# Patient Record
Sex: MALE | Race: BLACK OR AFRICAN AMERICAN | Employment: OTHER | URBAN - METROPOLITAN AREA
[De-identification: names, ages, dates, MRNs, and addresses within clinical notes are randomized per-mention and may not be internally consistent; named-entity substitution may affect disease eponyms.]

---

## 2017-01-09 ENCOUNTER — TELEPHONE (OUTPATIENT)
Dept: INTERNAL MEDICINE | Facility: CLINIC | Age: 72
End: 2017-01-09

## 2017-01-09 NOTE — TELEPHONE ENCOUNTER
Pt's daughter, Annetta called  She would like a call back to discuss all of her father's medications and how he should be taking them  Please call [phone] to leave a detailed message

## 2017-01-16 ENCOUNTER — TELEPHONE (OUTPATIENT)
Dept: INTERNAL MEDICINE | Facility: CLINIC | Age: 72
End: 2017-01-16

## 2017-01-16 NOTE — TELEPHONE ENCOUNTER
Pt's daughter Annetta is calling, asking if pt has recently had his prostate lab checked. Last lab for PSA was March 2016. Per daughter, Pt has not seen a Urologist, but he is being prescribed 2 meds by a Urologist. This was not mentioned when he came in Dec for exam with Dr. Castillo. Advised daughter to schedule f/u with PCP to discuss her concerns with prostate and have labs done.

## 2017-02-15 ENCOUNTER — OFFICE VISIT (OUTPATIENT)
Dept: INTERNAL MEDICINE | Facility: CLINIC | Age: 72
End: 2017-02-15
Payer: COMMERCIAL

## 2017-02-15 ENCOUNTER — RADIANT APPOINTMENT (OUTPATIENT)
Dept: GENERAL RADIOLOGY | Facility: CLINIC | Age: 72
End: 2017-02-15
Attending: INTERNAL MEDICINE
Payer: COMMERCIAL

## 2017-02-15 VITALS
BODY MASS INDEX: 42.15 KG/M2 | OXYGEN SATURATION: 90 % | TEMPERATURE: 98.3 F | HEART RATE: 100 BPM | HEIGHT: 65 IN | WEIGHT: 253 LBS | DIASTOLIC BLOOD PRESSURE: 80 MMHG | SYSTOLIC BLOOD PRESSURE: 130 MMHG

## 2017-02-15 DIAGNOSIS — R07.89 CHEST WALL PAIN: ICD-10-CM

## 2017-02-15 DIAGNOSIS — R05.9 COUGH: ICD-10-CM

## 2017-02-15 DIAGNOSIS — R07.89 CHEST WALL PAIN: Primary | ICD-10-CM

## 2017-02-15 DIAGNOSIS — J44.9 CHRONIC OBSTRUCTIVE PULMONARY DISEASE, UNSPECIFIED COPD TYPE (H): Chronic | ICD-10-CM

## 2017-02-15 DIAGNOSIS — E66.01 MORBID OBESITY DUE TO EXCESS CALORIES (H): ICD-10-CM

## 2017-02-15 PROCEDURE — 99214 OFFICE O/P EST MOD 30 MIN: CPT | Performed by: INTERNAL MEDICINE

## 2017-02-15 PROCEDURE — 71020 XR CHEST 2 VW: CPT

## 2017-02-15 RX ORDER — ALBUTEROL SULFATE 90 UG/1
2 AEROSOL, METERED RESPIRATORY (INHALATION) EVERY 6 HOURS PRN
Qty: 3 INHALER | Refills: 1 | Status: SHIPPED | OUTPATIENT
Start: 2017-02-15 | End: 2017-12-06

## 2017-02-15 NOTE — MR AVS SNAPSHOT
After Visit Summary   2/15/2017    Nahun Villalobos    MRN: [id number]           Patient Information     Date Of Birth          1945        Visit Information        Provider Department      2/15/2017 9:20 AM Olgeario Castillo MD [facility]        Today's Diagnoses     Chest wall pain    -  1    Cough        Morbid obesity due to excess calories (H)        Chronic obstructive pulmonary disease, unspecified COPD type (H)           Follow-ups after your visit        Follow-up notes from your care team     Return if symptoms worsen or fail to improve.      Who to contact     If you have questions or need follow up information about today's clinic visit or your schedule please contact [facility] directly at [phone].  Normal or non-critical lab and imaging results will be communicated to you by [employer], letter or phone within 4 business days after the clinic has received the results. If you do not hear from us within 7 days, please contact the clinic through [employer] or phone. If you have a critical or abnormal lab result, we will notify you by phone as soon as possible.  Submit refill requests through [employer] or call your pharmacy and they will forward the refill request to us. Please allow 3 business days for your refill to be completed.          Additional Information About Your Visit        MyChart Information     [employer] gives you secure access to your electronic health record. If you see a primary care provider, you can also send messages to your care team and make appointments. If you have questions, please call your primary care clinic.  If you do not have a primary care provider, please call [phone] and they will assist you.        Care EveryWhere ID     This is your Care EveryWhere ID. This could be used by other organizations to access your [facility] medical records  [id number]        Your Vitals Were     Pulse Temperature Height  "Pulse Oximetry BMI (Body Mass Index)       100 98.3  F (36.8  C) (Oral) 5' 5\" (1.651 m) 90% 42.1 kg/m2        Blood Pressure from Last 3 Encounters:   02/15/17 130/80   12/13/16 128/72   09/20/16 152/84    Weight from Last 3 Encounters:   02/15/17 253 lb (114.8 kg)   12/13/16 257 lb (116.6 kg)   09/20/16 247 lb 14.4 oz (112.4 kg)                 Today's Medication Changes          These changes are accurate as of: 2/15/17  9:56 AM.  If you have any questions, ask your nurse or doctor.               Start taking these medicines.        Dose/Directions    fluticasone-salmeterol 250-50 MCG/DOSE diskus inhaler   Commonly known as:  ADVAIR   Used for:  Chronic obstructive pulmonary disease, unspecified COPD type (H)   Started by:  Olegario Castillo MD        Dose:  1 puff   Inhale 1 puff into the lungs 2 times daily   Quantity:  1 Inhaler   Refills:  5            Where to get your medicines      These medications were sent to [facility]/pharmacy #[id number] - [address] [address]     Phone:  [phone]     albuterol 108 (90 BASE) MCG/ACT Inhaler    fluticasone-salmeterol 250-50 MCG/DOSE diskus inhaler                Primary Care Provider Office Phone # Fax #    Olegario Castillo [phone]       [facility] [address] [facility] [address]        Thank you!     Thank you for choosing [facility]  for your care. Our goal is always to provide you with excellent care. Hearing back from our patients is one way we can continue to improve our services. Please take a few minutes to complete the written survey that you may receive in the mail after your visit with us. Thank you!             Your Updated Medication List - Protect others around you: Learn how to safely use, store and throw away your medicines at www.disposemymeds.org.          This list is accurate as of: 2/15/17  9:56 AM.  Always use your most recent med " list.                   Brand Name Dispense Instructions for use    albuterol 108 (90 BASE) MCG/ACT Inhaler    PROAIR HFA/PROVENTIL HFA/VENTOLIN HFA    3 Inhaler    Inhale 2 puffs into the lungs every 6 hours as needed for shortness of breath / dyspnea or wheezing       aspirin 81 MG tablet     30 tablet    Take 1 tablet (81 mg) by mouth daily       atorvastatin 10 MG tablet    LIPITOR    90 tablet    Take 1 tablet (10 mg) by mouth daily       blood glucose monitoring lancets     1 Box    Use to test blood sugar 2 times daily or as directed.       blood glucose monitoring test strip    ACCU-CHEK CHACHA    60 strip    Use to test blood sugar 2 times daily or as directed.       erythromycin ophthalmic ointment    ROMYCIN    1 Tube    Place 0.25 inches Into the left eye At Bedtime       fluticasone-salmeterol 250-50 MCG/DOSE diskus inhaler    ADVAIR    1 Inhaler    Inhale 1 puff into the lungs 2 times daily       furosemide 20 MG tablet    LASIX    360 tablet    Take 2 tablets (40 mg) by mouth 2 times daily       lisinopril 40 MG tablet    PRINIVIL/ZESTRIL    90 tablet    Take 1 tablet (40 mg) by mouth daily       metFORMIN 500 MG tablet    GLUCOPHAGE    180 tablet    Take 1 tablet (500 mg) by mouth 2 times daily (with meals)       order for DME      Equipment delivered; Respironics 760S BIPAP with heated humidification and heated tubing.  Pressure 15/7cm. Respironics Syeda View FF mask (Medium)       * order for DME     1 Device    Oxygen 2 Li/min via nasal cannula at night       * order for DME     1 Device    Oxygen 2 Li/min at night via nasal cannula       * order for DME     1 Package    Equipment being ordered: diabetic shoes, 1 pair       * Notice:  This list has 3 medication(s) that are the same as other medications prescribed for you. Read the directions carefully, and ask your doctor or other care provider to review them with you.

## 2017-02-15 NOTE — NURSING NOTE
"Chief Complaint   Patient presents with     Chest Pain       Initial /80 (BP Location: Left arm, Patient Position: Chair, Cuff Size: Adult Large)  Pulse 100  Temp 98.3  F (36.8  C) (Oral)  Ht 5' 5\" (1.651 m)  Wt 253 lb (114.8 kg)  SpO2 90%  BMI 42.1 kg/m2 Estimated body mass index is 42.1 kg/(m^2) as calculated from the following:    Height as of this encounter: 5' 5\" (1.651 m).    Weight as of this encounter: 253 lb (114.8 kg).  Medication Reconciliation: complete   Betina Renee CMA      "

## 2017-02-15 NOTE — PROGRESS NOTES
"  SUBJECTIVE:                                                    Keely Villalobos is a 71 year old male who presents to clinic today for the following health issues:      Chest Wall Pain      Onset: 1 month     Description (location/character/radiation/duration): Left side chest pain/ \"sore muscle\"     Intensity:  moderate    Accompanying signs and symptoms:        Shortness of breath: no       Sweating: YES       Nausea/vomitting: no       Palpitations: no        Other (fevers/chills/cough/heartburn/lightheadedness): YES- extreme coughing , lightheadedness, hard time catching breath     History (similar episodes/previous evaluation): Hx copd     Precipitating or alleviating factors:       Worse with exertion: no- worse with bending        Worse with breathing: no        Related to eating: no        Better with burping: no     Therapies tried and outcome: None     GATED MYOCARDIAL PERFUSION SCINTIGRAPHY WITH INTRAVENOUS PHARMACOLOGIC  VASODILATATION LEXISCAN -TWO DAY STUDY     6/4/2015 10:23 AM KEELY VILLALOBOS 69 years Male 1945.     Indication/Clinical History: Shortness of breath with edema  Rest performed on 5/26/2015 and stress performed on 6/4/2015  Impression  1. Myocardial perfusion imaging using single isotope technique  demonstrated moderate size relatively fixed  inferior/inferoseptal/inferolateral defect extending from the base to  the apex rest greater than stress most likely consistent with  diaphragm attenuation/bowel uptake artifact; no significant perfusion  defect consistent with significant ischemia or infarct..   2. Gated images demonstrated normal size left ventricle with good  overall contractility and no significant regional wall motion  abnormality. The left ventricular systolic function is normal with  ejection fraction of 67%.  3. Compared to the prior study from not applicable .  Problem list and histories reviewed & adjusted, as indicated.  Additional history: as " documented    Patient Active Problem List   Diagnosis     Essential hypertension, benign     Tobacco use disorder     Lumbago     Impotence of organic origin     Advance care planning     Polyp of colon     Elevated PSA     Sleep related hypoventilation/hypoxemia in other disease     Hyperlipidemia LDL goal <100     Morbid obesity due to excess calories (H)     BPPV (benign paroxysmal positional vertigo), unspecified laterality     Chronic obstructive pulmonary disease, unspecified COPD type (H)     Type 2 diabetes mellitus without complication, without long-term current use of insulin (H)     Past Surgical History   Procedure Laterality Date     C nonspecific procedure       cholecystectomy     Cholecystectomy       Appendectomy         Social History   Substance Use Topics     Smoking status: Former Smoker     Packs/day: 1.00     Years: 52.00     Types: Cigarettes     Quit date: 6/1/2013     Smokeless tobacco: Never Used     Alcohol use No     Family History   Problem Relation Age of Onset     Hypertension Mother      C.A.D. Mother      ANEURYSM     Cancer - colorectal Mother      CANCER Mother      OVARIAN     Hypertension Sister      Breast Cancer Sister      CEREBROVASCULAR DISEASE Father          Current Outpatient Prescriptions   Medication Sig Dispense Refill     order for DME Equipment being ordered: diabetic shoes, 1 pair 1 Package 0     lisinopril (PRINIVIL/ZESTRIL) 40 MG tablet Take 1 tablet (40 mg) by mouth daily 90 tablet 3     atorvastatin (LIPITOR) 10 MG tablet Take 1 tablet (10 mg) by mouth daily 90 tablet 3     metFORMIN (GLUCOPHAGE) 500 MG tablet Take 1 tablet (500 mg) by mouth 2 times daily (with meals) 180 tablet 3     furosemide (LASIX) 20 MG tablet Take 2 tablets (40 mg) by mouth 2 times daily 360 tablet 3     erythromycin (ROMYCIN) ophthalmic ointment Place 0.25 inches Into the left eye At Bedtime 1 Tube 0     albuterol (PROAIR HFA, PROVENTIL HFA, VENTOLIN HFA) 108 (90 BASE) MCG/ACT inhaler  "Inhale 2 puffs into the lungs every 6 hours as needed for shortness of breath / dyspnea or wheezing 3 Inhaler 1     aspirin 81 MG tablet Take 1 tablet (81 mg) by mouth daily 30 tablet 11     order for DME Oxygen 2 Li/min  at night via nasal cannula 1 Device 0     order for DME Oxygen 2 Li/min via nasal cannula  at night 1 Device 0     order for DME Equipment delivered; Respironics 760S BIPAP with heated humidification and heated tubing.  Pressure 15/7cm. Respironics Syeda View FF mask (Medium)       blood glucose (ACCU-CHEK CHACHA) test strip Use to test blood sugar 2 times daily or as directed. 60 strip 6     blood glucose monitoring (SOFTCLIX) lancets Use to test blood sugar 2 times daily or as directed. 1 Box 6     BP Readings from Last 3 Encounters:   12/13/16 128/72   09/20/16 152/84   05/05/16 136/78    Wt Readings from Last 3 Encounters:   12/13/16 257 lb (116.6 kg)   09/20/16 247 lb 14.4 oz (112.4 kg)   05/05/16 256 lb (116.1 kg)            ROS:  C: NEGATIVE for fever, chills, change in weight  E: NEGATIVE for vision changes or irritation  E/M: NEGATIVE for ear, mouth and throat problems  CV: NEGATIVE for chest pain, palpitations or peripheral edema  GI: NEGATIVE for nausea, abdominal pain, heartburn, or change in bowel habits  : NEGATIVE for frequency, dysuria, or hematuria  M: NEGATIVE for significant arthralgias or myalgia  H: NEGATIVE for bleeding problems  P: NEGATIVE for changes in mood or affect    OBJECTIVE:                                                    /80 (BP Location: Left arm, Patient Position: Chair, Cuff Size: Adult Large)  Pulse 100  Temp 98.3  F (36.8  C) (Oral)  Ht 5' 5\" (1.651 m)  Wt 253 lb (114.8 kg)  SpO2 90%  BMI 42.1 kg/m2  Body mass index is 42.1 kg/(m^2).  GENERAL:  alert and no distress  EYES: Eyes grossly normal to inspection, extraocular movements - intact, and PERRL  HENT: ear canals- normal; TMs- normal; Nose- normal; Mouth- no ulcers, no lesions  NECK: no tenderness, " no adenopathy, no asymmetry, no masses, no stiffness; thyroid- normal to palpation  RESP: lungs clear to auscultation - no rales, no rhonchi, no wheezes  CV: regular rates and rhythm, normal S1 S2, no S3 or S4 and no murmur, no click or rub - ++ chest wall pain left upper chest reproducible as mimicking symptoms  ABDOMEN: obese  MS: extremities- no gross deformities noted  PSYCH: Alert and oriented times 3; speech- coherent , normal rate and volume; able to articulate logical thoughts, able to abstract reason, no tangential thoughts, no hallucinations or delusions, affect- normal     ASSESSMENT/PLAN:                                                      (R07.89) Chest wall pain  (primary encounter diagnosis)  Comment: as ordered, appears as distinct chest wall pain  Plan: XR Chest 2 Views            (R05) Cough  Comment: start trial inhaler  Plan: XR Chest 2 Views            (E66.01) Morbid obesity due to excess calories (H)  Comment: weight loss discussed  Plan:     (J44.9) Chronic obstructive pulmonary disease, unspecified COPD type (H)  Comment: start trial Advair  Plan: albuterol (PROAIR HFA/PROVENTIL HFA/VENTOLIN         HFA) 108 (90 BASE) MCG/ACT Inhaler              See Patient Instructions    Olegario Castillo MD  [facility]    25 minutes spent with this patient, face to face, discussing treatment options for listed problems above as well as side effects of appropriate medications.  Counseling time extended beyond 50% of the clinic visit.  Medication dosing, treatment plan and follow-up were discussed. Also reviewed need for primary care testing for patient.

## 2017-03-01 ENCOUNTER — PRE VISIT (OUTPATIENT)
Dept: UROLOGY | Facility: CLINIC | Age: 72
End: 2017-03-01

## 2017-03-02 ENCOUNTER — MEDICAL CORRESPONDENCE (OUTPATIENT)
Dept: HEALTH INFORMATION MANAGEMENT | Facility: CLINIC | Age: 72
End: 2017-03-02

## 2017-03-14 ENCOUNTER — TELEPHONE (OUTPATIENT)
Dept: NURSING | Facility: CLINIC | Age: 72
End: 2017-03-14

## 2017-03-14 NOTE — TELEPHONE ENCOUNTER
Pt has a head cold, very congested.  Last few days ears have felt plugged, fluid filled.  Is using Coricidin without relief.  Wondering with his htn, okay to try a few days of a real decongestant- like pseudefed?  Please let him know, or any other suggestions you may have.

## 2017-03-14 NOTE — TELEPHONE ENCOUNTER
Would avoid any traditional OTC antihistamines as may increase BP.  May benefit form trial of Flonase as well as Ana pot.

## 2017-03-31 ENCOUNTER — OFFICE VISIT (OUTPATIENT)
Dept: UROLOGY | Facility: CLINIC | Age: 72
End: 2017-03-31

## 2017-03-31 VITALS
SYSTOLIC BLOOD PRESSURE: 135 MMHG | BODY MASS INDEX: 43.47 KG/M2 | HEART RATE: 80 BPM | WEIGHT: 260.9 LBS | HEIGHT: 65 IN | DIASTOLIC BLOOD PRESSURE: 85 MMHG

## 2017-03-31 DIAGNOSIS — R97.20 ELEVATED PROSTATE SPECIFIC ANTIGEN (PSA): ICD-10-CM

## 2017-03-31 DIAGNOSIS — R39.15 URINARY URGENCY: Primary | ICD-10-CM

## 2017-03-31 LAB — PSA SERPL-MCNC: 1.74 UG/L (ref 0–4)

## 2017-03-31 RX ORDER — TAMSULOSIN HYDROCHLORIDE 0.4 MG/1
0.4 CAPSULE ORAL DAILY
Qty: 60 CAPSULE | Refills: 3 | Status: SHIPPED | OUTPATIENT
Start: 2017-03-31 | End: 2018-05-11

## 2017-03-31 ASSESSMENT — ENCOUNTER SYMPTOMS
FEVER: 0
DYSURIA: 0
TROUBLE SWALLOWING: 0
ARTHRALGIAS: 0
ABDOMINAL PAIN: 0
SNORES LOUDLY: 1
PANIC: 0
SORE THROAT: 0
PARALYSIS: 0
LIGHT-HEADEDNESS: 1
DEPRESSION: 1
MUSCLE CRAMPS: 1
MUSCLE WEAKNESS: 0
SINUS CONGESTION: 0
WEIGHT GAIN: 1
COUGH: 1
TREMORS: 0
FATIGUE: 0
HALLUCINATIONS: 0
LOSS OF CONSCIOUSNESS: 0
NERVOUS/ANXIOUS: 0
ALTERED TEMPERATURE REGULATION: 0
EYE PAIN: 0
DECREASED APPETITE: 0
RESPIRATORY PAIN: 1
HOARSE VOICE: 1
POLYPHAGIA: 1
EYE WATERING: 1
HEARTBURN: 0
JAUNDICE: 0
EYE IRRITATION: 0
RECTAL BLEEDING: 0
STIFFNESS: 0
JOINT SWELLING: 0
HYPERTENSION: 1
SMELL DISTURBANCE: 0
NAUSEA: 0
CLAUDICATION: 1
DIZZINESS: 0
SPUTUM PRODUCTION: 1
EXERCISE INTOLERANCE: 1
MYALGIAS: 0
ORTHOPNEA: 0
HEMOPTYSIS: 0
BLOATING: 0
BACK PAIN: 0
NECK MASS: 0
VOMITING: 0
NECK PAIN: 0
SINUS PAIN: 0
TASTE DISTURBANCE: 0
EYE REDNESS: 0
NUMBNESS: 1
INCREASED ENERGY: 0
LEG PAIN: 1
LEG SWELLING: 1
INSOMNIA: 0
BLOOD IN STOOL: 0
PALPITATIONS: 0
RECTAL PAIN: 0
DIFFICULTY URINATING: 1
DOUBLE VISION: 1
DYSPNEA ON EXERTION: 1
NIGHT SWEATS: 0
TINGLING: 1
SHORTNESS OF BREATH: 1
HEADACHES: 0
DIARRHEA: 1
HYPOTENSION: 0
POLYDIPSIA: 1
POSTURAL DYSPNEA: 0
FLANK PAIN: 0
MEMORY LOSS: 0
TACHYCARDIA: 0
SEIZURES: 0
WHEEZING: 1
SPEECH CHANGE: 0
WEAKNESS: 0
DECREASED CONCENTRATION: 0
WEIGHT LOSS: 0
CHILLS: 0
SYNCOPE: 0
COUGH DISTURBING SLEEP: 0
CONSTIPATION: 0
HEMATURIA: 0
BOWEL INCONTINENCE: 1
SLEEP DISTURBANCES DUE TO BREATHING: 0
DISTURBANCES IN COORDINATION: 0

## 2017-03-31 ASSESSMENT — PAIN SCALES - GENERAL: PAINLEVEL: NO PAIN (0)

## 2017-03-31 NOTE — PROGRESS NOTES
It was my pleasure to see Nahun Villalobos a 71 year old year old male today. Patient was seen in consultation for elevated PSA and lower urinary tract symptoms.    HPI:   [facility] previously seen at [facility].   Was seen for elevated PSA and lower urinary tract symptoms.   Patient s/p prostate biopsy four years ago for elevated PSA.   PSA was 7.5 six months ago and six months prior to that was 5.22.   Had lower urinary tract symptoms similar to listed below. Was prescribed tamsulosin and finasteride but unclear if patient started these rx.     Patient with urgency, nocturia 2-4x, frequency, incomplete emptying.   Notes variable stream from dribble to moderate stream.       Past Medical History:   Diagnosis Date     DM (diabetes mellitus) (H)      Essential hypertension, benign      Hemorrhage of rectum and anus      Obesity      Personal history of colonic polyps      Tobacco use disorder        Past Surgical History:   Procedure Laterality Date     APPENDECTOMY       C NONSPECIFIC PROCEDURE      cholecystectomy     CHOLECYSTECTOMY         Family History   Problem Relation Age of Onset     Hypertension Mother      C.A.D. Mother      ANEURYSM     Cancer - colorectal Mother      CANCER Mother      OVARIAN     Hypertension Sister      Breast Cancer Sister      CEREBROVASCULAR DISEASE Father        Current Outpatient Prescriptions   Medication Sig Dispense Refill     albuterol (PROAIR HFA/PROVENTIL HFA/VENTOLIN HFA) 108 (90 BASE) MCG/ACT Inhaler Inhale 2 puffs into the lungs every 6 hours as needed for shortness of breath / dyspnea or wheezing 3 Inhaler 1     fluticasone-salmeterol (ADVAIR) 250-50 MCG/DOSE diskus inhaler Inhale 1 puff into the lungs 2 times daily 1 Inhaler 5     order for DME Equipment being ordered: diabetic shoes, 1 pair 1 Package 0     lisinopril (PRINIVIL/ZESTRIL) 40 MG tablet Take 1 tablet (40 mg) by mouth daily 90 tablet 3     atorvastatin (LIPITOR) 10 MG tablet Take 1 tablet (10 mg) by mouth  "daily 90 tablet 3     metFORMIN (GLUCOPHAGE) 500 MG tablet Take 1 tablet (500 mg) by mouth 2 times daily (with meals) 180 tablet 3     furosemide (LASIX) 20 MG tablet Take 2 tablets (40 mg) by mouth 2 times daily 360 tablet 3     order for DME Oxygen 2 Li/min  at night via nasal cannula 1 Device 0     erythromycin (ROMYCIN) ophthalmic ointment Place 0.25 inches Into the left eye At Bedtime 1 Tube 0     order for DME Oxygen 2 Li/min via nasal cannula  at night 1 Device 0     aspirin 81 MG tablet Take 1 tablet (81 mg) by mouth daily 30 tablet 11     order for DME Equipment delivered; Respironics 760S BIPAP with heated humidification and heated tubing.  Pressure 15/7cm. Respironics Syeda View FF mask (Medium)       blood glucose (ACCU-CHEK CHACHA) test strip Use to test blood sugar 2 times daily or as directed. 60 strip 6     blood glucose monitoring (SOFTCLIX) lancets Use to test blood sugar 2 times daily or as directed. 1 Box 6       ALLERGIES: No known drug allergies      REVIEW OF SYSTEMS:  Skin: negative  Eyes: negative  Ears/Nose/Throat: negative  Respiratory: No shortness of breath, dyspnea on exertion, cough, or hemoptysis  Cardiovascular: negative  Gastrointestinal: negative  Genitourinary: as above  Musculoskeletal: negative  Neurologic: negative  Psychiatric: negative  Hematologic/Lymphatic/Immunologic: negative  Endocrine: negative      GENERAL PHYSICAL EXAM:   Vitals: /85  Pulse 80  Ht 1.651 m (5' 5\")  Wt 118.3 kg (260 lb 14.4 oz)  BMI 43.42 kg/m2  Body mass index is 43.42 kg/(m^2).  Constitutional: healthy, alert and no distress  Head: Normocephalic  Neck: Neck supple  Cardiovascular: negative  Respiratory: negative,  Gastrointestinal: Abdomen soft, non-tender.  Musculoskeletal: extremities normal-  Skin: no suspicious lesions or rashes  Neurologic: Gait normal  Psychiatric: affect normal/bright and mentation appears normal.      RADIOLOGY: The following tests were reviewed: Need to complete the " following Radiology exams prior to the office appointment:  LABS: The last test results for Needs to complete the necessary tests prior to appointment: were reviewed.     ASSESSMENT: 72 y/o M with fluctuating PSA, s/p one negative biopsy as well as lower urinary tract symptoms      PLAN:   Repeat PSA today   MRI prostate to better assess the prostate   Start trial of tamsulosin   Follow up in 6-8 weeks       I spent over 15 minutes with the patient.  Over half this time was spent on counseling for elevated PSA and lower urinary tract symptoms.      Answers for HPI/ROS submitted by the patient on 3/31/2017   General Symptoms: Yes  Skin Symptoms: No  HENT Symptoms: Yes  EYE SYMPTOMS: Yes  HEART SYMPTOMS: Yes  LUNG SYMPTOMS: Yes  INTESTINAL SYMPTOMS: Yes  URINARY SYMPTOMS: Yes  REPRODUCTIVE SYMPTOMS: No  SKELETAL SYMPTOMS: Yes  BLOOD SYMPTOMS: No  NERVOUS SYSTEM SYMPTOMS: Yes  MENTAL HEALTH SYMPTOMS: Yes  Fever: No  Loss of appetite: No  Weight loss: No  Weight gain: Yes  Fatigue: No  Night sweats: No  Chills: No  Increased stress: No  Excessive hunger: Yes  Excessive thirst: Yes  Feeling hot or cold when others believe the temperature is normal: No  Loss of height: Yes  Post-operative complications: No  Surgical site pain: No  Hallucinations: No  Change in or Loss of Energy: No  Hyperactivity: No  Confusion: No  Ear pain: No  Ear discharge: No  Hearing loss: Yes  Tinnitus: No  Nosebleeds: No  Congestion: No  Sinus pain: No  Trouble swallowing: No   Voice hoarseness: Yes  Mouth sores: No  Sore throat: No  Tooth pain: No  Gum tenderness: No  Bleeding gums: No  Change in taste: No  Change in sense of smell: No  Dry mouth: Yes  Hearing aid used: No  Neck lump: No  Eye pain: No  Vision loss: Yes  Dry eyes: Yes  Watery eyes: Yes  Eye bulging: No  Double vision: Yes  Flashing of lights: No  Spots: Yes  Floaters: Yes  Redness: No  Crossed eyes: No  Tunnel Vision: No  Yellowing of eyes: No  Eye irritation: No  Cough:  Yes  Sputum or phlegm: Yes  Coughing up blood: No  Difficulty breating or shortness of breath: Yes  Snoring: Yes  Wheezing: Yes  Difficulty breathing on exertion: Yes  Respiratory pain: Yes  Nighttime Cough: No  Difficulty breathing when lying flat: No  Chest pain or pressure: Yes  Fast or irregular heartbeat: No  Pain in legs with walking: Yes  Swelling in feet or ankles: Yes  Trouble breathing while lying down: No  Fingers or Toes appear blue: No  High blood pressure: Yes  Low blood pressure: No  Fainting: No  Murmurs: No  Chest pain on exertion: No  Chest pain at rest: No  Cramping pain in leg during exercise: Yes  Pacemaker: No  Varicose veins: Yes  Edema or swelling: Yes  Fast heart beat: No  Wake up at night with shortness of breath: No  Heart flutters: No  Light-headedness: Yes  Exercise intolerance: Yes  Heart burn or indigestion: No  Nausea: No  Vomiting: No  Abdominal pain: No  Bloating: No  Constipation: No  Diarrhea: Yes  Blood in stool: No  Black stools: No  Rectal or Anal pain: No  Fecal incontinence: Yes  Rectal bleeding: No  Yellowing of skin or eyes: No  Vomit with blood: No  Change in stools: Yes  Trouble holding urine or incontinence: Yes  Pain or burning: No  Trouble starting or stopping: Yes  Increased frequency of urination: Yes  Blood in urine: No  Decreased frequency of urination: No  Frequent nighttime urination: Yes  Flank pain: No  Difficulty emptying bladder: Yes  Back pain: No  Muscle aches: No  Neck pain: No  Swollen joints: No  Joint pain: No  Bone pain: No  Muscle cramps: Yes  Muscle weakness: No  Joint stiffness: No  Bone fracture: No  Trouble with coordination: No  Dizziness or trouble with balance: No  Fainting or black-out spells: No  Memory loss: No  Headache: No  Seizures: No  Speech problems: No  Tingling: Yes  Tremor: No  Weakness: No  Difficulty walking: No  Paralysis: No  Numbness: Yes  Nervous or Anxious: No  Depression: Yes  Trouble sleeping: No  Trouble thinking or  concentrating: No  Mood changes: No  Panic attacks: No

## 2017-03-31 NOTE — MR AVS SNAPSHOT
After Visit Summary   3/31/2017    Nahun Villalobos    MRN: [id number]           Patient Information     Date Of Birth          1945        Visit Information        Provider Department      3/31/2017 10:00 AM Praveena Burris MD [facility] Urology and [facility] for Prostate and Urologic Cancers        Today's Diagnoses     Urinary urgency    -  1    Elevated prostate specific antigen (PSA)          Care Instructions    PSA today, schedule an MRI, and return in 6-8 weeks for follow up of your medication.          Follow-ups after your visit        Your next 10 appointments already scheduled     Mar 31, 2017 11:45 AM CDT   LAB with  LAB   [facility] Lab ([facility])    [address] [address] [phone]           Patient must bring picture ID.  Patient should be prepared to give a urine specimen  Please do not eat 10-12 hours before your appointment if you are coming in fasting for labs on lipids, cholesterol, or glucose (sugar).  Pregnant women should follow their Care Team instructions. Water with medications is okay. Do not drink coffee or other fluids.   If you have concerns about taking  your medications, please ask at office or if scheduling via [employer], send a message by clicking on Secure Messaging, Message Your Care Team.            Apr 12, 2017  4:00 PM CDT   (Arrive by 3:45 PM)   MR PROSTATE with [id number]   [facility] [place] [facility] ([facility])    [address] [place] [id number] [phone]           Take your medicines as usual, unless your doctor tells you not to. Bring a list of your current medicines to your exam (including vitamins, minerals and over-the-counter drugs). Also bring the results of similar scans you may have had.  Please remove any body piercings and hair extensions before you arrive.  Follow your doctor s orders. If you do not, we may have to  postpone your exam.  You will not have contrast for this exam. You do not need to do anything special to prepare.  The MRI machine uses a strong magnet. Please wear clothes without metal (snaps, zippers). A sweatsuit works well, or we may give you a hospital gown.   **IMPORTANT** THE INSTRUCTIONS BELOW ARE ONLY FOR THOSE PATIENTS WHO HAVE BEEN TOLD THEY WILL RECEIVE SEDATION OR GENERAL ANESTHESIA DURING THEIR MRI PROCEDURE:  IF YOU WILL RECEIVE SEDATION (take medicine to help you relax during your exam):   You must get the medicine from your doctor before you arrive. Bring the medicine to the exam. Do not take it at home.   Arrive one hour early. Bring someone who can take you home after the test. Your medicine will make you sleepy. After the exam, you may not drive, take a bus or take a taxi by yourself.   No eating 8 hours before your exam. You may have clear liquids up until 4 hours before your exam. (Clear liquids include water, clear tea, black coffee and fruit juice without pulp.)  IF YOU WILL RECEIVE ANESTHESIA (be asleep for your exam):   Arrive 1 1/2 hours early. Bring someone who can take you home after the test. You may not drive, take a bus or take a taxi by yourself.   No eating 8 hours before your exam. You may have clear liquids up until 4 hours before your exam. (Clear liquids include water, clear tea, black coffee and fruit juice without pulp.)   You will spend four to five hours in the recovery room.  Please call the Imaging Department at your exam site with any questions.            Jun 02, 2017 10:00 AM CDT   (Arrive by 9:45 AM)   Return Visit with Praveena Burris MD   [facility] Urology and [facility] for Prostate and Urologic Cancers ([facility])    [address] [facility]  [address] [facility] [address] [phone]              Future tests that were ordered for you today     Open Future Orders        Priority Expected Expires Ordered    PSA tumor marker Routine  " 3/31/2018 3/31/2017    MRI Prostate Routine  3/31/2018 3/31/2017            Who to contact     Please call your clinic at [phone] to:    Ask questions about your health    Make or cancel appointments    Discuss your medicines    Learn about your test results    Speak to your doctor   If you have compliments or concerns about an experience at your clinic, or if you wish to file a complaint, please contact [facility] Patient Relations at [phone] or email us at [email or web address]         Additional Information About Your Visit        [employer] Information     [employer] gives you secure access to your electronic health record. If you see a primary care provider, you can also send messages to your care team and make appointments. If you have questions, please call your primary care clinic.  If you do not have a primary care provider, please call [phone] and they will assist you.      [employer] is an electronic gateway that provides easy, online access to your medical records. With [employer], you can request a clinic appointment, read your test results, renew a prescription or communicate with your care team.     To access your existing account, please contact your [facility] Clinic or call [phone] for assistance.        Care EveryWhere ID     This is your Care EveryWhere ID. This could be used by other organizations to access your [place] medical records  [id number]        Your Vitals Were     Pulse Height BMI (Body Mass Index)             80 1.651 m (5' 5\") 43.42 kg/m2          Blood Pressure from Last 3 Encounters:   03/31/17 135/85   02/15/17 130/80   12/13/16 128/72    Weight from Last 3 Encounters:   03/31/17 118.3 kg (260 lb 14.4 oz)   02/15/17 114.8 kg (253 lb)   12/13/16 116.6 kg (257 lb)              We Performed the Following     COMPLEX UROFLOWMETRY     POST-VOID RESIDUAL BLADDER SCAN          Today's Medication Changes        "   These changes are accurate as of: 3/31/17 11:31 AM.  If you have any questions, ask your nurse or doctor.               Start taking these medicines.        Dose/Directions    tamsulosin 0.4 MG capsule   Commonly known as:  FLOMAX   Used for:  Urinary urgency   Started by:  Praveena Burris MD        Dose:  0.4 mg   Take 1 capsule (0.4 mg) by mouth daily   Quantity:  60 capsule   Refills:  3            Where to get your medicines      These medications were sent to [facility]/pharmacy #[id number] [place], [place] - [address] [facility]  [address] [place] [address]     Phone:  [phone]     tamsulosin 0.4 MG capsule                Primary Care Provider Office Phone # Fax #    Olegario Castillo [phone]       [facility] [address] [facility] [address]        Thank you!     Thank you for choosing [facility] UROLOGY AND [facility] FOR PROSTATE AND UROLOGIC CANCERS  for your care. Our goal is always to provide you with excellent care. Hearing back from our patients is one way we can continue to improve our services. Please take a few minutes to complete the written survey that you may receive in the mail after your visit with us. Thank you!             Your Updated Medication List - Protect others around you: Learn how to safely use, store and throw away your medicines at www.disposemymeds.org.          This list is accurate as of: 3/31/17 11:31 AM.  Always use your most recent med list.                   Brand Name Dispense Instructions for use    albuterol 108 (90 BASE) MCG/ACT Inhaler    PROAIR HFA/PROVENTIL HFA/VENTOLIN HFA    3 Inhaler    Inhale 2 puffs into the lungs every 6 hours as needed for shortness of breath / dyspnea or wheezing       aspirin 81 MG tablet     30 tablet    Take 1 tablet (81 mg) by mouth daily       atorvastatin 10 MG tablet    LIPITOR    90 tablet    Take 1 tablet (10 mg) by mouth daily       blood glucose monitoring lancets     1 Box    Use to  test blood sugar 2 times daily or as directed.       blood glucose monitoring test strip    ACCU-CHEK CHACHA    60 strip    Use to test blood sugar 2 times daily or as directed.       erythromycin ophthalmic ointment    ROMYCIN    1 Tube    Place 0.25 inches Into the left eye At Bedtime       fluticasone-salmeterol 250-50 MCG/DOSE diskus inhaler    ADVAIR    1 Inhaler    Inhale 1 puff into the lungs 2 times daily       furosemide 20 MG tablet    LASIX    360 tablet    Take 2 tablets (40 mg) by mouth 2 times daily       lisinopril 40 MG tablet    PRINIVIL/ZESTRIL    90 tablet    Take 1 tablet (40 mg) by mouth daily       metFORMIN 500 MG tablet    GLUCOPHAGE    180 tablet    Take 1 tablet (500 mg) by mouth 2 times daily (with meals)       order for DME      Equipment delivered; Respironics 760S BIPAP with heated humidification and heated tubing.  Pressure 15/7cm. Respironics Syeda View FF mask (Medium)       * order for DME     1 Device    Oxygen 2 Li/min via nasal cannula at night       * order for DME     1 Device    Oxygen 2 Li/min at night via nasal cannula       * order for DME     1 Package    Equipment being ordered: diabetic shoes, 1 pair       tamsulosin 0.4 MG capsule    FLOMAX    60 capsule    Take 1 capsule (0.4 mg) by mouth daily       * Notice:  This list has 3 medication(s) that are the same as other medications prescribed for you. Read the directions carefully, and ask your doctor or other care provider to review them with you.

## 2017-03-31 NOTE — LETTER
3/31/2017       RE: Nahun Villalobos  [address] [facility] [address]     Dear Colleague,    Thank you for referring your patient, Nahun Villalobos, to the [facility] UROLOGY AND INST FOR PROSTATE AND UROLOGIC CANCERS at [facility]. Please see a copy of my visit note below.    It was my pleasure to see Nahun Villalobos a 71 year old year old male today. Patient was seen in consultation for elevated PSA and lower urinary tract symptoms.    HPI:   Patient previously seen at [facility].   Was seen for elevated PSA and lower urinary tract symptoms.   Patient s/p prostate biopsy four years ago for elevated PSA.   PSA was 7.5 six months ago and six months prior to that was 5.22.   Had lower urinary tract symptoms similar to listed below. Was prescribed tamsulosin and finasteride but unclear if patient started these rx.     Patient with urgency, nocturia 2-4x, frequency, incomplete emptying.   Notes variable stream from dribble to moderate stream.       Past Medical History:   Diagnosis Date     DM (diabetes mellitus) (H)      Essential hypertension, benign      Hemorrhage of rectum and anus      Obesity      Personal history of colonic polyps      Tobacco use disorder        Past Surgical History:   Procedure Laterality Date     APPENDECTOMY       C NONSPECIFIC PROCEDURE      cholecystectomy     CHOLECYSTECTOMY         Family History   Problem Relation Age of Onset     Hypertension Mother      C.A.D. Mother      ANEURYSM     Cancer - colorectal Mother      CANCER Mother      OVARIAN     Hypertension Sister      Breast Cancer Sister      CEREBROVASCULAR DISEASE Father        Current Outpatient Prescriptions   Medication Sig Dispense Refill     albuterol (PROAIR HFA/PROVENTIL HFA/VENTOLIN HFA) 108 (90 BASE) MCG/ACT Inhaler Inhale 2 puffs into the lungs every 6 hours as needed for shortness of breath / dyspnea or wheezing 3 Inhaler 1     fluticasone-salmeterol (ADVAIR)  "250-50 MCG/DOSE diskus inhaler Inhale 1 puff into the lungs 2 times daily 1 Inhaler 5     order for DME Equipment being ordered: diabetic shoes, 1 pair 1 Package 0     lisinopril (PRINIVIL/ZESTRIL) 40 MG tablet Take 1 tablet (40 mg) by mouth daily 90 tablet 3     atorvastatin (LIPITOR) 10 MG tablet Take 1 tablet (10 mg) by mouth daily 90 tablet 3     metFORMIN (GLUCOPHAGE) 500 MG tablet Take 1 tablet (500 mg) by mouth 2 times daily (with meals) 180 tablet 3     furosemide (LASIX) 20 MG tablet Take 2 tablets (40 mg) by mouth 2 times daily 360 tablet 3     order for DME Oxygen 2 Li/min  at night via nasal cannula 1 Device 0     erythromycin (ROMYCIN) ophthalmic ointment Place 0.25 inches Into the left eye At Bedtime 1 Tube 0     order for DME Oxygen 2 Li/min via nasal cannula  at night 1 Device 0     aspirin 81 MG tablet Take 1 tablet (81 mg) by mouth daily 30 tablet 11     order for DME Equipment delivered; Respironics 760S BIPAP with heated humidification and heated tubing.  Pressure 15/7cm. Respironics Syeda View FF mask (Medium)       blood glucose (ACCU-CHEK CHACHA) test strip Use to test blood sugar 2 times daily or as directed. 60 strip 6     blood glucose monitoring (SOFTCLIX) lancets Use to test blood sugar 2 times daily or as directed. 1 Box 6       ALLERGIES: No known drug allergies      REVIEW OF SYSTEMS:  Skin: negative  Eyes: negative  Ears/Nose/Throat: negative  Respiratory: No shortness of breath, dyspnea on exertion, cough, or hemoptysis  Cardiovascular: negative  Gastrointestinal: negative  Genitourinary: as above  Musculoskeletal: negative  Neurologic: negative  Psychiatric: negative  Hematologic/Lymphatic/Immunologic: negative  Endocrine: negative      GENERAL PHYSICAL EXAM:   Vitals: /85  Pulse 80  Ht 1.651 m (5' 5\")  Wt 118.3 kg (260 lb 14.4 oz)  BMI 43.42 kg/m2  Body mass index is 43.42 kg/(m^2).  Constitutional: healthy, alert and no distress  Head: Normocephalic  Neck: Neck " supple  Cardiovascular: negative  Respiratory: negative,  Gastrointestinal: Abdomen soft, non-tender.  Musculoskeletal: extremities normal-  Skin: no suspicious lesions or rashes  Neurologic: Gait normal  Psychiatric: affect normal/bright and mentation appears normal.      RADIOLOGY: The following tests were reviewed: Need to complete the following Radiology exams prior to the office appointment:  LABS: The last test results for Needs to complete the necessary tests prior to appointment: were reviewed.     ASSESSMENT: 70 y/o M with fluctuating PSA, s/p one negative biopsy as well as lower urinary tract symptoms      PLAN:   Repeat PSA today   MRI prostate to better assess the prostate   Start trial of tamsulosin   Follow up in 6-8 weeks       I spent over 15 minutes with the patient.  Over half this time was spent on counseling for elevated PSA and lower urinary tract symptoms.    Again, thank you for allowing me to participate in the care of your patient.      Sincerely,    Praveena Burris MD

## 2017-03-31 NOTE — NURSING NOTE
"Chief Complaint   Patient presents with     Consult     Elevated PSA       Blood pressure 135/85, pulse 80, height 1.651 m (5' 5\"), weight 118.3 kg (260 lb 14.4 oz). Body mass index is 43.42 kg/(m^2).    Patient Active Problem List   Diagnosis     Essential hypertension, benign     Tobacco use disorder     Lumbago     Impotence of organic origin     Advance care planning     Polyp of colon     Elevated PSA     Sleep related hypoventilation/hypoxemia in other disease     Hyperlipidemia LDL goal <100     Morbid obesity due to excess calories (H)     BPPV (benign paroxysmal positional vertigo), unspecified laterality     Chronic obstructive pulmonary disease, unspecified COPD type (H)     Type 2 diabetes mellitus without complication, without long-term current use of insulin (H)       Allergies   Allergen Reactions     No Known Drug Allergies        Current Outpatient Prescriptions   Medication Sig Dispense Refill     albuterol (PROAIR HFA/PROVENTIL HFA/VENTOLIN HFA) 108 (90 BASE) MCG/ACT Inhaler Inhale 2 puffs into the lungs every 6 hours as needed for shortness of breath / dyspnea or wheezing 3 Inhaler 1     fluticasone-salmeterol (ADVAIR) 250-50 MCG/DOSE diskus inhaler Inhale 1 puff into the lungs 2 times daily 1 Inhaler 5     order for DME Equipment being ordered: diabetic shoes, 1 pair 1 Package 0     lisinopril (PRINIVIL/ZESTRIL) 40 MG tablet Take 1 tablet (40 mg) by mouth daily 90 tablet 3     atorvastatin (LIPITOR) 10 MG tablet Take 1 tablet (10 mg) by mouth daily 90 tablet 3     metFORMIN (GLUCOPHAGE) 500 MG tablet Take 1 tablet (500 mg) by mouth 2 times daily (with meals) 180 tablet 3     furosemide (LASIX) 20 MG tablet Take 2 tablets (40 mg) by mouth 2 times daily 360 tablet 3     order for DME Oxygen 2 Li/min  at night via nasal cannula 1 Device 0     erythromycin (ROMYCIN) ophthalmic ointment Place 0.25 inches Into the left eye At Bedtime 1 Tube 0     order for DME Oxygen 2 Li/min via nasal cannula  at " night 1 Device 0     aspirin 81 MG tablet Take 1 tablet (81 mg) by mouth daily 30 tablet 11     order for DME Equipment delivered; Respironics 760S BIPAP with heated humidification and heated tubing.  Pressure 15/7cm. Respironics Syeda View FF mask (Medium)       blood glucose (ACCU-CHEK CHACHA) test strip Use to test blood sugar 2 times daily or as directed. 60 strip 6     blood glucose monitoring (SOFTCLIX) lancets Use to test blood sugar 2 times daily or as directed. 1 Box 6       Social History   Substance Use Topics     Smoking status: Former Smoker     Packs/day: 1.00     Years: 52.00     Types: Cigarettes     Quit date: 6/1/2013     Smokeless tobacco: Never Used     Alcohol use No       JESUS Helms  3/31/2017  9:49 AM

## 2017-04-03 ENCOUNTER — TELEPHONE (OUTPATIENT)
Dept: SLEEP MEDICINE | Facility: CLINIC | Age: 72
End: 2017-04-03

## 2017-04-03 NOTE — TELEPHONE ENCOUNTER
Javon was calling to inform me that his wife passed away from cancer. She was also a patient of mine.   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 He says that they bought vans instead of buses, so it no longer requires DOT monitoring. He feels he does not breathe well. He does not want to go through more workup and feels he would not be able to afford a new machine or supplemental oxygen, but asks about using his wife's old CPAP. He said he is going to have to work until he dies. I told him CPAP would be better than nothing, but I would want to make sure the settings are appropriate and that he has a mask that would work well. He will bring it in at some point. He also asks for a letter stating he should not walk through the casino or spend too much time in cold air because it exacerbates his breathing issues. I advised him to call his pulmonologist for that letter as it relates to COPD more than his sleep disorders. I gave him the contact number for Dr. Haynes at [facility].  Bennett Goltz, PA-C

## 2017-04-03 NOTE — TELEPHONE ENCOUNTER
Patient called to speak to Bennett Goltz, PA-C regarding how his sleep problem is going. He would also like to get an update and more information on the DOT studies.   Best phone number to call - [phone]    Christelle Durant

## 2017-05-08 ENCOUNTER — PRE VISIT (OUTPATIENT)
Dept: UROLOGY | Facility: CLINIC | Age: 72
End: 2017-05-08

## 2017-05-08 NOTE — TELEPHONE ENCOUNTER
Patient is coming in to see  for a 2 month follow up with a PSA and MRI of the prostate before. PSA and MRI are done and in epic ready for appointment.

## 2017-05-23 ENCOUNTER — TELEPHONE (OUTPATIENT)
Dept: INTERNAL MEDICINE | Facility: CLINIC | Age: 72
End: 2017-05-23

## 2017-05-23 NOTE — LETTER
"[facility]  [address]   [address]  Tel. [phone]  Fax [phone]      Nahun Villalobos  [address] [facility] [address]            To Whom it May Concern:    Nahun Villalobos has had lung cancer approx. 1 yr ago, and it is hard for him to breathe when he has to walk long distances.  He can't breathe when walking through the building from parking in the employee lot thus he should be allowed to park \"closer\" to the building so his distance is much shorter for walking.  He already has a handicap parking voucher.      Please contact me for questions or concerns.    Sincerely,       Olegario Castillo MD  [facility] INTERNAL MEDICINE        5/23/17      "

## 2017-05-23 NOTE — TELEPHONE ENCOUNTER
"Reason for Call:  Other form for work to park closer to building    Detailed comments: the patient had lung cancer approx. 1 yr ago, and it is hard for him to breathe when he has to walk long distances.  He would like to have a letter from the doctor indicating for the employer, [employer], that the patient can park \"closer\" to the building so his distance is much shorter for walking (do not park in the employee lot), he states he already has a handicap parking voucher.  He can't breathe when walking through the building from parking in the employee lot.    Phone Number Patient can be reached at: Home number on file [phone] (home)    Best Time: anytime    Can we leave a detailed message on this number? YES    Call taken on 5/23/2017 at 12:23 PM by Grace Kilpatrick      "

## 2017-06-29 DIAGNOSIS — E11.9 TYPE 2 DIABETES MELLITUS WITHOUT COMPLICATION (H): ICD-10-CM

## 2017-06-29 DIAGNOSIS — E78.5 HYPERLIPIDEMIA LDL GOAL <100: ICD-10-CM

## 2017-06-29 DIAGNOSIS — I10 ESSENTIAL HYPERTENSION, BENIGN: ICD-10-CM

## 2017-06-29 RX ORDER — LISINOPRIL 40 MG/1
TABLET ORAL
Qty: 30 TABLET | Refills: 0 | Status: SHIPPED | OUTPATIENT
Start: 2017-06-29 | End: 2017-12-06

## 2017-06-29 RX ORDER — ATORVASTATIN CALCIUM 10 MG/1
TABLET, FILM COATED ORAL
Qty: 30 TABLET | Refills: 0 | Status: SHIPPED | OUTPATIENT
Start: 2017-06-29 | End: 2017-11-29

## 2017-06-29 NOTE — TELEPHONE ENCOUNTER
atorvastatin (LIPITOR) 10 MG tablet     Last Written Prescription Date: 12/13/2016  Last Fill Quantity: 90, # refills: 3  [place] [facility] or [facility] prescribing provider: 2/15/2017       Lab Results   Component Value Date    CHOL 127 03/15/2016     Lab Results   Component Value Date    HDL 37 03/15/2016     Lab Results   Component Value Date    LDL 66 03/15/2016     Lab Results   Component Value Date    TRIG 122 03/15/2016     Lab Results   Component Value Date    CHOLHDLRATIO 3.6 07/24/2015     lisinopril (PRINIVIL/ZESTRIL) 40 MG tablet      Last Written Prescription Date: 12/13/2016  Last Fill Quantity: 90, # refills: 3  Last [place] Visit [place] [facility] or [facility] prescribing provider: 2/15/2017       Potassium   Date Value Ref Range Status   03/15/2016 3.9 3.4 - 5.3 mmol/L Final     Creatinine   Date Value Ref Range Status   03/15/2016 1.06 0.66 - 1.25 mg/dL Final     BP Readings from Last 3 Encounters:   03/31/17 135/85   02/15/17 130/80   12/13/16 128/72

## 2017-08-01 ENCOUNTER — PRE VISIT (OUTPATIENT)
Dept: UROLOGY | Facility: CLINIC | Age: 72
End: 2017-08-01

## 2017-09-06 DIAGNOSIS — R97.20 ELEVATED PROSTATE SPECIFIC ANTIGEN (PSA): Primary | ICD-10-CM

## 2017-09-24 DIAGNOSIS — N40.0 ENLARGED PROSTATE: Primary | ICD-10-CM

## 2017-09-25 RX ORDER — FINASTERIDE 5 MG/1
TABLET, FILM COATED ORAL
Qty: 90 TABLET | Refills: 3 | Status: SHIPPED | OUTPATIENT
Start: 2017-09-25 | End: 2020-06-11

## 2017-10-04 ENCOUNTER — PRE VISIT (OUTPATIENT)
Dept: UROLOGY | Facility: CLINIC | Age: 72
End: 2017-10-04

## 2017-10-10 ENCOUNTER — OFFICE VISIT (OUTPATIENT)
Dept: CARDIOLOGY | Facility: CLINIC | Age: 72
End: 2017-10-10
Attending: INTERNAL MEDICINE
Payer: COMMERCIAL

## 2017-10-10 VITALS
WEIGHT: 255.3 LBS | HEIGHT: 65 IN | SYSTOLIC BLOOD PRESSURE: 144 MMHG | HEART RATE: 84 BPM | DIASTOLIC BLOOD PRESSURE: 78 MMHG | BODY MASS INDEX: 42.53 KG/M2

## 2017-10-10 DIAGNOSIS — E78.5 HYPERLIPIDEMIA LDL GOAL <100: ICD-10-CM

## 2017-10-10 DIAGNOSIS — R94.39 ABNORMAL CARDIOVASCULAR STRESS TEST: Primary | ICD-10-CM

## 2017-10-10 DIAGNOSIS — R07.89 OTHER CHEST PAIN: ICD-10-CM

## 2017-10-10 PROCEDURE — 99214 OFFICE O/P EST MOD 30 MIN: CPT | Performed by: INTERNAL MEDICINE

## 2017-10-10 NOTE — PROGRESS NOTES
HPI and Plan:   See dictation    Orders Placed This Encounter   Procedures     NM Lexiscan stress test (nuc card)     Follow-Up with Cardiologist       No orders of the defined types were placed in this encounter.      There are no discontinued medications.      Encounter Diagnoses   Name Primary?     Abnormal cardiovascular stress test Yes     Hyperlipidemia LDL goal <100      Other chest pain        CURRENT MEDICATIONS:  Current Outpatient Prescriptions   Medication Sig Dispense Refill     finasteride (PROSCAR) 5 MG tablet TAKE 1 TABLET EVERY DAY 90 tablet 3     atorvastatin (LIPITOR) 10 MG tablet TAKE 1 TABLET (10 MG) BY MOUTH DAILY 30 tablet 0     lisinopril (PRINIVIL/ZESTRIL) 40 MG tablet TAKE 1 TABLET (40 MG) BY MOUTH DAILY 30 tablet 0     tamsulosin (FLOMAX) 0.4 MG capsule Take 1 capsule (0.4 mg) by mouth daily 60 capsule 3     albuterol (PROAIR HFA/PROVENTIL HFA/VENTOLIN HFA) 108 (90 BASE) MCG/ACT Inhaler Inhale 2 puffs into the lungs every 6 hours as needed for shortness of breath / dyspnea or wheezing 3 Inhaler 1     fluticasone-salmeterol (ADVAIR) 250-50 MCG/DOSE diskus inhaler Inhale 1 puff into the lungs 2 times daily 1 Inhaler 5     order for DME Equipment being ordered: diabetic shoes, 1 pair 1 Package 0     lisinopril (PRINIVIL/ZESTRIL) 40 MG tablet Take 1 tablet (40 mg) by mouth daily 90 tablet 3     atorvastatin (LIPITOR) 10 MG tablet Take 1 tablet (10 mg) by mouth daily 90 tablet 3     metFORMIN (GLUCOPHAGE) 500 MG tablet Take 1 tablet (500 mg) by mouth 2 times daily (with meals) 180 tablet 3     furosemide (LASIX) 20 MG tablet Take 2 tablets (40 mg) by mouth 2 times daily 360 tablet 3     order for DME Oxygen 2 Li/min  at night via nasal cannula 1 Device 0     erythromycin (ROMYCIN) ophthalmic ointment Place 0.25 inches Into the left eye At Bedtime 1 Tube 0     order for DME Oxygen 2 Li/min via nasal cannula  at night 1 Device 0     aspirin 81 MG tablet Take 1 tablet (81 mg) by mouth daily 30  tablet 11     order for DME Equipment delivered; Respironics 760S BIPAP with heated humidification and heated tubing.  Pressure 15/7cm. Respironics Syeda View FF mask (Medium)       blood glucose (ACCU-CHEK CHACHA) test strip Use to test blood sugar 2 times daily or as directed. 60 strip 6     blood glucose monitoring (SOFTCLIX) lancets Use to test blood sugar 2 times daily or as directed. 1 Box 6       ALLERGIES     Allergies   Allergen Reactions     No Known Drug Allergies        PAST MEDICAL HISTORY:  Past Medical History:   Diagnosis Date     DM (diabetes mellitus) (H)      Essential hypertension, benign      Hemorrhage of rectum and anus      Obesity      Personal history of colonic polyps      Tobacco use disorder        PAST SURGICAL HISTORY:  Past Surgical History:   Procedure Laterality Date     APPENDECTOMY       C NONSPECIFIC PROCEDURE      cholecystectomy     CHOLECYSTECTOMY         FAMILY HISTORY:  Family History   Problem Relation Age of Onset     Hypertension Mother      C.A.D. Mother      ANEURYSM     Cancer - colorectal Mother      CANCER Mother      OVARIAN     Hypertension Sister      Breast Cancer Sister      CEREBROVASCULAR DISEASE Father        SOCIAL HISTORY:  Social History     Social History     Marital status:      Spouse name: N/A     Number of children: N/A     Years of education: N/A     Social History Main Topics     Smoking status: Former Smoker     Packs/day: 1.00     Years: 52.00     Types: Cigarettes     Quit date: 6/1/2013     Smokeless tobacco: Never Used     Alcohol use No     Drug use: No     Sexual activity: Yes     Partners: Female     Other Topics Concern     Caffeine Concern No     1-2 a day     Special Diet No     Exercise No     Seat Belt Yes     Social History Narrative       Review of Systems:  Skin:  not assessed       Eyes:  not assessed      ENT:  not assessed      Respiratory:  Positive for shortness of breath;dyspnea on exertion  hx lung cancer  "  Cardiovascular:    chest pain;Positive for;dizziness    Gastroenterology: not assessed      Genitourinary:  not assessed      Musculoskeletal:  not assessed      Neurologic:  Positive for numbness or tingling of hands feet cramp when cold  Psychiatric:  not assessed      Heme/Lymph/Imm:  not assessed      Endocrine:  Positive for diabetes borderline diabetic    Physical Exam:  Vitals: /78 (BP Location: Right arm, Patient Position: Sitting, Cuff Size: Adult Large)  Pulse 84  Ht 1.651 m (5' 5\")  Wt 115.8 kg (255 lb 4.8 oz)  BMI 42.48 kg/m2    Constitutional:  alert and oriented;well developed;in no acute distress        Skin:  warm and dry to the touch        Head:  normocephalic        Eyes:  sclera white        ENT:  no pallor or cyanosis        Neck:  JVP normal        Chest:  clear to auscultation          Cardiac: regular rhythm;normal S1 and S2   distant heart sounds              Abdomen:  abdomen soft        Vascular: pulses full and equal                                        Extremities and Back:  no edema              Neurological:  affect appropriate              CC  Vern Armenta MD  [address]  [place] [id number]              "

## 2017-10-10 NOTE — PROGRESS NOTES
HISTORY OF PRESENT ILLNESS:  Mr. Villalobos is a pleasant 71-year-old gentleman with a history of type 2 diabetes mellitus, hypertension, longstanding history of former tobacco abuse having quit 4 years ago, diagnosis of lung carcinoma for which he underwent a wedge resection, COPD, and a prior stress test showing a fixed inferior wall defect which we have elected to treat medically.  He returns in annual followup.      The patient has been having more chest discomfort since I have seen him last.  He describes it as a sharp pain which comes on with physical exertion.  It does not happen frequently, but when it is present it is therefore 1-2 days continuously.  It is not associated with any nausea or shortness of breath.      He continues to have chronic dyspnea with exertion which is related to his underlying COPD and prior lung resection.  It is unchanged from when I saw him last.      His most recent lipids show excellent control of his cholesterol with an LDL of 66.  He has been able to tolerate his aspirin and atorvastatin without any difficulty.      Please see my separate note with his full physical examination.      IMPRESSION AND PLAN:  Mr. Villalobos is a pleasant 71-year-old gentleman with a history of probable underlying coronary artery disease with a stress test showing a fixed inferior defect which we have been treating medically.  He has been having some more atypical chest pain since I have seen him last.  At this time, I have ordered a Lexiscan nuclear stress test and I asked that the patient return to see me thereafter.  In the interim, I will continue his aspirin and statin unchanged.  His blood pressure is slightly elevated at today's visit and I will reassess his blood pressure when I see him back in the clinic.         JUAN PEREZ MD             D: 10/10/2017 12:33   T: 10/10/2017 14:31   MT: CARMELO      Name:     KEELY VILLALOBOS   MRN:      [id number]        Account:      [id number]   :       1945           Service Date: 10/10/2017      Document: [id number]

## 2017-10-10 NOTE — LETTER
10/10/2017    Olegario Castillo MD  [address] [facility] [address]      RE: Nahun Villalobos       Dear Colleague,    I had the pleasure of seeing Nahun Villalobos in the [facility].    HISTORY OF PRESENT ILLNESS:  Mr. Villalobos is a pleasant 71-year-old gentleman with a history of type 2 diabetes mellitus, hypertension, longstanding history of former tobacco abuse having quit 4 years ago, diagnosis of lung carcinoma for which he underwent a wedge resection, COPD, and a prior stress test showing a fixed inferior wall defect which we have elected to treat medically.  He returns in annual followup.      The patient has been having more chest discomfort since I have seen him last.  He describes it as a sharp pain which comes on with physical exertion.  It does not happen frequently, but when it is present it is therefore 1-2 days continuously.  It is not associated with any nausea or shortness of breath.      He continues to have chronic dyspnea with exertion which is related to his underlying COPD and prior lung resection.  It is unchanged from when I saw him last.      His most recent lipids show excellent control of his cholesterol with an LDL of 66.  He has been able to tolerate his aspirin and atorvastatin without any difficulty.      Please see my separate note with his full physical examination.     Outpatient Encounter Prescriptions as of 10/10/2017   Medication Sig Dispense Refill     finasteride (PROSCAR) 5 MG tablet TAKE 1 TABLET EVERY DAY 90 tablet 3     lisinopril (PRINIVIL/ZESTRIL) 40 MG tablet TAKE 1 TABLET (40 MG) BY MOUTH DAILY 30 tablet 0     [DISCONTINUED] atorvastatin (LIPITOR) 10 MG tablet TAKE 1 TABLET (10 MG) BY MOUTH DAILY 30 tablet 0     tamsulosin (FLOMAX) 0.4 MG capsule Take 1 capsule (0.4 mg) by mouth daily 60 capsule 3     albuterol (PROAIR HFA/PROVENTIL HFA/VENTOLIN HFA) 108 (90 BASE) MCG/ACT Inhaler Inhale 2 puffs into the lungs every 6 hours as  needed for shortness of breath / dyspnea or wheezing 3 Inhaler 1     fluticasone-salmeterol (ADVAIR) 250-50 MCG/DOSE diskus inhaler Inhale 1 puff into the lungs 2 times daily 1 Inhaler 5     order for DME Equipment being ordered: diabetic shoes, 1 pair 1 Package 0     metFORMIN (GLUCOPHAGE) 500 MG tablet Take 1 tablet (500 mg) by mouth 2 times daily (with meals) 180 tablet 3     furosemide (LASIX) 20 MG tablet Take 2 tablets (40 mg) by mouth 2 times daily 360 tablet 3     [DISCONTINUED] lisinopril (PRINIVIL/ZESTRIL) 40 MG tablet Take 1 tablet (40 mg) by mouth daily (Patient not taking: Reported on 10/16/2017) 90 tablet 3     [DISCONTINUED] atorvastatin (LIPITOR) 10 MG tablet Take 1 tablet (10 mg) by mouth daily (Patient not taking: Reported on 10/16/2017) 90 tablet 3     order for DME Oxygen 2 Li/min  at night via nasal cannula 1 Device 0     erythromycin (ROMYCIN) ophthalmic ointment Place 0.25 inches Into the left eye At Bedtime 1 Tube 0     [DISCONTINUED] order for DME Oxygen 2 Li/min via nasal cannula  at night (Patient not taking: Reported on 10/16/2017) 1 Device 0     aspirin 81 MG tablet Take 1 tablet (81 mg) by mouth daily 30 tablet 11     order for DME Equipment delivered; Respironics 760S BIPAP with heated humidification and heated tubing.  Pressure 15/7cm. Respironics Syeda View FF mask (Medium)       blood glucose (ACCU-CHEK CHACHA) test strip Use to test blood sugar 2 times daily or as directed. 60 strip 6     blood glucose monitoring (SOFTCLIX) lancets Use to test blood sugar 2 times daily or as directed. 1 Box 6     No facility-administered encounter medications on file as of 10/10/2017.       IMPRESSION AND PLAN:  Mr. Villalobos is a pleasant 71-year-old gentleman with a history of probable underlying coronary artery disease with a stress test showing a fixed inferior defect which we have been treating medically.  He has been having some more atypical chest pain since I have seen him last.  At this time,  I have ordered a Lexiscan nuclear stress test and I asked that the patient return to see me thereafter.  In the interim, I will continue his aspirin and statin unchanged.  His blood pressure is slightly elevated at today's visit and I will reassess his blood pressure when I see him back in the clinic.      Again, thank you for allowing me to participate in the care of your patient.      Sincerely,    Vern Armenta MD     [facility]

## 2017-10-10 NOTE — MR AVS SNAPSHOT
After Visit Summary   10/10/2017    Nahun Villalobos    MRN: [id number]           Patient Information     Date Of Birth          1945        Visit Information        Provider Department      10/10/2017 11:15 AM Vern Armenta MD [facility] [place]        Today's Diagnoses     Abnormal cardiovascular stress test    -  1    Hyperlipidemia LDL goal <100        Other chest pain           Follow-ups after your visit        Additional Services     Follow-Up with Cardiologist                 Your next 10 appointments already scheduled     Oct 17, 2017  9:00 AM CDT   NM SH CV MPI WITH ASHLEY 1 DAY with SCINM1   [facility] CV Nuclear Medicine (Cardiovascular Imaging at [facility])    [address]  [address]  [facility] [address] [phone]           For a ONE day exam: Allow 3-4 hours for test. For a TWO day exam: Allow 2 hours PER day for test.  You may need to stop some medicines before the test. Follow your doctor s orders. - If you take a beta blocker: Follow your doctor s specific instructions on taking it prior to and on the day of your exam. - If you take Aggrenox or dipyridamole (Persantine, Permole), stop taking it 48 hours before your test. - If you take Viagra, Cialis or Levitra, stop taking it 48 hours before your test. - If you take theophylline or aminophylline, stop taking it 12 hours before your test.  For patients with diabetes: - If you take insulin, call your diabetes care team. Ask if you should take a 1/2 dose the morning of your test. - If you take diabetes medicine by mouth, don t take it on the morning of your test. Bring it with you to take after the test. (If you have questions, call your diabetes care team.)  Do not take nitrates on the day of your test. Do not wear your Nitro-Patch.  Stop all caffeine 12 hours before the test. This includes coffee, tea, soda pop, chocolate and certain medicines (such as  Anacin, Excedrin and NoDoz). Also avoid decaf coffee and tea, as these contain small amounts of caffeine.  No alcohol, smoking or other tobacco for 12 hours before the test.  Stop eating 3 hours before the test. You may drink water.  Please wear a loose two-piece outfit. If you will have an exercise test, bring rubber-soled walking shoes.  When you arrive, please tell us if you: - Have diabetes - Are breastfeeding - May be pregnant - Have a pacemaker of ICD (implantable defibrillator).  Please call your Imaging Department at your exam site with any questions.            Oct 27, 2017  2:00 PM CDT   LAB with  LAB   [facility] ([facility])    [address] [address] [phone]           Patient must bring picture ID. Patient should be prepared to give a urine specimen  Please do not eat 10-12 hours before your appointment if you are coming in fasting for labs on lipids, cholesterol, or glucose (sugar). Pregnant women should follow their Care Team instructions. Water with medications is okay. Do not drink coffee or other fluids. If you have concerns about taking  your medications, please ask at office or if scheduling via [employer], send a message by clicking on Secure Messaging, Message Your Care Team.            Oct 27, 2017  3:30 PM CDT   (Arrive by 3:15 PM)   Return Visit with Praveena Burris MD   [facility] ([facility])    [address] [address] [phone]            Nov 17, 2017  1:45 PM CST   Return Visit with Vern Armenta MD   [facility] [place] ([facility])    [address]  [facility] [id number] [phone]              Future tests that were ordered for you today     Open Future Orders        Priority Expected Expires Ordered    Follow-Up with Cardiologist  "Routine 11/9/2017 10/10/2018 10/10/2017    NM Lexiscan stress test (nuc card) Routine 10/17/2017 10/10/2018 10/10/2017            Who to contact     If you have questions or need follow up information about today's clinic visit or your schedule please contact [facility] AT [place] directly at [phone].  Normal or non-critical lab and imaging results will be communicated to you by Amoreliehart, letter or phone within 4 business days after the clinic has received the results. If you do not hear from us within 7 days, please contact the clinic through Amoreliehart or phone. If you have a critical or abnormal lab result, we will notify you by phone as soon as possible.  Submit refill requests through [employer] or call your pharmacy and they will forward the refill request to us. Please allow 3 business days for your refill to be completed.          Additional Information About Your Visit        AmorelieharSphere 3d Information     [employer] gives you secure access to your electronic health record. If you see a primary care provider, you can also send messages to your care team and make appointments. If you have questions, please call your primary care clinic.  If you do not have a primary care provider, please call [phone] and they will assist you.        Care EveryWhere ID     This is your Care EveryWhere ID. This could be used by other organizations to access your [facility] medical records  [id number]        Your Vitals Were     Pulse Height BMI (Body Mass Index)             84 1.651 m (5' 5\") 42.48 kg/m2          Blood Pressure from Last 3 Encounters:   10/10/17 144/78   03/31/17 135/85   02/15/17 130/80    Weight from Last 3 Encounters:   10/10/17 115.8 kg (255 lb 4.8 oz)   03/31/17 118.3 kg (260 lb 14.4 oz)   02/15/17 114.8 kg (253 lb)              We Performed the Following     Follow-Up with Cardiologist        Primary Care Provider Office Phone # Fax #    Olegario Castillo [phone] " [phone]       [address] [facility] [address]        Equal Access to Services     MALLORY VAZQUEZ : Hadii rufina golden van [place], wavasiliyda luqbill, osbaldota kajennida roman, erika jigarin hayaamarcos nevillelavonne lee lachadmarcos campos. So [facility] [phone].    ATENCIÓN: Si habla español, tiene a huggins disposición servicios gratuitos de asistencia lingüística. Llame al [phone].    We comply with applicable federal civil rights laws and [place] laws. We do not discriminate on the basis of race, color, national origin, age, disability, sex, sexual orientation, or gender identity.            Thank you!     Thank you for choosing [facility] AT [place]  for your care. Our goal is always to provide you with excellent care. Hearing back from our patients is one way we can continue to improve our services. Please take a few minutes to complete the written survey that you may receive in the mail after your visit with us. Thank you!             Your Updated Medication List - Protect others around you: Learn how to safely use, store and throw away your medicines at www.disposemymeds.org.          This list is accurate as of: 10/10/17 11:57 AM.  Always use your most recent med list.                   Brand Name Dispense Instructions for use Diagnosis    albuterol 108 (90 BASE) MCG/ACT Inhaler    PROAIR HFA/PROVENTIL HFA/VENTOLIN HFA    3 Inhaler    Inhale 2 puffs into the lungs every 6 hours as needed for shortness of breath / dyspnea or wheezing    Chronic obstructive pulmonary disease, unspecified COPD type (H)       aspirin 81 MG tablet     30 tablet    Take 1 tablet (81 mg) by mouth daily    Type 2 diabetes, HbA1C goal < 8% (H)       * atorvastatin 10 MG tablet    LIPITOR    90 tablet    Take 1 tablet (10 mg) by mouth daily    Hyperlipidemia LDL goal <100, Type 2 diabetes mellitus without complication, without long-term current use of insulin (H)       * atorvastatin 10 MG tablet    LIPITOR    30  tablet    TAKE 1 TABLET (10 MG) BY MOUTH DAILY    Type 2 diabetes mellitus without complication (H), Hyperlipidemia LDL goal <100       blood glucose monitoring lancets     1 Box    Use to test blood sugar 2 times daily or as directed.    DM (diabetes mellitus) (H)       blood glucose monitoring test strip    ACCU-CHEK CHACHA    60 strip    Use to test blood sugar 2 times daily or as directed.    DM (diabetes mellitus) (H)       erythromycin ophthalmic ointment    ROMYCIN    1 Tube    Place 0.25 inches Into the left eye At Bedtime    Acute conjunctivitis of left eye, unspecified acute conjunctivitis       finasteride 5 MG tablet    PROSCAR    90 tablet    TAKE 1 TABLET EVERY DAY    Enlarged prostate       fluticasone-salmeterol 250-50 MCG/DOSE diskus inhaler    ADVAIR    1 Inhaler    Inhale 1 puff into the lungs 2 times daily    Chronic obstructive pulmonary disease, unspecified COPD type (H)       furosemide 20 MG tablet    LASIX    360 tablet    Take 2 tablets (40 mg) by mouth 2 times daily    Essential hypertension, benign       * lisinopril 40 MG tablet    PRINIVIL/ZESTRIL    90 tablet    Take 1 tablet (40 mg) by mouth daily    Type 2 diabetes mellitus without complication, without long-term current use of insulin (H), Essential hypertension, benign       * lisinopril 40 MG tablet    PRINIVIL/ZESTRIL    30 tablet    TAKE 1 TABLET (40 MG) BY MOUTH DAILY    Essential hypertension, benign, Type 2 diabetes mellitus without complication (H)       metFORMIN 500 MG tablet    GLUCOPHAGE    180 tablet    Take 1 tablet (500 mg) by mouth 2 times daily (with meals)    Type 2 diabetes mellitus without complication, without long-term current use of insulin (H)       order for DME      Equipment delivered; Respironics 760S BIPAP with heated humidification and heated tubing.  Pressure 15/7cm. Respironics Syeda View FF mask (Medium)        * order for DME     1 Device    Oxygen 2 Li/min via nasal cannula at night    Chronic  obstructive pulmonary disease, unspecified COPD type (H), Sleep related hypoventilation/hypoxemia in other disease       * order for DME     1 Device    Oxygen 2 Li/min at night via nasal cannula    Nocturnal hypoxemia       * order for DME     1 Package    Equipment being ordered: diabetic shoes, 1 pair    Type 2 diabetes mellitus without complication, without long-term current use of insulin (H)       tamsulosin 0.4 MG capsule    FLOMAX    60 capsule    Take 1 capsule (0.4 mg) by mouth daily    Urinary urgency       * Notice:  This list has 7 medication(s) that are the same as other medications prescribed for you. Read the directions carefully, and ask your doctor or other care provider to review them with you.

## 2017-10-16 ENCOUNTER — OFFICE VISIT (OUTPATIENT)
Dept: INTERNAL MEDICINE | Facility: CLINIC | Age: 72
End: 2017-10-16
Payer: COMMERCIAL

## 2017-10-16 VITALS
HEART RATE: 92 BPM | OXYGEN SATURATION: 93 % | DIASTOLIC BLOOD PRESSURE: 70 MMHG | SYSTOLIC BLOOD PRESSURE: 136 MMHG | WEIGHT: 255.1 LBS | TEMPERATURE: 98.9 F | BODY MASS INDEX: 42.45 KG/M2

## 2017-10-16 DIAGNOSIS — K57.32 DIVERTICULITIS OF COLON: ICD-10-CM

## 2017-10-16 DIAGNOSIS — R10.32 LLQ ABDOMINAL PAIN: Primary | ICD-10-CM

## 2017-10-16 LAB
ANION GAP SERPL CALCULATED.3IONS-SCNC: 4 MMOL/L (ref 3–14)
BUN SERPL-MCNC: 14 MG/DL (ref 7–30)
CALCIUM SERPL-MCNC: 9.3 MG/DL (ref 8.5–10.1)
CHLORIDE SERPL-SCNC: 101 MMOL/L (ref 94–109)
CO2 SERPL-SCNC: 32 MMOL/L (ref 20–32)
CREAT SERPL-MCNC: 1.02 MG/DL (ref 0.66–1.25)
ERYTHROCYTE [DISTWIDTH] IN BLOOD BY AUTOMATED COUNT: 12.3 % (ref 10–15)
GFR SERPL CREATININE-BSD FRML MDRD: 72 ML/MIN/1.7M2
GLUCOSE SERPL-MCNC: 118 MG/DL (ref 70–99)
HCT VFR BLD AUTO: 41.7 % (ref 40–53)
HGB BLD-MCNC: 13.2 G/DL (ref 13.3–17.7)
MCH RBC QN AUTO: 32.4 PG (ref 26.5–33)
MCHC RBC AUTO-ENTMCNC: 31.7 G/DL (ref 31.5–36.5)
MCV RBC AUTO: 103 FL (ref 78–100)
PLATELET # BLD AUTO: 281 10E9/L (ref 150–450)
POTASSIUM SERPL-SCNC: 4.4 MMOL/L (ref 3.4–5.3)
RBC # BLD AUTO: 4.07 10E12/L (ref 4.4–5.9)
SODIUM SERPL-SCNC: 137 MMOL/L (ref 133–144)
WBC # BLD AUTO: 15.2 10E9/L (ref 4–11)

## 2017-10-16 PROCEDURE — 36415 COLL VENOUS BLD VENIPUNCTURE: CPT | Performed by: INTERNAL MEDICINE

## 2017-10-16 PROCEDURE — 85027 COMPLETE CBC AUTOMATED: CPT | Performed by: INTERNAL MEDICINE

## 2017-10-16 PROCEDURE — 99214 OFFICE O/P EST MOD 30 MIN: CPT | Performed by: INTERNAL MEDICINE

## 2017-10-16 PROCEDURE — 80048 BASIC METABOLIC PNL TOTAL CA: CPT | Performed by: INTERNAL MEDICINE

## 2017-10-16 RX ORDER — METRONIDAZOLE 500 MG/1
500 TABLET ORAL 3 TIMES DAILY
Qty: 30 TABLET | Refills: 0 | Status: SHIPPED | OUTPATIENT
Start: 2017-10-16 | End: 2017-10-26

## 2017-10-16 RX ORDER — CIPROFLOXACIN 500 MG/1
500 TABLET, FILM COATED ORAL 2 TIMES DAILY
Qty: 20 TABLET | Refills: 0 | Status: SHIPPED | OUTPATIENT
Start: 2017-10-16 | End: 2017-11-10

## 2017-10-16 NOTE — MR AVS SNAPSHOT
After Visit Summary   10/16/2017    Nahun Villalobos    MRN: [id number]           Patient Information     Date Of Birth          1945        Visit Information        Provider Department      10/16/2017 1:40 PM Maxi Restrepo MD [facility]        Today's Diagnoses     LLQ abdominal pain    -  1    Diverticulitis of colon           Follow-ups after your visit        Your next 10 appointments already scheduled     Oct 17, 2017  9:00 AM CDT   NM SH CV MPI WITH ASHLEY 1 DAY with SCINM1   [facility] CV Nuclear Medicine (Cardiovascular Imaging at [facility])    [address]  [address]  [facility] [address] [phone]           For a ONE day exam: Allow 3-4 hours for test. For a TWO day exam: Allow 2 hours PER day for test.  You may need to stop some medicines before the test. Follow your doctor s orders. - If you take a beta blocker: Follow your doctor s specific instructions on taking it prior to and on the day of your exam. - If you take Aggrenox or dipyridamole (Persantine, Permole), stop taking it 48 hours before your test. - If you take Viagra, Cialis or Levitra, stop taking it 48 hours before your test. - If you take theophylline or aminophylline, stop taking it 12 hours before your test.  For patients with diabetes: - If you take insulin, call your diabetes care team. Ask if you should take a 1/2 dose the morning of your test. - If you take diabetes medicine by mouth, don t take it on the morning of your test. Bring it with you to take after the test. (If you have questions, call your diabetes care team.)  Do not take nitrates on the day of your test. Do not wear your Nitro-Patch.  Stop all caffeine 12 hours before the test. This includes coffee, tea, soda pop, chocolate and certain medicines (such as Anacin, Excedrin and NoDoz). Also avoid decaf coffee and tea, as these contain small amounts of caffeine.  No alcohol, smoking or  other tobacco for 12 hours before the test.  Stop eating 3 hours before the test. You may drink water.  Please wear a loose two-piece outfit. If you will have an exercise test, bring rubber-soled walking shoes.  When you arrive, please tell us if you: - Have diabetes - Are breastfeeding - May be pregnant - Have a pacemaker of ICD (implantable defibrillator).  Please call your Imaging Department at your exam site with any questions.            Oct 27, 2017  2:00 PM CDT   LAB with  LAB   [facility] ([facility])    [address] [place] [address]  [address] [facility] [address] [phone]           Patient must bring picture ID. Patient should be prepared to give a urine specimen  Please do not eat 10-12 hours before your appointment if you are coming in fasting for labs on lipids, cholesterol, or glucose (sugar). Pregnant women should follow their Care Team instructions. Water with medications is okay. Do not drink coffee or other fluids. If you have concerns about taking  your medications, please ask at office or if scheduling via [employer], send a message by clicking on Secure Messaging, Message Your Care Team.            Oct 27, 2017  3:30 PM CDT   (Arrive by 3:15 PM)   Return Visit with Praveena Burris MD   [facility] Urology and [facility] Pro[facility] Urologic [facility] ([facility])    [address] [place] [address] [phone]            Nov 17, 2017  1:45 PM CST   Return Visit with Vern Armenta MD   [facility] PHYSICIANS [facility] AT [place] ([facility])    [address]  [facility] [address] [phone]              Who to contact     If you have questions or need follow up information about today's clinic visit or your schedule please contact [facility] directly at [phone].  Normal or non-critical lab and imaging results will be communicated to you by  MyChart, letter or phone within 4 business days after the clinic has received the results. If you do not hear from us within 7 days, please contact the clinic through [employer] or phone. If you have a critical or abnormal lab result, we will notify you by phone as soon as possible.  Submit refill requests through [employer] or call your pharmacy and they will forward the refill request to us. Please allow 3 business days for your refill to be completed.          Additional Information About Your Visit        [email or web address] Information     [employer] gives you secure access to your electronic health record. If you see a primary care provider, you can also send messages to your care team and make appointments. If you have questions, please call your primary care clinic.  If you do not have a primary care provider, please call [phone] and they will assist you.        Care EveryWhere ID     This is your Care EveryWhere ID. This could be used by other organizations to access your [facility] medical records  [id number]        Your Vitals Were     Pulse Temperature Pulse Oximetry BMI (Body Mass Index)          92 98.9  F (37.2  C) (Oral) 93% 42.45 kg/m2         Blood Pressure from Last 3 Encounters:   10/16/17 136/70   10/10/17 144/78   03/31/17 135/85    Weight from Last 3 Encounters:   10/16/17 255 lb 1.6 oz (115.7 kg)   10/10/17 255 lb 4.8 oz (115.8 kg)   03/31/17 260 lb 14.4 oz (118.3 kg)              We Performed the Following     Basic metabolic panel     CBC with platelets          Today's Medication Changes          These changes are accurate as of: 10/16/17  3:21 PM.  If you have any questions, ask your nurse or doctor.               Start taking these medicines.        Dose/Directions    ciprofloxacin 500 MG tablet   Commonly known as:  CIPRO   Used for:  Diverticulitis of colon   Started by:  Maxi Restrepo MD        Dose:  500 mg   Take 1 tablet (500 mg) by mouth 2 times daily   Quantity:  20 tablet   Refills:  0        metroNIDAZOLE 500 MG tablet   Commonly known as:  FLAGYL   Used for:  Diverticulitis of colon   Started by:  Maxi Restrepo MD        Dose:  500 mg   Take 1 tablet (500 mg) by mouth 3 times daily for 10 days   Quantity:  30 tablet   Refills:  0            Where to get your medicines      These medications were sent to [facility]/pharmacy #[id number] [facility] [address] [address]     Phone:  [phone]     ciprofloxacin 500 MG tablet    metroNIDAZOLE 500 MG tablet                Primary Care Provider Office Phone # Fax #    Olegario Castillo [phone]       [address] [facility] [address]        Equal Access to Services     MALLORY VAZQUEZ : Hadii rufina mckeono Soaltagracia, waaxda luqadaha, qaybta kaalmada adeegyada, erika campos. So [facility] [phone].    ATENCIÓN: Si habla español, tiene a huggins disposición servicios gratuitos de asistencia lingüística. [facility] [phone].    We comply with applicable federal civil rights laws and [place] laws. We do not discriminate on the basis of race, color, national origin, age, disability, sex, sexual orientation, or gender identity.            Thank you!     Thank you for choosing [facility]  for your care. Our goal is always to provide you with excellent care. Hearing back from our patients is one way we can continue to improve our services. Please take a few minutes to complete the written survey that you may receive in the mail after your visit with us. Thank you!             Your Updated Medication List - Protect others around you: Learn how to safely use, store and throw away your medicines at www.disposemymeds.org.          This list is accurate as of: 10/16/17  3:21 PM.  Always use your most recent med list.                   Brand Name Dispense Instructions for use Diagnosis    albuterol 108 (90 BASE) MCG/ACT Inhaler    PROAIR HFA/PROVENTIL  HFA/VENTOLIN HFA    3 Inhaler    Inhale 2 puffs into the lungs every 6 hours as needed for shortness of breath / dyspnea or wheezing    Chronic obstructive pulmonary disease, unspecified COPD type (H)       aspirin 81 MG tablet     30 tablet    Take 1 tablet (81 mg) by mouth daily    Type 2 diabetes, HbA1C goal < 8% (H)       atorvastatin 10 MG tablet    LIPITOR    30 tablet    TAKE 1 TABLET (10 MG) BY MOUTH DAILY    Type 2 diabetes mellitus without complication (H), Hyperlipidemia LDL goal <100       blood glucose monitoring lancets     1 Box    Use to test blood sugar 2 times daily or as directed.    DM (diabetes mellitus) (H)       blood glucose monitoring test strip    ACCU-CHEK CHACHA    60 strip    Use to test blood sugar 2 times daily or as directed.    DM (diabetes mellitus) (H)       ciprofloxacin 500 MG tablet    CIPRO    20 tablet    Take 1 tablet (500 mg) by mouth 2 times daily    Diverticulitis of colon       erythromycin ophthalmic ointment    ROMYCIN    1 Tube    Place 0.25 inches Into the left eye At Bedtime    Acute conjunctivitis of left eye, unspecified acute conjunctivitis       finasteride 5 MG tablet    PROSCAR    90 tablet    TAKE 1 TABLET EVERY DAY    Enlarged prostate       fluticasone-salmeterol 250-50 MCG/DOSE diskus inhaler    ADVAIR    1 Inhaler    Inhale 1 puff into the lungs 2 times daily    Chronic obstructive pulmonary disease, unspecified COPD type (H)       furosemide 20 MG tablet    LASIX    360 tablet    Take 2 tablets (40 mg) by mouth 2 times daily    Essential hypertension, benign       lisinopril 40 MG tablet    PRINIVIL/ZESTRIL    30 tablet    TAKE 1 TABLET (40 MG) BY MOUTH DAILY    Essential hypertension, benign, Type 2 diabetes mellitus without complication (H)       metFORMIN 500 MG tablet    GLUCOPHAGE    180 tablet    Take 1 tablet (500 mg) by mouth 2 times daily (with meals)    Type 2 diabetes mellitus without complication, without long-term current use of insulin (H)        metroNIDAZOLE 500 MG tablet    FLAGYL    30 tablet    Take 1 tablet (500 mg) by mouth 3 times daily for 10 days    Diverticulitis of colon       order for DME      Equipment delivered; Respironics 760S BIPAP with heated humidification and heated tubing.  Pressure 15/7cm. Respironics Syeda View FF mask (Medium)        * order for DME     1 Device    Oxygen 2 Li/min at night via nasal cannula    Nocturnal hypoxemia       * order for DME     1 Package    Equipment being ordered: diabetic shoes, 1 pair    Type 2 diabetes mellitus without complication, without long-term current use of insulin (H)       tamsulosin 0.4 MG capsule    FLOMAX    60 capsule    Take 1 capsule (0.4 mg) by mouth daily    Urinary urgency       * Notice:  This list has 2 medication(s) that are the same as other medications prescribed for you. Read the directions carefully, and ask your doctor or other care provider to review them with you.

## 2017-10-16 NOTE — PROGRESS NOTES
SUBJECTIVE:   Nahun Villalobos is a 71 year old male who presents to clinic today for the following health issues:      ABDOMINAL   PAIN     Onset: yesterday    Description:   Character: Sharp and Dull ache  Location: left lower quadrant  Radiation: None    Intensity: mild    Progression of Symptoms:  same    Accompanying Signs & Symptoms:  Fever/Chills?: no   Gas/Bloating: no   Nausea: no   Vomitting: no   Diarrhea?: YES  Constipation:no   Dysuria or Hematuria: no    History:   Trauma: no   Previous similar pain: YES- maybe   Previous tests done: none    Precipitating factors:   Does the pain change with:     Food: no      BM: no     Urination: no     Alleviating factors:  none    Therapies Tried and outcome: none    LMP:  not applicable     Problem list and histories reviewed & adjusted, as indicated.  Additional history: as documented    Labs reviewed in EPIC    Reviewed and updated as needed this visit by clinical staffAllergies       Reviewed and updated as needed this visit by Provider         ROS:  Constitutional, HEENT, cardiovascular, pulmonary, gi and gu systems are negative, except as otherwise noted.      OBJECTIVE:                                                    /70  Pulse 92  Temp 98.9  F (37.2  C) (Oral)  Wt 255 lb 1.6 oz (115.7 kg)  SpO2 93%  BMI 42.45 kg/m2  Body mass index is 42.45 kg/(m^2).  GENERAL APPEARANCE: alert, no distress and over weight  RESP: lungs clear to auscultation - no rales, rhonchi or wheezes  CV: regular rates and rhythm, normal S1 S2, no S3 or S4 and no murmur, click or rub  ABDOMEN: localized tenderness in LLQ without rebound.    Diagnostic test results:  Results for orders placed or performed in visit on 10/16/17 (from the past 24 hour(s))   Basic metabolic panel   Result Value Ref Range    Sodium 137 133 - 144 mmol/L    Potassium 4.4 3.4 - 5.3 mmol/L    Chloride 101 94 - 109 mmol/L    Carbon Dioxide 32 20 - 32 mmol/L    Anion Gap 4 3 - 14 mmol/L    Glucose  118 (H) 70 - 99 mg/dL    Urea Nitrogen 14 7 - 30 mg/dL    Creatinine 1.02 0.66 - 1.25 mg/dL    GFR Estimate 72 >60 mL/min/1.7m2    GFR Estimate If Black 87 >60 mL/min/1.7m2    Calcium 9.3 8.5 - 10.1 mg/dL   CBC with platelets   Result Value Ref Range    WBC 15.2 (H) 4.0 - 11.0 10e9/L    RBC Count 4.07 (L) 4.4 - 5.9 10e12/L    Hemoglobin 13.2 (L) 13.3 - 17.7 g/dL    Hematocrit 41.7 40.0 - 53.0 %     (H) 78 - 100 fl    MCH 32.4 26.5 - 33.0 pg    MCHC 31.7 31.5 - 36.5 g/dL    RDW 12.3 10.0 - 15.0 %    Platelet Count 281 150 - 450 10e9/L          ASSESSMENT/PLAN:                                                    1. LLQ abdominal pain  2. Diverticulitis of colon  Prior hx of sigmoid diverticulitis in 2013. This is suggestive of same. Will empirically rx. If sx do not respond to antibx , worsening pain we'll need to do a CT and r/o abscess or other etiology for symptoms.   - metroNIDAZOLE (FLAGYL) 500 MG tablet; Take 1 tablet (500 mg) by mouth 3 times daily for 10 days  Dispense: 30 tablet; Refill: 0  - ciprofloxacin (CIPRO) 500 MG tablet; Take 1 tablet (500 mg) by mouth 2 times daily  Dispense: 20 tablet; Refill: 0      Follow up with Provider- prn with PCP     Maxi Restrepo MD  [facility]

## 2017-10-16 NOTE — NURSING NOTE
"Chief Complaint   Patient presents with     Abdominal Pain       Initial /70  Pulse 92  Temp 98.9  F (37.2  C) (Oral)  Wt 255 lb 1.6 oz (115.7 kg)  SpO2 93%  BMI 42.45 kg/m2 Estimated body mass index is 42.45 kg/(m^2) as calculated from the following:    Height as of 10/10/17: 5' 5\" (1.651 m).    Weight as of this encounter: 255 lb 1.6 oz (115.7 kg).  Medication Reconciliation: complete    "

## 2017-10-27 DIAGNOSIS — R97.20 ELEVATED PROSTATE SPECIFIC ANTIGEN (PSA): ICD-10-CM

## 2017-10-27 LAB — PSA SERPL-MCNC: 1.98 UG/L (ref 0–4)

## 2017-11-07 ENCOUNTER — HOSPITAL ENCOUNTER (OUTPATIENT)
Dept: NUCLEAR MEDICINE | Facility: CLINIC | Age: 72
Setting detail: NUCLEAR MEDICINE
End: 2017-11-07
Attending: INTERNAL MEDICINE | Admitting: INTERNAL MEDICINE
Payer: MEDICARE

## 2017-11-07 ENCOUNTER — HOSPITAL ENCOUNTER (OUTPATIENT)
Facility: CLINIC | Age: 72
Discharge: HOME | End: 2017-11-07
Attending: INTERNAL MEDICINE | Admitting: INTERNAL MEDICINE
Payer: MEDICARE

## 2017-11-07 ENCOUNTER — HOSPITAL ENCOUNTER (OUTPATIENT)
Dept: CARDIOLOGY | Facility: CLINIC | Age: 72
End: 2017-11-07
Attending: INTERNAL MEDICINE | Admitting: INTERNAL MEDICINE
Payer: MEDICARE

## 2017-11-07 VITALS — DIASTOLIC BLOOD PRESSURE: 68 MMHG | SYSTOLIC BLOOD PRESSURE: 138 MMHG | RESPIRATION RATE: 18 BRPM | HEART RATE: 87 BPM

## 2017-11-07 DIAGNOSIS — R07.89 OTHER CHEST PAIN: ICD-10-CM

## 2017-11-07 PROCEDURE — 93017 CV STRESS TEST TRACING ONLY: CPT | Performed by: REHABILITATION PRACTITIONER

## 2017-11-07 PROCEDURE — 78452 HT MUSCLE IMAGE SPECT MULT: CPT | Mod: 26 | Performed by: INTERNAL MEDICINE

## 2017-11-07 PROCEDURE — A9502 TC99M TETROFOSMIN: HCPCS | Performed by: INTERNAL MEDICINE

## 2017-11-07 PROCEDURE — 78452 HT MUSCLE IMAGE SPECT MULT: CPT

## 2017-11-07 PROCEDURE — 34300033 ZZH RX 343: Performed by: INTERNAL MEDICINE

## 2017-11-07 PROCEDURE — 25000128 H RX IP 250 OP 636: Performed by: INTERNAL MEDICINE

## 2017-11-07 PROCEDURE — 40000141 ZZH STATISTIC PERIPHERAL IV START W/O US GUIDANCE

## 2017-11-07 PROCEDURE — 93018 CV STRESS TEST I&R ONLY: CPT | Performed by: INTERNAL MEDICINE

## 2017-11-07 PROCEDURE — 40000855 ZZH STATISTIC ECHO STRESS OR NM NPI

## 2017-11-07 RX ORDER — AMINOPHYLLINE 25 MG/ML
50-100 INJECTION, SOLUTION INTRAVENOUS
Status: DISCONTINUED | OUTPATIENT
Start: 2017-11-07 | End: 2017-11-07 | Stop reason: HOSPADM

## 2017-11-07 RX ORDER — ACYCLOVIR 200 MG/1
0-1 CAPSULE ORAL
Status: DISCONTINUED | OUTPATIENT
Start: 2017-11-07 | End: 2017-11-07 | Stop reason: HOSPADM

## 2017-11-07 RX ORDER — REGADENOSON 0.08 MG/ML
0.4 INJECTION, SOLUTION INTRAVENOUS ONCE
Status: COMPLETED | OUTPATIENT
Start: 2017-11-07 | End: 2017-11-07

## 2017-11-07 RX ORDER — ALBUTEROL SULFATE 90 UG/1
2 AEROSOL, METERED RESPIRATORY (INHALATION) EVERY 5 MIN PRN
Status: DISCONTINUED | OUTPATIENT
Start: 2017-11-07 | End: 2017-11-07 | Stop reason: HOSPADM

## 2017-11-07 RX ADMIN — TETROFOSMIN 34.7 MCI.: 1.38 INJECTION, POWDER, LYOPHILIZED, FOR SOLUTION INTRAVENOUS at 11:42

## 2017-11-07 RX ADMIN — REGADENOSON 0.4 MG: 0.08 INJECTION, SOLUTION INTRAVENOUS at 11:31

## 2017-11-07 RX ADMIN — TETROFOSMIN 13 MCI.: 1.38 INJECTION, POWDER, LYOPHILIZED, FOR SOLUTION INTRAVENOUS at 09:36

## 2017-11-10 ENCOUNTER — OFFICE VISIT (OUTPATIENT)
Dept: UROLOGY | Facility: CLINIC | Age: 72
End: 2017-11-10

## 2017-11-10 VITALS
SYSTOLIC BLOOD PRESSURE: 145 MMHG | BODY MASS INDEX: 43.25 KG/M2 | HEIGHT: 65 IN | HEART RATE: 83 BPM | WEIGHT: 259.6 LBS | DIASTOLIC BLOOD PRESSURE: 83 MMHG

## 2017-11-10 DIAGNOSIS — R97.20 ELEVATED PROSTATE SPECIFIC ANTIGEN (PSA): Primary | ICD-10-CM

## 2017-11-10 DIAGNOSIS — R35.1 NOCTURIA: ICD-10-CM

## 2017-11-10 ASSESSMENT — PAIN SCALES - GENERAL: PAINLEVEL: NO PAIN (0)

## 2017-11-10 NOTE — PATIENT INSTRUCTIONS
Cystoscopy.    It was a pleasure meeting with you today.  Thank you for allowing me and my team the privilege of caring for you today.  YOU are the reason we are here, and I truly hope we provided you with the excellent service you deserve.  Please let us know if there is anything else we can do for you so that we can be sure you are leaving completely satisfied with your care experience.

## 2017-11-10 NOTE — LETTER
11/10/2017       RE: Nahun Villalobos  [address] [facility] [address]     Dear Colleague,    Thank you for referring your patient, Nahun Villalobos, to the [facility] UROLOGY AND INST FOR PROSTATE AND UROLOGIC CANCERS at [facility]. Please see a copy of my visit note below.    UROLOGIC DIAGNOSES:     CURRENT INTERVENTIONS:       HISTORY:     Patient previously seen at [facility].   Was seen for elevated PSA and lower urinary tract symptoms.   Patient s/p prostate biopsy four years ago for elevated PSA.   PSA was 7.5 six months ago and six months prior to that was 5.22.   Had lower urinary tract symptoms similar to listed below. Was prescribed tamsulosin and finasteride but unclear if patient started these rx.     Patient with urgency, nocturia 2-4x, frequency, incomplete emptying.   Notes variable stream from dribble to moderate stream.     Most recent PSA is 1.98 (though actually ~ 4 as patient is taking finasteride).   Patient continues to note lower urinary tract symptoms despite combination therapy and would like to consider further intervention.           PAST MEDICAL HISTORY:   Past Medical History:   Diagnosis Date     DM (diabetes mellitus) (H)      Essential hypertension, benign      Hemorrhage of rectum and anus      Obesity      Personal history of colonic polyps      Tobacco use disorder        PAST SURGICAL HISTORY:   Past Surgical History:   Procedure Laterality Date     APPENDECTOMY       C NONSPECIFIC PROCEDURE      cholecystectomy     CHOLECYSTECTOMY         FAMILY HISTORY:   Family History   Problem Relation Age of Onset     Hypertension Mother      C.A.D. Mother      ANEURYSM     Cancer - colorectal Mother      CANCER Mother      OVARIAN     Hypertension Sister      Breast Cancer Sister      CEREBROVASCULAR DISEASE Father        SOCIAL HISTORY:   Social History   Substance Use Topics     Smoking status: Former Smoker     Packs/day: 1.00      "Years: 52.00     Types: Cigarettes     Quit date: 6/1/2013     Smokeless tobacco: Never Used     Alcohol use No       Current Outpatient Prescriptions   Medication     finasteride (PROSCAR) 5 MG tablet     atorvastatin (LIPITOR) 10 MG tablet     lisinopril (PRINIVIL/ZESTRIL) 40 MG tablet     tamsulosin (FLOMAX) 0.4 MG capsule     albuterol (PROAIR HFA/PROVENTIL HFA/VENTOLIN HFA) 108 (90 BASE) MCG/ACT Inhaler     fluticasone-salmeterol (ADVAIR) 250-50 MCG/DOSE diskus inhaler     order for DME     metFORMIN (GLUCOPHAGE) 500 MG tablet     furosemide (LASIX) 20 MG tablet     order for DME     erythromycin (ROMYCIN) ophthalmic ointment     aspirin 81 MG tablet     order for DME     blood glucose (ACCU-CHEK CHACHA) test strip     blood glucose monitoring (SOFTCLIX) lancets     No current facility-administered medications for this visit.          PHYSICAL EXAM:    /83  Pulse 83  Ht 1.651 m (5' 5\")  Wt 117.8 kg (259 lb 9.6 oz)  BMI 43.2 kg/m2    HEENT: Normocephalic and atraumatic   Cardiac: Not done  Back/Flank: Not done  CNS/PNS: Not done  Respiratory: Normal non-labored breathing  Abdomen: Soft nontender and nondistended  Peripheral Vascular: Not done  Mental Status: Not done    Penis: Not done  Scrotal Skin: Not done  Testicles: Not done  Epididymis: Not done  Digital Rectal Exam:     Cystoscopy: Not done    Imaging: None    Urinalysis: UA RESULTS:  Recent Labs   Lab Test  02/22/12   1449   COLOR  Yellow   APPEARANCE  Clear   URINEGLC  Negative   URINEBILI  Negative   URINEKETONE  Negative   SG  1.015   UBLD  Negative   URINEPH  6.0   PROTEIN  Negative   UROBILINOGEN  0.2   NITRITE  Negative   LEUKEST  Negative       PSA: 1.98    Post Void Residual:     Other labs: None today      IMPRESSION:  71 y/o M with history of elevated PSA and lower urinary tract symptoms     PLAN:  Schedule for cystoscopy next available after which will consider TURP     Total Time: 15 minutes                                      Total " in Consultation: greater than 50%      Again, thank you for allowing me to participate in the care of your patient.      Sincerely,    Praveena Burris MD

## 2017-11-10 NOTE — NURSING NOTE
"Chief Complaint   Patient presents with     RECHECK     Elevated PSA       Blood pressure 145/83, pulse 83, height 1.651 m (5' 5\"), weight 117.8 kg (259 lb 9.6 oz). Body mass index is 43.2 kg/(m^2).    Patient Active Problem List   Diagnosis     Essential hypertension, benign     Tobacco use disorder     Lumbago     Impotence of organic origin     Advance care planning     Polyp of colon     Elevated PSA     Sleep related hypoventilation/hypoxemia in other disease     Hyperlipidemia LDL goal <100     Morbid obesity due to excess calories (H)     BPPV (benign paroxysmal positional vertigo), unspecified laterality     Chronic obstructive pulmonary disease, unspecified COPD type (H)     Type 2 diabetes mellitus without complication, without long-term current use of insulin (H)       Allergies   Allergen Reactions     No Known Drug Allergies        Current Outpatient Prescriptions   Medication Sig Dispense Refill     finasteride (PROSCAR) 5 MG tablet TAKE 1 TABLET EVERY DAY 90 tablet 3     atorvastatin (LIPITOR) 10 MG tablet TAKE 1 TABLET (10 MG) BY MOUTH DAILY 30 tablet 0     lisinopril (PRINIVIL/ZESTRIL) 40 MG tablet TAKE 1 TABLET (40 MG) BY MOUTH DAILY 30 tablet 0     tamsulosin (FLOMAX) 0.4 MG capsule Take 1 capsule (0.4 mg) by mouth daily 60 capsule 3     albuterol (PROAIR HFA/PROVENTIL HFA/VENTOLIN HFA) 108 (90 BASE) MCG/ACT Inhaler Inhale 2 puffs into the lungs every 6 hours as needed for shortness of breath / dyspnea or wheezing 3 Inhaler 1     fluticasone-salmeterol (ADVAIR) 250-50 MCG/DOSE diskus inhaler Inhale 1 puff into the lungs 2 times daily 1 Inhaler 5     order for DME Equipment being ordered: diabetic shoes, 1 pair 1 Package 0     metFORMIN (GLUCOPHAGE) 500 MG tablet Take 1 tablet (500 mg) by mouth 2 times daily (with meals) 180 tablet 3     furosemide (LASIX) 20 MG tablet Take 2 tablets (40 mg) by mouth 2 times daily 360 tablet 3     order for DME Oxygen 2 Li/min  at night via nasal cannula 1 Device 0 "     erythromycin (ROMYCIN) ophthalmic ointment Place 0.25 inches Into the left eye At Bedtime 1 Tube 0     aspirin 81 MG tablet Take 1 tablet (81 mg) by mouth daily 30 tablet 11     order for DME Equipment delivered; Respironics 760S BIPAP with heated humidification and heated tubing.  Pressure 15/7cm. Respironics Syeda View FF mask (Medium)       blood glucose (ACCU-CHEK CHACHA) test strip Use to test blood sugar 2 times daily or as directed. 60 strip 6     blood glucose monitoring (SOFTCLIX) lancets Use to test blood sugar 2 times daily or as directed. 1 Box 6       Social History   Substance Use Topics     Smoking status: Former Smoker     Packs/day: 1.00     Years: 52.00     Types: Cigarettes     Quit date: 6/1/2013     Smokeless tobacco: Never Used     Alcohol use No       JESUS Helms  11/10/2017  9:09 AM

## 2017-11-10 NOTE — PROGRESS NOTES
UROLOGIC DIAGNOSES:     CURRENT INTERVENTIONS:       HISTORY:     [facility] previously seen at [facility].   Was seen for elevated PSA and lower urinary tract symptoms.   Patient s/p prostate biopsy four years ago for elevated PSA.   PSA was 7.5 six months ago and six months prior to that was 5.22.   Had lower urinary tract symptoms similar to listed below. Was prescribed tamsulosin and finasteride but unclear if patient started these rx.     Patient with urgency, nocturia 2-4x, frequency, incomplete emptying.   Notes variable stream from dribble to moderate stream.     Most recent PSA is 1.98 (though actually ~ 4 as patient is taking finasteride).   Patient continues to note lower urinary tract symptoms despite combination therapy and would like to consider further intervention.           PAST MEDICAL HISTORY:   Past Medical History:   Diagnosis Date     DM (diabetes mellitus) (H)      Essential hypertension, benign      Hemorrhage of rectum and anus      Obesity      Personal history of colonic polyps      Tobacco use disorder        PAST SURGICAL HISTORY:   Past Surgical History:   Procedure Laterality Date     APPENDECTOMY       C NONSPECIFIC PROCEDURE      cholecystectomy     CHOLECYSTECTOMY         FAMILY HISTORY:   Family History   Problem Relation Age of Onset     Hypertension Mother      C.A.D. Mother      ANEURYSM     Cancer - colorectal Mother      CANCER Mother      OVARIAN     Hypertension Sister      Breast Cancer Sister      CEREBROVASCULAR DISEASE Father        SOCIAL HISTORY:   Social History   Substance Use Topics     Smoking status: Former Smoker     Packs/day: 1.00     Years: 52.00     Types: Cigarettes     Quit date: 6/1/2013     Smokeless tobacco: Never Used     Alcohol use No       Current Outpatient Prescriptions   Medication     finasteride (PROSCAR) 5 MG tablet     atorvastatin (LIPITOR) 10 MG tablet     lisinopril (PRINIVIL/ZESTRIL) 40 MG tablet     tamsulosin (FLOMAX) 0.4 MG capsule      "albuterol (PROAIR HFA/PROVENTIL HFA/VENTOLIN HFA) 108 (90 BASE) MCG/ACT Inhaler     fluticasone-salmeterol (ADVAIR) 250-50 MCG/DOSE diskus inhaler     order for DME     metFORMIN (GLUCOPHAGE) 500 MG tablet     furosemide (LASIX) 20 MG tablet     order for DME     erythromycin (ROMYCIN) ophthalmic ointment     aspirin 81 MG tablet     order for DME     blood glucose (ACCU-CHEK CHACHA) test strip     blood glucose monitoring (SOFTCLIX) lancets     No current facility-administered medications for this visit.          PHYSICAL EXAM:    /83  Pulse 83  Ht 1.651 m (5' 5\")  Wt 117.8 kg (259 lb 9.6 oz)  BMI 43.2 kg/m2    HEENT: Normocephalic and atraumatic   Cardiac: Not done  Back/Flank: Not done  CNS/PNS: Not done  Respiratory: Normal non-labored breathing  Abdomen: Soft nontender and nondistended  Peripheral Vascular: Not done  Mental Status: Not done    Penis: Not done  Scrotal Skin: Not done  Testicles: Not done  Epididymis: Not done  Digital Rectal Exam:     Cystoscopy: Not done    Imaging: None    Urinalysis: UA RESULTS:  Recent Labs   Lab Test  02/22/12   1449   COLOR  Yellow   APPEARANCE  Clear   URINEGLC  Negative   URINEBILI  Negative   URINEKETONE  Negative   SG  1.015   UBLD  Negative   URINEPH  6.0   PROTEIN  Negative   UROBILINOGEN  0.2   NITRITE  Negative   LEUKEST  Negative       PSA: 1.98    Post Void Residual:     Other labs: None today      IMPRESSION:  73 y/o M with history of elevated PSA and lower urinary tract symptoms     PLAN:  Schedule for cystoscopy next available after which will consider TURP     Total Time: 15 minutes                                      Total in Consultation: greater than 50%    "

## 2017-11-10 NOTE — MR AVS SNAPSHOT
After Visit Summary   11/10/2017    Nahun Villalobos    MRN: [id number]           Patient Information     Date Of Birth          1945        Visit Information        Provider Department      11/10/2017 9:00 AM Praveena Burris MD [facility]         Follow-ups after your visit        Your next 10 appointments already scheduled     Nov 17, 2017  1:45 PM CST   Return Visit with Vern Armenta MD   [facility] ([facility])    [address]  [facility] [id number] [phone]            Dec 22, 2017  1:15 PM CST   (Arrive by 1:00 PM)   Cystoscopy with Praveena Burris MD   [facility] ([facility])    [address] [place] [id number] [phone]              Who to contact     Please call your clinic at [phone] to:    Ask questions about your health    Make or cancel appointments    Discuss your medicines    Learn about your test results    Speak to your doctor   If you have compliments or concerns about an experience at your clinic, or if you wish to file a complaint, please contact [facility] Patient Relations at [phone] or email us at [email or web address]         Additional Information About Your Visit        MyChart Information     [employer] gives you secure access to your electronic health record. If you see a primary care provider, you can also send messages to your care team and make appointments. If you have questions, please call your primary care clinic.  If you do not have a primary care provider, please call [phone] and they will assist you.      [employer] is an electronic gateway that provides easy, online access to your medical records. With [employer], you can request a clinic appointment, read your test  "results, renew a prescription or communicate with your care team.     To access your existing account, please contact your [facility] or call [phone] for assistance.        Care EveryWhere ID     This is your Care EveryWhere ID. This could be used by other organizations to access your Early medical records  [id number]        Your Vitals Were     Pulse Height BMI (Body Mass Index)             83 1.651 m (5' 5\") 43.2 kg/m2          Blood Pressure from Last 3 Encounters:   11/10/17 145/83   11/07/17 138/68   10/16/17 136/70    Weight from Last 3 Encounters:   11/10/17 117.8 kg (259 lb 9.6 oz)   10/16/17 115.7 kg (255 lb 1.6 oz)   10/10/17 115.8 kg (255 lb 4.8 oz)              Today, you had the following     No orders found for display         Today's Medication Changes          These changes are accurate as of: 11/10/17  9:38 AM.  If you have any questions, ask your nurse or doctor.               Stop taking these medicines if you haven't already. Please contact your care team if you have questions.     ciprofloxacin 500 MG tablet   Commonly known as:  CIPRO   Stopped by:  Praveena Burris MD                    Primary Care Provider Office Phone # Fax #    Olegario Castillo [phone]       [address] [place] [address]        Equal Access to Services     [facility]: Hadii rufina raaujo Soaltagracia, waaxda luqadaha, qaybta kaalmada roman, erika lynch . So [facility] [phone].    ATENCIÓN: Si habla español, tiene a huggins disposición servicios gratuitos de asistencia lingüística. Llame al [phone].    We comply with applicable federal civil rights laws and [place] laws. We do not discriminate on the basis of race, color, national origin, age, disability, sex, sexual orientation, or gender identity.            Thank you!     Thank you for choosing [facility]OLOG[facility] AND [facility]  for " your care. Our goal is always to provide you with excellent care. Hearing back from our patients is one way we can continue to improve our services. Please take a few minutes to complete the written survey that you may receive in the mail after your visit with us. Thank you!             Your Updated Medication List - Protect others around you: Learn how to safely use, store and throw away your medicines at www.disposemymeds.org.          This list is accurate as of: 11/10/17  9:38 AM.  Always use your most recent med list.                   Brand Name Dispense Instructions for use Diagnosis    albuterol 108 (90 BASE) MCG/ACT Inhaler    PROAIR HFA/PROVENTIL HFA/VENTOLIN HFA    3 Inhaler    Inhale 2 puffs into the lungs every 6 hours as needed for shortness of breath / dyspnea or wheezing    Chronic obstructive pulmonary disease, unspecified COPD type (H)       aspirin 81 MG tablet     30 tablet    Take 1 tablet (81 mg) by mouth daily    Type 2 diabetes, HbA1C goal < 8% (H)       atorvastatin 10 MG tablet    LIPITOR    30 tablet    TAKE 1 TABLET (10 MG) BY MOUTH DAILY    Type 2 diabetes mellitus without complication (H), Hyperlipidemia LDL goal <100       blood glucose monitoring lancets     1 Box    Use to test blood sugar 2 times daily or as directed.    DM (diabetes mellitus) (H)       blood glucose monitoring test strip    ACCU-CHEK CHACHA    60 strip    Use to test blood sugar 2 times daily or as directed.    DM (diabetes mellitus) (H)       erythromycin ophthalmic ointment    ROMYCIN    1 Tube    Place 0.25 inches Into the left eye At Bedtime    Acute conjunctivitis of left eye, unspecified acute conjunctivitis       finasteride 5 MG tablet    PROSCAR    90 tablet    TAKE 1 TABLET EVERY DAY    Enlarged prostate       fluticasone-salmeterol 250-50 MCG/DOSE diskus inhaler    ADVAIR    1 Inhaler    Inhale 1 puff into the lungs 2 times daily    Chronic obstructive pulmonary disease, unspecified COPD type (H)        furosemide 20 MG tablet    LASIX    360 tablet    Take 2 tablets (40 mg) by mouth 2 times daily    Essential hypertension, benign       lisinopril 40 MG tablet    PRINIVIL/ZESTRIL    30 tablet    TAKE 1 TABLET (40 MG) BY MOUTH DAILY    Essential hypertension, benign, Type 2 diabetes mellitus without complication (H)       metFORMIN 500 MG tablet    GLUCOPHAGE    180 tablet    Take 1 tablet (500 mg) by mouth 2 times daily (with meals)    Type 2 diabetes mellitus without complication, without long-term current use of insulin (H)       order for DME      Equipment delivered; Respironics 760S BIPAP with heated humidification and heated tubing.  Pressure 15/7cm. Respironics Syeda View FF mask (Medium)        * order for DME     1 Device    Oxygen 2 Li/min at night via nasal cannula    Nocturnal hypoxemia       * order for DME     1 Package    Equipment being ordered: diabetic shoes, 1 pair    Type 2 diabetes mellitus without complication, without long-term current use of insulin (H)       tamsulosin 0.4 MG capsule    FLOMAX    60 capsule    Take 1 capsule (0.4 mg) by mouth daily    Urinary urgency       * Notice:  This list has 2 medication(s) that are the same as other medications prescribed for you. Read the directions carefully, and ask your doctor or other care provider to review them with you.

## 2017-11-14 ENCOUNTER — TRANSFERRED RECORDS (OUTPATIENT)
Dept: HEALTH INFORMATION MANAGEMENT | Facility: CLINIC | Age: 72
End: 2017-11-14

## 2017-11-27 ENCOUNTER — TELEPHONE (OUTPATIENT)
Dept: CARDIOLOGY | Facility: CLINIC | Age: 72
End: 2017-11-27

## 2017-11-27 NOTE — TELEPHONE ENCOUNTER
Called patient to f/u on cardiac testing. Pt no-showed 11/17/17 appt with .   Jing 11/7/17 was to be reviewed at this OV. No answer. Awaiting call back.   NICKY Perales

## 2017-11-28 ENCOUNTER — TELEPHONE (OUTPATIENT)
Dept: CARDIOLOGY | Facility: CLINIC | Age: 72
End: 2017-11-28

## 2017-11-28 NOTE — TELEPHONE ENCOUNTER
"Spoke to patient regarding stress test. Pt was a no-show for OV scheduled 11/17/17. Patient rescheduled visit for 2/22/18, however, pt needs to be seen sooner than that to address cardiac testing results.     Stress test done 11/7/17,   Impression  1.  Myocardial perfusion imaging using single isotope technique  demonstrated small size mild intensity basal inferior reversible  defect overall consistent with mild ischemia.   2. Gated images demonstrated no regional wall motion abnormalities.   The left ventricular systolic function is normal with LVEF of 83% with  stress.  3. Compared to the prior study dated 06/04/2015 a fixed inferior  defect was reported in previous study. In the present study the defect  is reversible in basal inferior segment.    Pt reports having two episodes of sharp chest pain starting on left side radiating down to ribs and back. These episodes occurred \"last week\" and lasted about 2-3 seconds at rest. No other new symptoms occurred at that time. Pt does report SOB but states that he always feels like that. Reviewed medications list with patient. Lisinopril and Atorvastatin not taken since July 2017. Pt has been taking Lasix 20 mg QD rather than BID. Pt seemed very confused about medications and states, \"I'm old and I admit I don't remember to take my medications all the time\". Pt also reports that ever since wife passed away, he forgets to make it to his appointments and to take his medications.   Advised patient to seek medical help if episode of chest pain, SOB, diaphoresis occurs. Patient \"feels okay\" and reports no reoccurring episodes since last week.   Will route to  for review and work with  to find a sooner OV date. Pt's work schedule provides limited times for f/u. Nurse number given to patient.  NICKY Perales     "

## 2017-11-29 ENCOUNTER — PRE VISIT (OUTPATIENT)
Dept: UROLOGY | Facility: CLINIC | Age: 72
End: 2017-11-29

## 2017-11-29 DIAGNOSIS — E78.5 HYPERLIPIDEMIA LDL GOAL <100: ICD-10-CM

## 2017-11-29 RX ORDER — ATORVASTATIN CALCIUM 10 MG/1
10 TABLET, FILM COATED ORAL DAILY
Qty: 90 TABLET | Refills: 1 | Status: SHIPPED | OUTPATIENT
Start: 2017-11-29 | End: 2018-02-21

## 2017-11-29 NOTE — TELEPHONE ENCOUNTER
Per , pt to restart Atorvastatin 10 mg qd. Pt to resume taking Lasix dose.   Patient has f/u scheduled w/  on 12/20/17.     Atorvastatin script sent to Pharmacy. Fasting lipids to be drawn 6-8 weeks post starting medication. Orders placed in [facility].   Calling patient with plan. NICKY Perales

## 2017-11-29 NOTE — TELEPHONE ENCOUNTER
Received refill request for:  Atorvastatin   Last OV was: 10/10/17 w/    Labs/EKG: Last lipids 3/15/16. Restarting this medication, will wait 6-8 weeks for a fasting lipid. Orders placed.   F/U scheduled: 12/20/17 w/    New script sent to: [facility]

## 2017-11-29 NOTE — TELEPHONE ENCOUNTER
Patient is coming in to see  for a cystoscopy for lower urinary tract symptoms, no need for a call.

## 2017-11-30 NOTE — TELEPHONE ENCOUNTER
Spoke to patient regarding Lasix dose. Pt to be taking Lasix 40 mg BID. Patient clarified he has only been taking 20 mg daily.   Lasix last filled by . Advised patient to f/u with PMD.  was notified. Patient will see  12/20/17. NICKY Peralse

## 2017-12-06 ENCOUNTER — OFFICE VISIT (OUTPATIENT)
Dept: INTERNAL MEDICINE | Facility: CLINIC | Age: 72
End: 2017-12-06
Payer: COMMERCIAL

## 2017-12-06 VITALS
OXYGEN SATURATION: 90 % | WEIGHT: 254.4 LBS | BODY MASS INDEX: 42.33 KG/M2 | DIASTOLIC BLOOD PRESSURE: 70 MMHG | SYSTOLIC BLOOD PRESSURE: 118 MMHG | HEART RATE: 78 BPM | TEMPERATURE: 98.7 F

## 2017-12-06 DIAGNOSIS — J44.9 CHRONIC OBSTRUCTIVE PULMONARY DISEASE, UNSPECIFIED COPD TYPE (H): Chronic | ICD-10-CM

## 2017-12-06 DIAGNOSIS — Z23 NEED FOR PROPHYLACTIC VACCINATION AND INOCULATION AGAINST INFLUENZA: ICD-10-CM

## 2017-12-06 DIAGNOSIS — F43.21 GRIEF REACTION: ICD-10-CM

## 2017-12-06 DIAGNOSIS — I10 ESSENTIAL HYPERTENSION, BENIGN: ICD-10-CM

## 2017-12-06 DIAGNOSIS — E11.9 TYPE 2 DIABETES MELLITUS WITHOUT COMPLICATION, WITHOUT LONG-TERM CURRENT USE OF INSULIN (H): Primary | ICD-10-CM

## 2017-12-06 DIAGNOSIS — E66.01 MORBID OBESITY DUE TO EXCESS CALORIES (H): ICD-10-CM

## 2017-12-06 DIAGNOSIS — Z71.89 ADVANCE CARE PLANNING: ICD-10-CM

## 2017-12-06 PROBLEM — F43.20 GRIEF REACTION: Status: ACTIVE | Noted: 2017-12-06

## 2017-12-06 PROCEDURE — 99214 OFFICE O/P EST MOD 30 MIN: CPT | Mod: 25 | Performed by: INTERNAL MEDICINE

## 2017-12-06 PROCEDURE — 90662 IIV NO PRSV INCREASED AG IM: CPT | Performed by: INTERNAL MEDICINE

## 2017-12-06 PROCEDURE — G0008 ADMIN INFLUENZA VIRUS VAC: HCPCS | Performed by: INTERNAL MEDICINE

## 2017-12-06 RX ORDER — ALBUTEROL SULFATE 90 UG/1
2 AEROSOL, METERED RESPIRATORY (INHALATION) EVERY 6 HOURS PRN
Qty: 3 INHALER | Refills: 1 | Status: SHIPPED | OUTPATIENT
Start: 2017-12-06 | End: 2019-03-30

## 2017-12-06 RX ORDER — LISINOPRIL 40 MG/1
TABLET ORAL
Qty: 90 TABLET | Refills: 3 | Status: SHIPPED | OUTPATIENT
Start: 2017-12-06 | End: 2018-11-01

## 2017-12-06 RX ORDER — FUROSEMIDE 20 MG
20 TABLET ORAL 2 TIMES DAILY
Qty: 360 TABLET | Refills: 3 | Status: SHIPPED | OUTPATIENT
Start: 2017-12-06 | End: 2018-07-24

## 2017-12-06 NOTE — LETTER
My Depression Action Plan  Name: Nahun Villalobos   Date of Birth 1945  Date: 12/6/2017    My doctor: Olegario Castillo   My clinic: [address] [address] [phone]          GREEN    ZONE   Good Control    What it looks like:     Things are going generally well. You have normal up s and down s. You may even feel depressed from time to time, but bad moods usually last less than a day.   What you need to do:  1. Continue to care for yourself (see self care plan)  2. Check your depression survival kit and update it as needed  3. Follow your physician s recommendations including any medication.  4. Do not stop taking medication unless you consult with your physician first.           YELLOW         ZONE Getting Worse    What it looks like:     Depression is starting to interfere with your life.     It may be hard to get out of bed; you may be starting to isolate yourself from others.    Symptoms of depression are starting to last most all day and this has happened for several days.     You may have suicidal thoughts but they are not constant.   What you need to do:     1. Call your care team, your response to treatment will improve if you keep your care team informed of your progress. Yellow periods are signs an adjustment may need to be made.     2. Continue your self-care, even if you have to fake it!    3. Talk to someone in your support network    4. Open up your depression survival kit           RED    ZONE Medical Alert - Get Help    What it looks like:     Depression is seriously interfering with your life.     You may experience these or other symptoms: You can t get out of bed most days, can t work or engage in other necessary activities, you have trouble taking care of basic hygiene, or basic responsibilities, thoughts of suicide or death that will not go away, self-injurious behavior.     What you need to do:  1. Call your  care team and request a same-day appointment. If they are not available (weekends or after hours) call your local crisis line, emergency room or 911.      Electronically signed by: Betina Renee, December 6, 2017    Depression Self Care Plan / Survival Kit    Self-Care for Depression  Here s the deal. Your body and mind are really not as separate as most people think.  What you do and think affects how you feel and how you feel influences what you do and think. This means if you do things that people who feel good do, it will help you feel better.  Sometimes this is all it takes.  There is also a place for medication and therapy depending on how severe your depression is, so be sure to consult with your medical provider and/ or Behavioral Health Consultant if your symptoms are worsening or not improving.     In order to better manage my stress, I will:    Exercise  Get some form of exercise, every day. This will help reduce pain and release endorphins, the  feel good  chemicals in your brain. This is almost as good as taking antidepressants!  This is not the same as joining a gym and then never going! (they count on that by the way ) It can be as simple as just going for a walk or doing some gardening, anything that will get you moving.      Hygiene   Maintain good hygiene (Get out of bed in the morning, Make your bed, Brush your teeth, Take a shower, and Get dressed like you were going to work, even if you are unemployed).  If your clothes don't fit try to get ones that do.    Diet  I will strive to eat foods that are good for me, drink plenty of water, and avoid excessive sugar, caffeine, alcohol, and other mood-altering substances.  Some foods that are helpful in depression are: complex carbohydrates, B vitamins, flaxseed, fish or fish oil, fresh fruits and vegetables.    Psychotherapy  I agree to participate in Individual Therapy (if recommended).    Medication  If prescribed medications, I agree to take  them.  Missing doses can result in serious side effects.  I understand that drinking alcohol, or other illicit drug use, may cause potential side effects.  I will not stop my medication abruptly without first discussing it with my provider.    Staying Connected With Others  I will stay in touch with my friends, family members, and my primary care provider/team.    Use your imagination  Be creative.  We all have a creative side; it doesn t matter if it s oil painting, sand castles, or mud pies! This will also kick up the endorphins.    Witness Beauty  (AKA stop and smell the roses) Take a look outside, even in mid-winter. Notice colors, textures. Watch the squirrels and birds.     Service to others  Be of service to others.  There is always someone else in need.  By helping others we can  get out of ourselves  and remember the really important things.  This also provides opportunities for practicing all the other parts of the program.    Humor  Laugh and be silly!  Adjust your TV habits for less news and crime-drama and more comedy.    Control your stress  Try breathing deep, massage therapy, biofeedback, and meditation. Find time to relax each day.     My support system    Clinic Contact:  Phone number:    Contact 1:  Phone number:    Contact 2:  Phone number:    [employer]/:  Phone number:    Therapist:  Phone number:    Local crisis center:    Phone number:    Other community support:  Phone number:

## 2017-12-06 NOTE — PROGRESS NOTES
SUBJECTIVE:   Nahnu Villalobos is a 72 year old male who presents to clinic today for the following health issues:       Hyperlipidemia Follow-Up      Rate your low fat/cholesterol diet?: fair    Taking statin?  Yes, no muscle aches from statin    Other lipid medications/supplements?:  Fenofibrate, without side effects    Hypertension Follow-up      Outpatient blood pressures are not being checked.    Low Salt Diet: low salt    Diabetes Follow-up      Patient is checking blood sugars: not at all    Diabetic concerns: None     Symptoms of hypoglycemia (low blood sugar): none     Paresthesias (numbness or burning in feet) or sores: Yes      Date of last diabetic eye exam: DUE    Other concerns:  1. Bilateral foot swelling L>R  2. L foot pain- sharp  3. Urinary frequency    BP Readings from Last 2 Encounters:   11/10/17 145/83   11/07/17 138/68     Hemoglobin A1C (%)   Date Value   12/13/2016 6.2 (H)   03/15/2016 6.1 (H)     LDL Cholesterol Calculated (mg/dL)   Date Value   03/15/2016 66   07/24/2015 44         Amount of exercise or physical activity: None    Problems taking medications regularly: No    Medication side effects: none    Diet: low fat/cholesterol and diabetic        Amount of exercise or physical activity: None    Problems taking medications regularly: No    Medication side effects: none  Diet: low fat/cholesterol    Problem list and histories reviewed & adjusted, as indicated.  Additional history: as documented    Patient Active Problem List   Diagnosis     Essential hypertension, benign     Tobacco use disorder     Lumbago     Impotence of organic origin     Advance care planning     Polyp of colon     Elevated PSA     Sleep related hypoventilation/hypoxemia in other disease     Hyperlipidemia LDL goal <100     Morbid obesity due to excess calories (H)     BPPV (benign paroxysmal positional vertigo), unspecified laterality     Chronic obstructive pulmonary disease, unspecified COPD type (H)     Type  2 diabetes mellitus without complication, without long-term current use of insulin (H)     Past Surgical History:   Procedure Laterality Date     APPENDECTOMY       C NONSPECIFIC PROCEDURE      cholecystectomy     CHOLECYSTECTOMY         Social History   Substance Use Topics     Smoking status: Former Smoker     Packs/day: 1.00     Years: 52.00     Types: Cigarettes     Quit date: 6/1/2013     Smokeless tobacco: Never Used     Alcohol use No     Family History   Problem Relation Age of Onset     Hypertension Mother      C.A.D. Mother      ANEURYSM     Cancer - colorectal Mother      CANCER Mother      OVARIAN     Hypertension Sister      Breast Cancer Sister      CEREBROVASCULAR DISEASE Father          Current Outpatient Prescriptions   Medication Sig Dispense Refill     atorvastatin (LIPITOR) 10 MG tablet Take 1 tablet (10 mg) by mouth daily 90 tablet 1     finasteride (PROSCAR) 5 MG tablet TAKE 1 TABLET EVERY DAY 90 tablet 3     lisinopril (PRINIVIL/ZESTRIL) 40 MG tablet TAKE 1 TABLET (40 MG) BY MOUTH DAILY 30 tablet 0     tamsulosin (FLOMAX) 0.4 MG capsule Take 1 capsule (0.4 mg) by mouth daily 60 capsule 3     albuterol (PROAIR HFA/PROVENTIL HFA/VENTOLIN HFA) 108 (90 BASE) MCG/ACT Inhaler Inhale 2 puffs into the lungs every 6 hours as needed for shortness of breath / dyspnea or wheezing 3 Inhaler 1     fluticasone-salmeterol (ADVAIR) 250-50 MCG/DOSE diskus inhaler Inhale 1 puff into the lungs 2 times daily 1 Inhaler 5     metFORMIN (GLUCOPHAGE) 500 MG tablet Take 1 tablet (500 mg) by mouth 2 times daily (with meals) 180 tablet 3     furosemide (LASIX) 20 MG tablet Take 2 tablets (40 mg) by mouth 2 times daily 360 tablet 3     aspirin 81 MG tablet Take 1 tablet (81 mg) by mouth daily 30 tablet 11     blood glucose (ACCU-CHEK CHACHA) test strip Use to test blood sugar 2 times daily or as directed. 60 strip 6     blood glucose monitoring (SOFTCLIX) lancets Use to test blood sugar 2 times daily or as directed. 1 Box  6     order for DME Equipment being ordered: diabetic shoes, 1 pair 1 Package 0     order for DME Oxygen 2 Li/min  at night via nasal cannula 1 Device 0     order for DME Equipment delivered; Respironics 760S BIPAP with heated humidification and heated tubing.  Pressure 15/7cm. Respironics Syeda View FF mask (Medium)       Allergies   Allergen Reactions     No Known Drug Allergies      Recent Labs   Lab Test  10/16/17   1411  12/13/16   1600  03/15/16   0806  07/24/15   0824  05/26/15   1016   04/07/15   1550  08/27/13   1029   A1C   --   6.2*  6.1*   --   7.1*   --    --    --    LDL   --    --   66  44   --    --    --   98   HDL   --    --   37*  29*   --    --    --   34*   TRIG   --    --   122  148   --    --    --   221*   ALT   --    --   18   --    --    --   24  39   CR  1.02   --   1.06   --    --    < >  1.09  1.04   GFRESTIMATED  72   --   69   --    --    < >  67  71   GFRESTBLACK  87   --   83   --    --    < >  81  86   POTASSIUM  4.4   --   3.9   --    --    < >  4.2  4.1   TSH   --    --   2.15   --    --    --   1.68   --     < > = values in this interval not displayed.      BP Readings from Last 3 Encounters:   12/06/17 118/70   11/10/17 145/83   11/07/17 138/68    Wt Readings from Last 3 Encounters:   12/06/17 254 lb 6.4 oz (115.4 kg)   11/10/17 259 lb 9.6 oz (117.8 kg)   10/16/17 255 lb 1.6 oz (115.7 kg)                  Labs reviewed in EPIC          Reviewed and updated as needed this visit by clinical staffProblems       Reviewed and updated as needed this visit by Provider         ROS:  C: NEGATIVE for fever, chills, change in weight  E/M: NEGATIVE for ear, mouth and throat problems  R: NEGATIVE for significant cough or SOB  CV: NEGATIVE for chest pain, palpitations or peripheral edema  GI: NEGATIVE for nausea, abdominal pain, heartburn, or change in bowel habits  M: NEGATIVE for significant arthralgias or myalgia  H: NEGATIVE for bleeding problems  P: + for changes in mood or affect since  his wife's death.    OBJECTIVE:                                                    /70  Pulse 78  Temp 98.7  F (37.1  C) (Oral)  Wt 254 lb 6.4 oz (115.4 kg)  SpO2 90%  BMI 42.33 kg/m2  Body mass index is 42.33 kg/(m^2).  GENERAL: alert and no distress  EYES: Eyes grossly normal to inspection, extraocular movements - intact, and PERRL  HENT: ear canals- normal; TMs- normal; Nose- normal; Mouth- no ulcers, no lesions  NECK: no tenderness, no adenopathy, no asymmetry, no masses, no stiffness; thyroid- normal to palpation  RESP: lungs clear to auscultation - no rales, no rhonchi, no wheezes  CV: regular rates and rhythm, normal S1 S2, no S3 or S4 and no click or rub -  ABDOMEN: obese  MS: extremities- no gross deformities noted with dystrophic nails to feet R>L, trace to 1+ edema.  PSYCH: Alert and oriented times 3; speech- coherent , normal rate and volume; able to articulate logical thoughts, able to abstract reason, no tangential thoughts, no hallucinations or delusions, affect- normal     ASSESSMENT/PLAN:                                                      (E11.9) Type 2 diabetes mellitus without complication, without long-term current use of insulin (H)  (primary encounter diagnosis)  Comment: repeat labs as ordered  Plan: lisinopril (PRINIVIL/ZESTRIL) 40 MG tablet,         metFORMIN (GLUCOPHAGE) 500 MG tablet, Lipid         Profile, Hepatic panel, Hemoglobin A1c, Albumin        Random Urine Quantitative with Creat Ratio, TSH        with free T4 reflex        Advised Twinkle Toes for toe trimming, cut back Lasix to BID dosing    (J44.9) Chronic obstructive pulmonary disease, unspecified COPD type (H)  Comment: stable on therapy  Plan: albuterol (PROAIR HFA/PROVENTIL HFA/VENTOLIN         HFA) 108 (90 BASE) MCG/ACT Inhaler            (I10) Essential hypertension, benign  Comment: at goal on therapy  Plan: furosemide (LASIX) 20 MG tablet, lisinopril         (PRINIVIL/ZESTRIL) 40 MG tablet             (F43.20) Grief reaction  Comment: discussed options of care since wife , offered SSRI + counseling but declined at present.  Plan:       (E66.01) Morbid obesity due to excess calories (H)  Comment: weight loss advised  Plan:         See Patient Instructions    Olegario Castillo MD  [facility]    25 minutes spent with this patient, face to face, discussing treatment options for listed problems above as well as side effects of appropriate medications.  Counseling time extended beyond 50% of the clinic visit.  Medication dosing, treatment plan and follow-up were discussed. Also reviewed need for primary care testing for patient.

## 2017-12-06 NOTE — MR AVS SNAPSHOT
After Visit Summary   12/6/2017    Nahun Villalobos    MRN: [id number]           Patient Information     Date Of Birth          1945        Visit Information        Provider Department      12/6/2017 2:40 PM Olegario Castillo MD [facility]        Today's Diagnoses     Type 2 diabetes mellitus without complication, without long-term current use of insulin (H)    -  1    Chronic obstructive pulmonary disease, unspecified COPD type (H)        Essential hypertension, benign        Grief reaction           Follow-ups after your visit        Your next 10 appointments already scheduled     Dec 20, 2017  1:45 PM CST   Return Visit with Vern Armenta MD   [facility]   [place] ([facility])    [address]  [address]  [facility] [address]   [phone]            Dec 22, 2017  1:15 PM CST   (Arrive by 1:00 PM)   Cystoscopy with Praveena Burris MD   [facility] ([facility])    [address] [place] [address]  4th Floor  [facility] [address] [phone]              Future tests that were ordered for you today     Open Future Orders        Priority Expected Expires Ordered    Lipid Profile Routine 12/6/2017 12/31/2017 12/6/2017    Hepatic panel Routine 12/6/2017 12/31/2017 12/6/2017    Hemoglobin A1c Routine 12/6/2017 12/31/2017 12/6/2017    Albumin Random Urine Quantitative with Creat Ratio Routine 12/6/2017 12/31/2017 12/6/2017    TSH with free T4 reflex Routine 12/6/2017 12/31/2017 12/6/2017            Who to contact     If you have questions or need follow up information about today's clinic visit or your schedule please contact [facility] directly at [phone].  Normal or non-critical lab and imaging results will be communicated to you by MyChart, letter or phone within 4 business days after the clinic has received the  results. If you do not hear from us within 7 days, please contact the clinic through Hash[employer] or phone. If you have a critical or abnormal lab result, we will notify you by phone as soon as possible.  Submit refill requests through Hash[employer] or call your pharmacy and they will forward the refill request to us. Please allow 3 business days for your refill to be completed.          Additional Information About Your Visit        Hashable Information     [employer]able gives you secure access to your electronic health record. If you see a primary care provider, you can also send messages to your care team and make appointments. If you have questions, please call your primary care clinic.  If you do not have a primary care provider, please call [phone] and they will assist you.        Care EveryWhere ID     This is your Care EveryWhere ID. This could be used by other organizations to access your [place] medical records  [id number]        Your Vitals Were     Pulse Temperature Pulse Oximetry BMI (Body Mass Index)          78 98.7  F (37.1  C) (Oral) 90% 42.33 kg/m2         Blood Pressure from Last 3 Encounters:   12/06/17 118/70   11/10/17 145/83   11/07/17 138/68    Weight from Last 3 Encounters:   12/06/17 254 lb 6.4 oz (115.4 kg)   11/10/17 259 lb 9.6 oz (117.8 kg)   10/16/17 255 lb 1.6 oz (115.7 kg)                 Today's Medication Changes          These changes are accurate as of: 12/6/17  3:15 PM.  If you have any questions, ask your nurse or doctor.               These medicines have changed or have updated prescriptions.        Dose/Directions    furosemide 20 MG tablet   Commonly known as:  LASIX   This may have changed:  how much to take   Used for:  Essential hypertension, benign        Dose:  20 mg   Take 1 tablet (20 mg) by mouth 2 times daily   Quantity:  360 tablet   Refills:  3       lisinopril 40 MG tablet   Commonly known as:  PRINIVIL/ZESTRIL   This may have changed:  See the new instructions.   Used  for:  Type 2 diabetes mellitus without complication, without long-term current use of insulin (H), Essential hypertension, benign        TAKE 1 TABLET (40 MG) BY MOUTH DAILY   Quantity:  90 tablet   Refills:  3            Where to get your medicines      These medications were sent to [facility]/pharmacy #[id number] - [place], [place] - [address]  [address] [address]     Phone:  [phone]     albuterol 108 (90 BASE) MCG/ACT Inhaler    furosemide 20 MG tablet    lisinopril 40 MG tablet    metFORMIN 500 MG tablet                Primary Care Provider Office Phone # Fax #    Olegario Castillo [phone]       [address] [facility] [address]        Equal Access to Services     MALLORY VAZQUEZ : Hadii rufina mckeono Soaltagracia, waaxda luqadaha, qaybta kaalmada adeegyada, erika campos. So [facility] [phone].    ATENCIÓN: Si habla español, tiene a huggins disposición servicios gratuitos de asistencia lingüística. [facility] [phone].    We comply with applicable federal civil rights laws and [place] laws. We do not discriminate on the basis of race, color, national origin, age, disability, sex, sexual orientation, or gender identity.            Thank you!     Thank you for choosing [facility]  for your care. Our goal is always to provide you with excellent care. Hearing back from our patients is one way we can continue to improve our services. Please take a few minutes to complete the written survey that you may receive in the mail after your visit with us. Thank you!             Your Updated Medication List - Protect others around you: Learn how to safely use, store and throw away your medicines at www.disposemymeds.org.          This list is accurate as of: 12/6/17  3:15 PM.  Always use your most recent med list.                   Brand Name Dispense Instructions for use Diagnosis    albuterol 108 (90 BASE) MCG/ACT Inhaler     PROAIR HFA/PROVENTIL HFA/VENTOLIN HFA    3 Inhaler    Inhale 2 puffs into the lungs every 6 hours as needed for shortness of breath / dyspnea or wheezing    Chronic obstructive pulmonary disease, unspecified COPD type (H)       aspirin 81 MG tablet     30 tablet    Take 1 tablet (81 mg) by mouth daily    Type 2 diabetes, HbA1C goal < 8% (H)       atorvastatin 10 MG tablet    LIPITOR    90 tablet    Take 1 tablet (10 mg) by mouth daily    Hyperlipidemia LDL goal <100       blood glucose monitoring lancets     1 Box    Use to test blood sugar 2 times daily or as directed.    DM (diabetes mellitus) (H)       blood glucose monitoring test strip    ACCU-CHEK CHACHA    60 strip    Use to test blood sugar 2 times daily or as directed.    DM (diabetes mellitus) (H)       finasteride 5 MG tablet    PROSCAR    90 tablet    TAKE 1 TABLET EVERY DAY    Enlarged prostate       fluticasone-salmeterol 250-50 MCG/DOSE diskus inhaler    ADVAIR    1 Inhaler    Inhale 1 puff into the lungs 2 times daily    Chronic obstructive pulmonary disease, unspecified COPD type (H)       furosemide 20 MG tablet    LASIX    360 tablet    Take 1 tablet (20 mg) by mouth 2 times daily    Essential hypertension, benign       lisinopril 40 MG tablet    PRINIVIL/ZESTRIL    90 tablet    TAKE 1 TABLET (40 MG) BY MOUTH DAILY    Type 2 diabetes mellitus without complication, without long-term current use of insulin (H), Essential hypertension, benign       metFORMIN 500 MG tablet    GLUCOPHAGE    180 tablet    Take 1 tablet (500 mg) by mouth 2 times daily (with meals)    Type 2 diabetes mellitus without complication, without long-term current use of insulin (H)       order for DME      Equipment delivered; Respironics 760S BIPAP with heated humidification and heated tubing.  Pressure 15/7cm. Respironics Syeda View FF mask (Medium)        * order for DME     1 Device    Oxygen 2 Li/min at night via nasal cannula    Nocturnal hypoxemia       * order for DME      1 Package    Equipment being ordered: diabetic shoes, 1 pair    Type 2 diabetes mellitus without complication, without long-term current use of insulin (H)       tamsulosin 0.4 MG capsule    FLOMAX    60 capsule    Take 1 capsule (0.4 mg) by mouth daily    Urinary urgency       * Notice:  This list has 2 medication(s) that are the same as other medications prescribed for you. Read the directions carefully, and ask your doctor or other care provider to review them with you.

## 2017-12-06 NOTE — NURSING NOTE
"Chief Complaint   Patient presents with     Recheck Medication     Swelling       Initial /70  Pulse 78  Temp 98.7  F (37.1  C) (Oral)  Wt 254 lb 6.4 oz (115.4 kg)  SpO2 90%  BMI 42.33 kg/m2 Estimated body mass index is 42.33 kg/(m^2) as calculated from the following:    Height as of 11/10/17: 5' 5\" (1.651 m).    Weight as of this encounter: 254 lb 6.4 oz (115.4 kg).  Medication Reconciliation: complete    "

## 2017-12-07 DIAGNOSIS — E11.9 TYPE 2 DIABETES MELLITUS WITHOUT COMPLICATION, WITHOUT LONG-TERM CURRENT USE OF INSULIN (H): ICD-10-CM

## 2017-12-07 LAB
ALBUMIN SERPL-MCNC: 3.6 G/DL (ref 3.4–5)
ALP SERPL-CCNC: 73 U/L (ref 40–150)
ALT SERPL W P-5'-P-CCNC: 20 U/L (ref 0–70)
AST SERPL W P-5'-P-CCNC: 20 U/L (ref 0–45)
BILIRUB DIRECT SERPL-MCNC: 0.1 MG/DL (ref 0–0.2)
BILIRUB SERPL-MCNC: 0.5 MG/DL (ref 0.2–1.3)
CHOLEST SERPL-MCNC: 136 MG/DL
CREAT UR-MCNC: 259 MG/DL
HBA1C MFR BLD: 6.6 % (ref 4.3–6)
HDLC SERPL-MCNC: 40 MG/DL
LDLC SERPL CALC-MCNC: 67 MG/DL
MICROALBUMIN UR-MCNC: 308 MG/L
MICROALBUMIN/CREAT UR: 118.92 MG/G CR (ref 0–17)
NONHDLC SERPL-MCNC: 96 MG/DL
PROT SERPL-MCNC: 8.5 G/DL (ref 6.8–8.8)
TRIGL SERPL-MCNC: 143 MG/DL
TSH SERPL DL<=0.005 MIU/L-ACNC: 2.39 MU/L (ref 0.4–4)

## 2017-12-07 PROCEDURE — 83036 HEMOGLOBIN GLYCOSYLATED A1C: CPT | Performed by: INTERNAL MEDICINE

## 2017-12-07 PROCEDURE — 80076 HEPATIC FUNCTION PANEL: CPT | Performed by: INTERNAL MEDICINE

## 2017-12-07 PROCEDURE — 84443 ASSAY THYROID STIM HORMONE: CPT | Performed by: INTERNAL MEDICINE

## 2017-12-07 PROCEDURE — 80061 LIPID PANEL: CPT | Performed by: INTERNAL MEDICINE

## 2017-12-07 PROCEDURE — 82043 UR ALBUMIN QUANTITATIVE: CPT | Performed by: INTERNAL MEDICINE

## 2017-12-07 PROCEDURE — 36415 COLL VENOUS BLD VENIPUNCTURE: CPT | Performed by: INTERNAL MEDICINE

## 2018-01-09 NOTE — TELEPHONE ENCOUNTER
Patient no-showed two OV w/  and canceled recent. Pt needs f/u after stress test done 11/7/17. Left  for call back. NICKY Perales

## 2018-01-22 ENCOUNTER — PRE VISIT (OUTPATIENT)
Dept: UROLOGY | Facility: CLINIC | Age: 73
End: 2018-01-22

## 2018-01-24 ENCOUNTER — OFFICE VISIT (OUTPATIENT)
Dept: UROLOGY | Facility: CLINIC | Age: 73
End: 2018-01-24
Payer: COMMERCIAL

## 2018-01-24 VITALS
BODY MASS INDEX: 40.98 KG/M2 | WEIGHT: 255 LBS | SYSTOLIC BLOOD PRESSURE: 150 MMHG | HEIGHT: 66 IN | HEART RATE: 81 BPM | DIASTOLIC BLOOD PRESSURE: 79 MMHG

## 2018-01-24 DIAGNOSIS — R35.0 URINARY FREQUENCY: Primary | ICD-10-CM

## 2018-01-24 RX ORDER — CEFAZOLIN SODIUM 1 G/3ML
1 INJECTION, POWDER, FOR SOLUTION INTRAMUSCULAR; INTRAVENOUS SEE ADMIN INSTRUCTIONS
Status: CANCELLED | OUTPATIENT
Start: 2018-01-24

## 2018-01-24 ASSESSMENT — PAIN SCALES - GENERAL: PAINLEVEL: NO PAIN (0)

## 2018-01-24 NOTE — NURSING NOTE
Invasive Procedure Safety Checklist:    Procedure: Cystoscopy    Action: Complete sections and checkboxes as appropriate.    Pre-procedure:  1. Patient ID Verified with 2 identifiers (Benita and  or MRN) : YES    2. Procedure and site verified with patient/designee (when able) : YES    3. Accurate consent documentation in medical record : YES    4. H&P (or appropriate assessment) documented in medical record : NO  H&P must be up to 30 days prior to procedure an updated within 24 hours of                 Procedure as applicable.     5. Relevant diagnostic and radiology test results appropriately labeled and displayed as applicable : NO    6. Blood products, implants, devices, and/or special equipment available for the procedure as applicable : NO    7. Procedure site(s) marked with provider initials [Exclusions: ] : NO    8. Marking not required. Reason : Yes  Procedure does not require site marking    Time Out:     Time-Out performed immediately prior to starting procedure, including verbal and active participation of all team members addressing: YES    1. Correct patient identity.  2. Confirmed that the correct side and site are marked.  3. An accurate procedure to be done.  4. Agreement on the procedure to be done.  5. Correct patient position.  6. Relevant images and results are properly labeled and appropriately displayed.  7. The need to administer antibiotics or fluids for irrigation purposes during the procedure as applicable.  8. Safety precautions based on patient history or medication use.    During Procedure: Verification of correct person, site, and procedure occurs any time the responsibility for care of the patient is transferred to another member of the care team.    JESUS Carpenter

## 2018-01-24 NOTE — PROGRESS NOTES
PRE-PROCEDURE DIAGNOSIS: urinary frequency, urgency, lower urinary tract symptoms   POST-PROCEDURE DIAGNOSIS: same   PROCEDURE: Cystoscopy  HISTORY: Nahun Villalobos is a 72 year old male with lower urinary tract symptoms refractory to combination therapy   REVIEW OF OFFICE STUDIES (e.g., uroflow or PVR):   DESCRIPTION OF PROCEDURE: After informed consent was obtained, the patient was brought to the procedure room where he was placed in the supine position with all pressure points well padded.  The penis and scrotum were prepped and draped in a sterile fashion. A flexible cystoscope was introduced through a well-lubricated urethra. Anterior urethra strictures were absent.   The urinary sphincter was intact.  The prostate demonstrated massive median lobe as well as bilobar hypertrophy.  Bladder neck was open.   Bladder significant for presence of the following:      Diverticuli: absent      Cellules: absent      Trabeculation: present 1+      Tumors: absent      Stones: absent  The flexible cystoscope was removed and the findings were described to the patient.   ASSESSMENT AND PLAN: 72 year old male with lower urinary tract symptoms and massive median lobe as well as bilobar hypertrophy.     Schedule for stage I TURP

## 2018-01-24 NOTE — NURSING NOTE
"Chief Complaint   Patient presents with     RECHECK     Follow up- elevated PSA/LUTS with cystoscopy.       Initial Ht 1.676 m (5' 6\")  Wt 115.7 kg (255 lb)  BMI 41.16 kg/m2 Estimated body mass index is 41.16 kg/(m^2) as calculated from the following:    Height as of this encounter: 1.676 m (5' 6\").    Weight as of this encounter: 115.7 kg (255 lb).  Medication Reconciliation: complete     JESUS Carpenter    "

## 2018-01-24 NOTE — MR AVS SNAPSHOT
"              After Visit Summary   1/24/2018    Nahun Villalobos    MRN: [id number]           Patient Information     Date Of Birth          1945        Visit Information        Provider Department      1/24/2018 11:30 AM Praveena Burris MD [facility] Urology and [facility] for Prostate and Urologic Cancers        Today's Diagnoses     Urinary frequency    -  1      Care Instructions      AFTER YOUR CYSTOSCOPY        You have just completed a cystoscopy, or \"cysto\", which allowed your physician to learn more about your bladder (or to remove a stent placed after surgery). We suggest that you continue to avoid caffeine, fruit juice, and alcohol for the next 24 hours, however, you are encouraged to return to your normal activities.         A few things that are considered normal after your cystoscopy:     * Small amount of bleeding (or spotting) that clears within the next 24 hours     * Slight burning sensation with urination     * Sensation to of needing to avoid more frequently     * The feeling of \"air\" in your urine     * Mild discomfort that is relieved with Tylenol        Please contact our office promptly if you:     * Develop a fever above 101 degrees     * Are unable to urinate     * Develop bright red blood that does not stop     * Severe pain or swelling         Please contact our office with any concerns or questions @[facility].          Follow-ups after your visit        Additional Services     PAC Visit Referral (For [facility] Only)       Does this visit require an Anesthesia consult?  No -  If this visit is not for evaluation for co-morbidity (such as patient request due to anxiety), what is the purpose of the visit?: H and P     H&P done by:  N/A        Please be aware that coverage of these services is subject to the terms and limitations of your health insurance plan.  Call member services at your health plan with any benefit or coverage questions.      Please bring the following to your " appointment:  >>   Any x-rays, CTs or MRIs which have been performed.  Contact the facility where they were done to arrange for  prior to your scheduled appointment.  Any new CT, MRI or other procedures ordered by your specialist must be performed at a [facility] facility or coordinated by your clinic's referral office.    >>   List of current medications  >>   This referral request   >>   Any documents/labs given to you for this referral                  Follow-up notes from your care team     Return in 12 months (on 1/24/2019).      Who to contact     Please call your clinic at [phone] to:    Ask questions about your health    Make or cancel appointments    Discuss your medicines    Learn about your test results    Speak to your doctor   If you have compliments or concerns about an experience at your clinic, or if you wish to file a complaint, please contact [facility] Patient Relations at [phone] or email us at [email or web address]         Additional Information About Your Visit        [email or web address] Information     [employer] gives you secure access to your electronic health record. If you see a primary care provider, you can also send messages to your care team and make appointments. If you have questions, please call your primary care clinic.  If you do not have a primary care provider, please call [phone] and they will assist you.      [employer] is an electronic gateway that provides easy, online access to your medical records. With [employer], you can request a clinic appointment, read your test results, renew a prescription or communicate with your care team.     To access your existing account, please contact your [facility] Clinic or call [phone] for assistance.        Care EveryWhere ID     This is your Care EveryWhere ID. This could be used by other organizations to access your [facility] medical records  [id number]        Your  "Vitals Were     Pulse Height BMI (Body Mass Index)             81 1.676 m (5' 6\") 41.16 kg/m2          Blood Pressure from Last 3 Encounters:   01/24/18 150/79   12/06/17 118/70   11/10/17 145/83    Weight from Last 3 Encounters:   01/24/18 115.7 kg (255 lb)   12/06/17 115.4 kg (254 lb 6.4 oz)   11/10/17 117.8 kg (259 lb 9.6 oz)              We Performed the Following     CYSTOURETHROSCOPY     PAC Visit Referral (For [facility] Only)     Gertrude-Operative Worksheet  (Urology General)        Primary Care Provider Office Phone # Fax #    Olegario Castillo [phone]       [address] [place] [id number]        Equal Access to Services     MALLORY VAZQUEZ : Humberto araujo Soaltagracia, waaxda luqadaha, qaybta kaalmada roman, erika lynch . [facility] [phone].    ATENCIÓN: Si habla español, tiene a huggins disposición servicios gratuitos de asistencia lingüística. [facility] [phone].    We comply with applicable federal civil rights laws and [place] laws. We do not discriminate on the basis of race, color, national origin, age, disability, sex, sexual orientation, or gender identity.            Thank you!     Thank you for choosing [facility] UROLOGY AND [facility] FOR PROSTATE AND UROLOGIC CANCERS  for your care. Our goal is always to provide you with excellent care. Hearing back from our patients is one way we can continue to improve our services. Please take a few minutes to complete the written survey that you may receive in the mail after your visit with us. Thank you!             Your Updated Medication List - Protect others around you: Learn how to safely use, store and throw away your medicines at www.disposemymeds.org.          This list is accurate as of 1/24/18 12:21 PM.  Always use your most recent med list.                   Brand Name Dispense Instructions for use Diagnosis    albuterol 108 (90 BASE) MCG/ACT Inhaler    PROAIR HFA/PROVENTIL HFA/VENTOLIN HFA    3 " Inhaler    Inhale 2 puffs into the lungs every 6 hours as needed for shortness of breath / dyspnea or wheezing    Chronic obstructive pulmonary disease, unspecified COPD type (H)       aspirin 81 MG tablet     30 tablet    Take 1 tablet (81 mg) by mouth daily    Type 2 diabetes, HbA1C goal < 8% (H)       atorvastatin 10 MG tablet    LIPITOR    90 tablet    Take 1 tablet (10 mg) by mouth daily    Hyperlipidemia LDL goal <100       blood glucose monitoring lancets     1 Box    Use to test blood sugar 2 times daily or as directed.    DM (diabetes mellitus) (H)       blood glucose monitoring test strip    ACCU-CHEK CHACHA    60 strip    Use to test blood sugar 2 times daily or as directed.    DM (diabetes mellitus) (H)       finasteride 5 MG tablet    PROSCAR    90 tablet    TAKE 1 TABLET EVERY DAY    Enlarged prostate       fluticasone-salmeterol 250-50 MCG/DOSE diskus inhaler    ADVAIR    1 Inhaler    Inhale 1 puff into the lungs 2 times daily    Chronic obstructive pulmonary disease, unspecified COPD type (H)       furosemide 20 MG tablet    LASIX    360 tablet    Take 1 tablet (20 mg) by mouth 2 times daily    Essential hypertension, benign       lisinopril 40 MG tablet    PRINIVIL/ZESTRIL    90 tablet    TAKE 1 TABLET (40 MG) BY MOUTH DAILY    Type 2 diabetes mellitus without complication, without long-term current use of insulin (H), Essential hypertension, benign       metFORMIN 500 MG tablet    GLUCOPHAGE    180 tablet    Take 1 tablet (500 mg) by mouth 2 times daily (with meals)    Type 2 diabetes mellitus without complication, without long-term current use of insulin (H)       order for DME      Equipment delivered; Respironics 760S BIPAP with heated humidification and heated tubing.  Pressure 15/7cm. Respironics Syeda View FF mask (Medium)        * order for DME     1 Device    Oxygen 2 Li/min at night via nasal cannula    Nocturnal hypoxemia       * order for DME     1 Package    Equipment being ordered:  diabetic shoes, 1 pair    Type 2 diabetes mellitus without complication, without long-term current use of insulin (H)       tamsulosin 0.4 MG capsule    FLOMAX    60 capsule    Take 1 capsule (0.4 mg) by mouth daily    Urinary urgency       * Notice:  This list has 2 medication(s) that are the same as other medications prescribed for you. Read the directions carefully, and ask your doctor or other care provider to review them with you.

## 2018-01-24 NOTE — LETTER
1/24/2018       RE: Nahun Villalobos  [address] [address]     Dear Colleague,    Thank you for referring your patient, Nahun Villalobos, to the [facility] UROLOGY AND INST FOR PROSTATE AND UROLOGIC CANCERS at [facility]. Please see a copy of my visit note below.        PRE-PROCEDURE DIAGNOSIS: urinary frequency, urgency, lower urinary tract symptoms   POST-PROCEDURE DIAGNOSIS: same   PROCEDURE: Cystoscopy  HISTORY: Nahun Villalobos is a 72 year old male with lower urinary tract symptoms refractory to combination therapy   REVIEW OF OFFICE STUDIES (e.g., uroflow or PVR):   DESCRIPTION OF PROCEDURE: After informed consent was obtained, the patient was brought to the procedure room where he was placed in the supine position with all pressure points well padded.  The penis and scrotum were prepped and draped in a sterile fashion. A flexible cystoscope was introduced through a well-lubricated urethra. Anterior urethra strictures were absent.   The urinary sphincter was intact.  The prostate demonstrated massive median lobe as well as bilobar hypertrophy.  Bladder neck was open.   Bladder significant for presence of the following:      Diverticuli: absent      Cellules: absent      Trabeculation: present 1+      Tumors: absent      Stones: absent  The flexible cystoscope was removed and the findings were described to the patient.   ASSESSMENT AND PLAN: 72 year old male with lower urinary tract symptoms and massive median lobe as well as bilobar hypertrophy.     Schedule for stage I TURP     Again, thank you for allowing me to participate in the care of your patient.      Sincerely,    Praveena Burris MD

## 2018-02-07 ENCOUNTER — OFFICE VISIT (OUTPATIENT)
Dept: SURGERY | Facility: CLINIC | Age: 73
End: 2018-02-07
Payer: COMMERCIAL

## 2018-02-07 ENCOUNTER — ALLIED HEALTH/NURSE VISIT (OUTPATIENT)
Dept: SURGERY | Facility: CLINIC | Age: 73
End: 2018-02-07
Payer: COMMERCIAL

## 2018-02-07 ENCOUNTER — OFFICE VISIT (OUTPATIENT)
Dept: CARDIOLOGY | Facility: CLINIC | Age: 73
End: 2018-02-07
Payer: COMMERCIAL

## 2018-02-07 ENCOUNTER — ANESTHESIA EVENT (OUTPATIENT)
Dept: SURGERY | Facility: CLINIC | Age: 73
End: 2018-02-07

## 2018-02-07 VITALS
SYSTOLIC BLOOD PRESSURE: 176 MMHG | BODY MASS INDEX: 40.82 KG/M2 | HEART RATE: 68 BPM | HEIGHT: 66 IN | WEIGHT: 254 LBS | OXYGEN SATURATION: 94 % | TEMPERATURE: 97.6 F | RESPIRATION RATE: 20 BRPM | DIASTOLIC BLOOD PRESSURE: 84 MMHG

## 2018-02-07 VITALS
WEIGHT: 255.6 LBS | SYSTOLIC BLOOD PRESSURE: 158 MMHG | OXYGEN SATURATION: 97 % | HEART RATE: 79 BPM | BODY MASS INDEX: 41.25 KG/M2 | DIASTOLIC BLOOD PRESSURE: 66 MMHG

## 2018-02-07 DIAGNOSIS — R94.39 ABNORMAL CARDIOVASCULAR STRESS TEST: Primary | ICD-10-CM

## 2018-02-07 DIAGNOSIS — N39.0 URINARY TRACT INFECTION: Primary | ICD-10-CM

## 2018-02-07 DIAGNOSIS — Z01.818 PREOP EXAMINATION: Primary | ICD-10-CM

## 2018-02-07 DIAGNOSIS — E78.5 HYPERLIPIDEMIA LDL GOAL <100: ICD-10-CM

## 2018-02-07 DIAGNOSIS — N39.0 URINARY TRACT INFECTION: ICD-10-CM

## 2018-02-07 PROCEDURE — 99215 OFFICE O/P EST HI 40 MIN: CPT | Performed by: INTERNAL MEDICINE

## 2018-02-07 RX ORDER — METOPROLOL SUCCINATE 25 MG/1
25 TABLET, EXTENDED RELEASE ORAL DAILY
Qty: 30 TABLET | Refills: 11 | Status: SHIPPED | OUTPATIENT
Start: 2018-02-07 | End: 2019-08-28

## 2018-02-07 ASSESSMENT — LIFESTYLE VARIABLES: TOBACCO_USE: 1

## 2018-02-07 ASSESSMENT — COPD QUESTIONNAIRES: COPD: 1

## 2018-02-07 NOTE — NURSING NOTE
"Chief Complaint   Patient presents with     RECHECK     f/u post stress test     Past medical history     HTN, hyperlipidemia, CIERA, COPD, DM2 and hx of lung cancer       Initial /66 (BP Location: Right arm, Patient Position: Chair, Cuff Size: Adult Large)  Pulse 79  Wt 115.9 kg (255 lb 9.6 oz)  SpO2 97%  BMI 41.25 kg/m2 Estimated body mass index is 41.25 kg/(m^2) as calculated from the following:    Height as of an earlier encounter on 2/7/18: 1.676 m (5' 6\").    Weight as of this encounter: 115.9 kg (255 lb 9.6 oz).  Medication Reconciliation: complete   Bailey Velázquez MA    "

## 2018-02-07 NOTE — PROGRESS NOTES
HPI and Plan:   See dictation    Orders Placed This Encounter   Procedures     Follow-Up with Cardiologist       Orders Placed This Encounter   Medications     metoprolol succinate (TOPROL-XL) 25 MG 24 hr tablet     Sig: Take 1 tablet (25 mg) by mouth daily     Dispense:  30 tablet     Refill:  11       There are no discontinued medications.      Encounter Diagnoses   Name Primary?     Abnormal cardiovascular stress test Yes     Hyperlipidemia LDL goal <100        CURRENT MEDICATIONS:  Current Outpatient Prescriptions   Medication Sig Dispense Refill     Ascorbic Acid (VITAMIN C PO) Take by mouth At Bedtime       metoprolol succinate (TOPROL-XL) 25 MG 24 hr tablet Take 1 tablet (25 mg) by mouth daily 30 tablet 11     albuterol (PROAIR HFA/PROVENTIL HFA/VENTOLIN HFA) 108 (90 BASE) MCG/ACT Inhaler Inhale 2 puffs into the lungs every 6 hours as needed for shortness of breath / dyspnea or wheezing 3 Inhaler 1     furosemide (LASIX) 20 MG tablet Take 1 tablet (20 mg) by mouth 2 times daily (Patient taking differently: Take 20 mg by mouth At Bedtime ) 360 tablet 3     lisinopril (PRINIVIL/ZESTRIL) 40 MG tablet TAKE 1 TABLET (40 MG) BY MOUTH DAILY (Patient taking differently: Take 40 mg by mouth At Bedtime TAKE 1 TABLET (40 MG) BY MOUTH DAILY) 90 tablet 3     metFORMIN (GLUCOPHAGE) 500 MG tablet Take 1 tablet (500 mg) by mouth 2 times daily (with meals) (Patient taking differently: Take 500 mg by mouth At Bedtime ) 180 tablet 3     atorvastatin (LIPITOR) 10 MG tablet Take 1 tablet (10 mg) by mouth daily (Patient taking differently: Take 10 mg by mouth At Bedtime ) 90 tablet 1     finasteride (PROSCAR) 5 MG tablet TAKE 1 TABLET EVERY DAY (Patient taking differently: TAKE 1 TABLET EVERY DAY AT BEDTIME) 90 tablet 3     tamsulosin (FLOMAX) 0.4 MG capsule Take 1 capsule (0.4 mg) by mouth daily (Patient taking differently: Take 0.4 mg by mouth At Bedtime ) 60 capsule 3     order for DME Equipment being ordered: diabetic shoes, 1  pair 1 Package 0     order for DME Oxygen 2 Li/min  at night via nasal cannula 1 Device 0     aspirin 81 MG tablet Take 1 tablet (81 mg) by mouth daily (Patient taking differently: Take 81 mg by mouth At Bedtime ) 30 tablet 11     order for DME Equipment delivered; Respironics 760S BIPAP with heated humidification and heated tubing.  Pressure 15/7cm. Respironics Syeda View FF mask (Medium)       blood glucose (ACCU-CHEK CHACHA) test strip Use to test blood sugar 2 times daily or as directed. 60 strip 6     blood glucose monitoring (SOFTCLIX) lancets Use to test blood sugar 2 times daily or as directed. 1 Box 6       ALLERGIES     Allergies   Allergen Reactions     No Known Drug Allergies        PAST MEDICAL HISTORY:  Past Medical History:   Diagnosis Date     COPD (chronic obstructive pulmonary disease) (H)      DM (diabetes mellitus) (H)      Essential hypertension, benign      Hemorrhage of rectum and anus      Non-small cell lung cancer (H)      Obesity      Personal history of colonic polyps      Tobacco use disorder      Urinary retention        PAST SURGICAL HISTORY:  Past Surgical History:   Procedure Laterality Date     APPENDECTOMY       C NONSPECIFIC PROCEDURE      cholecystectomy     CHOLECYSTECTOMY       WEDGE RESECTION      lung       FAMILY HISTORY:  Family History   Problem Relation Age of Onset     Hypertension Mother      C.A.D. Mother      ANEURYSM     Cancer - colorectal Mother      CANCER Mother      OVARIAN     Hypertension Sister      Breast Cancer Sister      CEREBROVASCULAR DISEASE Father        SOCIAL HISTORY:  Social History     Social History     Marital status:      Spouse name: N/A     Number of children: N/A     Years of education: N/A     Social History Main Topics     Smoking status: Former Smoker     Packs/day: 1.00     Years: 52.00     Types: Cigarettes     Quit date: 6/1/2013     Smokeless tobacco: Never Used     Alcohol use No     Drug use: No     Sexual activity: Yes      Partners: Female     Other Topics Concern     Caffeine Concern No     1-2 a day     Special Diet No     Exercise No     Seat Belt Yes     Social History Narrative       Review of Systems:  Skin:  Negative       Eyes:  Positive for glasses    ENT:  Negative      Respiratory:  Positive for shortness of breath;dyspnea on exertion;CPAP;sleep apnea  hx lung cancer   Cardiovascular:    chest pain;Positive for;dizziness    Gastroenterology: Negative      Genitourinary:  Positive for urinary frequency;prostate problem surgery 2/19/18  Musculoskeletal:  Positive for back pain;neck pain;joint pain;arthritis    Neurologic:  Positive for numbness or tingling of hands feet cramp when cold  Psychiatric:  Positive for depression    Heme/Lymph/Imm:  Negative      Endocrine:  Positive for diabetes borderline diabetic    Physical Exam:  Vitals: /66 (BP Location: Right arm, Patient Position: Chair, Cuff Size: Adult Large)  Pulse 79  Wt 115.9 kg (255 lb 9.6 oz)  SpO2 97%  BMI 41.25 kg/m2    Constitutional:  cooperative;in no acute distress        Skin:  warm and dry to the touch          Head:  normocephalic        Eyes:  sclera white        Lymph:No Cervical lymphadenopathy present     ENT:  no pallor or cyanosis        Neck:  JVP normal        Respiratory:  clear to auscultation         Cardiac: regular rhythm;normal S1 and S2   distant heart sounds            pulses full and equal                                        GI:  abdomen soft        Extremities and Muscular Skeletal:  no edema              Neurological:  affect appropriate        Psych:  affect appropriate, oriented to time, person and place        CC  Vern Armenta MD  [address]  [place] [id number]

## 2018-02-07 NOTE — MR AVS SNAPSHOT
After Visit Summary   2018    Nahun Villalobos    MRN: [id number]           Patient Information     Date Of Birth          1945        Visit Information        Provider Department      2018 9:00 AM Rn, [facility]        Care Instructions    Preparing for Your Surgery      Name:  Nahun Villalobos   MRN:  [id number]   :  1945   Today's Date:  2018     Arriving for surgery:  Surgery date:  18  Arrival time:  12:50 pm    Please come to:    [facility], 3rd floor, [address]    [address] [id number]    -   parking is available in front of the hospital from 5:15 am to 8:00 pm    -  Stop at the Information Desk in the lobby    -   Inform the information person that you are here for surgery. An escort to 3c will be provided. If you would not like an escort, please proceed to 3C on the 3rd floor. [phone]     What can I eat or drink?  -  You may have solid food or milk products until 8 hours prior to your surgery (6:50 am).  -  You may have water, gatorade, apple juice or 7up/Sprite until 2 hours prior to your surgery (12:50 pm). Come in hydrated!    Which medicines can I take?        -  Hold Aspirin 7 days before surgery, last dose on 18.      -  Please take these medications the day of surgery:   All regular morning medications, inhalers.        -  Do not bring your own medications to the hospital, except for inhalers and eye drops.    How do I prepare myself?  -  Take two showers: one the night before surgery; and one the morning of surgery.         Use Scrubcare or Hibiclens to wash from neck down.  You may use your own shampoo and conditioner. No other hair products.   -  Do NOT use lotion, powder, deodorant, or antiperspirant the day of your surgery.  -  Do NOT wear any makeup, fingernail polish or jewelry.  -  Bring your ID and insurance card.    AFTER YOUR  SURGERY  Breathing exercises   Breathing exercises help you recover faster. Take deep breaths and let the air out slowly. This will:     Help you wake up after surgery.    Help prevent complications like pneumonia.  Preventing complications will help you go home sooner.   We may give you a breathing device (incentive spirometer) to encourage you to breathe deeply.   Nausea and vomiting   You may feel sick to your stomach after surgery; if so, let your nurse know.    Pain control:  After surgery, you may have pain. Our goal is to help you manage your pain. Pain medicine will help you feel comfortable enough to do activities that will help you heal.  These activities may include breathing exercises, walking and physical therapy.   To help your health care team treat your pain we will ask: 1) If you have pain  2) where it is located 3) describe your pain in your words  Methods of pain control include medications given by mouth, vein or by nerve block for some surgeries.  We may give you a pain control pump that will:  1) Deliver the medicine through a tube placed in your vein  2) Control the amount of medicine you receive  3) Allow you to push a button to deliver a dose of pain medicine  Sequential Compression Device (SCD) or Pneumo Boots:  You may need to wear SCD S on your legs or feet. These are wraps connected to a machine that pumps in air and releases it. The repeated pumping helps prevent blood clots from forming.     Questions or Concerns:  If you have questions or concerns, please call the  Preoperative [facility] Center, Monday-Friday 7AM-7PM:  [phone]          Follow-ups after your visit        Your next 10 appointments already scheduled     Feb 07, 2018  9:00 AM CST   (Arrive by 8:45 AM)   PAC RN ASSESSMENT with Alice Pac Rn   [facility] ([facility])    [address]  [address] [facility] [id number] [phone]            Feb 07, 2018   9:30 AM CST   (Arrive by 9:15 AM)   PAC Anesthesia Consult with  Pac Anesthesiologist   [facility] ([facility])    [address]  [address] [facility] [id number] [phone]            Feb 07, 2018  9:45 AM CST   LAB with  LAB   [facility] Lab ([facility])    [address] [id number] [phone]           Please do not eat 10-12 hours before your appointment if you are coming in fasting for labs on lipids, cholesterol, or glucose (sugar). This does not apply to pregnant women. Water, hot tea and black coffee (with nothing added) are okay. Do not drink other fluids, diet soda or chew gum.            Feb 19, 2018   Procedure with Praveena Burris MD   [facility], [place], Same Day Surgery (--)    [address] [facility] [address]   [phone]            Mar 14, 2018  9:30 AM CDT   (Arrive by 9:15 AM)   Post-Op with Praveena Burris MD   [facility]rolog[facility] ([facility])    [address] [id number] [phone]              Future tests that were ordered for you today     Open Future Orders        Priority Expected Expires Ordered    Urine Culture Aerobic Bacterial Routine  2/7/2019 2/7/2018            Who to contact     Please call your clinic at [phone] to:    Ask questions about your health    Make or cancel appointments    Discuss your medicines    Learn about your test results    Speak to your doctor   If you have compliments or concerns about an experience at your clinic, or if you wish to file a complaint, please contact [facility] Patient Relations at [phone] or email us at [email or web address]         Additional Information About Your Visit        [employer] Information     [employer] gives you secure access to your electronic  health record. If you see a primary care provider, you can also send messages to your care team and make appointments. If you have questions, please call your primary care clinic.  If you do not have a primary care provider, please call [phone] and they will assist you.      [employer] is an electronic gateway that provides easy, online access to your medical records. With [employer], you can request a clinic appointment, read your test results, renew a prescription or communicate with your care team.     To access your existing account, please contact your [facility] or call [phone] for assistance.        Care EveryWhere ID     This is your Care EveryWhere ID. This could be used by other organizations to access your [place] medical records  [id number]         Blood Pressure from Last 3 Encounters:   02/07/18 176/84   01/24/18 150/79   12/06/17 118/70    Weight from Last 3 Encounters:   02/07/18 115.2 kg (254 lb)   01/24/18 115.7 kg (255 lb)   12/06/17 115.4 kg (254 lb 6.4 oz)              Today, you had the following     No orders found for display         Today's Medication Changes          These changes are accurate as of 2/7/18  8:45 AM.  If you have any questions, ask your nurse or doctor.               These medicines have changed or have updated prescriptions.        Dose/Directions    aspirin 81 MG tablet   This may have changed:  when to take this   Used for:  Type 2 diabetes, HbA1C goal < 8% (H)        Dose:  81 mg   Take 1 tablet (81 mg) by mouth daily   Quantity:  30 tablet   Refills:  11       atorvastatin 10 MG tablet   Commonly known as:  LIPITOR   This may have changed:  when to take this   Used for:  Hyperlipidemia LDL goal <100        Dose:  10 mg   Take 1 tablet (10 mg) by mouth daily   Quantity:  90 tablet   Refills:  1       finasteride 5 MG tablet   Commonly known as:  PROSCAR   This may have changed:  See the new instructions.   Used for:  Enlarged  prostate        TAKE 1 TABLET EVERY DAY   Quantity:  90 tablet   Refills:  3       furosemide 20 MG tablet   Commonly known as:  LASIX   This may have changed:  when to take this   Used for:  Essential hypertension, benign        Dose:  20 mg   Take 1 tablet (20 mg) by mouth 2 times daily   Quantity:  360 tablet   Refills:  3       lisinopril 40 MG tablet   Commonly known as:  PRINIVIL/ZESTRIL   This may have changed:    - how much to take  - how to take this  - when to take this  - additional instructions   Used for:  Type 2 diabetes mellitus without complication, without long-term current use of insulin (H), Essential hypertension, benign        TAKE 1 TABLET (40 MG) BY MOUTH DAILY   Quantity:  90 tablet   Refills:  3       metFORMIN 500 MG tablet   Commonly known as:  GLUCOPHAGE   This may have changed:  when to take this   Used for:  Type 2 diabetes mellitus without complication, without long-term current use of insulin (H)        Dose:  500 mg   Take 1 tablet (500 mg) by mouth 2 times daily (with meals)   Quantity:  180 tablet   Refills:  3       tamsulosin 0.4 MG capsule   Commonly known as:  FLOMAX   This may have changed:  when to take this   Used for:  Urinary urgency        Dose:  0.4 mg   Take 1 capsule (0.4 mg) by mouth daily   Quantity:  60 capsule   Refills:  3                Primary Care Provider Office Phone # Fax #    Olegario Castillo [phone]       [address] [place] [id number]        Equal Access to Services     MALLORY VAZQUEZ AH: Hadfred araujo Soaltagracia, waaxda luqadaha, qaybta kaalmada roman, erika campos. So [facility] [phone].    ATENCIÓN: Si habla español, tiene a huggins disposición servicios gratuitos de asistencia lingüística. Katie al [phone].    We comply with applicable federal civil rights laws and [place] laws. We do not discriminate on the basis of race, color, national origin, age, disability, sex, sexual orientation,  or gender identity.            Thank you!     Thank you for choosing [facility]  for your care. Our goal is always to provide you with excellent care. Hearing back from our patients is one way we can continue to improve our services. Please take a few minutes to complete the written survey that you may receive in the mail after your visit with us. Thank you!             Your Updated Medication List - Protect others around you: Learn how to safely use, store and throw away your medicines at www.disposemymeds.org.          This list is accurate as of 2/7/18  8:45 AM.  Always use your most recent med list.                   Brand Name Dispense Instructions for use Diagnosis    albuterol 108 (90 BASE) MCG/ACT Inhaler    PROAIR HFA/PROVENTIL HFA/VENTOLIN HFA    3 Inhaler    Inhale 2 puffs into the lungs every 6 hours as needed for shortness of breath / dyspnea or wheezing    Chronic obstructive pulmonary disease, unspecified COPD type (H)       aspirin 81 MG tablet     30 tablet    Take 1 tablet (81 mg) by mouth daily    Type 2 diabetes, HbA1C goal < 8% (H)       atorvastatin 10 MG tablet    LIPITOR    90 tablet    Take 1 tablet (10 mg) by mouth daily    Hyperlipidemia LDL goal <100       blood glucose monitoring lancets     1 Box    Use to test blood sugar 2 times daily or as directed.    DM (diabetes mellitus) (H)       blood glucose monitoring test strip    ACCU-CHEK CHACHA    60 strip    Use to test blood sugar 2 times daily or as directed.    DM (diabetes mellitus) (H)       finasteride 5 MG tablet    PROSCAR    90 tablet    TAKE 1 TABLET EVERY DAY    Enlarged prostate       furosemide 20 MG tablet    LASIX    360 tablet    Take 1 tablet (20 mg) by mouth 2 times daily    Essential hypertension, benign       lisinopril 40 MG tablet    PRINIVIL/ZESTRIL    90 tablet    TAKE 1 TABLET (40 MG) BY MOUTH DAILY    Type 2 diabetes mellitus without complication, without long-term current use of  insulin (H), Essential hypertension, benign       metFORMIN 500 MG tablet    GLUCOPHAGE    180 tablet    Take 1 tablet (500 mg) by mouth 2 times daily (with meals)    Type 2 diabetes mellitus without complication, without long-term current use of insulin (H)       order for DME      Equipment delivered; Respironics 760S BIPAP with heated humidification and heated tubing.  Pressure 15/7cm. Respironics Syeda View FF mask (Medium)        * order for DME     1 Device    Oxygen 2 Li/min at night via nasal cannula    Nocturnal hypoxemia       * order for DME     1 Package    Equipment being ordered: diabetic shoes, 1 pair    Type 2 diabetes mellitus without complication, without long-term current use of insulin (H)       tamsulosin 0.4 MG capsule    FLOMAX    60 capsule    Take 1 capsule (0.4 mg) by mouth daily    Urinary urgency       VITAMIN C PO      Take by mouth At Bedtime        * Notice:  This list has 2 medication(s) that are the same as other medications prescribed for you. Read the directions carefully, and ask your doctor or other care provider to review them with you.

## 2018-02-07 NOTE — H&P
Pre-Operative H & P     CC:  Preoperative exam to assess for increased cardiopulmonary risk while undergoing surgery and anesthesia.    Date of Encounter: 2/7/2018  Primary Care Physician:  Olegario Castillo  Reason for visit: Urinary frequency [R35.0]  - Primary     HPI  Nahun Villalobos is a 72 year old male who presents for pre-operative H & P in preparation for cystoscopy, transurethral resection of the prostate with Dr. Burris on 2/19/18 at [facility]. History is obtained from the patient.     Patient who was recently evaluated by Dr. Burris for urinary frequency and urgency. He was counseled for above procedure.   Patient's history is otherwise complex with HLD, HTN, smoking history, COPD, non small cell lung cancer, s/p wedge resection, CIERA, and DM with neuropathy.    Past Medical History  Past Medical History:   Diagnosis Date     COPD (chronic obstructive pulmonary disease) (H)      DM (diabetes mellitus) (H)      Essential hypertension, benign      Hemorrhage of rectum and anus      Non-small cell lung cancer (H)      Obesity      Personal history of colonic polyps      Tobacco use disorder      Urinary retention        Past Surgical History  Past Surgical History:   Procedure Laterality Date     APPENDECTOMY       C NONSPECIFIC PROCEDURE      cholecystectomy     CHOLECYSTECTOMY       WEDGE RESECTION      lung       Hx of Blood transfusions/reactions: Denies.      Hx of abnormal bleeding or anti-platelet use: ASA 81 mg daily    Menstrual history: No LMP for male patient.    Steroid use in the last year: Denies.     Personal or FH with difficulty with Anesthesia:  Denies.     Prior to Admission Medications  Current Outpatient Prescriptions   Medication Sig Dispense Refill     Ascorbic Acid (VITAMIN C PO) Take by mouth At Bedtime       albuterol (PROAIR HFA/PROVENTIL HFA/VENTOLIN HFA) 108 (90 BASE) MCG/ACT Inhaler Inhale 2 puffs into the lungs every 6 hours as  needed for shortness of breath / dyspnea or wheezing 3 Inhaler 1     furosemide (LASIX) 20 MG tablet Take 1 tablet (20 mg) by mouth 2 times daily (Patient taking differently: Take 20 mg by mouth At Bedtime ) 360 tablet 3     lisinopril (PRINIVIL/ZESTRIL) 40 MG tablet TAKE 1 TABLET (40 MG) BY MOUTH DAILY (Patient taking differently: Take 40 mg by mouth At Bedtime TAKE 1 TABLET (40 MG) BY MOUTH DAILY) 90 tablet 3     metFORMIN (GLUCOPHAGE) 500 MG tablet Take 1 tablet (500 mg) by mouth 2 times daily (with meals) (Patient taking differently: Take 500 mg by mouth At Bedtime ) 180 tablet 3     atorvastatin (LIPITOR) 10 MG tablet Take 1 tablet (10 mg) by mouth daily (Patient taking differently: Take 10 mg by mouth At Bedtime ) 90 tablet 1     finasteride (PROSCAR) 5 MG tablet TAKE 1 TABLET EVERY DAY (Patient taking differently: TAKE 1 TABLET EVERY DAY AT BEDTIME) 90 tablet 3     tamsulosin (FLOMAX) 0.4 MG capsule Take 1 capsule (0.4 mg) by mouth daily (Patient taking differently: Take 0.4 mg by mouth At Bedtime ) 60 capsule 3     aspirin 81 MG tablet Take 1 tablet (81 mg) by mouth daily (Patient taking differently: Take 81 mg by mouth At Bedtime ) 30 tablet 11     order for DME Equipment being ordered: diabetic shoes, 1 pair 1 Package 0     order for DME Oxygen 2 Li/min  at night via nasal cannula 1 Device 0     order for DME Equipment delivered; Respironics 760S BIPAP with heated humidification and heated tubing.  Pressure 15/7cm. Respironics Syeda View FF mask (Medium)       blood glucose (ACCU-CHEK CHACHA) test strip Use to test blood sugar 2 times daily or as directed. 60 strip 6     blood glucose monitoring (SOFTCLIX) lancets Use to test blood sugar 2 times daily or as directed. 1 Box 6       Allergies  Allergies   Allergen Reactions     No Known Drug Allergies        Social History  Social History     Social History     Marital status:      Spouse name: N/A     Number of children: N/A     Years of education:  N/A     Occupational History     Not on file.     Social History Main Topics     Smoking status: Former Smoker     Packs/day: 1.00     Years: 52.00     Types: Cigarettes     Quit date: 6/1/2013     Smokeless tobacco: Never Used     Alcohol use No     Drug use: No     Sexual activity: Yes     Partners: Female     Other Topics Concern     Caffeine Concern No     1-2 a day     Special Diet No     Exercise No     Seat Belt Yes     Social History Narrative       Family History  Family History   Problem Relation Age of Onset     Hypertension Mother      C.A.D. Mother      ANEURYSM     Cancer - colorectal Mother      CANCER Mother      OVARIAN     Hypertension Sister      Breast Cancer Sister      CEREBROVASCULAR DISEASE Father        Review of Systems  ROS/MED HISTORY    The complete review of systems is negative other than noted in the HPI or here.     ENT/Pulmonary: Comment: 52 pack years    (+)sleep apnea, tobacco use, Past use COPD, , . Other pulmonary disease s/p wedge resection for non small cell lung cancer. Unable to tolerate CPAP.   Neurologic:     (+)neuropathy feet   Cardiovascular:     (+) Dyslipidemia, hypertension----. Taking blood thinners : . . . :. . Previous cardiac testing date:results:date: results:ECG reviewed date:2016 results:Sinus rhythm date: results: Recent positive stress test, needs follow up        METS/Exercise Tolerance:  Able to walk from waiting room to exam room.   Hematologic:  - neg hematologic  ROS       Musculoskeletal:  - OA, low back and bilateral shoulder pain. Limited ROM of shoulders     GI/Hepatic:  - neg GI/hepatic ROS       Renal/Genitourinary:     (+) Other Renal/ Genitourinary, urinary retention, frequency      Endo:     (+) type II DM Last HgA1c: 6.6 Diabetic complications: neuropathy, Obesity, .      Psychiatric:  - neg psychiatric ROS       Infectious Disease:  - neg infectious disease ROS       Malignancy:   (+) Malignancy History of Lung  Lung CA Remission status post  "Surgery.         Other:    (+) No chance of pregnancy C-spine cleared: N/A, no H/O Chronic Pain,no other significant disability        Physical Exam      Airway   Mallampati: III  TM distance: >3 FB  Neck ROM: limited      Temp: 97.6  F (36.4  C) Temp src: Oral BP: 176/84 Pulse: 68   Resp: 20 SpO2: 94 %         254 lbs 0 oz  5' 6\"   Body mass index is 41 kg/(m^2).       Physical Exam  Constitutional: Awake, alert, cooperative, no apparent distress, and appears stated age. Accompanied by daughter.  Eyes: Pupils equal, round and reactive to light, extra ocular muscles intact, sclera clear, conjunctiva normal.  HENT: Normocephalic, oral pharynx with moist mucus membranes, few remaining teeth. No goiter appreciated.   Respiratory: Clear to auscultation bilaterally, no crackles or wheezing. No cough. Some shortness of breath after he returned from the BR, resolved after sitting.  Cardiovascular: Regular rate and rhythm, normal S1 and S2, and no murmur noted. Carotids, no bruits. No edema. Palpable pulses to radial  DP and PT arteries.   GI: Normal bowel sounds, soft, non-distended, non-tender, no masses palpated. Difficult exam due to obese abdomen.  Lymph/Hematologic: No cervical lymphadenopathy and no supraclavicular lymphadenopathy.  Genitourinary: Deferred.   Skin: Warm and dry.    Musculoskeletal: Limited ROM of neck. There is no redness, warmth, or swelling of the joints. Gross motor strength is mildly limited.    Neurologic: Awake, alert, oriented to name, place and time. Cranial nerves II-XII are grossly intact. Gait is irregular.  Neuropsychiatric: Calm, cooperative. Normal affect.     Labs: (personally reviewed)  Lab Results   Component Value Date    WBC 15.2 10/16/2017     Lab Results   Component Value Date    RBC 4.07 10/16/2017     Lab Results   Component Value Date    HGB 13.2 10/16/2017     Lab Results   Component Value Date    HCT 41.7 10/16/2017     Lab Results   Component Value Date     " 10/16/2017     Lab Results   Component Value Date    MCH 32.4 10/16/2017     Lab Results   Component Value Date    MCHC 31.7 10/16/2017     Lab Results   Component Value Date    RDW 12.3 10/16/2017     Lab Results   Component Value Date     10/16/2017     Last Basic Metabolic Panel:  Lab Results   Component Value Date     10/16/2017      Lab Results   Component Value Date    POTASSIUM 4.4 10/16/2017     Lab Results   Component Value Date    CHLORIDE 101 10/16/2017     Lab Results   Component Value Date    JESSE 9.3 10/16/2017     Lab Results   Component Value Date    CO2 32 10/16/2017     Lab Results   Component Value Date    BUN 14 10/16/2017     Lab Results   Component Value Date    CR 1.02 10/16/2017     Lab Results   Component Value Date     10/16/2017     Lab Results   Component Value Date    AST 20 2017     Lab Results   Component Value Date    ALT 20 2017     No results found for: BILICONJ   Lab Results   Component Value Date    BILITOTAL 0.5 2017     Lab Results   Component Value Date    ALBUMIN 3.6 2017     Lab Results   Component Value Date    PROTTOTAL 8.5 2017      Lab Results   Component Value Date    ALKPHOS 73 2017     A1C 6.6  TSH 2.39  EKG: Personally reviewed but formal cardiology read pendin Sinus rhythm  Cardiac echo: 2015  Interpretation Summary  Technically difficult imaging. Hyperdynamic left ventricular function The   visual ejection fraction is estimated at 65-70%. The left ventricle is normal   in size. The right ventricle is normal in structure, function and size. Right   ventricular systolic pressure is elevated, consistent with moderate pulmonary   hypertension. There has likely been no siginficant change since 10/2/2013.    Stress test:NM Lexiscan stress 17  Impression  1.  Myocardial perfusion imaging using single isotope technique  demonstrated small size mild intensity basal inferior reversible  defect overall  consistent with mild ischemia.   2. Gated images demonstrated no regional wall motion abnormalities.   The left ventricular systolic function is normal with LVEF of 83% with  stress.  3. Compared to the prior study dated 06/04/2015 a fixed inferior  defect was reported in previous study. In the present study the defect  is reversible in basal inferior segment.   MRI Prostate 4/12/17      IMPRESSION:   1. Based on the most suspicious abnormality, this exam is  characterized as PIRADS 2 - Low probability.  Clinically significant  cancer is unlikely to be present. Background changes of benign  prostatic hyperplasia with multiple nodules.  2. No evidence of extraprostatic malignancy. No suspicious adenopathy  or evidence of pelvic metastases.  CXR 2/15/17  IMPRESSION:  No acute process. No infiltrates. Suggestion of mild  linear scarring in the upper medial right lung.   CT Chest 11/17/17  INDICATION:  Non-small cell lung carcinoma; status post wedge resection lesion right upper lobe; lung cancer surveillance.  Impression:  1. Status post wedge resection non-small cell lung carcinoma upper lobe right lung.  2. Negative CT chest with intravenous contrast otherwise.  3. No interval change.    Outside records reviewed from: [facility] Everywhere    ASSESSMENT and PLAN  Nahun Villalobos is a 72 year old male scheduled to undergo cystoscopy, transurethral resection of the prostate with Dr. Burris on 2/19/18. He has the following specific operative considerations:   - RCRI : 0.9% risk of major adverse cardiac event.   - Anesthesia considerations:  Refer to PAC assessment in anesthesia records  - VTE risk: 1.8%  - Risk of PONV score = 2.  If > 2, anti-emetic intervention recommended.       --Urinary retention, frequency, and urgency. Above procedure planned.   --HLD. Atoravastatin. HTN. Lisinopril and Lasix at HS. Aspirin 81 mg daily. Follows with Cardiology, last seen 10/10/17 with positive stress test above. Contacted  Cardiologist to ask for recommendations for patient proceeding to surgery. He wanted to have follow up with him regarding recent stress test before he had surgery. This is planned for 2/22/18. Contacted Dr. Burris and team regarding postponement until he can be optimized by Cardiology. They agreed and will contact patient.    --Smoking history 52 pack years. COPD. Advair, taken rarely, Albuterol used occasionally. History of non small cell lung cancer, s/p wedge excision with ongoing surveillance.    --CIEAR, but unable to tolerate CPAP. Has been sleeping in recliner for 15 years.    --DM II. Last A1C 6.6 Will hold Metformin on DOS. Neuropathy in feet.    Patient was discussed with Dr Chávez.    Patient was seen by his Cardiologist today who has recommended an angiogram for further evaluation and possible PCI.     SELMA Hu CNS  Preoperative Assessment Center  [facility]  [facility]  Phone: [phone]  Fax: [phone]

## 2018-02-07 NOTE — ANESTHESIA PREPROCEDURE EVALUATION
Anesthesia Evaluation     . Pt has had prior anesthetic. Type: General and MAC    No history of anesthetic complications          ROS/MED HX    ENT/Pulmonary: Comment: 52 pack years    (+)sleep apnea, tobacco use, Past use COPD, doesn't use CPAP , . Other pulmonary disease s/p wedge resection for non small cell lung cancer.    Neurologic:     (+)neuropathy - feet,     Cardiovascular: Comment: Recent positive stress test, needs Cards follow up    (+) Dyslipidemia, hypertension-range: 150-118/80-70, ---. Taking blood thinners : . . STACY, . :. . Previous cardiac testing Echodate:2015results:Stress Testdate:2017 results:ECG reviewed date:2016 results:Sinus rhythm date: results:          METS/Exercise Tolerance:  1 - Eating, dressing   Hematologic:  - neg hematologic  ROS       Musculoskeletal:   (+) arthritis, , , other musculoskeletal- back pain, bilateral shoulder pain with limited mobility      GI/Hepatic:  - neg GI/hepatic ROS       Renal/Genitourinary:     (+) Other Renal/ Genitourinary, urinary retention, frequency      Endo:     (+) type II DM Last HgA1c: 6.6 date: 12/7/17 Not using insulin - not using insulin pump Diabetic complications: neuropathy, Obesity, .      Psychiatric:  - neg psychiatric ROS       Infectious Disease:  - neg infectious disease ROS       Malignancy:   (+) Malignancy History of Lung  Lung CA Remission status post Surgery.         Other:    (+) No chance of pregnancy C-spine cleared: N/A, H/O Chronic Pain,no other significant disability                    Physical Exam      Airway   Mallampati: III  TM distance: >3 FB  Neck ROM: limited    Dental   (+) missing    Cardiovascular   Rhythm and rate: regular and normal      Pulmonary    breath sounds clear to auscultation(+) decreased breath sounds       Other findings: For further details of assessment, testing, and physical exam please see H and P completed on same date.    NM Lexiscan stress 11/7/17  Impression  1.  Myocardial perfusion  imaging using single isotope technique  demonstrated small size mild intensity basal inferior reversible  defect overall consistent with mild ischemia.   2. Gated images demonstrated no regional wall motion abnormalities.   The left ventricular systolic function is normal with LVEF of 83% with  stress.  3. Compared to the prior study dated 06/04/2015 a fixed inferior  defect was reported in previous study. In the present study the defect  is reversible in basal inferior segment.    Echo 2015  Interpretation Summary  Technically difficult imaging.  Hyperdynamic left ventricular function The   visual ejection fraction is estimated at 65-70%. The left ventricle is normal   in size. The right ventricle is normal in structure, function and size. Right   ventricular systolic pressure is elevated, consistent with moderate pulmonary   hypertension.  There has likely been no siginficant change since `10/2/2013.             PAC Discussion and Assessment    ASA Classification: 3  Case is suitable for: [place]  Anesthetic techniques and relevant risks discussed: GA  Invasive monitoring and risk discussed: No  Types:   Possibility and Risk of blood transfusion discussed: No  NPO instructions given:   Additional anesthetic preparation and risks discussed:   Needs early admission to pre-op area:   Other:     PAC Resident/NP Anesthesia Assessment:  Nahun Villalobos is a 72 year old male scheduled to undergo cystoscopy, transurethral resection of the prostate with Dr. Burris on 2/19/18. He has the following specific operative considerations:   - RCRI : 0.9% risk of major adverse cardiac event.   - VTE risk: 1.8%  - Risk of PONV score = 2.  If > 2, anti-emetic intervention recommended.    Last GA for lung cancer wedge resection. No history of problems with anesthesia.     --Urinary retention, frequency, and urgency. Above procedure planned.   --HLD. Atoravastatin. HTN. Lisinopril and Lasix at HS. Aspirin 81 mg daily. Follows with  Cardiology, last seen 10/10/17 with positive stress test above. Contacted Cardiologist to ask for recommendations for patient proceeding to surgery. He wanted to have follow up with him regarding recent stress test before he had surgery. This is planned for 2/22/18. Contacted Dr. Burris and team regarding postponement until he can be optimized by Cardiology. They agreed and will contact patient.    --Smoking history 52 pack years. COPD. Advair, taken rarely, Albuterol used occasionally. History of non small cell lung cancer, s/p wedge excision with ongoing surveillance.    --CIERA, but unable to tolerate CPAP. Has been sleeping in recliner for 15 years.    --DM II. Last A1C 6.6 Will hold Metformin on DOS. Neuropathy in feet.      Patient was discussed with Dr Chávez.    Patient was seen by his Cardiologist today who has recommended an angiogram for further evaluation and possible PCI.             Reviewed and Signed by PAC Mid-Level Provider/Resident  Mid-Level Provider/Resident: SELMA Galaviz, CNS  Date: 2/7/18  Time: 8:00am    Attending Anesthesiologist Anesthesia Assessment:  Pt seen,  q's answered    Reviewed and Signed by PAC Anesthesiologist  Anesthesiologist: lavon  Date: 2/9/18  Time:   Pass/Fail:   Disposition:     PAC Pharmacist Assessment:        Pharmacist:   Date:   Time:                           .

## 2018-02-07 NOTE — MR AVS SNAPSHOT
After Visit Summary   2/7/2018    Nahun Villalobos    MRN: [id number]           Patient Information     Date Of Birth          1945        Visit Information        Provider Department      2/7/2018 11:45 AM Vern Armenta MD [facility]   [place]        Today's Diagnoses     Abnormal cardiovascular stress test    -  1    Hyperlipidemia LDL goal <100           Follow-ups after your visit        Additional Services     Follow-Up with Cardiologist                 Your next 10 appointments already scheduled     Feb 19, 2018   Procedure with Praveena Burris MD   [facility], [place], Same Day Surgery (--)    [address] [address]   [phone]            Mar 14, 2018  9:30 AM CDT   (Arrive by 9:15 AM)   Post-Op with Praveena Burris MD   [facility] ([facility])    [address]  [address] [facility] [place] [id number] [phone]              Future tests that were ordered for you today     Open Future Orders        Priority Expected Expires Ordered    Follow-Up with Cardiologist Routine 2/23/2018 2/7/2019 2/7/2018    Cardiac Cath: Coronary Angiography Adult Order Routine 2/14/2018 2/7/2019 2/7/2018    Urine Culture Aerobic Bacterial Routine  2/7/2019 2/7/2018            Who to contact     If you have questions or need follow up information about today's clinic visit or your schedule please contact [facility] directly at [phone].  Normal or non-critical lab and imaging results will be communicated to you by MyChart, letter or phone within 4 business days after the clinic has received the results. If you do not hear from us within 7 days, please contact the clinic through MyChart or phone. If you have a critical or abnormal lab result, we will notify you by phone as soon as possible.  Submit refill requests through [employer] or  call your pharmacy and they will forward the refill request to us. Please allow 3 business days for your refill to be completed.          Additional Information About Your Visit        [employer] Information     [email or web address] gives you secure access to your electronic health record. If you see a primary care provider, you can also send messages to your care team and make appointments. If you have questions, please call your primary care clinic.  If you do not have a primary care provider, please call [phone] and they will assist you.        Care EveryWhere ID     This is your Care EveryWhere ID. This could be used by other organizations to access your [facility] medical records  [id number]        Your Vitals Were     Pulse Pulse Oximetry BMI (Body Mass Index)             79 97% 41.25 kg/m2          Blood Pressure from Last 3 Encounters:   02/07/18 158/66   02/07/18 176/84   01/24/18 150/79    Weight from Last 3 Encounters:   02/07/18 115.9 kg (255 lb 9.6 oz)   02/07/18 115.2 kg (254 lb)   01/24/18 115.7 kg (255 lb)                 Today's Medication Changes          These changes are accurate as of 2/7/18 12:08 PM.  If you have any questions, ask your nurse or doctor.               Start taking these medicines.        Dose/Directions    metoprolol succinate 25 MG 24 hr tablet   Commonly known as:  TOPROL-XL   Used for:  Abnormal cardiovascular stress test   Started by:  Vern Armenta MD        Dose:  25 mg   Take 1 tablet (25 mg) by mouth daily   Quantity:  30 tablet   Refills:  11         These medicines have changed or have updated prescriptions.        Dose/Directions    aspirin 81 MG tablet   This may have changed:  when to take this   Used for:  Type 2 diabetes, HbA1C goal < 8% (H)        Dose:  81 mg   Take 1 tablet (81 mg) by mouth daily   Quantity:  30 tablet   Refills:  11       atorvastatin 10 MG tablet   Commonly known as:  LIPITOR   This may have changed:  when to take this   Used for:  Hyperlipidemia LDL  goal <100        Dose:  10 mg   Take 1 tablet (10 mg) by mouth daily   Quantity:  90 tablet   Refills:  1       finasteride 5 MG tablet   Commonly known as:  PROSCAR   This may have changed:  See the new instructions.   Used for:  Enlarged prostate        TAKE 1 TABLET EVERY DAY   Quantity:  90 tablet   Refills:  3       furosemide 20 MG tablet   Commonly known as:  LASIX   This may have changed:  when to take this   Used for:  Essential hypertension, benign        Dose:  20 mg   Take 1 tablet (20 mg) by mouth 2 times daily   Quantity:  360 tablet   Refills:  3       lisinopril 40 MG tablet   Commonly known as:  PRINIVIL/ZESTRIL   This may have changed:    - how much to take  - how to take this  - when to take this  - additional instructions   Used for:  Type 2 diabetes mellitus without complication, without long-term current use of insulin (H), Essential hypertension, benign        TAKE 1 TABLET (40 MG) BY MOUTH DAILY   Quantity:  90 tablet   Refills:  3       metFORMIN 500 MG tablet   Commonly known as:  GLUCOPHAGE   This may have changed:  when to take this   Used for:  Type 2 diabetes mellitus without complication, without long-term current use of insulin (H)        Dose:  500 mg   Take 1 tablet (500 mg) by mouth 2 times daily (with meals)   Quantity:  180 tablet   Refills:  3       tamsulosin 0.4 MG capsule   Commonly known as:  FLOMAX   This may have changed:  when to take this   Used for:  Urinary urgency        Dose:  0.4 mg   Take 1 capsule (0.4 mg) by mouth daily   Quantity:  60 capsule   Refills:  3            Where to get your medicines      These medications were sent to [facility] #[id number] - [place], [place] - [address] [address]     Phone:  [phone]     metoprolol succinate 25 MG 24 hr tablet                Primary Care Provider Office Phone # Fax #    Olegario Castillo [phone]       [address] [address]        Equal  Access to Services     [facility]: Hadii rufina golden van [place], wavasiliyda luqadaha, qaybta kaalmaerika zuluaga. So [facility] [phone].    ATENCIÓN: Si habla español, tiene a huggins disposición servicios gratuitos de asistencia lingüística. Llame al [phone].    We comply with applicable federal civil rights laws and [place] laws. We do not discriminate on the basis of race, color, national origin, age, disability, sex, sexual orientation, or gender identity.            Thank you!     Thank you for choosing [facility]   [facility]  for your care. Our goal is always to provide you with excellent care. Hearing back from our patients is one way we can continue to improve our services. Please take a few minutes to complete the written survey that you may receive in the mail after your visit with us. Thank you!             Your Updated Medication List - Protect others around you: Learn how to safely use, store and throw away your medicines at www.disposemymeds.org.          This list is accurate as of 2/7/18 12:08 PM.  Always use your most recent med list.                   Brand Name Dispense Instructions for use Diagnosis    albuterol 108 (90 BASE) MCG/ACT Inhaler    PROAIR HFA/PROVENTIL HFA/VENTOLIN HFA    3 Inhaler    Inhale 2 puffs into the lungs every 6 hours as needed for shortness of breath / dyspnea or wheezing    Chronic obstructive pulmonary disease, unspecified COPD type (H)       aspirin 81 MG tablet     30 tablet    Take 1 tablet (81 mg) by mouth daily    Type 2 diabetes, HbA1C goal < 8% (H)       atorvastatin 10 MG tablet    LIPITOR    90 tablet    Take 1 tablet (10 mg) by mouth daily    Hyperlipidemia LDL goal <100       blood glucose monitoring lancets     1 Box    Use to test blood sugar 2 times daily or as directed.    DM (diabetes mellitus) (H)       blood glucose monitoring test strip    ACCU-CHEK CHACHA    60 strip    Use to test  blood sugar 2 times daily or as directed.    DM (diabetes mellitus) (H)       finasteride 5 MG tablet    PROSCAR    90 tablet    TAKE 1 TABLET EVERY DAY    Enlarged prostate       furosemide 20 MG tablet    LASIX    360 tablet    Take 1 tablet (20 mg) by mouth 2 times daily    Essential hypertension, benign       lisinopril 40 MG tablet    PRINIVIL/ZESTRIL    90 tablet    TAKE 1 TABLET (40 MG) BY MOUTH DAILY    Type 2 diabetes mellitus without complication, without long-term current use of insulin (H), Essential hypertension, benign       metFORMIN 500 MG tablet    GLUCOPHAGE    180 tablet    Take 1 tablet (500 mg) by mouth 2 times daily (with meals)    Type 2 diabetes mellitus without complication, without long-term current use of insulin (H)       metoprolol succinate 25 MG 24 hr tablet    TOPROL-XL    30 tablet    Take 1 tablet (25 mg) by mouth daily    Abnormal cardiovascular stress test       order for DME      Equipment delivered; Respironics 760S BIPAP with heated humidification and heated tubing.  Pressure 15/7cm. Respironics Syeda View FF mask (Medium)        * order for DME     1 Device    Oxygen 2 Li/min at night via nasal cannula    Nocturnal hypoxemia       * order for DME     1 Package    Equipment being ordered: diabetic shoes, 1 pair    Type 2 diabetes mellitus without complication, without long-term current use of insulin (H)       tamsulosin 0.4 MG capsule    FLOMAX    60 capsule    Take 1 capsule (0.4 mg) by mouth daily    Urinary urgency       VITAMIN C PO      Take by mouth At Bedtime        * Notice:  This list has 2 medication(s) that are the same as other medications prescribed for you. Read the directions carefully, and ask your doctor or other care provider to review them with you.

## 2018-02-07 NOTE — PATIENT INSTRUCTIONS
Preparing for Your Surgery      Name:  Nahun Villalobos   MRN:  [id number]   :  1945   Today's Date:  2018     Arriving for surgery:  Surgery date:  18  Arrival time:  12:50 pm    Please come to:    [facility], 3rd floor, [address]    [address] [place]  [address]    -   parking is available in front of the hospital from 5:15 am to 8:00 pm    -  Stop at the Information Desk in the lobby    -   Inform the information person that you are here for surgery. An escort to 3c will be provided. If you would not like an escort, please proceed to 3C on the 3rd floor. [phone]     What can I eat or drink?  -  You may have solid food or milk products until 8 hours prior to your surgery (6:50 am).  -  You may have water, gatorade, apple juice or 7up/Sprite until 2 hours prior to your surgery (12:50 pm). Come in hydrated!    Which medicines can I take?        -  Hold Aspirin 7 days before surgery, last dose on 18.      -  Please take these medications the day of surgery:   All regular morning medications, inhalers.        -  Do not bring your own medications to the hospital, except for inhalers and eye drops.    How do I prepare myself?  -  Take two showers: one the night before surgery; and one the morning of surgery.         Use Scrubcare or Hibiclens to wash from neck down.  You may use your own shampoo and conditioner. No other hair products.   -  Do NOT use lotion, powder, deodorant, or antiperspirant the day of your surgery.  -  Do NOT wear any makeup, fingernail polish or jewelry.  -  Bring your ID and insurance card.    AFTER YOUR SURGERY  Breathing exercises   Breathing exercises help you recover faster. Take deep breaths and let the air out slowly. This will:     Help you wake up after surgery.    Help prevent complications like pneumonia.  Preventing complications will help you go home sooner.   We may give you a breathing device (incentive  spirometer) to encourage you to breathe deeply.   Nausea and vomiting   You may feel sick to your stomach after surgery; if so, let your nurse know.    Pain control:  After surgery, you may have pain. Our goal is to help you manage your pain. Pain medicine will help you feel comfortable enough to do activities that will help you heal.  These activities may include breathing exercises, walking and physical therapy.   To help your health care team treat your pain we will ask: 1) If you have pain  2) where it is located 3) describe your pain in your words  Methods of pain control include medications given by mouth, vein or by nerve block for some surgeries.  We may give you a pain control pump that will:  1) Deliver the medicine through a tube placed in your vein  2) Control the amount of medicine you receive  3) Allow you to push a button to deliver a dose of pain medicine  Sequential Compression Device (SCD) or Pneumo Boots:  You may need to wear SCD S on your legs or feet. These are wraps connected to a machine that pumps in air and releases it. The repeated pumping helps prevent blood clots from forming.     Questions or Concerns:  If you have questions or concerns, please call the  Preoperative Assessment Center, Monday-Friday 7AM-7PM:  [phone]

## 2018-02-07 NOTE — TELEPHONE ENCOUNTER
Spoke with patient today regarding need to f/u post his Lexiscan done 11/7/17. Patient stated he forgot about the previous OV. Rescheduled pt to see  today 2/7/18. Nurse in [place] aware of add on. NICKY Perales

## 2018-02-07 NOTE — PROGRESS NOTES
Service Date: 02/07/2018      HISTORY OF PRESENT ILLNESS:  Mr. Villalobos is a pleasant 72-year-old gentleman with a history of type 2 diabetes mellitus, hypertension, longstanding history of former tobacco abuse, having quit approximately 5 years ago, diagnosis of lung carcinoma 2 years ago for which he underwent wedge resection, COPD, prior stress test showing a fixed inferior defect which we initially treated medically, who I saw last fall with atypical chest pain.  I had the patient undergo a repeat nuclear stress test which now shows a small area of inferior wall ischemia.  He continues to have occasional chest discomfort.      Unfortunately, complicating the patient's care is his wife passed away a year ago.  He was exceptionally close to his wife and is still having some bereavement issues.  He is no longer eating as well as he used to as his wife cooked regularly for him.  He has gained a significant amount of weight since we have seen him last.  His blood pressure control has also been worse.      I was contacted by his urologist that the patient was going to undergo a TURP.  They asked me to clear him via staff message and obviously I could not.  I had the patient come in today to see me.  He continues to have occasional atypical chest pain and has dyspnea with exertion, but denies any shortness of breath at rest, orthopnea or paroxysmal nocturnal dyspnea.      He has been compliant with his medications and is currently on low dose aspirin along with atorvastatin 10 mg daily.  His baseline LDL is between 44 and 67.      Please see my separate note with his full physical examination.      IMPRESSION AND PLAN:  Mr. Villalobos is a pleasant 72-year-old gentleman with type 2 diabetes mellitus and multiple cardiovascular risk factors who needs to undergo preoperative risk assessment for an upcoming TURP.  He does continue to have occasional atypical chest discomfort; however, his nuclear stress test now is showing  evidence for inferior wall ischemia.  I believe the patient does have underlying coronary disease and at this time given his abnormal nuclear stress test, multiple cardiovascular risk factors, and atypical chest pain, I have recommended a cardiac catheterization.      Informed consent was obtained after explaining the risks and benefits of the cardiac catheterization with the patient who is in agreement.  In the interim, I have also started a beta blocker and we will plan to see the patient back in close clinical followup after his cardiac catheterization.  I did inform him that he may need to undergo PCI and will potentially need drug-eluting stents and dual antiplatelet therapy for up to 1 year.  He is comfortable waiting for up to 1 year for his TURP if the urologists are unwilling to operate on dual antiplatelet therapy.         JUAN PEREZ MD             D: 2018   T: 2018   MT: CARMELO      Name:     KEELY FRANCE   MRN:      [id number]        Account:      [id number]   :      1945           Service Date: 2018      Document: [id number]

## 2018-02-07 NOTE — LETTER
2/7/2018      Olegario Castillo MD  [address] [facility] [address]      RE: Nahun Villalobos       Dear Colleague,    I had the pleasure of seeing Nahun Villalobos in the [facility].    Service Date: 02/07/2018      HISTORY OF PRESENT ILLNESS:  Mr. Villalobos is a pleasant 72-year-old gentleman with a history of type 2 diabetes mellitus, hypertension, longstanding history of former tobacco abuse, having quit approximately 5 years ago, diagnosis of lung carcinoma 2 years ago for which he underwent wedge resection, COPD, prior stress test showing a fixed inferior defect which we initially treated medically, who I saw last fall with atypical chest pain.  I had the patient undergo a repeat nuclear stress test which now shows a small area of inferior wall ischemia.  He continues to have occasional chest discomfort.      Unfortunately, complicating the patient's care is his wife passed away a year ago.  He was exceptionally close to his wife and is still having some bereavement issues.  He is no longer eating as well as he used to as his wife cooked regularly for him.  He has gained a significant amount of weight since we have seen him last.  His blood pressure control has also been worse.      I was contacted by his urologist that the patient was going to undergo a TURP.  They asked me to clear him via staff message and obviously I could not.  I had the patient come in today to see me.  He continues to have occasional atypical chest pain and has dyspnea with exertion, but denies any shortness of breath at rest, orthopnea or paroxysmal nocturnal dyspnea.      He has been compliant with his medications and is currently on low dose aspirin along with atorvastatin 10 mg daily.  His baseline LDL is between 44 and 67.      Please see my separate note with his full physical examination.      IMPRESSION AND PLAN:  Mr. Villalobos is a pleasant 72-year-old gentleman with type 2 diabetes mellitus  and multiple cardiovascular risk factors who needs to undergo preoperative risk assessment for an upcoming TURP.  He does continue to have occasional atypical chest discomfort; however, his nuclear stress test now is showing evidence for inferior wall ischemia.  I believe the patient does have underlying coronary disease and at this time given his abnormal nuclear stress test, multiple cardiovascular risk factors, and atypical chest pain, I have recommended a cardiac catheterization.      Informed consent was obtained after explaining the risks and benefits of the cardiac catheterization with the patient who is in agreement.  In the interim, I have also started a beta blocker and we will plan to see the patient back in close clinical followup after his cardiac catheterization.  I did inform him that he may need to undergo PCI and will potentially need drug-eluting stents and dual antiplatelet therapy for up to 1 year.  He is comfortable waiting for up to 1 year for his TURP if the urologists are unwilling to operate on dual antiplatelet therapy.         JUAN PEREZ MD             D: 2018   T: 2018   MT: CARMELO      Name:     KEELY FRANCE   MRN:      [id number]        Account:      [id number]   :      1945           Service Date: 2018      Document: [id number]         Outpatient Encounter Prescriptions as of 2018   Medication Sig Dispense Refill     Ascorbic Acid (VITAMIN C PO) Take by mouth At Bedtime       metoprolol succinate (TOPROL-XL) 25 MG 24 hr tablet Take 1 tablet (25 mg) by mouth daily 30 tablet 11     albuterol (PROAIR HFA/PROVENTIL HFA/VENTOLIN HFA) 108 (90 BASE) MCG/ACT Inhaler Inhale 2 puffs into the lungs every 6 hours as needed for shortness of breath / dyspnea or wheezing 3 Inhaler 1     furosemide (LASIX) 20 MG tablet Take 1 tablet (20 mg) by mouth 2 times daily (Patient taking differently: Take 20 mg by mouth At Bedtime ) 360 tablet 3     lisinopril  (PRINIVIL/ZESTRIL) 40 MG tablet TAKE 1 TABLET (40 MG) BY MOUTH DAILY (Patient taking differently: Take 40 mg by mouth At Bedtime TAKE 1 TABLET (40 MG) BY MOUTH DAILY) 90 tablet 3     metFORMIN (GLUCOPHAGE) 500 MG tablet Take 1 tablet (500 mg) by mouth 2 times daily (with meals) (Patient taking differently: Take 500 mg by mouth At Bedtime ) 180 tablet 3     atorvastatin (LIPITOR) 10 MG tablet Take 1 tablet (10 mg) by mouth daily (Patient taking differently: Take 10 mg by mouth At Bedtime ) 90 tablet 1     finasteride (PROSCAR) 5 MG tablet TAKE 1 TABLET EVERY DAY (Patient taking differently: TAKE 1 TABLET EVERY DAY AT BEDTIME) 90 tablet 3     tamsulosin (FLOMAX) 0.4 MG capsule Take 1 capsule (0.4 mg) by mouth daily (Patient taking differently: Take 0.4 mg by mouth At Bedtime ) 60 capsule 3     order for DME Equipment being ordered: diabetic shoes, 1 pair 1 Package 0     order for DME Oxygen 2 Li/min  at night via nasal cannula 1 Device 0     aspirin 81 MG tablet Take 1 tablet (81 mg) by mouth daily (Patient taking differently: Take 81 mg by mouth At Bedtime ) 30 tablet 11     order for DME Equipment delivered; Respironics 760S BIPAP with heated humidification and heated tubing.  Pressure 15/7cm. Respironics Syeda View FF mask (Medium)       blood glucose (ACCU-CHEK CHACHA) test strip Use to test blood sugar 2 times daily or as directed. 60 strip 6     blood glucose monitoring (SOFTCLIX) lancets Use to test blood sugar 2 times daily or as directed. 1 Box 6     No facility-administered encounter medications on file as of 2/7/2018.        Again, thank you for allowing me to participate in the care of your patient.      Sincerely,    Vern Armenta MD     [facility]

## 2018-02-07 NOTE — LETTER
2/7/2018    Olegario Castillo MD  [address] [facility] [address]    RE: Nahun Villalobos       Dear Colleague,    I had the pleasure of seeing Nahun Villalobos in the [facility].    HPI and Plan:   See dictation    Orders Placed This Encounter   Procedures     Follow-Up with Cardiologist       Orders Placed This Encounter   Medications     metoprolol succinate (TOPROL-XL) 25 MG 24 hr tablet     Sig: Take 1 tablet (25 mg) by mouth daily     Dispense:  30 tablet     Refill:  11       There are no discontinued medications.      Encounter Diagnoses   Name Primary?     Abnormal cardiovascular stress test Yes     Hyperlipidemia LDL goal <100        CURRENT MEDICATIONS:  Current Outpatient Prescriptions   Medication Sig Dispense Refill     Ascorbic Acid (VITAMIN C PO) Take by mouth At Bedtime       metoprolol succinate (TOPROL-XL) 25 MG 24 hr tablet Take 1 tablet (25 mg) by mouth daily 30 tablet 11     albuterol (PROAIR HFA/PROVENTIL HFA/VENTOLIN HFA) 108 (90 BASE) MCG/ACT Inhaler Inhale 2 puffs into the lungs every 6 hours as needed for shortness of breath / dyspnea or wheezing 3 Inhaler 1     furosemide (LASIX) 20 MG tablet Take 1 tablet (20 mg) by mouth 2 times daily (Patient taking differently: Take 20 mg by mouth At Bedtime ) 360 tablet 3     lisinopril (PRINIVIL/ZESTRIL) 40 MG tablet TAKE 1 TABLET (40 MG) BY MOUTH DAILY (Patient taking differently: Take 40 mg by mouth At Bedtime TAKE 1 TABLET (40 MG) BY MOUTH DAILY) 90 tablet 3     metFORMIN (GLUCOPHAGE) 500 MG tablet Take 1 tablet (500 mg) by mouth 2 times daily (with meals) (Patient taking differently: Take 500 mg by mouth At Bedtime ) 180 tablet 3     atorvastatin (LIPITOR) 10 MG tablet Take 1 tablet (10 mg) by mouth daily (Patient taking differently: Take 10 mg by mouth At Bedtime ) 90 tablet 1     finasteride (PROSCAR) 5 MG tablet TAKE 1 TABLET EVERY DAY (Patient taking differently: TAKE 1 TABLET EVERY DAY AT BEDTIME) 90  tablet 3     tamsulosin (FLOMAX) 0.4 MG capsule Take 1 capsule (0.4 mg) by mouth daily (Patient taking differently: Take 0.4 mg by mouth At Bedtime ) 60 capsule 3     order for DME Equipment being ordered: diabetic shoes, 1 pair 1 Package 0     order for DME Oxygen 2 Li/min  at night via nasal cannula 1 Device 0     aspirin 81 MG tablet Take 1 tablet (81 mg) by mouth daily (Patient taking differently: Take 81 mg by mouth At Bedtime ) 30 tablet 11     order for DME Equipment delivered; Respironics 760S BIPAP with heated humidification and heated tubing.  Pressure 15/7cm. Respironics Syeda View FF mask (Medium)       blood glucose (ACCU-CHEK CHACHA) test strip Use to test blood sugar 2 times daily or as directed. 60 strip 6     blood glucose monitoring (SOFTCLIX) lancets Use to test blood sugar 2 times daily or as directed. 1 Box 6       ALLERGIES     Allergies   Allergen Reactions     No Known Drug Allergies        PAST MEDICAL HISTORY:  Past Medical History:   Diagnosis Date     COPD (chronic obstructive pulmonary disease) (H)      DM (diabetes mellitus) (H)      Essential hypertension, benign      Hemorrhage of rectum and anus      Non-small cell lung cancer (H)      Obesity      Personal history of colonic polyps      Tobacco use disorder      Urinary retention        PAST SURGICAL HISTORY:  Past Surgical History:   Procedure Laterality Date     APPENDECTOMY       C NONSPECIFIC PROCEDURE      cholecystectomy     CHOLECYSTECTOMY       WEDGE RESECTION      lung       FAMILY HISTORY:  Family History   Problem Relation Age of Onset     Hypertension Mother      C.A.D. Mother      ANEURYSM     Cancer - colorectal Mother      CANCER Mother      OVARIAN     Hypertension Sister      Breast Cancer Sister      CEREBROVASCULAR DISEASE Father        SOCIAL HISTORY:  Social History     Social History     Marital status:      Spouse name: N/A     Number of children: N/A     Years of education: N/A     Social History Main  Topics     Smoking status: Former Smoker     Packs/day: 1.00     Years: 52.00     Types: Cigarettes     Quit date: 6/1/2013     Smokeless tobacco: Never Used     Alcohol use No     Drug use: No     Sexual activity: Yes     Partners: Female     Other Topics Concern     Caffeine Concern No     1-2 a day     Special Diet No     Exercise No     Seat Belt Yes     Social History Narrative       Review of Systems:  Skin:  Negative       Eyes:  Positive for glasses    ENT:  Negative      Respiratory:  Positive for shortness of breath;dyspnea on exertion;CPAP;sleep apnea  hx lung cancer   Cardiovascular:    chest pain;Positive for;dizziness    Gastroenterology: Negative      Genitourinary:  Positive for urinary frequency;prostate problem surgery 2/19/18  Musculoskeletal:  Positive for back pain;neck pain;joint pain;arthritis    Neurologic:  Positive for numbness or tingling of hands feet cramp when cold  Psychiatric:  Positive for depression    Heme/Lymph/Imm:  Negative      Endocrine:  Positive for diabetes borderline diabetic    Physical Exam:  Vitals: /66 (BP Location: Right arm, Patient Position: Chair, Cuff Size: Adult Large)  Pulse 79  Wt 115.9 kg (255 lb 9.6 oz)  SpO2 97%  BMI 41.25 kg/m2    Constitutional:  cooperative;in no acute distress        Skin:  warm and dry to the touch          Head:  normocephalic        Eyes:  sclera white        Lymph:No Cervical lymphadenopathy present     ENT:  no pallor or cyanosis        Neck:  JVP normal        Respiratory:  clear to auscultation         Cardiac: regular rhythm;normal S1 and S2   distant heart sounds            pulses full and equal                                        GI:  abdomen soft        Extremities and Muscular Skeletal:  no edema              Neurological:  affect appropriate        Psych:  affect appropriate, oriented to time, person and place        CC  Vern Armenta MD  [address]  [place] [id number]                Thank you for  allowing me to participate in the care of your patient.      Sincerely,     Vern Armenta MD     [facility]    cc:   Vern Armenta MD  [address]  [place] [id number]

## 2018-02-08 ENCOUNTER — DOCUMENTATION ONLY (OUTPATIENT)
Dept: CARDIOLOGY | Facility: CLINIC | Age: 73
End: 2018-02-08

## 2018-02-08 ENCOUNTER — TELEPHONE (OUTPATIENT)
Dept: CARDIOLOGY | Facility: CLINIC | Age: 73
End: 2018-02-08

## 2018-02-08 DIAGNOSIS — I99.8 OTHER DISORDER OF CIRCULATORY SYSTEM (CODE): ICD-10-CM

## 2018-02-08 DIAGNOSIS — I10 ESSENTIAL HYPERTENSION, BENIGN: Primary | ICD-10-CM

## 2018-02-08 DIAGNOSIS — R94.39 ABNORMAL STRESS TEST: Primary | ICD-10-CM

## 2018-02-08 DIAGNOSIS — E11.9 TYPE 2 DIABETES MELLITUS WITHOUT COMPLICATION, WITHOUT LONG-TERM CURRENT USE OF INSULIN (H): Primary | ICD-10-CM

## 2018-02-08 LAB
BACTERIA SPEC CULT: NO GROWTH
Lab: NORMAL
SPECIMEN SOURCE: NORMAL

## 2018-02-08 RX ORDER — ASPIRIN 81 MG/1
81 TABLET ORAL DAILY
Status: CANCELLED | OUTPATIENT
Start: 2018-02-08

## 2018-02-08 RX ORDER — SODIUM CHLORIDE 9 MG/ML
INJECTION, SOLUTION INTRAVENOUS CONTINUOUS
Status: CANCELLED | OUTPATIENT
Start: 2018-02-08

## 2018-02-08 RX ORDER — LIDOCAINE 40 MG/G
CREAM TOPICAL
Status: CANCELLED | OUTPATIENT
Start: 2018-02-08

## 2018-02-08 RX ORDER — POTASSIUM CHLORIDE 750 MG/1
20 TABLET, EXTENDED RELEASE ORAL
Status: CANCELLED | OUTPATIENT
Start: 2018-02-08

## 2018-02-08 NOTE — PATIENT INSTRUCTIONS
"Left Heart Cath Patient Instructions    Patient is scheduled for a left heart cath/coronary angiogram at [facility], on 2/14/18. Check in time is at 10:30am and procedure time is at 12pm.  Advised patient not eat or drink after midnight on 2/13/18 .      Advised patient to hold Lasix on the morning of the procedure, patient may take it after the procedure.     Patient is not taking insulin.     Advised to hold Metformin the day of the procedure should continue to hold until after we can review results of BMP lab which is scheduled on 2/16/18 at [facility].The patient will be called and advised if they can resume their metformin.    Patient is not taking an anticoagulant.     Patient should continue their current dose of Aspirin with their other daily medications the morning of the procedure with small sips of water.     Verified patient does not have an allergy to contrast dye.     Verified patient has someone available to drive them home from the hospital and can stay with them for 24 hours after the procedure.     This is a same day procedure, however advised to should pack an overnight bag just in case he may need to stay overnight.    Patient seemed a little confused with prep instructions. Pt has difficulty writing and keeping track of \"things\". Pre-cath labs done 2/9/18, will have nurse explain instructions in person. Per pt's request, will contact daughter to review instructions as well.     NICKY Perales  [facility]      "

## 2018-02-09 ENCOUNTER — CARE COORDINATION (OUTPATIENT)
Dept: UROLOGY | Facility: CLINIC | Age: 73
End: 2018-02-09

## 2018-02-09 ENCOUNTER — DOCUMENTATION ONLY (OUTPATIENT)
Dept: CARDIOLOGY | Facility: CLINIC | Age: 73
End: 2018-02-09

## 2018-02-09 ENCOUNTER — HOSPITAL ENCOUNTER (OUTPATIENT)
Dept: LAB | Facility: CLINIC | Age: 73
Discharge: HOME | End: 2018-02-09
Attending: INTERNAL MEDICINE | Admitting: INTERNAL MEDICINE
Payer: MEDICARE

## 2018-02-09 DIAGNOSIS — R94.39 ABNORMAL STRESS TEST: ICD-10-CM

## 2018-02-09 DIAGNOSIS — E11.9 TYPE 2 DIABETES MELLITUS WITHOUT COMPLICATION, WITHOUT LONG-TERM CURRENT USE OF INSULIN (H): ICD-10-CM

## 2018-02-09 DIAGNOSIS — I99.8 OTHER DISORDER OF CIRCULATORY SYSTEM (CODE): ICD-10-CM

## 2018-02-09 LAB
ANION GAP SERPL CALCULATED.3IONS-SCNC: 4 MMOL/L (ref 3–14)
APTT PPP: 29 SEC (ref 22–37)
BUN SERPL-MCNC: 13 MG/DL (ref 7–30)
CALCIUM SERPL-MCNC: 9 MG/DL (ref 8.5–10.1)
CHLORIDE SERPL-SCNC: 99 MMOL/L (ref 94–109)
CO2 SERPL-SCNC: 32 MMOL/L (ref 20–32)
CREAT SERPL-MCNC: 0.92 MG/DL (ref 0.66–1.25)
ERYTHROCYTE [DISTWIDTH] IN BLOOD BY AUTOMATED COUNT: 12.1 % (ref 10–15)
GFR SERPL CREATININE-BSD FRML MDRD: 81 ML/MIN/1.7M2
GLUCOSE SERPL-MCNC: 141 MG/DL (ref 70–99)
HCT VFR BLD AUTO: 41.4 % (ref 40–53)
HGB BLD-MCNC: 13.3 G/DL (ref 13.3–17.7)
INR PPP: 1.01 (ref 0.86–1.14)
MCH RBC QN AUTO: 31.6 PG (ref 26.5–33)
MCHC RBC AUTO-ENTMCNC: 32.1 G/DL (ref 31.5–36.5)
MCV RBC AUTO: 98 FL (ref 78–100)
PLATELET # BLD AUTO: 297 10E9/L (ref 150–450)
POTASSIUM SERPL-SCNC: 3.9 MMOL/L (ref 3.4–5.3)
RBC # BLD AUTO: 4.21 10E12/L (ref 4.4–5.9)
SODIUM SERPL-SCNC: 135 MMOL/L (ref 133–144)
WBC # BLD AUTO: 10.1 10E9/L (ref 4–11)

## 2018-02-09 PROCEDURE — 85730 THROMBOPLASTIN TIME PARTIAL: CPT | Performed by: INTERNAL MEDICINE

## 2018-02-09 PROCEDURE — 36415 COLL VENOUS BLD VENIPUNCTURE: CPT | Performed by: INTERNAL MEDICINE

## 2018-02-09 PROCEDURE — 85027 COMPLETE CBC AUTOMATED: CPT | Performed by: INTERNAL MEDICINE

## 2018-02-09 PROCEDURE — 85610 PROTHROMBIN TIME: CPT | Performed by: INTERNAL MEDICINE

## 2018-02-09 PROCEDURE — 80048 BASIC METABOLIC PNL TOTAL CA: CPT | Performed by: INTERNAL MEDICINE

## 2018-02-09 NOTE — PATIENT INSTRUCTIONS
6/29/17: LCSW attempted to contact pt's son/caregiver Al to complete SW assessment.  Ray stated that it was not a good time for him to talk as he was in the middle of doing something.  Ray asked LCSW if he could give him a call back around the same time tomorrow morning.     Prep Instructions reviewed with patient: face to face    Procedure: Coronary Angiogram (left)  Scheduled: 02-14-18  Location: [facility]  Check-in time: 1030 am   Procedure time: 12 noon    Patient to be NPO after midnight, the night before the procedure.        Patient can take his Metoprolol XL, along with Aspirin on the morning of the procedure with small sips of water.      Patient will need to hold his Metformin on the morning of the procedure until 02-16-18.     Patient should take his other medications after the procedure.     Patient will need to arrange for someone to drive him home after the procedure.     Patient will need a responsible adult to stay with him for 24 hours after the procedure.     Patient should pack an overnight bag just in case he may need to stay overnight.    ----------------------------------------------------------------------------------------------------------------------    Prep instructions were reviewed with patient in clinic.    Patient was given written instructions.     A BMP is scheduled on 02-16-18 (Friday). Patient is aware that he will receive a call from a nurse to review his BMP results and to let him know if he can restart his Metformin.     Patient had no questions about his prep instructions     [facility]

## 2018-02-14 ENCOUNTER — HOSPITAL ENCOUNTER (OUTPATIENT)
Facility: CLINIC | Age: 73
Discharge: HOME | End: 2018-02-14
Attending: INTERNAL MEDICINE | Admitting: INTERNAL MEDICINE
Payer: MEDICARE

## 2018-02-14 ENCOUNTER — APPOINTMENT (OUTPATIENT)
Dept: CARDIOLOGY | Facility: CLINIC | Age: 73
End: 2018-02-14
Attending: INTERNAL MEDICINE
Payer: MEDICARE

## 2018-02-14 VITALS
RESPIRATION RATE: 15 BRPM | OXYGEN SATURATION: 96 % | BODY MASS INDEX: 40.75 KG/M2 | DIASTOLIC BLOOD PRESSURE: 78 MMHG | HEART RATE: 84 BPM | HEIGHT: 66 IN | WEIGHT: 253.53 LBS | SYSTOLIC BLOOD PRESSURE: 148 MMHG

## 2018-02-14 DIAGNOSIS — R94.39 ABNORMAL CARDIOVASCULAR STRESS TEST: ICD-10-CM

## 2018-02-14 DIAGNOSIS — Z98.890 STATUS POST CORONARY ANGIOGRAM: Primary | ICD-10-CM

## 2018-02-14 DIAGNOSIS — E78.5 HYPERLIPIDEMIA LDL GOAL <100: ICD-10-CM

## 2018-02-14 PROCEDURE — 99152 MOD SED SAME PHYS/QHP 5/>YRS: CPT | Performed by: INTERNAL MEDICINE

## 2018-02-14 PROCEDURE — 93010 ELECTROCARDIOGRAM REPORT: CPT | Performed by: INTERNAL MEDICINE

## 2018-02-14 PROCEDURE — 27210742 ZZH CATH CR1

## 2018-02-14 PROCEDURE — 40000065 ZZH STATISTIC EKG NON-CHARGEABLE

## 2018-02-14 PROCEDURE — 93005 ELECTROCARDIOGRAM TRACING: CPT

## 2018-02-14 PROCEDURE — 25000132 ZZH RX MED GY IP 250 OP 250 PS 637: Mod: GY | Performed by: INTERNAL MEDICINE

## 2018-02-14 PROCEDURE — 99152 MOD SED SAME PHYS/QHP 5/>YRS: CPT

## 2018-02-14 PROCEDURE — 25000128 H RX IP 250 OP 636: Performed by: INTERNAL MEDICINE

## 2018-02-14 PROCEDURE — 93458 L HRT ARTERY/VENTRICLE ANGIO: CPT

## 2018-02-14 PROCEDURE — C1769 GUIDE WIRE: HCPCS

## 2018-02-14 PROCEDURE — 27210856 ZZH ACCESS HEART CATH CR2

## 2018-02-14 PROCEDURE — 25000125 ZZHC RX 250: Performed by: INTERNAL MEDICINE

## 2018-02-14 PROCEDURE — A9270 NON-COVERED ITEM OR SERVICE: HCPCS | Mod: GY | Performed by: INTERNAL MEDICINE

## 2018-02-14 PROCEDURE — 27210946 ZZH KIT HC TOTES DISP CR8

## 2018-02-14 PROCEDURE — C1894 INTRO/SHEATH, NON-LASER: HCPCS

## 2018-02-14 PROCEDURE — 99153 MOD SED SAME PHYS/QHP EA: CPT

## 2018-02-14 PROCEDURE — 27210827 ZZH KIT ACIST INJECTOR CR6

## 2018-02-14 PROCEDURE — 93458 L HRT ARTERY/VENTRICLE ANGIO: CPT | Mod: 26 | Performed by: INTERNAL MEDICINE

## 2018-02-14 RX ORDER — FENTANYL CITRATE 50 UG/ML
25-50 INJECTION, SOLUTION INTRAMUSCULAR; INTRAVENOUS
Status: DISCONTINUED | OUTPATIENT
Start: 2018-02-14 | End: 2018-02-14 | Stop reason: HOSPADM

## 2018-02-14 RX ORDER — PHENYLEPHRINE HCL IN 0.9% NACL 1 MG/10 ML
20-100 SYRINGE (ML) INTRAVENOUS
Status: DISCONTINUED | OUTPATIENT
Start: 2018-02-14 | End: 2018-02-14 | Stop reason: HOSPADM

## 2018-02-14 RX ORDER — NITROGLYCERIN 5 MG/ML
VIAL (ML) INTRAVENOUS
Status: DISCONTINUED
Start: 2018-02-14 | End: 2018-02-14 | Stop reason: HOSPADM

## 2018-02-14 RX ORDER — SODIUM CHLORIDE 9 MG/ML
INJECTION, SOLUTION INTRAVENOUS CONTINUOUS
Status: DISCONTINUED | OUTPATIENT
Start: 2018-02-14 | End: 2018-02-14 | Stop reason: HOSPADM

## 2018-02-14 RX ORDER — FENTANYL CITRATE 50 UG/ML
INJECTION, SOLUTION INTRAMUSCULAR; INTRAVENOUS
Status: DISCONTINUED
Start: 2018-02-14 | End: 2018-02-14 | Stop reason: HOSPADM

## 2018-02-14 RX ORDER — HEPARIN SODIUM 1000 [USP'U]/ML
INJECTION, SOLUTION INTRAVENOUS; SUBCUTANEOUS
Status: DISCONTINUED
Start: 2018-02-14 | End: 2018-02-14 | Stop reason: HOSPADM

## 2018-02-14 RX ORDER — DOPAMINE HYDROCHLORIDE 160 MG/100ML
2-20 INJECTION, SOLUTION INTRAVENOUS CONTINUOUS PRN
Status: DISCONTINUED | OUTPATIENT
Start: 2018-02-14 | End: 2018-02-14 | Stop reason: HOSPADM

## 2018-02-14 RX ORDER — NICARDIPINE HYDROCHLORIDE 2.5 MG/ML
100 INJECTION INTRAVENOUS
Status: DISCONTINUED | OUTPATIENT
Start: 2018-02-14 | End: 2018-02-14 | Stop reason: HOSPADM

## 2018-02-14 RX ORDER — ASPIRIN 81 MG/1
81 TABLET ORAL DAILY
Status: DISCONTINUED | OUTPATIENT
Start: 2018-02-14 | End: 2018-02-14 | Stop reason: HOSPADM

## 2018-02-14 RX ORDER — NALOXONE HYDROCHLORIDE 0.4 MG/ML
0.4 INJECTION, SOLUTION INTRAMUSCULAR; INTRAVENOUS; SUBCUTANEOUS EVERY 5 MIN PRN
Status: DISCONTINUED | OUTPATIENT
Start: 2018-02-14 | End: 2018-02-14 | Stop reason: HOSPADM

## 2018-02-14 RX ORDER — IOPAMIDOL 755 MG/ML
100 INJECTION, SOLUTION INTRAVASCULAR ONCE
Status: COMPLETED | OUTPATIENT
Start: 2018-02-14 | End: 2018-02-14

## 2018-02-14 RX ORDER — ATROPINE SULFATE 0.1 MG/ML
.5-1 INJECTION INTRAVENOUS
Status: DISCONTINUED | OUTPATIENT
Start: 2018-02-14 | End: 2018-02-14 | Stop reason: HOSPADM

## 2018-02-14 RX ORDER — PRASUGREL 10 MG/1
10-60 TABLET, FILM COATED ORAL
Status: DISCONTINUED | OUTPATIENT
Start: 2018-02-14 | End: 2018-02-14 | Stop reason: HOSPADM

## 2018-02-14 RX ORDER — ONDANSETRON 2 MG/ML
4 INJECTION INTRAMUSCULAR; INTRAVENOUS EVERY 4 HOURS PRN
Status: DISCONTINUED | OUTPATIENT
Start: 2018-02-14 | End: 2018-02-14 | Stop reason: HOSPADM

## 2018-02-14 RX ORDER — ACETAMINOPHEN 325 MG/1
325-650 TABLET ORAL EVERY 4 HOURS PRN
Status: DISCONTINUED | OUTPATIENT
Start: 2018-02-14 | End: 2018-02-14 | Stop reason: HOSPADM

## 2018-02-14 RX ORDER — PROTAMINE SULFATE 10 MG/ML
25-100 INJECTION, SOLUTION INTRAVENOUS EVERY 5 MIN PRN
Status: DISCONTINUED | OUTPATIENT
Start: 2018-02-14 | End: 2018-02-14 | Stop reason: HOSPADM

## 2018-02-14 RX ORDER — VERAPAMIL HYDROCHLORIDE 2.5 MG/ML
INJECTION, SOLUTION INTRAVENOUS
Status: DISCONTINUED
Start: 2018-02-14 | End: 2018-02-14 | Stop reason: HOSPADM

## 2018-02-14 RX ORDER — NALOXONE HYDROCHLORIDE 0.4 MG/ML
.2-.4 INJECTION, SOLUTION INTRAMUSCULAR; INTRAVENOUS; SUBCUTANEOUS
Status: DISCONTINUED | OUTPATIENT
Start: 2018-02-14 | End: 2018-02-14 | Stop reason: HOSPADM

## 2018-02-14 RX ORDER — CLOPIDOGREL 300 MG/1
300-600 TABLET, FILM COATED ORAL
Status: DISCONTINUED | OUTPATIENT
Start: 2018-02-14 | End: 2018-02-14 | Stop reason: HOSPADM

## 2018-02-14 RX ORDER — DIPHENHYDRAMINE HYDROCHLORIDE 50 MG/ML
25-50 INJECTION INTRAMUSCULAR; INTRAVENOUS
Status: DISCONTINUED | OUTPATIENT
Start: 2018-02-14 | End: 2018-02-14 | Stop reason: HOSPADM

## 2018-02-14 RX ORDER — FLUMAZENIL 0.1 MG/ML
0.2 INJECTION, SOLUTION INTRAVENOUS
Status: DISCONTINUED | OUTPATIENT
Start: 2018-02-14 | End: 2018-02-14 | Stop reason: HOSPADM

## 2018-02-14 RX ORDER — METOPROLOL TARTRATE 1 MG/ML
5 INJECTION, SOLUTION INTRAVENOUS EVERY 5 MIN PRN
Status: DISCONTINUED | OUTPATIENT
Start: 2018-02-14 | End: 2018-02-14 | Stop reason: HOSPADM

## 2018-02-14 RX ORDER — POTASSIUM CHLORIDE 29.8 MG/ML
20 INJECTION INTRAVENOUS
Status: DISCONTINUED | OUTPATIENT
Start: 2018-02-14 | End: 2018-02-14 | Stop reason: HOSPADM

## 2018-02-14 RX ORDER — HYDROCODONE BITARTRATE AND ACETAMINOPHEN 5; 325 MG/1; MG/1
1-2 TABLET ORAL EVERY 4 HOURS PRN
Status: DISCONTINUED | OUTPATIENT
Start: 2018-02-14 | End: 2018-02-14 | Stop reason: HOSPADM

## 2018-02-14 RX ORDER — ASPIRIN 325 MG
325 TABLET ORAL
Status: DISCONTINUED | OUTPATIENT
Start: 2018-02-14 | End: 2018-02-14 | Stop reason: HOSPADM

## 2018-02-14 RX ORDER — DOBUTAMINE HYDROCHLORIDE 200 MG/100ML
2-20 INJECTION INTRAVENOUS CONTINUOUS PRN
Status: DISCONTINUED | OUTPATIENT
Start: 2018-02-14 | End: 2018-02-14 | Stop reason: HOSPADM

## 2018-02-14 RX ORDER — NALOXONE HYDROCHLORIDE 0.4 MG/ML
.1-.4 INJECTION, SOLUTION INTRAMUSCULAR; INTRAVENOUS; SUBCUTANEOUS
Status: DISCONTINUED | OUTPATIENT
Start: 2018-02-14 | End: 2018-02-14 | Stop reason: HOSPADM

## 2018-02-14 RX ORDER — ASPIRIN 81 MG/1
81-324 TABLET, CHEWABLE ORAL
Status: DISCONTINUED | OUTPATIENT
Start: 2018-02-14 | End: 2018-02-14 | Stop reason: HOSPADM

## 2018-02-14 RX ORDER — LORAZEPAM 2 MG/ML
.5-2 INJECTION INTRAMUSCULAR EVERY 4 HOURS PRN
Status: DISCONTINUED | OUTPATIENT
Start: 2018-02-14 | End: 2018-02-14 | Stop reason: HOSPADM

## 2018-02-14 RX ORDER — LIDOCAINE 40 MG/G
CREAM TOPICAL
Status: DISCONTINUED | OUTPATIENT
Start: 2018-02-14 | End: 2018-02-14 | Stop reason: HOSPADM

## 2018-02-14 RX ORDER — POTASSIUM CHLORIDE 7.45 MG/ML
10 INJECTION INTRAVENOUS
Status: DISCONTINUED | OUTPATIENT
Start: 2018-02-14 | End: 2018-02-14 | Stop reason: HOSPADM

## 2018-02-14 RX ORDER — EPINEPHRINE 1 MG/ML
0.3 INJECTION, SOLUTION, CONCENTRATE INTRAVENOUS
Status: DISCONTINUED | OUTPATIENT
Start: 2018-02-14 | End: 2018-02-14 | Stop reason: HOSPADM

## 2018-02-14 RX ORDER — SODIUM NITROPRUSSIDE 25 MG/ML
100-200 INJECTION INTRAVENOUS
Status: DISCONTINUED | OUTPATIENT
Start: 2018-02-14 | End: 2018-02-14 | Stop reason: HOSPADM

## 2018-02-14 RX ORDER — POTASSIUM CHLORIDE 1500 MG/1
20 TABLET, EXTENDED RELEASE ORAL
Status: COMPLETED | OUTPATIENT
Start: 2018-02-14 | End: 2018-02-14

## 2018-02-14 RX ORDER — PROTAMINE SULFATE 10 MG/ML
1-5 INJECTION, SOLUTION INTRAVENOUS
Status: DISCONTINUED | OUTPATIENT
Start: 2018-02-14 | End: 2018-02-14 | Stop reason: HOSPADM

## 2018-02-14 RX ORDER — POTASSIUM CHLORIDE 1500 MG/1
TABLET, EXTENDED RELEASE ORAL
Status: DISCONTINUED
Start: 2018-02-14 | End: 2018-02-14 | Stop reason: HOSPADM

## 2018-02-14 RX ORDER — NIFEDIPINE 10 MG/1
10 CAPSULE ORAL
Status: DISCONTINUED | OUTPATIENT
Start: 2018-02-14 | End: 2018-02-14 | Stop reason: HOSPADM

## 2018-02-14 RX ORDER — ENALAPRILAT 1.25 MG/ML
1.25-2.5 INJECTION INTRAVENOUS
Status: DISCONTINUED | OUTPATIENT
Start: 2018-02-14 | End: 2018-02-14 | Stop reason: HOSPADM

## 2018-02-14 RX ORDER — NITROGLYCERIN 5 MG/ML
100-200 VIAL (ML) INTRAVENOUS
Status: DISCONTINUED | OUTPATIENT
Start: 2018-02-14 | End: 2018-02-14 | Stop reason: HOSPADM

## 2018-02-14 RX ORDER — NITROGLYCERIN 0.4 MG/1
0.4 TABLET SUBLINGUAL EVERY 5 MIN PRN
Status: DISCONTINUED | OUTPATIENT
Start: 2018-02-14 | End: 2018-02-14 | Stop reason: HOSPADM

## 2018-02-14 RX ORDER — FUROSEMIDE 10 MG/ML
20-100 INJECTION INTRAMUSCULAR; INTRAVENOUS
Status: DISCONTINUED | OUTPATIENT
Start: 2018-02-14 | End: 2018-02-14 | Stop reason: HOSPADM

## 2018-02-14 RX ORDER — HYDRALAZINE HYDROCHLORIDE 20 MG/ML
10-20 INJECTION INTRAMUSCULAR; INTRAVENOUS
Status: DISCONTINUED | OUTPATIENT
Start: 2018-02-14 | End: 2018-02-14 | Stop reason: HOSPADM

## 2018-02-14 RX ORDER — NITROGLYCERIN 20 MG/100ML
.07-1.74 INJECTION INTRAVENOUS CONTINUOUS PRN
Status: DISCONTINUED | OUTPATIENT
Start: 2018-02-14 | End: 2018-02-14 | Stop reason: HOSPADM

## 2018-02-14 RX ORDER — PROMETHAZINE HYDROCHLORIDE 25 MG/ML
6.25-25 INJECTION, SOLUTION INTRAMUSCULAR; INTRAVENOUS EVERY 4 HOURS PRN
Status: DISCONTINUED | OUTPATIENT
Start: 2018-02-14 | End: 2018-02-14 | Stop reason: HOSPADM

## 2018-02-14 RX ORDER — METHYLPREDNISOLONE SODIUM SUCCINATE 125 MG/2ML
125 INJECTION, POWDER, LYOPHILIZED, FOR SOLUTION INTRAMUSCULAR; INTRAVENOUS
Status: DISCONTINUED | OUTPATIENT
Start: 2018-02-14 | End: 2018-02-14 | Stop reason: HOSPADM

## 2018-02-14 RX ORDER — NITROGLYCERIN 5 MG/ML
100-500 VIAL (ML) INTRAVENOUS
Status: COMPLETED | OUTPATIENT
Start: 2018-02-14 | End: 2018-02-14

## 2018-02-14 RX ORDER — LIDOCAINE HYDROCHLORIDE 10 MG/ML
30 INJECTION, SOLUTION EPIDURAL; INFILTRATION; INTRACAUDAL; PERINEURAL
Status: DISCONTINUED | OUTPATIENT
Start: 2018-02-14 | End: 2018-02-14 | Stop reason: HOSPADM

## 2018-02-14 RX ORDER — DEXTROSE MONOHYDRATE 25 G/50ML
12.5-5 INJECTION, SOLUTION INTRAVENOUS EVERY 30 MIN PRN
Status: DISCONTINUED | OUTPATIENT
Start: 2018-02-14 | End: 2018-02-14 | Stop reason: HOSPADM

## 2018-02-14 RX ORDER — VERAPAMIL HYDROCHLORIDE 2.5 MG/ML
1-2.5 INJECTION, SOLUTION INTRAVENOUS
Status: COMPLETED | OUTPATIENT
Start: 2018-02-14 | End: 2018-02-14

## 2018-02-14 RX ORDER — HEPARIN SODIUM 1000 [USP'U]/ML
1000-10000 INJECTION, SOLUTION INTRAVENOUS; SUBCUTANEOUS EVERY 5 MIN PRN
Status: DISCONTINUED | OUTPATIENT
Start: 2018-02-14 | End: 2018-02-14 | Stop reason: HOSPADM

## 2018-02-14 RX ORDER — CLOPIDOGREL BISULFATE 75 MG/1
75 TABLET ORAL
Status: DISCONTINUED | OUTPATIENT
Start: 2018-02-14 | End: 2018-02-14 | Stop reason: HOSPADM

## 2018-02-14 RX ORDER — LIDOCAINE HYDROCHLORIDE 10 MG/ML
1-10 INJECTION, SOLUTION INFILTRATION; PERINEURAL
Status: COMPLETED | OUTPATIENT
Start: 2018-02-14 | End: 2018-02-14

## 2018-02-14 RX ORDER — LIDOCAINE HYDROCHLORIDE 10 MG/ML
INJECTION, SOLUTION INFILTRATION; PERINEURAL
Status: DISCONTINUED
Start: 2018-02-14 | End: 2018-02-14 | Stop reason: HOSPADM

## 2018-02-14 RX ORDER — ATROPINE SULFATE 0.1 MG/ML
0.5 INJECTION INTRAVENOUS EVERY 5 MIN PRN
Status: DISCONTINUED | OUTPATIENT
Start: 2018-02-14 | End: 2018-02-14 | Stop reason: HOSPADM

## 2018-02-14 RX ORDER — MORPHINE SULFATE 2 MG/ML
1-2 INJECTION, SOLUTION INTRAMUSCULAR; INTRAVENOUS EVERY 5 MIN PRN
Status: DISCONTINUED | OUTPATIENT
Start: 2018-02-14 | End: 2018-02-14 | Stop reason: HOSPADM

## 2018-02-14 RX ORDER — BUPIVACAINE HYDROCHLORIDE 2.5 MG/ML
1-10 INJECTION, SOLUTION EPIDURAL; INFILTRATION; INTRACAUDAL
Status: DISCONTINUED | OUTPATIENT
Start: 2018-02-14 | End: 2018-02-14 | Stop reason: HOSPADM

## 2018-02-14 RX ORDER — ADENOSINE 3 MG/ML
12-12000 INJECTION, SOLUTION INTRAVENOUS
Status: DISCONTINUED | OUTPATIENT
Start: 2018-02-14 | End: 2018-02-14 | Stop reason: HOSPADM

## 2018-02-14 RX ADMIN — SODIUM CHLORIDE: 9 INJECTION, SOLUTION INTRAVENOUS at 11:20

## 2018-02-14 RX ADMIN — FENTANYL CITRATE 50 MCG: 50 INJECTION INTRAMUSCULAR; INTRAVENOUS at 12:35

## 2018-02-14 RX ADMIN — POTASSIUM CHLORIDE 20 MEQ: 1500 TABLET, EXTENDED RELEASE ORAL at 11:17

## 2018-02-14 RX ADMIN — ASPIRIN 81 MG: 81 TABLET, COATED ORAL at 11:17

## 2018-02-14 RX ADMIN — MIDAZOLAM 1 MG: 1 INJECTION INTRAMUSCULAR; INTRAVENOUS at 12:35

## 2018-02-14 RX ADMIN — FENTANYL CITRATE 50 MCG: 50 INJECTION INTRAMUSCULAR; INTRAVENOUS at 12:17

## 2018-02-14 RX ADMIN — HEPARIN SODIUM 5000 UNITS: 1000 INJECTION, SOLUTION INTRAVENOUS; SUBCUTANEOUS at 12:26

## 2018-02-14 RX ADMIN — NITROGLYCERIN 200 MCG: 5 INJECTION, SOLUTION INTRAVENOUS at 12:25

## 2018-02-14 RX ADMIN — LIDOCAINE HYDROCHLORIDE 1 ML: 10 INJECTION, SOLUTION INFILTRATION; PERINEURAL at 12:25

## 2018-02-14 RX ADMIN — VERAPAMIL HYDROCHLORIDE 2.5 MG: 2.5 INJECTION, SOLUTION INTRAVENOUS at 12:26

## 2018-02-14 RX ADMIN — MIDAZOLAM 1 MG: 1 INJECTION INTRAMUSCULAR; INTRAVENOUS at 12:17

## 2018-02-14 RX ADMIN — IOPAMIDOL 87 ML: 755 INJECTION, SOLUTION INTRAVASCULAR at 12:15

## 2018-02-14 NOTE — IP AVS SNAPSHOT
MRN:[id number]                      After Visit Summary   2/14/2018    Nahun Villalobos    MRN: [id number]           Visit Information        Department      2/14/2018 10:14 AM [facility]          Review of your medicines      UNREVIEWED medicines. Ask your doctor about these medicines        Dose / Directions    albuterol 108 (90 BASE) MCG/ACT Inhaler   Commonly known as:  PROAIR HFA/PROVENTIL HFA/VENTOLIN HFA   Used for:  Chronic obstructive pulmonary disease, unspecified COPD type (H)        Dose:  2 puff   Inhale 2 puffs into the lungs every 6 hours as needed for shortness of breath / dyspnea or wheezing   Quantity:  3 Inhaler   Refills:  1       aspirin 81 MG tablet   Used for:  Type 2 diabetes, HbA1C goal < 8% (H)        Dose:  81 mg   Take 1 tablet (81 mg) by mouth daily   Quantity:  30 tablet   Refills:  11       atorvastatin 10 MG tablet   Commonly known as:  LIPITOR   Used for:  Hyperlipidemia LDL goal <100        Dose:  10 mg   Take 1 tablet (10 mg) by mouth daily   Quantity:  90 tablet   Refills:  1       finasteride 5 MG tablet   Commonly known as:  PROSCAR   Used for:  Enlarged prostate        TAKE 1 TABLET EVERY DAY   Quantity:  90 tablet   Refills:  3       furosemide 20 MG tablet   Commonly known as:  LASIX   Used for:  Essential hypertension, benign        Dose:  20 mg   Take 1 tablet (20 mg) by mouth 2 times daily   Quantity:  360 tablet   Refills:  3       lisinopril 40 MG tablet   Commonly known as:  PRINIVIL/ZESTRIL   Used for:  Type 2 diabetes mellitus without complication, without long-term current use of insulin (H), Essential hypertension, benign        TAKE 1 TABLET (40 MG) BY MOUTH DAILY   Quantity:  90 tablet   Refills:  3       metFORMIN 500 MG tablet   Commonly known as:  GLUCOPHAGE   Used for:  Type 2 diabetes mellitus without complication, without long-term current use of insulin (H)        Dose:  500 mg   Take 1 tablet (500 mg) by mouth 2 times daily  (with meals)   Quantity:  180 tablet   Refills:  3       metoprolol succinate 25 MG 24 hr tablet   Commonly known as:  TOPROL-XL   Used for:  Abnormal cardiovascular stress test        Dose:  25 mg   Take 1 tablet (25 mg) by mouth daily   Quantity:  30 tablet   Refills:  11       tamsulosin 0.4 MG capsule   Commonly known as:  FLOMAX   Used for:  Urinary urgency        Dose:  0.4 mg   Take 1 capsule (0.4 mg) by mouth daily   Quantity:  60 capsule   Refills:  3       VITAMIN C PO        Take by mouth At Bedtime   Refills:  0         CONTINUE these medicines which have NOT CHANGED        Dose / Directions    blood glucose monitoring lancets   Used for:  DM (diabetes mellitus) (H)        Use to test blood sugar 2 times daily or as directed.   Quantity:  1 Box   Refills:  6       blood glucose monitoring test strip   Commonly known as:  ACCU-CHEK CHACHA   Used for:  DM (diabetes mellitus) (H)        Use to test blood sugar 2 times daily or as directed.   Quantity:  60 strip   Refills:  6       order for DME        Equipment delivered; Respironics 760S BIPAP with heated humidification and heated tubing.  Pressure 15/7cm. Respironics Syeda View FF mask (Medium)   Refills:  0       * order for DME   Used for:  Nocturnal hypoxemia        Oxygen 2 Li/min at night via nasal cannula   Quantity:  1 Device   Refills:  0       * order for DME   Used for:  Type 2 diabetes mellitus without complication, without long-term current use of insulin (H)        Equipment being ordered: diabetic shoes, 1 pair   Quantity:  1 Package   Refills:  0       * Notice:  This list has 2 medication(s) that are the same as other medications prescribed for you. Read the directions carefully, and ask your doctor or other care provider to review them with you.             Protect others around you: Learn how to safely use, store and throw away your medicines at www.disposemymeds.org.         Follow-ups after your visit        Your next 10 appointments  already scheduled     Feb 16, 2018  3:00 PM CST   LAB with RU LAB   [facility] AT [place] ([facility])    [address]  [facility] [id number] [phone]           Please do not eat 10-12 hours before your appointment if you are coming in fasting for labs on lipids, cholesterol, or glucose (sugar). This does not apply to pregnant women. Water, hot tea and black coffee (with nothing added) are okay. Do not drink other fluids, diet soda or chew gum.            Feb 19, 2018   Procedure with Praveena Burris MD   [facility], Same Day Surgery (--)    [address] [address] [phone]            Feb 21, 2018  3:15 PM CST   Return Visit with Vern Armenta MD   [facility]   [place] ([facility] [place])    [address]  [address]  [place] [address]   [phone]            Mar 14, 2018  9:30 AM CDT   (Arrive by 9:15 AM)   Post-Op with Praveena Burris MD   [facility]rologic C[facility] ([facility])    [address]  [address]  [place] [address] [phone]               Care Instructions        After Care Instructions     Discharge Instructions - IF on Metformin (Glucophage or Glucovance) or Metformin containing medications       IF on Metformin (Glucophage or Glucovance) or Metformin containing medications , schedule a Basic Metabolic Panel at [facility] or Primary Clinic in 48 - 72 hours post procedure and PRIOR TO resuming the Metformin or Metformin containing medications.  Hold Metformin (Glucophage or Glucovance) or Metformin containing medications until after the Basic Metabolic Panel on the 2nd or 3rd day following the procedure.  May resume after blood draw is complete.                  Further instructions from your care team               Going Home after an Angiogram    ______________________________________________    Patient Name: Nahun Villalobos  Date of Procedure: February 14, 2018    Doctor: Jaron    After you go home:    Have an adult stay with you for 24 hours.    Drink plenty of fluids.    You may eat your normal diet, unless your doctor tells you otherwise.    For 24 hours:    Relax and take it easy.    Do NOT smoke.    Do NOT make any important or legal decisions.    Do NOT drive or operate machines at home or at work.    Do NOT drink alcohol.    Remove the Band-Aid after 24 hours. If there is minor oozing, apply another Band-aid and remove it after 12 hours.    For 2 days, do NOT have sex or do any heavy exercise.    Do NOT take a bath, or use a hot tub or pool for at least 3 days. You may shower.                     You may experience some discomfort at the puncture site and mild tingling in your hand for up to 3 days.    For 2 days, do not use your hand or arm to support your weight (such as rising from a chair) or bend your wrist (such as lifting a garage door).    For 2 days, do not lift more than 5 pounds or exercise your arm (tennis, golf or bowling).    If you start bleeding from the site in your arm:    Sit down and press firmly on the site with your fingers for 10 minutes. Call your doctor as soon as you can.    If the bleeding stops, sit still and keep your wrist straight for 2 hours.    Medicines    If you are on metformin (Glucophage), do not restart it until you have blood tests (within 2 to 3 days after discharge). When your doctor tells you it is safe, you may restart the metformin.    If you have stopped any other medicines, check with your nurse or provider about when to restart them.    Call 911 right away if you have bleeding that is heavy or does not stop.    Call your doctor if:    You have a large or growing hard lump around the site.    The site is red, swollen, hot or tender.    Blood or fluid is draining from the site.    You have chills or a  "fever greater than 101 F (38 C).    Your leg or arm feels numb or cool.    You have hives, a rash or unusual itching.      [facility] [place]:  [phone] (7 days a week)             Additional Information About Your Visit        MyChart Information     [employer] gives you secure access to your electronic health record. If you see a primary care provider, you can also send messages to your care team and make appointments. If you have questions, please call your primary care clinic.  If you do not have a primary care provider, please call [phone] and they will assist you.        Care EveryWhere ID     This is your Care EveryWhere ID. This could be used by other organizations to access your [place] medical records  [id number]        Your Vitals Were     Blood Pressure Pulse Respirations Height Weight Pulse Oximetry    150/77 (BP Location: Left arm) 83 13 1.676 m (5' 6\") 115 kg (253 lb 8.5 oz) 93%    BMI (Body Mass Index)                   40.92 kg/m2            Primary Care Provider Office Phone # Fax #    Olegario Castillo [phone]      Equal Access to Services     [facility]: Hadii aad ku hadasho Soomaali, waaxda luqadaha, qaybta kaalmada adeegyada, erika dunnen nuris lynch . So [facility] [phone].    ATENCIÓN: Si habla español, tiene a huggins disposición servicios gratuitos de asistencia lingüística. Llame al [phone].    We comply with applicable federal civil rights laws and [place] laws. We do not discriminate on the basis of race, color, national origin, age, disability, sex, sexual orientation, or gender identity.            Thank you!     Thank you for choosing [facility] for your care. Our goal is always to provide you with excellent care. Hearing back from our patients is one way we can continue to improve our services. Please take a few minutes to complete the written survey that you may receive in the mail after " you visit. If you would like to speak to someone directly about your visit please contact Patient Relations at [phone]. Thank you!               Medication List: This is a list of all your medications and when to take them. Check marks below indicate your daily home schedule. Keep this list as a reference.      Medications           Morning Afternoon Evening Bedtime As Needed    albuterol 108 (90 BASE) MCG/ACT Inhaler   Commonly known as:  PROAIR HFA/PROVENTIL HFA/VENTOLIN HFA   Inhale 2 puffs into the lungs every 6 hours as needed for shortness of breath / dyspnea or wheezing                                aspirin 81 MG tablet   Take 1 tablet (81 mg) by mouth daily                                atorvastatin 10 MG tablet   Commonly known as:  LIPITOR   Take 1 tablet (10 mg) by mouth daily                                blood glucose monitoring lancets   Use to test blood sugar 2 times daily or as directed.                                blood glucose monitoring test strip   Commonly known as:  ACCU-CHEK CHACHA   Use to test blood sugar 2 times daily or as directed.                                finasteride 5 MG tablet   Commonly known as:  PROSCAR   TAKE 1 TABLET EVERY DAY                                furosemide 20 MG tablet   Commonly known as:  LASIX   Take 1 tablet (20 mg) by mouth 2 times daily                                lisinopril 40 MG tablet   Commonly known as:  PRINIVIL/ZESTRIL   TAKE 1 TABLET (40 MG) BY MOUTH DAILY                                metFORMIN 500 MG tablet   Commonly known as:  GLUCOPHAGE   Take 1 tablet (500 mg) by mouth 2 times daily (with meals)                                metoprolol succinate 25 MG 24 hr tablet   Commonly known as:  TOPROL-XL   Take 1 tablet (25 mg) by mouth daily                                order for DME   Equipment delivered; Respironics 760S BIPAP with heated humidification and heated tubing.  Pressure 15/7cm. Respironics Syeda View FF mask (Medium)                                 * order for DME   Oxygen 2 Li/min at night via nasal cannula                                * order for DME   Equipment being ordered: diabetic shoes, 1 pair                                tamsulosin 0.4 MG capsule   Commonly known as:  FLOMAX   Take 1 capsule (0.4 mg) by mouth daily                                VITAMIN C PO   Take by mouth At Bedtime                                * Notice:  This list has 2 medication(s) that are the same as other medications prescribed for you. Read the directions carefully, and ask your doctor or other care provider to review them with you.

## 2018-02-14 NOTE — DISCHARGE INSTRUCTIONS
Going Home after an Angiogram   ______________________________________________    Patient Name: Nahun Villalobos  Date of Procedure: February 14, 2018    Doctor: Jaron    After you go home:    Have an adult stay with you for 24 hours.    Drink plenty of fluids.    You may eat your normal diet, unless your doctor tells you otherwise.    For 24 hours:    Relax and take it easy.    Do NOT smoke.    Do NOT make any important or legal decisions.    Do NOT drive or operate machines at home or at work.    Do NOT drink alcohol.    Remove the Band-Aid after 24 hours. If there is minor oozing, apply another Band-aid and remove it after 12 hours.    For 2 days, do NOT have sex or do any heavy exercise.    Do NOT take a bath, or use a hot tub or pool for at least 3 days. You may shower.                     You may experience some discomfort at the puncture site and mild tingling in your hand for up to 3 days.    For 2 days, do not use your hand or arm to support your weight (such as rising from a chair) or bend your wrist (such as lifting a garage door).    For 2 days, do not lift more than 5 pounds or exercise your arm (tennis, golf or bowling).    If you start bleeding from the site in your arm:    Sit down and press firmly on the site with your fingers for 10 minutes. Call your doctor as soon as you can.    If the bleeding stops, sit still and keep your wrist straight for 2 hours.    Medicines    If you are on metformin (Glucophage), do not restart it until you have blood tests (within 2 to 3 days after discharge). When your doctor tells you it is safe, you may restart the metformin.    If you have stopped any other medicines, check with your nurse or provider about when to restart them.    Call 911 right away if you have bleeding that is heavy or does not stop.    Call your doctor if:    You have a large or growing hard lump around the site.    The site is red, swollen, hot or tender.    Blood or fluid is draining  from the site.    You have chills or a fever greater than 101 F (38 C).    Your leg or arm feels numb or cool.    You have hives, a rash or unusual itching.      [facility] [place]:  [phone] (7 days a week)

## 2018-02-14 NOTE — IP AVS SNAPSHOT
[facility]    [address]    [facility] [address]    Phone:  [phone]                                       After Visit Summary   2/14/2018    Nahun Villalobos    MRN: [id number]           After Visit Summary Signature Page     I have received my discharge instructions, and my questions have been answered. I have discussed any challenges I see with this plan with the nurse or doctor.    ..........................................................................................................................................  Patient/Patient Representative Signature      ..........................................................................................................................................  Patient Representative Print Name and Relationship to Patient    ..................................................               ................................................  Date                                            Time    ..........................................................................................................................................  Reviewed by Signature/Title    ...................................................              ..............................................  Date                                                            Time

## 2018-02-14 NOTE — PROGRESS NOTES
Patient remains stable .  post discharge instructions given to patient and daughter. Denies any chest pain or discomfort. Right wrist dry and intact , no bleeding or hematoma noted. Patient discharged to home with daughter.

## 2018-02-16 ENCOUNTER — TELEPHONE (OUTPATIENT)
Dept: UROLOGY | Facility: CLINIC | Age: 73
End: 2018-02-16

## 2018-02-16 ENCOUNTER — TELEPHONE (OUTPATIENT)
Dept: CARDIOLOGY | Facility: CLINIC | Age: 73
End: 2018-02-16

## 2018-02-16 LAB — INTERPRETATION ECG - MUSE: NORMAL

## 2018-02-16 NOTE — TELEPHONE ENCOUNTER
Due to the need for cardiology clearance, Carl surgery has been postponed. I called to let him know. He understood. I let him know that someone would be in touch with him to reschedule.

## 2018-02-16 NOTE — TELEPHONE ENCOUNTER
Patient missed BMP lab work today. BMP was post cath done 2/14/18. Pt was instructed to hold Metformin until lab work done today. States he forgot and took Metformin 500 mg yesterday 2/15/18.   Reviewed with Amira Jackson CNP. Advised pt to hold Metformin throughout the weekend until BMP can be completed. BMP scheduled for 2/19/18. Explained to pt the importance of checking kidney function prior to restarting Metformin. Reminded patient of office visit on 2/21/18 w/ . All questions answered. [facility] address given to patient. NICKY Perales

## 2018-02-19 DIAGNOSIS — I10 ESSENTIAL HYPERTENSION, BENIGN: ICD-10-CM

## 2018-02-19 LAB
ANION GAP SERPL CALCULATED.3IONS-SCNC: 7 MMOL/L (ref 3–14)
BUN SERPL-MCNC: 13 MG/DL (ref 7–30)
CALCIUM SERPL-MCNC: 9.3 MG/DL (ref 8.5–10.1)
CHLORIDE SERPL-SCNC: 102 MMOL/L (ref 94–109)
CO2 SERPL-SCNC: 29 MMOL/L (ref 20–32)
CREAT SERPL-MCNC: 0.87 MG/DL (ref 0.66–1.25)
GFR SERPL CREATININE-BSD FRML MDRD: 86 ML/MIN/1.7M2
GLUCOSE SERPL-MCNC: 104 MG/DL (ref 70–99)
POTASSIUM SERPL-SCNC: 4 MMOL/L (ref 3.4–5.3)
SODIUM SERPL-SCNC: 138 MMOL/L (ref 133–144)

## 2018-02-19 PROCEDURE — 36415 COLL VENOUS BLD VENIPUNCTURE: CPT | Performed by: INTERNAL MEDICINE

## 2018-02-19 PROCEDURE — 80048 BASIC METABOLIC PNL TOTAL CA: CPT | Performed by: INTERNAL MEDICINE

## 2018-02-21 ENCOUNTER — OFFICE VISIT (OUTPATIENT)
Dept: CARDIOLOGY | Facility: CLINIC | Age: 73
End: 2018-02-21
Payer: COMMERCIAL

## 2018-02-21 VITALS
WEIGHT: 256.6 LBS | BODY MASS INDEX: 41.42 KG/M2 | DIASTOLIC BLOOD PRESSURE: 68 MMHG | HEART RATE: 91 BPM | SYSTOLIC BLOOD PRESSURE: 134 MMHG | OXYGEN SATURATION: 95 %

## 2018-02-21 DIAGNOSIS — I25.10 CORONARY ARTERY DISEASE INVOLVING NATIVE CORONARY ARTERY OF NATIVE HEART WITHOUT ANGINA PECTORIS: Primary | ICD-10-CM

## 2018-02-21 DIAGNOSIS — E78.5 HYPERLIPIDEMIA LDL GOAL <100: ICD-10-CM

## 2018-02-21 DIAGNOSIS — R94.39 ABNORMAL CARDIOVASCULAR STRESS TEST: ICD-10-CM

## 2018-02-21 PROCEDURE — 99214 OFFICE O/P EST MOD 30 MIN: CPT | Performed by: INTERNAL MEDICINE

## 2018-02-21 RX ORDER — ATORVASTATIN CALCIUM 20 MG/1
20 TABLET, FILM COATED ORAL DAILY
Qty: 90 TABLET | Refills: 3 | Status: SHIPPED | OUTPATIENT
Start: 2018-02-21 | End: 2019-08-28

## 2018-02-21 NOTE — MR AVS SNAPSHOT
After Visit Summary   2/21/2018    Nahun Villalobos    MRN: [id number]           Patient Information     Date Of Birth          1945        Visit Information        Provider Department      2/21/2018 3:15 PM Vern Armenta MD [facility]        Today's Diagnoses     Coronary artery disease involving native coronary artery of native heart without angina pectoris    -  1    Abnormal cardiovascular stress test        Hyperlipidemia LDL goal <100           Follow-ups after your visit        Additional Services     Follow-Up with Cardiologist                 Your next 10 appointments already scheduled     Mar 14, 2018  9:30 AM CDT   (Arrive by 9:15 AM)   Post-Op with Praveena Burris MD   [facility] ([facility])    [address] [place] [address] [phone]              Future tests that were ordered for you today     Open Future Orders        Priority Expected Expires Ordered    Follow-Up with Cardiologist Routine 8/20/2018 2/21/2019 2/21/2018            Who to contact     If you have questions or need follow up information about today's clinic visit or your schedule please contact [facility]   [facility]ACK[facility] directly at [phone].  Normal or non-critical lab and imaging results will be communicated to you by MyChart, letter or phone within 4 business days after the clinic has received the results. If you do not hear from us within 7 days, please contact the clinic through MyChart or phone. If you have a critical or abnormal lab result, we will notify you by phone as soon as possible.  Submit refill requests through "[employer] or call your pharmacy and they will forward the refill request to us. Please allow 3 business days for your refill to be completed.          Additional Information About Your Visit        MyChart  Information     [employer] gives you secure access to your electronic health record. If you see a primary care provider, you can also send messages to your care team and make appointments. If you have questions, please call your primary care clinic.  If you do not have a primary care provider, please call [phone] and they will assist you.        Care EveryWhere ID     This is your Care EveryWhere ID. This could be used by other organizations to access your [facility] medical records  [id number]        Your Vitals Were     Pulse Pulse Oximetry BMI (Body Mass Index)             91 95% 41.42 kg/m2          Blood Pressure from Last 3 Encounters:   02/21/18 134/68   02/14/18 148/78   02/07/18 158/66    Weight from Last 3 Encounters:   02/21/18 116.4 kg (256 lb 9.6 oz)   02/14/18 115 kg (253 lb 8.5 oz)   02/07/18 115.9 kg (255 lb 9.6 oz)              We Performed the Following     Follow-Up with Cardiologist          Today's Medication Changes          These changes are accurate as of 2/21/18  3:28 PM.  If you have any questions, ask your nurse or doctor.               These medicines have changed or have updated prescriptions.        Dose/Directions    aspirin 81 MG tablet   This may have changed:  when to take this   Used for:  Type 2 diabetes, HbA1C goal < 8% (H)        Dose:  81 mg   Take 1 tablet (81 mg) by mouth daily   Quantity:  30 tablet   Refills:  11       atorvastatin 20 MG tablet   Commonly known as:  LIPITOR   This may have changed:    - medication strength  - how much to take   Used for:  Hyperlipidemia LDL goal <100   Changed by:  Vern Armenta MD        Dose:  20 mg   Take 1 tablet (20 mg) by mouth daily   Quantity:  90 tablet   Refills:  3       finasteride 5 MG tablet   Commonly known as:  PROSCAR   This may have changed:  See the new instructions.   Used for:  Enlarged prostate        TAKE 1 TABLET EVERY DAY   Quantity:  90 tablet   Refills:  3       furosemide 20 MG tablet   Commonly known as:   LASIX   This may have changed:  when to take this   Used for:  Essential hypertension, benign        Dose:  20 mg   Take 1 tablet (20 mg) by mouth 2 times daily   Quantity:  360 tablet   Refills:  3       lisinopril 40 MG tablet   Commonly known as:  PRINIVIL/ZESTRIL   This may have changed:    - how much to take  - how to take this  - when to take this  - additional instructions   Used for:  Type 2 diabetes mellitus without complication, without long-term current use of insulin (H), Essential hypertension, benign        TAKE 1 TABLET (40 MG) BY MOUTH DAILY   Quantity:  90 tablet   Refills:  3       metFORMIN 500 MG tablet   Commonly known as:  GLUCOPHAGE   This may have changed:  when to take this   Used for:  Type 2 diabetes mellitus without complication, without long-term current use of insulin (H)        Dose:  500 mg   Take 1 tablet (500 mg) by mouth 2 times daily (with meals)   Quantity:  180 tablet   Refills:  3       tamsulosin 0.4 MG capsule   Commonly known as:  FLOMAX   This may have changed:  when to take this   Used for:  Urinary urgency        Dose:  0.4 mg   Take 1 capsule (0.4 mg) by mouth daily   Quantity:  60 capsule   Refills:  3            Where to get your medicines      These medications were sent to [facility]/pharmacy #[id number] - [address] [address]     Phone:  [phone]     atorvastatin 20 MG tablet                Primary Care Provider Office Phone # Fax #    Olegario Castillo [phone]       [address] [facility] [address]        Equal Access to Services     MALLORY VAZQUEZ AH: Hadii rufina golden hadasho Soomaali, waaxda luqadaha, qaybta kaalmada adeegyada, waxay columba campos. So [facility] [phone].    ATENCIÓN: Si habla español, tiene a huggins disposición servicios gratuitos de asistencia lingüística. Llame al [phone].    We comply with applicable federal civil rights laws and [place] laws. We do  not discriminate on the basis of race, color, national origin, age, disability, sex, sexual orientation, or gender identity.            Thank you!     Thank you for choosing [facility]   [facility]  for your care. Our goal is always to provide you with excellent care. Hearing back from our patients is one way we can continue to improve our services. Please take a few minutes to complete the written survey that you may receive in the mail after your visit with us. Thank you!             Your Updated Medication List - Protect others around you: Learn how to safely use, store and throw away your medicines at www.disposemymeds.org.          This list is accurate as of 2/21/18  3:28 PM.  Always use your most recent med list.                   Brand Name Dispense Instructions for use Diagnosis    albuterol 108 (90 BASE) MCG/ACT Inhaler    PROAIR HFA/PROVENTIL HFA/VENTOLIN HFA    3 Inhaler    Inhale 2 puffs into the lungs every 6 hours as needed for shortness of breath / dyspnea or wheezing    Chronic obstructive pulmonary disease, unspecified COPD type (H)       aspirin 81 MG tablet     30 tablet    Take 1 tablet (81 mg) by mouth daily    Type 2 diabetes, HbA1C goal < 8% (H)       atorvastatin 20 MG tablet    LIPITOR    90 tablet    Take 1 tablet (20 mg) by mouth daily    Hyperlipidemia LDL goal <100       blood glucose monitoring lancets     1 Box    Use to test blood sugar 2 times daily or as directed.    DM (diabetes mellitus) (H)       blood glucose monitoring test strip    ACCU-CHEK CHACHA    60 strip    Use to test blood sugar 2 times daily or as directed.    DM (diabetes mellitus) (H)       finasteride 5 MG tablet    PROSCAR    90 tablet    TAKE 1 TABLET EVERY DAY    Enlarged prostate       furosemide 20 MG tablet    LASIX    360 tablet    Take 1 tablet (20 mg) by mouth 2 times daily    Essential hypertension, benign       lisinopril 40 MG tablet    PRINIVIL/ZESTRIL    90 tablet    TAKE 1  TABLET (40 MG) BY MOUTH DAILY    Type 2 diabetes mellitus without complication, without long-term current use of insulin (H), Essential hypertension, benign       metFORMIN 500 MG tablet    GLUCOPHAGE    180 tablet    Take 1 tablet (500 mg) by mouth 2 times daily (with meals)    Type 2 diabetes mellitus without complication, without long-term current use of insulin (H)       metoprolol succinate 25 MG 24 hr tablet    TOPROL-XL    30 tablet    Take 1 tablet (25 mg) by mouth daily    Abnormal cardiovascular stress test       order for DME      Equipment delivered; Respironics 760S BIPAP with heated humidification and heated tubing.  Pressure 15/7cm. Respironics Syeda View FF mask (Medium)        * order for DME     1 Device    Oxygen 2 Li/min at night via nasal cannula    Nocturnal hypoxemia       * order for DME     1 Package    Equipment being ordered: diabetic shoes, 1 pair    Type 2 diabetes mellitus without complication, without long-term current use of insulin (H)       tamsulosin 0.4 MG capsule    FLOMAX    60 capsule    Take 1 capsule (0.4 mg) by mouth daily    Urinary urgency       VITAMIN C PO      Take by mouth At Bedtime        * Notice:  This list has 2 medication(s) that are the same as other medications prescribed for you. Read the directions carefully, and ask your doctor or other care provider to review them with you.

## 2018-02-21 NOTE — LETTER
2/21/2018      Olegario Castillo MD  [address] [facility] [address]      RE: Nahun Villalobos       Dear Colleague,    I had the pleasure of seeing aNhun Villalobos in the [facility].    Service Date: 02/21/2018      HISTORY OF PRESENT ILLNESS:  Mr. Villalobos is a pleasant 72-year-old gentleman with a history of type 2 diabetes mellitus, hypertension, longstanding history of former tobacco abuse having quit approximately 5 years ago, diagnosis of lung carcinoma 2 years ago for which he underwent a wedge resection, COPD, and a prior stress test showing a fixed inferior defect which we initially elected to treat medically.  I saw the patient over the last 1 year with atypical chest pain and he underwent a nuclear stress test which showed a small area of inferior wall ischemia.  He continues to have atypical chest pain and eventually I sent the patient for a cardiac catheterization as he needed to undergo preoperative risk assessment for possible TURP.      The patient underwent a cardiac catheterization with Dr. Salmeron on 02/14/2018.  This fortunately showed mild to moderate nonobstructive coronary artery disease.  The patient did have severe systemic hypertension during the cardiac catheterization with aortic pressure of 161 mmHg.      The patient continues to do reasonably well from a cardiovascular standpoint, having some atypical chest pain.  This is likely noncardiac in origin.  His blood pressure is markedly better at today's office visit at 134/68.  He relates to me that he has been taking his cardiac medications.      Please see my separate note with his full physical examination.      IMPRESSION AND PLAN:  Mr. Villalobos is a pleasant 72-year-old gentleman with atypical chest pain whose cardiac catheterization shows mild to moderate nonobstructive coronary artery disease.  At this time, his risk factors appear to be well modified although I will increase his atorvastatin  from 10 to 20 mg daily.  His baseline LDL is 67.  His blood pressure is only 134/60 in the office, so I will continue his metoprolol and lisinopril unchanged.  He is at acceptable risk for his upcoming TURP and I have told the patient to contact his urologist to go ahead and have this set up.  I will plan to otherwise see the patient back in routine clinical followup in 6 months.  Again, at today's visit, we did discuss exercise, diet and weight loss to help his future cardiovascular risk.         JUAN PEREZ MD             D: 2018   T: 2018   MT: CARMELO      Name:     KEELY FRANCE   MRN:      [id number]        Account:      [id number]   :      1945           Service Date: 2018      Document: [id number]         Outpatient Encounter Prescriptions as of 2018   Medication Sig Dispense Refill     atorvastatin (LIPITOR) 20 MG tablet Take 1 tablet (20 mg) by mouth daily 90 tablet 3     Ascorbic Acid (VITAMIN C PO) Take by mouth At Bedtime       metoprolol succinate (TOPROL-XL) 25 MG 24 hr tablet Take 1 tablet (25 mg) by mouth daily 30 tablet 11     albuterol (PROAIR HFA/PROVENTIL HFA/VENTOLIN HFA) 108 (90 BASE) MCG/ACT Inhaler Inhale 2 puffs into the lungs every 6 hours as needed for shortness of breath / dyspnea or wheezing 3 Inhaler 1     furosemide (LASIX) 20 MG tablet Take 1 tablet (20 mg) by mouth 2 times daily (Patient taking differently: Take 20 mg by mouth At Bedtime ) 360 tablet 3     lisinopril (PRINIVIL/ZESTRIL) 40 MG tablet TAKE 1 TABLET (40 MG) BY MOUTH DAILY (Patient taking differently: Take 40 mg by mouth At Bedtime TAKE 1 TABLET (40 MG) BY MOUTH DAILY) 90 tablet 3     metFORMIN (GLUCOPHAGE) 500 MG tablet Take 1 tablet (500 mg) by mouth 2 times daily (with meals) (Patient taking differently: Take 500 mg by mouth At Bedtime ) 180 tablet 3     finasteride (PROSCAR) 5 MG tablet TAKE 1 TABLET EVERY DAY (Patient taking differently: TAKE 1 TABLET EVERY DAY AT BEDTIME) 90  tablet 3     tamsulosin (FLOMAX) 0.4 MG capsule Take 1 capsule (0.4 mg) by mouth daily (Patient taking differently: Take 0.4 mg by mouth At Bedtime ) 60 capsule 3     order for DME Equipment being ordered: diabetic shoes, 1 pair 1 Package 0     order for DME Oxygen 2 Li/min  at night via nasal cannula 1 Device 0     aspirin 81 MG tablet Take 1 tablet (81 mg) by mouth daily (Patient taking differently: Take 81 mg by mouth At Bedtime ) 30 tablet 11     order for DME Equipment delivered; Respironics 760S BIPAP with heated humidification and heated tubing.  Pressure 15/7cm. Respironics Syeda View FF mask (Medium)       blood glucose (ACCU-CHEK CHACHA) test strip Use to test blood sugar 2 times daily or as directed. 60 strip 6     blood glucose monitoring (SOFTCLIX) lancets Use to test blood sugar 2 times daily or as directed. 1 Box 6     [DISCONTINUED] atorvastatin (LIPITOR) 10 MG tablet Take 1 tablet (10 mg) by mouth daily (Patient taking differently: Take 10 mg by mouth At Bedtime ) 90 tablet 1     No facility-administered encounter medications on file as of 2/21/2018.      Again, thank you for allowing me to participate in the care of your patient.      Sincerely,    Vern Armenta MD     [facility]

## 2018-02-21 NOTE — PROGRESS NOTES
Service Date: 02/21/2018      HISTORY OF PRESENT ILLNESS:  Mr. Villalobos is a pleasant 72-year-old gentleman with a history of type 2 diabetes mellitus, hypertension, longstanding history of former tobacco abuse having quit approximately 5 years ago, diagnosis of lung carcinoma 2 years ago for which he underwent a wedge resection, COPD, and a prior stress test showing a fixed inferior defect which we initially elected to treat medically.  I saw the patient over the last 1 year with atypical chest pain and he underwent a nuclear stress test which showed a small area of inferior wall ischemia.  He continues to have atypical chest pain and eventually I sent the patient for a cardiac catheterization as he needed to undergo preoperative risk assessment for possible TURP.      The patient underwent a cardiac catheterization with Dr. Salmeron on 02/14/2018.  This fortunately showed mild to moderate nonobstructive coronary artery disease.  The patient did have severe systemic hypertension during the cardiac catheterization with aortic pressure of 161 mmHg.      The patient continues to do reasonably well from a cardiovascular standpoint, having some atypical chest pain.  This is likely noncardiac in origin.  His blood pressure is markedly better at today's office visit at 134/68.  He relates to me that he has been taking his cardiac medications.      Please see my separate note with his full physical examination.      IMPRESSION AND PLAN:  Mr. Villalobos is a pleasant 72-year-old gentleman with atypical chest pain whose cardiac catheterization shows mild to moderate nonobstructive coronary artery disease.  At this time, his risk factors appear to be well modified although I will increase his atorvastatin from 10 to 20 mg daily.  His baseline LDL is 67.  His blood pressure is only 134/60 in the office, so I will continue his metoprolol and lisinopril unchanged.  He is at acceptable risk for his upcoming TURP and I have told the  patient to contact his urologist to go ahead and have this set up.  I will plan to otherwise see the patient back in routine clinical followup in 6 months.  Again, at today's visit, we did discuss exercise, diet and weight loss to help his future cardiovascular risk.         JUAN PEREZ MD             D: 2018   T: 2018   MT: CARMELO      Name:     KEELY FRANCE   MRN:      [id number]        Account:      [id number]   :      1945           Service Date: 2018      Document: [id number]

## 2018-02-21 NOTE — PROGRESS NOTES
HPI and Plan:   See dictation    Orders Placed This Encounter   Procedures     Follow-Up with Cardiologist       Orders Placed This Encounter   Medications     atorvastatin (LIPITOR) 20 MG tablet     Sig: Take 1 tablet (20 mg) by mouth daily     Dispense:  90 tablet     Refill:  3       Medications Discontinued During This Encounter   Medication Reason     atorvastatin (LIPITOR) 10 MG tablet Reorder         Encounter Diagnoses   Name Primary?     Abnormal cardiovascular stress test      Hyperlipidemia LDL goal <100      Coronary artery disease involving native coronary artery of native heart without angina pectoris Yes       CURRENT MEDICATIONS:  Current Outpatient Prescriptions   Medication Sig Dispense Refill     atorvastatin (LIPITOR) 20 MG tablet Take 1 tablet (20 mg) by mouth daily 90 tablet 3     Ascorbic Acid (VITAMIN C PO) Take by mouth At Bedtime       metoprolol succinate (TOPROL-XL) 25 MG 24 hr tablet Take 1 tablet (25 mg) by mouth daily 30 tablet 11     albuterol (PROAIR HFA/PROVENTIL HFA/VENTOLIN HFA) 108 (90 BASE) MCG/ACT Inhaler Inhale 2 puffs into the lungs every 6 hours as needed for shortness of breath / dyspnea or wheezing 3 Inhaler 1     furosemide (LASIX) 20 MG tablet Take 1 tablet (20 mg) by mouth 2 times daily (Patient taking differently: Take 20 mg by mouth At Bedtime ) 360 tablet 3     lisinopril (PRINIVIL/ZESTRIL) 40 MG tablet TAKE 1 TABLET (40 MG) BY MOUTH DAILY (Patient taking differently: Take 40 mg by mouth At Bedtime TAKE 1 TABLET (40 MG) BY MOUTH DAILY) 90 tablet 3     metFORMIN (GLUCOPHAGE) 500 MG tablet Take 1 tablet (500 mg) by mouth 2 times daily (with meals) (Patient taking differently: Take 500 mg by mouth At Bedtime ) 180 tablet 3     finasteride (PROSCAR) 5 MG tablet TAKE 1 TABLET EVERY DAY (Patient taking differently: TAKE 1 TABLET EVERY DAY AT BEDTIME) 90 tablet 3     tamsulosin (FLOMAX) 0.4 MG capsule Take 1 capsule (0.4 mg) by mouth daily (Patient taking differently: Take  0.4 mg by mouth At Bedtime ) 60 capsule 3     order for DME Equipment being ordered: diabetic shoes, 1 pair 1 Package 0     order for DME Oxygen 2 Li/min  at night via nasal cannula 1 Device 0     aspirin 81 MG tablet Take 1 tablet (81 mg) by mouth daily (Patient taking differently: Take 81 mg by mouth At Bedtime ) 30 tablet 11     order for DME Equipment delivered; Respironics 760S BIPAP with heated humidification and heated tubing.  Pressure 15/7cm. Respironics Syeda View FF mask (Medium)       blood glucose (ACCU-CHEK CHACHA) test strip Use to test blood sugar 2 times daily or as directed. 60 strip 6     blood glucose monitoring (SOFTCLIX) lancets Use to test blood sugar 2 times daily or as directed. 1 Box 6     [DISCONTINUED] atorvastatin (LIPITOR) 10 MG tablet Take 1 tablet (10 mg) by mouth daily (Patient taking differently: Take 10 mg by mouth At Bedtime ) 90 tablet 1       ALLERGIES     Allergies   Allergen Reactions     No Known Drug Allergies        PAST MEDICAL HISTORY:  Past Medical History:   Diagnosis Date     COPD (chronic obstructive pulmonary disease) (H)      DM (diabetes mellitus) (H)      Essential hypertension, benign      Hemorrhage of rectum and anus      Non-small cell lung cancer (H)      Obesity      Personal history of colonic polyps      Tobacco use disorder      Urinary retention        PAST SURGICAL HISTORY:  Past Surgical History:   Procedure Laterality Date     APPENDECTOMY       C NONSPECIFIC PROCEDURE      cholecystectomy     CHOLECYSTECTOMY       WEDGE RESECTION      lung       FAMILY HISTORY:  Family History   Problem Relation Age of Onset     Hypertension Mother      C.A.D. Mother      ANEURYSM     Cancer - colorectal Mother      CANCER Mother      OVARIAN     Hypertension Sister      Breast Cancer Sister      CEREBROVASCULAR DISEASE Father        SOCIAL HISTORY:  Social History     Social History     Marital status:      Spouse name: N/A     Number of children: N/A      Years of education: N/A     Social History Main Topics     Smoking status: Former Smoker     Packs/day: 1.00     Years: 52.00     Types: Cigarettes     Quit date: 6/1/2013     Smokeless tobacco: Never Used     Alcohol use No     Drug use: No     Sexual activity: Yes     Partners: Female     Other Topics Concern     Caffeine Concern No     1-2 a day     Special Diet No     Exercise No     Seat Belt Yes     Social History Narrative       Review of Systems:  Skin:  Negative       Eyes:  Positive for glasses    ENT:  Negative      Respiratory:  Positive for shortness of breath;dyspnea on exertion;CPAP;sleep apnea  hx lung cancer   Cardiovascular:    chest pain;Positive for;dizziness;lightheadedness    Gastroenterology: Negative      Genitourinary:  Positive for urinary frequency;prostate problem surgery 2/19/18  Musculoskeletal:  Positive for back pain;neck pain;joint pain;arthritis    Neurologic:  Positive for numbness or tingling of hands;headaches feet cramp when cold  Psychiatric:  Positive for depression    Heme/Lymph/Imm:  Negative      Endocrine:  Positive for diabetes;hot flashes borderline diabetic    Physical Exam:  Vitals: /68 (BP Location: Right arm, Patient Position: Chair, Cuff Size: Adult Large)  Pulse 91  Wt 116.4 kg (256 lb 9.6 oz)  SpO2 95%  BMI 41.42 kg/m2    Constitutional:  cooperative;in no acute distress        Skin:  warm and dry to the touch          Head:  normocephalic        Eyes:  sclera white        Lymph:No Cervical lymphadenopathy present     ENT:  no pallor or cyanosis        Neck:  JVP normal        Respiratory:  clear to auscultation         Cardiac: regular rhythm;normal S1 and S2   distant heart sounds            pulses full and equal                                        GI:  abdomen soft        Extremities and Muscular Skeletal:  no edema              Neurological:  affect appropriate        Psych:  affect appropriate, oriented to time, person and place        MATTIE Porras  MD Kathi  [address]  [place] [id number]

## 2018-02-21 NOTE — LETTER
2/21/2018    Olegario Castillo MD  [address] [facility] [address]    RE: Nahun Villalobos       Dear Colleague,    I had the pleasure of seeing Nahun Villalobos in the [facility].    HPI and Plan:   See dictation    Orders Placed This Encounter   Procedures     Follow-Up with Cardiologist       Orders Placed This Encounter   Medications     atorvastatin (LIPITOR) 20 MG tablet     Sig: Take 1 tablet (20 mg) by mouth daily     Dispense:  90 tablet     Refill:  3       Medications Discontinued During This Encounter   Medication Reason     atorvastatin (LIPITOR) 10 MG tablet Reorder         Encounter Diagnoses   Name Primary?     Abnormal cardiovascular stress test      Hyperlipidemia LDL goal <100      Coronary artery disease involving native coronary artery of native heart without angina pectoris Yes       CURRENT MEDICATIONS:  Current Outpatient Prescriptions   Medication Sig Dispense Refill     atorvastatin (LIPITOR) 20 MG tablet Take 1 tablet (20 mg) by mouth daily 90 tablet 3     Ascorbic Acid (VITAMIN C PO) Take by mouth At Bedtime       metoprolol succinate (TOPROL-XL) 25 MG 24 hr tablet Take 1 tablet (25 mg) by mouth daily 30 tablet 11     albuterol (PROAIR HFA/PROVENTIL HFA/VENTOLIN HFA) 108 (90 BASE) MCG/ACT Inhaler Inhale 2 puffs into the lungs every 6 hours as needed for shortness of breath / dyspnea or wheezing 3 Inhaler 1     furosemide (LASIX) 20 MG tablet Take 1 tablet (20 mg) by mouth 2 times daily (Patient taking differently: Take 20 mg by mouth At Bedtime ) 360 tablet 3     lisinopril (PRINIVIL/ZESTRIL) 40 MG tablet TAKE 1 TABLET (40 MG) BY MOUTH DAILY (Patient taking differently: Take 40 mg by mouth At Bedtime TAKE 1 TABLET (40 MG) BY MOUTH DAILY) 90 tablet 3     metFORMIN (GLUCOPHAGE) 500 MG tablet Take 1 tablet (500 mg) by mouth 2 times daily (with meals) (Patient taking differently: Take 500 mg by mouth At Bedtime ) 180 tablet 3     finasteride (PROSCAR)  5 MG tablet TAKE 1 TABLET EVERY DAY (Patient taking differently: TAKE 1 TABLET EVERY DAY AT BEDTIME) 90 tablet 3     tamsulosin (FLOMAX) 0.4 MG capsule Take 1 capsule (0.4 mg) by mouth daily (Patient taking differently: Take 0.4 mg by mouth At Bedtime ) 60 capsule 3     order for DME Equipment being ordered: diabetic shoes, 1 pair 1 Package 0     order for DME Oxygen 2 Li/min  at night via nasal cannula 1 Device 0     aspirin 81 MG tablet Take 1 tablet (81 mg) by mouth daily (Patient taking differently: Take 81 mg by mouth At Bedtime ) 30 tablet 11     order for DME Equipment delivered; Respironics 760S BIPAP with heated humidification and heated tubing.  Pressure 15/7cm. Respironics Syeda View FF mask (Medium)       blood glucose (ACCU-CHEK CHACHA) test strip Use to test blood sugar 2 times daily or as directed. 60 strip 6     blood glucose monitoring (SOFTCLIX) lancets Use to test blood sugar 2 times daily or as directed. 1 Box 6     [DISCONTINUED] atorvastatin (LIPITOR) 10 MG tablet Take 1 tablet (10 mg) by mouth daily (Patient taking differently: Take 10 mg by mouth At Bedtime ) 90 tablet 1       ALLERGIES     Allergies   Allergen Reactions     No Known Drug Allergies        PAST MEDICAL HISTORY:  Past Medical History:   Diagnosis Date     COPD (chronic obstructive pulmonary disease) (H)      DM (diabetes mellitus) (H)      Essential hypertension, benign      Hemorrhage of rectum and anus      Non-small cell lung cancer (H)      Obesity      Personal history of colonic polyps      Tobacco use disorder      Urinary retention        PAST SURGICAL HISTORY:  Past Surgical History:   Procedure Laterality Date     APPENDECTOMY       C NONSPECIFIC PROCEDURE      cholecystectomy     CHOLECYSTECTOMY       WEDGE RESECTION      lung       FAMILY HISTORY:  Family History   Problem Relation Age of Onset     Hypertension Mother      C.A.D. Mother      ANEURYSM     Cancer - colorectal Mother      CANCER Mother      OVARIAN      Hypertension Sister      Breast Cancer Sister      CEREBROVASCULAR DISEASE Father        SOCIAL HISTORY:  Social History     Social History     Marital status:      Spouse name: N/A     Number of children: N/A     Years of education: N/A     Social History Main Topics     Smoking status: Former Smoker     Packs/day: 1.00     Years: 52.00     Types: Cigarettes     Quit date: 6/1/2013     Smokeless tobacco: Never Used     Alcohol use No     Drug use: No     Sexual activity: Yes     Partners: Female     Other Topics Concern     Caffeine Concern No     1-2 a day     Special Diet No     Exercise No     Seat Belt Yes     Social History Narrative       Review of Systems:  Skin:  Negative       Eyes:  Positive for glasses    ENT:  Negative      Respiratory:  Positive for shortness of breath;dyspnea on exertion;CPAP;sleep apnea  hx lung cancer   Cardiovascular:    chest pain;Positive for;dizziness;lightheadedness    Gastroenterology: Negative      Genitourinary:  Positive for urinary frequency;prostate problem surgery 2/19/18  Musculoskeletal:  Positive for back pain;neck pain;joint pain;arthritis    Neurologic:  Positive for numbness or tingling of hands;headaches feet cramp when cold  Psychiatric:  Positive for depression    Heme/Lymph/Imm:  Negative      Endocrine:  Positive for diabetes;hot flashes borderline diabetic    Physical Exam:  Vitals: /68 (BP Location: Right arm, Patient Position: Chair, Cuff Size: Adult Large)  Pulse 91  Wt 116.4 kg (256 lb 9.6 oz)  SpO2 95%  BMI 41.42 kg/m2    Constitutional:  cooperative;in no acute distress        Skin:  warm and dry to the touch          Head:  normocephalic        Eyes:  sclera white        Lymph:No Cervical lymphadenopathy present     ENT:  no pallor or cyanosis        Neck:  JVP normal        Respiratory:  clear to auscultation         Cardiac: regular rhythm;normal S1 and S2   distant heart sounds            pulses full and equal                                         GI:  abdomen soft        Extremities and Muscular Skeletal:  no edema              Neurological:  affect appropriate        Psych:  affect appropriate, oriented to time, person and place        CC  Vern Armenta MD  [address]  [place] [id number]                Thank you for allowing me to participate in the care of your patient.      Sincerely,     Vern Armenta MD     [facility]    cc:   Vern Armenta MD  [address]  [place] [id number]

## 2018-03-01 DIAGNOSIS — I10 ESSENTIAL HYPERTENSION, BENIGN: ICD-10-CM

## 2018-03-02 RX ORDER — FUROSEMIDE 20 MG
TABLET ORAL
Qty: 360 TABLET | Refills: 0 | Status: SHIPPED | OUTPATIENT
Start: 2018-03-02 | End: 2018-04-26

## 2018-03-13 ENCOUNTER — TELEPHONE (OUTPATIENT)
Dept: UROLOGY | Facility: CLINIC | Age: 73
End: 2018-03-13

## 2018-03-13 NOTE — TELEPHONE ENCOUNTER
Spoke to the patient daughter surgery scheduled for 04/30/18 at 7:45am with a 5:45am check in. She is aware he will need a pre-op. Surgery packet mailed to her house per her request. [address] [place] [address]

## 2018-04-24 ENCOUNTER — CARE COORDINATION (OUTPATIENT)
Dept: UROLOGY | Facility: CLINIC | Age: 73
End: 2018-04-24

## 2018-04-24 DIAGNOSIS — R30.0 DYSURIA: Primary | ICD-10-CM

## 2018-04-24 NOTE — PROGRESS NOTES
Spoke with patient to let him know that he needs a pre-op physical for appointment next Monday. He said he will schedule with [facility]. Areli Cordero RN

## 2018-04-26 ENCOUNTER — OFFICE VISIT (OUTPATIENT)
Dept: INTERNAL MEDICINE | Facility: CLINIC | Age: 73
End: 2018-04-26
Payer: COMMERCIAL

## 2018-04-26 VITALS
BODY MASS INDEX: 40.5 KG/M2 | DIASTOLIC BLOOD PRESSURE: 70 MMHG | TEMPERATURE: 98 F | HEART RATE: 81 BPM | HEIGHT: 66 IN | WEIGHT: 252 LBS | SYSTOLIC BLOOD PRESSURE: 138 MMHG | RESPIRATION RATE: 16 BRPM | OXYGEN SATURATION: 93 %

## 2018-04-26 DIAGNOSIS — F17.200 TOBACCO USE DISORDER: ICD-10-CM

## 2018-04-26 DIAGNOSIS — I10 ESSENTIAL HYPERTENSION, BENIGN: ICD-10-CM

## 2018-04-26 DIAGNOSIS — E66.01 MORBID OBESITY DUE TO EXCESS CALORIES (H): ICD-10-CM

## 2018-04-26 DIAGNOSIS — R35.0 URINARY FREQUENCY: ICD-10-CM

## 2018-04-26 DIAGNOSIS — E11.9 TYPE 2 DIABETES MELLITUS WITHOUT COMPLICATION, WITHOUT LONG-TERM CURRENT USE OF INSULIN (H): ICD-10-CM

## 2018-04-26 DIAGNOSIS — Z01.818 PREOP GENERAL PHYSICAL EXAM: Primary | ICD-10-CM

## 2018-04-26 PROCEDURE — 99215 OFFICE O/P EST HI 40 MIN: CPT | Performed by: INTERNAL MEDICINE

## 2018-04-26 NOTE — PROGRESS NOTES
[facility]  [address] [address]  [phone]  Dept: [phone]    PRE-OP EVALUATION:  Today's date: 2018    Nahun Villalobos (: 1945) presents for pre-operative evaluation assessment as requested by Dr. Burris.  He requires evaluation and anesthesia risk assessment prior to undergoing surgery/procedure for treatment of urinary frequency.    Proposed Surgery/ Procedure: Cystoscopy, transurethral resection of the prostate  Date of Surgery/ Procedure: 18  Time of Surgery/ Procedure: 7:30 am  Hospital/Surgical Facility: [facility]  Primary Physician: Olegario Castillo  Type of Anesthesia Anticipated: General    Patient has a Health Care Directive or Living Will:  NO    HPI:     HPI related to upcoming procedure: assessment prior to undergoing surgery/procedure for treatment of urinary frequency.    See problem list for active medical problems.  Problems all longstanding and stable, except as noted/documented.  See ROS for pertinent symptoms related to these conditions.                                                                                                                                                          .  DIABETES - Patient has a longstanding history of DiabetesType Type II . Patient is being treated with oral agents and denies significant side effects. Control has been good. Complicating factors include but are not limited to: hypertension, hyperlipidemia and CAD.    Lab Results   Component Value Date    A1C 6.6 2017    A1C 6.2 2016    A1C 6.1 03/15/2016    A1C 7.1 2015                                                                                                                          .    MEDICAL HISTORY:     Patient Active Problem List    Diagnosis Date Noted     Grief reaction 2017     Priority: Medium     Type 2 diabetes mellitus without complication, without long-term current use of insulin (H)  12/13/2016     Priority: Medium     Morbid obesity due to excess calories (H) 03/14/2016     Priority: Medium     BPPV (benign paroxysmal positional vertigo), unspecified laterality 03/14/2016     Priority: Medium     Hyperlipidemia LDL goal <100 10/10/2015     Priority: Medium     Sleep related hypoventilation/hypoxemia in other disease 07/30/2015     Priority: Medium     Problem list name updated by automated process. Provider to review       Elevated PSA 09/30/2013     Priority: Medium     Polyp of colon 08/27/2013     Priority: Medium     Advance care planning 02/22/2012     Priority: Medium     Advance Care Planning 12/6/2017: ACP Review of Chart / Resources Provided:  Reviewed chart for advance care plan.  Garnicamihaela Villalobos has been provided information and resources to begin or update their advance care plan.  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     Priority: Medium     Lumbago 11/25/2003     Priority: Medium     Essential hypertension, benign 12/19/2002     Priority: Medium     Tobacco use disorder 12/19/2002     Priority: Medium      Past Medical History:   Diagnosis Date     COPD (chronic obstructive pulmonary disease) (H)      DM (diabetes mellitus) (H)      Essential hypertension, benign      Hemorrhage of rectum and anus      Non-small cell lung cancer (H)      Obesity      Personal history of colonic polyps      Tobacco use disorder      Urinary retention      Past Surgical History:   Procedure Laterality Date     APPENDECTOMY       C NONSPECIFIC PROCEDURE      cholecystectomy     CHOLECYSTECTOMY       WEDGE RESECTION      lung     Current Outpatient Prescriptions   Medication Sig Dispense Refill     albuterol (PROAIR HFA/PROVENTIL HFA/VENTOLIN HFA) 108 (90 BASE) MCG/ACT Inhaler Inhale 2 puffs into the lungs every 6 hours as needed for shortness of breath /  dyspnea or wheezing 3 Inhaler 1     Ascorbic Acid (VITAMIN C PO) Take by mouth At Bedtime       aspirin 81 MG tablet Take 1 tablet (81 mg) by mouth daily (Patient taking differently: Take 81 mg by mouth At Bedtime ) 30 tablet 11     atorvastatin (LIPITOR) 20 MG tablet Take 1 tablet (20 mg) by mouth daily 90 tablet 3     finasteride (PROSCAR) 5 MG tablet TAKE 1 TABLET EVERY DAY (Patient taking differently: TAKE 1 TABLET EVERY DAY AT BEDTIME) 90 tablet 3     furosemide (LASIX) 20 MG tablet Take 1 tablet (20 mg) by mouth 2 times daily (Patient taking differently: Take 20 mg by mouth At Bedtime ) 360 tablet 3     furosemide (LASIX) 20 MG tablet TAKE 2 TABLETS (40 MG) BY MOUTH 2 TIMES DAILY 360 tablet 0     lisinopril (PRINIVIL/ZESTRIL) 40 MG tablet TAKE 1 TABLET (40 MG) BY MOUTH DAILY (Patient taking differently: Take 40 mg by mouth At Bedtime TAKE 1 TABLET (40 MG) BY MOUTH DAILY) 90 tablet 3     metFORMIN (GLUCOPHAGE) 500 MG tablet Take 1 tablet (500 mg) by mouth 2 times daily (with meals) (Patient taking differently: Take 500 mg by mouth At Bedtime ) 180 tablet 3     metoprolol succinate (TOPROL-XL) 25 MG 24 hr tablet Take 1 tablet (25 mg) by mouth daily 30 tablet 11     tamsulosin (FLOMAX) 0.4 MG capsule Take 1 capsule (0.4 mg) by mouth daily (Patient taking differently: Take 0.4 mg by mouth At Bedtime ) 60 capsule 3     blood glucose (ACCU-CHEK CHACHA) test strip Use to test blood sugar 2 times daily or as directed. 60 strip 6     blood glucose monitoring (SOFTCLIX) lancets Use to test blood sugar 2 times daily or as directed. 1 Box 6     order for DME Equipment delivered; Respironics 760S BIPAP with heated humidification and heated tubing.  Pressure 15/7cm. Respironics Syeda View FF mask (Medium)       order for DME Oxygen 2 Li/min  at night via nasal cannula 1 Device 0     order for DME Equipment being ordered: diabetic shoes, 1 pair 1 Package 0     OTC products: None, except as noted above    Allergies   Allergen  "Reactions     No Known Drug Allergies       Latex Allergy: NO    Social History   Substance Use Topics     Smoking status: Former Smoker     Packs/day: 1.00     Years: 52.00     Types: Cigarettes     Quit date: 6/1/2013     Smokeless tobacco: Never Used     Alcohol use No     History   Drug Use No       REVIEW OF SYSTEMS:   CONSTITUTIONAL: NEGATIVE for fever, chills, change in weight  INTEGUMENTARY/SKIN: NEGATIVE for worrisome rashes, moles or lesions  ENT/MOUTH: NEGATIVE for ear, mouth and throat problems  RESP: NEGATIVE for significant cough or SOB  GI: NEGATIVE for nausea, abdominal pain, heartburn, or change in bowel habits  : NEGATIVE for dysuria, or hematuria  MUSCULOSKELETAL: NEGATIVE for significant arthralgias or myalgia  NEURO: NEGATIVE for weakness, dizziness or paresthesias  HEME: NEGATIVE for bleeding problems  PSYCHIATRIC: NEGATIVE for changes in mood or affect    EXAM:   /70  Pulse 81  Temp 98  F (36.7  C) (Oral)  Resp 16  Ht 5' 6\" (1.676 m)  Wt 252 lb (114.3 kg)  SpO2 93%  BMI 40.67 kg/m2    GENERAL APPEARANCE: healthy, alert and no distress     EYES: EOMI,  PERRL     HENT: ear canals and TM's normal and nose and mouth without ulcers or lesions     NECK: no adenopathy, no asymmetry, masses, or scars and thyroid normal to palpation     RESP: lungs clear to auscultation - no rales, rhonchi or wheezes     CV: regular rates and rhythm, normal S1 S2, no S3 or S4 and no murmur, click or rub     ABDOMEN:  Obese, soft, nontender, no HSM or masses and bowel sounds normal.     MS: extremities normal- no gross deformities noted     NEURO: No focal changes     PSYCH: mentation appears normal and affect normal/bright    DIAGNOSTICS:     EKG: see prior EKG done per Cardiology 2/2018  Labs Resulted Today:   Results for orders placed or performed in visit on 02/19/18   Basic metabolic panel   Result Value Ref Range    Sodium 138 133 - 144 mmol/L    Potassium 4.0 3.4 - 5.3 mmol/L    Chloride 102 94 - " 109 mmol/L    Carbon Dioxide 29 20 - 32 mmol/L    Anion Gap 7 3 - 14 mmol/L    Glucose 104 (H) 70 - 99 mg/dL    Urea Nitrogen 13 7 - 30 mg/dL    Creatinine 0.87 0.66 - 1.25 mg/dL    GFR Estimate 86 >60 mL/min/1.7m2    GFR Estimate If Black >90 >60 mL/min/1.7m2    Calcium 9.3 8.5 - 10.1 mg/dL       Recent Labs   Lab Test  02/19/18   1507  02/09/18   1611  12/07/17   0934  10/16/17   1411  12/13/16   1600   HGB   --   13.3   --   13.2*   --    PLT   --   297   --   281   --    INR   --   1.01   --    --    --    NA  138  135   --   137   --    POTASSIUM  4.0  3.9   --   4.4   --    CR  0.87  0.92   --   1.02   --    A1C   --    --   6.6*   --   6.2*      IMPRESSION:   Reason for surgery/procedure: assessment prior to undergoing surgery/procedure for treatment of urinary frequency.    The proposed surgical procedure is considered mild risk.    REVISED CARDIAC RISK INDEX  The patient has the following serious cardiovascular risks for perioperative complications such as (MI, PE, VFib and 3  AV Block):  Congestive Heart Failure (pulmonary edema, PND, s3 vasquez, CXR with pulmonary congestion, basilar rales)  INTERPRETATION: 1 risks: Class II (low risk - 0.9% complication rate)    The patient has the following additional risks for perioperative complications:  Morbid obesity      ICD-10-CM    1. Preop general physical exam Z01.818    2. Urinary frequency R35.0    3. Type 2 diabetes mellitus without complication, without long-term current use of insulin (H) E11.9    4. Morbid obesity due to excess calories (H) E66.01    5. Essential hypertension, benign I10    6. Tobacco use disorder F17.200        RECOMMENDATIONS:     --Consult hospital rounder / IM to assist post-op medical management    Cardiovascular Risk  Performs 4 METs exercise without symptoms (Light housework (dusting, washing dishes), Climb a flight of stairs and Walk on level ground at 15 minutes per mile (4 miles/hour)) .   Patient is already on a Beta Blocker.  Continue Betablocker therapy after surgery, using Beta blocker order set as necessary for NPO status.    He is at acceptable risk for his upcoming TURP.    --Patient is to take all scheduled medications on the day of surgery EXCEPT for modifications listed below.    Diabetes Medication Use  Hold usual oral and non-insulin diabetic meds (e.g. Metformin) while NPO.     Anticoagulant or Antiplatelet Medication Use  ASPIRIN + supplements: Discontinue 7 days prior to procedure to reduce bleeding risk.  It should be resumed post-operatively.      ACE Inhibitor or Angiotensin Receptor Blocker (ARB) Use  Ace inhibitor or Angiotensin Receptor Blocker (ARB) and should HOLD this medication for the 24 hours prior to surgery.      APPROVAL GIVEN to proceed with proposed procedure, without further diagnostic evaluation       Signed Electronically by: Olegario Castillo MD    Copy of this evaluation report is provided to requesting physician, Dr Burris.    [facility] Preop Guidelines    Revised Cardiac Risk Index

## 2018-04-26 NOTE — MR AVS SNAPSHOT
After Visit Summary   4/26/2018    Nahun Villalobos    MRN: [id number]           Patient Information     Date Of Birth          1945        Visit Information        Provider Department      4/26/2018 2:20 PM Olegario Castillo MD [facility]        Today's Diagnoses     Preop general physical exam    -  1    Urinary frequency        Type 2 diabetes mellitus without complication, without long-term current use of insulin (H)        Morbid obesity due to excess calories (H)        Essential hypertension, benign        Tobacco use disorder          Care Instructions      Before Your Surgery      Call your surgeon if there is any change in your health. This includes signs of a cold or flu (such as a sore throat, runny nose, cough, rash or fever).    Do not smoke, drink alcohol or take over the counter medicine (unless your surgeon or primary care doctor tells you to) for the 24 hours before and after surgery.    If you take prescribed drugs: Follow your doctor s orders about which medicines to take and which to stop until after surgery.    Eating and drinking prior to surgery: follow the instructions from your surgeon    Take a shower or bath the night before surgery. Use the soap your surgeon gave you to gently clean your skin. If you do not have soap from your surgeon, use your regular soap. Do not shave or scrub the surgery site.  Wear clean pajamas and have clean sheets on your bed.           Follow-ups after your visit        Follow-up notes from your care team     Return if symptoms worsen or fail to improve.      Your next 10 appointments already scheduled     Apr 30, 2018   Procedure with Praveena Burris MD   [facility] [place], Same Day Surgery (--)    [address] [facility] [place] [address]   [phone]            May 23, 2018  8:30 AM CDT   (Arrive by 8:15 AM)   Post-Op with Praveena Burris MD   [facility]    [address]  4th Floor  [facility] [address] [phone]              Who to contact     If you have questions or need follow up information about today's clinic visit or your schedule please contact [facility] directly at [phone].  Normal or non-critical lab and imaging results will be communicated to you by MyChart, letter or phone within 4 business days after the clinic has received the results. If you do not hear from us within 7 days, please contact the clinic through [employer] or phone. If you have a critical or abnormal lab result, we will notify you by phone as soon as possible.  Submit refill requests through [employer] or call your pharmacy and they will forward the refill request to us. Please allow 3 business days for your refill to be completed.          Additional Information About Your Visit        MyChart Information     [employer] gives you secure access to your electronic health record. If you see a primary care provider, you can also send messages to your care team and make appointments. If you have questions, please call your primary care clinic.  If you do not have a primary care provider, please call [phone] and they will assist you.        Care EveryWhere ID     This is your Care EveryWhere ID. This could be used by other organizations to access your [facility] medical records  [id number]        Your Vitals Were     Pulse Temperature Respirations Height Pulse Oximetry BMI (Body Mass Index)    81 98  F (36.7  C) (Oral) 16 5' 6\" (1.676 m) 93% 40.67 kg/m2       Blood Pressure from Last 3 Encounters:   04/26/18 138/70   02/21/18 134/68   02/14/18 148/78    Weight from Last 3 Encounters:   04/26/18 252 lb (114.3 kg)   02/21/18 256 lb 9.6 oz (116.4 kg)   02/14/18 253 lb 8.5 oz (115 kg)              Today, you had the following     No orders found for display         Today's Medication Changes          These changes are " accurate as of 4/26/18  2:23 PM.  If you have any questions, ask your nurse or doctor.               These medicines have changed or have updated prescriptions.        Dose/Directions    aspirin 81 MG tablet   This may have changed:  when to take this   Used for:  Type 2 diabetes, HbA1C goal < 8% (H)        Dose:  81 mg   Take 1 tablet (81 mg) by mouth daily   Quantity:  30 tablet   Refills:  11       finasteride 5 MG tablet   Commonly known as:  PROSCAR   This may have changed:  See the new instructions.   Used for:  Enlarged prostate        TAKE 1 TABLET EVERY DAY   Quantity:  90 tablet   Refills:  3       furosemide 20 MG tablet   Commonly known as:  LASIX   This may have changed:  when to take this   Used for:  Essential hypertension, benign        Dose:  20 mg   Take 1 tablet (20 mg) by mouth 2 times daily   Quantity:  360 tablet   Refills:  3       lisinopril 40 MG tablet   Commonly known as:  PRINIVIL/ZESTRIL   This may have changed:    - how much to take  - how to take this  - when to take this  - additional instructions   Used for:  Type 2 diabetes mellitus without complication, without long-term current use of insulin (H), Essential hypertension, benign        TAKE 1 TABLET (40 MG) BY MOUTH DAILY   Quantity:  90 tablet   Refills:  3       metFORMIN 500 MG tablet   Commonly known as:  GLUCOPHAGE   This may have changed:  when to take this   Used for:  Type 2 diabetes mellitus without complication, without long-term current use of insulin (H)        Dose:  500 mg   Take 1 tablet (500 mg) by mouth 2 times daily (with meals)   Quantity:  180 tablet   Refills:  3       tamsulosin 0.4 MG capsule   Commonly known as:  FLOMAX   This may have changed:  when to take this   Used for:  Urinary urgency        Dose:  0.4 mg   Take 1 capsule (0.4 mg) by mouth daily   Quantity:  60 capsule   Refills:  3                Primary Care Provider Office Phone # Fax #    Olegario Castillo [phone] [address] [facility] [address]        Equal Access to Services     SHWETHAMOUNA GEORGE : Hadii rufina golden hadarmando Soaltagracia, wavasiliyda luqadaha, qaybta kaalmada nurisherodanuta, erika kammeaganmonika campos. So [facility] [phone].    ATENCIÓN: Si habla español, tiene a huggins disposición servicios gratuitos de asistencia lingüística. Llame al [phone].    We comply with applicable federal civil rights laws and [place] laws. We do not discriminate on the basis of race, color, national origin, age, disability, sex, sexual orientation, or gender identity.            Thank you!     Thank you for choosing [facility]  for your care. Our goal is always to provide you with excellent care. Hearing back from our patients is one way we can continue to improve our services. Please take a few minutes to complete the written survey that you may receive in the mail after your visit with us. Thank you!             Your Updated Medication List - Protect others around you: Learn how to safely use, store and throw away your medicines at www.disposemymeds.org.          This list is accurate as of 4/26/18  2:23 PM.  Always use your most recent med list.                   Brand Name Dispense Instructions for use Diagnosis    albuterol 108 (90 Base) MCG/ACT Inhaler    PROAIR HFA/PROVENTIL HFA/VENTOLIN HFA    3 Inhaler    Inhale 2 puffs into the lungs every 6 hours as needed for shortness of breath / dyspnea or wheezing    Chronic obstructive pulmonary disease, unspecified COPD type (H)       aspirin 81 MG tablet     30 tablet    Take 1 tablet (81 mg) by mouth daily    Type 2 diabetes, HbA1C goal < 8% (H)       atorvastatin 20 MG tablet    LIPITOR    90 tablet    Take 1 tablet (20 mg) by mouth daily    Hyperlipidemia LDL goal <100       blood glucose monitoring lancets     1 Box    Use to test blood sugar 2 times daily or as directed.    DM (diabetes mellitus) (H)       blood glucose monitoring test strip    ACCU-CHEK  CHACHA    60 strip    Use to test blood sugar 2 times daily or as directed.    DM (diabetes mellitus) (H)       finasteride 5 MG tablet    PROSCAR    90 tablet    TAKE 1 TABLET EVERY DAY    Enlarged prostate       furosemide 20 MG tablet    LASIX    360 tablet    Take 1 tablet (20 mg) by mouth 2 times daily    Essential hypertension, benign       lisinopril 40 MG tablet    PRINIVIL/ZESTRIL    90 tablet    TAKE 1 TABLET (40 MG) BY MOUTH DAILY    Type 2 diabetes mellitus without complication, without long-term current use of insulin (H), Essential hypertension, benign       metFORMIN 500 MG tablet    GLUCOPHAGE    180 tablet    Take 1 tablet (500 mg) by mouth 2 times daily (with meals)    Type 2 diabetes mellitus without complication, without long-term current use of insulin (H)       metoprolol succinate 25 MG 24 hr tablet    TOPROL-XL    30 tablet    Take 1 tablet (25 mg) by mouth daily    Abnormal cardiovascular stress test       order for DME      Equipment delivered; Respironics 760S BIPAP with heated humidification and heated tubing.  Pressure 15/7cm. Respironics Syeda View FF mask (Medium)        * order for DME     1 Device    Oxygen 2 Li/min at night via nasal cannula    Nocturnal hypoxemia       * order for DME     1 Package    Equipment being ordered: diabetic shoes, 1 pair    Type 2 diabetes mellitus without complication, without long-term current use of insulin (H)       tamsulosin 0.4 MG capsule    FLOMAX    60 capsule    Take 1 capsule (0.4 mg) by mouth daily    Urinary urgency       VITAMIN C PO      Take by mouth At Bedtime        * Notice:  This list has 2 medication(s) that are the same as other medications prescribed for you. Read the directions carefully, and ask your doctor or other care provider to review them with you.

## 2018-04-27 ENCOUNTER — ANESTHESIA EVENT (OUTPATIENT)
Dept: SURGERY | Facility: CLINIC | Age: 73
End: 2018-04-27
Payer: MEDICARE

## 2018-04-30 ENCOUNTER — HOSPITAL ENCOUNTER (OUTPATIENT)
Facility: CLINIC | Age: 73
Discharge: HOME | End: 2018-05-01
Attending: UROLOGY | Admitting: UROLOGY
Payer: MEDICARE

## 2018-04-30 ENCOUNTER — SURGERY (OUTPATIENT)
Age: 73
End: 2018-04-30

## 2018-04-30 ENCOUNTER — ANESTHESIA (OUTPATIENT)
Dept: SURGERY | Facility: CLINIC | Age: 73
End: 2018-04-30
Payer: MEDICARE

## 2018-04-30 DIAGNOSIS — Z90.79 S/P TRANSURETHRAL RESECTION OF PROSTATE: Primary | ICD-10-CM

## 2018-04-30 PROBLEM — R31.0 HEMATURIA, GROSS: Status: ACTIVE | Noted: 2018-04-30

## 2018-04-30 LAB
ALBUMIN UR-MCNC: 30 MG/DL
ANION GAP SERPL CALCULATED.3IONS-SCNC: 7 MMOL/L (ref 3–14)
APPEARANCE UR: CLEAR
BILIRUB UR QL STRIP: NEGATIVE
BUN SERPL-MCNC: 11 MG/DL (ref 7–30)
CALCIUM SERPL-MCNC: 8.4 MG/DL (ref 8.5–10.1)
CHLORIDE SERPL-SCNC: 103 MMOL/L (ref 94–109)
CO2 SERPL-SCNC: 28 MMOL/L (ref 20–32)
COLOR UR AUTO: YELLOW
CREAT SERPL-MCNC: 0.96 MG/DL (ref 0.66–1.25)
CREAT SERPL-MCNC: 1.03 MG/DL (ref 0.66–1.25)
ERYTHROCYTE [DISTWIDTH] IN BLOOD BY AUTOMATED COUNT: 12.6 % (ref 10–15)
GFR SERPL CREATININE-BSD FRML MDRD: 71 ML/MIN/1.7M2
GFR SERPL CREATININE-BSD FRML MDRD: 76 ML/MIN/1.7M2
GLUCOSE BLDC GLUCOMTR-MCNC: 122 MG/DL (ref 70–99)
GLUCOSE BLDC GLUCOMTR-MCNC: 150 MG/DL (ref 70–99)
GLUCOSE BLDC GLUCOMTR-MCNC: 179 MG/DL (ref 70–99)
GLUCOSE SERPL-MCNC: 134 MG/DL (ref 70–99)
GLUCOSE UR STRIP-MCNC: NEGATIVE MG/DL
HCT VFR BLD AUTO: 38.4 % (ref 40–53)
HGB BLD-MCNC: 12.4 G/DL (ref 13.3–17.7)
HGB BLD-MCNC: 13.1 G/DL (ref 13.3–17.7)
HGB UR QL STRIP: NEGATIVE
HYALINE CASTS #/AREA URNS LPF: 1 /LPF (ref 0–2)
KETONES UR STRIP-MCNC: NEGATIVE MG/DL
LEUKOCYTE ESTERASE UR QL STRIP: NEGATIVE
MCH RBC QN AUTO: 31.7 PG (ref 26.5–33)
MCHC RBC AUTO-ENTMCNC: 32.3 G/DL (ref 31.5–36.5)
MCV RBC AUTO: 98 FL (ref 78–100)
MUCOUS THREADS #/AREA URNS LPF: PRESENT /LPF
NITRATE UR QL: NEGATIVE
PH UR STRIP: 5.5 PH (ref 5–7)
PLATELET # BLD AUTO: 277 10E9/L (ref 150–450)
POTASSIUM SERPL-SCNC: 4 MMOL/L (ref 3.4–5.3)
POTASSIUM SERPL-SCNC: 4.3 MMOL/L (ref 3.4–5.3)
RBC # BLD AUTO: 3.91 10E12/L (ref 4.4–5.9)
RBC #/AREA URNS AUTO: 3 /HPF (ref 0–2)
SODIUM SERPL-SCNC: 138 MMOL/L (ref 133–144)
SOURCE: ABNORMAL
SP GR UR STRIP: 1.01 (ref 1–1.03)
UROBILINOGEN UR STRIP-MCNC: NORMAL MG/DL (ref 0–2)
WBC # BLD AUTO: 8.8 10E9/L (ref 4–11)
WBC #/AREA URNS AUTO: <1 /HPF (ref 0–5)

## 2018-04-30 PROCEDURE — 87086 URINE CULTURE/COLONY COUNT: CPT | Performed by: STUDENT IN AN ORGANIZED HEALTH CARE EDUCATION/TRAINING PROGRAM

## 2018-04-30 PROCEDURE — 85018 HEMOGLOBIN: CPT | Performed by: ANESTHESIOLOGY

## 2018-04-30 PROCEDURE — 37000008 ZZH ANESTHESIA TECHNICAL FEE, 1ST 30 MIN: Performed by: UROLOGY

## 2018-04-30 PROCEDURE — 82962 GLUCOSE BLOOD TEST: CPT | Mod: 91

## 2018-04-30 PROCEDURE — 25000125 ZZHC RX 250: Performed by: ANESTHESIOLOGY

## 2018-04-30 PROCEDURE — 27210794 ZZH OR GENERAL SUPPLY STERILE: Performed by: UROLOGY

## 2018-04-30 PROCEDURE — 71000016 ZZH RECOVERY PHASE 1 LEVEL 3 FIRST HR: Performed by: UROLOGY

## 2018-04-30 PROCEDURE — 37000009 ZZH ANESTHESIA TECHNICAL FEE, EACH ADDTL 15 MIN: Performed by: UROLOGY

## 2018-04-30 PROCEDURE — 85027 COMPLETE CBC AUTOMATED: CPT | Performed by: STUDENT IN AN ORGANIZED HEALTH CARE EDUCATION/TRAINING PROGRAM

## 2018-04-30 PROCEDURE — 84132 ASSAY OF SERUM POTASSIUM: CPT | Performed by: ANESTHESIOLOGY

## 2018-04-30 PROCEDURE — 71000017 ZZH RECOVERY PHASE 1 LEVEL 3 EA ADDTL HR: Performed by: UROLOGY

## 2018-04-30 PROCEDURE — 36415 COLL VENOUS BLD VENIPUNCTURE: CPT | Performed by: STUDENT IN AN ORGANIZED HEALTH CARE EDUCATION/TRAINING PROGRAM

## 2018-04-30 PROCEDURE — 25000125 ZZHC RX 250: Performed by: NURSE ANESTHETIST, CERTIFIED REGISTERED

## 2018-04-30 PROCEDURE — 25000125 ZZHC RX 250: Mod: GY | Performed by: STUDENT IN AN ORGANIZED HEALTH CARE EDUCATION/TRAINING PROGRAM

## 2018-04-30 PROCEDURE — A9270 NON-COVERED ITEM OR SERVICE: HCPCS | Mod: GY | Performed by: STUDENT IN AN ORGANIZED HEALTH CARE EDUCATION/TRAINING PROGRAM

## 2018-04-30 PROCEDURE — 25000128 H RX IP 250 OP 636: Performed by: ANESTHESIOLOGY

## 2018-04-30 PROCEDURE — 40000275 ZZH STATISTIC RCP TIME EA 10 MIN

## 2018-04-30 PROCEDURE — 40000014 ZZH STATISTIC ARTERIAL MONITORING DAILY

## 2018-04-30 PROCEDURE — 25000565 ZZH ISOFLURANE, EA 15 MIN: Performed by: UROLOGY

## 2018-04-30 PROCEDURE — 25000128 H RX IP 250 OP 636: Performed by: UROLOGY

## 2018-04-30 PROCEDURE — C1769 GUIDE WIRE: HCPCS | Performed by: UROLOGY

## 2018-04-30 PROCEDURE — 80048 BASIC METABOLIC PNL TOTAL CA: CPT | Performed by: STUDENT IN AN ORGANIZED HEALTH CARE EDUCATION/TRAINING PROGRAM

## 2018-04-30 PROCEDURE — 81001 URINALYSIS AUTO W/SCOPE: CPT | Performed by: STUDENT IN AN ORGANIZED HEALTH CARE EDUCATION/TRAINING PROGRAM

## 2018-04-30 PROCEDURE — 25800025 ZZH RX 258: Performed by: STUDENT IN AN ORGANIZED HEALTH CARE EDUCATION/TRAINING PROGRAM

## 2018-04-30 PROCEDURE — 25000128 H RX IP 250 OP 636: Performed by: NURSE ANESTHETIST, CERTIFIED REGISTERED

## 2018-04-30 PROCEDURE — 25000128 H RX IP 250 OP 636: Performed by: STUDENT IN AN ORGANIZED HEALTH CARE EDUCATION/TRAINING PROGRAM

## 2018-04-30 PROCEDURE — 25000132 ZZH RX MED GY IP 250 OP 250 PS 637: Mod: GY | Performed by: STUDENT IN AN ORGANIZED HEALTH CARE EDUCATION/TRAINING PROGRAM

## 2018-04-30 PROCEDURE — 36415 COLL VENOUS BLD VENIPUNCTURE: CPT | Performed by: ANESTHESIOLOGY

## 2018-04-30 PROCEDURE — 40000170 ZZH STATISTIC PRE-PROCEDURE ASSESSMENT II: Performed by: UROLOGY

## 2018-04-30 PROCEDURE — 36000057 ZZH SURGERY LEVEL 3 1ST 30 MIN - UMMC: Performed by: UROLOGY

## 2018-04-30 PROCEDURE — C9399 UNCLASSIFIED DRUGS OR BIOLOG: HCPCS | Performed by: NURSE ANESTHETIST, CERTIFIED REGISTERED

## 2018-04-30 PROCEDURE — 88305 TISSUE EXAM BY PATHOLOGIST: CPT | Performed by: UROLOGY

## 2018-04-30 PROCEDURE — 82565 ASSAY OF CREATININE: CPT | Mod: 91 | Performed by: ANESTHESIOLOGY

## 2018-04-30 PROCEDURE — 36000059 ZZH SURGERY LEVEL 3 EA 15 ADDTL MIN UMMC: Performed by: UROLOGY

## 2018-04-30 RX ORDER — IPRATROPIUM BROMIDE AND ALBUTEROL SULFATE 2.5; .5 MG/3ML; MG/3ML
3 SOLUTION RESPIRATORY (INHALATION) ONCE
Status: COMPLETED | OUTPATIENT
Start: 2018-04-30 | End: 2018-04-30

## 2018-04-30 RX ORDER — LIDOCAINE 40 MG/G
CREAM TOPICAL
Status: DISCONTINUED | OUTPATIENT
Start: 2018-04-30 | End: 2018-04-30 | Stop reason: HOSPADM

## 2018-04-30 RX ORDER — KETAMINE HYDROCHLORIDE 10 MG/ML
INJECTION INTRAMUSCULAR; INTRAVENOUS PRN
Status: DISCONTINUED | OUTPATIENT
Start: 2018-04-30 | End: 2018-04-30

## 2018-04-30 RX ORDER — PROPOFOL 10 MG/ML
INJECTION, EMULSION INTRAVENOUS CONTINUOUS PRN
Status: DISCONTINUED | OUTPATIENT
Start: 2018-04-30 | End: 2018-04-30

## 2018-04-30 RX ORDER — LIDOCAINE HYDROCHLORIDE 20 MG/ML
INJECTION, SOLUTION INFILTRATION; PERINEURAL PRN
Status: DISCONTINUED | OUTPATIENT
Start: 2018-04-30 | End: 2018-04-30

## 2018-04-30 RX ORDER — SODIUM CHLORIDE, SODIUM LACTATE, POTASSIUM CHLORIDE, CALCIUM CHLORIDE 600; 310; 30; 20 MG/100ML; MG/100ML; MG/100ML; MG/100ML
INJECTION, SOLUTION INTRAVENOUS CONTINUOUS
Status: DISCONTINUED | OUTPATIENT
Start: 2018-04-30 | End: 2018-04-30 | Stop reason: HOSPADM

## 2018-04-30 RX ORDER — NALOXONE HYDROCHLORIDE 0.4 MG/ML
.1-.4 INJECTION, SOLUTION INTRAMUSCULAR; INTRAVENOUS; SUBCUTANEOUS
Status: DISCONTINUED | OUTPATIENT
Start: 2018-04-30 | End: 2018-05-01 | Stop reason: HOSPADM

## 2018-04-30 RX ORDER — SODIUM CHLORIDE 9 MG/ML
INJECTION, SOLUTION INTRAVENOUS CONTINUOUS
Status: DISCONTINUED | OUTPATIENT
Start: 2018-04-30 | End: 2018-05-01 | Stop reason: CLARIF

## 2018-04-30 RX ORDER — ACETAMINOPHEN 325 MG/1
650 TABLET ORAL EVERY 4 HOURS PRN
Status: DISCONTINUED | OUTPATIENT
Start: 2018-04-30 | End: 2018-05-01 | Stop reason: HOSPADM

## 2018-04-30 RX ORDER — IPRATROPIUM BROMIDE AND ALBUTEROL SULFATE 2.5; .5 MG/3ML; MG/3ML
3 SOLUTION RESPIRATORY (INHALATION)
Status: COMPLETED | OUTPATIENT
Start: 2018-04-30 | End: 2018-04-30

## 2018-04-30 RX ORDER — OXYCODONE HYDROCHLORIDE 5 MG/1
5-10 TABLET ORAL
Status: DISCONTINUED | OUTPATIENT
Start: 2018-04-30 | End: 2018-05-01 | Stop reason: HOSPADM

## 2018-04-30 RX ORDER — FENTANYL CITRATE 50 UG/ML
INJECTION, SOLUTION INTRAMUSCULAR; INTRAVENOUS PRN
Status: DISCONTINUED | OUTPATIENT
Start: 2018-04-30 | End: 2018-04-30

## 2018-04-30 RX ORDER — NALOXONE HYDROCHLORIDE 0.4 MG/ML
.1-.4 INJECTION, SOLUTION INTRAMUSCULAR; INTRAVENOUS; SUBCUTANEOUS
Status: DISCONTINUED | OUTPATIENT
Start: 2018-04-30 | End: 2018-04-30

## 2018-04-30 RX ORDER — ESMOLOL HYDROCHLORIDE 10 MG/ML
INJECTION INTRAVENOUS PRN
Status: DISCONTINUED | OUTPATIENT
Start: 2018-04-30 | End: 2018-04-30

## 2018-04-30 RX ORDER — HYDROMORPHONE HCL/0.9% NACL/PF 0.2MG/0.2
0.2 SYRINGE (ML) INTRAVENOUS
Status: DISCONTINUED | OUTPATIENT
Start: 2018-04-30 | End: 2018-05-01 | Stop reason: HOSPADM

## 2018-04-30 RX ORDER — ONDANSETRON 2 MG/ML
4 INJECTION INTRAMUSCULAR; INTRAVENOUS EVERY 30 MIN PRN
Status: DISCONTINUED | OUTPATIENT
Start: 2018-04-30 | End: 2018-04-30 | Stop reason: HOSPADM

## 2018-04-30 RX ORDER — MEPERIDINE HYDROCHLORIDE 50 MG/ML
12.5 INJECTION INTRAMUSCULAR; INTRAVENOUS; SUBCUTANEOUS
Status: DISCONTINUED | OUTPATIENT
Start: 2018-04-30 | End: 2018-04-30 | Stop reason: HOSPADM

## 2018-04-30 RX ORDER — ONDANSETRON 4 MG/1
4 TABLET, ORALLY DISINTEGRATING ORAL EVERY 30 MIN PRN
Status: DISCONTINUED | OUTPATIENT
Start: 2018-04-30 | End: 2018-04-30 | Stop reason: HOSPADM

## 2018-04-30 RX ORDER — HYDROMORPHONE HYDROCHLORIDE 1 MG/ML
.3-.5 INJECTION, SOLUTION INTRAMUSCULAR; INTRAVENOUS; SUBCUTANEOUS EVERY 10 MIN PRN
Status: DISCONTINUED | OUTPATIENT
Start: 2018-04-30 | End: 2018-04-30 | Stop reason: HOSPADM

## 2018-04-30 RX ORDER — NALOXONE HYDROCHLORIDE 0.4 MG/ML
.1-.4 INJECTION, SOLUTION INTRAMUSCULAR; INTRAVENOUS; SUBCUTANEOUS
Status: DISCONTINUED | OUTPATIENT
Start: 2018-04-30 | End: 2018-04-30 | Stop reason: HOSPADM

## 2018-04-30 RX ORDER — LIDOCAINE 40 MG/G
CREAM TOPICAL
Status: DISCONTINUED | OUTPATIENT
Start: 2018-04-30 | End: 2018-05-01 | Stop reason: HOSPADM

## 2018-04-30 RX ORDER — ONDANSETRON 4 MG/1
4 TABLET, ORALLY DISINTEGRATING ORAL EVERY 6 HOURS PRN
Status: DISCONTINUED | OUTPATIENT
Start: 2018-04-30 | End: 2018-05-01 | Stop reason: HOSPADM

## 2018-04-30 RX ORDER — NEOMYCIN/BACITRACIN/POLYMYXINB 3.5-400-5K
OINTMENT (GRAM) TOPICAL 4 TIMES DAILY PRN
Status: DISCONTINUED | OUTPATIENT
Start: 2018-04-30 | End: 2018-05-01 | Stop reason: HOSPADM

## 2018-04-30 RX ORDER — METOPROLOL TARTRATE 1 MG/ML
INJECTION, SOLUTION INTRAVENOUS PRN
Status: DISCONTINUED | OUTPATIENT
Start: 2018-04-30 | End: 2018-04-30

## 2018-04-30 RX ORDER — METOPROLOL TARTRATE 1 MG/ML
1-2 INJECTION, SOLUTION INTRAVENOUS EVERY 5 MIN PRN
Status: DISCONTINUED | OUTPATIENT
Start: 2018-04-30 | End: 2018-04-30 | Stop reason: HOSPADM

## 2018-04-30 RX ORDER — CEFAZOLIN SODIUM 1 G/3ML
1 INJECTION, POWDER, FOR SOLUTION INTRAMUSCULAR; INTRAVENOUS SEE ADMIN INSTRUCTIONS
Status: DISCONTINUED | OUTPATIENT
Start: 2018-04-30 | End: 2018-04-30 | Stop reason: HOSPADM

## 2018-04-30 RX ORDER — OXYCODONE HYDROCHLORIDE 5 MG/1
5-10 TABLET ORAL EVERY 4 HOURS PRN
Status: DISCONTINUED | OUTPATIENT
Start: 2018-04-30 | End: 2018-04-30

## 2018-04-30 RX ORDER — LIDOCAINE 50 MG/G
OINTMENT TOPICAL 4 TIMES DAILY PRN
Status: DISCONTINUED | OUTPATIENT
Start: 2018-04-30 | End: 2018-05-01 | Stop reason: HOSPADM

## 2018-04-30 RX ORDER — ONDANSETRON 2 MG/ML
INJECTION INTRAMUSCULAR; INTRAVENOUS PRN
Status: DISCONTINUED | OUTPATIENT
Start: 2018-04-30 | End: 2018-04-30

## 2018-04-30 RX ORDER — INDOCYANINE GREEN AND WATER 25 MG
KIT INJECTION PRN
Status: DISCONTINUED | OUTPATIENT
Start: 2018-04-30 | End: 2018-04-30

## 2018-04-30 RX ORDER — CEFAZOLIN SODIUM 2 G/100ML
2 INJECTION, SOLUTION INTRAVENOUS
Status: COMPLETED | OUTPATIENT
Start: 2018-04-30 | End: 2018-04-30

## 2018-04-30 RX ORDER — ALBUTEROL SULFATE 0.83 MG/ML
2.5 SOLUTION RESPIRATORY (INHALATION) EVERY 4 HOURS PRN
Status: DISCONTINUED | OUTPATIENT
Start: 2018-04-30 | End: 2018-04-30 | Stop reason: HOSPADM

## 2018-04-30 RX ORDER — PROPOFOL 10 MG/ML
INJECTION, EMULSION INTRAVENOUS PRN
Status: DISCONTINUED | OUTPATIENT
Start: 2018-04-30 | End: 2018-04-30

## 2018-04-30 RX ORDER — FENTANYL CITRATE 50 UG/ML
25-50 INJECTION, SOLUTION INTRAMUSCULAR; INTRAVENOUS EVERY 5 MIN PRN
Status: DISCONTINUED | OUTPATIENT
Start: 2018-04-30 | End: 2018-04-30 | Stop reason: HOSPADM

## 2018-04-30 RX ORDER — HYDRALAZINE HYDROCHLORIDE 20 MG/ML
10 INJECTION INTRAMUSCULAR; INTRAVENOUS EVERY 4 HOURS PRN
Status: DISCONTINUED | OUTPATIENT
Start: 2018-04-30 | End: 2018-05-01 | Stop reason: HOSPADM

## 2018-04-30 RX ORDER — HYDRALAZINE HYDROCHLORIDE 20 MG/ML
2.5-5 INJECTION INTRAMUSCULAR; INTRAVENOUS EVERY 10 MIN PRN
Status: DISCONTINUED | OUTPATIENT
Start: 2018-04-30 | End: 2018-04-30 | Stop reason: HOSPADM

## 2018-04-30 RX ORDER — LABETALOL HYDROCHLORIDE 5 MG/ML
20 INJECTION, SOLUTION INTRAVENOUS EVERY 4 HOURS PRN
Status: DISCONTINUED | OUTPATIENT
Start: 2018-04-30 | End: 2018-05-01 | Stop reason: HOSPADM

## 2018-04-30 RX ORDER — ONDANSETRON 2 MG/ML
4 INJECTION INTRAMUSCULAR; INTRAVENOUS EVERY 6 HOURS PRN
Status: DISCONTINUED | OUTPATIENT
Start: 2018-04-30 | End: 2018-05-01 | Stop reason: HOSPADM

## 2018-04-30 RX ORDER — FUROSEMIDE 10 MG/ML
INJECTION INTRAMUSCULAR; INTRAVENOUS PRN
Status: DISCONTINUED | OUTPATIENT
Start: 2018-04-30 | End: 2018-04-30

## 2018-04-30 RX ORDER — METOPROLOL SUCCINATE 25 MG/1
25 TABLET, EXTENDED RELEASE ORAL EVERY EVENING
Status: DISCONTINUED | OUTPATIENT
Start: 2018-04-30 | End: 2018-05-01 | Stop reason: HOSPADM

## 2018-04-30 RX ADMIN — SODIUM CHLORIDE, POTASSIUM CHLORIDE, SODIUM LACTATE AND CALCIUM CHLORIDE: 600; 310; 30; 20 INJECTION, SOLUTION INTRAVENOUS at 12:20

## 2018-04-30 RX ADMIN — LIDOCAINE HYDROCHLORIDE 40 MG: 20 INJECTION, SOLUTION INFILTRATION; PERINEURAL at 11:54

## 2018-04-30 RX ADMIN — Medication 10 MG: at 08:58

## 2018-04-30 RX ADMIN — IPRATROPIUM BROMIDE AND ALBUTEROL SULFATE 3 ML: .5; 3 SOLUTION RESPIRATORY (INHALATION) at 07:08

## 2018-04-30 RX ADMIN — ONDANSETRON 4 MG: 2 INJECTION INTRAMUSCULAR; INTRAVENOUS at 11:15

## 2018-04-30 RX ADMIN — CEFAZOLIN SODIUM 2 G: 2 INJECTION, SOLUTION INTRAVENOUS at 09:17

## 2018-04-30 RX ADMIN — FUROSEMIDE 10 MG: 10 INJECTION, SOLUTION INTRAVENOUS at 11:13

## 2018-04-30 RX ADMIN — SODIUM CHLORIDE 3000 ML: 900 IRRIGANT IRRIGATION at 14:11

## 2018-04-30 RX ADMIN — SODIUM CHLORIDE 3000 ML: 900 IRRIGANT IRRIGATION at 19:22

## 2018-04-30 RX ADMIN — OXYCODONE HYDROCHLORIDE 10 MG: 5 TABLET ORAL at 15:28

## 2018-04-30 RX ADMIN — FENTANYL CITRATE 50 MCG: 50 INJECTION INTRAMUSCULAR; INTRAVENOUS at 13:39

## 2018-04-30 RX ADMIN — SODIUM CHLORIDE: 9 INJECTION, SOLUTION INTRAVENOUS at 19:02

## 2018-04-30 RX ADMIN — IPRATROPIUM BROMIDE AND ALBUTEROL SULFATE 3 ML: .5; 3 SOLUTION RESPIRATORY (INHALATION) at 13:00

## 2018-04-30 RX ADMIN — INDOCYANINE GREEN 25 MG: KIT INTRAVENOUS at 10:50

## 2018-04-30 RX ADMIN — ATROPA BELLADONNA AND OPIUM 1 SUPPOSITORY: 16.2; 3 SUPPOSITORY RECTAL at 18:01

## 2018-04-30 RX ADMIN — METOPROLOL SUCCINATE 25 MG: 25 TABLET, EXTENDED RELEASE ORAL at 20:12

## 2018-04-30 RX ADMIN — CEFAZOLIN 1 G: 1 INJECTION, POWDER, FOR SOLUTION INTRAMUSCULAR; INTRAVENOUS at 11:30

## 2018-04-30 RX ADMIN — FENTANYL CITRATE 50 MCG: 50 INJECTION INTRAMUSCULAR; INTRAVENOUS at 13:59

## 2018-04-30 RX ADMIN — SUGAMMADEX 200 MG: 100 INJECTION, SOLUTION INTRAVENOUS at 11:44

## 2018-04-30 RX ADMIN — FENTANYL CITRATE 50 MCG: 50 INJECTION, SOLUTION INTRAMUSCULAR; INTRAVENOUS at 09:00

## 2018-04-30 RX ADMIN — FENTANYL CITRATE 25 MCG: 50 INJECTION INTRAMUSCULAR; INTRAVENOUS at 13:33

## 2018-04-30 RX ADMIN — Medication 0.2 MG: at 17:21

## 2018-04-30 RX ADMIN — FENTANYL CITRATE 25 MCG: 50 INJECTION INTRAMUSCULAR; INTRAVENOUS at 13:53

## 2018-04-30 RX ADMIN — SODIUM CHLORIDE, POTASSIUM CHLORIDE, SODIUM LACTATE AND CALCIUM CHLORIDE: 600; 310; 30; 20 INJECTION, SOLUTION INTRAVENOUS at 08:48

## 2018-04-30 RX ADMIN — PROPOFOL 40 MG: 10 INJECTION, EMULSION INTRAVENOUS at 12:12

## 2018-04-30 RX ADMIN — Medication 100 MG: at 09:00

## 2018-04-30 RX ADMIN — ROCURONIUM BROMIDE 50 MG: 10 INJECTION INTRAVENOUS at 09:09

## 2018-04-30 RX ADMIN — LIDOCAINE HYDROCHLORIDE 100 MG: 20 INJECTION, SOLUTION INFILTRATION; PERINEURAL at 12:00

## 2018-04-30 RX ADMIN — LIDOCAINE HYDROCHLORIDE 60 MG: 20 INJECTION, SOLUTION INFILTRATION; PERINEURAL at 11:52

## 2018-04-30 RX ADMIN — SODIUM CHLORIDE 6000 ML: 900 IRRIGANT IRRIGATION at 23:48

## 2018-04-30 RX ADMIN — HYDROMORPHONE HYDROCHLORIDE 0.5 MG: 1 INJECTION, SOLUTION INTRAMUSCULAR; INTRAVENOUS; SUBCUTANEOUS at 14:06

## 2018-04-30 RX ADMIN — ESMOLOL HYDROCHLORIDE 30 MG: 10 INJECTION, SOLUTION INTRAVENOUS at 09:08

## 2018-04-30 RX ADMIN — METOPROLOL TARTRATE 1 MG: 5 INJECTION INTRAVENOUS at 09:09

## 2018-04-30 RX ADMIN — LIDOCAINE HYDROCHLORIDE 100 MG: 20 INJECTION, SOLUTION INFILTRATION; PERINEURAL at 09:00

## 2018-04-30 RX ADMIN — ESMOLOL HYDROCHLORIDE 40 MG: 10 INJECTION, SOLUTION INTRAVENOUS at 11:48

## 2018-04-30 RX ADMIN — ROCURONIUM BROMIDE 20 MG: 10 INJECTION INTRAVENOUS at 09:40

## 2018-04-30 RX ADMIN — SODIUM CHLORIDE, POTASSIUM CHLORIDE, SODIUM LACTATE AND CALCIUM CHLORIDE: 600; 310; 30; 20 INJECTION, SOLUTION INTRAVENOUS at 13:52

## 2018-04-30 RX ADMIN — MIDAZOLAM 1 MG: 1 INJECTION INTRAMUSCULAR; INTRAVENOUS at 08:48

## 2018-04-30 RX ADMIN — ESMOLOL HYDROCHLORIDE 30 MG: 10 INJECTION, SOLUTION INTRAVENOUS at 11:45

## 2018-04-30 RX ADMIN — PROPOFOL 50 MG: 10 INJECTION, EMULSION INTRAVENOUS at 09:00

## 2018-04-30 RX ADMIN — FENTANYL CITRATE 25 MCG: 50 INJECTION, SOLUTION INTRAMUSCULAR; INTRAVENOUS at 11:30

## 2018-04-30 RX ADMIN — Medication 10 MG: at 09:00

## 2018-04-30 RX ADMIN — PROPOFOL 40 MG: 10 INJECTION, EMULSION INTRAVENOUS at 09:09

## 2018-04-30 RX ADMIN — SODIUM CHLORIDE 6000 ML: 900 IRRIGANT IRRIGATION at 18:56

## 2018-04-30 RX ADMIN — PROPOFOL 50 MCG/KG/MIN: 10 INJECTION, EMULSION INTRAVENOUS at 09:15

## 2018-04-30 RX ADMIN — SODIUM CHLORIDE 3000 ML: 900 IRRIGANT IRRIGATION at 20:18

## 2018-04-30 RX ADMIN — FENTANYL CITRATE 50 MCG: 50 INJECTION, SOLUTION INTRAMUSCULAR; INTRAVENOUS at 09:10

## 2018-04-30 ASSESSMENT — PAIN DESCRIPTION - DESCRIPTORS: DESCRIPTORS: PRESSURE

## 2018-04-30 ASSESSMENT — COPD QUESTIONNAIRES: COPD: 1

## 2018-04-30 ASSESSMENT — LIFESTYLE VARIABLES: TOBACCO_USE: 1

## 2018-04-30 NOTE — IP AVS SNAPSHOT
MRN:[id number]                      After Visit Summary   4/30/2018    Nahun Villalobos    MRN: [id number]           Thank you!     Thank you for choosing [facility] for your care. Our goal is always to provide you with excellent care. Hearing back from our patients is one way we can continue to improve our services. Please take a few minutes to complete the written survey that you may receive in the mail after you visit with us. Thank you!        Patient Information     Date Of Birth          1945        Designated Caregiver       Most Recent Value    Caregiver    Will someone help with your care after discharge? no      About your hospital stay     You were admitted on:  April 30, 2018 You last received care in the:  [address] [facility]    You were discharged on:  May 1, 2018        Reason for your hospital stay       On 4/30/18 Mr. Villalobos underwent a TURP with Dr. Burris.                  Who to Call     For medical emergencies, please call 911.  For non-urgent questions about your medical care, please call your primary care provider or clinic, [phone]  For questions related to your surgery, please call your surgery clinic        Attending Provider     Provider Specialty    Praveena Burris MD Urology       Primary Care Provider Office Phone # Fax #    Olegario Castillo [phone]      After Care Instructions     Activity       Your activity upon discharge: see discharge instructions            Diet       Follow this diet upon discharge: see discharge instructions            Diet Instructions       Resume pre-procedure diet            Discharge Instructions       Diet:  - Consistent carbohydrate (diabetic) diet    Activity:   - No strenuous exercise for 4 weeks.   - No lifting, pushing, pulling more than 15 pounds for 4 weeks.   - Do not strain with bowel movements.   - Do not drive until you can press the brake pedal quickly and fully without pain.   - Do  not operate a motor vehicle while taking narcotic pain medications.     Medications:   - PAIN: Oxycodone is a narcotic medication that has been prescribed for pain.  Narcotics will cause sleepiness and constipation and can become addictive, therefore it is best to stop or reduce them as soon as you can.  Any left over narcotics should be disposed of with an Authorized  for unneeded medications.  Contact your [facility]'s or [facility]'s household trash and recycling service to learn about medication disposal options and guidelines for your area.  If you decide to store this medication at home it should be kept in a locked cabinet to prevent access to children or visitors. To reduce your narcotic use, take Tylenol (acetaminophen 625mg) every 4-6 hours as needed.  This has been prescribed for you.  Do not take more than 4,000mg of Tylenol (acetaminophen/ APAP) from all sources in any 24 hour period since this can cause liver damage.  Do not take more than 2400mg of ibuprofen in any 24 hour period since this can cause kidney damage. Never drive, operate machinery or drink alcoholic beverages while you are taking narcotic pain medications.    - Narcotics can make you constipated. Take over the counter fiber (metamucil or benefiber) and stool softeners (miralax, docusate or senna) while using narcotic pain medications, but stop if you develop diarrhea.   - Continue your urinary medications (finasteride and tamsulosin) until advised to stop by your urologist.   - No driving or operating machinery while taking narcotic pain medications    Post-TURP Instructions:   - You may shower 24 hours after surgery  - Continue to take Flomax (tamsulosin) and Proscar (finasteride) until your followup appointment with Dr. Burris.  At that time, she may ask you to discontinue the medication.    - Drink 2-3L of water a day to keep your urine clear to light pink in color. Some blood in your urine can be expected but should clear  with reducing your activity and increasing your water consumption  - Call or return sooner than your regularly scheduled visit if you develop any of the following:  Fever (greater than 101.3F), uncontrolled pain, uncontrolled nausea or vomiting, or if you are passing large clots, are unable to void, or if your urine will not lighten in color with increase water intake.            No Alcohol       For 24 hours post procedure            No lifting        No lifting over 10 lbs and no strenuous physical activity for 2 weeks                  Follow-up Appointments     Adult [facility] Follow-up and recommended labs and tests       Follow-Up:   - Call your primary care provider to touch base regarding your recent admission.     - Follow up with Dr. Burris as scheduled in Urology Clinic    Phone numbers:  - Monday through Friday 8am to 4:30pm: call [phone].    - Nights or weekends, call [phone] and ask the  to page the urology resident on call.   - For emergencies, always call 911    Appointments on [place] and/or [facility] (with [facility] or [facility] provider or service). Call [phone] if you haven't heard regarding these appointments within 7 days of discharge.                  Your next 10 appointments already scheduled     May 23, 2018  8:30 AM CDT   (Arrive by 8:15 AM)   Post-Op with Praveena Burris MD   [facility] ([facility])    [address] [address] [phone]            Jul 30, 2018  9:20 AM CDT   Office Visit with Olegario Castillo MD   [facility]    [address] [address] [phone]           Bring a current list of meds and any records pertaining to this visit. For Physicals, please bring immunization records and any forms needing to be filled out. Please arrive 10 minutes early  "to complete paperwork.              Pending Results     Date and Time Order Name Status Description    4/30/2018 1041 Surgical pathology exam In process             Statement of Approval     Ordered          05/01/18 1533  I have reviewed and agree with all the recommendations and orders detailed in this document.  EFFECTIVE NOW     Approved and electronically signed by:  Kaylin Spivey PA             Admission Information     Date & Time Provider Department Dept. Phone    4/30/2018 Praveena Burris MD [address] [facility] [phone]      Your Vitals Were     Blood Pressure Pulse Temperature Respirations Height Weight    143/77 (BP Location: Right arm) 81 98.8  F (37.1  C) (Oral) 20 1.689 m (5' 6.5\") 118.5 kg (261 lb 3.9 oz)    Pulse Oximetry BMI (Body Mass Index)                93% 41.53 kg/m2          MyChart Information     [employer] gives you secure access to your electronic health record. If you see a primary care provider, you can also send messages to your care team and make appointments. If you have questions, please call your primary care clinic.  If you do not have a primary care provider, please call [phone] and they will assist you.        Care EveryWhere ID     This is your Care EveryWhere ID. This could be used by other organizations to access your [place] medical records  [id number]        Equal Access to Services     MALLORY VAZQUEZ AH: Humberto araujo Soaltagracia, waaxda luqadaha, qaybta kaalmada roman, erika campos. So [facility] [phone].    ATENCIÓN: Si habla español, tiene a huggins disposición servicios gratuitos de asistencia lingüística. Llame al [phone].    We comply with applicable federal civil rights laws and [place] laws. We do not discriminate on the basis of race, color, national origin, age, disability, sex, sexual orientation, or gender identity.               Review of your medicines      START taking        Dose / Directions    " acetaminophen 325 MG tablet   Commonly known as:  TYLENOL        Dose:  650 mg   Take 2 tablets (650 mg) by mouth every 4 hours as needed for mild pain   Quantity:  100 tablet   Refills:  0       docusate sodium 100 MG tablet   Commonly known as:  COLACE        Dose:  100 mg   Take 100 mg by mouth 2 times daily To prevent constipation   Quantity:  45 tablet   Refills:  1       oxyCODONE IR 5 MG tablet   Commonly known as:  ROXICODONE        Dose:  5 mg   Take 1 tablet (5 mg) by mouth every 3 hours as needed for pain   Quantity:  14 tablet   Refills:  0         CONTINUE these medicines which may have CHANGED, or have new prescriptions. If we are uncertain of the size of tablets/capsules you have at home, strength may be listed as something that might have changed.        Dose / Directions    aspirin 81 MG tablet   This may have changed:  when to take this   Used for:  Type 2 diabetes, HbA1C goal < 8% (H)        Dose:  81 mg   Take 1 tablet (81 mg) by mouth daily   Quantity:  30 tablet   Refills:  11       finasteride 5 MG tablet   Commonly known as:  PROSCAR   This may have changed:  See the new instructions.   Used for:  Enlarged prostate        TAKE 1 TABLET EVERY DAY   Quantity:  90 tablet   Refills:  3       furosemide 20 MG tablet   Commonly known as:  LASIX   This may have changed:  when to take this   Used for:  Essential hypertension, benign        Dose:  20 mg   Take 1 tablet (20 mg) by mouth 2 times daily   Quantity:  360 tablet   Refills:  3       lisinopril 40 MG tablet   Commonly known as:  PRINIVIL/ZESTRIL   This may have changed:    - how much to take  - how to take this  - when to take this  - additional instructions   Used for:  Type 2 diabetes mellitus without complication, without long-term current use of insulin (H), Essential hypertension, benign        TAKE 1 TABLET (40 MG) BY MOUTH DAILY   Quantity:  90 tablet   Refills:  3       metFORMIN 500 MG tablet   Commonly known as:  GLUCOPHAGE   This  may have changed:  when to take this   Used for:  Type 2 diabetes mellitus without complication, without long-term current use of insulin (H)        Dose:  500 mg   Take 1 tablet (500 mg) by mouth 2 times daily (with meals)   Quantity:  180 tablet   Refills:  3       tamsulosin 0.4 MG capsule   Commonly known as:  FLOMAX   This may have changed:  when to take this   Used for:  Urinary urgency        Dose:  0.4 mg   Take 1 capsule (0.4 mg) by mouth daily   Quantity:  60 capsule   Refills:  3         CONTINUE these medicines which have NOT CHANGED        Dose / Directions    albuterol 108 (90 Base) MCG/ACT Inhaler   Commonly known as:  PROAIR HFA/PROVENTIL HFA/VENTOLIN HFA   Used for:  Chronic obstructive pulmonary disease, unspecified COPD type (H)        Dose:  2 puff   Inhale 2 puffs into the lungs every 6 hours as needed for shortness of breath / dyspnea or wheezing   Quantity:  3 Inhaler   Refills:  1       atorvastatin 20 MG tablet   Commonly known as:  LIPITOR   Used for:  Hyperlipidemia LDL goal <100        Dose:  20 mg   Take 1 tablet (20 mg) by mouth daily   Quantity:  90 tablet   Refills:  3       blood glucose monitoring lancets   Used for:  DM (diabetes mellitus) (H)        Use to test blood sugar 2 times daily or as directed.   Quantity:  1 Box   Refills:  6       blood glucose monitoring test strip   Commonly known as:  ACCU-CHEK CHACHA   Used for:  DM (diabetes mellitus) (H)        Use to test blood sugar 2 times daily or as directed.   Quantity:  60 strip   Refills:  6       metoprolol succinate 25 MG 24 hr tablet   Commonly known as:  TOPROL-XL   Used for:  Abnormal cardiovascular stress test        Dose:  25 mg   Take 1 tablet (25 mg) by mouth daily   Quantity:  30 tablet   Refills:  11       order for DME        Equipment delivered; Respironics 760S BIPAP with heated humidification and heated tubing.  Pressure 15/7cm. Respironics Syeda View FF mask (Medium)   Refills:  0       * order for DME   Used  for:  Nocturnal hypoxemia        Oxygen 2 Li/min at night via nasal cannula   Quantity:  1 Device   Refills:  0       * order for DME   Used for:  Type 2 diabetes mellitus without complication, without long-term current use of insulin (H)        Equipment being ordered: diabetic shoes, 1 pair   Quantity:  1 Package   Refills:  0       VITAMIN C PO        Take by mouth At Bedtime   Refills:  0       * Notice:  This list has 2 medication(s) that are the same as other medications prescribed for you. Read the directions carefully, and ask your doctor or other care provider to review them with you.         Where to get your medicines      These medications were sent to [facility] [place], [place] - [address]  [address] [facility] [address]     Phone:  [phone]     acetaminophen 325 MG tablet    docusate sodium 100 MG tablet         Some of these will need a paper prescription and others can be bought over the counter. Ask your nurse if you have questions.     Bring a paper prescription for each of these medications     oxyCODONE IR 5 MG tablet                Protect others around you: Learn how to safely use, store and throw away your medicines at www.disposemymeds.org.        Information about OPIOIDS     PRESCRIPTION OPIOIDS: WHAT YOU NEED TO KNOW   You have a prescription for an opioid (narcotic) pain medicine. Opioids can cause addiction. If you have a history of chemical dependency of any type, you are at a higher risk of becoming addicted to opioids. Only take this medicine after all other options have been tried. Take it for as short a time and as few doses as possible.     Do not:    Drive. If you drive while taking these medicines, you could be arrested for driving under the influence (DUI).    Operate heavy machinery    Do any other dangerous activities while taking these medicines.     Drink any alcohol while taking these medicines.      Take with any other medicines  that contain acetaminophen. Read all labels carefully. Look for the word  acetaminophen  or  Tylenol.  Ask your pharmacist if you have questions or are unsure.    Store your pills in a secure place, locked if possible. We will not replace any lost or stolen medicine. If you don t finish your medicine, please throw away (dispose) as directed by your pharmacist. The [place] Pollution Control Agency has more information about safe disposal: https://www.pca.[facility].[place].us/living-green/managing-unwanted-medications    All opioids tend to cause constipation. Drink plenty of water and eat foods that have a lot of fiber, such as fruits, vegetables, prune juice, apple juice and high-fiber cereal. Take a laxative (Miralax, milk of magnesia, Colace, Senna) if you don t move your bowels at least every other day.              Medication List: This is a list of all your medications and when to take them. Check marks below indicate your daily home schedule. Keep this list as a reference.      Medications           Morning Afternoon Evening Bedtime As Needed    acetaminophen 325 MG tablet   Commonly known as:  TYLENOL   Take 2 tablets (650 mg) by mouth every 4 hours as needed for mild pain                                albuterol 108 (90 Base) MCG/ACT Inhaler   Commonly known as:  PROAIR HFA/PROVENTIL HFA/VENTOLIN HFA   Inhale 2 puffs into the lungs every 6 hours as needed for shortness of breath / dyspnea or wheezing                                aspirin 81 MG tablet   Take 1 tablet (81 mg) by mouth daily                                atorvastatin 20 MG tablet   Commonly known as:  LIPITOR   Take 1 tablet (20 mg) by mouth daily   Last time this was given:  20 mg on 5/1/2018  9:15 AM                                blood glucose monitoring lancets   Use to test blood sugar 2 times daily or as directed.                                blood glucose monitoring test strip   Commonly known as:  ACCU-CHEK CHACHA   Use to test blood sugar  2 times daily or as directed.                                docusate sodium 100 MG tablet   Commonly known as:  COLACE   Take 100 mg by mouth 2 times daily To prevent constipation                                finasteride 5 MG tablet   Commonly known as:  PROSCAR   TAKE 1 TABLET EVERY DAY                                furosemide 20 MG tablet   Commonly known as:  LASIX   Take 1 tablet (20 mg) by mouth 2 times daily                                lisinopril 40 MG tablet   Commonly known as:  PRINIVIL/ZESTRIL   TAKE 1 TABLET (40 MG) BY MOUTH DAILY                                metFORMIN 500 MG tablet   Commonly known as:  GLUCOPHAGE   Take 1 tablet (500 mg) by mouth 2 times daily (with meals)                                metoprolol succinate 25 MG 24 hr tablet   Commonly known as:  TOPROL-XL   Take 1 tablet (25 mg) by mouth daily   Last time this was given:  25 mg on 4/30/2018  8:12 PM                                order for DME   Equipment delivered; Respironics 760S BIPAP with heated humidification and heated tubing.  Pressure 15/7cm. Respironics Syeda View FF mask (Medium)                                * order for DME   Oxygen 2 Li/min at night via nasal cannula                                * order for DME   Equipment being ordered: diabetic shoes, 1 pair                                oxyCODONE IR 5 MG tablet   Commonly known as:  ROXICODONE   Take 1 tablet (5 mg) by mouth every 3 hours as needed for pain   Last time this was given:  10 mg on 5/1/2018  1:19 PM                                tamsulosin 0.4 MG capsule   Commonly known as:  FLOMAX   Take 1 capsule (0.4 mg) by mouth daily                                VITAMIN C PO   Take by mouth At Bedtime                                * Notice:  This list has 2 medication(s) that are the same as other medications prescribed for you. Read the directions carefully, and ask your doctor or other care provider to review them with you.

## 2018-04-30 NOTE — ANESTHESIA CARE TRANSFER NOTE
Patient: Nahun Villalobos    Procedure(s):  Cystoscopy, Transurethral Resection Of The Prostate - Wound Class: II-Clean Contaminated    Diagnosis: Urinary Frequency   Diagnosis Additional Information: No value filed.    Anesthesia Type:   General, ETT     Note:  Airway :ETT  Patient transferred to:PACU  Comments: Pt transferred to PACU intubated, sedated. Unable to extubate in OR, would cough, but not responsive, Vt 150's when spontaneously breathing, ETCO2 would climb to 60's without support. Placed on vent, Vt 550, rate 12, Fio2 40. Report to RN.Handoff Report: Identifed the Patient, Identified the Reponsible Provider, Reviewed the pertinent medical history, Discussed the surgical course, Reviewed Intra-OP anesthesia mangement and issues during anesthesia, Set expectations for post-procedure period and Allowed opportunity for questions and acknowledgement of understanding      Vitals: (Last set prior to Anesthesia Care Transfer)    CRNA VITALS  4/30/2018 1146 - 4/30/2018 1222      4/30/2018             Pulse: 82    SpO2: 99 %    Resp Rate (observed): 22                Electronically Signed By: SELMA Murray CRNA  April 30, 2018  12:22 PM

## 2018-04-30 NOTE — BRIEF OP NOTE
[facility], [place]    Brief Operative Note    Pre-operative diagnosis: Urinary Frequency    Post-operative diagnosis * No post-op diagnosis entered *   Procedure: Procedure(s):  Cystoscopy, Transurethral Resection Of The Prostate - Wound Class: II-Clean Contaminated   Surgeon: Praveena Burris MD   Assistants(s): Josh Urban    Anesthesia: General    Estimated blood loss: Less than 50 ml   Total IV fluids: (See anesthesia record)   Blood transfusion: (See anesthesia record)   Total urine output: (See anesthesia record)   Drains: 24 Turkmen 3-way phliip catheter    Specimens:   ID Type Source Tests Collected by Time Destination   A : Prostate Chips Tissue Prostate SURGICAL PATHOLOGY EXAM Praveena Burris MD 4/30/2018 10:39 AM       Findings: massive intravesical lobe, unable to visualize ureteral orifices    Complications: None   Implants: None

## 2018-04-30 NOTE — OR NURSING
Pts VSS.. Has been working well with IS.. ETCo2 has trended down to 40's form  50's  Pain relativly under control.. 2 oxycodone give ~ 15:15  CBI continues.. U/O continues to be red/pink.. Good flow from philip.. Report given.. Ready for Tx to 7B

## 2018-04-30 NOTE — ANESTHESIA POSTPROCEDURE EVALUATION
Patient: Nahun Villalobos    Procedure(s):  Cystoscopy, Transurethral Resection Of The Prostate - Wound Class: II-Clean Contaminated    Diagnosis:Urinary Frequency   Diagnosis Additional Information: No value filed.    Anesthesia Type:  General, ETT    Note:  Anesthesia Post Evaluation    Patient location during evaluation: PACU  Patient participation: Able to fully participate in evaluation  Level of consciousness: awake  Pain management: adequate  Airway patency: patent  Cardiovascular status: acceptable  Respiratory status: acceptable  Hydration status: acceptable  PONV: none     Anesthetic complications: None          Last vitals:  Vitals:    04/30/18 1400 04/30/18 1415 04/30/18 1500   BP: (!) 154/91 153/88 155/88   Pulse:      Resp: 16 16 16   Temp: 36.4  C (97.5  F) 35.9  C (96.6  F) 36.7  C (98  F)   SpO2: 96% 96% 94%         Electronically Signed By: Gary Garcia MD  April 30, 2018  3:15 PM

## 2018-04-30 NOTE — ANESTHESIA PREPROCEDURE EVALUATION
Anesthesia Evaluation     . Pt has had prior anesthetic. Type: General and MAC    No history of anesthetic complications          ROS/MED HX    ENT/Pulmonary: Comment: 52 pack years    (+)sleep apnea, tobacco use, Past use COPD, doesn't use CPAP , . Other pulmonary disease s/p wedge resection for non small cell lung cancer.    Neurologic:     (+)neuropathy - feet,     Cardiovascular: Comment: Recent positive stress test, needs Cards follow up    (+) Dyslipidemia, hypertension-range: 150-118/80-70, ---. Taking blood thinners : . . STACY, . :. . Previous cardiac testing Echodate:2015results:Stress Testdate:2017 results:ECG reviewed date:2016 results:Sinus rhythm date: results:          METS/Exercise Tolerance:  1 - Eating, dressing   Hematologic:  - neg hematologic  ROS       Musculoskeletal:   (+) arthritis, , , other musculoskeletal- back pain, bilateral shoulder pain with limited mobility      GI/Hepatic:  - neg GI/hepatic ROS       Renal/Genitourinary:     (+) Other Renal/ Genitourinary, urinary retention, frequency      Endo:     (+) type II DM Last HgA1c: 6.6 date: 12/7/17 Not using insulin - not using insulin pump Diabetic complications: neuropathy, Obesity, .   (-) Type I DM   Psychiatric:  - neg psychiatric ROS       Infectious Disease:  - neg infectious disease ROS       Malignancy:   (+) Malignancy History of Lung  Lung CA Remission status post Surgery.         Other:    (+) No chance of pregnancy C-spine cleared: N/A, H/O Chronic Pain,no other significant disability                    Physical Exam  Normal systems: cardiovascular and pulmonary    Airway   Mallampati: III  TM distance: >3 FB  Neck ROM: limited    Dental     Cardiovascular   Rhythm and rate: regular and normal      Pulmonary (+) decreased breath sounds                       Anesthesia Plan      History & Physical Review  History and physical reviewed and following examination; no interval change.    ASA Status:  3 .    NPO Status:  > 8  hours    Plan for General and ETT with Intravenous and Propofol induction. Maintenance will be Balanced.    PONV prophylaxis:  Ondansetron (or other 5HT-3) and Dexamethasone or Solumedrol  Additional equipment: Videolaryngoscope, 2nd IV and Arterial Line      Postoperative Care  Postoperative pain management:  IV analgesics and Multi-modal analgesia.  Plan for postoperative opioid use.    Consents  Anesthetic plan, risks, benefits and alternatives discussed with:  Patient.  Use of blood products discussed: Yes.   Use of blood products discussed with Patient.  Consented to blood products.  .                          .

## 2018-04-30 NOTE — OR NURSING
Pain controlled C02 in the low 50's working on deep breathing and coughing instructed on incentive spiromiter. Report to Soham Avelar

## 2018-04-30 NOTE — IP AVS SNAPSHOT
[address] [address]    Phone:  [phone]                                       After Visit Summary   4/30/2018    Nahun Villalobos    MRN: [id number]           After Visit Summary Signature Page     I have received my discharge instructions, and my questions have been answered. I have discussed any challenges I see with this plan with the nurse or doctor.    ..........................................................................................................................................  Patient/Patient Representative Signature      ..........................................................................................................................................  Patient Representative Print Name and Relationship to Patient    ..................................................               ................................................  Date                                            Time    ..........................................................................................................................................  Reviewed by Signature/Title    ...................................................              ..............................................  Date                                                            Time

## 2018-05-01 ENCOUNTER — APPOINTMENT (OUTPATIENT)
Dept: ULTRASOUND IMAGING | Facility: CLINIC | Age: 73
End: 2018-05-01
Attending: UROLOGY
Payer: MEDICARE

## 2018-05-01 VITALS
WEIGHT: 261.25 LBS | DIASTOLIC BLOOD PRESSURE: 77 MMHG | HEART RATE: 81 BPM | RESPIRATION RATE: 20 BRPM | SYSTOLIC BLOOD PRESSURE: 143 MMHG | TEMPERATURE: 98.8 F | BODY MASS INDEX: 41 KG/M2 | HEIGHT: 67 IN | OXYGEN SATURATION: 93 %

## 2018-05-01 LAB
ANION GAP SERPL CALCULATED.3IONS-SCNC: 5 MMOL/L (ref 3–14)
BACTERIA SPEC CULT: NORMAL
BASOPHILS # BLD AUTO: 0 10E9/L (ref 0–0.2)
BASOPHILS NFR BLD AUTO: 0.2 %
BUN SERPL-MCNC: 11 MG/DL (ref 7–30)
CALCIUM SERPL-MCNC: 8.3 MG/DL (ref 8.5–10.1)
CHLORIDE SERPL-SCNC: 104 MMOL/L (ref 94–109)
CO2 SERPL-SCNC: 29 MMOL/L (ref 20–32)
CREAT SERPL-MCNC: 1.06 MG/DL (ref 0.66–1.25)
DIFFERENTIAL METHOD BLD: ABNORMAL
DIFFERENTIAL METHOD BLD: NORMAL
EOSINOPHIL # BLD AUTO: 0.2 10E9/L (ref 0–0.7)
EOSINOPHIL NFR BLD AUTO: 1.3 %
ERYTHROCYTE [DISTWIDTH] IN BLOOD BY AUTOMATED COUNT: 12.7 % (ref 10–15)
ERYTHROCYTE [DISTWIDTH] IN BLOOD BY AUTOMATED COUNT: NORMAL % (ref 10–15)
GFR SERPL CREATININE-BSD FRML MDRD: 68 ML/MIN/1.7M2
GLUCOSE BLDC GLUCOMTR-MCNC: 153 MG/DL (ref 70–99)
GLUCOSE SERPL-MCNC: 136 MG/DL (ref 70–99)
HBA1C MFR BLD: 7.2 % (ref 0–6.4)
HBA1C MFR BLD: NORMAL % (ref 0–6.4)
HCT VFR BLD AUTO: 37.6 % (ref 40–53)
HCT VFR BLD AUTO: NORMAL % (ref 40–53)
HGB BLD-MCNC: 11.9 G/DL (ref 13.3–17.7)
HGB BLD-MCNC: NORMAL G/DL (ref 13.3–17.7)
IMM GRANULOCYTES # BLD: 0 10E9/L (ref 0–0.4)
IMM GRANULOCYTES NFR BLD: 0.3 %
LYMPHOCYTES # BLD AUTO: 2.2 10E9/L (ref 0.8–5.3)
LYMPHOCYTES NFR BLD AUTO: 15.5 %
Lab: NORMAL
MCH RBC QN AUTO: 32.2 PG (ref 26.5–33)
MCH RBC QN AUTO: NORMAL PG (ref 26.5–33)
MCHC RBC AUTO-ENTMCNC: 31.6 G/DL (ref 31.5–36.5)
MCHC RBC AUTO-ENTMCNC: NORMAL G/DL (ref 31.5–36.5)
MCV RBC AUTO: 102 FL (ref 78–100)
MCV RBC AUTO: NORMAL FL (ref 78–100)
MONOCYTES # BLD AUTO: 1.5 10E9/L (ref 0–1.3)
MONOCYTES NFR BLD AUTO: 10.8 %
NEUTROPHILS # BLD AUTO: 10.1 10E9/L (ref 1.6–8.3)
NEUTROPHILS NFR BLD AUTO: 71.9 %
NRBC # BLD AUTO: 0 10*3/UL
NRBC BLD AUTO-RTO: 0 /100
PLATELET # BLD AUTO: 246 10E9/L (ref 150–450)
PLATELET # BLD AUTO: NORMAL 10E9/L (ref 150–450)
POTASSIUM SERPL-SCNC: 4.2 MMOL/L (ref 3.4–5.3)
RBC # BLD AUTO: 3.7 10E12/L (ref 4.4–5.9)
RBC # BLD AUTO: NORMAL 10E12/L (ref 4.4–5.9)
SODIUM SERPL-SCNC: 138 MMOL/L (ref 133–144)
SPECIMEN SOURCE: NORMAL
WBC # BLD AUTO: 14.1 10E9/L (ref 4–11)
WBC # BLD AUTO: NORMAL 10E9/L (ref 4–11)

## 2018-05-01 PROCEDURE — 82962 GLUCOSE BLOOD TEST: CPT

## 2018-05-01 PROCEDURE — A9270 NON-COVERED ITEM OR SERVICE: HCPCS | Mod: GY | Performed by: STUDENT IN AN ORGANIZED HEALTH CARE EDUCATION/TRAINING PROGRAM

## 2018-05-01 PROCEDURE — A9270 NON-COVERED ITEM OR SERVICE: HCPCS | Mod: GY | Performed by: UROLOGY

## 2018-05-01 PROCEDURE — 25000132 ZZH RX MED GY IP 250 OP 250 PS 637: Mod: GY | Performed by: STUDENT IN AN ORGANIZED HEALTH CARE EDUCATION/TRAINING PROGRAM

## 2018-05-01 PROCEDURE — 36415 COLL VENOUS BLD VENIPUNCTURE: CPT | Performed by: UROLOGY

## 2018-05-01 PROCEDURE — 25800025 ZZH RX 258: Performed by: STUDENT IN AN ORGANIZED HEALTH CARE EDUCATION/TRAINING PROGRAM

## 2018-05-01 PROCEDURE — 76770 US EXAM ABDO BACK WALL COMP: CPT

## 2018-05-01 PROCEDURE — 25000132 ZZH RX MED GY IP 250 OP 250 PS 637: Mod: GY | Performed by: UROLOGY

## 2018-05-01 PROCEDURE — 25000125 ZZHC RX 250: Mod: GY | Performed by: STUDENT IN AN ORGANIZED HEALTH CARE EDUCATION/TRAINING PROGRAM

## 2018-05-01 PROCEDURE — 85025 COMPLETE CBC W/AUTO DIFF WBC: CPT | Performed by: UROLOGY

## 2018-05-01 PROCEDURE — 83036 HEMOGLOBIN GLYCOSYLATED A1C: CPT | Performed by: UROLOGY

## 2018-05-01 PROCEDURE — 80048 BASIC METABOLIC PNL TOTAL CA: CPT | Performed by: UROLOGY

## 2018-05-01 RX ORDER — ATORVASTATIN CALCIUM 20 MG/1
20 TABLET, FILM COATED ORAL DAILY
Status: DISCONTINUED | OUTPATIENT
Start: 2018-05-01 | End: 2018-05-01 | Stop reason: HOSPADM

## 2018-05-01 RX ORDER — DEXTROSE MONOHYDRATE 25 G/50ML
25-50 INJECTION, SOLUTION INTRAVENOUS
Status: DISCONTINUED | OUTPATIENT
Start: 2018-05-01 | End: 2018-05-01 | Stop reason: HOSPADM

## 2018-05-01 RX ORDER — ASPIRIN 81 MG
100 TABLET, DELAYED RELEASE (ENTERIC COATED) ORAL 2 TIMES DAILY
Qty: 45 TABLET | Refills: 1 | Status: ON HOLD | OUTPATIENT
Start: 2018-05-01 | End: 2020-09-20

## 2018-05-01 RX ORDER — OXYCODONE HYDROCHLORIDE 5 MG/1
5 TABLET ORAL
Qty: 14 TABLET | Refills: 0 | Status: SHIPPED | OUTPATIENT
Start: 2018-05-01 | End: 2018-05-23

## 2018-05-01 RX ORDER — NICOTINE POLACRILEX 4 MG
15-30 LOZENGE BUCCAL
Status: DISCONTINUED | OUTPATIENT
Start: 2018-05-01 | End: 2018-05-01 | Stop reason: HOSPADM

## 2018-05-01 RX ORDER — FENTANYL CITRATE 50 UG/ML
25-50 INJECTION, SOLUTION INTRAMUSCULAR; INTRAVENOUS
Status: DISCONTINUED | OUTPATIENT
Start: 2018-05-01 | End: 2018-05-01

## 2018-05-01 RX ORDER — ALBUTEROL SULFATE 90 UG/1
2 AEROSOL, METERED RESPIRATORY (INHALATION) EVERY 6 HOURS PRN
Status: DISCONTINUED | OUTPATIENT
Start: 2018-05-01 | End: 2018-05-01 | Stop reason: HOSPADM

## 2018-05-01 RX ORDER — FUROSEMIDE 20 MG
20 TABLET ORAL AT BEDTIME
Status: DISCONTINUED | OUTPATIENT
Start: 2018-05-01 | End: 2018-05-01 | Stop reason: HOSPADM

## 2018-05-01 RX ORDER — ACETAMINOPHEN 325 MG/1
650 TABLET ORAL EVERY 4 HOURS PRN
Qty: 100 TABLET | Refills: 0 | Status: SHIPPED | OUTPATIENT
Start: 2018-05-01 | End: 2021-04-21

## 2018-05-01 RX ADMIN — OXYCODONE HYDROCHLORIDE 10 MG: 5 TABLET ORAL at 09:15

## 2018-05-01 RX ADMIN — SODIUM CHLORIDE 6000 ML: 900 IRRIGANT IRRIGATION at 01:39

## 2018-05-01 RX ADMIN — OXYCODONE HYDROCHLORIDE 10 MG: 5 TABLET ORAL at 13:19

## 2018-05-01 RX ADMIN — ATORVASTATIN CALCIUM 20 MG: 20 TABLET, FILM COATED ORAL at 09:15

## 2018-05-01 RX ADMIN — OXYCODONE HYDROCHLORIDE 10 MG: 5 TABLET ORAL at 01:39

## 2018-05-01 RX ADMIN — ATROPA BELLADONNA AND OPIUM 1 SUPPOSITORY: 16.2; 3 SUPPOSITORY RECTAL at 09:15

## 2018-05-01 NOTE — DISCHARGE SUMMARY
[facility] Discharge Summary    Patient: Nahun Villalobos    MRN: [id number]   : 1945         Date of Admission:  2018   Date of Discharge::  2018   Admitting Physician:  Praveena Burris MD  Discharge Physician:  Kaylin Spivey PA-C             Admission Diagnoses:   1) Urinary Frequency   2) Gross Hematuria  3) S/P transurethral resection of prostate    Past Medical History:   Diagnosis Date     COPD (chronic obstructive pulmonary disease) (H)      DM (diabetes mellitus) (H)      Essential hypertension, benign      Hemorrhage of rectum and anus      Non-small cell lung cancer (H)      Obesity      Personal history of colonic polyps      Tobacco use disorder      Urinary retention              Discharge Diagnosis:   1) Urinary Frequency   2) Gross Hematuria  3) S/P transurethral resection of prostate    Past Medical History:   Diagnosis Date     COPD (chronic obstructive pulmonary disease) (H)      DM (diabetes mellitus) (H)      Essential hypertension, benign      Hemorrhage of rectum and anus      Non-small cell lung cancer (H)      Obesity      Personal history of colonic polyps      Tobacco use disorder      Urinary retention           Procedures:   Procedure(s): 18 - Transurethral resection of prostate  Dr. Praveena Burris, Urology            Medications Prior to Admission:     Prescriptions Prior to Admission   Medication Sig Dispense Refill Last Dose     albuterol (PROAIR HFA/PROVENTIL HFA/VENTOLIN HFA) 108 (90 BASE) MCG/ACT Inhaler Inhale 2 puffs into the lungs every 6 hours as needed for shortness of breath / dyspnea or wheezing 3 Inhaler 1 2018 at 1800     Ascorbic Acid (VITAMIN C PO) Take by mouth At Bedtime   2018 at 0800     aspirin 81 MG tablet Take 1 tablet (81 mg) by mouth daily (Patient taking differently: Take 81 mg by mouth At Bedtime ) 30 tablet 11 2018     atorvastatin (LIPITOR) 20 MG tablet Take 1 tablet (20 mg) by mouth daily 90  tablet 3 4/29/2018 at 2100     blood glucose (ACCU-CHEK CHACHA) test strip Use to test blood sugar 2 times daily or as directed. 60 strip 6 Past Month at Unknown time     blood glucose monitoring (SOFTCLIX) lancets Use to test blood sugar 2 times daily or as directed. 1 Box 6 Past Month at Unknown time     finasteride (PROSCAR) 5 MG tablet TAKE 1 TABLET EVERY DAY (Patient taking differently: TAKE 1 TABLET EVERY DAY AT BEDTIME) 90 tablet 3 4/29/2018 at 2100     furosemide (LASIX) 20 MG tablet Take 1 tablet (20 mg) by mouth 2 times daily (Patient taking differently: Take 20 mg by mouth At Bedtime ) 360 tablet 3 4/29/2018 at 2100     lisinopril (PRINIVIL/ZESTRIL) 40 MG tablet TAKE 1 TABLET (40 MG) BY MOUTH DAILY (Patient taking differently: Take 40 mg by mouth At Bedtime TAKE 1 TABLET (40 MG) BY MOUTH DAILY) 90 tablet 3 4/29/2018 at 2100     metFORMIN (GLUCOPHAGE) 500 MG tablet Take 1 tablet (500 mg) by mouth 2 times daily (with meals) (Patient taking differently: Take 500 mg by mouth At Bedtime ) 180 tablet 3 4/29/2018 at 2100     metoprolol succinate (TOPROL-XL) 25 MG 24 hr tablet Take 1 tablet (25 mg) by mouth daily 30 tablet 11 4/29/2018 at 2100     order for DME Equipment delivered; Respironics 760S BIPAP with heated humidification and heated tubing.  Pressure 15/7cm. Respironics Syeda View FF mask (Medium)   Unknown at Unknown time     order for DME Oxygen 2 Li/min  at night via nasal cannula 1 Device 0 Unknown at Unknown time     order for DME Equipment being ordered: diabetic shoes, 1 pair 1 Package 0 Unknown at Unknown time     tamsulosin (FLOMAX) 0.4 MG capsule Take 1 capsule (0.4 mg) by mouth daily (Patient taking differently: Take 0.4 mg by mouth At Bedtime ) 60 capsule 3 4/29/2018 at 2100             Discharge Medications:     Current Discharge Medication List      START taking these medications    Details   acetaminophen (TYLENOL) 325 MG tablet Take 2 tablets (650 mg) by mouth every 4 hours as needed for  mild pain  Qty: 100 tablet, Refills: 0    Associated Diagnoses: S/P transurethral resection of prostate      docusate sodium (COLACE) 100 MG tablet Take 100 mg by mouth 2 times daily To prevent constipation  Qty: 45 tablet, Refills: 1    Associated Diagnoses: S/P transurethral resection of prostate      oxyCODONE IR (ROXICODONE) 5 MG tablet Take 1 tablet (5 mg) by mouth every 3 hours as needed for pain  Qty: 14 tablet, Refills: 0    Associated Diagnoses: S/P transurethral resection of prostate         CONTINUE these medications which have NOT CHANGED    Details   albuterol (PROAIR HFA/PROVENTIL HFA/VENTOLIN HFA) 108 (90 BASE) MCG/ACT Inhaler Inhale 2 puffs into the lungs every 6 hours as needed for shortness of breath / dyspnea or wheezing  Qty: 3 Inhaler, Refills: 1    Associated Diagnoses: Chronic obstructive pulmonary disease, unspecified COPD type (H)      Ascorbic Acid (VITAMIN C PO) Take by mouth At Bedtime      aspirin 81 MG tablet Take 1 tablet (81 mg) by mouth daily  Qty: 30 tablet, Refills: 11    Associated Diagnoses: Type 2 diabetes, HbA1C goal < 8% (H)      atorvastatin (LIPITOR) 20 MG tablet Take 1 tablet (20 mg) by mouth daily  Qty: 90 tablet, Refills: 3    Associated Diagnoses: Hyperlipidemia LDL goal <100      blood glucose (ACCU-CHEK CHACHA) test strip Use to test blood sugar 2 times daily or as directed.  Qty: 60 strip, Refills: 6    Comments: Chacha Plus test strips  Associated Diagnoses: DM (diabetes mellitus) (H)      blood glucose monitoring (SOFTCLIX) lancets Use to test blood sugar 2 times daily or as directed.  Qty: 1 Box, Refills: 6    Associated Diagnoses: DM (diabetes mellitus) (H)      finasteride (PROSCAR) 5 MG tablet TAKE 1 TABLET EVERY DAY  Qty: 90 tablet, Refills: 3    Associated Diagnoses: Enlarged prostate      furosemide (LASIX) 20 MG tablet Take 1 tablet (20 mg) by mouth 2 times daily  Qty: 360 tablet, Refills: 3    Associated Diagnoses: Essential hypertension, benign       lisinopril (PRINIVIL/ZESTRIL) 40 MG tablet TAKE 1 TABLET (40 MG) BY MOUTH DAILY  Qty: 90 tablet, Refills: 3    Associated Diagnoses: Type 2 diabetes mellitus without complication, without long-term current use of insulin (H); Essential hypertension, benign      metFORMIN (GLUCOPHAGE) 500 MG tablet Take 1 tablet (500 mg) by mouth 2 times daily (with meals)  Qty: 180 tablet, Refills: 3    Associated Diagnoses: Type 2 diabetes mellitus without complication, without long-term current use of insulin (H)      metoprolol succinate (TOPROL-XL) 25 MG 24 hr tablet Take 1 tablet (25 mg) by mouth daily  Qty: 30 tablet, Refills: 11    Associated Diagnoses: Abnormal cardiovascular stress test      !! order for DME Equipment delivered; Respironics 760S BIPAP with heated humidification and heated tubing.  Pressure 15/7cm. Respironics Syeda View FF mask (Medium)      !! order for DME Oxygen 2 Li/min  at night via nasal cannula  Qty: 1 Device, Refills: 0    Associated Diagnoses: Nocturnal hypoxemia      !! order for DME Equipment being ordered: diabetic shoes, 1 pair  Qty: 1 Package, Refills: 0    Associated Diagnoses: Type 2 diabetes mellitus without complication, without long-term current use of insulin (H)      tamsulosin (FLOMAX) 0.4 MG capsule Take 1 capsule (0.4 mg) by mouth daily  Qty: 60 capsule, Refills: 3    Associated Diagnoses: Urinary urgency       !! - Potential duplicate medications found. Please discuss with provider.                Consultations:   Consultation during this admission received: None          Brief History of Illness:   Reason for admission requiring a surgical or invasive procedure:   Urinary Frequency    The patient underwent the following procedure(s):   See above   There were no immediate complications during this procedure.    Please refer to the full operative summary for details.           Hospital Course:   The patient's hospital course was remarkable for gross hematuria requiring him to spend  one night in the hospital on continuous bladder irrigation.  On POD#1 his irrigation was able to be weaned with his urine remaining cherry colored.  He underwent a voiding trial which was successful.  His post-void residual bladderscan was checked several times and was < 70cc. Nahun Villalobos was largely incontinent of urine due to urgency, but per discussions with him he had similar urge incontinence prior to the surgery.      He otherwise recovered as anticipated and experienced no post-operative complications. On POD #1 he was ambulating without assitance, tolerating the discharge diet, had pain controlled with PO medications to go home with, requiring no IV medications or fluids, and his oxygen had been weaned with O2 sats of 90-92% both at rest and after ambulating. He was discharged home with appropriate contact information, follow-up and instructions as seen below in the discharge paperwork.  He was instructed to continue his Flomax and Finasteride.         Discharge Labs:     Lab Results   Component Value Date    PSA 1.98 10/27/2017    PSA 1.74 03/31/2017    PSA 5.22 03/15/2016    PSA 4.23 05/26/2015    PSA 4.13 08/27/2013    PSA 4.21 04/25/2012    PSA 5.4 03/28/2012    PSA 4.21 02/22/2012    PSA 2.80 03/17/2010       Recent Labs  Lab 05/01/18  0851 05/01/18  0703 04/30/18  1431 04/30/18  0636   WBC 14.1* Canceled, Test credited 8.8  --    HGB 11.9* Canceled, Test credited 12.4* 13.1*    Canceled, Test credited 277  --        Recent Labs  Lab 05/01/18  0703 04/30/18  1431 04/30/18  0636    138  --    POTASSIUM 4.2 4.3 4.0   CHLORIDE 104 103  --    CO2 29 28  --    BUN 11 11  --    CR 1.06 1.03 0.96   * 134*  --    JESSE 8.3* 8.4*  --        Recent Labs  Lab 04/30/18  0730   COLOR Yellow   APPEARANCE Clear   URINEGLC Negative   URINEBILI Negative   URINEKETONE Negative   SG 1.015   URINEPH 5.5   PROTEIN 30*   NITRITE Negative   LEUKEST Negative   RBCU 3*   WBCU <1     Results for orders  placed or performed during the hospital encounter of 04/30/18   Urine Culture Aerobic Bacterial   Result Value Ref Range    Specimen Description Catheterized Urine     Special Requests Specimen received in preservative     Culture Micro <1000 colonies/mL  urogenital bruno      Results for orders placed or performed in visit on 02/07/18   Urine Culture Aerobic Bacterial   Result Value Ref Range    Specimen Description Unspecified Urine     Special Requests Specimen received in preservative     Culture Micro No growth        Results for orders placed or performed during the hospital encounter of 04/30/18   US Renal Complete    Narrative    EXAMINATION: US RENAL COMPLETE, 5/1/2018 10:43 AM     COMPARISON: CT abdomen pelvis 9/6/2013    HISTORY: Postop day 1 from TURP. Assess for hydronephrosis.    FINDINGS:    Right kidney: Measures 10.7 cm in length. Parenchyma is of normal  thickness and echogenicity. No focal mass. No hydronephrosis.    Left kidney: Measures 11.6 cm in length. Parenchyma is of normal  thickness and echogenicity. No focal mass. No hydronephrosis. Midpole  cortical based anechoic structure without internal or peripheral  vascularity measuring 2.1 x 2.3 x 2.3 cm most compatible with cyst  seen on CT.    Bladder: Decompressed with foci of air from recent instrumentation  with Gomez catheter.      Impression    IMPRESSION: No hydronephrosis on either side.    I have personally reviewed the examination and initial interpretation  and I agree with the findings.    BOSSMAN HOPE MD ([place])   UA with Microscopic   Result Value Ref Range    Color Urine Yellow     Appearance Urine Clear     Glucose Urine Negative NEG^Negative mg/dL    Bilirubin Urine Negative NEG^Negative    Ketones Urine Negative NEG^Negative mg/dL    Specific Gravity Urine 1.015 1.003 - 1.035    Blood Urine Negative NEG^Negative    pH Urine 5.5 5.0 - 7.0 pH    Protein Albumin Urine 30 (A) NEG^Negative mg/dL    Urobilinogen mg/dL Normal 0.0 -  2.0 mg/dL    Nitrite Urine Negative NEG^Negative    Leukocyte Esterase Urine Negative NEG^Negative    Source Catheterized Urine     WBC Urine <1 0 - 5 /HPF    RBC Urine 3 (H) 0 - 2 /HPF    Mucous Urine Present (A) NEG^Negative /LPF    Hyaline Casts 1 0 - 2 /LPF   Hemoglobin   Result Value Ref Range    Hemoglobin 13.1 (L) 13.3 - 17.7 g/dL   Creatinine   Result Value Ref Range    Creatinine 0.96 0.66 - 1.25 mg/dL    GFR Estimate 76 >60 mL/min/1.7m2    GFR Estimate If Black >90 >60 mL/min/1.7m2   Potassium   Result Value Ref Range    Potassium 4.0 3.4 - 5.3 mmol/L   Glucose by meter   Result Value Ref Range    Glucose 150 (H) 70 - 99 mg/dL   Glucose by meter   Result Value Ref Range    Glucose 179 (H) 70 - 99 mg/dL   CBC with platelets   Result Value Ref Range    WBC 8.8 4.0 - 11.0 10e9/L    RBC Count 3.91 (L) 4.4 - 5.9 10e12/L    Hemoglobin 12.4 (L) 13.3 - 17.7 g/dL    Hematocrit 38.4 (L) 40.0 - 53.0 %    MCV 98 78 - 100 fl    MCH 31.7 26.5 - 33.0 pg    MCHC 32.3 31.5 - 36.5 g/dL    RDW 12.6 10.0 - 15.0 %    Platelet Count 277 150 - 450 10e9/L   Basic metabolic panel   Result Value Ref Range    Sodium 138 133 - 144 mmol/L    Potassium 4.3 3.4 - 5.3 mmol/L    Chloride 103 94 - 109 mmol/L    Carbon Dioxide 28 20 - 32 mmol/L    Anion Gap 7 3 - 14 mmol/L    Glucose 134 (H) 70 - 99 mg/dL    Urea Nitrogen 11 7 - 30 mg/dL    Creatinine 1.03 0.66 - 1.25 mg/dL    GFR Estimate 71 >60 mL/min/1.7m2    GFR Estimate If Black 86 >60 mL/min/1.7m2    Calcium 8.4 (L) 8.5 - 10.1 mg/dL   Glucose by meter   Result Value Ref Range    Glucose 122 (H) 70 - 99 mg/dL   Basic metabolic panel   Result Value Ref Range    Sodium 138 133 - 144 mmol/L    Potassium 4.2 3.4 - 5.3 mmol/L    Chloride 104 94 - 109 mmol/L    Carbon Dioxide 29 20 - 32 mmol/L    Anion Gap 5 3 - 14 mmol/L    Glucose 136 (H) 70 - 99 mg/dL    Urea Nitrogen 11 7 - 30 mg/dL    Creatinine 1.06 0.66 - 1.25 mg/dL    GFR Estimate 68 >60 mL/min/1.7m2    GFR Estimate If Black 83 >60  mL/min/1.7m2    Calcium 8.3 (L) 8.5 - 10.1 mg/dL   CBC with platelets differential   Result Value Ref Range    WBC Canceled, Test credited 4.0 - 11.0 10e9/L    RBC Count Canceled, Test credited 4.4 - 5.9 10e12/L    Hemoglobin Canceled, Test credited 13.3 - 17.7 g/dL    Hematocrit Canceled, Test credited 40.0 - 53.0 %    MCV Canceled, Test credited 78 - 100 fl    MCH Canceled, Test credited 26.5 - 33.0 pg    MCHC Canceled, Test credited 31.5 - 36.5 g/dL    RDW Canceled, Test credited 10.0 - 15.0 %    Platelet Count Canceled, Test credited 150 - 450 10e9/L    Diff Method Canceled, Test credited    Hemoglobin A1c   Result Value Ref Range    Hemoglobin A1C Canceled, Test credited 0 - 6.4 %   CBC with platelets differential   Result Value Ref Range    WBC 14.1 (H) 4.0 - 11.0 10e9/L    RBC Count 3.70 (L) 4.4 - 5.9 10e12/L    Hemoglobin 11.9 (L) 13.3 - 17.7 g/dL    Hematocrit 37.6 (L) 40.0 - 53.0 %     (H) 78 - 100 fl    MCH 32.2 26.5 - 33.0 pg    MCHC 31.6 31.5 - 36.5 g/dL    RDW 12.7 10.0 - 15.0 %    Platelet Count 246 150 - 450 10e9/L    Diff Method Automated Method     % Neutrophils 71.9 %    % Lymphocytes 15.5 %    % Monocytes 10.8 %    % Eosinophils 1.3 %    % Basophils 0.2 %    % Immature Granulocytes 0.3 %    Nucleated RBCs 0 0 /100    Absolute Neutrophil 10.1 (H) 1.6 - 8.3 10e9/L    Absolute Lymphocytes 2.2 0.8 - 5.3 10e9/L    Absolute Monocytes 1.5 (H) 0.0 - 1.3 10e9/L    Absolute Eosinophils 0.2 0.0 - 0.7 10e9/L    Absolute Basophils 0.0 0.0 - 0.2 10e9/L    Abs Immature Granulocytes 0.0 0 - 0.4 10e9/L    Absolute Nucleated RBC 0.0    Hemoglobin A1c   Result Value Ref Range    Hemoglobin A1C 7.2 (H) 0 - 6.4 %   Glucose by meter   Result Value Ref Range    Glucose 153 (H) 70 - 99 mg/dL   Urine Culture Aerobic Bacterial   Result Value Ref Range    Specimen Description Catheterized Urine     Special Requests Specimen received in preservative     Culture Micro <1000 colonies/mL  urogenital bruno                Discharge Instructions and Follow-Up:   Diet:  - Consistent carbohydrate (diabetic) diet    Activity:   - No strenuous exercise for 4 weeks.   - No lifting, pushing, pulling more than 15 pounds for 4 weeks.   - Do not strain with bowel movements.   - Do not drive until you can press the brake pedal quickly and fully without pain.   - Do not operate a motor vehicle while taking narcotic pain medications.     Medications:   - PAIN: Oxycodone is a narcotic medication that has been prescribed for pain.  Narcotics will cause sleepiness and constipation and can become addictive, therefore it is best to stop or reduce them as soon as you can.  Any left over narcotics should be disposed of with an Authorized  for unneeded medications.  Contact your [facility]'s or [facility] [employer]'s household trash and recycling service to learn about medication disposal options and guidelines for your area.  If you decide to store this medication at home it should be kept in a locked cabinet to prevent access to children or visitors. To reduce your narcotic use, take Tylenol (acetaminophen 625mg) every 4-6 hours as needed.  This has been prescribed for you.  Do not take more than 4,000mg of Tylenol (acetaminophen/ APAP) from all sources in any 24 hour period since this can cause liver damage.  Do not take more than 2400mg of ibuprofen in any 24 hour period since this can cause kidney damage. Never drive, operate machinery or drink alcoholic beverages while you are taking narcotic pain medications.    - Narcotics can make you constipated. Take over the counter fiber (metamucil or benefiber) and stool softeners (miralax, docusate or senna) while using narcotic pain medications, but stop if you develop diarrhea.   - Continue your urinary medications (finasteride and tamsulosin) until advised to stop by your urologist.   - No driving or operating machinery while taking narcotic pain medications    Post-TURP Instructions:   - You may shower  24 hours after surgery  - Continue to take Flomax (tamsulosin) and Proscar (finasteride) until your followup appointment with Dr. Burris.  At that time, she may ask you to discontinue the medication.    - Drink 2-3L of water a day to keep your urine clear to light pink in color. Some blood in your urine can be expected but should clear with reducing your activity and increasing your water consumption  - Call or return sooner than your regularly scheduled visit if you develop any of the following:  Fever (greater than 101.3F), uncontrolled pain, uncontrolled nausea or vomiting, or if you are passing large clots, are unable to void, or if your urine will not lighten in color with increase water intake.       Follow-Up:   - Call your primary care provider to touch base regarding your recent admission.     - Follow up with Dr. Burris as scheduled in Urology Clinic    Phone numbers:  - Monday through Friday 8am to 4:30pm: call [phone].    - Nights or weekends, call [phone] and ask the  to page the urology resident on call.   - For emergencies, always call [phone]           Discharge Disposition:   Discharged to home      Attestation: I have reviewed today's vital signs, notes, medications, labs and imaging.    Mila Spivey PA-C  Urology Physician Assistant  Personal Pager: [phone]    Please call Job Code:   [phone] to reach the Urology resident or PA on call - Weekdays  [phone] to reach the Urology resident or PA on call - Weeknights and weekends    May 1, 2018

## 2018-05-01 NOTE — PLAN OF CARE
Problem: Patient Care Overview  Goal: Plan of Care/Patient Progress Review  Outcome: Improving  Afebrile,vss. Voiding and also incontinent in brief.  Pt. Walked rhodes with nurse and tolerated it. Discharge orders were written and prescriptions were picked up from pharmacy. Nst took pt. To front door via wheelchair to meet his son. Discharge orders were reviewed. Iv was dc'd.  Pt. Listened to dc orders which nurse read to him. He did not want to read them.

## 2018-05-01 NOTE — PLAN OF CARE
Problem: Patient Care Overview  Goal: Plan of Care/Patient Progress Review  Outcome: Improving  T-max 99.5, recheck 98.8. OVSS. Sats drop to 85-88% RA, 1-2L NC maintaining sats 92-95%. LS clear, dim. Chronic productive cough, swallowing sputum. Endorses dyspnea w/exertion, denies SOB at rest. Pain well managed with oral oxycodone. , 153, no insulin pen available, requested med and insulin not given. Tolerating diet w/o nausea. +BS, +flatus, no BM. Gomez catheter removed this morning at 9:30am, pt voiding in small amounts ~q30min, red-tinged urine, pt experiencing a lot of urgency which he states was present before surgery. Ambulating in room and to WC in rhodes w/SBA. Renal US w/o abnormality. PIV SL'D. Potential d/c this evening if able to maintain sats >92% on RA.

## 2018-05-01 NOTE — PLAN OF CARE
"Problem: Patient Care Overview  Goal: Plan of Care/Patient Progress Review  Outcome: No Change  /62 (BP Location: Left arm)  Pulse 81  Temp 98.7  F (37.1  C) (Oral)  Resp 22  Ht 1.689 m (5' 6.5\")  Wt 118.5 kg (261 lb 3.9 oz)  SpO2 93%  BMI 41.53 kg/m2  Patient afebrile, Vss. Patients pain controlled with oxy x1. Patients denies SOB/ nausea.Tolerating full liquid diet. Passing gas. Patient CBI running open.U/O continues to be red/pink..Good flow from philip. Patient appears to rest between cares.Continue with POC.              "

## 2018-05-01 NOTE — PROGRESS NOTES
"Urology  Progress Note    No acute events overnight  Denies pain  Has not gotten out of bed yet  CBI clamped this AM    Exam  /62 (BP Location: Left arm)  Pulse 81  Temp 98.7  F (37.1  C) (Oral)  Resp 22  Ht 1.689 m (5' 6.5\")  Wt 118.5 kg (261 lb 3.9 oz)  SpO2 93%  BMI 41.53 kg/m2  No acute distress  Unlabored breathing  Abdomen soft, nt/nd  Lower extremities non-edematous bilaterally  3 way in place with clear urine on slow drip      Labs 4/30  WBC 8.8  Hgb 12.4  Cr 1.03    AM labs pending    Assessment/Plan  72 year old y/o male POD#1 s/p TURP.  Postoperative course unremarkable    Neuro: Continue current pain regimen  CV: HDS, home metoprolol, furosemide and prn antihypertensives ordered; Will restart lisinopril pending AM labs  Pulm: incentive spirometry while awake  FEN/GI: Carb consistent diet; DC IVF  Endo: SSI  : TOV this morning; Follow up on AM creatinine and renal US for hydronephrosis  Heme/ID: No acute issues  Activity: No strenuous activity  PPx: SCDs    Seen and examined with the chief resident. Will discuss with Dr. Burris.    Michael Zimmerman, G2  Urology Resident     Contacting the Urology Team     Please use the following job codes to reach the Urology Team. Note that you must use an in house phone and that job codes cannot receive text pages.     On weekdays, dial [phone] (or star-star-star 777 on the new [email or web address] telephones) then [phone] to reach the Adult Urology resident or PA on call    On weekdays, dial [phone] (or star-star-star 777 on the new [email or web address] telephones) then [phone] to reach the Pediatric Urology resident    On weeknights and weekends, dial [phone] (or star-star-star 777 on the new [email or web address] telephones) then [phone] to reach the Urology resident on call (for both Adult and Pediatrics)          "

## 2018-05-01 NOTE — PLAN OF CARE
Problem: Patient Care Overview  Goal: Plan of Care/Patient Progress Review  Outcome: Improving  HTN, MD aware. PRN's ordered. BP below parameters. IPI 8. CO2 46. Thick productive cough. IS encouraged. CBI running open. Bladder irrigated by MD and once by nurse. Bladder spasm noted. B&O supp given. Denies n/v. Tolerating full liquid diet. Passing gas. Will continue with POC.

## 2018-05-02 ENCOUNTER — CARE COORDINATION (OUTPATIENT)
Dept: CARE COORDINATION | Facility: CLINIC | Age: 73
End: 2018-05-02

## 2018-05-02 ENCOUNTER — CARE COORDINATION (OUTPATIENT)
Dept: UROLOGY | Facility: CLINIC | Age: 73
End: 2018-05-02

## 2018-05-02 ENCOUNTER — TELEPHONE (OUTPATIENT)
Dept: UROLOGY | Facility: CLINIC | Age: 73
End: 2018-05-02

## 2018-05-02 LAB — GLUCOSE BLDC GLUCOMTR-MCNC: 125 MG/DL (ref 70–99)

## 2018-05-02 NOTE — PROGRESS NOTES
"Nahun Villalobos,    How are you doing after your surgical procedure on Monday    Any fever, chills, nausea or vomiting? no  How is your appetite? \"too goo\"  Are you drinking enough fluids? yes  Have you had a bowel movement since you have been home? yes  Are you having any difficulties with urination? no  Any problems with your catheter? na  Is your urine clear yellow?  No If not, what color is it? pink  How does your incision look? NA   How is your pain? Denies      Do you have any questions or concerns?    We have your post-operative appointment scheduled for May 23rd at 8:30am with Dr. Burris.    Please feel free to reply via [employer] or contact the clinic.  [phone], option #3 to speak to the nurse      "

## 2018-05-02 NOTE — TELEPHONE ENCOUNTER
Health Call Center    Phone Message    May a detailed message be left on voicemail: no    Reason for Call: Symptoms or Concerns     If patient has red-flag symptoms, warm transfer to triage line    Current symptom or concern: Blood clot coming out of bladder    Symptoms have been present for:  1 day(s)    Has patient previously been seen for this? No, Pt just wanted to know if having a blood clot after completing a procedure with Dr. Burris on 4/30 is normal. Please call Pt at cell phone on file.      Action Taken: Message routed to:  [facility]OLOG[facility]

## 2018-05-02 NOTE — TELEPHONE ENCOUNTER
Patient called and stated his urine is pink and he saw one blood clot.  He is able to urinate without problems but concerned about blood clot.  Told him to push fluids and watch for fever or not able to urinate. Mariaa Bocanegra LPN Staff Nurse

## 2018-05-07 LAB — COPATH REPORT: NORMAL

## 2018-05-11 DIAGNOSIS — R39.15 URINARY URGENCY: ICD-10-CM

## 2018-05-12 RX ORDER — TAMSULOSIN HYDROCHLORIDE 0.4 MG/1
0.4 CAPSULE ORAL DAILY
Qty: 90 CAPSULE | Refills: 3 | Status: ON HOLD | OUTPATIENT
Start: 2018-05-12 | End: 2020-09-20

## 2018-05-12 NOTE — TELEPHONE ENCOUNTER
tamsulosin (FLOMAX) 0.4 MG capsule   Last Written Prescription Date:  3/31/17  Last Fill Quantity: 60,   # refills: 3  Last Office Visit : 1/24/18  Future Office visit: 5/23/18

## 2018-05-14 ENCOUNTER — PRE VISIT (OUTPATIENT)
Dept: UROLOGY | Facility: CLINIC | Age: 73
End: 2018-05-14

## 2018-05-14 NOTE — TELEPHONE ENCOUNTER
Post op.  S/P TURP on 4-30-18.  Flow/pvr.  Records available in [facility].

## 2018-05-23 ENCOUNTER — OFFICE VISIT (OUTPATIENT)
Dept: UROLOGY | Facility: CLINIC | Age: 73
End: 2018-05-23
Payer: COMMERCIAL

## 2018-05-23 VITALS
HEIGHT: 67 IN | HEART RATE: 71 BPM | DIASTOLIC BLOOD PRESSURE: 82 MMHG | WEIGHT: 261 LBS | BODY MASS INDEX: 40.97 KG/M2 | SYSTOLIC BLOOD PRESSURE: 153 MMHG

## 2018-05-23 DIAGNOSIS — R39.198 SLOW URINARY STREAM: Primary | ICD-10-CM

## 2018-05-23 DIAGNOSIS — R35.1 NOCTURIA: ICD-10-CM

## 2018-05-23 DIAGNOSIS — R30.0 DYSURIA: ICD-10-CM

## 2018-05-23 ASSESSMENT — PAIN SCALES - GENERAL: PAINLEVEL: MILD PAIN (2)

## 2018-05-23 NOTE — NURSING NOTE
Chief Complaint   Patient presents with     Surgical Followup     Post op.  S/P TURP on 4-30-18     JESUS Carpenter

## 2018-05-23 NOTE — PROGRESS NOTES
UROLOGIC DIAGNOSES:     CURRENT INTERVENTIONS:       HISTORY:     Patient previously seen at [facility].   Was seen for elevated PSA and lower urinary tract symptoms.   Patient s/p prostate biopsy four years ago for elevated PSA.   PSA was 7.5 six months ago and six months prior to that was 5.22.   Had lower urinary tract symptoms similar to listed below. Was prescribed tamsulosin and finasteride but unclear if patient started these rx.     Patient with urgency, nocturia 2-4x, frequency, incomplete emptying.   Notes variable stream from dribble to moderate stream.     Most recent PSA is 1.98 (though actually ~ 4 as patient is taking finasteride).   Patient continues to note lower urinary tract symptoms despite combination therapy and would like to consider further intervention.     Patient notes that his stream is much improved and that he is happy with his symptoms. Patient notes new onset urgency and occasional UUI but this is improving.     We discussed pathology results (negative), time line for improvement of symptoms, and follow up schedule.         PAST MEDICAL HISTORY:   Past Medical History:   Diagnosis Date     COPD (chronic obstructive pulmonary disease) (H)      DM (diabetes mellitus) (H)      Essential hypertension, benign      Hemorrhage of rectum and anus      Non-small cell lung cancer (H)      Obesity      Personal history of colonic polyps      Tobacco use disorder      Urinary retention        PAST SURGICAL HISTORY:   Past Surgical History:   Procedure Laterality Date     APPENDECTOMY       C NONSPECIFIC PROCEDURE      cholecystectomy     CHOLECYSTECTOMY       CYSTOSCOPY, TRANSURETHRAL RESECTION (TUR) PROSTATE, COMBINED N/A 4/30/2018    Procedure: COMBINED CYSTOSCOPY, TRANSURETHRAL RESECTION (TUR) PROSTATE;  Cystoscopy, Transurethral Resection Of The Prostate;  Surgeon: Praveena Burris MD;  Location: [facility] OR     WEDGE RESECTION      lung       FAMILY HISTORY:   Family History   Problem  Relation Age of Onset     Hypertension Mother      C.A.D. Mother      ANEURYSM     Cancer - colorectal Mother      CANCER Mother      OVARIAN     Hypertension Sister      Breast Cancer Sister      CEREBROVASCULAR DISEASE Father        SOCIAL HISTORY:   Social History   Substance Use Topics     Smoking status: Former Smoker     Packs/day: 1.00     Years: 52.00     Types: Cigarettes     Quit date: 6/1/2013     Smokeless tobacco: Never Used     Alcohol use No       Current Outpatient Prescriptions   Medication     acetaminophen (TYLENOL) 325 MG tablet     albuterol (PROAIR HFA/PROVENTIL HFA/VENTOLIN HFA) 108 (90 BASE) MCG/ACT Inhaler     Ascorbic Acid (VITAMIN C PO)     aspirin 81 MG tablet     atorvastatin (LIPITOR) 20 MG tablet     blood glucose (ACCU-CHEK CHACHA) test strip     blood glucose monitoring (SOFTCLIX) lancets     docusate sodium (COLACE) 100 MG tablet     finasteride (PROSCAR) 5 MG tablet     furosemide (LASIX) 20 MG tablet     lisinopril (PRINIVIL/ZESTRIL) 40 MG tablet     metFORMIN (GLUCOPHAGE) 500 MG tablet     metoprolol succinate (TOPROL-XL) 25 MG 24 hr tablet     order for DME     order for DME     order for DME     oxyCODONE IR (ROXICODONE) 5 MG tablet     tamsulosin (FLOMAX) 0.4 MG capsule     No current facility-administered medications for this visit.          PHYSICAL EXAM:    There were no vitals taken for this visit.    HEENT: Normocephalic and atraumatic   Cardiac: Not done  Back/Flank: Not done  CNS/PNS: Not done  Respiratory: Normal non-labored breathing  Abdomen: Soft nontender and nondistended  Peripheral Vascular: Not done  Mental Status: Not done    Penis: Not done  Scrotal Skin: Not done  Testicles: Not done  Epididymis: Not done  Digital Rectal Exam:     Cystoscopy: Not done    Imaging: None    Urinalysis: [facility] RESULTS:  Recent Labs   Lab Test  02/22/12   1449   COLOR  Yellow   APPEARANCE  Clear   URINEGLC  Negative   URINEBILI  Negative   URINEKETONE  Negative   SG  1.015   UBLD   Negative   URINEPH  6.0   PROTEIN  Negative   UROBILINOGEN  0.2   NITRITE  Negative   LEUKEST  Negative       PSA: 1.98    Post Void Residual:     Other labs: None today      IMPRESSION:  71 y/o M with history of elevated PSA and lower urinary tract symptoms     PLAN:   Hold tamsulosin   Continue finasteride   Uroflow/PVR in six weeks   Follow up in six weeks    Total Time: 15 minutes                                      Total in Consultation: greater than 50%

## 2018-05-23 NOTE — MR AVS SNAPSHOT
After Visit Summary   5/23/2018    Nahun Villalobos    MRN: [id number]           Patient Information     Date Of Birth          1945        Visit Information        Provider Department      5/23/2018 8:30 AM Praveena Burris MD [facility] Urology and [facility] [place] for Prostate and Urologic Cancers        Today's Diagnoses     Slow urinary stream    -  1    Nocturia          Care Instructions    STOP taking the Tamsulosin (Flomax).  CONTINUE taking Finasteride.    Follow up with Dr. Burris in 6 weeks.  Please try to come to this appointment with a full bladder.    It was a pleasure meeting with you today.  Thank you for allowing me and my team the privilege of caring for you today.  YOU are the reason we are here, and I truly hope we provided you with the excellent service you deserve.  Please let us know if there is anything else we can do for you so that we can be sure you are leaving completely satisfied with your care experience.        Krystal Velázquez ANTONIETTA          Follow-ups after your visit        Your next 10 appointments already scheduled     Jun 29, 2018  1:00 PM CDT   (Arrive by 12:45 PM)   Return Visit with Praveena Burris MD   [facility]rology and [facility] [place] for Prostate and Urologic Cancers ([facility])    [address]  [address] [facility] [address] [phone]            Jul 30, 2018  9:20 AM CDT   Office Visit with Olegario Castillo MD   [facility] ([facility])    [address] [address] [phone]           Bring a current list of meds and any records pertaining to this visit. For Physicals, please bring immunization records and any forms needing to be filled out. Please arrive 10 minutes early to complete paperwork.              Who to contact     Please call your clinic at [phone] to:    Ask questions about your health    Make or cancel  "appointments    Discuss your medicines    Learn about your test results    Speak to your doctor            Additional Information About Your Visit        [employer] Information     [employer] gives you secure access to your electronic health record. If you see a primary care provider, you can also send messages to your care team and make appointments. If you have questions, please call your primary care clinic.  If you do not have a primary care provider, please call [phone] and they will assist you.      [employer] is an electronic gateway that provides easy, online access to your medical records. With [employer], you can request a clinic appointment, read your test results, renew a prescription or communicate with your care team.     To access your existing account, please contact your [facility] or call [phone] for assistance.        Care EveryWhere ID     This is your Care EveryWhere ID. This could be used by other organizations to access your [place] medical records  [id number]        Your Vitals Were     Pulse Height BMI (Body Mass Index)             71 1.689 m (5' 6.5\") 41.5 kg/m2          Blood Pressure from Last 3 Encounters:   05/23/18 153/82   05/01/18 143/77   04/26/18 138/70    Weight from Last 3 Encounters:   05/23/18 118.4 kg (261 lb)   04/30/18 118.5 kg (261 lb 3.9 oz)   04/26/18 114.3 kg (252 lb)              We Performed the Following     POST-VOID RESIDUAL BLADDER SCAN          Today's Medication Changes          These changes are accurate as of 5/23/18  9:09 AM.  If you have any questions, ask your nurse or doctor.               These medicines have changed or have updated prescriptions.        Dose/Directions    aspirin 81 MG tablet   This may have changed:  when to take this   Used for:  Type 2 diabetes, HbA1C goal < 8% (H)        Dose:  81 mg   Take 1 tablet (81 mg) by mouth daily   Quantity:  30 tablet   Refills:  11       finasteride 5 MG tablet   Commonly " known as:  PROSCAR   This may have changed:  See the new instructions.   Used for:  Enlarged prostate        TAKE 1 TABLET EVERY DAY   Quantity:  90 tablet   Refills:  3       furosemide 20 MG tablet   Commonly known as:  LASIX   This may have changed:  when to take this   Used for:  Essential hypertension, benign        Dose:  20 mg   Take 1 tablet (20 mg) by mouth 2 times daily   Quantity:  360 tablet   Refills:  3       lisinopril 40 MG tablet   Commonly known as:  PRINIVIL/ZESTRIL   This may have changed:    - how much to take  - how to take this  - when to take this  - additional instructions   Used for:  Type 2 diabetes mellitus without complication, without long-term current use of insulin (H), Essential hypertension, benign        TAKE 1 TABLET (40 MG) BY MOUTH DAILY   Quantity:  90 tablet   Refills:  3       metFORMIN 500 MG tablet   Commonly known as:  GLUCOPHAGE   This may have changed:  when to take this   Used for:  Type 2 diabetes mellitus without complication, without long-term current use of insulin (H)        Dose:  500 mg   Take 1 tablet (500 mg) by mouth 2 times daily (with meals)   Quantity:  180 tablet   Refills:  3         Stop taking these medicines if you haven't already. Please contact your care team if you have questions.     oxyCODONE IR 5 MG tablet   Commonly known as:  ROXICODONE   Stopped by:  Praveena Burris MD                    Primary Care Provider Office Phone # Fax #    Olegario Castillo [phone]       [address] [place] [address]        Equal Access to Services     [facility] AH: Hadii rufina araujo Soaltagracia, waaxda luqadaha, qaybta kaalmada roman, erika campos. So [facility] [phone].    ATENCIÓN: Si habla español, tiene a huggins disposición servicios gratuitos de asistencia lingüística. Katie al [phone].    We comply with applicable federal civil rights laws and [place] laws. We do not discriminate on  the basis of race, color, national origin, age, disability, sex, sexual orientation, or gender identity.            Thank you!     Thank you for choosing [facility] UROLOGY AND [facility] FOR PROSTATE AND UROLOGIC CANCERS  for your care. Our goal is always to provide you with excellent care. Hearing back from our patients is one way we can continue to improve our services. Please take a few minutes to complete the written survey that you may receive in the mail after your visit with us. Thank you!             Your Updated Medication List - Protect others around you: Learn how to safely use, store and throw away your medicines at www.disposemymeds.org.          This list is accurate as of 5/23/18  9:09 AM.  Always use your most recent med list.                   Brand Name Dispense Instructions for use Diagnosis    acetaminophen 325 MG tablet    TYLENOL    100 tablet    Take 2 tablets (650 mg) by mouth every 4 hours as needed for mild pain    S/P transurethral resection of prostate       albuterol 108 (90 Base) MCG/ACT Inhaler    PROAIR HFA/PROVENTIL HFA/VENTOLIN HFA    3 Inhaler    Inhale 2 puffs into the lungs every 6 hours as needed for shortness of breath / dyspnea or wheezing    Chronic obstructive pulmonary disease, unspecified COPD type (H)       aspirin 81 MG tablet     30 tablet    Take 1 tablet (81 mg) by mouth daily    Type 2 diabetes, HbA1C goal < 8% (H)       atorvastatin 20 MG tablet    LIPITOR    90 tablet    Take 1 tablet (20 mg) by mouth daily    Hyperlipidemia LDL goal <100       blood glucose monitoring lancets     1 Box    Use to test blood sugar 2 times daily or as directed.    DM (diabetes mellitus) (H)       blood glucose monitoring test strip    ACCU-CHEK CHACHA    60 strip    Use to test blood sugar 2 times daily or as directed.    DM (diabetes mellitus) (H)       docusate sodium 100 MG tablet    COLACE    45 tablet    Take 100 mg by mouth 2 times daily To prevent constipation    S/P transurethral  resection of prostate       finasteride 5 MG tablet    PROSCAR    90 tablet    TAKE 1 TABLET EVERY DAY    Enlarged prostate       furosemide 20 MG tablet    LASIX    360 tablet    Take 1 tablet (20 mg) by mouth 2 times daily    Essential hypertension, benign       lisinopril 40 MG tablet    PRINIVIL/ZESTRIL    90 tablet    TAKE 1 TABLET (40 MG) BY MOUTH DAILY    Type 2 diabetes mellitus without complication, without long-term current use of insulin (H), Essential hypertension, benign       metFORMIN 500 MG tablet    GLUCOPHAGE    180 tablet    Take 1 tablet (500 mg) by mouth 2 times daily (with meals)    Type 2 diabetes mellitus without complication, without long-term current use of insulin (H)       metoprolol succinate 25 MG 24 hr tablet    TOPROL-XL    30 tablet    Take 1 tablet (25 mg) by mouth daily    Abnormal cardiovascular stress test       order for DME      Equipment delivered; Respironics 760S BIPAP with heated humidification and heated tubing.  Pressure 15/7cm. Respironics Syeda View FF mask (Medium)        * order for DME     1 Device    Oxygen 2 Li/min at night via nasal cannula    Nocturnal hypoxemia       * order for DME     1 Package    Equipment being ordered: diabetic shoes, 1 pair    Type 2 diabetes mellitus without complication, without long-term current use of insulin (H)       tamsulosin 0.4 MG capsule    FLOMAX    90 capsule    Take 1 capsule (0.4 mg) by mouth daily    Urinary urgency       VITAMIN C PO      Take by mouth At Bedtime        * Notice:  This list has 2 medication(s) that are the same as other medications prescribed for you. Read the directions carefully, and ask your doctor or other care provider to review them with you.

## 2018-05-23 NOTE — PATIENT INSTRUCTIONS
STOP taking the Tamsulosin (Flomax).  CONTINUE taking Finasteride.    Follow up with Dr. Burris in 6 weeks.  Please try to come to this appointment with a full bladder.    It was a pleasure meeting with you today.  Thank you for allowing me and my team the privilege of caring for you today.  YOU are the reason we are here, and I truly hope we provided you with the excellent service you deserve.  Please let us know if there is anything else we can do for you so that we can be sure you are leaving completely satisfied with your care experience.        JESUS Carpenter

## 2018-06-01 DIAGNOSIS — I10 ESSENTIAL HYPERTENSION, BENIGN: ICD-10-CM

## 2018-06-01 RX ORDER — FUROSEMIDE 20 MG
TABLET ORAL
Qty: 360 TABLET | Refills: 0 | Status: SHIPPED | OUTPATIENT
Start: 2018-06-01 | End: 2018-11-01

## 2018-06-01 NOTE — TELEPHONE ENCOUNTER
"Requested Prescriptions   Pending Prescriptions Disp Refills     furosemide (LASIX) 20 MG tablet [Pharmacy Med Name: FUROSEMIDE 20 MG TABLET]  Last Written Prescription Date:  12/06/2017  Last Fill Quantity: 360,  # refills: 3   Last office visit: 4/26/2018 with prescribing provider:  4/26/2018   Future Office Visit:   Next 5 appointments (look out 90 days)     Jul 30, 2018  9:20 AM CDT   Office Visit with Olegario Castillo MD   [facility] ([facility])    [address] [address] [phone]                  360 tablet 0     Sig: TAKE 2 TABLETS (40 MG) BY MOUTH 2 TIMES DAILY    Diuretics (Including Combos) Protocol Failed    6/1/2018  1:20 AM       Failed - Blood pressure under 140/90 in past 12 months    BP Readings from Last 3 Encounters:   05/23/18 153/82   05/01/18 143/77   04/26/18 138/70                Passed - Recent (12 mo) or future (30 days) visit within the authorizing provider's specialty    Patient had office visit in the last 12 months or has a visit in the next 30 days with authorizing provider or within the authorizing provider's specialty.  See \"Patient Info\" tab in inbasket, or \"Choose Columns\" in Meds & Orders section of the refill encounter.           Passed - Patient is age 18 or older       Passed - Normal serum creatinine on file in past 12 months    Recent Labs   Lab Test  05/01/18   0703   CR  1.06             Passed - Normal serum potassium on file in past 12 months    Recent Labs   Lab Test  05/01/18   0703   POTASSIUM  4.2                   Passed - Normal serum sodium on file in past 12 months    Recent Labs   Lab Test  05/01/18   0703   NA  138                "

## 2018-06-18 ENCOUNTER — PRE VISIT (OUTPATIENT)
Dept: UROLOGY | Facility: CLINIC | Age: 73
End: 2018-06-18

## 2018-06-18 NOTE — TELEPHONE ENCOUNTER
Patient is coming in to see  for a Flow/PVR, called patient and left a message to please come with a full bladder to appointment,

## 2018-06-25 ENCOUNTER — NURSE TRIAGE (OUTPATIENT)
Dept: NURSING | Facility: CLINIC | Age: 73
End: 2018-06-25

## 2018-06-26 ENCOUNTER — APPOINTMENT (OUTPATIENT)
Dept: CT IMAGING | Facility: CLINIC | Age: 73
End: 2018-06-26
Attending: EMERGENCY MEDICINE
Payer: MEDICARE

## 2018-06-26 ENCOUNTER — HOSPITAL ENCOUNTER (EMERGENCY)
Facility: CLINIC | Age: 73
Discharge: HOME | End: 2018-06-26
Attending: EMERGENCY MEDICINE | Admitting: EMERGENCY MEDICINE
Payer: MEDICARE

## 2018-06-26 VITALS
BODY MASS INDEX: 38.73 KG/M2 | OXYGEN SATURATION: 95 % | WEIGHT: 243.61 LBS | HEART RATE: 72 BPM | TEMPERATURE: 98.9 F | RESPIRATION RATE: 20 BRPM | DIASTOLIC BLOOD PRESSURE: 88 MMHG | SYSTOLIC BLOOD PRESSURE: 186 MMHG

## 2018-06-26 DIAGNOSIS — R82.81 PYURIA: ICD-10-CM

## 2018-06-26 DIAGNOSIS — R31.0 GROSS HEMATURIA: ICD-10-CM

## 2018-06-26 DIAGNOSIS — N40.0 PROSTATE HYPERTROPHY: ICD-10-CM

## 2018-06-26 LAB
ALBUMIN UR-MCNC: 100 MG/DL
ANION GAP SERPL CALCULATED.3IONS-SCNC: 8 MMOL/L (ref 3–14)
APPEARANCE UR: CLEAR
BASOPHILS # BLD AUTO: 0.1 10E9/L (ref 0–0.2)
BASOPHILS NFR BLD AUTO: 0.8 %
BILIRUB UR QL STRIP: NEGATIVE
BUN SERPL-MCNC: 14 MG/DL (ref 7–30)
CALCIUM SERPL-MCNC: 9 MG/DL (ref 8.5–10.1)
CHLORIDE SERPL-SCNC: 104 MMOL/L (ref 94–109)
CO2 SERPL-SCNC: 25 MMOL/L (ref 20–32)
COLOR UR AUTO: YELLOW
CREAT SERPL-MCNC: 1.02 MG/DL (ref 0.66–1.25)
DIFFERENTIAL METHOD BLD: ABNORMAL
EOSINOPHIL # BLD AUTO: 0.4 10E9/L (ref 0–0.7)
EOSINOPHIL NFR BLD AUTO: 3.6 %
ERYTHROCYTE [DISTWIDTH] IN BLOOD BY AUTOMATED COUNT: 12.3 % (ref 10–15)
GFR SERPL CREATININE-BSD FRML MDRD: 71 ML/MIN/1.7M2
GLUCOSE SERPL-MCNC: 183 MG/DL (ref 70–99)
GLUCOSE UR STRIP-MCNC: NEGATIVE MG/DL
HCT VFR BLD AUTO: 38.9 % (ref 40–53)
HGB BLD-MCNC: 12.4 G/DL (ref 13.3–17.7)
HGB UR QL STRIP: ABNORMAL
IMM GRANULOCYTES # BLD: 0 10E9/L (ref 0–0.4)
IMM GRANULOCYTES NFR BLD: 0.2 %
KETONES UR STRIP-MCNC: NEGATIVE MG/DL
LEUKOCYTE ESTERASE UR QL STRIP: ABNORMAL
LYMPHOCYTES # BLD AUTO: 3.7 10E9/L (ref 0.8–5.3)
LYMPHOCYTES NFR BLD AUTO: 35.1 %
MCH RBC QN AUTO: 31.4 PG (ref 26.5–33)
MCHC RBC AUTO-ENTMCNC: 31.9 G/DL (ref 31.5–36.5)
MCV RBC AUTO: 99 FL (ref 78–100)
MONOCYTES # BLD AUTO: 0.8 10E9/L (ref 0–1.3)
MONOCYTES NFR BLD AUTO: 8 %
MUCOUS THREADS #/AREA URNS LPF: PRESENT /LPF
NEUTROPHILS # BLD AUTO: 5.5 10E9/L (ref 1.6–8.3)
NEUTROPHILS NFR BLD AUTO: 52.3 %
NITRATE UR QL: NEGATIVE
NRBC # BLD AUTO: 0 10*3/UL
NRBC BLD AUTO-RTO: 0 /100
PH UR STRIP: 5 PH (ref 5–7)
PLATELET # BLD AUTO: 276 10E9/L (ref 150–450)
POTASSIUM SERPL-SCNC: 3.7 MMOL/L (ref 3.4–5.3)
RBC # BLD AUTO: 3.95 10E12/L (ref 4.4–5.9)
RBC #/AREA URNS AUTO: 99 /HPF (ref 0–2)
SODIUM SERPL-SCNC: 137 MMOL/L (ref 133–144)
SOURCE: ABNORMAL
SP GR UR STRIP: 1.02 (ref 1–1.03)
UROBILINOGEN UR STRIP-MCNC: 0 MG/DL (ref 0–2)
WBC # BLD AUTO: 10.6 10E9/L (ref 4–11)
WBC #/AREA URNS AUTO: 35 /HPF (ref 0–5)

## 2018-06-26 PROCEDURE — 80048 BASIC METABOLIC PNL TOTAL CA: CPT | Performed by: EMERGENCY MEDICINE

## 2018-06-26 PROCEDURE — 81001 URINALYSIS AUTO W/SCOPE: CPT | Performed by: EMERGENCY MEDICINE

## 2018-06-26 PROCEDURE — 96360 HYDRATION IV INFUSION INIT: CPT | Mod: 59

## 2018-06-26 PROCEDURE — 85025 COMPLETE CBC W/AUTO DIFF WBC: CPT | Performed by: EMERGENCY MEDICINE

## 2018-06-26 PROCEDURE — 25000128 H RX IP 250 OP 636: Performed by: EMERGENCY MEDICINE

## 2018-06-26 PROCEDURE — 74177 CT ABD & PELVIS W/CONTRAST: CPT

## 2018-06-26 PROCEDURE — 87086 URINE CULTURE/COLONY COUNT: CPT | Performed by: EMERGENCY MEDICINE

## 2018-06-26 PROCEDURE — 99285 EMERGENCY DEPT VISIT HI MDM: CPT | Mod: 25

## 2018-06-26 RX ORDER — LIDOCAINE 40 MG/G
CREAM TOPICAL
Status: DISCONTINUED | OUTPATIENT
Start: 2018-06-26 | End: 2018-06-26 | Stop reason: HOSPADM

## 2018-06-26 RX ORDER — IOPAMIDOL 755 MG/ML
500 INJECTION, SOLUTION INTRAVASCULAR ONCE
Status: COMPLETED | OUTPATIENT
Start: 2018-06-26 | End: 2018-06-26

## 2018-06-26 RX ORDER — CIPROFLOXACIN 250 MG/1
250 TABLET, FILM COATED ORAL 2 TIMES DAILY
Qty: 6 TABLET | Refills: 0 | Status: SHIPPED | OUTPATIENT
Start: 2018-06-26 | End: 2018-07-24

## 2018-06-26 RX ADMIN — SODIUM CHLORIDE 55 ML: 9 INJECTION, SOLUTION INTRAVENOUS at 02:39

## 2018-06-26 RX ADMIN — SODIUM CHLORIDE 500 ML: 9 INJECTION, SOLUTION INTRAVENOUS at 01:42

## 2018-06-26 RX ADMIN — IOPAMIDOL 100 ML: 755 INJECTION, SOLUTION INTRAVENOUS at 02:39

## 2018-06-26 ASSESSMENT — ENCOUNTER SYMPTOMS
HEADACHES: 0
ABDOMINAL PAIN: 0
DIZZINESS: 1
HEMATURIA: 1
BACK PAIN: 0
COLOR CHANGE: 0
WEAKNESS: 0

## 2018-06-26 NOTE — ED PROVIDER NOTES
History     Chief Complaint:  Hematuria         HPI   Nahun Villalobos is a 72 year old male who presents with hematuria. Patient had prostate surgery about two months ago (early May) and passed some blood clots along with hematuria right afterwards. Today his symptoms resurfaced and he notice dark urine that then cleared, but later had passage of a clot associated with pain that has also since resolved. He notes clear urine on last urination. He reports his urine is back to normal, however, he has been feeling dizzy. He reports the dizziness began yesterday afternoon. He denies headaches, one side weakness, or any abdominal/ back pain. He reports no other abnormal bleeding or bruising. He is currently not on any blood thinners and is otherwise feeling healthy.         Allergies:  No Known Drug Allergies    Medications:    Albuterol  Aspirin   Atorvastatin   Docusate sodium  Finasteride   Furosemide   Lisinopril  Metformin   Tamsulosin     Past Medical History:    COPD  DM  Essential hypertension   Hemorrhage of rectum and anus   Cell lung cancer  Obesity   Tobacco use disorder  Urinary retention   Lumbago   Hematuria  Elevated PSA  BPPV  Type two diabetes   Hyperlipidemia      Past Surgical History:    Appendectomy  Cholecystectomy  Cystoscopy, transurethral resection prostate combined  Wedge resection lung    Family History:    CAD  Cancer-colorectal  Hypertension  Cerebrovascular disease   Breast cancer    Social History:  Smoking Status: Former smoker  Smokeless Tobacco: No  Alcohol Use: No  Marital Status:   [5]      Review of Systems   Gastrointestinal: Negative for abdominal pain.   Genitourinary: Positive for hematuria.   Musculoskeletal: Negative for back pain.   Skin: Negative for color change.   Neurological: Positive for dizziness. Negative for weakness and headaches.   All other systems reviewed and are negative.    Physical Exam   Vitals:  Patient Vitals for the past 24 hrs:   BP Temp Temp  src Pulse Heart Rate Resp SpO2 Weight   06/26/18 0354 - - - 72 - - 95 % -   06/26/18 0350 - - - - - - 95 % -   06/26/18 0106 186/88 98.9  F (37.2  C) Temporal - 82 20 - 110.5 kg (243 lb 9.7 oz)       Physical Exam  General: Elderly male sitting upright  Eyes: PERRL, Conjunctive within normal limits  ENT: Moist mucous membranes, oropharynx clear.   CV: Normal S1S2, no murmur, rub or gallop. Regular rate and rhythm  Resp: Clear to auscultation bilaterally, no wheezes, rales or rhonchi. Normal respiratory effort.  GI: Abdomen is soft, nontender and nondistended. No palpable masses. No rebound or guarding. No CVA tenderness to percussion.   MSK:  Nontender. Normal active range of motion. Bilateral lower extremity edema ankles and feet  Skin: Warm and dry. No rashes or lesions or ecchymoses on visible skin.  Neuro: Alert and oriented. Responds appropriately to all questions and commands. No focal findings appreciated. Normal muscle tone.  Psych: Normal mood and affect. Pleasant.  Emergency Department Course   Imaging:  Radiology findings were communicated with the patient who voiced understanding of the findings.  CT Abdomen Pelvis w contrast   IMPRESSION:  1. Enlarged prostate. No other cause of hematuria identified.  2. Tiny benign angiomyolipoma right kidney.  3. Enlarged lymph node in the mesentery is indeterminant. Lymphoma or  other malignant adenopathy is not excluded. Recommend further  follow-up and clinical correlation.  Reading per radiology.    Laboratory:  Laboratory findings were communicated with the patient who voiced understanding of the findings.  UA with microscopic: Urine blood Large (A); Protein albumin Urine 100 (A); Leukocytes esterase urine Trace (A); WBC/HPF 35 (H); RBC/HPF 99 (H); Mucous Urine Present (A) O/W WNL  Urine Culture Aerobic Bacterial (Pending)  CBC: RBC 3.95 (L); HGB 12.4 (L);HCT 38.9 (L) O/W WNL ( )  BMP: Glucose 183 (H) O/W WNL (Creatinine 1.02)      Emergency Department  Course:  Nursing notes and vitals reviewed.  I performed an exam of the patient as documented above.   0315 I checked in with the patient.    The patient provided a urine sample here in the emergency department. This was sent for laboratory testing, findings above.  IV was inserted and blood was drawn for laboratory testing, results above.  The patient was sent for a CT Abdomen and pelvis while in the emergency department, results above.     I personally reviewed the laboratory results with the patient and answered all related questions prior to discharge. He denies any new concerns. Feels comfortable.     Findings and plan explained to the Patient. Patient discharged home with instructions regarding supportive care, medications, and reasons to return. The importance of close follow-up was reviewed.   Impression & Plan      Medical Decision Making:  Nahun Villalobos a 72-year-old male approximately 6 weeks status post prostate surgery who presents emergency room with concerns for hematuria. He does have hematuria and pyuria on urine analysis. There is mild pain associated with clot passage, but otherwise no dysuria. There has no fever or leukocytosis.  He is slightly anemic but not worrisomely so from an emergent  standpoint. He is asymptomatic otherwise on presentation here.  He has no associated abdominal pain but CT was obtained given the unclear cause for hematuria. Aside from prostate hypertrophy that other causes.  I did consider UTI and prescribed ciprofloxacin 250 mg twice a day for 3 days with following up with urine culture. He is recommended to see his urologist within 3 days.  Return immediately with worsening symptoms.  All questions answered prior to discharge.      Diagnosis:    ICD-10-CM    1. Gross hematuria R31.0    2. Pyuria N39.0    3. Prostate hypertrophy N40.0        Disposition:   Discharged       Discharge Medications:  Discharge Medication List as of 6/26/2018  3:46 AM      START taking these  medications    Details   ciprofloxacin (CIPRO) 250 MG tablet Take 1 tablet (250 mg) by mouth 2 times daily, Disp-6 tablet, R-0, Local Print             Scribe Disclosure:  I, Sandra Neely, am serving as a scribe at 1:18 AM on 6/26/2018 to document services personally performed by Mary Ellen Miguel MD, based on my observations and the provider's statements to me.  [facility] EMERGENCY DEPARTMENT       Mary Ellen Miguel MD  06/27/18 2841

## 2018-06-26 NOTE — ED AVS SNAPSHOT
[facility] Emergency Department    [address]    [facility] [address]    Phone:  [phone]    Fax:  [phone]                                       Nahun Villalobos   MRN: [id number]    Department:  [facility] Emergency Department   Date of Visit:  6/26/2018           After Visit Summary Signature Page     I have received my discharge instructions, and my questions have been answered. I have discussed any challenges I see with this plan with the nurse or doctor.    ..........................................................................................................................................  Patient/Patient Representative Signature      ..........................................................................................................................................  Patient Representative Print Name and Relationship to Patient    ..................................................               ................................................  Date                                            Time    ..........................................................................................................................................  Reviewed by Signature/Title    ...................................................              ..............................................  Date                                                            Time

## 2018-06-26 NOTE — DISCHARGE INSTRUCTIONS
Blood in the Urine    Blood in the urine (hematuria) has many possible causes. If it occurs after an injury (such as a car accident or fall), it is most often a sign of bruising to the kidney or bladder. Common causes of blood in the urine include urinary tract infections, kidney stones, inflammation, tumors, or certain other diseases of the kidney or bladder. Menstruation can cause blood to appear in the urine sample, although it is not coming from the urinary tract.  If only a trace amount of blood is present, it will show up on the urine test, even though the urine may be yellow and not pink or red. This may occur with any of the above conditions, as well as heavy exercise or high fever. In this case, your doctor may want to repeat the urine test on another day. This will show if the blood is still present. If it is, then other tests can be done to find out the cause.  Home care  Follow these home care guidelines:    If your urine does not appear bloody (pink, brown or red) then you do not need to restrict your activity in any way.    If you can see blood in your urine, rest and avoid heavy exertion until your next exam. Do not use aspirin, blood thinners, or anti-platelet or anti-inflammatory medicines. These include ibuprofen and naproxen. These thin the blood and may increase bleeding.  Follow-up care  Follow up with your healthcare provider, or as advised. If you were injured and had blood in your urine, you should have a repeat urine test in 1 to 2 days. Contact your doctor for this test.  A radiologist will review any X-rays that were taken. You will be told of any new findings that may affect your care.  When to seek medical advice  Call your healthcare provider right away if any of these occur:    Bright red blood or blood clots in the urine (if you did not have this before)    Weakness, dizziness or fainting    New groin, abdominal, or back pain    Fever of 100.4 F (38 C) or higher, or as directed by  your healthcare provider    Repeated vomiting    Bleeding from the nose or gums or easy bruising  Date Last Reviewed: 9/1/2016 2000-2017 [employer]. [address] [place] [address], [place] [address]. All rights reserved. This information is not intended as a substitute for professional medical care. Always follow your healthcare professional's instructions.      Discharge Instructions  Urinary Tract Infection (Possible cause of your signs/symptoms)  You or your child have been diagnosed with a urinary tract infection, or UTI. The urinary tract includes the kidneys (which make urine/pee), ureters (the tubes that carry urine/pee from the kidneys to the bladder), the bladder (which stores urine/pee), and urethra (the tube that carries urine/pee out of the bladder). Urinary tract infections occur when bacteria travel up the urethra into the bladder (bladder infection) and, in some cases, from there into the kidneys (kidney infection).  Generally, every Emergency Department visit should have a follow-up clinic visit with either a primary or a specialty clinic/provider. Please follow-up as instructed by your emergency provider today.  Return to the Emergency Department if:    You or your child have severe back pain.    You or your child are vomiting (throwing up) so that you cannot take your medicine.    You or your child have a new fever (had not previously had a fever) over 101 F.    You or your child have confusion or are very weak, or feel very ill.    Your child seems much more ill, will not wake up, will not respond right, or is crying for a long time and will not calm down.    You or your child are showing signs of dehydration. These signs may include decreased urination (pee), dry mouth/gums/tongue, or decreased activity.    Follow-up with your provider:     Children under 24 months need to be seen by their regular provider within one week after a diagnosis of a UTI. It may be necessary to do some more  tests to look at the child s kidney or bladder.    You should begin to feel better within 24 - 48 hours of starting your antibiotic; follow-up with your regular clinic/doctor/provider if this is not the case.    Treatment:     You will be treated with an antibiotic to kill the bacteria. We have to make an educated guess, based on what we know about common bacteria and antibiotics, as to which antibiotic will work for your infection. We will be correct most times but there will be some cases where the antibiotic chosen is not correct (see urine cultures below).    Take a pain medication such as acetaminophen (Tylenol ) or ibuprofen (Advil , Motrin , Nuprin ).    Phenazopyridine (Pyridium , Uristat ) is a prescription medication that numbs the bladder to reduce the burning pain of some UTIs.  The same medication is available in a non-prescription version (Azo-Standard , Urodol ). This medication will change the color of the urine and tears (usually blue or orange). If you wear contacts, do not wear them while taking this medication as they may be stained by the medication.    Urine Cultures:    If indicated, a urine culture may have been performed today. This test generally takes 24-48 hours to complete so the results are not known at this time. The results can confirm that an infection is present but also determine which antibiotic is effective for the specific bacteria that is causing the infection. If your urine culture shows that the antibiotic you were given today will not work to treat your infection, we will attempt to contact you to make arrangements to change the antibiotic. If the culture confirms that the antibiotic is effective for your infection, you will not be contacted. We often recommend follow-up with your regular physician/provider on the culture results regardless of this process.    Antibiotic Warning:     If you have been placed on antibiotics - watch for signs of allergic reaction.  These  "include rash, lip swelling, difficulty breathing, wheezing, and dizziness.  If you develop any of these symptoms, stop the antibiotic immediately and go to an emergency room or urgent care for evaluation.    Probiotics: If you have been given an antibiotic, you may want to also take a probiotic pill or eat yogurt with live cultures. Probiotics have \"good bacteria\" to help your intestines stay healthy. Studies have shown that probiotics help prevent diarrhea and other intestine problems (including C. diff infection) when you take antibiotics. You can buy these without a prescription in the pharmacy section of the store.   If you were given a prescription for medicine here today, be sure to read all of the information (including the package insert) that comes with your prescription.  This will include important information about the medicine, its side effects, and any warnings that you need to know about.  The pharmacist who fills the prescription can provide more information and answer questions you may have about the medicine.  If you have questions or concerns that the pharmacist cannot address, please call or return to the Emergency Department.   Remember that you can always come back to the Emergency Department if you are not able to see your regular provider in the amount of time listed above, if you get any new symptoms, or if there is anything that worries you.  "

## 2018-06-26 NOTE — TELEPHONE ENCOUNTER
Patient reports that he had a procedure done for his enlarged prostate about a month ago. Said he wears a diaper. He urinated earlier this evening and reports his urine looked like coffee. He passed a quarter size blood clot with his urine. Said he had a lot of burning when he urinated. At first during triage, patient denied feeling faint or dizzy. At the end of triage, he reported feeling a little dizzy. Was told that if he feels like he will pass out, he needs to call 911. Otherwise, he should have another adult drive him to the ER now for evaluation.     Protocol and care advice reviewed.   Caller states understanding of the recommended disposition.    Mee Cortez RN/FNA      Reason for Disposition    Passing pure blood or large blood clots (i.e., size > a dime) (Exception: margaret or small strands)    Protocols used: URINE - BLOOD IN-ADULT-

## 2018-06-26 NOTE — ED AVS SNAPSHOT
[facility] Emergency Department    [address]    [facility] [address]    Phone:  [phone]    Fax:  [phone]                                       Nahun Villalobos   MRN: [id number]    Department:  [facility] Emergency Department   Date of Visit:  6/26/2018           Patient Information     Date Of Birth          1945        Your diagnoses for this visit were:     Gross hematuria     Pyuria     Prostate hypertrophy        You were seen by Mary Ellen Miguel MD.      Follow-up Information     Follow up with Your urologist.    Why:  within 3-4 days        Follow up with [facility] Emergency Department.    Specialty:  EMERGENCY MEDICINE    Why:  As needed, If symptoms worsen    Contact information:    [address] [place] [id number] [phone]        Discharge Instructions         Blood in the Urine    Blood in the urine (hematuria) has many possible causes. If it occurs after an injury (such as a car accident or fall), it is most often a sign of bruising to the kidney or bladder. Common causes of blood in the urine include urinary tract infections, kidney stones, inflammation, tumors, or certain other diseases of the kidney or bladder. Menstruation can cause blood to appear in the urine sample, although it is not coming from the urinary tract.  If only a trace amount of blood is present, it will show up on the urine test, even though the urine may be yellow and not pink or red. This may occur with any of the above conditions, as well as heavy exercise or high fever. In this case, your doctor may want to repeat the urine test on another day. This will show if the blood is still present. If it is, then other tests can be done to find out the cause.  Home care  Follow these home care guidelines:    If your urine does not appear bloody (pink, brown or red) then you do not need to restrict your activity in any way.    If you  can see blood in your urine, rest and avoid heavy exertion until your next exam. Do not use aspirin, blood thinners, or anti-platelet or anti-inflammatory medicines. These include ibuprofen and naproxen. These thin the blood and may increase bleeding.  Follow-up care  Follow up with your healthcare provider, or as advised. If you were injured and had blood in your urine, you should have a repeat urine test in 1 to 2 days. Contact your doctor for this test.  A radiologist will review any X-rays that were taken. You will be told of any new findings that may affect your care.  When to seek medical advice  Call your healthcare provider right away if any of these occur:    Bright red blood or blood clots in the urine (if you did not have this before)    Weakness, dizziness or fainting    New groin, abdominal, or back pain    Fever of 100.4 F (38 C) or higher, or as directed by your healthcare provider    Repeated vomiting    Bleeding from the nose or gums or easy bruising  Date Last Reviewed: 9/1/2016 2000-2017 [employer]. [address] [place] [address]. All rights reserved. This information is not intended as a substitute for professional medical care. Always follow your healthcare professional's instructions.      Discharge Instructions  Urinary Tract Infection (Possible cause of your signs/symptoms)  You or your child have been diagnosed with a urinary tract infection, or UTI. The urinary tract includes the kidneys (which make urine/pee), ureters (the tubes that carry urine/pee from the kidneys to the bladder), the bladder (which stores urine/pee), and urethra (the tube that carries urine/pee out of the bladder). Urinary tract infections occur when bacteria travel up the urethra into the bladder (bladder infection) and, in some cases, from there into the kidneys (kidney infection).  Generally, every Emergency Department visit should have a follow-up clinic visit with either a primary or a  specialty clinic/provider. Please follow-up as instructed by your emergency provider today.  Return to the Emergency Department if:    You or your child have severe back pain.    You or your child are vomiting (throwing up) so that you cannot take your medicine.    You or your child have a new fever (had not previously had a fever) over 101 F.    You or your child have confusion or are very weak, or feel very ill.    Your child seems much more ill, will not wake up, will not respond right, or is crying for a long time and will not calm down.    You or your child are showing signs of dehydration. These signs may include decreased urination (pee), dry mouth/gums/tongue, or decreased activity.    Follow-up with your provider:     Children under 24 months need to be seen by their regular provider within one week after a diagnosis of a UTI. It may be necessary to do some more tests to look at the child s kidney or bladder.    You should begin to feel better within 24 - 48 hours of starting your antibiotic; follow-up with your regular clinic/doctor/provider if this is not the case.    Treatment:     You will be treated with an antibiotic to kill the bacteria. We have to make an educated guess, based on what we know about common bacteria and antibiotics, as to which antibiotic will work for your infection. We will be correct most times but there will be some cases where the antibiotic chosen is not correct (see urine cultures below).    Take a pain medication such as acetaminophen (Tylenol ) or ibuprofen (Advil , Motrin , Nuprin ).    Phenazopyridine (Pyridium , Uristat ) is a prescription medication that numbs the bladder to reduce the burning pain of some UTIs.  The same medication is available in a non-prescription version (Azo-Standard , Urodol ). This medication will change the color of the urine and tears (usually blue or orange). If you wear contacts, do not wear them while taking this medication as they may be  "stained by the medication.    Urine Cultures:    If indicated, a urine culture may have been performed today. This test generally takes 24-48 hours to complete so the results are not known at this time. The results can confirm that an infection is present but also determine which antibiotic is effective for the specific bacteria that is causing the infection. If your urine culture shows that the antibiotic you were given today will not work to treat your infection, we will attempt to contact you to make arrangements to change the antibiotic. If the culture confirms that the antibiotic is effective for your infection, you will not be contacted. We often recommend follow-up with your regular physician/provider on the culture results regardless of this process.    Antibiotic Warning:     If you have been placed on antibiotics - watch for signs of allergic reaction.  These include rash, lip swelling, difficulty breathing, wheezing, and dizziness.  If you develop any of these symptoms, stop the antibiotic immediately and go to an emergency room or urgent care for evaluation.    Probiotics: If you have been given an antibiotic, you may want to also take a probiotic pill or eat yogurt with live cultures. Probiotics have \"good bacteria\" to help your intestines stay healthy. Studies have shown that probiotics help prevent diarrhea and other intestine problems (including C. diff infection) when you take antibiotics. You can buy these without a prescription in the pharmacy section of the store.   If you were given a prescription for medicine here today, be sure to read all of the information (including the package insert) that comes with your prescription.  This will include important information about the medicine, its side effects, and any warnings that you need to know about.  The pharmacist who fills the prescription can provide more information and answer questions you may have about the medicine.  If you have questions " or concerns that the pharmacist cannot address, please call or return to the Emergency Department.   Remember that you can always come back to the Emergency Department if you are not able to see your regular provider in the amount of time listed above, if you get any new symptoms, or if there is anything that worries you.    Your next 10 appointments already scheduled     Jun 29, 2018  1:00 PM CDT   (Arrive by 12:45 PM)   Return Visit with Praveena Burris MD   [facility] ([facility])    [address] [facility] [address] [phone]            Jul 30, 2018  9:20 AM CDT   Office Visit with Olegario Castillo MD   [facility]    [address] [address] [phone]           Bring a current list of meds and any records pertaining to this visit. For Physicals, please bring immunization records and any forms needing to be filled out. Please arrive 10 minutes early to complete paperwork.              24 Hour Appointment Hotline       To make an appointment at any [facility], call [phone] ([phone]). If you don't have a family doctor or clinic, we will help you find one. [facility] are conveniently located to serve the needs of you and your family.             Review of your medicines      START taking        Dose / Directions Last dose taken    ciprofloxacin 250 MG tablet   Commonly known as:  CIPRO   Dose:  250 mg   Quantity:  6 tablet        Take 1 tablet (250 mg) by mouth 2 times daily   Refills:  0          Our records show that you are taking the medicines listed below. If these are incorrect, please call your family doctor or clinic.        Dose / Directions Last dose taken    acetaminophen 325 MG tablet   Commonly known as:  TYLENOL   Dose:  650 mg   Quantity:  100 tablet        Take 2 tablets (650  mg) by mouth every 4 hours as needed for mild pain   Refills:  0        albuterol 108 (90 Base) MCG/ACT Inhaler   Commonly known as:  PROAIR HFA/PROVENTIL HFA/VENTOLIN HFA   Dose:  2 puff   Quantity:  3 Inhaler        Inhale 2 puffs into the lungs every 6 hours as needed for shortness of breath / dyspnea or wheezing   Refills:  1        aspirin 81 MG tablet   Dose:  81 mg   Quantity:  30 tablet        Take 1 tablet (81 mg) by mouth daily   Refills:  11        atorvastatin 20 MG tablet   Commonly known as:  LIPITOR   Dose:  20 mg   Quantity:  90 tablet        Take 1 tablet (20 mg) by mouth daily   Refills:  3        blood glucose monitoring lancets   Quantity:  1 Box        Use to test blood sugar 2 times daily or as directed.   Refills:  6        blood glucose monitoring test strip   Commonly known as:  ACCU-CHEK CHACHA   Quantity:  60 strip        Use to test blood sugar 2 times daily or as directed.   Refills:  6        docusate sodium 100 MG tablet   Commonly known as:  COLACE   Dose:  100 mg   Quantity:  45 tablet        Take 100 mg by mouth 2 times daily To prevent constipation   Refills:  1        finasteride 5 MG tablet   Commonly known as:  PROSCAR   Quantity:  90 tablet        TAKE 1 TABLET EVERY DAY   Refills:  3        * furosemide 20 MG tablet   Commonly known as:  LASIX   Dose:  20 mg   Quantity:  360 tablet        Take 1 tablet (20 mg) by mouth 2 times daily   Refills:  3        * furosemide 20 MG tablet   Commonly known as:  LASIX   Quantity:  360 tablet        TAKE 2 TABLETS (40 MG) BY MOUTH 2 TIMES DAILY   Refills:  0        lisinopril 40 MG tablet   Commonly known as:  PRINIVIL/ZESTRIL   Quantity:  90 tablet        TAKE 1 TABLET (40 MG) BY MOUTH DAILY   Refills:  3        metFORMIN 500 MG tablet   Commonly known as:  GLUCOPHAGE   Dose:  500 mg   Quantity:  180 tablet        Take 1 tablet (500 mg) by mouth 2 times daily (with meals)   Refills:  3        metoprolol succinate 25 MG 24 hr tablet    Commonly known as:  TOPROL-XL   Dose:  25 mg   Quantity:  30 tablet        Take 1 tablet (25 mg) by mouth daily   Refills:  11        order for DME        Equipment delivered; Respironics 760S BIPAP with heated humidification and heated tubing.  Pressure 15/7cm. Respironics Syeda View FF mask (Medium)   Refills:  0        * order for DME   Quantity:  1 Device        Oxygen 2 Li/min at night via nasal cannula   Refills:  0        * order for DME   Quantity:  1 Package        Equipment being ordered: diabetic shoes, 1 pair   Refills:  0        tamsulosin 0.4 MG capsule   Commonly known as:  FLOMAX   Dose:  0.4 mg   Quantity:  90 capsule        Take 1 capsule (0.4 mg) by mouth daily   Refills:  3        VITAMIN C PO        Take by mouth At Bedtime   Refills:  0        * Notice:  This list has 4 medication(s) that are the same as other medications prescribed for you. Read the directions carefully, and ask your doctor or other care provider to review them with you.            Prescriptions were sent or printed at these locations (1 Prescription)                   Other Prescriptions                Printed at Department/Unit printer (1 of 1)         ciprofloxacin (CIPRO) 250 MG tablet                Procedures and tests performed during your visit     Basic metabolic panel    CBC with platelets differential    CT Abdomen Pelvis w Contrast    Peripheral IV catheter    UA with Microscopic    Urine Culture Aerobic Bacterial      Orders Needing Specimen Collection     None      Pending Results     Date and Time Order Name Status Description    6/26/2018 0257 Urine Culture Aerobic Bacterial In process             Pending Culture Results     Date and Time Order Name Status Description    6/26/2018 0257 Urine Culture Aerobic Bacterial In process             Pending Results Instructions     If you had any lab results that were not finalized at the time of your Discharge, you can call the ED Lab Result RN at [phone]. You  will be contacted by this team for any positive Lab results or changes in treatment. The nurses are available 7 days a week from 10A to 6:30P.  You can leave a message 24 hours per day and they will return your call.        Test Results From Your Hospital Stay        6/26/2018  1:56 AM      Component Results     Component Value Ref Range & Units Status    WBC 10.6 4.0 - 11.0 10e9/L Final    RBC Count 3.95 (L) 4.4 - 5.9 10e12/L Final    Hemoglobin 12.4 (L) 13.3 - 17.7 g/dL Final    Hematocrit 38.9 (L) 40.0 - 53.0 % Final    MCV 99 78 - 100 fl Final    MCH 31.4 26.5 - 33.0 pg Final    MCHC 31.9 31.5 - 36.5 g/dL Final    RDW 12.3 10.0 - 15.0 % Final    Platelet Count 276 150 - 450 10e9/L Final    Diff Method Automated Method  Final    % Neutrophils 52.3 % Final    % Lymphocytes 35.1 % Final    % Monocytes 8.0 % Final    % Eosinophils 3.6 % Final    % Basophils 0.8 % Final    % Immature Granulocytes 0.2 % Final    Nucleated RBCs 0 0 /100 Final    Absolute Neutrophil 5.5 1.6 - 8.3 10e9/L Final    Absolute Lymphocytes 3.7 0.8 - 5.3 10e9/L Final    Absolute Monocytes 0.8 0.0 - 1.3 10e9/L Final    Absolute Eosinophils 0.4 0.0 - 0.7 10e9/L Final    Absolute Basophils 0.1 0.0 - 0.2 10e9/L Final    Abs Immature Granulocytes 0.0 0 - 0.4 10e9/L Final    Absolute Nucleated RBC 0.0  Final         6/26/2018  2:10 AM      Component Results     Component Value Ref Range & Units Status    Sodium 137 133 - 144 mmol/L Final    Potassium 3.7 3.4 - 5.3 mmol/L Final    Chloride 104 94 - 109 mmol/L Final    Carbon Dioxide 25 20 - 32 mmol/L Final    Anion Gap 8 3 - 14 mmol/L Final    Glucose 183 (H) 70 - 99 mg/dL Final    Urea Nitrogen 14 7 - 30 mg/dL Final    Creatinine 1.02 0.66 - 1.25 mg/dL Final    GFR Estimate 71 >60 mL/min/1.7m2 Final    Non  GFR Calc    GFR Estimate If Black 86 >60 mL/min/1.7m2 Final    African American GFR Calc    Calcium 9.0 8.5 - 10.1 mg/dL Final         6/26/2018  2:57 AM      Component Results      Component Value Ref Range & Units Status    Color Urine Yellow  Final    Appearance Urine Clear  Final    Glucose Urine Negative NEG^Negative mg/dL Final    Bilirubin Urine Negative NEG^Negative Final    Ketones Urine Negative NEG^Negative mg/dL Final    Specific Gravity Urine 1.024 1.003 - 1.035 Final    Blood Urine Large (A) NEG^Negative Final    pH Urine 5.0 5.0 - 7.0 pH Final    Protein Albumin Urine 100 (A) NEG^Negative mg/dL Final    Urobilinogen mg/dL 0.0 0.0 - 2.0 mg/dL Final    Nitrite Urine Negative NEG^Negative Final    Leukocyte Esterase Urine Trace (A) NEG^Negative Final    Source Midstream Urine  Final    WBC Urine 35 (H) 0 - 5 /HPF Final    RBC Urine 99 (H) 0 - 2 /HPF Final    Mucous Urine Present (A) NEG^Negative /LPF Final         6/26/2018  3:09 AM      Narrative     CT ABDOMEN PELVIS W CONTRAST  6/26/2018 2:40 AM     HISTORY: Hematuria.     TECHNIQUE: Volumetric acquisition through abdomen and pelvis with IV  contrast. 100 mL Isovue-370. Radiation dose for this scan was reduced  using automated exposure control, adjustment of the mA and/or kV  according to patient size, or iterative reconstruction technique.    COMPARISON: None.    FINDINGS: The liver, gallbladder, spleen, pancreas, adrenal glands and  kidneys demonstrate no worrisome findings. Bilateral renal cysts.  Small subcentimeter lesion in the lower right kidney containing fat  consistent with a benign angiomyolipoma. No hydronephrosis.    There is an enlarged lymph node in the mesentery of the midabdomen  measuring 3.4 x 4.2 cm. A few adjacent small mesenteric nodes.    Diverticulosis of the sigmoid colon without diverticulitis. Enlarged  prostate. Mild degenerative and hypertrophic changes in the visualized  spine. No destructive bone lesions.        Impression     IMPRESSION:  1. Enlarged prostate. No other cause of hematuria identified.  2. Tiny benign angiomyolipoma right kidney.  3. Enlarged lymph node in the mesentery is  indeterminant. Lymphoma or  other malignant adenopathy is not excluded. Recommend further  follow-up and clinical correlation.    AUGUSTINE TRAN MD         6/26/2018  3:02 AM                Clinical Quality Measure: Blood Pressure Screening     Your blood pressure was checked while you were in the emergency department today. The last reading we obtained was  BP: 186/88 . Please read the guidelines below about what these numbers mean and what you should do about them.  If your systolic blood pressure (the top number) is less than 120 and your diastolic blood pressure (the bottom number) is less than 80, then your blood pressure is normal. There is nothing more that you need to do about it.  If your systolic blood pressure (the top number) is 120-139 or your diastolic blood pressure (the bottom number) is 80-89, your blood pressure may be higher than it should be. You should have your blood pressure rechecked within a year by a primary care provider.  If your systolic blood pressure (the top number) is 140 or greater or your diastolic blood pressure (the bottom number) is 90 or greater, you may have high blood pressure. High blood pressure is treatable, but if left untreated over time it can put you at risk for heart attack, stroke, or kidney failure. You should have your blood pressure rechecked by a primary care provider within the next 4 weeks.  If your provider in the emergency department today gave you specific instructions to follow-up with your doctor or provider even sooner than that, you should follow that instruction and not wait for up to 4 weeks for your follow-up visit.        Thank you for choosing [facility]       Thank you for choosing [facility] for your care. Our goal is always to provide you with excellent care. Hearing back from our patients is one way we can continue to improve our services. Please take a few minutes to complete the written survey that you may receive in the mail after you visit  with us. Thank you!        [employer] Information     [employer] gives you secure access to your electronic health record. If you see a primary care provider, you can also send messages to your care team and make appointments. If you have questions, please call your primary care clinic.  If you do not have a primary care provider, please call [phone] and they will assist you.        Care EveryWhere ID     This is your Care EveryWhere ID. This could be used by other organizations to access your [place] medical records  [id number]        Equal Access to Services     MALLORY VAZQUEZ : Humberto araujo Soaltagracia, wapatito terrell, aj kaalbright owens, erika campos. So [facility] [phone].    ATENCIÓN: Si habla español, tiene a huggins disposición servicios gratuitos de asistencia lingüística. Llame al [phone].    We comply with applicable federal civil rights laws and [place] laws. We do not discriminate on the basis of race, color, national origin, age, disability, sex, sexual orientation, or gender identity.            After Visit Summary       This is your record. Keep this with you and show to your community pharmacist(s) and doctor(s) at your next visit.

## 2018-06-27 LAB
BACTERIA SPEC CULT: NO GROWTH
Lab: NORMAL
SPECIMEN SOURCE: NORMAL

## 2018-06-28 ENCOUNTER — OFFICE VISIT (OUTPATIENT)
Dept: OPTOMETRY | Facility: CLINIC | Age: 73
End: 2018-06-28
Payer: COMMERCIAL

## 2018-06-28 ENCOUNTER — TELEPHONE (OUTPATIENT)
Dept: INTERNAL MEDICINE | Facility: CLINIC | Age: 73
End: 2018-06-28

## 2018-06-28 DIAGNOSIS — H52.4 PRESBYOPIA: Primary | ICD-10-CM

## 2018-06-28 DIAGNOSIS — H52.203 HYPEROPIA OF BOTH EYES WITH ASTIGMATISM: ICD-10-CM

## 2018-06-28 DIAGNOSIS — E11.9 TYPE 2 DIABETES MELLITUS WITHOUT RETINOPATHY (H): ICD-10-CM

## 2018-06-28 DIAGNOSIS — H52.03 HYPEROPIA OF BOTH EYES WITH ASTIGMATISM: ICD-10-CM

## 2018-06-28 DIAGNOSIS — H26.9 BILATERAL INCIPIENT CATARACTS: ICD-10-CM

## 2018-06-28 PROCEDURE — 92015 DETERMINE REFRACTIVE STATE: CPT | Performed by: OPTOMETRIST

## 2018-06-28 PROCEDURE — 92004 COMPRE OPH EXAM NEW PT 1/>: CPT | Performed by: OPTOMETRIST

## 2018-06-28 ASSESSMENT — REFRACTION_MANIFEST
OS_SPHERE: +0.25
OD_SPHERE: PLANO
OS_AXIS: 071
OS_AXIS: 078
OD_ADD: +2.50
OD_SPHERE: -1.50
OS_CYLINDER: +0.25
OD_CYLINDER: +0.75
OD_AXIS: 096
OD_AXIS: 120
OS_CYLINDER: +0.50
OS_ADD: +2.50
OD_CYLINDER: +0.75
OS_SPHERE: +0.50
METHOD_AUTOREFRACTION: 1

## 2018-06-28 ASSESSMENT — TONOMETRY
OD_IOP_MMHG: 15
IOP_METHOD: APPLANATION
OS_IOP_MMHG: 15

## 2018-06-28 ASSESSMENT — EXTERNAL EXAM - RIGHT EYE: OD_EXAM: NORMAL

## 2018-06-28 ASSESSMENT — VISUAL ACUITY
CORRECTION_TYPE: GLASSES
METHOD: SNELLEN - LINEAR
OD_SC: 20/200
OS_SC: 20/30
OD_SC+: -2
OD_SC: 20/50
OS_SC: 20/60
OS_SC+: -1

## 2018-06-28 ASSESSMENT — CONF VISUAL FIELD
OS_NORMAL: 1
OD_NORMAL: 1

## 2018-06-28 ASSESSMENT — EXTERNAL EXAM - LEFT EYE: OS_EXAM: NORMAL

## 2018-06-28 ASSESSMENT — SLIT LAMP EXAM - LIDS
COMMENTS: NORMAL
COMMENTS: NORMAL

## 2018-06-28 ASSESSMENT — CUP TO DISC RATIO
OD_RATIO: 0.3
OS_RATIO: 0.3

## 2018-06-28 NOTE — PROGRESS NOTES
Chief Complaint   Patient presents with     COMPREHENSIVE EYE EXAM     Blurry vision     Diabetic Eye Exam      Lab Results   Component Value Date    A1C 7.2 05/01/2018    A1C Canceled, Test credited 05/01/2018    A1C 6.6 12/07/2017    A1C 6.2 12/13/2016    A1C 6.1 03/15/2016      he does not want glasses     Last Eye Exam: 4yrs  Dilated Previously: Yes    What are you currently using to see?  readers       Distance Vision Acuity: Noticed gradual change in both eyes    Near Vision Acuity: Satisfied with vision while reading  with readers    Eye Comfort: good  Do you use eye drops? : No  Occupation or Hobbies: Everyday  Has 3 [employer]         HPI    Symptoms:     Floaters   Ghost images                      Medical, surgical and family histories reviewed and updated 6/28/2018.       OBJECTIVE: See Ophthalmology exam    ASSESSMENT:    ICD-10-CM    1. Presbyopia H52.4    2. Type 2 diabetes mellitus without retinopathy (H) E11.9    3. Bilateral incipient cataracts H26.9       PLAN:   Optional distance prescription visual acuity slightly improved    Shouldn't have a prescription restriction on license, due for renewal in Oct    Madelyn Cordero OD

## 2018-06-28 NOTE — TELEPHONE ENCOUNTER
application for disability parking certificate completed and left at  2nd floor for pt , copy made for chart

## 2018-06-28 NOTE — LETTER
6/28/2018         RE: Nahun Villalobos  [address] [facility] [address]        Dear Colleague,    Thank you for referring your patient, Nahun Villalobos, to the [facility]. Please see a copy of my visit note below.    Chief Complaint   Patient presents with     COMPREHENSIVE EYE EXAM     Blurry vision     Diabetic Eye Exam      Lab Results   Component Value Date    A1C 7.2 05/01/2018    A1C Canceled, Test credited 05/01/2018    A1C 6.6 12/07/2017    A1C 6.2 12/13/2016    A1C 6.1 03/15/2016      he does not want glasses     Last Eye Exam: 4yrs  Dilated Previously: Yes    What are you currently using to see?  readers       Distance Vision Acuity: Noticed gradual change in both eyes    Near Vision Acuity: Satisfied with vision while reading  with readers    Eye Comfort: good  Do you use eye drops? : No  Occupation or Hobbies: Everyday  Has 3 [employer]riers         HPI    Symptoms:     Floaters   Ghost images                      Medical, surgical and family histories reviewed and updated 6/28/2018.       OBJECTIVE: See Ophthalmology exam    ASSESSMENT:    ICD-10-CM    1. Presbyopia H52.4    2. Type 2 diabetes mellitus without retinopathy (H) E11.9    3. Bilateral incipient cataracts H26.9       PLAN:   Optional distance prescription visual acuity slightly improved    Shouldn't have a prescription restriction on license, due for renewal in Oct    Madelyn Cordero OD     Again, thank you for allowing me to participate in the care of your patient.        Sincerely,        Madelyn Cordero, OD

## 2018-06-28 NOTE — PATIENT INSTRUCTIONS
Diabetes weakens the blood vessels all over the body, including the eyes. Damage to the blood vessels in the eyes can cause swelling or bleeding into part of the eye (called the retina). This is called diabetic retinopathy (KRISS-tin--pu-thee). If not treated, this disease can cause vision loss or blindness.   Symptoms may include blurred or distorted vision, but many people have no symptoms. It's important to see your eye doctor regularly to check for problems.   Early treatment and good control can help protect your vision. Here are the things you can do to help prevent vision loss:      1. Keep your blood sugar levels under tight control.      2. Bring high blood pressure under control.      3. No smoking.      4. Have yearly dilated eye exams.   Very mild correction for distance / not needed , continue with readers    Early start of cataracts

## 2018-06-29 ENCOUNTER — OFFICE VISIT (OUTPATIENT)
Dept: UROLOGY | Facility: CLINIC | Age: 73
End: 2018-06-29
Payer: COMMERCIAL

## 2018-06-29 VITALS
BODY MASS INDEX: 39.18 KG/M2 | WEIGHT: 243.8 LBS | SYSTOLIC BLOOD PRESSURE: 129 MMHG | HEIGHT: 66 IN | DIASTOLIC BLOOD PRESSURE: 81 MMHG | HEART RATE: 85 BPM

## 2018-06-29 DIAGNOSIS — R39.15 URINARY URGENCY: Primary | ICD-10-CM

## 2018-06-29 ASSESSMENT — PAIN SCALES - GENERAL: PAINLEVEL: NO PAIN (0)

## 2018-06-29 NOTE — LETTER
6/29/2018       RE: Nahun Villalobos  [address] [facility] [address]     Dear Colleague,    Thank you for referring your patient, Nahun Villalobos, to the [facility] UROLOGY AND INST FOR PROSTATE AND UROLOGIC CANCERS at [facility]. Please see a copy of my visit note below.    UROLOGIC DIAGNOSES:     CURRENT INTERVENTIONS:       HISTORY:     [facility].   Was seen for elevated PSA and lower urinary tract symptoms.   Patient s/p prostate biopsy four years ago for elevated PSA.   PSA was 7.5 six months ago and six months prior to that was 5.22.   Had lower urinary tract symptoms similar to listed below. Was prescribed tamsulosin and finasteride but unclear if patient started these rx.     Patient with urgency, nocturia 2-4x, frequency, incomplete emptying.   Notes variable stream from dribble to moderate stream.     Most recent PSA is 1.98 (though actually ~ 4 as patient is taking finasteride).   Patient continues to note lower urinary tract symptoms despite combination therapy and would like to consider further intervention.     Patient notes that his stream is much improved and that he is happy with his symptoms. Patient notes new onset urgency and occasional UUI but that these are improving.             PAST MEDICAL HISTORY:   Past Medical History:   Diagnosis Date     COPD (chronic obstructive pulmonary disease) (H)      DM (diabetes mellitus) (H)      Essential hypertension, benign      Hemorrhage of rectum and anus      Non-small cell lung cancer (H)      Obesity      Personal history of colonic polyps      Tobacco use disorder      Urinary retention        PAST SURGICAL HISTORY:   Past Surgical History:   Procedure Laterality Date     APPENDECTOMY       C NONSPECIFIC PROCEDURE      cholecystectomy     CHOLECYSTECTOMY       CYSTOSCOPY, TRANSURETHRAL RESECTION (TUR) PROSTATE, COMBINED N/A 4/30/2018    Procedure: COMBINED CYSTOSCOPY,  "TRANSURETHRAL RESECTION (TUR) PROSTATE;  Cystoscopy, Transurethral Resection Of The Prostate;  Surgeon: Praveena Burris MD;  Location: [facility] OR     WEDGE RESECTION      lung       FAMILY HISTORY:   Family History   Problem Relation Age of Onset     Hypertension Mother      C.A.D. Mother      ANEURYSM     Cancer - colorectal Mother      Cancer Mother      OVARIAN     Hypertension Sister      Breast Cancer Sister      Cerebrovascular Disease Father      Glaucoma No family hx of      Macular Degeneration No family hx of        SOCIAL HISTORY:   Social History   Substance Use Topics     Smoking status: Former Smoker     Packs/day: 1.00     Years: 52.00     Types: Cigarettes     Quit date: 6/1/2013     Smokeless tobacco: Never Used     Alcohol use No       Current Outpatient Prescriptions   Medication     acetaminophen (TYLENOL) 325 MG tablet     albuterol (PROAIR HFA/PROVENTIL HFA/VENTOLIN HFA) 108 (90 BASE) MCG/ACT Inhaler     Ascorbic Acid (VITAMIN C PO)     aspirin 81 MG tablet     atorvastatin (LIPITOR) 20 MG tablet     blood glucose (ACCU-CHEK CHACHA) test strip     blood glucose monitoring (SOFTCLIX) lancets     ciprofloxacin (CIPRO) 250 MG tablet     docusate sodium (COLACE) 100 MG tablet     finasteride (PROSCAR) 5 MG tablet     furosemide (LASIX) 20 MG tablet     furosemide (LASIX) 20 MG tablet     lisinopril (PRINIVIL/ZESTRIL) 40 MG tablet     metFORMIN (GLUCOPHAGE) 500 MG tablet     metoprolol succinate (TOPROL-XL) 25 MG 24 hr tablet     order for DME     order for DME     order for DME     tamsulosin (FLOMAX) 0.4 MG capsule     No current facility-administered medications for this visit.          PHYSICAL EXAM:    /81  Pulse 85  Ht 1.676 m (5' 6\")  Wt 110.6 kg (243 lb 12.8 oz)  BMI 39.35 kg/m2    HEENT: Normocephalic and atraumatic   Cardiac: Not done  Back/Flank: Not done  CNS/PNS: Not done  Respiratory: Normal non-labored breathing  Abdomen: Soft nontender and nondistended  Peripheral " Vascular: Not done  Mental Status: Not done    Penis: Not done  Scrotal Skin: Not done  Testicles: Not done  Epididymis: Not done  Digital Rectal Exam:     Cystoscopy: Not done    Imaging: None    Urinalysis: UA RESULTS:  Recent Labs   Lab Test  02/22/12   1449   COLOR  Yellow   APPEARANCE  Clear   URINEGLC  Negative   URINEBILI  Negative   URINEKETONE  Negative   SG  1.015   UBLD  Negative   URINEPH  6.0   PROTEIN  Negative   UROBILINOGEN  0.2   NITRITE  Negative   LEUKEST  Negative       PSA: 1.98    Post Void Residual:     Other labs: None today      IMPRESSION:  71 y/o M with history of elevated PSA and lower urinary tract symptoms     PLAN:   Uroflow/PVR in three months   Follow up in three months     Total Time: 15 minutes                                      Total in Consultation: greater than 50%      I spent over 15 minutes with the patient.  Over half this time was spent on counseling for urgency and post op TURP symptoms.      Again, thank you for allowing me to participate in the care of your patient.      Sincerely,    Praveena Burris MD

## 2018-06-29 NOTE — PATIENT INSTRUCTIONS
You can schedule an appointment for your family member with Dr. Carpenter at [phone].    Return in 3 months with a full bladder for a urine flow test.    It was a pleasure meeting with you today.  Thank you for allowing me and my team the privilege of caring for you today.  YOU are the reason we are here, and I truly hope we provided you with the excellent service you deserve.  Please let us know if there is anything else we can do for you so that we can be sure you are leaving completely satisfied with your care experience.

## 2018-06-29 NOTE — MR AVS SNAPSHOT
After Visit Summary   6/29/2018    Nahun Villalobos    MRN: [id number]           Patient Information     Date Of Birth          1945        Visit Information        Provider Department      6/29/2018 1:00 PM Praveena Burris MD [facility] Urology and [facility] for Prostate and Urologic Cancers        Care Instructions    You can schedule an appointment for your family member with Dr. Carpenter at [phone].    Return in 3 months with a full bladder for a urine flow test.    It was a pleasure meeting with you today.  Thank you for allowing me and my team the privilege of caring for you today.  YOU are the reason we are here, and I truly hope we provided you with the excellent service you deserve.  Please let us know if there is anything else we can do for you so that we can be sure you are leaving completely satisfied with your care experience.                  Follow-ups after your visit        Your next 10 appointments already scheduled     Jul 30, 2018  9:20 AM CDT   Office Visit with Olegario Castillo MD   [facility] ([facility])    [address] [place] [address] [phone]           Bring a current list of meds and any records pertaining to this visit. For Physicals, please bring immunization records and any forms needing to be filled out. Please arrive 10 minutes early to complete paperwork.              Who to contact     Please call your clinic at [phone] to:    Ask questions about your health    Make or cancel appointments    Discuss your medicines    Learn about your test results    Speak to your doctor            Additional Information About Your Visit        [employer] Information     [employer] gives you secure access to your electronic health record. If you see a primary care provider, you can also send messages to your care team and make appointments. If you have questions, please call your primary care clinic.   "If you do not have a primary care provider, please call [phone] and they will assist you.      [employer] is an electronic gateway that provides easy, online access to your medical records. With [employer], you can request a clinic appointment, read your test results, renew a prescription or communicate with your care team.     To access your existing account, please contact your [facility] or call [phone] for assistance.        Care EveryWhere ID     This is your Care EveryWhere ID. This could be used by other organizations to access your [facility] medical records  [id number]        Your Vitals Were     Pulse Height BMI (Body Mass Index)             85 1.676 m (5' 6\") 39.35 kg/m2          Blood Pressure from Last 3 Encounters:   06/29/18 129/81   06/26/18 186/88   05/23/18 153/82    Weight from Last 3 Encounters:   06/29/18 110.6 kg (243 lb 12.8 oz)   06/26/18 110.5 kg (243 lb 9.7 oz)   05/23/18 118.4 kg (261 lb)              Today, you had the following     No orders found for display         Today's Medication Changes          These changes are accurate as of 6/29/18  2:08 PM.  If you have any questions, ask your nurse or doctor.               These medicines have changed or have updated prescriptions.        Dose/Directions    aspirin 81 MG tablet   This may have changed:  when to take this   Used for:  Type 2 diabetes, HbA1C goal < 8% (H)        Dose:  81 mg   Take 1 tablet (81 mg) by mouth daily   Quantity:  30 tablet   Refills:  11       finasteride 5 MG tablet   Commonly known as:  PROSCAR   This may have changed:  See the new instructions.   Used for:  Enlarged prostate        TAKE 1 TABLET EVERY DAY   Quantity:  90 tablet   Refills:  3       * furosemide 20 MG tablet   Commonly known as:  LASIX   This may have changed:  when to take this   Used for:  Essential hypertension, benign        Dose:  20 mg   Take 1 tablet (20 mg) by mouth 2 times daily   Quantity:  360 tablet "   Refills:  3       * furosemide 20 MG tablet   Commonly known as:  LASIX   This may have changed:  Another medication with the same name was changed. Make sure you understand how and when to take each.   Used for:  Essential hypertension, benign        TAKE 2 TABLETS (40 MG) BY MOUTH 2 TIMES DAILY   Quantity:  360 tablet   Refills:  0       lisinopril 40 MG tablet   Commonly known as:  PRINIVIL/ZESTRIL   This may have changed:    - how much to take  - how to take this  - when to take this  - additional instructions   Used for:  Type 2 diabetes mellitus without complication, without long-term current use of insulin (H), Essential hypertension, benign        TAKE 1 TABLET (40 MG) BY MOUTH DAILY   Quantity:  90 tablet   Refills:  3       metFORMIN 500 MG tablet   Commonly known as:  GLUCOPHAGE   This may have changed:  when to take this   Used for:  Type 2 diabetes mellitus without complication, without long-term current use of insulin (H)        Dose:  500 mg   Take 1 tablet (500 mg) by mouth 2 times daily (with meals)   Quantity:  180 tablet   Refills:  3       * Notice:  This list has 2 medication(s) that are the same as other medications prescribed for you. Read the directions carefully, and ask your doctor or other care provider to review them with you.             Primary Care Provider Office Phone # Fax #    Olegario Castillo [phone]       [address] [place] [address]        Equal Access to Services     MALLORY VAZQUEZ : Hadii rufina araujo Soaltagracia, waaxda luqadaha, qaybta kaalmada adeegapril, erika campos. So [facility] [phone].    ATENCIÓN: Si habla español, tiene a huggins disposición servicios gratuitos de asistencia lingüística. Llame al [phone].    We comply with applicable federal civil rights laws and [place] laws. We do not discriminate on the basis of race, color, national origin, age, disability, sex, sexual orientation, or gender  identity.            Thank you!     Thank you for choosing [facility] UROLOGY AND [facility] FOR PROSTATE AND UROLOGIC CANCERS  for your care. Our goal is always to provide you with excellent care. Hearing back from our patients is one way we can continue to improve our services. Please take a few minutes to complete the written survey that you may receive in the mail after your visit with us. Thank you!             Your Updated Medication List - Protect others around you: Learn how to safely use, store and throw away your medicines at www.disposemymeds.org.          This list is accurate as of 6/29/18  2:08 PM.  Always use your most recent med list.                   Brand Name Dispense Instructions for use Diagnosis    acetaminophen 325 MG tablet    TYLENOL    100 tablet    Take 2 tablets (650 mg) by mouth every 4 hours as needed for mild pain    S/P transurethral resection of prostate       albuterol 108 (90 Base) MCG/ACT Inhaler    PROAIR HFA/PROVENTIL HFA/VENTOLIN HFA    3 Inhaler    Inhale 2 puffs into the lungs every 6 hours as needed for shortness of breath / dyspnea or wheezing    Chronic obstructive pulmonary disease, unspecified COPD type (H)       aspirin 81 MG tablet     30 tablet    Take 1 tablet (81 mg) by mouth daily    Type 2 diabetes, HbA1C goal < 8% (H)       atorvastatin 20 MG tablet    LIPITOR    90 tablet    Take 1 tablet (20 mg) by mouth daily    Hyperlipidemia LDL goal <100       blood glucose monitoring lancets     1 Box    Use to test blood sugar 2 times daily or as directed.    DM (diabetes mellitus) (H)       blood glucose monitoring test strip    ACCU-CHEK CHACHA    60 strip    Use to test blood sugar 2 times daily or as directed.    DM (diabetes mellitus) (H)       ciprofloxacin 250 MG tablet    CIPRO    6 tablet    Take 1 tablet (250 mg) by mouth 2 times daily        docusate sodium 100 MG tablet    COLACE    45 tablet    Take 100 mg by mouth 2 times daily To prevent constipation    S/P  transurethral resection of prostate       finasteride 5 MG tablet    PROSCAR    90 tablet    TAKE 1 TABLET EVERY DAY    Enlarged prostate       * furosemide 20 MG tablet    LASIX    360 tablet    Take 1 tablet (20 mg) by mouth 2 times daily    Essential hypertension, benign       * furosemide 20 MG tablet    LASIX    360 tablet    TAKE 2 TABLETS (40 MG) BY MOUTH 2 TIMES DAILY    Essential hypertension, benign       lisinopril 40 MG tablet    PRINIVIL/ZESTRIL    90 tablet    TAKE 1 TABLET (40 MG) BY MOUTH DAILY    Type 2 diabetes mellitus without complication, without long-term current use of insulin (H), Essential hypertension, benign       metFORMIN 500 MG tablet    GLUCOPHAGE    180 tablet    Take 1 tablet (500 mg) by mouth 2 times daily (with meals)    Type 2 diabetes mellitus without complication, without long-term current use of insulin (H)       metoprolol succinate 25 MG 24 hr tablet    TOPROL-XL    30 tablet    Take 1 tablet (25 mg) by mouth daily    Abnormal cardiovascular stress test       order for DME      Equipment delivered; Respironics 760S BIPAP with heated humidification and heated tubing.  Pressure 15/7cm. Respironics Yseda View FF mask (Medium)        * order for DME     1 Device    Oxygen 2 Li/min at night via nasal cannula    Nocturnal hypoxemia       * order for DME     1 Package    Equipment being ordered: diabetic shoes, 1 pair    Type 2 diabetes mellitus without complication, without long-term current use of insulin (H)       tamsulosin 0.4 MG capsule    FLOMAX    90 capsule    Take 1 capsule (0.4 mg) by mouth daily    Urinary urgency       VITAMIN C PO      Take by mouth At Bedtime        * Notice:  This list has 4 medication(s) that are the same as other medications prescribed for you. Read the directions carefully, and ask your doctor or other care provider to review them with you.

## 2018-06-29 NOTE — PROGRESS NOTES
UROLOGIC DIAGNOSES:     CURRENT INTERVENTIONS:       HISTORY:     [facility].   Was seen for elevated PSA and lower urinary tract symptoms.   Patient s/p prostate biopsy four years ago for elevated PSA.   PSA was 7.5 six months ago and six months prior to that was 5.22.   Had lower urinary tract symptoms similar to listed below. Was prescribed tamsulosin and finasteride but unclear if patient started these rx.     Patient with urgency, nocturia 2-4x, frequency, incomplete emptying.   Notes variable stream from dribble to moderate stream.     Most recent PSA is 1.98 (though actually ~ 4 as patient is taking finasteride).   Patient continues to note lower urinary tract symptoms despite combination therapy and would like to consider further intervention.     Patient notes that his stream is much improved and that he is happy with his symptoms. Patient notes new onset urgency and occasional UUI but that these are improving.             PAST MEDICAL HISTORY:   Past Medical History:   Diagnosis Date     COPD (chronic obstructive pulmonary disease) (H)      DM (diabetes mellitus) (H)      Essential hypertension, benign      Hemorrhage of rectum and anus      Non-small cell lung cancer (H)      Obesity      Personal history of colonic polyps      Tobacco use disorder      Urinary retention        PAST SURGICAL HISTORY:   Past Surgical History:   Procedure Laterality Date     APPENDECTOMY       C NONSPECIFIC PROCEDURE      cholecystectomy     CHOLECYSTECTOMY       CYSTOSCOPY, TRANSURETHRAL RESECTION (TUR) PROSTATE, COMBINED N/A 4/30/2018    Procedure: COMBINED CYSTOSCOPY, TRANSURETHRAL RESECTION (TUR) PROSTATE;  Cystoscopy, Transurethral Resection Of The Prostate;  Surgeon: Praveena Burris MD;  Location: [facility] OR     WEDGE RESECTION      lung       FAMILY HISTORY:   Family History   Problem Relation Age of Onset     Hypertension Mother      C.A.D. Mother      ANEURYSM     Cancer - colorectal  "Mother      Cancer Mother      OVARIAN     Hypertension Sister      Breast Cancer Sister      Cerebrovascular Disease Father      Glaucoma No family hx of      Macular Degeneration No family hx of        SOCIAL HISTORY:   Social History   Substance Use Topics     Smoking status: Former Smoker     Packs/day: 1.00     Years: 52.00     Types: Cigarettes     Quit date: 6/1/2013     Smokeless tobacco: Never Used     Alcohol use No       Current Outpatient Prescriptions   Medication     acetaminophen (TYLENOL) 325 MG tablet     albuterol (PROAIR HFA/PROVENTIL HFA/VENTOLIN HFA) 108 (90 BASE) MCG/ACT Inhaler     Ascorbic Acid (VITAMIN C PO)     aspirin 81 MG tablet     atorvastatin (LIPITOR) 20 MG tablet     blood glucose (ACCU-CHEK CHACHA) test strip     blood glucose monitoring (SOFTCLIX) lancets     ciprofloxacin (CIPRO) 250 MG tablet     docusate sodium (COLACE) 100 MG tablet     finasteride (PROSCAR) 5 MG tablet     furosemide (LASIX) 20 MG tablet     furosemide (LASIX) 20 MG tablet     lisinopril (PRINIVIL/ZESTRIL) 40 MG tablet     metFORMIN (GLUCOPHAGE) 500 MG tablet     metoprolol succinate (TOPROL-XL) 25 MG 24 hr tablet     order for DME     order for DME     order for DME     tamsulosin (FLOMAX) 0.4 MG capsule     No current facility-administered medications for this visit.          PHYSICAL EXAM:    /81  Pulse 85  Ht 1.676 m (5' 6\")  Wt 110.6 kg (243 lb 12.8 oz)  BMI 39.35 kg/m2    HEENT: Normocephalic and atraumatic   Cardiac: Not done  Back/Flank: Not done  CNS/PNS: Not done  Respiratory: Normal non-labored breathing  Abdomen: Soft nontender and nondistended  Peripheral Vascular: Not done  Mental Status: Not done    Penis: Not done  Scrotal Skin: Not done  Testicles: Not done  Epididymis: Not done  Digital Rectal Exam:     Cystoscopy: Not done    Imaging: None    Urinalysis: UA RESULTS:  Recent Labs   Lab Test  02/22/12   1449   COLOR  Yellow   APPEARANCE  Clear   URINEGLC  Negative   URINEBILI  " Negative   URINEKETONE  Negative   SG  1.015   UBLD  Negative   URINEPH  6.0   PROTEIN  Negative   UROBILINOGEN  0.2   NITRITE  Negative   LEUKEST  Negative       PSA: 1.98    Post Void Residual:     Other labs: None today      IMPRESSION:  73 y/o M with history of elevated PSA and lower urinary tract symptoms     PLAN:   Uroflow/PVR in three months   Follow up in three months     Total Time: 15 minutes                                      Total in Consultation: greater than 50%      I spent over 15 minutes with the patient.  Over half this time was spent on counseling for urgency and post op TURP symptoms.

## 2018-07-26 ENCOUNTER — TELEPHONE (OUTPATIENT)
Dept: INTERNAL MEDICINE | Facility: CLINIC | Age: 73
End: 2018-07-26

## 2018-07-26 NOTE — TELEPHONE ENCOUNTER
Pt wanting recommendation for sinus pressure . Flying out of town tomorrow . Recommended nasal spray and possibly a zyrtec .Roseanne Nettles RN

## 2018-08-18 ENCOUNTER — APPOINTMENT (OUTPATIENT)
Dept: CT IMAGING | Facility: CLINIC | Age: 73
End: 2018-08-18
Attending: EMERGENCY MEDICINE
Payer: MEDICARE

## 2018-08-18 ENCOUNTER — HOSPITAL ENCOUNTER (EMERGENCY)
Facility: CLINIC | Age: 73
Discharge: HOME | End: 2018-08-18
Attending: EMERGENCY MEDICINE | Admitting: EMERGENCY MEDICINE
Payer: MEDICARE

## 2018-08-18 VITALS
RESPIRATION RATE: 23 BRPM | OXYGEN SATURATION: 94 % | DIASTOLIC BLOOD PRESSURE: 102 MMHG | SYSTOLIC BLOOD PRESSURE: 166 MMHG | TEMPERATURE: 97.8 F

## 2018-08-18 DIAGNOSIS — R07.89 ATYPICAL CHEST PAIN: ICD-10-CM

## 2018-08-18 LAB
ALBUMIN SERPL-MCNC: 3.3 G/DL (ref 3.4–5)
ALP SERPL-CCNC: 74 U/L (ref 40–150)
ALT SERPL W P-5'-P-CCNC: 20 U/L (ref 0–70)
ANION GAP SERPL CALCULATED.3IONS-SCNC: 4 MMOL/L (ref 3–14)
AST SERPL W P-5'-P-CCNC: 24 U/L (ref 0–45)
BASOPHILS # BLD AUTO: 0.1 10E9/L (ref 0–0.2)
BASOPHILS NFR BLD AUTO: 0.9 %
BILIRUB SERPL-MCNC: 0.3 MG/DL (ref 0.2–1.3)
BUN SERPL-MCNC: 17 MG/DL (ref 7–30)
CALCIUM SERPL-MCNC: 8.5 MG/DL (ref 8.5–10.1)
CHLORIDE SERPL-SCNC: 103 MMOL/L (ref 94–109)
CO2 SERPL-SCNC: 31 MMOL/L (ref 20–32)
CREAT SERPL-MCNC: 1.31 MG/DL (ref 0.66–1.25)
D DIMER PPP FEU-MCNC: 0.5 UG/ML FEU (ref 0–0.5)
D DIMER PPP FEU-MCNC: NORMAL UG/ML FEU (ref 0–0.5)
DIFFERENTIAL METHOD BLD: ABNORMAL
EOSINOPHIL # BLD AUTO: 0.3 10E9/L (ref 0–0.7)
EOSINOPHIL NFR BLD AUTO: 3 %
ERYTHROCYTE [DISTWIDTH] IN BLOOD BY AUTOMATED COUNT: 12.4 % (ref 10–15)
GFR SERPL CREATININE-BSD FRML MDRD: 54 ML/MIN/1.7M2
GLUCOSE SERPL-MCNC: 156 MG/DL (ref 70–99)
HCT VFR BLD AUTO: 39.7 % (ref 40–53)
HGB BLD-MCNC: 12.7 G/DL (ref 13.3–17.7)
IMM GRANULOCYTES # BLD: 0 10E9/L (ref 0–0.4)
IMM GRANULOCYTES NFR BLD: 0.3 %
LYMPHOCYTES # BLD AUTO: 3.3 10E9/L (ref 0.8–5.3)
LYMPHOCYTES NFR BLD AUTO: 34.9 %
MCH RBC QN AUTO: 31.8 PG (ref 26.5–33)
MCHC RBC AUTO-ENTMCNC: 32 G/DL (ref 31.5–36.5)
MCV RBC AUTO: 99 FL (ref 78–100)
MONOCYTES # BLD AUTO: 1 10E9/L (ref 0–1.3)
MONOCYTES NFR BLD AUTO: 10.5 %
NEUTROPHILS # BLD AUTO: 4.7 10E9/L (ref 1.6–8.3)
NEUTROPHILS NFR BLD AUTO: 50.4 %
NRBC # BLD AUTO: 0 10*3/UL
NRBC BLD AUTO-RTO: 0 /100
PLATELET # BLD AUTO: 298 10E9/L (ref 150–450)
POTASSIUM SERPL-SCNC: 4.2 MMOL/L (ref 3.4–5.3)
PROT SERPL-MCNC: 7.9 G/DL (ref 6.8–8.8)
RBC # BLD AUTO: 4 10E12/L (ref 4.4–5.9)
SODIUM SERPL-SCNC: 138 MMOL/L (ref 133–144)
TROPONIN I SERPL-MCNC: <0.015 UG/L (ref 0–0.04)
TROPONIN I SERPL-MCNC: <0.015 UG/L (ref 0–0.04)
WBC # BLD AUTO: 9.4 10E9/L (ref 4–11)

## 2018-08-18 PROCEDURE — 99285 EMERGENCY DEPT VISIT HI MDM: CPT | Mod: 25

## 2018-08-18 PROCEDURE — 96374 THER/PROPH/DIAG INJ IV PUSH: CPT

## 2018-08-18 PROCEDURE — 84484 ASSAY OF TROPONIN QUANT: CPT | Performed by: EMERGENCY MEDICINE

## 2018-08-18 PROCEDURE — 93005 ELECTROCARDIOGRAM TRACING: CPT

## 2018-08-18 PROCEDURE — 71250 CT THORAX DX C-: CPT

## 2018-08-18 PROCEDURE — 85025 COMPLETE CBC W/AUTO DIFF WBC: CPT | Performed by: EMERGENCY MEDICINE

## 2018-08-18 PROCEDURE — 80053 COMPREHEN METABOLIC PANEL: CPT | Performed by: EMERGENCY MEDICINE

## 2018-08-18 PROCEDURE — 85379 FIBRIN DEGRADATION QUANT: CPT | Performed by: EMERGENCY MEDICINE

## 2018-08-18 PROCEDURE — 25000128 H RX IP 250 OP 636: Performed by: EMERGENCY MEDICINE

## 2018-08-18 PROCEDURE — 36415 COLL VENOUS BLD VENIPUNCTURE: CPT | Performed by: EMERGENCY MEDICINE

## 2018-08-18 PROCEDURE — 96361 HYDRATE IV INFUSION ADD-ON: CPT

## 2018-08-18 RX ORDER — IOPAMIDOL 755 MG/ML
79 INJECTION, SOLUTION INTRAVASCULAR ONCE
Status: DISCONTINUED | OUTPATIENT
Start: 2018-08-18 | End: 2018-08-18

## 2018-08-18 RX ORDER — MORPHINE SULFATE 2 MG/ML
2 INJECTION, SOLUTION INTRAMUSCULAR; INTRAVENOUS ONCE
Status: COMPLETED | OUTPATIENT
Start: 2018-08-18 | End: 2018-08-18

## 2018-08-18 RX ADMIN — SODIUM CHLORIDE: 9 INJECTION, SOLUTION INTRAVENOUS at 12:00

## 2018-08-18 RX ADMIN — MORPHINE SULFATE 2 MG: 2 INJECTION, SOLUTION INTRAMUSCULAR; INTRAVENOUS at 14:51

## 2018-08-18 ASSESSMENT — ENCOUNTER SYMPTOMS
HEADACHES: 1
FEVER: 0
SHORTNESS OF BREATH: 1
ABDOMINAL DISTENTION: 1

## 2018-08-18 NOTE — ED NOTES
MD in to see patient and discuss diagnosis, test results, and discharge plan. Patient meets discharge criteria. Discussed AVS with patient and sister. Questions answered. Patient and family verbalized understanding. Patient reports being ready to go home. Patient discharged home with family by car with all necessary information.

## 2018-08-18 NOTE — ED AVS SNAPSHOT
[facility] Emergency Department    [address]    [facility] [address]    Phone:  [phone]    Fax:  [phone]                                       Nahun Villalobos   MRN: [id number]    Department:  [facility] Emergency Department   Date of Visit:  8/18/2018           Patient Information     Date Of Birth          1945        Your diagnoses for this visit were:     Atypical chest pain        You were seen by Francisco Javier Anderson MD.      Follow-up Information     Follow up with Olegario Castillo MD. Schedule an appointment as soon as possible for a visit in 3 days.    Specialty:  Internal Medicine    Contact information:    [address] [facility] [address] [phone]          Follow up with [facility] Emergency Department.    Specialty:  EMERGENCY MEDICINE    Why:  As needed, If symptoms worsen    Contact information:    [address]  [facility] [id number] [phone]        Discharge Instructions       Discharge Instructions  Chest Pain    You have been seen today for chest pain or discomfort.  At this time, your provider has found no signs that your chest pain is due to a serious or life-threatening condition, (or you have declined more testing and/or admission to the hospital). However, sometimes there is a serious problem that does not show up right away. Your evaluation today may not be complete and you may need further testing and evaluation.     Generally, every Emergency Department visit should have a follow-up clinic visit with either a primary or a specialty clinic/provider. Please follow-up as instructed by your emergency provider today.  Return to the Emergency Department if:    Your chest pain changes, gets worse, starts to happen more often, or comes with less activity.    You are newly short of breath.    You get very weak or tired.    You pass out or faint.    You have any new symptoms, like fever,  cough, numb legs, or you cough up blood.    You have anything else that worries you.    Until you follow-up with your regular provider, please do the following:    Take one aspirin daily unless you have an allergy or are told not to by your provider.    If a stress test appointment has been made, go to the appointment.    If you have questions, contact your regular provider.    Follow-up with your regular provider/clinic as directed; this is very important.    If you were given a prescription for medicine here today, be sure to read all of the information (including the package insert) that comes with your prescription.  This will include important information about the medicine, its side effects, and any warnings that you need to know about.  The pharmacist who fills the prescription can provide more information and answer questions you may have about the medicine.  If you have questions or concerns that the pharmacist cannot address, please call or return to the Emergency Department.       Remember that you can always come back to the Emergency Department if you are not able to see your regular provider in the amount of time listed above, if you get any new symptoms, or if there is anything that worries you.      Your next 10 appointments already scheduled     Aug 20, 2018  8:40 AM CDT   [facility]    [address] [place] [address] [phone]           Bring a current list of meds and any records pertaining to this visit. For Physicals, please bring immunization records and any forms needing to be filled out. Please arrive 10 minutes early to complete paperwork.            Oct 26, 2018 11:15 AM CDT   (Arrive by 11:00 AM)   Return Visit with Praveena Burris MD   [facility] ([facility])    [address]  [address]  4th Floor  [facility] [address] [phone]              24 Hour Appointment Hotline       To make an appointment at any [facility], call [phone]). If you don't have a family doctor or clinic, we will help you find one. [facility] are conveniently located to serve the needs of you and your family.             Review of your medicines      START taking        Dose / Directions Last dose taken    ranitidine 150 MG tablet   Commonly known as:  ZANTAC   Dose:  150 mg   Quantity:  30 tablet        Take 1 tablet (150 mg) by mouth 2 times daily for 15 days   Refills:  0          Our records show that you are taking the medicines listed below. If these are incorrect, please call your family doctor or clinic.        Dose / Directions Last dose taken    acetaminophen 325 MG tablet   Commonly known as:  TYLENOL   Dose:  650 mg   Quantity:  100 tablet        Take 2 tablets (650 mg) by mouth every 4 hours as needed for mild pain   Refills:  0        albuterol 108 (90 Base) MCG/ACT inhaler   Commonly known as:  PROAIR HFA/PROVENTIL HFA/VENTOLIN HFA   Dose:  2 puff   Quantity:  3 Inhaler        Inhale 2 puffs into the lungs every 6 hours as needed for shortness of breath / dyspnea or wheezing   Refills:  1        aspirin 81 MG tablet   Dose:  81 mg   Quantity:  30 tablet        Take 1 tablet (81 mg) by mouth daily   Refills:  11        atorvastatin 20 MG tablet   Commonly known as:  LIPITOR   Dose:  20 mg   Quantity:  90 tablet        Take 1 tablet (20 mg) by mouth daily   Refills:  3        blood glucose monitoring lancets   Quantity:  1 Box        Use to test blood sugar 2 times daily or as directed.   Refills:  6        blood glucose monitoring test strip   Commonly known as:  ACCU-CHEK CHACHA   Quantity:  60 strip        Use to test blood sugar 2 times daily or as directed.   Refills:  6        docusate sodium 100 MG tablet   Commonly known as:  COLACE   Dose:  100 mg   Quantity:   45 tablet        Take 100 mg by mouth 2 times daily To prevent constipation   Refills:  1        finasteride 5 MG tablet   Commonly known as:  PROSCAR   Quantity:  90 tablet        TAKE 1 TABLET EVERY DAY   Refills:  3        furosemide 20 MG tablet   Commonly known as:  LASIX   Quantity:  360 tablet        TAKE 2 TABLETS (40 MG) BY MOUTH 2 TIMES DAILY   Refills:  0        lisinopril 40 MG tablet   Commonly known as:  PRINIVIL/ZESTRIL   Quantity:  90 tablet        TAKE 1 TABLET (40 MG) BY MOUTH DAILY   Refills:  3        metFORMIN 500 MG tablet   Commonly known as:  GLUCOPHAGE   Dose:  500 mg   Quantity:  180 tablet        Take 1 tablet (500 mg) by mouth 2 times daily (with meals)   Refills:  3        metoprolol succinate 25 MG 24 hr tablet   Commonly known as:  TOPROL-XL   Dose:  25 mg   Quantity:  30 tablet        Take 1 tablet (25 mg) by mouth daily   Refills:  11        order for DME        Equipment delivered; Respironics 760S BIPAP with heated humidification and heated tubing.  Pressure 15/7cm. Respironics Syeda View FF mask (Medium)   Refills:  0        * order for DME   Quantity:  1 Device        Oxygen 2 Li/min at night via nasal cannula   Refills:  0        * order for DME   Quantity:  1 Package        Equipment being ordered: diabetic shoes, 1 pair   Refills:  0        tamsulosin 0.4 MG capsule   Commonly known as:  FLOMAX   Dose:  0.4 mg   Quantity:  90 capsule        Take 1 capsule (0.4 mg) by mouth daily   Refills:  3        VITAMIN C PO        Take by mouth At Bedtime   Refills:  0        * Notice:  This list has 2 medication(s) that are the same as other medications prescribed for you. Read the directions carefully, and ask your doctor or other care provider to review them with you.            Prescriptions were sent or printed at these locations (1 Prescription)                   Other Prescriptions                Printed at Department/Unit printer (1 of 1)         ranitidine (ZANTAC) 150 MG tablet                 Procedures and tests performed during your visit     Procedure/Test Number of Times Performed    CBC with platelets differential 1    CT Chest Abdomen Pelvis w/o Contrast 1    Comprehensive metabolic panel 1    D dimer quantitative 2    EKG 12 lead 1    Peripheral IV catheter 1    Troponin I 2      Orders Needing Specimen Collection     None      Pending Results     Date and Time Order Name Status Description    8/18/2018 0859 EKG 12 lead Preliminary             Pending Culture Results     No orders found from 8/16/2018 to 8/19/2018.            Pending Results Instructions     If you had any lab results that were not finalized at the time of your Discharge, you can call the ED Lab Result RN at [phone]. You will be contacted by this team for any positive Lab results or changes in treatment. The nurses are available 7 days a week from 10A to 6:30P.  You can leave a message 24 hours per day and they will return your call.        Test Results From Your Hospital Stay              8/18/2018 10:04 AM      Component Results     Component Value Ref Range & Units Status    WBC 9.4 4.0 - 11.0 10e9/L Final    RBC Count 4.00 (L) 4.4 - 5.9 10e12/L Final    Hemoglobin 12.7 (L) 13.3 - 17.7 g/dL Final    Hematocrit 39.7 (L) 40.0 - 53.0 % Final    MCV 99 78 - 100 fl Final    MCH 31.8 26.5 - 33.0 pg Final    MCHC 32.0 31.5 - 36.5 g/dL Final    RDW 12.4 10.0 - 15.0 % Final    Platelet Count 298 150 - 450 10e9/L Final    Diff Method Automated Method  Final    % Neutrophils 50.4 % Final    % Lymphocytes 34.9 % Final    % Monocytes 10.5 % Final    % Eosinophils 3.0 % Final    % Basophils 0.9 % Final    % Immature Granulocytes 0.3 % Final    Nucleated RBCs 0 0 /100 Final    Absolute Neutrophil 4.7 1.6 - 8.3 10e9/L Final    Absolute Lymphocytes 3.3 0.8 - 5.3 10e9/L Final    Absolute Monocytes 1.0 0.0 - 1.3 10e9/L Final    Absolute Eosinophils 0.3 0.0 - 0.7 10e9/L Final    Absolute Basophils 0.1 0.0 - 0.2 10e9/L Final     Abs Immature Granulocytes 0.0 0 - 0.4 10e9/L Final    Absolute Nucleated RBC 0.0  Final         8/18/2018 10:24 AM      Component Results     Component Value Ref Range & Units Status    Sodium 138 133 - 144 mmol/L Final    Potassium 4.2 3.4 - 5.3 mmol/L Final    Specimen slightly hemolyzed, potassium may be falsely elevated    Chloride 103 94 - 109 mmol/L Final    Carbon Dioxide 31 20 - 32 mmol/L Final    Anion Gap 4 3 - 14 mmol/L Final    Glucose 156 (H) 70 - 99 mg/dL Final    Urea Nitrogen 17 7 - 30 mg/dL Final    Creatinine 1.31 (H) 0.66 - 1.25 mg/dL Final    GFR Estimate 54 (L) >60 mL/min/1.7m2 Final    Non  GFR Calc    GFR Estimate If Black 65 >60 mL/min/1.7m2 Final    African American GFR Calc    Calcium 8.5 8.5 - 10.1 mg/dL Final    Bilirubin Total 0.3 0.2 - 1.3 mg/dL Final    Albumin 3.3 (L) 3.4 - 5.0 g/dL Final    Protein Total 7.9 6.8 - 8.8 g/dL Final    Alkaline Phosphatase 74 40 - 150 U/L Final    ALT 20 0 - 70 U/L Final    AST 24 0 - 45 U/L Final    Specimen is hemolyzed which can falsely elevate AST. Analysis of a   non-hemolyzed specimen may result in a lower value.           8/18/2018 10:24 AM      Component Results     Component Value Ref Range & Units Status    Troponin I ES <0.015 0.000 - 0.045 ug/L Final    The 99th percentile for upper reference range is 0.045 ug/L.  Troponin values   in the range of 0.045 - 0.120 ug/L may be associated with risks of adverse   clinical events.                 8/18/2018  3:05 PM      Addenda     Addendum: Findings discussed with Dr. Anderson by Dr. Santos via  telephone at 1437 hours on 8/18/2018.    EVER SANTOS MD      Signed by Ever Santos MD on 8/18/2018  3:04 PM      Narrative     Exam: CT CHEST ABDOMEN PELVIS W/O CONTRAST 8/18/2018 2:23 PM    History: Pleuritic chest pain, history of recent surgery. Epigastric  and RIGHT upper quadrant abdominal tenderness.    Comparison: Abdominal CT dated 6/26/2018.    Technique: Volumetric  acquisition with reconstruction in the axial,  coronal planes through the chest, abdomen and pelvis without contrast.  Radiation dose for this scan was reduced using automated exposure  control, adjustment of the mA and/or kV according to patient size, or  iterative reconstruction technique.    Contrast: None    Findings:   Chest: Lungs are clear. No pleural or pericardial effusion. Heart size  normal. Atherosclerotic calcifications of the coronary arteries. No  thoracic lymphadenopathy. Radiodense material in the LEFT axilla and  upper LEFT arm is partially visualized.    Abdomen: Unenhanced liver, spleen, adrenal glands, kidneys and  pancreas are normal. Gallbladder is absent.    Colonic diverticulosis without signs of diverticulitis. No areas of  bowel wall thickening or bowel dilatation.    Enlarged prostate. No free fluid. No abdominal or pelvic  lymphadenopathy. Atherosclerotic calcifications of the abdominal aorta  and its branch vessels without aneurysmal dilatation.    Bones: No concerning lytic or sclerotic lesions.        Impression     Impression:   1. Lungs are clear without findings to suggest an etiology of the  patient's chest pain.  2. Colonic diverticulosis without signs of diverticulitis.  3. Enlarged prostate.  4. Extravasated contrast in the LEFT axilla and upper LEFT arm.    CORNELL SANTOS MD         8/18/2018  2:58 PM      Component Results     Component Value Ref Range & Units Status    D Dimer Canceled, Test credited 0.0 - 0.50 ug/ml FEU Final    Unsatisfactory specimen - hemolyzed  CALLED TO MARCIE BUENO (FRANK), 8.18.18 @8753 BY OTONIEL           8/18/2018  3:28 PM      Component Results     Component Value Ref Range & Units Status    D Dimer 0.5 0.0 - 0.50 ug/ml FEU Final    This D-dimer assay is intended for use in conjunction with a clinical pretest   probability assessment model to exclude pulmonary embolism (PE) and deep   venous thrombosis (DVT) in outpatients suspected of PE or DVT. The  cut-off   value is 0.5 ug/mL FEU.           8/18/2018  4:03 PM      Component Results     Component Value Ref Range & Units Status    Troponin I ES <0.015 0.000 - 0.045 ug/L Final    The 99th percentile for upper reference range is 0.045 ug/L.  Troponin values   in the range of 0.045 - 0.120 ug/L may be associated with risks of adverse   clinical events.                  Clinical Quality Measure: Blood Pressure Screening     Your blood pressure was checked while you were in the emergency department today. The last reading we obtained was  BP: 177/87 . Please read the guidelines below about what these numbers mean and what you should do about them.  If your systolic blood pressure (the top number) is less than 120 and your diastolic blood pressure (the bottom number) is less than 80, then your blood pressure is normal. There is nothing more that you need to do about it.  If your systolic blood pressure (the top number) is 120-139 or your diastolic blood pressure (the bottom number) is 80-89, your blood pressure may be higher than it should be. You should have your blood pressure rechecked within a year by a primary care provider.  If your systolic blood pressure (the top number) is 140 or greater or your diastolic blood pressure (the bottom number) is 90 or greater, you may have high blood pressure. High blood pressure is treatable, but if left untreated over time it can put you at risk for heart attack, stroke, or kidney failure. You should have your blood pressure rechecked by a primary care provider within the next 4 weeks.  If your provider in the emergency department today gave you specific instructions to follow-up with your doctor or provider even sooner than that, you should follow that instruction and not wait for up to 4 weeks for your follow-up visit.        Thank you for choosing [facility]       Thank you for choosing [facility] for your care. Our goal is always to provide you with excellent care. Hearing  back from our patients is one way we can continue to improve our services. Please take a few minutes to complete the written survey that you may receive in the mail after you visit with us. Thank you!        [email or web address] Information     [facility] gives you secure access to your electronic health record. If you see a primary care provider, you can also send messages to your care team and make appointments. If you have questions, please call your primary care clinic.  If you do not have a primary care provider, please call [phone] and they will assist you.        Care EveryWhere ID     This is your Care EveryWhere ID. This could be used by other organizations to access your [facility] medical records  [id number]        Equal Access to Services     MALLORY VAZQUEZ : Humberto Helton, michael terrell, aj owens, erika campos. So [facility] [phone].    ATENCIÓN: Si habla español, tiene a huggins disposición servicios gratuitos de asistencia lingüística. Llame al [phone].    We comply with applicable federal civil rights laws and [place] laws. We do not discriminate on the basis of race, color, national origin, age, disability, sex, sexual orientation, or gender identity.            After Visit Summary       This is your record. Keep this with you and show to your community pharmacist(s) and doctor(s) at your next visit.

## 2018-08-18 NOTE — ED PROVIDER NOTES
History     Chief Complaint:  Chest Pain and Shortness of Breath    HPI   Nahun Villalobos is a 72 year old male with a history of lung cancer, HTN, DM2 who presents to the emergency department for evaluation of chest pain and shortness of breath. The patient reports he has been experiencing chest tightness and shortness of breath since 0600 today, with the pain increasing with deep breaths. He notes that the pain radiates to his left neck and left posterior head occasionally and he has had a dry cough. The patient remarks he has had nasal congestion for the last month or so, as well as a history of lung cancer 3 years ago, and an enlarged prostate with a blood clot, removed 3 months ago. The patient denies any fever or history of the same.    The patient has a secondary complaint of right-sided abdominal distension, worsened by exertion of abdominal muscles. He notes he had his gallbladder surgically removed 25-30 years ago.    Allergies:  NKDA     Medications:    Tylenol  Albuterol  Aspirin  Lipitor  Colace  Proscar  Lasix  Lisinopril  Metformin  Toprol  Flomax     Past Medical History:    COPD  DM2  HTN  Non-small cell lung cancer  Obesity  Tobacco use disorder    Past Surgical History:    Appendectomy  Cholecystectomy  Cystoscopy, TUR prostate, combined  Wedge resection    Family History:    HTN  CAD  Cancer, colorectal, ovarian  Breast cancer  Cerebrovascular disease    Social History:  Presents with sister.  Former smoker, quit 6/1/2013.  Negative for alcohol use.  Marital Status:   [5]     Review of Systems   Constitutional: Negative for fever.   HENT: Positive for congestion.    Respiratory: Positive for shortness of breath.    Cardiovascular: Positive for chest pain.   Gastrointestinal: Positive for abdominal distention.   Neurological: Positive for headaches.   All other systems reviewed and are negative.    Physical Exam     Patient Vitals for the past 24 hrs:   BP Temp Temp src Heart Rate Resp  SpO2   08/18/18 0912 177/87 97.8  F (36.6  C) Oral 79 22 97 %     Physical Exam  Nursing note and vitals reviewed.  Constitutional: Cooperative. Jovial.   HENT:   Mouth/Throat: Moist mucous membranes.   Eyes: EOMI, nonicteric sclera  Cardiovascular: Normal rate, regular rhythm, no murmurs, rubs, or gallops  Pulmonary/Chest: Effort normal and breath sounds normal. No respiratory distress. No wheezes. No rales.   Abdominal: Soft. Nontender, nondistended, no guarding or rigidity. BS present.   Musculoskeletal: Normal range of motion.   Neurological: Alert. Moves all extremities spontaneously.   Skin: Skin is warm and dry. No rash noted.   Psychiatric: Normal mood and affect.       Emergency Department Course     ECG:  Time: 0900  Vent. Rate 79 bpm. SC interval 124. QRS duration 76. QT/QTc 334/382. P-R-T axis 59 52 63. Normal sinus rhythm. Normal ECG. No significant change since EKG dated 2/14/18. Read time: 0907    Imaging:  Radiographic findings were communicated with the patient who voiced understanding of the findings.    CT Abdomen/Pelvis without IV contrast:   1. Lungs are clear without findings to suggest an etiology of the  patient's chest pain.  2. Colonic diverticulosis without signs of diverticulitis.  3. Enlarged prostate.  4. Extravasated contrast in the LEFT axilla and upper LEFT arm. Imaging independently reviewed and agree with radiologist interpretation.     Laboratory:  CBC: WBC: 9.4, HGB: 12.7 (L), PLT: 298  CMP: Glucose 156 (H), Creatinine 1.31 (H), GFR Estimate 54 (L), Albumin 3.3 (L), o/w WNL     D-dimer: 0.5    0929 Troponin I: <0.015  1506 Troponin I: <0.015    Interventions:  1451 Morphine 2 mg IV  1200  mL IV BOLUS    Emergency Department Course:  Nursing notes and vitals reviewed. 0934 I performed an exam of the patient as documented above.     EKG obtained in the ED, see results above.     IV inserted. Medicine administered as documented above. Blood drawn. This was sent to the lab for  further testing, results above.    The patient was sent for a CT Abd/Pelvis while in the emergency department, findings above.     1445 I rechecked the patient and discussed the results of his workup thus far.     1505 I spoke with FEDE Sauer for hospitalist Dr. Thomas, regarding this patient. She declined acceptance of the patient.    Findings and plan explained to the Patient. Patient discharged home with instructions regarding supportive care, medications, and reasons to return. The importance of close follow-up was reviewed. The patient was prescribed Zantac.     I personally reviewed the laboratory results with the Patient and answered all related questions prior to discharge.     Impression & Plan      Medical Decision Making:  Patient presents with chief complaint of chest pain and shortness of breath. The DDx of the patients chest pain includes ACS, angina, pericarditis, PE, pneumonia, pleuritis, dissection, aneurysmal disease, GERD, esophageal spasm, costochronditis/chest wall pain, etc as the most likely etiologies.  Initially had planned on obtaining a CT scan of chest for PE and abdomen pelvis with contrast for evaluation of multiple etiologies simultaneously.  We had difficulty with patient's IV.  Despite 3 ultrasound IVs, they all infiltrated.  The third time, there was a contrast extravasation in the patient's left arm.  This was monitored for 2 hours without any worsening.  Patient has minimal pain in the area.  I did discuss with him that if he has increasing pain in this area that he needs to return to emergency department immediately to make sure that there is no ischemia/compartment syndrome.  We ended up doing the CTs without contrast which is not ideal, but does rule out multiple negative etiologies.  Patient has history of malignancy which is his only point on the well's criteria.  This still puts him in the low risk category.  A subsequent d-dimer was negative ruling out PE.  Concerning  possible cardiac ischemia, troponin ×2 is negative.  Patient had a rather unremarkable cardiac catheterization just 6 months ago in February.  For this reason, it is unlikely that patient is suffering from ACS.  This was the reason for hospitalist declination of admission, which is reasonable.  Patient's symptoms were improved after a small dose of morphine.  At this time, I do not have a good explanation for patient's complaints, but they do not appear to be emergent at this time.  He is encouraged to follow-up closely with his primary care physician early next week, and to return to the emergency department for any worsening complaints.  All of patient and his wife questions were answered and they are in agreement with this plan.  He is discharged in stable condition.      Diagnosis:    ICD-10-CM   1. Atypical chest pain R07.89     Disposition: Patient discharged to home     Discharge Medications:  New Prescriptions    RANITIDINE (ZANTAC) 150 MG TABLET    Take 1 tablet (150 mg) by mouth 2 times daily for 15 days     Saji EDWARDS, am serving as a scribe on 8/18/2018 at 9:32 AM to personally document services performed by Francisco Javier Anderson MD based on my observations and the provider's statements to me.     Saji Lawrence  8/18/2018   [facility] EMERGENCY DEPARTMENT     Francisco Javier Anderson MD  08/20/18 9022

## 2018-08-18 NOTE — ED TRIAGE NOTES
Pt c/o midsternal chest pain and shortness of breath since 0600 this morning. Pain also goes up into head per pt.

## 2018-08-18 NOTE — ED AVS SNAPSHOT
[facility] Emergency Department    [address]    [facility] [address]    Phone:  [phone]    Fax:  [phone]                                       Nahun Villalobos   MRN: [id number]    Department:  [facility] Emergency Department   Date of Visit:  8/18/2018           After Visit Summary Signature Page     I have received my discharge instructions, and my questions have been answered. I have discussed any challenges I see with this plan with the nurse or doctor.    ..........................................................................................................................................  Patient/Patient Representative Signature      ..........................................................................................................................................  Patient Representative Print Name and Relationship to Patient    ..................................................               ................................................  Date                                            Time    ..........................................................................................................................................  Reviewed by Signature/Title    ...................................................              ..............................................  Date                                                            Time

## 2018-08-19 LAB — INTERPRETATION ECG - MUSE: NORMAL

## 2018-08-20 ENCOUNTER — OFFICE VISIT (OUTPATIENT)
Dept: INTERNAL MEDICINE | Facility: CLINIC | Age: 73
End: 2018-08-20
Payer: COMMERCIAL

## 2018-08-20 VITALS
BODY MASS INDEX: 39.53 KG/M2 | HEART RATE: 73 BPM | WEIGHT: 244.9 LBS | TEMPERATURE: 98.3 F | DIASTOLIC BLOOD PRESSURE: 76 MMHG | RESPIRATION RATE: 16 BRPM | OXYGEN SATURATION: 94 % | SYSTOLIC BLOOD PRESSURE: 112 MMHG

## 2018-08-20 DIAGNOSIS — E11.9 TYPE 2 DIABETES MELLITUS WITHOUT COMPLICATION, WITHOUT LONG-TERM CURRENT USE OF INSULIN (H): ICD-10-CM

## 2018-08-20 DIAGNOSIS — I10 ESSENTIAL HYPERTENSION, BENIGN: ICD-10-CM

## 2018-08-20 DIAGNOSIS — J44.9 CHRONIC OBSTRUCTIVE PULMONARY DISEASE, UNSPECIFIED COPD TYPE (H): Primary | Chronic | ICD-10-CM

## 2018-08-20 DIAGNOSIS — E66.01 MORBID OBESITY DUE TO EXCESS CALORIES (H): ICD-10-CM

## 2018-08-20 PROCEDURE — 99214 OFFICE O/P EST MOD 30 MIN: CPT | Performed by: INTERNAL MEDICINE

## 2018-08-20 NOTE — MR AVS SNAPSHOT
After Visit Summary   8/20/2018    Nahun Villalobos    MRN: [id number]           Patient Information     Date Of Birth          1945        Visit Information        Provider Department      8/20/2018 8:40 AM Olegario Castillo MD [facility]        Today's Diagnoses     Chronic obstructive pulmonary disease, unspecified COPD type (H)    -  1    Type 2 diabetes mellitus without complication, without long-term current use of insulin (H)        Morbid obesity due to excess calories (H)        Essential hypertension, benign           Follow-ups after your visit        Follow-up notes from your care team     Return if symptoms worsen or fail to improve.      Your next 10 appointments already scheduled     Oct 26, 2018 11:15 AM CDT   (Arrive by 11:00 AM)   Return Visit with Praveena Burris MD   [facility] ([facility])    [address] [place] [id number] [phone]            Nov 01, 2018  8:00 AM CDT   Office Visit with Olegario Castillo MD   [facility] ([facility])    [address] [place] [id number] [phone]           Bring a current list of meds and any records pertaining to this visit. For Physicals, please bring immunization records and any forms needing to be filled out. Please arrive 10 minutes early to complete paperwork.              Who to contact     If you have questions or need follow up information about today's clinic visit or your schedule please contact [facility] directly at [phone].  Normal or non-critical lab and imaging results will be communicated to you by MyChart, letter or phone within 4 business days after the clinic has received the results. If you do not hear from us within 7 days, please contact the clinic through MyChart or phone. If you have  a critical or abnormal lab result, we will notify you by phone as soon as possible.  Submit refill requests through expresscoin or call your pharmacy and they will forward the refill request to us. Please allow 3 business days for your refill to be completed.          Additional Information About Your Visit        Orbsterhart Information     expresscoin gives you secure access to your electronic health record. If you see a primary care provider, you can also send messages to your care team and make appointments. If you have questions, please call your primary care clinic.  If you do not have a primary care provider, please call [phone] and they will assist you.        Care EveryWhere ID     This is your Care EveryWhere ID. This could be used by other organizations to access your [facility] medical records  [id number]        Your Vitals Were     Pulse Temperature Respirations Pulse Oximetry BMI (Body Mass Index)       73 98.3  F (36.8  C) (Oral) 16 94% 39.53 kg/m2        Blood Pressure from Last 3 Encounters:   08/20/18 112/76   08/18/18 (!) 166/102   06/29/18 129/81    Weight from Last 3 Encounters:   08/20/18 244 lb 14.4 oz (111.1 kg)   06/29/18 243 lb 12.8 oz (110.6 kg)   06/26/18 243 lb 9.7 oz (110.5 kg)              Today, you had the following     No orders found for display         Today's Medication Changes          These changes are accurate as of 8/20/18  9:12 AM.  If you have any questions, ask your nurse or doctor.               Start taking these medicines.        Dose/Directions    fluticasone-salmeterol 250-50 MCG/DOSE diskus inhaler   Commonly known as:  ADVAIR   Used for:  Chronic obstructive pulmonary disease, unspecified COPD type (H)        Dose:  1 puff   Inhale 1 puff into the lungs 2 times daily   Quantity:  3 Inhaler   Refills:  1         These medicines have changed or have updated prescriptions.        Dose/Directions    aspirin 81 MG tablet   This may have changed:  when to take this   Used for:   Type 2 diabetes, HbA1C goal < 8% (H)        Dose:  81 mg   Take 1 tablet (81 mg) by mouth daily   Quantity:  30 tablet   Refills:  11       finasteride 5 MG tablet   Commonly known as:  PROSCAR   This may have changed:  See the new instructions.   Used for:  Enlarged prostate        TAKE 1 TABLET EVERY DAY   Quantity:  90 tablet   Refills:  3       lisinopril 40 MG tablet   Commonly known as:  PRINIVIL/ZESTRIL   This may have changed:    - how much to take  - how to take this  - when to take this  - additional instructions   Used for:  Type 2 diabetes mellitus without complication, without long-term current use of insulin (H), Essential hypertension, benign        TAKE 1 TABLET (40 MG) BY MOUTH DAILY   Quantity:  90 tablet   Refills:  3       metFORMIN 500 MG tablet   Commonly known as:  GLUCOPHAGE   This may have changed:  when to take this   Used for:  Type 2 diabetes mellitus without complication, without long-term current use of insulin (H)        Dose:  500 mg   Take 1 tablet (500 mg) by mouth 2 times daily (with meals)   Quantity:  180 tablet   Refills:  3            Where to get your medicines      These medications were sent to [facility]/pharmacy #[id number] - [place], [place] - [address]  [address] [facility] [address]     Phone:  [phone]     fluticasone-salmeterol 250-50 MCG/DOSE diskus inhaler                Primary Care Provider Office Phone # Fax #    Olegario Castillo [phone]       [address] [place] [id number]        Equal Access to Services     MALLORY VAZQUEZ : Hadii rufina mckeono Sopabloali, waaxda luqadaha, qaybta kaalmada adeegyada, erika campos. So [facility] [phone].    ATENCIÓN: Si habla español, tiene a huggins disposición servicios gratuitos de asistencia lingüística. Katie al [phone].    We comply with applicable federal civil rights laws and [place] laws. We do not discriminate on the basis of race, color, national origin, age,  disability, sex, sexual orientation, or gender identity.            Thank you!     Thank you for choosing [facility]  for your care. Our goal is always to provide you with excellent care. Hearing back from our patients is one way we can continue to improve our services. Please take a few minutes to complete the written survey that you may receive in the mail after your visit with us. Thank you!             Your Updated Medication List - Protect others around you: Learn how to safely use, store and throw away your medicines at www.disposemymeds.org.          This list is accurate as of 8/20/18  9:12 AM.  Always use your most recent med list.                   Brand Name Dispense Instructions for use Diagnosis    acetaminophen 325 MG tablet    TYLENOL    100 tablet    Take 2 tablets (650 mg) by mouth every 4 hours as needed for mild pain    S/P transurethral resection of prostate       albuterol 108 (90 Base) MCG/ACT inhaler    PROAIR HFA/PROVENTIL HFA/VENTOLIN HFA    3 Inhaler    Inhale 2 puffs into the lungs every 6 hours as needed for shortness of breath / dyspnea or wheezing    Chronic obstructive pulmonary disease, unspecified COPD type (H)       aspirin 81 MG tablet     30 tablet    Take 1 tablet (81 mg) by mouth daily    Type 2 diabetes, HbA1C goal < 8% (H)       atorvastatin 20 MG tablet    LIPITOR    90 tablet    Take 1 tablet (20 mg) by mouth daily    Hyperlipidemia LDL goal <100       blood glucose monitoring lancets     1 Box    Use to test blood sugar 2 times daily or as directed.    DM (diabetes mellitus) (H)       blood glucose monitoring test strip    ACCU-CHEK CHACHA    60 strip    Use to test blood sugar 2 times daily or as directed.    DM (diabetes mellitus) (H)       docusate sodium 100 MG tablet    COLACE    45 tablet    Take 100 mg by mouth 2 times daily To prevent constipation    S/P transurethral resection of prostate       finasteride 5 MG tablet    PROSCAR    90 tablet     TAKE 1 TABLET EVERY DAY    Enlarged prostate       fluticasone-salmeterol 250-50 MCG/DOSE diskus inhaler    ADVAIR    3 Inhaler    Inhale 1 puff into the lungs 2 times daily    Chronic obstructive pulmonary disease, unspecified COPD type (H)       furosemide 20 MG tablet    LASIX    360 tablet    TAKE 2 TABLETS (40 MG) BY MOUTH 2 TIMES DAILY    Essential hypertension, benign       lisinopril 40 MG tablet    PRINIVIL/ZESTRIL    90 tablet    TAKE 1 TABLET (40 MG) BY MOUTH DAILY    Type 2 diabetes mellitus without complication, without long-term current use of insulin (H), Essential hypertension, benign       metFORMIN 500 MG tablet    GLUCOPHAGE    180 tablet    Take 1 tablet (500 mg) by mouth 2 times daily (with meals)    Type 2 diabetes mellitus without complication, without long-term current use of insulin (H)       metoprolol succinate 25 MG 24 hr tablet    TOPROL-XL    30 tablet    Take 1 tablet (25 mg) by mouth daily    Abnormal cardiovascular stress test       order for DME      Equipment delivered; Respironics 760S BIPAP with heated humidification and heated tubing.  Pressure 15/7cm. Respironics Syeda View FF mask (Medium)        * order for DME     1 Device    Oxygen 2 Li/min at night via nasal cannula    Nocturnal hypoxemia       * order for DME     1 Package    Equipment being ordered: diabetic shoes, 1 pair    Type 2 diabetes mellitus without complication, without long-term current use of insulin (H)       ranitidine 150 MG tablet    ZANTAC    30 tablet    Take 1 tablet (150 mg) by mouth 2 times daily        tamsulosin 0.4 MG capsule    FLOMAX    90 capsule    Take 1 capsule (0.4 mg) by mouth daily    Urinary urgency       VITAMIN C PO      Take by mouth At Bedtime        * Notice:  This list has 2 medication(s) that are the same as other medications prescribed for you. Read the directions carefully, and ask your doctor or other care provider to review them with you.

## 2018-08-20 NOTE — PROGRESS NOTES
SUBJECTIVE:   Nahnu Villalobos is a 72 year old male who presents to clinic today for the following health issues:      ED/UC Followup:    Facility: [facility]  Date of visit: 8/18/18  Reason for visit: Atypical chest pain   Current Status: Improved- patient would like to discuss inhalers to help with daily shortness of breath      Other concerns:  1. Sinus concerns- congestion, ears feeling plugged, productive cough x 2 months  2. Needs new handicap parking certificate     Medical Decision Making:  Patient presents with chief complaint of chest pain and shortness of breath. The DDx of the patients chest pain includes ACS, angina, pericarditis, PE, pneumonia, pleuritis, dissection, aneurysmal disease, GERD, esophageal spasm, costochronditis/chest wall pain, etc as the most likely etiologies.  Initially had planned on obtaining a CT scan of chest for PE and abdomen pelvis with contrast for evaluation of multiple etiologies simultaneously.  We had difficulty with patient's IV.  Despite 3 ultrasound IVs, they all infiltrated.  The third time, there was a contrast extravasation in the patient's left arm.  This was monitored for 2 hours without any worsening.  Patient has minimal pain in the area.  I did discuss with him that if he has increasing pain in this area that he needs to return to emergency department immediately to make sure that there is no ischemia/compartment syndrome.  We ended up doing the CTs without contrast which is not ideal, but does rule out multiple negative etiologies.  Patient has history of malignancy which is his only point on the well's criteria.  This still puts him in the low risk category.  A subsequent d-dimer was negative ruling out PE.  Concerning possible cardiac ischemia, troponin ×2 is negative.  Patient had a rather unremarkable cardiac catheterization just 6 months ago in February.  For this reason, it is unlikely that patient is suffering from ACS.  This was the reason for  hospitalist declination of admission, which is reasonable.  Patient's symptoms were improved after a small dose of morphine.  At this time, I do not have a good explanation for patient's complaints, but they do not appear to be emergent at this time.  He is encouraged to follow-up closely with his primary care physician early next week, and to return to the emergency department for any worsening complaints.  All of patient and his wife questions were answered and they are in agreement with this plan.  He is discharged in stable condition.        Diagnosis:      ICD-10-CM   1. Atypical chest pain        Problem list and histories reviewed & adjusted, as indicated.  Additional history: as documented    Patient Active Problem List   Diagnosis     Essential hypertension, benign     Tobacco use disorder     Lumbago     Impotence of organic origin     Advance care planning     Polyp of colon     Elevated PSA     Sleep related hypoventilation/hypoxemia in other disease     Hyperlipidemia LDL goal <100     Morbid obesity due to excess calories (H)     BPPV (benign paroxysmal positional vertigo), unspecified laterality     Chronic obstructive pulmonary disease, unspecified COPD type (H)     Type 2 diabetes mellitus without complication, without long-term current use of insulin (H)     Grief reaction     S/P transurethral resection of prostate     Hematuria, gross     Past Surgical History:   Procedure Laterality Date     APPENDECTOMY       C NONSPECIFIC PROCEDURE      cholecystectomy     CHOLECYSTECTOMY       CYSTOSCOPY, TRANSURETHRAL RESECTION (TUR) PROSTATE, COMBINED N/A 4/30/2018    Procedure: COMBINED CYSTOSCOPY, TRANSURETHRAL RESECTION (TUR) PROSTATE;  Cystoscopy, Transurethral Resection Of The Prostate;  Surgeon: Praveena Burris MD;  Location: [facility] OR     WEDGE RESECTION      lung       Social History   Substance Use Topics     Smoking status: Former Smoker     Packs/day: 1.00     Years: 52.00     Types:  Cigarettes     Quit date: 6/1/2013     Smokeless tobacco: Never Used     Alcohol use No     Family History   Problem Relation Age of Onset     Hypertension Mother      C.A.D. Mother      ANEURYSM     Cancer - colorectal Mother      Cancer Mother      OVARIAN     Hypertension Sister      Breast Cancer Sister      Cerebrovascular Disease Father      Glaucoma No family hx of      Macular Degeneration No family hx of          Current Outpatient Prescriptions   Medication Sig Dispense Refill     acetaminophen (TYLENOL) 325 MG tablet Take 2 tablets (650 mg) by mouth every 4 hours as needed for mild pain 100 tablet 0     albuterol (PROAIR HFA/PROVENTIL HFA/VENTOLIN HFA) 108 (90 BASE) MCG/ACT Inhaler Inhale 2 puffs into the lungs every 6 hours as needed for shortness of breath / dyspnea or wheezing 3 Inhaler 1     Ascorbic Acid (VITAMIN C PO) Take by mouth At Bedtime       aspirin 81 MG tablet Take 1 tablet (81 mg) by mouth daily (Patient taking differently: Take 81 mg by mouth At Bedtime ) 30 tablet 11     atorvastatin (LIPITOR) 20 MG tablet Take 1 tablet (20 mg) by mouth daily 90 tablet 3     blood glucose (ACCU-CHEK CHACHA) test strip Use to test blood sugar 2 times daily or as directed. 60 strip 6     blood glucose monitoring (SOFTCLIX) lancets Use to test blood sugar 2 times daily or as directed. 1 Box 6     docusate sodium (COLACE) 100 MG tablet Take 100 mg by mouth 2 times daily To prevent constipation 45 tablet 1     finasteride (PROSCAR) 5 MG tablet TAKE 1 TABLET EVERY DAY (Patient taking differently: TAKE 1 TABLET EVERY DAY AT BEDTIME) 90 tablet 3     fluticasone-salmeterol (ADVAIR) 250-50 MCG/DOSE diskus inhaler Inhale 1 puff into the lungs 2 times daily 3 Inhaler 1     furosemide (LASIX) 20 MG tablet TAKE 2 TABLETS (40 MG) BY MOUTH 2 TIMES DAILY 360 tablet 0     lisinopril (PRINIVIL/ZESTRIL) 40 MG tablet TAKE 1 TABLET (40 MG) BY MOUTH DAILY (Patient taking differently: Take 40 mg by mouth At Bedtime TAKE 1 TABLET  (40 MG) BY MOUTH DAILY) 90 tablet 3     metFORMIN (GLUCOPHAGE) 500 MG tablet Take 1 tablet (500 mg) by mouth 2 times daily (with meals) (Patient taking differently: Take 500 mg by mouth At Bedtime ) 180 tablet 3     metoprolol succinate (TOPROL-XL) 25 MG 24 hr tablet Take 1 tablet (25 mg) by mouth daily 30 tablet 11     order for DME Equipment being ordered: diabetic shoes, 1 pair 1 Package 0     order for DME Oxygen 2 Li/min  at night via nasal cannula 1 Device 0     order for DME Equipment delivered; Respironics 760S BIPAP with heated humidification and heated tubing.  Pressure 15/7cm. Respironics Syeda View FF mask (Medium)       ranitidine (ZANTAC) 150 MG tablet Take 1 tablet (150 mg) by mouth 2 times daily 30 tablet 0     tamsulosin (FLOMAX) 0.4 MG capsule Take 1 capsule (0.4 mg) by mouth daily 90 capsule 3     Allergies   Allergen Reactions     No Known Drug Allergies      BP Readings from Last 3 Encounters:   08/20/18 112/76   08/18/18 (!) 166/102   06/29/18 129/81    Wt Readings from Last 3 Encounters:   08/20/18 244 lb 14.4 oz (111.1 kg)   06/29/18 243 lb 12.8 oz (110.6 kg)   06/26/18 243 lb 9.7 oz (110.5 kg)                    Reviewed and updated as needed this visit by clinical staff       Reviewed and updated as needed this visit by Provider         ROS:  CONSTITUTIONAL: NEGATIVE for fever, chills, change in weight  ENT/MOUTH: NEGATIVE for ear, mouth and throat problems  CV: NEGATIVE for chest pain, palpitations or peripheral edema  GI: NEGATIVE for nausea, abdominal pain, heartburn, or change in bowel habits  : NEGATIVE for frequency, dysuria, or hematuria  MUSCULOSKELETAL: NEGATIVE for significant arthralgias or myalgia  NEURO: NEGATIVE for weakness, dizziness or paresthesias  ENDOCRINE: NEGATIVE for temperature intolerance, skin/hair changes  HEME: NEGATIVE for bleeding problems  PSYCHIATRIC: NEGATIVE for changes in mood or affect    OBJECTIVE:                                                    BP  112/76  Pulse 73  Temp 98.3  F (36.8  C) (Oral)  Resp 16  Wt 244 lb 14.4 oz (111.1 kg)  SpO2 94%  BMI 39.53 kg/m2  Body mass index is 39.53 kg/(m^2).  GENERAL: alert and no distress  EYES: Eyes grossly normal to inspection, extraocular movements - intact, and PERRL  HENT: ear canals- normal; TMs- normal; Nose- normal; Mouth- no ulcers, no lesions  NECK: no tenderness, no adenopathy, no asymmetry, no masses, no stiffness; thyroid- normal to palpation  RESP: lungs clear to auscultation - no rales, no rhonchi, no wheezes  CV: regular rates and rhythm, normal S1 S2, no S3 or S4 and  no click or rub   obese  MS: extremities- no gross deformities noted  NEURO: no focal changes  PSYCH: Alert and oriented times 3; speech- coherent , normal rate and volume; able to articulate logical thoughts, able to abstract reason, no tangential thoughts, no hallucinations or delusions, affect- normal     ASSESSMENT/PLAN:                                                      (J44.9) Chronic obstructive pulmonary disease, unspecified COPD type (H)  (primary encounter diagnosis)  Comment: I see no evidence of infectious etiology on exam but have suggested that he uses albuterol inhaler more frequently and also that he start a trial of Advair 2 50-51 puff p.o. twice daily to see how he responds to this  Plan: fluticasone-salmeterol (ADVAIR) 250-50 MCG/DOSE        diskus inhaler            (E11.9) Type 2 diabetes mellitus without complication, without long-term current use of insulin (H)  Comment: Blood sugars checked for patient run in 120  Lab Results   Component Value Date    A1C 7.2 05/01/2018    A1C Canceled, Test credited 05/01/2018    A1C 6.6 12/07/2017    A1C 6.2 12/13/2016    A1C 6.1 03/15/2016     Plan: Advised the patient and he is due to get his blood work done in November fasting and he will make an appointment at that point and continue with his medications.    (E66.01) Morbid obesity due to excess calories (H)  Comment:  Encouraged ongoing weight loss  Plan:     (I10) Essential hypertension, benign  Comment: Stable on therapy as directed  Plan:       See Patient Instructions    Olegario Castillo MD  [facility]    THE MEDICATION LIST HAS BEEN FULLY RECONCILED BY THE M.D. AND THE NURSING STAFF.

## 2018-10-15 ENCOUNTER — PRE VISIT (OUTPATIENT)
Dept: UROLOGY | Facility: CLINIC | Age: 73
End: 2018-10-15

## 2018-10-26 ENCOUNTER — OFFICE VISIT (OUTPATIENT)
Dept: UROLOGY | Facility: CLINIC | Age: 73
End: 2018-10-26
Payer: COMMERCIAL

## 2018-10-26 VITALS
BODY MASS INDEX: 40.21 KG/M2 | SYSTOLIC BLOOD PRESSURE: 163 MMHG | WEIGHT: 249.1 LBS | DIASTOLIC BLOOD PRESSURE: 94 MMHG | HEART RATE: 70 BPM

## 2018-10-26 DIAGNOSIS — N39.41 URGENCY INCONTINENCE: Primary | ICD-10-CM

## 2018-10-26 DIAGNOSIS — R09.81 CONGESTION OF PARANASAL SINUS: ICD-10-CM

## 2018-10-26 ASSESSMENT — PAIN SCALES - GENERAL: PAINLEVEL: NO PAIN (0)

## 2018-10-26 NOTE — PROGRESS NOTES
UROLOGIC DIAGNOSES:     CURRENT INTERVENTIONS:       HISTORY:     [facility] seen [facility].   Was seen for elevated PSA and lower urinary tract symptoms.   Patient s/p prostate biopsy four years ago for elevated PSA.   PSA was 7.5 six months ago and six months prior to that was 5.22.   Had lower urinary tract symptoms similar to listed below. Was prescribed tamsulosin and finasteride but unclear if patient started these rx.     Patient with urgency, nocturia 2-4x, frequency, incomplete emptying.   Notes variable stream from dribble to moderate stream.     Most recent PSA is 1.98 (though actually ~ 4 as patient is taking finasteride).   Patient continues to note lower urinary tract symptoms despite combination therapy and would like to consider further intervention.     Patient is s/p TURP in 04/2018.     Patient notes that his stream is much improved and that he is happy with his symptoms. Patient notes new onset urgency and occasional UUI but that these are improving.  He states that he is currently happy with his symptoms.       WE discussed stopping rx, PSA screening, nasal congestion, follow up.       PAST MEDICAL HISTORY:   Past Medical History:   Diagnosis Date     COPD (chronic obstructive pulmonary disease) (H)      DM (diabetes mellitus) (H)      Essential hypertension, benign      Hemorrhage of rectum and anus      Non-small cell lung cancer (H)      Obesity      Personal history of colonic polyps      Tobacco use disorder      Urinary retention        PAST SURGICAL HISTORY:   Past Surgical History:   Procedure Laterality Date     APPENDECTOMY       C NONSPECIFIC PROCEDURE      cholecystectomy     CHOLECYSTECTOMY       CYSTOSCOPY, TRANSURETHRAL RESECTION (TUR) PROSTATE, COMBINED N/A 4/30/2018    Procedure: COMBINED CYSTOSCOPY, TRANSURETHRAL RESECTION (TUR) PROSTATE;  Cystoscopy, Transurethral Resection Of The Prostate;  Surgeon: Praveena Burris MD;  Location: [facility] OR     WEDGE RESECTION       lung       FAMILY HISTORY:   Family History   Problem Relation Age of Onset     Hypertension Mother      C.A.D. Mother      ANEURYSM     Cancer - colorectal Mother      Cancer Mother      OVARIAN     Hypertension Sister      Breast Cancer Sister      Cerebrovascular Disease Father      Glaucoma No family hx of      Macular Degeneration No family hx of        SOCIAL HISTORY:   Social History   Substance Use Topics     Smoking status: Former Smoker     Packs/day: 1.00     Years: 52.00     Types: Cigarettes     Quit date: 6/1/2013     Smokeless tobacco: Never Used     Alcohol use No       Current Outpatient Prescriptions   Medication     acetaminophen (TYLENOL) 325 MG tablet     albuterol (PROAIR HFA/PROVENTIL HFA/VENTOLIN HFA) 108 (90 BASE) MCG/ACT Inhaler     Ascorbic Acid (VITAMIN C PO)     aspirin 81 MG tablet     atorvastatin (LIPITOR) 20 MG tablet     blood glucose (ACCU-CHEK CHACHA) test strip     blood glucose monitoring (SOFTCLIX) lancets     docusate sodium (COLACE) 100 MG tablet     finasteride (PROSCAR) 5 MG tablet     fluticasone-salmeterol (ADVAIR) 250-50 MCG/DOSE diskus inhaler     furosemide (LASIX) 20 MG tablet     lisinopril (PRINIVIL/ZESTRIL) 40 MG tablet     metFORMIN (GLUCOPHAGE) 500 MG tablet     metoprolol succinate (TOPROL-XL) 25 MG 24 hr tablet     order for DME     order for DME     order for DME     ranitidine (ZANTAC) 150 MG tablet     tamsulosin (FLOMAX) 0.4 MG capsule     No current facility-administered medications for this visit.          PHYSICAL EXAM:    BP (!) 163/94  Pulse 70  Wt 113 kg (249 lb 1.6 oz)  BMI 40.21 kg/m2    HEENT: Normocephalic and atraumatic   Cardiac: Not done  Back/Flank: Not done  CNS/PNS: Not done  Respiratory: Normal non-labored breathing  Abdomen: Soft nontender and nondistended  Peripheral Vascular: Not done  Mental Status: Not done    Penis: Not done  Scrotal Skin: Not done  Testicles: Not done  Epididymis: Not done  Digital Rectal Exam:     Cystoscopy:  Not done    Imaging: None    Urinalysis: UA RESULTS:  Recent Labs   Lab Test  02/22/12   1449   COLOR  Yellow   APPEARANCE  Clear   URINEGLC  Negative   URINEBILI  Negative   URINEKETONE  Negative   SG  1.015   UBLD  Negative   URINEPH  6.0   PROTEIN  Negative   UROBILINOGEN  0.2   NITRITE  Negative   LEUKEST  Negative       PSA:   Qmax 13.4ml/s  [id number]  Post Void Residual: 19ml    Other labs: None today      IMPRESSION:  72 y/o M with history of elevated PSA and lower urinary tract symptoms     PLAN:   Hold combination therapy   Refer to ENT per patient request   Follow up in three months with uroflow/PVR     Total Time: 15 minutes                                      Total in Consultation: greater than 50%      I spent over 15 minutes with the patient.  Over half this time was spent on counseling for urgency and post op TURP symptoms.

## 2018-10-26 NOTE — MR AVS SNAPSHOT
After Visit Summary   10/26/2018    Nahun Villalobos    MRN: [id number]           Patient Information     Date Of Birth          1945        Visit Information        Provider Department      10/26/2018 11:15 AM Praveena Burris MD [facility] Urology and [facility] for Prostate and Urologic Cancers        Today's Diagnoses     Urgency incontinence    -  1    Congestion of paranasal sinus          Care Instructions    You may stop finasteride and tamsulosin   Follow up in three months for uroflow/PVR   You will need to get a PSA test one day prior to your visit.   Please make a appointment with ENT       It was a pleasure meeting with you today.  Thank you for allowing me and my team the privilege of caring for you today.  YOU are the reason we are here, and I truly hope we provided you with the excellent service you deserve.  Please let us know if there is anything else we can do for you so that we can be sure you are leaving completely satisfied with your care experience.                  Follow-ups after your visit        Additional Services     OTOLARYNGOLOGY REFERRAL       Your provider has referred you to: [facility]: [facility] and [facility] [phone]   [email or web address]    Please be aware that coverage of these services is subject to the terms and limitations of your health insurance plan.  Call member services at your health plan with any benefit or coverage questions.      Please bring the following with you to your appointment:    (1) Any X-Rays, CTs or MRIs which have been performed.  Contact the facility where they were done to arrange for  prior to your scheduled appointment.   (2) List of current medications  (3) This referral request   (4) Any documents/labs given to you for this referral                  Your next 10 appointments already scheduled     Nov 01, 2018  8:00 AM CDT   Office Visit with Olegario Castillo MD   [facility]  [facility] ([facility])    [address] [phone]           Bring a current list of meds and any records pertaining to this visit. For Physicals, please bring immunization records and any forms needing to be filled out. Please arrive 10 minutes early to complete paperwork.            Dec 10, 2018  2:00 PM CST   (Arrive by 1:45 PM)   NEW RHINOLOGY with Bennie Gilbert MD   [facility] ([facility])    [address] [place] [id number] [phone]            Feb 01, 2019  2:30 PM CST   (Arrive by 2:15 PM)   Return Visit with Praveena Burris MD   [facility] Urology and [facility] for Prostate and Urologic Cancers ([facility])    [address] [place] [id number] [phone]              Future tests that were ordered for you today     Open Future Orders        Priority Expected Expires Ordered    PSA tumor marker Routine  10/26/2019 10/26/2018            Who to contact     Please call your clinic at [phone] to:    Ask questions about your health    Make or cancel appointments    Discuss your medicines    Learn about your test results    Speak to your doctor            Additional Information About Your Visit        Muzzley Information     [employer]ley gives you secure access to your electronic health record. If you see a primary care provider, you can also send messages to your care team and make appointments. If you have questions, please call your primary care clinic.  If you do not have a primary care provider, please call [phone] and they will assist you.      Mu[employer]ley is an electronic gateway that provides easy, online access to your medical records. With [employer], you can request a clinic appointment, read your test results, renew a prescription or communicate with your care team.     To access your existing account, please contact  your [facility] or call [phone] for assistance.        Care EveryWhere ID     This is your Care EveryWhere ID. This could be used by other organizations to access your [place] medical records  [id number]        Your Vitals Were     Pulse BMI (Body Mass Index)                70 40.21 kg/m2           Blood Pressure from Last 3 Encounters:   10/26/18 (!) 163/94   08/20/18 112/76   08/18/18 (!) 166/102    Weight from Last 3 Encounters:   10/26/18 113 kg (249 lb 1.6 oz)   08/20/18 111.1 kg (244 lb 14.4 oz)   06/29/18 110.6 kg (243 lb 12.8 oz)              We Performed the Following     OTOLARYNGOLOGY REFERRAL          Today's Medication Changes          These changes are accurate as of 10/26/18 12:10 PM.  If you have any questions, ask your nurse or doctor.               These medicines have changed or have updated prescriptions.        Dose/Directions    aspirin 81 MG tablet   This may have changed:  when to take this   Used for:  Type 2 diabetes, HbA1C goal < 8% (H)        Dose:  81 mg   Take 1 tablet (81 mg) by mouth daily   Quantity:  30 tablet   Refills:  11       finasteride 5 MG tablet   Commonly known as:  PROSCAR   This may have changed:  See the new instructions.   Used for:  Enlarged prostate        TAKE 1 TABLET EVERY DAY   Quantity:  90 tablet   Refills:  3       lisinopril 40 MG tablet   Commonly known as:  PRINIVIL/ZESTRIL   This may have changed:    - how much to take  - how to take this  - when to take this  - additional instructions   Used for:  Type 2 diabetes mellitus without complication, without long-term current use of insulin (H), Essential hypertension, benign        TAKE 1 TABLET (40 MG) BY MOUTH DAILY   Quantity:  90 tablet   Refills:  3       metFORMIN 500 MG tablet   Commonly known as:  GLUCOPHAGE   This may have changed:  when to take this   Used for:  Type 2 diabetes mellitus without complication, without long-term current use of insulin (H)         Dose:  500 mg   Take 1 tablet (500 mg) by mouth 2 times daily (with meals)   Quantity:  180 tablet   Refills:  3                Primary Care Provider Office Phone # Fax #    Olegario Castillo [phone] [address] [facility] [address]        Equal Access to Services     [facility] : Hadii aad ku hadasho Soomaali, waaxda luqadaha, qaybta kaalmada adeegyada, waxivy jigarin haymacarion adelavonne lee lachadmarcos . So [facility] [phone].    ATENCIÓN: Si habla español, tiene a huggins disposición servicios gratuitos de asistencia lingüística. [facility] [phone].    We comply with applicable federal civil rights laws and [place] laws. We do not discriminate on the basis of race, color, national origin, age, disability, sex, sexual orientation, or gender identity.            Thank you!     Thank you for choosing [facility] UROLOGY AND [facility] FOR PROSTATE AND UROLOGIC CANCERS  for your care. Our goal is always to provide you with excellent care. Hearing back from our patients is one way we can continue to improve our services. Please take a few minutes to complete the written survey that you may receive in the mail after your visit with us. Thank you!             Your Updated Medication List - Protect others around you: Learn how to safely use, store and throw away your medicines at www.disposemymeds.org.          This list is accurate as of 10/26/18 12:10 PM.  Always use your most recent med list.                   Brand Name Dispense Instructions for use Diagnosis    acetaminophen 325 MG tablet    TYLENOL    100 tablet    Take 2 tablets (650 mg) by mouth every 4 hours as needed for mild pain    S/P transurethral resection of prostate       albuterol 108 (90 Base) MCG/ACT inhaler    PROAIR HFA/PROVENTIL HFA/VENTOLIN HFA    3 Inhaler    Inhale 2 puffs into the lungs every 6 hours as needed for shortness of breath / dyspnea or wheezing    Chronic obstructive pulmonary disease, unspecified COPD type (H)        aspirin 81 MG tablet     30 tablet    Take 1 tablet (81 mg) by mouth daily    Type 2 diabetes, HbA1C goal < 8% (H)       atorvastatin 20 MG tablet    LIPITOR    90 tablet    Take 1 tablet (20 mg) by mouth daily    Hyperlipidemia LDL goal <100       blood glucose monitoring lancets     1 Box    Use to test blood sugar 2 times daily or as directed.    DM (diabetes mellitus) (H)       blood glucose monitoring test strip    ACCU-CHEK CHACHA    60 strip    Use to test blood sugar 2 times daily or as directed.    DM (diabetes mellitus) (H)       docusate sodium 100 MG tablet    COLACE    45 tablet    Take 100 mg by mouth 2 times daily To prevent constipation    S/P transurethral resection of prostate       finasteride 5 MG tablet    PROSCAR    90 tablet    TAKE 1 TABLET EVERY DAY    Enlarged prostate       fluticasone-salmeterol 250-50 MCG/DOSE diskus inhaler    ADVAIR    3 Inhaler    Inhale 1 puff into the lungs 2 times daily    Chronic obstructive pulmonary disease, unspecified COPD type (H)       furosemide 20 MG tablet    LASIX    360 tablet    TAKE 2 TABLETS (40 MG) BY MOUTH 2 TIMES DAILY    Essential hypertension, benign       lisinopril 40 MG tablet    PRINIVIL/ZESTRIL    90 tablet    TAKE 1 TABLET (40 MG) BY MOUTH DAILY    Type 2 diabetes mellitus without complication, without long-term current use of insulin (H), Essential hypertension, benign       metFORMIN 500 MG tablet    GLUCOPHAGE    180 tablet    Take 1 tablet (500 mg) by mouth 2 times daily (with meals)    Type 2 diabetes mellitus without complication, without long-term current use of insulin (H)       metoprolol succinate 25 MG 24 hr tablet    TOPROL-XL    30 tablet    Take 1 tablet (25 mg) by mouth daily    Abnormal cardiovascular stress test       order for DME      Equipment delivered; Respironics 760S BIPAP with heated humidification and heated tubing.  Pressure 15/7cm. Respironics Syeda View FF mask (Medium)        * order for DME     1 Device     Oxygen 2 Li/min at night via nasal cannula    Nocturnal hypoxemia       * order for DME     1 Package    Equipment being ordered: diabetic shoes, 1 pair    Type 2 diabetes mellitus without complication, without long-term current use of insulin (H)       ranitidine 150 MG tablet    ZANTAC    30 tablet    Take 1 tablet (150 mg) by mouth 2 times daily        tamsulosin 0.4 MG capsule    FLOMAX    90 capsule    Take 1 capsule (0.4 mg) by mouth daily    Urinary urgency       VITAMIN C PO      Take by mouth At Bedtime        * Notice:  This list has 2 medication(s) that are the same as other medications prescribed for you. Read the directions carefully, and ask your doctor or other care provider to review them with you.

## 2018-10-26 NOTE — PATIENT INSTRUCTIONS
You may stop finasteride and tamsulosin   Follow up in three months for uroflow/PVR   You will need to get a PSA test one day prior to your visit.   Please make a appointment with ENT       It was a pleasure meeting with you today.  Thank you for allowing me and my team the privilege of caring for you today.  YOU are the reason we are here, and I truly hope we provided you with the excellent service you deserve.  Please let us know if there is anything else we can do for you so that we can be sure you are leaving completely satisfied with your care experience.

## 2018-10-26 NOTE — LETTER
10/26/2018       RE: Nahun Villalobos  [address] [facility] [address]     Dear Colleague,    Thank you for referring your patient, Nahun Villalobos, to the [facility] UROLOGY AND INST FOR PROSTATE AND UROLOGIC CANCERS at [facility]. Please see a copy of my visit note below.    UROLOGIC DIAGNOSES:     CURRENT INTERVENTIONS:       HISTORY:     Patient previously seen at [facility].   Was seen for elevated PSA and lower urinary tract symptoms.   Patient s/p prostate biopsy four years ago for elevated PSA.   PSA was 7.5 six months ago and six months prior to that was 5.22.   Had lower urinary tract symptoms similar to listed below. Was prescribed tamsulosin and finasteride but unclear if patient started these rx.     Patient with urgency, nocturia 2-4x, frequency, incomplete emptying.   Notes variable stream from dribble to moderate stream.     Most recent PSA is 1.98 (though actually ~ 4 as patient is taking finasteride).   Patient continues to note lower urinary tract symptoms despite combination therapy and would like to consider further intervention.     Patient is s/p TURP in 04/2018.     Patient notes that his stream is much improved and that he is happy with his symptoms. Patient notes new onset urgency and occasional UUI but that these are improving.  He states that he is currently happy with his symptoms.       WE discussed stopping rx, PSA screening, nasal congestion, follow up.       PAST MEDICAL HISTORY:   Past Medical History:   Diagnosis Date     COPD (chronic obstructive pulmonary disease) (H)      DM (diabetes mellitus) (H)      Essential hypertension, benign      Hemorrhage of rectum and anus      Non-small cell lung cancer (H)      Obesity      Personal history of colonic polyps      Tobacco use disorder      Urinary retention        PAST SURGICAL HISTORY:   Past Surgical History:   Procedure Laterality Date     APPENDECTOMY       C NONSPECIFIC  PROCEDURE      cholecystectomy     CHOLECYSTECTOMY       CYSTOSCOPY, TRANSURETHRAL RESECTION (TUR) PROSTATE, COMBINED N/A 4/30/2018    Procedure: COMBINED CYSTOSCOPY, TRANSURETHRAL RESECTION (TUR) PROSTATE;  Cystoscopy, Transurethral Resection Of The Prostate;  Surgeon: Praveena Burris MD;  Location: [facility] OR     WEDGE RESECTION      lung       FAMILY HISTORY:   Family History   Problem Relation Age of Onset     Hypertension Mother      C.A.D. Mother      ANEURYSM     Cancer - colorectal Mother      Cancer Mother      OVARIAN     Hypertension Sister      Breast Cancer Sister      Cerebrovascular Disease Father      Glaucoma No family hx of      Macular Degeneration No family hx of        SOCIAL HISTORY:   Social History   Substance Use Topics     Smoking status: Former Smoker     Packs/day: 1.00     Years: 52.00     Types: Cigarettes     Quit date: 6/1/2013     Smokeless tobacco: Never Used     Alcohol use No       Current Outpatient Prescriptions   Medication     acetaminophen (TYLENOL) 325 MG tablet     albuterol (PROAIR HFA/PROVENTIL HFA/VENTOLIN HFA) 108 (90 BASE) MCG/ACT Inhaler     Ascorbic Acid (VITAMIN C PO)     aspirin 81 MG tablet     atorvastatin (LIPITOR) 20 MG tablet     blood glucose (ACCU-CHEK CHACHA) test strip     blood glucose monitoring (SOFTCLIX) lancets     docusate sodium (COLACE) 100 MG tablet     finasteride (PROSCAR) 5 MG tablet     fluticasone-salmeterol (ADVAIR) 250-50 MCG/DOSE diskus inhaler     furosemide (LASIX) 20 MG tablet     lisinopril (PRINIVIL/ZESTRIL) 40 MG tablet     metFORMIN (GLUCOPHAGE) 500 MG tablet     metoprolol succinate (TOPROL-XL) 25 MG 24 hr tablet     order for DME     order for DME     order for DME     ranitidine (ZANTAC) 150 MG tablet     tamsulosin (FLOMAX) 0.4 MG capsule     No current facility-administered medications for this visit.          PHYSICAL EXAM:    BP (!) 163/94  Pulse 70  Wt 113 kg (249 lb 1.6 oz)  BMI 40.21 kg/m2    HEENT: Normocephalic  and atraumatic   Cardiac: Not done  Back/Flank: Not done  CNS/PNS: Not done  Respiratory: Normal non-labored breathing  Abdomen: Soft nontender and nondistended  Peripheral Vascular: Not done  Mental Status: Not done    Penis: Not done  Scrotal Skin: Not done  Testicles: Not done  Epididymis: Not done  Digital Rectal Exam:     Cystoscopy: Not done    Imaging: None    Urinalysis: UA RESULTS:  Recent Labs   Lab Test  02/22/12   1449   COLOR  Yellow   APPEARANCE  Clear   URINEGLC  Negative   URINEBILI  Negative   URINEKETONE  Negative   SG  1.015   UBLD  Negative   URINEPH  6.0   PROTEIN  Negative   UROBILINOGEN  0.2   NITRITE  Negative   LEUKEST  Negative       PSA:   Qmax 13.4ml/s  [id number]  Post Void Residual: 19ml    Other labs: None today      IMPRESSION:  74 y/o M with history of elevated PSA and lower urinary tract symptoms     PLAN:   Hold combination therapy   Refer to ENT per patient request   Follow up in three months with uroflow/PVR     Total Time: 15 minutes                                      Total in Consultation: greater than 50%      I spent over 15 minutes with the patient.  Over half this time was spent on counseling for urgency and post op TURP symptoms.      Again, thank you for allowing me to participate in the care of your patient.      Sincerely,    Praveena Burris MD

## 2018-11-01 ENCOUNTER — OFFICE VISIT (OUTPATIENT)
Dept: INTERNAL MEDICINE | Facility: CLINIC | Age: 73
End: 2018-11-01
Payer: COMMERCIAL

## 2018-11-01 VITALS
BODY MASS INDEX: 40.05 KG/M2 | WEIGHT: 249.2 LBS | SYSTOLIC BLOOD PRESSURE: 130 MMHG | DIASTOLIC BLOOD PRESSURE: 78 MMHG | HEART RATE: 85 BPM | HEIGHT: 66 IN | RESPIRATION RATE: 14 BRPM | OXYGEN SATURATION: 93 % | TEMPERATURE: 98.2 F

## 2018-11-01 DIAGNOSIS — E11.9 TYPE 2 DIABETES MELLITUS WITHOUT COMPLICATION, WITHOUT LONG-TERM CURRENT USE OF INSULIN (H): Primary | ICD-10-CM

## 2018-11-01 DIAGNOSIS — E78.5 HYPERLIPIDEMIA LDL GOAL <100: ICD-10-CM

## 2018-11-01 DIAGNOSIS — Z23 NEED FOR PROPHYLACTIC VACCINATION AND INOCULATION AGAINST INFLUENZA: ICD-10-CM

## 2018-11-01 DIAGNOSIS — I10 ESSENTIAL HYPERTENSION, BENIGN: ICD-10-CM

## 2018-11-01 DIAGNOSIS — E66.01 MORBID OBESITY DUE TO EXCESS CALORIES (H): ICD-10-CM

## 2018-11-01 DIAGNOSIS — Z13.89 SCREENING FOR DIABETIC PERIPHERAL NEUROPATHY: ICD-10-CM

## 2018-11-01 LAB
CHOLEST SERPL-MCNC: 170 MG/DL
HBA1C MFR BLD: 7.5 % (ref 0–5.6)
HDLC SERPL-MCNC: 40 MG/DL
LDLC SERPL CALC-MCNC: 94 MG/DL
NONHDLC SERPL-MCNC: 130 MG/DL
TRIGL SERPL-MCNC: 180 MG/DL

## 2018-11-01 PROCEDURE — G0008 ADMIN INFLUENZA VIRUS VAC: HCPCS | Performed by: INTERNAL MEDICINE

## 2018-11-01 PROCEDURE — 36415 COLL VENOUS BLD VENIPUNCTURE: CPT | Performed by: INTERNAL MEDICINE

## 2018-11-01 PROCEDURE — 99207 C FOOT EXAM  NO CHARGE: CPT | Performed by: INTERNAL MEDICINE

## 2018-11-01 PROCEDURE — 99214 OFFICE O/P EST MOD 30 MIN: CPT | Mod: 25 | Performed by: INTERNAL MEDICINE

## 2018-11-01 PROCEDURE — 80061 LIPID PANEL: CPT | Performed by: INTERNAL MEDICINE

## 2018-11-01 PROCEDURE — 83036 HEMOGLOBIN GLYCOSYLATED A1C: CPT | Performed by: INTERNAL MEDICINE

## 2018-11-01 PROCEDURE — 90662 IIV NO PRSV INCREASED AG IM: CPT | Performed by: INTERNAL MEDICINE

## 2018-11-01 RX ORDER — LISINOPRIL 40 MG/1
TABLET ORAL
Qty: 90 TABLET | Refills: 3 | Status: SHIPPED | OUTPATIENT
Start: 2018-11-01 | End: 2019-08-28

## 2018-11-01 RX ORDER — FUROSEMIDE 20 MG
40 TABLET ORAL 2 TIMES DAILY
Qty: 360 TABLET | Refills: 3 | Status: SHIPPED | OUTPATIENT
Start: 2018-11-01 | End: 2019-08-28

## 2018-11-01 NOTE — LETTER
[address] [address]  [phone]      11/1/2018       Nahun Villalobos  [address] [facility] [id number]        Dear Nahun,      Your Hemoglobin A1C and blood sugar tests look good and I would continue with your medication without change.  These tests should be repeated in 6 months.    Your cholesterol looks good and I would not change anything at this point but would repeat your labs in 12 months.    Sincerely,      Olegario Castillo MD  Internal Medicine

## 2018-11-01 NOTE — MR AVS SNAPSHOT
After Visit Summary   11/1/2018    Nahun Villalobos    MRN: [id number]           Patient Information     Date Of Birth          1945        Visit Information        Provider Department      11/1/2018 8:00 AM Olegario Castillo MD [facility]        Today's Diagnoses     Type 2 diabetes mellitus without complication, without long-term current use of insulin (H)    -  1    Morbid obesity due to excess calories (H)        Essential hypertension, benign        Hyperlipidemia LDL goal <100        Screening for diabetic peripheral neuropathy        Need for prophylactic vaccination and inoculation against influenza           Follow-ups after your visit        Follow-up notes from your care team     Return if symptoms worsen or fail to improve, for diabetes check 6 months.      Your next 10 appointments already scheduled     Dec 10, 2018  2:00 PM CST   (Arrive by 1:45 PM)   NEW RHINOLOGY with Bennie Gilbert MD   [facility] ([facility])    [address] [place] [id number] [phone]            Feb 01, 2019  2:30 PM CST   (Arrive by 2:15 PM)   Return Visit with Praveena Burris MD   [facility]rolog[facility] ([facility])    [address] [place] [id number] [phone]              Who to contact     If you have questions or need follow up information about today's clinic visit or your schedule please contact [facility] directly at [phone].  Normal or non-critical lab and imaging results will be communicated to you by MyChart, letter or phone within 4 business days after the clinic has received the results. If you do not hear from us within 7 days, please contact the clinic through MyChart or phone. If you have a critical or abnormal lab result, we will notify you by phone as soon as  "possible.  Submit refill requests through [employer] or call your pharmacy and they will forward the refill request to us. Please allow 3 business days for your refill to be completed.          Additional Information About Your Visit        [employer] Information     [employer] gives you secure access to your electronic health record. If you see a primary care provider, you can also send messages to your care team and make appointments. If you have questions, please call your primary care clinic.  If you do not have a primary care provider, please call [phone] and they will assist you.        Care EveryWhere ID     This is your Care EveryWhere ID. This could be used by other organizations to access your [place] medical records  [id number]        Your Vitals Were     Pulse Temperature Respirations Height Pulse Oximetry BMI (Body Mass Index)    85 98.2  F (36.8  C) (Oral) 14 5' 6\" (1.676 m) 93% 40.22 kg/m2       Blood Pressure from Last 3 Encounters:   11/01/18 130/78   10/26/18 (!) 163/94   08/20/18 112/76    Weight from Last 3 Encounters:   11/01/18 249 lb 3.2 oz (113 kg)   10/26/18 249 lb 1.6 oz (113 kg)   08/20/18 244 lb 14.4 oz (111.1 kg)              We Performed the Following     ADMIN INFLUENZA  (For MEDICARE Patients ONLY) []     FLU VACCINE, INCREASED ANTIGEN, PRESV FREE, AGE 65+ [16588]     FOOT EXAM  NO CHARGE [06384.114]     HEMOGLOBIN A1C     Lipid Profile          Today's Medication Changes          These changes are accurate as of 11/1/18  8:38 AM.  If you have any questions, ask your nurse or doctor.               These medicines have changed or have updated prescriptions.        Dose/Directions    aspirin 81 MG tablet   This may have changed:  when to take this   Used for:  Type 2 diabetes, HbA1C goal < 8% (H)        Dose:  81 mg   Take 1 tablet (81 mg) by mouth daily   Quantity:  30 tablet   Refills:  11       finasteride 5 MG tablet   Commonly known as:  PROSCAR   This may have changed:  See " the new instructions.   Used for:  Enlarged prostate        TAKE 1 TABLET EVERY DAY   Quantity:  90 tablet   Refills:  3       lisinopril 40 MG tablet   Commonly known as:  PRINIVIL/ZESTRIL   This may have changed:    - how much to take  - how to take this  - when to take this  - additional instructions   Used for:  Type 2 diabetes mellitus without complication, without long-term current use of insulin (H), Essential hypertension, benign        TAKE 1 TABLET (40 MG) BY MOUTH DAILY   Quantity:  90 tablet   Refills:  3       metFORMIN 500 MG tablet   Commonly known as:  GLUCOPHAGE   This may have changed:  when to take this   Used for:  Type 2 diabetes mellitus without complication, without long-term current use of insulin (H)        Dose:  500 mg   Take 1 tablet (500 mg) by mouth 2 times daily (with meals)   Quantity:  180 tablet   Refills:  3            Where to get your medicines      These medications were sent to [id number] - [place], [place] - [address]  [address] [facility] [address]     Phone:  [phone]     furosemide 20 MG tablet    lisinopril 40 MG tablet    metFORMIN 500 MG tablet                Primary Care Provider Office Phone # Fax #    Olegario Castillo [phone]       [address] [place] [phone]        Equal Access to Services     MALLORY VAZQUEZ : Humberto mckeono Sopabloali, waaxda luqadaha, qaybta kaalmada adeegyada, erika campos. So [facility] [phone].    ATENCIÓN: Si habla español, tiene a huggins disposición servicios gratuitos de asistencia lingüística. Llame al [phone].    We comply with applicable federal civil rights laws and [place] laws. We do not discriminate on the basis of race, color, national origin, age, disability, sex, sexual orientation, or gender identity.            Thank you!     Thank you for choosing [facility]  for your care. Our goal is always to provide you with  excellent care. Hearing back from our patients is one way we can continue to improve our services. Please take a few minutes to complete the written survey that you may receive in the mail after your visit with us. Thank you!             Your Updated Medication List - Protect others around you: Learn how to safely use, store and throw away your medicines at www.disposemymeds.org.          This list is accurate as of 11/1/18  8:38 AM.  Always use your most recent med list.                   Brand Name Dispense Instructions for use Diagnosis    acetaminophen 325 MG tablet    TYLENOL    100 tablet    Take 2 tablets (650 mg) by mouth every 4 hours as needed for mild pain    S/P transurethral resection of prostate       albuterol 108 (90 Base) MCG/ACT inhaler    PROAIR HFA/PROVENTIL HFA/VENTOLIN HFA    3 Inhaler    Inhale 2 puffs into the lungs every 6 hours as needed for shortness of breath / dyspnea or wheezing    Chronic obstructive pulmonary disease, unspecified COPD type (H)       aspirin 81 MG tablet     30 tablet    Take 1 tablet (81 mg) by mouth daily    Type 2 diabetes, HbA1C goal < 8% (H)       atorvastatin 20 MG tablet    LIPITOR    90 tablet    Take 1 tablet (20 mg) by mouth daily    Hyperlipidemia LDL goal <100       blood glucose monitoring lancets     1 Box    Use to test blood sugar 2 times daily or as directed.    DM (diabetes mellitus) (H)       blood glucose monitoring test strip    ACCU-CHEK CHACHA    60 strip    Use to test blood sugar 2 times daily or as directed.    DM (diabetes mellitus) (H)       docusate sodium 100 MG tablet    COLACE    45 tablet    Take 100 mg by mouth 2 times daily To prevent constipation    S/P transurethral resection of prostate       finasteride 5 MG tablet    PROSCAR    90 tablet    TAKE 1 TABLET EVERY DAY    Enlarged prostate       fluticasone-salmeterol 250-50 MCG/DOSE diskus inhaler    ADVAIR    3 Inhaler    Inhale 1 puff into the lungs 2 times daily    Chronic  obstructive pulmonary disease, unspecified COPD type (H)       furosemide 20 MG tablet    LASIX    360 tablet    Take 2 tablets (40 mg) by mouth 2 times daily    Essential hypertension, benign       lisinopril 40 MG tablet    PRINIVIL/ZESTRIL    90 tablet    TAKE 1 TABLET (40 MG) BY MOUTH DAILY    Type 2 diabetes mellitus without complication, without long-term current use of insulin (H), Essential hypertension, benign       metFORMIN 500 MG tablet    GLUCOPHAGE    180 tablet    Take 1 tablet (500 mg) by mouth 2 times daily (with meals)    Type 2 diabetes mellitus without complication, without long-term current use of insulin (H)       metoprolol succinate 25 MG 24 hr tablet    TOPROL-XL    30 tablet    Take 1 tablet (25 mg) by mouth daily    Abnormal cardiovascular stress test       order for DME      Equipment delivered; Respironics 760S BIPAP with heated humidification and heated tubing.  Pressure 15/7cm. Respironics Syeda View FF mask (Medium)        * order for DME     1 Device    Oxygen 2 Li/min at night via nasal cannula    Nocturnal hypoxemia       * order for DME     1 Package    Equipment being ordered: diabetic shoes, 1 pair    Type 2 diabetes mellitus without complication, without long-term current use of insulin (H)       ranitidine 150 MG tablet    ZANTAC    30 tablet    Take 1 tablet (150 mg) by mouth 2 times daily        tamsulosin 0.4 MG capsule    FLOMAX    90 capsule    Take 1 capsule (0.4 mg) by mouth daily    Urinary urgency       VITAMIN C PO      Take by mouth At Bedtime        * Notice:  This list has 2 medication(s) that are the same as other medications prescribed for you. Read the directions carefully, and ask your doctor or other care provider to review them with you.

## 2018-11-01 NOTE — PROGRESS NOTES
SUBJECTIVE:   Nahun Villalobos is a 73 year old male who presents to clinic today for the following health issues:    Patient discontinued all medications 2 weeks ago- was feeling overwhelmed with amount of medications he was taking.     Diabetes Follow-up      Patient is checking blood sugars: not at all    Diabetic concerns: None     Symptoms of hypoglycemia (low blood sugar): none     Paresthesias (numbness or burning in feet) or sores: Yes -pain in L foot great toe      Date of last diabetic eye exam: 2018    BP Readings from Last 2 Encounters:   10/26/18 (!) 163/94   08/20/18 112/76     Hemoglobin A1C (%)   Date Value   05/01/2018 7.2 (H)   05/01/2018 Canceled, Test credited     LDL Cholesterol Calculated (mg/dL)   Date Value   12/07/2017 67   03/15/2016 66       Diabetes Management Resources  Hypertension Follow-up      Outpatient blood pressures are not being checked.    Low Salt Diet: no added salt      Amount of exercise or physical activity: limited    Problems taking medications regularly: No    Medication side effects: none    Diet: low fat/cholesterol and diabetic    Problem list and histories reviewed & adjusted, as indicated.  Additional history: as documented    Patient Active Problem List   Diagnosis     Essential hypertension, benign     Tobacco use disorder     Lumbago     Impotence of organic origin     Advance care planning     Polyp of colon     Sleep related hypoventilation/hypoxemia in other disease     Hyperlipidemia LDL goal <100     Morbid obesity due to excess calories (H)     BPPV (benign paroxysmal positional vertigo), unspecified laterality     Chronic obstructive pulmonary disease, unspecified COPD type (H)     Type 2 diabetes mellitus without complication, without long-term current use of insulin (H)     Grief reaction     S/P transurethral resection of prostate     Hematuria, gross     Past Surgical History:   Procedure Laterality Date     APPENDECTOMY       C NONSPECIFIC  PROCEDURE      cholecystectomy     CHOLECYSTECTOMY       CYSTOSCOPY, TRANSURETHRAL RESECTION (TUR) PROSTATE, COMBINED N/A 4/30/2018    Procedure: COMBINED CYSTOSCOPY, TRANSURETHRAL RESECTION (TUR) PROSTATE;  Cystoscopy, Transurethral Resection Of The Prostate;  Surgeon: Praveena Burris MD;  Location: [facility] OR     WEDGE RESECTION      lung       Social History   Substance Use Topics     Smoking status: Former Smoker     Packs/day: 1.00     Years: 52.00     Types: Cigarettes     Quit date: 6/1/2013     Smokeless tobacco: Never Used     Alcohol use No     Family History   Problem Relation Age of Onset     Hypertension Mother      C.A.D. Mother      ANEURYSM     Cancer - colorectal Mother      Cancer Mother      OVARIAN     Hypertension Sister      Breast Cancer Sister      Cerebrovascular Disease Father      Glaucoma No family hx of      Macular Degeneration No family hx of          Current Outpatient Prescriptions   Medication Sig Dispense Refill     acetaminophen (TYLENOL) 325 MG tablet Take 2 tablets (650 mg) by mouth every 4 hours as needed for mild pain 100 tablet 0     albuterol (PROAIR HFA/PROVENTIL HFA/VENTOLIN HFA) 108 (90 BASE) MCG/ACT Inhaler Inhale 2 puffs into the lungs every 6 hours as needed for shortness of breath / dyspnea or wheezing 3 Inhaler 1     Ascorbic Acid (VITAMIN C PO) Take by mouth At Bedtime       aspirin 81 MG tablet Take 1 tablet (81 mg) by mouth daily (Patient taking differently: Take 81 mg by mouth At Bedtime ) 30 tablet 11     atorvastatin (LIPITOR) 20 MG tablet Take 1 tablet (20 mg) by mouth daily 90 tablet 3     blood glucose (ACCU-CHEK CHACHA) test strip Use to test blood sugar 2 times daily or as directed. 60 strip 6     blood glucose monitoring (SOFTCLIX) lancets Use to test blood sugar 2 times daily or as directed. 1 Box 6     docusate sodium (COLACE) 100 MG tablet Take 100 mg by mouth 2 times daily To prevent constipation 45 tablet 1     finasteride (PROSCAR) 5 MG tablet  TAKE 1 TABLET EVERY DAY (Patient taking differently: TAKE 1 TABLET EVERY DAY AT BEDTIME) 90 tablet 3     fluticasone-salmeterol (ADVAIR) 250-50 MCG/DOSE diskus inhaler Inhale 1 puff into the lungs 2 times daily 3 Inhaler 1     furosemide (LASIX) 20 MG tablet TAKE 2 TABLETS (40 MG) BY MOUTH 2 TIMES DAILY 360 tablet 0     lisinopril (PRINIVIL/ZESTRIL) 40 MG tablet TAKE 1 TABLET (40 MG) BY MOUTH DAILY (Patient taking differently: Take 40 mg by mouth At Bedtime TAKE 1 TABLET (40 MG) BY MOUTH DAILY) 90 tablet 3     metFORMIN (GLUCOPHAGE) 500 MG tablet Take 1 tablet (500 mg) by mouth 2 times daily (with meals) (Patient taking differently: Take 500 mg by mouth At Bedtime ) 180 tablet 3     metoprolol succinate (TOPROL-XL) 25 MG 24 hr tablet Take 1 tablet (25 mg) by mouth daily 30 tablet 11     order for DME Equipment being ordered: diabetic shoes, 1 pair 1 Package 0     order for DME Oxygen 2 Li/min  at night via nasal cannula 1 Device 0     order for DME Equipment delivered; Respironics 760S BIPAP with heated humidification and heated tubing.  Pressure 15/7cm. Respironics Syeda View FF mask (Medium)       ranitidine (ZANTAC) 150 MG tablet Take 1 tablet (150 mg) by mouth 2 times daily 30 tablet 0     tamsulosin (FLOMAX) 0.4 MG capsule Take 1 capsule (0.4 mg) by mouth daily 90 capsule 3     Allergies   Allergen Reactions     No Known Drug Allergies      Recent Labs   Lab Test  08/18/18   0929  06/26/18   0141  05/01/18   0851  05/01/18   0703   12/07/17   0934   03/15/16   0806  07/24/15   0824   A1C   --    --   7.2*  Canceled, Test credited   --   6.6*   < >  6.1*   --    LDL   --    --    --    --    --   67   --   66  44   HDL   --    --    --    --    --   40   --   37*  29*   TRIG   --    --    --    --    --   143   --   122  148   ALT  20   --    --    --    --   20   --   18   --    CR  1.31*  1.02   --   1.06   < >   --    < >  1.06   --    GFRESTIMATED  54*  71   --   68   < >   --    < >  69   --    GFRESTBLACK   "65  86   --   83   < >   --    < >  83   --    POTASSIUM  4.2  3.7   --   4.2   < >   --    < >  3.9   --    TSH   --    --    --    --    --   2.39   --   2.15   --     < > = values in this interval not displayed.      BP Readings from Last 3 Encounters:   10/26/18 (!) 163/94   08/20/18 112/76   08/18/18 (!) 166/102    Wt Readings from Last 3 Encounters:   10/26/18 249 lb 1.6 oz (113 kg)   08/20/18 244 lb 14.4 oz (111.1 kg)   06/29/18 243 lb 12.8 oz (110.6 kg)            Labs reviewed in EPIC    Reviewed and updated as needed this visit by clinical staff  Tobacco  Allergies  Meds  Med Hx  Surg Hx  Fam Hx  Soc Hx      Reviewed and updated as needed this visit by Provider       ROS:  CONSTITUTIONAL: NEGATIVE for fever, chills, change in weight  ENT/MOUTH: NEGATIVE for ear, mouth and throat problems  RESP: NEGATIVE for significant cough or SOB  CV: NEGATIVE for chest pain, palpitations or peripheral edema  GI: NEGATIVE for nausea, abdominal pain, heartburn, or change in bowel habits  : NEGATIVE for frequency, dysuria, or hematuria  MUSCULOSKELETAL: NEGATIVE for significant arthralgias or myalgia  NEURO: NEGATIVE for weakness, dizziness or paresthesias  HEME: NEGATIVE for bleeding problems  PSYCHIATRIC: NEGATIVE for changes in mood or affect    OBJECTIVE:                                                    /78  Pulse 85  Temp 98.2  F (36.8  C) (Oral)  Resp 14  Ht 5' 6\" (1.676 m)  Wt 249 lb 3.2 oz (113 kg)  SpO2 93%  BMI 40.22 kg/m2  There is no height or weight on file to calculate BMI.  GENERAL:  alert and no distress  EYES: Eyes grossly normal to inspection, extraocular movements - intact, and PERRL  HENT: ear canals- normal; TMs- normal; Nose- normal; Mouth- no ulcers, no lesions  NECK: no tenderness, no adenopathy, no asymmetry, no masses, no stiffness; thyroid- normal to palpation  RESP: lungs clear to auscultation - no rales, no rhonchi, no wheezes  CV: regular rates and rhythm, normal S1 S2, " no S3 or S4 and no murmur, no click or rub -  ABDOMEN: obese, soft, no tenderness, no  hepatosplenomegaly, no masses, normal bowel sounds  MS: extremities- no gross deformities noted  PSYCH: Alert and oriented times 3; speech- coherent , normal rate and volume; able to articulate logical thoughts, able to abstract reason, no tangential thoughts, no hallucinations or delusions, affect- normal     ASSESSMENT/PLAN:                                                      (E11.9) Type 2 diabetes mellitus without complication, without long-term current use of insulin (H)  (primary encounter diagnosis)  Comment: Labs ordered as fasting per routine  Plan: HEMOGLOBIN A1C, lisinopril (PRINIVIL/ZESTRIL)         40 MG tablet, metFORMIN (GLUCOPHAGE) 500 MG         tablet            (E66.01) Morbid obesity due to excess calories (H)  Comment: Encouraged ongoing weight loss  Plan:     (I10) Essential hypertension, benign  Comment: Stable on therapy continue with medical management  Plan: furosemide (LASIX) 20 MG tablet, lisinopril         (PRINIVIL/ZESTRIL) 40 MG tablet            (E78.5) Hyperlipidemia LDL goal <100  Comment: Labs ordered as fasting per routine  Plan: Lipid Profile        Continue with statin therapy    (Z13.89) Screening for diabetic peripheral neuropathy  Comment: No acute changes  Plan: FOOT EXAM  NO CHARGE [44660.114]            (Z23) Need for prophylactic vaccination and inoculation against influenza  Comment: Updated per patient  Plan: FLU VACCINE, INCREASED ANTIGEN, PRESV FREE, AGE        65+ [55045], ADMIN INFLUENZA  (For MEDICARE         Patients ONLY) []            See Patient Instructions    Olegario Castillo MD  [facility]

## 2018-12-05 NOTE — TELEPHONE ENCOUNTER
FUTURE VISIT INFORMATION      FUTURE VISIT INFORMATION:    Date: 12/10/18    Time: 2:00PM    Location: [facility]  REFERRAL INFORMATION:    Referring provider:  Praveena Burris MD    Referring providers clinic:  [facility]     Reason for visit/diagnosis  Congestion of paranasal sinus    RECORDS REQUESTED FROM:       Clinic name Comments Records Status Imaging Status   [facility]  10/26/18 notes with Dr Burris [facility] 8/20/18 notes with Dr Olegario Castillo EPIC

## 2018-12-10 ENCOUNTER — OFFICE VISIT (OUTPATIENT)
Dept: OTOLARYNGOLOGY | Facility: CLINIC | Age: 73
End: 2018-12-10
Payer: COMMERCIAL

## 2018-12-10 ENCOUNTER — PRE VISIT (OUTPATIENT)
Dept: OTOLARYNGOLOGY | Facility: CLINIC | Age: 73
End: 2018-12-10

## 2018-12-10 VITALS
BODY MASS INDEX: 40.02 KG/M2 | HEIGHT: 66 IN | OXYGEN SATURATION: 100 % | HEART RATE: 101 BPM | WEIGHT: 249 LBS | DIASTOLIC BLOOD PRESSURE: 81 MMHG | SYSTOLIC BLOOD PRESSURE: 167 MMHG

## 2018-12-10 DIAGNOSIS — R26.89 BALANCE PROBLEMS: ICD-10-CM

## 2018-12-10 DIAGNOSIS — J32.4 CHRONIC PANSINUSITIS: Primary | ICD-10-CM

## 2018-12-10 RX ORDER — FLUTICASONE PROPIONATE 50 MCG
2 SPRAY, SUSPENSION (ML) NASAL 2 TIMES DAILY
Qty: 2 BOTTLE | Refills: 6 | Status: SHIPPED | OUTPATIENT
Start: 2018-12-10 | End: 2019-01-09

## 2018-12-10 ASSESSMENT — MIFFLIN-ST. JEOR: SCORE: 1808.21

## 2018-12-10 ASSESSMENT — PAIN SCALES - GENERAL: PAINLEVEL: NO PAIN (0)

## 2018-12-10 NOTE — PROGRESS NOTES
HISTORY OF PRESENT ILLNESS:  Mr. Nahun Villalobos is here to see us today for the first time.  He is a 73-year-old gentleman who states that he has had issues with nasal congestion and pressure in his ears for the last three to four years.  He also states that recently, he has had more issues with balance, although no true vertigo.  He has been dealing with obstructive sleep apnea; however, does not use his CPAP due to difficulty with tolerating it.   He otherwise is able to smell. He does not report purulent rhinorrhea.      PAST SURGICAL HISTORY:   The patient does not have a history of previous nasal or sinus surgery.      PAST MEDICAL HISTORY:  COPD, type 2 diabetes, hypertension.      SOCIAL HISTORY:  The patient does have a history of smoking cigarettes.      FAMILY HISTORY:  Noted and reviewed in the chart.      REVIEW OF SYSTEMS:  Pertinent positives and negatives as above.  Otherwise, complete review of systems reviewed in the chart.      PHYSICAL EXAMINATION:  This is a 73-year-old gentleman in no acute distress.  Normal mood, normal affect, normal ability to communicate.  Alert and appropriate.  Head is normocephalic.  Cranial nerve VII is House-Brackmann I out of VI bilaterally.  Breathing without difficulty or stridor.  Eyes are anicteric.  Skin of the head and neck appears normal.  Examination of his nose shows a relatively straight septum with bilateral inferior turbinate hypertrophy.  No polyps or purulence identified.  On anterior examination,  the oral cavity shows a large tongue.  Otherwise, normal oropharynx.  Examination of the ears are normal.  Normal external auditory canals and tympanic membranes.      ASSESSMENT:  This is a 73-year-old gentleman with chronic rhinitis.       PLAN:   At this point, treat with Flonase twice a day.  Also, a referral to physical therapy for balance issues.  Followup will be in six weeks.      SJ/ms

## 2018-12-10 NOTE — NURSING NOTE
"Chief Complaint   Patient presents with     Consult     congestion of paransal sinus      Blood pressure 167/81, pulse 101, height 1.67 m (5' 5.75\"), weight 112.9 kg (249 lb), SpO2 100 %.  Figueroa Ly LPN    "

## 2018-12-10 NOTE — PATIENT INSTRUCTIONS
Thank you for choosing  [facility].  Dr. Gilbert' recommendation include:    1) flonase, 2 spray to both nostrils 2 times daily.    2) Physical therapy referral for balance.    3) Please follow up with Dr. Gilbert in approximately 6 weeks.

## 2018-12-10 NOTE — LETTER
12/10/2018       RE: Nahun Villalobos  [address] [facility] [id number]     Dear Colleague,    Thank you for referring your patient, Nahun Villalobos, to the [facility] EAR NOSE AND THROAT at [facility]. Please see a copy of my visit note below.    HISTORY OF PRESENT ILLNESS:  Mr. Nahun Villalobos is here to see us today for the first time.  He is a 73-year-old gentleman who states that he has had issues with nasal congestion and pressure in his ears for the last three to four years.  He also states that recently, he has had more issues with balance, although no true vertigo.  He has been dealing with obstructive sleep apnea; however, does not use his CPAP due to difficulty with tolerating it.   He otherwise is able to smell. He does not report purulent rhinorrhea.      PAST SURGICAL HISTORY:   The patient does not have a history of previous nasal or sinus surgery.      PAST MEDICAL HISTORY:  COPD, type 2 diabetes, hypertension.      SOCIAL HISTORY:  The patient does have a history of smoking cigarettes.      FAMILY HISTORY:  Noted and reviewed in the chart.      REVIEW OF SYSTEMS:  Pertinent positives and negatives as above.  Otherwise, complete review of systems reviewed in the chart.      PHYSICAL EXAMINATION:  This is a 73-year-old gentleman in no acute distress.  Normal mood, normal affect, normal ability to communicate.  Alert and appropriate.  Head is normocephalic.  Cranial nerve VII is House-Brackmann I out of VI bilaterally.  Breathing without difficulty or stridor.  Eyes are anicteric.  Skin of the head and neck appears normal.  Examination of his nose shows a relatively straight septum with bilateral inferior turbinate hypertrophy.  No polyps or purulence identified.  On anterior examination,  the oral cavity shows a large tongue.  Otherwise, normal oropharynx.  Examination of the ears are normal.  Normal external auditory canals and tympanic membranes.       ASSESSMENT:  This is a 73-year-old gentleman with chronic rhinitis.       PLAN:   At this point, treat with Flonase twice a day.  Also, a referral to physical therapy for balance issues.  Followup will be in six weeks.        Bennie Gilbert MD

## 2019-01-22 ENCOUNTER — PRE VISIT (OUTPATIENT)
Dept: UROLOGY | Facility: CLINIC | Age: 74
End: 2019-01-22

## 2019-01-22 NOTE — TELEPHONE ENCOUNTER
Patient is coming in to see  for a PSA check and Flow/PVR, called patient about PSA getting done 2 days before appointment and to please come with a full bladder.

## 2019-03-30 DIAGNOSIS — J44.9 CHRONIC OBSTRUCTIVE PULMONARY DISEASE, UNSPECIFIED COPD TYPE (H): Chronic | ICD-10-CM

## 2019-03-30 NOTE — TELEPHONE ENCOUNTER
"Requested Prescriptions   Pending Prescriptions Disp Refills     VENTOLIN  (90 Base) MCG/ACT inhaler [Pharmacy Med Name: VENTOLIN HFA 90 MCG INHALER]  0    Last Written Prescription Date:  12/6/2017  Last Fill Quantity: 3,  # refills: 1   Last office visit: 11/1/2018 with prescribing provider:  11/1/2018   Future Office Visit:     Sig: INHALE 2 PUFFS INTO THE LUNGS EVERY 6 HOURS AS NEEDED FOR SHORTNESS OF BREATH / DYSPNEA OR WHEEZING    Asthma Maintenance Inhalers - Anticholinergics Passed - 3/30/2019  9:48 AM       Passed - Patient is age 12 years or older       Passed - Recent (12 mo) or future (30 days) visit within the authorizing provider's specialty    Patient had office visit in the last 12 months or has a visit in the next 30 days with authorizing provider or within the authorizing provider's specialty.  See \"Patient Info\" tab in inbasket, or \"Choose Columns\" in Meds & Orders section of the refill encounter.             Passed - Medication is active on med list          "

## 2019-04-01 RX ORDER — ALBUTEROL SULFATE 90 UG/1
AEROSOL, METERED RESPIRATORY (INHALATION)
Qty: 25.5 G | Refills: 8 | Status: SHIPPED | OUTPATIENT
Start: 2019-04-01 | End: 2020-01-29

## 2019-04-09 ENCOUNTER — TRANSFERRED RECORDS (OUTPATIENT)
Dept: HEALTH INFORMATION MANAGEMENT | Facility: CLINIC | Age: 74
End: 2019-04-09

## 2019-06-18 ENCOUNTER — PRE VISIT (OUTPATIENT)
Dept: UROLOGY | Facility: CLINIC | Age: 74
End: 2019-06-18

## 2019-06-18 NOTE — TELEPHONE ENCOUNTER
Chief Complaint : Return-9 Mo    Hx/Sx: Elevated PSA/Post TURP    Records/Orders: Pt to get PSA at local [place]     Pt Contacted: Y-Called and went to ; VM not set up. Message NOT left    At Rooming: Flow/PVR

## 2019-06-27 NOTE — OP NOTE
PREOPERATIVE DIAGNOSIS: Prostatic hypertrophy with urinary retention.   POSTOPERATIVE DIAGNOSIS: Prostatic hypertrophy with urinary retention.   PROCEDURE PERFORMED: Transurethral resection of prostate.   ATTENDING SURGEON: Praveena Choi MD   RESIDENT SURGEON: Josh Lam MD   FINDINGS: Approximately 3 cm prostate with lateral lobe hypertrophy as well as massive intravesical lobe.   ANESTHESIA: General.   INTRAVENOUS FLUIDS: see anesthesia record   ESTIMATED BLOOD LOSS: 40 mL.   SPECIMENS: Prostate chips.   DRAINS: A 24-Malagasy 3-way catheter.   INDICATIONS FOR PROCEDURE: Nahun Villalobos is a 72 year old male who was seen in consultation for   BPH with obstructive voiding symptoms. He had failed optimal medical therapy with tamsulosin  Prostate volume on imaging was 80 grams.   His digital rectal exam was negative for any nodules.    After discussion of the risks, benefits and alternatives of the procedure, the patient agreed to proceed with transurethral resection of his prostate.   DESCRIPTION OF PROCEDURE: After verifying informed consent, the patient was taken to the operating room. Adequate anesthesia was induced, he was placed in dorsal lithotomy position and he was prepped and draped in standard sterile fashion. A timeout was performed to verify correct patient and procedure. Pneumo boots and perioperative antibiotics were in place before the procedure commenced. We began the procedure by inserting a 22-Malagasy rigid cystoscope. The anterior urethra was unremarkable. Prostate measured approximately 3cm. There was lateral lobe hypertrophy. Upon gaining entrance into the bladder, complete survey was performed. This was significant for mild trabeculations.  We were able to see both ureteral orifices. There was a massive median lobe.     We then introduced the 26-Malagasy bipolar resectoscope. We started by resecting the massive intravesical lobe of the prostate. We resected tissue from the right and  Patient transported to OR.  Consents signed and in chart.  Report given to OR, RN.   Will cont POC and treatment   left lobe out to the capsule, from the neck of the bladder down to the verumontanum from the 6 o'clock position to 12 o'clock position. Once we had completed our dissection, we used the Ellik evacuator to remove the prostate chips. We then reintroduced the resectoscope to ensure meticulous hemostasis of the prostatic fossa. Because of the size of the intravesical portion of the prostate (likely a portion of the right lateral lobe and not a true median lobe), we were unable to visualize the ureteral orifices. We administered indigo carmine but this was not visualized. We saw jets of urine in the bladder but not the ureteral orifices themselves.     A 24-Wallisian 3-way catheter was placed, and 50 mL was instilled into the balloon. Catheter was hooked up to continuous irrigation which was clear;  the patient was awoken from anesthesia. He was then transferred to the recovery room in stable condition.   POSTOPERATIVE PLAN: Will plan to observe him overnight on continuous bladder irrigation. Will obtain ultrasound of the kidneys in AM.

## 2019-08-27 NOTE — PATIENT INSTRUCTIONS
Patient Education   Personalized Prevention Plan  You are due for the preventive services outlined below.  Your care team is available to assist you in scheduling these services.  If you have already completed any of these items, please share that information with your care team to update in your medical record.  Health Maintenance Due   Topic Date Due     COPD Action Plan  1945     Depression Assessment  1945     Annual Wellness Visit  03/17/2011     Zoster (Shingles) Vaccine (2 of 3) 10/04/2013     Kidney Microalbumin Urine Test  12/07/2018     A1C Lab  05/01/2019     Eye Exam  06/28/2019     Basic Metabolic Panel  08/18/2019

## 2019-08-27 NOTE — PROGRESS NOTES
"  SUBJECTIVE:   Nahun Villalobos is a 73 year old male who presents for Preventive Visit.  Are you in the first 12 months of your Medicare Part B coverage?  No    He states that he has not been taking any of his medications.    Physical Health:    In general, how would you rate your overall physical health? fair    Outside of work, how many days during the week do you exercise? none    Outside of work, approximately how many minutes a day do you exercise?not applicable    If you drink alcohol do you typically have >3 drinks per day or >7 drinks per week? No    Do you usually eat at least 4 servings of fruit and vegetables a day, include whole grains & fiber and avoid regularly eating high fat or \"junk\" foods? NO    Do you have any problems taking medications regularly?  No    Do you have any side effects from medications? none    Needs assistance for the following daily activities: no assistance needed    Which of the following safety concerns are present in your home?  none identified     Hearing impairment: Yes, Need to ask people to speak up or repeat themselves.    In the past 6 months, have you been bothered by leaking of urine? yes    Mental Health:    In general, how would you rate your overall mental or emotional health? Fair  PHQ-2 Score:      Do you feel safe in your environment? Yes    Do you have a Health Care Directive? Yes: Advance Directive has been received and scanned.    Additional concerns to address?  No    Fall risk:  Fallen 2 or more times in the past year?: Yes  Any fall with injury in the past year?: No    Cognitive Screenin) Repeat 3 items (Leader, Season, Table)    2) Clock draw: NORMAL  3) 3 item recall: Recalls 3 objects   Results: Normal    Mini-CogTM Copyright HIGINIO Mckeon. Licensed by the author for use in [facility]; reprinted with permission (wisam@.[facility]). All rights reserved.      Do you have sleep apnea, excessive snoring or daytime drowsiness?: yes    Reviewed and " updated as needed this visit by clinical staff       Reviewed and updated as needed this visit by Provider        Social History     Tobacco Use     Smoking status: Former Smoker     Packs/day: 1.00     Years: 52.00     Pack years: 52.00     Types: Cigarettes     Last attempt to quit: 2013     Years since quittin.2     Smokeless tobacco: Never Used   Substance Use Topics     Alcohol use: No     Alcohol/week: 0.0 oz                           Current providers sharing in care for this patient include:   Patient Care Team:  Olegario Castillo MD as PCP - General  Olegario Castillo MD as Assigned PCP  Praveena Burris MD as MD (Urology)  Areli Cordero, RN as Registered Nurse  Olegario Castillo MD as Referring Physician (Internal Medicine)    The following health maintenance items are reviewed in Epic and correct as of today:  Health Maintenance   Topic Date Due     COPD ACTION PLAN  1945     PHQ-9  1945     MEDICARE ANNUAL WELLNESS VISIT  2011     ZOSTER IMMUNIZATION (2 of 3) 10/04/2013     MICROALBUMIN  2018     A1C  2019     EYE EXAM  2019     BMP  2019     INFLUENZA VACCINE (1) 2019     LIPID  2019     DIABETIC FOOT EXAM  2019     FALL RISK ASSESSMENT  2019     TSH W/FREE T4 REFLEX  2019     ADVANCE CARE PLANNING  2022     DTAP/TDAP/TD IMMUNIZATION (3 - Td) 2023     COLONOSCOPY  2025     SPIROMETRY  Completed     DEPRESSION ACTION PLAN  Completed     PNEUMOCOCCAL IMMUNIZATION 65+ HIGH/HIGHEST RISK  Completed     AORTIC ANEURYSM SCREENING (SYSTEM ASSIGNED)  Completed     IPV IMMUNIZATION  Aged Out     MENINGITIS IMMUNIZATION  Aged Out       Immunization History   Administered Date(s) Administered     HEPA 2010, 2011     HepB 2011, 2011, 2012     Influenza (High Dose) 3 valent vaccine 2010, 2016, 2017, 2018     Influenza (IIV3) PF 2007, 10/01/2011,  "09/30/2013     Pneumo Conj 13-V (2010&after) 03/14/2016     Pneumococcal 23 valent 03/17/2010     Poliovirus, inactivated (IPV) 12/06/2010     TD (ADULT, 7+) 12/06/2010     TDAP Vaccine (Adacel) 08/09/2013     Typhoid IM 12/06/2010     Yellow Fever 12/06/2010     Zoster vaccine, live 08/09/2013       ROS:  CONSTITUTIONAL: NEGATIVE for fever, chills, change in weight  ENT/MOUTH: NEGATIVE for ear, mouth and throat problems  RESP: NEGATIVE for significant cough or SOB  CV: NEGATIVE for chest pain, palpitations   GI: NEGATIVE for nausea, abdominal pain, heartburn, or change in bowel habits  : NEGATIVE for frequency, dysuria, or hematuria, + ED and would like to consider the trial of Viagra as he has a new partner.  He uses Flomax but does not use any nitroglycerin products  MUSCULOSKELETAL: NEGATIVE for significant arthralgias or myalgia  NEURO: NEGATIVE for weakness or paresthesias although at times he is had a little bit of some dizziness and vertigo-like symptoms for which he is decided now that he needs a cane.  He notes that his symptoms are oftentimes worse when he moves his head quickly such as getting out of a car.  HEME: NEGATIVE for bleeding problems  PSYCHIATRIC: NEGATIVE for changes in mood or affect    OBJECTIVE:   /80 (BP Location: Left arm, Patient Position: Chair, Cuff Size: Adult Large)   Pulse 91   Temp 98.7  F (37.1  C) (Oral)   Resp 16   Ht 1.67 m (5' 5.75\")   Wt 113.3 kg (249 lb 12.8 oz)   SpO2 91%   BMI 40.63 kg/m   Estimated body mass index is 40.5 kg/m  as calculated from the following:    Height as of 12/10/18: 1.67 m (5' 5.75\").    Weight as of 12/10/18: 112.9 kg (249 lb).  EXAM:   GENERAL: alert and no distress  EYES: Eyes grossly normal to inspection, PERRL and conjunctivae and sclerae normal  HENT: ear canals and TM's normal, nose and mouth without ulcers or lesions  NECK: no adenopathy, no asymmetry, masses, or scars and thyroid normal to palpation  RESP: lungs clear to " auscultation - no rales, rhonchi or wheezes  CV: regular rate and rhythm, normal S1 S2, no S3 or S4, no click or rub, miild peripheral edema and peripheral pulses intact  ABDOMEN: obese  RECTAL:  Deferred per Urology  MS: no gross musculoskeletal defects note dystrophic toenails and thickened nailbeds  NEURO: Normal strength and tone, mentation intact and speech normal, is using a cane for support but is able to ambulate without difficulty. Romberg negative, CN 2-12 intact, no tremor.  Gait slightly wide-based with tandem walk slightly difficult.  PSYCH: mentation appears normal, affect normal/bright      ASSESSMENT / PLAN:   1. Encounter for Medicare annual wellness exam  Recommended updated shingles vaccination.  - Hemoglobin  - Comprehensive metabolic panel  - Lipid Profile    2. Type 2 diabetes mellitus without complication, without long-term current use of insulin (H)  Recommend restarting medication accordingly and labs as directed today.  - Comprehensive metabolic panel  - Hemoglobin A1c  - Albumin Random Urine Quantitative with Creat Ratio  - TSH with free T4 reflex  - OFFICE/OUTPT VISIT,EST,LEVL III  - lisinopril (PRINIVIL/ZESTRIL) 40 MG tablet; TAKE 1 TABLET (40 MG) BY MOUTH DAILY  Dispense: 90 tablet; Refill: 3  - metFORMIN (GLUCOPHAGE) 500 MG tablet; Take 1 tablet (500 mg) by mouth 2 times daily (with meals)  Dispense: 180 tablet; Refill: 3    3. Morbid obesity due to excess calories (H)  Encouraged ongoing weight loss.    4. Essential hypertension, benign  Table on therapy and recommend reinitiating all antihypertensive treatment  - OFFICE/OUTPT VISIT,EST,LEVL III  - lisinopril (PRINIVIL/ZESTRIL) 40 MG tablet; TAKE 1 TABLET (40 MG) BY MOUTH DAILY  Dispense: 90 tablet; Refill: 3  - metoprolol succinate ER (TOPROL-XL) 25 MG 24 hr tablet; Take 1 tablet (25 mg) by mouth daily  Dispense: 30 tablet; Refill: 11  - furosemide (LASIX) 20 MG tablet; Take 2 tablets (40 mg) by mouth 2 times daily  Dispense: 360  tablet; Refill: 3    5. Chronic obstructive pulmonary disease, unspecified COPD type (H)  Stable using as needed inhalers as noted    6. Hyperlipidemia LDL goal <100  Labs ordered as fasting and recommend repeat lipid panel in 3 months with recommendations to restart statin therapy  - OFFICE/OUTPT VISIT,EST,LEVL III  - atorvastatin (LIPITOR) 20 MG tablet; Take 1 tablet (20 mg) by mouth daily  Dispense: 90 tablet; Refill: 3    7. Screening PSA (prostate specific antigen)  Patient states that this is being done with his evaluation of his prostate through his urologist Dr. Burris    8. Colon cancer screening  ADVISED COLONOSCOPY FOR ROUTINE PREVENTATIVE CARE.'  - GASTROENTEROLOGY ADULT REF PROCEDURE ONLY Kavitha Bautistaierge [phone]; No Provider Preference  - Fecal colorectal cancer screen (FIT); Future    9. Vertigo  Symptoms appear peripheral without any focal changes on exam.  Suggest balance and vestibular rehab therapy and observe clinical response  - PHYSICAL THERAPY REFERRAL; Future  - OFFICE/OUTPT VISIT,EST,LEVL III    10. Erectile dysfunction, unspecified erectile dysfunction type  Refilled medication for trial.  Advised of dosing side effects and potential risks.  - OFFICE/OUTPT VISIT,EST,LEVL III  - sildenafil (VIAGRA) 50 MG tablet; Take 1 tablet (50 mg) by mouth daily as needed (erectile dysgfunction, do not use with Flomax or alpha blockers) Or nitro products  '  Dispense: 10 tablet; Refill: 1  Vision was advised that he should not be using the medicine with Flomax or any alpha blockers or any nitroglycerin products.    The patient desires Viagra to treat his erectile dysfunction. History and physical exam has not disclosed any obvious treatable cause of this complaint. He is informed that Viagra is usually not covered by insurance. It is available on a fee-for-service cost basis, and is relatively expensive. He can start with 50 mg dose, and increase to 100 mg if necessary. The method of use 1 hour  "prior to anticipated intercourse is explained. He should not use any more than one tablet in a 24 hour period. The side effects of possible headache, flushing, dyspepsia and transient changes in vision have been explained. Samples are not given.    The patient is not taking nitrates, and denies he has access to nitrates in any form at any time. I have counseled him that taking Viagra with nitrates of any form can cause death. Additionally, Viagra serum concentrations can be increased by the following: cimetidine, erythromycin, itraconazole or ketoconazole. This patient does not take these drugs, but I have counseled him to avoid Viagra if he does take any of these.        End of Life Planning:  Patient currently has an advanced directive: Yes.  Practitioner is supportive of decision.    COUNSELING:  Reviewed preventive health counseling, as reflected in patient instructions       Regular exercise       Healthy diet/nutrition       Vision screening    Estimated body mass index is 40.5 kg/m  as calculated from the following:    Height as of 12/10/18: 1.67 m (5' 5.75\").    Weight as of 12/10/18: 112.9 kg (249 lb).    Weight management plan: Discussed healthy diet and exercise guidelines     reports that he quit smoking about 6 years ago. His smoking use included cigarettes. He has a 52.00 pack-year smoking history. He has never used smokeless tobacco.  Tobacco Cessation Action Plan: Information offered: Patient not interested at this time    Appropriate preventive services were discussed with this patient, including applicable screening as appropriate for cardiovascular disease, diabetes, osteopenia/osteoporosis, and glaucoma.  As appropriate for age/gender, discussed screening for colorectal cancer, prostate cancer, breast cancer, and cervical cancer. Checklist reviewing preventive services available has been given to the patient.    Reviewed patients plan of care and provided an AVS. The Basic Care Plan (routine " screening as documented in Health Maintenance) for Nahun meets the Care Plan requirement. This Care Plan has been established and reviewed with the Patient.    Counseling Resources:  ATP IV Guidelines  Pooled Cohorts Equation Calculator  Breast Cancer Risk Calculator  FRAX Risk Assessment  ICSI Preventive Guidelines  Dietary Guidelines for Americans, 2010  USDA's MyPlate  ASA Prophylaxis  Lung CA Screening    Olegario Castillo MD  [facility]

## 2019-08-28 ENCOUNTER — OFFICE VISIT (OUTPATIENT)
Dept: INTERNAL MEDICINE | Facility: CLINIC | Age: 74
End: 2019-08-28
Payer: MEDICARE

## 2019-08-28 VITALS
WEIGHT: 249.8 LBS | HEIGHT: 66 IN | RESPIRATION RATE: 16 BRPM | OXYGEN SATURATION: 91 % | TEMPERATURE: 98.7 F | DIASTOLIC BLOOD PRESSURE: 80 MMHG | HEART RATE: 91 BPM | SYSTOLIC BLOOD PRESSURE: 130 MMHG | BODY MASS INDEX: 40.15 KG/M2

## 2019-08-28 DIAGNOSIS — Z00.00 ENCOUNTER FOR MEDICARE ANNUAL WELLNESS EXAM: Primary | ICD-10-CM

## 2019-08-28 DIAGNOSIS — I10 ESSENTIAL HYPERTENSION, BENIGN: ICD-10-CM

## 2019-08-28 DIAGNOSIS — E66.01 MORBID OBESITY DUE TO EXCESS CALORIES (H): ICD-10-CM

## 2019-08-28 DIAGNOSIS — J44.9 CHRONIC OBSTRUCTIVE PULMONARY DISEASE, UNSPECIFIED COPD TYPE (H): Chronic | ICD-10-CM

## 2019-08-28 DIAGNOSIS — R42 VERTIGO: ICD-10-CM

## 2019-08-28 DIAGNOSIS — Z12.11 COLON CANCER SCREENING: ICD-10-CM

## 2019-08-28 DIAGNOSIS — E11.9 TYPE 2 DIABETES MELLITUS WITHOUT COMPLICATION, WITHOUT LONG-TERM CURRENT USE OF INSULIN (H): ICD-10-CM

## 2019-08-28 DIAGNOSIS — E78.5 HYPERLIPIDEMIA LDL GOAL <100: ICD-10-CM

## 2019-08-28 DIAGNOSIS — Z12.5 SCREENING PSA (PROSTATE SPECIFIC ANTIGEN): ICD-10-CM

## 2019-08-28 DIAGNOSIS — N52.9 ERECTILE DYSFUNCTION, UNSPECIFIED ERECTILE DYSFUNCTION TYPE: ICD-10-CM

## 2019-08-28 LAB
ALBUMIN SERPL-MCNC: 3.4 G/DL (ref 3.4–5)
ALP SERPL-CCNC: 76 U/L (ref 40–150)
ALT SERPL W P-5'-P-CCNC: 20 U/L (ref 0–70)
ANION GAP SERPL CALCULATED.3IONS-SCNC: 7 MMOL/L (ref 3–14)
AST SERPL W P-5'-P-CCNC: 23 U/L (ref 0–45)
BILIRUB SERPL-MCNC: 0.2 MG/DL (ref 0.2–1.3)
BUN SERPL-MCNC: 12 MG/DL (ref 7–30)
CALCIUM SERPL-MCNC: 9.2 MG/DL (ref 8.5–10.1)
CHLORIDE SERPL-SCNC: 105 MMOL/L (ref 94–109)
CHOLEST SERPL-MCNC: 111 MG/DL
CO2 SERPL-SCNC: 27 MMOL/L (ref 20–32)
CREAT SERPL-MCNC: 1.04 MG/DL (ref 0.66–1.25)
CREAT UR-MCNC: 179 MG/DL
GFR SERPL CREATININE-BSD FRML MDRD: 70 ML/MIN/{1.73_M2}
GLUCOSE SERPL-MCNC: 162 MG/DL (ref 70–99)
HBA1C MFR BLD: 8 % (ref 0–5.6)
HDLC SERPL-MCNC: 30 MG/DL
HGB BLD-MCNC: 13.8 G/DL (ref 13.3–17.7)
LDLC SERPL CALC-MCNC: 44 MG/DL
MICROALBUMIN UR-MCNC: 936 MG/L
MICROALBUMIN/CREAT UR: 522.9 MG/G CR (ref 0–17)
NONHDLC SERPL-MCNC: 81 MG/DL
POTASSIUM SERPL-SCNC: 4.4 MMOL/L (ref 3.4–5.3)
PROT SERPL-MCNC: 7.9 G/DL (ref 6.8–8.8)
SODIUM SERPL-SCNC: 139 MMOL/L (ref 133–144)
TRIGL SERPL-MCNC: 183 MG/DL
TSH SERPL DL<=0.005 MIU/L-ACNC: 2.88 MU/L (ref 0.4–4)

## 2019-08-28 PROCEDURE — 36415 COLL VENOUS BLD VENIPUNCTURE: CPT | Performed by: INTERNAL MEDICINE

## 2019-08-28 PROCEDURE — G0439 PPPS, SUBSEQ VISIT: HCPCS | Performed by: INTERNAL MEDICINE

## 2019-08-28 PROCEDURE — 83036 HEMOGLOBIN GLYCOSYLATED A1C: CPT | Performed by: INTERNAL MEDICINE

## 2019-08-28 PROCEDURE — 84443 ASSAY THYROID STIM HORMONE: CPT | Performed by: INTERNAL MEDICINE

## 2019-08-28 PROCEDURE — 80053 COMPREHEN METABOLIC PANEL: CPT | Performed by: INTERNAL MEDICINE

## 2019-08-28 PROCEDURE — 85018 HEMOGLOBIN: CPT | Performed by: INTERNAL MEDICINE

## 2019-08-28 PROCEDURE — 99214 OFFICE O/P EST MOD 30 MIN: CPT | Mod: 25 | Performed by: INTERNAL MEDICINE

## 2019-08-28 PROCEDURE — 82043 UR ALBUMIN QUANTITATIVE: CPT | Performed by: INTERNAL MEDICINE

## 2019-08-28 PROCEDURE — 80061 LIPID PANEL: CPT | Performed by: INTERNAL MEDICINE

## 2019-08-28 RX ORDER — FUROSEMIDE 20 MG
40 TABLET ORAL 2 TIMES DAILY
Qty: 360 TABLET | Refills: 3 | Status: SHIPPED | OUTPATIENT
Start: 2019-08-28 | End: 2020-01-23

## 2019-08-28 RX ORDER — ATORVASTATIN CALCIUM 20 MG/1
20 TABLET, FILM COATED ORAL DAILY
Qty: 90 TABLET | Refills: 3 | Status: SHIPPED | OUTPATIENT
Start: 2019-08-28 | End: 2020-01-23

## 2019-08-28 RX ORDER — SILDENAFIL 50 MG/1
50 TABLET, FILM COATED ORAL DAILY PRN
Qty: 10 TABLET | Refills: 1 | Status: SHIPPED | OUTPATIENT
Start: 2019-08-28 | End: 2021-01-28

## 2019-08-28 RX ORDER — METOPROLOL SUCCINATE 25 MG/1
25 TABLET, EXTENDED RELEASE ORAL DAILY
Qty: 30 TABLET | Refills: 11 | Status: SHIPPED | OUTPATIENT
Start: 2019-08-28 | End: 2020-01-23

## 2019-08-28 RX ORDER — LISINOPRIL 40 MG/1
TABLET ORAL
Qty: 90 TABLET | Refills: 3 | Status: SHIPPED | OUTPATIENT
Start: 2019-08-28 | End: 2020-01-23

## 2019-08-28 ASSESSMENT — MIFFLIN-ST. JEOR: SCORE: 1811.84

## 2019-08-28 NOTE — LETTER
[facility]  [address] [address]  [phone]      8/28/2019       Nahun Villalobos  [address] [place] [id number]        Dear Javon ,    I am pleased to inform you that your routine blood work including your hemoglobin, sodium, potassium, calcium, kidney and liver function tests are all normal.    Your Hemoglobin A1C is abnormal and indicates your blood sugars could be better controlled so please get back on your medications as we discussed.  Once you have been on your medications again I would recheck your A1c level and your blood sugar in 3 months.    Your cholesterol looks good and I would not change anything at this point but would repeat your labs in 12 months.    Your thyroid function tests look good and thus I would not change anything at this point.    Sincerely,      Olegario Castillo MD  Internal Medicine

## 2019-09-06 ENCOUNTER — HOSPITAL ENCOUNTER (OUTPATIENT)
Facility: CLINIC | Age: 74
End: 2019-09-06
Attending: INTERNAL MEDICINE | Admitting: INTERNAL MEDICINE
Payer: MEDICARE

## 2019-09-20 ENCOUNTER — THERAPY VISIT (OUTPATIENT)
Dept: PHYSICAL THERAPY | Facility: CLINIC | Age: 74
End: 2019-09-20
Attending: INTERNAL MEDICINE
Payer: MEDICARE

## 2019-09-20 DIAGNOSIS — R42 VERTIGO: ICD-10-CM

## 2019-09-20 DIAGNOSIS — R26.89 IMBALANCE: Primary | ICD-10-CM

## 2019-09-20 NOTE — PROGRESS NOTES
09/20/19 0900   Quick Adds   Quick Adds Vestibular Eval   Type of Visit Initial OP PT Evaluation   General Information   Start of Care Date 09/20/19   Referring Physician Olegario Castillo MD    Orders Evaluate and Treat as Indicated   Order Date 08/28/19   Medical Diagnosis Vertigo   Onset of illness/injury or Date of Surgery 08/28/19   Surgical/Medical history reviewed Yes   Pertinent history of current vestibular problem (include personal factors and/or comorbidities that impact the POC)  Hearing loss  (self-reported)   Pertinent history of current problem (include personal factors and/or comorbidities that impact the POC) Reports he moved into his [facility] 6 months ago. Living in the basement. Tripping a lot, going up steps. Not bothered driving at all. Walking down sidewalks. Notices some hearing changes. MD says hearing is okay. Reports decrease in balanec before moving into daughters place. Used cane about 1.5 months ago after falling 3 times. Reports he rarely ever gets dizzy. Not sure he ever gets dizzy. Reports lung caner 3-4 years ago and has trouble breathing. Not doing anything for exercise. Does not like to exercise. Reports hx of gallbladder, apendix and tonsil surgeries.    Pertinent Visual History  Reports had test for vision at [facility] and reports they said its good, but he doesn't think so.    Current Community Support Family/friend caregiver   Patient role/Employment history Retired   [place]   Home/Community Accessibility Comments 13 steps to get down to bedroom.    Current Assistive Devices Standard Cane   Assistive Devices Comments Uses with walking and stair climbing.    Patient/Family Goals Statement Live a long life with better balance, decrease falling.    Fall Risk Screen   Fall screen completed by PT   Have you fallen 2 or more times in the past year? Yes   Have you fallen and had an injury in the past year? Yes   Timed Up  "and Go score (seconds) 14.9   Is patient a fall risk? Yes   Fall screen comments Reports falling 5x in the past year. Hurts his knees when he does this. Uses furniture to help him get up when he falls.    Pain   Patient currently in pain Yes   Pain comments Tingling in fingers, fingers locking up on him, sometimes sharp pain into head.    Cognitive Status Examination   Orientation orientation to person, place and time   Level of Consciousness alert   Follows Commands and Answers Questions 100% of the time   Personal Safety and Judgment intact   Integumentary   Integumentary Comments Inc LE swelling, elias ankle.    Posture   Posture Forward head position;Protracted shoulders   Range of Motion (ROM)   ROM Comment WFL's    Strength   Strength Comments L hip flex: 3+/5, Knee ext: 4/5, knee flex:5/5, df:5/5   Bed Mobility   Bed Mobility Comments Independent   Transfer Skills   Transfer Comments Use of UE for sit<>stand    Gait   Gait Comments Pt ambulates with slow gait speed and drags his SEC behind him. Only uses it if he gets off balance going forward.    Gait Special Tests   Gait Special Tests 25 FOOT TIMED WALK;FUNCTIONAL GAIT ASSESSMENT   Gait Special Tests 25 Foot Timed Walk   Seconds 15.3   Steps 20 Steps   Balance   Balance Comments Impaired   Balance Special Tests   Balance Special Tests Modified CTSIB Conditions;Timed up and go   Balance Special Tests Timed Up and Go   Seconds 14.9 Seconds   Comments with SEC    Balance Special Tests Modified CTSIB Conditions   Condition 1, seconds 30 Seconds   Condition 2, seconds 12 Seconds   Condition 4, seconds 30 Seconds   Condition 5, seconds 0 Seconds   Sensory Examination   Sensory Perception Comments Reports B carpal tunnel/arthritis in hands - tingling and numbness. Denies tinginling/numbness in B feet. Reports it feels \"bubbly\" L>R. Like walking on popping    Planned Therapy Interventions   Planned Therapy Interventions balance training;gait training;neuromuscular " re-education;ROM;strengthening;stretching;transfer training   Clinical Impression   Criteria for Skilled Therapeutic Interventions Met yes, treatment indicated   PT Diagnosis Imbalance/Falls    Influenced by the following impairments Weakness, pain, imbalance, impaired activity tolerance    Functional limitations due to impairments Impaired community mobility, stairs, falls    Clinical Presentation Evolving/Changing   Clinical Decision Making (Complexity) Moderate complexity   Therapy Frequency 1 time/week   Predicted Duration of Therapy Intervention (days/wks) Up to 90 days    Risk & Benefits of therapy have been explained Yes   Patient, Family & other staff in agreement with plan of care Yes   Clinical Impression Comments Pt will benefit from skilled PT services to decrease his falls and improve his balance and overall conditioning to improve function.    GOALS   PT Eval Goals 1;2;3;4   Goal 1   Goal Identifier TUG    Goal Description Pt will perform TUG in 13.5 sec or less without AD and no imbalance in order to decrease risk for falling    Target Date 12/18/19   Goal 2   Goal Identifier Static balance    Goal Description Pt will improve by 10 seconds or more on conditions 2 and 5 of mCTSIB in order to demonstrate decreased visual reliance for balance    Target Date 12/18/19   Goal 3   Goal Identifier Falls prevention    Goal Description Pt will demonstrate and/or verbalize 3-5 falls prevention strategies and report no falls in 6 week timeframe in order to improve falls risk.    Target Date 12/18/19   Goal 4   Goal Identifier HEP    Goal Description Pt will be independent in HEP for balance, strength, gait and activity tolerance to improve overall functional mobility    Target Date 12/18/19   Total Evaluation Time   PT Eval, Moderate Complexity Minutes (61893) 50

## 2019-09-20 NOTE — DISCHARGE INSTRUCTIONS
9/20/19   - Get new shoes that support your heel and you can get on easily.   - Look into getting compression stockings to decrease the swelling in your ankles. Have a friend to help you put stockings on if needed.   - For now, keep the using the cane while your walking and for the stairs.

## 2019-09-29 ENCOUNTER — HEALTH MAINTENANCE LETTER (OUTPATIENT)
Age: 74
End: 2019-09-29

## 2020-01-23 ENCOUNTER — OFFICE VISIT (OUTPATIENT)
Dept: INTERNAL MEDICINE | Facility: CLINIC | Age: 75
End: 2020-01-23
Payer: MEDICARE

## 2020-01-23 VITALS
DIASTOLIC BLOOD PRESSURE: 94 MMHG | WEIGHT: 251.1 LBS | RESPIRATION RATE: 20 BRPM | SYSTOLIC BLOOD PRESSURE: 158 MMHG | BODY MASS INDEX: 40.84 KG/M2 | TEMPERATURE: 98.1 F | OXYGEN SATURATION: 95 % | HEART RATE: 85 BPM

## 2020-01-23 DIAGNOSIS — R26.89 BALANCE PROBLEMS: ICD-10-CM

## 2020-01-23 DIAGNOSIS — I10 ESSENTIAL HYPERTENSION, BENIGN: ICD-10-CM

## 2020-01-23 DIAGNOSIS — Z23 NEED FOR PROPHYLACTIC VACCINATION AND INOCULATION AGAINST INFLUENZA: ICD-10-CM

## 2020-01-23 DIAGNOSIS — E78.5 HYPERLIPIDEMIA LDL GOAL <100: ICD-10-CM

## 2020-01-23 DIAGNOSIS — E11.9 TYPE 2 DIABETES MELLITUS WITHOUT COMPLICATION, WITHOUT LONG-TERM CURRENT USE OF INSULIN (H): Primary | ICD-10-CM

## 2020-01-23 DIAGNOSIS — J06.9 UPPER RESPIRATORY TRACT INFECTION, UNSPECIFIED TYPE: ICD-10-CM

## 2020-01-23 LAB — HBA1C MFR BLD: 7.8 % (ref 0–5.6)

## 2020-01-23 PROCEDURE — G0008 ADMIN INFLUENZA VIRUS VAC: HCPCS | Performed by: INTERNAL MEDICINE

## 2020-01-23 PROCEDURE — 99214 OFFICE O/P EST MOD 30 MIN: CPT | Performed by: INTERNAL MEDICINE

## 2020-01-23 PROCEDURE — 83036 HEMOGLOBIN GLYCOSYLATED A1C: CPT | Performed by: INTERNAL MEDICINE

## 2020-01-23 PROCEDURE — 90662 IIV NO PRSV INCREASED AG IM: CPT | Performed by: INTERNAL MEDICINE

## 2020-01-23 PROCEDURE — 36415 COLL VENOUS BLD VENIPUNCTURE: CPT | Performed by: INTERNAL MEDICINE

## 2020-01-23 RX ORDER — METOPROLOL SUCCINATE 25 MG/1
25 TABLET, EXTENDED RELEASE ORAL DAILY
Qty: 30 TABLET | Refills: 11 | Status: SHIPPED | OUTPATIENT
Start: 2020-01-23 | End: 2021-03-01

## 2020-01-23 RX ORDER — FUROSEMIDE 20 MG
40 TABLET ORAL 2 TIMES DAILY
Qty: 360 TABLET | Refills: 3 | Status: SHIPPED | OUTPATIENT
Start: 2020-01-23 | End: 2020-09-08

## 2020-01-23 RX ORDER — LISINOPRIL 40 MG/1
TABLET ORAL
Qty: 90 TABLET | Refills: 3 | Status: SHIPPED | OUTPATIENT
Start: 2020-01-23 | End: 2020-11-09

## 2020-01-23 RX ORDER — ATORVASTATIN CALCIUM 20 MG/1
20 TABLET, FILM COATED ORAL DAILY
Qty: 90 TABLET | Refills: 3 | Status: SHIPPED | OUTPATIENT
Start: 2020-01-23 | End: 2021-01-27

## 2020-01-23 NOTE — LETTER
[facility]  [address] [address]  [phone]      1/23/2020       Nahun Villalobos  [address] [place] [id number]        Dear Nahun,    Your Hemoglobin A1C and blood sugar tests look better and I would continue with your medication without change.  These tests should be repeated in 6 months.    Sincerely,      Olegario Castillo MD  Internal Medicine

## 2020-01-23 NOTE — PROGRESS NOTES
Subjective     Nahun Villalobos is a 74 year old male who presents to clinic today for the following health issues:      RESPIRATORY SYMPTOMS      Duration: 2 weeks     Description  cough, wheezing, fatigue/malaise and hoarse voice    Severity: mild    Accompanying signs and symptoms: Dizziness, weakness    History (predisposing factors):  Hx of lung CA    Precipitating or alleviating factors: None    Therapies tried and outcome:  Coricidin       Patient states that he stopped taking his blood pressure medicines for some undisclosed reason.  He states he has moved several times in the last year and has complicated his life.  He states at times he has difficulty with strengthening and balance of his lower extremities and this is been ongoing for about 6 months for which she now uses a cane.  He is potentially interested in some therapy.    Patient Active Problem List   Diagnosis     Essential hypertension, benign     Tobacco use disorder     Lumbago     Impotence of organic origin     Advance care planning     Polyp of colon     Sleep related hypoventilation/hypoxemia in other disease     Hyperlipidemia LDL goal <100     Morbid obesity due to excess calories (H)     BPPV (benign paroxysmal positional vertigo), unspecified laterality     Chronic obstructive pulmonary disease, unspecified COPD type (H)     Type 2 diabetes mellitus without complication, without long-term current use of insulin (H)     Grief reaction     S/P transurethral resection of prostate     Hematuria, gross     Past Surgical History:   Procedure Laterality Date     APPENDECTOMY       C NONSPECIFIC PROCEDURE      cholecystectomy     CHOLECYSTECTOMY       CYSTOSCOPY, TRANSURETHRAL RESECTION (TUR) PROSTATE, COMBINED N/A 4/30/2018    Procedure: COMBINED CYSTOSCOPY, TRANSURETHRAL RESECTION (TUR) PROSTATE;  Cystoscopy, Transurethral Resection Of The Prostate;  Surgeon: Praveena Burris MD;  Location: [facility] OR     WEDGE RESECTION      lung        Social History     Tobacco Use     Smoking status: Former Smoker     Packs/day: 1.00     Years: 52.00     Pack years: 52.00     Types: Cigarettes     Last attempt to quit: 2013     Years since quittin.6     Smokeless tobacco: Never Used   Substance Use Topics     Alcohol use: No     Alcohol/week: 0.0 standard drinks     Family History   Problem Relation Age of Onset     Hypertension Mother      C.A.D. Mother         ANEURYSM     Cancer - colorectal Mother      Cancer Mother         OVARIAN     Hypertension Sister      Breast Cancer Sister      Cerebrovascular Disease Father      Glaucoma No family hx of      Macular Degeneration No family hx of          Current Outpatient Medications   Medication Sig Dispense Refill     acetaminophen (TYLENOL) 325 MG tablet Take 2 tablets (650 mg) by mouth every 4 hours as needed for mild pain 100 tablet 0     Ascorbic Acid (VITAMIN C PO) Take by mouth At Bedtime       aspirin 81 MG tablet Take 1 tablet (81 mg) by mouth daily (Patient taking differently: Take 81 mg by mouth At Bedtime ) 30 tablet 11     atorvastatin (LIPITOR) 20 MG tablet Take 1 tablet (20 mg) by mouth daily 90 tablet 3     blood glucose (ACCU-CHEK CHACHA) test strip Use to test blood sugar 2 times daily or as directed. 60 strip 6     blood glucose monitoring (SOFTCLIX) lancets Use to test blood sugar 2 times daily or as directed. 1 Box 6     docusate sodium (COLACE) 100 MG tablet Take 100 mg by mouth 2 times daily To prevent constipation 45 tablet 1     finasteride (PROSCAR) 5 MG tablet TAKE 1 TABLET EVERY DAY (Patient taking differently: TAKE 1 TABLET EVERY DAY AT BEDTIME) 90 tablet 3     fluticasone-salmeterol (ADVAIR) 250-50 MCG/DOSE diskus inhaler Inhale 1 puff into the lungs 2 times daily 3 Inhaler 1     furosemide (LASIX) 20 MG tablet Take 2 tablets (40 mg) by mouth 2 times daily 360 tablet 3     lisinopril (PRINIVIL/ZESTRIL) 40 MG tablet TAKE 1 TABLET (40 MG) BY MOUTH DAILY 90 tablet 3     metFORMIN  (GLUCOPHAGE) 500 MG tablet Take 1 tablet (500 mg) by mouth 2 times daily (with meals) 180 tablet 3     metoprolol succinate ER (TOPROL-XL) 25 MG 24 hr tablet Take 1 tablet (25 mg) by mouth daily 30 tablet 11     order for DME Equipment being ordered: diabetic shoes, 1 pair 1 Package 0     order for DME Oxygen 2 Li/min  at night via nasal cannula 1 Device 0     order for DME Equipment delivered; Respironics 760S BIPAP with heated humidification and heated tubing.  Pressure 15/7cm. Respironics Syeda View FF mask (Medium)       ranitidine (ZANTAC) 150 MG tablet Take 1 tablet (150 mg) by mouth 2 times daily 30 tablet 0     sildenafil (VIAGRA) 50 MG tablet Take 1 tablet (50 mg) by mouth daily as needed (erectile dysgfunction, do not use with Flomax or alpha blockers) Or nitro products  ' 10 tablet 1     tamsulosin (FLOMAX) 0.4 MG capsule Take 1 capsule (0.4 mg) by mouth daily 90 capsule 3     VENTOLIN  (90 Base) MCG/ACT inhaler INHALE 2 PUFFS INTO THE LUNGS EVERY 6 HOURS AS NEEDED FOR SHORTNESS OF BREATH / DYSPNEA OR WHEEZING 25.5 g 8     Allergies   Allergen Reactions     No Known Drug Allergies      BP Readings from Last 3 Encounters:   08/28/19 130/80   12/10/18 167/81   11/01/18 130/78    Wt Readings from Last 3 Encounters:   08/28/19 113.3 kg (249 lb 12.8 oz)   12/10/18 112.9 kg (249 lb)   11/01/18 113 kg (249 lb 3.2 oz)            Reviewed and updated as needed this visit by Provider         Review of Systems   ROS COMP: INTEGUMENTARY/SKIN: NEGATIVE for worrisome rashes, moles or lesions  ENT/MOUTH: NEGATIVE for ear, mouth and throat problems  CV: NEGATIVE for chest pain, palpitations or peripheral edema  GI: NEGATIVE for nausea, abdominal pain, heartburn, or change in bowel habits  : NEGATIVE for frequency, dysuria, or hematuria  MUSCULOSKELETAL: NEGATIVE for significant arthralgias or myalgia  NEURO: NEGATIVE for weakness, dizziness or paresthesias, states difficulty with ambulation at times for 6  months.  HEME: NEGATIVE for bleeding problems  PSYCHIATRIC: NEGATIVE for changes in mood or affect      Objective    BP (!) 158/94   Pulse 85   Temp 98.1  F (36.7  C) (Oral)   Resp 20   Wt 113.9 kg (251 lb 1.6 oz)   SpO2 95%   BMI 40.84 kg/m    Body mass index is 40.84 kg/m .  Physical Exam   GENERAL: alert and no distress using cane  EYES: Eyes grossly normal to inspection, PERRL and conjunctivae and sclerae normal  HENT: ear canals and TM's normal, nose and mouth without ulcers or lesions  NECK: no adenopathy, no asymmetry, masses, or scars and thyroid normal to palpation  RESP: lungs clear to auscultation - no rales, rhonchi or wheezes  CV: regular rate and rhythm, normal S1 S2, no S3 or S4, no murmur, click or rub  ABDOMEN: obese  MS: no gross musculoskeletal defects noted  He is able to ambulate with the use of his cane.  His gait appears slow but does not appear ataxic.  PSYCH: mentation appears normal, affect normal/bright        LDL Cholesterol Calculated   Date Value Ref Range Status   08/28/2019 44 <100 mg/dL Final     Comment:     Desirable:       <100 mg/dl       Assessment & Plan     (E11.9) Type 2 diabetes mellitus without complication, without long-term current use of insulin (H)  (primary encounter diagnosis)  Comment: Just recheck A1c level and restart oral therapy as directed.  Plan: Hemoglobin A1c, lisinopril (PRINIVIL/ZESTRIL)         40 MG tablet, metFORMIN (GLUCOPHAGE) 500 MG         tablet            (I10) Essential hypertension, benign  Comment: Restart antihypertensive therapy and recheck blood pressure in 4 to 6 weeks  Plan: metoprolol succinate ER (TOPROL-XL) 25 MG 24 hr        tablet, lisinopril (PRINIVIL/ZESTRIL) 40 MG         tablet, furosemide (LASIX) 20 MG tablet            (J06.9) Upper respiratory tract infection, unspecified type  Comment: Appears viral in etiology, supportive care recommended  Plan:     (E78.5) Hyperlipidemia LDL goal <100  Comment: At goal per last labs  checked.  Plan: atorvastatin (LIPITOR) 20 MG tablet            (R26.89) Balance problems  Comment: Suggested trial of physical therapy and if symptoms do not improve further assessment warranted  Plan: PHYSICAL THERAPY REFERRAL    Advised the patient needs to follow-up with his urologist which was recommended.  Was also advised that he needs to make sure that he gets his follow-up colonoscopy.  He states he has not gotten the flu shot and would like to get that today.  He was also advised about the shingles vaccination.          See Patient Instructions    Return in about 6 months (around 7/23/2020) for diabetes check 6 months.    Olegario Castillo MD  [facility]

## 2020-01-29 ENCOUNTER — TELEPHONE (OUTPATIENT)
Dept: INTERNAL MEDICINE | Facility: CLINIC | Age: 75
End: 2020-01-29

## 2020-01-29 DIAGNOSIS — N40.0 ENLARGED PROSTATE: ICD-10-CM

## 2020-01-29 DIAGNOSIS — R39.15 URINARY URGENCY: ICD-10-CM

## 2020-01-29 DIAGNOSIS — J44.9 CHRONIC OBSTRUCTIVE PULMONARY DISEASE, UNSPECIFIED COPD TYPE (H): Chronic | ICD-10-CM

## 2020-01-29 RX ORDER — ALBUTEROL SULFATE 90 UG/1
AEROSOL, METERED RESPIRATORY (INHALATION)
Qty: 25.5 G | Refills: 8 | Status: SHIPPED | OUTPATIENT
Start: 2020-01-29 | End: 2020-05-18

## 2020-01-29 RX ORDER — TAMSULOSIN HYDROCHLORIDE 0.4 MG/1
0.4 CAPSULE ORAL DAILY
Qty: 90 CAPSULE | Refills: 3 | Status: CANCELLED | OUTPATIENT
Start: 2020-01-29

## 2020-01-29 RX ORDER — FINASTERIDE 5 MG/1
TABLET, FILM COATED ORAL
Qty: 90 TABLET | Refills: 3 | Status: CANCELLED | OUTPATIENT
Start: 2020-01-29

## 2020-01-29 NOTE — TELEPHONE ENCOUNTER
Should pt be on both Flomax and Proscar? Please confirm.Roseanne Nettles RN'  Please send new prescription for ventolin.Roseanne Nettles RN

## 2020-01-29 NOTE — TELEPHONE ENCOUNTER
Reason for Call:  Other call back    Detailed comments: Georgina, pt's daughter, would like a call back to go over the pt's current med list.    Phone Number Patient can be reached at: Other phone number:  [phone]    Best Time: before 10 AM or after 4 PM    Can we leave a detailed message on this number? YES    Call taken on 1/29/2020 at 8:49 AM by ALL BELTRAN

## 2020-01-30 NOTE — TELEPHONE ENCOUNTER
Suggest needs to be in contact with Urology as these medications were started and prescribed by them, Dr. Burris.

## 2020-01-31 NOTE — TELEPHONE ENCOUNTER
Left detailed message on Annetta VERDIN t contact Dr. De La Fuente office and please call back if has further questions.     Ev CARMONAN, RN, PHN

## 2020-02-09 ENCOUNTER — MEDICAL CORRESPONDENCE (OUTPATIENT)
Dept: HEALTH INFORMATION MANAGEMENT | Facility: CLINIC | Age: 75
End: 2020-02-09

## 2020-02-17 ENCOUNTER — TELEPHONE (OUTPATIENT)
Dept: UROLOGY | Facility: CLINIC | Age: 75
End: 2020-02-17

## 2020-02-17 DIAGNOSIS — R31.9 URINARY TRACT INFECTION WITH HEMATURIA, SITE UNSPECIFIED: Primary | ICD-10-CM

## 2020-02-17 DIAGNOSIS — N39.0 URINARY TRACT INFECTION WITH HEMATURIA, SITE UNSPECIFIED: Primary | ICD-10-CM

## 2020-02-17 NOTE — TELEPHONE ENCOUNTER
[facility] Call Center    Phone Message    May a detailed message be left on voicemail: yes     Reason for Call: Symptoms or Concerns       Current symptom or concern:  Blood in urine -  Passed 2 blood clots in urine    Symptoms have been present for:  2 day(s)    Has patient previously been seen for this? No    By : NA    Date: NA    Are there any new or worsening symptoms? Yes: New symptoms.   Please call to discuss.       Action Taken: Message routed to:  [facility] Urology    Travel Screening: Not Applicable

## 2020-02-17 NOTE — TELEPHONE ENCOUNTER
Alex Joaquin,    Patient has cancel several follow up visits with Dr. Praveena Burris. He is experiencing some blood in the urine. He wants to know if he can be seen this Friday by Dr. Praveena Burris. He prefers am appointments. If so, please let the urology clinic coordinators know the date and time. I placed UA/UC orders in his chart to rule out a UTI. Patient agreed with the plan.      Hailee Hendrix MA

## 2020-02-18 ENCOUNTER — PRE VISIT (OUTPATIENT)
Dept: UROLOGY | Facility: CLINIC | Age: 75
End: 2020-02-18

## 2020-02-18 DIAGNOSIS — R39.15 URINARY URGENCY: ICD-10-CM

## 2020-02-18 DIAGNOSIS — R31.9 URINARY TRACT INFECTION WITH HEMATURIA, SITE UNSPECIFIED: ICD-10-CM

## 2020-02-18 DIAGNOSIS — N39.0 URINARY TRACT INFECTION WITH HEMATURIA, SITE UNSPECIFIED: ICD-10-CM

## 2020-02-18 DIAGNOSIS — N39.0 URINARY TRACT INFECTION WITH HEMATURIA, SITE UNSPECIFIED: Primary | ICD-10-CM

## 2020-02-18 DIAGNOSIS — R31.9 URINARY TRACT INFECTION WITH HEMATURIA, SITE UNSPECIFIED: Primary | ICD-10-CM

## 2020-02-18 LAB
ALBUMIN UR-MCNC: 100 MG/DL
APPEARANCE UR: CLEAR
BACTERIA #/AREA URNS HPF: ABNORMAL /HPF
BILIRUB UR QL STRIP: NEGATIVE
COLOR UR AUTO: YELLOW
GLUCOSE UR STRIP-MCNC: NEGATIVE MG/DL
HGB UR QL STRIP: ABNORMAL
KETONES UR STRIP-MCNC: NEGATIVE MG/DL
LEUKOCYTE ESTERASE UR QL STRIP: NEGATIVE
NITRATE UR QL: NEGATIVE
NON-SQ EPI CELLS #/AREA URNS LPF: ABNORMAL /LPF
PH UR STRIP: 6 PH (ref 5–7)
RBC #/AREA URNS AUTO: ABNORMAL /HPF
SOURCE: ABNORMAL
SP GR UR STRIP: >1.03 (ref 1–1.03)
UROBILINOGEN UR STRIP-ACNC: 0.2 EU/DL (ref 0.2–1)
WBC #/AREA URNS AUTO: ABNORMAL /HPF

## 2020-02-18 PROCEDURE — 81001 URINALYSIS AUTO W/SCOPE: CPT | Performed by: UROLOGY

## 2020-02-18 PROCEDURE — 87086 URINE CULTURE/COLONY COUNT: CPT | Performed by: UROLOGY

## 2020-02-18 NOTE — TELEPHONE ENCOUNTER
Chief Complaint : Return-Cysto    Hx/Sx: Hematuria     Records/Orders: CT Urogram requested     Pt Contacted: n/a    At Rooming: Urine

## 2020-02-19 LAB
BACTERIA SPEC CULT: NORMAL
SPECIMEN SOURCE: NORMAL

## 2020-02-20 ENCOUNTER — TELEPHONE (OUTPATIENT)
Dept: UROLOGY | Facility: CLINIC | Age: 75
End: 2020-02-20

## 2020-02-20 NOTE — TELEPHONE ENCOUNTER
Patient was notified that his UC was normal. Patient will follow up with Dr. Praveena Burris on 2/28/2020 @ 9:15 am. Patient agreed.      Hailee Hendrix MA

## 2020-02-24 ENCOUNTER — DOCUMENTATION ONLY (OUTPATIENT)
Dept: CARE COORDINATION | Facility: CLINIC | Age: 75
End: 2020-02-24

## 2020-02-27 ENCOUNTER — ANCILLARY PROCEDURE (OUTPATIENT)
Dept: CT IMAGING | Facility: CLINIC | Age: 75
End: 2020-02-27
Attending: UROLOGY
Payer: MEDICARE

## 2020-02-27 DIAGNOSIS — N39.0 URINARY TRACT INFECTION WITH HEMATURIA, SITE UNSPECIFIED: ICD-10-CM

## 2020-02-27 DIAGNOSIS — R39.15 URINARY URGENCY: ICD-10-CM

## 2020-02-27 DIAGNOSIS — R31.9 URINARY TRACT INFECTION WITH HEMATURIA, SITE UNSPECIFIED: ICD-10-CM

## 2020-02-27 LAB
CREAT BLD-MCNC: 1.1 MG/DL (ref 0.66–1.25)
GFR SERPL CREATININE-BSD FRML MDRD: 65 ML/MIN/{1.73_M2}

## 2020-02-27 RX ORDER — IOPAMIDOL 755 MG/ML
135 INJECTION, SOLUTION INTRAVASCULAR ONCE
Status: COMPLETED | OUTPATIENT
Start: 2020-02-27 | End: 2020-02-27

## 2020-02-27 RX ADMIN — IOPAMIDOL 135 ML: 755 INJECTION, SOLUTION INTRAVASCULAR at 09:27

## 2020-02-28 ENCOUNTER — OFFICE VISIT (OUTPATIENT)
Dept: UROLOGY | Facility: CLINIC | Age: 75
End: 2020-02-28
Payer: MEDICARE

## 2020-02-28 VITALS — HEART RATE: 76 BPM | SYSTOLIC BLOOD PRESSURE: 159 MMHG | DIASTOLIC BLOOD PRESSURE: 95 MMHG

## 2020-02-28 DIAGNOSIS — R31.0 GROSS HEMATURIA: ICD-10-CM

## 2020-02-28 DIAGNOSIS — R31.9 URINARY TRACT INFECTION WITH HEMATURIA, SITE UNSPECIFIED: Primary | ICD-10-CM

## 2020-02-28 DIAGNOSIS — N39.0 URINARY TRACT INFECTION WITH HEMATURIA, SITE UNSPECIFIED: Primary | ICD-10-CM

## 2020-02-28 PROCEDURE — 88112 CYTOPATH CELL ENHANCE TECH: CPT | Performed by: UROLOGY

## 2020-02-28 PROCEDURE — 88120 CYTP URNE 3-5 PROBES EA SPEC: CPT | Performed by: UROLOGY

## 2020-02-28 RX ORDER — LIDOCAINE HYDROCHLORIDE 20 MG/ML
JELLY TOPICAL ONCE
Status: COMPLETED | OUTPATIENT
Start: 2020-02-28 | End: 2020-02-28

## 2020-02-28 RX ADMIN — LIDOCAINE HYDROCHLORIDE: 20 JELLY TOPICAL at 10:11

## 2020-02-28 ASSESSMENT — PAIN SCALES - GENERAL: PAINLEVEL: NO PAIN (0)

## 2020-02-28 NOTE — LETTER
2/28/2020       RE: Nahun Villalobos  [address]  [facility] [address]     Dear Colleague,    Thank you for referring your patient, Nahun Villalobos, to the [facility] AND INST FOR PROSTATE AND UROLOGIC CANCERS at [facility]. Please see a copy of my visit note below.    PRE-PROCEDURE DIAGNOSIS: gross hematuria, history of BPH and lower urinary tract symptoms   POST-PROCEDURE DIAGNOSIS: same   PROCEDURE: Cystoscopy  HISTORY: Nahun Villalobos is a 74 year old male with history of lower urinary tract symptoms with very large median lobe, with several episodes of gross hematuria   REVIEW OF OFFICE STUDIES (e.g., uroflow or PVR):   DESCRIPTION OF PROCEDURE: After informed consent was obtained, the patient was brought to the procedure room where he was placed in the supine position with all pressure points well padded.  The penis and scrotum were prepped and draped in a sterile fashion. A flexible cystoscope was introduced through a well-lubricated urethra. Anterior urethra strictures were absent.   The urinary sphincter was intact.  The prostate demonstrated bilobar hypertrophy with TUR defect.  Bladder neck was open.   Bladder signififcant for presence of the following:      Diverticuli: absent      Cellules: present scattered       Trabeculation: present 1+       Tumors: absent      Stones: absent  The flexible cystoscope was removed and the findings were described to the patient.   ASSESSMENT AND PLAN: 74 year old male with gross hematuria   Follow up in four months for uroflow/PVR       Again, thank you for allowing me to participate in the care of your patient.      Sincerely,    Praveena Burris MD

## 2020-02-28 NOTE — PROGRESS NOTES
PRE-PROCEDURE DIAGNOSIS: gross hematuria, history of BPH and lower urinary tract symptoms   POST-PROCEDURE DIAGNOSIS: same   PROCEDURE: Cystoscopy  HISTORY: Nahun Villalobos is a 74 year old male with history of lower urinary tract symptoms with very large median lobe, with several episodes of gross hematuria   REVIEW OF OFFICE STUDIES (e.g., uroflow or PVR):   DESCRIPTION OF PROCEDURE: After informed consent was obtained, the patient was brought to the procedure room where he was placed in the supine position with all pressure points well padded.  The penis and scrotum were prepped and draped in a sterile fashion. A flexible cystoscope was introduced through a well-lubricated urethra. Anterior urethra strictures were absent.   The urinary sphincter was intact.  The prostate demonstrated bilobar hypertrophy with TUR defect.  Bladder neck was open.   Bladder signififcant for presence of the following:      Diverticuli: absent      Cellules: present scattered       Trabeculation: present 1+       Tumors: absent      Stones: absent  The flexible cystoscope was removed and the findings were described to the patient.   ASSESSMENT AND PLAN: 74 year old male with gross hematuria   Follow up in four months for uroflow/PVR

## 2020-02-28 NOTE — NURSING NOTE
Chief Complaint   Patient presents with     Procedure     cysto       Blood pressure (!) 159/95, pulse 76. There is no height or weight on file to calculate BMI.    Patient Active Problem List   Diagnosis     Essential hypertension, benign     Tobacco use disorder     Lumbago     Impotence of organic origin     Advance care planning     Polyp of colon     Sleep related hypoventilation/hypoxemia in other disease     Hyperlipidemia LDL goal <100     Morbid obesity due to excess calories (H)     BPPV (benign paroxysmal positional vertigo), unspecified laterality     Chronic obstructive pulmonary disease, unspecified COPD type (H)     Type 2 diabetes mellitus without complication, without long-term current use of insulin (H)     Grief reaction     S/P transurethral resection of prostate     Hematuria, gross       Allergies   Allergen Reactions     No Known Drug Allergies        Current Outpatient Medications   Medication Sig Dispense Refill     acetaminophen (TYLENOL) 325 MG tablet Take 2 tablets (650 mg) by mouth every 4 hours as needed for mild pain 100 tablet 0     albuterol (VENTOLIN HFA) 108 (90 Base) MCG/ACT inhaler INHALE 2 PUFFS INTO THE LUNGS EVERY 6 HOURS AS NEEDED FOR SHORTNESS OF BREATH / DYSPNEA OR WHEEZING 25.5 g 8     Ascorbic Acid (VITAMIN C PO) Take by mouth At Bedtime       aspirin 81 MG tablet Take 1 tablet (81 mg) by mouth daily 30 tablet 11     atorvastatin (LIPITOR) 20 MG tablet Take 1 tablet (20 mg) by mouth daily 90 tablet 3     blood glucose (ACCU-CHEK CHACHA) test strip Use to test blood sugar 2 times daily or as directed. 60 strip 6     blood glucose monitoring (SOFTCLIX) lancets Use to test blood sugar 2 times daily or as directed. 1 Box 6     docusate sodium (COLACE) 100 MG tablet Take 100 mg by mouth 2 times daily To prevent constipation 45 tablet 1     finasteride (PROSCAR) 5 MG tablet TAKE 1 TABLET EVERY DAY 90 tablet 3     fluticasone-salmeterol (ADVAIR) 250-50 MCG/DOSE diskus inhaler  Inhale 1 puff into the lungs 2 times daily 3 Inhaler 1     furosemide (LASIX) 20 MG tablet Take 2 tablets (40 mg) by mouth 2 times daily 360 tablet 3     lisinopril (PRINIVIL/ZESTRIL) 40 MG tablet TAKE 1 TABLET (40 MG) BY MOUTH DAILY 90 tablet 3     metFORMIN (GLUCOPHAGE) 500 MG tablet Take 1 tablet (500 mg) by mouth 2 times daily (with meals) 180 tablet 3     metoprolol succinate ER (TOPROL-XL) 25 MG 24 hr tablet Take 1 tablet (25 mg) by mouth daily 30 tablet 11     order for DME Equipment being ordered: diabetic shoes, 1 pair 1 Package 0     order for DME Oxygen 2 Li/min  at night via nasal cannula 1 Device 0     order for DME Equipment delivered; Respironics 760S BIPAP with heated humidification and heated tubing.  Pressure 15/7cm. Respironics Syeda View FF mask (Medium)       sildenafil (VIAGRA) 50 MG tablet Take 1 tablet (50 mg) by mouth daily as needed (erectile dysgfunction, do not use with Flomax or alpha blockers) Or nitro products  ' 10 tablet 1     tamsulosin (FLOMAX) 0.4 MG capsule Take 1 capsule (0.4 mg) by mouth daily 90 capsule 3       Social History     Tobacco Use     Smoking status: Former Smoker     Packs/day: 1.00     Years: 52.00     Pack years: 52.00     Types: Cigarettes     Last attempt to quit: 2013     Years since quittin.7     Smokeless tobacco: Never Used   Substance Use Topics     Alcohol use: No     Alcohol/week: 0.0 standard drinks     Drug use: No       Invasive Procedure Safety Checklist:    Procedure: Cystoscopy    Action: Complete sections and checkboxes as appropriate.    Pre-procedure:  1. Patient ID Verified with 2 identifiers (Benita and  or MRN) : YES    2. Procedure and site verified with patient/designee (when able) : YES    3. Accurate consent documentation in medical record : YES    4. H&P (or appropriate assessment) documented in medical record : N/A  H&P must be up to 30 days prior to procedure an updated within 24 hours of                 Procedure as  applicable.     5. Relevant diagnostic and radiology test results appropriately labeled and displayed as applicable : YES    6. Blood products, implants, devices, and/or special equipment available for the procedure as applicable : YES    7. Procedure site(s) marked with provider initials [Exclusions: none] : NO    8. Marking not required. Reason : Yes  Procedure does not require site marking    Time Out:     Time-Out performed immediately prior to starting procedure, including verbal and active participation of all team members addressing: YES    1. Correct patient identity.  2. Confirmed that the correct side and site are marked.  3. An accurate procedure to be done.  4. Agreement on the procedure to be done.  5. Correct patient position.  6. Relevant images and results are properly labeled and appropriately displayed.  7. The need to administer antibiotics or fluids for irrigation purposes during the procedure as applicable.  8. Safety precautions based on patient history or medication use.    During Procedure: Verification of correct person, site, and procedure occurs any time the responsibility for care of the patient is transferred to another member of the care team.    The following medication was given:     MEDICATION: Lidocaine Uro-Jet 2% 200mg (20mg/mL)  ROUTE: Urethral   SITE: Urethra   DOSE: 10mL  LOT #: [id number]  : [employer]   EXPIRATION DATE: 11-21  NDC#: [address]-24   Was there drug waste? No    Prior to injection, verified patient identity using patient's name and date of birth.  Due to injection administration, patient instructed to remain in clinic for 15 minutes  afterwards, and to report any adverse reaction to me immediately.    Drug Amount Wasted:  None.  Vial/Syringe: Single dose vial      RADHA Black  2/28/2020  9:45 AM

## 2020-02-28 NOTE — PATIENT INSTRUCTIONS
"Please follow up in 4 months with Dr. Burris and a Flow/PVR.    Gumaro come with a Full Bladder to your appointment.    It was a pleasure meeting with you today.  Thank you for allowing me and my team the privilege of caring for you today.  YOU are the reason we are here, and I truly hope we provided you with the excellent service you deserve.  Please let us know if there is anything else we can do for you so that we can be sure you are leaving completely satisfied with your care experience.        Jemal Cordero, EMT    AFTER YOUR CYSTOSCOPY        You have just completed a cystoscopy, or \"cysto\", which allowed your physician to learn more about your bladder (or to remove a stent placed after surgery). We suggest that you continue to avoid caffeine, fruit juice, and alcohol for the next 24 hours, however, you are encouraged to return to your normal activities.         A few things that are considered normal after your cystoscopy:     * Small amount of bleeding (or spotting) that clears within the next 24 hours     * Slight burning sensation with urination     * Sensation to of needing to avoid more frequently     * The feeling of \"air\" in your urine     * Mild discomfort that is relieved with Tylenol        Please contact our office promptly if you:     * Develop a fever above 101 degrees     * Are unable to urinate     * Develop bright red blood that does not stop     * Severe pain or swelling         Please contact our office with any concerns or questions @[email or web address].  "

## 2020-03-09 ENCOUNTER — TRANSFERRED RECORDS (OUTPATIENT)
Dept: HEALTH INFORMATION MANAGEMENT | Facility: CLINIC | Age: 75
End: 2020-03-09

## 2020-03-12 LAB — COPATH REPORT: NORMAL

## 2020-03-16 ENCOUNTER — APPOINTMENT (OUTPATIENT)
Dept: CT IMAGING | Facility: CLINIC | Age: 75
End: 2020-03-16
Attending: EMERGENCY MEDICINE
Payer: MEDICARE

## 2020-03-16 ENCOUNTER — HOSPITAL ENCOUNTER (EMERGENCY)
Facility: CLINIC | Age: 75
Discharge: HOME | End: 2020-03-16
Attending: EMERGENCY MEDICINE | Admitting: EMERGENCY MEDICINE
Payer: MEDICARE

## 2020-03-16 VITALS
DIASTOLIC BLOOD PRESSURE: 82 MMHG | BODY MASS INDEX: 39.24 KG/M2 | SYSTOLIC BLOOD PRESSURE: 166 MMHG | RESPIRATION RATE: 20 BRPM | TEMPERATURE: 98.5 F | OXYGEN SATURATION: 93 % | HEART RATE: 78 BPM | WEIGHT: 250 LBS | HEIGHT: 67 IN

## 2020-03-16 DIAGNOSIS — R31.9 HEMATURIA, UNSPECIFIED TYPE: ICD-10-CM

## 2020-03-16 DIAGNOSIS — K57.32 DIVERTICULITIS OF COLON: ICD-10-CM

## 2020-03-16 LAB
ALBUMIN UR-MCNC: 100 MG/DL
ANION GAP SERPL CALCULATED.3IONS-SCNC: 1 MMOL/L (ref 3–14)
APPEARANCE UR: CLEAR
BASOPHILS # BLD AUTO: 0.1 10E9/L (ref 0–0.2)
BASOPHILS NFR BLD AUTO: 0.8 %
BILIRUB UR QL STRIP: NEGATIVE
BUN SERPL-MCNC: 11 MG/DL (ref 7–30)
CALCIUM SERPL-MCNC: 9.3 MG/DL (ref 8.5–10.1)
CHLORIDE SERPL-SCNC: 101 MMOL/L (ref 94–109)
CO2 SERPL-SCNC: 32 MMOL/L (ref 20–32)
COLOR UR AUTO: ABNORMAL
CREAT SERPL-MCNC: 0.93 MG/DL (ref 0.66–1.25)
DIFFERENTIAL METHOD BLD: ABNORMAL
EOSINOPHIL # BLD AUTO: 0.3 10E9/L (ref 0–0.7)
EOSINOPHIL NFR BLD AUTO: 3.3 %
ERYTHROCYTE [DISTWIDTH] IN BLOOD BY AUTOMATED COUNT: 11.7 % (ref 10–15)
GFR SERPL CREATININE-BSD FRML MDRD: 79 ML/MIN/{1.73_M2}
GLUCOSE SERPL-MCNC: 185 MG/DL (ref 70–99)
GLUCOSE UR STRIP-MCNC: NEGATIVE MG/DL
HCT VFR BLD AUTO: 43.7 % (ref 40–53)
HGB BLD-MCNC: 13.8 G/DL (ref 13.3–17.7)
HGB UR QL STRIP: ABNORMAL
IMM GRANULOCYTES # BLD: 0.1 10E9/L (ref 0–0.4)
IMM GRANULOCYTES NFR BLD: 0.5 %
KETONES UR STRIP-MCNC: NEGATIVE MG/DL
LEUKOCYTE ESTERASE UR QL STRIP: NEGATIVE
LYMPHOCYTES # BLD AUTO: 3 10E9/L (ref 0.8–5.3)
LYMPHOCYTES NFR BLD AUTO: 31.2 %
MCH RBC QN AUTO: 32.5 PG (ref 26.5–33)
MCHC RBC AUTO-ENTMCNC: 31.6 G/DL (ref 31.5–36.5)
MCV RBC AUTO: 103 FL (ref 78–100)
MONOCYTES # BLD AUTO: 0.7 10E9/L (ref 0–1.3)
MONOCYTES NFR BLD AUTO: 7.6 %
MUCOUS THREADS #/AREA URNS LPF: PRESENT /LPF
NEUTROPHILS # BLD AUTO: 5.4 10E9/L (ref 1.6–8.3)
NEUTROPHILS NFR BLD AUTO: 56.6 %
NITRATE UR QL: NEGATIVE
NRBC # BLD AUTO: 0 10*3/UL
NRBC BLD AUTO-RTO: 0 /100
PH UR STRIP: 6 PH (ref 5–7)
PLATELET # BLD AUTO: 266 10E9/L (ref 150–450)
PLATELET # BLD EST: ABNORMAL 10*3/UL
POTASSIUM SERPL-SCNC: 4.6 MMOL/L (ref 3.4–5.3)
RBC # BLD AUTO: 4.25 10E12/L (ref 4.4–5.9)
RBC #/AREA URNS AUTO: >182 /HPF (ref 0–2)
RBC MORPH BLD: ABNORMAL
SODIUM SERPL-SCNC: 134 MMOL/L (ref 133–144)
SOURCE: ABNORMAL
SP GR UR STRIP: 1.02 (ref 1–1.03)
SQUAMOUS #/AREA URNS AUTO: <1 /HPF (ref 0–1)
UROBILINOGEN UR STRIP-MCNC: NORMAL MG/DL (ref 0–2)
WBC # BLD AUTO: 9.5 10E9/L (ref 4–11)
WBC #/AREA URNS AUTO: <1 /HPF (ref 0–5)

## 2020-03-16 PROCEDURE — 99285 EMERGENCY DEPT VISIT HI MDM: CPT | Mod: 25

## 2020-03-16 PROCEDURE — 80048 BASIC METABOLIC PNL TOTAL CA: CPT | Performed by: EMERGENCY MEDICINE

## 2020-03-16 PROCEDURE — 81001 URINALYSIS AUTO W/SCOPE: CPT | Performed by: EMERGENCY MEDICINE

## 2020-03-16 PROCEDURE — 74176 CT ABD & PELVIS W/O CONTRAST: CPT

## 2020-03-16 PROCEDURE — 85025 COMPLETE CBC W/AUTO DIFF WBC: CPT | Performed by: EMERGENCY MEDICINE

## 2020-03-16 PROCEDURE — 86900 BLOOD TYPING SEROLOGIC ABO: CPT | Performed by: EMERGENCY MEDICINE

## 2020-03-16 RX ORDER — AMOXICILLIN AND CLAVULANATE POTASSIUM 562.5; 437.5; 62.5 MG/1; MG/1; MG/1
2 TABLET, MULTILAYER, EXTENDED RELEASE ORAL 2 TIMES DAILY
Qty: 28 TABLET | Refills: 0 | Status: SHIPPED | OUTPATIENT
Start: 2020-03-16 | End: 2020-08-11

## 2020-03-16 ASSESSMENT — ENCOUNTER SYMPTOMS
VOMITING: 0
DYSURIA: 0
LIGHT-HEADEDNESS: 0
FEVER: 0
ABDOMINAL PAIN: 1
DIZZINESS: 0
DIFFICULTY URINATING: 0
NAUSEA: 0
HEMATURIA: 1
FREQUENCY: 0
DIARRHEA: 0

## 2020-03-16 ASSESSMENT — MIFFLIN-ST. JEOR: SCORE: 1827.62

## 2020-03-16 NOTE — ED PROVIDER NOTES
History     Chief Complaint:  Hematuria    HPI   Nahun Villalobos is a 74 year old male with a remote history of lung cancer, COPD, type II diabetes, and not currently on anticoagulation who presents for evaluation of hematuria. About a month ago, the patient reports seeing a urologist due to passing clots in his urine; he reports undergoing a cystoscopy with Dr. Burris of [facility] which revealed nothing acute. Plan was for follow-up in 4 months for a uroflow/PVR. Since then, he has continued to see collin blood in his urine with the occasional clot however he was unable to present for evaluation secondary to work hours. Today, he reports the hematuria was even worse, thus prompting presentation. He does have a small amount of right sided abdominal pain as well, but otherwise denies any fever, vomiting, dysuria, frequency or urgency of urination, or significant dizziness. He does note a cough, however this is chronic secondary to his COPD and not acutely changed recently.    Allergies:  NKDA     Medications:    Albuterol inhaler   Atorvastatin   Baby aspirin  Proscar  Advair inhaler  Lasix  Lisinopril   Metformin  Metoprolol   Flomax     Past Medical History:    COPD  Lung cancer  Rectal hemorrhage  Hypertension  Diabetes  Hyperlipidemia   Lumbago     Past Surgical History:    Appendectomy   Cholecystectomy   Cystoscopy, TURP  Lung wedge resection    Family History:    Hypertension  CAD  Colorectal cancer  Ovarian cancer  Cerebrovascular disease    Social History:  Marital Status:   [5]  Presents with friend.   Former smoker. Quit 2013.   Negative for alcohol use.  Negative for drug use.      Review of Systems   Constitutional: Negative for fever.   Gastrointestinal: Positive for abdominal pain. Negative for diarrhea, nausea and vomiting.   Genitourinary: Positive for hematuria. Negative for difficulty urinating, dysuria and frequency.   Neurological: Negative for dizziness and light-headedness.  "  All other systems reviewed and are negative.      Physical Exam     Patient Vitals for the past 24 hrs:   BP Temp Temp src Pulse Resp SpO2 Height Weight   03/16/20 1000 (!) 166/82 -- -- 78 -- -- -- --   03/16/20 0949 -- -- -- 76 -- 93 % -- --   03/16/20 0945 (!) 168/92 -- -- -- -- -- -- --   03/16/20 0915 -- -- -- -- -- 94 % -- --   03/16/20 0900 -- -- -- -- -- 94 % -- --   03/16/20 0830 -- -- -- -- -- 97 % -- --   03/16/20 0817 (!) 193/107 98.5  F (36.9  C) Oral 86 20 97 % 1.702 m (5' 7\") 113.4 kg (250 lb)      Physical Exam  Nursing note and vitals reviewed.  Constitutional: Well nourished. Resting comfortably.   Eyes: Conjunctiva normal.  Pupils are equal, round, and reactive to light.   ENT: Nose normal. Mucous membranes pink and moist.    Neck: Normal range of motion.  CVS: Normal rate, regular rhythm.  Normal heart sounds.  No murmur.  Pulmonary: Lungs clear to auscultation bilaterally. No wheezes/rales/rhonchi.  GI: Abdomen soft. Mild RLQ abdominal pain. No rigidity or guarding.  No CVA tenderness    MSK: No calf tenderness or swelling.  Neuro: Alert. Follows simple commands.  Skin: Skin is warm and dry. No rash noted.   Psychiatric: Normal affect.     Emergency Department Course   Imaging:  Radiographic findings were communicated with the patient who voiced understanding of the findings.  CT Abdomen/Pelvis w/o IV contrast-stone protocol:   1. Marked prostatic enlargement. No other cause for hematuria is   identified. No urinary calculi or evidence for urinary obstruction.   2. Mild hazy fat stranding adjacent to the proximal sigmoid colon is   suspicious for a mild acute diverticulitis. Please clinically correlate, as per radiology.     Laboratory:  CBC: WBC: 9.5, HGB: 13.8, PLT: 266  BMP: Glucose 185 (H), Anion gap: 1 (L), o/w WNL (Creatinine: 0.93)    UA with Microscopic: Blood: Large (A), Albumin: 100 (A), RBC: >182 (H), Mucous: Present (A), o/w WNL     Procedures:  None     Emergency Department " Course:  Nursing notes and vitals reviewed.   0821: I performed an exam of the patient as documented above.      IV was inserted and blood was drawn for laboratory testing, results above.   The patient was sent for a CT abdomen/pelvis while in the emergency department, results above.    The patient provided a urine sample here in the emergency department. This was sent for laboratory testing, findings above.      1045: I rechecked the patient and discussed the results of his workup thus far.     Findings and plan explained to the Patient. Patient discharged home with instructions regarding supportive care, medications, and reasons to return. The importance of close follow-up was reviewed. The patient was prescribed Augmentin. He was told to follow-up with urology.     I personally reviewed the laboratory and imaging results with the Patient and friend, and answered all related questions prior to discharge.    Impression & Plan      Medical Decision Making:  Nahun Villalobos is a 74 year old male who presents with concerns for hematuria.  Patient also has minimal abdominal pain on exam though is non-peritoneal.  He is nontoxic appearing.  Decision was made to pursue formal CT today to ensure no evidence of obstructive uropathy.  Fortunately no evidence of obstructive uropathy today though incidental concerns for diverticulitis.  Given patient has mild abdominal pain on exam I do have concerns that this may be secondary to acute diverticulitis.  There is no evidence to suggest severe sepsis.  Regarding patient's hematuria, H/H stable.  No indication for emergent blood transfusion.  UA negative.  Review of urology records does show that patient is supposed to follow-up in 3 months for uroflow study following his cystoscopy.  I recommended no indication for further emergent work-up at this time though did recommend he contact his urologist in the next few days to schedule a close outpatient follow-up.  He is instructed  to return to the ED for fever, worsening abdominal pain, lightheadedness or should symptoms change.    Diagnosis:    ICD-10-CM    1. Hematuria, unspecified type  R31.9 CBC with platelets differential   2. Diverticulitis of colon  K57.32        Disposition:  discharged to home    Discharge Medications:  New Prescriptions    AMOXICILLIN-CLAVULANATE (AUGMENTIN XR 1000 MG)    Take 2 tablets by mouth 2 times daily for 7 days       Scribe Disclosure:  I, Shakira Mason, am serving as a scribe on 3/16/2020 at 8:21 AM to personally document services performed by Tiffanie Figueroa DO based on my observations and the provider's statements to me.       3/16/2020   [facility] EMERGENCY DEPARTMENT       Tiffanie Figueroa DO  03/16/20 1119

## 2020-03-16 NOTE — ED AVS SNAPSHOT
[facility] Emergency Department  [address]  [facility] [address]  Phone:  [phone]  Fax:  [phone]                                    Nahun Villalobos   MRN: [id number]    Department:  [facility] Emergency Department   Date of Visit:  3/16/2020           After Visit Summary Signature Page    I have received my discharge instructions, and my questions have been answered. I have discussed any challenges I see with this plan with the nurse or doctor.    ..........................................................................................................................................  Patient/Patient Representative Signature      ..........................................................................................................................................  Patient Representative Print Name and Relationship to Patient    ..................................................               ................................................  Date                                   Time    ..........................................................................................................................................  Reviewed by Signature/Title    ...................................................              ..............................................  Date                                               Time          22EPIC Rev 08/18

## 2020-03-16 NOTE — ED TRIAGE NOTES
Comes in with bloody urination for past month. Increased today.  Denies pain. Reports seen by urologist one month ago, had a cysto. Reports yesterday feeling light-headed. Hypertensive, has not taken BP meds today. ABCD's intact; A/O x 4.

## 2020-03-18 ENCOUNTER — PRE VISIT (OUTPATIENT)
Dept: UROLOGY | Facility: CLINIC | Age: 75
End: 2020-03-18

## 2020-03-18 NOTE — TELEPHONE ENCOUNTER
Chief Complaint : Phone Visit     Hx/Sx: Hematuria     Records/Orders: Available     Pt Contacted: n/a    At Rooming: Call to pre-room

## 2020-03-19 ENCOUNTER — VIRTUAL VISIT (OUTPATIENT)
Dept: INTERNAL MEDICINE | Facility: CLINIC | Age: 75
End: 2020-03-19
Payer: MEDICARE

## 2020-03-19 DIAGNOSIS — I10 ESSENTIAL HYPERTENSION, BENIGN: ICD-10-CM

## 2020-03-19 DIAGNOSIS — R31.9 HEMATURIA, UNSPECIFIED TYPE: ICD-10-CM

## 2020-03-19 DIAGNOSIS — K57.32 DIVERTICULITIS OF COLON: Primary | ICD-10-CM

## 2020-03-19 DIAGNOSIS — E11.9 TYPE 2 DIABETES MELLITUS WITHOUT COMPLICATION, WITHOUT LONG-TERM CURRENT USE OF INSULIN (H): ICD-10-CM

## 2020-03-19 PROCEDURE — G2012 BRIEF CHECK IN BY MD/QHP: HCPCS | Performed by: INTERNAL MEDICINE

## 2020-03-19 NOTE — PROGRESS NOTES
"Nahun Villalobos is a 74 year old male who is being evaluated via a billable telephone visit.      The patient has been notified of following:     \"This telephone visit will be conducted via a call between you and your physician/provider. We have found that certain health care needs can be provided without the need for a physical exam.  This service lets us provide the care you need with a short phone conversation.  If a prescription is necessary we can send it directly to your pharmacy.  If lab work is needed we can place an order for that and you can then stop by our lab to have the test done at a later time.    If during the course of the call the physician/provider feels a telephone visit is not appropriate, you will not be charged for this service.\"     Nahun Villalobos complains:    Chief Complaint   Patient presents with     ER F/U       I have reviewed and updated the patient's Past Medical History, Social History, Family History and Medication List.    ALLERGIES  No known drug allergies    ED/UC Followup:    Facility: [facility]  Date of visit: 06/16/20  Reason for visit: Hematuria, diverticulitis of colon   Current Status: Patient states he has had loose stool since ER visit.  Appointment with urology tomorrow, blood in urine improving.      Betina Renee CMA    Additional provider notes:     Patient states he just had a urological procedure and he has been having some hematuria which over the last few days is starting to improve.  He has been in contact with his urologist.    Of note it appears he also was given an antibiotic for what appears to be diverticulitis with a noted CT scan stranding some mild haziness adjacent to the proximal sigmoid colon which was consistent with mild acute diverticulitis.  He has been taking his oral antibiotics and reports tolerating the medicine although noted a little bit of loose stool  Diabetes Follow-up    How often are you checking your blood sugar? One time " daily  What time of day are you checking your blood sugars (select all that apply)?  Before meals  Have you had any blood sugars above 200?  No  Have you had any blood sugars below 70?  No    What symptoms do you notice when your blood sugar is low?  Shaky    What concerns do you have today about your diabetes? None     Do you have any of these symptoms? (Select all that apply)  Weight gain    Have you had a diabetic eye exam in the last 12 months? yes        BP Readings from Last 2 Encounters:   03/16/20 (!) 166/82   02/28/20 (!) 159/95     Hemoglobin A1C (%)   Date Value   01/23/2020 7.8 (H)   08/28/2019 8.0 (H)     LDL Cholesterol Calculated (mg/dL)   Date Value   08/28/2019 44   11/01/2018 94                 Assessment/Plan:  (K57.32) Diverticulitis of colon  (primary encounter diagnosis)  Comment: Clinically stable and advised to complete pleat oral antibiotics as discussed.  Plan: Suggest follow-up if symptoms worsen or change.    (R31.9) Hematuria, unspecified type  Comment: Resolving post procedure per urology.  Continuing to follow-up with urology as per protocol.  Plan:     (I10) Essential hypertension, benign  Comment: Stable per patient with no acute changes noted on home checks  Plan:     (E11.9) Type 2 diabetes mellitus without complication, without long-term current use of insulin (H)  Comment:   Lab Results   Component Value Date    A1C 7.8 01/23/2020    A1C 8.0 08/28/2019    A1C 7.5 11/01/2018    A1C 7.2 05/01/2018    A1C Canceled, Test credited 05/01/2018     Plan: Encouraged ongoing of medication compliance in regards to diabetes with recent A1c relatively stable.  Suggest recheck A1c in 6 months    I have reviewed the note as documented above.  This accurately captures the substance of my conversation with the patient.    Phone call contact time  Call Started at 1100  Call Ended at 1115    Olegario Castillo MD

## 2020-03-20 ENCOUNTER — VIRTUAL VISIT (OUTPATIENT)
Dept: UROLOGY | Facility: CLINIC | Age: 75
End: 2020-03-20
Payer: MEDICARE

## 2020-03-20 DIAGNOSIS — R31.0 GROSS HEMATURIA: Primary | ICD-10-CM

## 2020-03-20 NOTE — NURSING NOTE
Called the pt 03/20/20, and 8:56 AM and reviewed their medications, history, and allergies. I then asked that they be in a quiet location with limed distractions so they can hear the provider well.    Call Kenia @ [phone]         Chief Complaint   Patient presents with     Telephone     Hematuria consult        There were no vitals taken for this visit. There is no height or weight on file to calculate BMI.    Patient Active Problem List   Diagnosis     Essential hypertension, benign     Tobacco use disorder     Lumbago     Impotence of organic origin     Advance care planning     Polyp of colon     Sleep related hypoventilation/hypoxemia in other disease     Hyperlipidemia LDL goal <100     Morbid obesity due to excess calories (H)     BPPV (benign paroxysmal positional vertigo), unspecified laterality     Chronic obstructive pulmonary disease, unspecified COPD type (H)     Type 2 diabetes mellitus without complication, without long-term current use of insulin (H)     Grief reaction     S/P transurethral resection of prostate     Hematuria, gross       Allergies   Allergen Reactions     No Known Drug Allergies        Current Outpatient Medications   Medication Sig Dispense Refill     acetaminophen (TYLENOL) 325 MG tablet Take 2 tablets (650 mg) by mouth every 4 hours as needed for mild pain 100 tablet 0     albuterol (VENTOLIN HFA) 108 (90 Base) MCG/ACT inhaler INHALE 2 PUFFS INTO THE LUNGS EVERY 6 HOURS AS NEEDED FOR SHORTNESS OF BREATH / DYSPNEA OR WHEEZING 25.5 g 8     amoxicillin-clavulanate (AUGMENTIN XR 1000 MG) Take 2 tablets by mouth 2 times daily for 7 days 28 tablet 0     Ascorbic Acid (VITAMIN C PO) Take by mouth At Bedtime       aspirin 81 MG tablet Take 1 tablet (81 mg) by mouth daily 30 tablet 11     atorvastatin (LIPITOR) 20 MG tablet Take 1 tablet (20 mg) by mouth daily 90 tablet 3     blood glucose (ACCU-CHEK CHACHA) test strip Use to test blood sugar 2 times daily or as directed. 60 strip 6      blood glucose monitoring (SOFTCLIX) lancets Use to test blood sugar 2 times daily or as directed. 1 Box 6     docusate sodium (COLACE) 100 MG tablet Take 100 mg by mouth 2 times daily To prevent constipation 45 tablet 1     finasteride (PROSCAR) 5 MG tablet TAKE 1 TABLET EVERY DAY 90 tablet 3     fluticasone-salmeterol (ADVAIR) 250-50 MCG/DOSE diskus inhaler Inhale 1 puff into the lungs 2 times daily 3 Inhaler 1     furosemide (LASIX) 20 MG tablet Take 2 tablets (40 mg) by mouth 2 times daily 360 tablet 3     lisinopril (PRINIVIL/ZESTRIL) 40 MG tablet TAKE 1 TABLET (40 MG) BY MOUTH DAILY 90 tablet 3     metFORMIN (GLUCOPHAGE) 500 MG tablet Take 1 tablet (500 mg) by mouth 2 times daily (with meals) 180 tablet 3     metoprolol succinate ER (TOPROL-XL) 25 MG 24 hr tablet Take 1 tablet (25 mg) by mouth daily 30 tablet 11     order for DME Equipment being ordered: diabetic shoes, 1 pair 1 Package 0     order for DME Oxygen 2 Li/min  at night via nasal cannula 1 Device 0     order for DME Equipment delivered; Respironics 760S BIPAP with heated humidification and heated tubing.  Pressure 15/7cm. Respironics Syeda View FF mask (Medium)       sildenafil (VIAGRA) 50 MG tablet Take 1 tablet (50 mg) by mouth daily as needed (erectile dysgfunction, do not use with Flomax or alpha blockers) Or nitro products  ' 10 tablet 1     tamsulosin (FLOMAX) 0.4 MG capsule Take 1 capsule (0.4 mg) by mouth daily 90 capsule 3       Social History     Tobacco Use     Smoking status: Former Smoker     Packs/day: 1.00     Years: 52.00     Pack years: 52.00     Types: Cigarettes     Last attempt to quit: 2013     Years since quittin.8     Smokeless tobacco: Never Used   Substance Use Topics     Alcohol use: No     Alcohol/week: 0.0 standard drinks     Drug use: No       Jemal Cordero, EMT,  3/20/2020  8:56 AM

## 2020-03-20 NOTE — PROGRESS NOTES
"Nahun Villalobos is a 74 year old male who is being evaluated via a billable telephone visit.      The patient has been notified of following:     \"This telephone visit will be conducted via a call between you and your physician/provider. We have found that certain health care needs can be provided without the need for a physical exam.  This service lets us provide the care you need with a short phone conversation.  If a prescription is necessary we can send it directly to your pharmacy.  If lab work is needed we can place an order for that and you can then stop by our lab to have the test done at a later time.    If during the course of the call the physician/provider feels a telephone visit is not appropriate, you will not be charged for this service.\"     Nahun Villalobos complains of    Chief Complaint   Patient presents with     Telephone     Hematuria consult        I have reviewed and updated the patient's Past Medical History, Social History, Family History and Medication List.    ALLERGIES  No known drug allergies    Additional provider notes:   We discussed gross hematuria, 5ARI for same. Also discussed repeat cystoscopy, repeat TUR if persistent. Though we discussed that the timing for this may be significantly delayed.     Assessment/Plan:  1. Gross hematuria    - finasteride (PROSCAR) 5 MG tablet; Take 1 tablet (5 mg) by mouth daily  Dispense: 90 tablet; Refill: 3  Follow up in six weeks for uroflow/PVR and to review symptoms     Phone call duration: 15 minutes    RIANNA JOHNSTON       "

## 2020-03-22 ENCOUNTER — TELEPHONE (OUTPATIENT)
Dept: NURSING | Facility: CLINIC | Age: 75
End: 2020-03-22

## 2020-03-22 ENCOUNTER — NURSE TRIAGE (OUTPATIENT)
Dept: NURSING | Facility: CLINIC | Age: 75
End: 2020-03-22

## 2020-03-22 ENCOUNTER — TELEPHONE (OUTPATIENT)
Dept: UROLOGY | Facility: CLINIC | Age: 75
End: 2020-03-22

## 2020-03-22 DIAGNOSIS — N13.8 BENIGN PROSTATIC HYPERPLASIA WITH URINARY OBSTRUCTION: Primary | ICD-10-CM

## 2020-03-22 DIAGNOSIS — N40.1 BENIGN PROSTATIC HYPERPLASIA WITH URINARY OBSTRUCTION: Primary | ICD-10-CM

## 2020-03-22 RX ORDER — FINASTERIDE 5 MG/1
5 TABLET, FILM COATED ORAL DAILY
Qty: 30 TABLET | Refills: 11 | Status: SHIPPED | OUTPATIENT
Start: 2020-03-22 | End: 2020-06-11

## 2020-03-22 NOTE — TELEPHONE ENCOUNTER
"Telephone Encounter    Date: 3:37 PM; 3/22/2020  Patient Name: Nahun Villalobos  MRN: [id number]    ADDENDUM:  The patient's wife called back a few hours after his original phone call stating his hematuria has worsened over the last few hours. He still feels like he is able to empty his bladder completely, however he is now passing clots.     I instructed the patient to present to the ED for evaluation if he is unable to void or if he believes his hematuria has worsened significantly. He may require catheterization and/or bladder irrigation should a significant clot burden be present. He would like to wait until he voids 1 more time before deciding whether or not to be evaluated as he has significant concerns regarding exposure to COVID-19 in the ER.     ----------------------------------------------------------------    Nahun Villalobos is 74 year old year old male with a history of BPH s/p TURP 4/2018 and recurrent episodes of gross hematuria (s/p negative workup a few weeks ago- only notable for prostate regrowth), calls today reporting intermittent gross hematuria over the weekend. According to the patient he had a telephone visit with Dr. Choi on Friday 3/20/2020 and they had discussed starting a medication to \"shrink the prostate\" that may help with bleeding (documentation of this visit is not currently available in Epic). He states that he contacted his pharmacy this weekend and wonders if we could write this Rx for him.    Despite intermittent episodes of GH, the patient is doing well. He feels as though he is able to empty his bladder fully. No fevers or chills.     Plans:  - Discussed with the patient that I believe the medication he is referring to is Finasteride. This is currently listed on his active medication list, however the patient states he does not take this. We did review side-effects of the medication (specifically related to mood and sexual dysfunction). I also reiterated that this " medication takes weeks-to-months to see an effect.   - I sent a Rx for Finasteride, 5 mg to the patient's local pharmacy. I will copy Dr. Choi on this note to confirm that this was the medication discussed with the patient at his recent telephone visit.  - Patient advised that because this is an encounter performed over the telephone, I am not able to perform a physical exam and thus any advice given is limited in nature and may not be completely informed.  The patient accepts this risk and if they have concerns with it they should be seen and evaluated in person by a medical professional.   - Discussed strict return precautions, including but not limited to: fevers > 101.4, chills, an inability to keep food or fluids down, increasing hematuria, and an inability to urinate.  - Patient expressed understanding and was in agreement with plan.    --    Marily Echeverria MD  PGY-2 Urology  Pager [phone]

## 2020-03-22 NOTE — TELEPHONE ENCOUNTER
"\"I was in the hospital on 3/16(see epic) for blood in my urine and diverticulitis. On 3/20 I called Dr. Burris's office to request a medication they were talking about that was suppose to shrink the prostate and help me go. I haven't heard back from anyone. I am still having blood in my urine. All day today in the beginning there are a few blood clots when I go, then in the middle there was a large blood clot , then a few smaller ones at the end of peeing. I have \"minor faint\" pain and burning. The whole toilet bowl water is red.\" Denies fever or flank pain.Triaged and paged on call resident MD ([facility]) through page op at 3:15 pm to call patient at [phone]. Advised to call back if you do not hear within 30 minutes or if sx worsen. Spoke to page op Milagros who will page resident, there is no on call MD listed for this group. If this page does not go through and we need to re page, she advised to call [facility] number [phone] and reference job code [email or web address].  Danya Robert RN [facility]        Reason for Disposition    Patient sounds very sick or weak to the triager    Additional Information    Negative: Urinary catheter, questions about    Negative: Recent back or abdominal injury    Negative: Recent genital injury    Negative: [1] Unable to urinate (or only a few drops) > 4 hours AND [2] bladder feels very full (e.g., palpable bladder or strong urge to urinate)    Negative: Passing pure blood or large blood clots (i.e., size > a dime) (Exception: margaret or small strands)    Negative: Fever > 100.5 F (38.1 C)    Protocols used: URINE - BLOOD IN-A-AH      "

## 2020-03-22 NOTE — TELEPHONE ENCOUNTER
Patient with [facility] calling. They had a telephone visit with Dr. Burris on Friday and were expecting a new prescription to be at their pharmacy. Unable to find this information in EMR, no new prescriptions. Advised to call the Urology clinic in the morning.  Hannah Tatum RN  [facility]

## 2020-03-24 RX ORDER — FINASTERIDE 5 MG/1
5 TABLET, FILM COATED ORAL DAILY
Qty: 90 TABLET | Refills: 3 | Status: SHIPPED | OUTPATIENT
Start: 2020-03-24 | End: 2021-06-18

## 2020-05-16 DIAGNOSIS — J44.9 CHRONIC OBSTRUCTIVE PULMONARY DISEASE, UNSPECIFIED COPD TYPE (H): Chronic | ICD-10-CM

## 2020-05-17 NOTE — TELEPHONE ENCOUNTER
Pharmacy Comments    Patient is requesting a new Rx for albuterol (VENTOLIN HFA) 108 (90 Base) MCG/ACT inhaler . States Proair does not work as well. Thanks.

## 2020-05-18 RX ORDER — ALBUTEROL SULFATE 90 UG/1
AEROSOL, METERED RESPIRATORY (INHALATION)
Qty: 25.5 G | Refills: 2 | Status: SHIPPED | OUTPATIENT
Start: 2020-05-18 | End: 2023-04-17

## 2020-06-09 ENCOUNTER — TRANSFERRED RECORDS (OUTPATIENT)
Dept: HEALTH INFORMATION MANAGEMENT | Facility: CLINIC | Age: 75
End: 2020-06-09

## 2020-06-11 ENCOUNTER — VIRTUAL VISIT (OUTPATIENT)
Dept: INTERNAL MEDICINE | Facility: CLINIC | Age: 75
End: 2020-06-11
Payer: MEDICARE

## 2020-06-11 VITALS — BODY MASS INDEX: 39.24 KG/M2 | WEIGHT: 250 LBS | HEIGHT: 67 IN

## 2020-06-11 DIAGNOSIS — E11.9 TYPE 2 DIABETES MELLITUS WITHOUT COMPLICATION, WITHOUT LONG-TERM CURRENT USE OF INSULIN (H): Primary | ICD-10-CM

## 2020-06-11 DIAGNOSIS — R06.00 DYSPNEA, UNSPECIFIED TYPE: ICD-10-CM

## 2020-06-11 DIAGNOSIS — J44.9 CHRONIC OBSTRUCTIVE PULMONARY DISEASE, UNSPECIFIED COPD TYPE (H): Chronic | ICD-10-CM

## 2020-06-11 PROCEDURE — 99442 ZZC PHYSICIAN TELEPHONE EVALUATION 11-20 MIN: CPT | Performed by: INTERNAL MEDICINE

## 2020-06-11 ASSESSMENT — MIFFLIN-ST. JEOR: SCORE: 1827.62

## 2020-06-11 NOTE — PROGRESS NOTES
"Nahun Villalobos is a 74 year old male who is being evaluated via a billable telephone visit.      The patient has been notified of following:     \"This telephone visit will be conducted via a call between you and your physician/provider. We have found that certain health care needs can be provided without the need for a physical exam.  This service lets us provide the care you need with a short phone conversation.  If a prescription is necessary we can send it directly to your pharmacy.  If lab work is needed we can place an order for that and you can then stop by our lab to have the test done at a later time.    Telephone visits are billed at different rates depending on your insurance coverage. During this emergency period, for some insurers they may be billed the same as an in-person visit.  Please reach out to your insurance provider with any questions.    If during the course of the call the physician/provider feels a telephone visit is not appropriate, you will not be charged for this service.\"    Patient has given verbal consent for Telephone visit?  Yes    What phone number would you like to be contacted at? [phone]    How would you like to obtain your AVS? Mail a copy    Subjective     Nahun Villalobos is a 74 year old male who presents via phone visit today for the following health issues:    HPI  Pt mentioned that since his lung surgery 5 years ago he has been having issues with his breathing. Pt does use the albuterol inhaler which, seems to help but, that is supposed to be used as a rescue inhaler. Pt mentioned that the Advair does not work and that the rescue inhaler works better then the Advair. So pt is wondering if he could get something that works better than the Advair.     Patient states he feels his breathing is been somewhat worse over the last 2 to 3 months.  It is primarily noted with exertion and not associated distinctly with any chest discomfort, back pain, arm pain, nausea, " vomiting or wheezing.      Patient Active Problem List   Diagnosis     Essential hypertension, benign     Tobacco use disorder     Lumbago     Impotence of organic origin     Advance care planning     Polyp of colon     Sleep related hypoventilation/hypoxemia in other disease     Hyperlipidemia LDL goal <100     Morbid obesity due to excess calories (H)     BPPV (benign paroxysmal positional vertigo), unspecified laterality     Chronic obstructive pulmonary disease, unspecified COPD type (H)     Type 2 diabetes mellitus without complication, without long-term current use of insulin (H)     Grief reaction     S/P transurethral resection of prostate     Hematuria, gross     Past Surgical History:   Procedure Laterality Date     APPENDECTOMY       C NONSPECIFIC PROCEDURE      cholecystectomy     CHOLECYSTECTOMY       CYSTOSCOPY, TRANSURETHRAL RESECTION (TUR) PROSTATE, COMBINED N/A 2018    Procedure: COMBINED CYSTOSCOPY, TRANSURETHRAL RESECTION (TUR) PROSTATE;  Cystoscopy, Transurethral Resection Of The Prostate;  Surgeon: Praveena Burris MD;  Location: [facility] OR     WEDGE RESECTION      lung       Social History     Tobacco Use     Smoking status: Former Smoker     Packs/day: 1.00     Years: 52.00     Pack years: 52.00     Types: Cigarettes     Last attempt to quit: 2013     Years since quittin.0     Smokeless tobacco: Never Used   Substance Use Topics     Alcohol use: No     Alcohol/week: 0.0 standard drinks     Family History   Problem Relation Age of Onset     Hypertension Mother      C.A.D. Mother         ANEURYSM     Cancer - colorectal Mother      Cancer Mother         OVARIAN     Hypertension Sister      Breast Cancer Sister      Cerebrovascular Disease Father      Glaucoma No family hx of      Macular Degeneration No family hx of          Current Outpatient Medications   Medication Sig Dispense Refill     acetaminophen (TYLENOL) 325 MG tablet Take 2 tablets (650 mg) by mouth every 4 hours as  needed for mild pain 100 tablet 0     albuterol (VENTOLIN HFA) 108 (90 Base) MCG/ACT inhaler INHALE 2 PUFFS INTO THE LUNGS EVERY 6 HOURS AS NEEDED FOR SHORTNESS OF BREATH / DYSPNEA OR WHEEZING 25.5 g 2     Ascorbic Acid (VITAMIN C PO) Take by mouth At Bedtime       aspirin 81 MG tablet Take 1 tablet (81 mg) by mouth daily 30 tablet 11     atorvastatin (LIPITOR) 20 MG tablet Take 1 tablet (20 mg) by mouth daily 90 tablet 3     blood glucose (ACCU-CHEK CHACHA) test strip Use to test blood sugar 2 times daily or as directed. 60 strip 6     blood glucose monitoring (SOFTCLIX) lancets Use to test blood sugar 2 times daily or as directed. 1 Box 6     docusate sodium (COLACE) 100 MG tablet Take 100 mg by mouth 2 times daily To prevent constipation 45 tablet 1     finasteride (PROSCAR) 5 MG tablet Take 1 tablet (5 mg) by mouth daily 90 tablet 3     fluticasone-salmeterol (ADVAIR) 250-50 MCG/DOSE diskus inhaler Inhale 1 puff into the lungs 2 times daily 3 Inhaler 1     furosemide (LASIX) 20 MG tablet Take 2 tablets (40 mg) by mouth 2 times daily 360 tablet 3     lisinopril (PRINIVIL/ZESTRIL) 40 MG tablet TAKE 1 TABLET (40 MG) BY MOUTH DAILY 90 tablet 3     metFORMIN (GLUCOPHAGE) 500 MG tablet Take 1 tablet (500 mg) by mouth 2 times daily (with meals) 180 tablet 3     metoprolol succinate ER (TOPROL-XL) 25 MG 24 hr tablet Take 1 tablet (25 mg) by mouth daily 30 tablet 11     order for DME Equipment being ordered: diabetic shoes, 1 pair 1 Package 0     order for DME Oxygen 2 Li/min  at night via nasal cannula 1 Device 0     order for DME Equipment delivered; Respironics 760S BIPAP with heated humidification and heated tubing.  Pressure 15/7cm. Respironics Syeda View FF mask (Medium)       sildenafil (VIAGRA) 50 MG tablet Take 1 tablet (50 mg) by mouth daily as needed (erectile dysgfunction, do not use with Flomax or alpha blockers) Or nitro products  ' 10 tablet 1     tamsulosin (FLOMAX) 0.4 MG capsule Take 1 capsule (0.4 mg)  "by mouth daily 90 capsule 3     Allergies   Allergen Reactions     No Known Drug Allergies      BP Readings from Last 3 Encounters:   03/16/20 (!) 166/82   02/28/20 (!) 159/95   01/23/20 (!) 158/94    Wt Readings from Last 3 Encounters:   06/11/20 113.4 kg (250 lb)   03/16/20 113.4 kg (250 lb)   01/23/20 113.9 kg (251 lb 1.6 oz)           Reviewed and updated as needed this visit by Provider         Review of Systems   CONSTITUTIONAL: NEGATIVE for fever, chills, change in weight  EYES: NEGATIVE for vision changes or irritation  ENT/MOUTH: NEGATIVE for ear, mouth and throat problems  CV: NEGATIVE for chest pain, palpitations or peripheral edema  GI: NEGATIVE for nausea, abdominal pain, heartburn, or change in bowel habits  : NEGATIVE for frequency, dysuria, or hematuria  MUSCULOSKELETAL: NEGATIVE for significant arthralgias or myalgia  NEURO: NEGATIVE for weakness, dizziness or paresthesias  ENDOCRINE: NEGATIVE for temperature intolerance, skin/hair changes  HEME: NEGATIVE for bleeding problems  PSYCHIATRIC: NEGATIVE for changes in mood or affect       Objective   Reported vitals:  Ht 1.702 m (5' 7\")   Wt 113.4 kg (250 lb)   BMI 39.16 kg/m     alert and no distress  PSYCH: Alert and oriented times 3; coherent speech, normal   rate and volume, able to articulate logical thoughts, able   to abstract reason, no tangential thoughts, no hallucinations   or delusions  His affect is normal  RESP: No cough, no audible wheezing, able to talk in full sentences  Remainder of exam unable to be completed due to telephone visits          Assessment/Plan:    1. Dyspnea, unspecified type  Discussed with patient that symptoms may be consistent with his lung disease but also I am concerned about potential cardiac equivalent.  Suggest echocardiogram and nuclear stress test for reassurance.  Advised patient that if his symptoms worsen or become more prominent prior to testing he should make sure that he follows up immediately.  - " Echocardiogram Complete; Future  - NM MPI Treadmill; Future    2. Chronic obstructive pulmonary disease, unspecified COPD type (H)  Discussed with patient potential benefits of trying Spiriva but he is unsure he has insurance coverage and will check    3. Type 2 diabetes mellitus without complication, without long-term current use of insulin (H)  Labs ordered as fasting per routine  - Hemoglobin A1c; Future    Lab Results   Component Value Date    A1C 7.8 01/23/2020    A1C 8.0 08/28/2019    A1C 7.5 11/01/2018    A1C 7.2 05/01/2018    A1C Canceled, Test credited 05/01/2018     LDL Cholesterol Calculated   Date Value Ref Range Status   08/28/2019 44 <100 mg/dL Final     Comment:     Desirable:       <100 mg/dl     Lab Results   Component Value Date    ALT 20 08/28/2019       Return in about 1 week (around 6/18/2020) for diagnostic test as ordered, Lab Work appointment.      Phone call duration:  14 minutes    Olegario Castillo MD

## 2020-06-18 DIAGNOSIS — E11.9 TYPE 2 DIABETES MELLITUS WITHOUT COMPLICATION, WITHOUT LONG-TERM CURRENT USE OF INSULIN (H): ICD-10-CM

## 2020-06-18 LAB — HBA1C MFR BLD: 7.6 % (ref 0–5.6)

## 2020-06-18 PROCEDURE — 83036 HEMOGLOBIN GLYCOSYLATED A1C: CPT | Performed by: INTERNAL MEDICINE

## 2020-06-18 PROCEDURE — 36415 COLL VENOUS BLD VENIPUNCTURE: CPT | Performed by: INTERNAL MEDICINE

## 2020-06-27 ENCOUNTER — NURSE TRIAGE (OUTPATIENT)
Dept: NURSING | Facility: CLINIC | Age: 75
End: 2020-06-27

## 2020-06-28 NOTE — TELEPHONE ENCOUNTER
Patient friend called, verbal consent given from patient to talk to [facility].     Diarrhea, chest was tightening up when breathing.      History of cancer 5 years ago.  Has been harder to breathe since.  Lung MD told him last month that everything looks good.     Primary took blood test and wants to do some heart testing.     Currently has runny and possibly some diarrhea. Breathing difficulty this morning but feels good now.     No fever. Had approx. 2 diarrhea bowel movements today.     Home care advice given per protocol.     Kelli Beltran RN/[facility]    COVID 19 Nurse Triage Plan/Patient Instructions    Please be aware that novel coronavirus (COVID-19) may be circulating in the community. If you develop symptoms such as fever, cough, or SOB or if you have concerns about the presence of another infection including coronavirus (COVID-19), please contact your health care provider or visit [email or web address].     Disposition/Instructions    Patient to stay at home and follow home care protocol based instructions.     Thank you for taking steps to prevent the spread of this virus.  o Limit your contact with others.  o Wear a simple mask to cover your cough.  o Wash your hands well and often.    Resources    [facility]: About COVID-19: [email or web address]    CDC: What to Do If You're Sick: www.cdc.gov/coronavirus/2019-ncov/about/steps-when-sick.html    CDC: Ending Home Isolation: www.cdc.gov/coronavirus/2019-ncov/hcp/disposition-in-home-patients.html     CDC: Caring for Someone: www.cdc.gov/coronavirus/2019-ncov/if-you-are-sick/care-for-someone.html     [facility]: Interim Guidance for Hospital Discharge to Home: www.health.[facility].[place]/diseases/coronavirus/hcp/hospdischarge.pdf    [facility] clinical trials (COVID-19 research studies): clinicalaffairs.[facility]/umn-clinical-trials     Below are the COVID-19 hotlines at the [place] Department of Health ([facility]). Interpreters are  available.   o For health questions: Call 746-678-4320 or 1-695.868.1147 (7 a.m. to 7 p.m.)  o For questions about schools and childcare: Call 388-452-2772 or 1-104.962.1610 (7 a.m. to 7 p.m.)     Additional Information    Negative: Shock suspected (e.g., cold/pale/clammy skin, too weak to stand, low BP, rapid pulse)    Negative: Difficult to awaken or acting confused (e.g., disoriented, slurred speech)    Negative: Sounds like a life-threatening emergency to the triager    Negative: Vomiting also present and worse than the diarrhea    Negative: [1] Blood in stool AND [2] without diarrhea    Negative: Diarrhea in a cancer patient who is currently (or recently) receiving chemotherapy or radiation therapy, or cancer patient who has metastatic or end-stage cancer and is receiving palliative care    Negative: [1] SEVERE abdominal pain (e.g., excruciating) AND [2] present > 1 hour    Negative: [1] SEVERE abdominal pain AND [2] age > 60    Negative: [1] Blood in the stool AND [2] moderate or large amount of blood    Negative: Black or tarry bowel movements  (Exception: chronic-unchanged  black-grey bowel movements AND is taking iron pills or Pepto-bismol)    Negative: [1] Drinking very little AND [2] dehydration suspected (e.g., no urine > 12 hours, very dry mouth, very lightheaded)    Negative: Patient sounds very sick or weak to the triager    Negative: [1] SEVERE diarrhea (e.g., 7 or more times / day more than normal) AND [2] age > 60 years    Negative: [1] Constant abdominal pain AND [2] present > 2 hours    Negative: [1] Fever > 103 F (39.4 C) AND [2] not able to get the fever down using Fever Care Advice    Negative: [1] SEVERE diarrhea (e.g., 7 or more times / day more than normal) AND [2] present > 24 hours (1 day)    Negative: [1] MODERATE diarrhea (e.g., 4-6 times / day more than normal) AND [2] present > 48 hours (2 days)    Negative: [1] MODERATE diarrhea (e.g., 4-6 times / day more than normal) AND [2] age >  70 years    Negative: Fever > 101 F (38.3 C)    Negative: Fever present > 3 days (72 hours)    Negative: Abdominal pain  (Exception: Pain clears with each passage of diarrhea stool)    Negative: [1] Blood in the stool AND [2] small amount of blood   (Exception: only on toilet paper. Reason: diarrhea can cause rectal irritation with blood on wiping)    Negative: [1] Mucus or pus in stool AND [2] present > 2 days AND [3] diarrhea is more than mild    Negative: [1] Recent antibiotic therapy (i.e., within last 2 months) AND [2] diarrhea present > 3 days since antibiotic was stopped    Negative: [1] Recent hospitalization AND [2] diarrhea present > 3 days    Negative: Weak immune system (e.g., HIV positive, cancer chemo, splenectomy, organ transplant, chronic steroids)    Negative: Tube feedings (e.g., nasogastric, g-tube, j-tube)    Negative: Travel to a foreign country in past month    Negative: [1] MILD diarrhea (e.g., 1-3 or more stools than normal in past 24 hours) without known cause AND [2] present >  7 days    Negative: Diarrhea is a chronic symptom (recurrent or ongoing AND present > 4 weeks)    Negative: SEVERE diarrhea (e.g., 7 or more times / day more than normal)    MILD-MODERATE diarrhea (e.g., 1-6 times / day more than normal)    Protocols used: DIARRHEA-A-

## 2020-07-01 ENCOUNTER — PRE VISIT (OUTPATIENT)
Dept: UROLOGY | Facility: CLINIC | Age: 75
End: 2020-07-01

## 2020-07-01 NOTE — TELEPHONE ENCOUNTER
Chief Complaint : Return-Phone    Hx/Sx: Retention/Hematuria     Records/Orders: Available     Pt Contacted: n/a    At Rooming:Check In- [phone]   Call [facility] @ [phone]

## 2020-07-10 ENCOUNTER — VIRTUAL VISIT (OUTPATIENT)
Dept: UROLOGY | Facility: CLINIC | Age: 75
End: 2020-07-10
Payer: MEDICARE

## 2020-07-10 ENCOUNTER — TELEPHONE (OUTPATIENT)
Dept: CARDIOLOGY | Facility: CLINIC | Age: 75
End: 2020-07-10

## 2020-07-10 DIAGNOSIS — R31.0 GROSS HEMATURIA: Primary | ICD-10-CM

## 2020-07-10 NOTE — PROGRESS NOTES
"Nahun Villalobos is a 74 year old male who is being evaluated via a billable video visit.      THE VIDEO WILL BE CONDUCTED OVER: Email: [email or web address]    PLEASE CALL PT TO LET HIM KNOW YOU ARE SENDING THE EMAIL [phone]      The patient has been notified of following:     \"This video visit will be conducted via a call between you and your physician/provider. We have found that certain health care needs can be provided without the need for an in-person physical exam.  This service lets us provide the care you need with a video conversation.  If a prescription is necessary we can send it directly to your pharmacy.  If lab work is needed we can place an order for that and you can then stop by our lab to have the test done at a later time.    Video visits are billed at different rates depending on your insurance coverage.  Please reach out to your insurance provider with any questions.    If during the course of the call the physician/provider feels a video visit is not appropriate, you will not be charged for this service.\"    Patient has given verbal consent for Video visit? Yes    How would you like to obtain your AVS? Mail a copy    Patient would like the video invitation sent by: Send to e-mail at: [email or web address]    Video Start Time: 10:32 AM  Changed to phone call due to connectivity issues   Finished phone call at 10:53am  Additional provider notes:    Patient with history of TURP with improvement but patient had hematuria recently.   Has been taking finasteride with fewer episodes of gross hematuria (work up negative apart from some residual prostate tissue).   Encouraged patient to have repeat cystoscopy but he would like to defer at present   Continue finasteride   Follow up with uroflow/PVR in six months       Video-Visit Details    Type of service:  Video Visit      Originating Location (pt. Location): Home    Distant Location (provider location):  [facility] UROLOGY AND [facility] [[email or web address] FOR PROSTATE AND " UROLOGIC CANCERS     Mode of Communication:  Video Conference via [facility]      Praveena Burris MD

## 2020-07-10 NOTE — LETTER
"7/10/2020       RE: Nahun Villalobos  [address]  [facility] [address]     Dear Colleague,    Thank you for referring your patient, Nahun Villalobos, to the [facility] UROLOGY AND INST FOR PROSTATE AND UROLOGIC CANCERS at [facility]. Please see a copy of my visit note below.    Nahun Villalobos is a 74 year old male who is being evaluated via a billable video visit.      THE VIDEO WILL BE CONDUCTED OVER: Email: [email or web address]    PLEASE CALL PT TO LET HIM KNOW YOU ARE SENDING THE EMAIL [phone]      The patient has been notified of following:     \"This video visit will be conducted via a call between you and your physician/provider. We have found that certain health care needs can be provided without the need for an in-person physical exam.  This service lets us provide the care you need with a video conversation.  If a prescription is necessary we can send it directly to your pharmacy.  If lab work is needed we can place an order for that and you can then stop by our lab to have the test done at a later time.    Video visits are billed at different rates depending on your insurance coverage.  Please reach out to your insurance provider with any questions.    If during the course of the call the physician/provider feels a video visit is not appropriate, you will not be charged for this service.\"    Patient has given verbal consent for Video visit? Yes    How would you like to obtain your AVS? Mail a copy    Patient would like the video invitation sent by: Send to e-mail at: [email or web address]    Video Start Time: 10:32 AM  Changed to phone call due to connectivity issues   Finished phone call at 10:53am  Additional provider notes:    Patient with history of TURP with improvement but patient had hematuria recently.   Has been taking finasteride with fewer episodes of gross hematuria (work up negative apart from some residual prostate tissue).   Encouraged patient to " have repeat cystoscopy but he would like to defer at present   Continue finasteride   Follow up with uroflow/PVR in six months       Video-Visit Details    Type of service:  Video Visit      Originating Location (pt. Location): Home    Distant Location (provider location):  [facility] UROLOGY AND [facility] FOR PROSTATE AND UROLOGIC CANCERS     Mode of Communication:  Video Conference via Zoomph      Praveena Burris MD

## 2020-07-10 NOTE — PATIENT INSTRUCTIONS
Please follow up in 6 months with Michelle with PSA and flow/PVR      It was a pleasure meeting with you today.  Thank you for allowing me and my team the privilege of caring for you today.  YOU are the reason we are here, and I truly hope we provided you with the excellent service you deserve.  Please let us know if there is anything else we can do for you so that we can be sure you are leaving completely satisfied with your care experience.

## 2020-07-10 NOTE — NURSING NOTE
Garnica GIANNI Wilfredo has given verbal permission for a video visit 07/10/20 8:50 AM and would like to be reached at  [email or web address] d    PLEASE CALL PT TO TELL HIM YOU SENT THE EMAIL [phone]      Called the pt 07/10/20, and 8:50 AM and reviewed their medications, history, and allergies. I then asked that they be in a quiet location with minimal distractions so they can hear the provider well.    Chief Complaint   Patient presents with     Video Visit     Retention/Hematuria        There were no vitals taken for this visit. There is no height or weight on file to calculate BMI.    Patient Active Problem List   Diagnosis     Essential hypertension, benign     Tobacco use disorder     Lumbago     Impotence of organic origin     Advance care planning     Polyp of colon     Sleep related hypoventilation/hypoxemia in other disease     Hyperlipidemia LDL goal <100     Morbid obesity due to excess calories (H)     BPPV (benign paroxysmal positional vertigo), unspecified laterality     Chronic obstructive pulmonary disease, unspecified COPD type (H)     Type 2 diabetes mellitus without complication, without long-term current use of insulin (H)     Grief reaction     S/P transurethral resection of prostate     Hematuria, gross       Allergies   Allergen Reactions     No Known Drug Allergies        Current Outpatient Medications   Medication Sig Dispense Refill     acetaminophen (TYLENOL) 325 MG tablet Take 2 tablets (650 mg) by mouth every 4 hours as needed for mild pain 100 tablet 0     albuterol (VENTOLIN HFA) 108 (90 Base) MCG/ACT inhaler INHALE 2 PUFFS INTO THE LUNGS EVERY 6 HOURS AS NEEDED FOR SHORTNESS OF BREATH / DYSPNEA OR WHEEZING 25.5 g 2     Ascorbic Acid (VITAMIN C PO) Take by mouth At Bedtime       aspirin 81 MG tablet Take 1 tablet (81 mg) by mouth daily 30 tablet 11     atorvastatin (LIPITOR) 20 MG tablet Take 1 tablet (20 mg) by mouth daily 90 tablet 3     blood glucose (ACCU-CHEK CHACHA) test strip Use  to test blood sugar 2 times daily or as directed. 60 strip 6     blood glucose monitoring (SOFTCLIX) lancets Use to test blood sugar 2 times daily or as directed. 1 Box 6     docusate sodium (COLACE) 100 MG tablet Take 100 mg by mouth 2 times daily To prevent constipation 45 tablet 1     finasteride (PROSCAR) 5 MG tablet Take 1 tablet (5 mg) by mouth daily 90 tablet 3     fluticasone-salmeterol (ADVAIR) 250-50 MCG/DOSE diskus inhaler Inhale 1 puff into the lungs 2 times daily 3 Inhaler 1     furosemide (LASIX) 20 MG tablet Take 2 tablets (40 mg) by mouth 2 times daily 360 tablet 3     lisinopril (PRINIVIL/ZESTRIL) 40 MG tablet TAKE 1 TABLET (40 MG) BY MOUTH DAILY 90 tablet 3     metFORMIN (GLUCOPHAGE) 500 MG tablet Take 1 tablet (500 mg) by mouth 2 times daily (with meals) 180 tablet 3     metoprolol succinate ER (TOPROL-XL) 25 MG 24 hr tablet Take 1 tablet (25 mg) by mouth daily 30 tablet 11     order for DME Equipment being ordered: diabetic shoes, 1 pair 1 Package 0     order for DME Oxygen 2 Li/min  at night via nasal cannula 1 Device 0     order for DME Equipment delivered; Respironics 760S BIPAP with heated humidification and heated tubing.  Pressure 15/7cm. Respironics Syeda View FF mask (Medium)       sildenafil (VIAGRA) 50 MG tablet Take 1 tablet (50 mg) by mouth daily as needed (erectile dysgfunction, do not use with Flomax or alpha blockers) Or nitro products  ' 10 tablet 1     tamsulosin (FLOMAX) 0.4 MG capsule Take 1 capsule (0.4 mg) by mouth daily 90 capsule 3       Social History     Tobacco Use     Smoking status: Former Smoker     Packs/day: 1.00     Years: 52.00     Pack years: 52.00     Types: Cigarettes     Last attempt to quit: 2013     Years since quittin.1     Smokeless tobacco: Never Used   Substance Use Topics     Alcohol use: No     Alcohol/week: 0.0 standard drinks     Drug use: No       RADHA Ahmadi,  7/10/2020  8:50 AM

## 2020-07-10 NOTE — TELEPHONE ENCOUNTER
PATIENT WELLNESS TELEPHONE SCREENING     Step 1 Screening Questions    In the past 3 weeks, have you been exposed to someone with a suspected or known illness?  COVID-19? No  Chickenpox? No   Measles? No  Pertussis? No    In the past 2 weeks, have you had any of the following symptoms?   Fever/Chills? No   Cough? No   Shortness of breath? No   New loss of taste or smell? No  Sore throat? No  Muscle or body aches? No  Headaches? No  Fatigue? No  Vomiting or diarrhea? No    Step 2 Screening Results (Skip if the patient is negative for symptoms)    If the patient is positive for new or worsening symptoms, contact the ordering provider to determine if the procedure is deemed necessary. Determine if patient can be re-scheduled when the patient is symptom free or has a negative COVID test.     If ordering provider deems the procedure is necessary, notify your manager/supervisor. Provide the patient with the procedural department phone number and inform the patient to call the procedural department upon arrival.  The patient will be registered over the phone.    Step 3 Review Visitor Policy  Patient informed of the updated visitor policy   1 visitor allowed per patient   Visitor must screen negative for COVID symptoms   Visitor must wear a mask    JONATHAN CORRIGAN

## 2020-07-13 ENCOUNTER — HOSPITAL ENCOUNTER (OUTPATIENT)
Dept: CARDIOLOGY | Facility: CLINIC | Age: 75
Discharge: HOME | End: 2020-07-13
Attending: INTERNAL MEDICINE | Admitting: INTERNAL MEDICINE
Payer: MEDICARE

## 2020-07-13 ENCOUNTER — TELEPHONE (OUTPATIENT)
Dept: UROLOGY | Facility: CLINIC | Age: 75
End: 2020-07-13

## 2020-07-13 VITALS — DIASTOLIC BLOOD PRESSURE: 68 MMHG | SYSTOLIC BLOOD PRESSURE: 130 MMHG

## 2020-07-13 DIAGNOSIS — R06.00 DYSPNEA, UNSPECIFIED TYPE: ICD-10-CM

## 2020-07-13 LAB
CV STRESS MAX HR HE: 90
RATE PRESSURE PRODUCT: NORMAL
STRESS ECHO BASELINE DIASTOLIC HE: 68
STRESS ECHO BASELINE HR: 78
STRESS ECHO BASELINE SYSTOLIC BP: 130
STRESS ECHO CALCULATED PERCENT HR: 62 %
STRESS ECHO LAST STRESS DIASTOLIC BP: 68
STRESS ECHO LAST STRESS SYSTOLIC BP: 150
STRESS ECHO TARGET HR: 145

## 2020-07-13 PROCEDURE — 25500064 ZZH RX 255 OP 636: Performed by: INTERNAL MEDICINE

## 2020-07-13 PROCEDURE — 78452 HT MUSCLE IMAGE SPECT MULT: CPT | Mod: 26 | Performed by: INTERNAL MEDICINE

## 2020-07-13 PROCEDURE — 93306 TTE W/DOPPLER COMPLETE: CPT

## 2020-07-13 PROCEDURE — 93306 TTE W/DOPPLER COMPLETE: CPT | Mod: 26 | Performed by: INTERNAL MEDICINE

## 2020-07-13 PROCEDURE — 93018 CV STRESS TEST I&R ONLY: CPT | Performed by: INTERNAL MEDICINE

## 2020-07-13 PROCEDURE — 93017 CV STRESS TEST TRACING ONLY: CPT

## 2020-07-13 PROCEDURE — A9502 TC99M TETROFOSMIN: HCPCS | Performed by: INTERNAL MEDICINE

## 2020-07-13 PROCEDURE — 34300033 ZZH RX 343: Performed by: INTERNAL MEDICINE

## 2020-07-13 PROCEDURE — 25000128 H RX IP 250 OP 636: Performed by: INTERNAL MEDICINE

## 2020-07-13 PROCEDURE — 93016 CV STRESS TEST SUPVJ ONLY: CPT | Performed by: INTERNAL MEDICINE

## 2020-07-13 RX ORDER — ALBUTEROL SULFATE 90 UG/1
2 AEROSOL, METERED RESPIRATORY (INHALATION) EVERY 5 MIN PRN
Status: DISCONTINUED | OUTPATIENT
Start: 2020-07-13 | End: 2020-07-14 | Stop reason: HOSPADM

## 2020-07-13 RX ORDER — AMINOPHYLLINE 25 MG/ML
50-100 INJECTION, SOLUTION INTRAVENOUS
Status: DISCONTINUED | OUTPATIENT
Start: 2020-07-13 | End: 2020-07-14 | Stop reason: HOSPADM

## 2020-07-13 RX ORDER — ACYCLOVIR 200 MG/1
0-1 CAPSULE ORAL
Status: DISCONTINUED | OUTPATIENT
Start: 2020-07-13 | End: 2020-07-14 | Stop reason: HOSPADM

## 2020-07-13 RX ORDER — REGADENOSON 0.08 MG/ML
0.4 INJECTION, SOLUTION INTRAVENOUS ONCE
Status: COMPLETED | OUTPATIENT
Start: 2020-07-13 | End: 2020-07-13

## 2020-07-13 RX ADMIN — HUMAN ALBUMIN MICROSPHERES AND PERFLUTREN 9 ML: 10; .22 INJECTION, SOLUTION INTRAVENOUS at 09:01

## 2020-07-13 RX ADMIN — REGADENOSON 0.4 MG: 0.08 INJECTION, SOLUTION INTRAVENOUS at 10:38

## 2020-07-13 RX ADMIN — TETROFOSMIN 6.14 MCI.: 1.38 INJECTION, POWDER, LYOPHILIZED, FOR SOLUTION INTRAVENOUS at 08:42

## 2020-07-13 RX ADMIN — TETROFOSMIN 18.55 MCI.: 1.38 INJECTION, POWDER, LYOPHILIZED, FOR SOLUTION INTRAVENOUS at 10:48

## 2020-07-31 ENCOUNTER — TELEPHONE (OUTPATIENT)
Dept: INTERNAL MEDICINE | Facility: CLINIC | Age: 75
End: 2020-07-31

## 2020-07-31 DIAGNOSIS — M54.2 NECK PAIN: Primary | ICD-10-CM

## 2020-07-31 NOTE — TELEPHONE ENCOUNTER
Reason for Call: Request for an order or referral:    Order or referral being requested: Referral for chiropractor    Date needed: as soon as possible    Has the patient been seen by the PCP for this problem? YES    Additional comments: The patient called and stated that he has been having a lot of neck pain. He would like Dr. Castillo to put in a referral so he can see a chiropractor. He would like a call back once the referral has been placed so he can call to get an appointment scheduled.     Phone number Patient can be reached at:  Cell number on file:    Telephone Information:   Mobile [phone]       Best Time:  Any    Can we leave a detailed message on this number?  YES    Call taken on 7/31/2020 at 11:13 AM by Karyn Pathak

## 2020-08-11 NOTE — PROGRESS NOTES
"Nahun Villalobos is a 74 year old male who is being evaluated via a billable video visit.      The patient has been notified of following:     \"This video visit will be conducted via a call between you and your physician/provider. We have found that certain health care needs can be provided without the need for an in-person physical exam.  This service lets us provide the care you need with a video conversation.  If a prescription is necessary we can send it directly to your pharmacy.  If lab work is needed we can place an order for that and you can then stop by our lab to have the test done at a later time.    Video visits are billed at different rates depending on your insurance coverage.  Please reach out to your insurance provider with any questions.    If during the course of the call the physician/provider feels a video visit is not appropriate, you will not be charged for this service.\"    Patient has given verbal consent for Video visit? Yes  How would you like to obtain your AVS? MyChart  If you are dropped from the video visit, the video invite should be resent to: Text to cell phone: [phone]  Will anyone else be joining your video visit? No        Sleep Consultation:    Date on this visit: 8/12/2020    Nahun Villalobos is sent by No ref. provider found for a sleep consultation regarding dyspnea.    Primary Physician: Olegario Castillo     Nahun Villalobos presents to see if he can get supplemental oxygen. His medical history is significant for HTN, DM 2, COPD, s/p wedge resection of upper lobe right lung secondary to carcinoma.    PSG at [facility] on 6/25/2015: Wt 256#   AHI was 8.6/hr, O2 rebeca 47%. He spent 130.8 minutes below 90% SpO2, mostly related to REM sleep. RDI 8.6/hr. REM RDI 60/hr (in 7 minutes of REM). Supine RDI 8.6/hr. PLMI 0/hour. The head of the bed was raised 30 degrees because he sleeps in a recliner at home. TCM was ordered but not used. His ABG prior to the sleep study showed: " pH: 7.4, pCO2 46, pO2 64, HCO3 29, Oxyhemoglobin 92%.    He had been prescribed auto BiPAP: IPAP max 15 cm, EPAP min 7 cm, min PS 5 cm. The pressures were increased to IPAP max 17.0cm, Epap min 7.0cm, Min PS 7.0cm, Max PS 8.0cm for residual hypoxemia. He was unable to tolerate those pressures.  He was eventually unable to tolerate BiPAP. He had a lot of phlegm and mucus production with them. Supplemental oxygen was prescribed in 2016 but he declined as he did not think he could afford it. In 2017, he called stating he would start using his wife's CPAP (who had recently passed away). He said he would bring it in to have the pressures set, but as far as I know, he did not bring it in. He sold his house after his wife . He is not sure what happened to his old BiPAP or his wife's CPAP. His wife also had an oxygen condenser, but is not sure what happened to that too.       He present today because he went to [place] and his cousin told him he was breathing bad. His cousin was on oxygen and put it on Javon and he felt much better. He has to stop and use an inhaler if he walks from his house to the street. He does not know if he has a lung doctor still.     Javon continues to sleep in a recliner.     Nahun goes to sleep at 12:00-2:00 AM during the week. He sleeps about 4-5 hours and then is up for the day. He watches TV until he falls asleep. He denies difficulty falling asleep or maintaining sleep. On weekends, his sleep schedule is the same.     Patient does watch TV in bed and does not use electronics in bed, worry in bed about anything and read in bed.     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  He has a partner named Kenia.    Nahun does not snore. Patient does have a regular bed partner. There is not report of gasping, snorting or witnessed apneas. They never sleep separately.  Patient sleeps on his back in the recliner. He has swelling in his legs. He denies snort arousals, morning headaches and  restless legs. He says he sometimes has some confusion in the day, but not in the morning specifically. He denies shortness of breath at night. Nahun denies any bruxism (missing several teeth), sleep walking, sleep talking, dream enactment, sleep paralysis, cataplexy and hypnogogic/hypnopompic hallucinations. Once in a while he may catch himself seeing something out of the corner of his eye.     Nahun has difficulty breathing through his nose (uses fluticasone spray regularly) and reflux/heartburn (in the day), denies reflux at night and depression.      Nahun has lost 5 pounds since his sleep study.  Patient describes themself as neither a morning or night person.  He would prefer to go to sleep at 10:00 PM and wake up at 6:00 AM.  Patient's [place] Sleepiness score 5/24 inconsistent with daytime sleepiness. He denies being bothered by sleepiness.      Nahun naps most days for  minutes, feels unrefreshed after naps. He takes some inadvertant naps as a passenger on a long ride.  He denies dozing while driving.  Patient was counseled on the importance of driving while alert, to pull over if drowsy, or nap before getting into the vehicle if sleepy.  He uses 1 cups/day of coffee, 3 sodas/day. Last caffeine intake can be anytime.    Allergies:    Allergies   Allergen Reactions     No Known Drug Allergies        Medications:    Current Outpatient Medications   Medication Sig Dispense Refill     acetaminophen (TYLENOL) 325 MG tablet Take 2 tablets (650 mg) by mouth every 4 hours as needed for mild pain 100 tablet 0     albuterol (VENTOLIN HFA) 108 (90 Base) MCG/ACT inhaler INHALE 2 PUFFS INTO THE LUNGS EVERY 6 HOURS AS NEEDED FOR SHORTNESS OF BREATH / DYSPNEA OR WHEEZING 25.5 g 2     Ascorbic Acid (VITAMIN C PO) Take by mouth At Bedtime       aspirin 81 MG tablet Take 1 tablet (81 mg) by mouth daily 30 tablet 11     atorvastatin (LIPITOR) 20 MG tablet Take 1 tablet (20 mg) by mouth daily 90 tablet 3      blood glucose (ACCU-CHEK CHACHA) test strip Use to test blood sugar 2 times daily or as directed. 60 strip 6     blood glucose monitoring (SOFTCLIX) lancets Use to test blood sugar 2 times daily or as directed. 1 Box 6     docusate sodium (COLACE) 100 MG tablet Take 100 mg by mouth 2 times daily To prevent constipation 45 tablet 1     finasteride (PROSCAR) 5 MG tablet Take 1 tablet (5 mg) by mouth daily 90 tablet 3     fluticasone-salmeterol (ADVAIR) 250-50 MCG/DOSE diskus inhaler Inhale 1 puff into the lungs 2 times daily 3 Inhaler 1     furosemide (LASIX) 20 MG tablet Take 2 tablets (40 mg) by mouth 2 times daily 360 tablet 3     lisinopril (PRINIVIL/ZESTRIL) 40 MG tablet TAKE 1 TABLET (40 MG) BY MOUTH DAILY 90 tablet 3     metFORMIN (GLUCOPHAGE) 500 MG tablet Take 1 tablet (500 mg) by mouth 2 times daily (with meals) 180 tablet 3     metoprolol succinate ER (TOPROL-XL) 25 MG 24 hr tablet Take 1 tablet (25 mg) by mouth daily 30 tablet 11     order for DME Equipment being ordered: diabetic shoes, 1 pair 1 Package 0     order for DME Oxygen 2 Li/min  at night via nasal cannula 1 Device 0     order for DME Equipment delivered; Respironics 760S BIPAP with heated humidification and heated tubing.  Pressure 15/7cm. Respironics Syeda View FF mask (Medium)       sildenafil (VIAGRA) 50 MG tablet Take 1 tablet (50 mg) by mouth daily as needed (erectile dysgfunction, do not use with Flomax or alpha blockers) Or nitro products  ' 10 tablet 1     tamsulosin (FLOMAX) 0.4 MG capsule Take 1 capsule (0.4 mg) by mouth daily 90 capsule 3       Problem List:  Patient Active Problem List    Diagnosis Date Noted     S/P transurethral resection of prostate 04/30/2018     Priority: Medium     Hematuria, gross 04/30/2018     Priority: Medium     Grief reaction 12/06/2017     Priority: Medium     Type 2 diabetes mellitus without complication, without long-term current use of insulin (H) 12/13/2016     Priority: Medium     Morbid obesity due  to excess calories (H) 03/14/2016     Priority: Medium     BPPV (benign paroxysmal positional vertigo), unspecified laterality 03/14/2016     Priority: Medium     Hyperlipidemia LDL goal <100 10/10/2015     Priority: Medium     Sleep related hypoventilation/hypoxemia in other disease 07/30/2015     Priority: Medium     Problem list name updated by automated process. Provider to review       Polyp of colon 08/27/2013     Priority: Medium     Advance care planning 02/22/2012     Priority: Medium     Advance Care Planning 12/6/2017: ACP Review of Chart / Resources Provided:  Reviewed chart for advance care plan.  Garnicamihaela Villalobos has been provided information and resources to begin or update their advance care plan.  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     Priority: Medium     Lumbago 11/25/2003     Priority: Medium     Essential hypertension, benign 12/19/2002     Priority: Medium     Tobacco use disorder 12/19/2002     Priority: Medium        Past Medical/Surgical History:  Past Medical History:   Diagnosis Date     COPD (chronic obstructive pulmonary disease) (H)      DM (diabetes mellitus) (H)      Essential hypertension, benign      Hemorrhage of rectum and anus      Non-small cell lung cancer (H)      Obesity      Personal history of colonic polyps      Tobacco use disorder      Urinary retention      Past Surgical History:   Procedure Laterality Date     APPENDECTOMY       C NONSPECIFIC PROCEDURE      cholecystectomy     CHOLECYSTECTOMY       CYSTOSCOPY, TRANSURETHRAL RESECTION (TUR) PROSTATE, COMBINED N/A 4/30/2018    Procedure: COMBINED CYSTOSCOPY, TRANSURETHRAL RESECTION (TUR) PROSTATE;  Cystoscopy, Transurethral Resection Of The Prostate;  Surgeon: Praveena Burris MD;  Location: [facility] OR     WEDGE RESECTION      lung       Social History:  Social History      Socioeconomic History     Marital status:      Spouse name: Not on file     Number of children: Not on file     Years of education: Not on file     Highest education level: Not on file   Occupational History     Not on file   Social Needs     Financial resource strain: Not on file     Food insecurity     Worry: Not on file     Inability: Not on file     Transportation needs     Medical: Not on file     Non-medical: Not on file   Tobacco Use     Smoking status: Former Smoker     Packs/day: 1.00     Years: 52.00     Pack years: 52.00     Types: Cigarettes     Last attempt to quit: 2013     Years since quittin.2     Smokeless tobacco: Never Used   Substance and Sexual Activity     Alcohol use: No     Alcohol/week: 0.0 standard drinks     Drug use: No     Sexual activity: Yes     Partners: Female   Lifestyle     Physical activity     Days per week: Not on file     Minutes per session: Not on file     Stress: Not on file   Relationships     Social connections     Talks on phone: Not on file     Gets together: Not on file     Attends Anglican service: Not on file     Active member of club or organization: Not on file     Attends meetings of clubs or organizations: Not on file     Relationship status: Not on file     Intimate partner violence     Fear of current or ex partner: Not on file     Emotionally abused: Not on file     Physically abused: Not on file     Forced sexual activity: Not on file   Other Topics Concern     Parent/sibling w/ CABG, MI or angioplasty before 65F 55M? Not Asked      Service Not Asked     Blood Transfusions Not Asked     Caffeine Concern No     Comment: 1-2 a day     Occupational Exposure Not Asked     Hobby Hazards Not Asked     Sleep Concern Not Asked     Stress Concern Not Asked     Weight Concern Not Asked     Special Diet No     Back Care Not Asked     Exercise No     Bike Helmet Not Asked     Seat Belt Yes     Self-Exams Not Asked   Social History Narrative      Not on file       Family History:  Family History   Problem Relation Age of Onset     Hypertension Mother      C.A.D. Mother         ANEURYSM     Cancer - colorectal Mother      Cancer Mother         OVARIAN     Hypertension Sister      Breast Cancer Sister      Cerebrovascular Disease Father      Glaucoma No family hx of      Macular Degeneration No family hx of        Review of Systems:  A complete review of systems reviewed by me is negative with the exeption of what has been mentioned in the history of present illness.  CONSTITUTIONAL: NEGATIVE for weight gain/loss, fever, chills, sweats or night sweats, drug allergies.  EYES: NEGATIVE for changes in vision, double vision.  EYES:  POSITIVE for blind spots  ENT: NEGATIVE for ear pain, sore throat, sinus pain, post-nasal drip, runny nose, bloody nose  CARDIAC: NEGATIVE for fast heartbeats or fluttering in chest, chest pain or pressure, breathlessness when lying flat.  CARDIAC:  POSITIVE for  swollen legs and swollen feet  NEUROLOGIC: NEGATIVE headaches, weakness or numbness in the arms or legs.  NEUROLOGIC:  POSITIVE for  Carpal tunnel  DERMATOLOGIC: NEGATIVE for rashes, new moles or change in mole(s)  PULMONARY: NEGATIVE SOB at rest, dry cough, coughing up blood, wheezing or whistling when breathing.    PULMONARY:  POSITIVE for  SOB with activity and productive cough-with certain smells  GASTROINTESTINAL: NEGATIVE for nausea or vomitting, fat or grease in stools, constipation, abdominal pain, bowel movements black in color.  GASTROINTESTINAL:  POSITIVE for  loose or watery stools and blood in stool  GENITOURINARY: NEGATIVE for pain during urination, blood in urine, urinating more frequently than usual, irregular menstrual periods.  MUSCULOSKELETAL: NEGATIVE for muscle pain, bone or joint pain, swollen joints.  MUSCULOSKELETAL:  POSITIVE for  Neck pain  ENDOCRINE: NEGATIVE for increased thirst or urination, diabetes.  LYMPHATIC: NEGATIVE for swollen lymph nodes,  "lumps or bumps in the breasts or nipple discharge.    Physical Examination:  Vitals: Ht 1.676 m (5' 6\")   Wt 113.4 kg (250 lb)   BMI 40.35 kg/m           [place] Total Score 8/11/2020   Total score - [place] 5       KRZYSZTOF Total Score: 4 (08/11/20 1500)    GENERAL APPEARANCE: healthy, alert, no distress and cooperative  EYES: Eyes grossly normal to inspection  NECK: no asymmetry, masses, or scars  RESP: one episode of a few coughs. Otherwise no respiratory distress or wheezes noted    Impression/Plan:    (G47.34) Nocturnal hypoxemia  (primary encounter diagnosis)  Comment: Javon presents to discuss getting supplemental oxygen. He was visiting a cousin in [place] who has oxygen and Javon felt much better when he tried that on. Javon gets short of breath with walking the length of his driveway. He uses his albuterol inhaler frequently. He had asleep study in 2015 that showed hypoxemia and very mild apnea (AHI 8.6/hr). He did not tolerate CPAP/BiPAP and his hypoxemia was not corrected by those devices anyway. He has a diagnosis of COPD and has had a wedge resection of his upper right lobe for cancer. His weight is down a few pounds from his sleep study. He is not observed to snore or have pauses in breathing. He sleeps in a recliner. He denies being bothered by sleepiness, although he does nap occasionally due to keeping an irregular sleep schedule (by choice).  He has followed in pulmonology with Dr. Bernardo Haynes and [facility] in the past.  Plan: I recommended that he return to Dr. Haynes to discuss getting qualified for 24/7 oxygen. My guess is a 6-minute walk test would suffice. I do not think a repeat sleep study would be beneficial as he did not tolerate or benefit from PAP therapy in the past.       He will follow up with me in approximately two weeks after his sleep study has been competed to review the results and discuss plan of care.       Polysomnography reviewed.  Obstructive sleep apnea reviewed.  Complications " of untreated sleep apnea were reviewed.  53 minutes was spent during this visit, over 50% in counseling and coordination of care.   Bennett Goltz, PA-C     CC: No ref. provider found        Video-Visit Details    Type of service:  Video Visit    Video Start Time: 11:11 AM  Video End Time: 12:04 PM    Originating Location (pt. Location): Home    Distant Location (provider location):  [facility]     Platform used for Video Visit: [employer]. Towards the end of the visit, the video signal was poor, so we converted to a phone visit.    Bennett Ezra Goltz, PA-C

## 2020-08-12 ENCOUNTER — VIRTUAL VISIT (OUTPATIENT)
Dept: SLEEP MEDICINE | Facility: CLINIC | Age: 75
End: 2020-08-12
Payer: MEDICARE

## 2020-08-12 VITALS — HEIGHT: 66 IN | BODY MASS INDEX: 40.18 KG/M2 | WEIGHT: 250 LBS

## 2020-08-12 DIAGNOSIS — G47.34 NOCTURNAL HYPOXEMIA: Primary | ICD-10-CM

## 2020-08-12 PROCEDURE — 99204 OFFICE O/P NEW MOD 45 MIN: CPT | Mod: 95 | Performed by: PHYSICIAN ASSISTANT

## 2020-08-12 ASSESSMENT — MIFFLIN-ST. JEOR: SCORE: 1811.74

## 2020-08-24 NOTE — PROGRESS NOTES
Injectable Influenza Immunization Documentation    1.  Is the person to be vaccinated sick today?   No    2. Does the person to be vaccinated have an allergy to a component   of the vaccine?   No  Egg Allergy Algorithm Link    3. Has the person to be vaccinated ever had a serious reaction   to influenza vaccine in the past?   No    4. Has the person to be vaccinated ever had Guillain-Barré syndrome?   No    Form completed by Betina Renee CMA            Clofazimine Counseling:  I discussed with the patient the risks of clofazimine including but not limited to skin and eye pigmentation, liver damage, nausea/vomiting, gastrointestinal bleeding and allergy.

## 2020-08-31 ENCOUNTER — THERAPY VISIT (OUTPATIENT)
Dept: CHIROPRACTIC MEDICINE | Facility: CLINIC | Age: 75
End: 2020-08-31
Attending: INTERNAL MEDICINE
Payer: MEDICARE

## 2020-08-31 DIAGNOSIS — M99.01 CERVICAL SEGMENT DYSFUNCTION: Primary | ICD-10-CM

## 2020-08-31 DIAGNOSIS — M54.2 CERVICALGIA: ICD-10-CM

## 2020-08-31 DIAGNOSIS — M99.02 THORACIC SEGMENT DYSFUNCTION: ICD-10-CM

## 2020-08-31 PROCEDURE — 98940 CHIROPRACT MANJ 1-2 REGIONS: CPT | Mod: AT | Performed by: CHIROPRACTOR

## 2020-08-31 PROCEDURE — 99202 OFFICE O/P NEW SF 15 MIN: CPT | Mod: GA | Performed by: CHIROPRACTOR

## 2020-09-07 ENCOUNTER — NURSE TRIAGE (OUTPATIENT)
Dept: NURSING | Facility: CLINIC | Age: 75
End: 2020-09-07

## 2020-09-07 PROBLEM — M99.01 CERVICAL SEGMENT DYSFUNCTION: Status: ACTIVE | Noted: 2020-09-07

## 2020-09-07 PROBLEM — M99.02 THORACIC SEGMENT DYSFUNCTION: Status: ACTIVE | Noted: 2020-09-07

## 2020-09-07 PROBLEM — M54.2 CERVICALGIA: Status: ACTIVE | Noted: 2020-09-07

## 2020-09-07 ASSESSMENT — ACTIVITIES OF DAILY LIVING (ADL): CURRENT_FUNCTION: HOUSEWORK REQUIRES ASSISTANCE

## 2020-09-07 NOTE — TELEPHONE ENCOUNTER
CC:  Both feet swollen - starting to move up into lower legs       Has been going on for the past few weeks but worsening over time now       Just got started on 2 new spiriva and and  wialexia + albuterol       Does report some SOB at rest - has a h/o COPD and lung CA  this is not new to him - this is NL for him - not sound SOB on the phone - no wheezing heard        A/P:  > Keep feet / legs elevated for now - appt for tomorrow for eval      > Other care advice as noted  - if any chest pain or worsening SOB, directed to ED        Sherman Guy RN      COVID 19 Nurse Triage Plan/Patient Instructions    Please be aware that novel coronavirus (COVID-19) may be circulating in the community. If you develop symptoms such as fever, cough, or SOB or if you have concerns about the presence of another infection including coronavirus (COVID-19), please contact your health care provider or visit [email or web address].     Disposition/Instructions    In-Person Visit with provider recommended. Reference Visit Selection Guide.    Thank you for taking steps to prevent the spread of this virus.  o Limit your contact with others.  o Wear a simple mask to cover your cough.  o Wash your hands well and often.    Resources    [facility]: About COVID-19: [email or web address]    CDC: What to Do If You're Sick: www.cdc.gov/coronavirus/2019-ncov/about/steps-when-sick.html    CDC: Ending Home Isolation: www.cdc.gov/coronavirus/2019-ncov/hcp/disposition-in-home-patients.html     CDC: Caring for Someone: www.cdc.gov/coronavirus/2019-ncov/if-you-are-sick/care-for-someone.html     [facility]: Interim Guidance for Hospital Discharge to Home: www.health.[facility].[place]/diseases/coronavirus/hcp/hospdischarge.pdf    [facility] clinical trials (COVID-19 research studies): [email or web address]     Below are the COVID-19 hotlines at the [place] [facility] ([facility]). Interpreters are available.   o For  "health questions: Call 182-883-3514 or 1-607.896.3209 (7 a.m. to 7 p.m.)  o For questions about schools and childcare: Call 675-450-6407 or 1-993.423.1867 (7 a.m. to 7 p.m.)                             Additional Information    Negative: Severe difficulty breathing (e.g., struggling for each breath, speaks in single words)    Negative: Looks like a broken bone or dislocated joint (e.g., crooked or deformed)    Negative: Sounds like a life-threatening emergency to the triager    Negative: Chest pain    Negative: Followed a leg injury    Negative: [1] Small area of swelling AND [2] followed an insect bite to the area    Negative: Swelling of one ankle joint    Negative: Pregnant    Negative: Swelling of knee is main symptom    Negative: Postpartum (from 0 to 6 weeks after delivery)    Negative: Difficulty breathing at rest    Negative: [1] Can't walk or can barely walk AND [2] new onset    Negative: Entire foot is cool or blue in comparison to other side    Negative: [1] Difficulty breathing with exertion (e.g., walking) AND [2] new onset or worsening    Negative: [1] Red area or streak AND [2] fever    Negative: [1] Swelling is painful to touch AND [2] fever    Negative: [1] Cast on leg or ankle AND [2] now increased pain    Negative: Patient sounds very sick or weak to the triager    Negative: SEVERE leg swelling (e.g., swelling extends above knee, entire leg is swollen, weeping fluid)    Negative: [1] Red area or streak [2] large (> 2 in. or 5 cm)    Negative: [1] Thigh or calf pain AND [2] only 1 side AND [3] present > 1 hour    Negative: [1] Thigh, calf, or ankle swelling AND [2] only 1 side    Negative: [1] Thigh, calf, or ankle swelling AND [2] bilateral AND [3] 1 side is more swollen    [1] MODERATE leg swelling (e.g., swelling extends up to knees) AND [2] new onset or worsening    Answer Assessment - Initial Assessment Questions  1. ONSET: \"When did the swelling start?\" (e.g., minutes, hours, days)      " "  Over the past few weeks       2. LOCATION: \"What part of the leg is swollen?\"  \"Are both legs swollen or just one leg?\"    Both       3. SEVERITY: \"How bad is the swelling?\" (e.g., localized; mild, moderate, severe)   - Localized - small area of swelling localized to one leg   - MILD pedal edema - swelling limited to foot and ankle, pitting edema < 1/4 inch (6 mm) deep, rest and elevation eliminate most or all swelling   - MODERATE edema - swelling of lower leg to knee, pitting edema > 1/4 inch (6 mm) deep, rest and elevation only partially reduce swelling   - SEVERE edema - swelling extends above knee, facial or hand swelling present     Yes - elevation will get rid of that           4. REDNESS: \"Does the swelling look red or infected?\"       Maybe a little red         5. PAIN: \"Is the swelling painful to touch?\" If so, ask: \"How painful is it?\"   (Scale 1-10; mild, moderate or severe)        Bottom of R foot some pain     6. FEVER: \"Do you have a fever?\" If so, ask: \"What is it, how was it measured, and when did it start?\"     No fever             7. CAUSE: \"What do you think is causing the leg swelling?\"    Unsure - eating habits              8. MEDICAL HISTORY: \"Do you have a history of heart failure, kidney disease, liver failure, or cancer?\"         9. RECURRENT SYMPTOM: \"Have you had leg swelling before?\" If so, ask: \"When was the last time?\" \"What happened that time?\"    Unsure       10. OTHER SYMPTOMS: \"Do you have any other symptoms?\" (e.g., chest pain, difficulty breathing)          Chronic SOB -  Sees lung specialist            11. PREGNANCY: \"Is there any chance you are pregnant?\" \"When was your last menstrual period?\"         NA    Protocols used: LEG SWELLING AND EDEMA-A-AH      "

## 2020-09-07 NOTE — PROGRESS NOTES
Chiropractic Clinic Visit    PCP: Olegario Castillomihaela Villalobos is a 74 year old male who is seen  in consultation at the request of  Olegario Castillo M.D. presenting with acute L sided neck pain . Patient reports that the onset was 2 months ago When asked, patient denies:, falling, slipping, bending and reaching or sleeping awkwardly. The pt reports an acute episode of L sided neck pain that started after the pt was sleeping in his recliner. He wakes with HA sx and pain at the back of the neck. The pt cannot turn his head to the right side due to neck pain. He is unable to sleep a full night due to pain. The pt denies weakness in the extremities or other unusual sx.  Prior to onset, the patient was able to sleep in his recliner. Patient notes that due to symptoms, they can only turn his head such as when driving due to pain. Nahun Villalobos notes   sleeping rated at a 5/10 difficulty  and prior to this incident it was 1/10.    Injury: There was no acute injury related to this episode     Location of Pain: left cervical  at the following level(s) Occiput, C2 , C7 , T1  and T6   Duration of Pain: 2 months   Rating of Pain at worst: 5/10  Rating of Pain Currently: 5/10  Symptoms are better with: Nothing  Symptoms are worse with: sleeping, turning his head, driving   Additional Features: HA        Health History  as reported by the patient:    How does the patient rate their own health:   Good    Current or past medical history:   Diabetes, High blood pressure and Overweight    Medical allergies  None    Past Traumas/Surgeries  Other:  Appendix, lung, gall bladder     Family History  Family History   Problem Relation Age of Onset     Hypertension Mother      C.A.D. Mother         ANEURYSM     Cancer - colorectal Mother      Cancer Mother         OVARIAN     Hypertension Sister      Breast Cancer Sister      Cerebrovascular Disease Father      Glaucoma No family hx of      Macular Degeneration No family hx of         Medications:  High blood pressure and other:  Insulin    Occupation:       Primary job tasks:   Driving and Other: driving    Barriers as home/work:   none    Additional health Issues:     None       Review of Systems  Musculoskeletal: as above  Remainder of review of systems is negative including constitutional, CV, pulmonary, GI, Skin and Neurologic except as noted in HPI or medical history.    Past Medical History:   Diagnosis Date     COPD (chronic obstructive pulmonary disease) (H)      DM (diabetes mellitus) (H)      Essential hypertension, benign      Hemorrhage of rectum and anus      Non-small cell lung cancer (H)      Obesity      Personal history of colonic polyps      Tobacco use disorder      Urinary retention      Past Surgical History:   Procedure Laterality Date     APPENDECTOMY       C NONSPECIFIC PROCEDURE      cholecystectomy     CHOLECYSTECTOMY       CYSTOSCOPY, TRANSURETHRAL RESECTION (TUR) PROSTATE, COMBINED N/A 4/30/2018    Procedure: COMBINED CYSTOSCOPY, TRANSURETHRAL RESECTION (TUR) PROSTATE;  Cystoscopy, Transurethral Resection Of The Prostate;  Surgeon: Praveena Burris MD;  Location: [facility] OR     WEDGE RESECTION      lung       [id number]  There were no vitals taken for this visit.    GENERAL APPEARANCE: healthy, alert and no distress   GAIT: NORMAL  SKIN: no suspicious lesions or rashes  NEURO: Normal strength and tone, mentation intact and speech normal  PSYCH:  mentation appears normal and affect normal/bright    Garnica was asked to complete the Neck Disability Index, today in the office. NDI Disability score: 28%; pain severity scale: 5/10..    Cervical Spine Exam    Range of Motion:         Full active and passive ROM forward flexion,   Decreased extension, lateral rotation, lateral flexion with pain at end range     Inspection:         Anterior head position        Forward rounded shoulders    Tender:        upper border of trapezius       L longus coli, B  suboccipitals     Non-Tender:        remainder of cervical spine area    Muscle strength:       C4 (shoulder shrug)  symmetric 5/5 Normal       C5 (shoulder abduction) symmetric 5/5 Normal       C6 (elbow flexion) symmetric 5/5 Normal       C7 (elbow extension) symmetric 5/5 Normal       C8 (finger abduction, thumb flexion) symmetric 5/5 Normal    Reflexes:        C5 (biceps) symmetric 2 bilaterally       C6 (supinator) symmetric 2 bilaterally       C7 (triceps) symmetric 2 bilaterally    Sensation:       grossly intact througout upper extremities bilaterally    Special Tests:  Spurlings- positive, Rogerio's- positive and VBI- negative    Lymphatics:        no edema noted in the upper extremities       Segmental spinal dysfunction/restrictions found at:  Occiput, C2 , C7 , T1  and T6       The following soft tissue hypotonicities were observed:   Lev scapulae: bilateral, ache and dull pain, referred pain: no  Sub-occiput: bilateral, ache and dull pain, referred pain: no    Trigger points were found in:  None     Muscle spasm found in:  Levator scapulae and Sub-occipital      Radiology:  None     Assessment:    1. Cervical segment dysfunction    2. Cervicalgia    3. Thoracic segment dysfunction        RX ordered/plan of care  Anticipated outcomes  Possible risks and side effects    After discussing the risk and benefits of care, patient consented to treatment    Patient's condition:  Patient had restrictions pre-manipulation    Treatment effectiveness:  Post manipulation there is better intersegmental movement and Patient claims to feel looser post manipulation    Plan:    Procedures:    Evaluation and Management:  53024 Low to moderate level exam 20 min    CMT:  64568 Chiropractic manipulative treatment 1-2 regions performed   Occiput: Diversified and Activator OCC RR , Prone, Supine  Cervical: Diversified and Activator, C2, C7 , Prone, Supine  Thoracic: Diversified and Activator, T1, T6, Prone   Modalities:  US  therapy 1.6 wang/cm2 to L cervical musculature 1 Mhz, 3 minutes     Therapeutic procedures:  None    Response to Treatment  Reduction in symptoms as reported by patient    Prognosis: Good      Treatment plan and goals:  Goals:  SLEEPING: the patient specific goal is to attain his pre-injury status of 6-8 hours comfortably  Driving, turning the head    Frequency of care  Duration of care is estimated to be 3-6 weeks, from the initial treatment.  It is estimated that the patient will need a total of 3-6 visits to resolve this episode.  For the initial therapeutic trial of care, the frequency is recommended at 1 X week, once daily.  A reevaluation would be clinically appropriate in 3-6 visits, to determine progress and further course of care.    In-Office Treatment  Evaluation  Spinal Chiropractic Manipulative Therapy  Us therapy      Recommendations:    Instructions:  stretch as instructed     Follow-up:  Return to care in one week     Disclaimer: This note consists of symbols derived from keyboarding, dictation and/or voice recognition software. As a result, there may be errors in the script that have gone undetected. Please consider this when interpreting information found in this chart.

## 2020-09-08 ENCOUNTER — VIRTUAL VISIT (OUTPATIENT)
Dept: INTERNAL MEDICINE | Facility: CLINIC | Age: 75
End: 2020-09-08
Payer: MEDICARE

## 2020-09-08 DIAGNOSIS — Z00.00 MEDICARE ANNUAL WELLNESS VISIT, SUBSEQUENT: Primary | ICD-10-CM

## 2020-09-08 DIAGNOSIS — J44.9 CHRONIC OBSTRUCTIVE PULMONARY DISEASE, UNSPECIFIED COPD TYPE (H): ICD-10-CM

## 2020-09-08 DIAGNOSIS — G47.39 OTHER SLEEP APNEA: ICD-10-CM

## 2020-09-08 DIAGNOSIS — E66.01 MORBID OBESITY DUE TO EXCESS CALORIES (H): ICD-10-CM

## 2020-09-08 DIAGNOSIS — E11.9 TYPE 2 DIABETES MELLITUS WITHOUT COMPLICATION, WITHOUT LONG-TERM CURRENT USE OF INSULIN (H): ICD-10-CM

## 2020-09-08 DIAGNOSIS — E78.5 HYPERLIPIDEMIA LDL GOAL <100: ICD-10-CM

## 2020-09-08 DIAGNOSIS — I10 ESSENTIAL HYPERTENSION, BENIGN: ICD-10-CM

## 2020-09-08 PROCEDURE — 99214 OFFICE O/P EST MOD 30 MIN: CPT | Mod: 25 | Performed by: INTERNAL MEDICINE

## 2020-09-08 PROCEDURE — G0439 PPPS, SUBSEQ VISIT: HCPCS | Mod: 95 | Performed by: INTERNAL MEDICINE

## 2020-09-08 RX ORDER — TIOTROPIUM BROMIDE 18 UG/1
18 CAPSULE ORAL; RESPIRATORY (INHALATION) DAILY
COMMUNITY
End: 2021-04-01

## 2020-09-08 RX ORDER — FUROSEMIDE 20 MG
TABLET ORAL
Qty: 270 TABLET | Refills: 3 | Status: ON HOLD
Start: 2020-09-08 | End: 2020-09-20

## 2020-09-08 NOTE — PROGRESS NOTES
"Nahun Villalobos is a 74 year old male who is being evaluated via a billable video visit.      The patient has been notified of following:     \"This video visit will be conducted via a call between you and your physician/provider. We have found that certain health care needs can be provided without the need for an in-person physical exam.  This service lets us provide the care you need with a video conversation.  If a prescription is necessary we can send it directly to your pharmacy.  If lab work is needed we can place an order for that and you can then stop by our lab to have the test done at a later time.    Video visits are billed at different rates depending on your insurance coverage.  Please reach out to your insurance provider with any questions.    If during the course of the call the physician/provider feels a video visit is not appropriate, you will not be charged for this service.\"    Patient has given verbal consent for Video visit? Yes  How would you like to obtain your AVS? MyChart  If you are dropped from the video visit, the video invite should be resent to: Text to cell phone: [phone]  Will anyone else be joining your video visit? No      Subjective     Nahun Villalobos is a 74 year old male who presents today via video visit for the following health issues:    HPI    Annual Wellness Visit    Are you in the first 12 months of your Medicare Part B coverage?  No    Answers for HPI/ROS submitted by the patient on 9/7/2020   Annual Exam:  In general, how would you rate your overall physical health?: good  Frequency of exercise:: None  Do you usually eat at least 4 servings of fruit and vegetables a day, include whole grains & fiber, and avoid regularly eating high fat or \"junk\" foods? : No  Taking medications regularly:: Yes  Medication side effects:: Not applicable  Activities of Daily Living: housework requires assistance  Home safety: no safety concerns identified  Hearing Impairment:: " difficulty following a conversation in a noisy restaurant or crowded room, feel that people are mumbling or not speaking clearly, difficulty following dialogue in the theater, need to ask people to speak up or repeat themselves, difficulty understanding soft or whispered speech  In the past 6 months, have you been bothered by leaking of urine?: No  In general, how would you rate your overall mental or emotional health?: good  Additional concerns today:: Yes      Mental Health:    In general, how would you rate your overall mental or emotional health? good  PHQ-2 Score: (P) 0    Do you feel safe in your environment? Yes    Have you ever done Advance Care Planning? (For example, a Health Directive, POLST, or a discussion with a medical provider or your loved ones about your wishes)? No, advance care planning information given to patient to review.  Patient plans to discuss their wishes with loved ones or provider.      Fall risk:  Fallen 2 or more times in the past year?: No  Any fall with injury in the past year?: No    Cognitive Screening: Unable to complete due to virtual visit; need for additional assessment in future face-to-face visit    Do you have sleep apnea, excessive snoring or daytime drowsiness?: yes      Other concerns:  1. Bilateral lower leg swelling, chronic.  Has seen cardiology as well as pulmonology and sleep specialist.    Current providers sharing in care for this patient include:   Patient Care Team:  Olegario Castillo MD as PCP - General  Olegario Castillo MD as Assigned PCP  Praveena Burris MD as MD (Urology)  Areli Cordero RN as Registered Nurse  Olegario Castillo MD as Referring Physician (Internal Medicine)       Video Start Time: 323PM      Review of Systems   CONSTITUTIONAL: NEGATIVE for fever, chills, mild change in weight  ENT/MOUTH: NEGATIVE for ear, mouth and throat problems  RESP: NEGATIVE for significant cough with baseline SOB  GI: NEGATIVE for nausea, abdominal pain,  heartburn, or change in bowel habits  : NEGATIVE for frequency, dysuria, or hematuria  MUSCULOSKELETAL: NEGATIVE for significant arthralgias or myalgia  NEURO: NEGATIVE for weakness, dizziness or paresthesias  HEME: NEGATIVE for bleeding problems  PSYCHIATRIC: NEGATIVE for changes in mood or affect      Objective       Vitals:  No vitals were obtained today due to virtual visit.    Physical Exam     GENERAL: alert and no distress  EYES: Eyes grossly normal to inspection.  No discharge or erythema, or obvious scleral/conjunctival abnormalities.  RESP: No audible wheeze, cough, or visible cyanosis.  No visible retractions or increased work of breathing.    SKIN: Visible skin clear. No significant rash, abnormal pigmentation or lesions.  NEURO: Cranial nerves grossly intact.  Mentation and speech appropriate for age.  PSYCH: Mentation appears normal, affect normal/bright, judgement and insight intact, normal speech and appearance well-groomed.    Creatinine   Date Value Ref Range Status   03/16/2020 0.93 0.66 - 1.25 mg/dL Final         Assessment & Plan :    Medicare annual wellness visit, subsequent  Advised updated shingles vaccination and influenza vaccine when available  - Hemoglobin; Future  - Comprehensive metabolic panel; Future  - Lipid Profile; Future    Type 2 diabetes mellitus without complication, without long-term current use of insulin (H)  Fasting per routine  - Comprehensive metabolic panel; Future  - Hemoglobin A1c; Future  - Albumin Random Urine Quantitative with Creat Ratio; Future  - TSH with free T4 reflex; Future    Chronic obstructive pulmonary disease, unspecified COPD type (H)  Appears overall stable, following up with pulmonology, continuing with inhalers  He has followed up in pulmonology with Dr. Bernardo Haynes and [facility] in the past.  Suggest follow-up for discussion O2 need.    Essential hypertension, benign  Stable and recommend recheck in clinic.  Suggest increase Lasix to 20 mg  "tablets 2 in the AM and 1 in the PM for mild edema  - Comprehensive metabolic panel; Future  - furosemide (LASIX) 20 MG tablet; Take 2 in AM and 1 in PM(60mg daily)    Hyperlipidemia LDL goal <100  Labs ordered as fasting per routine  - Lipid Profile; Future    Morbid obesity due to excess calories (H)  Encouraged ongoing weight loss    Other sleep apnea  Continue with the sleep apnea device.  Question component of edema  - furosemide (LASIX) 20 MG tablet; Take 2 in AM and 1 in PM(60mg daily)    BMI:   Estimated body mass index is 40.35 kg/m  as calculated from the following:    Height as of 8/12/20: 1.676 m (5' 6\").    Weight as of 8/12/20: 113.4 kg (250 lb).   Weight management plan: Discussed healthy diet and exercise guidelines    See Patient Instructions    Return for Lab Work appointment, if symptoms recur or worsen.    Olegario Castillo MD  [facility]      Video-Visit Details    Type of service:  Video Visit    Video End Time:340PM    Originating Location (pt. Location): Home    Distant Location (provider location):  [facility]     Platform used for Video Visit: Radha          "

## 2020-09-13 DIAGNOSIS — E11.9 TYPE 2 DIABETES MELLITUS WITHOUT COMPLICATION, WITHOUT LONG-TERM CURRENT USE OF INSULIN (H): ICD-10-CM

## 2020-09-14 ENCOUNTER — OFFICE VISIT (OUTPATIENT)
Dept: OPTOMETRY | Facility: CLINIC | Age: 75
End: 2020-09-14
Payer: MEDICARE

## 2020-09-14 DIAGNOSIS — H25.813 MIXED TYPE AGE-RELATED CATARACT, BOTH EYES: ICD-10-CM

## 2020-09-14 DIAGNOSIS — H01.006 BLEPHARITIS OF BOTH EYES, UNSPECIFIED EYELID, UNSPECIFIED TYPE: ICD-10-CM

## 2020-09-14 DIAGNOSIS — E11.9 DIABETES MELLITUS WITHOUT OPHTHALMIC MANIFESTATIONS (H): Primary | ICD-10-CM

## 2020-09-14 DIAGNOSIS — H01.003 BLEPHARITIS OF BOTH EYES, UNSPECIFIED EYELID, UNSPECIFIED TYPE: ICD-10-CM

## 2020-09-14 DIAGNOSIS — H52.4 PRESBYOPIA: ICD-10-CM

## 2020-09-14 DIAGNOSIS — H02.003 ENTROPION AND TRICHIASIS OF RIGHT EYELID: ICD-10-CM

## 2020-09-14 PROBLEM — I10 HTN (HYPERTENSION): Status: ACTIVE | Noted: 2020-09-14

## 2020-09-14 PROCEDURE — 92015 DETERMINE REFRACTIVE STATE: CPT | Mod: GY | Performed by: OPTOMETRIST

## 2020-09-14 PROCEDURE — 92014 COMPRE OPH EXAM EST PT 1/>: CPT | Performed by: OPTOMETRIST

## 2020-09-14 ASSESSMENT — VISUAL ACUITY
METHOD: SNELLEN - LINEAR
OS_SC: 20/40
OD_SC: 20/100
OS_SC+: -1
OS_SC: 20/80
OD_SC: 20/120

## 2020-09-14 ASSESSMENT — TONOMETRY
IOP_METHOD: APPLANATION
OS_IOP_MMHG: 15
OD_IOP_MMHG: 15

## 2020-09-14 ASSESSMENT — REFRACTION_MANIFEST
OS_ADD: +2.50
OS_CYLINDER: +0.50
OS_SPHERE: +0.75
OD_SPHERE: -0.50
OD_ADD: +2.50
OD_CYLINDER: SPHERE
OS_AXIS: 062

## 2020-09-14 ASSESSMENT — CONF VISUAL FIELD
OD_NORMAL: 1
METHOD: COUNTING FINGERS
OS_NORMAL: 1

## 2020-09-14 ASSESSMENT — CUP TO DISC RATIO
OS_RATIO: 0.3
OD_RATIO: 0.3

## 2020-09-14 ASSESSMENT — EXTERNAL EXAM - RIGHT EYE: OD_EXAM: NORMAL

## 2020-09-14 ASSESSMENT — EXTERNAL EXAM - LEFT EYE: OS_EXAM: NORMAL

## 2020-09-14 NOTE — PROGRESS NOTES
"Chief Complaint   Patient presents with     Diabetic Eye Exam      JAMEL: 2018  Reports he has never been able to wear his glasses and reports black spots in vision.  He reports these black spots are more like shadows in his vision.   Ongoing for years.  Pt reports swelling of the left eye in the corner seasonally.  No other concerns at this time.     Pt reports he is unable to wear a mask, as he had lung cancer in the past and will pass out.  I explained to him that there is a mask mandate, and pt did not comply. I did give him a face shield to wear in place of a mask.  \" if I pass out call the ambulance\"    Lab Results   Component Value Date    A1C 7.6 06/18/2020    A1C 7.8 01/23/2020    A1C 8.0 08/28/2019    A1C 7.5 11/01/2018    A1C 7.2 05/01/2018       Last Eye Exam: 2018  Dilated Previously: Yes    What are you currently using to see?  No correction     Distance Vision Acuity: Noticed gradual change in both eyes    Near Vision Acuity: Not satisfied     Eye Comfort: eyes get swollen   Do you use eye drops? : No      Lisbeth Temple CPO     Medical, surgical and family histories reviewed and updated 9/14/2020.       OBJECTIVE: See Ophthalmology exam    ASSESSMENT:    ICD-10-CM    1. 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 Presbyopia  H52.4 EYE EXAM (SIMPLE-NONBILLABLE)     REFRACTION   3. Mixed type age-related cataract, both eyes  H25.813 EYE ADULT REFERRAL   4. Blepharitis of both eyes, unspecified eyelid, unspecified type  H01.003     H01.006    5. Entropion and trichiasis of right eyelid  H02.003        PLAN:  He would like to proceed w/ cataract surgery    Patient was given the option of wearing a mask or rescheduling   He had a gator around his neck that he put on   There were no signs of respiratory distress while wearing a cloth mask      Nahun Villalobos aware  eye exam results will be sent to Olegario Castillo OD "

## 2020-09-14 NOTE — LETTER
"    9/14/2020         RE: Nahun Villalobos  [address]  [place] [address]        Dear Colleague,    Thank you for referring your patient, Nahun Villalobos, to the [facility]. Please see a copy of my visit note below.    Chief Complaint   Patient presents with     Diabetic Eye Exam      JAMEL: 2018  Reports he has never been able to wear his glasses and reports black spots in vision.  He reports these black spots are more like shadows in his vision.   Ongoing for years.  Pt reports swelling of the left eye in the corner seasonally.  No other concerns at this time.     Pt reports he is unable to wear a mask, as he had lung cancer in the past and will pass out.  I explained to him that there is a mask mandate, and pt did not comply. I did give him a face shield to wear in place of a mask.  \" if I pass out call the ambulance\"    Lab Results   Component Value Date    A1C 7.6 06/18/2020    A1C 7.8 01/23/2020    A1C 8.0 08/28/2019    A1C 7.5 11/01/2018    A1C 7.2 05/01/2018       Last Eye Exam: 2018  Dilated Previously: Yes    What are you currently using to see?  No correction     Distance Vision Acuity: Noticed gradual change in both eyes    Near Vision Acuity: Not satisfied     Eye Comfort: eyes get swollen   Do you use eye drops? : No      Lisbeth Temple CPO     Medical, surgical and family histories reviewed and updated 9/14/2020.       OBJECTIVE: See Ophthalmology exam    ASSESSMENT:    ICD-10-CM    1. 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 Presbyopia  H52.4 EYE EXAM (SIMPLE-NONBILLABLE)     REFRACTION   3. Mixed type age-related cataract, both eyes  H25.813 EYE ADULT REFERRAL   4. Blepharitis of both eyes, unspecified eyelid, unspecified type  H01.003     H01.006    5. Entropion and trichiasis of right eyelid  H02.003        PLAN:  He would like to proceed w/ cataract surgery    Patient was given the option of wearing a mask or " rescheduling   He had a gator around his neck that he put on and wears when he has to  There were no signs of respiratory distress while wearing a cloth mask   I felt bullied by this patient     Nahun Villalobos aware  eye exam results will be sent to Olegario Castillo OD     Again, thank you for allowing me to participate in the care of your patient.        Sincerely,        Madelyn Cordero, OD

## 2020-09-14 NOTE — PATIENT INSTRUCTIONS
Patient Education   Diabetes weakens the blood vessels all over the body, including the eyes. Damage to the blood vessels in the eyes can cause swelling or bleeding into part of the eye (called the retina). This is called diabetic retinopathy (KRISS-tin--pu-thee). If not treated, this disease can cause vision loss or blindness.   Symptoms may include blurred or distorted vision, but many people have no symptoms. It's important to see your eye doctor regularly to check for problems.   Early treatment and good control can help protect your vision. Here are the things you can do to help prevent vision loss:      1. Keep your blood sugar levels under tight control.      2. Bring high blood pressure under control.      3. No smoking.      4. Have yearly dilated eye exams.

## 2020-09-15 ENCOUNTER — NURSE TRIAGE (OUTPATIENT)
Dept: NURSING | Facility: CLINIC | Age: 75
End: 2020-09-15

## 2020-09-15 NOTE — TELEPHONE ENCOUNTER
"Wife of 75 y/o male calls about abdominal pain rated at 1-5-10 depends on when it comes on, comes and goes, and small bm both times after cramping, no blood in stool, took pepto bismal for the cramping, sprite to sip on. No vomiting, no diarrhea, no fever - does have some chills.   RN reviewed protocol, triaged to home care at this time, interventions include pepto bismal patient reassured he is doing the right thing, stick to clear liquids, crackers, toast, rice, cereal. RN advised to call back with any changes, worsening of symptoms, and questions or concerns.     Sayra Taylor RN - [facility] Nurse Advisor  9/15/20  7:10AM    Additional Information    Negative: Shock suspected (e.g., cold/pale/clammy skin, too weak to stand, low BP, rapid pulse)    Negative: Difficult to awaken or acting confused (e.g., disoriented, slurred speech)    Negative: Passed out (i.e., lost consciousness, collapsed and was not responding)    Negative: Sounds like a life-threatening emergency to the triager    Negative: [1] SEVERE pain (e.g., excruciating) AND [2] present > 1 hour    Negative: [1] SEVERE pain AND [2] age > 60    Negative: [1] Vomiting AND [2] contains red blood or black (\"coffee ground\") material  (Exception: few red streaks in vomit that only happened once)    Negative: Blood in bowel movements  (Exception: Blood on surface of BM with constipation)    Negative: Black or tarry bowel movements  (Exception: chronic-unchanged  black-grey bowel movements AND is taking iron pills or Pepto-bismol)    Negative: [1] Unable to urinate (or only a few drops) > 4 hours AND [2] bladder feels very full (e.g., palpable bladder or strong urge to urinate)    Negative: [1] Pain in the scrotum or testicle AND [2] present > 1 hour    Negative: Patient sounds very sick or weak to the triager    Negative: [1] MILD-MODERATE pain AND [2] constant AND [3] present > 2 hours    Negative: [1] Vomiting AND [2] abdomen looks much more swollen than " usual    Negative: [1] Vomiting AND [2] contains bile (green color)    Negative: White of the eyes have turned yellow (i.e., jaundice)    Negative: [1] Fever > 101 F (38.3 C) AND [2] age > 60    Negative: Fever > 103 F (39.4 C)    Negative: [1] Fever > 100.0 F (37.8 C) AND [2] bedridden (e.g., nursing home patient, CVA, chronic illness, recovering from surgery)    Negative: [1] Fever > 100.0 F (37.8 C) AND [2] diabetes mellitus or weak immune system (e.g., HIV positive, cancer chemo, splenectomy, organ transplant, chronic steroids)    Negative: [1] SEVERE pain AND [2] present < 1 hour    Negative: [1] MODERATE pain (e.g., interferes with normal activities) AND [2] pain comes and goes (cramps) AND [3] present > 24 hours  (Exception: pain with Vomiting or Diarrhea - see that Guideline)    Negative: [1] MILD pain (e.g., does not interfere with normal activities) AND [2] pain comes and goes (cramps) [3] present > 48 hours    Negative: Age > 60 years    Negative: Blood in urine (red, pink, or tea-colored)    Negative: Abdominal pain is a chronic symptom (recurrent or ongoing AND present > 4 weeks)    Negative: [1] MILD-MODERATE pain AND [2] constant and [3] present < 2 hours    [1] MILD-MODERATE pain AND [2] comes and goes (cramps)    Protocols used: ABDOMINAL PAIN - MALE-A-

## 2020-09-17 ENCOUNTER — VIRTUAL VISIT (OUTPATIENT)
Dept: INTERNAL MEDICINE | Facility: CLINIC | Age: 75
End: 2020-09-17
Payer: MEDICARE

## 2020-09-17 DIAGNOSIS — R10.84 ABDOMINAL PAIN, GENERALIZED: Primary | ICD-10-CM

## 2020-09-17 PROCEDURE — 99214 OFFICE O/P EST MOD 30 MIN: CPT | Mod: 95 | Performed by: INTERNAL MEDICINE

## 2020-09-17 NOTE — PROGRESS NOTES
"Nahun Villalobos is a 74 year old male who is being evaluated via a billable video visit.      The patient has been notified of following:     \"This video visit will be conducted via a call between you and your physician/provider. We have found that certain health care needs can be provided without the need for an in-person physical exam.  This service lets us provide the care you need with a video conversation.  If a prescription is necessary we can send it directly to your pharmacy.  If lab work is needed we can place an order for that and you can then stop by our lab to have the test done at a later time.    Video visits are billed at different rates depending on your insurance coverage.  Please reach out to your insurance provider with any questions.    If during the course of the call the physician/provider feels a video visit is not appropriate, you will not be charged for this service.\"    Patient has given verbal consent for Video visit? Yes  How would you like to obtain your AVS? [employer]  If you are dropped from the video visit, the video invite should be resent to: Other e-mail: through [employer]  Will anyone else be joining your video visit? No    Subjective     Nahun Villalobos is a 74 year old male who presents today via video visit for the following health issues:    HPI    Abdominal/Flank Pain  Onset/Duration: x1 day  Description:   Character: Sharp  Location: middle of stomach and side pain  Radiation: None  Intensity: 3-4/10  Progression of Symptoms:  constant  Accompanying Signs & Symptoms: no appetite, no medications in two days  Fever/Chills: YES- cold and cold sweats  Gas/Bloating: no but, is bloated  Nausea: YES- nauseous  Vomitting: YES- x3 times yesterday  Diarrhea: no  Constipation: no  Dysuria or Hematuria: no  History:   Trauma: no  Previous similar pain: no  Previous tests done: none  Precipitating factors:   Does the pain change with:     Food: no    Bowel Movement: no    Urination: no   " Other factors:  no  Therapies tried and outcome: rolaids, tums with no relief           Video Start Time: 1:52 PM      Complaining of abdominal pain that seems to start midline near his umbilicus, and is now radiating to both sides.  No obvious provocative or palliative features, patient is somewhat of a poor historian, but does seem to be worse with eating.  His appetite is lower, he has not eaten much in the last few days.  He is status post previous cholecystectomy.      Review of Systems   Constitutional, HEENT, cardiovascular, pulmonary, GI, , musculoskeletal, neuro, skin, endocrine and psych systems are negative, except as otherwise noted.      Objective           Vitals:  No vitals were obtained today due to virtual visit.    Physical Exam     GENERAL: Healthy, alert and no distress  EYES: Eyes grossly normal to inspection.  No discharge or erythema, or obvious scleral/conjunctival abnormalities.  RESP: No audible wheeze, cough, or visible cyanosis.  No visible retractions or increased work of breathing.    SKIN: Visible skin clear. No significant rash, abnormal pigmentation or lesions.  NEURO: Cranial nerves grossly intact.  Mentation and speech appropriate for age.  PSYCH: Mentation appears normal, affect normal/bright, judgement and insight intact, normal speech and appearance well-groomed.              Assessment & Plan     Abdominal pain, generalized  Given persistence and duration of pain, will go ahead and check CT scan.  We will follow-up on results with patient when available.  - CT Abdomen Pelvis w Contrast; Future       See Patient Instructions    No follow-ups on file.    Bernardo Arce MD  [facility]      Video-Visit Details    Type of service:  Video Visit    Video End Time:1:59 PM    Originating Location (pt. Location): Home    Distant Location (provider location):  [facility]     Platform used for Video Visit: [employer]

## 2020-09-18 ENCOUNTER — TELEPHONE (OUTPATIENT)
Dept: INTERNAL MEDICINE | Facility: CLINIC | Age: 75
End: 2020-09-18

## 2020-09-18 NOTE — TELEPHONE ENCOUNTER
Patient called reporting abdominal pain since Wednesday. He had a virtual appt 09/17/2020 and is looking for providers recommendations on treatment for pain. Denies new or worsening symptioms since appointment. Pt's friend Kenia is on the phone helping pt. States pt is unable to get abdominal CT until 09/25 at [facility]. Writer called [facility]. Radioliogy has an appointment today at [facility]. Advised pt get CT scan today for evaluation to determent treatment options. Pt was transferred to  to schedule CT.     Future imaging appointment was cancelled. Pt is aware.    Please advice if any other directives from provider.

## 2020-09-19 ENCOUNTER — NURSE TRIAGE (OUTPATIENT)
Dept: NURSING | Facility: CLINIC | Age: 75
End: 2020-09-19

## 2020-09-19 NOTE — TELEPHONE ENCOUNTER
Kenia calling, [facility] patient is nearby in the bathroom, [facility] patient has had stomach pain since Wednesday- had CT scan with [facility] yesterday. Asking for CT scan results.     Advised caller patient needs to contact [facility] and get records transferred to [place], so his doctor can review CT.    Caller verbalized understanding and had no further questions.     Ruby Hamilton, RN/[facility] [place] [facility]        Additional Information    Negative: [1] Caller is not with the adult (patient) AND [2] reporting urgent symptoms    Negative: Lab result questions    Negative: Medication questions    Negative: Caller can't be reached by phone    Negative: Caller has already spoken to PCP or another triager    Negative: RN needs further essential information from caller in order to complete triage    Negative: Requesting regular office appointment    Negative: [1] Caller requesting NON-URGENT health information AND [2] PCP's office is the best resource    Negative: Health Information question, no triage required and triager able to answer question    General information question, no triage required and triager able to answer question    Protocols used: INFORMATION ONLY CALL-A-

## 2020-09-20 ENCOUNTER — APPOINTMENT (OUTPATIENT)
Dept: CT IMAGING | Facility: CLINIC | Age: 75
DRG: 392 | End: 2020-09-20
Attending: EMERGENCY MEDICINE
Payer: MEDICARE

## 2020-09-20 ENCOUNTER — HOSPITAL ENCOUNTER (INPATIENT)
Facility: CLINIC | Age: 75
LOS: 2 days | Discharge: HOME | DRG: 392 | End: 2020-09-22
Attending: EMERGENCY MEDICINE | Admitting: INTERNAL MEDICINE
Payer: MEDICARE

## 2020-09-20 ENCOUNTER — APPOINTMENT (OUTPATIENT)
Dept: GENERAL RADIOLOGY | Facility: CLINIC | Age: 75
DRG: 392 | End: 2020-09-20
Attending: INTERNAL MEDICINE
Payer: MEDICARE

## 2020-09-20 DIAGNOSIS — G47.9 SLEEP DIFFICULTIES: Primary | ICD-10-CM

## 2020-09-20 DIAGNOSIS — R09.02 HYPOXIA: ICD-10-CM

## 2020-09-20 DIAGNOSIS — J43.9 PULMONARY EMPHYSEMA, UNSPECIFIED EMPHYSEMA TYPE (H): ICD-10-CM

## 2020-09-20 DIAGNOSIS — K57.92 ACUTE DIVERTICULITIS: ICD-10-CM

## 2020-09-20 LAB
ALBUMIN SERPL-MCNC: 3.3 G/DL (ref 3.4–5)
ALP SERPL-CCNC: 67 U/L (ref 40–150)
ALT SERPL W P-5'-P-CCNC: 32 U/L (ref 0–70)
ANION GAP SERPL CALCULATED.3IONS-SCNC: 4 MMOL/L (ref 3–14)
AST SERPL W P-5'-P-CCNC: 30 U/L (ref 0–45)
BASOPHILS # BLD AUTO: 0.1 10E9/L (ref 0–0.2)
BASOPHILS NFR BLD AUTO: 0.6 %
BILIRUB SERPL-MCNC: 0.5 MG/DL (ref 0.2–1.3)
BUN SERPL-MCNC: 12 MG/DL (ref 7–30)
CALCIUM SERPL-MCNC: 9 MG/DL (ref 8.5–10.1)
CHLORIDE SERPL-SCNC: 99 MMOL/L (ref 94–109)
CO2 SERPL-SCNC: 30 MMOL/L (ref 20–32)
CREAT SERPL-MCNC: 1.12 MG/DL (ref 0.66–1.25)
DIFFERENTIAL METHOD BLD: ABNORMAL
EOSINOPHIL # BLD AUTO: 0.2 10E9/L (ref 0–0.7)
EOSINOPHIL NFR BLD AUTO: 2 %
ERYTHROCYTE [DISTWIDTH] IN BLOOD BY AUTOMATED COUNT: 11.8 % (ref 10–15)
GFR SERPL CREATININE-BSD FRML MDRD: 64 ML/MIN/{1.73_M2}
GLUCOSE BLDC GLUCOMTR-MCNC: 183 MG/DL (ref 70–99)
GLUCOSE SERPL-MCNC: 184 MG/DL (ref 70–99)
HBA1C MFR BLD: 7.5 % (ref 0–5.6)
HCT VFR BLD AUTO: 40.1 % (ref 40–53)
HGB BLD-MCNC: 12.6 G/DL (ref 13.3–17.7)
IMM GRANULOCYTES # BLD: 0 10E9/L (ref 0–0.4)
IMM GRANULOCYTES NFR BLD: 0.3 %
LIPASE SERPL-CCNC: 88 U/L (ref 73–393)
LYMPHOCYTES # BLD AUTO: 2.8 10E9/L (ref 0.8–5.3)
LYMPHOCYTES NFR BLD AUTO: 25 %
MCH RBC QN AUTO: 32.2 PG (ref 26.5–33)
MCHC RBC AUTO-ENTMCNC: 31.4 G/DL (ref 31.5–36.5)
MCV RBC AUTO: 103 FL (ref 78–100)
MONOCYTES # BLD AUTO: 1.2 10E9/L (ref 0–1.3)
MONOCYTES NFR BLD AUTO: 10.8 %
NEUTROPHILS # BLD AUTO: 6.9 10E9/L (ref 1.6–8.3)
NEUTROPHILS NFR BLD AUTO: 61.3 %
NRBC # BLD AUTO: 0 10*3/UL
NRBC BLD AUTO-RTO: 0 /100
NT-PROBNP SERPL-MCNC: 18 PG/ML (ref 0–1800)
PLATELET # BLD AUTO: 309 10E9/L (ref 150–450)
POTASSIUM SERPL-SCNC: 4.1 MMOL/L (ref 3.4–5.3)
PROT SERPL-MCNC: 8.2 G/DL (ref 6.8–8.8)
RBC # BLD AUTO: 3.91 10E12/L (ref 4.4–5.9)
SARS-COV-2 RNA SPEC QL NAA+PROBE: NORMAL
SODIUM SERPL-SCNC: 133 MMOL/L (ref 133–144)
SPECIMEN SOURCE: NORMAL
TROPONIN I SERPL-MCNC: <0.015 UG/L (ref 0–0.04)
WBC # BLD AUTO: 11.2 10E9/L (ref 4–11)

## 2020-09-20 PROCEDURE — 25000128 H RX IP 250 OP 636: Performed by: EMERGENCY MEDICINE

## 2020-09-20 PROCEDURE — 25800030 ZZH RX IP 258 OP 636: Performed by: EMERGENCY MEDICINE

## 2020-09-20 PROCEDURE — 83880 ASSAY OF NATRIURETIC PEPTIDE: CPT | Performed by: EMERGENCY MEDICINE

## 2020-09-20 PROCEDURE — 99223 1ST HOSP IP/OBS HIGH 75: CPT | Mod: AI | Performed by: INTERNAL MEDICINE

## 2020-09-20 PROCEDURE — 96361 HYDRATE IV INFUSION ADD-ON: CPT

## 2020-09-20 PROCEDURE — 99285 EMERGENCY DEPT VISIT HI MDM: CPT | Mod: 25

## 2020-09-20 PROCEDURE — 85025 COMPLETE CBC W/AUTO DIFF WBC: CPT | Performed by: EMERGENCY MEDICINE

## 2020-09-20 PROCEDURE — 80053 COMPREHEN METABOLIC PANEL: CPT | Performed by: EMERGENCY MEDICINE

## 2020-09-20 PROCEDURE — 96365 THER/PROPH/DIAG IV INF INIT: CPT | Mod: 59

## 2020-09-20 PROCEDURE — 71045 X-RAY EXAM CHEST 1 VIEW: CPT

## 2020-09-20 PROCEDURE — U0003 INFECTIOUS AGENT DETECTION BY NUCLEIC ACID (DNA OR RNA); SEVERE ACUTE RESPIRATORY SYNDROME CORONAVIRUS 2 (SARS-COV-2) (CORONAVIRUS DISEASE [COVID-19]), AMPLIFIED PROBE TECHNIQUE, MAKING USE OF HIGH THROUGHPUT TECHNOLOGIES AS DESCRIBED BY CMS-2020-01-R: HCPCS | Performed by: EMERGENCY MEDICINE

## 2020-09-20 PROCEDURE — 96375 TX/PRO/DX INJ NEW DRUG ADDON: CPT

## 2020-09-20 PROCEDURE — 12000000 ZZH R&B MED SURG/OB

## 2020-09-20 PROCEDURE — 00000146 ZZHCL STATISTIC GLUCOSE BY METER IP

## 2020-09-20 PROCEDURE — 74177 CT ABD & PELVIS W/CONTRAST: CPT

## 2020-09-20 PROCEDURE — C9803 HOPD COVID-19 SPEC COLLECT: HCPCS

## 2020-09-20 PROCEDURE — 83036 HEMOGLOBIN GLYCOSYLATED A1C: CPT | Performed by: EMERGENCY MEDICINE

## 2020-09-20 PROCEDURE — 84484 ASSAY OF TROPONIN QUANT: CPT | Performed by: EMERGENCY MEDICINE

## 2020-09-20 PROCEDURE — 25000132 ZZH RX MED GY IP 250 OP 250 PS 637: Mod: GY | Performed by: INTERNAL MEDICINE

## 2020-09-20 PROCEDURE — 83690 ASSAY OF LIPASE: CPT | Performed by: EMERGENCY MEDICINE

## 2020-09-20 PROCEDURE — 93005 ELECTROCARDIOGRAM TRACING: CPT

## 2020-09-20 PROCEDURE — 25000128 H RX IP 250 OP 636: Performed by: INTERNAL MEDICINE

## 2020-09-20 PROCEDURE — 87040 BLOOD CULTURE FOR BACTERIA: CPT | Performed by: EMERGENCY MEDICINE

## 2020-09-20 RX ORDER — NICOTINE POLACRILEX 4 MG
15-30 LOZENGE BUCCAL
Status: DISCONTINUED | OUTPATIENT
Start: 2020-09-20 | End: 2020-09-22 | Stop reason: HOSPADM

## 2020-09-20 RX ORDER — METOPROLOL SUCCINATE 25 MG/1
25 TABLET, EXTENDED RELEASE ORAL DAILY
Status: DISCONTINUED | OUTPATIENT
Start: 2020-09-21 | End: 2020-09-22 | Stop reason: HOSPADM

## 2020-09-20 RX ORDER — ONDANSETRON 2 MG/ML
4 INJECTION INTRAMUSCULAR; INTRAVENOUS EVERY 6 HOURS PRN
Status: DISCONTINUED | OUTPATIENT
Start: 2020-09-20 | End: 2020-09-22 | Stop reason: HOSPADM

## 2020-09-20 RX ORDER — ONDANSETRON 4 MG/1
4 TABLET, ORALLY DISINTEGRATING ORAL EVERY 6 HOURS PRN
Status: DISCONTINUED | OUTPATIENT
Start: 2020-09-20 | End: 2020-09-22 | Stop reason: HOSPADM

## 2020-09-20 RX ORDER — POTASSIUM CHLORIDE 29.8 MG/ML
20 INJECTION INTRAVENOUS
Status: DISCONTINUED | OUTPATIENT
Start: 2020-09-20 | End: 2020-09-22 | Stop reason: HOSPADM

## 2020-09-20 RX ORDER — ATORVASTATIN CALCIUM 20 MG/1
20 TABLET, FILM COATED ORAL DAILY
Status: DISCONTINUED | OUTPATIENT
Start: 2020-09-21 | End: 2020-09-22 | Stop reason: HOSPADM

## 2020-09-20 RX ORDER — ONDANSETRON 2 MG/ML
4 INJECTION INTRAMUSCULAR; INTRAVENOUS EVERY 30 MIN PRN
Status: DISCONTINUED | OUTPATIENT
Start: 2020-09-20 | End: 2020-09-20

## 2020-09-20 RX ORDER — POTASSIUM CHLORIDE 1.5 G/1.58G
20-40 POWDER, FOR SOLUTION ORAL
Status: DISCONTINUED | OUTPATIENT
Start: 2020-09-20 | End: 2020-09-22 | Stop reason: HOSPADM

## 2020-09-20 RX ORDER — ACETAMINOPHEN 500 MG
1000 TABLET ORAL EVERY MORNING
COMMUNITY
End: 2021-08-30

## 2020-09-20 RX ORDER — HYDROMORPHONE HYDROCHLORIDE 1 MG/ML
0.5 INJECTION, SOLUTION INTRAMUSCULAR; INTRAVENOUS; SUBCUTANEOUS
Status: COMPLETED | OUTPATIENT
Start: 2020-09-20 | End: 2020-09-20

## 2020-09-20 RX ORDER — ACETAMINOPHEN 325 MG/1
650 TABLET ORAL EVERY 4 HOURS PRN
Status: DISCONTINUED | OUTPATIENT
Start: 2020-09-20 | End: 2020-09-22 | Stop reason: HOSPADM

## 2020-09-20 RX ORDER — OXYCODONE HYDROCHLORIDE 5 MG/1
5-10 TABLET ORAL EVERY 4 HOURS PRN
Status: DISCONTINUED | OUTPATIENT
Start: 2020-09-20 | End: 2020-09-22 | Stop reason: HOSPADM

## 2020-09-20 RX ORDER — METRONIDAZOLE 500 MG/1
500 TABLET ORAL 4 TIMES DAILY
Qty: 28 TABLET | Refills: 0 | Status: SHIPPED | OUTPATIENT
Start: 2020-09-20 | End: 2020-09-22

## 2020-09-20 RX ORDER — LISINOPRIL 40 MG/1
40 TABLET ORAL DAILY
Status: DISCONTINUED | OUTPATIENT
Start: 2020-09-21 | End: 2020-09-22 | Stop reason: HOSPADM

## 2020-09-20 RX ORDER — FINASTERIDE 5 MG/1
5 TABLET, FILM COATED ORAL DAILY
Status: DISCONTINUED | OUTPATIENT
Start: 2020-09-21 | End: 2020-09-22 | Stop reason: HOSPADM

## 2020-09-20 RX ORDER — FUROSEMIDE 20 MG
20 TABLET ORAL EVERY 24 HOURS
Status: DISCONTINUED | OUTPATIENT
Start: 2020-09-21 | End: 2020-09-22 | Stop reason: HOSPADM

## 2020-09-20 RX ORDER — HYDROMORPHONE HYDROCHLORIDE 1 MG/ML
.3-.5 INJECTION, SOLUTION INTRAMUSCULAR; INTRAVENOUS; SUBCUTANEOUS
Status: DISCONTINUED | OUTPATIENT
Start: 2020-09-20 | End: 2020-09-22 | Stop reason: HOSPADM

## 2020-09-20 RX ORDER — NALOXONE HYDROCHLORIDE 0.4 MG/ML
.1-.4 INJECTION, SOLUTION INTRAMUSCULAR; INTRAVENOUS; SUBCUTANEOUS
Status: DISCONTINUED | OUTPATIENT
Start: 2020-09-20 | End: 2020-09-22 | Stop reason: HOSPADM

## 2020-09-20 RX ORDER — FUROSEMIDE 40 MG
40 TABLET ORAL DAILY
Status: DISCONTINUED | OUTPATIENT
Start: 2020-09-21 | End: 2020-09-22 | Stop reason: HOSPADM

## 2020-09-20 RX ORDER — LIDOCAINE 40 MG/G
CREAM TOPICAL
Status: DISCONTINUED | OUTPATIENT
Start: 2020-09-20 | End: 2020-09-22 | Stop reason: HOSPADM

## 2020-09-20 RX ORDER — DOCUSATE SODIUM 100 MG/1
100 CAPSULE, LIQUID FILLED ORAL 2 TIMES DAILY PRN
COMMUNITY
End: 2024-03-02

## 2020-09-20 RX ORDER — ALBUTEROL SULFATE 90 UG/1
2 AEROSOL, METERED RESPIRATORY (INHALATION) EVERY 4 HOURS PRN
Status: DISCONTINUED | OUTPATIENT
Start: 2020-09-20 | End: 2020-09-22 | Stop reason: HOSPADM

## 2020-09-20 RX ORDER — CIPROFLOXACIN 500 MG/1
500 TABLET, FILM COATED ORAL 2 TIMES DAILY
Qty: 14 TABLET | Refills: 0 | Status: SHIPPED | OUTPATIENT
Start: 2020-09-20 | End: 2020-09-22

## 2020-09-20 RX ORDER — CIPROFLOXACIN 2 MG/ML
400 INJECTION, SOLUTION INTRAVENOUS EVERY 12 HOURS
Status: DISCONTINUED | OUTPATIENT
Start: 2020-09-21 | End: 2020-09-22 | Stop reason: HOSPADM

## 2020-09-20 RX ORDER — POTASSIUM CHLORIDE 1500 MG/1
20-40 TABLET, EXTENDED RELEASE ORAL
Status: DISCONTINUED | OUTPATIENT
Start: 2020-09-20 | End: 2020-09-22 | Stop reason: HOSPADM

## 2020-09-20 RX ORDER — FUROSEMIDE 20 MG
20 TABLET ORAL EVERY EVENING
COMMUNITY
End: 2021-10-05 | Stop reason: DRUGHIGH

## 2020-09-20 RX ORDER — DEXTROSE MONOHYDRATE 25 G/50ML
25-50 INJECTION, SOLUTION INTRAVENOUS
Status: DISCONTINUED | OUTPATIENT
Start: 2020-09-20 | End: 2020-09-22 | Stop reason: HOSPADM

## 2020-09-20 RX ORDER — ONDANSETRON 4 MG/1
4 TABLET, ORALLY DISINTEGRATING ORAL EVERY 8 HOURS PRN
Qty: 10 TABLET | Refills: 0 | Status: SHIPPED | OUTPATIENT
Start: 2020-09-20 | End: 2020-09-23

## 2020-09-20 RX ORDER — POTASSIUM CL/LIDO/0.9 % NACL 10MEQ/0.1L
10 INTRAVENOUS SOLUTION, PIGGYBACK (ML) INTRAVENOUS
Status: DISCONTINUED | OUTPATIENT
Start: 2020-09-20 | End: 2020-09-22 | Stop reason: HOSPADM

## 2020-09-20 RX ORDER — PROCHLORPERAZINE MALEATE 5 MG
5 TABLET ORAL EVERY 6 HOURS PRN
Status: DISCONTINUED | OUTPATIENT
Start: 2020-09-20 | End: 2020-09-22 | Stop reason: HOSPADM

## 2020-09-20 RX ORDER — PROCHLORPERAZINE 25 MG
12.5 SUPPOSITORY, RECTAL RECTAL EVERY 12 HOURS PRN
Status: DISCONTINUED | OUTPATIENT
Start: 2020-09-20 | End: 2020-09-22 | Stop reason: HOSPADM

## 2020-09-20 RX ORDER — MAGNESIUM SULFATE HEPTAHYDRATE 40 MG/ML
4 INJECTION, SOLUTION INTRAVENOUS EVERY 4 HOURS PRN
Status: DISCONTINUED | OUTPATIENT
Start: 2020-09-20 | End: 2020-09-22 | Stop reason: HOSPADM

## 2020-09-20 RX ORDER — IOPAMIDOL 755 MG/ML
100 INJECTION, SOLUTION INTRAVASCULAR ONCE
Status: COMPLETED | OUTPATIENT
Start: 2020-09-20 | End: 2020-09-20

## 2020-09-20 RX ORDER — ACETAMINOPHEN 500 MG
1000 TABLET ORAL 2 TIMES DAILY
Status: ON HOLD | COMMUNITY
End: 2024-07-09

## 2020-09-20 RX ORDER — HYDROCODONE BITARTRATE AND ACETAMINOPHEN 5; 325 MG/1; MG/1
1 TABLET ORAL EVERY 6 HOURS PRN
Qty: 10 TABLET | Refills: 0 | Status: SHIPPED | OUTPATIENT
Start: 2020-09-20 | End: 2020-09-23

## 2020-09-20 RX ORDER — ASPIRIN 81 MG/1
81 TABLET ORAL DAILY
Status: DISCONTINUED | OUTPATIENT
Start: 2020-09-21 | End: 2020-09-22 | Stop reason: HOSPADM

## 2020-09-20 RX ORDER — FUROSEMIDE 40 MG
40 TABLET ORAL EVERY MORNING
COMMUNITY
End: 2021-10-05

## 2020-09-20 RX ORDER — POTASSIUM CHLORIDE 7.45 MG/ML
10 INJECTION INTRAVENOUS
Status: DISCONTINUED | OUTPATIENT
Start: 2020-09-20 | End: 2020-09-22 | Stop reason: HOSPADM

## 2020-09-20 RX ORDER — CIPROFLOXACIN 2 MG/ML
400 INJECTION, SOLUTION INTRAVENOUS ONCE
Status: COMPLETED | OUTPATIENT
Start: 2020-09-20 | End: 2020-09-20

## 2020-09-20 RX ADMIN — CIPROFLOXACIN 400 MG: 2 INJECTION, SOLUTION INTRAVENOUS at 18:26

## 2020-09-20 RX ADMIN — SODIUM CHLORIDE 1000 ML: 9 INJECTION, SOLUTION INTRAVENOUS at 15:25

## 2020-09-20 RX ADMIN — METRONIDAZOLE 500 MG: 500 INJECTION, SOLUTION INTRAVENOUS at 19:47

## 2020-09-20 RX ADMIN — Medication 1 MG: at 21:12

## 2020-09-20 RX ADMIN — ONDANSETRON 4 MG: 2 INJECTION INTRAMUSCULAR; INTRAVENOUS at 15:25

## 2020-09-20 RX ADMIN — IOPAMIDOL 100 ML: 755 INJECTION, SOLUTION INTRAVENOUS at 15:51

## 2020-09-20 RX ADMIN — HYDROMORPHONE HYDROCHLORIDE 0.5 MG: 1 INJECTION, SOLUTION INTRAMUSCULAR; INTRAVENOUS; SUBCUTANEOUS at 15:25

## 2020-09-20 ASSESSMENT — ENCOUNTER SYMPTOMS
VOMITING: 0
DIARRHEA: 0
FEVER: 0
ABDOMINAL DISTENTION: 1
ABDOMINAL PAIN: 1
SHORTNESS OF BREATH: 1

## 2020-09-20 ASSESSMENT — MIFFLIN-ST. JEOR: SCORE: 1804.04

## 2020-09-20 ASSESSMENT — ACTIVITIES OF DAILY LIVING (ADL): ADLS_ACUITY_SCORE: 17

## 2020-09-20 NOTE — ED PROVIDER NOTES
History     Chief Complaint:  Shortness of Breath and Abdominal Pain       HPI   Nahun Villalobos is a 74 year old male with a history of DM, COPD, and lung cancer who presents with shortness of breath and abdominal pain. The patient reports having periumbilical abdominal pain that radiates to the bilateral flank, abdominal distension, and increasing shortness of breath for the past week. He notes having a CT scan of the abdomen done 2 days ago. He denies having any fevers, vomiting, or diarrhea.     Allergies:  No Known Drug Allergies     Medications:    aspirin 81 MG tablet  atorvastatin   finasteride   furosemide  lisinopril   metFORMIN   metoprolol succinate ER  tamsulosin     Past Medical History:    COPD (chronic obstructive pulmonary disease) (H)   DM (diabetes mellitus) (H)   Essential hypertension, benign   Hemorrhage of rectum and anus   Non-small cell lung cancer (H)   Obesity   Personal history of colonic polyps   Tobacco use disorder   Urinary retention   Hyperlipidemia      Past Surgical History:    Appendectomy   Cholecystectomy   Wedge resection     Family History:    Hypertension   Cancer   Cerebrovascular disease     Social History:  Smoking Status: former  Smokeless Tobacco: Never Used  Alcohol Use: No  Drug Use: No  PCP: Olegario Castillo     Review of Systems   Constitutional: Negative for fever.   Respiratory: Positive for shortness of breath.    Gastrointestinal: Positive for abdominal distention and abdominal pain (periumbilical). Negative for diarrhea and vomiting.   All other systems reviewed and are negative.        Physical Exam     Patient Vitals for the past 24 hrs:   BP Temp Temp src Pulse Resp SpO2   09/20/20 1645 -- -- -- 101 -- 92 %   09/20/20 1615 (!) 148/106 -- -- 100 -- 91 %   09/20/20 1545 (!) 158/77 -- -- 90 -- 99 %   09/20/20 1530 (!) 144/81 -- -- 90 -- --   09/20/20 1515 (!) 155/64 -- -- 85 -- --   09/20/20 1500 (!) 170/92 -- -- 87 -- 94 %   09/20/20 1440 -- -- -- 86 -- 96 %    09/20/20 1430 (!) 162/80 -- -- 90 -- 100 %   09/20/20 1413 (!) 159/82 99.6  F (37.6  C) Oral 86 17 96 %        Physical Exam    Constitutional: Alert, sitting on the bed  HENT:    Nose: Nose normal.    Mouth/Throat: Oropharynx is clear, mucous membranes are moist  Eyes: EOM are normal. Pupils are equal, round, and reactive to light.   CV: Regular rate and rhythm, no murmurs, rubs or gallops.  Resp: Clear lungs to auscultation, all lung fields. Normal respiratory effort.   GI: Soft,No rebound or guarding. Moderate periumbilical tenderness and mild distention of the abdomen   MSK: Normal range of motion. No deformity.   Neurological:   A/Ox3  5/5 strength is symmetric to the upper and lower extremities;   Sensation intact to light touch throughout the upper and lower extremities;   Skin: Skin is warm and dry.     Emergency Department Course   ECG:  ECG taken at 1427  Sinus rhythm  Normal ECG  Vent. rate 92 BPM  WV interval 156 ms  QRS duration 84 ms  QT/QTc 346/427 ms  P-R-T axes 69 44 47    Imaging:  Radiology findings were communicated with the patient who voiced understanding of the findings.    CT Abdomen Pelvis w Contrast  Preliminary Result  IMPRESSION:   1. Findings compatible with acute uncomplicated diverticulitis.   2. No evidence for abscess formation or intra-abdominal free air.    3. 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 This has been present dating back to 2013, and  is similar in size dating back to 2018, but is enlarged since 2013. A  low-grade process is suspected such as carcinoid vs. conceivably  reactive adenopathy.  Reading per radiology     Laboratory:  Laboratory findings were communicated with the patient who voiced understanding of the findings.    CBC:  WBC 11.2 (H), HGB 12.6 (L), , o/w WNL     CMP: Glucose 184 (H), albumin 3.3 (L), o/w WNL (Creatinine: 1.12)     Lipase: 88      Troponin(1441):  <0.015      Symptomatic COVID-19 Virus  (Coronavirus) by PCR Nasopharyngeal swab: pending      Interventions:  1525 0.9% sodium chloride BOLUS 1000 mL IV   1525 zofran 4 mg IV   1525 Dilaudid 0.5 mg IV   cipro 400 mg IV   Flagyl 500 mg IV    Emergency Department Course:  Past medical records, nursing notes, and vitals reviewed.    1507 I performed an exam of the patient as documented above.    IV was inserted and blood was drawn for laboratory testing, results above.     The patient was sent for imaging while in the emergency department, results above.       1633 Patient rechecked and updated.     1700 Patient rechecked and was found to be hypoxic.     1738 I spoke with Dr. Arthur of the Hospitalist service from [facility] regarding patient's presentation, findings, and plan of care.       Findings and plan explained to the Patient who consents to admission. Discussed the patient with Dr. Arthur, who will admit the patient to a adult med/surg bed for further monitoring, evaluation, and treatment.        Impression & Plan   Covid-19  Nahun Villalobos was evaluated during a global COVID-19 pandemic, which necessitated consideration that the patient might be at risk for infection with the SARS-CoV-2 virus that causes COVID-19.   Applicable protocols for evaluation were followed during the patient's care.   COVID-19 was considered as part of the patient's evaluation. The plan for testing is:  a test was obtained during this visit.     Medical Decision Making:  This is a 74-year-old male who comes in with abdominal pain and shortness of breath.  Initial exam as above with abdominal tenderness but a nonsurgical abdomen.  CT scan shows acute uncomplicated diverticulitis.  I went into informed the patient of this and he was on 2 L nasal cannula due to brief period of hypoxia after Dilaudid dose.  However, the patient was unable to wean off of this oxygen and would routinely sat between 82 and 85% room air.  Patient was placed back on 2 L and then sat of 93 to 9095%.   We observed him for approximately 2 hours after the dose of Dilaudid and he continued to have desaturations for prolonged periods of time.  I then spoke with Dr. Arthur who is amenable to admission.  Rechecked the patient, findings and plan were explained and he expressed understanding and was in agreement with the plan.      Discharge Diagnosis:    ICD-10-CM    1. Acute diverticulitis  K57.92        Disposition:  Admitted.     Scribe Disclosure:  I, Rosendo Cr, am serving as a scribe at 3:07 PM on 9/20/2020 to document services personally performed by Jean-Paul Montanez DO based on my observations and the provider's statements to me.      9/20/2020   Jean-Paul Montanez DO Gibbons, David, DO  09/20/20 [id number]

## 2020-09-20 NOTE — ED TRIAGE NOTES
SOB and Mid abdominal pain which started one week ago. Pt states he gets SOB with any exertion and the pain in his stomach radiates from his umbilicus out. A&O x 4 with VSS on RA, ABCs intact.

## 2020-09-20 NOTE — H&P
[facility]  Hospitalist Admission Note  Name: Nahun Villalobos    MRN: [id number]  YOB: 1945    Age: 74 year old  Date of admission: 9/20/2020  Primary care provider: Olegario Castillo    Chief Complaint: Abdominal pain    Assessment and Plan:   Abdominal pain, acute sigmoid diverticulitis, mesenteric mass: Reports 1 month of progressive abdominal pain initially periumbilical now rating to LLQ and RLQ.  Does report postprandial pain and has had decreased p.o. take for the last 3 days.  Night sweats and chills, but no fevers.  Vomited once.  CT the abdomen pelvis does show likely uncomplicated acute sigmoid diverticulitis, however I do note that he has had numerous CT scans in the past that always comment on some haziness around the sigmoid colon.  My other concern is that the CT shows a 4.1 x 3.3 cm heterogeneous small bowel mesenteric mass with comments that this is been present since 2013 and enlarged up to 2018.  The previous CT scans do not comment on this mass.  Given its central location I wonder if this is what is been causing 1 month of pain.  Initially recommend treatment for acute diverticulitis to see if this resolves his issue, but will ask for general surgery opinion regarding the CT findings of the mesenteric mass.  He does have a slight leukocytosis and low-grade temperature on presentation.  Abdominal exam is relatively benign, although he received IV Dilaudid earlier.  -Continue IV ciprofloxacin and IV metronidazole for sigmoid diverticulitis  -Allow full liquid diet tonight, downgrade if worsening abdominal pain  -Oxycodone IV Dilaudid as needed for pain, Zofran for nausea  -General surgery consult for the heterogeneous small bowel mesenteric mass to see if any further evaluation needed or if this could actually the etiology for his pain    Hypoxia: Hypoxia to 86% after IV Dilaudid in the ED.  This persisted for a few hours.  Does report progressive shortness of breath  especially with exertion over the last 1-1/2 years.  Suspect is overall progressive shortness of breath is likely from his COPD.  Acute hypoxia here is likely from the Dilaudid along with atelectasis.  He does have history of wedge resection for NSCLC.  Mucus production.  No history of CHF, however he does have a small amount of lower extremity edema and is on Lasix at baseline.  He had a Lexiscan done 2 months ago that was negative for ischemia.  TTE at that time showed EF 60-65% and no sign of diastolic or RV dysfunction.  Considered PE, however already received contrast load and denies any chest pain.  Previously prescribed a BiPAP for nocturnal hypoxemia, however did not tolerate due to  -Obtain chest x-ray to make sure no evidence for infiltrate or pulmonary edema  -Symptomatic COVID19 testing, isolation until results are back.  If negative remove from isolation as he is low risk  -Wean supplemental oxygen as able, if not able may need an oxygen prescription on discharge  -Consideration for CTPA if develops chest pain or worsening hypoxia, avoid now with contrast load already in the ED for CT abdomen    COPD: Smoked for 50 years, but successfully quit.  He is on Spiriva, fluticasone-salmeterol, and albuterol inhalers as needed.  PFTs from 2015 showing severe airflow obstruction.  Previously prescribed BiPAP at night that he did not tolerate due to mucus production.  He follows with sleep medicine and last saw them 1 month ago.  Also followed with Dr. Haynes from [facility] in the past.  He was referred back to Dr. Haynes last month to see if he would qualify for 24-hour supplemental oxygen with a 6-minute walk test.  No wheezing currently and I doubt acute exacerbation given 1-1/2 years of progressive symptoms with nothing acute other than measuring his oxygen at being low which is not surprising.  -Oxygen evaluation as above  -Continue PTA inhalers  -Agree with follow-up with pulmonology in clinic as  soon as able following discharge    HTN: Blood pressure elevated 150-170 range systolic in the setting of acute pain.  PTA on metoprolol succinate 25 mg daily, lisinopril 40 mg daily, and furosemide 40 mg the morning and 20 mg afternoon.  -Resume PTA regimen    Type II DM: PTA on metformin 500 mg twice daily.  Last A1c was 7.6 in 6/2020.  Blood glucose 184.  -Hold metformin initially due to decreased p.o. intake  -Medium dose line scale insulin    Obesity:  BMI 40.    History lower GI bleed: Previous bleeding for what he describes as hemorrhoids.  Also said previous polyps.  Occasionally has mild hematochezia after a hard stool.  Hemoglobin is stable in the 12 range.    NSCLC: Previous right upper lobe wedge resection in 2016.  No chemotherapy or radiation needed.  He says he follows up every 6 months with [facility] for this.    Urinary retention: Previous TURP.  Also,  has issues with intermittent hematuria.  Follows with urology.  Has been taking finasteride.      DVT Prophylaxis: Pneumatic Compression Devices initially given GI bleed acute diverticulitis  Code Status: DNR in the event of cardiac arrest.  Okay for intubation for respiratory failure for potential treatable causes.  FEN: Consistent carbohydrate moderate, no IV fluids  Discharge Dispo: Anticipate home.  Pending hypoxemia may need new home O2 order  Estimated Disch Date / # of Days until Disch: Admit inpatient for acute diverticulitis and ongoing hypoxia.  Anticipate at least 2-3 night hospitalization      History of Present Illness:  Nahun Villalobos is a 74 year old male with PMH including HTN, HLD, DM 2, COPD, obesity, urinary retention, lower GI bleed, and non-small cell lung cancer s/p wedge resection who presents with abdominal pain.  He reports abdominal pain for the past 1 month.  It started as periumbilical pain and then over time transition to left lower quadrant and some right lower quadrant pain.  It feels like it goes deep down  to the belly.  The pain has become fairly constant now, but varying in intensity.  He has had worsening pain with eating so he has had very minimal p.o. today for the last 3 days.  He denies any fevers, but he has been having chills followed by night sweats in the evenings.  He denies any weight loss or weight gain.  Occasionally has hematochezia after a very hard bowel movement which is not new for him.  Denies any melena.  Had some nausea this week and vomited once.  He denies ever having pain like this before.  He then goes on to talk about having progressive shortness of breath for the past 1-1/2 years.  He has been on and off some inhalers, but most recently has been taking them.  He has had a previous right long lobe wedge resection, but did not have chemotherapy or radiation.  Denies any recent wheezing and does not have a cough.  He is now winded if he has to walk to his car in the parking lot and needs to stop to rest.  Denies any orthopnea.  Occasionally has some bilateral lower leg swelling which comes and goes.  He does take Lasix twice daily and has not been missing any doses.    History obtained from patient, medical record, and from Dr. Montanez in the emergency department.  Blood pressure 158/77, heart rate , temperature 99.6  F, oxygen initially 99% on room air then decreased to 86% after IV Dilaudid despite being wide awake and alert.  Hypoxia has persisted requiring 2 L via nasal cannula to keep saturations 89-91%.  His initial labs show CMP within normal limits with sodium 133, creatinine 1.12, albumin 3.3.  Troponin is undetectable.  Slight leukocytosis 11.2, hemoglobin is 12.6, platelet count 309.  Blood glucose 184.  CT the abdomen pelvis shows likely mild uncomplicated sigmoid diverticulitis, but also a 4.1 x 3.3 cm small bowel mesentery heterogeneous mass that is been present since 2013 although larger since then.  He received 1 L normal saline, Zofran, IV Dilaudid 0.5 mg, IV   ciprofloxacin, and IV Flagyl.  He will be admitted inpatient for acute diverticulitis and hypoxia.     Past Medical History reviewed:  Past Medical History:   Diagnosis Date     COPD (chronic obstructive pulmonary disease) (H)      DM (diabetes mellitus) (H)      Essential hypertension, benign      Hemorrhage of rectum and anus      Non-small cell lung cancer (H)      Obesity      Personal history of colonic polyps      Tobacco use disorder      Urinary retention      Past Surgical History reviewed:  Past Surgical History:   Procedure Laterality Date     APPENDECTOMY       C NONSPECIFIC PROCEDURE      cholecystectomy     CHOLECYSTECTOMY       CYSTOSCOPY, TRANSURETHRAL RESECTION (TUR) PROSTATE, COMBINED N/A 2018    Procedure: COMBINED CYSTOSCOPY, TRANSURETHRAL RESECTION (TUR) PROSTATE;  Cystoscopy, Transurethral Resection Of The Prostate;  Surgeon: Praveena Burris MD;  Location: [facility] OR     WEDGE RESECTION      lung     Social History reviewed:  Social History     Tobacco Use     Smoking status: Former Smoker     Packs/day: 1.00     Years: 52.00     Pack years: 52.00     Types: Cigarettes     Last attempt to quit: 2013     Years since quittin.3     Smokeless tobacco: Never Used   Substance Use Topics     Alcohol use: No     Alcohol/week: 0.0 standard drinks     Social History     Social History Narrative     Not on file     Family History reviewed:  Family History   Problem Relation Age of Onset     Hypertension Mother      C.A.D. Mother         ANEURYSM     Cancer - colorectal Mother      Cancer Mother         OVARIAN     Hypertension Sister      Breast Cancer Sister      Cerebrovascular Disease Father      Glaucoma No family hx of      Macular Degeneration No family hx of      Allergies:  Allergies   Allergen Reactions     No Known Drug Allergies      Medications:  Prior to Admission medications    Medication Sig Last Dose Taking? Auth Provider   ciprofloxacin (CIPRO) 500 MG tablet Take 1  tablet (500 mg) by mouth 2 times daily for 7 days  Yes Jean-Paul Montanez DO   HYDROcodone-acetaminophen (NORCO) 5-325 MG tablet Take 1 tablet by mouth every 6 hours as needed for severe pain  Yes Jean-Paul Montanez DO   metroNIDAZOLE (FLAGYL) 500 MG tablet Take 1 tablet (500 mg) by mouth 4 times daily for 7 days  Yes Jean-Paul Montanez DO   ondansetron (ZOFRAN ODT) 4 MG ODT tab Take 1 tablet (4 mg) by mouth every 8 hours as needed for nausea  Yes Jean-Paul Montanez DO   acetaminophen (TYLENOL) 325 MG tablet Take 2 tablets (650 mg) by mouth every 4 hours as needed for mild pain   Kaylin Spivey PA   albuterol (VENTOLIN HFA) 108 (90 Base) MCG/ACT inhaler INHALE 2 PUFFS INTO THE LUNGS EVERY 6 HOURS AS NEEDED FOR SHORTNESS OF BREATH / DYSPNEA OR WHEEZING   Olegario Castillo MD   Ascorbic Acid (VITAMIN C PO) Take by mouth At Bedtime   Reported, Patient   aspirin 81 MG tablet Take 1 tablet (81 mg) by mouth daily   Olegario Castillo MD   atorvastatin (LIPITOR) 20 MG tablet Take 1 tablet (20 mg) by mouth daily   Olegario Castillo MD   blood glucose (ACCU-CHEK CHACHA) test strip Use to test blood sugar 2 times daily or as directed.   Olegario Castillo MD   blood glucose monitoring (SOFTCLIX) lancets Use to test blood sugar 2 times daily or as directed.   Olegario Castillo MD   docusate sodium (COLACE) 100 MG tablet Take 100 mg by mouth 2 times daily To prevent constipation  Patient taking differently: Take 100 mg by mouth as needed To prevent constipation   Kaylin Spivey PA   finasteride (PROSCAR) 5 MG tablet Take 1 tablet (5 mg) by mouth daily   Praveena Burris MD   fluticasone-salmeterol (ADVAIR) 250-50 MCG/DOSE diskus inhaler Inhale 1 puff into the lungs 2 times daily   Olegario Castillo MD   Fluticasone-Salmeterol (WIXELA INHUB IN) Inhale 1 puff into the lungs 2 times daily   Reported, Patient   furosemide (LASIX) 20 MG tablet Take 2 in AM and 1 in PM(60mg daily)   Olegario Castillo MD   lisinopril (PRINIVIL/ZESTRIL)  40 MG tablet TAKE 1 TABLET (40 MG) BY MOUTH DAILY   Olegario Castillo MD   metFORMIN (GLUCOPHAGE) 500 MG tablet TAKE 1 TABLET BY MOUTH TWICE A DAY WITH MEALS   Olegario Castillo MD   metoprolol succinate ER (TOPROL-XL) 25 MG 24 hr tablet Take 1 tablet (25 mg) by mouth daily   Olegario Castillo MD   order for DME Equipment being ordered: diabetic shoes, 1 pair   Olegario Castillo MD   order for DME Oxygen 2 Li/min  at night via nasal cannula   Goltz, Bennett Ezra, PA-C   order for DME Equipment delivered; Respironics 760S BIPAP with heated humidification and heated tubing.  Pressure 15/7cm. Respironics Syeda View FF mask (Medium)   Reported, Patient   sildenafil (VIAGRA) 50 MG tablet Take 1 tablet (50 mg) by mouth daily as needed (erectile dysgfunction, do not use with Flomax or alpha blockers) Or nitro products  '   Olegario Castillo MD   tamsulosin (FLOMAX) 0.4 MG capsule Take 1 capsule (0.4 mg) by mouth daily   Praveena Burris MD   tiotropium (SPIRIVA) 18 MCG inhaled capsule Inhale 18 mcg into the lungs daily   Reported, Patient     Review of Systems:  A Comprehensive greater than 10 system review of systems was carried out.  Pertinent positives and negatives are noted above.  Otherwise negative.     Physical Exam:  Blood pressure (!) 158/90, pulse 101, temperature 99.6  F (37.6  C), temperature source Oral, resp. rate 17, SpO2 100 %.  Wt Readings from Last 1 Encounters:   08/12/20 113.4 kg (250 lb)     Exam:  Constitutional: Awake, NAD  Eyes: sclera white   HEENT: atraumatic, MMM  Respiratory: Bibasilar crackles, no wheeze, no distress  Cardiovascular: RRR.  No murmur   GI: Obese, slight left lower quadrant and right lower quadrant tenderness to palpation without guarding, mild distention,  Skin: Few areas of excoriation to the bilateral lower legs without ulceration or significant erythema  Musculoskeletal/extremities: atraumatic, no major deformities.  Trace-1+ bilateral lower extremity edema  Neurologic:  A&O, speech clear, moves all extremities equally  Psychiatric: calm, cooperative, normal affect    Lab and imaging data personally reviewed:  Labs:  Recent Labs   Lab 09/20/20  1441   WBC 11.2*   HGB 12.6*   HCT 40.1   *        Recent Labs   Lab 09/20/20  1441      POTASSIUM 4.1   CHLORIDE 99   CO2 30   ANIONGAP 4   *   BUN 12   CR 1.12   GFRESTIMATED 64   GFRESTBLACK 74   JESSE 9.0   PROTTOTAL 8.2   ALBUMIN 3.3*   BILITOTAL 0.5   ALKPHOS 67   AST 30   ALT 32     Recent Labs   Lab 09/20/20  1441   TROPI <0.015       EKG: NSR, no ST elevation or depression    Imaging:  Recent Results (from the past 24 hour(s))   CT Abdomen Pelvis w Contrast    Narrative    CT ABDOMEN AND PELVIS WITH CONTRAST  9/20/2020 4:02 PM     HISTORY: Abdominal pain, acute, generalized.    COMPARISON: March 16, 2020    TECHNIQUE: Volumetric helical acquisition of CT images from the lung  bases through the symphysis pubis were acquired after administration  of 100mL Isovue-370  intravenous contrast. Coronal images  reconstructed from axial image data. Radiation dose for this scan was  reduced using automated exposure control, adjustment of the mA and/or  kV according to patient size, or iterative reconstruction technique.    FINDINGS:     LOWER CHEST: The visualized lung bases are unremarkable.     HEPATOBILIARY: No significant mass or bile duct dilatation. No  calcified gallstones.     PANCREAS: No significant mass demonstrated without contrast, duct  dilatation, or inflammatory change.    SPLEEN: No splenomegaly    ADRENAL GLANDS: No nodules    KIDNEYS/BLADDER: No gross mass, stones, or hydronephrosis. Low dense  lesion is technically indeterminant as it is slightly above 20  Hounsfield units, it is stable at 2.8 cm. Other simple cysts  bilaterally.    BOWEL: There is inflammatory change and surrounding stranding  compatible with acute diverticulitis of the sigmoid colon.  No  definite encapsulated fluid collections to  suggest abscess. There are  no dilated loops of small bowel or colon. No appendicitis  demonstrated. There is a low dense heterogeneously enhancing mass in  the small bowel mesentery measuring 4.1 x 3.3 cm.    PELVIC ORGANS: No pelvic mass.    ADDITIONAL FINDINGS: There are no abdominal or pelvic lymph nodes that  are abnormal by size criteria. No free air.  No free fluid.    MUSCULOSKELETAL: Bone windows reveal no destructive lesions.      Impression    IMPRESSION:   1. Findings compatible with acute uncomplicated diverticulitis.   2. No evidence for abscess formation or intra-abdominal free air.    3. 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 This has been present dating back to 2013, and  is similar in size dating back to 2018, but is enlarged since 2013. A  low-grade process is suspected such as carcinoid vs. conceivably  reactive adenopathy.    MD Eitan KHAN MD  Hospitalist  [facility]     admission

## 2020-09-20 NOTE — ED NOTES
[facility]  ED Nurse Handoff Report    Nahun Villalobos is a 74 year old male   ED Chief complaint: Shortness of Breath and Abdominal Pain  . ED Diagnosis:   Final diagnoses:   Acute diverticulitis   Hypoxia     Allergies:   Allergies   Allergen Reactions     No Known Drug Allergies        Code Status: Full Code  Activity level - Baseline/Home:  Independent. Activity Level - Current:   Assist X 1. Lift room needed: No. Bariatric: No   Needed: No   Isolation: No. Infection: Not Applicable.     Vital Signs:   Vitals:    09/20/20 1701 09/20/20 1702 09/20/20 1704 09/20/20 1705   BP:       Pulse: 95 97 96 94   Resp:       Temp:       TempSrc:       SpO2: (!) 87% (!) 87% (!) 86% (!) 86%       Cardiac Rhythm:  ,      Pain level: 0-10 Pain Scale: 3  Patient confused: No. Patient Falls Risk: Yes.   Elimination Status: Has voided   Patient Report - Initial Complaint: SOB, Abdominal pain . Focused Assessment: Nahun Villalobos is a 74 year old male with a history of DM, COPD, and lung cancer who presents with shortness of breath and abdominal pain. The patient reports having periumbilical abdominal pain that radiates to the bilateral flank, abdominal distension, and increasing shortness of breath for the past week. He notes having a CT scan of the abdomen done 2 days ago. He denies having any fevers, vomiting, or diarrhea.    Tests Performed: EKG, LABS, IMAGING. Abnormal Results:   Labs Ordered and Resulted from Time of ED Arrival Up to the Time of Departure from the ED   CBC WITH PLATELETS DIFFERENTIAL - Abnormal; Notable for the following components:       Result Value    WBC 11.2 (*)     RBC Count 3.91 (*)     Hemoglobin 12.6 (*)      (*)     MCHC 31.4 (*)     All other components within normal limits   COMPREHENSIVE METABOLIC PANEL - Abnormal; Notable for the following components:    Glucose 184 (*)     Albumin 3.3 (*)     All other components within normal limits   LIPASE   TROPONIN I    COVID-19 VIRUS (CORONAVIRUS) BY PCR     CT Abdomen Pelvis w Contrast   Final Result   IMPRESSION:    1. Findings compatible with acute uncomplicated diverticulitis.    2. No evidence for abscess formation or intra-abdominal free air.     3. Heterogeneously enhancing mass in the small bowel mesentery   measuring 4.1 x 3.3 cm. This has been present dating back to 2013, and   is similar in size dating back to 2018, but is enlarged since 2013. A   low-grade process is suspected such as carcinoid vs. conceivably   reactive adenopathy.      CHANDAN CHACKO MD        .   Treatments provided: See MAR  Family Comments: N/A  OBS brochure/video discussed/provided to patient:  No  ED Medications:   Medications   ondansetron (ZOFRAN) injection 4 mg (4 mg Intravenous Given 9/20/20 1525)   0.9% sodium chloride BOLUS (1,000 mLs Intravenous New Bag 9/20/20 1525)   HYDROmorphone (PF) (DILAUDID) injection 0.5 mg (0.5 mg Intravenous Given 9/20/20 1525)   iopamidol (ISOVUE-370) solution 100 mL (100 mLs Intravenous Given 9/20/20 1551)   sodium chloride (PF) 0.9% PF flush 65 mL (65 mLs Intravenous Given 9/20/20 1551)     Drips infusing:  No  For the majority of the shift, the patient's behavior Green. Interventions performed were N/A.    Sepsis treatment initiated: No     Patient tested for COVID 19 prior to admission: YES    ED Nurse Name/Phone Number: Valarie Christian RN,   5:38 PM  RECEIVING UNIT ED HANDOFF REVIEW    Above ED Nurse Handoff Report was reviewed: Yes  Reviewed by: Annalisa Cordero RN on September 20, 2020 at 6:17 PM   Did you vocera the ED RN:

## 2020-09-21 LAB
ANION GAP SERPL CALCULATED.3IONS-SCNC: 5 MMOL/L (ref 3–14)
BUN SERPL-MCNC: 8 MG/DL (ref 7–30)
CALCIUM SERPL-MCNC: 8.4 MG/DL (ref 8.5–10.1)
CHLORIDE SERPL-SCNC: 102 MMOL/L (ref 94–109)
CO2 SERPL-SCNC: 27 MMOL/L (ref 20–32)
CREAT SERPL-MCNC: 1.04 MG/DL (ref 0.66–1.25)
ERYTHROCYTE [DISTWIDTH] IN BLOOD BY AUTOMATED COUNT: 11.7 % (ref 10–15)
GFR SERPL CREATININE-BSD FRML MDRD: 69 ML/MIN/{1.73_M2}
GLUCOSE BLDC GLUCOMTR-MCNC: 143 MG/DL (ref 70–99)
GLUCOSE BLDC GLUCOMTR-MCNC: 161 MG/DL (ref 70–99)
GLUCOSE BLDC GLUCOMTR-MCNC: 176 MG/DL (ref 70–99)
GLUCOSE BLDC GLUCOMTR-MCNC: 192 MG/DL (ref 70–99)
GLUCOSE BLDC GLUCOMTR-MCNC: 201 MG/DL (ref 70–99)
GLUCOSE SERPL-MCNC: 199 MG/DL (ref 70–99)
HCT VFR BLD AUTO: 38.2 % (ref 40–53)
HGB BLD-MCNC: 12.2 G/DL (ref 13.3–17.7)
INTERPRETATION ECG - MUSE: NORMAL
LABORATORY COMMENT REPORT: NORMAL
MAGNESIUM SERPL-MCNC: 2.4 MG/DL (ref 1.6–2.3)
MCH RBC QN AUTO: 32.8 PG (ref 26.5–33)
MCHC RBC AUTO-ENTMCNC: 31.9 G/DL (ref 31.5–36.5)
MCV RBC AUTO: 103 FL (ref 78–100)
PLATELET # BLD AUTO: 271 10E9/L (ref 150–450)
POTASSIUM SERPL-SCNC: 4.6 MMOL/L (ref 3.4–5.3)
RBC # BLD AUTO: 3.72 10E12/L (ref 4.4–5.9)
SARS-COV-2 RNA SPEC QL NAA+PROBE: NEGATIVE
SODIUM SERPL-SCNC: 134 MMOL/L (ref 133–144)
SPECIMEN SOURCE: NORMAL
WBC # BLD AUTO: 9.2 10E9/L (ref 4–11)

## 2020-09-21 PROCEDURE — 80048 BASIC METABOLIC PNL TOTAL CA: CPT | Performed by: INTERNAL MEDICINE

## 2020-09-21 PROCEDURE — 99232 SBSQ HOSP IP/OBS MODERATE 35: CPT | Performed by: INTERNAL MEDICINE

## 2020-09-21 PROCEDURE — 00000146 ZZHCL STATISTIC GLUCOSE BY METER IP

## 2020-09-21 PROCEDURE — 36416 COLLJ CAPILLARY BLOOD SPEC: CPT | Performed by: INTERNAL MEDICINE

## 2020-09-21 PROCEDURE — 99221 1ST HOSP IP/OBS SF/LOW 40: CPT | Performed by: SURGERY

## 2020-09-21 PROCEDURE — 25000128 H RX IP 250 OP 636: Performed by: INTERNAL MEDICINE

## 2020-09-21 PROCEDURE — 83735 ASSAY OF MAGNESIUM: CPT | Performed by: INTERNAL MEDICINE

## 2020-09-21 PROCEDURE — 25000132 ZZH RX MED GY IP 250 OP 250 PS 637: Mod: GY | Performed by: INTERNAL MEDICINE

## 2020-09-21 PROCEDURE — 12000000 ZZH R&B MED SURG/OB

## 2020-09-21 PROCEDURE — 40000275 ZZH STATISTIC RCP TIME EA 10 MIN

## 2020-09-21 PROCEDURE — 85027 COMPLETE CBC AUTOMATED: CPT | Performed by: INTERNAL MEDICINE

## 2020-09-21 PROCEDURE — 94640 AIRWAY INHALATION TREATMENT: CPT

## 2020-09-21 RX ADMIN — METRONIDAZOLE 500 MG: 500 INJECTION, SOLUTION INTRAVENOUS at 04:06

## 2020-09-21 RX ADMIN — ASPIRIN 81 MG: 81 TABLET, COATED ORAL at 09:18

## 2020-09-21 RX ADMIN — CIPROFLOXACIN 400 MG: 2 INJECTION, SOLUTION INTRAVENOUS at 06:53

## 2020-09-21 RX ADMIN — FINASTERIDE 5 MG: 5 TABLET, FILM COATED ORAL at 09:18

## 2020-09-21 RX ADMIN — CIPROFLOXACIN 400 MG: 2 INJECTION, SOLUTION INTRAVENOUS at 21:10

## 2020-09-21 RX ADMIN — FUROSEMIDE 20 MG: 20 TABLET ORAL at 14:00

## 2020-09-21 RX ADMIN — METFORMIN HYDROCHLORIDE 500 MG: 500 TABLET, FILM COATED ORAL at 18:27

## 2020-09-21 RX ADMIN — UMECLIDINIUM 1 PUFF: 62.5 AEROSOL, POWDER ORAL at 07:58

## 2020-09-21 RX ADMIN — METRONIDAZOLE 500 MG: 500 INJECTION, SOLUTION INTRAVENOUS at 12:47

## 2020-09-21 RX ADMIN — OXYCODONE HYDROCHLORIDE 10 MG: 5 TABLET ORAL at 01:38

## 2020-09-21 RX ADMIN — ATORVASTATIN CALCIUM 20 MG: 20 TABLET, FILM COATED ORAL at 21:17

## 2020-09-21 RX ADMIN — FUROSEMIDE 40 MG: 40 TABLET ORAL at 09:18

## 2020-09-21 RX ADMIN — LISINOPRIL 40 MG: 40 TABLET ORAL at 09:18

## 2020-09-21 RX ADMIN — METOPROLOL SUCCINATE 25 MG: 25 TABLET, FILM COATED, EXTENDED RELEASE ORAL at 09:18

## 2020-09-21 RX ADMIN — METRONIDAZOLE 500 MG: 500 INJECTION, SOLUTION INTRAVENOUS at 23:16

## 2020-09-21 RX ADMIN — Medication 1 MG: at 21:29

## 2020-09-21 ASSESSMENT — ACTIVITIES OF DAILY LIVING (ADL)
ADLS_ACUITY_SCORE: 16
ADLS_ACUITY_SCORE: 20
ADLS_ACUITY_SCORE: 17
ADLS_ACUITY_SCORE: 20
ADLS_ACUITY_SCORE: 20
ADLS_ACUITY_SCORE: 16

## 2020-09-21 ASSESSMENT — MIFFLIN-ST. JEOR: SCORE: 1804.26

## 2020-09-21 NOTE — PROGRESS NOTES
[facility]  Hospitalist Progress Note  Eitan Arthur MD 09/21/20    Reason for Stay (Diagnosis): Acute diverticulitis         Assessment and Plan:      Summary of Stay:   Nahun Villalobos is a 74 year old male with PMH including HTN, HLD, DM 2, COPD, obesity, urinary retention, lower GI bleed, and non-small cell lung cancer s/p wedge resection who was admitted on 9/20/2020 with 1 month of progressive abdominal pain and also found to be hypoxic in the ER after a dose of IV Dilaudid.  Otherwise initial vital signs stable, temperature 99.6  F.  Slight leukocytosis 11.2 otherwise lab work unremarkable.  CT of the abdomen pelvis shows likely uncomplicated acute sigmoid diverticulitis and he was started on IV ciprofloxacin and Flagyl for this.  CT the abdomen also showed a 4.1 x 3.3 cm region of small bowel mesenteric mass that is not previously been commented on, although the radiology report states it is been there since 2013 with slight increase in size.  General surgery was consulted.  Recommend follow-up with his outpatient oncologist.  Feeling better today.  Advancing diet as tolerated today.    Problem List/Assessment and Plan:   Acute sigmoid diverticulitis, mesenteric mass: Reports 1 month of progressive abdominal pain initially periumbilical now rating to LLQ and RLQ.  Does report postprandial pain and has had decreased p.o. take for the last 3 days.  Night sweats and chills, but no fevers.  Vomited once.  CT the abdomen pelvis does show likely uncomplicated acute sigmoid diverticulitis, however I do note that he has had numerous CT scans in the past that always comment on some haziness around the sigmoid colon.  My other concern is that the CT shows a 4.1 x 3.3 cm heterogeneous small bowel mesenteric mass with comments that this is been present since 2013 and enlarged up to 2018.  The previous CT scans do not comment on this mass.  Given its central location I wonder if this is what is been  causing 1 month of pain.  Initially recommend treatment for acute diverticulitis to see if this resolves his issue, but will ask for general surgery opinion regarding the CT findings of the mesenteric mass.  He does have a slight leukocytosis and low-grade temperature on presentation.  Abdominal exam is relatively benign.  -Continue IV ciprofloxacin and IV metronidazole for sigmoid diverticulitis  -Advance to low fiber diet  -Oxycodone and IV Dilaudid as needed for pain, Zofran for nausea  -General surgery consulted for the heterogeneous small bowel mesenteric mass to see if any further evaluation needed or if this could actually the etiology for his pain.  Appreciate consideration recommendations.  At this time no acute surgical intervention or biopsy recommended.  Will have the patient follow-up with his oncologist as I agree I cannot see any mention of this mass in previous documentation.     Hypoxia, COPD: Hypoxia to 86% after IV Dilaudid in the ED.  He does report progressive shortness of breath especially with exertion over the last 1-1/2 years.  He has been evaluated in the clinic for this with previous TTE and Lexiscan stress test which were negative.  Smoked for 50 years, but successfully quit.  Suspect is overall progressive shortness of breath is likely from his COPD.  Acute hypoxia here is likely from the Dilaudid along with atelectasis and positioning.  He does have history of wedge resection for NSCLC.  Mucus production.  No history of CHF, however he does have a small amount of lower extremity edema and is on Lasix at baseline.  He had a Lexiscan done 2 months ago that was negative for ischemia.  TTE at that time showed EF 60-65% and no sign of diastolic or RV dysfunction.  Considered PE, however already received contrast load and denies any chest pain.  Previously prescribed a BiPAP for nocturnal hypoxemia, however did not tolerate due to secretions.  Chest x-ray here negative for infiltrate or  pulmonary edema/effusion.  Symptomatic COVID19 testing negative, isolation removed.  Doubt acute COPD exacerbation.  PFTs from 2015 showing severe airflow obstruction.  Previously prescribed BiPAP at night that he did not tolerate due to mucus production.  He follows with sleep medicine and last saw them 1 month ago.  Also followed with Dr. Haynes from [facility] in the past.  He was referred back to Dr. Haynes last month to see if he would qualify for 24-hour supplemental oxygen with a 6-minute walk test.  -continue pta Spiriva, fluticasone-salmeterol, and albuterol inhalers as needed  -Wean supplemental oxygen as able, if not able may need an oxygen prescription on discharge  -Recommend follow-up with pulmonologist  in pulmonary clinic after discharge     HTN: Blood pressure elevated 150-170 range systolic in the setting of acute pain.  PTA on metoprolol succinate 25 mg daily, lisinopril 40 mg daily, and furosemide 40 mg the morning and 20 mg afternoon.  -Resume PTA regimen     Type II DM: PTA on metformin 500 mg twice daily.  Last A1c was 7.6 in 6/2020.  Blood glucose 184.  -Resume metformin tonight with advancement of diet  -Medium dose line scale insulin     Obesity:  BMI 40.     History lower GI bleed: Previous bleeding for what he describes as hemorrhoids.  Also said previous polyps.  Occasionally has mild hematochezia after a hard stool.  Hemoglobin is stable in the 12 range.     NSCLC: Previous right upper lobe wedge resection in 2016.  No chemotherapy or radiation needed.  He says he follows up every 6 months with [facility] for this.     Urinary retention: Previous TURP.  Also,  has issues with intermittent hematuria.  Follows with urology.  Has been taking finasteride.        DVT Prophylaxis: Pneumatic Compression Devices initially given GI bleed acute diverticulitis  Code Status: DNR in the event of cardiac arrest.  Okay for intubation for respiratory failure for potential  "treatable causes.  FEN: Consistent carbohydrate moderate, no IV fluids  Discharge Dispo: Anticipate home.  Pending hypoxemia may need new home O2 order  Estimated Disch Date / # of Days until Disch: tomorrow if pain better and tolerating low fiber diet        Interval History (Subjective):      Abdominal pain improved today.  He feels hungry.  No nausea or vomiting.  Still on 1-2 L oxygen via nasal cannula.  He feels short of breath, but no different than his baseline.                  Physical Exam:      Last Vital Signs:  BP (!) 141/78 (BP Location: Left arm)   Pulse 86   Temp 98.3  F (36.8  C) (Oral)   Resp 20   Ht 1.702 m (5' 7\")   Wt 111.1 kg (244 lb 13.6 oz)   SpO2 92%   BMI 38.35 kg/m        Intake/Output Summary (Last 24 hours) at 9/21/2020 1319  Last data filed at 9/21/2020 0900  Gross per 24 hour   Intake 120 ml   Output 825 ml   Net -705 ml       Constitutional: Awake, NAD   Eyes: sclera white   HEENT:   MMM  Respiratory: Anterior lung fields clear, no crackles or wheeze  Cardiovascular: RRR.  No murmur   GI: Obese, slight left lower quadrant tenderness to palpation without guarding, slight distention, bowel sounds present.   Skin: Few areas of excoriation to the bilateral lower legs without ulceration or significant erythema  Musculoskeletal/extremities: Trace-1+ bilateral lower extremity edema  Psychiatric: calm, cooperative, normal affect         Medications:      All current medications were reviewed with changes reflected in problem list.         Data:      All new lab and imaging data was reviewed.   Labs:  Recent Labs   Lab 09/21/20  0754 09/20/20  1441    133   POTASSIUM 4.6 4.1   CHLORIDE 102 99   CO2 27 30   ANIONGAP 5 4   * 184*   BUN 8 12   CR 1.04 1.12   GFRESTIMATED 69 64   GFRESTBLACK 81 74   JESSE 8.4* 9.0     Recent Labs   Lab 09/21/20  0754 09/20/20  1441   WBC 9.2 11.2*   HGB 12.2* 12.6*   HCT 38.2* 40.1   * 103*    309     Recent Labs   Lab " 09/21/20  1228 09/21/20  0912 09/21/20  0754 09/21/20  0143 09/20/20  2109 09/20/20  1441   GLC  --   --  199*  --   --  184*   * 176*  --  192* 183*  --       Imaging:   None today      Eitan Arthur MD

## 2020-09-21 NOTE — CONSULTS
Consult Date:  09/21/2020      SURGERY CONSULTATION      REASON FOR CONSULTATION:  Mid abdominal pain, complicated diverticulitis with incidental mesenteric mass.      HISTORY OF PRESENT ILLNESS:  Mr. Villalobos is a 74-year-old gentleman with a medical history notable for a COPD and surgical history notable for open cholecystectomy and open appendectomy, who came to the ED with progressive mid abdominal pain going on for over a week.  This is associated with a single episode of vomiting.  He denies any fevers, chills or diarrhea.  He has had a recognized mesenteric mass dating back for about 7 years.  This seems to be slowly growing in size in the interim, but seems to have been stable over the last 2 years.  He denies any knowledge about this and seems to have no insight into potential diverticulitis either.  Since being admitted, the patient was placed on empiric antibiotics and he states he actually feels quite a bit better and has had return of appetite and is currently hungry.  He denies any unintentional weight loss or other abdominal complaints prior to his recent episode.  He states that his COPD has been getting worse as of late and has some dyspnea at rest, especially when speaking.      PAST MEDICAL AND SURGICAL HISTORY:  Notable for COPD as above, hypertension, type 2 diabetes, history of a lower GI bleed, nonsmall cell lung cancer, status post right upper lobe wedge resection in 2016 as well as urinary retention.      CURRENT MEDICATIONS:  Include Zofran, Tylenol, albuterol inhaler, vitamin C, baby aspirin, Lipitor, finasteride, Advair, Wixela, Lasix, lisinopril, metformin, metoprolol and Spiriva.      ALLERGIES:  THE PATIENT HAS NO RECOGNIZED DRUG ALLERGIES.      SOCIAL HISTORY:  The patient is a former smoker.  He smoked for about 52 years.  Of note, his wife passed from smoking-related disease.  He denies any alcohol use.      PHYSICAL EXAMINATION:   VITAL SIGNS:  Mr. Villalobos is afebrile, temperature  is 98.3, pulse 86 with blood pressure 140/78, respiratory rate is 20 with 92% saturations 1.5 liters.   GENERAL:  He is alert and appropriate, nontoxic.   HEENT:  He has no appreciable scleral icterus or jaundice.   HEART:  Sounds are regular.   LUNGS:  His breath sounds are distant.  He is somewhat dyspneic at rest.   ABDOMEN:  Obese, yet soft, he has a well-healed right upper quadrant Kocher incision as well as a right lower quadrant McBurney's incision.  There is some mild laxity to the right estuardo-abdomen.  He has a small asymptomatic umbilical hernia.  He has no focal tenderness in the mid abdomen.  He does have some mild tenderness in the left lower quadrant to deep palpation without guarding or rebound.      LABORATORY DATA:  Notable for white blood cell count on admission of 11,200.  Recheck this morning was 9,200.   Electrolytes and LFTs from yesterday were all unremarkable.  COVID-19 asymptomatic swab was negative.      IMAGING:  I personally reviewed the patient's CT scan done yesterday afternoon and compared this with several studies dating back to 2013.  On this occasion, he has some haziness in the fat adjacent to the sigmoid colon.  There is no extraluminal gas or abscess.  He has a mass in the small bowel mesentery in the mid abdomen measuring about 4 x 3 cm.  It is somewhat heterogeneous with some poor perfusion in the center.  He has a similar mass dating back to 2013, although it seems to be larger and less well-defined on this current study.      IMPRESSION AND RECOMMENDATIONS:  This is a 74-year-old gentleman with COPD and abdominal pain with what seems to be uncomplicated diverticulitis and incidental mesenteric mass.  How much of this mass is contributing to his current symptoms is difficult to discern but, interestingly, the patient is already feeling better with about a day's worth of antibiotics.  Etiologies for this include, but are not limited to, carcinoid, although does not have a  typical appearance versus a lymphoma which likely would have been progressive in the intervening years versus some sort of congenital duplication or ectopic tissue.  Given the slow and indolent growth, this seems less likely to be a malignancy.  For this reason, I am not terribly in favor of pursuing surgical excision given the patient's pulmonary status at present.  I would like to have his oncologist weigh in on the possibilities because as it does not seem that this has been addressed in his previous notes or truly any documentation that I could find in his Epic chart.  Otherwise, I agree with empiric treatment for his diverticulitis with Cipro and Flagyl as written.  I think it would be reasonable to give him a diet as he is hungry and is not having much in the way of pain or discomfort.  I would like to see him in the outpatient followup after further common by consultants is completed, again most notably his oncologist.      Thank you very much for this consultation.         ADRIANNE GARCIA MD             D: 2020   T: 2020   MT: PK      Name:     KEELY FRANCE   MRN:      [id number]        Account:       [id number]   :      1945           Consult Date:  2020      Document: [id number]       cc: Olegario Castillo MD

## 2020-09-21 NOTE — PLAN OF CARE
A&O x 4, assist x 1 w/gb & walker, full liquid diet, , LS dim,O2 95% 2L, melatonin given for sleep, profile not completed, will continue with POC.

## 2020-09-21 NOTE — PLAN OF CARE
To Do:  End of Shift Summary  For vital signs and complete assessments, please see documentation flowsheets.     Pertinent assessments: Patient is Aox4 this shift. Denies having any pain, nausea or stomach cramping. Patient complains of sob with activity and after coughing fits. Patient is intermittently on 1 liter of oxygen via NC to tolerate activity. Diminished lung sounds. Trace BLE edema. Blood sugars 176 and 161. Patient tolerating full liquid diet, advanced to low fiber diet.     Major Shift Events: Patient COVID came back negative, isolation precautions discontinued. Diet advanced from full liquid to low fiber.     Treatment Plan: Continue with IV antibiotic administration. No surgical intervention needed for mass in abdomen but recommends follow up with outpatient oncologist and pulmonologist    Bedside Nurse: Caty Lee RN

## 2020-09-21 NOTE — PROVIDER NOTIFICATION
1134: Removed precautions per Gavin note    1111: Paged Dr. Arthur to update regarding COVID results being negative, awaiting order to discontinue isolation precautions

## 2020-09-21 NOTE — PLAN OF CARE
To Do:  End of Shift Summary  For vital signs and complete assessments, please see documentation flowsheets.     Pertinent assessments: A&O.  Oxycodone for chronic neck pain.  Denies nausea.  Some SOB and infrequent dry cough. Lung sounds diminished on 2L O2 (none at baseline) Blood sugar 192    Major Shift Events uneventful  Treatment Plan: Flagyl, CIpro, symptom management.    Bedside Nurse: Lilian Acevedo RN

## 2020-09-22 ENCOUNTER — DOCUMENTATION ONLY (OUTPATIENT)
Dept: MEDSURG UNIT | Facility: CLINIC | Age: 75
End: 2020-09-22

## 2020-09-22 VITALS
DIASTOLIC BLOOD PRESSURE: 80 MMHG | BODY MASS INDEX: 36.47 KG/M2 | RESPIRATION RATE: 20 BRPM | HEIGHT: 67 IN | WEIGHT: 232.4 LBS | SYSTOLIC BLOOD PRESSURE: 140 MMHG | TEMPERATURE: 97.9 F | OXYGEN SATURATION: 93 % | HEART RATE: 74 BPM

## 2020-09-22 LAB
GLUCOSE BLDC GLUCOMTR-MCNC: 164 MG/DL (ref 70–99)
GLUCOSE BLDC GLUCOMTR-MCNC: 171 MG/DL (ref 70–99)
GLUCOSE BLDC GLUCOMTR-MCNC: 187 MG/DL (ref 70–99)

## 2020-09-22 PROCEDURE — 00000146 ZZHCL STATISTIC GLUCOSE BY METER IP

## 2020-09-22 PROCEDURE — 99239 HOSP IP/OBS DSCHRG MGMT >30: CPT | Performed by: INTERNAL MEDICINE

## 2020-09-22 PROCEDURE — 25000131 ZZH RX MED GY IP 250 OP 636 PS 637: Mod: GY | Performed by: INTERNAL MEDICINE

## 2020-09-22 PROCEDURE — 94640 AIRWAY INHALATION TREATMENT: CPT

## 2020-09-22 PROCEDURE — 25000128 H RX IP 250 OP 636: Performed by: INTERNAL MEDICINE

## 2020-09-22 PROCEDURE — 40000275 ZZH STATISTIC RCP TIME EA 10 MIN

## 2020-09-22 PROCEDURE — 25000132 ZZH RX MED GY IP 250 OP 250 PS 637: Mod: GY | Performed by: INTERNAL MEDICINE

## 2020-09-22 PROCEDURE — 99232 SBSQ HOSP IP/OBS MODERATE 35: CPT | Performed by: SURGERY

## 2020-09-22 RX ORDER — CIPROFLOXACIN 500 MG/1
500 TABLET, FILM COATED ORAL 2 TIMES DAILY
Qty: 16 TABLET | Refills: 0 | Status: SHIPPED | OUTPATIENT
Start: 2020-09-22 | End: 2020-10-01

## 2020-09-22 RX ORDER — METRONIDAZOLE 500 MG/1
500 TABLET ORAL 3 TIMES DAILY
Qty: 24 TABLET | Refills: 0 | Status: SHIPPED | OUTPATIENT
Start: 2020-09-22 | End: 2020-10-01

## 2020-09-22 RX ADMIN — FUROSEMIDE 40 MG: 40 TABLET ORAL at 08:33

## 2020-09-22 RX ADMIN — ASPIRIN 81 MG: 81 TABLET, COATED ORAL at 08:33

## 2020-09-22 RX ADMIN — FINASTERIDE 5 MG: 5 TABLET, FILM COATED ORAL at 08:33

## 2020-09-22 RX ADMIN — INSULIN ASPART 1 UNITS: 100 INJECTION, SOLUTION INTRAVENOUS; SUBCUTANEOUS at 12:07

## 2020-09-22 RX ADMIN — METOPROLOL SUCCINATE 25 MG: 25 TABLET, FILM COATED, EXTENDED RELEASE ORAL at 08:33

## 2020-09-22 RX ADMIN — CIPROFLOXACIN 400 MG: 2 INJECTION, SOLUTION INTRAVENOUS at 08:33

## 2020-09-22 RX ADMIN — UMECLIDINIUM 1 PUFF: 62.5 AEROSOL, POWDER ORAL at 08:16

## 2020-09-22 RX ADMIN — LISINOPRIL 40 MG: 40 TABLET ORAL at 08:33

## 2020-09-22 RX ADMIN — INSULIN ASPART 1 UNITS: 100 INJECTION, SOLUTION INTRAVENOUS; SUBCUTANEOUS at 07:32

## 2020-09-22 RX ADMIN — METFORMIN HYDROCHLORIDE 500 MG: 500 TABLET, FILM COATED ORAL at 08:33

## 2020-09-22 RX ADMIN — METRONIDAZOLE 500 MG: 500 INJECTION, SOLUTION INTRAVENOUS at 06:47

## 2020-09-22 RX ADMIN — OXYCODONE HYDROCHLORIDE 10 MG: 5 TABLET ORAL at 04:15

## 2020-09-22 ASSESSMENT — ACTIVITIES OF DAILY LIVING (ADL)
ADLS_ACUITY_SCORE: 20

## 2020-09-22 ASSESSMENT — MIFFLIN-ST. JEOR: SCORE: 1747.79

## 2020-09-22 NOTE — PROGRESS NOTES
"[facility]  General Surgery Progress Note           Assessment and Plan:   Assessment:   -Uncomplicated sigmoid diverticulitis; antibiotic therapy  -Mesenteric mass, small bowel, 4 x 3 cm; indolent growth over 7 years  -H/o nonsmall cell lung cancer, status post right upper lobe wedge resection in 2016      Plan:   -Diverticulitis therapy per Hospitalist; Cipro/Flagyl  -Follow-up needed with his Oncologist to review possible progression of mesenteric mass, possible contribution to abd symptoms.  Possible continued follow-up with imaging only, due to pulmonary status.  -No surgical interventions planned at this time, but will be available for outpatient follow-up after Oncology appt completed.  Surgery office contact information placed in discharge instructions.         Interval History:   Patient comfortable in bed.  No abd pain.  No BM today yet but feels the need to pass BM, some gas pains.  Tolerating diet without issues.  No fevers/chills/sweats.           Physical Exam:   Blood pressure (!) 140/80, pulse 74, temperature 97.9  F (36.6  C), temperature source Oral, resp. rate 20, height 1.702 m (5' 7\"), weight 105.4 kg (232 lb 6.4 oz), SpO2 93 %.    I/O last 3 completed shifts:  In: 1166 [P.O.:610; I.V.:556]  Out: 1500 [Urine:1500]    Constitutional:   awake, alert, cooperative, no apparent distress, and appears stated age      Abdomen:         Soft, rotund, mild tenderness at RUQ, non-tender throughout remainder of abd.           Data:     Recent Labs   Lab 09/21/20  0754 09/20/20  1441   WBC 9.2 11.2*   HGB 12.2* 12.6*   HCT 38.2* 40.1   * 103*    309     Recent Labs   Lab 09/21/20  0754 09/20/20  1441    133   POTASSIUM 4.6 4.1   CHLORIDE 102 99   CO2 27 30   ANIONGAP 5 4   * 184*   BUN 8 12   CR 1.04 1.12   GFRESTIMATED 69 64   GFRESTBLACK 81 74   JESSE 8.4* 9.0   MAG 2.4*  --    PROTTOTAL  --  8.2   ALBUMIN  --  3.3*   BILITOTAL  --  0.5   ALKPHOS  --  67   AST  --  30 "   ALT  --  32     CT ABDOMEN AND PELVIS WITH CONTRAST  9/20/2020 4:02 PM   IMPRESSION:   1. Findings compatible with acute uncomplicated diverticulitis.   2. No evidence for abscess formation or intra-abdominal free air.    3. Heterogeneously enhancing mass in the small bowel mesentery measuring 4.1 x 3.3 cm. This has been present dating back to 2013, and is similar in size dating back to 2018, but is enlarged since 2013. A low-grade process is suspected such as carcinoid vs. conceivably reactive adenopathy.    Winnie Rubio PA-C       As above,feels well, eating lunch and planning on going home later today  Reviewed plan as above  Jean-Paul Toledo MD  9/22/2020 11:51 AM

## 2020-09-22 NOTE — DISCHARGE SUMMARY
[facility]  Discharge Summary  Name: Nahun Villalobos    MRN: [id number]  YOB: 1945    Age: 74 year old  Date of Discharge:  9/22/2020  Date of Admission: 9/20/2020  Primary Care Provider: Olegario Castillo  Discharge Physician:  Eitan Arthur MD  Discharging Service:  Hospitalist      Discharge Diagnoses:  Acute sigmoid diverticulitis  Small bowel mesenteric mass  Chronic hypoxemic respiratory failure secondary to COPD  HTN  Type II DM  Obesity  History lower GI bleed  History of NSCLC  Urinary retention     Hospital Course:  Summary of Stay:   Nahun Villalobos is a 74 year old male with PMH including HTN, HLD, DM 2, COPD, obesity, urinary retention, lower GI bleed, and non-small cell lung cancer s/p wedge resection who was admitted on 9/20/2020 with 1 month of progressive abdominal pain and also found to be hypoxic in the ER after a dose of IV Dilaudid.  Otherwise initial vital signs stable, temperature 99.6  F.  Slight leukocytosis 11.2 otherwise lab work unremarkable.  CT of the abdomen pelvis shows likely uncomplicated acute sigmoid diverticulitis and he was started on IV ciprofloxacin and Flagyl for this.  CT the abdomen also showed a 4.1 x 3.3 cm region of small bowel mesenteric mass that is not previously been commented on, although the radiology report states it is been there since 2013 with slight increase in size.  General surgery was consulted.  Recommend follow-up with his outpatient oncologist.  Abdominal pain has since resolved and he is tolerating a low fiber diet.  Discharging home with oral ciprofloxacin and metronidazole.  Performed oxygen evaluation and he did drop to 74% with exertion so prescribing new home oxygen on discharge.  Discussed with him close follow-up with his pulmonologist in clinic which she agrees to schedule.     Problem List/Assessment and Plan:   Acute sigmoid diverticulitis, small bowel mesenteric mass: Reports 1 month of progressive abdominal  pain initially periumbilical now rating to LLQ and RLQ.  Does report postprandial pain and has had decreased p.o. take for the last 3 days.  Night sweats and chills, but no fevers.  Vomited once.  CT the abdomen pelvis does show likely uncomplicated acute sigmoid diverticulitis, however I do note that he has had numerous CT scans in the past that always comment on some haziness around the sigmoid colon.  My other concern is that the CT shows a 4.1 x 3.3 cm heterogeneous small bowel mesenteric mass with comments that this is been present since 2013 and enlarged up to 2018.  The previous CT scans do not comment on this mass.  Given its central location I wonder if this is what is been causing 1 month of pain.  Initially recommend treatment for acute diverticulitis to see if this resolves his issue, but will ask for general surgery opinion regarding the CT findings of the mesenteric mass.  He does have a slight leukocytosis and low-grade temperature on presentation.  Abdominal exam is relatively benign.  -Tolerating low fiber diet without pain  -Prescribed oral ciprofloxacin and metronidazole to complete total 10-day antibiotics  -Recommend follow-up colonoscopy in 6 to 8 weeks  -General surgery consulted for the heterogeneous small bowel mesenteric mass to see if any further evaluation needed or if this could actually the etiology for his pain.  Appreciate consideration recommendations.  At this time no acute surgical intervention or biopsy recommended.  Will have the patient follow-up with his oncologist as I agree I cannot see any mention of this mass in previous documentation.  Patient will schedule follow-up with his oncologist.     Chronic hypoxemic respiratory failure secondary to COPD: Hypoxia to 86% after IV Dilaudid in the ED.  He does report progressive shortness of breath especially with exertion over the last 1-1/2 years.  He has been evaluated in the clinic for this with previous TTE and Lexiscan stress  test which were negative.  Smoked for 50 years, but successfully quit.  Suspect is overall progressive shortness of breath is likely from his COPD.  Acute hypoxia here is likely from the Dilaudid along with atelectasis and positioning.  He does have history of wedge resection for NSCLC.  Mucus production.  No history of CHF, however he does have a small amount of lower extremity edema and is on Lasix at baseline.  He had a Lexiscan done 2 months ago that was negative for ischemia.  TTE at that time showed EF 60-65% and no sign of diastolic or RV dysfunction.  Considered PE, however already received contrast load and denies any chest pain.  Previously prescribed a BiPAP for nocturnal hypoxemia, however did not tolerate due to secretions.  Chest x-ray here negative for infiltrate or pulmonary edema/effusion.  Symptomatic COVID19 testing negative, isolation removed.  Doubt acute COPD exacerbation.  PFTs from 2015 showing severe airflow obstruction.  Previously prescribed BiPAP at night that he did not tolerate due to mucus production.  He follows with sleep medicine and last saw them 1 month ago.  Also followed with Dr. Haynes from [facility] in the past.  He was referred back to Dr. Haynes last month to see if he would qualify for 24-hour supplemental oxygen with a 6-minute walk test.  -continued pta Spiriva, fluticasone-salmeterol, and albuterol inhalers as needed  -Oxygen saturation down to 74% with exercise on room air, up to 90% on 4 L with improved symptoms.  Oxygen 92% while resting awake.  Prescribed new oxygen prescription with exertion at 4 L.  -Recommend follow-up with pulmonologist  in pulmonary clinic after discharge which he is agreeable to scheduling     HTN: Blood pressure elevated 150-170 range systolic in the setting of acute pain.  PTA on metoprolol succinate 25 mg daily, lisinopril 40 mg daily, and furosemide 40 mg the morning and 20 mg afternoon.  -Resumed PTA regimen     Type II  DM: PTA on metformin 500 mg twice daily.  Last A1c was 7.6 in 6/2020.  Blood glucose 184.  -Metformin resumed     Obesity:  BMI 40.  I did discuss with him if he were to achieve a moderate moderate weight loss this would also likely improve his respiratory and functional status.     History lower GI bleed: Previous bleeding for what he describes as hemorrhoids.  Also said previous polyps.  Occasionally has mild hematochezia after a hard stool.  Hemoglobin is stable in the 12 range.     History of NSCLC: Previous right upper lobe wedge resection in 2016.  No chemotherapy or radiation needed.  He says he follows up every 6 months with [facility] for this.     Urinary retention: Previous TURP.  Also,  has issues with intermittent hematuria.  Follows with urology.  Has been taking finasteride.     Discharge Disposition:  Discharged to home     Allergies:  Allergies   Allergen Reactions     No Known Drug Allergies         Discharge Medications:   Current Discharge Medication List      START taking these medications    Details   ciprofloxacin (CIPRO) 500 MG tablet Take 1 tablet (500 mg) by mouth 2 times daily for 8 days  Qty: 16 tablet, Refills: 0    Associated Diagnoses: Acute diverticulitis      HYDROcodone-acetaminophen (NORCO) 5-325 MG tablet Take 1 tablet by mouth every 6 hours as needed for severe pain  Qty: 10 tablet, Refills: 0      melatonin 1 MG TABS tablet Take 1 tablet (1 mg) by mouth nightly as needed for sleep  Qty: 30 tablet, Refills: 0    Associated Diagnoses: Sleep difficulties      metroNIDAZOLE (FLAGYL) 500 MG tablet Take 1 tablet (500 mg) by mouth 3 times daily for 8 days  Qty: 24 tablet, Refills: 0    Associated Diagnoses: Acute diverticulitis      ondansetron (ZOFRAN ODT) 4 MG ODT tab Take 1 tablet (4 mg) by mouth every 8 hours as needed for nausea  Qty: 10 tablet, Refills: 0         CONTINUE these medications which have NOT CHANGED    Details   !! acetaminophen (TYLENOL) 325 MG tablet Take 2  tablets (650 mg) by mouth every 4 hours as needed for mild pain  Qty: 100 tablet, Refills: 0    Associated Diagnoses: S/P transurethral resection of prostate      !! acetaminophen (TYLENOL) 500 MG tablet Take 1,000 mg by mouth every morning      !! acetaminophen (TYLENOL) 500 MG tablet Take 500 mg by mouth every evening      albuterol (VENTOLIN HFA) 108 (90 Base) MCG/ACT inhaler INHALE 2 PUFFS INTO THE LUNGS EVERY 6 HOURS AS NEEDED FOR SHORTNESS OF BREATH / DYSPNEA OR WHEEZING  Qty: 25.5 g, Refills: 2    Comments: Pharmacy may dispense brand covered by insurance (Proair, or proventil or ventolin or generic albuterol inhaler)  Associated Diagnoses: Chronic obstructive pulmonary disease, unspecified COPD type (H)      Ascorbic Acid (VITAMIN C PO) Take 500 mg by mouth At Bedtime       aspirin 81 MG tablet Take 1 tablet (81 mg) by mouth daily  Qty: 30 tablet, Refills: 11    Associated Diagnoses: Type 2 diabetes, HbA1C goal < 8% (H)      atorvastatin (LIPITOR) 20 MG tablet Take 1 tablet (20 mg) by mouth daily  Qty: 90 tablet, Refills: 3    Associated Diagnoses: Hyperlipidemia LDL goal <100      docusate sodium (COLACE) 100 MG capsule Take 100 mg by mouth 2 times daily as needed for constipation      finasteride (PROSCAR) 5 MG tablet Take 1 tablet (5 mg) by mouth daily  Qty: 90 tablet, Refills: 3    Associated Diagnoses: Gross hematuria      Fluticasone-Salmeterol (WIXELA INHUB IN) Inhale 1 puff into the lungs 2 times daily      !! furosemide (LASIX) 20 MG tablet Take 20 mg by mouth every evening      !! furosemide (LASIX) 40 MG tablet Take 40 mg by mouth every morning      lisinopril (PRINIVIL/ZESTRIL) 40 MG tablet TAKE 1 TABLET (40 MG) BY MOUTH DAILY  Qty: 90 tablet, Refills: 3    Associated Diagnoses: Type 2 diabetes mellitus without complication, without long-term current use of insulin (H); Essential hypertension, benign      metFORMIN (GLUCOPHAGE) 500 MG tablet TAKE 1 TABLET BY MOUTH TWICE A DAY WITH MEALS  Qty: 180  "tablet, Refills: 1    Associated Diagnoses: Type 2 diabetes mellitus without complication, without long-term current use of insulin (H)      metoprolol succinate ER (TOPROL-XL) 25 MG 24 hr tablet Take 1 tablet (25 mg) by mouth daily  Qty: 30 tablet, Refills: 11    Associated Diagnoses: Essential hypertension, benign      sildenafil (VIAGRA) 50 MG tablet Take 1 tablet (50 mg) by mouth daily as needed (erectile dysgfunction, do not use with Flomax or alpha blockers) Or nitro products  '  Qty: 10 tablet, Refills: 1    Associated Diagnoses: Erectile dysfunction, unspecified erectile dysfunction type      tiotropium (SPIRIVA) 18 MCG inhaled capsule Inhale 18 mcg into the lungs daily      blood glucose (ACCU-CHEK CHACHA) test strip Use to test blood sugar 2 times daily or as directed.  Qty: 60 strip, Refills: 6    Comments: Chacha Plus test strips  Associated Diagnoses: DM (diabetes mellitus) (H)      blood glucose monitoring (SOFTCLIX) lancets Use to test blood sugar 2 times daily or as directed.  Qty: 1 Box, Refills: 6    Associated Diagnoses: DM (diabetes mellitus) (H)      !! order for DME Equipment being ordered: diabetic shoes, 1 pair  Qty: 1 Package, Refills: 0    Associated Diagnoses: Type 2 diabetes mellitus without complication, without long-term current use of insulin (H)      !! order for DME Oxygen 2 Li/min  at night via nasal cannula  Qty: 1 Device, Refills: 0    Associated Diagnoses: Nocturnal hypoxemia      !! order for DME Equipment delivered; Respironics 760S BIPAP with heated humidification and heated tubing.  Pressure 15/7cm. Respironics Syeda View FF mask (Medium)       !! - Potential duplicate medications found. Please discuss with provider.           Condition on Discharge:  Discharge condition: Stable   Discharge vitals: Blood pressure (!) 140/80, pulse 74, temperature 97.9  F (36.6  C), temperature source Oral, resp. rate 20, height 1.702 m (5' 7\"), weight 105.4 kg (232 lb 6.4 oz), SpO2 93 %.   Code " "status on discharge: DNR     History of Illness:  See detailed admission note for full details.    Physical Exam:  Blood pressure (!) 140/80, pulse 74, temperature 97.9  F (36.6  C), temperature source Oral, resp. rate 20, height 1.702 m (5' 7\"), weight 105.4 kg (232 lb 6.4 oz), SpO2 93 %.  Wt Readings from Last 1 Encounters:   09/22/20 105.4 kg (232 lb 6.4 oz)     Constitutional: Awake, NAD   Eyes: sclera white   HEENT:  MMM  Respiratory: No focal crackles or wheeze  Cardiovascular: RRR.  No murmur   GI: Obese, very minimal tenderness in the left lower quadrant palpation without any guarding, bowel sounds present, not distended.     Skin: Few areas of excoriation of the lower legs  Musculoskeletal/extremities: Trace-1+ bilateral lower extremity edema   Neurologic: A&O, speech clear   Psychiatric: calm, cooperative, normal affect    Procedures other than Imaging:  None     Imaging:  Results for orders placed or performed during the hospital encounter of 09/20/20   CT Abdomen Pelvis w Contrast    Narrative    CT ABDOMEN AND PELVIS WITH CONTRAST  9/20/2020 4:02 PM     HISTORY: Abdominal pain, acute, generalized.    COMPARISON: March 16, 2020    TECHNIQUE: Volumetric helical acquisition of CT images from the lung  bases through the symphysis pubis were acquired after administration  of 100mL Isovue-370  intravenous contrast. Coronal images  reconstructed from axial image data. Radiation dose for this scan was  reduced using automated exposure control, adjustment of the mA and/or  kV according to patient size, or iterative reconstruction technique.    FINDINGS:     LOWER CHEST: The visualized lung bases are unremarkable.     HEPATOBILIARY: No significant mass or bile duct dilatation. No  calcified gallstones.     PANCREAS: No significant mass demonstrated without contrast, duct  dilatation, or inflammatory change.    SPLEEN: No splenomegaly    ADRENAL GLANDS: No nodules    KIDNEYS/BLADDER: No gross mass, stones, or " hydronephrosis. Low dense  lesion is technically indeterminant as it is slightly above 20  Hounsfield units, it is stable at 2.8 cm. Other simple cysts  bilaterally.    BOWEL: There is inflammatory change and surrounding stranding  compatible with acute diverticulitis of the sigmoid colon.  No  definite encapsulated fluid collections to suggest abscess. There are  no dilated loops of small bowel or colon. No appendicitis  demonstrated. There is a low dense heterogeneously enhancing mass in  the small bowel mesentery measuring 4.1 x 3.3 cm.    PELVIC ORGANS: No pelvic mass.    ADDITIONAL FINDINGS: There are no abdominal or pelvic lymph nodes that  are abnormal by size criteria. No free air.  No free fluid.    MUSCULOSKELETAL: Bone windows reveal no destructive lesions.      Impression    IMPRESSION:   1. Findings compatible with acute uncomplicated diverticulitis.   2. No evidence for abscess formation or intra-abdominal free air.    3. 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 This has been present dating back to 2013, and  is similar in size dating back to 2018, but is enlarged since 2013. A  low-grade process is suspected such as carcinoid vs. conceivably  reactive adenopathy.    CHANDAN CHACKO MD   XR Chest Port 1 View    Narrative    EXAM: CHEST SINGLE VIEW PORTABLE  LOCATION: [facility]  DATE/TIME: 9/20/2020 7:10 PM    INDICATION: Progressive shortness of breath. Peripheral edema.  COMPARISON: None.    FINDINGS: The lungs are clear. Normal size cardiac silhouette.      Impression    IMPRESSION: No evidence of active cardiopulmonary disease.         Consultations:  Consultation during this admission received from surgery.       Recent Lab Results:  Recent Labs   Lab 09/21/20  0754 09/20/20  1441   WBC 9.2 11.2*   HGB 12.2* 12.6*   HCT 38.2* 40.1   * 103*    309     Recent Labs   Lab 09/20/20  1817   CULT No growth after 2 days  No growth  after 2 days     Recent Labs   Lab 09/21/20  0754 09/20/20  1441    133   POTASSIUM 4.6 4.1   CHLORIDE 102 99   CO2 27 30   ANIONGAP 5 4   * 184*   BUN 8 12   CR 1.04 1.12   GFRESTIMATED 69 64   GFRESTBLACK 81 74   JESSE 8.4* 9.0   MAG 2.4*  --    PROTTOTAL  --  8.2   ALBUMIN  --  3.3*   BILITOTAL  --  0.5   ALKPHOS  --  67   AST  --  30   ALT  --  32          Pending Results:    Unresulted Labs Ordered in the Past 30 Days of this Admission     Date and Time Order Name Status Description    9/20/2020 1816 Blood culture Preliminary     9/20/2020 1816 Blood culture Preliminary          These results will be followed up by patient's primary care provider.    Discharge Instructions and Follow-Up:   Discharge Procedure Orders   Reason for your hospital stay   Order Comments: You were hospitalized for abdominal pain that is from acute diverticulitis which should resolve with an antibiotic course moving forward.  Your oxygen was low here which is likely from her COPD and oxygen with exertion has been prescribed.  I do recommend close follow-up with your pulmonologist in clinic for this.  Additionally, as we discussed if you are able to achieve a fair amount of weight loss this will also help your breathing and ability to exercise.  There was a small mass seen in your small bowel mesentery as we discussed which has been present for a number of years, but I do recommend you see your oncologist in clinic for follow-up with us to see if any other imaging tests are needed.  Recommend a follow-up colonoscopy in 6 to 8 weeks for your episode of acute diverticulitis.     Follow-up and recommended labs and tests    Order Comments: Follow-up with your oncologist later this month for the small small bowel mesenteric mass seen on CT scan.    Follow-up with your pulmonologist Dr. Haynes at next available appointment for your COPD    Outpatient colonoscopy recommended in 6 to 8 weeks for follow-up with your acute  diverticulitis, this can be ordered by your primary care provider.     Activity   Order Comments: Your activity upon discharge: activity as tolerated     Order Specific Question Answer Comments   Is discharge order? Yes      No CPR- Pre-arrest intubation OK     Order Specific Question Answer Comments   Code status determined by: Discussion with patient/ legal decision maker      Oxygen Adult/Peds   Order Comments: Oxygen Documentation:   I certify that this patient, Nahun Villalobos has been under my care (or a nurse practitioner or physican's assistant working with me). This is the face-to-face encounter for oxygen medical necessity.      Nahun Villalobos is now in a chronic stable state and continues to require supplemental oxygen. Patient has continued oxygen desaturation due to COPD J44.9.    Alternative treatment(s) tried or considered and deemed clinically infective for treatment of COPD J44.9 include inhalers.  If portability is ordered, is the patient mobile within the home? yes    **Patients who qualify for home O2 coverage under the CMS guidelines require ABG tests or O2 sat readings obtained closest to, but no earlier than 2 days prior to the discharge, as evidence of the need for home oxygen therapy. Testing must be performed while patient is in the chronic stable state. See notes for O2 sats.**     Order Specific Question Answer Comments   DME Provider: [facility]    Type: New/Recertification    Oxygen Consult Reqt: Call [facility] before patient leaves at [phone] (to ensure SATS testing is completed).    Did the patient have SpO2 (sat) testing (only needed for new oxgyen or liter flow changes)? Yes    Length of Need: Lifetime    Frequency of Use: With Activity    Mode of Delivery - With Activity Nasal Cannula    Liter Flow - With Activity (LPM): 4    Need for Portability: Yes    Evaluate for Conserving Device: Yes    Maintain Sats >= 90%    The face to face evaluation  was performed on: 9/22/2020      Diet   Order Comments: Follow this diet upon discharge: Orders Placed This Encounter      Low Fiber Diet for 2 weeks then regular diet     Order Specific Question Answer Comments   Is discharge order? Yes            I, Eitan Arthur MD, personally saw the patient today and spent greater than 30 minutes discharging this patient.    Eitan Arthur MD

## 2020-09-22 NOTE — DISCHARGE INSTRUCTIONS
Discharge Instructions  Diverticulitis  Your provider has diagnosed you with diverticulitis.  Diverticulitis is an infection of a diverticulum, which is a tiny sack-like structure that protrudes off the wall of the colon (large intestine).  These sacks are created over years of increased pressure in the colon (this is diverticulosis), usually as a result of a diet without enough fruits, vegetables, and whole grains. Because these sacks are small, bacteria can get trapped inside them and cause an infection (this is divericulitis).  This infection often causes abdominal (belly) pain, fever, nausea (sick to stomach), and vomiting (throwing up). Diverticulitis is usually treated at home.  However, sometimes diverticulitis needs treatment in the hospital and may even need surgery.    Generally, every Emergency Department visit should have a follow-up clinic visit with either a primary or a specialty clinic/provider. Please follow-up as instructed by your emergency provider today.    Return to the Emergency Department if:     You get an oral temperature above 102oF or as directed by your provider.    You have blood in your stools (bright red or black, tarry stools), or have blood in your vomit.    You keep vomiting or cannot drink liquids or cannot keep your medicine down.    You cannot have a bowel movement or you cannot pass gas.    Your stomach gets bloated or bigger.    You faint or become very weak.    Your pain is too bad to tolerate.    You have new symptoms or anything that worries you.    What can I do to help myself?    Fill any antibiotic prescriptions the provider gave you and take them right away. Be sure to finish the whole antibiotic prescription.    For the first day or two at home drink only clear liquids.  This lets your intestines rest.    If your pain has improved after one or two days, you may start eating mild foods. Soda crackers, toast, plain noodles, gelatin, applesauce and bananas are good first  "choices.  Avoid foods that have acid, are spicy, fatty or fibrous (such as meats, coarse grains, vegetables). You may start eating these foods again in about 3-4 days when you are better.    Once you are back to normal, eat a high fiber diet of fruits, vegetables and whole grains. Some people think you should avoid eating nuts, seeds, and corn, but there is no definite proof this makes any difference in whether you will get diverticulitis again.     Pain and fever can be treated with Tylenol  (acetaminophen) or you might have been prescribed a pain medication.    Probiotics: If you have been given an antibiotic, you may want to also take a probiotic pill or eat yogurt with live cultures. Probiotics have \"good bacteria\" to help your intestines stay healthy. Studies have shown that probiotics help prevent diarrhea and other intestine problems (including C. diff infection) when you take antibiotics. You can buy these without a prescription in the pharmacy section of the store.  If you were given a prescription for medicine here today, be sure to read all of the information (including the package insert) that comes with your prescription.  This will include important information about the medicine, its side effects, and any warnings that you need to know about.  The pharmacist who fills the prescription can provide more information and answer questions you may have about the medicine.  If you have questions or concerns that the pharmacist cannot address, please call or return to the Emergency Department.     Remember that you can always come back to the Emergency Department if you are not able to see your regular provider in the amount of time listed above, if you get any new symptoms, or if there is anything that worries you.        ***Dr. Armani Osorio, general surgery follow-up appt AFTER your appointment with your Oncologist at [facility].   General surgery office number: [phone]    Oxygen Provider:  Arranged " through [employer], contact number [phone]. If you have any questions or concerns please call the oxygen company directly.

## 2020-09-22 NOTE — PROGRESS NOTES
End of Shift Summary  For vital signs and complete assessments, please see documentation flowsheets.     Pertinent assessments: A&O, slightly elevated BP, currently on 1L, HS  pt declined scheduled NovoLog, 0200 . Using urinal at bedside, voiding without difficulty. Oxycodone x 1 for some neck pain.     Major Shift Events: Tolerating low fiber diet.     Treatment Plan: IV Cipro, Flagyl, pain management.  No surgical intervention needed for mass in abdomen but recommends follow up with outpatient oncologist and pulmonologist    Bedside Nurse: Thi Garcia RN

## 2020-09-22 NOTE — PROGRESS NOTES
Patient has been assessed for Home Oxygen needs. Oxygen readings:    *Pulse oximetry (SpO2) = 92% on room air at rest while awake.    *SpO2 improved to 99% on 1 liters/minute at rest.    *SpO2 = 74% on room air during activity/with exercise.    *SpO2 improved to 90 % on 4 liters/minute during activity/with exercise.

## 2020-09-22 NOTE — PROGRESS NOTES
Received intake call for home oxygen at 11AM. Reviewed patient's chart; Patient qualifies under insurance guidelines and all documentation is in the chart including a good order.   11:05AM- Called to offer choice and patient is okay with [facility] setting them up. Discussed equipment with patient and informed them that we would be to bedside with oxygen in the next 2 hours.   11:11AM- Spoke with care coordinator,Thi, confirmed we received the order, and provided them with ETA of oxygen.

## 2020-09-22 NOTE — PROGRESS NOTES
I certify that this patient, Nahun Villalobos has been under my care (or a nurse practitioner or physican's assistant working with me). This is the face-to-face encounter for oxygen medical necessity.      Nahun Villalobos is now in a chronic stable state and continues to require supplemental oxygen. Patient has continued oxygen desaturation due to COPD J44.9.    Alternative treatment(s) tried or considered and deemed clinically infective for treatment of COPD J44.9 include inhalers.  If portability is ordered, is the patient mobile within the home? yes    **Patients who qualify for home O2 coverage under the CMS guidelines require ABG tests or O2 sat readings obtained closest to, but no earlier than 2 days prior to the discharge, as evidence of the need for home oxygen therapy. Testing must be performed while patient is in the chronic stable state. See notes for O2 sats.**

## 2020-09-23 ENCOUNTER — PATIENT OUTREACH (OUTPATIENT)
Dept: NURSING | Facility: CLINIC | Age: 75
End: 2020-09-23
Payer: MEDICARE

## 2020-09-23 ENCOUNTER — TELEPHONE (OUTPATIENT)
Dept: INTERNAL MEDICINE | Facility: CLINIC | Age: 75
End: 2020-09-23

## 2020-09-23 ENCOUNTER — TELEPHONE (OUTPATIENT)
Dept: CARE COORDINATION | Facility: CLINIC | Age: 75
End: 2020-09-23

## 2020-09-23 DIAGNOSIS — Z71.89 OTHER SPECIFIED COUNSELING: ICD-10-CM

## 2020-09-23 DIAGNOSIS — Z20.822 COVID-19 RULED OUT: Primary | ICD-10-CM

## 2020-09-23 NOTE — TELEPHONE ENCOUNTER
Clinic Care Coordination Contact  Patient stated that he has questions regarding his oxygen tank and some of his medications from his hospital discharge.    Sent a telephone message to [facility].    OMAR Kim  Clinic Care Coordination  [facility]: [place]  Phone: [phone]

## 2020-09-23 NOTE — PROGRESS NOTES
Clinic Care Coordination Contact  Community Health Worker Initial Outreach    CHW Initial Information Gathering:  Referral Source: IP Report  Preferred Hospital: ([facility] or [facility])  Current living arrangement:: I live in a private home(with a friend)  Community Resources: None  Supplies used at home:: Oxygen Tubing/Supplies(Oxygen)  Equipment Currently Used at Home: cane, straight  Informal Support system:: Family, Friends, Neighbors  No PCP office visit in Past Year: No  Transportation means:: Regular car  CHW Additional Questions  Medication changes made following ED/Hospital discharge?: Yes, patient to be scheduled with CC RN/SW within approx 1 business day  MyChart active?: Yes  Patient sent Social Determinants of Health questionnaire?: Yes    Chart Review: Referral from a discharge.  IP for Abdominal Pain.  Medication Changes- Yes  Follow Up- 1 day with PCP    Patient accepts CC: Yes. Patient scheduled for assessment with Wesly Davis RN CC on 9/24/20 at 10:30am. Patient noted desire to discuss patient's health and CC.     OMAR Kim  Clinic Care Coordination  [facility]: [place] [facility]  [place]   Phone: [phone]

## 2020-09-23 NOTE — PROGRESS NOTES
COVID-19 GetWell Loop:  Testing Results: negative  Email on file OR Smartphone: Yes  Does the patient speak English: Yes  Is the patient pregnant: N/A  Candidate for GetWell Loop: Yes  Referral placed per criteria above.

## 2020-09-24 ENCOUNTER — TELEPHONE (OUTPATIENT)
Dept: CARE COORDINATION | Facility: CLINIC | Age: 75
End: 2020-09-24

## 2020-09-24 ENCOUNTER — PATIENT OUTREACH (OUTPATIENT)
Dept: NURSING | Facility: CLINIC | Age: 75
End: 2020-09-24
Payer: MEDICARE

## 2020-09-24 DIAGNOSIS — K57.32 DIVERTICULITIS OF COLON: Primary | ICD-10-CM

## 2020-09-24 RX ORDER — ACETAMINOPHEN AND CODEINE PHOSPHATE 300; 30 MG/1; MG/1
1-2 TABLET ORAL EVERY 6 HOURS PRN
Qty: 10 TABLET | Refills: 0 | Status: SHIPPED | OUTPATIENT
Start: 2020-09-24 | End: 2020-10-01

## 2020-09-24 ASSESSMENT — ACTIVITIES OF DAILY LIVING (ADL): DEPENDENT_IADLS:: CLEANING;COOKING;LAUNDRY;SHOPPING;MEAL PREPARATION

## 2020-09-24 NOTE — TELEPHONE ENCOUNTER
Very limited supply of tylenol with codeine sent to requested pharmacy.  Ultimately, the most effective way to manage this pain is to treat the underlying problem, which the antibiotics should be doing.  Would expect pain to improve steadily in next few days, even without the pain medications.

## 2020-09-24 NOTE — LETTER
My COPD Action Plan     Name: Nahun Villalobos    YOB: 1945   Date: 9/25/2020    My doctor: Nurse   My clinic: [address] [address] [phone]  My Controller Medicine: Tiotropium (Spiriva)  Serevent/Fluticasone (Advair)   Dose: see medication directions     My Rescue Medicine: Albuterol (Proair/Ventolin/Proventil) inhaler   Dose: 2 puffs every 6 hours as needed     My Flare Up Medicine: ask your doctor   Dose:      My COPD Severity: discuss with your pulmonology doctor      Use of Oxygen: 1-4 liters to keep oxygen saturation above 90%     Make sure you've had your pneumonia   vaccines.          GREEN ZONE       Doing well today      Usual level of activity and exercise    Usual amount of cough and mucus    No shortness of breath    Usual level of health (thinking clearly, sleeping well, feel like eating) Actions:      Take daily medicines    Use oxygen as prescribed    Follow regular exercise and diet plan    Avoid cigarette smoke and other irritants that harm the lungs           YELLOW ZONE          Having a bad day or flare up      Short of breath more than usual    A lot more sputum (mucus) than usual    Sputum looks yellow, green, tan, brown or bloody    More coughing or wheezing    Fever or chills    Less energy; trouble completing activities    Trouble thinking or focusing    Using quick relief inhaler or nebulizer more often    Poor sleep; symptoms wake me up    Do not feel like eating Actions:      Get plenty of rest    Take daily medicines    Use quick relief inhaler every 6 hours    If you use oxygen, call you doctor to see if you should adjust your oxygen    Do breathing exercises or other things to help you relax    Let a loved one, friend or neighbor know you are feeling worse    Call your care team if you have 2 or more symptoms.  Start taking steroids or antibiotics if directed by your care team           RED  ZONE       Need medical care now      Severe shortness of breath (feel you can't breathe)    Fever, chills    Not enough breath to do any activity    Trouble coughing up mucus, walking or talking    Blood in mucus    Frequent coughing   Rescue medicines are not working    Not able to sleep because of breathing    Feel confused or drowsy    Chest pain    Actions:      Call your health care team.  If you cannot reach your care team, call 911 or go to the emergency room.        Annual Reminders:  Meet with Care Team, Flu Shot every Fall  Pharmacy: [facility]/PH[facility]ACY #[id number] - [place], [place] - [address]

## 2020-09-24 NOTE — TELEPHONE ENCOUNTER
Questions addressed by CC RN during hospital follow-up call today.  No need for triage to call patient.  Thank you!

## 2020-09-24 NOTE — TELEPHONE ENCOUNTER
Patient recently discharged from the hospital following admission for diverticulitis.  CC RN spoke with patient today for hospital discharge follow-up.    Patient had been prescribed pain medication (hydrocodone-acetaminophen) on discharge but declined at that time.  However, patient has been having intermittent abdominal pain since hospital discharge and would like to have some pain medication available.  Patient scheduled for a follow-up appointment with PCP next week 10/1/20 but needs pain medication prescription prior to appointment.    CC RN will send pain medication request to PCP.    If approved, prescription can be sent to [facility]/pharmacy #[id number] [address] (ph: [phone] fax: [phone]).    Wesly Davis RN  Clinic Care Coordinator  [facility]  Ph: [phone]

## 2020-09-24 NOTE — LETTER
[facility]  Complex Care Plan  About Me:    Patient Name:  Nahun Villalobos    YOB: 1945  Age:         74 year old   Sukumar MRN:    [id number] Telephone Information:  Home Phone [phone]   Mobile [phone]       Address:  [address] [place] [address] Email address:  [email or web address]      Emergency Contact(s)    Name Relationship Lgl Grd Work Phone Home Phone Mobile Phone   1. MYLENE FIGUEROA Sister   [phone]   2. SINDY CHRISTIE  Daughter No  [phone]    3. SUMAN Significant ot*    [phone]           Primary language:  English     needed? No   [employer]:  [phone] op. 1  Other communication barriers: None  Preferred Method of Communication:  William  Current living arrangement: I live in a private home (lives with girlfriend)  Mobility Status/ Medical Equipment: Independent w/Device    Health Maintenance  Health Maintenance Reviewed: Due/Overdue   Health Maintenance Due   Topic Date Due     COPD ACTION PLAN  1945     ZOSTER IMMUNIZATION (2 of 3) 10/04/2013     DIABETIC FOOT EXAM  11/01/2019     LIPID  08/28/2020     MICROALBUMIN  08/28/2020     INFLUENZA VACCINE (1) 09/01/2020     My Access Plan  Medical Emergency 911   Primary Clinic Line [facility] - [phone]   24 Hour Appointment Line [phone] or  [phone]) (toll-free)   24 Hour Nurse Line [phone] (toll-free)   Preferred Urgent Care [facility], [phone]   Preferred Hospital [facility]  [phone]([facility] or [facility])   Preferred Pharmacy [id number] - [place], [place] - [address]     Behavioral Health Crisis Line The National Suicide Prevention Lifeline at 1-342.116.3642 or 911       My Care Team Members  Patient Care Team       Relationship Specialty Notifications Start End    Olegario Castillo MD PCP -  General   2/15/02     Phone: [phone] Fax: [phone]         [address] [place] [address]    Olegario Castillo MD Assigned PCP   10/4/12     Phone: [phone] Fax: [phone]         [address] [place] [address]    Praveena Burris MD MD Urology  3/1/17     Phone: [phone] Fax: [phone]         [address] [place] [address]    Areli Cordero, RN Registered Nurse   3/1/17     Phone: [phone]         Olegario Castillo MD Referring Physician Internal Medicine  1/25/18     Phone: [phone] Fax: [phone]         [address] [place] [address]    Wesly Davis RN Lead Care Coordinator Primary Care - CC  9/24/20     Phone: [phone]         Kelley Slaughter MA Community Health Worker Primary Care - CC  9/24/20     Phone: [phone]                 My Care Plans  Self Management and Treatment Plan  Goals and (Comments)  Goals        General    1. Medical (pt-stated)     Notes - Note edited  9/25/2020  6:56 AM by Wesly Davis, RN    Goal Statement: I will prevent ED visits / hospitalizations within the  next 6 months.  Date Goal set: 9/24/20  Barriers: multiple health diagnoses  Strengths: motivated to feel better and say out of the hospital  Date to Achieve By: 3/31/21  Patient expressed understanding of goal: Yes    Action steps to achieve this goal:  1. I will follow a low fiber diet for 2 weeks for my diverticulitis.  2. I will continue taking my medications as prescribed.  3. I will schedule and attend my appointment with my PCP, Pulmonology, and Oncology doctors as recommended.  4. I will use my oxygen as directed and will purchase a pulse oximeter so I can monitor my oxygen levels.  5. I will work on weight loss as advised for improved health.  6. I will notify my care team with any questions or concerns.  7. I will continue working with Care Coordination to identify and address any barriers to achieving my goal.              Action Plans on File:   Depression    Advance Care Plans/Directives Type:   None on file -- see Honoring Choices information below.    Honoring Choices    Advance Care Planning and Health Care Directives  When it comes to decisions about your health care, it s important that your voice is heard. You may not always be able to speak for yourself.    We encourage you to have discussions with your loved ones, cultural or spiritual leaders and health care providers about your goals, values, beliefs and choices.    We are a part of [employer] [place] , supporting and promoting the benefits of advance care planning conversations.    Our goals are to:    Help you make informed decisions about your healthcare choices and ensure that those choices are honored    Offer advance care planning discussions with trained staff    Ensure your choices are clearly defined, documented and available in your medical record    Translate your choices into medical orders as needed    Why is Advance Care Planning important?    Know what your health care choices are and decide what is right for you    An unexpected illness or injury could leave you unable to participate in important treatment decisions    When choices are left to others to decide that responsibility can be difficult and stressful    By discussing and outlining your choices, your voice is heard in the care you want to receive    How can I learn more?  We offer free classes at multiple locations, days and times. Our trained facilitators will provide information and guide you through a Health Care Directive document. They can also review, notarize and add your document to your medical record.    Call [employer] at [phone] or toll free at [phone] for assistance.       My Medical and Care Information  Problem List   Patient Active Problem List   Diagnosis     Essential hypertension, benign     Tobacco use disorder     Lumbago     Impotence of  organic origin     Advance care planning     Polyp of colon     Other sleep apnea     Hyperlipidemia LDL goal <100     Morbid obesity due to excess calories (H)     BPPV (benign paroxysmal positional vertigo), unspecified laterality     Chronic obstructive pulmonary disease, unspecified COPD type (H)     Type 2 diabetes mellitus without complication, without long-term current use of insulin (H)     Grief reaction     S/P transurethral resection of prostate     Hematuria, gross     Cervical segment dysfunction     Cervicalgia     Thoracic segment dysfunction     Adenocarcinoma of lung, stage 1 (H)     Erectile dysfunction     HTN (hypertension)     Acute diverticulitis      Current Medications:  Current Outpatient Medications   Medication Instructions     acetaminophen (TYLENOL) 650 mg, Oral, EVERY 4 HOURS PRN     acetaminophen (TYLENOL) 1,000 mg, Oral, EVERY MORNING     acetaminophen (TYLENOL) 500 mg, Oral, EVERY EVENING     acetaminophen-codeine (TYLENOL WITH CODEINE #3) 300-30 MG per tablet 1-2 tablets, Oral, EVERY 6 HOURS PRN     albuterol (VENTOLIN HFA) 108 (90 Base) MCG/ACT inhaler INHALE 2 PUFFS INTO THE LUNGS EVERY 6 HOURS AS NEEDED FOR SHORTNESS OF BREATH / DYSPNEA OR WHEEZING     Ascorbic Acid (VITAMIN C PO) 500 mg, Oral, AT BEDTIME     aspirin (ASA) 81 mg, Oral, DAILY     atorvastatin (LIPITOR) 20 mg, Oral, DAILY     blood glucose (ACCU-CHEK CHACHA) test strip Use to test blood sugar 2 times daily or as directed.     blood glucose monitoring (SOFTCLIX) lancets Use to test blood sugar 2 times daily or as directed.     ciprofloxacin (CIPRO) 500 mg, Oral, 2 TIMES DAILY     docusate sodium (COLACE) 100 mg, Oral, 2 TIMES DAILY PRN     finasteride (PROSCAR) 5 mg, Oral, DAILY     Fluticasone-Salmeterol (WIXELA INHUB IN) 1 puff, Inhalation, 2 TIMES DAILY     furosemide (LASIX) 40 mg, Oral, EVERY MORNING     furosemide (LASIX) 20 mg, Oral, EVERY EVENING     lisinopril (PRINIVIL/ZESTRIL) 40 MG tablet TAKE 1 TABLET  (40 MG) BY MOUTH DAILY     melatonin 1 mg, Oral, AT BEDTIME PRN     metFORMIN (GLUCOPHAGE) 500 MG tablet TAKE 1 TABLET BY MOUTH TWICE A DAY WITH MEALS     metoprolol succinate ER (TOPROL-XL) 25 mg, Oral, DAILY     metroNIDAZOLE (FLAGYL) 500 mg, Oral, 3 TIMES DAILY     order for DME Equipment delivered; Respironics 760S BIPAP with heated humidification and heated tubing.  Pressure 15/7cm. Respironics Syeda View FF mask (Medium)      order for DME Oxygen 2 Li/min<BR>at night via nasal cannula     order for DME Equipment being ordered: diabetic shoes, 1 pair     sildenafil (VIAGRA) 50 mg, Oral, DAILY PRN, Or nitro products<BR>'     tiotropium (SPIRIVA) 18 mcg, Inhalation, DAILY     Care Coordination Start Date: 9/24/2020   Frequency of Care Coordination: monthly   Form Last Updated: 09/25/2020

## 2020-09-24 NOTE — TELEPHONE ENCOUNTER
Agreed, and did review that would anticipate pain to decrease/resolve if it is solely from the diverticulitis; will reiterate.  I will notify patient that small prescription was sent.  Thank you!

## 2020-09-24 NOTE — PROGRESS NOTES
Clinic Care Coordination Contact    OUTREACH    Referral Information:  Referral Source: IP Report  Primary Diagnosis: GI Disorders  Chief Complaint   Patient presents with     Clinic Care Coordination - Post Hospital     diverticulitis      Universal Utilization: reviewed on this date  Clinic Utilization  Difficulty keeping appointments: No  Compliance Concerns: No  No-Show Concerns: No  No PCP office visit in Past Year: No  Utilization    Last refreshed: 9/25/2020  3:03 AM:  Hospital Admissions 1           Last refreshed: 9/25/2020  3:03 AM:  ED Visits 2           Last refreshed: 9/25/2020  3:03 AM:  No Show Count (past year) 4              Current as of: 9/25/2020  3:03 AM          CC RN outreach to patient following recent hospitalization for diverticulitis.  Patient was found to have low oxygen during admission, likely due to COPD; he was prescribed home oxygen on discharge.    CC RN called and spoke with patient; introduced self, discussed role of Care Coordination, and explained reason for call.    Clinical Concerns:  Current Medical Concerns: acute diverticulitis, COPD, small bowel mesentery mass, sleep apnea, DM2, history of adenocarcinoma of lung s/p wedge resection  Current Behavioral Concerns: none noted at this time    Education Provided to patient: provided information about Care Coordination, reviewed hospital discharge AVS, discussed COPD Action Plan     Pain  Pain (GOAL): Yes  Type: Acute on Chronic  Location of chronic pain: abdomen  Progression: Intermittent    Patient updated that his abdominal pain has been intermittent but mostly improved.  He did eat a hamburger when he got home from the hospital and this caused abdominal pain.  He is now working diligently to follow a low fiber diet as recommended for his diverticulitis.    Clinical Pathway: Clinic Care Coordination COPD Assessment    Discharge:  Hospital summary: Patient hospitalized at [facility] from 9/20/20 to 9/22/20 for  "acute diverticulitis.  During admission, patient was found to need supplemental oxygen related to his COPD.  He was also found to have a small bowel mesentery mass.  What recommendations were made for follow up after your recent hospitalization?    Follow-up with PCP.    Follow-up with your oncologist later this month for the small small bowel mesenteric mass seen on CT scan.    Follow-up with your pulmonologist Dr. Haynes at next available appointment for your COPD.    Outpatient colonoscopy recommended in 6 to 8 weeks for follow-up with your acute diverticulitis, this can be ordered by your primary care provider.   Have the follow up appointments been scheduled? No  If not, can I help you set up these appointments? CC RN provided patient with name and phone number for his Oncologist (Dr. Maxi Garcia at [facility]) and Pulmonology (Dr. Haynes at [facility]); patient/significant other agreed to call to schedule these follow-up appointments.  CC RN assisted patient to schedule a follow-up appointment with PCP as advised; he will discuss need for colonoscopy at this appointment.    COPD Symptom Review  How have you been feeling now that you are home? \"a little better\"  Are you having any increased shortness of breath? No    Symptoms  Anxiety: No  Chest pain: No  Chills: No  Cough: No  Fever: No  Fatigue: Sometimes  Increased sputum: No  Night sweats: No  Weight change: No  Wheezing: No  COPD symptoms limiting activities?: Yes  What COPD zone are you currently in?: Green  Taking COPD medications as prescribed: Yes  Took steroids (by mouth) for COPD: No  Overall your COPD symptoms are (GOAL): Stable    Do you have a COPD Action Plan? No; CC RN will send patient a copy  What number would you call if you were in the YELLOW zones:  [phone]    Medications:  Were you started on any new medications?  Yes  START taking:  ciprofloxacin (CIPRO)  HYDROcodone-acetaminophen (NORCO)  melatonin  metroNIDAZOLE " "(Flagyl)  ondansetron (Zofran ODT)  Were any of your previous medications changed? No  Do you have all of your medications? No, patient declined prescriptions for Norco and Zofran.  He requested a prescription for pain medication now; CC RN will send request to PCP (see separate Telephone Encounter)  Have you had trouble filling your prescriptions? No  Are you medications effective in controlling your symptoms? Yes  Are you currently on Prednisone (* does pt understand the tapering instructions)?  No    For patients with DM:     Are you monitoring your blood sugars, if so how are your blood sugars? No, patient quit monitoring his blood sugars because he \"was tired of it\".  CC RN encouraged patient to discuss this further with PCP to get advisement on how often he needs to be monitoring his blood sugars; he agreed to do so.  Did you start on insulin in the hospital or has your Insulin dose changed? No  Medication reconciliation completed? Post-discharge medication reconciliation status: Discharge medications reviewed and reconciled.  Changed medications per note/orders (see AVS).  Was MTM or Diabetic Education referral placed (*Make sure to put transitions as reason for referral)? Not at this time    Oxygen/DME:  Are you currently on oxygen?  Yes   Is this new for you? Yes   Is it set up at home? Yes   What liter flow were you instructed to use and how often do you use it? 1-4 liters to keep oxygen saturation above 90%  What type of home oxygen system do you have (*ask about portability and ability to manage a portable oxygen delivery)? Concentrator at home and tanks when leaving home  Who is your supply company? [employer]  Review with patient how to use/maintain nebulizers and inhalers: Yes, patient stated understanding; denied questions    Activity:  How much activity can you do before you are SOB? Patient experiences SOB with any activity  Have you had to reduce your activities because of dyspnea or other " symptoms? Yes     Diet:  Do you weigh yourself daily? No    Are there any current diet restrictions or changes per discharge instructions? Low fiber diet for 2 weeks related to diverticulitis, then can resume regular diet.    Emotions/Lifestyle:  Do you smoke?  reports that he quit smoking about 7 years ago. His smoking use included cigarettes. He has a 52.00 pack-year smoking history. He has never used smokeless tobacco.    Functional Status:  Dependent ADLs: Ambulation-cane  Dependent IADLs: Cleaning, Cooking, Laundry, Shopping, Meal Preparation  Bed or wheelchair confined: No  Mobility Status: Independent w/Device  Fallen 2 or more times in the past year?: (not assessed)  Any fall with injury in the past year?: (not assessed)    Living Situation:  Current living arrangement: I live in a private home (lives with girlfriend)  Type of residence: Private home - stairs    Lifestyle & Psychosocial Needs:  Diet: Low Fiber  Inadequate nutrition (GOAL): No  Tube Feeding: No  Inadequate activity/exercise (GOAL): No  Significant changes in sleep pattern (GOAL): No  Transportation means: Regular car     Caodaism or spiritual beliefs that impact treatment: No  Mental health DX: No  Mental health management concern (GOAL): No  Informal Support system: Family, Friends, Neighbors, Significant other   Socioeconomic History     Marital status:      Spouse name: Not on file     Number of children: Not on file     Years of education: Not on file     Highest education level: Not on file     Tobacco Use     Smoking status: Former Smoker     Packs/day: 1.00     Years: 52.00     Pack years: 52.00     Types: Cigarettes     Last attempt to quit: 2013     Years since quittin.3     Smokeless tobacco: Never Used   Substance and Sexual Activity     Alcohol use: No     Alcohol/week: 0.0 standard drinks     Drug use: No     Sexual activity: Yes     Partners: Female     Care Coordinator has reviewed patient's Social Determinants  of Health ([facility]) on this date. Upon review, changes were not  Made.    CC RN requested that patient complete SDoH questionnaire which was sent to him via [employer]; he agreed to do so.     Resources and Interventions:  Community Resources: None  Supplies used at home: Oxygen Tubing/Supplies  Equipment Currently Used at Home: cane, straight, glucometer    Advance Care Plan/Directive  Advanced Care Plans/Directives on file: No  Advanced Care Plan/Directive Status: (not discussed this encounter)  CC RN sent patient information about Honoring Choices.    Referrals Placed: [facility]  CC RN provided patient/significant other with phone number to [facility]; encouraged to call to inquire about eligibility for an assessment for [facility] programs/services.     Goals:   Goals        General    1. Medical (pt-stated)     Notes - Note edited  9/25/2020  6:56 AM by Wesly Davis RN    Goal Statement: I will prevent ED visits / hospitalizations within the  next 6 months.  Date Goal set: 9/24/20  Barriers: multiple health diagnoses  Strengths: motivated to feel better and say out of the hospital  Date to Achieve By: 3/31/21  Patient expressed understanding of goal: Yes    Action steps to achieve this goal:  1. I will follow a low fiber diet for 2 weeks for my diverticulitis.  2. I will continue taking my medications as prescribed.  3. I will schedule and attend my appointment with my PCP, Pulmonology, and Oncology doctors as recommended.  4. I will use my oxygen as directed and will purchase a pulse oximeter so I can monitor my oxygen levels.  5. I will work on weight loss as advised for improved health.  6. I will notify my care team with any questions or concerns.  7. I will continue working with Care Coordination to identify and address any barriers to achieving my goal.        Patient/Caregiver understanding: Yes    Outreach Frequency: monthly  Future Appointments              In 3 days Stephanie  SUSI Wolff IAM EDINA    In 6 days Olegario Castillo MD [facility]        Plan:    CC RN will send patient Care Coordination introduction letter, Complex Care Plan, and COPD Action Plan for reference.    Patient will continue taking his medications as prescribed.    Patient will follow a low fiber diet for 2 weeks for his diverticulitis.    Patient will schedule and attend appointments with PCP, Pulmonology, and Oncology as instructed.    Patient will continue using oxygen as directed; he will buy a pulse oximeter for better oxygen monitoring.    Patient agreed to contact CC RN with additional questions or concerns.    Care Coordination will outreach to patient in approximately 1 month to get updates on patient status, assess goal progress, and offer additional support and resources as indicated.    Wesly Davis RN  Clinic Care Coordinator  [facility]  Ph: [phone]

## 2020-09-24 NOTE — LETTER
[place] CARE COORDINATION  [facility]  [address], [place] [address]    September 25, 2020    Nahun Villalobos  [address]  [facility] [address]      Dear Nahun,    I am a clinic care coordinator who works with Olegario Castillo MD at [facility]. I wanted to thank you for spending the time to talk with me.  Below is a description of clinic care coordination and how I can further assist you.      The clinic care coordination team is made up of a registered nurse,  and community health worker who understand the health care system. The goal of clinic care coordination is to help you manage your health and improve access to the health care system in the most efficient manner. The team can assist you in meeting your health care goals by providing education, coordinating services, strengthening the communication among your providers and supporting you with any resource needs.    Please feel free to contact me with any questions or concerns. We are focused on providing you with the highest-quality healthcare experience possible and that all starts with you.     Sincerely,     Wesly Davis RN  Clinic Care Coordinator  [facility]  Ph: [phone]    Enclosed: I have enclosed a copy of the Complex Care Plan. This has helpful information and goals that we have talked about. Please keep this in an easy to access place to use as needed.

## 2020-09-24 NOTE — TELEPHONE ENCOUNTER
Appears care coordinator RN called patient for hospital f.u? Were below questions addressed by care coordinator?

## 2020-09-26 LAB
BACTERIA SPEC CULT: NO GROWTH
BACTERIA SPEC CULT: NO GROWTH
SPECIMEN SOURCE: NORMAL
SPECIMEN SOURCE: NORMAL

## 2020-09-28 ENCOUNTER — PATIENT OUTREACH (OUTPATIENT)
Dept: NURSING | Facility: CLINIC | Age: 75
End: 2020-09-28
Payer: MEDICARE

## 2020-09-28 ASSESSMENT — ACTIVITIES OF DAILY LIVING (ADL): DEPENDENT_IADLS:: CLEANING;COOKING;LAUNDRY;SHOPPING;MEAL PREPARATION

## 2020-09-28 NOTE — PROGRESS NOTES
Clinic Care Coordination Contact    Follow Up Progress Note     CC RN received a voicemail over the weekend from patient's significant other reporting patient is having diarrhea and requested a call back.    Assessment:    Patient reported he has been having about 1 loose stool per day.  He stated his diarrhea has not gotten any better or any worse since this weekend.    Patient did update that he picked up and started taking OTC probiotics to help with the diarrhea.    CC RN discussed with patient that his diarrhea could be from his antibiotics; he stated understanding.  Patient will complete his antibiotics on 9/30/20.    Patient also updated that he was looking into switching oxygen supply companies because he would like to get a portable oxygen concentrator.  Currently, patient only has large oxygen tanks and large non-portable concentrator.    Patient otherwise reported things have been going okay since last outreach.  He confirmed his upcoming appointment with PCP as scheduled.    Goals addressed this encounter:   Goals Addressed                 This Visit's Progress       Patient Stated      1. Medical (pt-stated)   10%     Goal Statement: I will prevent ED visits / hospitalizations within the  next 6 months.  Date Goal set: 9/24/20  Barriers: multiple health diagnoses  Strengths: motivated to feel better and say out of the hospital  Date to Achieve By: 3/31/21  Patient expressed understanding of goal: Yes    Action steps to achieve this goal:  1. I will follow a low fiber diet for 2 weeks for my diverticulitis.  2. I will continue taking my medications as prescribed.  3. I will schedule and attend my appointment with my PCP, Pulmonology, and Oncology doctors as recommended.  4. I will use my oxygen as directed and will purchase a pulse oximeter so I can monitor my oxygen levels.  5. I will work on weight loss as advised for improved health.  6. I will notify my care team with any questions or concerns.  7. I  will continue working with Care Coordination to identify and address any barriers to achieving my goal.        Intervention/Education provided during outreach:    CC RN left voicemail for [facility]; requested call back with information about options for getting patient a portable oxygen concentrator.    CC RN instructed patient to continue his antibiotics until finish date; he agreed to do so.  CC RN also instructed patient to continue with probiotics and notify care team if his diarrhea persists or worsens following completion of antibiotics; he agreed to do so.    Outreach Frequency: monthly    Plan:     Patient will continue taking his antibiotics as prescribed.  He will continue with probiotics and will notify his care team if his diarrhea persists or worsens.    CC RN received call back from Bindu with [facility]; she informed that patient's oxygen flow is above 3 LPM max for portable oxygen concentrators, so she recommended that patient give it some time to see if he can wean off any oxygen, and in the meantime he can call to request smaller tanks for portability.  CC RN left voicemail for patient with above information and recommendations; requested he call back with further questions.    Care Coordination will outreach to patient in approximately 1 month to get updates on patient status, assess goal progress, and offer additional support and resources as indicated.    Wesly Davis, RN  Clinic Care Coordinator  [facility]  Ph: [phone]

## 2020-10-01 ENCOUNTER — OFFICE VISIT (OUTPATIENT)
Dept: INTERNAL MEDICINE | Facility: CLINIC | Age: 75
End: 2020-10-01
Payer: MEDICARE

## 2020-10-01 VITALS
BODY MASS INDEX: 38.33 KG/M2 | WEIGHT: 244.2 LBS | OXYGEN SATURATION: 97 % | SYSTOLIC BLOOD PRESSURE: 114 MMHG | DIASTOLIC BLOOD PRESSURE: 68 MMHG | TEMPERATURE: 98.7 F | RESPIRATION RATE: 16 BRPM | HEIGHT: 67 IN | HEART RATE: 72 BPM

## 2020-10-01 DIAGNOSIS — Z12.11 COLON CANCER SCREENING: ICD-10-CM

## 2020-10-01 DIAGNOSIS — Z12.5 SCREENING PSA (PROSTATE SPECIFIC ANTIGEN): ICD-10-CM

## 2020-10-01 DIAGNOSIS — Z09 HOSPITAL DISCHARGE FOLLOW-UP: Primary | ICD-10-CM

## 2020-10-01 DIAGNOSIS — J44.9 CHRONIC OBSTRUCTIVE PULMONARY DISEASE, UNSPECIFIED COPD TYPE (H): Chronic | ICD-10-CM

## 2020-10-01 DIAGNOSIS — Z23 NEED FOR PROPHYLACTIC VACCINATION AND INOCULATION AGAINST INFLUENZA: ICD-10-CM

## 2020-10-01 DIAGNOSIS — K57.92 ACUTE DIVERTICULITIS: ICD-10-CM

## 2020-10-01 DIAGNOSIS — I10 ESSENTIAL HYPERTENSION: ICD-10-CM

## 2020-10-01 DIAGNOSIS — E11.9 TYPE 2 DIABETES MELLITUS WITHOUT COMPLICATION, WITHOUT LONG-TERM CURRENT USE OF INSULIN (H): ICD-10-CM

## 2020-10-01 PROCEDURE — 90732 PPSV23 VACC 2 YRS+ SUBQ/IM: CPT | Performed by: INTERNAL MEDICINE

## 2020-10-01 PROCEDURE — G0009 ADMIN PNEUMOCOCCAL VACCINE: HCPCS | Performed by: INTERNAL MEDICINE

## 2020-10-01 PROCEDURE — G0008 ADMIN INFLUENZA VIRUS VAC: HCPCS | Performed by: INTERNAL MEDICINE

## 2020-10-01 PROCEDURE — 99495 TRANSJ CARE MGMT MOD F2F 14D: CPT | Mod: 25 | Performed by: INTERNAL MEDICINE

## 2020-10-01 PROCEDURE — 90662 IIV NO PRSV INCREASED AG IM: CPT | Performed by: INTERNAL MEDICINE

## 2020-10-01 RX ORDER — METRONIDAZOLE 500 MG/1
500 TABLET ORAL 3 TIMES DAILY
COMMUNITY
End: 2020-10-21

## 2020-10-01 ASSESSMENT — MIFFLIN-ST. JEOR: SCORE: 1801.31

## 2020-10-01 NOTE — PROGRESS NOTES
Subjective     Nahun Villalobos is a 74 year old male who presents to clinic today for the following health issues:    HPI           Hospital Follow-up Visit:    Hospital/Nursing Home/IP Rehab Facility: [facility]  Date of Admission: 09/20/20  Date of Discharge: 09/22/20  Reason(s) for Admission: Acute diverticulitis       Was your hospitalization related to COVID-19? No   Problems taking medications regularly:  None  Medication changes since discharge: None  Problems adhering to non-medication therapy:  None    Summary of hospitalization:  [facility] discharge summary reviewed  Diagnostic Tests/Treatments reviewed.  Follow up needed: oncologist  Other Healthcare Providers Involved in Patient s Care:         None  Update since discharge: improved.       Post Discharge Medication Reconciliation: discharge medications reconciled, continue medications without change.  Plan of care communicated with patient              Problem List/Assessment and Plan:   Acute sigmoid diverticulitis, small bowel mesenteric mass: Reports 1 month of progressive abdominal pain initially periumbilical now rating to LLQ and RLQ.  Does report postprandial pain and has had decreased p.o. take for the last 3 days.  Night sweats and chills, but no fevers.  Vomited once.  CT the abdomen pelvis does show likely uncomplicated acute sigmoid diverticulitis, however I do note that he has had numerous CT scans in the past that always comment on some haziness around the sigmoid colon.  My other concern is that the CT shows a 4.1 x 3.3 cm heterogeneous small bowel mesenteric mass with comments that this is been present since 2013 and enlarged up to 2018.  The previous CT scans do not comment on this mass.  Given its central location I wonder if this is what is been causing 1 month of pain.  Initially recommend treatment for acute diverticulitis to see if this resolves his issue, but will ask for general surgery opinion regarding  the CT findings of the mesenteric mass.  He does have a slight leukocytosis and low-grade temperature on presentation.  Abdominal exam is relatively benign.  -Tolerating low fiber diet without pain  -Prescribed oral ciprofloxacin and metronidazole to complete total 10-day antibiotics  -Recommend follow-up colonoscopy in 6 to 8 weeks  -General surgery consulted for the heterogeneous small bowel mesenteric mass to see if any further evaluation needed or if this could actually the etiology for his pain.  Appreciate consideration recommendations.  At this time no acute surgical intervention or biopsy recommended.  Will have the patient follow-up with his oncologist as I agree I cannot see any mention of this mass in previous documentation.  Patient will schedule follow-up with his oncologist.     Chronic hypoxemic respiratory failure secondary to COPD: Hypoxia to 86% after IV Dilaudid in the ED.  He does report progressive shortness of breath especially with exertion over the last 1-1/2 years.  He has been evaluated in the clinic for this with previous TTE and Lexiscan stress test which were negative.  Smoked for 50 years, but successfully quit.  Suspect is overall progressive shortness of breath is likely from his COPD.  Acute hypoxia here is likely from the Dilaudid along with atelectasis and positioning.  He does have history of wedge resection for NSCLC.  Mucus production.  No history of CHF, however he does have a small amount of lower extremity edema and is on Lasix at baseline.  He had a Lexiscan done 2 months ago that was negative for ischemia.  TTE at that time showed EF 60-65% and no sign of diastolic or RV dysfunction.  Considered PE, however already received contrast load and denies any chest pain.  Previously prescribed a BiPAP for nocturnal hypoxemia, however did not tolerate due to secretions.  Chest x-ray here negative for infiltrate or pulmonary edema/effusion.  Symptomatic COVID19 testing  negative, isolation removed.  Doubt acute COPD exacerbation.  PFTs from 2015 showing severe airflow obstruction.  Previously prescribed BiPAP at night that he did not tolerate due to mucus production.  He follows with sleep medicine and last saw them 1 month ago.  Also followed with Dr. Haynes from [facility] in the past.  He was referred back to Dr. Haynes last month to see if he would qualify for 24-hour supplemental oxygen with a 6-minute walk test.  -continued pta Spiriva, fluticasone-salmeterol, and albuterol inhalers as needed  -Oxygen saturation down to 74% with exercise on room air, up to 90% on 4 L with improved symptoms.  Oxygen 92% while resting awake.  Prescribed new oxygen prescription with exertion at 4 L.  -Recommend follow-up with pulmonologist  in pulmonary clinic after discharge which he is agreeable to scheduling     HTN: Blood pressure elevated 150-170 range systolic in the setting of acute pain.  PTA on metoprolol succinate 25 mg daily, lisinopril 40 mg daily, and furosemide 40 mg the morning and 20 mg afternoon.  -Resumed PTA regimen     Type II DM: PTA on metformin 500 mg twice daily.  Last A1c was 7.6 in 6/2020.  Blood glucose 184.  -Metformin resumed     Obesity:  BMI 40.  I did discuss with him if he were to achieve a moderate moderate weight loss this would also likely improve his respiratory and functional status.    Review of Systems   CONSTITUTIONAL: NEGATIVE for fever, chills, change in weight  INTEGUMENTARY/SKIN: NEGATIVE for worrisome rashes, moles or lesions  EYES: NEGATIVE for vision changes or irritation  ENT/MOUTH: NEGATIVE for ear, mouth and throat problems  RESP: NEGATIVE for significant cough or SOB  CV: NEGATIVE for chest pain, palpitations or peripheral edema  : NEGATIVE for frequency, dysuria, or hematuria  MUSCULOSKELETAL: NEGATIVE for significant arthralgias or myalgia  NEURO: NEGATIVE for weakness, dizziness or paresthesias  ENDOCRINE: NEGATIVE for  "temperature intolerance, skin/hair changes  HEME: NEGATIVE for bleeding problems  PSYCHIATRIC: NEGATIVE for changes in mood or affect      Objective    /68   Pulse 72   Temp 98.7  F (37.1  C) (Temporal)   Resp 16   Ht 1.702 m (5' 7\")   Wt 110.8 kg (244 lb 3.2 oz)   SpO2 97%   BMI 38.25 kg/m    Body mass index is 38.25 kg/m .  Physical Exam   GENERAL: healthy, alert and no distress  EYES: Eyes grossly normal to inspection, PERRL and conjunctivae and sclerae normal  HENT: ear canals and TM's normal, nose and mouth without ulcers or lesions  NECK: no adenopathy, no asymmetry, masses, or scars and thyroid normal to palpation  RESP: lungs clear to auscultation - no rales, rhonchi or wheezes  CV: regular rate and rhythm, normal S1 S2, no S3 or S4, no murmur, click or rub, no peripheral edema and peripheral pulses strong  ABDOMEN: obese  MS: no gross musculoskeletal defects noted, no edema  NEURO: Normal strength and tone, mentation intact and speech normal  PSYCH: mentation appears normal, affect normal/bright      Lab Results   Component Value Date    A1C 7.5 09/20/2020    A1C 7.6 06/18/2020    A1C 7.8 01/23/2020    A1C 8.0 08/28/2019    A1C 7.5 11/01/2018     Creatinine   Date Value Ref Range Status   09/21/2020 1.04 0.66 - 1.25 mg/dL Final     PSA   Date Value Ref Range Status   10/27/2017 1.98 0 - 4 ug/L Final     Comment:     Assay Method:  Chemiluminescence using Siemens Vista analyzer     Hemoglobin   Date Value Ref Range Status   09/21/2020 12.2 (L) 13.3 - 17.7 g/dL Final   ]    Assessment & Plan     Hospital discharge follow-up  Stable at present time and suggest follow-up with oncology and pulmonology per discharge summary recommendations.  Patient has appointment scheduled.    Acute diverticulitis  Stable and completing oral antibiotics as ordered.  Recommended routine updated colonoscopy at 8 weeks.  Please note I have attempted to get patient to get colonoscopy in the past but he has declined.  - " Hemoglobin; Future    Need for prophylactic vaccination and inoculation against influenza  Updated vaccination accordingly for influenza today  - INFLUENZA (HIGH DOSE) 3 VALENT VACCINE [58341]  - Pneumococcal vaccine 23 valent PPSV23  (Pneumovax) [33009]  - ADMIN INFLUENZA (For MEDICARE Patients ONLY) []  - ADMIN PNEUMOVAX VACCINE (For MEDICARE Patients ONLY) []    Chronic obstructive pulmonary disease, unspecified COPD type (H)  Stable and continue with current inhalers as ordered with good control per patient    Essential hypertension  At goal on therapy continue with medical management.    Type 2 diabetes mellitus without complication, without long-term current use of insulin (H)  Overall stable and recommend follow-up 3 to 6 months for repeat A1c.  - Basic metabolic panel  (Ca, Cl, CO2, Creat, Gluc, K, Na, BUN); Future    Screening PSA (prostate specific antigen)  Ordered as future although apparently done per urology per patient.  They will verify results if not future lab orders have been placed  - PSA, screen; Future    Colon cancer screening  ADVISED COLONOSCOPY FOR ROUTINE PREVENTATIVE CARE.  Recommended at least 8 weeks until procedure.  - GASTROENTEROLOGY ADULT REF PROCEDURE ONLY; Future        See Patient Instructions    Return in about 1 month (around 11/1/2020) for Lab Work appointment.    Olegario Castillo MD  [facility]

## 2020-10-02 ENCOUNTER — TRANSFERRED RECORDS (OUTPATIENT)
Dept: HEALTH INFORMATION MANAGEMENT | Facility: CLINIC | Age: 75
End: 2020-10-02

## 2020-10-05 ENCOUNTER — THERAPY VISIT (OUTPATIENT)
Dept: CHIROPRACTIC MEDICINE | Facility: CLINIC | Age: 75
End: 2020-10-05
Payer: MEDICARE

## 2020-10-05 DIAGNOSIS — M99.01 CERVICAL SEGMENT DYSFUNCTION: Primary | ICD-10-CM

## 2020-10-05 DIAGNOSIS — M99.02 THORACIC SEGMENT DYSFUNCTION: ICD-10-CM

## 2020-10-05 DIAGNOSIS — M54.2 CERVICALGIA: ICD-10-CM

## 2020-10-05 PROCEDURE — 98940 CHIROPRACT MANJ 1-2 REGIONS: CPT | Mod: AT | Performed by: CHIROPRACTOR

## 2020-10-07 ENCOUNTER — TELEPHONE (OUTPATIENT)
Dept: CARE COORDINATION | Facility: CLINIC | Age: 75
End: 2020-10-07

## 2020-10-07 NOTE — TELEPHONE ENCOUNTER
Would advise to decrease O2 to 2 L/min see if can maintain adequate saturations at rest and with activity.  Based on message this would potentially allow patient to be a candidate for portable oxygen device.

## 2020-10-07 NOTE — TELEPHONE ENCOUNTER
Patient left voicemail message for CC CHW requesting call back to discuss his oxygen.    CC RN called and spoke with patient who updated that he thinks he is now requiring less oxygen and would like to be assessed again in order to see if he would be eligible for a portable concentrator.    Patient used his pulse oximeter during phone call:  At rest, patient's O2 saturation was 94% (on room air).  With activity, patient's O2 saturation was 86% (on room air).    CC RN advised patient that he would likely need return visit with doctor to get his oxygen needs retested and documented in order to get his oxygen orders updated; patient stated understanding.    CC RN will forward patient request to PCP to determine what is needed.    Wesly Davis RN  Clinic Care Coordinator  [facility]  Ph: [phone]

## 2020-10-08 NOTE — TELEPHONE ENCOUNTER
CC RN spoke with patient; advised to decrease oxygen to 2 LPM at rest and with activity.  Instructed patient to keep log of oxygen saturations for the next several days and report back to CC RN next week.  Patient verbalized understanding of plan and denied questions or concerns.    Wesly Davis, RN  Clinic Care Coordinator  [facility]  Ph: [phone]

## 2020-10-12 NOTE — PROGRESS NOTES
Visit #:  2 of 2, based on treatment plan    Subjective:  Nahun Villalobos is a 74 year old male who is seen in f/u up for:        Cervical segment dysfunction  Cervicalgia  Thoracic segment dysfunction.     Since last visit on 8/31/2020,  Nahun Villalobos reports the following changes: Pain immediately after last treatment: 1/10 and their pain level today 1/10.  The pt reported significant improvement post treatment. He was able to sleep and turn his head without issue.     Area of chief complaint:  Cervical and Thoracic :  Symptoms are graded at 1/10. The quality is described as stiff, achey, dull.  Motion has increased, but is still not normal. The pt denies . Patient feels that they are improved due to a reduction in symptoms. The pt reports 90% improvement since the previous treatment. He changed his pillow and it seemed to help him. He is able to sleep a full night and drive without issue. The pt denies HA and is pleased with his progress.        Objective:  The following was observed:    P: palpatory tendernessLevator scapulae and Sub-occipital L>>R    A: static palpation demonstrates intersegmental asymmetry     R: motion palpation notes restricted motion    T: The following soft tissue hypotonicities were observed:  Lev scapulae: left, ache and dull pain, referred pain: no  Sub-occiput: bilateral, ache and dull pain, referred pain: no      Assessment:    Segmental spinal dysfunction/restrictions found at:  C2   C7   T1   T5  T9    Diagnoses:      1. Cervical segment dysfunction    2. Cervicalgia    3. Thoracic segment dysfunction        Patient's condition:  Patient had restrictions pre-manipulation    Treatment effectiveness:  Post manipulation there is better intersegmental movement and Patient claims to feel looser post manipulation      Procedures:  CMT:  50920 Chiropractic manipulative treatment 1-2 regions performed   Occiput: Activator,  R OCC , Seated  Cervical: Activator, C2, C7 ,  Seated  Thoracic: Activator, T1, T5, T9, Seated     Modalities:  37952: US:  1.8  Forrest/cm squared for 2 minutes at 1 mhz  Location: L cervical     Therapeutic procedures:  None      Prognosis: Excellent    Progress towards Goals: Patient is making progress towards the goal   Patient has met the goal of Goals:  SLEEPING: the patient specific goal is to attain his pre-injury status of 6-8 hours comfortably  Driving, turning the head.    Response to Treatment:   Reduction in symptoms as reported by patient      Recommendations:    Instructions:  none    Follow-up:   Return to care if symptoms persist.

## 2020-10-13 ENCOUNTER — TELEPHONE (OUTPATIENT)
Dept: CARE COORDINATION | Facility: CLINIC | Age: 75
End: 2020-10-13

## 2020-10-13 ENCOUNTER — PATIENT OUTREACH (OUTPATIENT)
Dept: NURSING | Facility: CLINIC | Age: 75
End: 2020-10-13
Payer: MEDICARE

## 2020-10-13 ASSESSMENT — ACTIVITIES OF DAILY LIVING (ADL): DEPENDENT_IADLS:: CLEANING;COOKING;LAUNDRY;SHOPPING;MEAL PREPARATION

## 2020-10-13 NOTE — TELEPHONE ENCOUNTER
From CCRN:          Spoke to pt, informed him he needs to schedule an in-clinic oxygen reading / walk test, with any OX provider. (Dr. Castillo is full this week).   He needs to talk to the person that gives him a ride to figure out the best day & time.   -- so he will call back to schedule appt.

## 2020-10-13 NOTE — PROGRESS NOTES
"Clinic Care Coordination Contact    Follow Up Progress Note     CC RN returned call to patient following voicemail from patient requesting return call to discuss his oxygen needs.     Assessment: patient and significant other on speaker phone    Patient was initially at the dentist at time of call; notably short of breath on the phone and requested call back.    On call back, patient was on his way home.  He reported he did not bring his oxygen with him and noted difficulty recovering from his shortness of breath at the dentist.    Patient updated that he has not been taking his oxygen with him when leaving the house as the \"tanks are too heavy\" and \"he doesn't know how to use them\".    Patient again stated that he wants to get a portable oxygen concentrator and wants to get rid of the big tanks he has.    Patient also noted that he \"doesn't think his inhalers are helping\".  He stated \"they worked at first and now it seems like they make it worse\", referring to his breathing.  He informed that he does have another follow-up appointment with Pulmonology within the next couple of weeks and plans to discuss this further at that time.    Goals addressed this encounter:   Goals Addressed                 This Visit's Progress       Patient Stated      1. Medical (pt-stated)   20%     Goal Statement: I will prevent ED visits / hospitalizations within the  next 6 months.  Date Goal set: 9/24/20  Barriers: multiple health diagnoses  Strengths: motivated to feel better and say out of the hospital  Date to Achieve By: 3/31/21  Patient expressed understanding of goal: Yes    Action steps to achieve this goal:  1. I will follow a low fiber diet for 2 weeks for my diverticulitis.  2. I will continue taking my medications as prescribed.  3. I will schedule and attend my appointment with my PCP, Pulmonology, and Oncology doctors as recommended.  4. I will use my oxygen as directed and will purchase a pulse oximeter so I can monitor " my oxygen levels.  5. I will work on weight loss as advised for improved health.  6. I will notify my care team with any questions or concerns.  7. I will continue working with Care Coordination to identify and address any barriers to achieving my goal.        Intervention/Education provided during outreach:    CC RN again explained to patient and significant other that in order to be eligible for a portable oxygen concentrator, you cannot be on more than 3 LPM, which is why patient was to be monitoring his oxygen needs over the past several days and trialing being on 2 LPM as advised by PCP.    Patient confirmed he has been logging his oxygen needs but isn't home yet so will have to report them once he's home.  He agreed to send them via [employer].    MATTIE RN reiterated the importance of the patient using his oxygen as ordered, especially with activity as that is when he needs it the most.  MATTIE RN instructed patient to contact [facility] (oxygen supplier) and request another visit to review how to use his oxygen tanks; he agreed to do so.    MATTIE RN outlined that once we have documentation of his oxygen needs, that can be reviewed by PCP and then hopefully his oxygen orders can be updated and sent to [facility] with hope of being able to get a portable oxygen concentrator.    MATTIE RN reviewed importance of contacting his Pulmonology office sooner, if needed, if he continues to obtain no relief from his current inhalers; he stated understanding.    Outreach Frequency: monthly    Plan:    Patient will continue to closely monitor his respiratory status and will contact Pulmonology with ongoing breathing concerns to discuss plan of care and medications.    Patient will send MATTIE RN his oxygen readings as discussed, which will then be discussed with PCP with plan of requesting a portable oxygen concentrator once updated oxygen orders are sent to [facility].    Wesly Davis RN  Clinic Care  Coordinator  [facility]  [facility]  Ph: [phone]

## 2020-10-13 NOTE — TELEPHONE ENCOUNTER
CC RN received [employer] message from patient/significant other indicating patient's recent oxygen saturations; it appears he has been using approximately 1.5 LPM of O2, mostly with activity.    CC RN spoke with Betina with [facility] to discuss process of getting patient a portable oxygen concentrator.    Patient will need a walk test on a pulse dose unit to be performed in clinic.  She reiterated that it needs to be pulse dose testing.    Once walk test is completed, PCP needs to submit prescription along with walk test results to [facility].    If patient qualifies, he will then be placed on a wait list for a portable oxygen concentrator unit.    Betina will call patient to review above process as well as to offer patient additional educational session related to his previous report to CC RN that he does not know how to use his oxygen tanks.    CC RN will connect with clinic staff to request assistance with scheduling patient for walk test on pulse dose unit.    Wesly Davis, RN  Clinic Care Coordinator  [facility] & [facility]  Ph: [phone]

## 2020-10-14 ENCOUNTER — OFFICE VISIT (OUTPATIENT)
Dept: INTERNAL MEDICINE | Facility: CLINIC | Age: 75
End: 2020-10-14
Payer: MEDICARE

## 2020-10-14 VITALS
HEIGHT: 67 IN | SYSTOLIC BLOOD PRESSURE: 116 MMHG | DIASTOLIC BLOOD PRESSURE: 80 MMHG | WEIGHT: 242 LBS | HEART RATE: 110 BPM | OXYGEN SATURATION: 90 % | BODY MASS INDEX: 37.98 KG/M2 | TEMPERATURE: 98.7 F

## 2020-10-14 DIAGNOSIS — Z53.9 ERRONEOUS ENCOUNTER--DISREGARD: Primary | ICD-10-CM

## 2020-10-14 ASSESSMENT — MIFFLIN-ST. JEOR: SCORE: 1791.33

## 2020-10-14 NOTE — PROGRESS NOTES
Subjective     Nahun Villalobos is a 74 year old male who presents to clinic today for the following health issues:    HPI      O2 check     COPD Follow-Up    Overall, how are your COPD symptoms since your last clinic visit?  Much worse-slightly worse with O2    How much fatigue or shortness of breath do you have when you are walking?  A lot more than usual    How much shortness of breath do you have when you are resting?  More than usual    How often do you cough? Rarely    Have you noticed any change in your sputum/phlegm?  No    Have you experienced a recent fever? No    Please describe how far you can walk without stopping to rest:  The length of 1-2 rooms-10 feet    How many flights of stairs are you able to walk up without stopping?  None only 4 steps into house    Have you had any Emergency Room Visits, Urgent Care Visits, or Hospital Admissions because of your COPD since your last office visit?  No    History   Smoking Status     Former Smoker     Packs/day: 2.00     Years: 52.00     Types: Cigarettes, Cigars     Start date: 6/1/1960     Quit date: 6/1/2013   Smokeless Tobacco     Never Used     Comment: Quit, will never start again.     No results found for: FEV1, TLR1YKX      How many servings of fruits and vegetables do you eat daily?  0-1    On average, how many sweetened beverages do you drink each day (Examples: soda, juice, sweet tea, etc.  Do NOT count diet or artificially sweetened beverages)?   1- 2    How many days per week do you exercise enough to make your heart beat faster? none    How many minutes a day do you exercise enough to make your heart beat faster? none    How many days per week do you miss taking your medication? 0    {additonal problems for provider to add (Optional):232336}    Review of Systems   {ROS COMP (Optional):189183}      Objective    There were no vitals taken for this visit.  There is no height or weight on file to calculate BMI.  Physical Exam   {Exam List  (Optional):946726}    {Diagnostic Test Results (Optional):575067}        {PROVIDER CHARTING PREFERENCE:928950}

## 2020-10-14 NOTE — PROGRESS NOTES
Patient was scheduled for an oxygen test in clinic. He specifically needed this to be completed on a pulse dose unit. We do not have this in the clinic. I reached out to our oxygen supplies team and they will contact patient to schedule this. Pt was agreeable to this plan. No further issues were addressed.

## 2020-10-20 NOTE — PROGRESS NOTES
"Nahun Villalobos is a 75 year old male who is being evaluated via a billable video visit.      The patient has been notified of following:     \"This video visit will be conducted via a call between you and your physician/provider. We have found that certain health care needs can be provided without the need for an in-person physical exam.  This service lets us provide the care you need with a video conversation.  If a prescription is necessary we can send it directly to your pharmacy.  If lab work is needed we can place an order for that and you can then stop by our lab to have the test done at a later time.    Video visits are billed at different rates depending on your insurance coverage.  Please reach out to your insurance provider with any questions.    If during the course of the call the physician/provider feels a video visit is not appropriate, you will not be charged for this service.\"    Patient has given verbal consent for Video visit? Yes  How would you like to obtain your AVS? MyChart  Will anyone else be joining your video visit? No      Subjective     Nahun Villalobos is a 75 year old male who presents today via video visit for the following health issues:    HPI       Hospital Follow-up Visit:    Hospital/Nursing Home/IP Rehab Facility: [facility] [place]  Date of Admission: 10/17/20  Date of Discharge: 10/19/20  Reason(s) for Admission: Bilateral pulmonary embolism       Was your hospitalization related to COVID-19? No   Problems taking medications regularly:  None  Medication changes since discharge: Noted  Problems adhering to non-medication therapy:  None    Summary of hospitalization:  See outside records, reviewed and scanned  Diagnostic Tests/Treatments reviewed.  Follow up needed: Oncology  Other Healthcare Providers Involved in Patient s Care:         None  Update since discharge: stable.       Post Discharge Medication Reconciliation: discharge medications reconciled, continue medications without " change.  Plan of care communicated with patient               Nahun Villalobos states that he is feeling much better.  He is now off the supplemental oxygen that he was placed on upon discharge.  He is taking his blood thinners as prescribed.  He reports feeling much better since he presented to the emergency room.  Apparently he was seen at [facility] due to the fact that he has a family member that works at [facility] in the human resource department.    Video Start Time: 948AM    Acute Hypoxic Respiratory Failure  Bilateral subsegmental PE  Pt with slowly progressing SOB/STACY with acute decompensation, presented with hypoxia, tachycardia, tachypnea. Hypoxia improved with BiPAP. CT PE study showed bilateral pulmonary emboli and moderate-severe R heart strain, possible infarction RUL. TPA given in ED, heparin gtt initiated in MICU. Unclear etiology of PE, but has several risk factors including a history of lung cancer (2018; recently had PET scan last week with Dr. Santi Tinajero- f/u records), has not had surgery or periods of prolonged hospitalization, no immobilization. PE most likely cause of acute hypoxia; no history of CHF, TTE several months ago with EF 60-65% and no sign of diastolic or RV dysfunction, stress test negative. Unlikely infectious, procal 0.06, mild leukocytosis likely relatitoring, telemetry  - Discharged home on oxygen  - Apixiban was started on discharge    COPD  History of chronic hypoxemic respiratory failure secondary to COPD; has had progressive shortness of breath with exertion over last 1-2 years. Only recently has begun using home 02. Previously prescribed BiPAP at night that he did not tolerate due to mucus production.   -- PTA spiriva, fluticasone-salmoterol, albuterol inhalers     Acute Kidney Injury vs CKD; Cr 1.2  Recent labs range Cr 0.93-1.12     Hx Lung CA, s/p wedge resection RUL 2016  Adenocarcinoma per notes. Follows with Dr. Santi Tinajero, oncology.  Surveillance CTs q 6 month; most recent with no new or enlarged LN within mediastinal or hilar regions. Did have a recent PET scan last week  -- Follow up with heme/onc doctor after admission     T2DM  On metformin 500mg BID, last A1c 7.6 in 6/2020. Holding metformin.  -MDSSI   -Recheck A1c      HTN  Continue PTA lisinopril and metoprolol, holding home PO lasix      Recent acute sigmoid diverticulitis  Small bowel mesenteric mass   Recent admission 9/20-22 @ [facility] for 1 month progressive abdominal pain. Has had several other episodes of acute diverticulitis; numerous CT scans in the past share haziness around sigmoid colon. CT also showed a 4x3 cm heterogenous small bowel mesenteric mass with previous CTs with comments that it had been present since 2013, and enlarged since 2018. Following up with general surgery. NTD, just note if abdominal pain or if PET results show any concern for malignancy to account for hypercoagulability.       Review of Systems   CONSTITUTIONAL: NEGATIVE for fever, chills, change in weight  EYES: NEGATIVE for vision changes or irritation  ENT/MOUTH: NEGATIVE for ear, mouth and throat problems  CV: NEGATIVE for chest pain, palpitations or peripheral edema  GI: NEGATIVE for nausea, abdominal pain, heartburn, or change in bowel habits  : NEGATIVE for frequency, dysuria, or hematuria  MUSCULOSKELETAL: NEGATIVE for significant arthralgias or myalgia  HEME: NEGATIVE for bleeding problems  PSYCHIATRIC: NEGATIVE for changes in mood or affect      Objective           Vitals:  No vitals were obtained today due to virtual visit.    Physical Exam     GENERAL: alert and no distress  EYES: Eyes grossly normal to inspection.  No discharge or erythema, or obvious scleral/conjunctival abnormalities.  RESP: No audible wheeze, cough, or visible cyanosis.  No visible retractions or increased work of breathing.    SKIN: Visible skin clear. No significant rash, abnormal pigmentation or lesions.  NEURO:  Cranial nerves grossly intact.  Mentation and speech appropriate for age.  PSYCH: Mentation appears normal, affect normal/bright, judgement and insight intact, normal speech and appearance well-groomed.      Lab Results   Component Value Date    A1C 7.5 09/20/2020    A1C 7.6 06/18/2020    A1C 7.8 01/23/2020    A1C 8.0 08/28/2019    A1C 7.5 11/01/2018     Creatinine   Date Value Ref Range Status   09/21/2020 1.04 0.66 - 1.25 mg/dL Final     Potassium   Date Value Ref Range Status   09/21/2020 4.6 3.4 - 5.3 mmol/L Final     Comment:     Specimen slightly hemolyzed, potassium may be falsely elevated     LDL Cholesterol Calculated   Date Value Ref Range Status   08/28/2019 44 <100 mg/dL Final     Comment:     Desirable:       <100 mg/dl       Assessment & Plan     Hospital discharge follow-up  Appears doing well at present time, continue with supportive care post discharge.    Other pulmonary embolism without acute cor pulmonale, unspecified chronicity (H)  Now off Supplemental O2 recheck labs and continuing anticoagulation  - CBC with platelets; Future    Type 2 diabetes mellitus without complication, without long-term current use of insulin (H)  Labs ordered as fasting  - Comprehensive metabolic panel; Future  - Lipid Profile; Future  - Albumin Random Urine Quantitative with Creat Ratio; Future    Chronic obstructive pulmonary disease, unspecified COPD type (H)  Stable as noted per baseline continuing with supplemental inhaler therapy.    Adenocarcinoma of lung, stage 1, unspecified laterality (H)  Following up with oncology as ordered for testing per recent CT imaging.      See Patient Instructions    Return for Lab Work appointment, f/u with routine sub-specialist.    Olegario Castillo MD  [facility]      Video-Visit Details    Type of service:  Video Visit    Video End Time:  1005AM    Originating Location (pt. Location): Home    Distant Location (provider location):  [facility]  [facility]     Platform used for Video Visit: Radha

## 2020-10-21 ENCOUNTER — VIRTUAL VISIT (OUTPATIENT)
Dept: INTERNAL MEDICINE | Facility: CLINIC | Age: 75
End: 2020-10-21
Payer: MEDICARE

## 2020-10-21 DIAGNOSIS — C34.90 ADENOCARCINOMA OF LUNG, STAGE 1, UNSPECIFIED LATERALITY (H): ICD-10-CM

## 2020-10-21 DIAGNOSIS — I26.99 OTHER PULMONARY EMBOLISM WITHOUT ACUTE COR PULMONALE, UNSPECIFIED CHRONICITY (H): ICD-10-CM

## 2020-10-21 DIAGNOSIS — J44.9 CHRONIC OBSTRUCTIVE PULMONARY DISEASE, UNSPECIFIED COPD TYPE (H): Chronic | ICD-10-CM

## 2020-10-21 DIAGNOSIS — E11.9 TYPE 2 DIABETES MELLITUS WITHOUT COMPLICATION, WITHOUT LONG-TERM CURRENT USE OF INSULIN (H): ICD-10-CM

## 2020-10-21 DIAGNOSIS — Z09 HOSPITAL DISCHARGE FOLLOW-UP: Primary | ICD-10-CM

## 2020-10-21 PROCEDURE — 99214 OFFICE O/P EST MOD 30 MIN: CPT | Mod: 95 | Performed by: INTERNAL MEDICINE

## 2020-10-22 ENCOUNTER — PATIENT OUTREACH (OUTPATIENT)
Dept: NURSING | Facility: CLINIC | Age: 75
End: 2020-10-22
Payer: MEDICARE

## 2020-10-22 NOTE — PROGRESS NOTES
Clinic Care Coordination Contact  CHW called patient to schedule a phone visit with RN CC.    The scheduled visit is on 10/23/20 at 9:00am with NICKY Awad CHW  Clinic Care Coordination  [facility]: [facility] & [place]   Phone: [phone]

## 2020-10-23 ENCOUNTER — PATIENT OUTREACH (OUTPATIENT)
Dept: NURSING | Facility: CLINIC | Age: 75
End: 2020-10-23
Payer: MEDICARE

## 2020-10-23 DIAGNOSIS — E11.9 TYPE 2 DIABETES MELLITUS WITHOUT COMPLICATION, WITHOUT LONG-TERM CURRENT USE OF INSULIN (H): Primary | ICD-10-CM

## 2020-10-23 DIAGNOSIS — I10 ESSENTIAL HYPERTENSION: ICD-10-CM

## 2020-10-23 DIAGNOSIS — E78.5 HYPERLIPIDEMIA LDL GOAL <100: ICD-10-CM

## 2020-10-23 DIAGNOSIS — E66.01 MORBID OBESITY DUE TO EXCESS CALORIES (H): ICD-10-CM

## 2020-10-23 ASSESSMENT — ACTIVITIES OF DAILY LIVING (ADL): DEPENDENT_IADLS:: CLEANING;COOKING;LAUNDRY;SHOPPING;MEAL PREPARATION

## 2020-10-23 NOTE — TELEPHONE ENCOUNTER
apixaban (ELIQUIS) 5 mg oral tablet   Please take 10 mg (2 tab) twice a day for 7 days  Please take 5 mg (1 tab) twice a day after that     Per discharge orders

## 2020-10-23 NOTE — PROGRESS NOTES
Clinic Care Coordination Contact    OUTREACH    Referral Information:  Referral Source: IP Report  Primary Diagnosis: Respiratory Disorders (bilateral pulmonary embolism)  Chief Complaint   Patient presents with     Clinic Care Coordination - Post Hospital     discharged from [facility] [place] for bilateral pulmonary embolism      Universal Utilization: reviewed on this date  Difficulty keeping appointments: No  Compliance Concerns: No  No-Show Concerns: No  No PCP office visit in Past Year: No  Utilization    Last refreshed: 10/23/2020  9:19 AM: Hospital Admissions 1           Last refreshed: 10/23/2020  9:19 AM: ED Visits 2           Last refreshed: 10/23/2020  9:19 AM: No Show Count (past year) 3              Current as of: 10/23/2020  9:19 AM          Patient enrolled with Care Coordination; noted to have discharge following admission at [facility] [place] 10/17/20 - 10/19/20 for bilateral pulmonary embolism.    CC RN outreach to patient for hospital discharge follow-up.    Patient reported to be feeling quite a bit better than prior to hospitalization.  He noted his breathing is improved.  He continues on 2L of O2.  CC RN reviewed with patient, at length, goals for oxygen saturations and how to titrate his oxygen to meet oxygen saturation level goals.    Patient was started on apixaban for bilateral pulmonary embolism.  CC RN provided patient with education about pulmonary embolism and use of apixaban.    Patient completed a hospital discharge follow-up appointment with PCP on 10/21/20.  He rescheduled his Pulmonology appointment for 11/4/20 and his Oncology appointment for 11/9/20 (previously scheduled appointments had to be cancelled due to his hospitalization).    Patient noted that he has not been checking his blood sugars and hasn't been for some time.  He would be interested in getting a CGM system and was agreeable to Diabetes Education referral to further discuss diabetes management.    Patient will await a call from [facility] so he can complete another walk test with hopes of getting a portable oxygen concentrator.  Patient failed his previous one but this was likely due to the later-identified bilateral pulmonary embolism.    Clinical Concerns:  Current Medical Concerns: bilateral pulmonary embolism  Current Behavioral Concerns: none noted at this time    Education Provided to patient: reviewed hospital discharge instructions with patient, provided information about Diabetes Education     Pain  Pain (GOAL): Yes  Type: Acute on Chronic  Location of chronic pain: abdomen  Progression: Intermittent    Medication Management:  Post-discharge medication reconciliation status: Discharge medications reviewed and reconciled.  Changed medications per note/orders (see AVS).     Patient has only been taking apixaban (Eliquis) 5 mg twice a day since hospital discharge (10/19/20).  Prescription is written to Take 10 mg twice a day for 7 days, then take 5 mg twice a day after that.    CC RN will inquire with PCP on how patient should proceed.    Functional Status:  Dependent ADLs: Ambulation-cane  Dependent IADLs: Cleaning, Cooking, Laundry, Shopping, Meal Preparation  Bed or wheelchair confined: No  Mobility Status: Independent w/Device  Fallen 2 or more times in the past year?: No  Any fall with injury in the past year?: No    Living Situation:  Current living arrangement: I live in a private home (lives with girlfriend)  Type of residence: [facility]    Lifestyle & Psychosocial Needs:  Diet: Low Fiber  Inadequate nutrition (GOAL): No  Tube Feeding: No  Inadequate activity/exercise (GOAL): No  Significant changes in sleep pattern (GOAL): No  Transportation means: Regular car     Restorationism or spiritual beliefs that impact treatment: No  Mental health DX: No  Mental health management concern (GOAL): No  Informal Support system: Family, Friends, Neighbors, Significant other   Socioeconomic History     Marital status:       Spouse name: Not on file     Number of children: Not on file     Years of education: Not on file     Highest education level: Not on file     Tobacco Use     Smoking status: Former Smoker     Packs/day: 2.00     Years: 52.00     Pack years: 104.00     Types: Cigarettes, Cigars     Start date: 1960     Quit date: 2013     Years since quittin.4     Smokeless tobacco: Never Used     Tobacco comment: Quit, will never start again.   Substance and Sexual Activity     Alcohol use: No     Alcohol/week: 0.0 standard drinks     Drug use: No     Sexual activity: Not Currently     Partners: Female     Birth control/protection: Spermicide, None     Care Coordinator has reviewed patient's Social Determinants of Health (SDoH) on this date. Upon review, changes were not  made.     Resources and Interventions:  Community Resources: None  Supplies used at home: Oxygen Tubing/Supplies  Equipment Currently Used at Home: cane, straight, glucometer    Advance Care Plan/Directive  Advanced Care Plans/Directives on file: No  Advanced Care Plan/Directive Status: (not discussed this encounter)    Referrals Placed: Specialty Providers (Diabetes Education)     Goals: reviewed following transition of care, no changes made; goal remains appropriate at this time.  Goals        General    1. Medical (pt-stated)     Notes - Note edited  2020  6:56 AM by Wesly Davis RN    Goal Statement: I will prevent ED visits / hospitalizations within the  next 6 months.  Date Goal set: 20  Barriers: multiple health diagnoses  Strengths: motivated to feel better and say out of the hospital  Date to Achieve By: 3/31/21  Patient expressed understanding of goal: Yes    Action steps to achieve this goal:  1. I will follow a low fiber diet for 2 weeks for my diverticulitis.  2. I will continue taking my medications as prescribed.  3. I will schedule and attend my appointment with my PCP, Pulmonology, and Oncology doctors as recommended.  4.  I will use my oxygen as directed and will purchase a pulse oximeter so I can monitor my oxygen levels.  5. I will work on weight loss as advised for improved health.  6. I will notify my care team with any questions or concerns.  7. I will continue working with Care Coordination to identify and address any barriers to achieving my goal.        Patient/Caregiver understanding: Yes    Outreach Frequency: monthly    Plan:    CC RN will update PCP that patient has only been taking apixaban 5 mg twice a day instead of prescribed 10 mg twice a day x 7 days (then 5 mg twice a day after that); will inquire about how patient should proceed.    CC RN will facilitate placement of Diabetes Education referral.    Patient will attend his upcoming Pulmonology and Oncology appointments on 11/4 and 11/9 respectively.    Patient will continue using his oxygen as instructed.  He will complete another walk test with [facility] to determine his eligibility for a portable oxygen concentrator.    Patient agreed to contact CC RN with additional questions or concerns.    Care Coordination will outreach to patient in approximately 1 month to get updates on patient status, assess goal progress, and offer additional support and resources as indicated.    UPDATE at 1227:  Per discussion with PCP, CC RN advised patient take apixaban 10 mg twice a day for 7 days starting today, and then after 7 days to change to apixaban 5 mg twice a day.  Patient and significant other both on speaker phone and stated understanding of instructions; no questions at this time.    Wesly Davis RN  Clinic Care Coordinator  [facility]  Ph: [phone]

## 2020-10-23 NOTE — TELEPHONE ENCOUNTER
CC RN will advise patient:  Take apixaban 10 mg (2 tab) twice daily for 7 days, and then Take 5 mg (1 tab) twice a day after that.    PCP please review and sign pended Diabetes Education referral if appropriate.  Thank you!

## 2020-11-02 ENCOUNTER — TRANSFERRED RECORDS (OUTPATIENT)
Dept: HEALTH INFORMATION MANAGEMENT | Facility: CLINIC | Age: 75
End: 2020-11-02

## 2020-11-02 ENCOUNTER — TELEPHONE (OUTPATIENT)
Dept: OPTOMETRY | Facility: CLINIC | Age: 75
End: 2020-11-02

## 2020-11-02 NOTE — TELEPHONE ENCOUNTER
Patient was referred by Dr. Cordero for Cat. Eval for today. Clinic is calling asking for patient record information. Please fax information to [phone] carlos Moffett. Any questions please call: [phone].

## 2020-11-03 ENCOUNTER — PATIENT OUTREACH (OUTPATIENT)
Dept: CARE COORDINATION | Facility: CLINIC | Age: 75
End: 2020-11-03

## 2020-11-03 NOTE — PROGRESS NOTES
Clinic Care Coordination Contact  The Community Health Worker planned to call for a Care Coordination outreach to follow up on goals and action steps.   Per Chart Review, RN CC contacted patient on 10/23/20, and will follow up on 11/20/20.  Did not call.    Next Outreach: 12/18/20    OMAR Kim  Clinic Care Coordination  [facility]: [facility] & [place]  Phone: [phone]

## 2020-11-05 ENCOUNTER — VIRTUAL VISIT (OUTPATIENT)
Dept: EDUCATION SERVICES | Facility: CLINIC | Age: 75
End: 2020-11-05
Attending: INTERNAL MEDICINE
Payer: MEDICARE

## 2020-11-05 ENCOUNTER — TELEPHONE (OUTPATIENT)
Dept: EDUCATION SERVICES | Facility: CLINIC | Age: 75
End: 2020-11-05

## 2020-11-05 DIAGNOSIS — E11.9 TYPE 2 DIABETES MELLITUS WITHOUT COMPLICATION, WITHOUT LONG-TERM CURRENT USE OF INSULIN (H): Primary | ICD-10-CM

## 2020-11-05 DIAGNOSIS — E78.5 HYPERLIPIDEMIA LDL GOAL <100: ICD-10-CM

## 2020-11-05 DIAGNOSIS — E11.9 TYPE 2 DIABETES MELLITUS WITHOUT COMPLICATION, WITHOUT LONG-TERM CURRENT USE OF INSULIN (H): ICD-10-CM

## 2020-11-05 DIAGNOSIS — I10 ESSENTIAL HYPERTENSION: ICD-10-CM

## 2020-11-05 DIAGNOSIS — E66.01 MORBID OBESITY DUE TO EXCESS CALORIES (H): ICD-10-CM

## 2020-11-05 PROCEDURE — G0108 DIAB MANAGE TRN  PER INDIV: HCPCS | Mod: 95

## 2020-11-05 RX ORDER — FLASH GLUCOSE SCANNING READER
EACH MISCELLANEOUS
Qty: 1 EACH | Refills: 0 | Status: SHIPPED | OUTPATIENT
Start: 2020-11-05 | End: 2022-01-18

## 2020-11-05 RX ORDER — FLASH GLUCOSE SENSOR
KIT MISCELLANEOUS
Qty: 2 EACH | Refills: 11 | Status: SHIPPED | OUTPATIENT
Start: 2020-11-05 | End: 2022-01-18

## 2020-11-05 NOTE — PROGRESS NOTES
"Diabetes Self-Management Education & Support    Presents for: Individual review    Patient has given verbal consent for Video visit? Yes  Patient would like the video invitation sent by: Other e-mail: William    Type of service:  Video Visit  Originating Location (pt. Location): Home  Distant Location (provider location):  Home  Mode of Communication:  Video Conference via [employer]  Video Start Time: 10:44A  Video End Time (time video stopped): 11:35A  Patient would like to obtain AVS? William    SUBJECTIVE/OBJECTIVE:  Presents for: Individual review  Accompanied by: Self, Girlfriend, Other(Dietetic intern - observing)  Diabetes education in the past 24mo: No  Focus of Visit: Monitoring  Diabetes type: Type 2  Date of diagnosis: 10+ years  Disease course: Stable  How confident are you filling out medical forms by yourself:: Not Assessed  Diabetes management related comments/concerns: I've had diabetes 10 years. I do not want to poke my finger and I'm not going to.  Other concerns:: None  Cultural Influences/Ethnic Background:  American    Diabetes Symptoms & Complications:     Complications assessed today?: No    Patient Problem List and Family Medical History reviewed for relevant medical history, current medical status, and diabetes risk factors.    Vitals:  There were no vitals taken for this visit.  Estimated body mass index is 37.9 kg/m  as calculated from the following:    Height as of 10/14/20: 1.702 m (5' 7\").    Weight as of 10/14/20: 109.8 kg (242 lb).   Last 3 BP:   BP Readings from Last 3 Encounters:   10/14/20 116/80   10/01/20 114/68   09/22/20 (!) 140/80       History   Smoking Status     Former Smoker     Packs/day: 2.00     Years: 52.00     Types: Cigarettes, Cigars     Start date: 6/1/1960     Quit date: 6/1/2013   Smokeless Tobacco     Never Used     Comment: Quit, will never start again.       Labs:  Lab Results   Component Value Date    A1C 7.5 09/20/2020     Lab Results   Component Value " Date     09/21/2020     Lab Results   Component Value Date    LDL 44 08/28/2019     HDL Cholesterol   Date Value Ref Range Status   08/28/2019 30 (L) >39 mg/dL Final   ]  GFR Estimate   Date Value Ref Range Status   09/21/2020 69 >60 mL/min/[1.73_m2] Final     Comment:     Non  GFR Calc  Starting 12/18/2018, serum creatinine based estimated GFR (eGFR) will be   calculated using the Chronic Kidney Disease Epidemiology Collaboration   (CKD-EPI) equation.       GFR Estimate If Black   Date Value Ref Range Status   09/21/2020 81 >60 mL/min/[1.73_m2] Final     Comment:      GFR Calc  Starting 12/18/2018, serum creatinine based estimated GFR (eGFR) will be   calculated using the Chronic Kidney Disease Epidemiology Collaboration   (CKD-EPI) equation.       Lab Results   Component Value Date    CR 1.04 09/21/2020     No results found for: MICROALBUMIN    Healthy Eating:  Healthy Eating Assessed Today: Yes  Cultural/Nondenominational diet restrictions?: No  Meal planning/habits: None  Meals include: Breakfast, Lunch, Dinner  Breakfast: bologna sandwiches x 2 OR Kinyarwanda toast x 2 - 6 syrups, coffee + sugar & cream, 2 sausage patties OR 2 sausage patties, 2 slices of toast, syrup, eggs, water  Lunch: 2 thin pork chops, sweet potatoes, green beans OR salami w/ cheese sandwich OR 1/2 steak Chipotle bowl, water, 7.5 oz orange soda OR turkey sandwich, chips soda  Dinner: 1 tuna fish sandwich, chips, 7 tomatoes OR sliced tomatoes, 7.5 oz orange soda, 1/2 slice of cake OR 1/2 Chipotle bowl  Beverages: Soda, Water(5 cans orange soda, 7.5 oz pepsi x 2)  Has patient met with a dietitian in the past?: No    Being Active:  Being Active Assessed Today: Yes  Exercise:: Currently not exercising  Barrier to exercise: None    Monitoring:  Monitoring Assessed Today: Yes  Did patient bring glucose meter to appointment? : No  Times checking blood sugar at home (number): Never    Patient states he checks blood sugars  only when he goes to the doctor, does not want to poke fingers. He would like a CGM to avoid finger pokes.     Taking Medications:  Diabetes Medication(s)     Biguanides       metFORMIN (GLUCOPHAGE) 500 MG tablet    TAKE 1 TABLET BY MOUTH TWICE A DAY WITH MEALS        Taking Medication Assessed Today: Yes  Current Treatments: Oral Medication (taken by mouth)  Problems taking diabetes medications regularly?: No  Diabetes medication side effects?: No    Problem Solving:  Problem Solving Assessed Today: No  Is the patient at risk for hypoglycemia?: No  Is the patient at risk for DKA?: No  Does patient have severe weather/disaster plan for diabetes management?: No  Does patient have sick day plan for diabetes management?: No    Reducing Risks:  Reducing Risks Assessed Today: No    Healthy Coping:  Healthy Coping Assessed Today: Yes  Emotional response to diabetes: Shock/Denial/Bargaining  Informal Support system:: Partner  Stage of change: CONTEMPLATION (Considering change and yet undecided)  Support resources: None  Patient Activation Measure Survey Score:  ALL Score (Last Two) 2/22/2012   ALL Raw Score 39   Activation Score 56.4   ALL Level 3       Diabetes knowledge and skills assessment:   Patient is knowledgeable in diabetes management concepts related to: Taking Medication    Patient needs further education on the following diabetes management concepts: Healthy Eating, Being Active, Monitoring, Taking Medication, Problem Solving, Reducing Risks and Healthy Coping    Based on learning assessment above, most appropriate setting for further diabetes education would be: Group class or Individual setting.      INTERVENTIONS:    Education provided today on:  AADE Self-Care Behaviors:  Healthy Eating: discussed strategies to improve blood sugar control, recommended he focus initially on decreasing regular soda consumption, discussed ways to decrease consumption   Being Active: relationship to blood glucose  Monitoring:  frequency of monitoring and discussed use of Freestyle Mikel - he would prefer to use this in the future but uncertain if insurance will cover this  Taking Medication: side effects of prescribed medications  Reducing Risks: prevention, early diagnostic measures and treatment of complications    Opportunities for ongoing education and support in diabetes-self management were discussed.    Pt verbalized understanding of concepts discussed and recommendations provided today.       Education Materials Provided:  No new materials provided today      ASSESSMENT:  Ongoing technical issues during this video visit made visit more difficult. Patient interested in avoiding fingers pokes. States at 75 he really does not want to change his lifestyle for his diabetes but is willing to listen to education. After discussion, he is willing to decrease soda consumption further. Requested I reach out to PCP for sign off on Mikel orders and follow up planned to get him started in 2 weeks, if covered by insurance.     Patient's most recent   Lab Results   Component Value Date    A1C 7.5 09/20/2020    is not meeting goal of <7.0    PLAN  Recommend use of Freestyle Mikel CGMS, will reach out to PCP in separate encounter for sign off on orders  Decrease soda consumption from 4-5 cans/day to half, at least   Follow up in 2 weeks     Danna Francisco RD, LD, [facility]   Time Spent: 51 minutes  Encounter Type: Individual    Any diabetes medication dose changes were made via the CDE Protocol and Collaborative Practice Agreement with the patient's referring provider. A copy of this encounter was shared with the provider.

## 2020-11-05 NOTE — TELEPHONE ENCOUNTER
I have signed the orders although there was no pharmacy designated so I sent it to the [facility] as listed first and the pharmacy list

## 2020-11-05 NOTE — LETTER
"    11/5/2020         RE: Nahun Villalobos  [address]  [place] [address]        Dear Colleague,    Thank you for referring your patient, Nahun Villalobos, to the [facility]. Please see a copy of my visit note below.    Diabetes Self-Management Education & Support    Presents for: Individual review    Patient has given verbal consent for Video visit? Yes  Patient would like the video invitation sent by: Other e-mail: [employer]    Type of service:  Video Visit  Originating Location (pt. Location): Home  Distant Location (provider location):  Home  Mode of Communication:  Video Conference via [employer] Start Time: 10:44A  Video End Time (time video stopped): 11:35A  Patient would like to obtain AVS? William    SUBJECTIVE/OBJECTIVE:  Presents for: Individual review  Accompanied by: Self, Girlfriend, Other(Dietetic intern - observing)  Diabetes education in the past 24mo: No  Focus of Visit: Monitoring  Diabetes type: Type 2  Date of diagnosis: 10+ years  Disease course: Stable  How confident are you filling out medical forms by yourself:: Not Assessed  Diabetes management related comments/concerns: I've had diabetes 10 years. I do not want to poke my finger and I'm not going to.  Other concerns:: None  Cultural Influences/Ethnic Background:  American    Diabetes Symptoms & Complications:     Complications assessed today?: No    Patient Problem List and Family Medical History reviewed for relevant medical history, current medical status, and diabetes risk factors.    Vitals:  There were no vitals taken for this visit.  Estimated body mass index is 37.9 kg/m  as calculated from the following:    Height as of 10/14/20: 1.702 m (5' 7\").    Weight as of 10/14/20: 109.8 kg (242 lb).   Last 3 BP:   BP Readings from Last 3 Encounters:   10/14/20 116/80   10/01/20 114/68   09/22/20 (!) 140/80       History   Smoking Status     Former Smoker     Packs/day: 2.00     Years: 52.00 "     Types: Cigarettes, Cigars     Start date: 6/1/1960     Quit date: 6/1/2013   Smokeless Tobacco     Never Used     Comment: Quit, will never start again.       Labs:  Lab Results   Component Value Date    A1C 7.5 09/20/2020     Lab Results   Component Value Date     09/21/2020     Lab Results   Component Value Date    LDL 44 08/28/2019     HDL Cholesterol   Date Value Ref Range Status   08/28/2019 30 (L) >39 mg/dL Final   ]  GFR Estimate   Date Value Ref Range Status   09/21/2020 69 >60 mL/min/[1.73_m2] Final     Comment:     Non  GFR Calc  Starting 12/18/2018, serum creatinine based estimated GFR (eGFR) will be   calculated using the Chronic Kidney Disease Epidemiology Collaboration   (CKD-EPI) equation.       GFR Estimate If Black   Date Value Ref Range Status   09/21/2020 81 >60 mL/min/[1.73_m2] Final     Comment:      GFR Calc  Starting 12/18/2018, serum creatinine based estimated GFR (eGFR) will be   calculated using the Chronic Kidney Disease Epidemiology Collaboration   (CKD-EPI) equation.       Lab Results   Component Value Date    CR 1.04 09/21/2020     No results found for: MICROALBUMIN    Healthy Eating:  Healthy Eating Assessed Today: Yes  Cultural/Denominational diet restrictions?: No  Meal planning/habits: None  Meals include: Breakfast, Lunch, Dinner  Breakfast: bologna sandwiches x 2 OR Somali toast x 2 - 6 syrups, coffee + sugar & cream, 2 sausage patties OR 2 sausage patties, 2 slices of toast, syrup, eggs, water  Lunch: 2 thin pork chops, sweet potatoes, green beans OR salami w/ cheese sandwich OR 1/2 steak Chipotle bowl, water, 7.5 oz orange soda OR turkey sandwich, chips soda  Dinner: 1 tuna fish sandwich, chips, 7 tomatoes OR sliced tomatoes, 7.5 oz orange soda, 1/2 slice of cake OR 1/2 Chipotle bowl  Beverages: Soda, Water(5 cans orange soda, 7.5 oz pepsi x 2)  Has patient met with a dietitian in the past?: No    Being Active:  Being Active Assessed Today:  Yes  Exercise:: Currently not exercising  Barrier to exercise: None    Monitoring:  Monitoring Assessed Today: Yes  Did patient bring glucose meter to appointment? : No  Times checking blood sugar at home (number): Never    Patient states he checks blood sugars only when he goes to the doctor, does not want to poke fingers. He would like a CGM to avoid finger pokes.     Taking Medications:  Diabetes Medication(s)     Biguanides       metFORMIN (GLUCOPHAGE) 500 MG tablet    TAKE 1 TABLET BY MOUTH TWICE A DAY WITH MEALS        Taking Medication Assessed Today: Yes  Current Treatments: Oral Medication (taken by mouth)  Problems taking diabetes medications regularly?: No  Diabetes medication side effects?: No    Problem Solving:  Problem Solving Assessed Today: No  Is the patient at risk for hypoglycemia?: No  Is the patient at risk for DKA?: No  Does patient have severe weather/disaster plan for diabetes management?: No  Does patient have sick day plan for diabetes management?: No    Reducing Risks:  Reducing Risks Assessed Today: No    Healthy Coping:  Healthy Coping Assessed Today: Yes  Emotional response to diabetes: Shock/Denial/Bargaining  Informal Support system:: Partner  Stage of change: CONTEMPLATION (Considering change and yet undecided)  Support resources: None  Patient Activation Measure Survey Score:  ALL Score (Last Two) 2/22/2012   ALL Raw Score 39   Activation Score 56.4   ALL Level 3       Diabetes knowledge and skills assessment:   Patient is knowledgeable in diabetes management concepts related to: Taking Medication    Patient needs further education on the following diabetes management concepts: Healthy Eating, Being Active, Monitoring, Taking Medication, Problem Solving, Reducing Risks and Healthy Coping    Based on learning assessment above, most appropriate setting for further diabetes education would be: Group class or Individual setting.      INTERVENTIONS:    Education provided today on:  LINDA  Self-Care Behaviors:  Healthy Eating: discussed strategies to improve blood sugar control, recommended he focus initially on decreasing regular soda consumption, discussed ways to decrease consumption   Being Active: relationship to blood glucose  Monitoring: frequency of monitoring and discussed use of Freestyle Mikel - he would prefer to use this in the future but uncertain if insurance will cover this  Taking Medication: side effects of prescribed medications  Reducing Risks: prevention, early diagnostic measures and treatment of complications    Opportunities for ongoing education and support in diabetes-self management were discussed.    Pt verbalized understanding of concepts discussed and recommendations provided today.       Education Materials Provided:  No new materials provided today      ASSESSMENT:  Ongoing technical issues during this video visit made visit more difficult. Patient interested in avoiding fingers pokes. States at 75 he really does not want to change his lifestyle for his diabetes but is willing to listen to education. After discussion, he is willing to decrease soda consumption further. Requested I reach out to PCP for sign off on Mikel orders and follow up planned to get him started in 2 weeks, if covered by insurance.     Patient's most recent   Lab Results   Component Value Date    A1C 7.5 09/20/2020    is not meeting goal of <7.0    PLAN  Recommend use of Freestyle Mikel CGMS, will reach out to PCP in separate encounter for sign off on orders  Decrease soda consumption from 4-5 cans/day to half, at least   Follow up in 2 weeks     Danna Francisco RD, LD, [facility]   Time Spent: 51 minutes  Encounter Type: Individual    Any diabetes medication dose changes were made via the CDE Protocol and Collaborative Practice Agreement with the patient's referring provider. A copy of this encounter was shared with the provider.

## 2020-11-05 NOTE — PATIENT INSTRUCTIONS
Decrease pop consumption by half, at least! Choose diet soda, water, iced tea (unsweetened) or Crystal Light instead  Danna will reach out to Dr. Castillo for sign off on Freestyle Mikel  Follow up in 2 weeks    Call or send a [employer] message with any questions or concerns    Danna Francisco RD, LD, [facility]   Diabetes Education Triage Line: [phone]  Diabetes Education Appointment [phone]

## 2020-11-05 NOTE — TELEPHONE ENCOUNTER
Dr. Castillo,   Patient requesting orders for Freestyle Mikel continuous glucose monitoring system as he is currently not testing at all and is refusing to do finger pokes. I've pended orders. Please sign off if you agree or provide an alternative plan.   Thank you!  Danna Francisco RD, LD, [facility]

## 2020-11-06 ENCOUNTER — DOCUMENTATION ONLY (OUTPATIENT)
Dept: SLEEP MEDICINE | Facility: CLINIC | Age: 75
End: 2020-11-06

## 2020-11-06 NOTE — PROGRESS NOTES
RX SIGNED BY: Olegario Castillo  DATE SIGNED: 11/5/20  INITIAL DOS: 4 LPM  REVISED DATE: 11/5/20  DX: COPD  MIRANDA: 99    LITER FLOW: 2-3 LPM  MODE OF DELIVERY: Nasal Cannula   PORTABILITY: Pulse dose test    REASON FOR REVISION: Patient qualified for pulse dose device    Patient was called and updated that Rx had been received and he had been put on the waitlist. He stated that he wanted the process to go quickly and was told that he would be contacted immediately when a device would be available to him.

## 2020-11-09 ENCOUNTER — TRANSFERRED RECORDS (OUTPATIENT)
Dept: HEALTH INFORMATION MANAGEMENT | Facility: CLINIC | Age: 75
End: 2020-11-09

## 2020-11-09 DIAGNOSIS — E11.9 TYPE 2 DIABETES MELLITUS WITHOUT COMPLICATION, WITHOUT LONG-TERM CURRENT USE OF INSULIN (H): ICD-10-CM

## 2020-11-09 DIAGNOSIS — I26.99 OTHER PULMONARY EMBOLISM WITHOUT ACUTE COR PULMONALE, UNSPECIFIED CHRONICITY (H): Primary | ICD-10-CM

## 2020-11-09 DIAGNOSIS — I10 ESSENTIAL HYPERTENSION, BENIGN: ICD-10-CM

## 2020-11-09 RX ORDER — LISINOPRIL 40 MG/1
TABLET ORAL
Qty: 90 TABLET | Refills: 0 | Status: SHIPPED | OUTPATIENT
Start: 2020-11-09 | End: 2021-03-01

## 2020-11-11 ENCOUNTER — OFFICE VISIT (OUTPATIENT)
Dept: INTERNAL MEDICINE | Facility: CLINIC | Age: 75
End: 2020-11-11
Payer: MEDICARE

## 2020-11-11 VITALS
SYSTOLIC BLOOD PRESSURE: 116 MMHG | HEIGHT: 67 IN | HEART RATE: 75 BPM | BODY MASS INDEX: 38.74 KG/M2 | DIASTOLIC BLOOD PRESSURE: 74 MMHG | OXYGEN SATURATION: 94 % | TEMPERATURE: 98.1 F | RESPIRATION RATE: 15 BRPM | WEIGHT: 246.8 LBS

## 2020-11-11 DIAGNOSIS — I26.99 OTHER PULMONARY EMBOLISM WITHOUT ACUTE COR PULMONALE, UNSPECIFIED CHRONICITY (H): ICD-10-CM

## 2020-11-11 DIAGNOSIS — E66.01 MORBID OBESITY DUE TO EXCESS CALORIES (H): ICD-10-CM

## 2020-11-11 DIAGNOSIS — I10 ESSENTIAL HYPERTENSION, BENIGN: ICD-10-CM

## 2020-11-11 DIAGNOSIS — J44.9 CHRONIC OBSTRUCTIVE PULMONARY DISEASE, UNSPECIFIED COPD TYPE (H): Chronic | ICD-10-CM

## 2020-11-11 DIAGNOSIS — Z01.818 PREOP GENERAL PHYSICAL EXAM: Primary | ICD-10-CM

## 2020-11-11 DIAGNOSIS — Z11.59 ENCOUNTER FOR HCV SCREENING TEST FOR LOW RISK PATIENT: ICD-10-CM

## 2020-11-11 DIAGNOSIS — H26.9 CATARACT, UNSPECIFIED CATARACT TYPE, UNSPECIFIED LATERALITY: ICD-10-CM

## 2020-11-11 DIAGNOSIS — E11.9 TYPE 2 DIABETES MELLITUS WITHOUT COMPLICATION, WITHOUT LONG-TERM CURRENT USE OF INSULIN (H): ICD-10-CM

## 2020-11-11 PROBLEM — K63.89 MESENTERIC MASS: Status: ACTIVE | Noted: 2020-11-11

## 2020-11-11 LAB
HBA1C MFR BLD: 7.2 % (ref 0–5.6)
HGB BLD-MCNC: 11.6 G/DL (ref 13.3–17.7)
PLATELET # BLD AUTO: 263 10E9/L (ref 150–450)
POTASSIUM SERPL-SCNC: 3.9 MMOL/L (ref 3.4–5.3)

## 2020-11-11 PROCEDURE — 86803 HEPATITIS C AB TEST: CPT | Performed by: INTERNAL MEDICINE

## 2020-11-11 PROCEDURE — 87522 HEPATITIS C REVRS TRNSCRPJ: CPT | Performed by: INTERNAL MEDICINE

## 2020-11-11 PROCEDURE — 84132 ASSAY OF SERUM POTASSIUM: CPT | Performed by: INTERNAL MEDICINE

## 2020-11-11 PROCEDURE — 85049 AUTOMATED PLATELET COUNT: CPT | Performed by: INTERNAL MEDICINE

## 2020-11-11 PROCEDURE — 85018 HEMOGLOBIN: CPT | Performed by: INTERNAL MEDICINE

## 2020-11-11 PROCEDURE — 36415 COLL VENOUS BLD VENIPUNCTURE: CPT | Performed by: INTERNAL MEDICINE

## 2020-11-11 PROCEDURE — 99215 OFFICE O/P EST HI 40 MIN: CPT | Performed by: INTERNAL MEDICINE

## 2020-11-11 PROCEDURE — 83036 HEMOGLOBIN GLYCOSYLATED A1C: CPT | Performed by: INTERNAL MEDICINE

## 2020-11-11 ASSESSMENT — MIFFLIN-ST. JEOR: SCORE: 1808.11

## 2020-11-11 NOTE — PROGRESS NOTES
[address] [address]  Phone: [phone]  Primary Provider: Meena Castillo  Pre-op Performing Provider: MEENA CASTILLO    PREOPERATIVE EVALUATION:  Today's date: 11/11/2020    Nahun Villalobos is a 75 year old male who presents for a preoperative evaluation.    Surgical Information:  Surgery/Procedure: Cataract lens replacement    Surgery Location: [facility]   Surgeon: Dr. Priest   Surgery Date: 11/16/20 & 11/30/20  Time of Surgery: 6:30 am   Where patient plans to recover: At home with family  Fax number for surgical facility: [phone]    Type of Anesthesia Anticipated: to be determined    Subjective     HPI related to upcoming procedure: Cataract extraction      Health Care Directive:  Patient does not have a Health Care Directive or Living Will: Discussed advance care planning with patient; however, patient declined at this time.  56}    Status of Chronic Conditions:  See problem list for active medical problems.  Problems all longstanding and stable, except as noted/documented.  See ROS for pertinent symptoms related to these conditions.      Review of Systems  CONSTITUTIONAL: NEGATIVE for fever, chills, change in weight  ENT/MOUTH: NEGATIVE for ear, mouth and throat problems  RESP: NEGATIVE for significant cough with baseline SOB  CV: NEGATIVE for chest pain, palpitations or peripheral edema  GI: NEGATIVE for nausea, abdominal pain, heartburn, or change in bowel habits  : NEGATIVE for frequency, dysuria, or hematuria  MUSCULOSKELETAL: NEGATIVE for significant arthralgias or myalgia  NEURO: NEGATIVE for weakness, dizziness or paresthesias  HEME: NEGATIVE for bleeding problems  PSYCHIATRIC: NEGATIVE for changes in mood or affect    Patient Active Problem List    Diagnosis Date Noted     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     Priority: Medium     Other pulmonary embolism without acute cor pulmonale, unspecified  chronicity (H) 10/21/2020     Priority: Medium     Acute diverticulitis 09/20/2020     Priority: Medium     HTN (hypertension) 09/14/2020     Priority: Medium     Cervical segment dysfunction 09/07/2020     Priority: Medium     Cervicalgia 09/07/2020     Priority: Medium     Thoracic segment dysfunction 09/07/2020     Priority: Medium     S/P transurethral resection of prostate 04/30/2018     Priority: Medium     Hematuria, gross 04/30/2018     Priority: Medium     Grief reaction 12/06/2017     Priority: Medium     Type 2 diabetes mellitus without complication, without long-term current use of insulin (H) 12/13/2016     Priority: Medium     Adenocarcinoma of lung, stage 1 (H) 04/29/2016     Priority: Medium     Overview:   Right upper lobe. No evidence of distant spread on PET.  Clinical stage (prior to surgery): IA (T1a, N0, M0)  Surgically resected 4/28/2016.       Erectile dysfunction 04/29/2016     Priority: Medium     Morbid obesity due to excess calories (H) 03/14/2016     Priority: Medium     BPPV (benign paroxysmal positional vertigo), unspecified laterality 03/14/2016     Priority: Medium     Hyperlipidemia LDL goal <100 10/10/2015     Priority: Medium     Other sleep apnea 07/30/2015     Priority: Medium     Problem list name updated by automated process. Provider to review       Polyp of colon 08/27/2013     Priority: Medium     Advance care planning 02/22/2012     Priority: Medium     Advance Care Planning 12/6/2017: ACP Review of Chart / Resources Provided:  Reviewed chart for advance care plan.  Nahun Alejandroman has been provided information and resources to begin or update their advance care plan.  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     Priority: Medium     Lumbago 11/25/2003     Priority: Medium     Essential hypertension, benign 12/19/2002     Priority:  Medium     Tobacco use disorder 12/19/2002     Priority: Medium      Past Medical History:   Diagnosis Date     Arthritis      COPD (chronic obstructive pulmonary disease) (H)      DM (diabetes mellitus) (H)      Essential hypertension, benign      Hemorrhage of rectum and anus      Non-small cell lung cancer (H)      Obesity      Personal history of colonic polyps      Tobacco use disorder      Urinary retention      Past Surgical History:   Procedure Laterality Date     ABDOMEN SURGERY  1995     APPENDECTOMY       CHOLECYSTECTOMY       COLONOSCOPY  2014     CYSTOSCOPY, TRANSURETHRAL RESECTION (TUR) PROSTATE, COMBINED N/A 4/30/2018    Procedure: COMBINED CYSTOSCOPY, TRANSURETHRAL RESECTION (TUR) PROSTATE;  Cystoscopy, Transurethral Resection Of The Prostate;  Surgeon: Praveena Burris MD;  Location: [facility] OR     WEDGE RESECTION      lung     ZZC NONSPECIFIC PROCEDURE      cholecystectomy     Current Outpatient Medications   Medication Sig Dispense Refill     acetaminophen (TYLENOL) 325 MG tablet Take 2 tablets (650 mg) by mouth every 4 hours as needed for mild pain 100 tablet 0     acetaminophen (TYLENOL) 500 MG tablet Take 1,000 mg by mouth every morning       acetaminophen (TYLENOL) 500 MG tablet Take 500 mg by mouth every evening       albuterol (VENTOLIN HFA) 108 (90 Base) MCG/ACT inhaler INHALE 2 PUFFS INTO THE LUNGS EVERY 6 HOURS AS NEEDED FOR SHORTNESS OF BREATH / DYSPNEA OR WHEEZING 25.5 g 2     apixaban ANTICOAGULANT (ELIQUIS) 5 MG tablet Please take 5 mg (1 tab) twice a day 60 tablet 3     Ascorbic Acid (VITAMIN C PO) Take 500 mg by mouth At Bedtime        aspirin 81 MG tablet Take 1 tablet (81 mg) by mouth daily 30 tablet 11     atorvastatin (LIPITOR) 20 MG tablet Take 1 tablet (20 mg) by mouth daily 90 tablet 3     blood glucose (ACCU-CHEK CHACHA) test strip Use to test blood sugar 2 times daily or as directed. 60 strip 6     blood glucose monitoring (SOFTCLIX) lancets Use to test blood sugar 2  times daily or as directed. 1 Box 6     Continuous Blood Gluc  (FREESTYLE KONG 14 DAY READER) LEONEL Use to read blood sugars as per 's instructions. 1 each 0     Continuous Blood Gluc Sensor (FREESTYLE KONG 14 DAY SENSOR) MISC Change every 14 days. 2 each 11     docusate sodium (COLACE) 100 MG capsule Take 100 mg by mouth 2 times daily as needed for constipation       finasteride (PROSCAR) 5 MG tablet Take 1 tablet (5 mg) by mouth daily 90 tablet 3     Fluticasone-Salmeterol (WIXELA INHUB IN) Inhale 1 puff into the lungs 2 times daily       furosemide (LASIX) 20 MG tablet Take 20 mg by mouth every evening       furosemide (LASIX) 40 MG tablet Take 40 mg by mouth every morning       lisinopril (ZESTRIL) 40 MG tablet TAKE 1 TABLET BY MOUTH EVERY DAY 90 tablet 0     melatonin 1 MG TABS tablet Take 1 tablet (1 mg) by mouth nightly as needed for sleep 30 tablet 0     metFORMIN (GLUCOPHAGE) 500 MG tablet TAKE 1 TABLET BY MOUTH TWICE A DAY WITH MEALS 180 tablet 1     metoprolol succinate ER (TOPROL-XL) 25 MG 24 hr tablet Take 1 tablet (25 mg) by mouth daily 30 tablet 11     order for DME Equipment being ordered: diabetic shoes, 1 pair 1 Package 0     order for DME Oxygen 2 Li/min  at night via nasal cannula 1 Device 0     order for DME Equipment delivered; Respironics 760S BIPAP with heated humidification and heated tubing.  Pressure 15/7cm. Respironics Syeda View FF mask (Medium)       sildenafil (VIAGRA) 50 MG tablet Take 1 tablet (50 mg) by mouth daily as needed (erectile dysgfunction, do not use with Flomax or alpha blockers) Or nitro products  ' 10 tablet 1     tiotropium (SPIRIVA) 18 MCG inhaled capsule Inhale 18 mcg into the lungs daily         Allergies   Allergen Reactions     No Known Drug Allergies         Social History     Tobacco Use     Smoking status: Former Smoker     Packs/day: 2.00     Years: 52.00     Pack years: 104.00     Types: Cigarettes, Cigars     Start date: 6/1/1960     Quit  "date: 2013     Years since quittin.4     Smokeless tobacco: Never Used     Tobacco comment: Quit, will never start again.   Substance Use Topics     Alcohol use: No     Alcohol/week: 0.0 standard drinks       History   Drug Use No         Objective     /74   Pulse 75   Temp 98.1  F (36.7  C) (Temporal)   Resp 15   Ht 1.702 m (5' 7\")   Wt 111.9 kg (246 lb 12.8 oz)   SpO2 94%   BMI 38.65 kg/m      Physical Exam    GENERAL APPEARANCE: healthy, alert and no distress     EYES: + cataract OU     HENT: ear canals and TM's normal and nose and mouth without ulcers or lesions     NECK: no adenopathy, no asymmetry, masses, or scars and thyroid normal to palpation     RESP: lungs clear to auscultation - no rales, rhonchi or wheezes     CV: regular rates and rhythm, normal S1 S2, no S3 or S4 and no click or rub     ABDOMEN: obese     MS: extremities normal- no gross deformities noted     NEURO: No focal changes     PSYCH: mentation appears normal and affect normal/bright    Recent Labs   Lab Test 20  0754 20  1441 20  1537   HGB 12.2* 12.6*  --     309  --     133  --    POTASSIUM 4.6 4.1  --    CR 1.04 1.12  --    A1C  --  7.5* 7.6*        Diagnostics:  No labs were ordered during this visit.   No EKG required for low risk surgery (cataract, skin procedure, breast biopsy, etc).    Revised Cardiac Risk Index (RCRI):  The patient has the following serious cardiovascular risks for perioperative complications:   - Diabetes Mellitus  = 1 point     RCRI Interpretation: 1 point: Class II (low risk - 0.9% complication rate) for this procedure       Assessment & Plan   The proposed surgical procedure is considered LOW risk.    (Z01.818) Preop general physical exam  (primary encounter diagnosis)  Comment: Surgery is scheduled for routine cataract extraction bilaterally as long as within 30 days this preop suffices for both procedures  Plan: Potassium, Platelet count, Hemoglobin        "     (H26.9) Cataract, unspecified cataract type, unspecified laterality  Comment: Continue with follow-up with ophthalmology as directed  Plan:     (E11.9) Type 2 diabetes mellitus without complication, without long-term current use of insulin (H)  Comment: Stable continue with current medical management, recheck A1c  Plan: Hemoglobin A1c            (I26.99) Other pulmonary embolism without acute cor pulmonale, unspecified chronicity (H)  Comment: Noted on anticoagulation.  Continuing as ordered.  Plan: Meds as ordered    (J44.9) Chronic obstructive pulmonary disease, unspecified COPD type (H)  Comment: Stable continue with inhaler use and supportive care  Plan:     (I10) Essential hypertension, benign  Comment: At goal on therapy continuing with current medical management  Plan:     (E66.01) Morbid obesity due to excess calories (H)  Comment: Encouraged ongoing weight loss and weight reduction  Plan:     (Z11.59) Encounter for HCV screening test for low risk patient  Comment: Screening hepatitis C test ordered  Plan: Hepatitis C antibody            Risks and Recommendations:  The patient has the following additional risks and recommendations for perioperative complications:   - Morbid obesity (BMI >40)  Diabetes:  - Patient is not on insulin therapy: regular NPO guidelines can be followed.     Medication Instructions:   - metformin: HOLD day of surgery if NPO    RECOMMENDATION:  APPROVAL GIVEN to proceed with proposed procedure, without further diagnostic evaluation.    Signed Electronically by: Olegario Castillo MD    Copy of this evaluation report is provided to requesting physician, Dr. Zayda Lucia [facility] Guidelines    Revised Cardiac Risk Index

## 2020-11-12 LAB — HCV AB SERPL QL IA: REACTIVE

## 2020-11-15 LAB
HCV RNA SERPL NAA+PROBE-ACNC: NORMAL [IU]/ML
HCV RNA SERPL NAA+PROBE-LOG IU: NORMAL LOG IU/ML

## 2020-11-19 ENCOUNTER — VIRTUAL VISIT (OUTPATIENT)
Dept: EDUCATION SERVICES | Facility: CLINIC | Age: 75
End: 2020-11-19
Payer: MEDICARE

## 2020-11-19 DIAGNOSIS — E11.9 TYPE 2 DIABETES MELLITUS WITHOUT COMPLICATION, WITHOUT LONG-TERM CURRENT USE OF INSULIN (H): Primary | ICD-10-CM

## 2020-11-19 PROCEDURE — G0108 DIAB MANAGE TRN  PER INDIV: HCPCS | Mod: 95

## 2020-11-19 NOTE — PROGRESS NOTES
"Diabetes Self-Management Education & Support    Presents for: Personal CGM Start    Patient has given verbal consent for Video visit? Yes  Patient would like the video invitation sent by: Other e-mail: William    Type of service:  Video Visit  Originating Location (pt. Location): Home  Distant Location (provider location):  Home  Mode of Communication:  Video Conference via [email or web address]  Video Start Time: 1:34p  Video End Time (time video stopped): 2:14p  Patient would like to obtain AVS? William      SUBJECTIVE/OBJECTIVE:  Presents for: Personal CGM Start  Accompanied by: Self, Girlfriend, Other(Dietetic intern - observing)  Diabetes education in the past 24mo: Yes  Focus of Visit: Monitoring, CGM  Type of CGM visit: Personal CGM Start  Diabetes type: Type 2  Date of diagnosis: 10+ years  Disease course: Stable  How confident are you filling out medical forms by yourself:: Not Assessed  Diabetes management related comments/concerns: I have the Mikel. It was expensive.  Transportation concerns: No  Difficulty affording diabetes medication?: No  Difficulty affording diabetes testing supplies?: Sometimes  Other concerns:: None  Cultural Influences/Ethnic Background:  American    Diabetes Symptoms & Complications:     Complications assessed today?: No    Patient Problem List and Family Medical History reviewed for relevant medical history, current medical status, and diabetes risk factors.    Vitals:  There were no vitals taken for this visit.  Estimated body mass index is 38.65 kg/m  as calculated from the following:    Height as of 11/11/20: 1.702 m (5' 7\").    Weight as of 11/11/20: 111.9 kg (246 lb 12.8 oz).   Last 3 BP:   BP Readings from Last 3 Encounters:   11/11/20 116/74   10/14/20 116/80   10/01/20 114/68       History   Smoking Status     Former Smoker     Packs/day: 2.00     Years: 52.00     Types: Cigarettes, Cigars     Start date: 6/1/1960     Quit date: 6/1/2013   Smokeless Tobacco     Never Used     " Comment: Quit, will never start again.       Labs:  Lab Results   Component Value Date    A1C 7.2 11/11/2020     Lab Results   Component Value Date     09/21/2020     Lab Results   Component Value Date    LDL 44 08/28/2019     HDL Cholesterol   Date Value Ref Range Status   08/28/2019 30 (L) >39 mg/dL Final   ]  GFR Estimate   Date Value Ref Range Status   09/21/2020 69 >60 mL/min/[1.73_m2] Final     Comment:     Non  GFR Calc  Starting 12/18/2018, serum creatinine based estimated GFR (eGFR) will be   calculated using the Chronic Kidney Disease Epidemiology Collaboration   (CKD-EPI) equation.       GFR Estimate If Black   Date Value Ref Range Status   09/21/2020 81 >60 mL/min/[1.73_m2] Final     Comment:      GFR Calc  Starting 12/18/2018, serum creatinine based estimated GFR (eGFR) will be   calculated using the Chronic Kidney Disease Epidemiology Collaboration   (CKD-EPI) equation.       Lab Results   Component Value Date    CR 1.04 09/21/2020     No results found for: MICROALBUMIN    Healthy Eating:  Healthy Eating Assessed Today: Yes  Cultural/Bahai diet restrictions?: No  Meal planning/habits: None  Meals include: Breakfast, Lunch, Dinner  Breakfast: bologna sandwiches x 2 OR Kazakh toast x 2 - 6 syrups, coffee + sugar & cream, 2 sausage patties OR 2 sausage patties, 2 slices of toast, syrup, eggs, water  Lunch: 2 thin pork chops, sweet potatoes, green beans OR salami w/ cheese sandwich OR 1/2 steak Chipotle bowl, water, 7.5 oz orange soda OR turkey sandwich, chips soda  Dinner: 1 tuna fish sandwich, chips, 7 tomatoes OR sliced tomatoes, 7.5 oz orange soda, 1/2 slice of cake OR 1/2 Chipotle bowl  Beverages: Soda, Water(5 cans orange soda, 7.5 oz pepsi x 2)  Has patient met with a dietitian in the past?: No    Being Active:  Being Active Assessed Today: Yes  Exercise:: Currently not exercising  Barrier to exercise: None    Monitoring:  Monitoring Assessed Today:  Yes  Blood Glucose Meter: CGM  Times checking blood sugar at home (number): Never    Started Freestyle Mikel today     Taking Medications:  Diabetes Medication(s)     Biguanides       metFORMIN (GLUCOPHAGE) 500 MG tablet    TAKE 1 TABLET BY MOUTH TWICE A DAY WITH MEALS          Taking Medication Assessed Today: Yes  Current Treatments: Oral Medication (taken by mouth)  Problems taking diabetes medications regularly?: No  Diabetes medication side effects?: No    Problem Solving:  Problem Solving Assessed Today: No  Is the patient at risk for hypoglycemia?: No  Is the patient at risk for DKA?: No  Does patient have severe weather/disaster plan for diabetes management?: No  Does patient have sick day plan for diabetes management?: No    Reducing Risks:  Reducing Risks Assessed Today: Yes  Diabetes Risks: Age over 45 years, Sedentary Lifestyle, Ethnicity  CAD Risks: Diabetes Mellitus, Sedentary lifestyle, Male sex, Obesity  Has dilated eye exam at least once a year?: Yes    Healthy Coping:  Healthy Coping Assessed Today: Yes  Emotional response to diabetes: Shock/Denial/Bargaining  Informal Support system:: Partner  Stage of change: PREPARATION (Decided to change - considering how)  Support resources: None  Patient Activation Measure Survey Score:  ALL Score (Last Two) 2/22/2012   ALL Raw Score 39   Activation Score 56.4   ALL Level 3       Diabetes knowledge and skills assessment:   Patient is knowledgeable in diabetes management concepts related to: Taking Medication    Patient needs further education on the following diabetes management concepts: Healthy Eating, Being Active, Monitoring, Taking Medication, Problem Solving, Reducing Risks and Healthy Coping    Based on learning assessment above, most appropriate setting for further diabetes education would be: Group class or Individual setting.      INTERVENTIONS:    Patient completed homework (online training and/or Getting started with CGM guide)? : No  Sensor  started:: Other(Started today over video visit)  CGM Initial Settings: Freestyle Mikel  Freestyle Mikel Target Glucose Range (mg/dL):     Sensor was inserted with no resistance or bleeding at insertion site.    Education provided today on:  AADE Self-Care Behaviors:  Healthy Eating: portion control and decreasing sugary beverage consumption, using CGM data to help determine how foods/beverages affect blood sugars   Monitoring: individual blood glucose targets and frequency of monitoring  Reducing Risks: A1C - goals, relating to blood glucose levels, how often to check    CGM-specific education:   Freestyle Mikel sensor: insertion technique, sensor site location and rotation, insulin administration in relation to sensor placement, sensor wear, reasons to remove sensor (MRI, CT, diathermy), Mikel Johnston: frequency of scanning sensor, length of time data is visible, use of built in glucose meter, Precision X-tra test strips and Use of trends and graphs for pattern management and problem solving       Education Materials Provided:  No new materials provided today    ASSESSMENT:  Patient and his partner are ready to start Mikel today. They were able to utilize tools and place first sensor while on video visit today. Discussed how to use this, frequency of scanning, potential use of Smartphone irene as reader, goals for blood sugars at different times of day, using CGM data to support decision making.     Pt verbalized understanding of concepts discussed and recommendations provided today.    PLAN  Scan Mikel at least every 8 hours, first thing in the morning, before and/or 2 hours after the start of a meal, before bed  Continue to decrease soda consumption  Goal to decrease A1C <7%  Follow up in 1 month to review CGM data     Danna Francisco RD, LD, [facility]   Time Spent: 40 minutes  Encounter Type: Individual    Any diabetes medication dose changes were made via the CDE Protocol and Collaborative Practice Agreement with  the patient's referring provider. A copy of this encounter was shared with the provider.

## 2020-12-07 ENCOUNTER — PATIENT OUTREACH (OUTPATIENT)
Dept: NURSING | Facility: CLINIC | Age: 75
End: 2020-12-07
Payer: MEDICARE

## 2020-12-07 ASSESSMENT — ACTIVITIES OF DAILY LIVING (ADL): DEPENDENT_IADLS:: CLEANING;COOKING;LAUNDRY;SHOPPING;MEAL PREPARATION

## 2020-12-07 NOTE — PROGRESS NOTES
Clinic Care Coordination Contact    Follow Up Progress Note      Assessment:    Patient reported to be doing fairly well since last Care Coordination outreach.  No ED visits or hospitalizations in >30 days.    Patient continues on 2 LPM supplemental oxygen.  He still has home oxygen concentrator; isn't utilizing his oxygen tanks.  Patient is still working with [employer]; they are pursuing getting him a portable oxygen concentrator but was told it'd probably be a couple of months.  Patient feels fairly well from a respiratory standpoint; only noticing shortness of breath with activity.    Patient has been walking 2 blocks with his dog every day in an effort to keep moving and possibly lose weight.  He stated this has been going fairly well.    Patient has returned to work; he drives a shuttle bus for [employer]  He has limited the number of people he is willing to drive at one time from 50 down to 10 to limit possible COVID-19 exposure.    Patient had a Virtual Visit with Diabetes Education on 11/19/20; patient is now on the Freestyle Mikel monitoring system.  He will follow up with Diabetes Education again next week to review CGM data.    Patient had cataract surgery on his right eye; he will have cataract surgery on his left eye on 12/14/20.    Patient has been taking his medications as prescribed; no questions or concerns noted at this time.     Patient followed up with both Pulmonology and Oncology providers; he will have a repeat scan of chest and torso on 12/21/20 as there is something in the area behind his belly button that they'd like to evaluate further.  Patient has been told there is concern for recurrent cancer so he is just waiting for his scan and follow-up to find out more.  Patient will see Oncology provider again on 12/28/20.    Patient otherwise noted he feels quite well and denied outstanding questions or concerns at this time.    Goals Addressed                  This Visit's Progress       Patient Stated      1. Medical (pt-stated)   40%     Goal Statement: I will prevent ED visits / hospitalizations within the  next 6 months.  Date Goal set: 9/24/20  Barriers: multiple health diagnoses  Strengths: motivated to feel better and say out of the hospital  Date to Achieve By: 3/31/21  Patient expressed understanding of goal: Yes    Action steps to achieve this goal:  1. I will follow a low fiber diet for 2 weeks for my diverticulitis.  2. I will continue taking my medications as prescribed.  3. I will schedule and attend my appointment with my PCP, Pulmonology, and Oncology doctors as recommended.  4. I will use my oxygen as directed and will purchase a pulse oximeter so I can monitor my oxygen levels.  5. I will work on weight loss as advised for improved health.  6. I will notify my care team with any questions or concerns.  7. I will continue working with Care Coordination to identify and address any barriers to achieving my goal.        Intervention/Education provided during outreach:    CC RN provided patient with ashley for continued follow-up with his care teams and for being compliant with medications and oxygen use.    CC RN provided encouragement to patient for his efforts at staying active with his daily walks and urged him to continue this.    CC RN reviewed patient's upcoming appointments; updated Care Team with Pulmonology and Oncology providers.  CC RN requested that patient call back if any additional needs following his appointment with Oncology; he agreed to do so.    Outreach Frequency: monthly    Plan:    Patient will continue taking his medications as prescribed.    Patient will continue attending his appointments with his care teams as scheduled.    Patient will continue on supplemental oxygen as needed and will continue working with [facility] to obtain a portable oxygen concentrator.    Patient agreed to contact MATTIE SAUNDERS with additional  questions, concerns, or needs.    CC CHW will continue with outreaches at this time with next outreach in approximately 1 month (CC CHW outreach date updated accordingly). CC CHW will inform CC RN of any concerns or changes regarding patient as needed.  CC RN will review patient's chart in approximately 6 weeks and outreach to patient as indicated.    Wesly Davis RN  Clinic Care Coordinator  [facility]  Ph: [phone]

## 2020-12-07 NOTE — LETTER
[facility] CARE COORDINATION  [facility]  [address], [place] [address]    December 7, 2020        Nahun Villalobos  [address]  [facility] [address]          Dear Nahun,     Attached is an updated Complex Care Plan for your continued enrollment in Care Coordination. Please let us know if you have additional questions, concerns, or goals that we can assist with.    Sincerely,    Wesly Davis RN        [facility]  Complex Care Plan  About Me:    Patient Name:  Nahun Villalobos    YOB: 1945  Age:         75 year old   [place] MRN:    [id number] Telephone Information:  Home Phone [phone]   Mobile [phone]       Address:  [address]  [facility] [address] Email address:  [email or web address]      Emergency Contact(s)    Name Relationship Lgl Grd Work Phone Home Phone Mobile Phone   1. MYLENE FIGUEROA Sister   [phone]   2. SINDY CHRISTIE  Daughter No  [phone]    3. SUMAN Significant ot*    [phone]           Primary language:  English     needed? No   [place] [employer]:  [phone] op. 1  Other communication barriers: None  Preferred Method of Communication:  William  Current living arrangement: I live in a private home(lives with girlfriend)  Mobility Status/ Medical Equipment: Independent w/Device    Health Maintenance  Health Maintenance Reviewed: Due/Overdue   Health Maintenance Due   Topic Date Due     ZOSTER IMMUNIZATION (2 of 3) 10/04/2013     DIABETIC FOOT EXAM  11/01/2019     LIPID  08/28/2020     MICROALBUMIN  08/28/2020     My Access Plan  Medical Emergency 911   Primary Clinic Line [facility] [phone]   24 Hour Appointment Line [phone] or  [phone]) (toll-free)   24 Hour Nurse Line [phone] (toll-free)   Preferred Urgent Care [facility], [phone]   [facility]  [facility]  [phone]([facility] or [facility])   Preferred Pharmacy [id number] [place], [place] - [address] [facility]     Behavioral Health Crisis Line The National Suicide Prevention Lifeline at 1-998.496.1515 or 911     My Care Team Members  Patient Care Team       Relationship Specialty Notifications Start End    Olegario Castillo MD PCP - General   2/15/02     Phone: [phone] Fax: [phone]         [address] [place] [id number]    Olegario Castillo MD Assigned PCP   10/4/12     Phone: [phone] Fax: [phone]         [address] [place] [id number]    Praveena Burris MD MD Urology  3/1/17     Phone: [phone] Fax: [phone]         [address] [facility] [address] [facility] [address]    Areli Cordero RN Registered Nurse   3/1/17     Phone: [phone]         Olegario Castillo MD Referring Physician Internal Medicine  1/25/18     Phone: [phone] Fax: [phone]         [address] [place] [address]    Wesly Davis, RN Lead Care Coordinator Primary Care - CC  9/24/20     Phone: [phone]         Kelley Slaughter MA Community Health Worker Primary Care - CC  9/24/20     Phone: [phone]         Praveena Burris MD Assigned Surgical Provider   10/23/20     Phone: [phone] Fax: [phone]         [address] [facility] [address] [facility] [address]    Santi Tinajero MD MD Hematology & Oncology  12/7/20     Phone: [phone] Fax: [phone]         [place] [facility] [address] [facility] [address] [facility] [address]    Bernardo Haynes MD MD Pulmonary Disease  12/7/20     Phone: [phone] Fax: [phone]         [place] [facility] [address] [address]            My Care Plans  Self Management and Treatment Plan  Goals and (Comments)  Goals        Patient Stated      1. Medical (pt-stated)      Goal Statement: I will prevent ED visits / hospitalizations within the  next 6  months.  Date Goal set: 9/24/20  Barriers: multiple health diagnoses  Strengths: motivated to feel better and say out of the hospital  Date to Achieve By: 3/31/21  Patient expressed understanding of goal: Yes    Action steps to achieve this goal:  1. I will follow a low fiber diet for 2 weeks for my diverticulitis.  2. I will continue taking my medications as prescribed.  3. I will schedule and attend my appointment with my PCP, Pulmonology, and Oncology doctors as recommended.  4. I will use my oxygen as directed and will purchase a pulse oximeter so I can monitor my oxygen levels.  5. I will work on weight loss as advised for improved health.  6. I will notify my care team with any questions or concerns.  7. I will continue working with Care Coordination to identify and address any barriers to achieving my goal.        Action Plans on File:   COPD  Depression    Advance Care Plans/Directives Type: None on file       My Medical and Care Information  Problem List   Patient Active Problem List   Diagnosis     Essential hypertension, benign     Tobacco use disorder     Lumbago     Impotence of organic origin     Advance care planning     Polyp of colon     Other sleep apnea     Hyperlipidemia LDL goal <100     Morbid obesity due to excess calories (H)     BPPV (benign paroxysmal positional vertigo), unspecified laterality     Chronic obstructive pulmonary disease, unspecified COPD type (H)     Type 2 diabetes mellitus without complication, without long-term current use of insulin (H)     Grief reaction     S/P transurethral resection of prostate     Hematuria, gross     Cervical segment dysfunction     Cervicalgia     Thoracic segment dysfunction     Adenocarcinoma of lung, stage 1 (H)     Erectile dysfunction     HTN (hypertension)     Acute diverticulitis     Other pulmonary embolism without acute cor pulmonale, unspecified chronicity (H)     Mesenteric mass      Current Medications:  Current Outpatient Medications    Medication Instructions     acetaminophen (TYLENOL) 650 mg, Oral, EVERY 4 HOURS PRN     acetaminophen (TYLENOL) 1,000 mg, Oral, EVERY MORNING     acetaminophen (TYLENOL) 500 mg, Oral, EVERY EVENING     albuterol (VENTOLIN HFA) 108 (90 Base) MCG/ACT inhaler INHALE 2 PUFFS INTO THE LUNGS EVERY 6 HOURS AS NEEDED FOR SHORTNESS OF BREATH / DYSPNEA OR WHEEZING     apixaban ANTICOAGULANT (ELIQUIS) 5 MG tablet Please take 5 mg (1 tab) twice a day     Ascorbic Acid (VITAMIN C PO) 500 mg, Oral, AT BEDTIME     aspirin (ASA) 81 mg, Oral, DAILY     atorvastatin (LIPITOR) 20 mg, Oral, DAILY     blood glucose (ACCU-CHEK CHACHA) test strip Use to test blood sugar 2 times daily or as directed.     blood glucose monitoring (SOFTCLIX) lancets Use to test blood sugar 2 times daily or as directed.     Continuous Blood Gluc  (FREESTYLE KONG 14 DAY READER) LEONEL Use to read blood sugars as per 's instructions.     Continuous Blood Gluc Sensor (DealupaSTYLE KONG 14 DAY SENSOR) MISC Change every 14 days.     docusate sodium (COLACE) 100 mg, Oral, 2 TIMES DAILY PRN     finasteride (PROSCAR) 5 mg, Oral, DAILY     Fluticasone-Salmeterol (WIXELA INHUB IN) 1 puff, Inhalation, 2 TIMES DAILY     furosemide (LASIX) 40 mg, Oral, EVERY MORNING     furosemide (LASIX) 20 mg, Oral, EVERY EVENING     lisinopril (ZESTRIL) 40 MG tablet TAKE 1 TABLET BY MOUTH EVERY DAY     melatonin 1 mg, Oral, AT BEDTIME PRN     metFORMIN (GLUCOPHAGE) 500 MG tablet TAKE 1 TABLET BY MOUTH TWICE A DAY WITH MEALS     metoprolol succinate ER (TOPROL-XL) 25 mg, Oral, DAILY     order for DME Equipment delivered; Respironics 760S BIPAP with heated humidification and heated tubing.  Pressure 15/7cm. Respironics Syeda View FF mask (Medium)      order for DME Oxygen 2 Li/min<BR>at night via nasal cannula     order for DME Equipment being ordered: diabetic shoes, 1 pair     sildenafil (VIAGRA) 50 mg, Oral, DAILY PRN, Or nitro products<BR>'     tiotropium (SPIRIVA)  18 mcg, Inhalation, DAILY     Care Coordination Start Date: 9/24/2020   Frequency of Care Coordination: monthly   Form Last Updated: 12/07/2020

## 2020-12-17 ENCOUNTER — VIRTUAL VISIT (OUTPATIENT)
Dept: EDUCATION SERVICES | Facility: CLINIC | Age: 75
End: 2020-12-17
Payer: MEDICARE

## 2020-12-17 DIAGNOSIS — E11.9 TYPE 2 DIABETES MELLITUS WITHOUT COMPLICATION, WITHOUT LONG-TERM CURRENT USE OF INSULIN (H): Primary | ICD-10-CM

## 2020-12-17 PROCEDURE — G0108 DIAB MANAGE TRN  PER INDIV: HCPCS | Mod: 95

## 2020-12-17 NOTE — Clinical Note
Dr. Castillo,   Patient complains of foot pain & discoloration. I told him to follow up with you ASAP to get this checked out. I think he could/should go up on metformin dose to 2000 mg/day - I didn't make the change thinking you could make final decision on this when you see him. I was thrown off by notes of hepatitis but looks like it should be ok to increase. Let me know how I can help further - I have follow up with him in ~1 month.   Thanks,   Danna Francisco, RD, LD, [facility]

## 2020-12-17 NOTE — PROGRESS NOTES
"Diabetes Self-Management Education & Support    Presents for: CGM Review    Patient has given verbal consent for Video visit? Yes  Patient would like the video invitation sent by: Other e-mail: William    Type of service:  Video Visit  Originating Location (pt. Location): Home  Distant Location (provider location):  Home  Mode of Communication:  Video Conference via Prim[employer]â‚¬â[employer]Vision  Video Start Time: 10:32a  Video End Time (time video stopped): 11:05a  Patient would like to obtain AVS? William      SUBJECTIVE/OBJECTIVE:  Presents for: CGM Review  Accompanied by: Self, Girlfriend, Other(Dietetic intern - observing)  Diabetes education in the past 24mo: Yes  Focus of Visit: Monitoring, CGM  Type of CGM visit: Personal CGM Follow-up  Diabetes type: Type 2  Date of diagnosis: 10+ years  Disease course: Stable  How confident are you filling out medical forms by yourself:: Not Assessed  Diabetes management related comments/concerns: I'm scanning. I see what happens when I drink pop. Now I scan before I drink pop.  Transportation concerns: No  Difficulty affording diabetes medication?: No  Difficulty affording diabetes testing supplies?: Sometimes  Other concerns:: None  Cultural Influences/Ethnic Background:  American    Diabetes Symptoms & Complications:  Neuropathy: Yes  Slow healing wounds: Yes  Complications assessed today?: No    Patient Problem List and Family Medical History reviewed for relevant medical history, current medical status, and diabetes risk factors.    Vitals:  There were no vitals taken for this visit.  Estimated body mass index is 38.65 kg/m  as calculated from the following:    Height as of 11/11/20: 1.702 m (5' 7\").    Weight as of 11/11/20: 111.9 kg (246 lb 12.8 oz).   Last 3 BP:   BP Readings from Last 3 Encounters:   11/11/20 116/74   10/14/20 116/80   10/01/20 114/68       History   Smoking Status     Former Smoker     Packs/day: 2.00     Years: 52.00     Types: Cigarettes, Cigars     Start date: " 6/1/1960     Quit date: 6/1/2013   Smokeless Tobacco     Never Used     Comment: Quit, will never start again.       Labs:  Lab Results   Component Value Date    A1C 7.2 11/11/2020     Lab Results   Component Value Date     09/21/2020     Lab Results   Component Value Date    LDL 44 08/28/2019     HDL Cholesterol   Date Value Ref Range Status   08/28/2019 30 (L) >39 mg/dL Final   ]  GFR Estimate   Date Value Ref Range Status   09/21/2020 69 >60 mL/min/[1.73_m2] Final     Comment:     Non  GFR Calc  Starting 12/18/2018, serum creatinine based estimated GFR (eGFR) will be   calculated using the Chronic Kidney Disease Epidemiology Collaboration   (CKD-EPI) equation.       GFR Estimate If Black   Date Value Ref Range Status   09/21/2020 81 >60 mL/min/[1.73_m2] Final     Comment:      GFR Calc  Starting 12/18/2018, serum creatinine based estimated GFR (eGFR) will be   calculated using the Chronic Kidney Disease Epidemiology Collaboration   (CKD-EPI) equation.       Lab Results   Component Value Date    CR 1.04 09/21/2020     No results found for: MICROALBUMIN    Healthy Eating:  Healthy Eating Assessed Today: Yes  Cultural/Judaism diet restrictions?: No  Meal planning/habits: None  Meals include: Breakfast, Lunch, Dinner  Breakfast: bologna sandwiches x 2 OR Slovak toast x 2 - 6 syrups, coffee + sugar & cream, 2 sausage patties OR 2 sausage patties, 2 slices of toast, syrup, eggs, water  Lunch: 2 thin pork chops, sweet potatoes, green beans OR salami w/ cheese sandwich OR 1/2 steak Chipotle bowl, water, 7.5 oz orange soda OR turkey sandwich, chips soda  Dinner: 1 tuna fish sandwich, chips, 7 tomatoes OR sliced tomatoes, 7.5 oz orange soda, 1/2 slice of cake OR 1/2 Chipotle bowl  Beverages: Soda, Water(5 cans orange soda, 7.5 oz pepsi x 2)  Has patient met with a dietitian in the past?: No    Being Active:  Being Active Assessed Today: Yes  Exercise:: Currently not  exercising  Barrier to exercise: None    Monitoring:  Monitoring Assessed Today: Yes  Blood Glucose Meter: CGM  Times checking blood sugar at home (number): 3  Times checking blood sugar at home (per): Day  Blood glucose trend: Fluctuating    Time in Target: ( mg/dL)  x 7 days    Above 44%  In target 56%  Below 0%    X 14 days  Above 45%   In target 55%  Below 0%    Patient using reader to scan sensor so unable to download blood sugar trends today, was able to obtain time in target from patient though.     Taking Medications:  Diabetes Medication(s)     Biguanides       metFORMIN (GLUCOPHAGE) 500 MG tablet    TAKE 1 TABLET BY MOUTH TWICE A DAY WITH MEALS          Taking Medication Assessed Today: Yes  Current Treatments: Oral Medication (taken by mouth)  Problems taking diabetes medications regularly?: No  Diabetes medication side effects?: No    Problem Solving:  Problem Solving Assessed Today: No  Is the patient at risk for hypoglycemia?: No  Is the patient at risk for DKA?: No  Does patient have severe weather/disaster plan for diabetes management?: No  Does patient have sick day plan for diabetes management?: No    Reducing Risks:  Reducing Risks Assessed Today: Yes  Diabetes Risks: Age over 45 years, Sedentary Lifestyle, Ethnicity  CAD Risks: Diabetes Mellitus, Sedentary lifestyle, Male sex, Obesity  Has dilated eye exam at least once a year?: Yes  Feet checked by healthcare provider in the last year?: No    Healthy Coping:  Healthy Coping Assessed Today: Yes  Emotional response to diabetes: Shock/Denial/Bargaining, Concern for health and well-being, Fear/Anxiety  Informal Support system:: Partner  Stage of change: ACTION (Actively working towards change)  Support resources: None  Patient Activation Measure Survey Score:  ALL Score (Last Two) 2/22/2012   ALL Raw Score 39   Activation Score 56.4   ALL Level 3       Diabetes knowledge and skills assessment:   Patient is knowledgeable in diabetes management  "concepts related to: Monitoring and Taking Medication    Patient needs further education on the following diabetes management concepts: Healthy Eating, Being Active, Monitoring, Taking Medication, Problem Solving, Reducing Risks and Healthy Coping    Based on learning assessment above, most appropriate setting for further diabetes education would be: Group class or Individual setting.      INTERVENTIONS:    Education provided today on:  AADE Self-Care Behaviors:  Healthy Eating: portion control and eliminating sugary beverages - pop & coffee with sugar, discussed alternatives   Monitoring: individual blood glucose targets and frequency of monitoring  Taking Medication: discussed potential for increased metformin dose, due to recent concern for hepatitis, recommended patient discuss with PCP before increasing dose - patient has never taken more than 1000 mg/day metformin, by report.   Problem Solving: high blood glucose - causes, signs/symptoms, treatment and prevention  Reducing Risks: major complications of diabetes, prevention, early diagnostic measures and treatment of complications, foot care, annual eye exam and A1C - goals, relating to blood glucose levels, how often to check    Opportunities for ongoing education and support in diabetes-self management were discussed.    Pt verbalized understanding of concepts discussed and recommendations provided today.       Education Materials Provided:  No new materials provided today      ASSESSMENT:  Patient is gaining better understanding of his blood sugars with use of Freestyle Mikel. States his fasting numbers are never in goal range . He is seeing higher numbers, particularly after drinking pop. Encouraged him to use this knowledge to change behaviors. Discussed complications of diabetes - patient asks writer to \"scare\" him to encourage behavior change. Patient complains of foot pain/s/sx of neuropathy and he states his feet are changing color. He is " concerned about this. Recommend he follow up with PCP to get his feet looked at as soon as possible. He states he will cut out regular pop, he knows he needs to do this, and will also explore different sweetener options for his coffee to avoid 2 tablespoons of sugar in the AM.     Patient's most recent   Lab Results   Component Value Date    A1C 7.2 11/11/2020    is not meeting goal of <7.0    PLAN  Stop drinking regular pop  Stop adding sugar to coffee -- switch to Stevia, Truvia or monkfruit as natural sugar substitutes  Continue to scan Freestyle Mikel frequently - at least every 8 hrs  Make an appointment with Dr. Jonathan NEWTON to get your feet checked out  Follow up in 1 month to review blood sugars & lifestyle changes     Danna Francisco, MONIQUE, LD, [facility]   Time Spent: 33 minutes  Encounter Type: Individual    Any diabetes medication dose changes were made via the CDE Protocol and Collaborative Practice Agreement with the patient's referring provider. A copy of this encounter was shared with the provider.

## 2020-12-17 NOTE — PATIENT INSTRUCTIONS
Stop drinking regular pop  Stop adding sugar to coffee -- switch to Stevia, Truvia or monkfruit as natural sugar substitutes  Continue to scan Freestyle Mikel frequently - at least every 8 hrs  Make an appointment with Dr. Jonathan NEWTON to get your feet checked out  Follow up in 1 month to review blood sugars & lifestyle changes     Call or send a JoinTV message with any questions or concerns    Danna Francisco RD, LD, [facility]   Diabetes Education Triage Line: [phone]  Diabetes Education Appointment [phone]

## 2020-12-21 ENCOUNTER — TRANSFERRED RECORDS (OUTPATIENT)
Dept: HEALTH INFORMATION MANAGEMENT | Facility: CLINIC | Age: 75
End: 2020-12-21

## 2020-12-22 ENCOUNTER — TRANSFERRED RECORDS (OUTPATIENT)
Dept: HEALTH INFORMATION MANAGEMENT | Facility: CLINIC | Age: 75
End: 2020-12-22

## 2020-12-24 ENCOUNTER — OFFICE VISIT (OUTPATIENT)
Dept: INTERNAL MEDICINE | Facility: CLINIC | Age: 75
End: 2020-12-24
Payer: MEDICARE

## 2020-12-24 VITALS
HEART RATE: 88 BPM | TEMPERATURE: 98.6 F | SYSTOLIC BLOOD PRESSURE: 130 MMHG | OXYGEN SATURATION: 94 % | WEIGHT: 241.5 LBS | HEIGHT: 67 IN | DIASTOLIC BLOOD PRESSURE: 82 MMHG | RESPIRATION RATE: 16 BRPM | BODY MASS INDEX: 37.9 KG/M2

## 2020-12-24 DIAGNOSIS — R20.2 PARESTHESIA: ICD-10-CM

## 2020-12-24 DIAGNOSIS — R19.5 LOOSE STOOLS: Primary | ICD-10-CM

## 2020-12-24 DIAGNOSIS — I26.99 OTHER PULMONARY EMBOLISM WITHOUT ACUTE COR PULMONALE, UNSPECIFIED CHRONICITY (H): ICD-10-CM

## 2020-12-24 DIAGNOSIS — E11.9 TYPE 2 DIABETES MELLITUS WITHOUT COMPLICATION, WITHOUT LONG-TERM CURRENT USE OF INSULIN (H): ICD-10-CM

## 2020-12-24 PROCEDURE — 99214 OFFICE O/P EST MOD 30 MIN: CPT | Performed by: INTERNAL MEDICINE

## 2020-12-24 RX ORDER — GABAPENTIN 300 MG/1
300 CAPSULE ORAL 2 TIMES DAILY
Qty: 60 CAPSULE | Refills: 0 | Status: SHIPPED | OUTPATIENT
Start: 2020-12-24 | End: 2021-01-28

## 2020-12-24 ASSESSMENT — MIFFLIN-ST. JEOR: SCORE: 1784.07

## 2020-12-24 NOTE — PROGRESS NOTES
Subjective     Nahun Villalobos is a 75 year old male who presents to clinic today for the following health issues:    HPI         Musculoskeletal problem/pain  Onset/Duration: 1 month   Description  Location: Feet, legs, hands  Redness: no  Pain: no  Warmth: no  Intensity:  moderate  Progression of Symptoms:  same  Accompanying signs and symptoms:   Fevers: no  Numbness/tingling/weakness: YES  History  Trauma to the area: no  Recent illness:  no  Previous similar problem: no  Previous evaluation:  no  Precipitating or alleviating factors: diabetes   Aggravating factors include: elevating feet   Therapies tried and outcome: nothing    Diarrhea  He is tentatively scheduled for colonoscopy in about a month and a half.  He defines no blood in the stool.  He reports that he has 1 normal stool a day and the other days he has occasional loose stool.  He denies significant nausea or vomiting.  He has seen a little bit of weight loss and is being evaluated through his oncologist also.    Duration: 1 month     Description:       Consistency of stool: watery       Blood in stool: no        Number of loose stools past 24 hours: 1-2     Intensity:  moderate    Accompanying signs and symptoms:       Fever: no        Nausea/vomitting: no        Abdominal pain: no        Weight loss: YES    History (recent antibiotics or travel/ill contacts/med changes/testing done): None    Precipitating or alleviating factors: None    Therapies tried and outcome: None          Review of Systems   CONSTITUTIONAL: NEGATIVE for fever, chills, mild change in weight  EYES: NEGATIVE for vision changes or irritation  ENT/MOUTH: NEGATIVE for ear, mouth and throat problems  RESP: NEGATIVE for significant cough or SOB  CV: NEGATIVE for chest pain, palpitations   : NEGATIVE for frequency, dysuria, or hematuria  HEME: NEGATIVE for bleeding problems  PSYCHIATRIC: NEGATIVE for changes in mood or affect      Objective    /82   Pulse 88   Temp 98.6  " F (37  C) (Temporal)   Resp 16   Ht 1.702 m (5' 7\")   Wt 109.5 kg (241 lb 8 oz)   SpO2 94%   BMI 37.82 kg/m    Body mass index is 37.82 kg/m .  Physical Exam   GENERAL: alert and no distress  RESP: lungs clear to auscultation - no rales, rhonchi or wheezes  CV: regular rate and rhythm, normal S1 S2, no S3 or S4, no click or rub  ABDOMEN: obese  MS: no gross musculoskeletal defects noted, trace edema noted LE  NEURO: There is no focal changes noted on exam less subjective complaints of paresthesias to the lower extremities bilaterally at the foot and ankle left greater than right.  There is some mild subjective changes also noted to the fingers.  There is normal strength.  Gait appears essentially non-ataxic.  PSYCH: mentation appears normal, affect normal/bright    Lab Results   Component Value Date    A1C 7.2 11/11/2020    A1C 7.5 09/20/2020    A1C 7.6 06/18/2020    A1C 7.8 01/23/2020    A1C 8.0 08/28/2019     Creatinine   Date Value Ref Range Status   09/21/2020 1.04 0.66 - 1.25 mg/dL Final     LDL Cholesterol Calculated   Date Value Ref Range Status   08/28/2019 44 <100 mg/dL Final     Comment:     Desirable:       <100 mg/dl             Assessment & Plan     Loose stools  Suggest following up for routine colonoscopy but would like to wait at least 3 months from recent PE  - Ova and Parasite Exam Routine; Future  - Enteric Bacteria and Virus Panel by SILVIA Stool; Future  - Clostridium difficile Toxin B PCR; Future    Type 2 diabetes mellitus without complication, without long-term current use of insulin (H)  Labs ordered as fasting per routine with A1c as listed above stable  - Comprehensive metabolic panel; Future  - Lipid Profile; Future    Paresthesia  Question paresthesias secondary to longstanding diabetes.  No focal changes noted on exam.  Suggest trial of gabapentin 1 tablet twice daily to start and advance slowly with dosing and risks of medication discussed with patient  - gabapentin (NEURONTIN) 300 " "MG capsule; Take 1 capsule (300 mg) by mouth 2 times daily    Other pulmonary embolism without acute cor pulmonale, unspecified chronicity (H)  Continue with anticoagulation as ordered.    Patient states he had a recent CT scan of his abdomen and pelvis done through his oncologist the results of which I do not have but the patient states he was told his \"cancer may be back \"        See Patient Instructions    Return in about 2 weeks (around 1/7/2021) for Lab Work appointment.    Olegario Castillo MD  [facility]    "

## 2020-12-28 ENCOUNTER — TRANSFERRED RECORDS (OUTPATIENT)
Dept: HEALTH INFORMATION MANAGEMENT | Facility: CLINIC | Age: 75
End: 2020-12-28

## 2021-01-18 ENCOUNTER — MEDICAL CORRESPONDENCE (OUTPATIENT)
Dept: HEALTH INFORMATION MANAGEMENT | Facility: CLINIC | Age: 76
End: 2021-01-18

## 2021-01-19 ENCOUNTER — TELEPHONE (OUTPATIENT)
Dept: INTERNAL MEDICINE | Facility: CLINIC | Age: 76
End: 2021-01-19

## 2021-01-19 NOTE — TELEPHONE ENCOUNTER
Left detailed message for [facility] on voicemail with message to contact oncology first, due to medical history:

## 2021-01-19 NOTE — TELEPHONE ENCOUNTER
I see no reason why the patient could not hold the Eliquis as scheduled but [facility] as the patient does have a very significant history and I would like there opinion prior to formal recommendations.

## 2021-01-19 NOTE — TELEPHONE ENCOUNTER
Margy SAUNDERS from [facility] called. Pt is scheduled for colonoscopy on Feb. 11th in [place] and they are asking if PCP is ok to hold Eliquis 3 days before the procedure. She would like a call back when provider approves [phone].

## 2021-01-21 ENCOUNTER — VIRTUAL VISIT (OUTPATIENT)
Dept: EDUCATION SERVICES | Facility: CLINIC | Age: 76
End: 2021-01-21
Payer: MEDICARE

## 2021-01-21 ENCOUNTER — MYC MEDICAL ADVICE (OUTPATIENT)
Dept: EDUCATION SERVICES | Facility: CLINIC | Age: 76
End: 2021-01-21

## 2021-01-21 DIAGNOSIS — E11.9 TYPE 2 DIABETES MELLITUS WITHOUT COMPLICATION, WITHOUT LONG-TERM CURRENT USE OF INSULIN (H): Primary | ICD-10-CM

## 2021-01-21 NOTE — PROGRESS NOTES
Called patient this AM after he did not log on to video visit. Patient & partner had the date wrong. Patient states he has been working really hard cutting out the pop, hasn't had one for 2 weeks! Now is drinking 1 bottle of beer with dinner instead. He would like to share Mikel data with writer and is willing to switch to cell phone as . Will provide detail to make this switch via eshtery.    Rescheduled follow up in 3 weeks  to review data once it has been shared. No further questions or concerns today.     Danna Francisco, RD, LD, [facility]

## 2021-01-24 DIAGNOSIS — E78.5 HYPERLIPIDEMIA LDL GOAL <100: ICD-10-CM

## 2021-01-24 DIAGNOSIS — N52.9 ERECTILE DYSFUNCTION, UNSPECIFIED ERECTILE DYSFUNCTION TYPE: ICD-10-CM

## 2021-01-27 NOTE — TELEPHONE ENCOUNTER
Prescriptions approved but cannot fill due to ? listed pharmacy.  Please verify and may call in if needed

## 2021-01-28 DIAGNOSIS — R20.2 PARESTHESIA: ICD-10-CM

## 2021-01-28 RX ORDER — ATORVASTATIN CALCIUM 20 MG/1
20 TABLET, FILM COATED ORAL DAILY
Qty: 30 TABLET | Refills: 0 | Status: SHIPPED | OUTPATIENT
Start: 2021-01-28 | End: 2021-02-23

## 2021-01-28 RX ORDER — ATORVASTATIN CALCIUM 20 MG/1
TABLET, FILM COATED ORAL
Qty: 30 TABLET | Refills: 0 | Status: SHIPPED | OUTPATIENT
Start: 2021-01-28 | End: 2021-01-28

## 2021-01-28 RX ORDER — SILDENAFIL 50 MG/1
50 TABLET, FILM COATED ORAL DAILY PRN
Qty: 10 TABLET | Refills: 1 | Status: SHIPPED | OUTPATIENT
Start: 2021-01-28 | End: 2022-01-18

## 2021-01-28 RX ORDER — GABAPENTIN 300 MG/1
300 CAPSULE ORAL 2 TIMES DAILY
Qty: 180 CAPSULE | Refills: 0 | Status: SHIPPED | OUTPATIENT
Start: 2021-01-28 | End: 2021-05-10

## 2021-01-28 RX ORDER — SILDENAFIL 50 MG/1
50 TABLET, FILM COATED ORAL DAILY PRN
Qty: 10 TABLET | Refills: 1 | Status: SHIPPED | OUTPATIENT
Start: 2021-01-28 | End: 2021-01-28

## 2021-01-28 NOTE — TELEPHONE ENCOUNTER
Prescription approved per [facility] Refill Protocol.    Ev CARMONAN, RN, PHN

## 2021-01-28 NOTE — TELEPHONE ENCOUNTER
Patient is wondering if he should remain on this medication. If so he will need a refill and please call him back at [phone] with answer.

## 2021-01-28 NOTE — TELEPHONE ENCOUNTER
Patient would likely benefit from still being on medication but does need to get lab work done that was placed as future order to ensure the kidney function remains stable and dosing does not need to be adjusted.  Scription has been refilled, may inform patient

## 2021-02-01 ENCOUNTER — MEDICAL CORRESPONDENCE (OUTPATIENT)
Dept: HEALTH INFORMATION MANAGEMENT | Facility: CLINIC | Age: 76
End: 2021-02-01

## 2021-02-04 ENCOUNTER — MYC MEDICAL ADVICE (OUTPATIENT)
Dept: INTERNAL MEDICINE | Facility: CLINIC | Age: 76
End: 2021-02-04

## 2021-02-04 ENCOUNTER — MEDICAL CORRESPONDENCE (OUTPATIENT)
Dept: HEALTH INFORMATION MANAGEMENT | Facility: CLINIC | Age: 76
End: 2021-02-04

## 2021-02-09 ENCOUNTER — VIRTUAL VISIT (OUTPATIENT)
Dept: EDUCATION SERVICES | Facility: CLINIC | Age: 76
End: 2021-02-09
Payer: MEDICARE

## 2021-02-09 ENCOUNTER — MEDICAL CORRESPONDENCE (OUTPATIENT)
Dept: HEALTH INFORMATION MANAGEMENT | Facility: CLINIC | Age: 76
End: 2021-02-09

## 2021-02-09 DIAGNOSIS — E11.9 TYPE 2 DIABETES MELLITUS WITHOUT COMPLICATION, WITHOUT LONG-TERM CURRENT USE OF INSULIN (H): Primary | ICD-10-CM

## 2021-02-09 PROCEDURE — G0108 DIAB MANAGE TRN  PER INDIV: HCPCS | Mod: 95

## 2021-02-09 NOTE — PROGRESS NOTES
"Diabetes Self-Management Education & Support    Presents for: CGM Review    Type of service:  Video Visit    If the video visit is dropped, the video visit invitation should be resent by: Send to e-mail at: [email or web address]    Originating Location (pt. Location): Home  Distant Location (provider location): Home  Mode of Communication:  Video Conference via Smart[employer]s ([employer])    Video Start Time: 8:41a  Video End Time (time video stopped): 9:15a    How would patient like to obtain AVS? MyChart      SUBJECTIVE/OBJECTIVE:  Presents for: CGM Review  Accompanied by: Self, Girlfriend  Diabetes education in the past 24mo: Yes  Focus of Visit: CGM  Type of CGM visit: Personal CGM Follow-up  Diabetes type: Type 2  Date of diagnosis: 10+ years  Disease course: Stable  How confident are you filling out medical forms by yourself:: Not Assessed  Diabetes management related comments/concerns: Have had only 2 pops over the past month  Transportation concerns: No  Difficulty affording diabetes medication?: No  Difficulty affording diabetes testing supplies?: Sometimes  Other concerns:: None  Cultural Influences/Ethnic Background:  American    Diabetes Symptoms & Complications:  Neuropathy: Yes  Slow healing wounds: Yes  Complications assessed today?: No    Patient Problem List and Family Medical History reviewed for relevant medical history, current medical status, and diabetes risk factors.    Vitals:  There were no vitals taken for this visit.  Estimated body mass index is 37.82 kg/m  as calculated from the following:    Height as of 12/24/20: 1.702 m (5' 7\").    Weight as of 12/24/20: 109.5 kg (241 lb 8 oz).   Last 3 BP:   BP Readings from Last 3 Encounters:   12/24/20 130/82   11/11/20 116/74   10/14/20 116/80       History   Smoking Status     Former Smoker     Packs/day: 2.00     Years: 52.00     Types: Cigarettes, Cigars     Start date: 6/1/1960     Quit date: 6/1/2013   Smokeless Tobacco     Never Used     Comment: " Quit, will never start again.       Labs:  Lab Results   Component Value Date    A1C 7.2 11/11/2020     Lab Results   Component Value Date     09/21/2020     Lab Results   Component Value Date    LDL 44 08/28/2019     HDL Cholesterol   Date Value Ref Range Status   08/28/2019 30 (L) >39 mg/dL Final   ]  GFR Estimate   Date Value Ref Range Status   09/21/2020 69 >60 mL/min/[1.73_m2] Final     Comment:     Non  GFR Calc  Starting 12/18/2018, serum creatinine based estimated GFR (eGFR) will be   calculated using the Chronic Kidney Disease Epidemiology Collaboration   (CKD-EPI) equation.       GFR Estimate If Black   Date Value Ref Range Status   09/21/2020 81 >60 mL/min/[1.73_m2] Final     Comment:      GFR Calc  Starting 12/18/2018, serum creatinine based estimated GFR (eGFR) will be   calculated using the Chronic Kidney Disease Epidemiology Collaboration   (CKD-EPI) equation.       Lab Results   Component Value Date    CR 1.04 09/21/2020     No results found for: MICROALBUMIN    Healthy Eating:  Healthy Eating Assessed Today: Yes  Cultural/Shinto diet restrictions?: No  Meal planning/habits: None  Meals include: Breakfast, Lunch, Dinner  Breakfast: bologna sandwiches x 2 OR Occitan toast x 2 - 6 syrups, coffee + sugar & cream, 2 sausage patties OR 2 sausage patties, 2 slices of toast, syrup, eggs, water  Lunch: 2 thin pork chops, sweet potatoes, green beans OR salami w/ cheese sandwich OR 1/2 steak Chipotle bowl, water, 7.5 oz orange soda OR turkey sandwich, chips soda  Dinner: 1 tuna fish sandwich, chips, 7 tomatoes OR sliced tomatoes, 7.5 oz orange soda, 1/2 slice of cake OR 1/2 Chipotle bowl  Beverages: Soda, Water(Has now cut out pop, tastes too sweet when trying to drink regular pop now so doesn't like it anymore.)  Has patient met with a dietitian in the past?: Yes    Being Active:  Being Active Assessed Today: No  Exercise:: Currently not exercising  Barrier to  exercise: None    Monitoring:  Monitoring Assessed Today: Yes  Blood Glucose Meter: CGM  Times checking blood sugar at home (number): 3  Times checking blood sugar at home (per): Day  Blood glucose trend: Fluctuating    Unable to review today, still had not downloaded irene or linked it remotely to allow for remote review of blood sugar log     Taking Medications:  Diabetes Medication(s)     Biguanides       metFORMIN (GLUCOPHAGE) 500 MG tablet    TAKE 1 TABLET BY MOUTH TWICE A DAY WITH MEALS          Taking Medication Assessed Today: Yes  Current Treatments: Oral Medication (taken by mouth)  Problems taking diabetes medications regularly?: No  Diabetes medication side effects?: No    Problem Solving:  Problem Solving Assessed Today: No  Is the patient at risk for hypoglycemia?: No  Is the patient at risk for DKA?: No  Does patient have severe weather/disaster plan for diabetes management?: No  Does patient have sick day plan for diabetes management?: No              Reducing Risks:  Reducing Risks Assessed Today: Yes  Diabetes Risks: Age over 45 years, Sedentary Lifestyle, Ethnicity  CAD Risks: Diabetes Mellitus, Sedentary lifestyle, Male sex, Obesity  Has dilated eye exam at least once a year?: Yes  Feet checked by healthcare provider in the last year?: No    Healthy Coping:  Healthy Coping Assessed Today: Yes  Emotional response to diabetes: Shock/Denial/Bargaining, Concern for health and well-being, Fear/Anxiety  Informal Support system:: Partner  Stage of change: ACTION (Actively working towards change)  Support resources: None  Patient Activation Measure Survey Score:  ALL Score (Last Two) 2/22/2012   ALL Raw Score 39   Activation Score 56.4   ALL Level 3       Diabetes knowledge and skills assessment:   Patient is knowledgeable in diabetes management concepts related to: Healthy Eating, Being Active, Monitoring, Taking Medication and Problem Solving    Patient needs further education on the following diabetes  management concepts: Healthy Eating, Monitoring and Reducing Risks    Based on learning assessment above, most appropriate setting for further diabetes education would be: Group class or Individual setting.      INTERVENTIONS:    Education provided today on:  AADE Self-Care Behaviors:  Healthy Eating: portion control and continuing to avoid soda, recommendations for EtOH consumption - no more than 2 beers/day for men with DM   Monitoring: individual blood glucose targets, frequency of monitoring and spent majority of visit downloading and connecting [employer] appointment to cloud in order to allow for remote review of blood sugars in the future   Reducing Risks: A1C - goals, relating to blood glucose levels, how often to check    Opportunities for ongoing education and support in diabetes-self management were discussed.    Pt verbalized understanding of concepts discussed and recommendations provided today.       Education Materials Provided:  Carbohydrate Counting and My Plate Planner      ASSESSMENT:  Patient & partner still hadn't downloaded irene, were having trouble determining which irene to use. Walked them through the steps and was able to link their irene with professional [employer] account to review in the future. Patient needs to start new sensor today, plans to do so with the irene after visit today. Briefly discussed guidelines surrounding diet, alcohol consumption and importance of getting A1C rechecked frequently. Also discussed need for frequent blood sugar monitoring with Mikel - scanning regularly to help guide patient's decisions. He is agreeable.       Patient's most recent   Lab Results   Component Value Date    A1C 7.2 11/11/2020    is not meeting goal of <7.0    PLAN  Patient due for A1C lab recheck - other future labs ordered, added A1C and recommended patient get in to get all labs performed and follow up with PCP asap.   SUZI will review [employer] data in 2 weeks and send assessment to patient via  William   Review diet guidelines that will be mailed to you, continue with avoiding sugary beverages & watching portion control  Follow up in 4 months for review via video visit     Danna Francisco RD, LD, [facility]   Time Spent: 34 minutes  Encounter Type: Individual    Any diabetes medication dose changes were made via the CDE Protocol and Collaborative Practice Agreement with the patient's referring provider. A copy of this encounter was shared with the provider.

## 2021-02-09 NOTE — PATIENT INSTRUCTIONS
Get your A1C rechecked when you get your other labs done & then follow up with Dr. Castillo  Scan your Mikel using your phone regularly - at least every 8 hrs but ideally fasting, before & 2 hours after each meal, before bed  Look for handouts in the mail on diet adjustments  Keep up the hard work!    Call or send a [employer] message with any questions or concerns    Danna Francisco RD, LD, [facility]   Diabetes Education Triage Line: [phone]  Diabetes Education Appointment [phone]

## 2021-02-10 ENCOUNTER — PATIENT OUTREACH (OUTPATIENT)
Dept: NURSING | Facility: CLINIC | Age: 76
End: 2021-02-10
Payer: MEDICARE

## 2021-02-10 NOTE — PROGRESS NOTES
"Clinic Care Coordination Contact  Community Health Worker Follow Up    Goals:   Goals Addressed as of 2/10/2021 at 2:29 PM                 Today    12/7/20       Patient Stated      1. Medical (pt-stated)   50%  40%    Added 9/24/20 by Wesly Davis RN     Goal Statement: I will prevent ED visits / hospitalizations within the  next 6 months.  Date Goal set: 9/24/20  Barriers: multiple health diagnoses  Strengths: motivated to feel better and say out of the hospital  Date to Achieve By: 3/31/21  Patient expressed understanding of goal: Yes    Action steps to achieve this goal:  1. I will follow a low fiber diet for 2 weeks for my diverticulitis.  2. I will continue taking my medications as prescribed.  3. I will schedule and attend my appointment with my PCP, Pulmonology, and Oncology doctors as recommended.  4. I will use my oxygen as directed and will purchase a pulse oximeter so I can monitor my oxygen levels.  5. I will work on weight loss as advised for improved health.  6. I will notify my care team with any questions or concerns.  7. I will continue working with Care Coordination to identify and address any barriers to achieving my goal.        Discussed:    Patient stated that he had a colonoscopy scheduled for 2/11/21, but rescheduled due to his work.  Patient is now scheduled for 3/17/21.    Patient stated that he is doing \"pretty good\".    Patient stated that he is still using his oxygen and has a portable machine, so he is able to work around.    Patient stated that he had his Covid-19 vaccine last week, and he will have his 2nd does on 2/20/21 at [facility].    Intervention and Education during outreach: CHW encouraged patient to contact CC if there are any other changes or resources needed.  Patient acknowledged understanding.      Plan: will follow up in one month    Next Outreach: 3/10/21    OMAR Kim  Clinic Care Coordination  [facility] Clinics: [place], [place], " Women[facility], and [facility]  Phone: [phone]

## 2021-02-11 ENCOUNTER — PATIENT OUTREACH (OUTPATIENT)
Dept: CARE COORDINATION | Facility: CLINIC | Age: 76
End: 2021-02-11

## 2021-02-11 ASSESSMENT — ACTIVITIES OF DAILY LIVING (ADL): DEPENDENT_IADLS:: CLEANING;COOKING;LAUNDRY;SHOPPING;MEAL PREPARATION

## 2021-02-11 NOTE — PROGRESS NOTES
Clinic Care Coordination Contact    Situation: Patient chart reviewed by care coordinator.     Background: Care Coordination following patient to assist with goal as below.     Assessment: Per chart review, patient outreach completed by CC CHW on 2/10/21.  Patient is actively working to accomplish goal.  Patient reported to be doing fairly well.  He does continue on oxygen but has a portable machine so he is able to walk around more easily.  Per chart review, patient has had no ED visits or hospitalizations since last CC outreach.  Patient's goal remains appropriate and relevant at this time.    Goals        Patient Stated      1. Medical (pt-stated)      Goal Statement: I will prevent ED visits / hospitalizations within the  next 6 months.  Date Goal set: 9/24/20  Barriers: multiple health diagnoses  Strengths: motivated to feel better and say out of the hospital  Date to Achieve By: 3/31/21  Patient expressed understanding of goal: Yes    Action steps to achieve this goal:  1. I will follow a low fiber diet for 2 weeks for my diverticulitis.  2. I will continue taking my medications as prescribed.  3. I will schedule and attend my appointment with my PCP, Pulmonology, and Oncology doctors as recommended.  4. I will use my oxygen as directed and will purchase a pulse oximeter so I can monitor my oxygen levels.  5. I will work on weight loss as advised for improved health.  6. I will notify my care team with any questions or concerns.  7. I will continue working with Care Coordination to identify and address any barriers to achieving my goal.     Plan/Recommendations: The patient will continue working with Care Coordination to achieve goal as above.  CC CHW will continue with outreaches at this time with next outreach in approximately 1 month. CC CHW will inform CC RN of any concerns or changes regarding patient as needed.  CC RN will review patient's chart in approximately 6 weeks and outreach to patient as  indicated.    Wesly Davis, RN  Clinic Care Coordinator  [facility]  [facility] [place], [facility]  Ph: [phone]

## 2021-02-20 DIAGNOSIS — E78.5 HYPERLIPIDEMIA LDL GOAL <100: ICD-10-CM

## 2021-02-22 RX ORDER — ATORVASTATIN CALCIUM 20 MG/1
TABLET, FILM COATED ORAL
Qty: 30 TABLET | Refills: 0 | OUTPATIENT
Start: 2021-02-22

## 2021-02-22 NOTE — TELEPHONE ENCOUNTER
Routing refill request to provider for review/approval because:  Labs not current:  LDL    LDL Cholesterol Calculated   Date Value Ref Range Status   08/28/2019 44 <100 mg/dL Final     Comment:     Desirable:       <100 mg/dl

## 2021-02-23 RX ORDER — ATORVASTATIN CALCIUM 20 MG/1
20 TABLET, FILM COATED ORAL DAILY
Qty: 30 TABLET | Refills: 0 | Status: SHIPPED | OUTPATIENT
Start: 2021-02-23 | End: 2021-03-01

## 2021-02-23 NOTE — TELEPHONE ENCOUNTER
Pt has appt 3/1/21, please refill to get to appt. Can send down to lab after appt, told to come in fasting.  Send Lipitor to [facility] and [facility] if appropriate.

## 2021-03-01 ENCOUNTER — TELEPHONE (OUTPATIENT)
Dept: INTERNAL MEDICINE | Facility: CLINIC | Age: 76
End: 2021-03-01

## 2021-03-01 ENCOUNTER — MYC MEDICAL ADVICE (OUTPATIENT)
Dept: INTERNAL MEDICINE | Facility: CLINIC | Age: 76
End: 2021-03-01

## 2021-03-01 ENCOUNTER — OFFICE VISIT (OUTPATIENT)
Dept: INTERNAL MEDICINE | Facility: CLINIC | Age: 76
End: 2021-03-01
Payer: MEDICARE

## 2021-03-01 VITALS
HEIGHT: 67 IN | RESPIRATION RATE: 15 BRPM | OXYGEN SATURATION: 95 % | SYSTOLIC BLOOD PRESSURE: 138 MMHG | TEMPERATURE: 98.5 F | DIASTOLIC BLOOD PRESSURE: 80 MMHG | WEIGHT: 250.6 LBS | BODY MASS INDEX: 39.33 KG/M2 | HEART RATE: 79 BPM

## 2021-03-01 DIAGNOSIS — E11.9 TYPE 2 DIABETES MELLITUS WITHOUT COMPLICATION, WITHOUT LONG-TERM CURRENT USE OF INSULIN (H): Primary | ICD-10-CM

## 2021-03-01 DIAGNOSIS — I26.99 OTHER PULMONARY EMBOLISM WITHOUT ACUTE COR PULMONALE, UNSPECIFIED CHRONICITY (H): ICD-10-CM

## 2021-03-01 DIAGNOSIS — I10 ESSENTIAL HYPERTENSION, BENIGN: ICD-10-CM

## 2021-03-01 DIAGNOSIS — E78.5 HYPERLIPIDEMIA LDL GOAL <100: ICD-10-CM

## 2021-03-01 PROCEDURE — 99214 OFFICE O/P EST MOD 30 MIN: CPT | Performed by: INTERNAL MEDICINE

## 2021-03-01 RX ORDER — METOPROLOL SUCCINATE 25 MG/1
25 TABLET, EXTENDED RELEASE ORAL DAILY
Qty: 90 TABLET | Refills: 3 | Status: SHIPPED | OUTPATIENT
Start: 2021-03-01 | End: 2022-01-19

## 2021-03-01 RX ORDER — ATORVASTATIN CALCIUM 20 MG/1
20 TABLET, FILM COATED ORAL DAILY
Qty: 90 TABLET | Refills: 3 | Status: SHIPPED | OUTPATIENT
Start: 2021-03-01 | End: 2021-04-19

## 2021-03-01 RX ORDER — LISINOPRIL 40 MG/1
TABLET ORAL
Qty: 90 TABLET | Refills: 3 | Status: SHIPPED | OUTPATIENT
Start: 2021-03-01 | End: 2022-01-19

## 2021-03-01 ASSESSMENT — MIFFLIN-ST. JEOR: SCORE: 1825.34

## 2021-03-01 NOTE — PROGRESS NOTES
"    Assessment & Plan     Type 2 diabetes mellitus without complication, without long-term current use of insulin (H)  Lab Results   Component Value Date    A1C 7.2 11/11/2020    A1C 7.5 09/20/2020    A1C 7.6 06/18/2020    A1C 7.8 01/23/2020    A1C 8.0 08/28/2019     Doing well at present time and continue with current medical management  - lisinopril (ZESTRIL) 40 MG tablet; TAKE 1 TABLET BY MOUTH EVERY DAY    Hyperlipidemia LDL goal <100  LDL Cholesterol Calculated   Date Value Ref Range Status   08/28/2019 44 <100 mg/dL Final     Comment:     Desirable:       <100 mg/dl   Labs ordered as fasting and lipid panel pending  - atorvastatin (LIPITOR) 20 MG tablet; Take 1 tablet (20 mg) by mouth daily    Essential hypertension, benign    Therapy and refilled medication per request  - lisinopril (ZESTRIL) 40 MG tablet; TAKE 1 TABLET BY MOUTH EVERY DAY  - metoprolol succinate ER (TOPROL-XL) 25 MG 24 hr tablet; Take 1 tablet (25 mg) by mouth daily      (I26.99) 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  Plan: We discussed prolonging the dental procedure at the present time until this information is available  Disorder oncology Dr. Jaswinder Tinajero.    Discussed with Dr. Tinajero and with patient and suggest that he go ahead and get his teeth pulled at this point.  He does not need to be bridged.  I have advised him to stop his Eliquis 1 day prior to his dental procedure and hold it the day of his procedure and restart the following day.    BMI:   Estimated body mass index is 39.25 kg/m  as calculated from the following:    Height as of this encounter: 1.702 m (5' 7\").    Weight as of this encounter: 113.7 kg (250 lb 9.6 oz).   Weight management plan: Discussed healthy diet and " exercise guidelines    Work on weight loss  Regular exercise    Return in about 6 months (around 9/1/2021) for diabetes check 6 months.    Olegario Castillo MD  [facility] ALEAH Alejandro is a 75 year old who presents for the following health issues     HPI       Medication Followup of gabapentin (NEURONTIN) 300 MG capsule    Taking Medication as prescribed: yes    Side Effects:  None    Medication Helping Symptoms:  Unable to say      Diabetes Follow-up    How often are you checking your blood sugar? Two times daily  Blood sugar testing frequency justification:  Adjustment of medication(s)  What time of day are you checking your blood sugars (select all that apply)?  Before meals  Have you had any blood sugars above 200?  No 160-180   Have you had any blood sugars below 70?  No    What symptoms do you notice when your blood sugar is low?  None    What concerns do you have today about your diabetes? None     Do you have any of these symptoms? (Select all that apply)  Numbness in feet      BP Readings from Last 2 Encounters:   12/24/20 130/82   11/11/20 116/74     Hemoglobin A1C (%)   Date Value   11/11/2020 7.2 (H)   09/20/2020 7.5 (H)     LDL Cholesterol Calculated (mg/dL)   Date Value   08/28/2019 44   11/01/2018 94                 Review of Systems :    CONSTITUTIONAL: NEGATIVE for fever, chills, change in weight  ENT/MOUTH: NEGATIVE for ear, mouth and throat problems  RESP: NEGATIVE for significant cough or SOB  CV: NEGATIVE for chest pain, palpitations or peripheral edema  GI: NEGATIVE for nausea, abdominal pain, heartburn, or change in bowel habits  : NEGATIVE for frequency, dysuria, or hematuria  MUSCULOSKELETAL: NEGATIVE for significant arthralgias or myalgia  NEURO: NEGATIVE for weakness, dizziness or paresthesias  HEME: NEGATIVE for bleeding problems  PSYCHIATRIC: NEGATIVE for changes in mood or affect      Objective    /80   Pulse 79   Temp 98.5  F (36.9  C)  "(Temporal)   Resp 15   Ht 1.702 m (5' 7\")   Wt 113.7 kg (250 lb 9.6 oz)   SpO2 95%   BMI 39.25 kg/m    Body mass index is 39.25 kg/m .  Physical Exam   GENERAL: alert and no distress  EYES: Eyes grossly normal to inspection, PERRL and conjunctivae and sclerae normal  HENT: ear canals and TM's normal, nose and mouth without ulcers or lesions  NECK: no adenopathy, no asymmetry, masses, or scars and thyroid normal to palpation  RESP: lungs clear to auscultation - no rales, rhonchi or wheezes  CV: regular rate and rhythm, normal S1 S2, no S3 or S4, no click or rub, no peripheral edema and peripheral pulses strong  ABDOMEN: obese  MS: no gross musculoskeletal defects noted  NEURO: No focal changes  PSYCH: mentation appears normal, affect normal/bright    Lab Results   Component Value Date    A1C 7.2 11/11/2020    A1C 7.5 09/20/2020    A1C 7.6 06/18/2020    A1C 7.8 01/23/2020    A1C 8.0 08/28/2019       LDL Cholesterol Calculated   Date Value Ref Range Status   08/28/2019 44 <100 mg/dL Final     Comment:     Desirable:       <100 mg/dl     Creatinine   Date Value Ref Range Status   09/21/2020 1.04 0.66 - 1.25 mg/dL Final             "

## 2021-03-03 ENCOUNTER — TELEPHONE (OUTPATIENT)
Dept: INTERNAL MEDICINE | Facility: CLINIC | Age: 76
End: 2021-03-03

## 2021-03-03 NOTE — TELEPHONE ENCOUNTER
I do not see any reason as to why patient could not receive benzodiazepines unless unknown drug allergies are noted

## 2021-03-03 NOTE — TELEPHONE ENCOUNTER
Dr. Sam calling to clarify fax that he received. Can patient have a benzodiazapine prior to the extraction of his teeth?   Patient is scheduled for dental extraction on Monday 3/8.  Triage to call dental office back.  Yamel Shankar RN

## 2021-03-17 DIAGNOSIS — E11.9 TYPE 2 DIABETES MELLITUS WITHOUT COMPLICATION, WITHOUT LONG-TERM CURRENT USE OF INSULIN (H): ICD-10-CM

## 2021-03-17 LAB
ALBUMIN SERPL-MCNC: 3.5 G/DL (ref 3.4–5)
ALP SERPL-CCNC: 61 U/L (ref 40–150)
ALT SERPL W P-5'-P-CCNC: 24 U/L (ref 0–70)
ANION GAP SERPL CALCULATED.3IONS-SCNC: 5 MMOL/L (ref 3–14)
AST SERPL W P-5'-P-CCNC: 21 U/L (ref 0–45)
BILIRUB SERPL-MCNC: 0.3 MG/DL (ref 0.2–1.3)
BUN SERPL-MCNC: 19 MG/DL (ref 7–30)
CALCIUM SERPL-MCNC: 8.9 MG/DL (ref 8.5–10.1)
CHLORIDE SERPL-SCNC: 103 MMOL/L (ref 94–109)
CHOLEST SERPL-MCNC: 114 MG/DL
CO2 SERPL-SCNC: 27 MMOL/L (ref 20–32)
CREAT SERPL-MCNC: 1.11 MG/DL (ref 0.66–1.25)
GFR SERPL CREATININE-BSD FRML MDRD: 64 ML/MIN/{1.73_M2}
GLUCOSE SERPL-MCNC: 155 MG/DL (ref 70–99)
HBA1C MFR BLD: 6.8 % (ref 0–5.6)
HDLC SERPL-MCNC: 36 MG/DL
LDLC SERPL CALC-MCNC: 46 MG/DL
NONHDLC SERPL-MCNC: 78 MG/DL
POTASSIUM SERPL-SCNC: 4.1 MMOL/L (ref 3.4–5.3)
PROT SERPL-MCNC: 8 G/DL (ref 6.8–8.8)
SODIUM SERPL-SCNC: 135 MMOL/L (ref 133–144)
TRIGL SERPL-MCNC: 161 MG/DL

## 2021-03-17 PROCEDURE — 80061 LIPID PANEL: CPT | Performed by: INTERNAL MEDICINE

## 2021-03-17 PROCEDURE — 36415 COLL VENOUS BLD VENIPUNCTURE: CPT | Performed by: INTERNAL MEDICINE

## 2021-03-17 PROCEDURE — 80053 COMPREHEN METABOLIC PANEL: CPT | Performed by: INTERNAL MEDICINE

## 2021-03-17 PROCEDURE — 83036 HEMOGLOBIN GLYCOSYLATED A1C: CPT | Performed by: INTERNAL MEDICINE

## 2021-03-18 ENCOUNTER — PATIENT OUTREACH (OUTPATIENT)
Dept: NURSING | Facility: CLINIC | Age: 76
End: 2021-03-18
Payer: MEDICARE

## 2021-03-19 NOTE — PROGRESS NOTES
Clinic Care Coordination Contact  Community Health Worker Follow Up    Goals:   Goals Addressed as of 3/18/2021 at 11:32 AM                 3/18/21    2/10/21       Patient Stated      1. Medical (pt-stated)   60%  50%    Added 9/24/20 by Wesly Davis RN     Goal Statement: I will prevent ED visits / hospitalizations within the  next 6 months.  Date Goal set: 9/24/20  //  Extended goal on 3/18/21  Barriers: multiple health diagnoses  Strengths: motivated to feel better and say out of the hospital  Date to Achieve By: 3/31/21 // Extended Goal to 5/31/2021  Patient expressed understanding of goal: Yes    Action steps to achieve this goal:  1. I will follow a low fiber diet for 2 weeks for my diverticulitis.  2. I will continue taking my medications as prescribed.  3. I will schedule and attend my appointment with my PCP, Pulmonology, and Oncology doctors as recommended.  4. I will use my oxygen as directed and will purchase a pulse oximeter so I can monitor my oxygen levels.  5. I will work on weight loss as advised for improved health.  6. I will notify my care team with any questions or concerns.  7. I will continue working with Care Coordination to identify and address any barriers to achieving my goal.        Discussed:  Patient stated that he rescheduled his colonoscopy to 3/25/21.  Patient stated that he is taking his medication as prescribed.  Patient stated that he has pain on his left side, and it feels like a hernia.  Patient reported sending a message via [employer], but has not heard back.  Patient reported having his diabetes education follow up appointment on June 1st.  Patient reported that he would like to continue working on this goal.    Intervention and Education during outreach: CHW called schedule line with patient.  PCP appointment for left side pain is scheduled for 3/24/21 at 2:40 in clinic.    CHW encouraged patient to contact CC if there are any other changes or resources needed.  Patient  acknowledged understanding.     CHW Plan: CHW extended goal per discussion with patient to 5/31/21.  Patient will take medication as prescribed.  Patient will attend scheduled appointments.  CHW will follow up in one month.    Next Outreach; 4/19/21    OMAR Kim  Clinic Care Coordination  [facility] Clinics: [place], [place], [place], and [place] [facility]  Phone: [phone]

## 2021-03-27 ENCOUNTER — NURSE TRIAGE (OUTPATIENT)
Dept: NURSING | Facility: CLINIC | Age: 76
End: 2021-03-27

## 2021-03-27 NOTE — TELEPHONE ENCOUNTER
Pt's care taker/ Partner  Darlene called stating pt is out of his atorvastatin, and eliquis, and she needs emergency refill. She reported she only received one moth supply from [employer] mail service, and she is only requesting one month supply sent to [facility] pharmacy in [facility] in [place].    Pt gave  verbal consent RN to speak to Darlene about his medications/ medical information.     RN called [facility] [employer] and was unable to get hold off anyone, a message stating call the number in the back of your card was displaying. RN called [facility] pharmacy in [place] since pt has refills for his Eliquis, but was told the order was transferred to [employer] mail order.      RN paged on call provider, received a return call from DR Ramsey Sneed, who approved one month supply of each medication,  Eliquis and atorvastatin sent to the requested pharmacy.    Eliquis 5 mg tablet, take one tablet twice a day,quatiity 60 tablets. Atorvastatin 20 mg tablet, take one tablet once a day, quantity  30 tablets.     RN called in the both prescriptions to the pharmacy and was told it will be ready for  for about an hour.     RN called darlene back and informed her medication was called in to the pharmacy and will be ready for  in open hour, she stated okay.      Rodrick Prieto RN  [facility]       COVID 19 Nurse Triage Plan/Patient Instructions    Please be aware that novel coronavirus (COVID-19) may be circulating in the community. If you develop symptoms such as fever, cough, or SOB or if you have concerns about the presence of another infection including coronavirus (COVID-19), please contact your health care provider or visit [email or web address].     Disposition/Instructions    Home care recommended. Follow home care protocol based instructions.    Thank you for taking steps to prevent the spread of this virus.  o Limit your contact with others.  o Wear a simple mask to cover your cough.  o Wash  "your hands well and often.    Resources    [facility]: About COVID-19: [email or web address]    CDC: What to Do If You're Sick: www.cdc.gov/coronavirus/2019-ncov/about/steps-when-sick.html    CDC: Ending Home Isolation: www.cdc.gov/coronavirus/2019-ncov/hcp/disposition-in-home-patients.html     CDC: Caring for Someone: www.cdc.gov/coronavirus/2019-ncov/if-you-are-sick/care-for-someone.html     [facility]: Interim Guidance for Hospital Discharge to Home: www.health.[facility].[place]/diseases/coronavirus/hcp/hospdischarge.pdf    [facility] clinical trials (COVID-19 research studies): clinicalaffairs.[facility]-clinical-trials     Below are the COVID-19 hotlines at the [place] Department of Health ([facility]). Interpreters are available.   o For health questions: Call 183-829-9194 or 1-119.750.3371 (7 a.m. to 7 p.m.)  o For questions about schools and childcare: Call 792-365-0667 or 1-729.297.8624 (7 a.m. to 7 p.m.)                   Reason for Disposition    [1] Request for URGENT new prescription or refill of \"essential\" medication (i.e., likelihood of harm to patient if not taken) AND [2] triager unable to fill per unit policy    Protocols used: MEDICATION QUESTION CALL-A-AH      "

## 2021-04-01 ENCOUNTER — PATIENT OUTREACH (OUTPATIENT)
Dept: CARE COORDINATION | Facility: CLINIC | Age: 76
End: 2021-04-01

## 2021-04-01 ENCOUNTER — OFFICE VISIT (OUTPATIENT)
Dept: INTERNAL MEDICINE | Facility: CLINIC | Age: 76
End: 2021-04-01
Payer: MEDICARE

## 2021-04-01 ENCOUNTER — TRANSFERRED RECORDS (OUTPATIENT)
Dept: MULTI SPECIALTY CLINIC | Facility: CLINIC | Age: 76
End: 2021-04-01

## 2021-04-01 VITALS
HEIGHT: 67 IN | HEART RATE: 79 BPM | BODY MASS INDEX: 39.19 KG/M2 | OXYGEN SATURATION: 100 % | TEMPERATURE: 98.5 F | DIASTOLIC BLOOD PRESSURE: 72 MMHG | WEIGHT: 249.7 LBS | RESPIRATION RATE: 14 BRPM | SYSTOLIC BLOOD PRESSURE: 126 MMHG

## 2021-04-01 DIAGNOSIS — Z12.5 SCREENING PSA (PROSTATE SPECIFIC ANTIGEN): ICD-10-CM

## 2021-04-01 DIAGNOSIS — R10.32 ABDOMINAL PAIN, LEFT LOWER QUADRANT: Primary | ICD-10-CM

## 2021-04-01 DIAGNOSIS — J44.9 CHRONIC OBSTRUCTIVE PULMONARY DISEASE, UNSPECIFIED COPD TYPE (H): ICD-10-CM

## 2021-04-01 DIAGNOSIS — E11.9 TYPE 2 DIABETES MELLITUS WITHOUT COMPLICATION, WITHOUT LONG-TERM CURRENT USE OF INSULIN (H): ICD-10-CM

## 2021-04-01 DIAGNOSIS — I26.99 OTHER PULMONARY EMBOLISM WITHOUT ACUTE COR PULMONALE, UNSPECIFIED CHRONICITY (H): ICD-10-CM

## 2021-04-01 DIAGNOSIS — I10 ESSENTIAL HYPERTENSION, BENIGN: ICD-10-CM

## 2021-04-01 DIAGNOSIS — E78.5 HYPERLIPIDEMIA LDL GOAL <100: ICD-10-CM

## 2021-04-01 LAB — RETINOPATHY: NORMAL

## 2021-04-01 PROCEDURE — 99214 OFFICE O/P EST MOD 30 MIN: CPT | Performed by: INTERNAL MEDICINE

## 2021-04-01 ASSESSMENT — MIFFLIN-ST. JEOR: SCORE: 1821.26

## 2021-04-01 ASSESSMENT — ACTIVITIES OF DAILY LIVING (ADL): DEPENDENT_IADLS:: CLEANING;COOKING;LAUNDRY;SHOPPING;MEAL PREPARATION

## 2021-04-01 NOTE — LETTER
[facility] CARE COORDINATION  [facility]  [address], [place] [address]    April 1, 2021        Garnica J Wilfredo  [address] [place], [place] [address]e [facility] [address]          Dear Nahun,     Oralia is an updated Complex Care Plan for your continued enrollment in Care Coordination. Please let us know if you have additional questions, concerns, or goals that we can assist with.    Sincerely,    Wesly Davis, RN  Clinic Care Coordinator  [facility]  [facility] [place], [facility]  Ph: [phone]            [facility]  Complex Care Plan  About Me:    Patient Name:  Nahun Villalobos    YOB: 1945  Age:         75 year old   [employer] MRN:    [id number] Telephone Information:  Home Phone [phone]   Mobile [phone]       Address:  [address] [facility] [address] Email address:  [email or web address]      Emergency Contact(s)    Name Relationship Lgl Grd Work Phone Home Phone Mobile Phone   1. MYLENE FIGUEROA Sister   [phone]   2. SINDY CHRISTIE  Daughter No  [phone]    3. SUMAN Significant ot*    [phone]           Primary language:  English     needed? No   [employer]:  [phone] op. 1  Other communication barriers: None  Preferred Method of Communication:  William  Current living arrangement: I live in a private home(lives with girlfriend)  Mobility Status/ Medical Equipment: Independent w/Device    Health Maintenance  Health Maintenance Reviewed: Due/Overdue   Health Maintenance Due   Topic Date Due     ZOSTER IMMUNIZATION (2 of 3) 10/04/2013     DIABETIC FOOT EXAM  11/01/2019     MICROALBUMIN  08/28/2020     My Access Plan  Medical Emergency 911   Primary Clinic Line [facility] - [phone]   24 Hour Appointment Line [phone] or  [phone]) (toll-free)   24 Hour Nurse Line [phone] (toll-free)    Preferred Urgent Care [facility], [phone]   Preferred Hospital [facility], [place]  [phone]([facility] or [facility])   Preferred Pharmacy [id number] - [place], [place] - [address]     Behavioral Health Crisis Line The National Suicide Prevention Lifeline at 1-133.821.3742 or 911       My Care Team Members  Patient Care Team       Relationship Specialty Notifications Start End    Olegario Castillo MD PCP - General   2/15/02     Phone: [phone] Fax: [phone]         [address] [place] [id number]    Olegario Castillo MD Assigned PCP   10/4/12     Phone: [phone] Fax: [phone]         [address] [place] [address]    Praveena Burris MD MD Urology  3/1/17     Phone: [phone] Fax: [phone]         [address] [place] [address]    Areli Cordero RN Registered Nurse   3/1/17     Phone: [phone]         Olegario Castillo MD Referring Physician Internal Medicine  1/25/18     Phone: [phone] Fax: [phone]         [address] [place] [address]    Wesly Davis, RN Lead Care Coordinator Primary Care - CC Admissions 9/24/20     Phone: [phone]         Kelley Slaughter MA Community Health Worker Primary Care - CC  9/24/20     Phone: [phone]         Praveena Burris MD Assigned Surgical Provider   10/23/20     Phone: [phone] Fax: [phone]         [address] [place] [address] [address] [facility] [address]    Santi Tinajero MD MD Hematology & Oncology  12/7/20     Phone: [phone] Fax: [phone]         [facility] [address] [address]    Bernardo Haynes MD MD Pulmonary Disease  12/7/20     Phone: [phone] Fax: [phone]         [facility] [address] [address]    Goltz, Bennett Ezra, PA-C Assigned Neuroscience Provider   3/17/21     Phone: [phone] Fax: [phone]          [address] [facility] [address] [place] [place] [address]    Goltz, Bennett Ezra, PA-C Assigned Sleep Provider   3/17/21     Phone: [phone] Fax: [phone] [address] [place] [address]            My Care Plans  Self Management and Treatment Plan  Goals and (Comments)  Goals        General    1. Medical (pt-stated)     Notes - Note edited  9/25/2020  6:56 AM by Wesly Davis RN    Goal Statement: I will prevent ED visits / hospitalizations within the  next 6 months.  Date Goal set: 9/24/20  Barriers: multiple health diagnoses  Strengths: motivated to feel better and say out of the hospital  Date to Achieve By: 3/31/21  Patient expressed understanding of goal: Yes    Action steps to achieve this goal:  1. I will follow a low fiber diet for 2 weeks for my diverticulitis.  2. I will continue taking my medications as prescribed.  3. I will schedule and attend my appointment with my PCP, Pulmonology, and Oncology doctors as recommended.  4. I will use my oxygen as directed and will purchase a pulse oximeter so I can monitor my oxygen levels.  5. I will work on weight loss as advised for improved health.  6. I will notify my care team with any questions or concerns.  7. I will continue working with Care Coordination to identify and address any barriers to achieving my goal.      Healthy Eating (pt-stated)     Notes - Note created  12/17/2020 12:19 PM by Danna Francisco RD    My Goal: I will stop drinking regular pop.     What I need to meet my goal: beverage alternatives - water, diet pop, Crystal Light    I plan to meet my goal by this date: follow up appointment, 1/21             Action Plans on File:   COPD  Depression    Advance Care Plans/Directives Type: none    Honoring Choices    Advance Care Planning and Health Care Directives  When it comes to decisions about your health care, it s important that your voice is heard. You may not always be able to speak for yourself.    We encourage you to have  discussions with your loved ones, cultural or spiritual leaders and health care providers about your goals, values, beliefs and choices.    We are a part of [employer] [place] , supporting and promoting the benefits of advance care planning conversations.    Our goals are to:    Help you make informed decisions about your healthcare choices and ensure that those choices are honored    Offer advance care planning discussions with trained staff    Ensure your choices are clearly defined, documented and available in your medical record    Translate your choices into medical orders as needed    Why is Advance Care Planning important?    Know what your health care choices are and decide what is right for you    An unexpected illness or injury could leave you unable to participate in important treatment decisions    When choices are left to others to decide that responsibility can be difficult and stressful    By discussing and outlining your choices, your voice is heard in the care you want to receive    How can I learn more?  We offer free classes at multiple locations, days and times. Our trained facilitators will provide information and guide you through a Health Care Directive document. They can also review, notarize and add your document to your medical record.    Call [employer] at [phone] or toll free at [phone] for assistance.         My Medical and Care Information  Problem List   Patient Active Problem List   Diagnosis     Essential hypertension, benign     Tobacco use disorder     Lumbago     Impotence of organic origin     Advance care planning     Polyp of colon     Other sleep apnea     Hyperlipidemia LDL goal <100     Morbid obesity due to excess calories (H)     BPPV (benign paroxysmal positional vertigo), unspecified laterality     Chronic obstructive pulmonary disease, unspecified COPD type (H)     Type 2 diabetes mellitus without complication, without long-term current  use of insulin (H)     Grief reaction     S/P transurethral resection of prostate     Hematuria, gross     Cervical segment dysfunction     Cervicalgia     Thoracic segment dysfunction     Adenocarcinoma of lung, stage 1 (H)     Erectile dysfunction     HTN (hypertension)     Acute diverticulitis     Other pulmonary embolism without acute cor pulmonale, unspecified chronicity (H)     Mesenteric mass      Current Medications:  Current Outpatient Medications   Medication Instructions     acetaminophen (TYLENOL) 650 mg, Oral, EVERY 4 HOURS PRN     acetaminophen (TYLENOL) 1,000 mg, Oral, EVERY MORNING     acetaminophen (TYLENOL) 500 mg, Oral, EVERY EVENING     albuterol (VENTOLIN HFA) 108 (90 Base) MCG/ACT inhaler INHALE 2 PUFFS INTO THE LUNGS EVERY 6 HOURS AS NEEDED FOR SHORTNESS OF BREATH / DYSPNEA OR WHEEZING     apixaban ANTICOAGULANT (ELIQUIS) 5 MG tablet Please take 5 mg (1 tab) twice a day     Ascorbic Acid (VITAMIN C PO) 500 mg, Oral, AT BEDTIME     atorvastatin (LIPITOR) 20 mg, Oral, DAILY     blood glucose (ACCU-CHEK CHACHA) test strip Use to test blood sugar 2 times daily or as directed.     blood glucose monitoring (SOFTCLIX) lancets Use to test blood sugar 2 times daily or as directed.     Continuous Blood Gluc  (EMOSpeechYLE KONG 14 DAY READER) LEONEL Use to read blood sugars as per 's instructions.     Continuous Blood Gluc Sensor (EasyQasaSTYLE KONG 14 DAY SENSOR) MISC Change every 14 days.     docusate sodium (COLACE) 100 mg, Oral, 2 TIMES DAILY PRN     finasteride (PROSCAR) 5 mg, Oral, DAILY     Fluticasone-Salmeterol (WIXELA INHUB IN) 1 puff, Inhalation, 2 TIMES DAILY     furosemide (LASIX) 40 mg, Oral, EVERY MORNING     furosemide (LASIX) 20 mg, Oral, EVERY EVENING     gabapentin (NEURONTIN) 300 mg, Oral, 2 TIMES DAILY     lisinopril (ZESTRIL) 40 MG tablet TAKE 1 TABLET BY MOUTH EVERY DAY     melatonin 1 mg, Oral, AT BEDTIME PRN     metFORMIN (GLUCOPHAGE) 500 MG tablet TAKE 1 TABLET BY  MOUTH TWICE A DAY WITH MEALS     metoprolol succinate ER (TOPROL-XL) 25 mg, Oral, DAILY     order for DME Equipment delivered; Respironics 760S BIPAP with heated humidification and heated tubing.  Pressure 15/7cm. Respironics Syeda View FF mask (Medium)      order for DME Oxygen 2 Li/min<BR>at night via nasal cannula     order for DME Equipment being ordered: diabetic shoes, 1 pair     sildenafil (VIAGRA) 50 mg, Oral, DAILY PRN     tiotropium (SPIRIVA) 18 mcg, Inhalation, DAILY     Care Coordination Start Date: 9/24/2020   Frequency of Care Coordination: monthly   Form Last Updated: 04/01/2021

## 2021-04-01 NOTE — PROGRESS NOTES
Care Coordination Clinician Chart Review    Situation: Patient chart reviewed by care coordinator.    Background: Care Coordination initial assessment and enrollment to Care Coordination was 9/24/2020.  Patient centered goals were developed with participation from patient.  CC RN handed patient off to CHW for continued outreach every 30 days.    Assessment: Per chart review, patient outreach completed by CHW on 3/18/21.  Patient is actively working to accomplish goal.  Patient has remained out of the hospital since 10/17/20 and continues working on action steps toward goal.  Patient's goal remains appropriate and relevant at this time.  Patient is due for updated Complex Care Plan.  Annual assessment to be completed annually and as needed.    Goals        Patient Stated      1. Medical (pt-stated)      Goal Statement: I will prevent ED visits / hospitalizations within the  next 6 months.  Date Goal set: 9/24/20  Barriers: multiple health diagnoses  Strengths: motivated to feel better and say out of the hospital  Date to Achieve By: 3/31/21  Patient expressed understanding of goal: Yes    Action steps to achieve this goal:  1. I will follow a low fiber diet for 2 weeks for my diverticulitis.  2. I will continue taking my medications as prescribed.  3. I will schedule and attend my appointment with my PCP, Pulmonology, and Oncology doctors as recommended.  4. I will use my oxygen as directed and will purchase a pulse oximeter so I can monitor my oxygen levels.  5. I will work on weight loss as advised for improved health.  6. I will notify my care team with any questions or concerns.  7. I will continue working with Care Coordination to identify and address any barriers to achieving my goal.        Plan/Recommendations: The patient will continue working with Care Coordination to achieve goal as above.  CHW will involve CC RN as needed or if patient is ready to move to maintenance.  CC RN will continue to monitor  progress to goals and CHW outreaches every 6 weeks.  Care Plan updated and sent to patient: Yes    Wesly Davis RN  Clinic Care Coordinator  [facility]  [facility] [place], [facility]  Ph: [phone]

## 2021-04-01 NOTE — PROGRESS NOTES
Assessment & Plan     Abdominal pain, left lower quadrant  Patient symptoms are completely resolved today without any issues of concern.  I reviewed with him his prior symptoms and discussed with him the options available which include going forward with CT imaging of his abdomen as well as some routine lab work, urinalysis etc.  He would like to wait and observe things as he almost canceled the appointment today because his symptoms have improved.  We discussed the need to follow-up if he were to develop symptoms over the weekend.    Type 2 diabetes mellitus without complication, without long-term current use of insulin (H)  Stable per A1c as listed below recommend recheck in 6 months    Essential hypertension, benign  Currently at goal on therapy continue with current medical management    Hyperlipidemia LDL goal <100  LDL level listed below and noted at goal continue with annual follow-up    Other pulmonary embolism without acute cor pulmonale, unspecified chronicity (H)  Stable and refilled per request for 3-month supply  - apixaban ANTICOAGULANT (ELIQUIS) 5 MG tablet; Take 1 tablet (5 mg) by mouth 2 times daily Please take 5 mg (1 tab) twice a day    Chronic obstructive pulmonary disease, unspecified COPD type (H)  Updated medication as per pulmonary prescription entered  - Fluticasone-Umeclidin-Vilanterol (TRELEGY ELLIPTA) 100-62.5-25 MCG/INH oral inhaler; Inhale 1 puff into the lungs daily    Screening PSA (prostate specific antigen)  Offered routine screening but patient is being seen by urology  - PSA, screen; Future       See Patient Instructions    No follow-ups on file.    Olegario Castillo MD  [facility]    Mariano Alejandro is a 75 year old who presents for the following health issues  accompanied by his partner:    HPI     Abdominal/Flank Pain:    Onset/Duration: 2 weeks, resolved yesterday   Description:   Character: Squeezing sensation- only occurred when walking  "  Location: left lower quadrant  Radiation: None  Intensity: moderate  Progression of Symptoms:  improving  Accompanying Signs & Symptoms:  Fever/Chills: no  Gas/Bloating: no  Nausea: no  Vomitting: no  Diarrhea: no  Constipation: no  Dysuria or Hematuria: no  History:   Trauma: no  Previous similar pain: no  Previous tests done: none  Precipitating factors:   Does the pain change with:     Food: no    Bowel Movement: no    Urination: no   Other factors:  no  Therapies tried and outcome: None      Review of Systems   CONSTITUTIONAL: NEGATIVE for fever, chills, change in weight  EYES: NEGATIVE for vision changes or irritation  ENT/MOUTH: NEGATIVE for ear, mouth and throat problems  RESP: NEGATIVE for significant cough  CV: NEGATIVE for chest pain, palpitations or peripheral edema  : NEGATIVE for frequency, dysuria, or hematuria  MUSCULOSKELETAL: NEGATIVE for significant arthralgias or myalgia  NEURO: NEGATIVE for weakness, dizziness or paresthesias  HEME: NEGATIVE for bleeding problems  PSYCHIATRIC: NEGATIVE for changes in mood or affect      Objective    /72   Pulse 79   Temp 98.5  F (36.9  C) (Temporal)   Resp 14   Ht 1.702 m (5' 7\")   Wt 113.3 kg (249 lb 11.2 oz)   SpO2 100%   BMI 39.11 kg/m    Body mass index is 39.11 kg/m .     Physical Exam   GENERAL: alert and no distress  RESP: lungs clear to auscultation - no rales, rhonchi or wheezes  CV: regular rate and rhythm, normal S1 S2, no S3 or S4, no click or rub  Morbidly obese  ABDOMEN: The abdomen is protuberant.  There is a right upper quadrant scar.  There is a mild rectus diastases versus early abdominal wall hernia noted to the right mid abdomen but this is nontender.  The abdomen is otherwise distinctly soft.  There is no rebound or guarding noted.   (male): The lower groin area is difficult to evaluate due to redundant adipose tissue but I do not appreciate any adenopathy or any obvious nor distinct lower abdominal wall or inguinal " hernia  NEURO: No focal changes, using assist device as   PSYCH: mentation appears normal, affect normal/bright    Lab Results   Component Value Date    A1C 6.8 03/17/2021    A1C 7.2 11/11/2020    A1C 7.5 09/20/2020    A1C 7.6 06/18/2020    A1C 7.8 01/23/2020     LDL Cholesterol Calculated   Date Value Ref Range Status   03/17/2021 46 <100 mg/dL Final     Comment:     Desirable:       <100 mg/dl     Lab Results   Component Value Date    ALT 24 03/17/2021     Creatinine   Date Value Ref Range Status   03/17/2021 1.11 0.66 - 1.25 mg/dL Final     PSA   Date Value Ref Range Status   10/27/2017 1.98 0 - 4 ug/L Final     Comment:     Assay Method:  Chemiluminescence using Siemens Vista analyzer

## 2021-04-18 DIAGNOSIS — I26.99 OTHER PULMONARY EMBOLISM WITHOUT ACUTE COR PULMONALE, UNSPECIFIED CHRONICITY (H): ICD-10-CM

## 2021-04-18 DIAGNOSIS — E78.5 HYPERLIPIDEMIA LDL GOAL <100: ICD-10-CM

## 2021-04-19 RX ORDER — ATORVASTATIN CALCIUM 20 MG/1
TABLET, FILM COATED ORAL
Qty: 30 TABLET | Refills: 5 | Status: SHIPPED | OUTPATIENT
Start: 2021-04-19 | End: 2021-04-22

## 2021-04-20 RX ORDER — APIXABAN 5 MG/1
TABLET, FILM COATED ORAL
Qty: 60 TABLET | Refills: 0 | Status: SHIPPED | OUTPATIENT
Start: 2021-04-20 | End: 2022-01-13

## 2021-04-20 NOTE — TELEPHONE ENCOUNTER
Prescription approved per [facility] Refill Protocol.    Ev CARMONAN, RN, PHN

## 2021-04-21 ENCOUNTER — NURSE TRIAGE (OUTPATIENT)
Dept: CARE COORDINATION | Facility: CLINIC | Age: 76
End: 2021-04-21

## 2021-04-21 ENCOUNTER — VIRTUAL VISIT (OUTPATIENT)
Dept: INTERNAL MEDICINE | Facility: CLINIC | Age: 76
End: 2021-04-21
Payer: MEDICARE

## 2021-04-21 ENCOUNTER — PATIENT OUTREACH (OUTPATIENT)
Dept: NURSING | Facility: CLINIC | Age: 76
End: 2021-04-21
Payer: MEDICARE

## 2021-04-21 DIAGNOSIS — R60.0 BILATERAL LOWER EXTREMITY EDEMA: Primary | ICD-10-CM

## 2021-04-21 DIAGNOSIS — Z86.711 HISTORY OF PULMONARY EMBOLISM: ICD-10-CM

## 2021-04-21 PROCEDURE — 99441 PR PHYSICIAN TELEPHONE EVALUATION 5-10 MIN: CPT | Mod: 95 | Performed by: INTERNAL MEDICINE

## 2021-04-21 NOTE — PATIENT INSTRUCTIONS
As we discussed on the phone, do your best to cut back your salt intake.       In addition, get some mild compression support stockings, knee-high, and try to wear them every day and take them off at bedtime.

## 2021-04-21 NOTE — PROGRESS NOTES
Clinic Care Coordination Contact  Community Health Worker Follow Up    Goals:   Goals Addressed as of 4/21/2021 at 3:19 PM                 Today    3/18/21       Patient Stated      1. Medical (pt-stated)   70%  60%    Added 9/24/20 by Wesly Davis RN     Goal Statement: I will prevent ED visits / hospitalizations within the  next 6 months.  Date Goal set: 9/24/20  Barriers: multiple health diagnoses  Strengths: motivated to feel better and say out of the hospital  Date to Achieve By: 3/31/21//Extended to 5/31/21  Patient expressed understanding of goal: Yes    Action steps to achieve this goal:  1. I will follow a low fiber diet for 2 weeks for my diverticulitis.  2. I will continue taking my medications as prescribed.  3. I will schedule and attend my appointment with my PCP, Pulmonology, and Oncology doctors as recommended.  4. I will use my oxygen as directed and will purchase a pulse oximeter so I can monitor my oxygen levels.  5. I will work on weight loss as advised for improved health.  6. I will notify my care team with any questions or concerns.  7. I will continue working with Care Coordination to identify and address any barriers to achieving my goal.        Discussed:  Patient stated that he is taking his medications as prescribed.  Patient stated that he will walk 1/2 block depending on how he feels.  Patient stated that he needs to reschedule his colonoscopy.  Patient was told that he needs to be off his blood thinners for 3 days.  Patient stated that he is try to eating a low fiber healthy diet.  Patient stated that he checks his oxygen levels daily.  Patient stated that his lower legs are swollen and discolored.    Intervention and Education during outreach: CHW encouraged patient to contact CC if there are any other changes or resources needed.  Patient acknowledged understanding.     Plan: CHW will send a message to  Triage regarding patient's legs.    Next Outreach: 5/18/21    Kelley  OMAR Slaughter  Clinic Care Coordination  [facility]: [place], [place], [place] and [place] [facility]  Phone: [phone]

## 2021-04-21 NOTE — PROGRESS NOTES
Javon is a 75 year old who is being evaluated via a billable video visit.      How would you like to obtain your AVS? MyChart  If the video visit is dropped, the invitation should be resent by: Text to cell phone: [phone]  Will anyone else be joining your video visit? No      Video Start Time:               He is unable to access the video portion, so we switched to a phone visit.          Assessment & Plan     Bilateral lower extremity edema  Based on his history, he may be dealing with venous insufficiency, aggravated by his high salt intake.              See patient instructions    History of pulmonary embolism  Continue Eliquis twice daily               Patient Instructions   As we discussed on the phone, do your best to cut back your salt intake.       In addition, get some mild compression support stockings, knee-high, and try to wear them every day and take them off at bedtime.                         Return in about 3 months (around 7/21/2021) for follow up of several issues.    Olegario Uribe MD  [facility]   Javon is a 75 year old who presents for the following health issues     HPI     Concern - edema  Onset: 1 month  Description: left more than right  Intensity: moderate  Progression of Symptoms:  same  Accompanying Signs & Symptoms: redness  Previous history of similar problem: YES   Precipitating factors:        Worsened by: none  Alleviating factors:        Improved by: none  Therapies tried and outcome:  none               This patient is complaining of bilateral leg edema, slightly worse on the left than on the right.       He tells me it is not really any worse than usual, it is just that he wonders why it is not resolving despite taking 60 mg of furosemide per day.                     He has used support stockings in the past, and he states that they do work well but they are difficult to put on.              He also tells me that he has been a  "lifelong \"salt lover\".         He even adds salt to his beer.                         He had a recent echocardiogram showing good LV function, no signs of right heart strain, and also had a recent stress echo that showed a good result.               He had pulmonary emboli a few months ago.  He takes Eliquis 5 mg twice daily, and states that he has not missed any doses.    Review of Systems         Current Outpatient Medications   Medication Sig Dispense Refill     acetaminophen (TYLENOL) 500 MG tablet Take 1,000 mg by mouth every morning       acetaminophen (TYLENOL) 500 MG tablet Take 500 mg by mouth every evening       albuterol (VENTOLIN HFA) 108 (90 Base) MCG/ACT inhaler INHALE 2 PUFFS INTO THE LUNGS EVERY 6 HOURS AS NEEDED FOR SHORTNESS OF BREATH / DYSPNEA OR WHEEZING 25.5 g 2     Ascorbic Acid (VITAMIN C PO) Take 500 mg by mouth At Bedtime        atorvastatin (LIPITOR) 20 MG tablet TAKE 1 TABLET BY MOUTH EVERY DAY 30 tablet 5     blood glucose (ACCU-CHEK CHACHA) test strip Use to test blood sugar 2 times daily or as directed. 60 strip 6     blood glucose monitoring (SOFTCLIX) lancets Use to test blood sugar 2 times daily or as directed. 1 Box 6     Continuous Blood Gluc  (FREESTYLE KONG 14 DAY READER) LEONEL Use to read blood sugars as per 's instructions. 1 each 0     Continuous Blood Gluc Sensor (FREESTYLE KONG 14 DAY SENSOR) MISC Change every 14 days. 2 each 11     docusate sodium (COLACE) 100 MG capsule Take 100 mg by mouth 2 times daily as needed for constipation       ELIQUIS ANTICOAGULANT 5 MG tablet TAKE 1 TABLET BY MOUTH TWICE A DAY 60 tablet 0     finasteride (PROSCAR) 5 MG tablet Take 1 tablet (5 mg) by mouth daily 90 tablet 3     Fluticasone-Umeclidin-Vilanterol (TRELEGY ELLIPTA) 100-62.5-25 MCG/INH oral inhaler Inhale 1 puff into the lungs daily 60 each 1     furosemide (LASIX) 20 MG tablet Take 20 mg by mouth every evening       furosemide (LASIX) 40 MG tablet Take 40 mg by " mouth every morning       gabapentin (NEURONTIN) 300 MG capsule Take 1 capsule (300 mg) by mouth 2 times daily 180 capsule 0     lisinopril (ZESTRIL) 40 MG tablet TAKE 1 TABLET BY MOUTH EVERY DAY 90 tablet 3     melatonin 1 MG TABS tablet Take 1 tablet (1 mg) by mouth nightly as needed for sleep 30 tablet 0     metFORMIN (GLUCOPHAGE) 500 MG tablet TAKE 1 TABLET BY MOUTH TWICE A DAY WITH MEALS 180 tablet 1     metoprolol succinate ER (TOPROL-XL) 25 MG 24 hr tablet Take 1 tablet (25 mg) by mouth daily 90 tablet 3     order for DME Equipment being ordered: diabetic shoes, 1 pair 1 Package 0     order for DME Oxygen 2 Li/min  at night via nasal cannula 1 Device 0     order for DME Equipment delivered; Respironics 760S BIPAP with heated humidification and heated tubing.  Pressure 15/7cm. 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     Priority: Medium     Other pulmonary embolism without acute cor pulmonale, unspecified chronicity (H) 10/21/2020     Priority: Medium     Acute diverticulitis 09/20/2020     Priority: Medium     HTN (hypertension) 09/14/2020     Priority: Medium     Cervical segment dysfunction 09/07/2020     Priority: Medium     Cervicalgia 09/07/2020     Priority: Medium     Thoracic segment dysfunction 09/07/2020     Priority: Medium     S/P transurethral resection of prostate 04/30/2018     Priority: Medium     Hematuria, gross 04/30/2018     Priority: Medium     Grief reaction 12/06/2017     Priority: Medium     Type 2 diabetes mellitus without complication, without  long-term current use of insulin (H) 12/13/2016     Priority: Medium     Adenocarcinoma of lung, stage 1 (H) 04/29/2016     Priority: Medium     Overview:   Right upper lobe. No evidence of distant spread on PET.  Clinical stage (prior to surgery): IA (T1a, N0, M0)  Surgically resected 4/28/2016.       Erectile dysfunction 04/29/2016     Priority: Medium     Morbid obesity due to excess calories (H) 03/14/2016     Priority: Medium     BPPV (benign paroxysmal positional vertigo), unspecified laterality 03/14/2016     Priority: Medium     Hyperlipidemia LDL goal <100 10/10/2015     Priority: Medium     Other sleep apnea 07/30/2015     Priority: Medium     Problem list name updated by automated process. Provider to review       Polyp of colon 08/27/2013     Priority: Medium     Advance care planning 02/22/2012     Priority: Medium     Advance Care Planning 12/6/2017: ACP Review of Chart / Resources Provided:  Reviewed chart for advance care plan.  Garnica GIANNI AlejandroWilfredo has been provided information and resources to begin or update their advance care plan.  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     Priority: Medium     Lumbago 11/25/2003     Priority: Medium     Essential hypertension, benign 12/19/2002     Priority: Medium     Tobacco use disorder 12/19/2002     Priority: Medium     Past Surgical History:   Procedure Laterality Date     ABDOMEN SURGERY  1995     APPENDECTOMY       CHOLECYSTECTOMY       COLONOSCOPY  2014     CYSTOSCOPY, TRANSURETHRAL RESECTION (TUR) PROSTATE, COMBINED N/A 4/30/2018    Procedure: COMBINED CYSTOSCOPY, TRANSURETHRAL RESECTION (TUR) PROSTATE;  Cystoscopy, Transurethral Resection Of The Prostate;  Surgeon: Praveena Burris MD;  Location: [facility] OR     WEDGE RESECTION      lung     [facility] NONSPECIFIC PROCEDURE      cholecystectomy         Objective            Vitals:  No vitals were obtained today due to virtual visit.    Physical Exam   RESP: No audible wheeze, or cough    PSYCH: Mentation appears normal, affect normal/bright                Telephone time: 10 minutes

## 2021-04-21 NOTE — TELEPHONE ENCOUNTER
Ankle to knee dark red .  Furosemide 60 mg daily .     Additional Information    Negative: Entire foot is cool or blue in comparison to other side    Negative: Difficulty breathing at rest    Negative: Chest pain    Negative: Small area of swelling and followed an insect bite to the area    Negative: Followed a knee injury    Negative: Ankle or foot injury    Negative: Pregnant with leg swelling or edema    Negative: Sounds like a life-threatening emergency to the triager    SEVERE swelling (e.g., swelling extends above knee, entire leg is swollen, weeping fluid)    Negative: Thigh or calf pain and only 1 side and present > 1 hour    Negative: Thigh, calf, or ankle swelling in only one leg    Negative: Thigh, calf, or ankle swelling in both legs, but one side is definitely more swollen    Negative: Cast on leg or ankle and has increasing pain    Negative: Can't walk or can barely stand (new onset)    Negative: Fever and red area (or area very tender to touch)    Negative: Patient sounds very sick or weak to the triager    Negative: Swelling of face, arm or hands (Exception: slight puffiness of fingers during hot weather)    Negative: Pregnant > 20 weeks and sudden weight gain (i.e., > 2 lbs, 1 kg in one week)    MODERATE swelling of both ankles (e.g., swelling extends up to the knees) AND new onset or worsening    Negative: Difficulty breathing with exertion AND worsening or new onset    Negative: Looks like a boil, infected sore, deep ulcer, or other infected rash (spreading redness, pus)    Negative: Patient wants to be seen    MILD swelling of both ankles (i.e., pedal edema) AND new onset or worsening    Protocols used: LEG SWELLING AND EDEMA-A-OH

## 2021-04-21 NOTE — TELEPHONE ENCOUNTER
Clinic Care Coordination Contact  Patient stated that both of his lower legs are swollen and the color is changing.  Please call to discuss.    Patient also stated that he would like a walker and wanted to know if an order could be placed.  Thank you.    Kelley Slaughter, JUDITH  Clinic Care Coordination  [facility] Clinics: [facility] [place], [place], [place], and [facility]  Phone: [phone]

## 2021-04-22 DIAGNOSIS — E78.5 HYPERLIPIDEMIA LDL GOAL <100: ICD-10-CM

## 2021-04-22 RX ORDER — ATORVASTATIN CALCIUM 20 MG/1
20 TABLET, FILM COATED ORAL DAILY
Qty: 90 TABLET | Refills: 3 | Status: SHIPPED | OUTPATIENT
Start: 2021-04-22 | End: 2022-01-19

## 2021-05-09 DIAGNOSIS — R20.2 PARESTHESIA: ICD-10-CM

## 2021-05-10 RX ORDER — GABAPENTIN 300 MG/1
CAPSULE ORAL
Qty: 180 CAPSULE | Refills: 0 | Status: SHIPPED | OUTPATIENT
Start: 2021-05-10 | End: 2021-07-29

## 2021-05-12 ENCOUNTER — PATIENT OUTREACH (OUTPATIENT)
Dept: CARE COORDINATION | Facility: CLINIC | Age: 76
End: 2021-05-12

## 2021-05-12 ASSESSMENT — ACTIVITIES OF DAILY LIVING (ADL): DEPENDENT_IADLS:: CLEANING;COOKING;LAUNDRY;SHOPPING;MEAL PREPARATION

## 2021-05-12 NOTE — PROGRESS NOTES
Care Coordination Clinician Chart Review    Situation: Patient chart reviewed by care coordinator.    Background: Care Coordination initial assessment and enrollment to Care Coordination was 9/24/2020.  Patient centered goals were developed with participation from patient.  CC RN handed patient off to CHW for continued outreach every 30 days.    Assessment: Per chart review, patient outreach completed by CHW on 4/21/21.  Patient is actively working to accomplish goal.  Patient has had no ED or hospital visits since 10/2020.  Patient continues taking medications as prescribed, striving to be physically active, trying to eat healthier, and appropriately following up with his care team. Patient's goal remains appropriate and relevant at this time.  Patient is not due for updated Complex Care Plan.  Annual assessment to be completed annually and as needed.    Goals        Patient Stated      1. Medical (pt-stated)      Goal Statement: I will prevent ED visits / hospitalizations within the  next 6 months.  Date Goal set: 9/24/20  Barriers: multiple health diagnoses  Strengths: motivated to feel better and say out of the hospital  Date to Achieve By: 3/31/21//Extended to 5/31/21  Patient expressed understanding of goal: Yes    Action steps to achieve this goal:  1. I will follow a low fiber diet for 2 weeks for my diverticulitis.  2. I will continue taking my medications as prescribed.  3. I will schedule and attend my appointment with my PCP, Pulmonology, and Oncology doctors as recommended.  4. I will use my oxygen as directed and will purchase a pulse oximeter so I can monitor my oxygen levels.  5. I will work on weight loss as advised for improved health.  6. I will notify my care team with any questions or concerns.  7. I will continue working with Care Coordination to identify and address any barriers to achieving my goal.     Plan/Recommendations: The patient will continue working with Care Coordination to achieve  goal as above.  On next CHW outreach, would recommend discussion of goal completion.  CHW will involve CC RN as needed or if patient is ready to move to maintenance.  CC RN will continue to monitor progress to goals and CHW outreaches every 6 weeks.  Care Plan updated and sent to patient: Ilene Davis RN  Clinic Care Coordinator  [facility]  Ph: [phone]

## 2021-05-24 ENCOUNTER — PATIENT OUTREACH (OUTPATIENT)
Dept: NURSING | Facility: CLINIC | Age: 76
End: 2021-05-24
Payer: MEDICARE

## 2021-05-24 ENCOUNTER — TELEPHONE (OUTPATIENT)
Dept: CARE COORDINATION | Facility: CLINIC | Age: 76
End: 2021-05-24

## 2021-05-24 ENCOUNTER — NURSE TRIAGE (OUTPATIENT)
Dept: INTERNAL MEDICINE | Facility: CLINIC | Age: 76
End: 2021-05-24

## 2021-05-24 NOTE — TELEPHONE ENCOUNTER
"Patient wanted appointment and protocol disposition was to schedule and appointment.  Completed.    Yamel Kendall, Triage RN  [facility]      Reason for Disposition    Mild swelling of both ankles and chronic (unchanged)    Answer Assessment - Initial Assessment Questions  1. ONSET: \"When did the swelling start?\" (e.g., minutes, hours, days)        Has been ongoing problem.  Has become worse in recent months    2. LOCATION: \"What part of the leg is swollen?\"  \"Are both legs swollen or just one leg?\"     Both legs are swollen but the left leg is worse.  Edema is present in both feet, both ankles and both lower legs.  Patient does a poor job describing        3. SEVERITY: \"How bad is the swelling?\" (e.g., localized; mild, moderate,      - Localized - small area of swelling localized to one leg   - MILD pedal edema - swelling limited to foot and ankle, pitting edema < 1/4 inch (6 mm) deep, rest and elevation eliminate most or all swelling   - MODERATE edema - swelling of lower leg to knee, pitting edema > 1/4 inch (6 mm) deep, rest and elevation only partially reduce swelling   - SEVERE edema - swelling extends above knee, facial or hand swelling present         Moderate,    4. REDNESS: \"Does the swelling look red or infected?\"        States it is red and \"nasty\" and the skin feels rough and dry    5. PAIN: \"Is the swelling painful to touch?\" If so, ask: \"How painful is it?\"   (Scale 1-10; mild, moderate or severe)        Denies pain    6. FEVER: \"Do you have a fever?\" If so, ask: \"What is it, how was it measured, and when did it start?\"         Denies fever.    7. CAUSE: \"What do you think is causing the leg swelling?\"    I'm eating too much salt        8. MEDICAL HISTORY: \"Do you have a history of heart failure, kidney disease, liver failure, or cancer?\"         Difficult breathing due to COPD, lung cancer    9. RECURRENT SYMPTOM: \"Have you had leg swelling before?\" If so, ask: \"When was the last time?\" " "\"What happened that time?\"        10. OTHER SYMPTOMS: \"Do you have any other symptoms?\" (e.g., chest pain, difficulty breathing)    No chest pain or other cardia symptoms          11. PREGNANCY: \"Is there any chance you are pregnant?\" \"When was your last menstrual period?\"        No    Protocols used: LEG SWELLING AND EDEMA-A-OH      "

## 2021-05-24 NOTE — PROGRESS NOTES
Clinic Care Coordination Contact  Community Health Worker Follow Up    Goals:   Goals Addressed as of 5/24/2021 at 1:02 PM                 Today    4/21/21       Patient Stated      1. Medical (pt-stated)   100%  70%    Added 9/24/20 by Wesly Davis RN     Goal Statement: I will prevent ED visits / hospitalizations within the  next 6 months.  Date Goal set: 9/24/20  Barriers: multiple health diagnoses  Strengths: motivated to feel better and say out of the hospital  Date to Achieve By: 3/31/21//Extended to 5/31/21  Patient expressed understanding of goal: Yes    Action steps to achieve this goal:  1. I will follow a low fiber diet for 2 weeks for my diverticulitis.  2. I will continue taking my medications as prescribed.  3. I will schedule and attend my appointment with my PCP, Pulmonology, and Oncology doctors as recommended.  4. I will use my oxygen as directed and will purchase a pulse oximeter so I can monitor my oxygen levels.  5. I will work on weight loss as advised for improved health.  6. I will notify my care team with any questions or concerns.  7. I will continue working with Care Coordination to identify and address any barriers to achieving my goal.             Discussed:  Patient stated that he had his toe nails cut in March.  Patient stated that his left foot is swollen like a baseball mitt, and his right foot is swollen too, but not as swollen as his left foot.  Patient stated that his legs are also swollen.  Patient stated that he can barely walk.  Patient stated that he would like to work on a new goal.    Intervention and Education during outreach: CHW gave patient affirmations for completing his goal.  CHW discussed other goals and resource needs at this time.  Patient accepted and would like to schedule a goal update.  CHW encouraged patient to contact CC if there are any other changes or resources needed.  Patient acknowledged understanding.     Plan: CHW scheduled a phone visit with RN  CC on 6/3/21 at 11:00am for a goal update.  CHW sent a message to  Triage regarding patient's swollen feet and legs.    Next Outreach: 7/1/21    OMAR Kim  Clinic Care Coordination  [facility] Clinics: [place] [place], [place], [place], and [place] [facility]  Phone: [phone]

## 2021-05-24 NOTE — TELEPHONE ENCOUNTER
Clinic Care Coordination Contact  Patient stated that his left foot is swollen like a baseball mitt, and his right foot is swollen as well, but not quite as swollen as his left foot.  Patient stated that he can hardly walk.  Patient stated that his legs are swollen too.  Patient stated that he has diabetes.  Please call patient to discuss.  Thank you.    Kelley Slaughter, JUDITH  Clinic Care Coordination  [facility] Clinics: [place], and [place] [facility]  Phone: [phone]

## 2021-05-27 ENCOUNTER — OFFICE VISIT (OUTPATIENT)
Dept: INTERNAL MEDICINE | Facility: CLINIC | Age: 76
End: 2021-05-27
Payer: MEDICARE

## 2021-05-27 VITALS
TEMPERATURE: 97.8 F | WEIGHT: 249.4 LBS | SYSTOLIC BLOOD PRESSURE: 124 MMHG | HEART RATE: 81 BPM | BODY MASS INDEX: 39.06 KG/M2 | OXYGEN SATURATION: 92 % | DIASTOLIC BLOOD PRESSURE: 66 MMHG

## 2021-05-27 DIAGNOSIS — R80.1 PERSISTENT PROTEINURIA: ICD-10-CM

## 2021-05-27 DIAGNOSIS — E66.01 MORBID OBESITY DUE TO EXCESS CALORIES (H): ICD-10-CM

## 2021-05-27 DIAGNOSIS — D75.89 MACROCYTOSIS: ICD-10-CM

## 2021-05-27 DIAGNOSIS — R60.0 BILATERAL LEG EDEMA: Primary | ICD-10-CM

## 2021-05-27 PROBLEM — I50.9 CHF (CONGESTIVE HEART FAILURE) (H): Status: ACTIVE | Noted: 2020-09-16

## 2021-05-27 PROBLEM — J96.11 CHRONIC RESPIRATORY FAILURE WITH HYPOXIA (H): Status: ACTIVE | Noted: 2021-05-27

## 2021-05-27 PROBLEM — Z85.118 HISTORY OF ADENOCARCINOMA OF LUNG: Status: ACTIVE | Noted: 2021-05-27

## 2021-05-27 LAB
ALBUMIN UR-MCNC: 30 MG/DL
APPEARANCE UR: ABNORMAL
BACTERIA #/AREA URNS HPF: ABNORMAL /HPF
BILIRUB UR QL STRIP: NEGATIVE
COLOR UR AUTO: YELLOW
CREAT UR-MCNC: 168 MG/DL
ERYTHROCYTE [DISTWIDTH] IN BLOOD BY AUTOMATED COUNT: 12 % (ref 10–15)
FOLATE SERPL-MCNC: 11.3 NG/ML
GLUCOSE UR STRIP-MCNC: NEGATIVE MG/DL
HCT VFR BLD AUTO: 41.2 % (ref 40–53)
HGB BLD-MCNC: 13.2 G/DL (ref 13.3–17.7)
HGB UR QL STRIP: NEGATIVE
HYALINE CASTS #/AREA URNS LPF: ABNORMAL /LPF
KETONES UR STRIP-MCNC: NEGATIVE MG/DL
LEUKOCYTE ESTERASE UR QL STRIP: NEGATIVE
MCH RBC QN AUTO: 32.8 PG (ref 26.5–33)
MCHC RBC AUTO-ENTMCNC: 32 G/DL (ref 31.5–36.5)
MCV RBC AUTO: 103 FL (ref 78–100)
MICROALBUMIN UR-MCNC: 205 MG/L
MICROALBUMIN/CREAT UR: 122.02 MG/G CR (ref 0–17)
MUCOUS THREADS #/AREA URNS LPF: PRESENT /LPF
NITRATE UR QL: NEGATIVE
NON-SQ EPI CELLS #/AREA URNS LPF: ABNORMAL /LPF
PH UR STRIP: 5.5 PH (ref 5–7)
PLATELET # BLD AUTO: 258 10E9/L (ref 150–450)
RBC # BLD AUTO: 4.02 10E12/L (ref 4.4–5.9)
RBC #/AREA URNS AUTO: ABNORMAL /HPF
SOURCE: ABNORMAL
SP GR UR STRIP: >1.03 (ref 1–1.03)
UROBILINOGEN UR STRIP-ACNC: 0.2 EU/DL (ref 0.2–1)
VIT B12 SERPL-MCNC: 272 PG/ML (ref 193–986)
WBC # BLD AUTO: 7.4 10E9/L (ref 4–11)
WBC #/AREA URNS AUTO: ABNORMAL /HPF

## 2021-05-27 PROCEDURE — 82607 VITAMIN B-12: CPT | Performed by: INTERNAL MEDICINE

## 2021-05-27 PROCEDURE — 36415 COLL VENOUS BLD VENIPUNCTURE: CPT | Performed by: INTERNAL MEDICINE

## 2021-05-27 PROCEDURE — 82043 UR ALBUMIN QUANTITATIVE: CPT | Performed by: INTERNAL MEDICINE

## 2021-05-27 PROCEDURE — 99214 OFFICE O/P EST MOD 30 MIN: CPT | Performed by: INTERNAL MEDICINE

## 2021-05-27 PROCEDURE — 85027 COMPLETE CBC AUTOMATED: CPT | Performed by: INTERNAL MEDICINE

## 2021-05-27 PROCEDURE — 81001 URINALYSIS AUTO W/SCOPE: CPT | Performed by: INTERNAL MEDICINE

## 2021-05-27 PROCEDURE — 82746 ASSAY OF FOLIC ACID SERUM: CPT | Performed by: INTERNAL MEDICINE

## 2021-05-27 NOTE — PROGRESS NOTES
Assessment & Plan     Bilateral leg edema  Unclear etiology. Discussed heart failure vs kidney disease vs liver disease vs lymphedema vs DVT vs infection vs other. Bilateral and chronic so infection quite low on DDX. DVT lower as well due to that. Past TTE WNL in 2020. Recent labs reassuring for liver or kidney disease, but does have some history of proteinuria on past U/As. Discussed compression stockings, open to a formal fitting with OT. Will check UPC.  - LYMPHEDEMA THERAPY REFERRAL; Future    Persistent proteinuria  Review of chart shows he does have some history of proteinuria on past U/As. Will re-check and better characterize (due for UPC for DM care as well).  - UA reflex to Microscopic  - Albumin Random Urine Quantitative with Creat Ratio    Macrocytosis   on last check. No recent B12 or folate. Has been on metformin for awhile which can cause low B12 and exacerbate neuropathy symptoms. Will check.  - CBC with platelets  - Vitamin B12  - Folate    Morbid obesity due to excess calories (H)  Known issue that I take into account for their medical decisions, no current exacerbations or new concerns. Encourage weight loss.    F/u with regular PCP at regular interval or sooner JERARDO Mascorro MD  [facility]    Mariano Alejandro is a 75 year old who presents for the following health issues:    Musculoskeletal problem/pain  Onset/Duration: 2 years   Description  Location: foot and leg - bilateral  Joint Swelling: YES  Redness: yes  Pain: Yes  Warmth: no  Intensity:  moderate  Progression of Symptoms:  intermittent  Accompanying signs and symptoms:   Fevers: no  Numbness/tingling/weakness: YES, feet   History  Trauma to the area: no  Recent illness:  no  Previous similar problem: no  Previous evaluation:  YES  Precipitating or alleviating factors:  Aggravating factors include: sitting  Therapies tried and outcome: tylenol, ibuprofen, gabapentin, elevation     BLE edema  for years. Not worsening, but not better. Some numbness if he keeps his L leg raised for awhile. Bottoms of feet feel a bit different from each other. No joint swelling or pain.    Review of Systems   Constitutional, cardiovascular, pulmonary, neuro, MSK, derm and gu systems are negative, except as otherwise noted.      Objective    /66   Pulse 81   Temp 97.8  F (36.6  C) (Tympanic)   Wt 113.1 kg (249 lb 6.4 oz)   SpO2 92%   BMI 39.06 kg/m    Body mass index is 39.06 kg/m .     Physical Exam   GENERAL: alert and in no distress.  EYES: conjunctivae/corneas clear. EOMs grossly intact  HENT: NC/AT, facies symmetric.  RESP: CTAB, no w/r/r. Distant sounds.  CV: RRR, no m/r/g.  GI: NT, ND, without rebound tenderness or guarding  MSK: 2+ pitting edema in BLE. Calves same size and color. No cyanosis or clubbing noted bilaterally in upper and/or lower extremities.  SKIN: No significant ulcers, lesions, or rashes on the visualized portions of the skin  NEURO: Alert. Oriented. Sensation grossly WNL.

## 2021-05-27 NOTE — PATIENT INSTRUCTIONS
- I will send you a message on onlinetours when I am able to look at the results of your tests from today

## 2021-06-01 ENCOUNTER — VIRTUAL VISIT (OUTPATIENT)
Dept: EDUCATION SERVICES | Facility: CLINIC | Age: 76
End: 2021-06-01
Payer: MEDICARE

## 2021-06-01 DIAGNOSIS — E11.9 TYPE 2 DIABETES MELLITUS WITHOUT COMPLICATION, WITHOUT LONG-TERM CURRENT USE OF INSULIN (H): Primary | ICD-10-CM

## 2021-06-01 PROCEDURE — 98967 PH1 ASSMT&MGMT NQHP 11-20: CPT | Mod: 95

## 2021-06-01 NOTE — PROGRESS NOTES
Diabetes Follow-up - telephone visit     Type of Service: Telephone Visit    Audio only visit done, as patient does not have access to audio-visual technology.    Individual visit provided, given no group classes are available for 2 months.     How would patient like to obtain AVS? William      Subjective/Objective:    Nahun Villalobos uploaded blood sugars to Compendium for review. Last date of communication was: 2/9/21.    Diabetes is being managed with   Diabetes Medications   Diabetes Medication(s)     Biguanides       metFORMIN (GLUCOPHAGE) 500 MG tablet    TAKE 1 TABLET BY MOUTH TWICE A DAY WITH MEALS          BG/Food Log:           Assessment:    Spoke with patient over the phone as he was unable to log onto video visit today. He complains of high cost of Mikel sensors - explained that he may only want to use occasionally or not at all, but if not using, will need to perform finger pokes for glucose monitoring. He is rarely scanning his Mikel when he is wearing it. Stressed that he needs to take full advantage of Mikel by scanning more frequently. He is agreeable. - he requests that he plan to work on this and then have follow up call in 1 month for review. Writer is agreeable.     Also patient states he has cut way back on regular soda and has replaced it with beer. Provided ADA guidelines of no more than 2 beers/day for patient - he states he is going through a 24 pack every 3-4 days. Encouraged him to decrease his alcohol consumption. Reviewed alternatives to regular soda or beer - diet soda, Crystal Light, water.     Plan/Response:  Check blood sugars fasting, before meals, bedtime by scanning your Mikel regularly, at minimum TID but ideally more   Follow up on 7/6 scheduled for review     Danna Francisco RD, LD, [facility]   Time spent: 13 minutes    Any diabetes medication dose changes were made via the CDE Protocol and Collaborative Practice Agreement with the patient's referring provider. A copy of this  encounter was shared with the provider.

## 2021-06-03 ENCOUNTER — PATIENT OUTREACH (OUTPATIENT)
Dept: NURSING | Facility: CLINIC | Age: 76
End: 2021-06-03
Payer: MEDICARE

## 2021-06-03 ENCOUNTER — TELEPHONE (OUTPATIENT)
Dept: CARE COORDINATION | Facility: CLINIC | Age: 76
End: 2021-06-03

## 2021-06-03 DIAGNOSIS — M25.50 MULTIPLE JOINT PAIN: Primary | ICD-10-CM

## 2021-06-03 ASSESSMENT — ACTIVITIES OF DAILY LIVING (ADL): DEPENDENT_IADLS:: CLEANING;COOKING;LAUNDRY;SHOPPING;MEAL PREPARATION

## 2021-06-03 NOTE — TELEPHONE ENCOUNTER
Patient noted chronic leg, knee, and hip pain.  He would like referral to Ortho for further evaluation.    CC RN will request referral from PCP.    Wesly Davis RN  Clinic Care Coordinator  [facility]  [facility]  Ph: [phone]

## 2021-06-03 NOTE — LETTER
[facility] CARE COORDINATION  [facility]  [address], [place] [address]    Lainey 3, 2021        Garnica J Wilfredo  [address] [place], [place] [address]e [facility] [address]          Dear Nahun,     [place] is an updated Complex Care Plan for your continued enrollment in Care Coordination. Please let us know if you have additional questions, concerns, or goals that we can assist with.    Sincerely,    Wesly Davis, RN  Clinic Care Coordinator  [facility]  [facility] Putnam, [facility]  Ph: [phone]            [facility]  Complex Care Plan  About Me:    Patient Name:  Nahun Villalobos    YOB: 1945  Age:         75 year old   [place] MRN:    [id number] Telephone Information:  Home Phone [phone]   Mobile [phone]       Address:  [address] [facility] [address] Email address:  [email or web address]      Emergency Contact(s)    Name Relationship Lgl Grd Work Phone Home Phone Mobile Phone   1. MYLENE FIGUEROA Sister   [phone]   2. SINDY CHRISTIE  Daughter No  [phone]    3. SUMAN Significant ot*    [phone]           Primary language:  English     needed? No   [place] [employer]:  [phone] op. 1  Other communication barriers: None  Preferred Method of Communication:  William  Current living arrangement: I live in a private home(lives with girlfriend)  Mobility Status/ Medical Equipment: Independent w/Device    Health Maintenance  Health Maintenance Reviewed: Due/Overdue   Health Maintenance Due   Topic Date Due     ZOSTER IMMUNIZATION (2 of 3) 10/04/2013     DIABETIC FOOT EXAM  11/01/2019     My Access Plan  Medical Emergency 911   Primary Clinic Line [facility] - [phone]   24 Hour Appointment Line [phone] or  [phone]) (toll-free)   24 Hour Nurse Line [phone] (toll-free)   Preferred Urgent Care [facility]  [facility], [phone]   Preferred Hospital [facility] [place]  [phone]([facility] or [facility])   Preferred Pharmacy [facility] - [place], [place] - [address] AT Portal to Registered [facility] Sites     Behavioral Health Crisis Line The National Suicide Prevention Lifeline at 1-178.340.6351 or 911     My Care Team Members  Patient Care Team       Relationship Specialty Notifications Start End    Olegario Castillo MD PCP - General   2/15/02     Phone: [phone] Fax: [phone]         [address] [place] [address]    Olegario Castillo MD Assigned PCP   10/4/12     Phone: [phone] Fax: [phone]         [address] [place] [address]    Praveena Burris MD MD Urology  3/1/17     Phone: [phone] Fax: [phone]         [address] [facility] [address] [facility] [address]    Areli Cordero RN Registered Nurse   3/1/17     Phone: [phone]         Olegario Castillo MD Referring Physician Internal Medicine  1/25/18     Phone: [phone] Fax: [phone]         [address] [place] [address]    Wesly Davis, RN Lead Care Coordinator Primary Care - CC Admissions 9/24/20     Phone: [phone]         Kelley Slaughter MA Community Health Worker Primary Care - CC  9/24/20     Phone: [phone]         Praveena Burris MD Assigned Surgical Provider   10/23/20     Phone: [phone] Fax: [phone]         [address] [facility] [address] [facility] [address]    Santi Tinajero MD MD Hematology & Oncology  12/7/20     Phone: [phone] Fax: [phone]         [facility] [address] [facility] [address] [facility] [address]    Bernardo Haynes MD MD Pulmonary Disease  12/7/20     Phone: [phone] Fax: [phone]         [facility] [address] [place] [address]    Goltz, Bennett Ezra, PA-C Assigned Neuroscience Provider   3/17/21     Phone: [phone]  Fax: [phone]         [address] [place] [address]    Goltz, Bennett Ezra, PA-C Assigned Sleep Provider   3/17/21     Phone: [phone] Fax: [phone] [address] [place] [address]            My Care Plans  Self Management and Treatment Plan  Goals and (Comments)  Goals        General    1. Improve chronic symptoms (pt-stated)     Notes - Note created  6/3/2021 11:30 AM by Wesly Davis RN    Goal Statement: I want to improve management of my leg, knee, and hip pain and swelling.  Date Goal set: 6/3/21  Barriers: lymphedema  Strengths: motivated to improve ability to walk  Date to Achieve By: 12/3/21  Patient expressed understanding of goal: Yes    Action steps to achieve this goal:  1. I will participate in Lymphedema Therapy for management of my leg swelling.  2. I will schedule and attend appointment with Orthopedic team for evaluation and discussion of my leg, knee, and hip pains.  3. I will consider Physical Therapy referral if appropriate.  4. I will continue working with Care Coordination to identify and address barriers to achieving my goal.      Healthy Eating (pt-stated)     Notes - Note created  12/17/2020 12:19 PM by Danna Francisco RD    My Goal: I will stop drinking regular pop.     What I need to meet my goal: beverage alternatives - water, diet pop, Crystal Light    I plan to meet my goal by this date: follow up appointment, 1/21             Action Plans on File:   COPD  Depression    Advance Care Plans/Directives Type: none on file    [facility]    Advance Care Planning and Health Care Directives  When it comes to decisions about your health care, it s important that your voice is heard. You may not always be able to speak for yourself.    We encourage you to have discussions with your loved ones, cultural or spiritual leaders and health care providers about your goals, values, beliefs and choices.    We are a part of [employer] [place] ,  supporting and promoting the benefits of advance care planning conversations.    Our goals are to:    Help you make informed decisions about your healthcare choices and ensure that those choices are honored    Offer advance care planning discussions with trained staff    Ensure your choices are clearly defined, documented and available in your medical record    Translate your choices into medical orders as needed    Why is Advance Care Planning important?    Know what your health care choices are and decide what is right for you    An unexpected illness or injury could leave you unable to participate in important treatment decisions    When choices are left to others to decide that responsibility can be difficult and stressful    By discussing and outlining your choices, your voice is heard in the care you want to receive    How can I learn more?  We offer free classes at multiple locations, days and times. Our trained facilitators will provide information and guide you through a Health Care Directive document. They can also review, notarize and add your document to your medical record.    Call Reading Room at [phone] or toll free at [phone] for assistance.       My Medical and Care Information  Problem List   Patient Active Problem List   Diagnosis     Essential hypertension, benign     Tobacco use disorder     Lumbago     Impotence of organic origin     Advance care planning     Polyp of colon     Obstructive sleep apnea syndrome     Hyperlipidemia LDL goal <100     Morbid obesity due to excess calories (H)     BPPV (benign paroxysmal positional vertigo), unspecified laterality     Chronic obstructive pulmonary disease, unspecified COPD type (H)     Type 2 diabetes mellitus without complication, without long-term current use of insulin (H)     S/P transurethral resection of prostate     Hematuria, gross     Cervical segment dysfunction     Cervicalgia     Thoracic segment dysfunction      Erectile dysfunction     HTN (hypertension)     Acute diverticulitis     Other pulmonary embolism without acute cor pulmonale, unspecified chronicity (H)     Mesenteric mass     History of adenocarcinoma of lung      Current Medications:  Current Outpatient Medications   Medication Instructions     acetaminophen (TYLENOL) 1,000 mg, Oral, EVERY MORNING     acetaminophen (TYLENOL) 500 mg, Oral, EVERY EVENING     albuterol (VENTOLIN HFA) 108 (90 Base) MCG/ACT inhaler INHALE 2 PUFFS INTO THE LUNGS EVERY 6 HOURS AS NEEDED FOR SHORTNESS OF BREATH / DYSPNEA OR WHEEZING     Ascorbic Acid (VITAMIN C PO) 500 mg, Oral, AT BEDTIME     atorvastatin (LIPITOR) 20 mg, Oral, DAILY     blood glucose (ACCU-CHEK HCACHA) test strip Use to test blood sugar 2 times daily or as directed.     blood glucose monitoring (SOFTCLIX) lancets Use to test blood sugar 2 times daily or as directed.     Continuous Blood Gluc  (FREESTYLE KONG 14 DAY READER) LEONEL Use to read blood sugars as per 's instructions.     Continuous Blood Gluc Sensor (PlaynomicsSTYLE KONG 14 DAY SENSOR) MISC Change every 14 days.     docusate sodium (COLACE) 100 mg, Oral, 2 TIMES DAILY PRN     ELIQUIS ANTICOAGULANT 5 MG tablet TAKE 1 TABLET BY MOUTH TWICE A DAY     finasteride (PROSCAR) 5 mg, Oral, DAILY     Fluticasone-Umeclidin-Vilanterol (TRELEGY ELLIPTA) 100-62.5-25 MCG/INH oral inhaler 1 puff, Inhalation, DAILY     furosemide (LASIX) 40 mg, Oral, EVERY MORNING     furosemide (LASIX) 20 mg, Oral, EVERY EVENING     gabapentin (NEURONTIN) 300 MG capsule TAKE 1 CAPSULE TWICE DAILY     lisinopril (ZESTRIL) 40 MG tablet TAKE 1 TABLET BY MOUTH EVERY DAY     melatonin 1 mg, Oral, AT BEDTIME PRN     metFORMIN (GLUCOPHAGE) 500 MG tablet TAKE 1 TABLET BY MOUTH TWICE A DAY WITH MEALS     metoprolol succinate ER (TOPROL-XL) 25 mg, Oral, DAILY     order for DME Equipment delivered; Respir[employer]BINETICSs 760S BIPAP with heated humidification and heated tubing.  Pressure 15/7cm.  Respironics Syeda View FF mask (Medium)      order for DME Oxygen 2 Li/min<BR>at night via nasal cannula     order for DME Equipment being ordered: diabetic shoes, 1 pair     sildenafil (VIAGRA) 50 mg, Oral, DAILY PRN     Care Coordination Start Date: 9/24/2020   Frequency of Care Coordination: monthly   Form Last Updated: 06/03/2021

## 2021-06-03 NOTE — PROGRESS NOTES
Clinic Care Coordination Contact    Follow Up Progress Note - CC RN outreach to patient for goal update     Assessment:    Patient reported to be doing fairly well.  He has been compliant with plan of care as it relates to previous goal of avoiding ED/hospital visits.    Patient would like to focus on improving symptoms of leg, knee, and hip pain.  He did get a Lymphedema Therapy referral from PCP clinic at recent visit related to leg edema and has appointment scheduled for this for 6/29/21.    Patient noted he has some chronic knee and hip pain, as well, that he would like to have evaluated.  He isn't sure what the cause of his pain is but is open to having this assessed further as it does impact his ability to walk as much and as easily as he'd like.    Patient continues follow up with his Pulmonology and Oncology providers; he'll be seeing Oncology 6/25/21 for follow-up with CT and labs prior (on 6/24/21).  He will see his Pulmonology provider later this year; he was seen a few months ago and goes about every 6 months.    Patient remains on 2 LPM of supplemental oxygen and noted this is sufficient.  He feels his respiratory status is fairly stable at this time.  He continues on Trelegy Ellipta for daily COPD maintenance and has albuterol inhaler as needed.    Goals addressed this encounter:   Goals Addressed                 This Visit's Progress       Patient Stated      1. Improve chronic symptoms (pt-stated)        Goal Statement: I want to improve management of my leg, knee, and hip pain and swelling.  Date Goal set: 6/3/21  Barriers: lymphedema  Strengths: motivated to improve ability to walk  Date to Achieve By: 12/3/21  Patient expressed understanding of goal: Yes    Action steps to achieve this goal:  1. I will participate in Lymphedema Therapy for management of my leg swelling.  2. I will schedule and attend appointment with Orthopedic team for evaluation and discussion of my leg, knee, and hip pains.  3.  I will consider Physical Therapy referral if appropriate.  4. I will continue working with Care Coordination to identify and address barriers to achieving my goal.        COMPLETED: 1. Medical (pt-stated)        Goal Statement: I will prevent ED visits / hospitalizations within the  next 6 months.  Date Goal set: 9/24/20  Barriers: multiple health diagnoses  Strengths: motivated to feel better and say out of the hospital  Date to Achieve By: 3/31/21//Extended to 5/31/21  Patient expressed understanding of goal: Yes    Action steps to achieve this goal:  1. I will follow a low fiber diet for 2 weeks for my diverticulitis.  2. I will continue taking my medications as prescribed.  3. I will schedule and attend my appointment with my PCP, Pulmonology, and Oncology doctors as recommended.  4. I will use my oxygen as directed and will purchase a pulse oximeter so I can monitor my oxygen levels.  5. I will work on weight loss as advised for improved health.  6. I will notify my care team with any questions or concerns.  7. I will continue working with Care Coordination to identify and address any barriers to achieving my goal.        Intervention/Education provided during outreach:    New goal established with patient per discussion; see above.    CC RN reviewed option of referral to Ortho for further evaluation of leg, knee, and hip pains; patient agreeable.    CC RN inquired if patient interested in referral to PT for strength/mobility; patient declined at this time but agreeable to keeping this as an option pending how things go with Lymphedema Therapy and Ortho referral.    CC RN inquired if patient had any additional questions, concerns, or needs at this time; he declined.     Outreach Frequency: monthly    Plan:     CC RN will request Ortho referral from PCP as discussed with patient.    Patient will attend upcoming appointments as scheduled, including appointment for Lymphedema Therapy as referred by PCP  clinic.    Patient will work on new goal above as discussed.    Patient agreed to contact CC team with any additional questions, concerns, or needs.    CC CHW will continue with outreaches at this time with next outreach in approximately 1 month. CC CHW will inform CC RN/SW of any concerns or changes regarding patient as needed.  CC RN/SW will review patient's chart in approximately 6 weeks and outreach to patient as indicated.    Wesly Davis RN  Clinic Care Coordinator  [facility]  Faith Putnam, [facility]  Ph: [phone]

## 2021-06-10 ENCOUNTER — MEDICAL CORRESPONDENCE (OUTPATIENT)
Dept: HEALTH INFORMATION MANAGEMENT | Facility: CLINIC | Age: 76
End: 2021-06-10

## 2021-06-15 DIAGNOSIS — R31.0 GROSS HEMATURIA: ICD-10-CM

## 2021-06-17 NOTE — TELEPHONE ENCOUNTER
FINASTERIDE 5 MG TABLET      Last Written Prescription Date:  3/24/20 by Dr Burris  Last Fill Quantity: 90,   # refills: 3  Last Office Visit : 7/10/20 recommended 6 month follow up  Future Office visit:  None scheduled    Routing refill request to provider for review/approval because:  Prescribed by provider no longer with [facility]

## 2021-06-18 DIAGNOSIS — R31.0 GROSS HEMATURIA: ICD-10-CM

## 2021-06-18 RX ORDER — FINASTERIDE 5 MG/1
5 TABLET, FILM COATED ORAL DAILY
Qty: 90 TABLET | Refills: 3 | Status: SHIPPED | OUTPATIENT
Start: 2021-06-18 | End: 2022-01-19

## 2021-06-21 RX ORDER — FINASTERIDE 5 MG/1
1 TABLET, FILM COATED ORAL DAILY
Qty: 30 TABLET | Refills: 0 | OUTPATIENT
Start: 2021-06-21

## 2021-06-22 ENCOUNTER — TELEPHONE (OUTPATIENT)
Dept: UROLOGY | Facility: CLINIC | Age: 76
End: 2021-06-22

## 2021-06-22 ENCOUNTER — TELEPHONE (OUTPATIENT)
Dept: INTERNAL MEDICINE | Facility: CLINIC | Age: 76
End: 2021-06-22

## 2021-06-22 NOTE — TELEPHONE ENCOUNTER
Suggested patient make contact with their diabetic educator Danna Francisco who was involved with the patient on June 1 and see if she felt that this was the best option for the patient in regards to treatment.  I will forward this message to her

## 2021-06-22 NOTE — TELEPHONE ENCOUNTER
Spoke with patient and explained PCP message as below.    Diabetes Educator,Danna Francisco     As it is so difficult for patients to reach staff, would you be willing to contact patient if this is appropriate?  If not, please route back to [facility] Triage.    Patient phone # [phone]    Thanks!    Yamel Kendall, Triage RN  [facility]

## 2021-06-22 NOTE — TELEPHONE ENCOUNTER
PCP:    Patient called requesting to a RX for Wegovy.      Would you also like a referral to a dietician or any other weight loss strategies?

## 2021-06-22 NOTE — TELEPHONE ENCOUNTER
Pt would like to know what they need to do to start the weight loss medication Wegovy.    [phone] ok to lv detailed message

## 2021-06-26 ENCOUNTER — HOSPITAL ENCOUNTER (EMERGENCY)
Facility: CLINIC | Age: 76
End: 2021-06-26
Payer: MEDICARE

## 2021-06-28 ENCOUNTER — HOSPITAL ENCOUNTER (OUTPATIENT)
Dept: PHYSICAL THERAPY | Facility: CLINIC | Age: 76
Setting detail: THERAPIES SERIES
End: 2021-06-28
Payer: MEDICARE

## 2021-06-28 DIAGNOSIS — R60.0 BILATERAL LEG EDEMA: ICD-10-CM

## 2021-06-28 PROCEDURE — 97161 PT EVAL LOW COMPLEX 20 MIN: CPT | Mod: GP | Performed by: PHYSICAL THERAPIST

## 2021-06-28 PROCEDURE — 97110 THERAPEUTIC EXERCISES: CPT | Mod: GP | Performed by: PHYSICAL THERAPIST

## 2021-06-28 PROCEDURE — 97140 MANUAL THERAPY 1/> REGIONS: CPT | Mod: GP | Performed by: PHYSICAL THERAPIST

## 2021-06-28 PROCEDURE — 97535 SELF CARE MNGMENT TRAINING: CPT | Mod: GP | Performed by: PHYSICAL THERAPIST

## 2021-06-28 NOTE — PROGRESS NOTES
[facility]        OUTPATIENT PHYSICAL THERAPY EDEMA EVALUATION  PLAN OF TREATMENT FOR OUTPATIENT REHABILITATION  (COMPLETE FOR INITIAL CLAIMS ONLY)  Patient's Last Name, First Name, BEVERLY AlejandromanNahun                           Provider s Name:   [facility] Medical Record No.  [id number]     Start of Care Date:  06/28/21   Onset Date:  07/01/18   Type:  PT   Medical Diagnosis:  B LE Edema, Lymphedema   Therapy Diagnosis:  Lymphedema Visits from SOC:  1                                     __________________________________________________________________________________   Plan of Treatment/Functional Goals:    Manual lymph drainage, Fit for compression garment, Exercises, Precautions to prevent infection / exacerbation, Education, Manual therapy, ADL training, Home management program development, Skin care / precautions        GOALS  1. Goal description: Pt will complete home program 5/7 days per week or more to better manage LE lymphedema by facilitating drainage.       Target date: 09/25/21  2. Goal description: Pt will wearing daytime compression daily to prevent filling of fluid in dependent legs to prevent further mobility deficits and infection.       Target date: 09/25/21  3. Goal description: Pt will report 3 thing that he can do better manage swelling and prevent exacerbation to prevent long term complication       Target date: 09/25/21    Treatment Frequency: Other (see comments)   Treatment duration: 6 visits over 90 days    Yamel Pandya, PT                                    I CERTIFY THE NEED FOR THESE SERVICES FURNISHED UNDER        THIS PLAN OF TREATMENT AND WHILE UNDER MY CARE     (Physician co-signature of this document indicates review and certification of the therapy plan).                   Certification date from: 06/28/21        Certification date to: 09/25/21           Referring physician: Dr. Eddie Olivares   Initial Assessment  See Epic Evaluation- Start of care: 06/28/21

## 2021-06-29 ENCOUNTER — MEDICAL CORRESPONDENCE (OUTPATIENT)
Dept: HEALTH INFORMATION MANAGEMENT | Facility: CLINIC | Age: 76
End: 2021-06-29
Payer: MEDICARE

## 2021-06-29 ENCOUNTER — TRANSFERRED RECORDS (OUTPATIENT)
Dept: HEALTH INFORMATION MANAGEMENT | Facility: CLINIC | Age: 76
End: 2021-06-29

## 2021-06-30 NOTE — PROGRESS NOTES
"CHIEF COMPLAINT:  Pain of the Right Hip, Pain of the Left Hip, Pain of the Left Leg, and Pain of the Right Leg     Patient is seen at the request of Dr. Jonathan MD    HISTORY OF PRESENT ILLNESS  Mr. Villalobos is a pleasant 75 year old year old male who presents to clinic today for lower extremity weakness.  Nahun explains that although appointment notes in visit was written for pain of hips and knees, that he denies any pain of his hips and knees.  He states that his concern is that he has been having weakness of his lower extremities.  He states that he was standing for prolonged period of time and was \"sinking\" where his knees started bending and flexing.  He states that this occurs with prolonged standing.  He does not have any pain associated with this.  No radicular paresthesias, no numbness of his lower extremities.    Onset: gradual  Location: bilateral hips  Quality:  weakness  Duration: 1 year  Severity: 0/10 at worst  Timing: gradually getting worse  Modifying factors:  resting and non-use makes it better, movement and use makes it worse  Associated signs & symptoms: When standing, notes that his knees start bending uncontrollably and feels as if he is \"sinking\"  Previous similar pain: No , denies pain  Treatments to date: Nothing     Additional history: Of note he has a history of adenocarcinoma of lung.  He has a history of pulmonary embolus.  He states that he is very sedentary and his daily activities include \"taking naps and sitting around.\"    Review of Systems:    Have you recently had a a fever, chills, weight loss? No    Do you have any vision problems? No    Do you have any chest pain or edema? No    Do you have any shortness of breath or wheezing?  Yes    Do you have stomach problems? No    Do you have any numbness or focal weakness? No    Do you have diabetes? Yes    Do you have problems with bleeding or clotting? No    Do you have an rashes or other skin lesions? No    MEDICAL " HISTORY  Patient Active Problem List   Diagnosis     Essential hypertension, benign     Tobacco use disorder     Lumbago     Impotence of organic origin     Advance care planning     Polyp of colon     Obstructive sleep apnea syndrome     Hyperlipidemia LDL goal <100     Morbid obesity due to excess calories (H)     BPPV (benign paroxysmal positional vertigo), unspecified laterality     Chronic obstructive pulmonary disease, unspecified COPD type (H)     Type 2 diabetes mellitus without complication, without long-term current use of insulin (H)     S/P transurethral resection of prostate     Hematuria, gross     Cervical segment dysfunction     Cervicalgia     Thoracic segment dysfunction     Erectile dysfunction     HTN (hypertension)     Acute diverticulitis     Other pulmonary embolism without acute cor pulmonale, unspecified chronicity (H)     Mesenteric mass     History of adenocarcinoma of lung       Current Outpatient Medications   Medication Sig Dispense Refill     acetaminophen (TYLENOL) 500 MG tablet Take 1,000 mg by mouth every morning       acetaminophen (TYLENOL) 500 MG tablet Take 500 mg by mouth every evening       albuterol (VENTOLIN HFA) 108 (90 Base) MCG/ACT inhaler INHALE 2 PUFFS INTO THE LUNGS EVERY 6 HOURS AS NEEDED FOR SHORTNESS OF BREATH / DYSPNEA OR WHEEZING 25.5 g 2     Ascorbic Acid (VITAMIN C PO) Take 500 mg by mouth At Bedtime        atorvastatin (LIPITOR) 20 MG tablet Take 1 tablet (20 mg) by mouth daily 90 tablet 3     blood glucose (ACCU-CHEK CHACHA) test strip Use to test blood sugar 2 times daily or as directed. 60 strip 6     blood glucose monitoring (SOFTCLIX) lancets Use to test blood sugar 2 times daily or as directed. 1 Box 6     Continuous Blood Gluc  (FREESTYLE KONG 14 DAY READER) LEONEL Use to read blood sugars as per 's instructions. 1 each 0     Continuous Blood Gluc Sensor (FREESTYLE KONG 14 DAY SENSOR) MISC Change every 14 days. 2 each 11     docusate  sodium (COLACE) 100 MG capsule Take 100 mg by mouth 2 times daily as needed for constipation       ELIQUIS ANTICOAGULANT 5 MG tablet TAKE 1 TABLET BY MOUTH TWICE A DAY 60 tablet 0     finasteride (PROSCAR) 5 MG tablet Take 1 tablet (5 mg) by mouth daily 90 tablet 3     Fluticasone-Umeclidin-Vilanterol (TRELEGY ELLIPTA) 100-62.5-25 MCG/INH oral inhaler Inhale 1 puff into the lungs daily 60 each 1     furosemide (LASIX) 20 MG tablet Take 20 mg by mouth every evening       furosemide (LASIX) 40 MG tablet Take 40 mg by mouth every morning       gabapentin (NEURONTIN) 300 MG capsule TAKE 1 CAPSULE TWICE DAILY 180 capsule 0     lisinopril (ZESTRIL) 40 MG tablet TAKE 1 TABLET BY MOUTH EVERY DAY 90 tablet 3     melatonin 1 MG TABS tablet Take 1 tablet (1 mg) by mouth nightly as needed for sleep 30 tablet 0     metFORMIN (GLUCOPHAGE) 500 MG tablet TAKE 1 TABLET BY MOUTH TWICE A DAY WITH MEALS 180 tablet 1     metoprolol succinate ER (TOPROL-XL) 25 MG 24 hr tablet Take 1 tablet (25 mg) by mouth daily 90 tablet 3     order for DME Equipment being ordered: diabetic shoes, 1 pair 1 Package 0     order for DME Oxygen 2 Li/min  at night via nasal cannula 1 Device 0     order for DME Equipment delivered; Respironics 760S BIPAP with heated humidification and heated tubing.  Pressure 15/7cm. Respironics Syeda View FF mask (Medium)       sildenafil (VIAGRA) 50 MG tablet Take 1 tablet (50 mg) by mouth daily as needed (erectile dysfunction, do not use with Flomax or alpha blockers or Nitro) 10 tablet 1       Allergies   Allergen Reactions     No Known Drug Allergies        Family History   Problem Relation Age of Onset     Hypertension Mother      C.A.D. Mother         ANEURYSM     Cancer - colorectal Mother      Cancer Mother         OVARIAN     Other Cancer Mother      Hypertension Sister      Breast Cancer Sister      Cerebrovascular Disease Father      Hypertension Sister      Breast Cancer Sister      Glaucoma No family hx of       Macular Degeneration No family hx of        Additional medical/Social/Surgical histories reviewed in [facility] and updated as appropriate.       PHYSICAL EXAM  There were no vitals taken for this visit.    General  - normal appearance, in no obvious distress  HEENT  - conjunctivae not injected, moist mucous membranes  CV  - normal femoral pulse  Pulm  - normal respiratory pattern, non-labored  Musculoskeletal - hips  - stance: Slow to stand from sitting position, nonantalgic gait, however he is stooped posture.  - inspection: no swelling or effusion,  normal bone and joint alignment, no obvious deformity  - palpation: no lateral or anterior hip tenderness  - ROM: Decreased external rotation and internal rotation of bilateral hips, flexion to about 100 degrees bilaterally without significant pain.  - strength: Weakness of hip flexion bilaterally, 4/5.  Musculoskeletal - knees  - inspection: no swelling or effusion, normal bone and joint alignment, no obvious deformity  - palpation: no joint line tenderness, patella and patellar tendon non-tender  - ROM: 120 degrees flexion, -5 degrees extension, not painful, normal actively and passively compared to contralateral  - strength: 4+/5 knee flexion extension  - special tests:  (-) Lachman  (-) anterior drawer  (-) posterior drawer  (-) Darrin  (-) varus at 0 and 30 degrees flexion  (-) valgus at 0 and 30 degrees flexion  Neuro  - no sensory or motor deficit, grossly normal coordination, normal muscle tone  Skin  - no ecchymosis, erythema, warmth, or induration, no obvious rash  Psych  - interactive, appropriate, normal mood and affect    IMAGING : CT abdomen and pelvis reviewed from 9/20/ 2020 and reveals mild degenerative changes of hips.     ASSESSMENT & PLAN  Mr. Villalobos is a 75 year old year old male referred by his primary care physician for bilateral hip and knee pain.  Per patient his significant other, he has not been experiencing any type of hip or knee pain, but  does have bilateral lower extremity weakness.    I suspect that this is mostly secondary to deconditioning with his current medical comorbidities and lifestyle that he reports.    No concerning finding with bilateral hip and knee physical examination.  At this time I would like him to start formal physical therapy for generalized strengthening of hips and knees.  I do think his quadricep weakness is most likely a contributor here which is providing difficulty getting up and maintaining standing position.    He will continue to monitor for any increasing hip or knee pain.  If that is the case I would like to start with formal films.    It was a pleasure seeing Nahnu today.    Jean-Paul Silver DO, [facility]  Primary Care Sports Medicine

## 2021-07-01 ENCOUNTER — OFFICE VISIT (OUTPATIENT)
Dept: ORTHOPEDICS | Facility: CLINIC | Age: 76
End: 2021-07-01
Attending: INTERNAL MEDICINE
Payer: MEDICARE

## 2021-07-01 VITALS
SYSTOLIC BLOOD PRESSURE: 142 MMHG | BODY MASS INDEX: 40.02 KG/M2 | WEIGHT: 249 LBS | DIASTOLIC BLOOD PRESSURE: 88 MMHG | HEIGHT: 66 IN

## 2021-07-01 DIAGNOSIS — M25.50 MULTIPLE JOINT PAIN: ICD-10-CM

## 2021-07-01 DIAGNOSIS — R29.898 LEG WEAKNESS, BILATERAL: Primary | ICD-10-CM

## 2021-07-01 PROCEDURE — 99203 OFFICE O/P NEW LOW 30 MIN: CPT | Performed by: FAMILY MEDICINE

## 2021-07-01 ASSESSMENT — MIFFLIN-ST. JEOR: SCORE: 1802.21

## 2021-07-01 NOTE — LETTER
"    7/1/2021         RE: Nahun Villalobos  [address]  [facility] [address]        Dear Colleague,    Thank you for referring your patient, Nahun Villalobos, to the [facility]. Please see a copy of my visit note below.    CHIEF COMPLAINT:  Pain of the Right Hip, Pain of the Left Hip, Pain of the Left Leg, and Pain of the Right Leg     Patient is seen at the request of Dr. Jonathan MD    HISTORY OF PRESENT ILLNESS  Mr. Villalobos is a pleasant 75 year old year old male who presents to clinic today for lower extremity weakness.  Nahun explains that although appointment notes in visit was written for pain of hips and knees, that he denies any pain of his hips and knees.  He states that his concern is that he has been having weakness of his lower extremities.  He states that he was standing for prolonged period of time and was \"sinking\" where his knees started bending and flexing.  He states that this occurs with prolonged standing.  He does not have any pain associated with this.  No radicular paresthesias, no numbness of his lower extremities.    Onset: gradual  Location: bilateral hips  Quality:  weakness  Duration: 1 year  Severity: 0/10 at worst  Timing: gradually getting worse  Modifying factors:  resting and non-use makes it better, movement and use makes it worse  Associated signs & symptoms: When standing, notes that his knees start bending uncontrollably and feels as if he is \"sinking\"  Previous similar pain: No , denies pain  Treatments to date: Nothing     Additional history: Of note he has a history of adenocarcinoma of lung.  He has a history of pulmonary embolus.  He states that he is very sedentary and his daily activities include \"taking naps and sitting around.\"    Review of Systems:    Have you recently had a a fever, chills, weight loss? No    Do you have any vision problems? No    Do you have any chest pain or edema? No    Do you have any shortness of breath " or wheezing?  Yes    Do you have stomach problems? No    Do you have any numbness or focal weakness? No    Do you have diabetes? Yes    Do you have problems with bleeding or clotting? No    Do you have an rashes or other skin lesions? No    MEDICAL HISTORY  Patient Active Problem List   Diagnosis     Essential hypertension, benign     Tobacco use disorder     Lumbago     Impotence of organic origin     Advance care planning     Polyp of colon     Obstructive sleep apnea syndrome     Hyperlipidemia LDL goal <100     Morbid obesity due to excess calories (H)     BPPV (benign paroxysmal positional vertigo), unspecified laterality     Chronic obstructive pulmonary disease, unspecified COPD type (H)     Type 2 diabetes mellitus without complication, without long-term current use of insulin (H)     S/P transurethral resection of prostate     Hematuria, gross     Cervical segment dysfunction     Cervicalgia     Thoracic segment dysfunction     Erectile dysfunction     HTN (hypertension)     Acute diverticulitis     Other pulmonary embolism without acute cor pulmonale, unspecified chronicity (H)     Mesenteric mass     History of adenocarcinoma of lung       Current Outpatient Medications   Medication Sig Dispense Refill     acetaminophen (TYLENOL) 500 MG tablet Take 1,000 mg by mouth every morning       acetaminophen (TYLENOL) 500 MG tablet Take 500 mg by mouth every evening       albuterol (VENTOLIN HFA) 108 (90 Base) MCG/ACT inhaler INHALE 2 PUFFS INTO THE LUNGS EVERY 6 HOURS AS NEEDED FOR SHORTNESS OF BREATH / DYSPNEA OR WHEEZING 25.5 g 2     Ascorbic Acid (VITAMIN C PO) Take 500 mg by mouth At Bedtime        atorvastatin (LIPITOR) 20 MG tablet Take 1 tablet (20 mg) by mouth daily 90 tablet 3     blood glucose (ACCU-CHEK CHACHA) test strip Use to test blood sugar 2 times daily or as directed. 60 strip 6     blood glucose monitoring (SOFTCLIX) lancets Use to test blood sugar 2 times daily or as directed. 1 Box 6      Continuous Blood Gluc  (FREESTYLE KONG 14 DAY READER) LEONEL Use to read blood sugars as per 's instructions. 1 each 0     Continuous Blood Gluc Sensor (FREESTYLE KONG 14 DAY SENSOR) MISC Change every 14 days. 2 each 11     docusate sodium (COLACE) 100 MG capsule Take 100 mg by mouth 2 times daily as needed for constipation       ELIQUIS ANTICOAGULANT 5 MG tablet TAKE 1 TABLET BY MOUTH TWICE A DAY 60 tablet 0     finasteride (PROSCAR) 5 MG tablet Take 1 tablet (5 mg) by mouth daily 90 tablet 3     Fluticasone-Umeclidin-Vilanterol (TRELEGY ELLIPTA) 100-62.5-25 MCG/INH oral inhaler Inhale 1 puff into the lungs daily 60 each 1     furosemide (LASIX) 20 MG tablet Take 20 mg by mouth every evening       furosemide (LASIX) 40 MG tablet Take 40 mg by mouth every morning       gabapentin (NEURONTIN) 300 MG capsule TAKE 1 CAPSULE TWICE DAILY 180 capsule 0     lisinopril (ZESTRIL) 40 MG tablet TAKE 1 TABLET BY MOUTH EVERY DAY 90 tablet 3     melatonin 1 MG TABS tablet Take 1 tablet (1 mg) by mouth nightly as needed for sleep 30 tablet 0     metFORMIN (GLUCOPHAGE) 500 MG tablet TAKE 1 TABLET BY MOUTH TWICE A DAY WITH MEALS 180 tablet 1     metoprolol succinate ER (TOPROL-XL) 25 MG 24 hr tablet Take 1 tablet (25 mg) by mouth daily 90 tablet 3     order for DME Equipment being ordered: diabetic shoes, 1 pair 1 Package 0     order for DME Oxygen 2 Li/min  at night via nasal cannula 1 Device 0     order for DME Equipment delivered; Respironics 760S BIPAP with heated humidification and heated tubing.  Pressure 15/7cm. Respironics Syeda View FF mask (Medium)       sildenafil (VIAGRA) 50 MG tablet Take 1 tablet (50 mg) by mouth daily as needed (erectile dysfunction, do not use with Flomax or alpha blockers or Nitro) 10 tablet 1       Allergies   Allergen Reactions     No Known Drug Allergies        Family History   Problem Relation Age of Onset     Hypertension Mother      C.A.D. Mother         ANEURYSM     Cancer  - colorectal Mother      Cancer Mother         OVARIAN     Other Cancer Mother      Hypertension Sister      Breast Cancer Sister      Cerebrovascular Disease Father      Hypertension Sister      Breast Cancer Sister      Glaucoma No family hx of      Macular Degeneration No family hx of        Additional medical/Social/Surgical histories reviewed in [facility] and updated as appropriate.       PHYSICAL EXAM  There were no vitals taken for this visit.    General  - normal appearance, in no obvious distress  HEENT  - conjunctivae not injected, moist mucous membranes  CV  - normal femoral pulse  Pulm  - normal respiratory pattern, non-labored  Musculoskeletal - hips  - stance: Slow to stand from sitting position, nonantalgic gait, however he is stooped posture.  - inspection: no swelling or effusion,  normal bone and joint alignment, no obvious deformity  - palpation: no lateral or anterior hip tenderness  - ROM: Decreased external rotation and internal rotation of bilateral hips, flexion to about 100 degrees bilaterally without significant pain.  - strength: Weakness of hip flexion bilaterally, 4/5.  Musculoskeletal - knees  - inspection: no swelling or effusion, normal bone and joint alignment, no obvious deformity  - palpation: no joint line tenderness, patella and patellar tendon non-tender  - ROM: 120 degrees flexion, -5 degrees extension, not painful, normal actively and passively compared to contralateral  - strength: 4+/5 knee flexion extension  - special tests:  (-) Lachman  (-) anterior drawer  (-) posterior drawer  (-) Darrin  (-) varus at 0 and 30 degrees flexion  (-) valgus at 0 and 30 degrees flexion  Neuro  - no sensory or motor deficit, grossly normal coordination, normal muscle tone  Skin  - no ecchymosis, erythema, warmth, or induration, no obvious rash  Psych  - interactive, appropriate, normal mood and affect    IMAGING : CT abdomen and pelvis reviewed from 9/20/ 2020 and reveals mild degenerative  changes of hips.     ASSESSMENT & PLAN  Mr. Villalobos is a 75 year old year old male referred by his primary care physician for bilateral hip and knee pain.  Per patient his significant other, he has not been experiencing any type of hip or knee pain, but does have bilateral lower extremity weakness.    I suspect that this is mostly secondary to deconditioning with his current medical comorbidities and lifestyle that he reports.    No concerning finding with bilateral hip and knee physical examination.  At this time I would like him to start formal physical therapy for generalized strengthening of hips and knees.  I do think his quadricep weakness is most likely a contributor here which is providing difficulty getting up and maintaining standing position.    He will continue to monitor for any increasing hip or knee pain.  If that is the case I would like to start with formal films.    It was a pleasure seeing Garnica today.    Jean-Paul Silver DO, [facility]  Primary Care Sports Medicine        Again, thank you for allowing me to participate in the care of your patient.        Sincerely,        Jean-Paul Silver DO

## 2021-07-01 NOTE — PATIENT INSTRUCTIONS
Follow-up in 6 weeks    Order for physical therapy has been placed. They will call you to schedule.

## 2021-07-06 ENCOUNTER — VIRTUAL VISIT (OUTPATIENT)
Dept: EDUCATION SERVICES | Facility: CLINIC | Age: 76
End: 2021-07-06
Payer: MEDICARE

## 2021-07-06 DIAGNOSIS — E11.9 TYPE 2 DIABETES MELLITUS WITHOUT COMPLICATION, WITHOUT LONG-TERM CURRENT USE OF INSULIN (H): Primary | ICD-10-CM

## 2021-07-06 PROCEDURE — 98967 PH1 ASSMT&MGMT NQHP 11-20: CPT | Mod: 95

## 2021-07-06 NOTE — PROGRESS NOTES
"Diabetes Self-Management Education & Support    Presents for: Follow-up    Type of service:  Video Visit    If the video visit is dropped, the video visit invitation should be resent by: Send to e-mail at: [email or web address]    Originating Location (pt. Location): Home  Distant Location (provider location): Home  Mode of Communication:  Video Conference via [employer]    Video Start Time: 8:32a  Video End Time (time video stopped): 8:49a    How would patient like to obtain AVS? MyChart      SUBJECTIVE/OBJECTIVE:  Presents for: Follow-up  Accompanied by: Self, Girlfriend  Diabetes education in the past 24mo: Yes  Focus of Visit: Monitoring  Diabetes type: Type 2  Date of diagnosis: 10+ years  Disease course: Stable  How confident are you filling out medical forms by yourself:: Not Assessed  Diabetes management related comments/concerns: the Mikel was too expensive  Transportation concerns: No  Difficulty affording diabetes medication?: No  Difficulty affording diabetes testing supplies?: Sometimes  Other concerns:: None  Cultural Influences/Ethnic Background:  Choose not to answer    Diabetes Symptoms & Complications:  Neuropathy: Yes  Slow healing wounds: Yes  Complications assessed today?: No    Patient Problem List and Family Medical History reviewed for relevant medical history, current medical status, and diabetes risk factors.    Vitals:  There were no vitals taken for this visit.  Estimated body mass index is 40.19 kg/m  as calculated from the following:    Height as of 7/1/21: 1.676 m (5' 6\").    Weight as of 7/1/21: 112.9 kg (249 lb).   Last 3 BP:   BP Readings from Last 3 Encounters:   07/01/21 (!) 142/88   05/27/21 124/66   04/01/21 126/72       History   Smoking Status     Former Smoker     Packs/day: 2.00     Years: 52.00     Types: Cigarettes, Cigars     Start date: 6/1/1960     Quit date: 6/1/2013   Smokeless Tobacco     Never Used     Comment: Quit, will never start again.       Labs:  Lab " Results   Component Value Date    A1C 6.8 03/17/2021     Lab Results   Component Value Date     03/17/2021     Lab Results   Component Value Date    LDL 46 03/17/2021     HDL Cholesterol   Date Value Ref Range Status   03/17/2021 36 (L) >39 mg/dL Final   ]  GFR Estimate   Date Value Ref Range Status   03/17/2021 64 >60 mL/min/[1.73_m2] Final     Comment:     Non  GFR Calc  Starting 12/18/2018, serum creatinine based estimated GFR (eGFR) will be   calculated using the Chronic Kidney Disease Epidemiology Collaboration   (CKD-EPI) equation.       GFR Estimate If Black   Date Value Ref Range Status   03/17/2021 74 >60 mL/min/[1.73_m2] Final     Comment:      GFR Calc  Starting 12/18/2018, serum creatinine based estimated GFR (eGFR) will be   calculated using the Chronic Kidney Disease Epidemiology Collaboration   (CKD-EPI) equation.       Lab Results   Component Value Date    CR 1.11 03/17/2021     No results found for: MICROALBUMIN    Healthy Eating:  Healthy Eating Assessed Today: No  Cultural/Quaker diet restrictions?: No  Meal planning/habits: None  Meals include: Breakfast, Lunch, Dinner  Breakfast: bologna sandwiches x 2 OR [place] citizen toast x 2 - 6 syrups, coffee + sugar & cream, 2 sausage patties OR 2 sausage patties, 2 slices of toast, syrup, eggs, water  Lunch: 2 thin pork chops, sweet potatoes, green beans OR salami w/ cheese sandwich OR 1/2 steak Chipotle bowl, water, 7.5 oz orange soda OR turkey sandwich, chips soda  Dinner: 1 tuna fish sandwich, chips, 7 tomatoes OR sliced tomatoes, 7.5 oz orange soda, 1/2 slice of cake OR 1/2 Chipotle bowl  Beverages: Soda, Water(Has now cut out pop, tastes too sweet when trying to drink regular pop now so doesn't like it anymore.)  Has patient met with a dietitian in the past?: Yes    Being Active:  Being Active Assessed Today: No  Exercise:: Currently not exercising  Barrier to exercise: None    Monitoring:  Monitoring Assessed  Today: Yes  Times checking blood sugar at home (number): Never    Not testing at home at all since stopping use of Mikel, doesn't have meter or supplies to test at home     Taking Medications:  Diabetes Medication(s)     Biguanides       metFORMIN (GLUCOPHAGE) 500 MG tablet    TAKE 1 TABLET BY MOUTH TWICE A DAY WITH MEALS          Taking Medication Assessed Today: Yes  Current Treatments: Oral Medication (taken by mouth)  Problems taking diabetes medications regularly?: No  Diabetes medication side effects?: No    Problem Solving:  Problem Solving Assessed Today: No  Is the patient at risk for hypoglycemia?: No  Is the patient at risk for DKA?: No  Does patient have severe weather/disaster plan for diabetes management?: No  Does patient have sick day plan for diabetes management?: No    Reducing Risks:  Reducing Risks Assessed Today: No  Diabetes Risks: Age over 45 years, Sedentary Lifestyle, Ethnicity  CAD Risks: Diabetes Mellitus, Sedentary lifestyle, Male sex, Obesity  Has dilated eye exam at least once a year?: Yes  Feet checked by healthcare provider in the last year?: No    Healthy Coping:  Healthy Coping Assessed Today: Yes  Emotional response to diabetes: Shock/Denial/Bargaining, Concern for health and well-being, Fear/Anxiety  Informal Support system:: Partner  Stage of change: ACTION (Actively working towards change)  Support resources: None  Patient Activation Measure Survey Score:  ALL Score (Last Two) 2/22/2012   ALL Raw Score 39   Activation Score 56.4   ALL Level 3       Diabetes knowledge and skills assessment:   Patient is knowledgeable in diabetes management concepts related to: Healthy Eating and Taking Medication    Patient needs further education on the following diabetes management concepts: Healthy Eating, Being Active, Monitoring, Taking Medication, Problem Solving, Reducing Risks and Healthy Coping    Based on learning assessment above, most appropriate setting for further diabetes education  would be: Group class or Individual setting.      INTERVENTIONS:    Education provided today on:  AADE Self-Care Behaviors:  Monitoring: purpose, log and interpret results, individual blood glucose targets and frequency of monitoring  Taking Medication: discussed Wegovy & comparable diabetes med Ozempic, potential need if A1C still elevated, but likely expensive.   Reducing Risks: A1C - goals, relating to blood glucose levels, how often to check    Opportunities for ongoing education and support in diabetes-self management were discussed.    Pt verbalized understanding of concepts discussed and recommendations provided today.       Education Materials Provided:  No new materials provided today      ASSESSMENT:  Reviewed testing options, medications with patient. He is agreeable to testing blood sugars once daily & getting A1C rechecked to help determine if further meds might be needed for his diabetes. Briefly discussed Ozempic vs weight loss med Wegovy. Plan for follow up in ~6 weeks after A1C rechecked and has a blood sugar log to share. He is agreeable.         Patient's most recent   Lab Results   Component Value Date    A1C 6.8 03/17/2021    is not meeting goal of <7.0    PLAN  Start checking blood sugars once daily in the morning, order for meter & supplies sent  Get A1C rechecked, make lab only appointment through [facility] for this & orders placed for this   Follow up in 6 weeks for review    Danna Francisco RD, LD, [facility] [place]   Time Spent: 17 minutes  Encounter Type: Individual    Any diabetes medication dose changes were made via the CDE Protocol and Collaborative Practice Agreement with the patient's referring provider. A copy of this encounter was shared with the provider.

## 2021-07-14 ENCOUNTER — PATIENT OUTREACH (OUTPATIENT)
Dept: CARE COORDINATION | Facility: CLINIC | Age: 76
End: 2021-07-14

## 2021-07-14 DIAGNOSIS — E11.9 TYPE 2 DIABETES MELLITUS WITHOUT COMPLICATION, WITHOUT LONG-TERM CURRENT USE OF INSULIN (H): Primary | ICD-10-CM

## 2021-07-14 DIAGNOSIS — M54.50 LOW BACK PAIN: ICD-10-CM

## 2021-07-14 ASSESSMENT — ACTIVITIES OF DAILY LIVING (ADL): DEPENDENT_IADLS:: CLEANING;COOKING;LAUNDRY;SHOPPING;MEAL PREPARATION

## 2021-07-14 NOTE — PROGRESS NOTES
Clinic Care Coordination Contact  [facility]/Voicemail    Referral Source: IP Report  Clinical Data: Care Coordinator Outreach  Outreach attempted x 1.  Left message on patient's voicemail with call back information and requested return call.  Plan: Care Coordinator will try to reach patient again in 3-5 business days.  [facility]   Meghana Gomez RN, Care Coordinator   [facility]'s   E-mail [email or web address]   [phone]

## 2021-07-21 ENCOUNTER — PATIENT OUTREACH (OUTPATIENT)
Dept: NURSING | Facility: CLINIC | Age: 76
End: 2021-07-21
Payer: MEDICARE

## 2021-07-21 NOTE — PROGRESS NOTES
Clinic Care Coordination Contact    Follow Up Progress Note      Assessment: Patient spoke to Kelley NELSON and is requesting a Scooter,:lift Chair and Walker.      I called the patient and informed him that he  needs a PCP visit for Medicare to cover the cost of Walker,Scooter and Lift Chair    The PCP can then write RX and send to [facility]      Patient will discuss further with the PT at his visit 7/27/2021 as to recommendations for the appropriate type of equipment to request       Goals addressed this encounter:   goal Statement: I want to improve management of my leg, knee, and hip pain and swelling.  Date Goal set: 6/3/21  Barriers: lymphedema  Strengths: motivated to improve ability to walk  Date to Achieve By: 12/3/21  Patient expressed understanding of goal: Yes    Action steps to achieve this goal:  1. I will participate in Lymphedema Therapy for management of my leg swelling.  2. I will schedule and attend appointment with Orthopedic team for evaluation and discussion of my leg, knee, and hip pains.  3. I will consider Physical Therapy referral if appropriate.  4. I will continue working with Care Coordination to identify and address barriers to achieving my goal.       Intervention/Education provided during outreach: CC RN available for future questions         Plan:   Patient will make a PCP visit to qualify for medicare coverage on the cost of new equipment (Scooter-Walker and Lift Chair )  Care Coordinator will follow up in 3-5 business days   [facility]   Meghana Gomez RN, Care Coordinator   [facility]'s   E-mail [email or web address]   [phone]

## 2021-07-21 NOTE — PROGRESS NOTES
Clinic Care Coordination Contact  Community Health Worker Follow Up    Goals:   Goals Addressed as of 7/21/2021 at 11:15 AM                    Today       Patient Stated       1. Improve chronic symptoms (pt-stated)   10%    Added 6/3/21 by Wesly Davis RN      Goal Statement: I want to improve management of my leg, knee, and hip pain and swelling.  Date Goal set: 6/3/21  Barriers: lymphedema  Strengths: motivated to improve ability to walk  Date to Achieve By: 12/3/21  Patient expressed understanding of goal: Yes    Action steps to achieve this goal:  1. I will participate in Lymphedema Therapy for management of my leg swelling.  2. I will schedule and attend appointment with Orthopedic team for evaluation and discussion of my leg, knee, and hip pains.  3. I will consider Physical Therapy referral if appropriate.  4. I will continue working with Care Coordination to identify and address barriers to achieving my goal.        Discussed:    Patient stated that his swelling on his legs is going down a bit.     Patient stated that he purchased compression stocking a few days ago, and someone will be out today (family friend) on how to put them on.    Patient stated that he is attending PT once a week.    Patient stated that he has started Lymphedema Therapy.    Patient stated that he wears good walking shoes for support.    Patient stated that it is getting really difficult to stand up.  Patient would like assistance in getting a Lift Chair.    Patient stated that he uses a cane when he walks, but would like to have a walker instead, along with a scooter.    Intervention and Education during outreach: CHW encouraged patient to contact CC if there are any other changes or resources needed.  Patient acknowledged understanding.        Plan: Patient will attend scheduled appointments.  CHW will follow up in one month.    Next Outreach: 8/17/21    OMAR Kim  Clinic Care Coordination  [facility]:  [facility] [place], and [facility]  Phone: [phone]

## 2021-07-27 ENCOUNTER — THERAPY VISIT (OUTPATIENT)
Dept: PHYSICAL THERAPY | Facility: CLINIC | Age: 76
End: 2021-07-27
Attending: FAMILY MEDICINE
Payer: MEDICARE

## 2021-07-27 DIAGNOSIS — R29.898 BILATERAL LEG WEAKNESS: ICD-10-CM

## 2021-07-27 DIAGNOSIS — R29.898 LEG WEAKNESS, BILATERAL: ICD-10-CM

## 2021-07-27 PROCEDURE — 97110 THERAPEUTIC EXERCISES: CPT | Mod: GP | Performed by: PHYSICAL THERAPIST

## 2021-07-27 PROCEDURE — 97162 PT EVAL MOD COMPLEX 30 MIN: CPT | Mod: GP | Performed by: PHYSICAL THERAPIST

## 2021-07-27 NOTE — PROGRESS NOTES
"Physical Therapy Initial Evaluation  Subjective:  The history is provided by the patient. No  was used.   Therapist Generated HPI Evaluation  Problem details: Patient states he has been having weakness of his lower extremities for at least a year, MD order for PT 7-1-21.  If he is standing for <5' he starts \"sinking\" where his knees start bending.  This started about a year ago, getting worse.  He does not have any pain associated with this.  No radicular paresthesias, no numbness of his lower extremities.  Patient has history of adenocarcinoma of the lung and was hospitalized last year due to pulmonary embolus.   He states that he is very sedentary and his daily activities include \"taking naps and sitting around.\"  He lives in a house with his girlfriend, no stairs to get in, does not navigate stairs in the home.  He has a walk-in tub.  He does walk his dog 1 block and then needs to rest due to difficulty breathing - he does this up to 3 times a day.  Patient uses a SEC or walking stick outside of the home, no assistive device in the home.  Patient has difficulty getting in/out of a chair - needs to use his hands.  Standing tolerance <5' before knees start \"buckling\".  Symptoms decrease with nothing.  Patient states he was issued supplemental oxygen and is to be using at 2 liters - he does not use.  He has a pulse oximeter but does not use and does not know what his resting saturation is.   .                     Pain is the same all the time.                          Since onset symptoms are gradually worsening.                  Patient Health History           General health as reported by patient is fair.  Pertinent medical history includes: cancer, diabetes, high blood pressure, numbness/tingling and overweight.   Red flags:  None as reported by patient.  Medical allergies: none.   Surgeries include:  Cancer surgery. Other surgery history details: right lung.    Current medications:  High " "blood pressure medication. Other medications details: eloquis.    Current occupation is Retired -  and .                                       Objective:    Gait:  Slow torin, intermittent use of SEC on the floor.  Moderate assist of bilateral UEs to get up from a chair  Assistive Devices:  Cane        Patient has small skin lesions lateral lower legs.  Patient states are recent, has been putting antibiotic on.  **will be getting lymphedema treatment tomorrow and will discuss.     MMT bilateral hip flexion 4+/5, abduction 4/5, hip extension 4-/5, knee flexion 4/5, knee extension 4/5 right and 4+/5 left, ankle DF 4+/5 bilaterally.  After muscle testing of the LEs patient was fatigued and feeling \"winded\".     Physical Exam    General     ROS    Assessment/Plan:    Patient is a 75 year old male with leg weakness complaints, appears to be from very sedentary lifestyle compounded by not using his supplemental oxygen.  Patient has the following significant findings with corresponding treatment plan.                Diagnosis 1:  Bilateral leg weakness    Decreased strength - therapeutic exercise and home program  Decreased function - therapeutic activities and home program    Therapy Evaluation Codes:   1) History comprised of:   Personal factors that impact the plan of care:      Age, Overall behavior pattern and Time since onset of symptoms.    Comorbidity factors that impact the plan of care are:      Cancer, Diabetes, High blood pressure, Overweight and Weakness.     Medications impacting care: None.  2) Examination of Body Systems comprised of:   Body structures and functions that impact the plan of care:      Ankle, Hip and Knee.   Activity limitations that impact the plan of care are:      Cooking, Dressing, Standing and Walking.  3) Clinical presentation characteristics are:   Evolving/Changing.  4) Decision-Making    Moderate complexity using standardized patient assessment instrument and/or " measureable assessment of functional outcome.  Cumulative Therapy Evaluation is: Moderate complexity.    Previous and current functional limitations:  (See Goal Flow Sheet for this information)    Short term and Long term goals: (See Goal Flow Sheet for this information)     Communication ability:  Patient appears to be able to clearly communicate and understand verbal and written communication and follow directions correctly.  Treatment Explanation - The following has been discussed with the patient:   RX ordered/plan of care  Anticipated outcomes  Possible risks and side effects  This patient would benefit from PT intervention to resume normal activities.   Rehab potential is fair.    Frequency:  1 X week, once daily  Duration:  for 6 weeks  Discharge Plan:  Achieve all LTG.  Independent in home treatment program.  Reach maximal therapeutic benefit.    Please refer to the daily flowsheet for treatment today, total treatment time and time spent performing 1:1 timed codes.

## 2021-07-27 NOTE — LETTER
"DEPARTMENT OF HEALTH AND HUMAN SERVICES  CENTERS FOR MEDICARE & MEDICAID SERVICES    PLAN/UPDATED PLAN OF PROGRESS FOR OUTPATIENT REHABILITATION       PATIENTS NAME:  Nahun Villalobos   : 1945  PROVIDER NUMBER:    [id number]  HICN:  [id number]   PROVIDER NAME: [facility] [place]  MEDICAL RECORD NUMBER: [id number]   START OF CARE DATE:  SOC Date: 21   TYPE:  PT  PRIMARY/TREATMENT DIAGNOSIS: (Pertinent Medical Diagnosis)     Leg weakness, bilateral  Bilateral leg weakness    VISITS FROM START OF CARE:  Rxs Used: 1     Physical Therapy Initial Evaluation  Therapist Generated HPI Evaluation  Problem details: Patient states he has been having weakness of his lower extremities for at least a year, MD order for PT 21.  If he is standing for <5' he starts \"sinking\" where his knees start bending.  This started about a year ago, getting worse.  He does not have any pain associated with this.  No radicular paresthesias, no numbness of his lower extremities.  Patient has history of adenocarcinoma of the lung and was hospitalized last year due to pulmonary embolus.   He states that he is very sedentary and his daily activities include \"taking naps and sitting around.\"  He lives in a house with his girlfriend, no stairs to get in, does not navigate stairs in the home.  He has a walk-in tub.  He does walk his dog 1 block and then needs to rest due to difficulty breathing - he does this up to 3 times a day.  Patient uses a SEC or walking stick outside of the home, no assistive device in the home.  Patient has difficulty getting in/out of a chair - needs to use his hands.  Standing tolerance <5' before knees start \"buckling\".  Symptoms decrease with nothing.  Patient states he was issued supplemental oxygen and is to be using at 2 liters - he does not use.  He has a pulse oximeter but does not use and does not know what his resting saturation is.   Pain is the same all the " "time.  Since onset symptoms are gradually worsening.    Patient Health History  General health as reported by patient is fair.  Pertinent medical history includes: cancer, diabetes, high blood pressure, numbness/tingling and overweight.   Red flags:  None as reported by patient.  Medical allergies: none.   Surgeries include:  Cancer surgery. Other surgery history details: right lung.    Current medications:  High blood pressure medication. Other medications details: eloquis.    Current occupation is Retired -  and .                                     Objective:  Gait:  Slow torin, intermittent use of SEC on the floor.  Moderate assist of bilateral UEs to get up from a chair  Assistive Devices:  Cane  Patient has small skin lesions lateral lower legs.  Patient states are recent, has been putting antibiotic on.  **will be getting lymphedema treatment tomorrow and will discuss.   MMT bilateral hip flexion 4+/5, abduction 4/5, hip extension 4-/5, knee flexion 4/5, knee extension 4/5 right and 4+/5 left, ankle DF 4+/5 bilaterally.  After muscle testing of the LEs patient was fatigued and feeling \"winded\".     ROS  Assessment/Plan:    Patient is a 75 year old male with leg weakness complaints, appears to be from very sedentary lifestyle compounded by not using his supplemental oxygen.  Patient has the following significant findings with corresponding treatment plan.                Diagnosis 1:  Bilateral leg weakness  Decreased strength - therapeutic exercise and home program  Decreased function - therapeutic activities and home program    Therapy Evaluation Codes:   1) History comprised of:   Personal factors that impact the plan of care:      Age, Overall behavior pattern and Time since onset of symptoms.    Comorbidity factors that impact the plan of care are:      Cancer, Diabetes, High blood pressure, Overweight and Weakness.     Medications impacting care: None.  2) Examination of Body Systems " "comprised of:   Body structures and functions that impact the plan of care:      Ankle, Hip and Knee.   Activity limitations that impact the plan of care are:      Cooking, Dressing, Standing and Walking.  3) Clinical presentation characteristics are:   Evolving/Changing.  4) Decision-Making    Moderate complexity using standardized patient assessment instrument   and/or measureable assessment of functional outcome.    Cumulative Therapy Evaluation is: Moderate complexity.  Previous and current functional limitations:  (See Goal Flow Sheet for this information)    Short term and Long term goals: (See Goal Flow Sheet for this information)   Communication ability:  Patient appears to be able to clearly communicate and understand verbal and written communication and follow directions correctly.  Treatment Explanation - The following has been discussed with the patient:   RX ordered/plan of care  Anticipated outcomes  Possible risks and side effects  This patient would benefit from PT intervention to resume normal activities.   Rehab potential is fair.    Frequency:  1 X week, once daily  Duration:  for 6 weeks  Discharge Plan:  Achieve all LTG.  Independent in home treatment program.  Reach maximal therapeutic benefit.    Caregiver Signature/Credentials _____________________________ Date ________       Treating Provider: Antonia Mandel PT    I have reviewed and certified the need for these services and plan of treatment while under my care.        PHYSICIAN'S SIGNATURE:   __________________________ Date___________                               Jean-Paul Silver DO    Certification period:  Beginning of Cert date period: 07/27/21 to  End of Cert period date: 10/24/21     Functional Level Progress Report: Please see attached \"Goal Flow sheet for Functional level.\"    ____X____ Continue Services or       ________ DC Services                Service dates: From  SOC Date: 07/27/21 date to present                         "

## 2021-07-28 ENCOUNTER — HOSPITAL ENCOUNTER (OUTPATIENT)
Dept: PHYSICAL THERAPY | Facility: CLINIC | Age: 76
Setting detail: THERAPIES SERIES
End: 2021-07-28
Attending: INTERNAL MEDICINE
Payer: MEDICARE

## 2021-07-28 PROCEDURE — 97140 MANUAL THERAPY 1/> REGIONS: CPT | Mod: GP | Performed by: PHYSICAL THERAPIST

## 2021-07-28 PROCEDURE — 97110 THERAPEUTIC EXERCISES: CPT | Mod: GP | Performed by: PHYSICAL THERAPIST

## 2021-07-28 PROCEDURE — 97535 SELF CARE MNGMENT TRAINING: CPT | Mod: GP | Performed by: PHYSICAL THERAPIST

## 2021-07-28 NOTE — DISCHARGE INSTRUCTIONS
Sock Device  Put upon a  table, something non-slip under will help.  After you but the top of the sock over, use her hands on the side to slide the sock down.    Sit and stick your foot in the sock all the way and then tip the device up so you can pull with your hands  Consider getting rubber gloves for helping with adjusting the socks once on.    Wearing compression  Put socks on first thing in the morning and wear all day until you go to bed  If you are not sleeping your legs elevated, remove the socks for 1 hour before bed but then put back on (also then remove for 1 hour in the morning and then put back on)    Elevate as much as you can during the day when you are home and not walking around.   Try to elevate at night, talk to your doctor about your numbness in the toes when your feet are up.      Deep breathing, Ankle pumps, Drinking 6-8 glasses of water- Do these 3 simple things throughout the day    Home Program-complete exercise and massage 1 time each day.    Use antibiotic ointment on your sores, over with gauze or bandage but avoid leaving the bandaid on more than 24 hour as you don t want to tear skin when you remove.

## 2021-07-29 DIAGNOSIS — R20.2 PARESTHESIA: ICD-10-CM

## 2021-07-29 RX ORDER — GABAPENTIN 300 MG/1
CAPSULE ORAL
Qty: 180 CAPSULE | Refills: 0 | Status: SHIPPED | OUTPATIENT
Start: 2021-07-29 | End: 2021-10-11

## 2021-07-29 NOTE — TELEPHONE ENCOUNTER
Reason for Call:  Medication or medication refill:    Do you use a [facility]?  Name of the pharmacy and phone number for the current request:  [facility] # [id number] [place] - [phone] Fax [phone]    Name of the medication requested: gabapentin (NEURONTIN) 300 MG capsule    Other request: none    Can we leave a detailed message on this number? YES    Phone number patient can be reached at: [phone]    Best Time: anytime    Call taken on 7/29/2021 at 11:23 AM by Dorota John

## 2021-07-29 NOTE — TELEPHONE ENCOUNTER
Controlled Substance Refill Request for   Gabapentin 300mg  Problem List Complete:  No     PROVIDER TO CONSIDER COMPLETION OF PROBLEM LIST AND OVERVIEW/CONTROLLED SUBSTANCE AGREEMENT    Last Written Prescription Date:  5-10-21  Last Fill Quantity: 180,   # refills: 0    Last Office Visit with [facility] [place]: 5-27-21    Future Office visit:     Controlled substance agreement:   Encounter-Level CSA:    There are no encounter-level csa.     Patient-Level CSA:    There are no patient-level csa.         Last Urine Drug Screen: No results found for: CDAUT, No results found for: COMDAT, No results found for: THC13, PCP13, COC13, MAMP13, OPI13, AMP13, BZO13, TCA13, MTD13, BAR13, OXY13, PPX13, BUP13     Processing:  Rx to be electronically transmitted to pharmacy by provider      [email or web address]       checked in past 3 months?  No, route to RN      Deisi Farr RN

## 2021-08-03 ENCOUNTER — THERAPY VISIT (OUTPATIENT)
Dept: PHYSICAL THERAPY | Facility: CLINIC | Age: 76
End: 2021-08-03
Payer: MEDICARE

## 2021-08-03 DIAGNOSIS — R29.898 BILATERAL LEG WEAKNESS: ICD-10-CM

## 2021-08-03 PROCEDURE — 97110 THERAPEUTIC EXERCISES: CPT | Mod: GP | Performed by: PHYSICAL THERAPIST

## 2021-08-04 ENCOUNTER — PATIENT OUTREACH (OUTPATIENT)
Dept: NURSING | Facility: CLINIC | Age: 76
End: 2021-08-04
Payer: MEDICARE

## 2021-08-04 ENCOUNTER — ALLIED HEALTH/NURSE VISIT (OUTPATIENT)
Dept: NURSING | Facility: CLINIC | Age: 76
End: 2021-08-04
Payer: MEDICARE

## 2021-08-04 DIAGNOSIS — R29.898 BILATERAL LEG WEAKNESS: Primary | ICD-10-CM

## 2021-08-04 ASSESSMENT — ACTIVITIES OF DAILY LIVING (ADL): DEPENDENT_IADLS:: CLEANING;COOKING;LAUNDRY;SHOPPING;MEAL PREPARATION

## 2021-08-04 NOTE — PROGRESS NOTES
Clinic Care Coordination Contact    Follow Up Progress Note      Assessment: Patient states his physical therapist wants him to keep exercising to regain his leg strength before he pursues getting a walker,scooter or lift chair     Goals addressed this encounter:   Goals Addressed                    This Visit's Progress       1. Improve chronic symptoms (pt-stated)   40%      Goal Statement: I want to improve management of my leg, knee, and hip pain and swelling.  Date Goal set: 6/3/21  Barriers: lymphedema  Strengths: motivated to improve ability to walk  Date to Achieve By: 12/3/21  Patient expressed understanding of goal: Yes    Action steps to achieve this goal:  1. I will participate in Lymphedema Therapy for management of my leg swelling.  2. I will schedule and attend appointment with Orthopedic team for evaluation and discussion of my leg, knee, and hip pains.  3. I will consider Physical Therapy referral if appropriate.  4. I will continue working with Care Coordination to identify and address barriers to achieving my goal.         COMPLETED: Healthy Eating (pt-stated)         My Goal: I will stop drinking regular pop.     What I need to meet my goal: beverage alternatives - water, diet pop, Crystal Light    I plan to meet my goal by this date: follow up appointment, 1/21               Intervention/Education provided during outreach: CC RN available for questions or concerns     Outreach Frequency: monthly    Plan:   Patient will keep future PT appointment to increase leg strength   Care Coordinator RN will follow up in 1-2 weeks     [facility]   Meghana Gomez RN, Care Coordinator   [facility]'s   E-mail [email or web address]   [phone]

## 2021-08-16 ENCOUNTER — HOSPITAL ENCOUNTER (OUTPATIENT)
Dept: PHYSICAL THERAPY | Facility: CLINIC | Age: 76
Setting detail: THERAPIES SERIES
End: 2021-08-16
Attending: INTERNAL MEDICINE
Payer: MEDICARE

## 2021-08-16 PROCEDURE — 97140 MANUAL THERAPY 1/> REGIONS: CPT | Mod: GP | Performed by: PHYSICAL THERAPIST

## 2021-08-16 PROCEDURE — 97535 SELF CARE MNGMENT TRAINING: CPT | Mod: GP | Performed by: PHYSICAL THERAPIST

## 2021-08-16 NOTE — DISCHARGE INSTRUCTIONS
Home Plan  1. Compression socks during the day  2. Elevate when possible and lay down if you are going to nap  3. Try to get 48-64oz of water per day  4. Decrease salt in food and drink  5. Try exercises/massage sequence 5 times per week (increase to daily once able)  6. If the legs continue to get worse, a stronger compression that you can wear day and night may be needed like what is in the picture.  You will probably need instruction from Yamel or another edema therapist to make sure you are using them correctly.    *If I don t hear from you by Sept 25th or see that you are scheduled with me, I will discharge you for now. You can come back with a new order if your swelling is persisting.

## 2021-08-17 ENCOUNTER — VIRTUAL VISIT (OUTPATIENT)
Dept: EDUCATION SERVICES | Facility: CLINIC | Age: 76
End: 2021-08-17
Payer: MEDICARE

## 2021-08-17 DIAGNOSIS — E11.9 TYPE 2 DIABETES MELLITUS WITHOUT COMPLICATION, WITHOUT LONG-TERM CURRENT USE OF INSULIN (H): Primary | ICD-10-CM

## 2021-08-17 PROCEDURE — 98967 PH1 ASSMT&MGMT NQHP 11-20: CPT | Mod: 95

## 2021-08-17 NOTE — Clinical Note
Dr. Castillo,   Sending him back to you for 6 month follow up. He is on my schedule in early November. I will need an updated Ambulatory Adult Diabetes Ed referral prior to that visit - please place at your convenience.   Thanks,   Danna Francisco RD, LD, [facility]

## 2021-08-17 NOTE — PROGRESS NOTES
Diabetes Follow-up - video visit    Type of service:  Video Visit    If the video visit is dropped, the video visit invitation should be resent by: Send to e-mail at: [email or web address]    Originating Location (pt. Location): Home  Distant Location (provider location): Home  Mode of Communication:  Video Conference via [employer]    Video Start Time: 8:33a  Video End Time (time video stopped): 8:53a    How would patient like to obtain AVS? MyChart    Subjective/Objective:    Garnica J Wilfredo reported in blood glucose log for review. Last date of communication was: 7/6/21.    Diabetes is being managed with   Diabetes Medications   Diabetes Medication(s)     Biguanides       metFORMIN (GLUCOPHAGE) 500 MG tablet    Take 1 tablet (500 mg) by mouth 2 times daily (with meals)          BG/Food Log:   Blood sugars - 180, 155, 176, 141, 172, 161, 244 (non-fasting), 171, 180, 147, 164, 178 (had pop 30 minutes before this)    Assessment:    Fasting blood glucose: 0% in target.    Discussed amended goal to get fasting blood sugars below 150. Encouraged him to avoid drinking pop before checking blood sugars and patient states he only did that once, today. Otherwise numbers are fasting. Discussed need for A1C recheck to determine if he needs adjustment to his meds. Recommended follow up with PCP for further review. He is agreeable and plans to call today.     In the meantime, again reiterated importance of cutting our regular pop, trying to get some exercise and a little bit of weight loss through healthier diet choices. He is now on oxygen at home which helps him breathe better and is seeing PT - recommended he discuss appropriate exercise with them and he is agreeable.     Plan/Response:  Recommend A1C recheck, orders in chart, + other labs as needed per PCP. 6 month follow up with PCP needed.   May need either increase in metformin dosing or addition of another med - could consider GLP1 as best option or  sulfonylurea if cost continues to be an issue  [facility]    Danna Francisco RD, RUPESH, [facility]     Time spent: 20 minutes    Any diabetes medication dose changes were made via the CDE Protocol and Collaborative Practice Agreement with the patient's referring provider. A copy of this encounter was shared with the provider.

## 2021-08-19 ENCOUNTER — THERAPY VISIT (OUTPATIENT)
Dept: PHYSICAL THERAPY | Facility: CLINIC | Age: 76
End: 2021-08-19
Payer: MEDICARE

## 2021-08-19 DIAGNOSIS — R29.898 BILATERAL LEG WEAKNESS: ICD-10-CM

## 2021-08-19 PROCEDURE — 97110 THERAPEUTIC EXERCISES: CPT | Mod: GP | Performed by: PHYSICAL THERAPIST

## 2021-08-24 ENCOUNTER — TELEPHONE (OUTPATIENT)
Dept: INTERNAL MEDICINE | Facility: CLINIC | Age: 76
End: 2021-08-24

## 2021-08-24 NOTE — TELEPHONE ENCOUNTER
Reason for Call: Request for an order or referral:    Order or referral being requested: lab orders    Date needed: 8/26/21 3pm    Has the patient been seen by the PCP for this problem? YES    Additional comments: patient has lab appt on 8/26 for A1C. Please place additional labs needed. Patient has appt with Dr. Castillo on 9/1.     Phone number Patient can be reached at:  Home number on file [phone] (home)    Best Time:  anytime    Can we leave a detailed message on this number?  Not Applicable    Call taken on 8/24/2021 at 5:04 PM by Tiffany Lee

## 2021-08-25 ENCOUNTER — PATIENT OUTREACH (OUTPATIENT)
Dept: NURSING | Facility: CLINIC | Age: 76
End: 2021-08-25
Payer: MEDICARE

## 2021-08-25 DIAGNOSIS — I50.9 CONGESTIVE HEART FAILURE (H): Primary | ICD-10-CM

## 2021-08-25 DIAGNOSIS — E11.9 DIABETES MELLITUS, TYPE 2 (H): ICD-10-CM

## 2021-08-25 ASSESSMENT — ACTIVITIES OF DAILY LIVING (ADL): DEPENDENT_IADLS:: CLEANING;COOKING;LAUNDRY;SHOPPING;MEAL PREPARATION

## 2021-08-25 NOTE — PROGRESS NOTES
Clinic Care Coordination Contact    Follow Up Progress Note      Assessment: CC RN spoke to Kenia/significant other/  Kenia states the patient is resting    Kenia states the patient has an appointment on Monday to discuss left leg edema and elevated blood sugar.  Works with CDE and the expectation of blood sugars is   Patient has ranged 147-175  Patient continues oxygen at 2 liters . Denies SOB or cough     Care Gaps:    Health Maintenance Due   Topic Date Due     ZOSTER IMMUNIZATION (2 of 3) 10/04/2013     DIABETIC FOOT EXAM  11/01/2019     INFLUENZA VACCINE (1) 09/01/2021     MEDICARE ANNUAL WELLNESS VISIT  09/08/2021     EYE EXAM  09/14/2021       Postponed to to next RN CC outreach      Goals addressed this encounter:   Goals Addressed                    This Visit's Progress       1. Improve chronic symptoms (pt-stated)   50%      Goal Statement: I want to improve management of my leg, knee, and hip pain and swelling.  Date Goal set: 6/3/21  Barriers: lymphedema  Strengths: motivated to improve ability to walk  Date to Achieve By: 12/3/21  Patient expressed understanding of goal: Yes    Action steps to achieve this goal:  1. I will participate in Lymphedema Therapy for management of my leg swelling.  2. I will schedule and attend appointment with Orthopedic team for evaluation and discussion of my leg, knee, and hip pains.  3. I will consider Physical Therapy referral if appropriate.  4. I will continue working with Care Coordination to identify and address barriers to achieving my goal.            Intervention/Education provided during outreach: CC available for future questions or concerns      Outreach Frequency: monthly    Plan:   1.Patient will continue oxygen continuously at 2 liters   2. Patient will continue to check his blood sugar and work closely with the CDE for any changes needed   3. Patient will continue outpatient PT to increase the strength in his legs   4. Patient will keep PCP appointment  Monday to discuss left leg edema   5. Care Coordinator will follow up in 3-5 business days   [facility]   Meghana Gomez RN, Care Coordinator   [facility]'s   E-mail [email or web address]   [phone]

## 2021-08-26 ENCOUNTER — LAB (OUTPATIENT)
Dept: LAB | Facility: CLINIC | Age: 76
End: 2021-08-26
Payer: MEDICARE

## 2021-08-26 DIAGNOSIS — E11.9 TYPE 2 DIABETES MELLITUS WITHOUT COMPLICATION, WITHOUT LONG-TERM CURRENT USE OF INSULIN (H): ICD-10-CM

## 2021-08-26 DIAGNOSIS — Z12.5 SCREENING PSA (PROSTATE SPECIFIC ANTIGEN): ICD-10-CM

## 2021-08-26 LAB — HBA1C MFR BLD: 6.6 % (ref 0–5.6)

## 2021-08-26 PROCEDURE — 83036 HEMOGLOBIN GLYCOSYLATED A1C: CPT

## 2021-08-26 PROCEDURE — 36415 COLL VENOUS BLD VENIPUNCTURE: CPT

## 2021-08-26 PROCEDURE — G0103 PSA SCREENING: HCPCS

## 2021-08-27 LAB — PSA SERPL-MCNC: 1.27 UG/L (ref 0–4)

## 2021-08-30 ENCOUNTER — OFFICE VISIT (OUTPATIENT)
Dept: INTERNAL MEDICINE | Facility: CLINIC | Age: 76
End: 2021-08-30
Payer: MEDICARE

## 2021-08-30 VITALS
TEMPERATURE: 98.5 F | WEIGHT: 235 LBS | BODY MASS INDEX: 37.77 KG/M2 | SYSTOLIC BLOOD PRESSURE: 130 MMHG | DIASTOLIC BLOOD PRESSURE: 80 MMHG | HEIGHT: 66 IN | OXYGEN SATURATION: 92 % | HEART RATE: 69 BPM | RESPIRATION RATE: 16 BRPM

## 2021-08-30 DIAGNOSIS — I10 ESSENTIAL HYPERTENSION: ICD-10-CM

## 2021-08-30 DIAGNOSIS — E66.01 MORBID OBESITY DUE TO EXCESS CALORIES (H): ICD-10-CM

## 2021-08-30 DIAGNOSIS — E11.9 TYPE 2 DIABETES MELLITUS WITHOUT COMPLICATION, WITHOUT LONG-TERM CURRENT USE OF INSULIN (H): Primary | ICD-10-CM

## 2021-08-30 DIAGNOSIS — R60.9 EDEMA, UNSPECIFIED TYPE: ICD-10-CM

## 2021-08-30 LAB
ANION GAP SERPL CALCULATED.3IONS-SCNC: 7 MMOL/L (ref 3–14)
BUN SERPL-MCNC: 9 MG/DL (ref 7–30)
CALCIUM SERPL-MCNC: 9.4 MG/DL (ref 8.5–10.1)
CHLORIDE BLD-SCNC: 102 MMOL/L (ref 94–109)
CO2 SERPL-SCNC: 28 MMOL/L (ref 20–32)
CREAT SERPL-MCNC: 1 MG/DL (ref 0.66–1.25)
GFR SERPL CREATININE-BSD FRML MDRD: 73 ML/MIN/1.73M2
GLUCOSE BLD-MCNC: 142 MG/DL (ref 70–99)
POTASSIUM BLD-SCNC: 3.7 MMOL/L (ref 3.4–5.3)
SODIUM SERPL-SCNC: 137 MMOL/L (ref 133–144)

## 2021-08-30 PROCEDURE — 36415 COLL VENOUS BLD VENIPUNCTURE: CPT | Performed by: INTERNAL MEDICINE

## 2021-08-30 PROCEDURE — 80048 BASIC METABOLIC PNL TOTAL CA: CPT | Performed by: INTERNAL MEDICINE

## 2021-08-30 PROCEDURE — 99214 OFFICE O/P EST MOD 30 MIN: CPT | Performed by: INTERNAL MEDICINE

## 2021-08-30 ASSESSMENT — MIFFLIN-ST. JEOR: SCORE: 1738.7

## 2021-08-30 NOTE — PROGRESS NOTES
"    Assessment & Plan     Type 2 diabetes mellitus without complication, without long-term current use of insulin (H)  Stable control.  Continuing with current medications A1c check in 6 months.  - Basic metabolic panel  (Ca, Cl, CO2, Creat, Gluc, K, Na, BUN); Future  - Echocardiogram Complete; Future    Morbid obesity due to excess calories (H)  Encouraged ongoing and continuing weight loss as he has done a nice job down 25 pounds.    Essential hypertension  Stable on therapy continue with current medical management    Edema, unspecified type  Question dependent component advised ED hose.  Recheck creatinine.  Consider rechecking echocardiogram.  No subjective complaints.  - Echocardiogram Complete; Future     BMI:   Estimated body mass index is 37.93 kg/m  as calculated from the following:    Height as of this encounter: 1.676 m (5' 6\").    Weight as of this encounter: 106.6 kg (235 lb).   Weight management plan: Discussed healthy diet and exercise guidelines    See Patient Instructions    No follow-ups on file.    Olegario Castillo MD  [facility]    Mariano Alejandro is a 75 year old who presents for the following health issues  accompanied by his self:    HPI     Diabetes Follow-up    How often are you checking your blood sugar? One time daily  What time of day are you checking your blood sugars (select all that apply)?  fASTING  Have you had any blood sugars above 200?  No  Have you had any blood sugars below 70?  No    What symptoms do you notice when your blood sugar is low?  NONE    What concerns do you have today about your diabetes?  none     Do you have any of these symptoms? (Select all that apply)  Numbness in feet    Have you had a diabetic eye exam in the last 12 months? No      BP Readings from Last 2 Encounters:   07/01/21 (!) 142/88   05/27/21 124/66     Hemoglobin A1C (%)   Date Value   08/26/2021 6.6 (H)   03/17/2021 6.8 (H)   11/11/2020 7.2 (H)     LDL " "Cholesterol Calculated (mg/dL)   Date Value   03/17/2021 46   08/28/2019 44       Review of Systems   CONSTITUTIONAL: NEGATIVE for fever, chills, change in weight  EYES: NEGATIVE for vision changes or irritation  ENT/MOUTH: NEGATIVE for ear, mouth and throat problems  RESP: NEGATIVE for significant cough or SOB  CV: NEGATIVE for chest pain, palpitations  GI: NEGATIVE for nausea, abdominal pain, heartburn, or change in bowel habits  : NEGATIVE for frequency, dysuria, or hematuria  NEURO: NEGATIVE for weakness, dizziness or paresthesias  HEME: NEGATIVE for bleeding problems  PSYCHIATRIC: NEGATIVE for changes in mood or affect      Objective    /80   Pulse 69   Temp 98.5  F (36.9  C) (Temporal)   Resp 16   Ht 1.676 m (5' 6\")   Wt 106.6 kg (235 lb)   SpO2 92%   BMI 37.93 kg/m    Body mass index is 37.93 kg/m .     Physical Exam:     GENERAL:  alert and no distress, assist device in form of cane.    EYES: Eyes grossly normal to inspection, PERRL and conjunctivae and sclerae normal  HENT: ear canals and TM's normal, nose and mouth without ulcers or lesions  NECK: no adenopathy, no asymmetry, masses, or scars and thyroid normal to palpation  RESP: lungs clear to auscultation - no rales, rhonchi or wheezes  CV: regular rate and rhythm, normal S1 S2, no S3 or S4, no click or rub  ABDOMEN: obese  MS: no gross musculoskeletal defects noted, mild edema LE's, 1+, distinct open wounds noted lower extremities  NEURO: No focal changes  PSYCH: mentation appears normal, affect normal/bright    Lab Results   Component Value Date    A1C 6.6 08/26/2021    A1C 6.8 03/17/2021    A1C 7.2 11/11/2020    A1C 7.5 09/20/2020    A1C 7.6 06/18/2020    A1C 7.8 01/23/2020     LDL Cholesterol Calculated   Date Value Ref Range Status   03/17/2021 46 <100 mg/dL Final     Comment:     Desirable:       <100 mg/dl       Creatinine   Date Value Ref Range Status   03/17/2021 1.11 0.66 - 1.25 mg/dL Final     Lab Results   Component Value Date "    ALT 24 03/17/2021     PSA   Date Value Ref Range Status   10/27/2017 1.98 0 - 4 ug/L Final     Comment:     Assay Method:  Chemiluminescence using Siemens Vista analyzer     Prostate Specific Antigen Screen   Date Value Ref Range Status   08/26/2021 1.27 0.00 - 4.00 ug/L Final

## 2021-09-02 ENCOUNTER — THERAPY VISIT (OUTPATIENT)
Dept: PHYSICAL THERAPY | Facility: CLINIC | Age: 76
End: 2021-09-02
Payer: MEDICARE

## 2021-09-02 DIAGNOSIS — R29.898 BILATERAL LEG WEAKNESS: ICD-10-CM

## 2021-09-02 PROCEDURE — 97110 THERAPEUTIC EXERCISES: CPT | Mod: GP | Performed by: PHYSICAL THERAPIST

## 2021-09-03 ENCOUNTER — HOSPITAL ENCOUNTER (OUTPATIENT)
Dept: CARDIOLOGY | Facility: CLINIC | Age: 76
Discharge: HOME | End: 2021-09-03
Attending: INTERNAL MEDICINE | Admitting: INTERNAL MEDICINE
Payer: MEDICARE

## 2021-09-03 DIAGNOSIS — R60.9 EDEMA, UNSPECIFIED TYPE: ICD-10-CM

## 2021-09-03 DIAGNOSIS — E11.9 TYPE 2 DIABETES MELLITUS WITHOUT COMPLICATION, WITHOUT LONG-TERM CURRENT USE OF INSULIN (H): ICD-10-CM

## 2021-09-03 LAB — LVEF ECHO: NORMAL

## 2021-09-03 PROCEDURE — 999N000208 ECHOCARDIOGRAM COMPLETE

## 2021-09-03 PROCEDURE — 93306 TTE W/DOPPLER COMPLETE: CPT | Mod: 26 | Performed by: INTERNAL MEDICINE

## 2021-09-03 PROCEDURE — 255N000002 HC RX 255 OP 636: Performed by: INTERNAL MEDICINE

## 2021-09-03 RX ADMIN — HUMAN ALBUMIN MICROSPHERES AND PERFLUTREN 3 ML: 10; .22 INJECTION, SOLUTION INTRAVENOUS at 14:45

## 2021-09-15 ENCOUNTER — PATIENT OUTREACH (OUTPATIENT)
Dept: CARE COORDINATION | Facility: CLINIC | Age: 76
End: 2021-09-15

## 2021-09-15 ASSESSMENT — ACTIVITIES OF DAILY LIVING (ADL): DEPENDENT_IADLS:: CLEANING;COOKING;LAUNDRY;SHOPPING;MEAL PREPARATION

## 2021-09-15 NOTE — PROGRESS NOTES
Clinic Care Coordination Contact  [facility]/Voicemail    Referral Source: IP Report  Goal Statement: I want to improve management of my leg, knee, and hip pain and swelling.  Date Goal set: 6/3/21  Barriers: lymphedema  Strengths: motivated to improve ability to walk  Date to Achieve By: 12/3/21  Patient expressed understanding of goal: Yes    Action steps to achieve this goal:  1. I will participate in Lymphedema Therapy for management of my leg swelling.  2. I will schedule and attend appointment with Orthopedic team for evaluation and discussion of my leg, knee, and hip pains.  3. I will consider Physical Therapy referral if appropriate.  4. I will continue working with Care Coordination to identify and address barriers to achieving my goal.    Clinical Data: Care Coordinator Outreach  Outreach attempted x 1.  Left message on Kenia/Significant other voicemail with call back information and requested return call.  Plan: Care Coordinator will try to reach patient again in 3-5 business days.  [facility]   Meghana Gomez RN, Care Coordinator   [facility]'s   E-mail [email or web address]   [phone]

## 2021-09-16 ENCOUNTER — TELEPHONE (OUTPATIENT)
Dept: INTERNAL MEDICINE | Facility: CLINIC | Age: 76
End: 2021-09-16

## 2021-09-16 ENCOUNTER — THERAPY VISIT (OUTPATIENT)
Dept: PHYSICAL THERAPY | Facility: CLINIC | Age: 76
End: 2021-09-16
Payer: MEDICARE

## 2021-09-16 DIAGNOSIS — R29.898 BILATERAL LEG WEAKNESS: ICD-10-CM

## 2021-09-16 PROCEDURE — 97110 THERAPEUTIC EXERCISES: CPT | Mod: GP | Performed by: PHYSICAL THERAPIST

## 2021-09-16 NOTE — LETTER
[facility]  [address] [address]  [phone]      10/5/2021       October 5, 2021      Nahun Villalboos  [address] [place] [id number]      Your healthcare team cares about your health. To provide you with the best care,   we have reviewed your chart and based on our findings, we see that you are due to:     - ANNUAL WELLNESS FOLLOW UP:   Schedule an Annual Medicare Wellness Exam. This can be done by in person visit or virtual video visit.     If you have already completed these items, please contact the clinic via phone or   [employer] so your care team can review and update your records. Thank you for   choosing [facility] for your healthcare needs. For any questions,   concerns, or to schedule an appointment please contact the clinic.       Healthy Regards,      Your [facility] Care Team

## 2021-09-16 NOTE — TELEPHONE ENCOUNTER
Patient Quality Outreach      Summary:    Patient Active Problem List   Diagnosis     Essential hypertension, benign     Tobacco use disorder     Lumbago     Impotence of organic origin     Advance care planning     Polyp of colon     Obstructive sleep apnea syndrome     Hyperlipidemia LDL goal <100     Morbid obesity due to excess calories (H)     BPPV (benign paroxysmal positional vertigo), unspecified laterality     Chronic obstructive pulmonary disease, unspecified COPD type (H)     Type 2 diabetes mellitus without complication, without long-term current use of insulin (H)     S/P transurethral resection of prostate     Hematuria, gross     Cervical segment dysfunction     Cervicalgia     Thoracic segment dysfunction     Erectile dysfunction     HTN (hypertension)     Acute diverticulitis     Other pulmonary embolism without acute cor pulmonale, unspecified chronicity (H)     Mesenteric mass     History of adenocarcinoma of lung     Bilateral leg weakness         Patient is due/failing the following:   Annual wellness, date due: Now    Type of outreach:    Sent MSA Management message.    Questions for provider review:    None                                                                                                                                     Lindsay Jean, CMA

## 2021-09-28 ENCOUNTER — PATIENT OUTREACH (OUTPATIENT)
Dept: CARE COORDINATION | Facility: CLINIC | Age: 76
End: 2021-09-28

## 2021-09-28 ENCOUNTER — PATIENT OUTREACH (OUTPATIENT)
Dept: NURSING | Facility: CLINIC | Age: 76
End: 2021-09-28
Payer: MEDICARE

## 2021-09-28 DIAGNOSIS — R29.898 BILATERAL LEG WEAKNESS: Primary | ICD-10-CM

## 2021-09-28 ASSESSMENT — ACTIVITIES OF DAILY LIVING (ADL): DEPENDENT_IADLS:: CLEANING;COOKING;LAUNDRY;SHOPPING;MEAL PREPARATION

## 2021-09-28 NOTE — PROGRESS NOTES
Clinic Care Coordination Contact    Follow Up Progress Note      Assessment:   Patient continues to have bilateral leg edema and now has some open sores  CC encouraged patient to make a PCP appointment for an evaluation.and treatment plan  Patient agrees with the plan   Patient continues to elevate legs as able and is using his walker fr safety  Blood sugar this morning 173 CDE appointment in November  Patient continues outpatient PT   Care Gaps:      Health Maintenance Due   Topic Date Due     HF ACTION PLAN  Never done     ZOSTER IMMUNIZATION (2 of 3) 10/04/2013     DIABETIC FOOT EXAM  11/01/2019     INFLUENZA VACCINE (1) 09/01/2021     EYE EXAM  09/14/2021     MEDICARE ANNUAL WELLNESS VISIT  09/08/2021     FALL RISK ASSESSMENT  10/23/2021       Postponed to Next CC RN outreach      Goals addressed this encounter:   Goals Addressed                    This Visit's Progress       1. Improve chronic symptoms (pt-stated)   70%      Goal Statement: I want to improve management of my leg, knee, and hip pain and swelling.  Date Goal set: 6/3/21  Barriers: lymphedema  Strengths: motivated to improve ability to walk  Date to Achieve By: 12/3/21  Patient expressed understanding of goal: Yes    Action steps to achieve this goal:  1. I will participate in Lymphedema Therapy for management of my leg swelling.  2. I will schedule and attend appointment with Orthopedic team for evaluation and discussion of my leg, knee, and hip pains.  3. I will consider Physical Therapy referral if appropriate.  4. I will continue working with Care Coordination to identify and address barriers to achieving my goal.            Intervention/Education provided during outreach: CC RN available for future questions or concerns      Outreach Frequency: monthly    Plan:   1. Patient will make a future appointment with PCP to evaluate leg weakness and edema  2. Patient will continue outpatient PT as scheduled   3. Care Coordinator will follow up in 3-5  business days   [facility]   Meghana Gomez RN, Care Coordinator   [facility]'s   E-mail [email or web address]   [phone]

## 2021-09-29 NOTE — PROGRESS NOTES
Outpatient Physical Therapy Discharge Note     Patient: Nahun Villalobos  : 1945    Beginning/End Dates of Reporting Period:  21 to 21    Referring Provider: Dr. Ever Olivares    Therapy Diagnosis: Lymphedema     Client Self Report: Pt reports that he is doubly swelled because he has had a lot of salt. Pt has been wearing sock only when coming to therapy.    Objective Measurements:  Objective Measure: Volume 10-40mmHg  Details: increased bilaterally, L > R. Pt has not been doing home program  Objective Measure: Pitting  Details: 3+ L LE, 2+ L foot and R pre-tib  Objective Measure: Sores  Details: L LE with pink sores, don't look open. Skin is tight and shiny     Goals:  Goal Identifier Home Program   Goal Description Pt will complete home program 5/7 days per week or more to better manage LE lymphedema by facilitating drainage.   Target Date 21   Date Met      Progress (detail required for progress note): Was not completing regularly at last visit on 21     Goal Identifier Compression   Goal Description Pt will wearing daytime compression daily to prevent filling of fluid in dependent legs to prevent further mobility deficits and infection.   Target Date 21   Date Met      Progress (detail required for progress note): Pt not consistent, he was given info to order velcro wraps if the compression socks are not working for him.      Goal Identifier Home Management   Goal Description Pt will report 3 thing that he can do better manage swelling and prevent exacerbation to prevent long term complication   Target Date 21   Date Met  21   Progress (detail required for progress note): Pt knows to elevate, use compression, and decrease salt but is not currently doing these things     Plan:  Discharge from therapy.    Discharge:    Reason for Discharge: No further expectation of progress.  Patient has failed to schedule further appointments.    Equipment Issued: Pt got  compression stockings and was fit for velcro wraps    Discharge Plan: Patient to continue home program. Pt may return with new order, will need improved attendence and active participation in home program to make progress with edema management

## 2021-10-03 ENCOUNTER — MEDICAL CORRESPONDENCE (OUTPATIENT)
Dept: HEALTH INFORMATION MANAGEMENT | Facility: CLINIC | Age: 76
End: 2021-10-03
Payer: MEDICARE

## 2021-10-05 ENCOUNTER — OFFICE VISIT (OUTPATIENT)
Dept: INTERNAL MEDICINE | Facility: CLINIC | Age: 76
End: 2021-10-05
Payer: MEDICARE

## 2021-10-05 VITALS
DIASTOLIC BLOOD PRESSURE: 72 MMHG | RESPIRATION RATE: 17 BRPM | WEIGHT: 247 LBS | OXYGEN SATURATION: 94 % | SYSTOLIC BLOOD PRESSURE: 132 MMHG | HEART RATE: 75 BPM | TEMPERATURE: 97.5 F | BODY MASS INDEX: 39.87 KG/M2

## 2021-10-05 DIAGNOSIS — E11.9 TYPE 2 DIABETES MELLITUS WITHOUT COMPLICATION, WITHOUT LONG-TERM CURRENT USE OF INSULIN (H): Primary | ICD-10-CM

## 2021-10-05 DIAGNOSIS — J44.9 CHRONIC OBSTRUCTIVE PULMONARY DISEASE, UNSPECIFIED COPD TYPE (H): Chronic | ICD-10-CM

## 2021-10-05 DIAGNOSIS — Z23 NEED FOR PROPHYLACTIC VACCINATION AND INOCULATION AGAINST INFLUENZA: ICD-10-CM

## 2021-10-05 DIAGNOSIS — R26.89 BALANCE PROBLEMS: ICD-10-CM

## 2021-10-05 DIAGNOSIS — R60.9 EDEMA, UNSPECIFIED TYPE: ICD-10-CM

## 2021-10-05 DIAGNOSIS — I10 ESSENTIAL HYPERTENSION, BENIGN: ICD-10-CM

## 2021-10-05 PROCEDURE — 99214 OFFICE O/P EST MOD 30 MIN: CPT | Mod: 25 | Performed by: INTERNAL MEDICINE

## 2021-10-05 PROCEDURE — 90662 IIV NO PRSV INCREASED AG IM: CPT | Performed by: INTERNAL MEDICINE

## 2021-10-05 PROCEDURE — G0008 ADMIN INFLUENZA VIRUS VAC: HCPCS | Performed by: INTERNAL MEDICINE

## 2021-10-05 RX ORDER — FUROSEMIDE 40 MG
40 TABLET ORAL 2 TIMES DAILY
Qty: 180 TABLET | Refills: 0 | Status: SHIPPED | OUTPATIENT
Start: 2021-10-05 | End: 2022-01-13

## 2021-10-05 RX ORDER — CEPHALEXIN 500 MG/1
500 CAPSULE ORAL 3 TIMES DAILY
Qty: 21 CAPSULE | Refills: 0 | Status: SHIPPED | OUTPATIENT
Start: 2021-10-05 | End: 2021-12-10

## 2021-10-05 NOTE — PROGRESS NOTES
Assessment & Plan     Type 2 diabetes mellitus without complication, without long-term current use of insulin (H)  Lab Results   Component Value Date    A1C 6.6 08/26/2021    A1C 6.8 03/17/2021    A1C 7.2 11/11/2020    A1C 7.5 09/20/2020    A1C 7.6 06/18/2020    A1C 7.8 01/23/2020     Stable with good A1c level.  Continue as directed.  Likely contributing to notable balance as well as chronic health issues    Chronic obstructive pulmonary disease, unspecified COPD type (H)  Stable and progressive.  Needs O2 supplementation with ambulation with saturations noted 87% off O2    Essential hypertension, benign  Stable at present time continue with current medical management    Edema, unspecified type  We will increase Lasix to 40 mg twice daily.  Add Keflex 1 capsule 3 times daily for 7 days follow-up basic metabolic panel in 4 weeks.  Advised ED hose  - cephALEXin (KEFLEX) 500 MG capsule; Take 1 capsule (500 mg) by mouth 3 times daily  - furosemide (LASIX) 40 MG tablet; Take 1 tablet (40 mg) by mouth 2 times daily    Need for prophylactic vaccination and inoculation against influenza  Advise updated influenza screen    Balance problems  Discussed options available.  Suggest may benefit from neurology to rule out secondary source  - Adult Neurology Referral; Future     See Patient Instructions    Return in about 1 month (around 11/5/2021) for Lab Work appointment.    Olegario Castillo MD  [facility]    Mariano Alejandro is a 75 year old who presents for the following health issues  accompanied by his partner:    HPI     Concern - Leg swelling which has been chronic.  Was given a referral to lymphedema clinic in the past  Has been taking Lasix 40 mg in the a.m. and 20 mg in the p.m.  Onset:  months  Description: bilateral legs swelling, blistering  Intensity: moderate  Progression of Symptoms:  same  Accompanying Signs & Symptoms: sharp pains in feet, blistering on left leg with  redness/ drainage   Previous history of similar problem: YES   Precipitating factors:        Worsened by: none   Alleviating factors:        Improved by: elevating feet   Therapies tried and outcome: antibiotic ointment- helps     He has had an echocardiogram demonstrating normal ejection fraction.  His creatinine has been stable.  Albumin is low normal.  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    Normal B12 and folate levels.  Proteinuria noted    Other concerns:  1. Worsening balance over last 6 months, worse with walking, tried PT with some aide.  He defines more generalized weakness.  2. Discuss continuing oxygen. Needs new orders.  Uses it with walking as needed    O2 with 2L 94%  O2 at room air: 92%   O2 ambulatin%      Review of Systems   CONSTITUTIONAL: NEGATIVE for fever, chills, change in weight  EYES: NEGATIVE for vision changes or irritation  ENT/MOUTH: NEGATIVE for ear, mouth and throat problems  RESP: NEGATIVE for significant cough with + SOB  CV: NEGATIVE for chest pain, palpitations  GI: NEGATIVE for nausea, abdominal pain, heartburn, or change in bowel habits  : NEGATIVE for frequency, dysuria, or hematuria  HEME: NEGATIVE for bleeding problems  PSYCHIATRIC: NEGATIVE for changes in mood or affect      Objective    /72   Pulse 75   Temp 97.5  F (36.4  C) (Temporal)   Resp 17   Wt 112 kg (247 lb)   SpO2 94%   BMI 39.87 kg/m    Body mass index is 39.87 kg/m .     Physical Exam   Weight has remained stable.  Uses cane normally but not with today  GENERAL: alert and no distress  EYES: Eyes grossly normal to inspection, PERRL and conjunctivae and sclerae normal  HENT: ear canals and TM's normal, nose and mouth without ulcers or lesions  NECK: no adenopathy, no asymmetry, masses, or scars and thyroid normal to palpation  RESP: lungs clear to auscultation with distant breath sounds.- no rales, rhonchi or wheezes,  short of breath with exertion.  CV: regular rate and rhythm, normal S1 S2, no S3 or S4, no click or rub, noted + peripheral edema  ABDOMEN: obese  Gait slowed and slightly wide based  MS: Lower extremities reveal 1++ edema bilaterally to knees.  Edema actually appears to be slightly better than when last seen.  No asymmetry.  SKIN: There is superficial erythema about the size of a quarter x 2 noted left lateral calf.  Area is mildly warm.  No distinct formal cellulitic change  PSYCH: mentation appears normal, affect normal/bright    Lab Results   Component Value Date    A1C 6.6 08/26/2021    A1C 6.8 03/17/2021    A1C 7.2 11/11/2020    A1C 7.5 09/20/2020    A1C 7.6 06/18/2020    A1C 7.8 01/23/2020     Creatinine   Date Value Ref Range Status   08/30/2021 1.00 0.66 - 1.25 mg/dL Final   03/17/2021 1.11 0.66 - 1.25 mg/dL Final

## 2021-10-05 NOTE — TELEPHONE ENCOUNTER
Patient Quality Outreach 2nd Attempt      Summary:    Type of outreach:    Sent letter.    Next Steps:  Reach out within 90 days via [employer].    Max number of attempts reached: Yes. Will try again in 90 days if patient still on fail list.    Questions for provider review:    None                                                                                                                    Lindsay Jean

## 2021-10-10 DIAGNOSIS — R20.2 PARESTHESIA: ICD-10-CM

## 2021-10-11 ENCOUNTER — PATIENT OUTREACH (OUTPATIENT)
Dept: NURSING | Facility: CLINIC | Age: 76
End: 2021-10-11
Payer: MEDICARE

## 2021-10-11 ENCOUNTER — PATIENT OUTREACH (OUTPATIENT)
Dept: CARE COORDINATION | Facility: CLINIC | Age: 76
End: 2021-10-11

## 2021-10-11 DIAGNOSIS — R29.898 BILATERAL LEG WEAKNESS: Primary | ICD-10-CM

## 2021-10-11 RX ORDER — GABAPENTIN 300 MG/1
CAPSULE ORAL
Qty: 180 CAPSULE | Refills: 0 | Status: SHIPPED | OUTPATIENT
Start: 2021-10-11 | End: 2022-01-13

## 2021-10-11 ASSESSMENT — ACTIVITIES OF DAILY LIVING (ADL): DEPENDENT_IADLS:: CLEANING;COOKING;LAUNDRY;SHOPPING;MEAL PREPARATION

## 2021-10-11 NOTE — TELEPHONE ENCOUNTER
Routing refill request to provider for review/approval because:  Drug not on the [facility] refill protocol   Shelly Vee RN

## 2021-10-11 NOTE — PROGRESS NOTES
Clinic Care Coordination Contact    Follow Up Progress Note      Assessment:   CC RN spoke to patient and he reports he continues outpatient PT to increase his strength  Patient is using his walker for safety    Patient had a recent visit with PCP and discussed leg edema and open sores on left leg   Patient's Lasix dose increased and put on an antibiotic.  Patient reports the sores are healing and the leg swelling has gone down     Patient's Oximeter in the clinic visit was 92-93 % and went down to 87% when he walked the halls .    Patient continues to use oxygen continuously at 2 liters      Blood sugar readings 147-200    Care Gaps:    Health Maintenance Due   Topic Date Due     HF ACTION PLAN  Never done     ZOSTER IMMUNIZATION (2 of 3) 10/04/2013     DIABETIC FOOT EXAM  11/01/2019     MEDICARE ANNUAL WELLNESS VISIT  09/08/2021     EYE EXAM  09/14/2021     FALL RISK ASSESSMENT  10/23/2021       Postponed to Next CC RN outreach      Goals addressed this encounter:   Goals Addressed                    This Visit's Progress       1. Improve chronic symptoms (pt-stated)   80%      Goal Statement: I want to improve management of my leg, knee, and hip pain and swelling.  Date Goal set: 6/3/21  Barriers: lymphedema  Strengths: motivated to improve ability to walk  Date to Achieve By: 12/3/21  Patient expressed understanding of goal: Yes    Action steps to achieve this goal:  1. I will participate in Lymphedema Therapy for management of my leg swelling.  2. I will schedule and attend appointment with Orthopedic team for evaluation and discussion of my leg, knee, and hip pains.  3. I will consider Physical Therapy referral if appropriate.  4. I will continue working with Care Coordination to identify and address barriers to achieving my goal.            Intervention/Education provided during outreach: Informed patient he has a new CC RN Rhea Knapp      Outreach Frequency: 2 weeks    Plan:   1. Patient will continue  outpatient PT and use his walker for safety   2. Patient will continue to use his oxygen continuously at 2 liters   3. Patient will keep future Neurology visit to discuss balance problems   4. Patient will continue to check his blood sugar as directed and keep future CDE appointment scheduled   5. New RN CC will follow up in 1-2 weeks   [facility]   Meghana Gomez RN, Care Coordinator   [facility]'s   E-mail [email or web address]   [phone]

## 2021-10-12 ENCOUNTER — THERAPY VISIT (OUTPATIENT)
Dept: PHYSICAL THERAPY | Facility: CLINIC | Age: 76
End: 2021-10-12
Payer: MEDICARE

## 2021-10-12 DIAGNOSIS — R29.898 BILATERAL LEG WEAKNESS: ICD-10-CM

## 2021-10-12 PROCEDURE — 97110 THERAPEUTIC EXERCISES: CPT | Mod: GP | Performed by: PHYSICAL THERAPIST

## 2021-10-14 PROBLEM — R29.898 BILATERAL LEG WEAKNESS: Status: RESOLVED | Noted: 2021-07-27 | Resolved: 2021-10-14

## 2021-10-14 NOTE — PROGRESS NOTES
"Subjective:  HPI  Physical Exam                    Objective:  System    Physical Exam    General     ROS    Assessment/Plan:    DISCHARGE REPORT    Progress reporting period is from 7-27-21 to 10-12-21 .       SUBJECTIVE  Still did not get a walker, being told due to supply issue.  Using \"old\" standard walker a friend gave him or SEC. Going for daily walks about 1/2 block with dog.  Has increased his diuretic med per MD order and has helped the LE swelling.  Continues to have weakness feeling LEs, most pronounced with prolonged (5+ minutes) standing.  Reports intermittent compliance with PT HEP.       Current pain level is n/a   Previous pain level was  n/a   Changes in function:  Yes (See Goal flowsheet attached for changes in current functional level)  Adverse reaction to treatment or activity: None    OBJECTIVE  Able to stand for exercises at counter up to 10' without a rest.  Arrived ambulating with SEC and O2 at 2 liters, easier to manage O2 and increased torin with use of 4-wheel clinic walker.  Static MMT bilateral hip flexion 5/5, abduction 4/5, knee extension 4+/5 bilaterally, ankle 4+/5 bilaterally.   Able to tolerate 8.5' on NuStep (slower pace) without SOB.      ASSESSMENT/PLAN  Updated problem list and treatment plan: Diagnosis 1:  Bilateral leg weakness    Decreased strength - therapeutic exercise and home program  STG/LTGs have been met or progress has been made towards goals:  Yes (See Goal flow sheet completed today.)  Assessment of Progress: The patient's condition is improving.  The patient's condition has potential to improve.  Self Management Plans:  Patient has been instructed in a home treatment program.  I have re-evaluated this patient and find that the nature, scope, duration and intensity of the therapy is appropriate for the medical condition of the patient.  Nahun continues to require the following intervention to meet STG and LTG's:  PT intervention is no longer required to meet " STG/LTG.    Recommendations:  This patient is ready to be discharged from therapy and continue their home treatment program.    Please refer to the daily flowsheet for treatment today, total treatment time and time spent performing 1:1 timed codes.

## 2021-10-23 ENCOUNTER — HEALTH MAINTENANCE LETTER (OUTPATIENT)
Age: 76
End: 2021-10-23

## 2021-10-25 ENCOUNTER — PATIENT OUTREACH (OUTPATIENT)
Dept: NURSING | Facility: CLINIC | Age: 76
End: 2021-10-25
Payer: MEDICARE

## 2021-10-25 ASSESSMENT — ACTIVITIES OF DAILY LIVING (ADL): DEPENDENT_IADLS:: CLEANING;COOKING;LAUNDRY;SHOPPING;MEAL PREPARATION

## 2021-10-25 NOTE — LETTER
[facility] CARE COORDINATION  [address] [facility] [address]    October 25, 2021    Nahun Villalobos  [address] [place] [address]      Dear Jack and Kenia (and Kendrick!),    I am a clinic care coordinator who works with Olegario Castillo MD at [facility]. I wanted to introduce myself and provide you with my contact information for you to be able to call me with any questions or concerns. Below is a description of clinic care coordination and how I can further assist you.      The clinic care coordination team is made up of a registered nurse,  and community health worker who understand the health care system. The goal of clinic care coordination is to help you manage your health and improve access to the health care system in the most efficient manner. The team can assist you in meeting your health care goals by providing education, coordinating services, strengthening the communication among your providers and supporting you with any resource needs.    Please feel free to contact me with any questions or concerns. We are focused on providing you with the highest-quality healthcare experience possible and that all starts with you.     Warm regards,     [facility]   Rhea Knapp RN, Care Coordinator   [facility] [place], Women's Clinic, [facility], [place]  E-mail [email or web address]   [phone]      Enclosed: I have enclosed a copy of the Patient Centered Plan of Care. This has helpful information and goals that we have talked about. Please keep this in an easy to access place to use as needed.

## 2021-10-25 NOTE — PROGRESS NOTES
"Clinic Care Coordination Contact    Follow Up Progress Note      Assessment:   RNCC reached out to Javon who had this writer on speaker with input received from Kenia, significant other.  Kenia sets up patients medications and administers adding patient will have completed his Keflex tomorrow.    Javon takes his dog out to walks a half a block every day and is trying to go a full block.  He states the antibiotic and increasing frequency of lasix has helped with the swelling in his (L) foot.  He does don ED hose \"almost\" every day and doffs at night.  Discussed was elevating his legs as much as possible but due to \"it hurts\" when elevating he will \"try\" to do this periodically throughout the day.    His O2 sats range from 88%-96% depending upon exertion.  He is taking his inhalers which \"help me breathe easier.\".  He states his blood sugars range from 147-180 and hasn't had any hyper/hypoglycemic episodes. Discussed was the need to schedule 1 month follow up appointment with PCP.    He has completed both lymphedema and physical therapy as recommended. He has upcoming appointments for registered dietician, lab work and neurology- as recommended by PCP for balance issue and to rule out a secondary source.    Care Gaps:    Health Maintenance Due   Topic Date Due     HF ACTION PLAN  Never done     ZOSTER IMMUNIZATION (2 of 3) 10/04/2013     DIABETIC FOOT EXAM  11/01/2019     MEDICARE ANNUAL WELLNESS VISIT  09/08/2021     EYE EXAM  09/14/2021     FALL RISK ASSESSMENT  10/23/2021       Postponed to next RNCC outreach     Goals addressed this encounter:   Goals Addressed                    This Visit's Progress       1. Improve chronic symptoms (pt-stated)   50%      Goal Statement: I want to improve management of my leg, knee, and hip pain and swelling.  Date Goal set: 6/3/21  Barriers: lymphedema  Strengths: motivated to improve ability to walk  Date to Achieve By: 12/3/21  Patient expressed understanding of goal: " Yes    Action steps to achieve this goal:    1. I will participate in Lymphedema Therapy for management of my leg swelling; Completed 9/2021  2. I will schedule and attend appointment with Orthopedic team for evaluation and discussion of my leg, knee, and hip pains; Completed with Physical Therapy referral  3. I will consider Physical Therapy referral if appropriate; Completed week of 10/18/2021  4. I will apply Jacinto hose to wear throughout the day and remove at night  5. I will walk my dog daily to help with strengthening and endurance  6. I will elevate my legs while sitting and laying down by propping them up with a pillow  7. I will discuss, review, schedule and complete recommended overdue health maintenance with my primary care provider  8. I will I will take my medications as prescribed  9. I will contact my care team with questions, concerns, support needs. I will use the clinic as a resource and I understand I can contact my clinic with 24/7 after hours services available. Care Coordinator will remain available as needed            Intervention/Education provided during outreach:   RNCC called and spoke with patient; introduced self, discussed role of Care Coordination, and explained reason for call     Outreach Frequency: monthly    Plan:   -Patient will call to schedule a 1 month follow up appt with PCP  -Patient will follow upcoming appt recommendations  -RNCC will reach out in 3 weeks to monitor provider recommendations and goal progression  -RNCC will send Care Coordination Introduction letter with contact information and an updated patient centered plan of care      Care Coordinator will follow up in 3 weeks    [facility]   Rhea Knapp RN, Care Coordinator   [facility] [place], Women's Clinic, [facility], [place]  E-mail [email or web address]   [phone]

## 2021-10-29 ENCOUNTER — DOCUMENTATION ONLY (OUTPATIENT)
Dept: LAB | Facility: CLINIC | Age: 76
End: 2021-10-29

## 2021-10-29 ENCOUNTER — TELEPHONE (OUTPATIENT)
Dept: INTERNAL MEDICINE | Facility: CLINIC | Age: 76
End: 2021-10-29

## 2021-10-29 DIAGNOSIS — E11.9 TYPE 2 DIABETES MELLITUS WITHOUT COMPLICATION, WITHOUT LONG-TERM CURRENT USE OF INSULIN (H): Primary | ICD-10-CM

## 2021-10-29 NOTE — TELEPHONE ENCOUNTER
----- Message from Danna Francisco RD sent at 10/29/2021 12:17 PM CDT -----  Regarding: Amb Adult Diabetes Ed referral  Dr. Castillo,   I have Javon on my schedule next week and he needs an updated Diabetes Ed referral. Can you please place prior to Tuesday, 11/2?   Thank you!  Danna Francisco RD, LD, [facility]

## 2021-10-30 DIAGNOSIS — E11.9 TYPE 2 DIABETES MELLITUS WITHOUT COMPLICATION, WITHOUT LONG-TERM CURRENT USE OF INSULIN (H): ICD-10-CM

## 2021-11-01 NOTE — TELEPHONE ENCOUNTER
Prescription approved per [facility] Refill Protocol.    Shannon KIMBLE RN  EP Triage

## 2021-11-02 ENCOUNTER — VIRTUAL VISIT (OUTPATIENT)
Dept: EDUCATION SERVICES | Facility: CLINIC | Age: 76
End: 2021-11-02
Payer: MEDICARE

## 2021-11-02 DIAGNOSIS — E11.9 TYPE 2 DIABETES MELLITUS WITHOUT COMPLICATION, WITHOUT LONG-TERM CURRENT USE OF INSULIN (H): ICD-10-CM

## 2021-11-02 PROCEDURE — 98967 PH1 ASSMT&MGMT NQHP 11-20: CPT | Mod: 95 | Performed by: DIETITIAN, REGISTERED

## 2021-11-02 NOTE — LETTER
11/2/2021         RE: Nahun Villalobos  [address]  [place] [address]        Dear Colleague,    Thank you for referring your patient, Nahun Villalobos, to the [facility] [place]. Please see a copy of my visit note below.    Diabetes Follow-up - Viceo Visit    Type of service:  Video Visit    If the video visit is dropped, the video visit invitation should be resent by: Send to e-mail at: [email or web address]    Originating Location (pt. Location): Home  Distant Location (provider location): Home  Mode of Communication:  Video Conference via [employer]    Video Start Time: 8:34a  Video End Time (time video stopped): 8:53a    How would patient like to obtain AVS? MyChart      Subjective/Objective:    Nahun Villalobos reported in blood glucose log for review. Last date of communication was: 8/17/21.    Diabetes is being managed with   Lifestyle (diet/activity), Diabetes Medications   Diabetes Medication(s)     Biguanides       metFORMIN (GLUCOPHAGE) 500 MG tablet    TAKE 1 TABLET TWICE DAILY  WITH MEALS          BG/Food Log:   Lowest 147, usually around 175, sometimes in 200s but not fasting     Assessment:    Fasting numbers reported remain high but overall A1C is well controlled. Patient has completely cut out pop and feels this has made a big difference for him. He is planning to see a neurologist soon for leg/knee pain plus trouble with balance that has prevented exercise. He is drinking 1-2 beers/day, sometimes none. Encouraged him to keep up the hard work with diet & exercise, as able, to keep A1C in check. He states he does feel better and is eating healthier. Follow up with PCP for A1C recheck in 1-3 months.     Explained that [employer] will be out on maternity leave in January - he is ok with omitting f/up appointment for now, until writer is back. Encouraged him to reach out via [employer] as needed in the future.     Plan/Response:  No changes in the patient's current  treatment plan  Follow up in 6 months or as needed.     Danna Francisco RD, LD, [facility]     Time spent: 19 minutes     Any diabetes medication dose changes were made via the CDE Protocol and Collaborative Practice Agreement with the patient's referring provider. A copy of this encounter was shared with the provider.

## 2021-11-02 NOTE — PROGRESS NOTES
Diabetes Follow-up - Viceo Visit    Type of service:  Video Visit    If the video visit is dropped, the video visit invitation should be resent by: Send to e-mail at: [email or web address]    Originating Location (pt. Location): Home  Distant Location (provider location): Home  Mode of Communication:  Video Conference via [employer]    Video Start Time: 8:34a  Video End Time (time video stopped): 8:53a    How would patient like to obtain AVS? MyChart      Subjective/Objective:    Nahun Villalobos reported in blood glucose log for review. Last date of communication was: 8/17/21.    Diabetes is being managed with   Lifestyle (diet/activity), Diabetes Medications   Diabetes Medication(s)     Biguanides       metFORMIN (GLUCOPHAGE) 500 MG tablet    TAKE 1 TABLET TWICE DAILY  WITH MEALS          BG/Food Log:   Lowest 147, usually around 175, sometimes in 200s but not fasting     Assessment:    Fasting numbers reported remain high but overall A1C is well controlled. Patient has completely cut out pop and feels this has made a big difference for him. He is planning to see a neurologist soon for leg/knee pain plus trouble with balance that has prevented exercise. He is drinking 1-2 beers/day, sometimes none. Encouraged him to keep up the hard work with diet & exercise, as able, to keep A1C in check. He states he does feel better and is eating healthier. Follow up with PCP for A1C recheck in 1-3 months.     Explained that [employer] will be out on maternity leave in January - he is ok with omitting f/up appointment for now, until writer is back. Encouraged him to reach out via [employer] as needed in the future.     Plan/Response:  No changes in the patient's current treatment plan  Follow up in 6 months or as needed.     Danna Francisco, MONIQUE, LD, [facility]     Time spent: 19 minutes     Any diabetes medication dose changes were made via the CDE Protocol and Collaborative Practice Agreement with the patient's referring provider. ANTONIETTA  copy of this encounter was shared with the provider.

## 2021-11-05 ENCOUNTER — LAB (OUTPATIENT)
Dept: LAB | Facility: CLINIC | Age: 76
End: 2021-11-05
Payer: MEDICARE

## 2021-11-05 DIAGNOSIS — E11.9 TYPE 2 DIABETES MELLITUS WITHOUT COMPLICATION, WITHOUT LONG-TERM CURRENT USE OF INSULIN (H): ICD-10-CM

## 2021-11-05 LAB
ALBUMIN SERPL-MCNC: 3.2 G/DL (ref 3.4–5)
ALP SERPL-CCNC: 78 U/L (ref 40–150)
ALT SERPL W P-5'-P-CCNC: 24 U/L (ref 0–70)
ANION GAP SERPL CALCULATED.3IONS-SCNC: 9 MMOL/L (ref 3–14)
AST SERPL W P-5'-P-CCNC: 16 U/L (ref 0–45)
BASOPHILS # BLD AUTO: 0.1 10E3/UL (ref 0–0.2)
BASOPHILS NFR BLD AUTO: 1 %
BILIRUB SERPL-MCNC: 0.3 MG/DL (ref 0.2–1.3)
BUN SERPL-MCNC: 16 MG/DL (ref 7–30)
CALCIUM SERPL-MCNC: 9.4 MG/DL (ref 8.5–10.1)
CHLORIDE BLD-SCNC: 100 MMOL/L (ref 94–109)
CO2 SERPL-SCNC: 25 MMOL/L (ref 20–32)
CREAT SERPL-MCNC: 1.05 MG/DL (ref 0.66–1.25)
EOSINOPHIL # BLD AUTO: 0.3 10E3/UL (ref 0–0.7)
EOSINOPHIL NFR BLD AUTO: 4 %
ERYTHROCYTE [DISTWIDTH] IN BLOOD BY AUTOMATED COUNT: 11.7 % (ref 10–15)
GFR SERPL CREATININE-BSD FRML MDRD: 68 ML/MIN/1.73M2
GLUCOSE BLD-MCNC: 204 MG/DL (ref 70–99)
HCT VFR BLD AUTO: 39 % (ref 40–53)
HGB BLD-MCNC: 12.2 G/DL (ref 13.3–17.7)
LYMPHOCYTES # BLD AUTO: 3.3 10E3/UL (ref 0.8–5.3)
LYMPHOCYTES NFR BLD AUTO: 35 %
MCH RBC QN AUTO: 31.4 PG (ref 26.5–33)
MCHC RBC AUTO-ENTMCNC: 31.3 G/DL (ref 31.5–36.5)
MCV RBC AUTO: 100 FL (ref 78–100)
MONOCYTES # BLD AUTO: 0.8 10E3/UL (ref 0–1.3)
MONOCYTES NFR BLD AUTO: 9 %
NEUTROPHILS # BLD AUTO: 4.8 10E3/UL (ref 1.6–8.3)
NEUTROPHILS NFR BLD AUTO: 52 %
PLATELET # BLD AUTO: 308 10E3/UL (ref 150–450)
POTASSIUM BLD-SCNC: 4 MMOL/L (ref 3.4–5.3)
PROT SERPL-MCNC: 8.1 G/DL (ref 6.8–8.8)
RBC # BLD AUTO: 3.89 10E6/UL (ref 4.4–5.9)
SODIUM SERPL-SCNC: 134 MMOL/L (ref 133–144)
WBC # BLD AUTO: 9.3 10E3/UL (ref 4–11)

## 2021-11-05 PROCEDURE — 80053 COMPREHEN METABOLIC PANEL: CPT

## 2021-11-05 PROCEDURE — 85025 COMPLETE CBC W/AUTO DIFF WBC: CPT

## 2021-11-05 PROCEDURE — 36415 COLL VENOUS BLD VENIPUNCTURE: CPT

## 2021-11-30 ENCOUNTER — PATIENT OUTREACH (OUTPATIENT)
Dept: CARE COORDINATION | Facility: CLINIC | Age: 76
End: 2021-11-30
Payer: MEDICARE

## 2021-11-30 NOTE — PROGRESS NOTES
Clinic Care Coordination Contact  [facility]/Voicemail       Clinical Data: Care Coordinator Outreach  Outreach attempted x 1.  Left message on patient's voicemail with call back information and requested return call.    Plan: Care Coordinator will try to reach patient again in 10 business days.    OMAR Kim  Clinic Care Coordination  [facility]: [place], [place], [place], and [facility]  Phone: [phone]

## 2021-12-03 ENCOUNTER — PATIENT OUTREACH (OUTPATIENT)
Dept: NURSING | Facility: CLINIC | Age: 76
End: 2021-12-03
Payer: MEDICARE

## 2021-12-03 NOTE — PROGRESS NOTES
"Clinic Care Coordination Contact    Follow Up Progress Note      Assessment:   RNCC reached out to patient who states he is having issues with his bilateral feet in that they feel \"bubbly\" when he doesn't wear his shoes.  He does have an upcoming Neurology appointment for balance issues and for his feet.    Patients blood sugars range from 140-180 with occasional 200 spikes.  Per patient, his A1C's are doing \"better and I know why\".  Per patient, he used to drink 1 case of soda/week and now he will have 1-2 cans of soda/week.    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  He works 3 days a week and on his breaks he takes short walks.    Care Gaps:    Health Maintenance Due   Topic Date Due     HF ACTION PLAN  Never done     ZOSTER IMMUNIZATION (2 of 3) 10/04/2013     DIABETIC FOOT EXAM  11/01/2019     MEDICARE ANNUAL WELLNESS VISIT  09/08/2021     EYE EXAM  09/14/2021       Scheduled for next RNCC outeach      Goals addressed this encounter:   Goals Addressed                    This Visit's Progress       1. Improve chronic symptoms (pt-stated)   60%      Goal Statement: I want to improve management of my leg, knee, and hip pain and swelling.  Date Goal set: 6/3/21  Barriers: lymphedema  Strengths: motivated to improve ability to walk  Date to Achieve By: 12/3/21 // Modified/edited to 6/3/2022  Patient expressed understanding of goal: Yes    Action steps to achieve this goal:    1. I will participate in Lymphedema Therapy for management of my leg swelling; Completed 9/2021  2. I will schedule and attend appointment with Orthopedic team for evaluation and discussion of my leg, knee, and hip pains; Completed with Physical Therapy referral  3. I will consider Physical Therapy referral if appropriate; Completed week of 10/18/2021  4. I will apply Jacinto hose to wear throughout the day and remove at night  5. I " will walk my dog daily to help with strengthening and endurance  6. I will elevate my legs while sitting and laying down by propping them up with a pillow  7. I will discuss, review, schedule and complete recommended overdue health maintenance with my primary care provider  8. I will I will take my medications as prescribed  9. I will contact my care team with questions, concerns, support needs. I will use the clinic as a resource and I understand I can contact my clinic with 24/7 after hours services available. Care Coordinator will remain available as needed            Intervention/Education provided during outreach:   RNCC called and spoke with patient; introduced self, discussed role of Care Coordination and explained reason for call     Outreach Frequency: monthly    Plan:   -Patient will contact the care team with questions, concerns, support needs   -Patient will use the clinic as a resource and understands (s)he can contact the [place] clinic with 24/7 after hours services available  -Care Coordinator will remain available as needed  -RNCC will follow up in 1 month for follow up appointments/recommendations and goal progression    Care Coordinator will follow up in 1 month    [facility]   Rhea Knapp RN, Care Coordinator   [facility] [place], [facility]  E-mail [email or web address]   [phone]

## 2021-12-10 ENCOUNTER — OFFICE VISIT (OUTPATIENT)
Dept: INTERNAL MEDICINE | Facility: CLINIC | Age: 76
End: 2021-12-10
Payer: MEDICARE

## 2021-12-10 VITALS
HEART RATE: 68 BPM | DIASTOLIC BLOOD PRESSURE: 70 MMHG | BODY MASS INDEX: 39.37 KG/M2 | TEMPERATURE: 98.7 F | SYSTOLIC BLOOD PRESSURE: 126 MMHG | WEIGHT: 245 LBS | OXYGEN SATURATION: 94 % | HEIGHT: 66 IN

## 2021-12-10 DIAGNOSIS — E11.9 TYPE 2 DIABETES MELLITUS WITHOUT COMPLICATION, WITHOUT LONG-TERM CURRENT USE OF INSULIN (H): Primary | ICD-10-CM

## 2021-12-10 DIAGNOSIS — D64.9 ANEMIA, UNSPECIFIED TYPE: ICD-10-CM

## 2021-12-10 LAB
ERYTHROCYTE [DISTWIDTH] IN BLOOD BY AUTOMATED COUNT: 12.1 % (ref 10–15)
HBA1C MFR BLD: 7.5 % (ref 0–5.6)
HCT VFR BLD AUTO: 37.7 % (ref 40–53)
HGB BLD-MCNC: 11.7 G/DL (ref 13.3–17.7)
MCH RBC QN AUTO: 31.6 PG (ref 26.5–33)
MCHC RBC AUTO-ENTMCNC: 31 G/DL (ref 31.5–36.5)
MCV RBC AUTO: 102 FL (ref 78–100)
PLATELET # BLD AUTO: 297 10E3/UL (ref 150–450)
RBC # BLD AUTO: 3.7 10E6/UL (ref 4.4–5.9)
WBC # BLD AUTO: 8.6 10E3/UL (ref 4–11)

## 2021-12-10 PROCEDURE — 99214 OFFICE O/P EST MOD 30 MIN: CPT | Performed by: NURSE PRACTITIONER

## 2021-12-10 PROCEDURE — 36415 COLL VENOUS BLD VENIPUNCTURE: CPT | Performed by: NURSE PRACTITIONER

## 2021-12-10 PROCEDURE — 85027 COMPLETE CBC AUTOMATED: CPT | Performed by: NURSE PRACTITIONER

## 2021-12-10 PROCEDURE — 83036 HEMOGLOBIN GLYCOSYLATED A1C: CPT | Performed by: NURSE PRACTITIONER

## 2021-12-10 ASSESSMENT — MIFFLIN-ST. JEOR: SCORE: 1779.06

## 2021-12-10 NOTE — PROGRESS NOTES
Assessment & Plan     Type 2 diabetes mellitus without complication, without long-term current use of insulin (H)  - Patient is here today for diabetes follow-up.  Endorses relatively stable fasting blood sugars ranging between 140 and 170, periodically in the 200s right after eating.  Concern at last visit regarding some discordance between A1c and actual blood sugar.  Has been seen by diabetes education and was advised to reduce his soda intake.  Javon reports that he has reduced his soda intake however he has replaced this with drinking 1-2 beers per day.  - Discussed cutting back on his alcohol use  - Check A1c  - Continue Metformin for now however consider increasing dose based on value  - Hemoglobin A1c    Anemia, unspecified type  - History of mild, chronic anemia.  Does endorse intermittent bright red blood from his stool.  Appears he is due for colonoscopy, however reports concern regarding anticoagulation and timing of holding for procedure.  States he will try to reschedule but notes that he likely will not be able to get 1 completed until March, 2022  - Check CBC  - CBC with platelets    [id number]     See Patient Instructions    Return in about 2 months (around 2/10/2022) for PCP.    SELMA Kidd [facility]    Subjective   Javon is a 76 year old who presents for the following health issues  accompanied by his sig other.    HPI   Recheck medications  Diabetes Follow-up    How often are you checking your blood sugar? One time daily- usually but hasn't taken in about a week  What time of day are you checking your blood sugars (select all that apply)?  Before meals  Have you had any blood sugars above 200?  No  Have you had any blood sugars below 70?  No    What symptoms do you notice when your blood sugar is low?  None    What concerns do you have today about your diabetes? Other: review lab results-     Do you have any of these symptoms? (Select all that  apply)  Numbness in feet and Burning in feet    Have you had a diabetic eye exam in the last 12 months? Yes- Date of last eye exam: spring 2021,  Location: [facility]      Hyperlipidemia Follow-Up      Are you regularly taking any medication or supplement to lower your cholesterol?   Yes- Atorvastatin    Are you having muscle aches or other side effects that you think could be caused by your cholesterol lowering medication?  No    Hypertension Follow-up      Do you check your blood pressure regularly outside of the clinic? No     Are you following a low salt diet? No    Are your blood pressures ever more than 140 on the top number (systolic) OR more   than 90 on the bottom number (diastolic), for example 140/90?     BP Readings from Last 2 Encounters:   10/05/21 132/72   08/30/21 130/80     Hemoglobin A1C POCT (%)   Date Value   03/17/2021 6.8 (H)   11/11/2020 7.2 (H)     Hemoglobin A1C (%)   Date Value   08/26/2021 6.6 (H)     LDL Cholesterol Calculated (mg/dL)   Date Value   03/17/2021 46   08/28/2019 44       DM follow up:  Patient is seen in clinic today for follow-up of his diabetes.  History of type 2 diabetes, maintained on Metformin 500 mg twice daily.  Patient reports that his fasting blood sugars range between 140 and 170, and in the 200s right after he eats.  He denies any excess thirst; reports urinary frequency, which he attributes to his furosemide use.  Last A1c was 6.6% in August, 2021, however, concern for discordance between blood sugars and A1c.  Patient had a BMP completed on 11/5/2021, and his glucose value was 204.  Patient reports that he has been seen by diabetes education recently, and was encouraged to cut back on his soda intake.  Patient reports that he has cut back significantly on his soda, however, reports that he now has replaced that with drinking beer.  Reports that he drinks about 1-2 beers per day.  Discussed this concern and requested cutting back on beer however, patient  "feels may not be realistic as he enjoys this.    Anemia:  Most recent hemoglobin on 11/5/2021 was 12.2, down from 13.2 in May, 2021.  Per review of chart hemoglobin appears to run between 11.6 and 13.8 over the past year.  Patient does endorse intermittent bright red blood per rectum, which she states is not new.  Was told that this was due to a blood vessel noted on prior colonoscopy screening.  This only occurs when he has a bowel movement.  Per review of chart patient had a colonoscopy with polypectomy in 2015 showing adenomatous polyp.  Recommended follow-up in 5 years.  Patient reports he has tried to reschedule his colonoscopy however, reports GI provider has declined to do a colonoscopy without humberto holding his Eliquis for 5 days, whereas previously he had been advised to only hold for 3 days.  States next available appointment is not till March, 2022.    Review of Systems   CONSTITUTIONAL:NEGATIVE for fever, chills, change in weight  RESP:NEGATIVE for significant cough or SOB  CV: NEGATIVE for chest pain, palpitations or peripheral edema  GI: NEGATIVE for nausea, abdominal pain, heartburn; reports intermittent bright red blood in stool, which she states is not new  : Urinary frequency, attributes to furosemide      Objective    /70   Pulse 68   Temp 98.7  F (37.1  C) (Oral)   Ht 1.676 m (5' 6\")   Wt 111.1 kg (245 lb)   SpO2 94%   BMI 39.54 kg/m    Body mass index is 39.54 kg/m .     Physical Exam   GENERAL APPEARANCE: healthy, alert and no distress  EYES: Eyes grossly normal to inspection, PERRL and conjunctivae and sclerae normal  RESP: lungs clear to auscultation - no rales, rhonchi or wheezes  CV: regular rates and rhythm, normal S1 S2, no S3 or S4 and no murmur, click or rub  ABDOMEN: soft, nontender, without hepatosplenomegaly or masses and bowel sounds normal  MS: extremities normal- no gross deformities noted  PSYCH: mentation appears normal and affect normal/bright            "

## 2021-12-10 NOTE — PATIENT INSTRUCTIONS
- please go to the lab today  - I will be in touch with you about the results  - Follow up with Dr. Romero in 2 months

## 2021-12-14 DIAGNOSIS — E11.9 TYPE 2 DIABETES MELLITUS WITHOUT COMPLICATION, WITHOUT LONG-TERM CURRENT USE OF INSULIN (H): ICD-10-CM

## 2021-12-30 NOTE — PROGRESS NOTES
Patient tolerated this portion of the stress test well, C/O thaddeus SOB which has now resolved.   normal mood with appropriate affect

## 2022-01-04 DIAGNOSIS — C34.11 CANCER OF UPPER LOBE OF RIGHT LUNG (H): Primary | ICD-10-CM

## 2022-01-11 ENCOUNTER — PATIENT OUTREACH (OUTPATIENT)
Dept: NURSING | Facility: CLINIC | Age: 77
End: 2022-01-11
Payer: MEDICARE

## 2022-01-11 ENCOUNTER — E-VISIT (OUTPATIENT)
Dept: INTERNAL MEDICINE | Facility: CLINIC | Age: 77
End: 2022-01-11

## 2022-01-11 DIAGNOSIS — M79.89 LEG SWELLING: Primary | ICD-10-CM

## 2022-01-11 SDOH — ECONOMIC STABILITY: INCOME INSECURITY: IN THE LAST 12 MONTHS, WAS THERE A TIME WHEN YOU WERE NOT ABLE TO PAY THE MORTGAGE OR RENT ON TIME?: NO

## 2022-01-11 SDOH — ECONOMIC STABILITY: FOOD INSECURITY: WITHIN THE PAST 12 MONTHS, THE FOOD YOU BOUGHT JUST DIDN'T LAST AND YOU DIDN'T HAVE MONEY TO GET MORE.: NEVER TRUE

## 2022-01-11 SDOH — ECONOMIC STABILITY: TRANSPORTATION INSECURITY
IN THE PAST 12 MONTHS, HAS THE LACK OF TRANSPORTATION KEPT YOU FROM MEDICAL APPOINTMENTS OR FROM GETTING MEDICATIONS?: NO

## 2022-01-11 SDOH — ECONOMIC STABILITY: TRANSPORTATION INSECURITY
IN THE PAST 12 MONTHS, HAS LACK OF TRANSPORTATION KEPT YOU FROM MEETINGS, WORK, OR FROM GETTING THINGS NEEDED FOR DAILY LIVING?: NO

## 2022-01-11 SDOH — ECONOMIC STABILITY: FOOD INSECURITY: WITHIN THE PAST 12 MONTHS, YOU WORRIED THAT YOUR FOOD WOULD RUN OUT BEFORE YOU GOT MONEY TO BUY MORE.: NEVER TRUE

## 2022-01-11 ASSESSMENT — LIFESTYLE VARIABLES
HOW MANY STANDARD DRINKS CONTAINING ALCOHOL DO YOU HAVE ON A TYPICAL DAY: 1 OR 2
HOW OFTEN DO YOU HAVE A DRINK CONTAINING ALCOHOL: 2-3 TIMES A WEEK
HOW OFTEN DO YOU HAVE SIX OR MORE DRINKS ON ONE OCCASION: NEVER

## 2022-01-11 ASSESSMENT — SOCIAL DETERMINANTS OF HEALTH (SDOH): HOW HARD IS IT FOR YOU TO PAY FOR THE VERY BASICS LIKE FOOD, HOUSING, MEDICAL CARE, AND HEATING?: NOT HARD AT ALL

## 2022-01-11 NOTE — PROGRESS NOTES
"Clinic Care Coordination Contact    Follow Up Progress Note      Assessment:   RNCC reached out to patient who states his (L) foot is swollen and \"It feels like it's climbing inside\".  RNCC requested Kenia, significant other, to help doff patients sock and shoes to bilateral lower extremities. Per Kenia, the (L) foot is swollen when compared to the (R).  Per Kenia, both legs are \"pink\" due to just doffing the socks.  Per Kenia, the leg is less swollen than yesterday and she will take a picture of his (L) foot/leg and send via [employer] to have on file for baseline.    Patient states his blood sugars have ranged from 121-258.  Denies hypo/hperglycemic episodes in the past month.  Patient mentions the Trilegy is \"expensive\" stating it's almost $200/month, which at times can \"makes things tight\".    When asked about GI/ symptoms, patient states that he has had \"loose\" stool for \"at least a month\".  Patient describes stool as \"it's like the color of chili and it smells REALLY bad\"  Patient has loose stools 2-3 times per day.  Patient states he has shared this once before and he was asked to collect stool and \"strain\" and he couldn't complete due to \"it made me sick as a dog-I wanted to throw up\".    RNCC discussed care gaps and will request Zoster vaccine (Shingles) vaccine at next PCP appointment and RNCC suggested patient make an Annual Wellness visit and stated it was due in 9/2021 which patient stated he would schedule.      Care Gaps:    Health Maintenance Due   Topic Date Due     HF ACTION PLAN  Never done     ZOSTER IMMUNIZATION (2 of 3) 10/04/2013     DIABETIC FOOT EXAM  11/01/2019     MEDICARE ANNUAL WELLNESS VISIT  09/08/2021     PHQ-2  01/01/2022       Care Gaps Last addressed on 1/11/2022    Goals addressed this encounter:   Goals Addressed                    This Visit's Progress       1. Improve chronic symptoms (pt-stated)         Goal Statement: I want to improve management of my leg, knee, and hip pain and " swelling.  Date Goal set: 6/3/21  Barriers: lymphedema  Strengths: motivated to improve ability to walk  Date to Achieve By: 12/3/21 // Modified/edited to 6/3/2022  Patient expressed understanding of goal: Yes    Action steps to achieve this goal:    1. I will participate in Lymphedema Therapy for management of my leg swelling; Completed 9/2021  2. I will schedule and attend appointment with Orthopedic team for evaluation and discussion of my leg, knee, and hip pains; Completed with Physical Therapy referral  3. I will consider Physical Therapy referral if appropriate; Completed week of 10/18/2021  4. I will apply Jacinto hose to wear throughout the day and remove at night  5. I will walk my dog daily to help with strengthening and endurance  6. I will elevate my legs while sitting and laying down by propping them up with a pillow  7. I will discuss, review, schedule and complete recommended overdue health maintenance with my primary care provider  8. I will I will take my medications as prescribed  9. I will contact my care team with questions, concerns, support needs. I will use the clinic as a resource   10. I will contact my clinic with 24/7 after hours services available  11. Care Coordinator will remain available as needed          Intervention/Education provided during outreach:   RNCC called and spoke with patient; introduced self, discussed role of Care Coordination and explained reason for call     Outreach Frequency: monthly    Plan:   -Patient will contact the care team with questions, concerns, support needs   -Patient will use the clinic as a resource and understands (s)he can contact the [place] clinic with 24/7 after hours services available  -Care Coordinator will remain available as needed  -RNCC will follow up in one month for follow up appointments/recommendations and goal progression     Rhea Knapp RN, BSN, PHN  Primary Care / Care Coordinator   [facility]  [facility]  E-mail [email or web address]   [phone]

## 2022-01-12 DIAGNOSIS — R20.2 PARESTHESIA: ICD-10-CM

## 2022-01-12 DIAGNOSIS — R60.9 EDEMA, UNSPECIFIED TYPE: ICD-10-CM

## 2022-01-12 DIAGNOSIS — I26.99 OTHER PULMONARY EMBOLISM WITHOUT ACUTE COR PULMONALE, UNSPECIFIED CHRONICITY (H): ICD-10-CM

## 2022-01-13 RX ORDER — FUROSEMIDE 40 MG
TABLET ORAL
Qty: 180 TABLET | Refills: 1 | Status: SHIPPED | OUTPATIENT
Start: 2022-01-13 | End: 2022-07-19

## 2022-01-13 RX ORDER — GABAPENTIN 300 MG/1
CAPSULE ORAL
Qty: 180 CAPSULE | Refills: 0 | Status: SHIPPED | OUTPATIENT
Start: 2022-01-13 | End: 2022-04-05

## 2022-01-13 RX ORDER — APIXABAN 5 MG/1
TABLET, FILM COATED ORAL
Qty: 180 TABLET | Refills: 1 | Status: SHIPPED | OUTPATIENT
Start: 2022-01-13 | End: 2022-07-19

## 2022-01-13 NOTE — TELEPHONE ENCOUNTER
Prescription approved per [facility] Refill Protocol.  Obie Garcia RN  [facility] Triage Nurse

## 2022-01-13 NOTE — TELEPHONE ENCOUNTER
Routing refill request to provider for review/approval because:  Drug not on the [facility] refill protocol     Eliquis last filled by Dr. Naren Garcia, RN  [facility] Triage Nurse

## 2022-01-18 ENCOUNTER — OFFICE VISIT (OUTPATIENT)
Dept: INTERNAL MEDICINE | Facility: CLINIC | Age: 77
End: 2022-01-18
Payer: MEDICARE

## 2022-01-18 ENCOUNTER — LAB (OUTPATIENT)
Dept: LAB | Facility: CLINIC | Age: 77
End: 2022-01-18
Payer: MEDICARE

## 2022-01-18 VITALS
TEMPERATURE: 99 F | WEIGHT: 241.3 LBS | SYSTOLIC BLOOD PRESSURE: 136 MMHG | BODY MASS INDEX: 38.78 KG/M2 | OXYGEN SATURATION: 94 % | HEIGHT: 66 IN | HEART RATE: 78 BPM | DIASTOLIC BLOOD PRESSURE: 64 MMHG | RESPIRATION RATE: 16 BRPM

## 2022-01-18 DIAGNOSIS — R20.2 PARESTHESIA: ICD-10-CM

## 2022-01-18 DIAGNOSIS — E66.01 MORBID OBESITY DUE TO EXCESS CALORIES (H): ICD-10-CM

## 2022-01-18 DIAGNOSIS — E11.9 TYPE 2 DIABETES MELLITUS WITHOUT COMPLICATION, WITHOUT LONG-TERM CURRENT USE OF INSULIN (H): ICD-10-CM

## 2022-01-18 DIAGNOSIS — I10 PRIMARY HYPERTENSION: ICD-10-CM

## 2022-01-18 DIAGNOSIS — C34.11 CANCER OF UPPER LOBE OF RIGHT LUNG (H): ICD-10-CM

## 2022-01-18 DIAGNOSIS — R60.9 EDEMA, UNSPECIFIED TYPE: Primary | ICD-10-CM

## 2022-01-18 LAB
BASOPHILS # BLD AUTO: 0 10E3/UL (ref 0–0.2)
BASOPHILS NFR BLD AUTO: 1 %
CREAT SERPL-MCNC: 1.09 MG/DL (ref 0.66–1.25)
EOSINOPHIL # BLD AUTO: 0.2 10E3/UL (ref 0–0.7)
EOSINOPHIL NFR BLD AUTO: 3 %
ERYTHROCYTE [DISTWIDTH] IN BLOOD BY AUTOMATED COUNT: 12.3 % (ref 10–15)
GFR SERPL CREATININE-BSD FRML MDRD: 70 ML/MIN/1.73M2
HCT VFR BLD AUTO: 37.6 % (ref 40–53)
HGB BLD-MCNC: 11.6 G/DL (ref 13.3–17.7)
LYMPHOCYTES # BLD AUTO: 2.6 10E3/UL (ref 0.8–5.3)
LYMPHOCYTES NFR BLD AUTO: 38 %
MCH RBC QN AUTO: 32.5 PG (ref 26.5–33)
MCHC RBC AUTO-ENTMCNC: 30.9 G/DL (ref 31.5–36.5)
MCV RBC AUTO: 105 FL (ref 78–100)
MONOCYTES # BLD AUTO: 0.6 10E3/UL (ref 0–1.3)
MONOCYTES NFR BLD AUTO: 8 %
NEUTROPHILS # BLD AUTO: 3.4 10E3/UL (ref 1.6–8.3)
NEUTROPHILS NFR BLD AUTO: 50 %
PLATELET # BLD AUTO: 239 10E3/UL (ref 150–450)
RBC # BLD AUTO: 3.57 10E6/UL (ref 4.4–5.9)
TOTAL PROTEIN SERUM FOR ELP: 7.2 G/DL (ref 6.8–8.8)
WBC # BLD AUTO: 6.8 10E3/UL (ref 4–11)

## 2022-01-18 PROCEDURE — 99214 OFFICE O/P EST MOD 30 MIN: CPT | Performed by: INTERNAL MEDICINE

## 2022-01-18 PROCEDURE — 84165 PROTEIN E-PHORESIS SERUM: CPT | Performed by: PATHOLOGY

## 2022-01-18 PROCEDURE — 84155 ASSAY OF PROTEIN SERUM: CPT

## 2022-01-18 PROCEDURE — 85025 COMPLETE CBC W/AUTO DIFF WBC: CPT

## 2022-01-18 PROCEDURE — 82565 ASSAY OF CREATININE: CPT

## 2022-01-18 PROCEDURE — 36415 COLL VENOUS BLD VENIPUNCTURE: CPT

## 2022-01-18 ASSESSMENT — MIFFLIN-ST. JEOR: SCORE: 1762.28

## 2022-01-18 NOTE — PROGRESS NOTES
Assessment & Plan     Edema, unspecified type  Discussed with patient.  Suggest lower extremity venous ultrasounds for reassurance.  Suggest Lasix 40 mg twice daily, follow-up basic metabolic panel and renal function.  Suggest eliminating alcohol and low-sodium diet with ED hose to lower extremities bilaterally.  - US Lower Extremity Venous Duplex Bilateral; Future    Type 2 diabetes mellitus without complication, without long-term current use of insulin (H)  Labs ordered as fasting.  Discussed dietary changes with patient.  He reports being more liberal with his diet and alcohol.  - Lipid Profile; Future  - Comprehensive metabolic panel; Future  - Hemoglobin A1c; Future    Morbid obesity due to excess calories (H)  Encouraged ongoing weight loss.    Primary hypertension  Stable on therapy continue with current medical management    Paresthesia  Discussed carpal tunnel and bilateral EMGs as assessment.  Suggest wrist splints and then follow-up accordingly     See Patient Instructions    Return in about 1 month (around 2/18/2022) for Lab Work appointment, if symptoms recur or worsen.    Olegario Castillo MD  [facility]    Mariano Alejandro is a 76 year old who presents for the following health issues  accompanied by his self.    HPI:    Concern - Swelling   Onset: 1 yr   Description: Bilateral lower leg and foot swelling L>R  Intensity: moderate  Progression of Symptoms:  Same   Accompanying Signs & Symptoms: tingling in legs   Previous history of similar problem: YES   Therapies tried and outcome: Diuretics     Review of Systems   CONSTITUTIONAL: NEGATIVE for fever, chills, NO cange in weight  EYES: NEGATIVE for vision changes or irritation  ENT/MOUTH: NEGATIVE for ear, mouth and throat problems  RESP: NEGATIVE for significant cough or SOB  CV: NEGATIVE for chest pain, palpitations  GI: NEGATIVE for nausea, abdominal pain, heartburn, or change in bowel habits  : NEGATIVE for  "frequency, dysuria, or hematuria  HEME: NEGATIVE for bleeding problems  PSYCHIATRIC: NEGATIVE for changes in mood or affect  Prior to leaving the patient also represented some symptoms of nonspecific tingling in his hands that is worse at night.  He does do a lot of repetition activities at home.  He reports sometimes some difficulty with grabbing the remote control.  He defines no neck pain or recent trauma.    Current Outpatient Medications   Medication     acetaminophen (TYLENOL) 500 MG tablet     albuterol (VENTOLIN HFA) 108 (90 Base) MCG/ACT inhaler     Ascorbic Acid (VITAMIN C PO)     atorvastatin (LIPITOR) 20 MG tablet     docusate sodium (COLACE) 100 MG capsule     ELIQUIS ANTICOAGULANT 5 MG tablet     finasteride (PROSCAR) 5 MG tablet     Fluticasone-Umeclidin-Vilanterol (TRELEGY ELLIPTA) 100-62.5-25 MCG/INH oral inhaler     furosemide (LASIX) 40 MG tablet     gabapentin (NEURONTIN) 300 MG capsule     lisinopril (ZESTRIL) 40 MG tablet     metFORMIN (GLUCOPHAGE) 500 MG tablet     metoprolol succinate ER (TOPROL-XL) 25 MG 24 hr tablet     blood glucose (ACCU-CHEK CHACHA) test strip     blood glucose (NO BRAND SPECIFIED) lancets standard     blood glucose (NO BRAND SPECIFIED) test strip     blood glucose monitoring (NO BRAND SPECIFIED) meter device kit     blood glucose monitoring (SOFTCLIX) lancets     order for DME     order for DME     No current facility-administered medications for this visit.             Objective    /64   Pulse 78   Temp 99  F (37.2  C) (Temporal)   Resp 16   Ht 1.676 m (5' 6\")   Wt 109.5 kg (241 lb 4.8 oz)   SpO2 94%   BMI 38.95 kg/m    Body mass index is 38.95 kg/m .     Physical Exam   GENERAL: alert and no distress  EYES: Eyes grossly normal to inspection, PERRL and conjunctivae and sclerae normal  HENT: ear canals and TM's normal, nose and mouth without ulcers or lesions  NECK: no adenopathy, no asymmetry, masses, or scars and thyroid normal to palpation  RESP: lungs " clear to auscultation - no rales, rhonchi or wheezes  CV: regular rate and rhythm, normal S1 S2, no S3 or S4, no click or rub  ABDOMEN: obese  The Tinel's and Phalen sign are essentially negative.   strength appears to be intact to upper extremities.  MS: extremities normal- no gross deformities noted, there is mild edema noted to the lower extremities bilaterally most notable on the dorsum of the foot.  This is trace to 1+ with the mild pitting component  PSYCH: mentation appears normal, affect normal/bright    Lab Results   Component Value Date    ALT 24 11/05/2021    ALT 24 03/17/2021     Creatinine   Date Value Ref Range Status   11/05/2021 1.05 0.66 - 1.25 mg/dL Final   03/17/2021 1.11 0.66 - 1.25 mg/dL Final       Lab Results   Component Value Date    A1C 7.5 12/10/2021    A1C 6.6 08/26/2021    A1C 6.8 03/17/2021    A1C 7.2 11/11/2020    A1C 7.5 09/20/2020    A1C 7.6 06/18/2020    A1C 7.8 01/23/2020     LDL Cholesterol Calculated   Date Value Ref Range Status   03/17/2021 46 <100 mg/dL Final     Comment:     Desirable:       <100 mg/dl     Interpretation Summary     1. Left ventricular systolic function is normal. The visual ejection fraction  is 60-65%.  2. No regional wall motion abnormalities noted.  3. The right ventricle is normal in structure, function and size.  4. No evidence for significant valvular pathology.

## 2022-01-19 DIAGNOSIS — I26.99 OTHER PULMONARY EMBOLISM WITHOUT ACUTE COR PULMONALE, UNSPECIFIED CHRONICITY (H): ICD-10-CM

## 2022-01-19 DIAGNOSIS — E11.9 TYPE 2 DIABETES MELLITUS WITHOUT COMPLICATION, WITHOUT LONG-TERM CURRENT USE OF INSULIN (H): ICD-10-CM

## 2022-01-19 DIAGNOSIS — I10 ESSENTIAL HYPERTENSION, BENIGN: ICD-10-CM

## 2022-01-19 DIAGNOSIS — R31.0 GROSS HEMATURIA: ICD-10-CM

## 2022-01-19 DIAGNOSIS — E78.5 HYPERLIPIDEMIA LDL GOAL <100: ICD-10-CM

## 2022-01-19 DIAGNOSIS — J44.9 CHRONIC OBSTRUCTIVE PULMONARY DISEASE, UNSPECIFIED COPD TYPE (H): ICD-10-CM

## 2022-01-19 LAB
ALBUMIN SERPL ELPH-MCNC: 3.6 G/DL (ref 3.7–5.1)
ALPHA1 GLOB SERPL ELPH-MCNC: 0.4 G/DL (ref 0.2–0.4)
ALPHA2 GLOB SERPL ELPH-MCNC: 0.9 G/DL (ref 0.5–0.9)
B-GLOBULIN SERPL ELPH-MCNC: 1.3 G/DL (ref 0.6–1)
GAMMA GLOB SERPL ELPH-MCNC: 1 G/DL (ref 0.7–1.6)
M PROTEIN SERPL ELPH-MCNC: 0.1 G/DL
PROT PATTERN SERPL ELPH-IMP: ABNORMAL

## 2022-01-19 RX ORDER — METOPROLOL SUCCINATE 25 MG/1
25 TABLET, EXTENDED RELEASE ORAL DAILY
Qty: 90 TABLET | Refills: 3 | Status: SHIPPED | OUTPATIENT
Start: 2022-01-19 | End: 2023-01-12

## 2022-01-19 RX ORDER — FINASTERIDE 5 MG/1
5 TABLET, FILM COATED ORAL DAILY
Qty: 90 TABLET | Refills: 3 | Status: SHIPPED | OUTPATIENT
Start: 2022-01-19 | End: 2022-07-28

## 2022-01-19 RX ORDER — ATORVASTATIN CALCIUM 20 MG/1
20 TABLET, FILM COATED ORAL DAILY
Qty: 90 TABLET | Refills: 3 | Status: SHIPPED | OUTPATIENT
Start: 2022-01-19 | End: 2022-08-24

## 2022-01-19 RX ORDER — LISINOPRIL 40 MG/1
TABLET ORAL
Qty: 90 TABLET | Refills: 2 | Status: SHIPPED | OUTPATIENT
Start: 2022-01-19 | End: 2022-03-31

## 2022-01-19 NOTE — TELEPHONE ENCOUNTER
Routing refill request to provider for review/approval because:  Drug not on the [facility] refill protocol     Obie Garcia RN  [facility] Triage Nurse

## 2022-01-25 ENCOUNTER — TRANSFERRED RECORDS (OUTPATIENT)
Dept: HEALTH INFORMATION MANAGEMENT | Facility: CLINIC | Age: 77
End: 2022-01-25
Payer: MEDICARE

## 2022-01-27 ENCOUNTER — HOSPITAL ENCOUNTER (OUTPATIENT)
Dept: ULTRASOUND IMAGING | Facility: CLINIC | Age: 77
Discharge: HOME | End: 2022-01-27
Attending: INTERNAL MEDICINE | Admitting: INTERNAL MEDICINE
Payer: MEDICARE

## 2022-01-27 DIAGNOSIS — R60.9 EDEMA, UNSPECIFIED TYPE: ICD-10-CM

## 2022-01-27 PROCEDURE — 93970 EXTREMITY STUDY: CPT

## 2022-02-02 ENCOUNTER — OFFICE VISIT (OUTPATIENT)
Dept: NEUROLOGY | Facility: CLINIC | Age: 77
End: 2022-02-02
Attending: INTERNAL MEDICINE
Payer: MEDICARE

## 2022-02-02 VITALS
SYSTOLIC BLOOD PRESSURE: 136 MMHG | OXYGEN SATURATION: 98 % | HEIGHT: 66 IN | HEART RATE: 69 BPM | DIASTOLIC BLOOD PRESSURE: 75 MMHG | WEIGHT: 236.4 LBS | BODY MASS INDEX: 37.99 KG/M2

## 2022-02-02 DIAGNOSIS — R20.2 PARESTHESIA OF UPPER AND LOWER EXTREMITIES OF BOTH SIDES: Primary | ICD-10-CM

## 2022-02-02 DIAGNOSIS — R26.89 BALANCE PROBLEMS: ICD-10-CM

## 2022-02-02 PROCEDURE — 99205 OFFICE O/P NEW HI 60 MIN: CPT | Performed by: PSYCHIATRY & NEUROLOGY

## 2022-02-02 ASSESSMENT — MIFFLIN-ST. JEOR: SCORE: 1740.05

## 2022-02-02 NOTE — PATIENT INSTRUCTIONS
AFTER VISIT SUMMARY (AVS):    At today's visit we thoroughly discussed various diagnostic possibilities for your symptoms, necessary evaluation, and the plan, which includes:  Orders Placed This Encounter   Procedures     Methylmalonic Acid     Protein Immunofixation Serum     Protein immunofixation urine     Vitamin B1 whole blood     Vitamin B12     Vitamin B6     Vitamin E     TSH with free T4 reflex     EMG     No new medications.     Additional recommendations after the work-up.    Next follow-up appointment is in the next 4 weeks or earlier if needed.    Please do not hesitate to call me with any questions or concerns.    Thanks.

## 2022-02-02 NOTE — PROGRESS NOTES
"Nahun Villalobos is a 76 year old male who presents for:  Chief Complaint   Patient presents with     Balance/ Vestibular     patient has difficulty gaining balance when getting up from a seated position, states he is fine when sitting or walking, also swelling in legs that is uncomfortable         Initial Vitals:  /75 (BP Location: Right arm, Patient Position: Sitting, Cuff Size: Adult Large)   Pulse 69   Ht 1.676 m (5' 6\")   Wt 107.2 kg (236 lb 6.4 oz)   SpO2 98%   BMI 38.16 kg/m   Estimated body mass index is 38.16 kg/m  as calculated from the following:    Height as of this encounter: 1.676 m (5' 6\").    Weight as of this encounter: 107.2 kg (236 lb 6.4 oz).. Body surface area is 2.23 meters squared. BP completed using cuff size: regular    Nursing Comments: Orthostatic on next visit     Shakira Solis  "

## 2022-02-02 NOTE — LETTER
"    2/2/2022         RE: Nahun Villalobos  [address]  [facility] [address]        Dear Colleague,    Thank you for referring your patient, Nahun Villalobos, to the [facility] NEUROLOGY [facility]. Please see a copy of my visit note below.    INITIAL NEUROLOGY CONSULTATION    DATE OF VISIT: 2/2/2022  CLINIC LOCATION: [facility]  MRN: [id number]  PATIENT NAME: Nahun Villalobos  YOB: 1945    REASON FOR VISIT:   Chief Complaint   Patient presents with     Balance/ Vestibular     patient has difficulty gaining balance when getting up from a seated position, states he is fine when sitting or walking      HISTORY OF PRESENT ILLNESS:                                                    Mr. Nahun Villalobos is 76 year old right handed male patient with past medical history of hypertension, obstructive sleep apnea, hyperlipidemia, BPPV, type 2 diabetes mellitus, COPD, lung cancer, and pulmonary embolism (on Eliquis), who was referred by his primary care provider for balance difficulty, but mainly concerned about paresthesias in his feet and hands.  The patient is accompanied by his female partner, who participates in interview today.    Per patient's report, he developed paresthesias approximately 2 years ago.  He describes them as numbness and tingling in both hands and feet.  Hand paresthesias are present mainly when he using his hands.  Left hand is more affected and frequently flares up at night.  Feet bother him at night.  When he walks it feels like he is standing on \"bubbly plastic\", though it helps feet paresthesia.  Driving makes his hands feel better.  Symptoms are worse at rest.  Occasionally, he also has balance difficulty when getting up from seated position.  He feels fine while walking.  He is on 300 mg of gabapentin twice daily for paresthesias.  Had physical therapy that was not helpful.  Family history is positive for stroke (father and " cousin).    Laboratory evaluation from November 2021-January 2022 includes unremarkable CMP (except glucose of 204), elevated hemoglobin A1C of 7.5, low hemoglobin with elevated MCV of 102-105. Serum electrophoresis demonstrated small monoclonal protein in the beta fraction.  B12 was 272 in May 2021.  Folate was 11.3 at that time.    The brain MRI from July 2004, performed for vertigo, demonstrated minimal cerebral atrophy without evidence of cerebellopontine angle lesions or abnormal contrast-enhancement.  Images were personally reviewed and independently interpreted.    No additional useful information is available in Care Everywhere, which was reviewed.  PAST MEDICAL/SURGICAL HISTORY:                                                    I personally reviewed patient's past medical and surgical history with the patient at today's visit.  MEDICATIONS:                                                    I personally reviewed patient's medications and allergies with the patient at today's visit.  acetaminophen (TYLENOL) 500 MG tablet, Take 500 mg by mouth every evening  albuterol (VENTOLIN HFA) 108 (90 Base) MCG/ACT inhaler, INHALE 2 PUFFS INTO THE LUNGS EVERY 6 HOURS AS NEEDED FOR SHORTNESS OF BREATH / DYSPNEA OR WHEEZING  Ascorbic Acid (VITAMIN C PO), Take 500 mg by mouth At Bedtime   atorvastatin (LIPITOR) 20 MG tablet, Take 1 tablet (20 mg) by mouth daily  blood glucose (ACCU-CHEK CHACHA) test strip, Use to test blood sugar 2 times daily or as directed.  blood glucose (NO BRAND SPECIFIED) lancets standard, Use to test blood sugar 1 time daily, first thing in the morning  blood glucose (NO BRAND SPECIFIED) test strip, Use to test blood sugar 1 time daily, first thing in the morning.  blood glucose monitoring (NO BRAND SPECIFIED) meter device kit, Use to test blood sugar 1 time daily, first thing in the morning  blood glucose monitoring (SOFTCLIX) lancets, Use to test blood sugar 2 times daily or as directed.  docusate  "sodium (COLACE) 100 MG capsule, Take 100 mg by mouth 2 times daily as needed for constipation  ELIQUIS ANTICOAGULANT 5 MG tablet, TAKE 1 TABLET TWICE A DAY  finasteride (PROSCAR) 5 MG tablet, Take 1 tablet (5 mg) by mouth daily  Fluticasone-Umeclidin-Vilanterol (TRELEGY ELLIPTA) 100-62.5-25 MCG/INH oral inhaler, Inhale 1 puff into the lungs daily  furosemide (LASIX) 40 MG tablet, TAKE 1 TABLET BY MOUTH 2 TIMES DAILY  gabapentin (NEURONTIN) 300 MG capsule, TAKE 1 CAPSULE TWICE DAILY  lisinopril (ZESTRIL) 40 MG tablet, TAKE 1 TABLET BY MOUTH EVERY DAY  metFORMIN (GLUCOPHAGE) 500 MG tablet, 2 tabs at breakfast, 1 tablet at dinner  metoprolol succinate ER (TOPROL-XL) 25 MG 24 hr tablet, Take 1 tablet (25 mg) by mouth daily  order for DME, Equipment being ordered: diabetic shoes, 1 pair  order for DME, Oxygen 2 Li/min  at night via nasal cannula    No current facility-administered medications on file prior to visit.    ALLERGIES:                                                      Allergies   Allergen Reactions     No Known Drug Allergies      EXAM:                                                    VITAL SIGNS:   /75 (BP Location: Right arm, Patient Position: Sitting, Cuff Size: Adult Large)   Pulse 69   Ht 1.676 m (5' 6\")   Wt 107.2 kg (236 lb 6.4 oz)   SpO2 98%   BMI 38.16 kg/m     Mini-Cog Assessment:  Mini Cog Assessment  Clock Draw Score: 2 Normal  3 Item Recall: 3 objects recalled  Mini Cog Total Score: 5  Administered by: : Shakira TRAMMELL    General: pt is in NAD, cooperative.  Skin: normal turgor, moist mucous membranes, no lesions/rashes noticed.  HEENT: ATNC, EOMI, PERRL, white sclera, normal conjunctiva, no nystagmus or ptosis. No carotid bruits bilaterally.  Respiratory: lung sounds clear to auscultation bilaterally, no crackles, wheezes, rhonchi. Symmetric lung excursion, no accessory respiratory muscle use.  Cardiovascular: normal S1/S2, no murmurs/rubs/gallops.   Abdomen: Not distended.  : " deferred.    Neurological:  Mental: alert, follows commands, Mini Cog Total Score: 5/5 with 3/3 on memory recall, no aphasia or dysarthria. Fund of knowledge is appropriate for age.  Cranial Nerves:  CN II: visual acuity - able to accurately count fingers with each eye. Visual fields intact, fundi: discs sharp, no papilledema and normal vessels bilaterally.  CN III, IV, VI: EOM intact, pupils equal and reactive  CN V: facial sensation nl  CN VII: face symmetric, no facial droop  CN VIII: hearing mildly reduced bilaterally  CN IX: palate elevation symmetric, uvula at midline  CN XI SCM normal, shoulder shrug nl  CN XII: tongue midline  Motor: Strength: 5/5 in all major groups of all extremities. Normal tone. No abnormal movements. No pronator drift b/l.  Phalen's test is positive on the left and negative on the right.  Tinel's tests are negative bilaterally.  Reflexes: Triceps, biceps, and brachioradialis reflexes are diminished, but symmetric.  Patellar reflexes are reduced bilaterally, and achilles reflexes are absent bilaterally. No clonus noted. Toes are down-going b/l.   Sensory: light touch and pinprick are reduced in both feet, and vibration is reduced in the left foot. Romberg: negative, but the patient reports balance difficulty.  Coordination: FNF and heel-shin tests intact b/l.   Gait: Mildly unsteady, has difficulty with tandem walk.  DATA:   LABS/EEG/IMAGING/OTHER STUDIES: I reviewed pertinent medical records, as detailed in the history of present illness.  ASSESSMENT AND PLAN:      ASSESSMENT: Nahun Villalobos is a 76 year old male patient with listed above past medical history, who presents with chronic bilateral hand and feet paresthesias over the last 3-year along with associated intermittent balance difficulty.    We had a detailed discussion with the patient and his partner regarding his presenting complaints.  The neurological exam today is suggestive of peripheral polyneuropathy and left carpal  tunnel syndrome.  Mild carpal tunnel cannot be excluded on the right side.  The patient has elevated hemoglobin A1C of 7.5 and rising MCV with low hemoglobin.  His vitamin B12 was in low normal range in May 2021 (272).  Folate at that time was normal.    I reviewed that his clinical presentation might be consistent with peripheral polyneuropathy secondary to vitamin deficiency, thyroid dysfunction and uncontrolled diabetes.  Upper extremity/hand symptoms might be consistent with superimposed focal entrapment neuropathies.  Balance difficulty might be secondary to peripheral polyneuropathy, but we will check orthostatics when he comes for follow-up.  I would like to obtain additional laboratory work-up and EMG for further diagnostic clarification.    DIAGNOSES:    ICD-10-CM    1. Paresthesia of upper and lower extremities of both sides  R20.2    2. Balance problems  R26.89 Adult Neurology Referral     PLAN: At today's visit we thoroughly discussed various diagnostic possibilities for patient's symptoms, necessary evaluation, and the plan, which includes:  Orders Placed This Encounter   Procedures     Methylmalonic Acid     Protein Immunofixation Serum     Protein immunofixation urine     Vitamin B1 whole blood     Vitamin B12     Vitamin B6     Vitamin E     TSH with free T4 reflex     EMG     No new medications.     Additional recommendations after the work-up.    Next follow-up appointment is in the next 4 weeks or earlier if needed.    Total Time: 63 minutes spent on the date of the encounter doing chart review, history and exam, documentation and further activities per the note.    Jamil Aparicio MD  [facility] Neurology  (Chart documentation was completed in part with Dragon voice-recognition software. Even though reviewed, some grammatical, spelling, and word errors may remain.)            Nahun Villalobos is a 76 year old male who presents for:  Chief Complaint   Patient presents with      "Balance/ Vestibular     patient has difficulty gaining balance when getting up from a seated position, states he is fine when sitting or walking, also swelling in legs that is uncomfortable         Initial Vitals:  /75 (BP Location: Right arm, Patient Position: Sitting, Cuff Size: Adult Large)   Pulse 69   Ht 1.676 m (5' 6\")   Wt 107.2 kg (236 lb 6.4 oz)   SpO2 98%   BMI 38.16 kg/m   Estimated body mass index is 38.16 kg/m  as calculated from the following:    Height as of this encounter: 1.676 m (5' 6\").    Weight as of this encounter: 107.2 kg (236 lb 6.4 oz).. Body surface area is 2.23 meters squared. BP completed using cuff size: regular    Nursing Comments: Orthostatic on next visit     Shakira Solis      Again, thank you for allowing me to participate in the care of your patient.        Sincerely,        Jamil Aparicio MD    "

## 2022-02-02 NOTE — PROGRESS NOTES
"INITIAL NEUROLOGY CONSULTATION    DATE OF VISIT: 2/2/2022  CLINIC LOCATION: [facility]  MRN: [id number]  PATIENT NAME: Nahun Villalobos  YOB: 1945    REASON FOR VISIT:   Chief Complaint   Patient presents with     Balance/ Vestibular     patient has difficulty gaining balance when getting up from a seated position, states he is fine when sitting or walking      HISTORY OF PRESENT ILLNESS:                                                    Mr. Nahun Villalobos is 76 year old right handed male patient with past medical history of hypertension, obstructive sleep apnea, hyperlipidemia, BPPV, type 2 diabetes mellitus, COPD, lung cancer, and pulmonary embolism (on Eliquis), who was referred by his primary care provider for balance difficulty, but mainly concerned about paresthesias in his feet and hands.  The patient is accompanied by his female partner, who participates in interview today.    Per patient's report, he developed paresthesias approximately 2 years ago.  He describes them as numbness and tingling in both hands and feet.  Hand paresthesias are present mainly when he using his hands.  Left hand is more affected and frequently flares up at night.  Feet bother him at night.  When he walks it feels like he is standing on \"bubbly plastic\", though it helps feet paresthesia.  Driving makes his hands feel better.  Symptoms are worse at rest.  Occasionally, he also has balance difficulty when getting up from seated position.  He feels fine while walking.  He is on 300 mg of gabapentin twice daily for paresthesias.  Had physical therapy that was not helpful.  Family history is positive for stroke (father and cousin).    Laboratory evaluation from November 2021-January 2022 includes unremarkable CMP (except glucose of 204), elevated hemoglobin A1C of 7.5, low hemoglobin with elevated MCV of 102-105. Serum electrophoresis demonstrated small monoclonal protein in the beta fraction.  " B12 was 272 in May 2021.  Folate was 11.3 at that time.    The brain MRI from July 2004, performed for vertigo, demonstrated minimal cerebral atrophy without evidence of cerebellopontine angle lesions or abnormal contrast-enhancement.  Images were personally reviewed and independently interpreted.    No additional useful information is available in Care Everywhere, which was reviewed.  PAST MEDICAL/SURGICAL HISTORY:                                                    I personally reviewed patient's past medical and surgical history with the patient at today's visit.  MEDICATIONS:                                                    I personally reviewed patient's medications and allergies with the patient at today's visit.  acetaminophen (TYLENOL) 500 MG tablet, Take 500 mg by mouth every evening  albuterol (VENTOLIN HFA) 108 (90 Base) MCG/ACT inhaler, INHALE 2 PUFFS INTO THE LUNGS EVERY 6 HOURS AS NEEDED FOR SHORTNESS OF BREATH / DYSPNEA OR WHEEZING  Ascorbic Acid (VITAMIN C PO), Take 500 mg by mouth At Bedtime   atorvastatin (LIPITOR) 20 MG tablet, Take 1 tablet (20 mg) by mouth daily  blood glucose (ACCU-CHEK CHACHA) test strip, Use to test blood sugar 2 times daily or as directed.  blood glucose (NO BRAND SPECIFIED) lancets standard, Use to test blood sugar 1 time daily, first thing in the morning  blood glucose (NO BRAND SPECIFIED) test strip, Use to test blood sugar 1 time daily, first thing in the morning.  blood glucose monitoring (NO BRAND SPECIFIED) meter device kit, Use to test blood sugar 1 time daily, first thing in the morning  blood glucose monitoring (SOFTCLIX) lancets, Use to test blood sugar 2 times daily or as directed.  docusate sodium (COLACE) 100 MG capsule, Take 100 mg by mouth 2 times daily as needed for constipation  ELIQUIS ANTICOAGULANT 5 MG tablet, TAKE 1 TABLET TWICE A DAY  finasteride (PROSCAR) 5 MG tablet, Take 1 tablet (5 mg) by mouth daily  Fluticasone-Umeclidin-Vilanterol (TRELEGY  "ELLIPTA) 100-62.5-25 MCG/INH oral inhaler, Inhale 1 puff into the lungs daily  furosemide (LASIX) 40 MG tablet, TAKE 1 TABLET BY MOUTH 2 TIMES DAILY  gabapentin (NEURONTIN) 300 MG capsule, TAKE 1 CAPSULE TWICE DAILY  lisinopril (ZESTRIL) 40 MG tablet, TAKE 1 TABLET BY MOUTH EVERY DAY  metFORMIN (GLUCOPHAGE) 500 MG tablet, 2 tabs at breakfast, 1 tablet at dinner  metoprolol succinate ER (TOPROL-XL) 25 MG 24 hr tablet, Take 1 tablet (25 mg) by mouth daily  order for DME, Equipment being ordered: diabetic shoes, 1 pair  order for DME, Oxygen 2 Li/min  at night via nasal cannula    No current facility-administered medications on file prior to visit.    ALLERGIES:                                                      Allergies   Allergen Reactions     No Known Drug Allergies      EXAM:                                                    VITAL SIGNS:   /75 (BP Location: Right arm, Patient Position: Sitting, Cuff Size: Adult Large)   Pulse 69   Ht 1.676 m (5' 6\")   Wt 107.2 kg (236 lb 6.4 oz)   SpO2 98%   BMI 38.16 kg/m     Mini-Cog Assessment:  Mini Cog Assessment  Clock Draw Score: 2 Normal  3 Item Recall: 3 objects recalled  Mini Cog Total Score: 5  Administered by: : Shakira TRAMMELL    General: pt is in NAD, cooperative.  Skin: normal turgor, moist mucous membranes, no lesions/rashes noticed.  HEENT: ATNC, EOMI, PERRL, white sclera, normal conjunctiva, no nystagmus or ptosis. No carotid bruits bilaterally.  Respiratory: lung sounds clear to auscultation bilaterally, no crackles, wheezes, rhonchi. Symmetric lung excursion, no accessory respiratory muscle use.  Cardiovascular: normal S1/S2, no murmurs/rubs/gallops.   Abdomen: Not distended.  : deferred.    Neurological:  Mental: alert, follows commands, Mini Cog Total Score: 5/5 with 3/3 on memory recall, no aphasia or dysarthria. Fund of knowledge is appropriate for age.  Cranial Nerves:  CN II: visual acuity - able to accurately count fingers with each eye. Visual " fields intact, fundi: discs sharp, no papilledema and normal vessels bilaterally.  CN III, IV, VI: EOM intact, pupils equal and reactive  CN V: facial sensation nl  CN VII: face symmetric, no facial droop  CN VIII: hearing mildly reduced bilaterally  CN IX: palate elevation symmetric, uvula at midline  CN XI SCM normal, shoulder shrug nl  CN XII: tongue midline  Motor: Strength: 5/5 in all major groups of all extremities. Normal tone. No abnormal movements. No pronator drift b/l.  Phalen's test is positive on the left and negative on the right.  Tinel's tests are negative bilaterally.  Reflexes: Triceps, biceps, and brachioradialis reflexes are diminished, but symmetric.  Patellar reflexes are reduced bilaterally, and achilles reflexes are absent bilaterally. No clonus noted. Toes are down-going b/l.   Sensory: light touch and pinprick are reduced in both feet, and vibration is reduced in the left foot. Romberg: negative, but the patient reports balance difficulty.  Coordination: FNF and heel-shin tests intact b/l.   Gait: Mildly unsteady, has difficulty with tandem walk.  DATA:   LABS/EEG/IMAGING/OTHER STUDIES: I reviewed pertinent medical records, as detailed in the history of present illness.  ASSESSMENT AND PLAN:      ASSESSMENT: Nahun Villalobos is a 76 year old male patient with listed above past medical history, who presents with chronic bilateral hand and feet paresthesias over the last 3-year along with associated intermittent balance difficulty.    We had a detailed discussion with the patient and his partner regarding his presenting complaints.  The neurological exam today is suggestive of peripheral polyneuropathy and left carpal tunnel syndrome.  Mild carpal tunnel cannot be excluded on the right side.  The patient has elevated hemoglobin A1C of 7.5 and rising MCV with low hemoglobin.  His vitamin B12 was in low normal range in May 2021 (272).  Folate at that time was normal.    I reviewed that his  clinical presentation might be consistent with peripheral polyneuropathy secondary to vitamin deficiency, thyroid dysfunction and uncontrolled diabetes.  Upper extremity/hand symptoms might be consistent with superimposed focal entrapment neuropathies.  Balance difficulty might be secondary to peripheral polyneuropathy, but we will check orthostatics when he comes for follow-up.  I would like to obtain additional laboratory work-up and EMG for further diagnostic clarification.    DIAGNOSES:    ICD-10-CM    1. Paresthesia of upper and lower extremities of both sides  R20.2    2. Balance problems  R26.89 Adult Neurology Referral     PLAN: At today's visit we thoroughly discussed various diagnostic possibilities for patient's symptoms, necessary evaluation, and the plan, which includes:  Orders Placed This Encounter   Procedures     Methylmalonic Acid     Protein Immunofixation Serum     Protein immunofixation urine     Vitamin B1 whole blood     Vitamin B12     Vitamin B6     Vitamin E     TSH with free T4 reflex     EMG     No new medications.     Additional recommendations after the work-up.    Next follow-up appointment is in the next 4 weeks or earlier if needed.    Total Time: 63 minutes spent on the date of the encounter doing chart review, history and exam, documentation and further activities per the note.    Jamil Aparicio MD  [facility] Neurology  (Chart documentation was completed in part with Dragon voice-recognition software. Even though reviewed, some grammatical, spelling, and word errors may remain.)

## 2022-02-08 ENCOUNTER — PATIENT OUTREACH (OUTPATIENT)
Dept: NURSING | Facility: CLINIC | Age: 77
End: 2022-02-08
Payer: MEDICARE

## 2022-02-08 NOTE — PROGRESS NOTES
Clinic Care Coordination Contact  Community Health Worker Follow Up  Spoke with patient, and Kenia.    Care Gaps:     Health Maintenance Due   Topic Date Due     HF ACTION PLAN  Never done     ZOSTER IMMUNIZATION (2 of 3) 10/04/2013     DIABETIC FOOT EXAM  11/01/2019     MEDICARE ANNUAL WELLNESS VISIT  09/08/2021       Care Gaps Last addressed on 2/8/22    Goals:   Goals Addressed as of 2/8/2022 at 2:59 PM                    Today    1/11/22       Patient Stated       1. Improve chronic symptoms (pt-stated)   80%  70%    Added 6/3/21 by Wesly Davis RN      Goal Statement: I want to improve management of my leg, knee, and hip pain and swelling.  Date Goal set: 6/3/21  Barriers: lymphedema  Strengths: motivated to improve ability to walk  Date to Achieve By: 12/3/21 // Modified/edited to 6/3/2022  Patient expressed understanding of goal: Yes    Action steps to achieve this goal:    1. I will participate in Lymphedema Therapy for management of my leg swelling; Completed 9/2021  2. I will schedule and attend appointment with Orthopedic team for evaluation and discussion of my leg, knee, and hip pains; Completed with Physical Therapy referral  3. I will consider Physical Therapy referral if appropriate; Completed week of 10/18/2021  4. I will apply Jacinto hose to wear throughout the day and remove at night  5. I will walk my dog daily to help with strengthening and endurance  6. I will elevate my legs while sitting and laying down by propping them up with a pillow  7. I will discuss, review, schedule and complete recommended overdue health maintenance with my primary care provider  8. I will I will take my medications as prescribed  9. I will contact my care team with questions, concerns, support needs. I will use the clinic as a resource   10. I will contact my clinic with 24/7 after hours services available  11. Care Coordinator will remain available as needed        Discussed:    Patient stated he had a Neurology  "appointment on 2/2/22.  Patient stated he has Neuropathy.     Patient stated his left leg is swollen like a \"catcher's mitt\".    Patient stated he walks one block with his dog 2 -3 times a day.    Patient stated he is trying a pair of new compression stockings today.  He had a few different pairs, and is seeing what pair he likes best.    Patient stated he takes his medications as prescribed.    CHW asked patient if he is interested in scheduling his Medicare Annual Wellness Visit, patient stated yes, and he would like assistance.        Intervention and Education during outreach: CHW asked patient if he has any resource needs at this time, patient declined.  CHW encouraged patient to contact CC if there are any other changes or resources needed.  Patient acknowledged understanding.      Plan: Patient will attend his scheduled Medicare Annual Visit on 4/4/22 at 11:10am.  CHW will follow up in one month.    OMAR Kim  Clinic Care Coordination  [facility]: [facility] [place] and [facility]  Phone: [phone]    "

## 2022-02-10 ENCOUNTER — LAB (OUTPATIENT)
Dept: LAB | Facility: CLINIC | Age: 77
End: 2022-02-10
Payer: MEDICARE

## 2022-02-10 DIAGNOSIS — E11.9 TYPE 2 DIABETES MELLITUS WITHOUT COMPLICATION, WITHOUT LONG-TERM CURRENT USE OF INSULIN (H): ICD-10-CM

## 2022-02-10 DIAGNOSIS — R20.2 PARESTHESIA OF UPPER AND LOWER EXTREMITIES OF BOTH SIDES: ICD-10-CM

## 2022-02-10 DIAGNOSIS — R26.89 BALANCE PROBLEMS: ICD-10-CM

## 2022-02-10 LAB
ALBUMIN SERPL-MCNC: 3.2 G/DL (ref 3.4–5)
ALP SERPL-CCNC: 70 U/L (ref 40–150)
ALT SERPL W P-5'-P-CCNC: 21 U/L (ref 0–70)
ANION GAP SERPL CALCULATED.3IONS-SCNC: 3 MMOL/L (ref 3–14)
AST SERPL W P-5'-P-CCNC: 12 U/L (ref 0–45)
BILIRUB SERPL-MCNC: 0.3 MG/DL (ref 0.2–1.3)
BUN SERPL-MCNC: 11 MG/DL (ref 7–30)
CALCIUM SERPL-MCNC: 8.7 MG/DL (ref 8.5–10.1)
CHLORIDE BLD-SCNC: 101 MMOL/L (ref 94–109)
CHOLEST SERPL-MCNC: 87 MG/DL
CO2 SERPL-SCNC: 32 MMOL/L (ref 20–32)
CREAT SERPL-MCNC: 1.11 MG/DL (ref 0.66–1.25)
FASTING STATUS PATIENT QL REPORTED: NO
GFR SERPL CREATININE-BSD FRML MDRD: 68 ML/MIN/1.73M2
GLUCOSE BLD-MCNC: 128 MG/DL (ref 70–99)
HBA1C MFR BLD: 6.7 % (ref 0–5.6)
HDLC SERPL-MCNC: 30 MG/DL
LDLC SERPL CALC-MCNC: 28 MG/DL
NONHDLC SERPL-MCNC: 57 MG/DL
POTASSIUM BLD-SCNC: 4.1 MMOL/L (ref 3.4–5.3)
PROT SERPL-MCNC: 7.5 G/DL (ref 6.8–8.8)
SODIUM SERPL-SCNC: 136 MMOL/L (ref 133–144)
TRIGL SERPL-MCNC: 143 MG/DL
TSH SERPL DL<=0.005 MIU/L-ACNC: 2.47 MU/L (ref 0.4–4)
VIT B12 SERPL-MCNC: 278 PG/ML (ref 193–986)

## 2022-02-10 PROCEDURE — 84207 ASSAY OF VITAMIN B-6: CPT | Mod: 90

## 2022-02-10 PROCEDURE — 36415 COLL VENOUS BLD VENIPUNCTURE: CPT

## 2022-02-10 PROCEDURE — 84425 ASSAY OF VITAMIN B-1: CPT | Mod: 90

## 2022-02-10 PROCEDURE — 83921 ORGANIC ACID SINGLE QUANT: CPT

## 2022-02-10 PROCEDURE — 82607 VITAMIN B-12: CPT

## 2022-02-10 PROCEDURE — 86335 IMMUNFIX E-PHORSIS/URINE/CSF: CPT | Performed by: PATHOLOGY

## 2022-02-10 PROCEDURE — 83036 HEMOGLOBIN GLYCOSYLATED A1C: CPT

## 2022-02-10 PROCEDURE — 80061 LIPID PANEL: CPT

## 2022-02-10 PROCEDURE — 86334 IMMUNOFIX E-PHORESIS SERUM: CPT | Performed by: PATHOLOGY

## 2022-02-10 PROCEDURE — 80053 COMPREHEN METABOLIC PANEL: CPT

## 2022-02-10 PROCEDURE — 99000 SPECIMEN HANDLING OFFICE-LAB: CPT

## 2022-02-10 PROCEDURE — 84446 ASSAY OF VITAMIN E: CPT | Mod: 90

## 2022-02-10 PROCEDURE — 84443 ASSAY THYROID STIM HORMONE: CPT

## 2022-02-11 LAB
PROT ELPH PNL UR ELPH: NORMAL
PROT PATTERN SERPL IFE-IMP: NORMAL

## 2022-02-13 LAB
A-TOCOPHEROL VIT E SERPL-MCNC: 6.9 MG/L
BETA+GAMMA TOCOPHEROL SERPL-MCNC: 1.3 MG/L
PYRIDOXAL PHOS SERPL-SCNC: 20.4 NMOL/L
VIT B1 PYROPHOSHATE BLD-SCNC: 103 NMOL/L

## 2022-02-15 LAB — METHYLMALONATE SERPL-SCNC: 0.23 UMOL/L (ref 0–0.4)

## 2022-02-16 ENCOUNTER — PATIENT OUTREACH (OUTPATIENT)
Dept: CARE COORDINATION | Facility: CLINIC | Age: 77
End: 2022-02-16
Payer: MEDICARE

## 2022-02-16 NOTE — LETTER
[address] [facility] [address]    [facility]  Patient Centered Plan of Care  About Me:        Patient Name:  Nahun Villalobos    YOB: 1945  Age:         76 year old   Sukumar MRN:    [id number] Telephone Information:  Home Phone [phone]   Mobile [phone]       Address:  [address] [place] [address] Email address:  [email or web address]      Emergency Contact(s)    Name Relationship Lgl Grd Work Phone Home Phone Mobile Phone   1. SINDY CHRISTIE  Daughter No  [phone]    2. SUMAN Significant ot*    [phone]           Primary language:  English     needed? No   [facility] [employer] [facility]:  [phone] op. 1  Other communication barriers:None    Preferred Method of Communication:  William  Current living arrangement: I live in a private home (lives with girlfriend)    Mobility Status/ Medical Equipment: Independent w/Device    Health Maintenance  Health Maintenance Reviewed: Due/Overdue   Health Maintenance Due   Topic Date Due     HF ACTION PLAN  Never done     ZOSTER IMMUNIZATION (2 of 3) 10/04/2013     DIABETIC FOOT EXAM  11/01/2019     MEDICARE ANNUAL WELLNESS VISIT  09/08/2021       My Access Plan  Medical Emergency 911   Primary Clinic [facility] [phone]   24 Hour Appointment Line [phone] or  [phone]) (toll-free)   24 Hour Nurse Line [phone] (toll-free)   Preferred Urgent Care [facility], [phone]     Preferred Hospital [facility]  [phone] ([facility])     [facility] [place], [place] - [address] AT Portal to [facility]istered [facility] Sites     Behavioral Health Crisis Line The National Suicide Prevention Lifeline at 1-859.503.4964 or 911     My Care Team Members  Patient Care Team       Relationship Specialty Notifications Start End    Jaime Pulliam MD  PCP - General Neurology  2/2/22     Phone: [phone] Fax: [phone]         [address] [place] [address]    Olegario Castillo MD Assigned PCP   10/4/12     Phone: [phone] Fax: [phone]         [address] [place] [address]    Praveena Burris MD MD Urology  3/1/17     Phone: [phone] Fax: [phone]         [address] [place] [address]    Areli Cordero, RN Registered Nurse   3/1/17     Phone: [phone]         Olegario Castillo MD Referring Physician Internal Medicine  1/25/18     Phone: [phone] Fax: [phone]         [address] [place] [address]    Kelley Slaughter MA Community Health Worker Primary Care - CC  9/24/20     Phone: [phone]         Santi Tinajero MD MD Hematology & Oncology  12/7/20     Phone: [phone] Fax: [phone]         [facility] [address] [facility] [address] [facility] [address]    Bernardo Haynes MD MD Pulmonary Disease  12/7/20     Phone: [phone] Fax: [phone]         [facility] [address] [facility] [address] [facility] [address]    Goltz, Bennett Ezra, PA-C Assigned Neuroscience Provider   3/17/21     Phone: [phone] Fax: [phone] [address] [facility] [address] [facility] [address]    Jean-Paul Silver DO Assigned Musculoskeletal Provider   7/4/21     Phone: [phone] Fax: [phone]         [address] [facility] [address]    Antonia Mandel, PT Physical Therapist Physical Therapist  8/2/21     Phone: [phone] Fax: [phone]         [address] [place] [address] [address] [facility] [address]    Rhea Knapp, RN Lead Care Coordinator  Admissions 10/11/21     Madelyn Cordero OD Assigned Surgical Provider   11/21/21     Phone: [phone] Fax: [phone] [address] [facility] [address] [place] [address]    Jamil Aparicio MD MD Neurology  2/2/22     Phone: [phone] Fax: [phone] [address] [place] [address]             My Care Plans  Self Management and Treatment Plan  Goals and (Comments)  Goals        General     1. Improve chronic symptoms (pt-stated)      Notes - Note edited  1/11/2022  2:41 PM by Rhea Knapp RN     Goal Statement: I want to improve management of my leg, knee, and hip pain and swelling.  Date Goal set: 6/3/21  Barriers: lymphedema  Strengths: motivated to improve ability to walk  Date to Achieve By: 12/3/21 // Modified/edited to 6/3/2022  Patient expressed understanding of goal: Yes    Action steps to achieve this goal:    1. I will participate in Lymphedema Therapy for management of my leg swelling; Completed 9/2021  2. I will schedule and attend appointment with Orthopedic team for evaluation and discussion of my leg, knee, and hip pains; Completed with Physical Therapy referral  3. I will consider Physical Therapy referral if appropriate; Completed week of 10/18/2021  4. I will apply Jacinto hose to wear throughout the day and remove at night  5. I will walk my dog daily to help with strengthening and endurance  6. I will elevate my legs while sitting and laying down by propping them up with a pillow  7. I will discuss, review, schedule and complete recommended overdue health maintenance with my primary care provider  8. I will I will take my medications as prescribed  9. I will contact my care team with questions, concerns, support needs. I will use the clinic as a resource   10. I will contact my clinic with 24/7 after hours services available  11. Care Coordinator will remain available as needed             Action Plans on File:   COPD  Depression     Advance Care Plans/Directives Type:   No data recorded    My Medical and Care Information  Problem List   Patient Active Problem List   Diagnosis     Essential hypertension, benign     Tobacco use disorder     Lumbago     Impotence of organic origin     Advance care planning     Polyp of colon     Obstructive sleep apnea syndrome      Hyperlipidemia LDL goal <100     Morbid obesity due to excess calories (H)     BPPV (benign paroxysmal positional vertigo), unspecified laterality     Chronic obstructive pulmonary disease, unspecified COPD type (H)     Type 2 diabetes mellitus without complication, without long-term current use of insulin (H)     S/P transurethral resection of prostate     Hematuria, gross     Cervical segment dysfunction     Cervicalgia     Thoracic segment dysfunction     Erectile dysfunction     HTN (hypertension)     Acute diverticulitis     Other pulmonary embolism without acute cor pulmonale, unspecified chronicity (H)     Mesenteric mass     History of adenocarcinoma of lung      Current Medications and Allergies:    Allergies   Allergen Reactions     No Known Drug Allergies      Current Outpatient Medications   Medication     acetaminophen (TYLENOL) 500 MG tablet     albuterol (VENTOLIN HFA) 108 (90 Base) MCG/ACT inhaler     Ascorbic Acid (VITAMIN C PO)     atorvastatin (LIPITOR) 20 MG tablet     blood glucose (ACCU-CHEK CHACHA) test strip     blood glucose (NO BRAND SPECIFIED) lancets standard     blood glucose (NO BRAND SPECIFIED) test strip     blood glucose monitoring (NO BRAND SPECIFIED) meter device kit     blood glucose monitoring (SOFTCLIX) lancets     docusate sodium (COLACE) 100 MG capsule     ELIQUIS ANTICOAGULANT 5 MG tablet     finasteride (PROSCAR) 5 MG tablet     Fluticasone-Umeclidin-Vilanterol (TRELEGY ELLIPTA) 100-62.5-25 MCG/INH oral inhaler     furosemide (LASIX) 40 MG tablet     gabapentin (NEURONTIN) 300 MG capsule     lisinopril (ZESTRIL) 40 MG tablet     metFORMIN (GLUCOPHAGE) 500 MG tablet     metoprolol succinate ER (TOPROL-XL) 25 MG 24 hr tablet     order for DME     order for DME     No current facility-administered medications for this visit.     Care Coordination Start Date: 9/24/2020   Frequency of Care Coordination: 6 weeks     Form Last Updated: 02/16/2022

## 2022-02-16 NOTE — PROGRESS NOTES
Care Coordination Clinician Chart Review     Situation: Patient chart reviewed by care coordinator.?     Background: Initial assessment and enrollment to Care Coordination was 6/3/2021.?? Patient centered goals were developed with participation from patient.? Lead CC handed patient off to CHW for continued outreach every 30 days.??     Assessment: Per chart review, patient outreach completed by CC CHW on 2/8/2022.? Patient is actively working to accomplish goal(s).? Patient's goal(s) remain(s) appropriate at this time.? Patient is due for updated Plan of Care.? Annual assessment will be due 6/3/2022  .     Goals        1. Improve chronic symptoms (pt-stated)       Goal Statement: I want to improve management of my leg, knee, and hip pain and swelling.  Date Goal set: 6/3/21  Barriers: lymphedema  Strengths: motivated to improve ability to walk  Date to Achieve By: 12/3/21 // Modified/edited to 6/3/2022  Patient expressed understanding of goal: Yes    Action steps to achieve this goal:    1. I will participate in Lymphedema Therapy for management of my leg swelling; Completed 9/2021  2. I will schedule and attend appointment with Orthopedic team for evaluation and discussion of my leg, knee, and hip pains; Completed with Physical Therapy referral  3. I will consider Physical Therapy referral if appropriate; Completed week of 10/18/2021  4. I will apply Jacinto hose to wear throughout the day and remove at night  5. I will walk my dog daily to help with strengthening and endurance  6. I will elevate my legs while sitting and laying down by propping them up with a pillow  7. I will discuss, review, schedule and complete recommended overdue health maintenance with my primary care provider  8. I will I will take my medications as prescribed  9. I will contact my care team with questions, concerns, support needs. I will use the clinic as a resource   10. I will contact my clinic with 24/7 after hours services available  11.  Care Coordinator will remain available as needed        ??     Plan/Recommendations: The patient will continue working with Care Coordination to achieve above goal(s).? CHW will involve Lead CC as needed or if patient is ready to move to maintenance.? Lead CC will continue to monitor CHW s monthly outreaches and progress to goal(s) every 6 weeks.?     Plan of Care updated and sent to patient: Yes, 2/16/2022     Rhea Knapp RN, BSN, PHN  Primary Care / Care Coordinator   [facility]  E-mail [email or web address]   [phone]

## 2022-02-28 ENCOUNTER — TELEPHONE (OUTPATIENT)
Dept: INTERNAL MEDICINE | Facility: CLINIC | Age: 77
End: 2022-02-28
Payer: MEDICARE

## 2022-02-28 NOTE — TELEPHONE ENCOUNTER
Received call from [facility] requesting a 3 day hold on Eliquis prior to patients colonoscopy scheduled at the end of March. GI MD requesting 3 day hold per protocol.     Provider to review. If less than 3 day hold is needed, [facility] needs creatinine within the last 90 days. Last creatinine done 2/10/22-verbal result given to Shakira at [facility].     Leslie Summers RN

## 2022-03-10 ENCOUNTER — OFFICE VISIT (OUTPATIENT)
Dept: NEUROLOGY | Facility: CLINIC | Age: 77
End: 2022-03-10
Attending: PSYCHIATRY & NEUROLOGY
Payer: MEDICARE

## 2022-03-10 DIAGNOSIS — R20.2 PARESTHESIA OF UPPER AND LOWER EXTREMITIES OF BOTH SIDES: ICD-10-CM

## 2022-03-10 PROCEDURE — 95913 NRV CNDJ TEST 13/> STUDIES: CPT | Performed by: PSYCHIATRY & NEUROLOGY

## 2022-03-10 PROCEDURE — 95885 MUSC TST DONE W/NERV TST LIM: CPT | Performed by: PSYCHIATRY & NEUROLOGY

## 2022-03-10 NOTE — PROGRESS NOTES
"                    [facility]  Electrodiagnostic Laboratory                 Department of Neurology                                                                                                         Test Date:  3/10/2022    Patient: Javon Villalobos : 1945 Physician: Jaime Pulliam MD   Sex: Male AGE: 76 year Ref Phys:    ID#: [id number]   Technician: Jordin Prather     Clinical Information: Mr. Garnica \"Javon\" Wilfredo is a 76-year-old gentleman referred by Dr. Aparicio for evaluation of carpal tunnel in the upper extremities and a generalized peripheral neuropathy.      Techniques:  Motor and sensory conduction studies were done with surface recording electrodes. EMG was done with a concentric needle electrode.     Results:  The right fibular motor conduction study is normal.  The bilateral tibial compound motor action potentials are reduced in amplitude with borderline conduction velocities.  Bilateral median conduction studies demonstrated normal compound motor action potential amplitudes, prolonged distal latencies and slowed conduction velocities.  Bilateral ulnar motor conduction studies revealed a prolonged left motor distal latency.  All other measures were normal.  Distal median and ulnar motor conduction was compared by stimulating the median nerve and recording from the lumbricals and stimulating the ulnar nerve and recording from the second palmar interossei at equal distances.  There was disproportionate slowing of distal median motor conduction.  The left median antidromic sensory nerve action potential was absent.  The right median antidromic sensory nerve action potential was borderline in amplitude with a slowed distal conduction velocity.  Bilateral ulnar antidromic sensory nerve action potentials were normal.  Distal median and ulnar sensory conduction was compared with the palmar sensory technique.  The left palmar sensory nerve action potential was absent.  There was " disproportionate slowing of distal sensory conduction in the right median nerve.  Bilateral  superficial radial sensory nerve action potentials were normal.  Bilateral sural sensory nerve action potentials were absent.    Needle exam revealed fibrillation in distal leg muscles bilaterally.  Proximal leg muscles and left arm muscles were normal.      Interpretation: The EMG is abnormal.  There is EMG evidence for bilateral median neuropathies at the wrist (carpal tunnel syndrome), worse on the left than the right.  There is also EMG evidence suggestive of a length-dependent peripheral neuropathy.        ___________________________  Jaime Pulliam MD        Nerve Conduction Studies  Motor Sites      Latency Amplitude Neg. Amp Diff Segment Distance Velocity Neg. Dur Neg Area Diff Temperature Comment   Site (ms) Norm (mV) Norm %  cm m/s Norm ms %  C    Right Fibular (EDB) Motor   Ankle 4.3  < 6.0 4.5  > 2.0  Ankle-EDB 8   4.5  32    Bel Fib Head 11.6 - 3.3 - -26.7 Bel Fib Head-Ankle 32 44  > 38 5.4 -20.0 32    Pop Fossa 13.1 - 2.9 - -12.1 Pop Fossa-Bel Fib Head 7 47  > 38 5.1 -10.2 31.1    Left Median (APB) Motor   Wrist 6.8  < 4.2 5.0  > 5.0  Wrist-APB 8   5.8  30.8    Elbow 11.9 - 4.7 - -6.0 Elbow-Wrist 23.6 46  > 48 6.1 -7.2 30.7    Right Median (APB) Motor   Wrist 5.5  < 4.2 7.6  > 5.0  Wrist-APB 8   5.5  31.8    Elbow 11.2 - 6.3 - -17.1 Elbow-Wrist 25.3 44  > 48 5.8 -14.3 31.7    Left Median/Ulnar (Lumb-Dors Int II) Motor        Median (Lumb I)   Wrist 6.5 - 0.55 -      7.1  31.8         Ulnar (Dorsal Int (manus))   Wrist 2.9 - 1.38 -      6.7  31.7    Right Median/Ulnar (Lumb-Dors Int II) Motor        Median (Lumb I)   Wrist 5.4 - 0.88 -      6.3  32         Ulnar (Dorsal Int (manus))   Wrist 3.2 - 1.91 -      5.6  32    Left Tibial (AHB) Motor   Ankle 4.9  < 6.5 1.02  > 4.4  Ankle-AHB 8   7.3  31    Knee 14.4 - 1.15 - 12.7 Knee-Ankle 37.5 39  > 38 7.9 17.1 30.8    Right Tibial (AHB) Motor   Ankle 4.6  < 6.5  3.8  > 4.4  Ankle-AHB 8   6.2  31.9    Knee 15.1 - 2.9 - -23.7 Knee-Ankle 40 38  > 38 6.1 -12.9 31.6    Left Ulnar (ADM) Motor   Wrist 3.9  < 3.5 11.3  > 5.0  Wrist-ADM 8   5.4  31.3    Bel Elbow 7.6 - 10.3 - -8.8 Bel Elbow-Wrist 20.2 55  > 48 5.7 -4.6 31.2    Abv Elbow 9.5 - 10.1 - -1.94 Abv Elbow-Bel Elbow 10.2 54  > 48 5.9 -1.21 31.1    Right Ulnar (ADM) Motor   Wrist 3.2  < 3.5 12.8  > 5.0  Wrist-ADM 8   5.2  31.4    Bel Elbow 7.4 - 11.6 - -9.4 Bel Elbow-Wrist 21 50  > 48 5.4 -3.1 31.4    Abv Elbow 9.6 - 11.2 - -3.4 Abv Elbow-Bel Elbow 12.5 57  > 48 5.6 -1.47 31.4      Sensory Sites      Onset Lat Peak Lat Amp (O-P) Amp (P-P) Segment Distance Velocity Temperature   Site ms ms  V Norm  V  cm m/s Norm  C   Left Median Sensory   Wrist-Dig II NR NR NR  > 10 NR Wrist-Dig II 14 NR  > 48 32.4   Right Median Sensory   Wrist-Dig II 4.4 5.4 10  > 10 22 Wrist-Dig II 14 32  > 48 31.9   Left Median (Ortho) Sensory   Palm-Wrist NR NR NR - NR Palm-Wrist 8 NR - 31.6   Right Median (Ortho) Sensory   Palm-Wrist 1.80 2.3 3 - 6 Palm-Wrist 8 44 - 32.1   Left Median-Ulnar Palmar Sensory        Median   Palm-Wrist NR NR NR - NR Palm-Wrist 8 NR - 31.6        Ulnar   Palm-Wrist 1.40 2.1 3 - 3 Palm-Wrist 8 57 - 31.4   Right Median-Ulnar Palmar Sensory        Median   Palm-Wrist 1.80 2.3 3 - 6 Palm-Wrist 8 44 - 32.1        Ulnar   Palm-Wrist 1.35 1.98 9 - 3 Palm-Wrist 8 59 - 32.1   Left Radial Sensory   Forearm-Wrist 1.70 2.3 38  > 15 36 Forearm-Wrist 10 59 - 31.1   Right Radial Sensory   Forearm-Wrist 1.73 2.3 32  > 15 35 Forearm-Wrist 10 58 - 31.9   Left Sural Sensory   Calf-Lat Mall NR NR NR  > 5 NR Calf-Lat Mall 14 NR  > 38 31.6   Right Sural Sensory   Calf-Lat Mall NR NR NR  > 5 NR Calf-Lat Mall 14 NR  > 38 32.3   Left Ulnar Sensory   Wrist-Dig V 2.4 3.2 15  > 8 18 Wrist-Dig V 12.5 52  > 48 32.1   Right Ulnar Sensory   Wrist-Dig V 2.6 3.4 14  > 8 23 Wrist-Dig V 12.5 48  > 48 32   Left Ulnar (Ortho) Sensory   Palm-Wrist 1.40 2.1 3 - 3  Palm-Wrist 8 57 - 31.4   Right Ulnar (Ortho) Sensory   Palm-Wrist 1.35 1.98 9 - 3 Palm-Wrist 8 59 - 32.1     Inter-Nerve Comparisons     Nerve 1 Value 1 Nerve 2 Value 2 Parameter Result Normal   Sensory Sites   R Median Palm-Wrist 2.3 ms R Ulnar Palm-Wrist 2.0 ms Peak Lat Diff 0.32 ms <0.30   L Median Palm-Wrist - L Ulnar Palm-Wrist 2.1 ms Peak Lat Diff - <0.30       Electromyography     Side Muscle Ins Act Fibs/PSW Fasc HF Amp Dur Poly Recrt Int Pat   Left Tib Anterior Nml None Nml 0 Nml Nml 0 Nml Nml   Left Gastroc MH Incr 2+ Nml 0 Nml Nml 0 Nml Nml   Left Vastus Lat Nml None Nml 0 Nml Nml 0 Nml Nml   Left Biceps Fem Nml None Nml 0 Nml Nml 0 Nml Nml   Right Gastroc MH Incr 1+ Nml 0 Nml Nml 0 Nml Nml   Right Fib Tertious fibrillation in distal leg muscles bilaterally. Incr 1+ Nml 0 Nml Nml 0 Nml Nml   Right FDI Nml None Nml 0 Nml Nml 0 Nml Nml   Right FCR Nml None Nml 0 Nml Nml 0 Nml Nml         NCS Waveforms:    Motor                             Sensory

## 2022-03-10 NOTE — LETTER
"3/10/2022       RE: Nahun Villalobos  [address]  [facility] [address]     Dear Colleague,    Thank you for referring your patient, Nahun Villalobos, to the [facility] [place] at [facility]. Please see a copy of my visit note below.                        [facility]  Electrodiagnostic Laboratory                 Department of Neurology                                                                                                         Test Date:  3/10/2022    Patient: Javon Villalobos : 1945 Physician: Jaime Pulliam MD   Sex: Male AGE: 76 year Ref Phys:    ID#: [id number]   Technician: Jordin Prather     Clinical Information: Mr. Garnica \"Javon\" Wilfredo is a 76-year-old gentleman referred by Dr. Aparicio for evaluation of carpal tunnel in the upper extremities and a generalized peripheral neuropathy.      Techniques:  Motor and sensory conduction studies were done with surface recording electrodes. EMG was done with a concentric needle electrode.     Results:  The right fibular motor conduction study is normal.  The bilateral tibial compound motor action potentials are reduced in amplitude with borderline conduction velocities.  Bilateral median conduction studies demonstrated normal compound motor action potential amplitudes, prolonged distal latencies and slowed conduction velocities.  Bilateral ulnar motor conduction studies revealed a prolonged left motor distal latency.  All other measures were normal.  Distal median and ulnar motor conduction was compared by stimulating the median nerve and recording from the lumbricals and stimulating the ulnar nerve and recording from the second palmar interossei at equal distances.  There was disproportionate slowing of distal median motor conduction.  The left median antidromic sensory nerve action potential was absent.  The right median antidromic sensory nerve action " potential was borderline in amplitude with a slowed distal conduction velocity.  Bilateral ulnar antidromic sensory nerve action potentials were normal.  Distal median and ulnar sensory conduction was compared with the palmar sensory technique.  The left palmar sensory nerve action potential was absent.  There was disproportionate slowing of distal sensory conduction in the right median nerve.  Bilateral  superficial radial sensory nerve action potentials were normal.  Bilateral sural sensory nerve action potentials were absent.    Needle exam revealed fibrillation in distal leg muscles bilaterally.  Proximal leg muscles and left arm muscles were normal.      Interpretation: The EMG is abnormal.  There is EMG evidence for bilateral median neuropathies at the wrist (carpal tunnel syndrome), worse on the left than the right.  There is also EMG evidence suggestive of a length-dependent peripheral neuropathy.        ___________________________  Jaime Pulliam MD        Nerve Conduction Studies  Motor Sites      Latency Amplitude Neg. Amp Diff Segment Distance Velocity Neg. Dur Neg Area Diff Temperature Comment   Site (ms) Norm (mV) Norm %  cm m/s Norm ms %  C    Right Fibular (EDB) Motor   Ankle 4.3  < 6.0 4.5  > 2.0  Ankle-EDB 8   4.5  32    Bel Fib Head 11.6 - 3.3 - -26.7 Bel Fib Head-Ankle 32 44  > 38 5.4 -20.0 32    Pop Fossa 13.1 - 2.9 - -12.1 Pop Fossa-Bel Fib Head 7 47  > 38 5.1 -10.2 31.1    Left Median (APB) Motor   Wrist 6.8  < 4.2 5.0  > 5.0  Wrist-APB 8   5.8  30.8    Elbow 11.9 - 4.7 - -6.0 Elbow-Wrist 23.6 46  > 48 6.1 -7.2 30.7    Right Median (APB) Motor   Wrist 5.5  < 4.2 7.6  > 5.0  Wrist-APB 8   5.5  31.8    Elbow 11.2 - 6.3 - -17.1 Elbow-Wrist 25.3 44  > 48 5.8 -14.3 31.7    Left Median/Ulnar (Lumb-Dors Int II) Motor        Median (Lumb I)   Wrist 6.5 - 0.55 -      7.1  31.8         Ulnar (Dorsal Int (manus))   Wrist 2.9 - 1.38 -      6.7  31.7    Right Median/Ulnar (Lumb-Dors Int II) Motor         Median (Lumb I)   Wrist 5.4 - 0.88 -      6.3  32         Ulnar (Dorsal Int (manus))   Wrist 3.2 - 1.91 -      5.6  32    Left Tibial (AHB) Motor   Ankle 4.9  < 6.5 1.02  > 4.4  Ankle-AHB 8   7.3  31    Knee 14.4 - 1.15 - 12.7 Knee-Ankle 37.5 39  > 38 7.9 17.1 30.8    Right Tibial (AHB) Motor   Ankle 4.6  < 6.5 3.8  > 4.4  Ankle-AHB 8   6.2  31.9    Knee 15.1 - 2.9 - -23.7 Knee-Ankle 40 38  > 38 6.1 -12.9 31.6    Left Ulnar (ADM) Motor   Wrist 3.9  < 3.5 11.3  > 5.0  Wrist-ADM 8   5.4  31.3    Bel Elbow 7.6 - 10.3 - -8.8 Bel Elbow-Wrist 20.2 55  > 48 5.7 -4.6 31.2    Abv Elbow 9.5 - 10.1 - -1.94 Abv Elbow-Bel Elbow 10.2 54  > 48 5.9 -1.21 31.1    Right Ulnar (ADM) Motor   Wrist 3.2  < 3.5 12.8  > 5.0  Wrist-ADM 8   5.2  31.4    Bel Elbow 7.4 - 11.6 - -9.4 Bel Elbow-Wrist 21 50  > 48 5.4 -3.1 31.4    Abv Elbow 9.6 - 11.2 - -3.4 Abv Elbow-Bel Elbow 12.5 57  > 48 5.6 -1.47 31.4      Sensory Sites      Onset Lat Peak Lat Amp (O-P) Amp (P-P) Segment Distance Velocity Temperature   Site ms ms  V Norm  V  cm m/s Norm  C   Left Median Sensory   Wrist-Dig II NR NR NR  > 10 NR Wrist-Dig II 14 NR  > 48 32.4   Right Median Sensory   Wrist-Dig II 4.4 5.4 10  > 10 22 Wrist-Dig II 14 32  > 48 31.9   Left Median (Ortho) Sensory   Palm-Wrist NR NR NR - NR Palm-Wrist 8 NR - 31.6   Right Median (Ortho) Sensory   Palm-Wrist 1.80 2.3 3 - 6 Palm-Wrist 8 44 - 32.1   Left Median-Ulnar Palmar Sensory        Median   Palm-Wrist NR NR NR - NR Palm-Wrist 8 NR - 31.6        Ulnar   Palm-Wrist 1.40 2.1 3 - 3 Palm-Wrist 8 57 - 31.4   Right Median-Ulnar Palmar Sensory        Median   Palm-Wrist 1.80 2.3 3 - 6 Palm-Wrist 8 44 - 32.1        Ulnar   Palm-Wrist 1.35 1.98 9 - 3 Palm-Wrist 8 59 - 32.1   Left Radial Sensory   Forearm-Wrist 1.70 2.3 38  > 15 36 Forearm-Wrist 10 59 - 31.1   Right Radial Sensory   Forearm-Wrist 1.73 2.3 32  > 15 35 Forearm-Wrist 10 58 - 31.9   Left Sural Sensory   Calf-Lat Mall NR NR NR  > 5 NR Calf-Lat Mall 14 NR  > 38  31.6   Right Sural Sensory   Calf-Lat Mall NR NR NR  > 5 NR Calf-Lat Mall 14 NR  > 38 32.3   Left Ulnar Sensory   Wrist-Dig V 2.4 3.2 15  > 8 18 Wrist-Dig V 12.5 52  > 48 32.1   Right Ulnar Sensory   Wrist-Dig V 2.6 3.4 14  > 8 23 Wrist-Dig V 12.5 48  > 48 32   Left Ulnar (Ortho) Sensory   Palm-Wrist 1.40 2.1 3 - 3 Palm-Wrist 8 57 - 31.4   Right Ulnar (Ortho) Sensory   Palm-Wrist 1.35 1.98 9 - 3 Palm-Wrist 8 59 - 32.1     Inter-Nerve Comparisons     Nerve 1 Value 1 Nerve 2 Value 2 Parameter Result Normal   Sensory Sites   R Median Palm-Wrist 2.3 ms R Ulnar Palm-Wrist 2.0 ms Peak Lat Diff 0.32 ms <0.30   L Median Palm-Wrist - L Ulnar Palm-Wrist 2.1 ms Peak Lat Diff - <0.30       Electromyography     Side Muscle Ins Act Fibs/PSW Fasc HF Amp Dur Poly Recrt Int Pat   Left Tib Anterior Nml None Nml 0 Nml Nml 0 Nml Nml   Left Gastroc MH Incr 2+ Nml 0 Nml Nml 0 Nml Nml   Left Vastus Lat Nml None Nml 0 Nml Nml 0 Nml Nml   Left Biceps Fem Nml None Nml 0 Nml Nml 0 Nml Nml   Right Gastroc MH Incr 1+ Nml 0 Nml Nml 0 Nml Nml   Right Fib Tertious fibrillation in distal leg muscles bilaterally. Incr 1+ Nml 0 Nml Nml 0 Nml Nml   Right FDI Nml None Nml 0 Nml Nml 0 Nml Nml   Right FCR Nml None Nml 0 Nml Nml 0 Nml Nml         NCS Waveforms:    Motor                             Sensory                                                   Jaime Pulliam MD

## 2022-03-14 ENCOUNTER — PATIENT OUTREACH (OUTPATIENT)
Dept: NURSING | Facility: CLINIC | Age: 77
End: 2022-03-14
Payer: MEDICARE

## 2022-03-14 NOTE — PROGRESS NOTES
Clinic Care Coordination Contact    Community Health Worker Follow Up    Care Gaps:     Health Maintenance Due   Topic Date Due     HF ACTION PLAN  Never done     ZOSTER IMMUNIZATION (2 of 3) 10/04/2013     DIABETIC FOOT EXAM  11/01/2019     MEDICARE ANNUAL WELLNESS VISIT  09/08/2021     EYE EXAM  04/01/2022       Care Gaps Last addressed on 3/14/22- Medicare Annual Wellness Visit scheduled    Goals:   Goals Addressed as of 3/14/2022 at 10:58 AM                    Today    2/16/22       Patient Stated       1. Improve chronic symptoms (pt-stated)   90%  80%    Added 6/3/21 by Wesly Davis, RN      Goal Statement: I want to improve management of my leg, knee, and hip pain and swelling.  Date Goal set: 6/3/21  Barriers: lymphedema  Strengths: motivated to improve ability to walk  Date to Achieve By: 12/3/21 // Modified/edited to 6/3/2022  Patient expressed understanding of goal: Yes    Action steps to achieve this goal:    1. I will participate in Lymphedema Therapy for management of my leg swelling; Completed 9/2021  2. I will schedule and attend appointment with Orthopedic team for evaluation and discussion of my leg, knee, and hip pains; Completed with Physical Therapy referral  3. I will consider Physical Therapy referral if appropriate; Completed week of 10/18/2021  4. I will apply Jacinto hose to wear throughout the day and remove at night  5. I will walk my dog daily to help with strengthening and endurance  6. I will elevate my legs while sitting and laying down by propping them up with a pillow  7. I will discuss, review, schedule and complete recommended overdue health maintenance with my primary care provider  8. I will I will take my medications as prescribed  9. I will contact my care team with questions, concerns, support needs. I will use the clinic as a resource   10. I will contact my clinic with 24/7 after hours services available  11. Care Coordinator will remain available as needed         Discussed:    Patient stated he had a fall and couldn't get back up. He hurt his knees.    Patient stated he is taking his medications as prescribed.    Patient stated he found a pair of compression stockings that works well for him.  Patient stated the stocking have helped some to reduce the swelling in his feet.  Patient stated he elevates his legs to a comfortable level.    Patient stated he walks with the dog one block 3 times a day.    Patient stated he is having a colonoscopy next week on 3/24/22.      Patient stated he would like information on a Life Alert button.    Intervention and Education during outreach: CHW gave patient the [facility] Life Line phone number [phone].  CHW encouraged patient to contact CC if there are any other changes or resources needed.  Patient acknowledged understanding.     Plan: CHW will outreach in one month.    OMAR Kim  Clinic Care Coordination  [facility]: [facility], [place], [place], and [facility]  Phone: [phone]

## 2022-03-21 ENCOUNTER — NURSE TRIAGE (OUTPATIENT)
Dept: INTERNAL MEDICINE | Facility: CLINIC | Age: 77
End: 2022-03-21
Payer: MEDICARE

## 2022-03-21 NOTE — TELEPHONE ENCOUNTER
Patients significant other called reporting pt has sinus pressure and congestion since Friday. Has no cough, fever, breathing problems - Mucinex and rest is not helping relief symptoms. Triage advised he try home care treatments schedule appt or see UC if any worsening symptoms in the next couple days.     Reason for Disposition    Sinus congestion as part of a cold, present < 10 days    Additional Information    Negative: Sounds like a life-threatening emergency to the triager    Negative: Difficulty breathing, and not from stuffy nose (e.g., not relieved by cleaning out the nose)    Negative: SEVERE headache and has fever    Negative: Patient sounds very sick or weak to the triager    Negative: SEVERE sinus pain    Negative: Severe headache    Negative: Redness or swelling on the cheek, forehead, or around the eye    Negative: Fever > 103 F (39.4 C)    Negative: Fever > 101 F (38.3 C) and over 60 years of age    Negative: Fever > 100.0 F (37.8 C) and has diabetes mellitus or a weak immune system (e.g., HIV positive, cancer chemotherapy, organ transplant, splenectomy, chronic steroids)    Negative: Fever > 100.0 F (37.8 C) and bedridden (e.g., nursing home patient, stroke, chronic illness, recovering from surgery)    Negative: Fever present > 3 days (72 hours)    Negative: Fever returns after gone for over 24 hours and symptoms worse or not improved    Negative: Sinus pain (not just congestion) and fever    Negative: Earache    Negative: Sinus congestion (pressure, fullness) present > 10 days    Negative: Nasal discharge present > 10 days    Negative: Using nasal washes and pain medicine > 24 hours and sinus pain (lower forehead, cheekbone, or eye) persists    Negative: Lots of coughing    Negative: Patient wants to be seen    Protocols used: SINUS PAIN OR CONGESTION-A-OH

## 2022-04-04 ENCOUNTER — OFFICE VISIT (OUTPATIENT)
Dept: INTERNAL MEDICINE | Facility: CLINIC | Age: 77
End: 2022-04-04
Payer: MEDICARE

## 2022-04-04 ENCOUNTER — PATIENT OUTREACH (OUTPATIENT)
Dept: ONCOLOGY | Facility: CLINIC | Age: 77
End: 2022-04-04

## 2022-04-04 VITALS
SYSTOLIC BLOOD PRESSURE: 127 MMHG | OXYGEN SATURATION: 95 % | HEART RATE: 82 BPM | WEIGHT: 232 LBS | HEIGHT: 66 IN | DIASTOLIC BLOOD PRESSURE: 70 MMHG | BODY MASS INDEX: 37.28 KG/M2 | TEMPERATURE: 98.6 F

## 2022-04-04 DIAGNOSIS — Z00.00 ENCOUNTER FOR SUBSEQUENT ANNUAL WELLNESS VISIT IN MEDICARE PATIENT: Primary | ICD-10-CM

## 2022-04-04 DIAGNOSIS — Z12.11 COLON CANCER SCREENING: ICD-10-CM

## 2022-04-04 DIAGNOSIS — J44.9 CHRONIC OBSTRUCTIVE PULMONARY DISEASE, UNSPECIFIED COPD TYPE (H): Chronic | ICD-10-CM

## 2022-04-04 DIAGNOSIS — R20.2 PARESTHESIA: ICD-10-CM

## 2022-04-04 DIAGNOSIS — I26.99 OTHER PULMONARY EMBOLISM WITHOUT ACUTE COR PULMONALE, UNSPECIFIED CHRONICITY (H): ICD-10-CM

## 2022-04-04 DIAGNOSIS — E11.9 TYPE 2 DIABETES MELLITUS WITHOUT COMPLICATION, WITHOUT LONG-TERM CURRENT USE OF INSULIN (H): ICD-10-CM

## 2022-04-04 DIAGNOSIS — I10 PRIMARY HYPERTENSION: ICD-10-CM

## 2022-04-04 DIAGNOSIS — D47.2 MONOCLONAL PARAPROTEINEMIA: ICD-10-CM

## 2022-04-04 DIAGNOSIS — E78.5 HYPERLIPIDEMIA LDL GOAL <100: ICD-10-CM

## 2022-04-04 DIAGNOSIS — E66.01 MORBID OBESITY DUE TO EXCESS CALORIES (H): ICD-10-CM

## 2022-04-04 DIAGNOSIS — Z12.5 SCREENING PSA (PROSTATE SPECIFIC ANTIGEN): ICD-10-CM

## 2022-04-04 DIAGNOSIS — G47.33 OBSTRUCTIVE SLEEP APNEA SYNDROME: ICD-10-CM

## 2022-04-04 DIAGNOSIS — Z23 NEED FOR COVID-19 VACCINE: ICD-10-CM

## 2022-04-04 LAB — PSA SERPL-MCNC: 1.95 UG/L (ref 0–4)

## 2022-04-04 PROCEDURE — 0054A COVID-19,PF,PFIZER (12+ YRS): CPT | Performed by: INTERNAL MEDICINE

## 2022-04-04 PROCEDURE — 99214 OFFICE O/P EST MOD 30 MIN: CPT | Mod: 25 | Performed by: INTERNAL MEDICINE

## 2022-04-04 PROCEDURE — G0439 PPPS, SUBSEQ VISIT: HCPCS | Performed by: INTERNAL MEDICINE

## 2022-04-04 PROCEDURE — 91305 COVID-19,PF,PFIZER (12+ YRS): CPT | Performed by: INTERNAL MEDICINE

## 2022-04-04 PROCEDURE — G0103 PSA SCREENING: HCPCS | Performed by: INTERNAL MEDICINE

## 2022-04-04 PROCEDURE — 36415 COLL VENOUS BLD VENIPUNCTURE: CPT | Performed by: INTERNAL MEDICINE

## 2022-04-04 NOTE — PROGRESS NOTES
Writer received referral for [facility]. Reviewed for urgency based on labs and symptomology. Appropriate scheduling instructions added and referral sent to New Patient Scheduling for completion.

## 2022-04-04 NOTE — PROGRESS NOTES
SUBJECTIVE:   Nahun Villalobos is a 76 year old male who presents for Preventive Visit.    Patient has been advised of split billing requirements and indicates understanding: Yes  Are you in the first 12 months of your Medicare coverage?  No    HPI  Do you feel safe in your environment? Yes    Have you ever done Advance Care Planning? (For example, a Health Directive, POLST, or a discussion with a medical provider or your loved ones about your wishes): Yes, advance care planning is on file.       Fall risk:  low  Cognitive Screening   1) Repeat 3 items (Leader, Season, Table)    2) Clock draw: NORMAL  3) 3 item recall: Recalls 3 objects  Results: normal    Mini-CogTM Copyright S Linda. Licensed by the author for use in [facility]; reprinted with permission (soob@[facility]). All rights reserved.      Do you have sleep apnea, excessive snoring or daytime drowsiness?: yes    Reviewed and updated as needed this visit by clinical staff                  Reviewed and updated as needed this visit by Provider                 Social History     Tobacco Use     Smoking status: Former Smoker     Packs/day: 2.00     Years: 52.00     Pack years: 104.00     Types: Cigarettes, Cigars     Start date: 1960     Quit date: 2013     Years since quittin.8     Smokeless tobacco: Never Used     Tobacco comment: Quit, will never start again.   Substance Use Topics     Alcohol use: No     Alcohol/week: 0.0 standard drinks         Alcohol Use 2020   Prescreen: >3 drinks/day or >7 drinks/week? Not Applicable       Current providers sharing in care for this patient include:   Patient Care Team:  Jaime Pulliam MD as PCP - General (Neurology)  Olegario Castillo MD as Assigned PCP  Praveena Burris MD as MD (Urology)  Areli Cordero, RN as Registered Nurse  Olegario Castillo MD as Referring Physician (Internal Medicine)  Kelley Slaughter MA as Community Health Worker (Primary Care - CC)  Santi Tinajero  MD as MD (Hematology & Oncology)  Bernardo Haynes MD as MD (Pulmonary Disease)  Jean-Paul Silver DO as Assigned Musculoskeletal Provider  Antonia Mandel PT as Physical Therapist (Physical Therapist)  Rhea Knapp RN as Lead Care Coordinator  Jamil Aparicio MD as MD (Neurology)  Jaime Pulliam MD as Assigned Neuroscience Provider    The following health maintenance items are reviewed in Epic and correct as of today:  Health Maintenance Due   Topic Date Due     HF ACTION PLAN  Never done     ZOSTER IMMUNIZATION (2 of 3) 10/04/2013     DIABETIC FOOT EXAM  11/01/2019     EYE EXAM  04/01/2022       Immunization History   Administered Date(s) Administered     COVID-19,PF,Pfizer (12+ Yrs) 01/30/2021, 02/20/2021, 10/28/2021     HEPA 12/06/2010, 06/16/2011     HepB 06/16/2011, 12/30/2011, 08/29/2012     Influenza (High Dose) 3 valent vaccine 11/22/2010, 12/13/2016, 12/06/2017, 11/01/2018, 01/23/2020     Influenza (IIV3) PF 11/19/2007, 10/01/2011, 09/30/2013     Influenza, Quad, High Dose, Pf, 65yr+ (Fluzone HD) 10/01/2020, 10/05/2021     Pneumo Conj 13-V (2010&after) 03/14/2016     Pneumococcal 23 valent 03/17/2010, 10/01/2020     Poliovirus, inactivated (IPV) 12/06/2010     TD (ADULT, 7+) 12/06/2010     TDAP Vaccine (Adacel) 08/09/2013     Typhoid IM 12/06/2010     Yellow Fever 12/06/2010     Zoster vaccine, live 08/09/2013       Review of Systems  CONSTITUTIONAL: NEGATIVE for fever, chills, change in weight  EYES: NEGATIVE for vision changes or irritation  ENT/MOUTH: NEGATIVE for ear, mouth and throat problems  RESP: NEGATIVE for significant cough or SOB  CV: NEGATIVE for chest pain, palpitations or peripheral edema  GI: NEGATIVE for nausea, abdominal pain, heartburn, or change in bowel habits  : NEGATIVE for frequency, dysuria, or hematuria  NEURO: NEGATIVE for weakness, dizziness or paresthesias  HEME: NEGATIVE for bleeding problems  PSYCHIATRIC: NEGATIVE for changes in mood or  "affect    OBJECTIVE:   /70   Pulse 82   Temp 98.6  F (37  C)   Ht 1.676 m (5' 6\")   Wt 105.2 kg (232 lb)   SpO2 95%   BMI 37.45 kg/m   Estimated body mass index is 38.16 kg/m  as calculated from the following:    Height as of 2/2/22: 1.676 m (5' 6\").    Weight as of 2/2/22: 107.2 kg (236 lb 6.4 oz).     Physical Exam  GENERAL: alert and no distress, on O2  EYES: Eyes grossly normal to inspection, PERRL and conjunctivae and sclerae normal  HENT: ear canals and TM's normal, nose and mouth without ulcers or lesions  NECK: no adenopathy, no asymmetry, masses, or scars and thyroid normal to palpation  RESP: lungs clear to auscultation - no rales, rhonchi or wheezes  CV: regular rate and rhythm, normal S1 S2, no S3 or S4, no click or rub  ABDOMEN: soft, nontender, no hepatosplenomegaly, no masses and bowel sounds normal  ABDOMEN: obese  MS: no gross musculoskeletal defects noted  NEURO: No new focal changes from baseline  PSYCH: mentation appears normal, affect normal/bright    Component      Latest Ref Rng & Units 8/26/2021 1/18/2022 2/10/2022   WBC      4.0 - 11.0 10e3/uL  6.8    RBC Count      4.40 - 5.90 10e6/uL  3.57 (L)    Hemoglobin      13.3 - 17.7 g/dL  11.6 (L)    Hematocrit      40.0 - 53.0 %  37.6 (L)    MCV      78 - 100 fL  105 (H)    MCH      26.5 - 33.0 pg  32.5    MCHC      31.5 - 36.5 g/dL  30.9 (L)    RDW      10.0 - 15.0 %  12.3    Platelet Count      150 - 450 10e3/uL  239    % Neutrophils      %  50    % Lymphocytes      %  38    % Monocytes      %  8    % Eosinophils      %  3    % Basophils      %  1    Absolute Neutrophils      1.6 - 8.3 10e3/uL  3.4    Absolute Lymphocytes      0.8 - 5.3 10e3/uL  2.6    Absolute Monocytes      0.0 - 1.3 10e3/uL  0.6    Absolute Eosinophils      0.0 - 0.7 10e3/uL  0.2    Absolute Basophils      0.0 - 0.2 10e3/uL  0.0    Sodium      133 - 144 mmol/L   136   Potassium      3.4 - 5.3 mmol/L   4.1   Chloride      94 - 109 mmol/L   101   Carbon Dioxide      " 20 - 32 mmol/L   32   Anion Gap      3 - 14 mmol/L   3   Urea Nitrogen      7 - 30 mg/dL   11   Creatinine      0.66 - 1.25 mg/dL   1.11   Calcium      8.5 - 10.1 mg/dL   8.7   Glucose      70 - 99 mg/dL   128 (H)   Alkaline Phosphatase      40 - 150 U/L   70   AST      0 - 45 U/L   12   ALT      0 - 70 U/L   21   Protein Total      6.8 - 8.8 g/dL   7.5   Albumin      3.4 - 5.0 g/dL   3.2 (L)   Bilirubin Total      0.2 - 1.3 mg/dL   0.3   GFR Estimate      >60 mL/min/1.73m2   68   Cholesterol      <200 mg/dL   87   Triglycerides      <150 mg/dL   143   HDL Cholesterol      >=40 mg/dL   30 (L)   LDL Cholesterol Calculated      <=100 mg/dL   28   Non HDL Cholesterol      <130 mg/dL   57   Patient Fasting > 8hrs?         No   PSA      0.00 - 4.00 ug/L 1.27       Lab Results   Component Value Date    A1C 6.7 02/10/2022    A1C 7.5 12/10/2021    A1C 6.6 08/26/2021    A1C 6.8 03/17/2021    A1C 7.2 11/11/2020    A1C 7.5 09/20/2020    A1C 7.6 06/18/2020    A1C 7.8 01/23/2020     PSA   Date Value Ref Range Status   10/27/2017 1.98 0 - 4 ug/L Final     Comment:     Assay Method:  Chemiluminescence using Siemens Vista analyzer     Prostate Specific Antigen Screen   Date Value Ref Range Status   08/26/2021 1.27 0.00 - 4.00 ug/L Final       ASSESSMENT / PLAN:     (Z00.00) Encounter for subsequent annual wellness visit in Medicare patient  (primary encounter diagnosis)  Comment: Discussed updating shingles vaccine with patient and he will check with insurance.  Discussed updating COVID booster.  Patient is willing and prefers Pfizer  Plan:     (E11.9) Type 2 diabetes mellitus without complication, without long-term current use of insulin (H)  Comment:   Lab Results   Component Value Date    A1C 6.7 02/10/2022    A1C 7.5 12/10/2021    A1C 6.6 08/26/2021    A1C 6.8 03/17/2021    A1C 7.2 11/11/2020    A1C 7.5 09/20/2020    A1C 7.6 06/18/2020    A1C 7.8 01/23/2020     Plan: Stable and doing well with weight loss.  Continue with  current medication, recheck labs in 6 months.  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  Suggest that might benefit from hematology assessment.  Following up with neurology to assess component of testing as source for paresthesia  Plan: Adult Oncology/Hematology Referral,         OFFICE/OUTPT VISIT,EST,LEVL IV            (G47.33) Obstructive sleep apnea syndrome  Comment: Stable on O2 as directed.  Plan:     (E66.01) Morbid obesity due to excess calories (H)  Comment: Encouraged ongoing and continuing weight loss  Plan:     (I10) Primary hypertension  Comment: Stable continue with current medical manage  Plan:     (E78.5) Hyperlipidemia LDL goal <100  Comment:   LDL Cholesterol Calculated   Date Value Ref Range Status   02/10/2022 28 <=100 mg/dL Final   03/17/2021 46 <100 mg/dL Final     Comment:     Desirable:       <100 mg/dl     Plan: Medically treated as noted.    (J44.9) Chronic obstructive pulmonary disease, unspecified COPD type (H)  Comment: Continuing with supplemental O2.  Refill Trelegy for patient  Plan: Fluticasone-Umeclidin-Vilanterol (TRELEGY         ELLIPTA) 100-62.5-25 MCG/INH oral inhaler            (Z12.5) Screening PSA (prostate specific antigen)  Comment: Patient is okay with getting blood tests as screening for PSA  Plan: PSA, screen            (Z12.11) Colon cancer screening  Comment: Patient states has colonoscopy rescheduled upcoming.  He will forward results to me  Plan:       (I26.99) Other pulmonary embolism without acute cor pulmonale, unspecified chronicity (H)  Comment: Continuing with anticoagulation as noted.  Plan:   Creatinine   Date Value Ref Range Status   02/10/2022 1.11 0.66 - 1.25 mg/dL Final   03/17/2021 1.11 0.66 - 1.25 mg/dL Final     Patient has been advised of split billing requirements and indicates understanding: Yes    COUNSELING:  Reviewed preventive health counseling, as reflected in  "patient instructions       Regular exercise       Healthy diet/nutrition    Estimated body mass index is 38.16 kg/m  as calculated from the following:    Height as of 2/2/22: 1.676 m (5' 6\").    Weight as of 2/2/22: 107.2 kg (236 lb 6.4 oz).    Weight management plan: Discussed healthy diet and exercise guidelines    He reports that he quit smoking about 8 years ago. His smoking use included cigarettes and cigars. He started smoking about 61 years ago. He has a 104.00 pack-year smoking history. He has never used smokeless tobacco.      Appropriate preventive services were discussed with this patient, including applicable screening as appropriate for cardiovascular disease, diabetes, osteopenia/osteoporosis, and glaucoma.  As appropriate for age/gender, discussed screening for colorectal cancer, prostate cancer, breast cancer, and cervical cancer. Checklist reviewing preventive services available has been given to the patient.    Reviewed patients plan of care and provided an AVS. The Basic Care Plan (routine screening as documented in Health Maintenance) for Nahun meets the Care Plan requirement. This Care Plan has been established and reviewed with the Patient.    Counseling Resources:  ATP IV Guidelines  Pooled Cohorts Equation Calculator  Breast Cancer Risk Calculator  Breast Cancer: Medication to Reduce Risk  FRAX Risk Assessment  ICSI Preventive Guidelines  Dietary Guidelines for Americans, 2010  [employer]'s MyPlate  ASA Prophylaxis  Lung CA Screening    Olegario Castillo MD  [facility]    Identified Health Risks:  "

## 2022-04-05 ENCOUNTER — PATIENT OUTREACH (OUTPATIENT)
Dept: CARE COORDINATION | Facility: CLINIC | Age: 77
End: 2022-04-05
Payer: MEDICARE

## 2022-04-05 RX ORDER — GABAPENTIN 300 MG/1
CAPSULE ORAL
Qty: 180 CAPSULE | Refills: 0 | Status: SHIPPED | OUTPATIENT
Start: 2022-04-05 | End: 2022-04-06

## 2022-04-05 ASSESSMENT — ACTIVITIES OF DAILY LIVING (ADL): DEPENDENT_IADLS:: CLEANING;COOKING;LAUNDRY;SHOPPING;MEAL PREPARATION

## 2022-04-05 NOTE — LETTER
[address] [facility] [address]    [facility]  Patient Centered Plan of Care  About Me:        Patient Name:  Nahun Villalobos    YOB: 1945  Age:         76 year old   Sukumar MRN:    [id number] Telephone Information:  Home Phone [phone]   Mobile [phone]       Address:  [address] [place] [address] Email address:  [email or web address]      Emergency Contact(s)    Name Relationship Lgl Grd Work Phone Home Phone Mobile Phone   1. SINDY CHRISTIE  Daughter No  [phone]    2. SUMAN Significant ot*    [phone]           Primary language:  English     needed? No   [employer]:  [phone] op. 1  Other communication barriers:None    Preferred Method of Communication:  William  Current living arrangement: I live in a private home (lives with girlfriend)    Mobility Status/ Medical Equipment: Independent w/Device    Health Maintenance  Health Maintenance Reviewed: Due/Overdue   Health Maintenance Due   Topic Date Due     HF ACTION PLAN  Never done     ZOSTER IMMUNIZATION (2 of 3) 10/04/2013     DIABETIC FOOT EXAM  11/01/2019     EYE EXAM  04/01/2022           My Access Plan  Medical Emergency 911   Primary Clinic Line [facility] [phone]   24 Hour Appointment Line [phone] or  [phone]) (toll-free)   24 Hour Nurse Line [phone] (toll-free)   Preferred Urgent Care [facility], [phone]     Preferred Hospital [facility]  [phone] ([facility])     [facility] [place] [place] - [address] AT Portal to Registered CareGary Sites     Behavioral Health Crisis Line The National Suicide Prevention Lifeline at 1-945.384.5560 or 911     My Care Team Members  Patient Care Team       Relationship Specialty Notifications Start End    Jaime Pulliam MD PCP - General  Neurology  2/2/22     Phone: [phone] Fax: [phone]         [address] [facility] [address]    Olegario Castillo MD Assigned PCP   10/4/12     Phone: [phone] Fax: [phone]         [address] [place] [address]    Praveena Burris MD MD Urology  3/1/17     Phone: [phone] Fax: [phone]         [address] [place] [address]    Areli Cordero, RN Registered Nurse   3/1/17     Phone: [phone]         Olegario Castillo MD Referring Physician Internal Medicine  1/25/18     Phone: [phone] Fax: [phone]         [address] [place] [address]    Kelley Slaughter MA Community Health Worker Primary Care - CC  9/24/20     Phone: [phone]         Santi Tinajero MD MD Hematology & Oncology  12/7/20     Phone: [phone] Fax: [phone]         [facility] [address] [facility] [address] [facility] [address]    Bernardo Haynes MD MD Pulmonary Disease  12/7/20     Phone: [phone] Fax: [phone]         [place] [facility] [address] [facility] [address] [facility] [address]    Jean-Paul Silver DO Assigned Musculoskeletal Provider   7/4/21     Phone: [phone] Fax: [phone]         [address] [facility] [address]    Antonia Mandel, PT Physical Therapist Physical Therapist  8/2/21     Phone: [phone] Fax: [phone]         [address] [place] [address]    Rhea Knapp, RN Lead Care Coordinator  Admissions 10/11/21     Jamil Aparicio MD MD Neurology  2/2/22     Phone: [phone] Fax: [phone] [address] [place] [address]    Jaime Pulliam MD Assigned Neuroscience Provider   3/20/22     Phone: [phone] Fax: [phone]         [address] [facility] [address]            My Care Plans  Self Management and Treatment Plan  Goals and (Comments)  Goals        General     1. Improve chronic symptoms (pt-stated)      Notes - Note edited  4/5/2022  9:19  AM by Rhea Knapp RN     Goal Statement: I want to improve management of my leg, knee, and hip pain and swelling.  Date Goal set: 6/3/21  Barriers: lymphedema  Strengths: motivated to improve ability to walk  Date to Achieve By: 12/3/21 // Modified/edited to 6/3/2022  Patient expressed understanding of goal: Yes    Action steps to achieve this goal:    1. I will apply Jacinto hose to wear throughout the day and remove at night  2. I will walk my dog daily to help with strengthening and endurance  3. I will elevate my legs while sitting and laying down by propping them up with a pillow  4. I will discuss, review, schedule and complete recommended overdue health maintenance with my primary care provider  5. I will I will take my medications as prescribed  6. I will contact my care team with questions, concerns, support needs. I will use the clinic as a resource   7. I will contact my clinic with 24/7 after hours services available  8. Care Coordinator will remain available as needed             Action Plans on File:   COPD  Depression    Advance Care Plans/Directives Type:   No data recorded    My Medical and Care Information  Problem List   Patient Active Problem List   Diagnosis     Essential hypertension, benign     Tobacco use disorder     Lumbago     Impotence of organic origin     Advance care planning     Polyp of colon     Obstructive sleep apnea syndrome     Hyperlipidemia LDL goal <100     Morbid obesity due to excess calories (H)     BPPV (benign paroxysmal positional vertigo), unspecified laterality     Chronic obstructive pulmonary disease, unspecified COPD type (H)     Type 2 diabetes mellitus without complication, without long-term current use of insulin (H)     S/P transurethral resection of prostate     Hematuria, gross     Cervical segment dysfunction     Cervicalgia     Thoracic segment dysfunction     Erectile dysfunction     HTN (hypertension)     Acute diverticulitis     Other pulmonary  embolism without acute cor pulmonale, unspecified chronicity (H)     Mesenteric mass     History of adenocarcinoma of lung      Current Medications and Allergies:   Allergies   Allergen Reactions     No Known Drug Allergies      Current Outpatient Medications   Medication     acetaminophen (TYLENOL) 500 MG tablet     albuterol (VENTOLIN HFA) 108 (90 Base) MCG/ACT inhaler     Ascorbic Acid (VITAMIN C PO)     atorvastatin (LIPITOR) 20 MG tablet     blood glucose (ACCU-CHEK CHACHA) test strip     blood glucose (NO BRAND SPECIFIED) lancets standard     blood glucose (NO BRAND SPECIFIED) test strip     blood glucose monitoring (NO BRAND SPECIFIED) meter device kit     docusate sodium (COLACE) 100 MG capsule     ELIQUIS ANTICOAGULANT 5 MG tablet     finasteride (PROSCAR) 5 MG tablet     Fluticasone-Umeclidin-Vilanterol (TRELEGY ELLIPTA) 100-62.5-25 MCG/INH oral inhaler     furosemide (LASIX) 40 MG tablet     gabapentin (NEURONTIN) 300 MG capsule     lisinopril (ZESTRIL) 40 MG tablet     metFORMIN (GLUCOPHAGE) 500 MG tablet     metoprolol succinate ER (TOPROL-XL) 25 MG 24 hr tablet     order for DME     order for DME     No current facility-administered medications for this visit.         Care Coordination Start Date: 9/24/2020   Frequency of Care Coordination: 6 weeks     Form Last Updated: 04/05/2022

## 2022-04-05 NOTE — PROGRESS NOTES
Clinic Care Coordination Contact    Clinic Care Coordination Contact  Care Coordination Clinician Chart review    Situation: Patient chart reviewed by care coordinator.       Background: Care Coordination initial assessment and enrollment took place 6/3/2021.   Upon initial assessment patient-centered goals were discussed and developed with participation from patient.  RNCC handed patient follow up and monitoring of goal progression off to CHW for continued outreach every 30 days.        Assessment: Per chart review, patient outreach completed by CC CHW on 3/14/2022.  Patient is actively working to accomplish goal and patient's goal remains appropriate and relevant at this time.       Goals        1. Improve chronic symptoms (pt-stated)       Goal Statement: I want to improve management of my leg, knee, and hip pain and swelling.  Date Goal set: 6/3/21  Barriers: lymphedema  Strengths: motivated to improve ability to walk  Date to Achieve By: 12/3/21 // Modified/edited to 6/3/2022  Patient expressed understanding of goal: Yes    Action steps to achieve this goal:    1. I will apply Jacinto hose to wear throughout the day and remove at night  2. I will walk my dog daily to help with strengthening and endurance  3. I will elevate my legs while sitting and laying down by propping them up with a pillow  4. I will discuss, review, schedule and complete recommended overdue health maintenance with my primary care provider  5. I will I will take my medications as prescribed  6. I will contact my care team with questions, concerns, support needs. I will use the clinic as a resource   7. I will contact my clinic with 24/7 after hours services available  8. Care Coordinator will remain available as needed                Plan/Recommendations: RNCC Clinical Assessments to be completed annually or as needed. Annual Assessment will be due 6/3/2022. The patient will continue working with Care Coordination to achieve goal as above.  CHW  will involve RNCC as needed or if patient is ready to transition to goal status of Maintenance.  RNCC will continue to review progress to goals and CHW outreaches every 6 weeks.     Complex Care Plan:  Patient is due for Complex Care Plan to be updated.  Care Plan updated and mailed to patient: Yes, via [facility]   Rhea Knapp RN, Care Coordinator   [facility] [place], [facility], [facility], [place]  E-mail [email or web address]   [phone]

## 2022-04-06 ENCOUNTER — OFFICE VISIT (OUTPATIENT)
Dept: NEUROLOGY | Facility: CLINIC | Age: 77
End: 2022-04-06
Payer: MEDICARE

## 2022-04-06 VITALS — HEART RATE: 70 BPM | DIASTOLIC BLOOD PRESSURE: 75 MMHG | OXYGEN SATURATION: 96 % | SYSTOLIC BLOOD PRESSURE: 121 MMHG

## 2022-04-06 DIAGNOSIS — G62.9 PERIPHERAL POLYNEUROPATHY: Primary | ICD-10-CM

## 2022-04-06 DIAGNOSIS — G56.03 CARPAL TUNNEL SYNDROME, BILATERAL: ICD-10-CM

## 2022-04-06 PROCEDURE — 99214 OFFICE O/P EST MOD 30 MIN: CPT | Performed by: PSYCHIATRY & NEUROLOGY

## 2022-04-06 RX ORDER — GABAPENTIN 300 MG/1
300 CAPSULE ORAL 3 TIMES DAILY
Qty: 270 CAPSULE | Refills: 1 | Status: SHIPPED | OUTPATIENT
Start: 2022-04-27 | End: 2022-06-27

## 2022-04-06 NOTE — PROGRESS NOTES
"Nahun Villalobos is a 76 year old male who presents for:  Chief Complaint   Patient presents with     Follow Up     Review EMG- patient would like updates sent to [place] PCP (Olegario Castillo MD).        Initial Vitals:  /61 (BP Location: Left arm, Patient Position: Sitting, Cuff Size: Adult Large)   Pulse 71   SpO2 94%  Estimated body mass index is 37.45 kg/m  as calculated from the following:    Height as of 4/4/22: 1.676 m (5' 6\").    Weight as of 4/4/22: 105.2 kg (232 lb).. There is no height or weight on file to calculate BSA. BP completed using cuff size: regular    Nursing Comments: OrthostaticToday   Supine, Left Arm, Large Cuff= BP- 116/67, Pulse 70, O2-94%  Standing, Left Arm, Large Cuff= BP- 121/75, Pulse- 70, O2-96%    Shakira Solis  "

## 2022-04-06 NOTE — PROGRESS NOTES
ESTABLISHED PATIENT NEUROLOGY NOTE    DATE OF VISIT: 4/6/2022  CLINIC LOCATION: [facility]  MRN: [id number]  PATIENT NAME: Nahun Villalobos  YOB: 1945    REASON FOR VISIT:   Chief Complaint   Patient presents with     Follow Up     Review EMG- patient would like updates sent to [place] PCP (Olegario Castillo MD).     SUBJECTIVE:                                                      HISTORY OF PRESENT ILLNESS: Patient is here to follow up regarding bilateral feet and hands paresthesias. Please refer to my initial note from 2/2/2022 for further information.  He is accompanied by his female partner.    Since the last visit, the patient reports no changes in his symptoms. He denies interval development of new focal neurological symptoms.    Laboratory evaluation, including methylmalonic acid, TSH, vitamin B1, B6, and E, was normal.  Vitamin B12 was low normal at 278.  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  He will see hematologist-oncologist on advice of his primary care provider about this finding.    EMG demonstrated evidence of bilateral median neuropathy at the wrist and length dependent peripheral polyneuropathy.  Tabulated data from EMG report were personally reviewed and independently interpreted.  EXAM:                                                    Physical Exam:   Vitals: /61 (BP Location: Left arm, Patient Position: Sitting, Cuff Size: Adult Large)   Pulse 71   SpO2 94%   Orthostatic vital signs:  Vitals:    04/06/22 1200 04/06/22 1234 04/06/22 1311   BP: 121/61 116/67 121/75   BP Location: Left arm Left arm Left arm   Patient Position: Sitting Supine Standing   Cuff Size: Adult Large Adult Large Adult Large   Pulse: 71 70 70   SpO2: 94% 94% 96%   .  General: pt is in NAD, cooperative.  Skin: normal turgor, moist mucous membranes, no lesions/rashes noticed.  HEENT: ATNC,  white sclera, normal conjunctiva.  Respiratory: Symmetric lung excursion, no accessory respiratory muscle use.  Abdomen: Non distended.  Neurological: awake, cooperative, follows commands, no exam changes compared to the last visit.    ASSESSMENT AND PLAN:                                                    Assessment: 76 year old male patient presents for follow-up of bilateral hands and feet paresthesias after the completion of recommended work-up.  His laboratory work-up was essentially unrevealing.  EMG demonstrated evidence of bilateral moderate carpal tunnel syndrome and lines dependent peripheral polyneuropathy.  Orthostatic testing today is negative.  We reviewed the diagnosis, available treatment options, and the plan.    Diagnoses:    ICD-10-CM    1. Peripheral polyneuropathy  G62.9 gabapentin (NEURONTIN) 300 MG capsule   2. Carpal tunnel syndrome, bilateral  G56.03 Wrist/Arm Supplies Order     Plan: At today's visit we thoroughly discussed current symptoms, evaluation results, diagnosis, available treatment options, and the plan.    We decided to increase the dose of gabapentin to treat his peripheral polyneuropathy.  I advised him to take 300 mg in the morning and 600 mg in the evening every day.  Updated prescription was sent to his pharmacy.    Regarding bilateral carpal tunnel syndrome, I prescribed him bilateral wrist splints that he needs to wear every night for the next 2 to 3 months and also during the day with repetitive wrist movements.    Next follow-up appointment is in the next 2-3 months or earlier if needed.    Total Time: 31 minutes spent on the date of the encounter doing chart review, history and exam, documentation and further activities per the note.    Jamil Aparicio MD  [facility]  (Chart documentation was completed in part with Dragon voice-recognition software. Even though reviewed, some grammatical, spelling, and word errors may remain.)

## 2022-04-06 NOTE — PATIENT INSTRUCTIONS
AFTER VISIT SUMMARY (AVS):    At today's visit we thoroughly discussed current symptoms, evaluation results, diagnosis, available treatment options, and the plan.    We decided to increase the dose of gabapentin to treat your peripheral polyneuropathy.  Please take 300 mg in the morning and 600 mg in the evening every day.  Updated prescription was sent to your pharmacy.    Regarding your bilateral carpal tunnel syndrome, I prescribed you bilateral wrist splints that you need to wear every night for the next 2 to 3 months and also during the day with repetitive wrist movements.    Next follow-up appointment is in the next 2-3 months or earlier if needed.    Please do not hesitate to call me with any questions or concerns.    Thanks.

## 2022-04-11 NOTE — PROGRESS NOTES
RECORDS STATUS - ALL OTHER DIAGNOSIS      Action    Action Taken 5/11/2022 11:16AM LUCIO EDWARDS [place]ed [place] [facility]      [place]   DATE RECEIVED: 5/12/2022   NOTES STATUS DETAILS   OFFICE NOTE from referring provider Complete Olegario Shields MD   DISCHARGE SUMMARY from hospital     DISCHARGE REPORT from the ER     OPERATIVE REPORT     MEDICATION LIST Complete Epic      CLINICAL TRIAL TREATMENTS TO DATE     LABS     PATHOLOGY REPORTS     ANYTHING RELATED TO DIAGNOSIS Complete Labs last updated on 4/4/2022   GENONOMIC TESTING     TYPE:     IMAGING (NEED IMAGES & REPORT)     CT SCANS     MRI     MAMMO     ULTRASOUND Complete US Lower Extremity 1/27/2022   PET

## 2022-04-26 ENCOUNTER — PATIENT OUTREACH (OUTPATIENT)
Dept: CARE COORDINATION | Facility: CLINIC | Age: 77
End: 2022-04-26
Payer: MEDICARE

## 2022-04-26 NOTE — PROGRESS NOTES
Clinic Care Coordination Contact  [facility]/Voicemail       Clinical Data: Care Coordinator Outreach  Outreach attempted x 1.  Left message on patient's voicemail with call back information and requested return call.    Plan: Care Coordinator will try to reach patient again in 10 business days.    OMAR Kim  Clinic Care Coordination  [facility]: [place] [place], [place], [place], and [place] [facility]  Phone: [phone]

## 2022-05-12 ENCOUNTER — VIRTUAL VISIT (OUTPATIENT)
Dept: ONCOLOGY | Facility: CLINIC | Age: 77
End: 2022-05-12
Attending: INTERNAL MEDICINE
Payer: MEDICARE

## 2022-05-12 ENCOUNTER — PRE VISIT (OUTPATIENT)
Dept: ONCOLOGY | Facility: CLINIC | Age: 77
End: 2022-05-12

## 2022-05-12 DIAGNOSIS — D47.2 MONOCLONAL PARAPROTEINEMIA: ICD-10-CM

## 2022-05-12 PROCEDURE — 99204 OFFICE O/P NEW MOD 45 MIN: CPT | Mod: 95 | Performed by: INTERNAL MEDICINE

## 2022-05-12 NOTE — LETTER
5/12/2022         RE: Nahun Villalobos  [address]  [facility] [address]        Dear Colleague,    Thank you for referring your patient, Nahun Villalobos, to the [facility] [place]. Please see a copy of my visit note below.    Javon is a 76 year old who is being evaluated via a billable video visit.  Currently in state of [place].    How would you like to obtain your AVS? MyChart  If the video visit is dropped, the invitation should be resent by: Text to cell phone: [phone]  Will anyone else be joining your video visit? Yes, patients girlfriend Adina Escoto, PAVEL       [facility]    NEW PATIENT VISIT NOTE    PATIENT NAME: Nahun Villalobos MRN # [id number]  DATE OF VISIT: May 12, 2022 YOB: 1945    REFERRING PROVIDER: Olegario Castillo MD  [address] [place] [id number]     HISTORY OF PRESENT ILLNESS     His left hand tingles all the time. Right hand tingles off and on. Walking is getting difficult.     He had right lung cancer. He had it cut out. He used to be stronger than ox. He cannot do much now. He sleeps only 5-6 hrs at night. He sleeps at 9 pm and wakes up by 2-3 am. He sleeps in a recliner. His shoulder are stiff.     If he is not wearing his shoes, he has weird feeling in his legs. He feels the feet are numb. He can walk okay with shoes on.     He does have hypertension, diabetes mellitus type II.      PAST MEDICAL HISTORY     Past Medical History:   Diagnosis Date     Arthritis      CHF (congestive heart failure) (H) 9/16/2020     COPD (chronic obstructive pulmonary disease) (H)      DM (diabetes mellitus) (H)      Essential hypertension, benign      Hemorrhage of rectum and anus      Non-small cell lung cancer (H)      Obesity      Personal history of colonic polyps      Tobacco use disorder      Urinary retention           CURRENT OUTPATIENT MEDICATIONS     Current Outpatient Medications   Medication  Sig     acetaminophen (TYLENOL) 500 MG tablet Take 500 mg by mouth every evening     albuterol (VENTOLIN HFA) 108 (90 Base) MCG/ACT inhaler INHALE 2 PUFFS INTO THE LUNGS EVERY 6 HOURS AS NEEDED FOR SHORTNESS OF BREATH / DYSPNEA OR WHEEZING     Ascorbic Acid (VITAMIN C PO) Take 500 mg by mouth At Bedtime      atorvastatin (LIPITOR) 20 MG tablet Take 1 tablet (20 mg) by mouth daily     blood glucose (ACCU-CHEK CHACHA) test strip Use to test blood sugar 2 times daily or as directed.     blood glucose (NO BRAND SPECIFIED) lancets standard Use to test blood sugar 1 time daily, first thing in the morning     blood glucose (NO BRAND SPECIFIED) test strip Use to test blood sugar 1 time daily, first thing in the morning.     blood glucose monitoring (NO BRAND SPECIFIED) meter device kit Use to test blood sugar 1 time daily, first thing in the morning     docusate sodium (COLACE) 100 MG capsule Take 100 mg by mouth 2 times daily as needed for constipation     ELIQUIS ANTICOAGULANT 5 MG tablet TAKE 1 TABLET TWICE A DAY     finasteride (PROSCAR) 5 MG tablet Take 1 tablet (5 mg) by mouth daily     Fluticasone-Umeclidin-Vilanterol (TRELEGY ELLIPTA) 100-62.5-25 MCG/INH oral inhaler Inhale 1 puff into the lungs daily     furosemide (LASIX) 40 MG tablet TAKE 1 TABLET BY MOUTH 2 TIMES DAILY     gabapentin (NEURONTIN) 300 MG capsule Take 1 capsule (300 mg) by mouth 3 times daily     lisinopril (ZESTRIL) 40 MG tablet TAKE 1 TABLET DAILY     metFORMIN (GLUCOPHAGE) 500 MG tablet 2 tabs at breakfast, 1 tablet at dinner     metoprolol succinate ER (TOPROL-XL) 25 MG 24 hr tablet Take 1 tablet (25 mg) by mouth daily     order for DME Equipment being ordered: diabetic shoes, 1 pair     order for DME Oxygen 2 Li/min  at night via nasal cannula     No current facility-administered medications for this visit.        ALLERGIES      Allergies   Allergen Reactions     No Known Drug Allergies         SOCIAL HISTORY     Single  - Wife  . He has 75  yr old girl friend. He worked at [employer]. 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     He quit smoking 8-9 yrs ago. He smoked about 2 packs a day for          FAMILY HISTORY   Father had stroke  Sister has dementia at 80     REVIEW OF SYSTEMS   As above in the HPI, o/w complete 12-point ROS was negative.     PHYSICAL EXAM   B/P: Data Unavailable, T: Data Unavailable, P: Data Unavailable, R: Data Unavailable  @[email or web address](4)@  Wt Readings from Last 3 Encounters:   04/04/22 105.2 kg (232 lb)   02/02/22 107.2 kg (236 lb 6.4 oz)   01/18/22 109.5 kg (241 lb 4.8 oz)     GEN: NAD  HEENT: PERRL, EOMI, no icterus, injection or pallor. Oropharynx is clear.  NECK: no cervical or supraclavicular lymphadenopathy  LUNGS: clear bilaterally  CV: regular, no murmurs, rubs, or gallops  ABDOMEN: soft, non-tender, non-distended, normal bowel sounds, no hepatosplenomegaly by percussion or palpation  EXT: warm, well perfused, no edema  NEURO: alert  SKIN: no rashes     LABORATORY AND IMAGING STUDIES     Recent Labs   Lab Test 02/10/22  1016 01/18/22  1129 11/05/21  1018 08/30/21  1250 03/17/21  0932 11/11/20  1256 09/21/20  0754     --  134 137 135  --  134   POTASSIUM 4.1  --  4.0 3.7 4.1 3.9 4.6   CHLORIDE 101  --  100 102 103  --  102   CO2 32  --  25 28 27  --  27   ANIONGAP 3  --  9 7 5  --  5   BUN 11  --  16 9 19  --  8   CR 1.11 1.09 1.05 1.00 1.11  --  1.04   *  --  204* 142* 155*  --  199*   JESSE 8.7  --  9.4 9.4 8.9  --  8.4*     Recent Labs   Lab Test 09/21/20  0754   MAG 2.4*     Recent Labs   Lab Test 01/18/22  1129 12/10/21  1214 11/05/21  1018 05/27/21  1052 11/11/20  1256 09/21/20  0754 09/20/20  1441 03/16/20  0918 08/28/19  0901 08/18/18  0929   WBC 6.8 8.6 9.3 7.4  --  9.2 11.2* 9.5  --  9.4   HGB 11.6* 11.7* 12.2* 13.2* 11.6* 12.2* 12.6* 13.8   < > 12.7*    297 308 258 263 271 309 266  --  298   * 102* 100 103*  --  103* 103* 103*  --  99    NEUTROPHIL 50  --  52  --   --   --  61.3 56.6  --  50.4    < > = values in this interval not displayed.     Recent Labs   Lab Test 02/10/22  1016 11/05/21  1018 03/17/21  0932   BILITOTAL 0.3 0.3 0.3   ALKPHOS 70 78 61   ALT 21 24 24   AST 12 16 21   ALBUMIN 3.2* 3.2* 3.5     TSH   Date Value Ref Range Status   02/10/2022 2.47 0.40 - 4.00 mU/L Final   08/28/2019 2.88 0.40 - 4.00 mU/L Final   12/07/2017 2.39 0.40 - 4.00 mU/L Final   03/15/2016 2.15 0.40 - 4.00 mU/L Final     No results for input(s): CEA in the last 83208 hours.  Results for orders placed or performed during the hospital encounter of 01/27/22   US Lower Extremity Venous Duplex Bilateral    Narrative    VENOUS ULTRASOUND BILATERAL LEG(S)  1/27/2022 9:14 AM     HISTORY: Edema, unspecified type. Bilateral lower extremity swelling  left first great    COMPARISON: None.    FINDINGS: Examination of the deep veins with graded compression and  color flow Doppler with spectral wave form analysis was performed.  Images show no evidence of thrombus in the bilateral common femoral  vein, femoral vein, popliteal vein or calf veins.      Impression    IMPRESSION: No deep vein thrombosis in either lower extremity.      GUERITA FIGUEROA DO         SYSTEM ID:  [id number]       Lab Results   Component Value Date    PATH  02/28/2020     Patient Name: KEELY FRANCE  MR#: [id number]  Specimen #: [id number]  Collected: 2/28/2020  Received: 2/28/2020  Reported: 3/11/2020 17:02  Ordering Phy(s): RIANNA HERNANDEZ    For improved result formatting, select 'View Enhanced Report Format' under   Linked Documents section.    SPECIMEN/STAIN PROCESS:  A: Urine, voided with FISH       Pap-Cyto x 1  B: FISH UroVysion       FISH-CEP 3 x 1, FISH-CEP 7 x 1, FISH-CEP 17 x 1, FISH-LCI 9P21 x 1,   UroVysion x 1    ----------------------------------------------------------------    CYTOLOGIC INTERPRETATION:    A.  Urine, voided with FISH:  Negative for High-Grade Urothelial  Carcinoma  Specimen Adequacy: Satisfactory for evaluation.    B.  FISH UroVysion:  Negative UroVysion test  Specimen Adequacy: Satisfactory for evaluation.    I have personally reviewed all specimens and/or slides, including the   listed special stains, and used them  with my medical judgement to determine or confirm the final diagnosis.    Electronically signed out by:  Brooklyn Carvre M.D., [facility]    CLINICAL HISTORY:  Hematuria.    ,    GROSS:  A. Urine, voided with FISH:  Received 60 ml of yellow, hazy fluid,   processed as 1 Pap stained Autocyte..    B. FISH UroVysion:  split with part A.    Negative:  No interphase cytogenetic changes consistent with urothelial   carcinoma by using the UroVysion FISH  probe set.  This test result does not rule out the possibility that the   patient may have a low-grade  non-invasive papillary urothelial carcinoma.    METHODS:    Florescence in-situ hybridization (FISH) was performed on urine using the   Vysis UroVysion DNA probe set to the  centromere region of chromosome 3, 7, and 17 and the 9p21 locus.  At least   25 non-inflammatory cells were  scored. [facility], validated the   performance characteristics of this  assay.    MICROSCOPIC:  Microscopic examination is performed.  Catracho Michel MD Cytopathology fellow           Brooklyn Carver MD   Attending    CPT Codes:  A: 29688-[id number]  B: [id number]    COLLECTION SITE:  Client:  [facility]  Location:  [facility] (B)    The technical component of this testing was completed at the [facility], with the professional component performed   at the [facility], [address] [place] [phone] ([phone])    Resident  [id number]       Recent Labs   Lab Test 01/18/22  1128   ELPM 0.1*           ASSESSMENT    Monoclonal gammopathy of  undetermined significance with a monoclonal spike of 0.1 g/dL   Peripheral neuropathy  Non-small cell lung cancer post surgical resection  Essential hypertension, diabetes mellitus type 2, obesity, tobacco use, COPD     DISCUSSION   I had a lengthy discussion with Mr. Villalobos at this video visit. I reviewed the finding of his new monoclonal gammopathy. This is considered a pre-malignant condition involving the plasma cells. I reviewed the 3 involved entities including MGUS, smoldering multiple myeloma(MM) and MM.  Multiple myeloma is a serious malignancy involving the plasma cell and has aggressive course.  He barely has a small spike of 0.1 g/dl which has been identified incidentally as is usually the case.  He does not have chronic kidney disease, vasculitis, hemolytic anemia, skin rashes, hypercalcemia which are often seen with multiple myeloma.  He has significant peripheral neuropathy which could also be due to his hypertension, type 2 diabetes mellitus and other comorbidities.    I reviewed some additional tests that could be done as a part of workup including the free light chain assay, beta 2 microglobulin, gamma globulin subsets, urinary protein electrophoresis. While often a bone marrow biopsy is done, it is not mandated given the small spike in abnormal protein.     We could follow him serially with monoclonal spike and work up more aggressively if there was suddenly a rapid increase. The likelihood of having this disease remains small.      PLAN   I would complete some additional work up for his MGUS  Serial follow up of monoclonal spike can be considered,   I would schedule a follow up appointment once the above test results are available.     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  He has mild elevation in his kappa free light chain in the blood with borderline elevation in his kappa to  lambda ratio.  He has a mild elevation in his kappa light chain and undetectable lambda light chain in his urine giving him an abnormal kappa to lambda ratio in the urine similar to blood.  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  I would recommend that we monitor him over time -every 6 to 12 months.    Recent Labs   Lab Test 05/20/22  1237 01/18/22  1128   *  --    IGG 1,075  --    IGM 29*  --    ELPM 0.2* 0.1*     Video-Visit Details    Type of service:  Video Visit  Originating Location (pt. Location): Home  Distant Location (provider location):  [facility]  Platform used for Video Visit: CreaWor    45 minutes spent on the date of the encounter doing chart review, history and exam, documentation and further activities as noted above      Husam Contreras    Hematologist and Medical Oncologist  [facility]        Again, thank you for allowing me to participate in the care of your patient.        Sincerely,        Husam Contreras MD

## 2022-05-12 NOTE — PROGRESS NOTES
Javon is a 76 year old who is being evaluated via a billable video visit.  Currently in [facility].    How would you like to obtain your AVS? MyChart  If the video visit is dropped, the invitation should be resent by: Text to cell phone: [phone]  Will anyone else be joining your video visit? Yes, patients girlfriend Adina Escoto,

## 2022-05-12 NOTE — LETTER
5/12/2022         RE: Nahun Villalobos  [address]  [facility] [address]        Dear Colleague,    Thank you for referring your patient, Nahun Villalobos, to the [facility] [place]. Please see a copy of my visit note below.    Javon is a 76 year old who is being evaluated via a billable video visit.  Currently in state of [place].    How would you like to obtain your AVS? MyChart  If the video visit is dropped, the invitation should be resent by: Text to cell phone: [phone]  Will anyone else be joining your video visit? Yes, patients girlfriend Adina Escoto, PAVEL       [facility]    NEW PATIENT VISIT NOTE    PATIENT NAME: Nahun Villalobos MRN # [id number]  DATE OF VISIT: May 12, 2022 YOB: 1945    REFERRING PROVIDER: Olegario Castillo MD  [address] [place] [id number]     HISTORY OF PRESENT ILLNESS     His left hand tingles all the time. Right hand tingles off and on. Walking is getting difficult.     He had right lung cancer. He had it cut out. He used to be stronger than ox. He cannot do much now. He sleeps only 5-6 hrs at night. He sleeps at 9 pm and wakes up by 2-3 am. He sleeps in a recliner. His shoulder are stiff.     If he is not wearing his shoes, he has weird feeling in his legs. He feels the feet are numb. He can walk okay with shoes on.     He does have hypertension, diabetes mellitus type II.      PAST MEDICAL HISTORY     Past Medical History:   Diagnosis Date     Arthritis      CHF (congestive heart failure) (H) 9/16/2020     COPD (chronic obstructive pulmonary disease) (H)      DM (diabetes mellitus) (H)      Essential hypertension, benign      Hemorrhage of rectum and anus      Non-small cell lung cancer (H)      Obesity      Personal history of colonic polyps      Tobacco use disorder      Urinary retention           CURRENT OUTPATIENT MEDICATIONS     Current Outpatient Medications   Medication  Sig     acetaminophen (TYLENOL) 500 MG tablet Take 500 mg by mouth every evening     albuterol (VENTOLIN HFA) 108 (90 Base) MCG/ACT inhaler INHALE 2 PUFFS INTO THE LUNGS EVERY 6 HOURS AS NEEDED FOR SHORTNESS OF BREATH / DYSPNEA OR WHEEZING     Ascorbic Acid (VITAMIN C PO) Take 500 mg by mouth At Bedtime      atorvastatin (LIPITOR) 20 MG tablet Take 1 tablet (20 mg) by mouth daily     blood glucose (ACCU-CHEK CHACHA) test strip Use to test blood sugar 2 times daily or as directed.     blood glucose (NO BRAND SPECIFIED) lancets standard Use to test blood sugar 1 time daily, first thing in the morning     blood glucose (NO BRAND SPECIFIED) test strip Use to test blood sugar 1 time daily, first thing in the morning.     blood glucose monitoring (NO BRAND SPECIFIED) meter device kit Use to test blood sugar 1 time daily, first thing in the morning     docusate sodium (COLACE) 100 MG capsule Take 100 mg by mouth 2 times daily as needed for constipation     ELIQUIS ANTICOAGULANT 5 MG tablet TAKE 1 TABLET TWICE A DAY     finasteride (PROSCAR) 5 MG tablet Take 1 tablet (5 mg) by mouth daily     Fluticasone-Umeclidin-Vilanterol (TRELEGY ELLIPTA) 100-62.5-25 MCG/INH oral inhaler Inhale 1 puff into the lungs daily     furosemide (LASIX) 40 MG tablet TAKE 1 TABLET BY MOUTH 2 TIMES DAILY     gabapentin (NEURONTIN) 300 MG capsule Take 1 capsule (300 mg) by mouth 3 times daily     lisinopril (ZESTRIL) 40 MG tablet TAKE 1 TABLET DAILY     metFORMIN (GLUCOPHAGE) 500 MG tablet 2 tabs at breakfast, 1 tablet at dinner     metoprolol succinate ER (TOPROL-XL) 25 MG 24 hr tablet Take 1 tablet (25 mg) by mouth daily     order for DME Equipment being ordered: diabetic shoes, 1 pair     order for DME Oxygen 2 Li/min  at night via nasal cannula     No current facility-administered medications for this visit.        ALLERGIES      Allergies   Allergen Reactions     No Known Drug Allergies         SOCIAL HISTORY     Single  - Wife  . He has 75  yr old girl friend. He worked at [employer]. 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     He quit smoking 8-9 yrs ago. He smoked about 2 packs a day for          FAMILY HISTORY   Father had stroke  Sister has dementia at 80     REVIEW OF SYSTEMS   As above in the HPI, o/w complete 12-point ROS was negative.     PHYSICAL EXAM   B/P: Data Unavailable, T: Data Unavailable, P: Data Unavailable, R: Data Unavailable  @[email or web address](4)@  Wt Readings from Last 3 Encounters:   04/04/22 105.2 kg (232 lb)   02/02/22 107.2 kg (236 lb 6.4 oz)   01/18/22 109.5 kg (241 lb 4.8 oz)     GEN: NAD  HEENT: PERRL, EOMI, no icterus, injection or pallor. Oropharynx is clear.  NECK: no cervical or supraclavicular lymphadenopathy  LUNGS: clear bilaterally  CV: regular, no murmurs, rubs, or gallops  ABDOMEN: soft, non-tender, non-distended, normal bowel sounds, no hepatosplenomegaly by percussion or palpation  EXT: warm, well perfused, no edema  NEURO: alert  SKIN: no rashes     LABORATORY AND IMAGING STUDIES     Recent Labs   Lab Test 02/10/22  1016 01/18/22  1129 11/05/21  1018 08/30/21  1250 03/17/21  0932 11/11/20  1256 09/21/20  0754     --  134 137 135  --  134   POTASSIUM 4.1  --  4.0 3.7 4.1 3.9 4.6   CHLORIDE 101  --  100 102 103  --  102   CO2 32  --  25 28 27  --  27   ANIONGAP 3  --  9 7 5  --  5   BUN 11  --  16 9 19  --  8   CR 1.11 1.09 1.05 1.00 1.11  --  1.04   *  --  204* 142* 155*  --  199*   JESSE 8.7  --  9.4 9.4 8.9  --  8.4*     Recent Labs   Lab Test 09/21/20  0754   MAG 2.4*     Recent Labs   Lab Test 01/18/22  1129 12/10/21  1214 11/05/21  1018 05/27/21  1052 11/11/20  1256 09/21/20  0754 09/20/20  1441 03/16/20  0918 08/28/19  0901 08/18/18  0929   WBC 6.8 8.6 9.3 7.4  --  9.2 11.2* 9.5  --  9.4   HGB 11.6* 11.7* 12.2* 13.2* 11.6* 12.2* 12.6* 13.8   < > 12.7*    297 308 258 263 271 309 266  --  298   * 102* 100 103*  --  103* 103* 103*  --  99    NEUTROPHIL 50  --  52  --   --   --  61.3 56.6  --  50.4    < > = values in this interval not displayed.     Recent Labs   Lab Test 02/10/22  1016 11/05/21  1018 03/17/21  0932   BILITOTAL 0.3 0.3 0.3   ALKPHOS 70 78 61   ALT 21 24 24   AST 12 16 21   ALBUMIN 3.2* 3.2* 3.5     TSH   Date Value Ref Range Status   02/10/2022 2.47 0.40 - 4.00 mU/L Final   08/28/2019 2.88 0.40 - 4.00 mU/L Final   12/07/2017 2.39 0.40 - 4.00 mU/L Final   03/15/2016 2.15 0.40 - 4.00 mU/L Final     No results for input(s): CEA in the last 44678 hours.  Results for orders placed or performed during the hospital encounter of 01/27/22   US Lower Extremity Venous Duplex Bilateral    Narrative    VENOUS ULTRASOUND BILATERAL LEG(S)  1/27/2022 9:14 AM     HISTORY: Edema, unspecified type. Bilateral lower extremity swelling  left first great    COMPARISON: None.    FINDINGS: Examination of the deep veins with graded compression and  color flow Doppler with spectral wave form analysis was performed.  Images show no evidence of thrombus in the bilateral common femoral  vein, femoral vein, popliteal vein or calf veins.      Impression    IMPRESSION: No deep vein thrombosis in either lower extremity.      GUERITA FIGUEROA DO         SYSTEM ID:  [id number]       Lab Results   Component Value Date    PATH  02/28/2020     Patient Name: KEELY FRANCE  MR#: [id number]  Specimen #: [id number]  Collected: 2/28/2020  Received: 2/28/2020  Reported: 3/11/2020 17:02  Ordering Phy(s): RIANNA HERNANDEZ    For improved result formatting, select 'View Enhanced Report Format' under   Linked Documents section.    SPECIMEN/STAIN PROCESS:  A: Urine, voided with FISH       Pap-Cyto x 1  B: FISH UroVysion       FISH-CEP 3 x 1, FISH-CEP 7 x 1, FISH-CEP 17 x 1, FISH-LCI 9P21 x 1,   UroVysion x 1    ----------------------------------------------------------------    CYTOLOGIC INTERPRETATION:    A.  Urine, voided with FISH:  Negative for High-Grade Urothelial  Carcinoma  Specimen Adequacy: Satisfactory for evaluation.    B.  FISH UroVysion:  Negative UroVysion test  Specimen Adequacy: Satisfactory for evaluation.    I have personally reviewed all specimens and/or slides, including the   listed special stains, and used them  with my medical judgement to determine or confirm the final diagnosis.    Electronically signed out by:  Brooklyn Carver M.D., [facility]    CLINICAL HISTORY:  Hematuria.    ,    GROSS:  A. Urine, voided with FISH:  Received 60 ml of yellow, hazy fluid,   processed as 1 Pap stained Autocyte..    B. FISH UroVysion:  split with part A.    Negative:  No interphase cytogenetic changes consistent with urothelial   carcinoma by using the UroVysion FISH  probe set.  This test result does not rule out the possibility that the   patient may have a low-grade  non-invasive papillary urothelial carcinoma.    METHODS:    Florescence in-situ hybridization (FISH) was performed on urine using the   Vysis UroVysion DNA probe set to the  centromere region of chromosome 3, 7, and 17 and the 9p21 locus.  At least   25 non-inflammatory cells were  scored. [facility], validated the   performance characteristics of this  assay.    MICROSCOPIC:  Microscopic examination is performed.  Catracho Michel MD Cytopathology fellow           Brooklyn Carver MD   Attending    CPT Codes:  A: 51317-OMOQLBH  B: 31928-ATNJI    COLLECTION SITE:  Client:  [facility]  Location:  [facility] (B)    The technical component of this testing was completed at the [facility], with the professional component performed   at the [facility], [address] [place] [phone] ([phone])    Resident  [id number]       Recent Labs   Lab Test 01/18/22  1128   ELPM 0.1*           ASSESSMENT    Monoclonal gammopathy of  undetermined significance with a monoclonal spike of 0.1 g/dL   Peripheral neuropathy  Non-small cell lung cancer post surgical resection  Essential hypertension, diabetes mellitus type 2, obesity, tobacco use, COPD     DISCUSSION   I had a lengthy discussion with Mr. Villalobos at this video visit. I reviewed the finding of his new monoclonal gammopathy. This is considered a pre-malignant condition involving the plasma cells. I reviewed the 3 involved entities including MGUS, smoldering multiple myeloma(MM) and MM.  Multiple myeloma is a serious malignancy involving the plasma cell and has aggressive course.  He barely has a small spike of 0.1 g/dl which has been identified incidentally as is usually the case.  He does not have chronic kidney disease, vasculitis, hemolytic anemia, skin rashes, hypercalcemia which are often seen with multiple myeloma.  He has significant peripheral neuropathy which could also be due to his hypertension, type 2 diabetes mellitus and other comorbidities.    I reviewed some additional tests that could be done as a part of workup including the free light chain assay, beta 2 microglobulin, gamma globulin subsets, urinary protein electrophoresis. While often a bone marrow biopsy is done, it is not mandated given the small spike in abnormal protein.     We could follow him serially with monoclonal spike and work up more aggressively if there was suddenly a rapid increase. The likelihood of having this disease remains small.      PLAN   I would complete some additional work up for his MGUS  Serial follow up of monoclonal spike can be considered,   I would schedule a follow up appointment once the above test results are available.     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  He has mild elevation in his kappa free light chain in the blood with borderline elevation in his kappa to  lambda ratio.  He has a mild elevation in his kappa light chain and undetectable lambda light chain in his urine giving him an abnormal kappa to lambda ratio in the urine similar to blood.  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  I would recommend that we monitor him over time -every 6 to 12 months.    Recent Labs   Lab Test 05/20/22  1237 01/18/22  1128   *  --    IGG 1,075  --    IGM 29*  --    ELPM 0.2* 0.1*     Video-Visit Details    Type of service:  Video Visit  Originating Location (pt. Location): Home  Distant Location (provider location):  [facility]  Platform used for Video Visit: Prestiamoci    45 minutes spent on the date of the encounter doing chart review, history and exam, documentation and further activities as noted above      Husam Contreras    Hematologist and Medical Oncologist  [facility]        Again, thank you for allowing me to participate in the care of your patient.        Sincerely,        Husam Contreras MD

## 2022-05-12 NOTE — PROGRESS NOTES
[facility] CANCER CLINIC    NEW PATIENT VISIT NOTE    PATIENT NAME: Nahun Villalobos MRN # [id number]  DATE OF VISIT: May 12, 2022 YOB: 1945    REFERRING PROVIDER: Olegario Castillo MD  [address] [place] [address]     HISTORY OF PRESENT ILLNESS     His left hand tingles all the time. Right hand tingles off and on. Walking is getting difficult.     He had right lung cancer. He had it cut out. He used to be stronger than ox. He cannot do much now. He sleeps only 5-6 hrs at night. He sleeps at 9 pm and wakes up by 2-3 am. He sleeps in a recliner. His shoulder are stiff.     If he is not wearing his shoes, he has weird feeling in his legs. He feels the feet are numb. He can walk okay with shoes on.     He does have hypertension, diabetes mellitus type II.      PAST MEDICAL HISTORY     Past Medical History:   Diagnosis Date     Arthritis      CHF (congestive heart failure) (H) 9/16/2020     COPD (chronic obstructive pulmonary disease) (H)      DM (diabetes mellitus) (H)      Essential hypertension, benign      Hemorrhage of rectum and anus      Non-small cell lung cancer (H)      Obesity      Personal history of colonic polyps      Tobacco use disorder      Urinary retention           CURRENT OUTPATIENT MEDICATIONS     Current Outpatient Medications   Medication Sig     acetaminophen (TYLENOL) 500 MG tablet Take 500 mg by mouth every evening     albuterol (VENTOLIN HFA) 108 (90 Base) MCG/ACT inhaler INHALE 2 PUFFS INTO THE LUNGS EVERY 6 HOURS AS NEEDED FOR SHORTNESS OF BREATH / DYSPNEA OR WHEEZING     Ascorbic Acid (VITAMIN C PO) Take 500 mg by mouth At Bedtime      atorvastatin (LIPITOR) 20 MG tablet Take 1 tablet (20 mg) by mouth daily     blood glucose (ACCU-CHEK CHACHA) test strip Use to test blood sugar 2 times daily or as directed.     blood glucose (NO BRAND SPECIFIED) lancets standard Use to test blood sugar 1 time daily, first thing in the morning     blood  glucose (NO BRAND SPECIFIED) test strip Use to test blood sugar 1 time daily, first thing in the morning.     blood glucose monitoring (NO BRAND SPECIFIED) meter device kit Use to test blood sugar 1 time daily, first thing in the morning     docusate sodium (COLACE) 100 MG capsule Take 100 mg by mouth 2 times daily as needed for constipation     ELIQUIS ANTICOAGULANT 5 MG tablet TAKE 1 TABLET TWICE A DAY     finasteride (PROSCAR) 5 MG tablet Take 1 tablet (5 mg) by mouth daily     Fluticasone-Umeclidin-Vilanterol (TRELEGY ELLIPTA) 100-62.5-25 MCG/INH oral inhaler Inhale 1 puff into the lungs daily     furosemide (LASIX) 40 MG tablet TAKE 1 TABLET BY MOUTH 2 TIMES DAILY     gabapentin (NEURONTIN) 300 MG capsule Take 1 capsule (300 mg) by mouth 3 times daily     lisinopril (ZESTRIL) 40 MG tablet TAKE 1 TABLET DAILY     metFORMIN (GLUCOPHAGE) 500 MG tablet 2 tabs at breakfast, 1 tablet at dinner     metoprolol succinate ER (TOPROL-XL) 25 MG 24 hr tablet Take 1 tablet (25 mg) by mouth daily     order for DME Equipment being ordered: diabetic shoes, 1 pair     order for DME Oxygen 2 Li/min  at night via nasal cannula     No current facility-administered medications for this visit.        ALLERGIES      Allergies   Allergen Reactions     No Known Drug Allergies         SOCIAL HISTORY     Single  - Wife  . He has 75 yr old girl friend. He worked at [employer]. 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     He quit smoking 8-9 yrs ago. He smoked about 2 packs a day for          FAMILY HISTORY   Father had stroke  Sister has dementia at 80     REVIEW OF SYSTEMS   As above in the HPI, o/w complete 12-point ROS was negative.     PHYSICAL EXAM   B/P: Data Unavailable, T: Data Unavailable, P: Data Unavailable, R: Data Unavailable  @LASTSAO2(4)@  Wt Readings from Last 3 Encounters:   22 105.2 kg (232 lb)   22 107.2 kg (236 lb 6.4 oz)   22 109.5 kg (241 lb  4.8 oz)     GEN: NAD  HEENT: PERRL, EOMI, no icterus, injection or pallor. Oropharynx is clear.  NECK: no cervical or supraclavicular lymphadenopathy  LUNGS: clear bilaterally  CV: regular, no murmurs, rubs, or gallops  ABDOMEN: soft, non-tender, non-distended, normal bowel sounds, no hepatosplenomegaly by percussion or palpation  EXT: warm, well perfused, no edema  NEURO: alert  SKIN: no rashes     LABORATORY AND IMAGING STUDIES     Recent Labs   Lab Test 02/10/22  1016 01/18/22  1129 11/05/21  1018 08/30/21  1250 03/17/21  0932 11/11/20  1256 09/21/20  0754     --  134 137 135  --  134   POTASSIUM 4.1  --  4.0 3.7 4.1 3.9 4.6   CHLORIDE 101  --  100 102 103  --  102   CO2 32  --  25 28 27  --  27   ANIONGAP 3  --  9 7 5  --  5   BUN 11  --  16 9 19  --  8   CR 1.11 1.09 1.05 1.00 1.11  --  1.04   *  --  204* 142* 155*  --  199*   JESSE 8.7  --  9.4 9.4 8.9  --  8.4*     Recent Labs   Lab Test 09/21/20  0754   MAG 2.4*     Recent Labs   Lab Test 01/18/22  1129 12/10/21  1214 11/05/21  1018 05/27/21  1052 11/11/20  1256 09/21/20  0754 09/20/20  1441 03/16/20  0918 08/28/19  0901 08/18/18  0929   WBC 6.8 8.6 9.3 7.4  --  9.2 11.2* 9.5  --  9.4   HGB 11.6* 11.7* 12.2* 13.2* 11.6* 12.2* 12.6* 13.8   < > 12.7*    297 308 258 263 271 309 266  --  298   * 102* 100 103*  --  103* 103* 103*  --  99   NEUTROPHIL 50  --  52  --   --   --  61.3 56.6  --  50.4    < > = values in this interval not displayed.     Recent Labs   Lab Test 02/10/22  1016 11/05/21  1018 03/17/21  0932   BILITOTAL 0.3 0.3 0.3   ALKPHOS 70 78 61   ALT 21 24 24   AST 12 16 21   ALBUMIN 3.2* 3.2* 3.5     TSH   Date Value Ref Range Status   02/10/2022 2.47 0.40 - 4.00 mU/L Final   08/28/2019 2.88 0.40 - 4.00 mU/L Final   12/07/2017 2.39 0.40 - 4.00 mU/L Final   03/15/2016 2.15 0.40 - 4.00 mU/L Final     No results for input(s): CEA in the last [id number] hours.  Results for orders placed or performed during the hospital encounter of  01/27/22   US Lower Extremity Venous Duplex Bilateral    Narrative    VENOUS ULTRASOUND BILATERAL LEG(S)  1/27/2022 9:14 AM     HISTORY: Edema, unspecified type. Bilateral lower extremity swelling  left first great    COMPARISON: None.    FINDINGS: Examination of the deep veins with graded compression and  color flow Doppler with spectral wave form analysis was performed.  Images show no evidence of thrombus in the bilateral common femoral  vein, femoral vein, popliteal vein or calf veins.      Impression    IMPRESSION: No deep vein thrombosis in either lower extremity.      GUERITA FIGUEROA DO         SYSTEM ID:  [id number]       Lab Results   Component Value Date    PATH  02/28/2020     Patient Name: KEELY FRANCE  MR#: [id number]  Specimen #: [id number]  Collected: 2/28/2020  Received: 2/28/2020  Reported: 3/11/2020 17:02  Ordering Phy(s): RIANNA HERNANDEZ    For improved result formatting, select 'View Enhanced Report Format' under   Linked Documents section.    SPECIMEN/STAIN PROCESS:  A: Urine, voided with FISH       Pap-Cyto x 1  B: FISH UroVysion       FISH-CEP 3 x 1, FISH-CEP 7 x 1, FISH-CEP 17 x 1, FISH-LCI 9P21 x 1,   UroVysion x 1    ----------------------------------------------------------------    CYTOLOGIC INTERPRETATION:    A.  Urine, voided with FISH:  Negative for High-Grade Urothelial Carcinoma  Specimen Adequacy: Satisfactory for evaluation.    B.  FISH UroVysion:  Negative UroVysion test  Specimen Adequacy: Satisfactory for evaluation.    I have personally reviewed all specimens and/or slides, including the   listed special stains, and used them  with my medical judgement to determine or confirm the final diagnosis.    Electronically signed out by:  Brooklyn Carver M.D., [facility]    CLINICAL HISTORY:  Hematuria.    ,    GROSS:  A. Urine, voided with FISH:  Received 60 ml of yellow, hazy fluid,   processed as 1 Pap stained Autocyte..    B. FISH UroVysion:  split with part  A.    Negative:  No interphase cytogenetic changes consistent with urothelial   carcinoma by using the UroVysion FISH  probe set.  This test result does not rule out the possibility that the   patient may have a low-grade  non-invasive papillary urothelial carcinoma.    METHODS:    Florescence in-situ hybridization (FISH) was performed on urine using the   Vysis UroVysion DNA probe set to the  centromere region of chromosome 3, 7, and 17 and the 9p21 locus.  At least   25 non-inflammatory cells were  scored. [facility], validated the   performance characteristics of this  assay.    MICROSCOPIC:  Microscopic examination is performed.  Catracho Michel MD Cytopathology fellow           Brooklyn Carevr MD   Attending    CPT Codes:  A: 00656-[id number]  B: 16809-GSEZH    COLLECTION SITE:  Client:  [facility]  Location:  [facility] ()    The technical component of this testing was completed at the [facility], with the professional component performed   at the [facility], [address] [place] [phone] ([phone])    Resident  [id number]       Recent Labs   Lab Test 01/18/22  1128   ELPM 0.1*           ASSESSMENT    Monoclonal gammopathy of undetermined significance with a monoclonal spike of 0.1 g/dL   Peripheral neuropathy  Non-small cell lung cancer post surgical resection  Essential hypertension, diabetes mellitus type 2, obesity, tobacco use, COPD     DISCUSSION   I had a lengthy discussion with Mr. Villalobos at this video visit. I reviewed the finding of his new monoclonal gammopathy. This is considered a pre-malignant condition involving the plasma cells. I reviewed the 3 involved entities including MGUS, smoldering multiple myeloma(MM) and MM.  Multiple myeloma is a serious malignancy involving the plasma cell and has  aggressive course.  He barely has a small spike of 0.1 g/dl which has been identified incidentally as is usually the case.  He does not have chronic kidney disease, vasculitis, hemolytic anemia, skin rashes, hypercalcemia which are often seen with multiple myeloma.  He has significant peripheral neuropathy which could also be due to his hypertension, type 2 diabetes mellitus and other comorbidities.    I reviewed some additional tests that could be done as a part of workup including the free light chain assay, beta 2 microglobulin, gamma globulin subsets, urinary protein electrophoresis. While often a bone marrow biopsy is done, it is not mandated given the small spike in abnormal protein.     We could follow him serially with monoclonal spike and work up more aggressively if there was suddenly a rapid increase. The likelihood of having this disease remains small.      PLAN   I would complete some additional work up for his MGUS  Serial follow up of monoclonal spike can be considered,   I would schedule a follow up appointment once the above test results are available.     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  He has mild elevation in his kappa free light chain in the blood with borderline elevation in his kappa to lambda ratio.  He has a mild elevation in his kappa light chain and undetectable lambda light chain in his urine giving him an abnormal kappa to lambda ratio in the urine similar to blood.  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  I would recommend that we monitor him over time -every 6 to 12 months.    Recent Labs   Lab Test 05/20/22  1237 01/18/22  1128   *  --    IGG 1,075  --    IGM 29*  --    ELPM 0.2* 0.1*     Video-Visit Details    Type of service:  Video Visit  Originating Location (pt. Location):  Home  Distant Location (provider location):  [facility]  Platform used for Video Visit: Radha    45 minutes spent on the date of the encounter doing chart review, history and exam, documentation and further activities as noted above      Husam Contreras    Hematologist and Medical Oncologist  [facility]

## 2022-05-13 ENCOUNTER — PATIENT OUTREACH (OUTPATIENT)
Dept: NURSING | Facility: CLINIC | Age: 77
End: 2022-05-13
Payer: MEDICARE

## 2022-05-13 NOTE — PROGRESS NOTES
Clinic Care Coordination Contact    Community Health Worker Follow Up    Care Gaps:     Health Maintenance Due   Topic Date Due     HF ACTION PLAN  Never done     ZOSTER IMMUNIZATION (2 of 3) 10/04/2013     DIABETIC FOOT EXAM  11/01/2019     EYE EXAM  04/01/2022     MICROALBUMIN  05/27/2022       Did not discuss    Goals:    Goals Addressed as of 5/13/2022 at 10:20 AM                    Today    4/5/22       Patient Stated       1. Improve chronic symptoms (pt-stated)   90%  90%    Added 6/3/21 by Wesly Davis RN      Goal Statement: I want to improve management of my leg, knee, and hip pain and swelling.  Date Goal set: 6/3/21  Barriers: lymphedema  Strengths: motivated to improve ability to walk  Date to Achieve By: 12/3/21 // Modified/edited to 6/3/2022  Patient expressed understanding of goal: Yes    Action steps to achieve this goal:    1. I will apply Jesus hose to wear throughout the day and remove at night  2. I will walk my dog daily to help with strengthening and endurance  3. I will elevate my legs while sitting and laying down by propping them up with a pillow  4. I will discuss, review, schedule and complete recommended overdue health maintenance with my primary care provider  5. I will I will take my medications as prescribed  6. I will contact my care team with questions, concerns, support needs. I will use the clinic as a resource   7. I will contact my clinic with 24/7 after hours services available  8. Care Coordinator will remain available as needed          Discussed:    Patient stated he is wearing the jesus stockings.  They are working well and not as tight.  Patient stated his swelling has done gone considerably.    Patient stated his hips and knees are getting worse.    Patient stated he walks one block with his dog three times a day.    Patient stated he is taking his medications as prescribed.    Patient stated his significant other's dad from [place] moved in with them last month. He has  really improved since being here.    Intervention and Education during outreach:CHW asked patient if he had any resource needs, patient declined.  CHW encouraged patient to contact CC if there are any other changes or resources needed.  Patient acknowledged understanding.       Plan: CHW will outreach in one month.    OMAR Kim  Clinic Care Coordination  [facility]: [facility], [facility], [facility], and [facility]  Phone: [phone]

## 2022-05-18 ENCOUNTER — PATIENT OUTREACH (OUTPATIENT)
Dept: CARE COORDINATION | Facility: CLINIC | Age: 77
End: 2022-05-18
Payer: MEDICARE

## 2022-05-18 ENCOUNTER — TELEPHONE (OUTPATIENT)
Dept: INTERNAL MEDICINE | Facility: CLINIC | Age: 77
End: 2022-05-18
Payer: MEDICARE

## 2022-05-18 ASSESSMENT — ACTIVITIES OF DAILY LIVING (ADL): DEPENDENT_IADLS:: CLEANING;COOKING;LAUNDRY;SHOPPING;MEAL PREPARATION

## 2022-05-18 NOTE — TELEPHONE ENCOUNTER
Okay to hold Eliquis as ordered.  Short term holding of med and low risk procedure, would not bridge with anticoagulation but would start back on anticoagulation evening of colonoscopy if no concerns per GI.

## 2022-05-18 NOTE — PROGRESS NOTES
Care Coordination Clinician Chart Review     Situation: Patient chart reviewed by care coordinator.?     Background: Initial assessment and enrollment to Care Coordination was 6/3/2021.?? Patient centered goals were developed with participation from patient.? Lead CC handed patient off to CHW for continued outreach every 30 days.??     Assessment: Per chart review, patient outreach completed by CC CHW on 5/13/2022.? Patient is actively working to accomplish goal(s).? Patient's goal(s) remain(s) appropriate at this time.? Patient is not due for updated Plan of Care.? Annual assessment will be due 6/2022.      Goals        1. Improve chronic symptoms (pt-stated)       Goal Statement: I want to improve management of my leg, knee, and hip pain and swelling.  Date Goal set: 6/3/21  Barriers: lymphedema  Strengths: motivated to improve ability to walk  Date to Achieve By: 12/3/21 // Modified/edited to 6/3/2022  Patient expressed understanding of goal: Yes    Action steps to achieve this goal:    1. I will apply Jacinto hose to wear throughout the day and remove at night  2. I will walk my dog daily to help with strengthening and endurance  3. I will elevate my legs while sitting and laying down by propping them up with a pillow  4. I will discuss, review, schedule and complete recommended overdue health maintenance with my primary care provider  5. I will I will take my medications as prescribed  6. I will contact my care team with questions, concerns, support needs. I will use the clinic as a resource   7. I will contact my clinic with 24/7 after hours services available  8. Care Coordinator will remain available as needed    Annual Assessment Due: 6/2023          ??   Plan/Recommendations: The patient will continue working with Care Coordination to achieve above goal(s).? CHW will involve Lead CC as needed or if patient is ready to move to maintenance.? Lead CC will continue to monitor CHW s monthly outreaches and progress  to goal(s) every 6 weeks.?     Plan of Care updated and sent to patient: Ilene Knapp RN, BSN, PHN  Primary Care / Care Coordinator   [facility]  E-mail [email or web address]   [phone]

## 2022-05-18 NOTE — TELEPHONE ENCOUNTER
[facility] called ([facility])     6/9/22 pt is scheduled for colonoscopy     Requesting 3 day eliquis hold orders     Please advise if okay for pt to hold eliquis 3 days prior to procedure and if any bridging is needed     Eve FALLON, Triage RN  [facility]

## 2022-05-18 NOTE — TELEPHONE ENCOUNTER
Called and left detailed VM of below message. Advised GI clinic to call back clinic with any questions or concerns.

## 2022-05-20 ENCOUNTER — LAB (OUTPATIENT)
Dept: LAB | Facility: CLINIC | Age: 77
End: 2022-05-20
Payer: MEDICARE

## 2022-05-20 DIAGNOSIS — D47.2 MONOCLONAL PARAPROTEINEMIA: ICD-10-CM

## 2022-05-20 DIAGNOSIS — E11.9 TYPE 2 DIABETES MELLITUS WITHOUT COMPLICATION, WITHOUT LONG-TERM CURRENT USE OF INSULIN (H): Primary | ICD-10-CM

## 2022-05-20 LAB
BASOPHILS # BLD AUTO: 0.1 10E3/UL (ref 0–0.2)
BASOPHILS NFR BLD AUTO: 1 %
CREAT UR-MCNC: 158 MG/DL
EOSINOPHIL # BLD AUTO: 0.3 10E3/UL (ref 0–0.7)
EOSINOPHIL NFR BLD AUTO: 3 %
ERYTHROCYTE [DISTWIDTH] IN BLOOD BY AUTOMATED COUNT: 12 % (ref 10–15)
HCT VFR BLD AUTO: 36.3 % (ref 40–53)
HGB BLD-MCNC: 11.5 G/DL (ref 13.3–17.7)
LYMPHOCYTES # BLD AUTO: 3.2 10E3/UL (ref 0.8–5.3)
LYMPHOCYTES NFR BLD AUTO: 37 %
MCH RBC QN AUTO: 32 PG (ref 26.5–33)
MCHC RBC AUTO-ENTMCNC: 31.7 G/DL (ref 31.5–36.5)
MCV RBC AUTO: 101 FL (ref 78–100)
MICROALBUMIN UR-MCNC: 111 MG/L
MICROALBUMIN/CREAT UR: 70.25 MG/G CR (ref 0–17)
MONOCYTES # BLD AUTO: 0.7 10E3/UL (ref 0–1.3)
MONOCYTES NFR BLD AUTO: 7 %
NEUTROPHILS # BLD AUTO: 4.6 10E3/UL (ref 1.6–8.3)
NEUTROPHILS NFR BLD AUTO: 52 %
PLATELET # BLD AUTO: 322 10E3/UL (ref 150–450)
RBC # BLD AUTO: 3.59 10E6/UL (ref 4.4–5.9)
TOTAL PROTEIN SERUM FOR ELP: 7.3 G/DL (ref 6.8–8.8)
WBC # BLD AUTO: 8.7 10E3/UL (ref 4–11)

## 2022-05-20 PROCEDURE — 80053 COMPREHEN METABOLIC PANEL: CPT

## 2022-05-20 PROCEDURE — 86335 IMMUNFIX E-PHORSIS/URINE/CSF: CPT

## 2022-05-20 PROCEDURE — 86334 IMMUNOFIX E-PHORESIS SERUM: CPT

## 2022-05-20 PROCEDURE — 84155 ASSAY OF PROTEIN SERUM: CPT

## 2022-05-20 PROCEDURE — 84165 PROTEIN E-PHORESIS SERUM: CPT

## 2022-05-20 PROCEDURE — 82043 UR ALBUMIN QUANTITATIVE: CPT

## 2022-05-20 PROCEDURE — 83883 ASSAY NEPHELOMETRY NOT SPEC: CPT | Mod: 90

## 2022-05-20 PROCEDURE — 82784 ASSAY IGA/IGD/IGG/IGM EACH: CPT

## 2022-05-20 PROCEDURE — 99000 SPECIMEN HANDLING OFFICE-LAB: CPT

## 2022-05-20 PROCEDURE — 36415 COLL VENOUS BLD VENIPUNCTURE: CPT

## 2022-05-20 PROCEDURE — 85025 COMPLETE CBC W/AUTO DIFF WBC: CPT

## 2022-05-20 PROCEDURE — 83521 IG LIGHT CHAINS FREE EACH: CPT

## 2022-05-21 ENCOUNTER — APPOINTMENT (OUTPATIENT)
Dept: LAB | Facility: CLINIC | Age: 77
End: 2022-05-21
Payer: MEDICARE

## 2022-05-21 LAB
ALBUMIN SERPL-MCNC: 3.6 G/DL (ref 3.4–5)
ALP SERPL-CCNC: 84 U/L (ref 40–150)
ALT SERPL W P-5'-P-CCNC: 21 U/L (ref 0–70)
ANION GAP SERPL CALCULATED.3IONS-SCNC: 10 MMOL/L (ref 3–14)
AST SERPL W P-5'-P-CCNC: 21 U/L (ref 0–45)
BILIRUB SERPL-MCNC: 0.4 MG/DL (ref 0.2–1.3)
BUN SERPL-MCNC: 12 MG/DL (ref 7–30)
CALCIUM SERPL-MCNC: 9.4 MG/DL (ref 8.5–10.1)
CHLORIDE BLD-SCNC: 102 MMOL/L (ref 94–109)
CO2 SERPL-SCNC: 28 MMOL/L (ref 20–32)
CREAT SERPL-MCNC: 1.04 MG/DL (ref 0.66–1.25)
GFR SERPL CREATININE-BSD FRML MDRD: 74 ML/MIN/1.73M2
GLUCOSE BLD-MCNC: 148 MG/DL (ref 70–99)
POTASSIUM BLD-SCNC: 4.2 MMOL/L (ref 3.4–5.3)
PROT SERPL-MCNC: 7.8 G/DL (ref 6.8–8.8)
SODIUM SERPL-SCNC: 140 MMOL/L (ref 133–144)

## 2022-05-21 PROCEDURE — 81050 URINALYSIS VOLUME MEASURE: CPT

## 2022-05-21 PROCEDURE — 84166 PROTEIN E-PHORESIS/URINE/CSF: CPT

## 2022-05-23 LAB
ALBUMIN MFR UR ELPH: 61.6 %
ALBUMIN SERPL ELPH-MCNC: 3.8 G/DL (ref 3.7–5.1)
ALPHA1 GLOB MFR UR ELPH: 1.7 %
ALPHA1 GLOB SERPL ELPH-MCNC: 0.3 G/DL (ref 0.2–0.4)
ALPHA2 GLOB MFR UR ELPH: 9.8 %
ALPHA2 GLOB SERPL ELPH-MCNC: 0.9 G/DL (ref 0.5–0.9)
B-GLOBULIN MFR UR ELPH: 11.9 %
B-GLOBULIN SERPL ELPH-MCNC: 1.3 G/DL (ref 0.6–1)
GAMMA GLOB MFR UR ELPH: 15 %
GAMMA GLOB SERPL ELPH-MCNC: 1 G/DL (ref 0.7–1.6)
IGA SERPL-MCNC: 690 MG/DL (ref 84–499)
IGG SERPL-MCNC: 1075 MG/DL (ref 610–1616)
IGM SERPL-MCNC: 29 MG/DL (ref 35–242)
KAPPA LC FREE SER-MCNC: 5.91 MG/DL (ref 0.33–1.94)
KAPPA LC FREE/LAMBDA FREE SER NEPH: 2.09 {RATIO} (ref 0.26–1.65)
KAPPA LC UR-MCNC: 2.23 MG/DL
KAPPA LC/LAMBDA UR: >3.19 {RATIO} (ref 0.7–6.2)
LAMBDA LC FREE SERPL-MCNC: 2.83 MG/DL (ref 0.57–2.63)
LAMBDA LC UR-MCNC: <0.7 MG/DL
M PROTEIN MFR UR ELPH: 0 %
M PROTEIN SERPL ELPH-MCNC: 0.2 G/DL
PROT ELPH PNL UR ELPH: NORMAL
PROT PATTERN SERPL ELPH-IMP: ABNORMAL
PROT PATTERN SERPL IFE-IMP: NORMAL
PROT PATTERN UR ELPH-IMP: ABNORMAL

## 2022-06-25 DIAGNOSIS — G62.9 PERIPHERAL POLYNEUROPATHY: ICD-10-CM

## 2022-06-27 ENCOUNTER — PATIENT OUTREACH (OUTPATIENT)
Dept: NURSING | Facility: CLINIC | Age: 77
End: 2022-06-27

## 2022-06-27 DIAGNOSIS — E11.9 TYPE 2 DIABETES MELLITUS WITHOUT COMPLICATION, WITHOUT LONG-TERM CURRENT USE OF INSULIN (H): Primary | ICD-10-CM

## 2022-06-27 RX ORDER — GABAPENTIN 300 MG/1
CAPSULE ORAL
Qty: 180 CAPSULE | Refills: 0 | Status: SHIPPED | OUTPATIENT
Start: 2022-06-27 | End: 2022-07-19

## 2022-06-27 ASSESSMENT — ACTIVITIES OF DAILY LIVING (ADL): DEPENDENT_IADLS:: CLEANING;COOKING;LAUNDRY;SHOPPING;MEAL PREPARATION

## 2022-06-27 NOTE — LETTER
[facility]  Patient Centered Plan of Care  About Me:        Patient Name:  Nahun Villalobos    YOB: 1945  Age:         76 year old   Sukumar MRN:    [id number] Telephone Information:  Home Phone [phone]   Mobile [phone]       Address:  [address] [place] [address] Email address:  [email or web address]      Emergency Contact(s)    Name Relationship Lgl Grd Work Phone Home Phone Mobile Phone   1. SINDY CHRISTIE  Daughter No  [phone]    2. SUMAN Significant ot*    [phone]           Primary language:  English     needed? No   [place] [facility]:  [phone] op. 1  Other communication barriers:None    Preferred Method of Communication:  William  Current living arrangement: I live in a private home (lives with girlfriend)    Mobility Status/ Medical Equipment: Independent w/Device    Health Maintenance  Health Maintenance Reviewed: Due/Overdue   Health Maintenance Due   Topic Date Due     HF ACTION PLAN  Never done     ZOSTER IMMUNIZATION (2 of 3) 10/04/2013     DIABETIC FOOT EXAM  11/01/2019     EYE EXAM  04/01/2022     My Access Plan  Medical Emergency 911   Primary Clinic Line [facility] - [phone]   24 Hour Appointment Line [phone] or  [phone]) (toll-free)   24 Hour Nurse Line [phone] (toll-free)   Preferred Urgent Care [facility], [phone]     Preferred Hospital [facility]  [phone] ([facility])     [facility]erred [facility] [place], [place] - [address] AT Portal to Registered [facility] Sites     Behavioral Health Crisis Line The National Suicide Prevention Lifeline at 1-900.868.6188 or 911     My Care Team Members  Patient Care Team       Relationship Specialty Notifications Start End    Olegario Castillo MD PCP - General Internal Medicine  4/6/22     Phone: [phone]  Fax: [phone]         [address] [place] [address]    Olegario Castillo MD Assigned PCP   10/4/12     Phone: [phone] Fax: [phone]         [address] [place] [address]    Praveena Burris MD MD Urology  3/1/17     Phone: [phone] Fax: [phone]         [address] [place] [address] [address] [facility] [address]    Areli Cordero, RN Registered Nurse   3/1/17     Phone: [phone]         Olegario Castillo MD Referring Physician Internal Medicine  1/25/18     Phone: [phone] Fax: [phone]         [address] [place] [address]    Kelley Slaughter MA Community Health Worker Primary Care - CC  9/24/20     Phone: [phone]         Santi Tinajero MD MD Hematology & Oncology  12/7/20     Phone: [phone] Fax: [phone]         [facility] [address] [facility] [address] [facility] [address]    Bernardo Haynes MD MD Pulmonary Disease  12/7/20     Phone: [phone] Fax: [phone]         [facility] [address] [facility] [address] [facility] [address]    Jean-Paul Silver DO Assigned Musculoskeletal Provider   7/4/21     Phone: [phone] Fax: [phone]         [address] [facility] [address]    Antonia Mandel, PT Physical Therapist Physical Therapist  8/2/21     Phone: [phone] Fax: [phone]         [address] [place] [address] [address] [facility] [address]    Rhea Knapp, RN Lead Care Coordinator  Admissions 10/11/21     Jamil Aparicio MD MD Neurology  2/2/22     Phone: [phone] Fax: [phone] [address] [place] [address]    Jamil Aparicio MD Assigned Neuroscience Provider   4/10/22     Phone: [phone] Fax: [phone] [address] [address]    Husam Contreras MD Assigned Cancer Care Provider   6/18/22     Phone: [phone] Fax: [phone]         [address] [place] [address] [address] [facility] [address]            My Care Plans  Self Management and  Treatment Plan  Goals and (Comments)   Goals        General     1. Improve chronic symptoms (pt-stated)      Notes - Note edited  6/27/2022  3:46 PM by Rhea Knapp RN     Goal Statement: I want to improve management of my leg, knee, and hip pain and swelling.  Date Goal set: 6/3/21  Barriers: lymphedema  Strengths: motivated to improve ability to walk  Date to Achieve By: 12/3/21 // Modified/edited to 6/3/2022 // Modified.edited 12/3/2022  Patient expressed understanding of goal: Yes    Action steps to achieve this goal:    1. I will apply Jacinto hose to wear throughout the day and remove at night  2. I will walk my dog daily to help with strengthening and endurance  3. I will elevate my legs while sitting and laying down by propping them up with a pillow  4. I will discuss, review, schedule and complete recommended overdue health maintenance with my primary care provider  5. I will I will take my medications as prescribed  6. I will contact my care team with questions, concerns, support needs. I will use the clinic as a resource   7. I will contact my clinic with 24/7 after hours services available  8. Care Coordinator will remain available as needed    Annual Assessment Due: 6/2023         2. Improve chronic symptoms (pt-stated)      Notes - Note created  6/27/2022  3:52 PM by Rhea Knapp, RN     Goal Statement: I would like assistance and support to afford my FreeStyle Mikel sensors to monitor my blood sugar.  Date Goal set: 6/27/2022  Barriers: Unable to afford sensors for continuous glucose monitor  Strengths: Advocate for self  Date to Achieve By: 12/27/2022  Patient expressed understanding of goal: Yes  Action steps to achieve this goal:  1. I will follow up with my providers as scheduled/recommended    2. I will take my medications as prescribed  3. I will follow up with my Diabetic Educator as scheduled/recommended  4. I will contact my care team with questions, concerns, support needs   5. I will  use the clinic as a resource, and I understand I can contact my clinic with 24/7 after hours services available   6.  Care Coordinator will remain available as needed                 Action Plans on File:   COPD  Depression     Advance Care Plans/Directives Type:   -- (Full Code)      My Medical and Care Information  Problem List   Patient Active Problem List   Diagnosis     Essential hypertension, benign     Tobacco use disorder     Lumbago     Impotence of organic origin     Advance care planning     Polyp of colon     Obstructive sleep apnea syndrome     Hyperlipidemia LDL goal <100     Morbid obesity due to excess calories (H)     BPPV (benign paroxysmal positional vertigo), unspecified laterality     Chronic obstructive pulmonary disease, unspecified COPD type (H)     Type 2 diabetes mellitus without complication, without long-term current use of insulin (H)     S/P transurethral resection of prostate     Hematuria, gross     Cervical segment dysfunction     Cervicalgia     Thoracic segment dysfunction     Erectile dysfunction     HTN (hypertension)     Acute diverticulitis     Other pulmonary embolism without acute cor pulmonale, unspecified chronicity (H)     Mesenteric mass     History of adenocarcinoma of lung      Current Medications and Allergies:    Allergies   Allergen Reactions     No Known Drug Allergies      Current Outpatient Medications   Medication     acetaminophen (TYLENOL) 500 MG tablet     albuterol (VENTOLIN HFA) 108 (90 Base) MCG/ACT inhaler     Ascorbic Acid (VITAMIN C PO)     atorvastatin (LIPITOR) 20 MG tablet     blood glucose (ACCU-CHEK CHACHA) test strip     blood glucose (NO BRAND SPECIFIED) lancets standard     blood glucose (NO BRAND SPECIFIED) test strip     blood glucose monitoring (NO BRAND SPECIFIED) meter device kit     docusate sodium (COLACE) 100 MG capsule     ELIQUIS ANTICOAGULANT 5 MG tablet     finasteride (PROSCAR) 5 MG tablet     Fluticasone-Umeclidin-Vilanterol (TRELEGY  ELLIPTA) 100-62.5-25 MCG/INH oral inhaler     furosemide (LASIX) 40 MG tablet     gabapentin (NEURONTIN) 300 MG capsule     lisinopril (ZESTRIL) 40 MG tablet     metFORMIN (GLUCOPHAGE) 500 MG tablet     metoprolol succinate ER (TOPROL-XL) 25 MG 24 hr tablet     order for DME     order for DME     No current facility-administered medications for this visit.         Care Coordination Start Date: 9/24/2020   Frequency of Care Coordination: 6 weeks     Form Last Updated: 06/27/2022

## 2022-06-27 NOTE — PROGRESS NOTES
"Clinic Care Coordination Contact    Follow Up Progress Note      Assessment:   Patient states he is doing \"fair to middlin'\" and that his bilateral lower edema to ankles is the same as last month with the left ankle greater than the right ankle. Patient states he wears the JACINTO hose during the day if he remembers.  Patient sleeps in a recliner at night and his legs are elevated with a pillow underneath.    Patient states that Kenia's (significant other) 95 year old father is with them in the home and that Kenia is taking care of him.      Patient is now driving for [employer] and is having difficulty financially with the price of gas, and the price of food, he is struggling to afford his FreeStyle Mikel CBM sensors so has not checked his blood sugars the last 2 months.  Per patient, \"I can't afford that, it's too expensive\".  RNCC will request a CDE referral from PCP; CDE did reach out to patient 8/2021.    Care Gaps:    Health Maintenance Due   Topic Date Due     HF ACTION PLAN  Never done     ZOSTER IMMUNIZATION (2 of 3) 10/04/2013     DIABETIC FOOT EXAM  11/01/2019     EYE EXAM  04/01/2022       Care Gaps Last addressed on 6/27/2022    Goals addressed this encounter:    Goals Addressed                    This Visit's Progress       1. Improve chronic symptoms (pt-stated)   80%      Goal Statement: I want to improve management of my leg, knee, and hip pain and swelling.  Date Goal set: 6/3/21  Barriers: lymphedema  Strengths: motivated to improve ability to walk  Date to Achieve By: 12/3/21 // Modified/edited to 6/3/2022 // Modified.edited 12/3/2022  Patient expressed understanding of goal: Yes    Action steps to achieve this goal:    1. I will apply Jacinto hose to wear throughout the day and remove at night  2. I will walk my dog daily to help with strengthening and endurance  3. I will elevate my legs while sitting and laying down by propping them up with a pillow  4. I will discuss, review, schedule and complete " recommended overdue health maintenance with my primary care provider  5. I will I will take my medications as prescribed  6. I will contact my care team with questions, concerns, support needs. I will use the clinic as a resource   7. I will contact my clinic with 24/7 after hours services available  8. Care Coordinator will remain available as needed    Annual Assessment Due: 6/2023           2. Improve chronic symptoms (pt-stated)         Goal Statement: I would like assistance and support to afford my FreeStyle Mikel sensors to monitor my blood sugar.  Date Goal set: 6/27/2022  Barriers: Unable to afford sensors for continuous glucose monitor  Strengths: Advocate for self  Date to Achieve By: 12/27/2022  Patient expressed understanding of goal: Yes  Action steps to achieve this goal:  1. I will follow up with my providers as scheduled/recommended    2. I will take my medications as prescribed  3. I will follow up with my Diabetic Educator as scheduled/recommended  4. I will contact my care team with questions, concerns, support needs   5. I will use the clinic as a resource, and I understand I can contact my clinic with 24/7 after hours services available   6.  Care Coordinator will remain available as needed              Care Coordination Clinician Chart Review     Situation: Patient chart reviewed by care coordinator.?     Background: Initial assessment and enrollment to Care Coordination was 6/3/2021.?? Patient centered goals were developed with participation from patient.? Lead CC handed patient off to CHW for continued outreach every 30 days.??     Assessment: Per chart review, patient outreach completed by CC CHW/RNCC on 6/27/2022.? Patient is actively working to accomplish goal(s).? Patient's goal(s) remain(s) appropriate at this time.? Patient is not due for updated Plan of Care.? Annual assessment will be due 6/20/2023.          Plan/Recommendations: The patient will continue working with Care  Coordination to achieve above goal(s).? CHW will involve Lead CC as needed or if patient is ready to move to maintenance.? Lead CC will continue to monitor CHW s monthly outreaches and progress to goal(s) every 6 weeks.?     Plan of Care updated and sent to patient: Yes, 6/27/2022, new diabetic goal added    Intervention/Education provided during outreach:   RNCC called and spoke with patient; introduced self, discussed role of Care Coordination and explained reason for call    Outreach Frequency: 6 weeks    Plan:   -Patient will contact the care team with questions, concerns, support needs   -Patient will use the clinic as a resource and understands (s)he can contact the [facility] with 24/7 after hours services available  -Care Coordinator will remain available as needed  -RNCC will follow up in 2 weeks for follow up appointments/recommendations and goal progression     Rhea Knapp RN, BSN, PHN  Primary Care / Care Coordinator   [facility]  E-mail [email or web address]   [phone]

## 2022-06-30 ENCOUNTER — TELEPHONE (OUTPATIENT)
Dept: INTERNAL MEDICINE | Facility: CLINIC | Age: 77
End: 2022-06-30

## 2022-06-30 NOTE — TELEPHONE ENCOUNTER
Diabetes Education Scheduling Outreach #1:    Call to patient to schedule. Left message with phone number to call to schedule.    Also sent Zeolife message for second attempt. Requested patient to call to schedule.    Eve Patino OnCall  Diabetes and Nutrition Scheduling

## 2022-07-08 NOTE — TELEPHONE ENCOUNTER
Eliquis    Routing refill request to provider for review/approval because:  Medication is reported/historical    Ev CARMONAN, RN, PHN          
Eliquis prescription not completely filled as do not know how patient is currently dosing.  Need to verify current dose and prior to refill  
Hives subsiding   
Lisinopril    Prescription approved per [facility] Refill Protocol.    Ev CARMONAN, RN, PHN      
Pt is out of Eliquist. Is requesting a call when rx is approved.   
Spoke with patient.     He is currently on 5mg BID dosing as of 11/1/2020.    Ev CARMONAN, RN, PHN      
No

## 2022-07-14 ENCOUNTER — VIRTUAL VISIT (OUTPATIENT)
Dept: ONCOLOGY | Facility: CLINIC | Age: 77
End: 2022-07-14
Attending: INTERNAL MEDICINE
Payer: MEDICARE

## 2022-07-14 DIAGNOSIS — Z53.9 ERRONEOUS ENCOUNTER--DISREGARD: Primary | ICD-10-CM

## 2022-07-14 NOTE — LETTER
7/14/2022         RE: Nahun Villalobos  [address]  [facility] [address]        Dear Colleague,    Thank you for referring your patient, Nahun Villalobos, to the [facility]. Please see a copy of my visit note below.    error      Again, thank you for allowing me to participate in the care of your patient.        Sincerely,        SELMA Capone CNP

## 2022-07-14 NOTE — LETTER
7/14/2022         RE: Nahun Villalobos  [address]  [facility] [place] [address]        Dear Colleague,    Thank you for referring your patient, Nahun Villalobos, to the [facility]. Please see a copy of my visit note below.    error      Again, thank you for allowing me to participate in the care of your patient.        Sincerely,        SELMA Capone CNP

## 2022-07-18 DIAGNOSIS — E11.9 TYPE 2 DIABETES MELLITUS WITHOUT COMPLICATION, WITHOUT LONG-TERM CURRENT USE OF INSULIN (H): ICD-10-CM

## 2022-07-18 DIAGNOSIS — I26.99 OTHER PULMONARY EMBOLISM WITHOUT ACUTE COR PULMONALE, UNSPECIFIED CHRONICITY (H): ICD-10-CM

## 2022-07-18 DIAGNOSIS — R60.9 EDEMA, UNSPECIFIED TYPE: ICD-10-CM

## 2022-07-19 ENCOUNTER — OFFICE VISIT (OUTPATIENT)
Dept: NEUROLOGY | Facility: CLINIC | Age: 77
End: 2022-07-19
Payer: MEDICARE

## 2022-07-19 VITALS — SYSTOLIC BLOOD PRESSURE: 115 MMHG | DIASTOLIC BLOOD PRESSURE: 76 MMHG | HEART RATE: 86 BPM | OXYGEN SATURATION: 93 %

## 2022-07-19 DIAGNOSIS — G56.03 CARPAL TUNNEL SYNDROME, BILATERAL: ICD-10-CM

## 2022-07-19 DIAGNOSIS — G62.9 PERIPHERAL POLYNEUROPATHY: Primary | ICD-10-CM

## 2022-07-19 PROCEDURE — 99213 OFFICE O/P EST LOW 20 MIN: CPT | Performed by: PSYCHIATRY & NEUROLOGY

## 2022-07-19 RX ORDER — APIXABAN 5 MG/1
TABLET, FILM COATED ORAL
Qty: 180 TABLET | Refills: 1 | Status: SHIPPED | OUTPATIENT
Start: 2022-07-19 | End: 2023-01-11

## 2022-07-19 RX ORDER — GABAPENTIN 300 MG/1
300 CAPSULE ORAL 2 TIMES DAILY
Qty: 180 CAPSULE | Refills: 1 | Status: SHIPPED | OUTPATIENT
Start: 2022-09-01 | End: 2023-01-12

## 2022-07-19 RX ORDER — FUROSEMIDE 40 MG
TABLET ORAL
Qty: 180 TABLET | Refills: 1 | Status: SHIPPED | OUTPATIENT
Start: 2022-07-19 | End: 2023-01-11

## 2022-07-19 NOTE — LETTER
7/19/2022         RE: Nahun Villalobos  [address]  [facility] [address]        Dear Colleague,    Thank you for referring your patient, Nahun Villalobos, to the [facility] NEUR[facility]. Please see a copy of my visit note below.    ESTABLISHED PATIENT NEUROLOGY NOTE    DATE OF VISIT: 7/19/2022  CLINIC LOCATION: [facility]  MRN: [id number]  PATIENT NAME: Nahun Villalobos  YOB: 1945    REASON FOR VISIT:   Chief Complaint   Patient presents with     Carpel Tunnel      Patient reports no changes in in carpel tunnel symptoms      SUBJECTIVE:                                                      HISTORY OF PRESENT ILLNESS: Patient is here to follow up regarding peripheral polyneuropathy and bilateral carpal tunnel syndrome.  The last visit was on 4/6/2022.  At that time bilateral wrist splints were prescribed, and gabapentin dose was increased.  Please refer to my initial/other prior notes for further information.    Since the last visit, the patient reports that his neuropathy is under control after increasing gabapentin dose, which is tolerated well.  He feels that wrist splints were helpful when he wear them once, but he does not use them since.  He continues to experience bilateral paresthesias and pain in his hands.  Denies interval development of new focal neurological symptoms.  EXAM:                                                    Physical Exam:   Vitals: /76 (BP Location: Right arm, Patient Position: Sitting, Cuff Size: Adult Large)   Pulse 86   SpO2 93%     General: pt is in NAD, cooperative.  Skin: normal turgor, moist mucous membranes, no lesions/rashes noticed.  HEENT: ATNC, white sclera, normal conjunctiva.  Respiratory: Symmetric lung excursion, no accessory respiratory muscle use.  Abdomen: Non distended.  Neurological: awake, cooperative, follows commands, the rest of exam was deferred today.  ASSESSMENT AND PLAN:                                                     Assessment: 76 year old male patient presents for follow-up of bilateral carpal tunnel syndrome and peripheral polyneuropathy.  He reports that increased dose of gabapentin was helpful.  I refilled his prescription.  No dose adjustment is needed at this time.    Bilateral wrist splints helped when he tried them on for 1 night, but he does not consistently use them.  We discussed the importance of regular use of bilateral wrist splints to help with carpal tunnel.  We also discussed additional treatment options that include steroid injections and carpal tunnel release surgery.  The patient was hesitant to consider surgery, but was willing to wear wrist splints regularly.    Diagnoses:    ICD-10-CM    1. Peripheral polyneuropathy  G62.9    2. Carpal tunnel syndrome, bilateral  G56.03      Plan: At today's visit we thoroughly discussed current symptoms, available treatment options, and the plan.  I am pleased to hear that his neuropathy symptoms improved after increasing the dose of gabapentin.  I have refilled it.    Regarding bilateral carpal tunnel syndrome, we discussed the importance of wearing bilateral wrist splints every night to promote healing.  We also discussed options of steroid injections and surgical release.    Next follow-up appointment is in the next 3 months or earlier if needed.    Total Time: 21 minutes spent on the date of the encounter doing chart review, history and exam, documentation and further activities per the note.    Jamil Aparicio MD  [facility] Neurology  (Chart documentation was completed in part with Dragon voice-recognition software. Even though reviewed, some grammatical, spelling, and word errors may remain.)        Again, thank you for allowing me to participate in the care of your patient.        Sincerely,        Jamil Aparicio MD

## 2022-07-19 NOTE — PATIENT INSTRUCTIONS
AFTER VISIT SUMMARY (AVS):    At today's visit we thoroughly discussed current symptoms, available treatment options, and the plan.  I am pleased to hear that your neuropathy symptoms improved after increasing the dose of gabapentin.  I have refilled it.    Regarding your bilateral carpal tunnel syndrome, we discussed the importance of wearing your bilateral wrist splints every night to promote healing.  We also discussed options of steroid injections and surgical release.    Next follow-up appointment is in the next 3 months or earlier if needed.    Please do not hesitate to call me with any questions or concerns.    Thanks.

## 2022-07-19 NOTE — PROGRESS NOTES
ESTABLISHED PATIENT NEUROLOGY NOTE    DATE OF VISIT: 7/19/2022  CLINIC LOCATION: [facility]  MRN: [id number]  PATIENT NAME: Nahun Villalobos  YOB: 1945    REASON FOR VISIT:   Chief Complaint   Patient presents with     Carpel Tunnel      Patient reports no changes in in carpel tunnel symptoms      SUBJECTIVE:                                                      HISTORY OF PRESENT ILLNESS: Patient is here to follow up regarding peripheral polyneuropathy and bilateral carpal tunnel syndrome.  The last visit was on 4/6/2022.  At that time bilateral wrist splints were prescribed, and gabapentin dose was increased.  Please refer to my initial/other prior notes for further information.    Since the last visit, the patient reports that his neuropathy is under control after increasing gabapentin dose, which is tolerated well.  He feels that wrist splints were helpful when he wear them once, but he does not use them since.  He continues to experience bilateral paresthesias and pain in his hands.  Denies interval development of new focal neurological symptoms.  EXAM:                                                    Physical Exam:   Vitals: /76 (BP Location: Right arm, Patient Position: Sitting, Cuff Size: Adult Large)   Pulse 86   SpO2 93%     General: pt is in NAD, cooperative.  Skin: normal turgor, moist mucous membranes, no lesions/rashes noticed.  HEENT: ATNC, white sclera, normal conjunctiva.  Respiratory: Symmetric lung excursion, no accessory respiratory muscle use.  Abdomen: Non distended.  Neurological: awake, cooperative, follows commands, the rest of exam was deferred today.  ASSESSMENT AND PLAN:                                                    Assessment: 76 year old male patient presents for follow-up of bilateral carpal tunnel syndrome and peripheral polyneuropathy.  He reports that increased dose of gabapentin was helpful.  I refilled his prescription.  No dose  adjustment is needed at this time.    Bilateral wrist splints helped when he tried them on for 1 night, but he does not consistently use them.  We discussed the importance of regular use of bilateral wrist splints to help with carpal tunnel.  We also discussed additional treatment options that include steroid injections and carpal tunnel release surgery.  The patient was hesitant to consider surgery, but was willing to wear wrist splints regularly.    Diagnoses:    ICD-10-CM    1. Peripheral polyneuropathy  G62.9    2. Carpal tunnel syndrome, bilateral  G56.03      Plan: At today's visit we thoroughly discussed current symptoms, available treatment options, and the plan.  I am pleased to hear that his neuropathy symptoms improved after increasing the dose of gabapentin.  I have refilled it.    Regarding bilateral carpal tunnel syndrome, we discussed the importance of wearing bilateral wrist splints every night to promote healing.  We also discussed options of steroid injections and surgical release.    Next follow-up appointment is in the next 3 months or earlier if needed.    Total Time: 21 minutes spent on the date of the encounter doing chart review, history and exam, documentation and further activities per the note.    Jamil Aparicio MD  [facility] Neurology  (Chart documentation was completed in part with Dragon voice-recognition software. Even though reviewed, some grammatical, spelling, and word errors may remain.)

## 2022-07-27 DIAGNOSIS — R31.0 GROSS HEMATURIA: ICD-10-CM

## 2022-07-28 RX ORDER — FINASTERIDE 5 MG/1
TABLET, FILM COATED ORAL
Qty: 90 TABLET | Refills: 2 | Status: SHIPPED | OUTPATIENT
Start: 2022-07-28 | End: 2023-01-11

## 2022-07-28 NOTE — TELEPHONE ENCOUNTER
Prescription approved per [facility] Refill Protocol.  Obie Garcia RN  [facility] Triage Nurse

## 2022-08-02 ENCOUNTER — PATIENT OUTREACH (OUTPATIENT)
Dept: CARE COORDINATION | Facility: CLINIC | Age: 77
End: 2022-08-02

## 2022-08-02 NOTE — PROGRESS NOTES
Clinic Care Coordination Contact  [facility]/Voicemail       Clinical Data: Care Coordinator Outreach  Outreach attempted x 1.  Left message on patient's mobile and home phone voicemails with call back information and requested return call.  Plan:  Care Coordinator will try to reach patient again in 7-10 business days.    Seda Gallegos  Community Health Worker  [facility]  [phone]    7/19/22 neuro appt with Dr. Flaherty - Are you wearing wrist splints consistently for carpel tunnel symptoms?  How are you doing?

## 2022-08-06 NOTE — MR AVS SNAPSHOT
After Visit Summary   6/28/2018    Nahun Villalobos    MRN: [id number]           Patient Information     Date Of Birth          1945        Visit Information        Provider Department      6/28/2018 10:40 AM Madelyn Cordero OD [facility]        Today's Diagnoses     Presbyopia    -  1    Type 2 diabetes mellitus without retinopathy (H)        Bilateral incipient cataracts        Hyperopia of both eyes with astigmatism          Care Instructions    Diabetes weakens the blood vessels all over the body, including the eyes. Damage to the blood vessels in the eyes can cause swelling or bleeding into part of the eye (called the retina). This is called diabetic retinopathy (KRISS-tin-AH-puh-thee). If not treated, this disease can cause vision loss or blindness.   Symptoms may include blurred or distorted vision, but many people have no symptoms. It's important to see your eye doctor regularly to check for problems.   Early treatment and good control can help protect your vision. Here are the things you can do to help prevent vision loss:      1. Keep your blood sugar levels under tight control.      2. Bring high blood pressure under control.      3. No smoking.      4. Have yearly dilated eye exams.   Very mild correction for distance / not needed , continue with readers    Early start of cataracts             Follow-ups after your visit        Follow-up notes from your care team     Return in about 1 year (around 6/28/2019).      Your next 10 appointments already scheduled     Jun 29, 2018  1:00 PM CDT   (Arrive by 12:45 PM)   Return Visit with Praveena Burris MD   [facility] ([facility])    [address] [place] [address] [id number] [phone]            Jul 30, 2018  9:20 AM CDT   Office Visit with Olegario Castillo MD   [facility] ([facility]  [facility]    [address] [address] [phone]           Bring a current list of meds and any records pertaining to this visit. For Physicals, please bring immunization records and any forms needing to be filled out. Please arrive 10 minutes early to complete paperwork.              Who to contact     If you have questions or need follow up information about today's clinic visit or your schedule please contact [facility] directly at [phone].  Normal or non-critical lab and imaging results will be communicated to you by Woof[employer], letter or phone within 4 business days after the clinic has received the results. If you do not hear from us within 7 days, please contact the clinic through [employer] or phone. If you have a critical or abnormal lab result, we will notify you by phone as soon as possible.  Submit refill requests through Infer or call your pharmacy and they will forward the refill request to us. Please allow 3 business days for your refill to be completed.          Additional Information About Your Visit        Infer Information     Infer gives you secure access to your electronic health record. If you see a primary care provider, you can also send messages to your care team and make appointments. If you have questions, please call your primary care clinic.  If you do not have a primary care provider, please call [phone] and they will assist you.        Care EveryWhere ID     This is your Care EveryWhere ID. This could be used by other organizations to access your [facility] medical records  [id number]         Blood Pressure from Last 3 Encounters:   06/26/18 186/88   05/23/18 153/82   05/01/18 143/77    Weight from Last 3 Encounters:   06/26/18 110.5 kg (243 lb 9.7 oz)   05/23/18 118.4 kg (261 lb)   04/30/18 118.5 kg (261 lb 3.9 oz)              We Performed the Following     EYE EXAM (SIMPLE-NONBILLABLE)     REFRACTION          Today's  Medication Changes          These changes are accurate as of 6/28/18 11:59 AM.  If you have any questions, ask your nurse or doctor.               These medicines have changed or have updated prescriptions.        Dose/Directions    aspirin 81 MG tablet   This may have changed:  when to take this   Used for:  Type 2 diabetes, HbA1C goal < 8% (H)        Dose:  81 mg   Take 1 tablet (81 mg) by mouth daily   Quantity:  30 tablet   Refills:  11       finasteride 5 MG tablet   Commonly known as:  PROSCAR   This may have changed:  See the new instructions.   Used for:  Enlarged prostate        TAKE 1 TABLET EVERY DAY   Quantity:  90 tablet   Refills:  3       * furosemide 20 MG tablet   Commonly known as:  LASIX   This may have changed:  when to take this   Used for:  Essential hypertension, benign        Dose:  20 mg   Take 1 tablet (20 mg) by mouth 2 times daily   Quantity:  360 tablet   Refills:  3       * furosemide 20 MG tablet   Commonly known as:  LASIX   This may have changed:  Another medication with the same name was changed. Make sure you understand how and when to take each.   Used for:  Essential hypertension, benign        TAKE 2 TABLETS (40 MG) BY MOUTH 2 TIMES DAILY   Quantity:  360 tablet   Refills:  0       lisinopril 40 MG tablet   Commonly known as:  PRINIVIL/ZESTRIL   This may have changed:    - how much to take  - how to take this  - when to take this  - additional instructions   Used for:  Type 2 diabetes mellitus without complication, without long-term current use of insulin (H), Essential hypertension, benign        TAKE 1 TABLET (40 MG) BY MOUTH DAILY   Quantity:  90 tablet   Refills:  3       metFORMIN 500 MG tablet   Commonly known as:  GLUCOPHAGE   This may have changed:  when to take this   Used for:  Type 2 diabetes mellitus without complication, without long-term current use of insulin (H)        Dose:  500 mg   Take 1 tablet (500 mg) by mouth 2 times daily (with meals)   Quantity:  180  TEAM D Surgery Daily Progress Note  =====================================================    SUBJECTIVE: Patient seen and examined at bedside on AM rounds. Patient reports abdominal pain. Endorses burping but not passing gas from below. Had 3 episodes of bilious vomiting overnight and continues to be nauseous.     ALLERGIES:  No Known Allergies  -------------------------------------------------------------------------------------    MEDICATIONS:    Neurologic Medications  acetaminophen     Tablet .. 975 milliGRAM(s) Oral every 6 hours  cyclobenzaprine 5 milliGRAM(s) Oral every 8 hours PRN Muscle Spasm  gabapentin 300 milliGRAM(s) Oral three times a day  metoclopramide Injectable 10 milliGRAM(s) IV Push every 8 hours PRN nausea/vomiting  ondansetron Injectable 4 milliGRAM(s) IV Push every 6 hours PRN Nausea and/or Vomiting    Respiratory Medications  montelukast 10 milliGRAM(s) Oral daily    Cardiovascular Medications    Gastrointestinal Medications  dextrose 5% + sodium chloride 0.45%. 1000 milliLiter(s) IV Continuous <Continuous>  dextrose 5%. 1000 milliLiter(s) IV Continuous <Continuous>  dextrose 5%. 1000 milliLiter(s) IV Continuous <Continuous>  potassium chloride    Tablet ER 20 milliEquivalent(s) Oral once  potassium chloride  10 mEq/100 mL IVPB 10 milliEquivalent(s) IV Intermittent once    Genitourinary Medications    Hematologic/Oncologic Medications  enoxaparin Injectable 40 milliGRAM(s) SubCutaneous every 24 hours    Antimicrobial/Immunologic Medications    Endocrine/Metabolic Medications  dextrose 50% Injectable 25 Gram(s) IV Push once  dextrose 50% Injectable 12.5 Gram(s) IV Push once  dextrose 50% Injectable 25 Gram(s) IV Push once  dextrose Oral Gel 15 Gram(s) Oral once PRN Blood Glucose LESS THAN 70 milliGRAM(s)/deciliter  fenofibrate Tablet 145 milliGRAM(s) Oral daily  glucagon  Injectable 1 milliGRAM(s) IntraMuscular once  insulin lispro (ADMELOG) corrective regimen sliding scale   SubCutaneous three times a day before meals  insulin lispro (ADMELOG) corrective regimen sliding scale   SubCutaneous at bedtime    Topical/Other Medications    --------------------------------------------------------------------------------------    VITAL SIGNS:  ICU Vital Signs Last 24 Hrs  T(C): 36.8 (06 Aug 2022 21:42), Max: 37.2 (06 Aug 2022 00:28)  T(F): 98.2 (06 Aug 2022 21:42), Max: 98.9 (06 Aug 2022 00:28)  HR: 85 (06 Aug 2022 21:42) (84 - 100)  BP: 131/87 (06 Aug 2022 21:42) (112/71 - 135/80)  BP(mean): --  ABP: --  ABP(mean): --  RR: 18 (06 Aug 2022 21:42) (18 - 18)  SpO2: 97% (06 Aug 2022 21:42) (95% - 98%)    O2 Parameters below as of 06 Aug 2022 21:42  Patient On (Oxygen Delivery Method): room air  --------------------------------------------------------------------------------------    EXAM    General: NAD, resting in bed comfortably.  Cardiac: regular rate, warm and well perfused  Respiratory: Nonlabored respirations, normal cw expansion.  Abdomen: appropriately tender, distended.   Extremities: normal strength, FROM, no deformities    --------------------------------------------------------------------------------------                          PROCEDURE DATE:  08/06/2022          INTERPRETATION:  XR ABDOMEN URGENT    INDICATION: vomiting.    COMPARISON: None    IMPRESSION:    Nonobstructive bowel gas pattern. Thestomach is distended with gas.       tablet   Refills:  3       * Notice:  This list has 2 medication(s) that are the same as other medications prescribed for you. Read the directions carefully, and ask your doctor or other care provider to review them with you.             Primary Care Provider Office Phone # Fax #    Olegario Castillo [phone]       [address] [facility] [address]        Equal Access to Services     [facility] : Hadii aad ku hadasho Soomaali, waaxda luqadaha, qaybta kaalmada adeegyada, waxay idiin hayaan adeeg kharash la'aan ah. So [facility] [phone].    ATENCIÓN: Si rg michaels, tiene a huggins disposición servicios gratuitos de asistencia lingüística. Llame al [phone].    We comply with applicable federal civil rights laws and [place] laws. We do not discriminate on the basis of race, color, national origin, age, disability, sex, sexual orientation, or gender identity.            Thank you!     Thank you for choosing [facility]  for your care. Our goal is always to provide you with excellent care. Hearing back from our patients is one way we can continue to improve our services. Please take a few minutes to complete the written survey that you may receive in the mail after your visit with us. Thank you!             Your Updated Medication List - Protect others around you: Learn how to safely use, store and throw away your medicines at www.disposemymeds.org.          This list is accurate as of 6/28/18 11:59 AM.  Always use your most recent med list.                   Brand Name Dispense Instructions for use Diagnosis    acetaminophen 325 MG tablet    TYLENOL    100 tablet    Take 2 tablets (650 mg) by mouth every 4 hours as needed for mild pain    S/P transurethral resection of prostate       albuterol 108 (90 Base) MCG/ACT Inhaler    PROAIR HFA/PROVENTIL HFA/VENTOLIN HFA    3 Inhaler    Inhale 2 puffs into the lungs every 6 hours as needed for shortness of breath / dyspnea or wheezing     Chronic obstructive pulmonary disease, unspecified COPD type (H)       aspirin 81 MG tablet     30 tablet    Take 1 tablet (81 mg) by mouth daily    Type 2 diabetes, HbA1C goal < 8% (H)       atorvastatin 20 MG tablet    LIPITOR    90 tablet    Take 1 tablet (20 mg) by mouth daily    Hyperlipidemia LDL goal <100       blood glucose monitoring lancets     1 Box    Use to test blood sugar 2 times daily or as directed.    DM (diabetes mellitus) (H)       blood glucose monitoring test strip    ACCU-CHEK CHACHA    60 strip    Use to test blood sugar 2 times daily or as directed.    DM (diabetes mellitus) (H)       ciprofloxacin 250 MG tablet    CIPRO    6 tablet    Take 1 tablet (250 mg) by mouth 2 times daily        docusate sodium 100 MG tablet    COLACE    45 tablet    Take 100 mg by mouth 2 times daily To prevent constipation    S/P transurethral resection of prostate       finasteride 5 MG tablet    PROSCAR    90 tablet    TAKE 1 TABLET EVERY DAY    Enlarged prostate       * furosemide 20 MG tablet    LASIX    360 tablet    Take 1 tablet (20 mg) by mouth 2 times daily    Essential hypertension, benign       * furosemide 20 MG tablet    LASIX    360 tablet    TAKE 2 TABLETS (40 MG) BY MOUTH 2 TIMES DAILY    Essential hypertension, benign       lisinopril 40 MG tablet    PRINIVIL/ZESTRIL    90 tablet    TAKE 1 TABLET (40 MG) BY MOUTH DAILY    Type 2 diabetes mellitus without complication, without long-term current use of insulin (H), Essential hypertension, benign       metFORMIN 500 MG tablet    GLUCOPHAGE    180 tablet    Take 1 tablet (500 mg) by mouth 2 times daily (with meals)    Type 2 diabetes mellitus without complication, without long-term current use of insulin (H)       metoprolol succinate 25 MG 24 hr tablet    TOPROL-XL    30 tablet    Take 1 tablet (25 mg) by mouth daily    Abnormal cardiovascular stress test       order for DME      Equipment delivered; Respironics 760S BIPAP with heated humidification  and heated tubing.  Pressure 15/7cm. Respironics Syeda View FF mask (Medium)        * order for DME     1 Device    Oxygen 2 Li/min at night via nasal cannula    Nocturnal hypoxemia       * order for DME     1 Package    Equipment being ordered: diabetic shoes, 1 pair    Type 2 diabetes mellitus without complication, without long-term current use of insulin (H)       tamsulosin 0.4 MG capsule    FLOMAX    90 capsule    Take 1 capsule (0.4 mg) by mouth daily    Urinary urgency       VITAMIN C PO      Take by mouth At Bedtime        * Notice:  This list has 4 medication(s) that are the same as other medications prescribed for you. Read the directions carefully, and ask your doctor or other care provider to review them with you.

## 2022-08-17 ENCOUNTER — PATIENT OUTREACH (OUTPATIENT)
Dept: CARE COORDINATION | Facility: CLINIC | Age: 77
End: 2022-08-17

## 2022-08-17 NOTE — PROGRESS NOTES
Clinic Care Coordination - Chart Review Only    Situation/Background: Patient chart reviewed by care coordinator related to [facility] conversion.    Assessment: Patient continues to be followed by Clinic Care Coordination.    Plan: Patient's chart updated to align with [facility] program for ongoing patient management.     Rhea Knapp RN, BSN, PHN  Primary Care / Care Coordinator   [facility]  [email or web address] [email or web address]   [phone]

## 2022-08-18 ENCOUNTER — TELEPHONE (OUTPATIENT)
Dept: INTERNAL MEDICINE | Facility: CLINIC | Age: 77
End: 2022-08-18

## 2022-08-18 NOTE — TELEPHONE ENCOUNTER
[facility] called to request a 3 day hold eliquis for colonoscopy on 9/1 so hold starting 8/29     Is this okay?    Obie Garcia RN

## 2022-08-18 NOTE — TELEPHONE ENCOUNTER
Called and left a detailed message for [facility] with Dr. Castillo's message. Advised [facility] to call us back if they have ay questions.

## 2022-08-21 DIAGNOSIS — E78.5 HYPERLIPIDEMIA LDL GOAL <100: ICD-10-CM

## 2022-08-24 RX ORDER — ATORVASTATIN CALCIUM 20 MG/1
TABLET, FILM COATED ORAL
Qty: 90 TABLET | Refills: 1 | Status: SHIPPED | OUTPATIENT
Start: 2022-08-24 | End: 2023-01-11

## 2022-08-24 NOTE — TELEPHONE ENCOUNTER
Prescription approved per [facility] Refill Protocol.  Leslie Summers, RN  [facility] Triage Nurse

## 2022-08-29 ENCOUNTER — PATIENT OUTREACH (OUTPATIENT)
Dept: CARE COORDINATION | Facility: CLINIC | Age: 77
End: 2022-08-29

## 2022-08-29 NOTE — PROGRESS NOTES
Clinic Care Coordination Contact    Community Health Worker Follow Up    Care Gaps: Discussed with pt. He reports having an eye exam last week. Pt is aware he is due for his A1C and soon for his flu shot.     Health Maintenance Due   Topic Date Due     HF ACTION PLAN  Never done     ZOSTER IMMUNIZATION (2 of 3) 10/04/2013     DIABETIC FOOT EXAM  11/01/2019     EYE EXAM  04/01/2022     A1C  08/10/2022     INFLUENZA VACCINE (1) 09/01/2022       Care Plan:   Care Plan: 1. Improve chronic symptoms     Problem: Lifestyle choices     Goal: I want to improve management of my leg, knee, and hip pain and swelling.     Start Date: 6/3/2021 Expected End Date: 12/3/2022    This Visit's Progress: 80%    Note:     Barriers: lymphedema  Strengths: motivated to improve ability to walk  Patient expressed understanding of goal: Yes    Action steps to achieve this goal:    1. I will apply Jacinto hose to wear throughout the day and remove at night  2. I will walk my dog daily to help with strengthening and endurance  3. I will elevate my legs while sitting and laying down by propping them up with a pillow  4. I will discuss, review, schedule and complete recommended overdue health maintenance with my primary care provider  5. I will I will take my medications as prescribed  6. I will contact my care team with questions, concerns, support needs. I will use the clinic as a resource   7. I will contact my clinic with 24/7 after hours services available  8. Care Coordinator will remain available as needed                      Care Plan: 2. Improve chronic symptoms     Problem: I would like assistance and support to afford my FreeStyle Mikel sensors to monitor my blood sugar     Priority: Medium    Note:     Goal Statement: I would like assistance and support to afford my FreeStyle Mikel sensors to monitor my blood sugar.  Date Goal set: 6/27/2022  Barriers: Unable to afford sensors for continuous glucose monitor  Strengths: Advocate for  "self  Date to Achieve By: 12/27/2022  Patient expressed understanding of goal: Yes  Action steps to achieve this goal:  1. I will follow up with my providers as scheduled/recommended    2. I will take my medications as prescribed  3. I will follow up with my Diabetic Educator as scheduled/recommended  4. I will contact my care team with questions, concerns, support needs   5. I will use the clinic as a resource, and I understand I can contact my clinic with 24/7 after hours services available   6.  Care Coordinator will remain available as needed      Goal: Improve chronic symptoms     Start Date: 6/27/2022 Expected End Date: 12/27/2022    Note:     Barriers: Unable to afford sensors for continuous glucose monitor  Strengths: Advocate for self  Patient expressed understanding of goal: Yes  Action steps to achieve this goal:  1. I will follow up with my providers as scheduled/recommended    2. I will take my medications as prescribed  3. I will follow up with my Diabetic Educator as scheduled/recommended  4. I will contact my care team with questions, concerns, support needs   5. I will use the clinic as a resource, and I understand I can contact my clinic with 24/7 after hours services available   6.  Care Coordinator will remain available as needed                        Intervention and Education during outreach:     Reviewed upcoming medical appointments and procedures. Pt is aware of his pre-op exam tomorrow, his colonoscopy on 9/1/22, and his foot surgery on 9/20/22.    Pt is open to the diabetic educator, Kandace Castanon, calling him anytime after 2:00pm. Pt reports he checks his blood sugars weekly stating, \"it always stays about the same.\" When CHW asks what his blood glucose number is usually, he could not tell me. Kelley BLANCAS, CHW for the  Clinic, states she will contact Kandace to discuss calling pt regarding his DM.    Pt has already started his low fiber diet for upcoming colonoscopy on 9/1/22. \"I hate the " "prep.\"    Pt reports his leg/foot/hip swelling is gone. He continues to wear ED stockings. He sits in a reclined during the day with feet elevated.     Pt reports walking his dog 3x/day for a block distance.    Pt continues to take his medications on time. His girlfriend sets up his medications.     Pt continues to wear his wrist splints at night. His right hand is much improved, but he still gets tingling in his left hand.    CHW asks if pt has any further concerns or need for resources as this time. Pt states he does not. CHW made sure pt has CHW contact information. Pt will contact CHW with questions or updates before next outreach.     CHW Plan: CHW will follow up with pt in one month.    Seda Gallegos, covering for Kelley Slaughter  Community Heatlh Worker  [facility], [facility], and [facility]  [phone]          "

## 2022-08-30 ENCOUNTER — OFFICE VISIT (OUTPATIENT)
Dept: INTERNAL MEDICINE | Facility: CLINIC | Age: 77
End: 2022-08-30
Payer: MEDICARE

## 2022-08-30 VITALS
TEMPERATURE: 98.4 F | OXYGEN SATURATION: 96 % | HEART RATE: 68 BPM | HEIGHT: 66 IN | SYSTOLIC BLOOD PRESSURE: 134 MMHG | DIASTOLIC BLOOD PRESSURE: 80 MMHG | BODY MASS INDEX: 37.28 KG/M2 | WEIGHT: 232 LBS

## 2022-08-30 DIAGNOSIS — I26.99 OTHER PULMONARY EMBOLISM WITHOUT ACUTE COR PULMONALE, UNSPECIFIED CHRONICITY (H): ICD-10-CM

## 2022-08-30 DIAGNOSIS — J44.9 CHRONIC OBSTRUCTIVE PULMONARY DISEASE, UNSPECIFIED COPD TYPE (H): Chronic | ICD-10-CM

## 2022-08-30 DIAGNOSIS — I10 PRIMARY HYPERTENSION: ICD-10-CM

## 2022-08-30 DIAGNOSIS — Z01.818 PRE-OPERATIVE GENERAL PHYSICAL EXAMINATION: Primary | ICD-10-CM

## 2022-08-30 DIAGNOSIS — E11.9 TYPE 2 DIABETES MELLITUS WITHOUT COMPLICATION, WITHOUT LONG-TERM CURRENT USE OF INSULIN (H): ICD-10-CM

## 2022-08-30 DIAGNOSIS — M79.673 PAIN OF FOOT, UNSPECIFIED LATERALITY: ICD-10-CM

## 2022-08-30 DIAGNOSIS — G47.33 OBSTRUCTIVE SLEEP APNEA SYNDROME: ICD-10-CM

## 2022-08-30 LAB
HBA1C MFR BLD: 6.7 % (ref 0–5.6)
HGB BLD-MCNC: 11.4 G/DL (ref 13.3–17.7)
PLATELET # BLD AUTO: 275 10E3/UL (ref 150–450)

## 2022-08-30 PROCEDURE — 83036 HEMOGLOBIN GLYCOSYLATED A1C: CPT | Performed by: INTERNAL MEDICINE

## 2022-08-30 PROCEDURE — 93000 ELECTROCARDIOGRAM COMPLETE: CPT | Performed by: INTERNAL MEDICINE

## 2022-08-30 PROCEDURE — 85049 AUTOMATED PLATELET COUNT: CPT | Performed by: INTERNAL MEDICINE

## 2022-08-30 PROCEDURE — 85018 HEMOGLOBIN: CPT | Performed by: INTERNAL MEDICINE

## 2022-08-30 PROCEDURE — 36415 COLL VENOUS BLD VENIPUNCTURE: CPT | Performed by: INTERNAL MEDICINE

## 2022-08-30 PROCEDURE — 82947 ASSAY GLUCOSE BLOOD QUANT: CPT | Performed by: INTERNAL MEDICINE

## 2022-08-30 PROCEDURE — 84132 ASSAY OF SERUM POTASSIUM: CPT | Performed by: INTERNAL MEDICINE

## 2022-08-30 PROCEDURE — 99214 OFFICE O/P EST MOD 30 MIN: CPT | Performed by: INTERNAL MEDICINE

## 2022-08-30 NOTE — PROGRESS NOTES
[address] [address]  Phone: [phone]  Primary Provider: Meena Castillo  Pre-op Performing Provider: MEENA CASTILLO    PREOPERATIVE EVALUATION:  Today's date: 8/30/2022    Nahun Villalobos is a 76 year old male who presents for a preoperative evaluation.    Surgical Information:  Surgery/Procedure: Corn Elimination Surgery on foot  Surgery Location: [facility] [place]  Surgeon: Dr. Tong Gray  Surgery Date: 9/20/2022  Time of Surgery: TBD  Where patient plans to recover: At home with family  Fax number for surgical facility:  [phone]    Type of Anesthesia Anticipated: General    Assessment & Plan     The proposed surgical procedure is considered LOW risk.    Pre-operative general physical examination  To proceed as scheduled.  - Hemoglobin; Future  - EKG 12-lead complete w/read - Clinics  - Platelet count; Future  - Potassium; Future    Pain of foot, unspecified laterality  Routine postoperative course per podiatry.    Type 2 diabetes mellitus without complication, without long-term current use of insulin (H)  Labs ordered as fasting per routine and discussed holding of a.m. dosing of oral hypoglycemics prior to procedure  - Glucose; Future  - HEMOGLOBIN A1C; Future    Obstructive sleep apnea syndrome  Note as baseline history    Other pulmonary embolism without acute cor pulmonale, unspecified chronicity (H)  Discussed potential risk for complications related to bleeding and or clot formation.  Discussed with patient need to hold Eliquis as previously ordered prior to procedure and then resume postoperatively.  Advised stopping 2 days prior    Primary hypertension  Known as baseline and stable on medical therapy    Chronic obstructive pulmonary disease, unspecified COPD type (H)  Continue with inhaler therapy as directed.      Risks and Recommendations:  The patient has the following additional risks and recommendations  for perioperative complications:   - Morbid obesity (BMI >40)  Diabetes:  - Patient is not on insulin therapy: regular NPO guidelines can be followed.   Obstructive Sleep Apnea:  Note as baseline history    Medication Instructions:   - apixaban (Eliquis), edoxaban (Savaysa), rivaroxaban (Xarelto): Bleeding risk is low for this procedure AND CrCl (>=) 50 mL/min. HOLD 1 day before surgery.     - metformin: HOLD day of surgery.    RECOMMENDATION:  APPROVAL GIVEN to proceed with proposed procedure, without further diagnostic evaluation.    30 minutes spent on the date of the encounter doing chart review, review of test results, interpretation of tests and patient visit       Subjective     HPI related to upcoming procedure: foot repair      Preop Questions 8/30/2022   1. Have you ever had a heart attack or stroke? No   2. Have you ever had surgery on your heart or blood vessels, such as a stent placement, a coronary artery bypass, or surgery on an artery in your head, neck, heart, or legs? No   3. Do you have chest pain with activity? No   4. Do you have a history of  heart failure? No   5. Do you currently have a cold, bronchitis or symptoms of other infection? No   6. Do you have a cough, shortness of breath, or wheezing? No   7. Do you or anyone in your family have previous history of blood clots? YES -    8. Do you or does anyone in your family have a serious bleeding problem such as prolonged bleeding following surgeries or cuts? No   9. Have you ever had problems with anemia or been told to take iron pills? No   10. Have you had any abnormal blood loss such as black, tarry or bloody stools? YES -    11. Have you ever had a blood transfusion? No   12. Are you willing to have a blood transfusion if it is medically needed before, during, or after your surgery? Yes   13. Have you or any of your relatives ever had problems with anesthesia? No   14. Do you have sleep apnea, excessive snoring or daytime drowsiness? No    15. Do you have any artifical heart valves or other implanted medical devices like a pacemaker, defibrillator, or continuous glucose monitor? No   16. Do you have artificial joints? No   17. Are you allergic to latex? No     Health Care Directive:  Patient does not have a Health Care Directive or Living Will: Advance Directive received and scanned. Click on Code in the patient header to view.6}    Status of Chronic Conditions:  See problem list for active medical problems.  Problems all longstanding and stable, except as noted/documented.  See ROS for pertinent symptoms related to these conditions.      Review of Systems  CONSTITUTIONAL: NEGATIVE for fever, chills, change in weight  EYES: NEGATIVE for vision changes or irritation  ENT/MOUTH: NEGATIVE for ear, mouth and throat problems  RESP: NEGATIVE for significant cough or SOB  CV: NEGATIVE for chest pain, palpitations or peripheral edema  GI: NEGATIVE for nausea, abdominal pain, heartburn, or change in bowel habits  : NEGATIVE for frequency, dysuria, or hematuria  NEURO: NEGATIVE for weakness, dizziness or paresthesias  ENDOCRINE: NEGATIVE for temperature intolerance, skin/hair changes  HEME: NEGATIVE for bleeding problems  PSYCHIATRIC: NEGATIVE for changes in mood or affect    Patient Active Problem List    Diagnosis Date Noted     History of adenocarcinoma of lung 05/27/2021     Priority: Medium     Right upper lobe. No evidence of distant spread on PET.  Clinical stage (prior to surgery): IA (T1a, N0, M0)  Surgically resected 4/28/2016.  Imaging stable since then.       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     Priority: Medium     Other pulmonary embolism without acute cor pulmonale, unspecified chronicity (H) 10/21/2020     Priority: Medium     Acute diverticulitis 09/20/2020     Priority: Medium     Cervical segment dysfunction 09/07/2020     Priority: Medium     Cervicalgia 09/07/2020     Priority: Medium     Thoracic segment dysfunction 09/07/2020     Priority:  Medium     S/P transurethral resection of prostate 04/30/2018     Priority: Medium     Hematuria, gross 04/30/2018     Priority: Medium     Type 2 diabetes mellitus without complication, without long-term current use of insulin (H) 12/13/2016     Priority: Medium     Erectile dysfunction 04/29/2016     Priority: Medium     Morbid obesity due to excess calories (H) 03/14/2016     Priority: Medium     BPPV (benign paroxysmal positional vertigo), unspecified laterality 03/14/2016     Priority: Medium     Hyperlipidemia LDL goal <100 10/10/2015     Priority: Medium     Obstructive sleep apnea syndrome 07/30/2015     Priority: Medium     Problem list name updated by automated process. Provider to review       Polyp of colon 08/27/2013     Priority: Medium     Advance care planning 02/22/2012     Priority: Medium     Advance Care Planning 12/6/2017: ACP Review of Chart / Resources Provided:  Reviewed chart for advance care plan.  Nahun Villalobos has been provided information and resources to begin or update their advance care plan.  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     Priority: Medium     Lumbago 11/25/2003     Priority: Medium     Essential hypertension, benign 12/19/2002     Priority: Medium     Tobacco use disorder 12/19/2002     Priority: Medium      Past Medical History:   Diagnosis Date     Arthritis      CHF (congestive heart failure) (H) 9/16/2020     COPD (chronic obstructive pulmonary disease) (H)      DM (diabetes mellitus) (H)      Essential hypertension, benign      Hemorrhage of rectum and anus      Non-small cell lung cancer (H)      Obesity      Personal history of colonic polyps      Tobacco use disorder      Urinary retention      Past Surgical History:   Procedure Laterality Date     ABDOMEN SURGERY  1995     APPENDECTOMY       CHOLECYSTECTOMY        COLONOSCOPY  2014     CYSTOSCOPY, TRANSURETHRAL RESECTION (TUR) PROSTATE, COMBINED N/A 4/30/2018    Procedure: COMBINED CYSTOSCOPY, TRANSURETHRAL RESECTION (TUR) PROSTATE;  Cystoscopy, Transurethral Resection Of The Prostate;  Surgeon: Praveena Burris MD;  Location: [facility] OR     WEDGE RESECTION      lung     [facility] NONSPECIFIC PROCEDURE      cholecystectomy     Current Outpatient Medications   Medication Sig Dispense Refill     acetaminophen (TYLENOL) 500 MG tablet Take 500 mg by mouth every evening       albuterol (VENTOLIN HFA) 108 (90 Base) MCG/ACT inhaler INHALE 2 PUFFS INTO THE LUNGS EVERY 6 HOURS AS NEEDED FOR SHORTNESS OF BREATH / DYSPNEA OR WHEEZING 25.5 g 2     Ascorbic Acid (VITAMIN C PO) Take 500 mg by mouth At Bedtime        atorvastatin (LIPITOR) 20 MG tablet TAKE 1 TABLET DAILY 90 tablet 1     blood glucose (ACCU-CHEK CHACHA) test strip Use to test blood sugar 2 times daily or as directed. 60 strip 6     blood glucose (NO BRAND SPECIFIED) lancets standard Use to test blood sugar 1 time daily, first thing in the morning 50 each 3     blood glucose (NO BRAND SPECIFIED) test strip Use to test blood sugar 1 time daily, first thing in the morning. 50 strip 11     blood glucose monitoring (NO BRAND SPECIFIED) meter device kit Use to test blood sugar 1 time daily, first thing in the morning 1 kit 0     docusate sodium (COLACE) 100 MG capsule Take 100 mg by mouth 2 times daily as needed for constipation       ELIQUIS ANTICOAGULANT 5 MG tablet TAKE 1 TABLET TWICE A  tablet 1     finasteride (PROSCAR) 5 MG tablet TAKE 1 TABLET BY MOUTH EVERY DAY 90 tablet 2     Fluticasone-Umeclidin-Vilanterol (TRELEGY ELLIPTA) 100-62.5-25 MCG/INH oral inhaler Inhale 1 puff into the lungs daily 60 each 5     furosemide (LASIX) 40 MG tablet TAKE 1 TABLET BY MOUTH TWICE A  tablet 1     [START ON 9/1/2022] gabapentin (NEURONTIN) 300 MG capsule Take 1 capsule (300 mg) by mouth 2 times daily 180 capsule 1      "lisinopril (ZESTRIL) 40 MG tablet TAKE 1 TABLET DAILY 90 tablet 2     metFORMIN (GLUCOPHAGE) 500 MG tablet TAKE 2 TABLETS AT          BREAKFAST, 1 TABLET AT     DINNER 270 tablet 1     metoprolol succinate ER (TOPROL-XL) 25 MG 24 hr tablet Take 1 tablet (25 mg) by mouth daily 90 tablet 3     order for DME Equipment being ordered: diabetic shoes, 1 pair 1 Package 0     order for DME Oxygen 2 Li/min  at night via nasal cannula 1 Device 0       Allergies   Allergen Reactions     No Known Drug Allergies         Social History     Tobacco Use     Smoking status: Former Smoker     Packs/day: 2.00     Years: 52.00     Pack years: 104.00     Types: Cigarettes, Cigars     Start date: 1960     Quit date: 2013     Years since quittin.2     Smokeless tobacco: Never Used     Tobacco comment: Quit, will never start again.   Substance Use Topics     Alcohol use: No       History   Drug Use No         Objective     /80   Pulse 68   Temp 98.4  F (36.9  C)   Ht 1.676 m (5' 6\")   Wt 105.2 kg (232 lb)   SpO2 96%   BMI 37.45 kg/m      Physical Exam    GENERAL APPEARANCE: alert and no distress     EYES: EOMI,  PERRL     HENT: ear canals and TM's normal and nose and mouth without ulcers or lesions     NECK: no adenopathy, no asymmetry, masses, or scars and thyroid normal to palpation     RESP: lungs clear to auscultation - no rales, rhonchi or wheezes     CV: regular rates and rhythm, normal S1 S2, no S3 or S4 and no click or rub     ABDOMEN: obese     NEURO: No focal changes     PSYCH: mentation appears normal and affect normal/bright    Recent Labs   Lab Test 22  1237 02/10/22  1016 22  1129 12/10/21  1214   HGB 11.5*  --  11.6* 11.7*     --  239 297    136  --   --    POTASSIUM 4.2 4.1  --   --    CR 1.04 1.11 1.09  --    A1C  --  6.7*  --  7.5*        Diagnostics:  Labs pending at this time.  Results will be reviewed when available.   EKG: Normal sinus rhythm with a heart rate of " approximately 63 bpm.  No acute changes are noted.    Revised Cardiac Risk Index (RCRI):  The patient has the following serious cardiovascular risks for perioperative complications:   - Diabetes Mellitus  = 1 point     RCRI Interpretation: 2 points: Class III (moderate risk - 6.6% complication rate)     Estimated Functional Capacity: Performs 4 METS exercise without symptoms (e.g., light housework, stairs, 4 mph walk, 7 mph bike, slow step dance)       Signed Electronically by: Olegario Castillo MD  Copy of this evaluation report is provided to requesting physician.

## 2022-08-31 LAB
FASTING STATUS PATIENT QL REPORTED: NO
GLUCOSE BLD-MCNC: 138 MG/DL (ref 70–99)
POTASSIUM BLD-SCNC: 4 MMOL/L (ref 3.4–5.3)

## 2022-09-01 ENCOUNTER — TRANSFERRED RECORDS (OUTPATIENT)
Dept: HEALTH INFORMATION MANAGEMENT | Facility: CLINIC | Age: 77
End: 2022-09-01

## 2022-09-01 NOTE — TELEPHONE ENCOUNTER
Diabetes Education Scheduling Outreach #2 as requested by care coordinator    Call to patient to schedule. Patient declined to schedule and says he is not intrested.    Letter sent to patient requesting to call to schedule.    Rayne Epstein  [place] [facility]  Diabetes and Nutrition Scheduling

## 2022-09-08 ENCOUNTER — PATIENT OUTREACH (OUTPATIENT)
Dept: CARE COORDINATION | Facility: CLINIC | Age: 77
End: 2022-09-08

## 2022-09-08 ASSESSMENT — ACTIVITIES OF DAILY LIVING (ADL): DEPENDENT_IADLS:: CLEANING;COOKING;LAUNDRY;SHOPPING;MEAL PREPARATION

## 2022-09-08 NOTE — PROGRESS NOTES
Clinic Care Coordination Contact  Care Coordination Clinician Chart Review     Situation: Patient chart reviewed by care coordinator.?     Background: Initial assessment and enrollment to Care Coordination was 9/24/2020.??Care plan(s) with patient-centered goal(s) were developed with participation from patient.? Lead CC handed patient off to CHW for continued outreach every 30 days.??     Assessment: Per chart review, patient outreach completed by CC CHW on 8/29/2022.? Patient is actively working to accomplish goal(s).? Patient's goal(s) remain(s) appropriate at this time.? Patient is due for updated Plan of Care.? Annual assessment will be due 9/8/2023.     Care Plan: 1. Improve chronic symptoms     Problem: Lifestyle choices     Goal: I want to improve management of my leg, knee, and hip pain and swelling.     Start Date: 6/3/2021 Expected End Date: 12/3/2022    This Visit's Progress: 80% Recent Progress: 80%    Note:     Barriers: Lymphedema  Strengths: Motivated to improve ability to walk  Patient expressed understanding of goal: Yes    Action steps to achieve this goal:    1. I will apply Jacinto hose to wear throughout the day and remove at night  2. I will walk my dog daily to help with strengthening and endurance  3. I will elevate my legs while sitting and laying down by propping them up with a pillow  4. I will discuss, review, schedule and complete recommended overdue health maintenance with my primary care provider  5. I will I will take my medications as prescribed  6. I will contact my care team with questions, concerns, support needs. I will use the clinic as a resource   7. I will contact my clinic with 24/7 after hours services available  8. Care Coordinator will remain available as needed                      Care Plan: 2. Improve chronic symptoms     Problem: I would like assistance and support to afford my FreeStyle Mikel sensors to monitor my blood sugar     Priority: Medium    Note:     Goal  Statement: I would like assistance and support to afford my FreeStyle Mikel sensors to monitor my blood sugar.  Date Goal set: 6/27/2022  Barriers: Unable to afford sensors for continuous glucose monitor  Strengths: Advocate for self  Date to Achieve By: 12/27/2022  Patient expressed understanding of goal: Yes  Action steps to achieve this goal:  1. I will follow up with my providers as scheduled/recommended    2. I will take my medications as prescribed  3. I will follow up with my Diabetic Educator as scheduled/recommended  4. I will contact my care team with questions, concerns, support needs   5. I will use the clinic as a resource, and I understand I can contact my clinic with 24/7 after hours services available   6.  Care Coordinator will remain available as needed      Goal: Improve chronic symptoms     Start Date: 6/27/2022 Expected End Date: 12/27/2022    This Visit's Progress: 60%    Note:     Barriers: Unable to afford sensors for continuous glucose monitor  Strengths: Advocate for self  Patient expressed understanding of goal: Yes  Action steps to achieve this goal:  1. I will follow up with my providers as scheduled/recommended    2. I will take my medications as prescribed  3. I will follow up with my Diabetic Educator as scheduled/recommended  4. I will contact my care team with questions, concerns, support needs   5. I will use the clinic as a resource, and I understand I can contact my clinic with 24/7 after hours services available   6.  Care Coordinator will remain available as needed                      Plan/Recommendations: The patient will continue working with Care Coordination to achieve above goal(s).? CHW will involve Lead CC as needed or if patient is ready to move to maintenance.? Lead CC will continue to monitor CHW s monthly outreaches and progress to goal(s) every 6 weeks.    Plan of Care updated and sent to patient: Yes, 9/8/2022     Rhea Knapp RN, BSN, PHN  Primary Care / Care  Coordinator   [facility]  E-mail [email or web address]   [phone]

## 2022-09-08 NOTE — LETTER
[address] [facility] [address]    [facility]  Patient Centered Plan of Care  About Me:        Patient Name:  Nahun Villalobos    YOB: 1945  Age:         76 year old   Sukumar MRN:    [id number] Telephone Information:  Home Phone [phone]   Mobile [phone]       Address:  [address] [place] [address] Email address:  [email or web address]      Emergency Contact(s)    Name Relationship Lgl Grd Work Phone Home Phone Mobile Phone   1. SINDY CHRISTIE  Daughter No  [phone]    2. SUMAN Significant ot*    [phone]           Primary language:  English     needed? No   [facility] [employer] [facility]:  [phone] op. 1  Other communication barriers:None    Preferred Method of Communication:  William  Current living arrangement: I live in a private home (lives with girlfriend)    Mobility Status/ Medical Equipment: Independent w/Device    Health Maintenance  Health Maintenance Reviewed: Due/Overdue   Health Maintenance Due   Topic Date Due     HF ACTION PLAN  Never done     ZOSTER IMMUNIZATION (2 of 3) 10/04/2013     DIABETIC FOOT EXAM  11/01/2019     EYE EXAM  04/01/2022     INFLUENZA VACCINE (1) 09/01/2022         My Access Plan  Medical Emergency 911   Primary Clinic Line [facility] [phone]   24 Hour Appointment Line [phone] or  [phone]) (toll-free)   24 Hour Nurse Line [phone] (toll-free)   Preferred Urgent Care [facility], [phone]     Preferred Hospital [facility]  [phone] ([facility] or [facility])     Preferred Pharmacy [facility] - [place], [place] - [address] AT Portal to Registered [employer] Sites     Behavioral Health Crisis Line The National Suicide Prevention Lifeline at 1-330.447.8310 or Text/Call 708     My Care Team Members  Patient Care Team       Relationship Specialty Notifications Start  End    Olegario Castillo MD PCP - General Internal Medicine  4/6/22     Phone: [phone] Fax: [phone]         [address] [place] [address]    Olegario Castillo MD Assigned PCP   10/4/12     Phone: [phone] Fax: [phone]         [address] [place] [address]    Praveena Burris MD MD Urology  3/1/17     Phone: [phone] Fax: [phone]         [address] [facility]  [facility] [address]    Areli Cordero, RN Registered Nurse   3/1/17     Phone: [phone]         Kelley Slaughter MA Community Health Worker Primary Care - [facility] Admissions 9/24/20     Phone: [phone]         Rhea Knapp, RN Lead Care Coordinator  Admissions 9/24/20     Snati Tinajero MD MD Hematology & Oncology  12/7/20     Phone: [phone] Fax: [phone]         [facility] [address] [facility] [address] [facility] [address]    Bernardo Haynes MD MD Pulmonary Disease  12/7/20     Phone: [phone] Fax: [phone]         [facility] [address] [place] [address] [facility] [address]    Jean-Paul Silver DO Assigned Musculoskeletal Provider   7/4/21     Phone: [phone] Fax: [phone]         [address] [facility] [address]    Antonia Mandel, LIZA Physical Therapist Physical Therapist  8/2/21     Phone: [phone] Fax: [phone]         [address] [place] [address] [address] [facility] [address]    Jamil Aparicio MD MD Neurology  2/2/22     Phone: [phone] Fax: [phone] [address] [place] [address]    Jamil Aparicio MD Assigned Neuroscience Provider   4/10/22     Phone: [phone] Fax: [phone] [address] [place] [address]    Nando Herrera APRN CNP Assigned Cancer Care Provider   7/23/22     Phone: [phone] Fax: [phone] [address] [facility] [address]            My Care Plans  Self Management and Treatment Plan  Care Plan  Care Plan: 1. Improve chronic symptoms      Problem: Lifestyle choices     Goal: I want to improve management of my leg, knee, and hip pain and swelling.     Start Date: 6/3/2021 Expected End Date: 12/3/2022    This Visit's Progress: 80% Recent Progress: 80%    Note:     Barriers: Lymphedema  Strengths: Motivated to improve ability to walk  Patient expressed understanding of goal: Yes    Action steps to achieve this goal:    1. I will apply Jacinto hose to wear throughout the day and remove at night  2. I will walk my dog daily to help with strengthening and endurance  3. I will elevate my legs while sitting and laying down by propping them up with a pillow  4. I will discuss, review, schedule and complete recommended overdue health maintenance with my primary care provider  5. I will I will take my medications as prescribed  6. I will contact my care team with questions, concerns, support needs. I will use the clinic as a resource   7. I will contact my clinic with 24/7 after hours services available  8. Care Coordinator will remain available as needed                      Care Plan: 2. Improve chronic symptoms     Problem: I would like assistance and support to afford my FreeStyle Mikel sensors to monitor my blood sugar     Priority: Medium    Note:     Goal Statement: I would like assistance and support to afford my FreeStyle Mikel sensors to monitor my blood sugar.  Date Goal set: 6/27/2022  Barriers: Unable to afford sensors for continuous glucose monitor  Strengths: Advocate for self  Date to Achieve By: 12/27/2022  Patient expressed understanding of goal: Yes  Action steps to achieve this goal:  1. I will follow up with my providers as scheduled/recommended    2. I will take my medications as prescribed  3. I will follow up with my Diabetic Educator as scheduled/recommended  4. I will contact my care team with questions, concerns, support needs   5. I will use the clinic as a resource, and I understand I can contact my clinic with 24/7 after hours services  available   6.  Care Coordinator will remain available as needed      Goal: Improve chronic symptoms     Start Date: 6/27/2022 Expected End Date: 12/27/2022    This Visit's Progress: 60%    Note:     Barriers: Unable to afford sensors for continuous glucose monitor  Strengths: Advocate for self  Patient expressed understanding of goal: Yes  Action steps to achieve this goal:  1. I will follow up with my providers as scheduled/recommended    2. I will take my medications as prescribed  3. I will follow up with my Diabetic Educator as scheduled/recommended  4. I will contact my care team with questions, concerns, support needs   5. I will use the clinic as a resource, and I understand I can contact my clinic with 24/7 after hours services available   6.  Care Coordinator will remain available as needed                         Action Plans on File:   COPD  Depression     Advance Care Plans/Directives Type:   -- (Full Code)      My Medical and Care Information  Problem List   Patient Active Problem List   Diagnosis     Essential hypertension, benign     Tobacco use disorder     Lumbago     Impotence of organic origin     Advance care planning     Polyp of colon     Obstructive sleep apnea syndrome     Hyperlipidemia LDL goal <100     Morbid obesity due to excess calories (H)     BPPV (benign paroxysmal positional vertigo), unspecified laterality     Chronic obstructive pulmonary disease, unspecified COPD type (H)     Type 2 diabetes mellitus without complication, without long-term current use of insulin (H)     S/P transurethral resection of prostate     Hematuria, gross     Cervical segment dysfunction     Cervicalgia     Thoracic segment dysfunction     Erectile dysfunction     Acute diverticulitis     Other pulmonary embolism without acute cor pulmonale, unspecified chronicity (H)     Mesenteric mass     History of adenocarcinoma of lung      Current Medications and Allergies:    Allergies   Allergen Reactions     No  Known Drug Allergies      Current Outpatient Medications   Medication     acetaminophen (TYLENOL) 500 MG tablet     albuterol (VENTOLIN HFA) 108 (90 Base) MCG/ACT inhaler     Ascorbic Acid (VITAMIN C PO)     atorvastatin (LIPITOR) 20 MG tablet     blood glucose (ACCU-CHEK CHACHA) test strip     blood glucose (NO BRAND SPECIFIED) lancets standard     blood glucose (NO BRAND SPECIFIED) test strip     blood glucose monitoring (NO BRAND SPECIFIED) meter device kit     docusate sodium (COLACE) 100 MG capsule     ELIQUIS ANTICOAGULANT 5 MG tablet     finasteride (PROSCAR) 5 MG tablet     Fluticasone-Umeclidin-Vilanterol (TRELEGY ELLIPTA) 100-62.5-25 MCG/INH oral inhaler     furosemide (LASIX) 40 MG tablet     gabapentin (NEURONTIN) 300 MG capsule     lisinopril (ZESTRIL) 40 MG tablet     metFORMIN (GLUCOPHAGE) 500 MG tablet     metoprolol succinate ER (TOPROL-XL) 25 MG 24 hr tablet     order for DME     order for DME     No current facility-administered medications for this visit.     Care Coordination Start Date: 9/24/2020   Frequency of Care Coordination: 6 weeks     Form Last Updated: 09/08/2022

## 2022-09-14 NOTE — H&P
Admitted: 09/20/2022    HISTORY OF PRESENT ILLNESS:  The patient is a black male who has been presenting to my professional services at the [facility].  He relates a 50-year duration of painful corns on both of his toes.  They are painful when he wears the majority if not all of his shoe gear at this point in time.  There is a history of conservative care, which I have been providing for him.  This includes different shoes, hand cream and periodic debridement of the lesions and also his usage of a silicone tube sleeves.  These have helped him, but despite all of these conservative measures the lesions continue to return.  Therefore, surgical intervention has been opted for.    PAST MEDICAL HISTORY:  Type 2 diabetes, diabetic neuropathy, elevated cholesterol, history of blood clots, hypertension and prostate dysfunction.  He denies any other medical problems.    MEDICATIONS:  Atorvastatin tablet 20 mg, Eliquis oral tablet 5 mg, finasteride oral tablet 5 mg, which is for his prostate dysfunction, furosemide 40 mg tablet, gabapentin oral capsules 300 mg, metformin hydrochloride oral tablet 500 mg, metoprolol succinate ER oral tablets, One-Touch Verio in vitro strips and inhalation aerosol as necessary.    SOCIAL HISTORY:  He quit smoking approximately 10 years ago.  He relates having stopped drinking 5 years ago was a social history.    FAMILY HISTORY:  Unknown.      PAST SURGICAL HISTORY:  Has included:    1.  An appendectomy.  2.  Prostate services.  3.  Tonsillectomy.  4.  Gallbladder resection.    PHYSICAL EXAMINATION:    EXTREMITIES:  Examination of lower extremities dermatologic, the temperature, texture and turgor is within normal limits bilaterally.  All nail plates are present within normal limits bilaterally.  Hair growth is good into his toes bilaterally.  There are no masses or scars.  He presents with calluses on the dorsal lateral aspect of the proximal interphalangeal  joints of both fifth digits.   VASCULAR:  The patient has edema bilaterally.  His posterior tibial pulses are nonpalpable due to his ankle edema.  His dorsalis pedis pulses are both +2/4 bilaterally.  Capillary filling time is less than 3 seconds bilaterally.  There is no gross varicosities or varicose veins bilaterally.  NEUROLOGIC:  Deferred, but he does have a flexatory Babinski response, bilaterally.   MUSCULOSKELETAL:  The patient has early bunion formation bilaterally.  His bony exostosis on his fifth digits, bilaterally.  They are contributing of course to his unremitting lesions.  These digits themselves are rather rectus.      PROPOSED PROCEDURE:  Exostosis, fifth digits, both feet.    DISCLAIMER:  The nature and extent of surgery and possible complications were all explained to Keely as full preoperative consultation.  This full preoperative consultation was provided utilizing drawings and sketches.  No guarantee was given as far as any possible functional or cosmetic results.  Various inherent risks and complications were all discussed including but not necessarily limited to infection, numbness, pins and needles, delayed healing of skin or bone, nonresolution of his lesions, loss of power or frailness to any toe or toes, reactions to medications and something unforeseen.  The patient seemed to accept these risks and complications.  My office consent was signed.  We are premedicating him with preoperative antibiotics.        Tong Gray DPM        D: 2022   T: 2022   MT: terrence    Name:     KEELY FRANCE  MRN:      [id number]        Account:     [id number]   :      1945           Admitted:    2022       Document: [id number]

## 2022-09-17 ENCOUNTER — NURSE TRIAGE (OUTPATIENT)
Dept: NURSING | Facility: CLINIC | Age: 77
End: 2022-09-17

## 2022-09-17 NOTE — TELEPHONE ENCOUNTER
Nurse Triage SBAR    Is this a 2nd Level Triage? NO    Situation: calling for instructions on Eliquis prior to surgery    Background: No consent on file- verbal permission given to speak to Kenia today     Assessment: having foot surgery on Tuesday  On Eliquis   Chart review for medication instructions:     Other pulmonary embolism without acute cor pulmonale, unspecified chronicity (H)  Discussed potential risk for complications related to bleeding and or clot formation.  Discussed with patient need to hold Eliquis as previously ordered prior to procedure and then resume postoperatively.  Advised stopping 2 days prior     - metformin: HOLD day of surgery.      Protocol Recommended Disposition:   No disposition on file.    Recommendation: reviewed medication recommendations from patients preoperative physical with patient and Kenia- they verbalize understanding.      home care    Does the patient meet one of the following criteria for ADS visit consideration? 16+ years old, with an [facility] PCP     TIP  Providers, please consider if this condition is appropriate for management at one of our Acute and Diagnostic Services sites.     If patient is a good candidate, please use dotphrase <dot>triageresponse and select Refer to ADS to document.    Reason for Disposition    Caller has medicine question only, adult not sick, AND triager answers question    Additional Information    Negative: [1] Intentional drug overdose AND [2] suicidal thoughts or ideas    Negative: Drug overdose and triager unable to answer question    Negative: Caller requesting a renewal or refill of a medicine patient is currently taking    Negative: Caller requesting information unrelated to medicine    Negative: Caller requesting information about COVID-19 Vaccine    Negative: Caller requesting information about Emergency Contraception    Negative: Caller requesting information about Combined Birth Control Pills    Negative: Caller requesting information  about Progestin Birth Control Pills    Negative: Caller requesting information about Post-Op pain or medicines    Negative: Caller requesting a prescription antibiotic (such as Penicillin) for Strep throat and has a positive culture result    Negative: Caller requesting a prescription anti-viral med (such as Tamiflu) and has influenza (flu) symptoms    Negative: Immunization reaction suspected    Negative: Rash while taking a medicine or within 3 days of stopping it    Negative: [1] Asthma and [2] having symptoms of asthma (cough, wheezing, etc.)    Negative: [1] Symptom of illness (e.g., headache, abdominal pain, earache, vomiting) AND [2] more than mild    Negative: Breastfeeding questions about mother's medicines and diet    Negative: MORE THAN A DOUBLE DOSE of a prescription or over-the-counter (OTC) drug    Negative: [1] DOUBLE DOSE (an extra dose or lesser amount) of prescription drug AND [2] any symptoms (e.g., dizziness, nausea, pain, sleepiness)    Negative: [1] DOUBLE DOSE (an extra dose or lesser amount) of over-the-counter (OTC) drug AND [2] any symptoms (e.g., dizziness, nausea, pain, sleepiness)    Negative: Took another person's prescription drug    Negative: [1] DOUBLE DOSE (an extra dose or lesser amount) of prescription drug AND [2] NO symptoms (Exception: a double dose of antibiotics)    Negative: Diabetes drug error or overdose (e.g., took wrong type of insulin or took extra dose)    Negative: [1] Prescription not at pharmacy AND [2] was prescribed by PCP recently (Exception: triager has access to EMR and prescription is recorded there. Go to Home Care and confirm for pharmacy.)    Negative: [1] Pharmacy calling with prescription question AND [2] triager unable to answer question    Negative: [1] Caller has URGENT medicine question about med that PCP or specialist prescribed AND [2] triager unable to answer question    Negative: Medicine patch causing local rash or itching    Negative: [1] Caller  has medicine question about med NOT prescribed by PCP AND [2] triager unable to answer question (e.g., compatibility with other med, storage)    Negative: Prescription request for new medicine (not a refill)    Negative: [1] Caller has NON-URGENT medicine question about med that PCP prescribed AND [2] triager unable to answer question    Negative: Caller wants to use a complementary or alternative medicine    Negative: [1] Prescription prescribed recently is not at pharmacy AND [2] triager has access to patient's EMR AND [3] prescription is recorded in the EMR    Negative: [1] DOUBLE DOSE (an extra dose or lesser amount) of over-the-counter (OTC) drug AND [2] NO symptoms    Negative: [1] DOUBLE DOSE (an extra dose or lesser amount) of antibiotic drug AND [2] NO symptoms    Protocols used: MEDICATION QUESTION CALL-A-

## 2022-09-20 ENCOUNTER — HOSPITAL ENCOUNTER (OUTPATIENT)
Facility: AMBULATORY SURGERY CENTER | Age: 77
Discharge: HOME | End: 2022-09-20
Attending: PODIATRIST
Payer: MEDICARE

## 2022-09-20 ENCOUNTER — ANESTHESIA (OUTPATIENT)
Dept: SURGERY | Facility: AMBULATORY SURGERY CENTER | Age: 77
End: 2022-09-20
Payer: MEDICARE

## 2022-09-20 ENCOUNTER — ANESTHESIA EVENT (OUTPATIENT)
Dept: SURGERY | Facility: AMBULATORY SURGERY CENTER | Age: 77
End: 2022-09-20
Payer: MEDICARE

## 2022-09-20 VITALS
HEIGHT: 66 IN | BODY MASS INDEX: 37.28 KG/M2 | OXYGEN SATURATION: 91 % | HEART RATE: 80 BPM | SYSTOLIC BLOOD PRESSURE: 117 MMHG | WEIGHT: 232 LBS | DIASTOLIC BLOOD PRESSURE: 60 MMHG | RESPIRATION RATE: 16 BRPM | TEMPERATURE: 97.6 F

## 2022-09-20 DIAGNOSIS — M89.30 HYPERTROPHY OF BONE: ICD-10-CM

## 2022-09-20 LAB
GLUCOSE POCT: 132 MG/DL (ref 70–99)
GLUCOSE SERPL-MCNC: 139 MG/DL (ref 70–99)

## 2022-09-20 RX ORDER — ONDANSETRON 2 MG/ML
4 INJECTION INTRAMUSCULAR; INTRAVENOUS EVERY 30 MIN PRN
Status: DISCONTINUED | OUTPATIENT
Start: 2022-09-20 | End: 2022-09-21 | Stop reason: HOSPADM

## 2022-09-20 RX ORDER — LIDOCAINE HYDROCHLORIDE 10 MG/ML
INJECTION, SOLUTION INFILTRATION; PERINEURAL PRN
Status: DISCONTINUED | OUTPATIENT
Start: 2022-09-20 | End: 2022-09-20

## 2022-09-20 RX ORDER — BUPIVACAINE HYDROCHLORIDE 5 MG/ML
INJECTION, SOLUTION PERINEURAL PRN
Status: DISCONTINUED | OUTPATIENT
Start: 2022-09-20 | End: 2022-09-20 | Stop reason: HOSPADM

## 2022-09-20 RX ORDER — MEPERIDINE HYDROCHLORIDE 25 MG/ML
12.5 INJECTION INTRAMUSCULAR; INTRAVENOUS; SUBCUTANEOUS
Status: DISCONTINUED | OUTPATIENT
Start: 2022-09-20 | End: 2022-09-21 | Stop reason: HOSPADM

## 2022-09-20 RX ORDER — HYDROMORPHONE HCL IN WATER/PF 6 MG/30 ML
0.2 PATIENT CONTROLLED ANALGESIA SYRINGE INTRAVENOUS EVERY 5 MIN PRN
Status: DISCONTINUED | OUTPATIENT
Start: 2022-09-20 | End: 2022-09-21 | Stop reason: HOSPADM

## 2022-09-20 RX ORDER — FENTANYL CITRATE 0.05 MG/ML
25 INJECTION, SOLUTION INTRAMUSCULAR; INTRAVENOUS
Status: DISCONTINUED | OUTPATIENT
Start: 2022-09-20 | End: 2022-09-21 | Stop reason: HOSPADM

## 2022-09-20 RX ORDER — OXYCODONE HYDROCHLORIDE 5 MG/1
5 TABLET ORAL EVERY 4 HOURS PRN
Status: DISCONTINUED | OUTPATIENT
Start: 2022-09-20 | End: 2022-09-21 | Stop reason: HOSPADM

## 2022-09-20 RX ORDER — ONDANSETRON 4 MG/1
4 TABLET, ORALLY DISINTEGRATING ORAL EVERY 30 MIN PRN
Status: DISCONTINUED | OUTPATIENT
Start: 2022-09-20 | End: 2022-09-21 | Stop reason: HOSPADM

## 2022-09-20 RX ORDER — CEFAZOLIN SODIUM 2 G/100ML
2 INJECTION, SOLUTION INTRAVENOUS
Status: CANCELLED | OUTPATIENT
Start: 2022-09-20

## 2022-09-20 RX ORDER — DEXAMETHASONE SODIUM PHOSPHATE 4 MG/ML
INJECTION, SOLUTION INTRA-ARTICULAR; INTRALESIONAL; INTRAMUSCULAR; INTRAVENOUS; SOFT TISSUE PRN
Status: DISCONTINUED | OUTPATIENT
Start: 2022-09-20 | End: 2022-09-20

## 2022-09-20 RX ORDER — CEFAZOLIN SODIUM/WATER 2 G/20 ML
SYRINGE (ML) INTRAVENOUS PRN
Status: DISCONTINUED | OUTPATIENT
Start: 2022-09-20 | End: 2022-09-20

## 2022-09-20 RX ORDER — ONDANSETRON 2 MG/ML
INJECTION INTRAMUSCULAR; INTRAVENOUS PRN
Status: DISCONTINUED | OUTPATIENT
Start: 2022-09-20 | End: 2022-09-20

## 2022-09-20 RX ORDER — IPRATROPIUM BROMIDE AND ALBUTEROL SULFATE 2.5; .5 MG/3ML; MG/3ML
3 SOLUTION RESPIRATORY (INHALATION)
Status: DISCONTINUED | OUTPATIENT
Start: 2022-09-20 | End: 2022-09-21 | Stop reason: HOSPADM

## 2022-09-20 RX ORDER — FENTANYL CITRATE 50 UG/ML
INJECTION, SOLUTION INTRAMUSCULAR; INTRAVENOUS PRN
Status: DISCONTINUED | OUTPATIENT
Start: 2022-09-20 | End: 2022-09-20

## 2022-09-20 RX ORDER — LIDOCAINE 40 MG/G
CREAM TOPICAL
Status: DISCONTINUED | OUTPATIENT
Start: 2022-09-20 | End: 2022-09-21 | Stop reason: HOSPADM

## 2022-09-20 RX ORDER — LIDOCAINE HYDROCHLORIDE 10 MG/ML
INJECTION, SOLUTION EPIDURAL; INFILTRATION; INTRACAUDAL; PERINEURAL PRN
Status: DISCONTINUED | OUTPATIENT
Start: 2022-09-20 | End: 2022-09-20 | Stop reason: HOSPADM

## 2022-09-20 RX ORDER — SODIUM CHLORIDE, SODIUM LACTATE, POTASSIUM CHLORIDE, CALCIUM CHLORIDE 600; 310; 30; 20 MG/100ML; MG/100ML; MG/100ML; MG/100ML
INJECTION, SOLUTION INTRAVENOUS CONTINUOUS
Status: DISCONTINUED | OUTPATIENT
Start: 2022-09-20 | End: 2022-09-21 | Stop reason: HOSPADM

## 2022-09-20 RX ORDER — PROPOFOL 10 MG/ML
INJECTION, EMULSION INTRAVENOUS CONTINUOUS PRN
Status: DISCONTINUED | OUTPATIENT
Start: 2022-09-20 | End: 2022-09-20

## 2022-09-20 RX ORDER — FENTANYL CITRATE 0.05 MG/ML
25 INJECTION, SOLUTION INTRAMUSCULAR; INTRAVENOUS EVERY 5 MIN PRN
Status: DISCONTINUED | OUTPATIENT
Start: 2022-09-20 | End: 2022-09-21 | Stop reason: HOSPADM

## 2022-09-20 RX ADMIN — ONDANSETRON 4 MG: 2 INJECTION INTRAMUSCULAR; INTRAVENOUS at 10:15

## 2022-09-20 RX ADMIN — PROPOFOL 120 MCG/KG/MIN: 10 INJECTION, EMULSION INTRAVENOUS at 10:05

## 2022-09-20 RX ADMIN — Medication 2 G: at 10:03

## 2022-09-20 RX ADMIN — LIDOCAINE HYDROCHLORIDE 3 ML: 10 INJECTION, SOLUTION INFILTRATION; PERINEURAL at 10:03

## 2022-09-20 RX ADMIN — FENTANYL CITRATE 25 MCG: 50 INJECTION, SOLUTION INTRAMUSCULAR; INTRAVENOUS at 10:11

## 2022-09-20 RX ADMIN — IPRATROPIUM BROMIDE AND ALBUTEROL SULFATE 3 ML: 2.5; .5 SOLUTION RESPIRATORY (INHALATION) at 09:53

## 2022-09-20 RX ADMIN — SODIUM CHLORIDE, SODIUM LACTATE, POTASSIUM CHLORIDE, CALCIUM CHLORIDE: 600; 310; 30; 20 INJECTION, SOLUTION INTRAVENOUS at 09:53

## 2022-09-20 RX ADMIN — DEXAMETHASONE SODIUM PHOSPHATE 4 MG: 4 INJECTION, SOLUTION INTRA-ARTICULAR; INTRALESIONAL; INTRAMUSCULAR; INTRAVENOUS; SOFT TISSUE at 10:15

## 2022-09-20 RX ADMIN — FENTANYL CITRATE 25 MCG: 50 INJECTION, SOLUTION INTRAMUSCULAR; INTRAVENOUS at 10:05

## 2022-09-20 ASSESSMENT — COPD QUESTIONNAIRES
CAT_SEVERITY: SEVERE
COPD: 1

## 2022-09-20 NOTE — DISCHARGE INSTRUCTIONS
If you have any questions or concerns regarding your procedure, please contact Dr. Gray, his office number is [phone].    Discharge Instructions for Foot Surgery    Arrange to have an adult drive you home after surgery. If you had general anesthesia, it may take a day or more to fully recover. So for at least the next 24 hours:  Do not drive or use machinery or power tools.  Do not drink alcohol.  Do not make any major decisions.     Diet    Here are some dietary suggestions following surgery:   Start with liquids and light foods (like dry toast, bananas, and applesauce). As you feel up to it, slowly return to your normal diet.  Drink at least 6 to 8 glasses of water or other nonalcoholic fluids a day.  To avoid nausea, eat before taking narcotic pain medicines.     Medicines    It is important to follow these directions:   Take all medicines as instructed.  Take pain medicines on time. Do not wait until the pain is bad before taking your medicines.  Avoid alcohol while on pain medicines.     Activity    These instructions are to help with your recovery:   Sit or lie down when possible. Put a pillow or 2 under your heel to raise your foot above the level of your heart.  Wrap an ice pack or bag of frozen peas in a thin cloth. Place it over your bandaged foot for no longer than 20 minutes. Do this 3 times a day.  You can drive again in 7 days or as instructed by your healthcare provider.  Wear your surgical shoe at all times unless told otherwise by your healthcare provider.  Use crutches or a cane as directed.  Follow your healthcare provider s instructions about putting weight on your foot.     Bandage and cast care    Here are tips to follow:   Do not shower for 48 hours.  When you can shower again, cover the bandage, splint, or cast with a plastic bag to keep it dry.  Don t remove your bandage until your healthcare provider tells you to. If your bandage gets wet or dirty, check with your healthcare  provider. You can likely replace it with a clean, dry one.     What to expect    It is normal to have the following:  Bruising and slight swelling of the foot and toes  A small amount of blood on the dressing     Call your healthcare provider     Contact your healthcare provider right away if you have any of the following:   Continuous bleeding through the bandage  Excessive swelling, increased bleeding, or redness  Fever over 101.5 F (38.6 C) or chills  Pain unrelieved by pain medicines  Foot feels cold to the touch or numb  Increased pain in your leg or foot  Chest pain or shortness of breath    Coping with pain    *Pain after surgery is normal and expected*    If you have pain after surgery, pain medicine will help you feel better. Take it as told, before pain becomes severe. Also, ask your healthcare provider or pharmacist about other ways to control pain. This might be with heat, ice, or relaxation. And follow any other instructions your surgeon or nurse gives you.    Tips for taking pain medicine    To get the best relief possible, remember these points:    Pain medicines can upset your stomach. Taking them with a little food may help.  Most pain relievers taken by mouth need at least 20 to 30 minutes to start to work.  Don't wait till your pain becomes severe before you take your medicine. Try to time your medicine so that you can take it before starting an activity. This might be before you get dressed, go for a walk, or sit down for dinner.  Constipation is a common side effect of pain medicines. You can take medicines such as laxatives (Miralax) or stool softeners to help ease constipation. Drinking lots of fluids and eating foods such as fruits and vegetables that are high in fiber can also help.  Drinking alcohol and taking pain medicine can cause dizziness and slow your breathing. It can even be deadly. Don't drink alcohol while taking pain medicine.  Pain medicine can make you react more slowly to  things. Don't drive or run machinery while taking pain medicine.  Your healthcare provider may tell you to take acetaminophen to help ease your pain. Ask him or her how much you are supposed to take each day. Acetaminophen or other pain relievers may interact with your prescription medicines or other over-the-counter (OTC) medicines. Some prescription medicines have acetaminophen and other ingredients. Using both prescription and OTC acetaminophen for pain can cause you to overdose. Read the labels on your OTC medicines with care. This will help you to clearly know the list of ingredients, how much to take, and any warnings. It may also help you not take too much acetaminophen. If you have questions or don't understand the information, ask your pharmacist or healthcare provider to explain it to you before you take the OTC medicine.    Discharge Instructions: After Your Surgery, regarding Anesthesia    You ve just had surgery. During surgery, you were given medicine called anesthesia to keep you relaxed and free of pain. After surgery, you may have some pain or nausea. This is common. Here are some tips for feeling better and getting well after surgery.    Going home    Your healthcare provider will show you how to take care of yourself when you go home. He or she will also answer your questions. Have an adult family member or friend drive you home. For the first 24 hours after your surgery:    Don't drive or use heavy equipment.  Don't make important decisions or sign legal papers.  Don't drink alcohol.  Have an adult stay with you for the next 24 hours. He or she can watch for problems and help keep you safe.  Be sure to go to all follow-up visits with your healthcare provider. And rest after your surgery for as long as your healthcare provider tells you to.    Managing nausea    Some people have an upset stomach after surgery. This is often because of anesthesia, pain, or pain medicine, or the stress of surgery.  These tips will help you handle nausea and eat healthy foods as you get better. If you were on a special food plan before surgery, ask your healthcare provider if you should follow it while you get better. These tips may help:    Don't push yourself to eat. Your body will tell you when to eat and how much.  Start off with clear liquids and soup. They are easier to digest.  Next try semi-solid foods, such as mashed potatoes, applesauce, and gelatin, as you feel ready.  Slowly move to solid foods. Don t eat fatty, rich, or spicy foods at first.  Don't force yourself to have 3 large meals a day. Instead eat smaller amounts more often.  Take pain medicines with a small amount of solid food, such as crackers or toast, to prevent nausea.    If you have obstructive sleep apnea    You were given anesthesia medicine during surgery to keep you comfortable and free of pain. After surgery, you may have more apnea spells because of this medicine and other medicines you were given. The spells may last longer than usual.   At home:    Keep using the continuous positive airway pressure (CPAP) device when you sleep. Unless your healthcare provider tells you not to, use it when you sleep, day or night. CPAP is a common device used to treat obstructive sleep apnea.  Talk with your provider before taking any pain medicine, muscle relaxants, or sedatives. Your provider will tell you about the possible dangers of taking these medicines.

## 2022-09-20 NOTE — ANESTHESIA CARE TRANSFER NOTE
Patient: Nahun Villalobos    Procedure: Procedure(s):  EXOSTECTOMIES BOTH 5TH DIGITS WITH SOFT TISSUE RELEASE       Diagnosis: Hypertrophy of bone [M89.30]  Diagnosis Additional Information: No value filed.    Anesthesia Type:   MAC     Note:    Oropharynx: oropharynx clear of all foreign objects and spontaneously breathing  Level of Consciousness: awake  Oxygen Supplementation: room air    Independent Airway: airway patency satisfactory and stable  Dentition: dentition unchanged  Vital Signs Stable: post-procedure vital signs reviewed and stable  Report to RN Given: handoff report given  Patient transferred to: Phase II    Handoff Report: Identifed the Patient, Identified the Reponsible Provider, Reviewed the pertinent medical history, Discussed the surgical course, Reviewed Intra-OP anesthesia mangement and issues during anesthesia, Set expectations for post-procedure period and Allowed opportunity for questions and acknowledgement of understanding      Vitals:  Vitals Value Taken Time   /54 09/20/22 1112   Temp 36.4  C (97.6  F) 09/20/22 1112   Pulse 85 09/20/22 1112   Resp 16 09/20/22 1112   SpO2 95 % 09/20/22 1112   Vitals shown include unvalidated device data.    Electronically Signed By: SELMA Ferrera CRNA  September 20, 2022  11:15 AM

## 2022-09-20 NOTE — ANESTHESIA POSTPROCEDURE EVALUATION
Patient: Nahun Villalobos    Procedure: Procedure(s):  EXOSTECTOMIES BOTH 5TH DIGITS WITH SOFT TISSUE RELEASE       Anesthesia Type:  MAC    Note:  Disposition: Outpatient   Postop Pain Control: Uneventful            Sign Out: Well controlled pain   PONV: No   Neuro/Psych: Uneventful            Sign Out: Acceptable/Baseline neuro status   Airway/Respiratory: Uneventful            Sign Out: Acceptable/Baseline resp. status   CV/Hemodynamics: Uneventful            Sign Out: Acceptable CV status; No obvious hypovolemia; No obvious fluid overload   Other NRE: NONE   DID A NON-ROUTINE EVENT OCCUR? No           Last vitals:  Vitals Value Taken Time   /60 09/20/22 1145   Temp 97.6  F (36.4  C) 09/20/22 1112   Pulse 88 09/20/22 1154   Resp 16 09/20/22 1112   SpO2 93 % 09/20/22 1154   Vitals shown include unvalidated device data.    Electronically Signed By: Jean-Paul Nolasco MD  September 20, 2022  12:54 PM

## 2022-09-20 NOTE — OP NOTE
[facility] Orthopedic Brief Operative Note    Pre-operative diagnosis: Hypertrophy of bone [M89.30]    Post-operative diagnosis: Same plus hammertoe    Procedure: Exostectomies PIPJ 5th digit with soft tissue release 5th digits bilaterally.    Surgeon: Tong Gray DPM   Assistant(s): None   Anesthesia: MAC   Estimated blood loss:   1 cc     Drains: Nonoe   Specimens: None    Implants: None    Findings: Impressive exostosis both 5th digits.   Complications: None   Condition: Good

## 2022-09-20 NOTE — PROGRESS NOTES
I have viewed a picture that the patient brought with showing a negative COVID-19 result.  Bhavna Valencia RN on 9/20/2022 at 9:07 AM

## 2022-09-20 NOTE — PROGRESS NOTES
The history and physical has been updated with the patient.   There are no changes since the patient had their visit with their general provider.

## 2022-09-20 NOTE — ANESTHESIA PREPROCEDURE EVALUATION
Anesthesia Pre-Procedure Evaluation    Patient: Nahun Villalobos   MRN: [id number] : 1945        Procedure : Procedure(s):  EXOSTECTOMIES BOTH 5TH DIGITS          Past Medical History:   Diagnosis Date     Antiplatelet or antithrombotic long-term use      Arthritis      CHF (congestive heart failure) (H) 2020     COPD (chronic obstructive pulmonary disease) (H)      DM (diabetes mellitus) (H)      Dyspnea on exertion      Essential hypertension, benign      Hemorrhage of rectum and anus      Non-small cell lung cancer (H)      Obesity      Personal history of colonic polyps      Sleep apnea      Thrombosis      Tobacco use disorder      Urinary retention      Walking troubles       Past Surgical History:   Procedure Laterality Date     ABDOMEN SURGERY       APPENDECTOMY       CHOLECYSTECTOMY       COLONOSCOPY       CYSTOSCOPY, TRANSURETHRAL RESECTION (TUR) PROSTATE, COMBINED N/A 2018    Procedure: COMBINED CYSTOSCOPY, TRANSURETHRAL RESECTION (TUR) PROSTATE;  Cystoscopy, Transurethral Resection Of The Prostate;  Surgeon: Praveena uBrris MD;  Location: [facility] OR     WEDGE RESECTION      lung     ZZC NONSPECIFIC PROCEDURE      cholecystectomy      Allergies   Allergen Reactions     No Known Drug Allergies       Social History     Tobacco Use     Smoking status: Former Smoker     Packs/day: 2.00     Years: 52.00     Pack years: 104.00     Types: Cigarettes, Cigars     Start date: 1960     Quit date: 2013     Years since quittin.3     Smokeless tobacco: Never Used     Tobacco comment: Quit, will never start again.   Substance Use Topics     Alcohol use: No      Wt Readings from Last 1 Encounters:   22 105.2 kg (232 lb)        Anesthesia Evaluation   Pt has had prior anesthetic.         ROS/MED HX  ENT/Pulmonary: Comment: History of PE    (+) sleep apnea (refuses to use CPAP), moderate, doesn't use CPAP, severe,  COPD, O2 dependent,     Neurologic: Comment: Neck  pain  Mid back pain - neg neurologic ROS     Cardiovascular:     (+) hypertension-----Taking blood thinners (Eloquis) STACY.     METS/Exercise Tolerance:     Hematologic:  - neg hematologic  ROS   (+) History of blood clots, pt is anticoagulated,     Musculoskeletal:  - neg musculoskeletal ROS     GI/Hepatic:  - neg GI/hepatic ROS     Renal/Genitourinary:  - neg Renal ROS     Endo:     (+) type II DM, Last HgA1c: 6.7, Obesity,     Psychiatric/Substance Use:  - neg psychiatric ROS     Infectious Disease:  - neg infectious disease ROS     Malignancy:  - neg malignancy ROS (+) Malignancy, History of Lung.  Lung CA Remission status post.        Other:  - neg other ROS    (+) , H/O Chronic Pain,        Physical Exam    Airway  airway exam normal      Mallampati: II       Respiratory Devices and Support         Dental  no notable dental history         Cardiovascular   cardiovascular exam normal       Rhythm and rate: regular and normal     Pulmonary   pulmonary exam normal        breath sounds clear to auscultation           OUTSIDE LABS:  CBC:   Lab Results   Component Value Date    WBC 8.7 05/20/2022    WBC 6.8 01/18/2022    HGB 11.4 (L) 08/30/2022    HGB 11.5 (L) 05/20/2022    HCT 36.3 (L) 05/20/2022    HCT 37.6 (L) 01/18/2022     08/30/2022     05/20/2022     BMP:   Lab Results   Component Value Date     05/20/2022     02/10/2022    POTASSIUM 4.0 08/30/2022    POTASSIUM 4.2 05/20/2022    CHLORIDE 102 05/20/2022    CHLORIDE 101 02/10/2022    CO2 28 05/20/2022    CO2 32 02/10/2022    BUN 12 05/20/2022    BUN 11 02/10/2022    CR 1.04 05/20/2022    CR 1.11 02/10/2022     (H) 08/30/2022     (H) 05/20/2022     COAGS:   Lab Results   Component Value Date    PTT 29 02/09/2018    INR 1.01 02/09/2018     POC:   Lab Results   Component Value Date     (H) 09/22/2020     HEPATIC:   Lab Results   Component Value Date    ALBUMIN 3.6 05/20/2022    PROTTOTAL 7.8 05/20/2022    ALT 21  05/20/2022    AST 21 05/20/2022    ALKPHOS 84 05/20/2022    BILITOTAL 0.4 05/20/2022     OTHER:   Lab Results   Component Value Date    PH 7.40 06/25/2015    A1C 6.7 (H) 08/30/2022    JESSE 9.4 05/20/2022    MAG 2.4 (H) 09/21/2020    LIPASE 88 09/20/2020    TSH 2.47 02/10/2022       Anesthesia Plan    ASA Status:  3      Anesthesia Type: MAC.   Induction: Intravenous, Propofol.   Maintenance: TIVA.        Consents    Anesthesia Plan(s) and associated risks, benefits, and realistic alternatives discussed. Questions answered and patient/representative(s) expressed understanding.    - Discussed:     - Discussed with:  Patient      - Extended Intubation/Ventilatory Support Discussed: No.      - Patient is DNR/DNI Status: No    Use of blood products discussed: No .     Postoperative Care    Pain management: IV analgesics.   PONV prophylaxis: Ondansetron (or other 5HT-3), Dexamethasone or Solumedrol     Comments:    Other Comments: Has tolerated MACs at [facility] in recent past. Accepts that he might have intra op recall  He requests bed at slight incline  The patient understands and accepts the risks of MAC anesthesia including (but not limited to) nausea, vomiting, dizziness, and chipped teeth. I also discussed the possibility of conversion to GAETT/GALMA anesthesia which include hoarse voice, sore throat, and pinched lip or chipped teeth.  Versed/fent  propofol ggt  Decadron/zofran  Pre op nebulizer            Jean-Paul Nolasco MD

## 2022-09-21 NOTE — OP NOTE
Procedure Date: 09/20/2022    PREOPERATIVE DIAGNOSIS: Exostosis, fifth digits, bilaterally.    POSTOPERATIVE DIAGNOSES:  1.  Exostosis, fifth digits, bilaterally.  2.  Flexure contracture with hammertoe formation, fifth digits, both feet.    PROCEDURE PERFORMED:   1.  Exostectomy, fifth digit, right foot.  2.  Exostectomy, fifth digit, left foot.  3.  Soft tissue release, fifth digit, right foot.  4.  Soft tissue release, fifth digit, left foot.    DESCRIPTION OF OPERATION:  On 09/20/2022, the patient was escorted per cart, placed in the supine position.  There, IV sedation was instituted.  Local anesthesia was obtained utilizing approximately 7 mL of 0.5% Marcaine plain injected locally around the parameters of the fifth digit.  Hemostasis was achieved and maintained utilizing pneumatic ankle tourniquet elevated to approximately 230 mmHg for elevation of the respective limb for 3 minutes.    PROCEDURE ONE:  Exostectomy, fifth digit, right foot.  Attention was directed to the dorsolateral surface of the proximal interphalangeal joint, fifth digit where a 2 cm semielliptical incision was fashioned.  This was encompassing a rather impressive hyperkeratotic lesion at this immediate location.  The semielliptical incision was deepened to allow primary resection of the lesion.  This exposed the capsular and periosteal structures of the proximal interphalangeal joint.  These structures were incised in a linear fashion, reflected both medial and laterally, exposing an area of bony hypertrophy throughout the entire head of the proximal phalanx and base of the middle phalanx.  These areas of bony hypertrophy were resected in total utilizing a power oscillating saw.  We then further smoothed utilizing a power bur.    PROCEDURE TWO: Exostectomy, fifth digit, left foot.  Attention was directed to the dorsolateral surface of the proximal interphalangeal joint, fifth digit where a 2 cm semielliptical incision was fashioned.  This  was encompassing a rather impressive hyperkeratotic lesion at this immediate location.  The semielliptical incision was deepened to allow primary resection of the lesion.  This exposed the capsular and periosteal structures of the proximal interphalangeal joint.  These structures were incised in a linear fashion, reflected both medial and laterally, exposing an area of bony hypertrophy throughout the entire head of the proximal phalanx and base of the middle phalanx.  These areas of bony hypertrophy were resected in total utilizing a power oscillating saw.  We then further smoothed utilizing a power bur.    PROCEDURES NUMBER THREE:  Soft tissue release, fifth digit, right foot.  Attention was directed to the plantar aspect of the fifth digit.  Utilizing a stab wound, a flexure tenotomy and capsulotomy were provided.      PROCEDURE NUMBER FOUR: Soft tissue release, fifth digit, left foot.  Attention was directed to the plantar aspect of the fifth digit.  Utilizing a stab wound, a flexure tenotomy and capsulotomy were provided.     All surgical wounds were flushed with copious amounts of saline.  Surgical closure was obtained utilizing 4-0 and 5-0 Vicryl for the dorsal wounds only.  There was no postoperative injection.  Surgical dressing was applied.  The pneumatic ankle tourniquet was released.  Good vascular return noted to all digits.  No complications or specimens.  The patient was escorted to the recovery area in apparent satisfactory condition with all vital signs stable.    Tong Gray DPM        D: 2022   T: 2022   MT: chhaya    Name:     KEELY FRANCE  MRN:      [id number]        Account:        [id number]   :      1945           Procedure Date: 2022     Document: [id number]

## 2022-09-27 ENCOUNTER — PATIENT OUTREACH (OUTPATIENT)
Dept: CARE COORDINATION | Facility: CLINIC | Age: 77
End: 2022-09-27

## 2022-09-27 NOTE — PROGRESS NOTES
Clinic Care Coordination Contact    Community Health Worker Follow Up    Care Gaps: Did Not Address    Health Maintenance Due   Topic Date Due     HF ACTION PLAN  Never done     DIABETIC FOOT EXAM  11/01/2019     EYE EXAM  04/01/2022     COVID-19 Vaccine (5 - Booster for Pfizer series) 05/30/2022     FALL RISK ASSESSMENT  10/25/2022       Care Plan:   Care Plan: 1. Improve chronic symptoms     Problem: Lifestyle choices     Goal: I want to improve management of my leg, knee, and hip pain and swelling.     Start Date: 6/3/2021 Expected End Date: 12/3/2022    This Visit's Progress: 80% Recent Progress: 80%    Note:     Barriers: Lymphedema  Strengths: Motivated to improve ability to walk  Patient expressed understanding of goal: Yes    Action steps to achieve this goal:    1. I will apply Jacinto hose to wear throughout the day and remove at night  2. I will walk my dog daily to help with strengthening and endurance  3. I will elevate my legs while sitting and laying down by propping them up with a pillow  4. I will discuss, review, schedule and complete recommended overdue health maintenance with my primary care provider  5. I will I will take my medications as prescribed  6. I will contact my care team with questions, concerns, support needs. I will use the clinic as a resource   7. I will contact my clinic with 24/7 after hours services available  8. Care Coordinator will remain available as needed                      Care Plan: 2. Improve chronic symptoms     Problem: I would like assistance and support to afford my FreeStyle Mikel sensors to monitor my blood sugar     Priority: Medium    Note:     Goal Statement: I would like assistance and support to afford my FreeStyle Mikel sensors to monitor my blood sugar.  Date Goal set: 6/27/2022  Barriers: Unable to afford sensors for continuous glucose monitor  Strengths: Advocate for self  Date to Achieve By: 12/27/2022  Patient expressed understanding of goal: Yes  Action  steps to achieve this goal:  1. I will follow up with my providers as scheduled/recommended    2. I will take my medications as prescribed  3. I will follow up with my Diabetic Educator as scheduled/recommended  4. I will contact my care team with questions, concerns, support needs   5. I will use the clinic as a resource, and I understand I can contact my clinic with 24/7 after hours services available   6.  Care Coordinator will remain available as needed      Goal: Improve chronic symptoms     Start Date: 6/27/2022 Expected End Date: 12/27/2022    This Visit's Progress: 60%    Note:     Barriers: Unable to afford sensors for continuous glucose monitor  Strengths: Advocate for self  Patient expressed understanding of goal: Yes  Action steps to achieve this goal:  1. I will follow up with my providers as scheduled/recommended    2. I will take my medications as prescribed  3. I will follow up with my Diabetic Educator as scheduled/recommended  4. I will contact my care team with questions, concerns, support needs   5. I will use the clinic as a resource, and I understand I can contact my clinic with 24/7 after hours services available   6.  Care Coordinator will remain available as needed                    Discussed:    Patient stated he had a procedure last week on his toe.  Patient's foot is wrapped and he has been evaluating his leg.  Patient stated he should be able to walk around next week.  He has an appointment on 9/29/22.    Patient stated he is wearing compression Jacinto stockings.    Patient stated he is taking his medications as prescribed.    Patient stated his girlfriend Kenia has been walking the dog while he recovers.      Intervention and Education during outreach: CHW asked patient if he would like assistance scheduling an appointment with Kandace, the Diabetic Educator, patient agreed.  CHW and patient called [phone].  Appointment scheduled.  CHW asked patient if he has any resource needs, patient  declined.  CHW encouraged patient to contact CC if there are any other changes or resources needed.  Patient acknowledged understanding.      Appointment scheduled on 10/25/22 at 9:30am  with Kandace Diabetic Educator, [facility].      CHW Plan: will outreach in one month.    Kelley Slaughter, CHW  Clinic Care Coordination  [facility] Clinics: [facility], [facility], [facility], and [facility]  Phone: [phone]

## 2022-10-03 ENCOUNTER — MYC MEDICAL ADVICE (OUTPATIENT)
Dept: INTERNAL MEDICINE | Facility: CLINIC | Age: 77
End: 2022-10-03

## 2022-10-05 ENCOUNTER — NURSE TRIAGE (OUTPATIENT)
Dept: INTERNAL MEDICINE | Facility: CLINIC | Age: 77
End: 2022-10-05

## 2022-10-05 DIAGNOSIS — N39.9 URINARY DISORDER: Primary | ICD-10-CM

## 2022-10-05 NOTE — TELEPHONE ENCOUNTER
Provider Recommendation Follow Up:   Reached patient/caregiver. Informed of provider's recommendations. Patient verbalized understanding and agrees with the plan.     Called and scheduled patient for a lab appointment 10/6/22.     Leslie Summers RN

## 2022-10-05 NOTE — TELEPHONE ENCOUNTER
Called patient he states he is still having symptoms   Patient reports having a burning sensation when urinating. When urine stream begins pain goes from 1 -10. Patient states normally he normally urinates 3x per day and right now he is urinating what feels like every 15 min. Patient reports feeling bladder fullness. When he sits in his car to drive, he feels like he needs to get out and urinate. Despite bladder feeling full, patient reports only being able to urinate about 1/2 cup at a time. He states he is not drinking as much water    Ua UC pended if appropriate     Obie Garcia RN

## 2022-10-05 NOTE — TELEPHONE ENCOUNTER
"Nurse Triage SBAR    Is this a 2nd Level Triage? YES, LICENSED PRACTITIONER REVIEW IS REQUIRED    Situation: Nurse called and triaged patient regarding teo from 10/3/22    Background: Patient states that he recently had foot surgery and was prescribed antibiotics. Patient states that he has been having painful urination since about 10/1/22. He contacted his surgeon and they recommended he stop taking his antibiotics reach out to his PCP for a potential UTI. Patient reports a decrease in pain since stopping antibiotics but still present.    Assessment: Patient reports having a burning sensation when urinating. When urine stream begins pain goes from 1 -10. Patient states normally he normally urinates 3x per day and right now he is urinating what feels like every 15 min. Patient reports feeling bladder fullness. When he sits in his car to drive, he feels like he needs to get out and urinate. Despite bladder feeling full, patient reports only being able to urinate about 1/2 cup at a time. He states he is not drinking as much water.     Protocol Recommended Disposition:   Go To Office Now, See More Appropriate Protocol recommended patient go to urgent care due to no available appointments in clinic today.    Recommendation: Recommended patient go to UC. Patient states he is not able to go to UC today and would like advise from PCP.    Routed to provider    Does the patient meet one of the following criteria for ADS visit consideration? 16+ years old, with an [facility] PCP     TIP  Providers, please consider if this condition is appropriate for management at one of our Acute and Diagnostic Services sites.     If patient is a good candidate, please use dotphrase <dot>triageresponse and select Refer to ADS to document.    1. SYMPTOM: \"What's the main symptom you're concerned about?\" (e.g., frequency, incontinence)      Burning, burning decreased after stopping antibiotics still not normal. Peeing \"too much\" not drinking as " "much water as usual  2. ONSET: \"When did the  pain  start?\"      About 10/1. Patient correlates this to being on antibiotics  3. PAIN: \"Is there any pain?\" If Yes, ask: \"How bad is it?\" (Scale: 1-10; mild, moderate, severe)      When urine stream begins pain increases from 1-10  4. CAUSE: \"What do you think is causing the symptoms?\"     Antibiotics prescribed from foot surgery  5. OTHER SYMPTOMS: \"Do you have any other symptoms?\" (e.g., fever, flank pain, blood in urine, pain with urination)      No       Reason for Disposition    Pain or burning with passing urine (urination) and male    SEVERE pain with urination    Additional Information    Negative: Shock suspected (e.g., cold/pale/clammy skin, too weak to stand, low BP, rapid pulse)    Negative: Sounds like a life-threatening emergency to the triager    Negative: Shock suspected (e.g., cold/pale/clammy skin, too weak to stand, low BP, rapid pulse)    Negative: Sounds like a life-threatening emergency to the triager    Negative: Unable to urinate (or only a few drops) and bladder feels very full    Negative: Swollen scrotum    Negative: Pain in scrotum or testicle that persists > 1 hour    Negative: Fever > 100.4 F (38.0 C)    Negative: Vomiting    Negative: Patient sounds very sick or weak to the triager    Protocols used: URINARY SYMPTOMS-A-OH, URINATION PAIN - MALE-A-OH    Toy Cummins RN    "

## 2022-10-05 NOTE — TELEPHONE ENCOUNTER
Provider Response to 2nd Level Triage Request    I have reviewed the RN documentation. My recommendation is:  If patient was already taking antibiotics he may have actually been treating a potential UTI pending on which antibiotic he was given thus if his symptoms have now resolved off antibiotics I would continue to observe.  If symptoms recur then suggest urinalysis for reassurance of atypical UTI

## 2022-10-06 ENCOUNTER — TELEPHONE (OUTPATIENT)
Dept: INTERNAL MEDICINE | Facility: CLINIC | Age: 77
End: 2022-10-06

## 2022-10-06 ENCOUNTER — LAB (OUTPATIENT)
Dept: LAB | Facility: CLINIC | Age: 77
End: 2022-10-06
Payer: MEDICARE

## 2022-10-06 DIAGNOSIS — N39.9 URINARY DISORDER: Primary | ICD-10-CM

## 2022-10-06 DIAGNOSIS — N39.9 URINARY DISORDER: ICD-10-CM

## 2022-10-06 LAB
ALBUMIN UR-MCNC: 30 MG/DL
APPEARANCE UR: CLEAR
BACTERIA #/AREA URNS HPF: ABNORMAL /HPF
BILIRUB UR QL STRIP: NEGATIVE
COLOR UR AUTO: YELLOW
GLUCOSE UR STRIP-MCNC: NEGATIVE MG/DL
HGB UR QL STRIP: NEGATIVE
KETONES UR STRIP-MCNC: NEGATIVE MG/DL
LEUKOCYTE ESTERASE UR QL STRIP: NEGATIVE
NITRATE UR QL: NEGATIVE
PH UR STRIP: 6 [PH] (ref 5–7)
RBC #/AREA URNS AUTO: ABNORMAL /HPF
SP GR UR STRIP: >=1.03 (ref 1–1.03)
SQUAMOUS #/AREA URNS AUTO: ABNORMAL /LPF
UROBILINOGEN UR STRIP-ACNC: 0.2 E.U./DL
WBC #/AREA URNS AUTO: ABNORMAL /HPF

## 2022-10-06 PROCEDURE — 81001 URINALYSIS AUTO W/SCOPE: CPT

## 2022-10-06 NOTE — TELEPHONE ENCOUNTER
Per previus note from triage nurse, patient does not need a call back. Patient will wait for a call from urology.     Closing encounter.     Leslie Summers RN

## 2022-10-06 NOTE — TELEPHONE ENCOUNTER
----- Message from Olegario Castillo MD sent at 10/6/2022  4:24 PM CDT -----  Would please inform patient that urinalysis does not demonstrate any white blood cells, is otherwise clear and leukocyte Estrace and nitrate negative indicating low likelihood for UTI and no blood present.  Would suggest that if patient has ongoing symptoms may benefit from seeing urology.

## 2022-10-06 NOTE — TELEPHONE ENCOUNTER
Called patient and relayed message patient is okay with urology referral as still having some symptoms though they are less     Referral pended    Patient does not need a call back he will call us if he does not hear from urology in 2/3 business days    Obie Garcia RN

## 2022-10-14 ENCOUNTER — PATIENT OUTREACH (OUTPATIENT)
Dept: CARE COORDINATION | Facility: CLINIC | Age: 77
End: 2022-10-14

## 2022-10-14 ASSESSMENT — ACTIVITIES OF DAILY LIVING (ADL): DEPENDENT_IADLS:: CLEANING;COOKING;LAUNDRY;SHOPPING;MEAL PREPARATION

## 2022-10-14 NOTE — PROGRESS NOTES
Clinic Care Coordination Contact  Care Coordination Clinician Chart Review     Situation: Patient chart reviewed by care coordinator.?     Background: Initial assessment and enrollment to Care Coordination was 6/3/2021.?? Care plan(s) with patient-centered goal(s) were developed with participation from patient.? Lead CC handed patient off to CHW for continued outreach every 30 days.??     Assessment: Per chart review, patient outreach completed by CC CHW on 9/27/2022.? Patient is actively working to accomplish goal(s).? Patient's goal(s) remain(s) appropriate at this time.? Patient is not due for updated Plan of Care.? Annual assessment will be due 6/3/2023.     Care Plan: 1. Improve chronic symptoms     Problem: Lifestyle choices     Goal: I want to improve management of my leg, knee, and hip pain and swelling.     Start Date: 6/3/2021 Expected End Date: 12/3/2022    This Visit's Progress: 80% Recent Progress: 80%    Note:     Barriers: Lymphedema  Strengths: Motivated to improve ability to walk  Patient expressed understanding of goal: Yes    Action steps to achieve this goal:    1. I will apply Jacinto hose to wear throughout the day and remove at night  2. I will walk my dog daily to help with strengthening and endurance  3. I will elevate my legs while sitting and laying down by propping them up with a pillow  4. I will discuss, review, schedule and complete recommended overdue health maintenance with my primary care provider  5. I will I will take my medications as prescribed  6. I will contact my care team with questions, concerns, support needs. I will use the clinic as a resource   7. I will contact my clinic with 24/7 after hours services available  8. Care Coordinator will remain available as needed                      Care Plan: 2. Improve chronic symptoms     Problem: I would like assistance and support to afford my FreeStyle Mikel sensors to monitor my blood sugar     Priority: Medium    Note:     Goal  Statement: I would like assistance and support to afford my FreeStyle Mikel sensors to monitor my blood sugar.  Date Goal set: 6/27/2022  Barriers: Unable to afford sensors for continuous glucose monitor  Strengths: Advocate for self  Date to Achieve By: 12/27/2022  Patient expressed understanding of goal: Yes  Action steps to achieve this goal:  1. I will follow up with my providers as scheduled/recommended    2. I will take my medications as prescribed  3. I will follow up with my Diabetic Educator as scheduled/recommended  4. I will contact my care team with questions, concerns, support needs   5. I will use the clinic as a resource, and I understand I can contact my clinic with 24/7 after hours services available   6.  Care Coordinator will remain available as needed      Goal: Improve chronic symptoms     Start Date: 6/27/2022 Expected End Date: 12/27/2022    This Visit's Progress: 60% Recent Progress: 60%    Note:     Barriers: Unable to afford sensors for continuous glucose monitor  Strengths: Advocate for self  Patient expressed understanding of goal: Yes  Action steps to achieve this goal:  1. I will follow up with my providers as scheduled/recommended    2. I will take my medications as prescribed  3. I will follow up with my Diabetic Educator as scheduled/recommended  4. I will contact my care team with questions, concerns, support needs   5. I will use the clinic as a resource, and I understand I can contact my clinic with 24/7 after hours services available   6.  Care Coordinator will remain available as needed                      Plan/Recommendations: The patient will continue working with Care Coordination to achieve above goal(s).? CHW will involve Lead CC as needed or if patient is ready to move to maintenance.? Lead CC will continue to monitor CHW s monthly outreaches and progress to goal(s) every 6 weeks.    Plan of Care updated and sent to patient: No     Rhea Knapp RN, BSN, PHN  Primary  Care / Care Coordinator   [facility]  E-mail [email or web address]   [phone]

## 2022-10-25 ENCOUNTER — TELEPHONE (OUTPATIENT)
Dept: ONCOLOGY | Facility: CLINIC | Age: 77
End: 2022-10-25

## 2022-10-25 NOTE — TELEPHONE ENCOUNTER
Left voicemail message for patient requesting a return call regarding rescheduling lab draw and follow up with Dr Contreras.

## 2022-10-28 ENCOUNTER — PATIENT OUTREACH (OUTPATIENT)
Dept: NURSING | Facility: CLINIC | Age: 77
End: 2022-10-28
Payer: MEDICARE

## 2022-10-28 NOTE — PROGRESS NOTES
Clinic Care Coordination Contact  The Community Health Worker called for a Care Coordination outreach to follow up on goals and action steps. Spoke to Javon.    Patient stated he is in [place], [place].  He will be attending a .  Patient requested to be called back early next week.    CHW will outreach in 1-2 business days.    OMAR Kim  Clinic Care Coordination  [facility]: [place], [place], [facility], and [place] for Women  Phone: [phone]

## 2022-11-03 NOTE — PROGRESS NOTES
Clinic Care Coordination Contact  The Community Health Worker called for a Care Coordination outreach to follow up on goals and action steps. Spoke to Javon.     Patient stated he is on the road driving to [place], [place].  Patient stated he should be home early next week, and requested to be called at that time.     CHW will outreach in 5 - 7 business days.     OMAR Kim  Clinic Care Coordination  [facility]: [place], and [place] for Women  Phone: [phone]

## 2022-11-11 ENCOUNTER — APPOINTMENT (OUTPATIENT)
Dept: LAB | Facility: CLINIC | Age: 77
End: 2022-11-11
Payer: MEDICARE

## 2022-11-21 ENCOUNTER — MYC MEDICAL ADVICE (OUTPATIENT)
Dept: INTERNAL MEDICINE | Facility: CLINIC | Age: 77
End: 2022-11-21

## 2022-11-23 ENCOUNTER — LAB (OUTPATIENT)
Dept: LAB | Facility: CLINIC | Age: 77
End: 2022-11-23
Payer: MEDICARE

## 2022-11-23 ENCOUNTER — PATIENT OUTREACH (OUTPATIENT)
Dept: CARE COORDINATION | Facility: CLINIC | Age: 77
End: 2022-11-23

## 2022-11-23 DIAGNOSIS — I10 ESSENTIAL HYPERTENSION, BENIGN: ICD-10-CM

## 2022-11-23 DIAGNOSIS — D47.2 MONOCLONAL PARAPROTEINEMIA: ICD-10-CM

## 2022-11-23 LAB
ALBUMIN SERPL BCG-MCNC: 3.8 G/DL (ref 3.5–5.2)
ALP SERPL-CCNC: 71 U/L (ref 40–129)
ALT SERPL W P-5'-P-CCNC: 13 U/L (ref 10–50)
ANION GAP SERPL CALCULATED.3IONS-SCNC: 12 MMOL/L (ref 7–15)
AST SERPL W P-5'-P-CCNC: 28 U/L (ref 10–50)
BASOPHILS # BLD AUTO: 0.1 10E3/UL (ref 0–0.2)
BASOPHILS NFR BLD AUTO: 0 %
BILIRUB SERPL-MCNC: 0.2 MG/DL
BUN SERPL-MCNC: 13.5 MG/DL (ref 8–23)
CALCIUM SERPL-MCNC: 9.1 MG/DL (ref 8.8–10.2)
CHLORIDE SERPL-SCNC: 102 MMOL/L (ref 98–107)
CREAT SERPL-MCNC: 1.14 MG/DL (ref 0.67–1.17)
DEPRECATED HCO3 PLAS-SCNC: 26 MMOL/L (ref 22–29)
EOSINOPHIL # BLD AUTO: 0.4 10E3/UL (ref 0–0.7)
EOSINOPHIL NFR BLD AUTO: 3 %
ERYTHROCYTE [DISTWIDTH] IN BLOOD BY AUTOMATED COUNT: 12.3 % (ref 10–15)
GFR SERPL CREATININE-BSD FRML MDRD: 66 ML/MIN/1.73M2
GLUCOSE SERPL-MCNC: 183 MG/DL (ref 70–99)
HCT VFR BLD AUTO: 36.9 % (ref 40–53)
HGB BLD-MCNC: 11.8 G/DL (ref 13.3–17.7)
LYMPHOCYTES # BLD AUTO: 4 10E3/UL (ref 0.8–5.3)
LYMPHOCYTES NFR BLD AUTO: 35 %
MCH RBC QN AUTO: 32.5 PG (ref 26.5–33)
MCHC RBC AUTO-ENTMCNC: 32 G/DL (ref 31.5–36.5)
MCV RBC AUTO: 102 FL (ref 78–100)
MONOCYTES # BLD AUTO: 0.8 10E3/UL (ref 0–1.3)
MONOCYTES NFR BLD AUTO: 7 %
NEUTROPHILS # BLD AUTO: 6.2 10E3/UL (ref 1.6–8.3)
NEUTROPHILS NFR BLD AUTO: 55 %
PLATELET # BLD AUTO: 276 10E3/UL (ref 150–450)
POTASSIUM SERPL-SCNC: 4.6 MMOL/L (ref 3.4–5.3)
PROT SERPL-MCNC: 7.1 G/DL (ref 6.4–8.3)
RBC # BLD AUTO: 3.63 10E6/UL (ref 4.4–5.9)
SODIUM SERPL-SCNC: 140 MMOL/L (ref 136–145)
TOTAL PROTEIN SERUM FOR ELP: 6.8 G/DL (ref 6.4–8.3)
WBC # BLD AUTO: 11.4 10E3/UL (ref 4–11)

## 2022-11-23 PROCEDURE — 82784 ASSAY IGA/IGD/IGG/IGM EACH: CPT

## 2022-11-23 PROCEDURE — 83521 IG LIGHT CHAINS FREE EACH: CPT

## 2022-11-23 PROCEDURE — 84165 PROTEIN E-PHORESIS SERUM: CPT

## 2022-11-23 PROCEDURE — 80053 COMPREHEN METABOLIC PANEL: CPT

## 2022-11-23 PROCEDURE — 36415 COLL VENOUS BLD VENIPUNCTURE: CPT

## 2022-11-23 PROCEDURE — 86334 IMMUNOFIX E-PHORESIS SERUM: CPT

## 2022-11-23 PROCEDURE — 84155 ASSAY OF PROTEIN SERUM: CPT | Mod: 59

## 2022-11-23 PROCEDURE — 85025 COMPLETE CBC W/AUTO DIFF WBC: CPT

## 2022-11-23 ASSESSMENT — ACTIVITIES OF DAILY LIVING (ADL): DEPENDENT_IADLS:: CLEANING;COOKING;LAUNDRY;SHOPPING;MEAL PREPARATION

## 2022-11-23 NOTE — PROGRESS NOTES
Clinic Care Coordination Contact  Care Coordination Clinician Chart Review     Situation: Patient chart reviewed by care coordinator.?     Background: Initial assessment and enrollment to Care Coordination was 9/24/2020.?? Care plan(s) with patient-centered goal(s) were developed with participation from patient.? Lead CC handed patient off to CHW for continued outreach every 30 days.??     Assessment: Per chart review, patient outreach completed by CC CHW on 10/28/2022.?Patient is actively working to accomplish goal(s).? Patient's goal(s) remain(s) appropriate at this time.? Patient is not due for updated Plan of Care.? Annual assessment will be due 9/24/2023.     Care Plan: 1. Improve chronic symptoms     Problem: Lifestyle choices     Goal: I want to improve management of my leg, knee, and hip pain and swelling.     Start Date: 6/3/2021 Expected End Date: 12/3/2022    This Visit's Progress: 80% Recent Progress: 80%    Note:     Barriers: Lymphedema  Strengths: Motivated to improve ability to walk  Patient expressed understanding of goal: Yes    Action steps to achieve this goal:    1. I will apply Jacinto hose to wear throughout the day and remove at night  2. I will walk my dog daily to help with strengthening and endurance  3. I will elevate my legs while sitting and laying down by propping them up with a pillow  4. I will discuss, review, schedule and complete recommended overdue health maintenance with my primary care provider  5. I will I will take my medications as prescribed  6. I will contact my care team with questions, concerns, support needs. I will use the clinic as a resource   7. I will contact my clinic with 24/7 after hours services available                    Care Plan: 2. Improve chronic symptoms     Problem: I would like assistance and support to afford my FreeStyle Mikel sensors to monitor my blood sugar     Priority: Medium    Note:     Goal Statement: I would like assistance and support to  afford my FreeStyle Mikel sensors to monitor my blood sugar.  Date Goal set: 6/27/2022  Barriers: Unable to afford sensors for continuous glucose monitor  Strengths: Advocate for self  Date to Achieve By: 12/27/2022  Patient expressed understanding of goal: Yes  Action steps to achieve this goal:  1. I will follow up with my providers as scheduled/recommended    2. I will take my medications as prescribed  3. I will follow up with my Diabetic Educator as scheduled/recommended  4. I will contact my care team with questions, concerns, support needs   5. I will use the clinic as a resource, and I understand I can contact my clinic with 24/7 after hours services available   6.  Care Coordinator will remain available as needed      Goal: Improve chronic symptoms     Start Date: 6/27/2022 Expected End Date: 12/27/2022    This Visit's Progress: 60% Recent Progress: 60%    Note:     Barriers: Unable to afford sensors for continuous glucose monitor  Strengths: Advocate for self  Patient expressed understanding of goal: Yes  Action steps to achieve this goal:  1. I will follow up with my providers as scheduled/recommended    2. I will take my medications as prescribed  3. I will follow up with my Diabetic Educator as scheduled/recommended  4. I will contact my care team with questions, concerns, support needs   5. I will use the clinic as a resource, and I understand I can contact my clinic with 24/7 after hours services available                       Plan/Recommendations: The patient will continue working with Care Coordination to achieve above goal(s).? CHW will involve Lead CC as needed or if patient is ready to move to maintenance.? Lead CC will continue to monitor CHW s monthly outreaches and progress to goal(s) every 6 weeks.    Plan of Care updated and sent to patient: Ilene Knapp RN, BSN, PHN  Primary Care / Care Coordinator   [facility] [place]  [facility]  E-mail [email or web address]   [phone]

## 2022-11-23 NOTE — PROGRESS NOTES
Clinic Care Coordination Contact  [facility]/Voicemail       Clinical Data: Care Coordinator Outreach  Outreach attempted x 1.  Left message on patient's voicemail with call back information and requested return call.    Plan: Care Coordinator will try to reach patient again in 10 business days.    OMAR Kim  Clinic Care Coordination  [facility]: [place], [place], [place], and [place] for Women  Phone: [phone]

## 2022-11-25 LAB
ALBUMIN SERPL ELPH-MCNC: 3.6 G/DL (ref 3.7–5.1)
ALPHA1 GLOB SERPL ELPH-MCNC: 0.3 G/DL (ref 0.2–0.4)
ALPHA2 GLOB SERPL ELPH-MCNC: 0.9 G/DL (ref 0.5–0.9)
B-GLOBULIN SERPL ELPH-MCNC: 1.2 G/DL (ref 0.6–1)
GAMMA GLOB SERPL ELPH-MCNC: 0.9 G/DL (ref 0.7–1.6)
M PROTEIN SERPL ELPH-MCNC: 0.2 G/DL
PROT PATTERN SERPL ELPH-IMP: ABNORMAL
PROT PATTERN SERPL IFE-IMP: NORMAL

## 2022-11-26 ENCOUNTER — LAB (OUTPATIENT)
Dept: LAB | Facility: CLINIC | Age: 77
End: 2022-11-26
Payer: MEDICARE

## 2022-11-26 DIAGNOSIS — D47.2 MONOCLONAL PARAPROTEINEMIA: Primary | ICD-10-CM

## 2022-11-29 ENCOUNTER — LAB (OUTPATIENT)
Dept: LAB | Facility: CLINIC | Age: 77
End: 2022-11-29
Payer: MEDICARE

## 2022-11-29 DIAGNOSIS — D47.2 MONOCLONAL PARAPROTEINEMIA: ICD-10-CM

## 2022-11-29 LAB
IGA SERPL-MCNC: 706 MG/DL (ref 84–499)
IGG SERPL-MCNC: 1101 MG/DL (ref 610–1616)
IGM SERPL-MCNC: 31 MG/DL (ref 35–242)
KAPPA LC FREE SER-MCNC: 6.96 MG/DL (ref 0.33–1.94)
KAPPA LC FREE/LAMBDA FREE SER NEPH: 2.82 {RATIO} (ref 0.26–1.65)
LAMBDA LC FREE SERPL-MCNC: 2.47 MG/DL (ref 0.57–2.63)

## 2022-11-30 PROCEDURE — 84166 PROTEIN E-PHORESIS/URINE/CSF: CPT

## 2022-11-30 PROCEDURE — 81050 URINALYSIS VOLUME MEASURE: CPT

## 2022-11-30 PROCEDURE — 86335 IMMUNFIX E-PHORSIS/URINE/CSF: CPT

## 2022-11-30 PROCEDURE — 83883 ASSAY NEPHELOMETRY NOT SPEC: CPT | Mod: 90

## 2022-11-30 PROCEDURE — 99000 SPECIMEN HANDLING OFFICE-LAB: CPT

## 2022-12-01 ENCOUNTER — APPOINTMENT (OUTPATIENT)
Dept: LAB | Facility: CLINIC | Age: 77
End: 2022-12-01
Payer: MEDICARE

## 2022-12-02 LAB
ALBUMIN MFR UR ELPH: 57.9 %
ALPHA1 GLOB MFR UR ELPH: 4 %
ALPHA2 GLOB MFR UR ELPH: 6.5 %
B-GLOBULIN MFR UR ELPH: 10.2 %
GAMMA GLOB MFR UR ELPH: 21.4 %
M PROTEIN MFR UR ELPH: 0 %
PROT ELPH PNL UR ELPH: NORMAL
PROT PATTERN UR ELPH-IMP: ABNORMAL

## 2022-12-05 ENCOUNTER — NURSE TRIAGE (OUTPATIENT)
Dept: INTERNAL MEDICINE | Facility: CLINIC | Age: 77
End: 2022-12-05
Payer: MEDICARE

## 2022-12-05 DIAGNOSIS — R19.7 DIARRHEA, UNSPECIFIED TYPE: Primary | ICD-10-CM

## 2022-12-05 DIAGNOSIS — R10.817 GENERALIZED ABDOMINAL TENDERNESS, REBOUND TENDERNESS PRESENCE NOT SPECIFIED: ICD-10-CM

## 2022-12-05 NOTE — TELEPHONE ENCOUNTER
Let's start with stool studies.    If these are negative, the next step is a trial of cholestyramine (used for post-cholecystectomy diarrhea).

## 2022-12-05 NOTE — TELEPHONE ENCOUNTER
Nurse Triage SBAR    Is this a 2nd Level Triage? YES, LICENSED PRACTITIONER REVIEW IS REQUIRED    Situation: Ongoing diarrhea     Background: Pt states he has had diarrhea off and on since he had his gallbladder removed (belives was around 12 years ago), but this is the worst diarrhea he has had. Pt sent [facility] message on 11/21/22 reporting 30+ days of diarrhea, pt states he still has not had a solid stool. Pt states that Kenia (his partner) talked to a nurse last week and was given a number to gastroenterology but could not find it. Upon chart review, no call documented and no referral for gastro.     Assessment: Stool is watery with chunks--some lighter brown and some darker brown. Reports he only experienced nausea once yesterday, no vomiting. He reports that yesterday he has sharp abdominal pain most of the day around his umbilicus. Pt states that at some point he took imodium, but it did not help firm up his stools.     Reports that he drinks pop and water primarily. He drinks 2-3 glasses of water per day. He reports no change in his urination (on diuretic). Reports morning urination is always a darker yellow.   Unknown if has fever, denies any blood in stool.     Protocol Recommended Disposition:   See in Office Today    Recommendation:  Per RN triage disposition, pt to be seen in office today--okay for virtual visit? Okay to double book?    Pt would like gastro referral.     Pt would like a call back to Kenia Clifford  Can we leave a detailed message on this number? YES  Phone number patient can be reached at: Other phone number:  [phone]    Routed to provider    Does the patient meet one of the following criteria for ADS visit consideration? 16+ years old, with an [facility] PCP     TIP  Providers, please consider if this condition is appropriate for management at one of our Acute and Diagnostic Services sites.     If patient is a good candidate, please use dotphrase <dot>triageresponse and select Refer to ADS to  "document.  1. DIARRHEA SEVERITY: \"How bad is the diarrhea?\" \"How many more stools have you had in the past 24 hours than normal?\"     - NO DIARRHEA (SCALE 0)    - MILD (SCALE 1-3): Few loose or mushy BMs; increase of 1-3 stools over normal daily number of stools; mild increase in ostomy output.    -  MODERATE (SCALE 4-7): Increase of 4-6 stools daily over normal; moderate increase in ostomy output.  * SEVERE (SCALE 8-10; OR 'WORST POSSIBLE'): Increase of 7 or more stools daily over normal; moderate increase in ostomy output; incontinence.  States he has about 2-3 loose stools per day. During call pt states that he already had 2 loose stools and feels another coming.   2. ONSET: \"When did the diarrhea begin?\"    has had diarrhea off and on since had gallbladder removed ~12 years ago  3. BM CONSISTENCY: \"How loose or watery is the diarrhea?\"   Has been taking imodium to firm up stool, but repor  Watery and chunks. Pt states that some of his stool is light colored and some is dark colored.   4. VOMITING: \"Are you also vomiting?\" If Yes, ask: \"How many times in the past 24 hours?\"   NO vomiting. Pt states that yesterday he felt a little nauseous but he did not throw up.   5. ABDOMINAL PAIN: \"Are you having any abdominal pain?\" If Yes, ask: \"What does it feel like?\" (e.g., crampy, dull, intermittent, constant)   Pt states that yesterday he had sharp abdominal pain   6. ABDOMINAL PAIN SEVERITY: If present, ask: \"How bad is the pain?\"  (e.g., Scale 1-10; mild, moderate, or severe)    - MILD (1-3): doesn't interfere with normal activities, abdomen soft and not tender to touch     - MODERATE (4-7): interferes with normal activities or awakens from sleep, abdomen tender to touch     - SEVERE (8-10): excruciating pain, doubled over, unable to do any normal activities    Has been keeping pt from going to work. 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   7. ORAL INTAKE: " "If vomiting, \"Have you been able to drink liquids?\" \"How much liquids have you had in the past 24 hours?\"  n/a  8. HYDRATION: \"Any signs of dehydration?\" (e.g., dry mouth [not just dry lips], too weak to stand, dizziness, new weight loss) \"When did you last urinate?  Drinks pop, water. 2 - glass. States that he has has not noticed a change in his urination.   9. EXPOSURE: \"Have you traveled to a foreign country recently?\" \"Have you been exposed to anyone with diarrhea?\" \"Could you have eaten any food that was spoiled?\"  No  10. ANTIBIOTIC USE: \"Are you taking antibiotics now or have you taken antibiotics in the past 2 months?\"   No abx   11. OTHER SYMPTOMS: \"Do you have any other symptoms?\" (e.g., fever, blood in stool)  No blood in stools. uknown if any fever     Reason for Disposition    MODERATE diarrhea (e.g., 4-6 times / day more than normal) and age > 70 years    Additional Information    Negative: Shock suspected (e.g., cold/pale/clammy skin, too weak to stand, low BP, rapid pulse)    Negative: Difficult to awaken or acting confused (e.g., disoriented, slurred speech)    Negative: Sounds like a life-threatening emergency to the triager    Negative: SEVERE abdominal pain (e.g., excruciating) and present > 1 hour    Negative: SEVERE abdominal pain and age > 60 years    Negative: Bloody, black, or tarry bowel movements (Exception: chronic-unchanged black-grey bowel movements and is taking iron pills or Pepto-Bismol)    Negative: SEVERE diarrhea (e.g., 7 or more times / day more than normal) and age > 60 years    Negative: Constant abdominal pain lasting > 2 hours    Negative: Drinking very little and has signs of dehydration (e.g., no urine > 12 hours, very dry mouth, very lightheaded)    Negative: Patient sounds very sick or weak to the triager    Negative: SEVERE diarrhea (e.g., 7 or more times / day more than normal) and present > 24 hours (1 day)    Negative: MODERATE diarrhea (e.g., 4-6 times / day more " than normal) and present > 48 hours (2 days)    Protocols used: DIARRHEA-A-OH    Toy Cummins, RN

## 2022-12-05 NOTE — TELEPHONE ENCOUNTER
Called patient relaying provider message. Patient is agreeable to have a stool study done and had no further questions. Patient agreed to call and schedule lab visit.

## 2022-12-06 LAB
KAPPA LC UR-MCNC: <0.9 MG/DL
KAPPA LC/LAMBDA UR: NORMAL {RATIO} (ref 0.7–6.2)
LAMBDA LC UR-MCNC: <0.7 MG/DL

## 2022-12-08 NOTE — PROGRESS NOTES
Clinic Care Coordination Contact  The Community Health Worker called for Care Coordination outreach.  Spoke with patient.    Patient stated he is not home and requested to be called back tomorrow morning or on Monday 12/12/22.    CHW will attempt to outreach on 12/9/22.    Kelley Slaughter, OMAR  Clinic Care Coordination  [facility]: [place], [place], [place], and [place] for Women  Phone: [phone]

## 2022-12-09 ENCOUNTER — VIRTUAL VISIT (OUTPATIENT)
Dept: EDUCATION SERVICES | Facility: CLINIC | Age: 77
End: 2022-12-09
Attending: INTERNAL MEDICINE
Payer: MEDICARE

## 2022-12-09 ENCOUNTER — TELEPHONE (OUTPATIENT)
Dept: CARE COORDINATION | Facility: CLINIC | Age: 77
End: 2022-12-09

## 2022-12-09 ENCOUNTER — PATIENT OUTREACH (OUTPATIENT)
Dept: CARE COORDINATION | Facility: CLINIC | Age: 77
End: 2022-12-09

## 2022-12-09 DIAGNOSIS — E11.9 TYPE 2 DIABETES MELLITUS WITHOUT COMPLICATION, WITHOUT LONG-TERM CURRENT USE OF INSULIN (H): ICD-10-CM

## 2022-12-09 PROCEDURE — 98968 PH1 ASSMT&MGMT NQHP 21-30: CPT | Performed by: DIETITIAN, REGISTERED

## 2022-12-09 NOTE — TELEPHONE ENCOUNTER
Clinic Care Coordination Contact    Patient stated he has noticed discoloration from his feet up to this knees.  Patient is questioning if he should be concerned about this.    Please call patient to discuss.  Thank you.    REBECA-  patient is scheduled for a virtual appointment today at 1:30pm with a diabetic educator.      OMAR Kim  Clinic Care Coordination  [facility]: [place], [place], [facility], and [place] [facility]  Phone: [phone]

## 2022-12-09 NOTE — PROGRESS NOTES
Javon was seen for VIDEO visit, annual review.  He was accompanied by a woman who was helping and their priority need today was to learn how to use his new BG meter.    Via video, we spoke and they showed me what they were doing.   He has a new Ont Touch Reflect. Together, they were able to get a lancet in the device and a strip in the machine and blood on the strip.    He was able to successfully check BG with result= 2 hours 105 after lunch.     Last A1C 6.7% in August.    When asked if other concerns, Javon says his feet and legs are changing color. Asked how long that's been going on he says 6-8 months.   Sounds like he had corns removed, uncertain how long ago.  I see he mentioned this to care coordinator today too.     Advised call today: PCP and podiatry to let them know, it can be urgent to note changes right away in diabetes.   He voices agreement.     No other needs at this time.   Usha Shankar RD, LD, [facility]  Time spent 25 minutes[

## 2022-12-09 NOTE — TELEPHONE ENCOUNTER
Will have to try and schedule patient with provider who has availability or ER if deemed acute changes

## 2022-12-09 NOTE — LETTER
12/9/2022         RE: Nahun Villalobos  [address]  [facility] [address]        Dear Colleague,    Thank you for referring your patient, Nahun Villalobos, to the [facility]. Please see a copy of my visit note below.    Javon was seen for VIDEO visit, annual review.  He was accompanied by a woman who was helping and their priority need today was to learn how to use his new BG meter.    Via video, we spoke and they showed me what they were doing.   He has a new Ont Touch Reflect. Together, they were able to get a lancet in the device and a strip in the machine and blood on the strip.    He was able to successfully check BG with result= 2 hours 105 after lunch.     Last A1C 6.7% in August.    When asked if other concerns, Javon says his feet and legs are changing color. Asked how long that's been going on he says 6-8 months.   Sounds like he had corns removed, uncertain how long ago.  I see he mentioned this to care coordinator today too.     Advised call today: PCP and podiatry to let them know, it can be urgent to note changes right away in diabetes.   He voices agreement.     No other needs at this time.   Usha Shankar RD, LD, CDCES  Time spent 25 minutes[

## 2022-12-09 NOTE — PROGRESS NOTES
Clinic Care Coordination Contact    Community Health Worker Follow Up    Care Gaps:     Health Maintenance Due   Topic Date Due     HF ACTION PLAN  Never done     DIABETIC FOOT EXAM  11/01/2019     EYE EXAM  04/01/2022     COVID-19 Vaccine (5 - Booster for Pfizer series) 05/30/2022     FALL RISK ASSESSMENT  10/25/2022     ZOSTER IMMUNIZATION (3 of 3) 11/08/2022       Care Plan:   Care Plan: 1. Improve chronic symptoms     Problem: Lifestyle choices     Goal: I want to improve management of my leg, knee, and hip pain and swelling.     Start Date: 6/3/2021 Expected End Date: 1/26/2023    This Visit's Progress: 80% Recent Progress: 80%    Note:     Barriers: Lymphedema  Strengths: Motivated to improve ability to walk  Patient expressed understanding of goal: Yes    Action steps to achieve this goal:    1. I will apply Jacinto hose to wear throughout the day and remove at night  2. I will walk my dog daily to help with strengthening and endurance  3. I will elevate my legs while sitting and laying down by propping them up with a pillow  4. I will discuss, review, schedule and complete recommended overdue health maintenance with my primary care provider  5. I will I will take my medications as prescribed  6. I will contact my care team with questions, concerns, support needs. I will use the clinic as a resource   7. I will contact my clinic with 24/7 after hours services available                    Care Plan: 2. Improve chronic symptoms     Problem: I would like assistance and support to afford my FreeStyle Mikel sensors to monitor my blood sugar     Priority: Medium    Note:     Goal Statement: I would like assistance and support to afford my FreeStyle Mikel sensors to monitor my blood sugar.  Date Goal set: 6/27/2022  Barriers: Unable to afford sensors for continuous glucose monitor  Strengths: Advocate for self  Date to Achieve By: 12/27/2022  Patient expressed understanding of goal: Yes  Action steps to achieve this  "goal:  1. I will follow up with my providers as scheduled/recommended    2. I will take my medications as prescribed  3. I will follow up with my Diabetic Educator as scheduled/recommended  4. I will contact my care team with questions, concerns, support needs   5. I will use the clinic as a resource, and I understand I can contact my clinic with 24/7 after hours services available   6.  Care Coordinator will remain available as needed      Goal: Improve chronic symptoms     Start Date: 6/27/2022 Expected End Date: 12/27/2022    This Visit's Progress: 60% Recent Progress: 60%    Note:     Barriers: Unable to afford sensors for continuous glucose monitor  Strengths: Advocate for self  Patient expressed understanding of goal: Yes  Action steps to achieve this goal:  1. I will follow up with my providers as scheduled/recommended    2. I will take my medications as prescribed  3. I will follow up with my Diabetic Educator as scheduled/recommended  4. I will contact my care team with questions, concerns, support needs   5. I will use the clinic as a resource, and I understand I can contact my clinic with 24/7 after hours services available                       Discussed:    Patient stated he is wearing jesus compression stockings daily.    Patient stated from his feet up to his knee, he has noticed discoloration. Patient stated \"his legs are changing colors\".  Patient stated he has not discussed the discoloration with his PCP or any provider.    Patient stated he walks one block 3 times a day.  Patient walks his dog and uses a cane.    Patient stated he is taking his medications as prescribed.    Patient stated he attends appointments as scheduled.    Patient stated he has not checked his blood sugars.  Patient stated he has a virtual appointment today with a diabetic educator.    Patient thanked W for calling.    Intervention and Education during outreach: CHW encouraged patient to contact  if there are any other " changes or resources needed.  Patient acknowledged understanding.       CHW Plan: will route a message to OX Triage regarding patient's discoloration in feet and legs.    CHW will outreach in one month.    Kelley Slaughter, OMAR  Clinic Care Coordination  [facility]: [facility], [facility], [place], and [facility]  Phone: [phone]

## 2022-12-09 NOTE — TELEPHONE ENCOUNTER
"Triage RN unable to locate appropriate triage protocol based on the patient's symptoms.    S-(situation): Called patient in relation to message received below from Care Coordinator.     B-(background): For the past 6-8 months the patient has been experiencing discoloration in both of his legs from the knees down to the ankles. Patient states he has not reached out to the clinic before this because \"we are busy\" and he did not want to bother us. Patient does have a history of diabetes.     A-(assessment): Upon conversing with the patient, the patient states his legs are black/blue and get worse on the ankles. Patient states intermittently he feels like he has \"bubbles in his feet.\" Patient went on to describe a spot on the R side of his leg (between waist and knee) that looks like a varicose vein. Main times throughout the phone call the patient states it is hard to explain and he needs to come in and see someone. Patient states she is still able to get up and move around but does need to use a cane. No swelling present. Numbness and tingling present but may be related to his neuropathy.     R-(recommendations): Patient would like to come into the clinic early next week to have someone take a look at his legs. Will route to PCP for review and Kelli Friedman to see if patient can be scheduled in next day spot on Monday for further evaluation.    Leslie Summers RN      "

## 2022-12-10 ENCOUNTER — APPOINTMENT (OUTPATIENT)
Dept: LAB | Facility: CLINIC | Age: 77
End: 2022-12-10
Payer: MEDICARE

## 2022-12-10 LAB — C DIFF TOX B STL QL: NEGATIVE

## 2022-12-10 PROCEDURE — 87506 IADNA-DNA/RNA PROBE TQ 6-11: CPT | Performed by: INTERNAL MEDICINE

## 2022-12-12 DIAGNOSIS — Z90.49 S/P CHOLECYSTECTOMY: ICD-10-CM

## 2022-12-12 DIAGNOSIS — R19.7 DIARRHEA, UNSPECIFIED TYPE: Primary | ICD-10-CM

## 2022-12-12 RX ORDER — CHOLESTYRAMINE LIGHT 4 G/5.7G
4 POWDER, FOR SUSPENSION ORAL 2 TIMES DAILY
Qty: 180 EACH | Refills: 3 | Status: SHIPPED | OUTPATIENT
Start: 2022-12-12 | End: 2023-01-24

## 2022-12-13 NOTE — TELEPHONE ENCOUNTER
Spoke with patient regarding recent leg discoloration symptoms. Patient reports no new discoloration or symptoms with legs but would still like to schedule an appt with PCP.    RN assisted patient with scheduling OV on 12/26/2022 with PCP. Patient had no additional questions.

## 2022-12-26 ENCOUNTER — OFFICE VISIT (OUTPATIENT)
Dept: INTERNAL MEDICINE | Facility: CLINIC | Age: 77
End: 2022-12-26
Payer: MEDICARE

## 2022-12-26 VITALS
RESPIRATION RATE: 16 BRPM | OXYGEN SATURATION: 97 % | BODY MASS INDEX: 36.24 KG/M2 | WEIGHT: 225.5 LBS | TEMPERATURE: 98.1 F | DIASTOLIC BLOOD PRESSURE: 62 MMHG | HEART RATE: 73 BPM | SYSTOLIC BLOOD PRESSURE: 108 MMHG | HEIGHT: 66 IN

## 2022-12-26 DIAGNOSIS — M25.551 HIP PAIN, RIGHT: ICD-10-CM

## 2022-12-26 DIAGNOSIS — E11.9 TYPE 2 DIABETES MELLITUS WITHOUT COMPLICATION, WITHOUT LONG-TERM CURRENT USE OF INSULIN (H): Primary | ICD-10-CM

## 2022-12-26 DIAGNOSIS — G56.03 BILATERAL CARPAL TUNNEL SYNDROME: ICD-10-CM

## 2022-12-26 DIAGNOSIS — I10 ESSENTIAL HYPERTENSION, BENIGN: ICD-10-CM

## 2022-12-26 DIAGNOSIS — G47.33 OBSTRUCTIVE SLEEP APNEA SYNDROME: ICD-10-CM

## 2022-12-26 PROCEDURE — 99214 OFFICE O/P EST MOD 30 MIN: CPT | Performed by: INTERNAL MEDICINE

## 2022-12-26 NOTE — PROGRESS NOTES
"  Assessment & Plan     Type 2 diabetes mellitus without complication, without long-term current use of insulin (H)  A1c stable per last check recommend continue with current medical management and recheck A1c level in 6-month    Bilateral carpal tunnel syndrome  Discussed EMG results with patient.  Suspect his hand symptoms are suggestive of upper extremity bilateral carpal tunnel syndrome.  Advised wrist splints.  Consider orthopedic referral for surgical and injection options  - Orthopedic  Referral; Future    Hip pain, right  Question related to trochanteric discomfort.  No distinct radicular pain.  Discussed MRI imaging of lumbar spine for reassurance with orthopedic assessment.  We will start with orthopedics first.  - Orthopedic  Referral; Future    Essential hypertension, benign  Stable on therapy continue with medical management    Obstructive sleep apnea syndrome  Encouraged ongoing weight loss.     BMI:   Estimated body mass index is 36.4 kg/m  as calculated from the following:    Height as of this encounter: 1.676 m (5' 6\").    Weight as of this encounter: 102.3 kg (225 lb 8 oz).   Weight management plan: Discussed healthy diet and exercise guidelines    See Patient Instructions    No follow-ups on file.    Olegario Castillo MD  [facility]   Javon is a 77 year old accompanied by his self, presenting for the following health issues:  Musculoskeletal Problem    Javon is here today for follow-up.  He states has been doing pretty well.  He is lost quite a bit of weight.  He is given up smoking.  He states that he recently has had some issues with some loose stools for which he has been given some Questran.  He has been on the medicine for about a week now and he seen his stool frequency considerably improve.  He is now down to only 2 slightly loose stools daily with no blood.  He is going to continue with his Questran accordingly    He is also had " some nonspecific pain and discomfort in his right hip and upper thigh area.  This is not associated with significant back pain.  He notes it is worse when he lays on it at night.  Does not bother him of major significance during the day.  He is fallen a couple times this year but none recently.  He does not report classic radicular pain below his knee.  He denies any generalized weakness or change in his bowel or bladder function    He also reports nonspecific tingling in his hands.  This is worse again at night.  He has been seen in the neurology clinic for which she has been diagnosed with carpal tunnel syndrome as well as peripheral polyneuropathy.  He has had an EMG to confirm both.  He is taking gabapentin.    History of Present Illness       Reason for visit:  Pain in right thigh, diarrhea for 6 months    He eats 0-1 servings of fruits and vegetables daily.He consumes 1 sweetened beverage(s) daily.He exercises with enough effort to increase his heart rate 10 to 19 minutes per day.  He exercises with enough effort to increase his heart rate 7 days per week.   He is taking medications regularly.       Review of Systems   CONSTITUTIONAL: NEGATIVE for fever, chills, change in weight  EYES: NEGATIVE for vision changes or irritation  ENT/MOUTH: NEGATIVE for ear, mouth and throat problems  RESP: NEGATIVE for significant cough or SOB  CV: NEGATIVE for chest pain, palpitations or peripheral edema  GI: NEGATIVE for nausea, abdominal pain, heartburn, or change in bowel habits  : NEGATIVE for frequency, dysuria, or hematuria  HEME: NEGATIVE for bleeding problems  PSYCHIATRIC: NEGATIVE for changes in mood or affect    Current Outpatient Medications   Medication     acetaminophen (TYLENOL) 500 MG tablet     albuterol (VENTOLIN HFA) 108 (90 Base) MCG/ACT inhaler     Ascorbic Acid (VITAMIN C PO)     atorvastatin (LIPITOR) 20 MG tablet     blood glucose (ACCU-CHEK CHACHA) test strip     blood glucose (NO BRAND SPECIFIED)  "lancets standard     blood glucose (NO BRAND SPECIFIED) test strip     blood glucose monitoring (NO BRAND SPECIFIED) meter device kit     cholestyramine light (QUESTRAN) 4 GM packet     docusate sodium (COLACE) 100 MG capsule     ELIQUIS ANTICOAGULANT 5 MG tablet     finasteride (PROSCAR) 5 MG tablet     Fluticasone-Umeclidin-Vilanterol (TRELEGY ELLIPTA) 100-62.5-25 MCG/INH oral inhaler     furosemide (LASIX) 40 MG tablet     gabapentin (NEURONTIN) 300 MG capsule     lisinopril (ZESTRIL) 40 MG tablet     metFORMIN (GLUCOPHAGE) 500 MG tablet     metoprolol succinate ER (TOPROL-XL) 25 MG 24 hr tablet     order for DME     order for DME     No current facility-administered medications for this visit.           Objective    /62   Pulse 73   Temp 98.1  F (36.7  C) (Tympanic)   Resp 16   Ht 1.676 m (5' 6\")   Wt 102.3 kg (225 lb 8 oz)   SpO2 97%   BMI 36.40 kg/m    Body mass index is 36.4 kg/m .  Physical Exam   GENERAL: alert and no distress  EYES: Eyes grossly normal to inspection, PERRL and conjunctivae and sclerae normal  HENT: ear canals and TM's normal, nose and mouth without ulcers or lesions  NECK: no adenopathy, no asymmetry, masses, or scars and thyroid normal to palpation  RESP: lungs clear to auscultation - no rales, rhonchi or wheezes  CV: regular rate and rhythm, normal S1 S2, no S3 or S4, no murmur, click or rub  ABDOMEN:  obese   MS: His gait is slowed and slightly wide-based but non-ataxic.  There is minimal point tenderness noted over the right trochanter.  The  strength to the upper extremities is essentially intact.  The Tinel's and Phalen's are essentially negative.  NEURO: No focal changes  PSYCH: mentation appears normal, affect normal/bright    Lab Results   Component Value Date    A1C 6.7 08/30/2022    A1C 6.7 02/10/2022    A1C 7.5 12/10/2021    A1C 6.6 08/26/2021    A1C 6.8 03/17/2021    A1C 7.2 11/11/2020    A1C 7.5 09/20/2020    A1C 7.6 06/18/2020    A1C 7.8 01/23/2020     LDL " Cholesterol Calculated   Date Value Ref Range Status   02/10/2022 28 <=100 mg/dL Final   03/17/2021 46 <100 mg/dL Final     Comment:     Desirable:       <100 mg/dl     Creatinine   Date Value Ref Range Status   11/23/2022 1.14 0.67 - 1.17 mg/dL Final   03/17/2021 1.11 0.66 - 1.25 mg/dL Final

## 2022-12-28 ENCOUNTER — TRANSFERRED RECORDS (OUTPATIENT)
Dept: HEALTH INFORMATION MANAGEMENT | Facility: CLINIC | Age: 77
End: 2022-12-28

## 2023-01-02 ENCOUNTER — PATIENT OUTREACH (OUTPATIENT)
Dept: CARE COORDINATION | Facility: CLINIC | Age: 78
End: 2023-01-02

## 2023-01-02 ASSESSMENT — ACTIVITIES OF DAILY LIVING (ADL): DEPENDENT_IADLS:: CLEANING;COOKING;LAUNDRY;SHOPPING;MEAL PREPARATION

## 2023-01-02 NOTE — PROGRESS NOTES
Clinic Care Coordination Contact    Follow Up Progress Note      Assessment:   Patient had a recent visit with PCP on 12/26/2022, reviewed information from that visit with patient. He reported things are going well, he stated his New Year's resolution was to live to 2044. He is walking his dog 3 times a day. He is wearing his compression stockings. He reported he and his girlfriend are going on a trip but he did not remember what country. He asked to have the phone number for the Ortho scheduling sent to him via [employer].  Care Gaps:    Health Maintenance Due   Topic Date Due     HF ACTION PLAN  Never done     DIABETIC FOOT EXAM  11/01/2019     EYE EXAM  04/01/2022     PHQ-2 (once per calendar year)  01/01/2023       Patient and PCP to address at office visit.     Care Plans  Care Plan: 1. Improve chronic symptoms     Problem: Lifestyle choices     Goal: I want to improve management of my leg, knee, and hip pain and swelling.     Start Date: 6/3/2021 Expected End Date: 1/26/2023    This Visit's Progress: 80% Recent Progress: 80%    Note:     Barriers: Lymphedema  Strengths: Motivated to improve ability to walk  Patient expressed understanding of goal: Yes    Action steps to achieve this goal:    1. I will apply Jacinto hose to wear throughout the day and remove at night  2. I will walk my dog daily to help with strengthening and endurance  3. I will elevate my legs while sitting and laying down by propping them up with a pillow  4. I will discuss, review, schedule and complete recommended overdue health maintenance with my primary care provider  5. I will I will take my medications as prescribed  6. I will contact my care team with questions, concerns, support needs. I will use the clinic as a resource   7. I will contact my clinic with 24/7 after hours services available                    Care Plan: 2. Improve chronic symptoms     Problem: I would like assistance and support to afford my FreeStyle Mikel sensors to  monitor my blood sugar     Priority: Medium    Note:     Goal Statement: I would like assistance and support to afford my FreeStyle Mikel sensors to monitor my blood sugar.  Date Goal set: 6/27/2022  Barriers: Unable to afford sensors for continuous glucose monitor  Strengths: Advocate for self  Date to Achieve By: 12/27/2022  Patient expressed understanding of goal: Yes  Action steps to achieve this goal:  1. I will follow up with my providers as scheduled/recommended    2. I will take my medications as prescribed  3. I will follow up with my Diabetic Educator as scheduled/recommended  4. I will contact my care team with questions, concerns, support needs   5. I will use the clinic as a resource, and I understand I can contact my clinic with 24/7 after hours services available   6.  Care Coordinator will remain available as needed      Goal: Improve chronic symptoms     Start Date: 6/27/2022 Expected End Date: 12/27/2022    This Visit's Progress: 60% Recent Progress: 60%    Note:     Barriers: Unable to afford sensors for continuous glucose monitor  Strengths: Advocate for self  Patient expressed understanding of goal: Yes  Action steps to achieve this goal:  1. I will follow up with my providers as scheduled/recommended    2. I will take my medications as prescribed  3. I will follow up with my Diabetic Educator as scheduled/recommended  4. I will contact my care team with questions, concerns, support needs   5. I will use the clinic as a resource, and I understand I can contact my clinic with 24/7 after hours services available                         Intervention/Education provided during outreach: As above. CC role, goal(s), clinic after hours, appointments, discussed/reviewed. Support provided.     Plan: Patient/caregiver will call RNCC with questions, concerns, support needs. RNCC will be available as needed.     Care Coordinator will follow up in one month.    Mikayla Smart RN, BSN, PHN    Casual RN Care  Coordinator  [facility]  (Covering for Lead RN Care Coordinator)

## 2023-01-02 NOTE — LETTER
[facility] COORDIN[facility]  [address] [facility] [address]    January 2, 2023        Nahun Villalobos  [address] [place] [address]          Dear Nahun,     You have an ortho appointment scheduled, it is on 1/12/2023        Department Location: Located in the Clinics and Surgery Center at [address] [facility] [address]. For parking options, enter the [facility] /arrival plaza from [facility] and attendants can assist you based on your needs.  parking is available for those with limited mobility M-F from 7 a.m. to 5 p.m. Due to short staffing, we are unable to offer  to all patients/visitors. Visit [email or web address] for more details.    Self-parking:    West Lot: Located across from the main entrance, this is a convenient option for patients. Enter on [place] [employer]. Parking attendants available most hours to assist.      [place] Ramp: Enter at the [facility] entrance (one block north of the [facility] main entrance). Do not enter the ramp from [facility] - this entrance is not staffed and is further from the [facility] main entrance.   From Sign In Desk: [facility] - 4th Floor   4D     Department Address: [address] [place] [address]    Department Phone: [phone]        Attached is an updated Patient Centered Plan of Care for your continued enrollment in Care Coordination. Please let us know if you have additional questions, concerns, or goals that we can assist with.        Sincerely,    Mikayla Smart RN, BSN, PHN  Casual RN Care Coordinator  [facility]  (Covering for Lead RN Care Coordinator)            [facility]  Patient Centered Plan of Care  About Me:        Patient Name:  Nahun Villalobos    YOB: 1945  Age:         77 year old   [place] MRN:    [id number] Telephone Information:  Home Phone [phone]   Mobile [phone]       Address:  [address] [place] [address]  [place] [address] Email address:  [email or web address]      Emergency Contact(s)    Name Relationship Lgl Grd Work Phone Home Phone Mobile Phone   1SINDY ALLAN  Daughter No   [phone]   2. SUMAN Significant ot*    [phone]           Primary language:  English     needed? No   [place] [facility]:  [phone] op. 1  Other communication barriers:None    Preferred Method of Communication:  William  Current living arrangement: I live in a private home (lives with girlfriend)    Mobility Status/ Medical Equipment: Independent w/Device        Health Maintenance  Health Maintenance Reviewed: Due/Overdue   Health Maintenance Due   Topic Date Due     HF ACTION PLAN  Never done     DIABETIC FOOT EXAM  11/01/2019     EYE EXAM  04/01/2022     PHQ-2 (once per calendar year)  01/01/2023           My Access Plan  Medical Emergency 911   Primary Clinic Line [facility] - [phone]   24 Hour Appointment Line [phone] or  [phone]) (toll-free)   24 Hour Nurse Line [phone] (toll-free)   Preferred Urgent Care [facility], [phone]     Preferred Hospital [facility]  [phone] ([facility] or [facility])     Preferred Pharmacy [facility] - [facility] - [facility] AT Portal to [facility] Sites     Behavioral Health Crisis Line The National Suicide Prevention Lifeline at 1-790.882.2898 or Text/Call 148             My Care Team Members  Patient Care Team       Relationship Specialty Notifications Start End    Olegario Catsillo MD PCP - General Internal Medicine  4/6/22     Phone: [phone] Fax: [phone]         [address] [place] [address]    Olegario Castillo MD Assigned PCP   10/4/12     Phone: [phone] Fax: [phone]         [address] [place] [address]    Praveena Burris MD MD Urology  3/1/17      INACTIVE IN [place] AS OF  4/30/2021    Areli Cordero, RN Registered Nurse   3/1/17     Phone: [phone]         Kelley Slaughter MA Community Health Worker Primary Care - CC Admissions 9/24/20     Phone: [phone]         Rhea Knapp, RN Lead Care Coordinator  Admissions 9/24/20     Santi Tinajero MD MD Hematology & Oncology  12/7/20     Phone: [phone] Fax: [phone]         [place] [facility] [address] [facility] [address]    Bernardo Haynes MD MD Pulmonary Disease  12/7/20     Phone: [phone] Fax: [phone]         [place] [facility] [address] [facility] [address] [facility] [address]    Jean-Paul Silver DO Assigned Musculoskeletal Provider   7/4/21     Phone: [phone] Fax: [phone]         [address] [facility] [address] [facility] [address]    Antonia Mandel, PT Physical Therapist Physical Therapist  8/2/21     Phone: [phone] Fax: [phone]         [address] [facility] [address] [facility] [address]    Jamil Aparicio MD MD Neurology  2/2/22     Phone: [phone] Fax: [phone] [address] [place] [address]    Jamil Aparicio MD Assigned Neuroscience Provider   4/10/22     Phone: [phone] Fax: [phone] [address] [place] [address]    Husam Contreras MD Assigned Cancer Care Provider   9/17/22     Phone: [phone] Pager: [phone] Fax: [phone]        [address] [place] [address] [address] [facility] [address]            My Care Plans  Self Management and Treatment Plan  Care Plan  Care Plan: 1. Improve chronic symptoms     Problem: Lifestyle choices     Goal: I want to improve management of my leg, knee, and hip pain and swelling.     Start Date: 6/3/2021 Expected End Date: 1/26/2023    This Visit's Progress: 80% Recent Progress: 80%    Note:     Barriers: Lymphedema  Strengths: Motivated to improve ability to walk  Patient expressed understanding of goal: Yes    Action steps to achieve this goal:    1. I will apply Jacinto hose to wear  throughout the day and remove at night  2. I will walk my dog daily to help with strengthening and endurance  3. I will elevate my legs while sitting and laying down by propping them up with a pillow  4. I will discuss, review, schedule and complete recommended overdue health maintenance with my primary care provider  5. I will I will take my medications as prescribed  6. I will contact my care team with questions, concerns, support needs. I will use the clinic as a resource   7. I will contact my clinic with 24/7 after hours services available                    Care Plan: 2. Improve chronic symptoms     Problem: I would like assistance and support to afford my FreeStyle Mikel sensors to monitor my blood sugar     Priority: Medium    Note:     Goal Statement: I would like assistance and support to afford my FreeStyle Mikel sensors to monitor my blood sugar.  Date Goal set: 6/27/2022  Barriers: Unable to afford sensors for continuous glucose monitor  Strengths: Advocate for self  Date to Achieve By: 12/27/2022  Patient expressed understanding of goal: Yes  Action steps to achieve this goal:  1. I will follow up with my providers as scheduled/recommended    2. I will take my medications as prescribed  3. I will follow up with my Diabetic Educator as scheduled/recommended  4. I will contact my care team with questions, concerns, support needs   5. I will use the clinic as a resource, and I understand I can contact my clinic with 24/7 after hours services available   6.  Care Coordinator will remain available as needed      Goal: Improve chronic symptoms     Start Date: 6/27/2022 Expected End Date: 12/27/2022    This Visit's Progress: 60% Recent Progress: 60%    Note:     Barriers: Unable to afford sensors for continuous glucose monitor  Strengths: Advocate for self  Patient expressed understanding of goal: Yes  Action steps to achieve this goal:  1. I will follow up with my providers as scheduled/recommended    2. I  will take my medications as prescribed  3. I will follow up with my Diabetic Educator as scheduled/recommended  4. I will contact my care team with questions, concerns, support needs   5. I will use the clinic as a resource, and I understand I can contact my clinic with 24/7 after hours services available                          Action Plans on File:         COPD  Depression          Advance Care Plans/Directives Type:   -- (Full Code)      My Medical and Care Information  Problem List   Patient Active Problem List   Diagnosis     Essential hypertension, benign     Tobacco use disorder     Lumbago     Impotence of organic origin     Advance care planning     Polyp of colon     Obstructive sleep apnea syndrome     Hyperlipidemia LDL goal <100     Morbid obesity due to excess calories (H)     BPPV (benign paroxysmal positional vertigo), unspecified laterality     Chronic obstructive pulmonary disease, unspecified COPD type (H)     Type 2 diabetes mellitus without complication, without long-term current use of insulin (H)     S/P transurethral resection of prostate     Hematuria, gross     Cervical segment dysfunction     Cervicalgia     Thoracic segment dysfunction     Erectile dysfunction     Acute diverticulitis     Other pulmonary embolism without acute cor pulmonale, unspecified chronicity (H)     Mesenteric mass     History of adenocarcinoma of lung      Current Medications and Allergies:    Current Outpatient Medications   Medication Instructions     acetaminophen (TYLENOL) 500 mg, Oral, EVERY EVENING     albuterol (VENTOLIN HFA) 108 (90 Base) MCG/ACT inhaler INHALE 2 PUFFS INTO THE LUNGS EVERY 6 HOURS AS NEEDED FOR SHORTNESS OF BREATH / DYSPNEA OR WHEEZING     Ascorbic Acid (VITAMIN C PO) 500 mg, Oral, AT BEDTIME     atorvastatin (LIPITOR) 20 MG tablet TAKE 1 TABLET DAILY     blood glucose (ACCU-CHEK CHACHA) test strip Use to test blood sugar 2 times daily or as directed.     blood glucose (NO BRAND SPECIFIED)  lancets standard Use to test blood sugar 1 time daily, first thing in the morning     blood glucose (NO BRAND SPECIFIED) test strip Use to test blood sugar 1 time daily, first thing in the morning.     blood glucose monitoring (NO BRAND SPECIFIED) meter device kit Use to test blood sugar 1 time daily, first thing in the morning     cholestyramine light (QUESTRAN) 4 g, Oral, 2 TIMES DAILY     docusate sodium (COLACE) 100 mg, Oral, 2 TIMES DAILY PRN     ELIQUIS ANTICOAGULANT 5 MG tablet TAKE 1 TABLET TWICE A DAY     finasteride (PROSCAR) 5 MG tablet TAKE 1 TABLET BY MOUTH EVERY DAY     Fluticasone-Umeclidin-Vilanterol (TRELEGY ELLIPTA) 100-62.5-25 MCG/INH oral inhaler 1 puff, Inhalation, DAILY     furosemide (LASIX) 40 MG tablet TAKE 1 TABLET BY MOUTH TWICE A DAY     gabapentin (NEURONTIN) 300 mg, Oral, 2 TIMES DAILY     lisinopril (ZESTRIL) 40 MG tablet TAKE 1 TABLET DAILY     metFORMIN (GLUCOPHAGE) 500 MG tablet TAKE 2 TABLETS AT          BREAKFAST, 1 TABLET AT     DINNER     metoprolol succinate ER (TOPROL XL) 25 mg, Oral, DAILY     order for DME Oxygen 2 Li/min<BR>at night via nasal cannula     order for DME Equipment being ordered: diabetic shoes, 1 pair     Allergies   Allergen Reactions     No Known Drug Allergies          Care Coordination Start Date: 9/24/2020   Frequency of Care Coordination: monthly     Form Last Updated: 01/02/2023

## 2023-01-09 NOTE — TELEPHONE ENCOUNTER
Action January 9, 2023 1:51 PM MT   Action Taken Sent a request to Danay CURIEL for imaging [phone].     Action January 11, 2023 10:30 AM MT   Action Taken Imaging resolved.        DIAGNOSIS: Hip Pain, Right   APPOINTMENT DATE: 01/12/2023   NOTES STATUS DETAILS   OFFICE NOTE from referring provider Internal 12/26/2022 - Olegario Castillo MD - [facility] Internal Med   MEDICATION LIST Internal  Care Everywhere    EMG (for Spine) Internal 03/10/2022   LABS     CBC/DIFF Internal 11/23/2022   CT SCAN PACS Internal    [facility]:  12/22/2022 - Chest/Abd/Pel  04/04/2016 - PET Skull to Mid Thigh    XRAYS PACS Internal   ULTRASOUND PACS Internal

## 2023-01-11 DIAGNOSIS — G62.9 PERIPHERAL POLYNEUROPATHY: ICD-10-CM

## 2023-01-12 ENCOUNTER — PRE VISIT (OUTPATIENT)
Dept: ORTHOPEDICS | Facility: CLINIC | Age: 78
End: 2023-01-12

## 2023-01-12 ENCOUNTER — OFFICE VISIT (OUTPATIENT)
Dept: ORTHOPEDICS | Facility: CLINIC | Age: 78
End: 2023-01-12
Attending: INTERNAL MEDICINE
Payer: COMMERCIAL

## 2023-01-12 ENCOUNTER — ANCILLARY PROCEDURE (OUTPATIENT)
Dept: GENERAL RADIOLOGY | Facility: CLINIC | Age: 78
End: 2023-01-12
Attending: ORTHOPAEDIC SURGERY
Payer: COMMERCIAL

## 2023-01-12 VITALS — BODY MASS INDEX: 34.84 KG/M2 | WEIGHT: 222 LBS | HEIGHT: 67 IN

## 2023-01-12 DIAGNOSIS — G56.03 BILATERAL CARPAL TUNNEL SYNDROME: ICD-10-CM

## 2023-01-12 DIAGNOSIS — M25.551 RIGHT HIP PAIN: ICD-10-CM

## 2023-01-12 DIAGNOSIS — M25.551 RIGHT HIP PAIN: Primary | ICD-10-CM

## 2023-01-12 DIAGNOSIS — M25.551 HIP PAIN, RIGHT: ICD-10-CM

## 2023-01-12 PROCEDURE — 99204 OFFICE O/P NEW MOD 45 MIN: CPT | Performed by: ORTHOPAEDIC SURGERY

## 2023-01-12 PROCEDURE — 73502 X-RAY EXAM HIP UNI 2-3 VIEWS: CPT | Mod: GC | Performed by: RADIOLOGY

## 2023-01-12 RX ORDER — GABAPENTIN 300 MG/1
300 CAPSULE ORAL 2 TIMES DAILY
Qty: 60 CAPSULE | Refills: 0 | Status: SHIPPED | OUTPATIENT
Start: 2023-01-12 | End: 2023-08-24

## 2023-01-12 ASSESSMENT — HOOS JR
BENDING TO THE FLOOR TO PICK UP OBJECT: MODERATE
SITTING: MILD
HOOS JR TOTAL INTERVAL SCORE: 70.43
LYING IN BED (TURNING OVER, MAINTAINING HIP POSITION): MODERATE
RISING FROM SITTING: MILD

## 2023-01-12 NOTE — NURSING NOTE
"Reason For Visit:   Chief Complaint   Patient presents with     Consult     Right butt and thigh pain. No groin pain. Has fallen but pain was there before the fall. No hip or knee surgeries. No PT or injections.       Ht 1.702 m (5' 7\")   Wt 100.7 kg (222 lb)   BMI 34.77 kg/m           Bouchra Scott, ATC    "

## 2023-01-12 NOTE — LETTER
1/12/2023         RE: Nahun Villalobos  [address]  [facility] [address]        Dear Colleague,    Thank you for referring your patient, Nahun Villalobos, to the [facility] [place]. Please see a copy of my visit note below.    Assessment: This is a 77 year old with right meralgia paresthetica. We discussed the diagnosis and the management options including clothing modification with avoidance of tight clothin/belts around the waist which he presents with today. All the patient's questions were answered to the best of my ability.     Plan:  PRN    Chief Complaint: Consult (Right butt and thigh pain. No groin pain. Has fallen but pain was there before the fall. No hip or knee surgeries. No PT or injections.)      Physician:  Olegario Castillo    HPI: Nahun Villalobos is a 77 year old male with PMH adenocarcinoma of the lung, COPD, PE on eliquis, and T2DM (A1C 6.7) who presents today for evaluation of 6 months right lateral thigh pain. No inciting event or injury. Patient sleeps in a recliner due to sleep apnea, states right thigh pain is worse between 2 and 5 am. Pain is relieved with walking, driving, and tylenol. Ambulates with a cane d/t dyspnea. Works as a , able to perform all work related duties.     Symptom Profile  Location of symptoms:  Lateral thigh  Onset: insidious   Trend: getting no better or worse   Duration of symptoms: 6 months   Quality of symptoms: aching, sharp/stabbing  Severity: moderate  Alleviate: activity modification  Exacerbating: lying in bed   Previous Treatments: Previous treatments-- none     Current Status:  Results of the patient s Hip Disability and Osteoarthritis Outcome Score (HOOS)  are as follows (0-100 scales with 100 being the theoretical best):  70.43    MEDICAL HISTORY:   Past Medical History:   Diagnosis Date     Antiplatelet or antithrombotic long-term use      Arthritis      CHF (congestive heart failure) (H) 09/16/2020      COPD (chronic obstructive pulmonary disease) (H)      DM (diabetes mellitus) (H)      Dyspnea on exertion      Essential hypertension, benign      Hemorrhage of rectum and anus      Non-small cell lung cancer (H)      Obesity      Personal history of colonic polyps      Sleep apnea      Thrombosis      Tobacco use disorder      Urinary retention      Walking troubles        Medications:     Current Outpatient Medications:      acetaminophen (TYLENOL) 500 MG tablet, Take 500 mg by mouth every evening, Disp: , Rfl:      albuterol (VENTOLIN HFA) 108 (90 Base) MCG/ACT inhaler, INHALE 2 PUFFS INTO THE LUNGS EVERY 6 HOURS AS NEEDED FOR SHORTNESS OF BREATH / DYSPNEA OR WHEEZING, Disp: 25.5 g, Rfl: 2     Ascorbic Acid (VITAMIN C PO), Take 500 mg by mouth At Bedtime , Disp: , Rfl:      atorvastatin (LIPITOR) 20 MG tablet, TAKE 1 TABLET DAILY, Disp: 90 tablet, Rfl: 1     blood glucose (ACCU-CHEK CHACHA) test strip, Use to test blood sugar 2 times daily or as directed., Disp: 60 strip, Rfl: 6     blood glucose (NO BRAND SPECIFIED) lancets standard, Use to test blood sugar 1 time daily, first thing in the morning, Disp: 50 each, Rfl: 3     blood glucose (NO BRAND SPECIFIED) test strip, Use to test blood sugar 1 time daily, first thing in the morning., Disp: 50 strip, Rfl: 11     blood glucose monitoring (NO BRAND SPECIFIED) meter device kit, Use to test blood sugar 1 time daily, first thing in the morning, Disp: 1 kit, Rfl: 0     cholestyramine light (QUESTRAN) 4 GM packet, Take 1 packet (4 g) by mouth 2 times daily, Disp: 180 each, Rfl: 3     docusate sodium (COLACE) 100 MG capsule, Take 100 mg by mouth 2 times daily as needed for constipation, Disp: , Rfl:      ELIQUIS ANTICOAGULANT 5 MG tablet, TAKE 1 TABLET TWICE A DAY, Disp: 180 tablet, Rfl: 1     finasteride (PROSCAR) 5 MG tablet, Take 1 tablet (5 mg) by mouth daily, Disp: 90 tablet, Rfl: 2     Fluticasone-Umeclidin-Vilant (TRELEGY ELLIPTA) 100-62.5-25 MCG/ACT oral  inhaler, Inhale 1 puff into the lungs daily, Disp: 60 each, Rfl: 5     furosemide (LASIX) 40 MG tablet, Take 1 tablet (40 mg) by mouth 2 times daily, Disp: 180 tablet, Rfl: 2     gabapentin (NEURONTIN) 300 MG capsule, Take 1 capsule (300 mg) by mouth 2 times daily, Disp: 180 capsule, Rfl: 1     lisinopril (ZESTRIL) 40 MG tablet, TAKE 1 TABLET DAILY, Disp: 90 tablet, Rfl: 2     metFORMIN (GLUCOPHAGE) 500 MG tablet, TAKE 2 TABLETS AT          BREAKFAST, 1 TABLET AT     DINNER, Disp: 270 tablet, Rfl: 1     metoprolol succinate ER (TOPROL XL) 25 MG 24 hr tablet, Take 1 tablet (25 mg) by mouth daily, Disp: 90 tablet, Rfl: 2     order for DME, Equipment being ordered: diabetic shoes, 1 pair, Disp: 1 Package, Rfl: 0     order for DME, Oxygen 2 Li/min at night via nasal cannula, Disp: 1 Device, Rfl: 0    Allergies: No known drug allergies    SURGICAL HISTORY:   Past Surgical History:   Procedure Laterality Date     ABDOMEN SURGERY  1995     APPENDECTOMY       BONE EXOSTOSIS EXCISION Bilateral 9/20/2022    Procedure: EXOSTECTOMIES BOTH 5TH DIGITS WITH SOFT TISSUE RELEASE;  Surgeon: Tong Gray DPM;  Location: [place] [facility]     CHOLECYSTECTOMY       COLONOSCOPY  2014     CYSTOSCOPY, TRANSURETHRAL RESECTION (TUR) PROSTATE, COMBINED N/A 4/30/2018    Procedure: COMBINED CYSTOSCOPY, TRANSURETHRAL RESECTION (TUR) PROSTATE;  Cystoscopy, Transurethral Resection Of The Prostate;  Surgeon: Praveena Burris MD;  Location: [facility] OR     WEDGE RESECTION      lung     ZZ NONSPECIFIC PROCEDURE      cholecystectomy       FAMILY HISTORY:   Family History   Problem Relation Age of Onset     Hypertension Mother      C.A.D. Mother         ANEURYSM     Cancer - colorectal Mother      Cancer Mother         OVARIAN     Other Cancer Mother      Hypertension Sister      Breast Cancer Sister      Cerebrovascular Disease Father      Hypertension Sister      Breast Cancer Sister      Glaucoma No family hx of      Macular Degeneration No  "family hx of        SOCIAL HISTORY:   Social History     Tobacco Use     Smoking status: Former     Packs/day: 2.00     Years: 52.00     Pack years: 104.00     Types: Cigarettes, Cigars     Start date: 1960     Quit date: 2013     Years since quittin.6     Smokeless tobacco: Never     Tobacco comments:     Quit, will never start again.   Substance Use Topics     Alcohol use: No       REVIEW OF SYSTEMS:  The comprehensive review of systems from the intake form was reviewed with the patient.  No fever, weight change or fatigue. No dry eyes. No oral ulcers, sore throat or voice change. No palpitations, syncope, angina or edema.  No chest pain, excessive sleepiness, shortness of breath or hemoptysis.   No abdominal pain, nausea, vomiting, diarrhea or heartburn.  No skin rash. No focal weakness or numbness. No bleeding or lymphadenopathy. No rhinitis or hives.     Exam:  On physical examination the patient appears the stated age, is in no acute distress, affect is appropriate, and breathing is non-labored.  Vitals are documented in the EMR and have been reviewed:    Ht 1.702 m (5' 7\")   Wt 100.7 kg (222 lb)   BMI 34.77 kg/m    5' 7\"  Body mass index is 34.77 kg/m .    RIGHT hip subjective:  Abd: 20  Add:10  Flexion: 85  IRF: 10  ERF: 25  Impingement test: mild +  Tenderness to palpation: mild troch   Lateral thigh with subjective hyperesthesia     Distally, the circulatory, motor, and sensation exam is intact with 5/5 EHL, gastroc-soleus, and tibialis anterior.    Sensation to light touch is intact.    Dorsalis pedis and posterior tibialis pulses are palpable.    There are no sores on the feet, no bruising, and no lymphedema.    X-rays:   Study Result    Narrative & Impression   2 views right hip radiographs 2023 11:48 AM     History: Right hip pain     Additional History from EMR: \"Right butt and thigh pain. No groin  pain. Has fallen but pain was there before the fall. No hip or knee  surgeries. No " PT or injections.. History of adenocarcinoma of the  lung. Sits in a recliner due to sleep apnea, right-sided pain is worse  between 2-5:00 AM. As relief with walking, driving, takes Tylenol for  pain.     Comparison: CT abdomen and pelvis 9/20/2020.     Findings:     AP of the pelvis, frog leg lateral views of the hips were obtained.      No acute osseous abnormality.       Mild subchondral sclerosis of the bilateral acetabulum. Mild to  moderate degenerative changes of the hips.      Others:     Enthesopathic changes of pelvis and trochanters.      Pelvic phlebolith.                                                                       Impression:  1. No acute osseous abnormality.  2. 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     I have personally reviewed the examination and initial interpretation  and I agree with the findings.     MD Jemal HALE MD (Joe)

## 2023-01-12 NOTE — PROGRESS NOTES
Assessment: This is a 77 year old with right meralgia paresthetica. We discussed the diagnosis and the management options including clothing modification with avoidance of tight clothin/belts around the waist which he presents with today. All the patient's questions were answered to the best of my ability.     Plan:  PRN    Chief Complaint: Consult (Right butt and thigh pain. No groin pain. Has fallen but pain was there before the fall. No hip or knee surgeries. No PT or injections.)      Physician:  Olegario Castillo    HPI: Nahun Villalobos is a 77 year old male with PMH adenocarcinoma of the lung, COPD, PE on eliquis, and T2DM (A1C 6.7) who presents today for evaluation of 6 months right lateral thigh pain. No inciting event or injury. Patient sleeps in a recliner due to sleep apnea, states right thigh pain is worse between 2 and 5 am. Pain is relieved with walking, driving, and tylenol. Ambulates with a cane d/t dyspnea. Works as a , able to perform all work related duties.     Symptom Profile  Location of symptoms:  Lateral thigh  Onset: insidious   Trend: getting no better or worse   Duration of symptoms: 6 months   Quality of symptoms: aching, sharp/stabbing  Severity: moderate  Alleviate: activity modification  Exacerbating: lying in bed   Previous Treatments: Previous treatments-- none     Current Status:  Results of the patient s Hip Disability and Osteoarthritis Outcome Score (HOOS)  are as follows (0-100 scales with 100 being the theoretical best):  70.43    MEDICAL HISTORY:   Past Medical History:   Diagnosis Date     Antiplatelet or antithrombotic long-term use      Arthritis      CHF (congestive heart failure) (H) 09/16/2020     COPD (chronic obstructive pulmonary disease) (H)      DM (diabetes mellitus) (H)      Dyspnea on exertion      Essential hypertension, benign      Hemorrhage of rectum and anus      Non-small cell lung cancer (H)      Obesity      Personal history of colonic polyps       Sleep apnea      Thrombosis      Tobacco use disorder      Urinary retention      Walking troubles        Medications:     Current Outpatient Medications:      acetaminophen (TYLENOL) 500 MG tablet, Take 500 mg by mouth every evening, Disp: , Rfl:      albuterol (VENTOLIN HFA) 108 (90 Base) MCG/ACT inhaler, INHALE 2 PUFFS INTO THE LUNGS EVERY 6 HOURS AS NEEDED FOR SHORTNESS OF BREATH / DYSPNEA OR WHEEZING, Disp: 25.5 g, Rfl: 2     Ascorbic Acid (VITAMIN C PO), Take 500 mg by mouth At Bedtime , Disp: , Rfl:      atorvastatin (LIPITOR) 20 MG tablet, TAKE 1 TABLET DAILY, Disp: 90 tablet, Rfl: 1     blood glucose (ACCU-CHEK CHACHA) test strip, Use to test blood sugar 2 times daily or as directed., Disp: 60 strip, Rfl: 6     blood glucose (NO BRAND SPECIFIED) lancets standard, Use to test blood sugar 1 time daily, first thing in the morning, Disp: 50 each, Rfl: 3     blood glucose (NO BRAND SPECIFIED) test strip, Use to test blood sugar 1 time daily, first thing in the morning., Disp: 50 strip, Rfl: 11     blood glucose monitoring (NO BRAND SPECIFIED) meter device kit, Use to test blood sugar 1 time daily, first thing in the morning, Disp: 1 kit, Rfl: 0     cholestyramine light (QUESTRAN) 4 GM packet, Take 1 packet (4 g) by mouth 2 times daily, Disp: 180 each, Rfl: 3     docusate sodium (COLACE) 100 MG capsule, Take 100 mg by mouth 2 times daily as needed for constipation, Disp: , Rfl:      ELIQUIS ANTICOAGULANT 5 MG tablet, TAKE 1 TABLET TWICE A DAY, Disp: 180 tablet, Rfl: 1     finasteride (PROSCAR) 5 MG tablet, Take 1 tablet (5 mg) by mouth daily, Disp: 90 tablet, Rfl: 2     Fluticasone-Umeclidin-Vilant (TRELEGY ELLIPTA) 100-62.5-25 MCG/ACT oral inhaler, Inhale 1 puff into the lungs daily, Disp: 60 each, Rfl: 5     furosemide (LASIX) 40 MG tablet, Take 1 tablet (40 mg) by mouth 2 times daily, Disp: 180 tablet, Rfl: 2     gabapentin (NEURONTIN) 300 MG capsule, Take 1 capsule (300 mg) by mouth 2 times daily, Disp:  180 capsule, Rfl: 1     lisinopril (ZESTRIL) 40 MG tablet, TAKE 1 TABLET DAILY, Disp: 90 tablet, Rfl: 2     metFORMIN (GLUCOPHAGE) 500 MG tablet, TAKE 2 TABLETS AT          BREAKFAST, 1 TABLET AT     DINNER, Disp: 270 tablet, Rfl: 1     metoprolol succinate ER (TOPROL XL) 25 MG 24 hr tablet, Take 1 tablet (25 mg) by mouth daily, Disp: 90 tablet, Rfl: 2     order for DME, Equipment being ordered: diabetic shoes, 1 pair, Disp: 1 Package, Rfl: 0     order for DME, Oxygen 2 Li/min at night via nasal cannula, Disp: 1 Device, Rfl: 0    Allergies: No known drug allergies    SURGICAL HISTORY:   Past Surgical History:   Procedure Laterality Date     ABDOMEN SURGERY       APPENDECTOMY       BONE EXOSTOSIS EXCISION Bilateral 2022    Procedure: EXOSTECTOMIES BOTH 5TH DIGITS WITH SOFT TISSUE RELEASE;  Surgeon: Tong Gray DPM;  Location: [facility]     CHOLECYSTECTOMY       COLONOSCOPY       CYSTOSCOPY, TRANSURETHRAL RESECTION (TUR) PROSTATE, COMBINED N/A 2018    Procedure: COMBINED CYSTOSCOPY, TRANSURETHRAL RESECTION (TUR) PROSTATE;  Cystoscopy, Transurethral Resection Of The Prostate;  Surgeon: Praveena Burris MD;  Location: [facility]     WEDGE RESECTION      lung     ZZC NONSPECIFIC PROCEDURE      cholecystectomy       FAMILY HISTORY:   Family History   Problem Relation Age of Onset     Hypertension Mother      C.A.D. Mother         ANEURYSM     Cancer - colorectal Mother      Cancer Mother         OVARIAN     Other Cancer Mother      Hypertension Sister      Breast Cancer Sister      Cerebrovascular Disease Father      Hypertension Sister      Breast Cancer Sister      Glaucoma No family hx of      Macular Degeneration No family hx of        SOCIAL HISTORY:   Social History     Tobacco Use     Smoking status: Former     Packs/day: 2.00     Years: 52.00     Pack years: 104.00     Types: Cigarettes, Cigars     Start date: 1960     Quit date: 2013     Years since quittin.6      "Smokeless tobacco: Never     Tobacco comments:     Quit, will never start again.   Substance Use Topics     Alcohol use: No       REVIEW OF SYSTEMS:  The comprehensive review of systems from the intake form was reviewed with the patient.  No fever, weight change or fatigue. No dry eyes. No oral ulcers, sore throat or voice change. No palpitations, syncope, angina or edema.  No chest pain, excessive sleepiness, shortness of breath or hemoptysis.   No abdominal pain, nausea, vomiting, diarrhea or heartburn.  No skin rash. No focal weakness or numbness. No bleeding or lymphadenopathy. No rhinitis or hives.     Exam:  On physical examination the patient appears the stated age, is in no acute distress, affect is appropriate, and breathing is non-labored.  Vitals are documented in the EMR and have been reviewed:    Ht 1.702 m (5' 7\")   Wt 100.7 kg (222 lb)   BMI 34.77 kg/m    5' 7\"  Body mass index is 34.77 kg/m .    RIGHT hip subjective:  Abd: 20  Add:10  Flexion: 85  IRF: 10  ERF: 25  Impingement test: mild +  Tenderness to palpation: mild troch   Lateral thigh with subjective hyperesthesia     Distally, the circulatory, motor, and sensation exam is intact with 5/5 EHL, gastroc-soleus, and tibialis anterior.    Sensation to light touch is intact.    Dorsalis pedis and posterior tibialis pulses are palpable.    There are no sores on the feet, no bruising, and no lymphedema.    X-rays:   Study Result    Narrative & Impression   2 views right hip radiographs 1/12/2023 11:48 AM     History: Right hip pain     Additional History from EMR: \"Right butt and thigh pain. No groin  pain. Has fallen but pain was there before the fall. No hip or knee  surgeries. No PT or injections.. History of adenocarcinoma of the  lung. Sits in a recliner due to sleep apnea, right-sided pain is worse  between 2-5:00 AM. As relief with walking, driving, takes Tylenol for  pain.     Comparison: CT abdomen and pelvis " 9/20/2020.     Findings:     AP of the pelvis, frog leg lateral views of the hips were obtained.      No acute osseous abnormality.       Mild subchondral sclerosis of the bilateral acetabulum. Mild to  moderate degenerative changes of the hips.      Others:     Enthesopathic changes of pelvis and trochanters.      Pelvic phlebolith.                                                                       Impression:  1. No acute osseous abnormality.  2. 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     I have personally reviewed the examination and initial interpretation  and I agree with the findings.     JESIKA MCKINNEY MD (Joe)

## 2023-01-13 ENCOUNTER — PATIENT OUTREACH (OUTPATIENT)
Dept: CARE COORDINATION | Facility: CLINIC | Age: 78
End: 2023-01-13

## 2023-01-13 NOTE — PROGRESS NOTES
Clinic Care Coordination Contact  Community Health Worker Follow Up    Best time to call patient is in the mornings from  9:00am to 12:00pm noon per patient.      Care Gaps:     Health Maintenance Due   Topic Date Due     HF ACTION PLAN  Never done     DIABETIC FOOT EXAM  11/01/2019     EYE EXAM  04/01/2022     LIPID  02/10/2023       Care Plan:   Care Plan: 1. Improve chronic symptoms     Problem: Lifestyle choices     Goal: I want to improve management of my leg, knee, and hip pain and swelling.     Start Date: 6/3/2021 Expected End Date: 3/30/2023    This Visit's Progress: 70% Recent Progress: 80%    Note:     Barriers: Lymphedema  Strengths: Motivated to improve ability to walk  Patient expressed understanding of goal: Yes    Action steps to achieve this goal:    1. I will apply Jacinto hose to wear throughout the day and remove at night  2. I will walk my dog daily to help with strengthening and endurance  3. I will elevate my legs while sitting and laying down by propping them up with a pillow  4. I will discuss, review, schedule and complete recommended overdue health maintenance with my primary care provider  5. I will I will take my medications as prescribed  6. I will contact my care team with questions, concerns, support needs. I will use the clinic as a resource   7. I will contact my clinic with 24/7 after hours services available                    Care Plan: 2. Improve chronic symptoms     Problem: I would like assistance and support to afford my FreeStyle Mikel sensors to monitor my blood sugar     Priority: Medium    Note:     Goal Statement: I would like assistance and support to afford my FreeStyle Mikel sensors to monitor my blood sugar.  Date Goal set: 6/27/2022  Barriers: Unable to afford sensors for continuous glucose monitor  Strengths: Advocate for self  Date to Achieve By: 12/27/2022  Patient expressed understanding of goal: Yes  Action steps to achieve this goal:  1. I will follow up with my  providers as scheduled/recommended    2. I will take my medications as prescribed  3. I will follow up with my Diabetic Educator as scheduled/recommended  4. I will contact my care team with questions, concerns, support needs   5. I will use the clinic as a resource, and I understand I can contact my clinic with 24/7 after hours services available   6.  Care Coordinator will remain available as needed      Goal: Improve chronic symptoms     Start Date: 6/27/2022 Expected End Date: 12/27/2022    This Visit's Progress: 60% Recent Progress: 60%    Note:     Barriers: Unable to afford sensors for continuous glucose monitor  Strengths: Advocate for self  Patient expressed understanding of goal: Yes  Action steps to achieve this goal:  1. I will follow up with my providers as scheduled/recommended    2. I will take my medications as prescribed  3. I will follow up with my Diabetic Educator as scheduled/recommended  4. I will contact my care team with questions, concerns, support needs   5. I will use the clinic as a resource, and I understand I can contact my clinic with 24/7 after hours services available                     Discussed:    Patient stated he iweas his Jacinto Stockings.    Patient stated he takes his medications as prescribed.    Patient stated he had an appointment yesterday with an orthopedic doctor for his hip pain.  Patient stated his hip hurts at night.    Patient stated he walks his dog three times a day walking one block each time.    Patient stated he checks his blood sugars daily.    Patient stated the best time to call him is in the mornings from 9:00am to 12:00pm.  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    Patient stated he is going to [place] leaving 1/30/23 for 7 days.    Patient stated he is at works and needs to end the call.     Intervention and Education during outreach: CHW asked patient if he has any resource needs or concerns, patient declined.    CHW encouraged patient to contact CC if there are any other changes or resources needed.  Patient acknowledged understanding.       CHW Plan: will outreach in one month.    Kelley Slaughter, OMAR  Clinic Care Coordination  [facility]: [facility] [place], and [facility]  Phone: [phone]

## 2023-01-15 ENCOUNTER — MYC MEDICAL ADVICE (OUTPATIENT)
Dept: INTERNAL MEDICINE | Facility: CLINIC | Age: 78
End: 2023-01-15

## 2023-01-15 DIAGNOSIS — K43.9 HERNIA OF ABDOMINAL WALL: Primary | ICD-10-CM

## 2023-01-17 ENCOUNTER — TELEPHONE (OUTPATIENT)
Dept: INTERNAL MEDICINE | Facility: CLINIC | Age: 78
End: 2023-01-17
Payer: COMMERCIAL

## 2023-01-17 NOTE — TELEPHONE ENCOUNTER
Pt's partner Kenia called (she is not on CTC) and states pt reported blood in his urine. He is currently at work. Nurse attempted to call and triage the pt and he did answer the phone. He stated unfortunately he was unable to talk at this time but will call the clinic back to be triaged. Routing to nurses to f/u.    Leelee KING RN  [facility]

## 2023-01-18 ENCOUNTER — NURSE TRIAGE (OUTPATIENT)
Dept: INTERNAL MEDICINE | Facility: CLINIC | Age: 78
End: 2023-01-18

## 2023-01-18 ENCOUNTER — OFFICE VISIT (OUTPATIENT)
Dept: SURGERY | Facility: CLINIC | Age: 78
End: 2023-01-18
Payer: COMMERCIAL

## 2023-01-18 VITALS
BODY MASS INDEX: 34.84 KG/M2 | OXYGEN SATURATION: 100 % | WEIGHT: 222 LBS | HEIGHT: 67 IN | SYSTOLIC BLOOD PRESSURE: 132 MMHG | DIASTOLIC BLOOD PRESSURE: 80 MMHG | RESPIRATION RATE: 18 BRPM | HEART RATE: 68 BPM

## 2023-01-18 DIAGNOSIS — R19.00 ABDOMINAL WALL BULGE: Primary | ICD-10-CM

## 2023-01-18 PROCEDURE — 99203 OFFICE O/P NEW LOW 30 MIN: CPT | Performed by: SURGERY

## 2023-01-18 NOTE — PROGRESS NOTES
[facility] General Surgery Clinic Consultation    CHIEF COMPLAINT:  Chief Complaint   Patient presents with     Consult     Hernia of abdominal wall ....other imaging in Epic.....ref Dr Olegario Castillo       HISTORY OF PRESENT ILLNESS:  Nahun Villalobos is a 77 year old male who is seen in consultation at the request of Dr. Castillo for evaluation of abdominal wall hernia.  Patient's past surgical history significant for both open appendectomy and open cholecystectomy.  Since the time of his cholecystectomy, the patient has reported noticing bulging over the right hemiabdomen.  He denies any significant pain associated with this bulging.  No nausea/vomiting related to the bulging.  He does report chronic loose stools, for which he takes a fiber supplement.  No history of previous hernias or hernia repairs.  Family history significant for hernia in the patient's mother.  Patient denies use of tobacco.  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  He reports that he was seen by Dr. Shultz (surgical oncologist) earlier today and plans are in place for management of this mass.    REVIEW OF SYSTEMS:  Constitutional: No fevers or chills  Eyes: No blurred or double vision  HENT: Denies headaches, reports rhinorrhea, No sore throat  Respiratory: shortness of breath  Cardiovascular: Denies chest pain or palpitations  Gastrointestinal: No abdominal pain or nausea/vomiting  Genitourinary: New onset hematuria  Musculoskeletal: Denies arthralgias or myalgias  Neurologic: Numbness/tingling in hands and feet  Integumentary: No skin rashes    Past Medical History:   Diagnosis Date     Antiplatelet or antithrombotic long-term use      Arthritis      CHF (congestive heart failure) (H) 09/16/2020     COPD (chronic obstructive pulmonary disease) (H)      DM (diabetes mellitus) (H)      Dyspnea on exertion      Essential hypertension, benign      Hemorrhage of rectum and anus       Non-small cell lung cancer (H)      Obesity      Personal history of colonic polyps      Sleep apnea      Thrombosis      Tobacco use disorder      Urinary retention      Walking troubles        Past Surgical History:   Procedure Laterality Date     ABDOMEN SURGERY       APPENDECTOMY       BONE EXOSTOSIS EXCISION Bilateral 2022    Procedure: EXOSTECTOMIES BOTH 5TH DIGITS WITH SOFT TISSUE RELEASE;  Surgeon: Tong Gray DPM;  Location: [facility]     CHOLECYSTECTOMY       COLONOSCOPY       CYSTOSCOPY, TRANSURETHRAL RESECTION (TUR) PROSTATE, COMBINED N/A 2018    Procedure: COMBINED CYSTOSCOPY, TRANSURETHRAL RESECTION (TUR) PROSTATE;  Cystoscopy, Transurethral Resection Of The Prostate;  Surgeon: Praveena Burris MD;  Location: [facility]     WEDGE RESECTION      lung     ZZC NONSPECIFIC PROCEDURE      cholecystectomy       Family History   Problem Relation Age of Onset     Hypertension Mother      C.A.D. Mother         ANEURYSM     Cancer - colorectal Mother      Cancer Mother         OVARIAN     Other Cancer Mother      Hypertension Sister      Breast Cancer Sister      Cerebrovascular Disease Father      Hypertension Sister      Breast Cancer Sister      Glaucoma No family hx of      Macular Degeneration No family hx of        Social History     Tobacco Use     Smoking status: Former     Packs/day: 2.00     Years: 52.00     Pack years: 104.00     Types: Cigarettes, Cigars     Start date: 1960     Quit date: 2013     Years since quittin.6     Smokeless tobacco: Never     Tobacco comments:     Quit, will never start again.   Substance Use Topics     Alcohol use: No       Patient Active Problem List   Diagnosis     Essential hypertension, benign     Tobacco use disorder     Lumbago     Impotence of organic origin     Advance care planning     Polyp of colon     Obstructive sleep apnea syndrome     Hyperlipidemia LDL goal <100     Morbid obesity due to excess calories (H)      BPPV (benign paroxysmal positional vertigo), unspecified laterality     Chronic obstructive pulmonary disease, unspecified COPD type (H)     Type 2 diabetes mellitus without complication, without long-term current use of insulin (H)     S/P transurethral resection of prostate     Hematuria, gross     Cervical segment dysfunction     Cervicalgia     Thoracic segment dysfunction     Erectile dysfunction     Acute diverticulitis     Other pulmonary embolism without acute cor pulmonale, unspecified chronicity (H)     Mesenteric mass     History of adenocarcinoma of lung       Allergies   Allergen Reactions     No Known Drug Allergies        Current Outpatient Medications   Medication Sig Dispense Refill     acetaminophen (TYLENOL) 500 MG tablet Take 500 mg by mouth every evening       albuterol (VENTOLIN HFA) 108 (90 Base) MCG/ACT inhaler INHALE 2 PUFFS INTO THE LUNGS EVERY 6 HOURS AS NEEDED FOR SHORTNESS OF BREATH / DYSPNEA OR WHEEZING 25.5 g 2     apixaban ANTICOAGULANT (ELIQUIS ANTICOAGULANT) 5 MG tablet Take 1 tablet (5 mg) by mouth 2 times daily 180 tablet 0     Ascorbic Acid (VITAMIN C PO) Take 500 mg by mouth At Bedtime        atorvastatin (LIPITOR) 20 MG tablet Take 1 tablet (20 mg) by mouth daily 90 tablet 0     blood glucose (ACCU-CHEK CHACHA) test strip Use to test blood sugar 2 times daily or as directed. 60 strip 6     blood glucose (NO BRAND SPECIFIED) lancets standard Use to test blood sugar 1 time daily, first thing in the morning 50 each 3     blood glucose (NO BRAND SPECIFIED) test strip Use to test blood sugar 1 time daily, first thing in the morning. 50 strip 11     blood glucose monitoring (NO BRAND SPECIFIED) meter device kit Use to test blood sugar 1 time daily, first thing in the morning 1 kit 0     cholestyramine light (QUESTRAN) 4 GM packet Take 1 packet (4 g) by mouth 2 times daily 180 each 3     docusate sodium (COLACE) 100 MG capsule Take 100 mg by mouth 2 times daily as needed for  "constipation       finasteride (PROSCAR) 5 MG tablet Take 1 tablet (5 mg) by mouth daily 90 tablet 2     Fluticasone-Umeclidin-Vilant (TRELEGY ELLIPTA) 100-62.5-25 MCG/ACT oral inhaler Inhale 1 puff into the lungs daily 60 each 5     furosemide (LASIX) 40 MG tablet Take 1 tablet (40 mg) by mouth 2 times daily 180 tablet 2     gabapentin (NEURONTIN) 300 MG capsule Take 1 capsule (300 mg) by mouth 2 times daily 60 capsule 0     lisinopril (ZESTRIL) 40 MG tablet TAKE 1 TABLET DAILY 90 tablet 2     metFORMIN (GLUCOPHAGE) 500 MG tablet TAKE 2 TABLETS AT          BREAKFAST, 1 TABLET AT     DINNER 270 tablet 0     metoprolol succinate ER (TOPROL XL) 25 MG 24 hr tablet Take 1 tablet (25 mg) by mouth daily 90 tablet 2     order for DME Equipment being ordered: diabetic shoes, 1 pair 1 Package 0     order for DME Oxygen 2 Li/min  at night via nasal cannula 1 Device 0       Vitals: /80   Pulse 68   Resp 18   Ht 1.702 m (5' 7\")   Wt 100.7 kg (222 lb)   SpO2 100%   BMI 34.77 kg/m    BMI= Body mass index is 34.77 kg/m .    EXAM:  General: Vital signs reviewed, in no apparent distress  Eyes: Anicteric  HENT: Normocephalic, atraumatic, trachea midline   Respiratory: Breathing nonlabored  Cardiovascular: Regular rate and rhythm  GI: Abdomen soft, nondistended, nontender; diffuse nontender bulging of the right hemiabdomen without obviously palpable fascial defect  Musculoskeletal: No gross deformities  Neurologic: Grossly nonfocal exam  Psychiatric: Normal mood, affect and insight  Integumentary: Warm and dry    All labs and imaging personally reviewed and significant for:   CT chest, abdomen, and pelvis with IV contrast- 12/22/22    COMPARISON:   CT chest 06/09/2020. CT chest 04/09/2019.     FINDINGS:   Chest:   Lungs: Right upper lobe wedge resection. Unchanged architectural   distortion/scarring in the right upper lobe. No consolidation. Small   subpleural ground-glass opacity in the right lower lobe is unchanged. No "   pleural effusion or pneumothorax. No pulmonary nodules.     Mediastinum: Thoracic aorta is normal caliber. Aortic atherosclerosis.   Coronary artery calcifications. No pericardial effusion.     Lymph nodes: No lymphadenopathy.   ---   Abdomen: No liver lesions. No bile duct dilation. No pancreatic mass or   pancreatic duct dilation. No spleen lesions. No adrenal nodules. Kidneys   enhance symmetrically. Bilateral renal cysts. Largest cyst is a 3.3 cm   cyst in the mid left kidney. 5th no hydronephrosis.     Lobulated central mesenteric soft tissue mass measuring 5.5 cm medial to   lateral by 3.5 cm AP (axial series 2, images 217-223). Mass focally abuts   the jejunum (coronal series 604, image 36). No dilated bowel. Colonic   diverticulosis. No free fluid. A few enlarged mesenteric   root/peripancreatic lymph nodes measure up to 1.7 cm short axis (series 2,   image 196 and image 205). Atherosclerosis. No abdominal aortic aneurysm.     Pelvis: Prostate is enlarged. No lymphadenopathy.   ---   Musculoskeletal: No lytic or blastic bone lesions. Degenerative changes of   the spine. Diffuse idiopathic skeletal hyperostosis (DISH changes) in the   thoracic spine.     IMPRESSION:   1. 5.5 cm central mesenteric soft tissue mass suspicious for malignancy.   Few adjacent enlarged mesenteric root/peripancreatic lymph nodes   suspicious for metastases.   2. Stable appearance of the chest since 06/09/2020. No evidence of   recurrent or metastatic disease in the chest.       ASSESSMENT:  Nahun Villalobos is a 77 year old who presents with abdominal wall bulging and small bowel mesenteric mass.  Significant pertinent co-morbidities include: Obesity, chronic anticoagulation.       PLAN:  The patient's physical exam and abdominal CT findings were extensively discussed today in clinic.  While the patient does have retraction of his rectus musculature on the right side, likely resulting from his previous cholecystectomy, he does  not have any evidence of true fascial defect.  Therefore, there is no indication for surgical intervention to the patient's abdominal wall at this time.  The patient reports that he will be undergoing surgical resection of his mesenteric mass in the near future with Dr. Shultz.  He may follow-up in the general surgery clinic on an as-needed basis.    It was my pleasure to participate in the care of Nahun Villalobos in clinic today. Thank you for this consultation.         Kelly Sapp MD    Please route or send letter to:  Primary Care Provider (PCP) and Referring Provider

## 2023-01-18 NOTE — TELEPHONE ENCOUNTER
"Called pt to discuss symptoms. Pt stated he tried to call yesterday but \"we dont answer the phone\".     Pt reported- it bled all day yesterday but seems to be clearing up today.   Pt stated hes downtown and trying to find another dr appt and requested we call him back in an hour. Writer unable to triage at this time.      Writer will call pt back in an hour.     Pt ended up calling the clinic back and speaking to a different RN about this.   "

## 2023-01-18 NOTE — LETTER
January 18, 2023          Olegario Castillo MD  [address] [place] [id number]      RE:   Nahun Villalobos 1945      Dear Colleague,    Thank you for referring your patient, Nahun Villalobos, to [facility] at [facility]. Please see a copy of my visit note below.    Nahun Villalobos is a 77 year old male who is seen in consultation at the request of Dr. Castillo for evaluation of abdominal wall hernia.  Patient's past surgical history significant for both open appendectomy and open cholecystectomy.  Since the time of his cholecystectomy, the patient has reported noticing bulging over the right hemiabdomen.  He denies any significant pain associated with this bulging.  No nausea/vomiting related to the bulging.  He does report chronic loose stools, for which he takes a fiber supplement.  No history of previous hernias or hernia repairs.  Family history significant for hernia in the patient's mother.  Patient denies use of tobacco.  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  He reports that he was seen by Dr. Shultz (surgical oncologist) earlier today and plans are in place for management of this mass.     REVIEW OF SYSTEMS:  Constitutional: No fevers or chills  Eyes: No blurred or double vision  HENT: Denies headaches, reports rhinorrhea, No sore throat  Respiratory: shortness of breath  Cardiovascular: Denies chest pain or palpitations  Gastrointestinal: No abdominal pain or nausea/vomiting  Genitourinary: New onset hematuria  Musculoskeletal: Denies arthralgias or myalgias  Neurologic: Numbness/tingling in hands and feet  Integumentary: No skin rashes     Past Medical History        Past Medical History:   Diagnosis Date     Antiplatelet or antithrombotic long-term use       Arthritis       CHF (congestive heart failure) (H) 09/16/2020     COPD (chronic obstructive pulmonary disease) (H)       DM (diabetes mellitus)  (H)       Dyspnea on exertion       Essential hypertension, benign       Hemorrhage of rectum and anus       Non-small cell lung cancer (H)       Obesity       Personal history of colonic polyps       Sleep apnea       Thrombosis       Tobacco use disorder       Urinary retention       Walking troubles              Past Surgical History         Past Surgical History:   Procedure Laterality Date     ABDOMEN SURGERY        APPENDECTOMY         BONE EXOSTOSIS EXCISION Bilateral 2022     Procedure: EXOSTECTOMIES BOTH 5TH DIGITS WITH SOFT TISSUE RELEASE;  Surgeon: Tong Gray DPM;  Location: [facility]     CHOLECYSTECTOMY         COLONOSCOPY        CYSTOSCOPY, TRANSURETHRAL RESECTION (TUR) PROSTATE, COMBINED N/A 2018     Procedure: COMBINED CYSTOSCOPY, TRANSURETHRAL RESECTION (TUR) PROSTATE;  Cystoscopy, Transurethral Resection Of The Prostate;  Surgeon: Praveena Burris MD;  Location: [facility]     WEDGE RESECTION         lung     ZZC NONSPECIFIC PROCEDURE         cholecystectomy            Family History         Family History   Problem Relation Age of Onset     Hypertension Mother       C.A.D. Mother           ANEURYSM     Cancer - colorectal Mother       Cancer Mother           OVARIAN     Other Cancer Mother       Hypertension Sister       Breast Cancer Sister       Cerebrovascular Disease Father       Hypertension Sister       Breast Cancer Sister       Glaucoma No family hx of       Macular Degeneration No family hx of              Social History            Tobacco Use     Smoking status: Former       Packs/day: 2.00       Years: 52.00       Pack years: 104.00       Types: Cigarettes, Cigars       Start date: 1960       Quit date: 2013       Years since quittin.6     Smokeless tobacco: Never     Tobacco comments:       Quit, will never start again.   Substance Use Topics     Alcohol use: No             Patient Active Problem List   Diagnosis     Essential hypertension,  benign     Tobacco use disorder     Lumbago     Impotence of organic origin     Advance care planning     Polyp of colon     Obstructive sleep apnea syndrome     Hyperlipidemia LDL goal <100     Morbid obesity due to excess calories (H)     BPPV (benign paroxysmal positional vertigo), unspecified laterality     Chronic obstructive pulmonary disease, unspecified COPD type (H)     Type 2 diabetes mellitus without complication, without long-term current use of insulin (H)     S/P transurethral resection of prostate     Hematuria, gross     Cervical segment dysfunction     Cervicalgia     Thoracic segment dysfunction     Erectile dysfunction     Acute diverticulitis     Other pulmonary embolism without acute cor pulmonale, unspecified chronicity (H)     Mesenteric mass     History of adenocarcinoma of lung              Allergies   Allergen Reactions     No Known Drug Allergies           Current Outpatient Prescriptions          Current Outpatient Medications   Medication Sig Dispense Refill     acetaminophen (TYLENOL) 500 MG tablet Take 500 mg by mouth every evening         albuterol (VENTOLIN HFA) 108 (90 Base) MCG/ACT inhaler INHALE 2 PUFFS INTO THE LUNGS EVERY 6 HOURS AS NEEDED FOR SHORTNESS OF BREATH / DYSPNEA OR WHEEZING 25.5 g 2     apixaban ANTICOAGULANT (ELIQUIS ANTICOAGULANT) 5 MG tablet Take 1 tablet (5 mg) by mouth 2 times daily 180 tablet 0     Ascorbic Acid (VITAMIN C PO) Take 500 mg by mouth At Bedtime          atorvastatin (LIPITOR) 20 MG tablet Take 1 tablet (20 mg) by mouth daily 90 tablet 0     blood glucose (ACCU-CHEK CHACHA) test strip Use to test blood sugar 2 times daily or as directed. 60 strip 6     blood glucose (NO BRAND SPECIFIED) lancets standard Use to test blood sugar 1 time daily, first thing in the morning 50 each 3     blood glucose (NO BRAND SPECIFIED) test strip Use to test blood sugar 1 time daily, first thing in the morning. 50 strip 11     blood glucose monitoring (NO BRAND  "SPECIFIED) meter device kit Use to test blood sugar 1 time daily, first thing in the morning 1 kit 0     cholestyramine light (QUESTRAN) 4 GM packet Take 1 packet (4 g) by mouth 2 times daily 180 each 3     docusate sodium (COLACE) 100 MG capsule Take 100 mg by mouth 2 times daily as needed for constipation         finasteride (PROSCAR) 5 MG tablet Take 1 tablet (5 mg) by mouth daily 90 tablet 2     Fluticasone-Umeclidin-Vilant (TRELEGY ELLIPTA) 100-62.5-25 MCG/ACT oral inhaler Inhale 1 puff into the lungs daily 60 each 5     furosemide (LASIX) 40 MG tablet Take 1 tablet (40 mg) by mouth 2 times daily 180 tablet 2     gabapentin (NEURONTIN) 300 MG capsule Take 1 capsule (300 mg) by mouth 2 times daily 60 capsule 0     lisinopril (ZESTRIL) 40 MG tablet TAKE 1 TABLET DAILY 90 tablet 2     metFORMIN (GLUCOPHAGE) 500 MG tablet TAKE 2 TABLETS AT          BREAKFAST, 1 TABLET AT     DINNER 270 tablet 0     metoprolol succinate ER (TOPROL XL) 25 MG 24 hr tablet Take 1 tablet (25 mg) by mouth daily 90 tablet 2     order for DME Equipment being ordered: diabetic shoes, 1 pair 1 Package 0     order for DME Oxygen 2 Li/min  at night via nasal cannula 1 Device 0            Vitals: /80   Pulse 68   Resp 18   Ht 1.702 m (5' 7\")   Wt 100.7 kg (222 lb)   SpO2 100%   BMI 34.77 kg/m    BMI= Body mass index is 34.77 kg/m .     EXAM:  General: Vital signs reviewed, in no apparent distress  Eyes: Anicteric  HENT: Normocephalic, atraumatic, trachea midline   Respiratory: Breathing nonlabored  Cardiovascular: Regular rate and rhythm  GI: Abdomen soft, nondistended, nontender; diffuse nontender bulging of the right hemiabdomen without obviously palpable fascial defect  Musculoskeletal: No gross deformities  Neurologic: Grossly nonfocal exam  Psychiatric: Normal mood, affect and insight  Integumentary: Warm and dry     All labs and imaging personally reviewed and significant for:   CT chest, abdomen, and pelvis with IV contrast- " 12/22/22    COMPARISON:   CT chest 06/09/2020. CT chest 04/09/2019.     FINDINGS:   Chest:   Lungs: Right upper lobe wedge resection. Unchanged architectural   distortion/scarring in the right upper lobe. No consolidation. Small   subpleural ground-glass opacity in the right lower lobe is unchanged. No   pleural effusion or pneumothorax. No pulmonary nodules.     Mediastinum: Thoracic aorta is normal caliber. Aortic atherosclerosis.   Coronary artery calcifications. No pericardial effusion.     Lymph nodes: No lymphadenopathy.   ---   Abdomen: No liver lesions. No bile duct dilation. No pancreatic mass or   pancreatic duct dilation. No spleen lesions. No adrenal nodules. Kidneys   enhance symmetrically. Bilateral renal cysts. Largest cyst is a 3.3 cm   cyst in the mid left kidney. 5th no hydronephrosis.     Lobulated central mesenteric soft tissue mass measuring 5.5 cm medial to   lateral by 3.5 cm AP (axial series 2, images 217-223). Mass focally abuts   the jejunum (coronal series 604, image 36). No dilated bowel. Colonic   diverticulosis. No free fluid. A few enlarged mesenteric   root/peripancreatic lymph nodes measure up to 1.7 cm short axis (series 2,   image 196 and image 205). Atherosclerosis. No abdominal aortic aneurysm.     Pelvis: Prostate is enlarged. No lymphadenopathy.   ---   Musculoskeletal: No lytic or blastic bone lesions. Degenerative changes of   the spine. Diffuse idiopathic skeletal hyperostosis (DISH changes) in the   thoracic spine.     IMPRESSION:   1. 5.5 cm central mesenteric soft tissue mass suspicious for malignancy.   Few adjacent enlarged mesenteric root/peripancreatic lymph nodes   suspicious for metastases.   2. Stable appearance of the chest since 06/09/2020. No evidence of   recurrent or metastatic disease in the chest.         ASSESSMENT:  Nahun Villalobos is a 77 year old who presents with abdominal wall bulging and small bowel mesenteric mass.  Significant pertinent  co-morbidities include: Obesity, chronic anticoagulation.         PLAN:  The patient's physical exam and abdominal CT findings were extensively discussed today in clinic.  While the patient does have retraction of his rectus musculature on the right side, likely resulting from his previous cholecystectomy, he does not have any evidence of true fascial defect.  Therefore, there is no indication for surgical intervention to the patient's abdominal wall at this time.  The patient reports that he will be undergoing surgical resection of his mesenteric mass in the near future with Dr. Shultz.  He may follow-up in the general surgery clinic on an as-needed basis.     It was my pleasure to participate in the care of Nahun Villalobos in clinic today.    Again, thank you for allowing me to participate in the care of your patient.      Sincerely,      Kelly Sapp MD

## 2023-01-18 NOTE — TELEPHONE ENCOUNTER
Spoke with patient regarding hematuria symptoms. Patient stated urine cleared up 1/17/2023 evening and he has not noticed any blood in urine or spotting in brief this morning. Patient does not endorse abdominal injury or filling entire toilet bowl with blood when having symptoms. Patient stated he would like to know what PCP recommendations are.     Routing to PCP for review.     Please reference 1/17/2023 MyChart encounter and 1/18/2023 Triage encounter.

## 2023-01-20 ENCOUNTER — TELEPHONE (OUTPATIENT)
Dept: INTERNAL MEDICINE | Facility: CLINIC | Age: 78
End: 2023-01-20
Payer: COMMERCIAL

## 2023-01-20 NOTE — TELEPHONE ENCOUNTER
"Kenia calling \"I'm his partner and his caregiver\"- not on consent to communicate. Kenia requests writer call patient stating \"He can't wait on the phone. I waited 12 minutes.\"     Writer unsure what the concern is.   Call to patient. No answer. Message left for return call to clinic.       Deisi Henson RN BSN MSN  [facility]    "

## 2023-01-20 NOTE — TELEPHONE ENCOUNTER
[employer] message also sent to the patient instructing patient to call the clinic back or respond back to the [employer] message with his concerns.    Will postpone encounter to follow-up with on Monday.     Leslie Summers RN

## 2023-01-23 ENCOUNTER — OFFICE VISIT (OUTPATIENT)
Dept: URGENT CARE | Facility: URGENT CARE | Age: 78
End: 2023-01-23
Payer: COMMERCIAL

## 2023-01-23 ENCOUNTER — NURSE TRIAGE (OUTPATIENT)
Dept: INTERNAL MEDICINE | Facility: CLINIC | Age: 78
End: 2023-01-23

## 2023-01-23 VITALS
BODY MASS INDEX: 34.77 KG/M2 | WEIGHT: 222 LBS | HEART RATE: 66 BPM | SYSTOLIC BLOOD PRESSURE: 124 MMHG | DIASTOLIC BLOOD PRESSURE: 72 MMHG | RESPIRATION RATE: 14 BRPM | OXYGEN SATURATION: 97 % | TEMPERATURE: 98.3 F

## 2023-01-23 DIAGNOSIS — Z90.79 S/P TURP (STATUS POST TRANSURETHRAL RESECTION OF PROSTATE): ICD-10-CM

## 2023-01-23 DIAGNOSIS — I26.99 OTHER PULMONARY EMBOLISM WITHOUT ACUTE COR PULMONALE, UNSPECIFIED CHRONICITY (H): ICD-10-CM

## 2023-01-23 DIAGNOSIS — R31.0 GROSS HEMATURIA: Primary | ICD-10-CM

## 2023-01-23 DIAGNOSIS — Z79.01 ANTICOAGULATED: ICD-10-CM

## 2023-01-23 DIAGNOSIS — C34.90 MALIGNANT NEOPLASM OF UNSPECIFIED PART OF UNSPECIFIED BRONCHUS OR LUNG (H): ICD-10-CM

## 2023-01-23 DIAGNOSIS — R19.7 DIARRHEA, UNSPECIFIED TYPE: ICD-10-CM

## 2023-01-23 DIAGNOSIS — Z90.49 S/P CHOLECYSTECTOMY: ICD-10-CM

## 2023-01-23 PROBLEM — D12.8 BENIGN NEOPLASM OF RECTUM: Status: ACTIVE | Noted: 2022-09-06

## 2023-01-23 PROBLEM — D12.2 BENIGN NEOPLASM OF ASCENDING COLON: Status: ACTIVE | Noted: 2022-09-06

## 2023-01-23 LAB
ALBUMIN UR-MCNC: NEGATIVE MG/DL
APPEARANCE UR: CLEAR
BACTERIA #/AREA URNS HPF: ABNORMAL /HPF
BILIRUB UR QL STRIP: NEGATIVE
COLOR UR AUTO: YELLOW
GLUCOSE UR STRIP-MCNC: NEGATIVE MG/DL
HGB UR QL STRIP: ABNORMAL
KETONES UR STRIP-MCNC: NEGATIVE MG/DL
LEUKOCYTE ESTERASE UR QL STRIP: NEGATIVE
MUCOUS THREADS #/AREA URNS LPF: PRESENT /LPF
NITRATE UR QL: NEGATIVE
PH UR STRIP: 5.5 [PH] (ref 5–7)
RBC #/AREA URNS AUTO: ABNORMAL /HPF
SP GR UR STRIP: 1.02 (ref 1–1.03)
SQUAMOUS #/AREA URNS AUTO: ABNORMAL /LPF
UROBILINOGEN UR STRIP-ACNC: 0.2 E.U./DL
WBC #/AREA URNS AUTO: ABNORMAL /HPF

## 2023-01-23 PROCEDURE — 99214 OFFICE O/P EST MOD 30 MIN: CPT | Performed by: NURSE PRACTITIONER

## 2023-01-23 PROCEDURE — 81001 URINALYSIS AUTO W/SCOPE: CPT

## 2023-01-23 NOTE — PATIENT INSTRUCTIONS
Likely eloquis as well as prostate etiology  No clinical signs of infection or stone today  Follow-up with urology if this lingers  ER if severe pain, fever >100, nausea vomiting

## 2023-01-23 NOTE — TELEPHONE ENCOUNTER
On chart review, pt is currently at [facility]. Would PCP still recommend referral to urologist? Await recommendations from UC provider?    Toy Cummins RN

## 2023-01-23 NOTE — PROGRESS NOTES
Chief Complaint   Patient presents with     UTI     Blood in the urine x 7 days      SUBJECTIVE:  Nahun Villalobos is a 77 year old male who presents today with gross hematuria for 1 week.  It comes and goes.  The blood today has lessened.  Relevant history of TURP prostate surgical resection on Proscar Eliquis for history of blood clots, lung cancer.  Has not seen urology for quite a while.  No fevers sweats chills nausea vomiting flank pain dysuria.  No recent blunt trauma or vigorous exercise.  He walks daily and fell to his bottom a month ago.  He is not bruising or bleeding anywhere else.    Past Medical History:   Diagnosis Date     Antiplatelet or antithrombotic long-term use      Arthritis      CHF (congestive heart failure) (H) 09/16/2020     COPD (chronic obstructive pulmonary disease) (H)      DM (diabetes mellitus) (H)      Dyspnea on exertion      Essential hypertension, benign      Hemorrhage of rectum and anus      Non-small cell lung cancer (H)      Obesity      Personal history of colonic polyps      Sleep apnea      Thrombosis      Tobacco use disorder      Urinary retention      Walking troubles      Current Outpatient Medications   Medication Sig Dispense Refill     albuterol (VENTOLIN HFA) 108 (90 Base) MCG/ACT inhaler INHALE 2 PUFFS INTO THE LUNGS EVERY 6 HOURS AS NEEDED FOR SHORTNESS OF BREATH / DYSPNEA OR WHEEZING 25.5 g 2     apixaban ANTICOAGULANT (ELIQUIS ANTICOAGULANT) 5 MG tablet Take 1 tablet (5 mg) by mouth 2 times daily 180 tablet 0     Ascorbic Acid (VITAMIN C PO) Take 500 mg by mouth At Bedtime        atorvastatin (LIPITOR) 20 MG tablet Take 1 tablet (20 mg) by mouth daily 90 tablet 0     blood glucose (ACCU-CHEK CHACHA) test strip Use to test blood sugar 2 times daily or as directed. 60 strip 6     blood glucose (NO BRAND SPECIFIED) lancets standard Use to test blood sugar 1 time daily, first thing in the morning 50 each 3     blood glucose monitoring (NO BRAND SPECIFIED) meter  device kit Use to test blood sugar 1 time daily, first thing in the morning 1 kit 0     cholestyramine light (QUESTRAN) 4 GM packet Take 1 packet (4 g) by mouth 2 times daily 180 each 3     docusate sodium (COLACE) 100 MG capsule Take 100 mg by mouth 2 times daily as needed for constipation       finasteride (PROSCAR) 5 MG tablet Take 1 tablet (5 mg) by mouth daily 90 tablet 2     Fluticasone-Umeclidin-Vilant (TRELEGY ELLIPTA) 100-62.5-25 MCG/ACT oral inhaler Inhale 1 puff into the lungs daily 60 each 5     furosemide (LASIX) 40 MG tablet Take 1 tablet (40 mg) by mouth 2 times daily 180 tablet 2     gabapentin (NEURONTIN) 300 MG capsule Take 1 capsule (300 mg) by mouth 2 times daily 60 capsule 0     lisinopril (ZESTRIL) 40 MG tablet TAKE 1 TABLET DAILY 90 tablet 2     metFORMIN (GLUCOPHAGE) 500 MG tablet TAKE 2 TABLETS AT          BREAKFAST, 1 TABLET AT     DINNER 270 tablet 0     metoprolol succinate ER (TOPROL XL) 25 MG 24 hr tablet Take 1 tablet (25 mg) by mouth daily 90 tablet 2     order for DME Equipment being ordered: diabetic shoes, 1 pair 1 Package 0     order for DME Oxygen 2 Li/min  at night via nasal cannula 1 Device 0     acetaminophen (TYLENOL) 500 MG tablet Take 500 mg by mouth every evening (Patient not taking: Reported on 2023)       blood glucose (NO BRAND SPECIFIED) test strip Use to test blood sugar 1 time daily, first thing in the morning. (Patient not taking: Reported on 2023) 50 strip 11     Social History     Tobacco Use     Smoking status: Former     Packs/day: 2.00     Years: 52.00     Pack years: 104.00     Types: Cigarettes, Cigars     Start date: 1960     Quit date: 2013     Years since quittin.6     Smokeless tobacco: Never     Tobacco comments:     Quit, will never start again.   Substance Use Topics     Alcohol use: No     Allergies   Allergen Reactions     No Known Drug Allergies        Review of Systems   All systems negative except for those listed above in  HPI.    OBJECTIVE:  /72 (BP Location: Right arm, Patient Position: Chair, Cuff Size: Adult Regular)   Pulse 66   Temp 98.3  F (36.8  C) (Oral)   Resp 14   Wt 100.7 kg (222 lb)   SpO2 97%   BMI 34.77 kg/m       Physical Exam  Vitals reviewed.   Constitutional:       General: He is not in acute distress.     Appearance: Normal appearance. He is not ill-appearing.   HENT:      Head: Normocephalic and atraumatic.      Nose: Nose normal.      Mouth/Throat:      Mouth: Mucous membranes are moist.      Pharynx: Oropharynx is clear.   Eyes:      Extraocular Movements: Extraocular movements intact.      Conjunctiva/sclera: Conjunctivae normal.      Pupils: Pupils are equal, round, and reactive to light.   Cardiovascular:      Rate and Rhythm: Normal rate.      Pulses: Normal pulses.   Pulmonary:      Effort: Pulmonary effort is normal.   Abdominal:      Tenderness: There is no right CVA tenderness or left CVA tenderness.   Musculoskeletal:         General: Normal range of motion.      Cervical back: Normal range of motion and neck supple.   Skin:     General: Skin is warm and dry.      Coloration: Skin is not jaundiced or pale.      Findings: No bruising or rash.   Neurological:      General: No focal deficit present.      Mental Status: He is alert and oriented to person, place, and time.   Psychiatric:         Mood and Affect: Mood normal.         Behavior: Behavior normal.       Results for orders placed or performed in visit on 01/23/23   UA Macro with Reflex to Micro and Culture - lab collect     Status: Abnormal    Specimen: Urine, Clean Catch   Result Value Ref Range    Color Urine Yellow Colorless, Straw, Light Yellow, Yellow    Appearance Urine Clear Clear    Glucose Urine Negative Negative mg/dL    Bilirubin Urine Negative Negative    Ketones Urine Negative Negative mg/dL    Specific Gravity Urine 1.020 1.003 - 1.035    Blood Urine Large (A) Negative    pH Urine 5.5 5.0 - 7.0    Protein Albumin Urine  Negative Negative mg/dL    Urobilinogen Urine 0.2 0.2, 1.0 E.U./dL    Nitrite Urine Negative Negative    Leukocyte Esterase Urine Negative Negative   Urine Microscopic     Status: Abnormal   Result Value Ref Range    Bacteria Urine Few (A) None Seen /HPF    RBC Urine 25-50 (A) 0-2 /HPF /HPF    WBC Urine None Seen 0-5 /HPF /HPF    Squamous Epithelials Urine Few (A) None Seen /LPF    Mucus Urine Present (A) None Seen /LPF    Narrative    Urine Culture not indicated     ASSESSMENT:    ICD-10-CM    1. Gross hematuria  R31.0 Adult Urology  Referral      2. S/P TURP (status post transurethral resection of prostate)  Z90.79 UA Macro with Reflex to Micro and Culture - lab collect     UA Macro with Reflex to Micro and Culture - lab collect     Urine Microscopic      3. Anticoagulated  Z79.01       4. Other pulmonary embolism without acute cor pulmonale, unspecified chronicity (H)  I26.99       5. Malignant neoplasm of unspecified part of unspecified bronchus or lung (H)  C34.90         PLAN:     Likely eloquis as well as prostate etiology  No clinical signs of infection or stone today  Follow-up with urology if this lingers  ER if severe pain, fever >100, nausea vomiting    Follow up with primary care provider with any problems, questions or concerns or if symptoms worsen or fail to improve. Patient agreed to plan and verbalized understanding.    Su Márquez, BEE-[facility]

## 2023-01-23 NOTE — TELEPHONE ENCOUNTER
Again difficult to assess without seeing patient.  May be suited to be seen in urology for further evaluation.  If patient has urologist would suggest follow-up if not a referral can be placed for cosign.

## 2023-01-23 NOTE — TELEPHONE ENCOUNTER
"Nurse Triage SBAR    Is this a 2nd Level Triage? NO    Situation: Called pt in regards to the message from last week from Kenia from 1/20. See  messages and telephone encounters regarding this.      Background: Pt has been urinating blood intermittently for about the last week. Pt stated he is on eliquis. Pt stated he had surgery on his prostate about a yr ago. Pt denies any recent abd or back injury and genital injury.         Assessment: Blood is present while urinating which happens intermittently. Last time it happened was about an hour ago. Pt stated it is not pure blood that he is urinating. Pt stated sometimes its more blood than urine and sometimes its more urine than blood. Pt stated his urine is burgundy in color.  Pt stated he does have thigh pain but has seen a provider for this.      Pt denies pain with urination, fever.     Protocol Recommended Disposition:   Go to office now. No open appts at the office now/today. Advised pt to go to  (per protocol). Pt stated he is across the street from the office getting his hair cut and will go to  after his hair cut. Advised pt to call us back if he has any questions. Pt stated if he has to to wait a while to talk to a nurse he wont- advised pt if he has to wait a long time to send us a  message with his questions. Pt stated he doesn't know how to do that and Kenia would have to do that.       Routing to PCP as an FYI.   Routed to provider    Does the patient meet one of the following criteria for ADS visit consideration? 16+ years old, with an [facility] PCP     TIP  Providers, please consider if this condition is appropriate for management at one of our Acute and Diagnostic Services sites.     If patient is a good candidate, please use dotphrase <dot>triageresponse and select Refer to ADS to document.        1. COLOR of URINE: \"Describe the color of the urine.\"  (e.g., tea-colored, pink, red, blood clots, bloody)      Burgundy   2. ONSET: \"When did the " "bleeding start?\"       A week ago   3. EPISODES: \"How many times has there been blood in the urine?\" or \"How many times today?\"      Last happened about an hour ago- otherwise every once in a while   4. PAIN with URINATION: \"Is there any pain with passing your urine?\" If Yes, ask: \"How bad is the pain?\"  (Scale 1-10; or mild, moderate, severe)     - MILD - complains slightly about urination hurting     - MODERATE - interferes with normal activities       - SEVERE - excruciating, unwilling or unable to urinate because of the pain       No pain with urination   5. FEVER: \"Do you have a fever?\" If Yes, ask: \"What is your temperature, how was it measured, and when did it start?\"      No  6. ASSOCIATED SYMPTOMS: \"Are you passing urine more frequently than usual?\"      No  7. OTHER SYMPTOMS: \"Do you have any other symptoms?\" (e.g., back/flank pain, abdominal pain, vomiting)      Thigh pain- pt saw a provider for this     Reason for Disposition    Taking Coumadin (warfarin) or other strong blood thinner, or known bleeding disorder (e.g., thrombocytopenia)    Additional Information    Negative: Shock suspected (e.g., cold/pale/clammy skin, too weak to stand, low BP, rapid pulse)    Negative: Sounds like a life-threatening emergency to the triager    Negative: Urinary catheter, questions about    Negative: Recent back or abdominal injury    Negative: Recent genital injury    Negative: Unable to urinate (or only a few drops) > 4 hours and bladder feels very full (e.g., palpable bladder or strong urge to urinate)    Negative: Passing pure blood or large blood clots (i.e., larger than a dime or grape)    Negative: Fever > 100.4 F (38.0 C)    Negative: Patient sounds very sick or weak to the triager    Negative: Known sickle cell disease    Protocols used: URINE - BLOOD IN-A-OH      "

## 2023-01-23 NOTE — TELEPHONE ENCOUNTER
Per provider:        Per UC office visit, pt agreed with plan to follow-up with primary with urology and PCP as needed. See below:      Closing encounter.   Toy Cummins RN

## 2023-01-23 NOTE — TELEPHONE ENCOUNTER
Would assess what urgent care finds upon clinical exam and if needed they can refer patient to urology accordingly

## 2023-01-24 ENCOUNTER — TELEPHONE (OUTPATIENT)
Dept: UROLOGY | Facility: CLINIC | Age: 78
End: 2023-01-24
Payer: COMMERCIAL

## 2023-01-24 RX ORDER — CHOLESTYRAMINE LIGHT 4 G/5.7G
4 POWDER, FOR SUSPENSION ORAL 2 TIMES DAILY
Qty: 180 EACH | Refills: 3 | Status: SHIPPED | OUTPATIENT
Start: 2023-01-24 | End: 2024-02-28

## 2023-01-24 NOTE — TELEPHONE ENCOUNTER
[facility] Call Center    Phone Message    May a detailed message be left on voicemail: yes     Reason for Call: Other: Patient is being referred to Urology for an appointment in 1-2 weeks. Please review and call patient to schedule.     Action Taken: Message routed to:  [facility]): Urology     Travel Screening: Not Applicable

## 2023-02-01 ENCOUNTER — NURSE TRIAGE (OUTPATIENT)
Dept: INTERNAL MEDICINE | Facility: CLINIC | Age: 78
End: 2023-02-01
Payer: COMMERCIAL

## 2023-02-01 NOTE — TELEPHONE ENCOUNTER
Attempted to contact pt to relay Dr. Alan message. No answer. Left VM.     Upon callback- please relay Dr. Alan message.       Patient Contact    Attempt # 1    Was call answered?  No.  Left message on voicemail with information to call me back.

## 2023-02-01 NOTE — TELEPHONE ENCOUNTER
"FYI PCP:     Pt called is in [place] and unable to return to [place] until Sunday     Was recent seen at  for hematuria. States no one told him not to travel when he stated he was going to leave the country    Initially it was light blood in urine. Today peed more blood than urine - stream of yellow at first, taking Vitamin B (bright yellow), FDC through felt a change in body and blood started coming, full stream of red     Pain? None   Fever? None   Back pain or abdominal pain? Sitting on couch, starting to get central lower back pain  Yesterday wanted to vomit, very nauseated     Occurred one time today - has peed 3x today (also taking Lasix)     States when he left on vacation, had light blood in urine. No one said not to travel     Is taking Eliquis     Unsure if he is feeling lightheaded or dizzy, \"I don't know what that feels like, maybe a little bit\"     Is with a lady friend there     Per protocol advised ED, advised if unable to be seen at  emergency dept would need to seek care there. Pt verbalized understanding. Requested that FYI message be sent to PCP     Eve FALLON, Triage RN  [facility]     Reason for Disposition    Passing pure blood or large blood clots (i.e., larger than a dime or grape)    Additional Information    Negative: Shock suspected (e.g., cold/pale/clammy skin, too weak to stand, low BP, rapid pulse)    Negative: Sounds like a life-threatening emergency to the triager    Negative: Urinary catheter, questions about    Negative: Unable to urinate (or only a few drops) > 4 hours and bladder feels very full (e.g., palpable bladder or strong urge to urinate)    Negative: Recent genital injury    Negative: Recent back or abdominal injury    Protocols used: URINE - BLOOD IN-A-OH      "

## 2023-02-01 NOTE — TELEPHONE ENCOUNTER
Received a call back from the patient. Relayed message from the provider. Patient states if hs symptoms get worse/persist he will proceed to the emergency room for further evaluation.     Triage RN encouraged patient to call Urology and get an appointment scheduled for when he returns. Phone number provided to the patient.    Patient expressed understanding and had no additional questions at this time.    Leslie Summers RN

## 2023-02-01 NOTE — TELEPHONE ENCOUNTER
Apparently patient was seen in urgent care.  If patient's symptoms get worse or persist or develop symptomatically then probably needs to be seen by somebody for reassurance.  It appears he was given already a referral to see a urologist.

## 2023-02-03 ENCOUNTER — PRE VISIT (OUTPATIENT)
Dept: UROLOGY | Facility: CLINIC | Age: 78
End: 2023-02-03
Payer: COMMERCIAL

## 2023-02-03 DIAGNOSIS — R31.0 GROSS HEMATURIA: Primary | ICD-10-CM

## 2023-02-03 NOTE — CONFIDENTIAL NOTE
Reason for visit: consult gross hematuria     Relevant information: s/p TURP, pt taking Eliquis    Records/imaging/labs/orders: in epic    At Rooming: video    Supriya Ferrer  2/3/2023  8:32 AM

## 2023-02-07 ENCOUNTER — HOSPITAL ENCOUNTER (OUTPATIENT)
Dept: CT IMAGING | Facility: CLINIC | Age: 78
Discharge: HOME | End: 2023-02-07
Attending: UROLOGY | Admitting: UROLOGY
Payer: COMMERCIAL

## 2023-02-07 DIAGNOSIS — R31.0 GROSS HEMATURIA: ICD-10-CM

## 2023-02-07 LAB
CREAT BLD-MCNC: 1.1 MG/DL (ref 0.7–1.3)
GFR SERPL CREATININE-BSD FRML MDRD: >60 ML/MIN/1.73M2

## 2023-02-07 PROCEDURE — G1010 CDSM STANSON: HCPCS

## 2023-02-07 PROCEDURE — 250N000011 HC RX IP 250 OP 636: Performed by: UROLOGY

## 2023-02-07 PROCEDURE — 82565 ASSAY OF CREATININE: CPT

## 2023-02-07 PROCEDURE — 250N000009 HC RX 250: Performed by: UROLOGY

## 2023-02-07 RX ORDER — IOPAMIDOL 755 MG/ML
109 INJECTION, SOLUTION INTRAVASCULAR ONCE
Status: COMPLETED | OUTPATIENT
Start: 2023-02-07 | End: 2023-02-07

## 2023-02-07 RX ADMIN — IOPAMIDOL 109 ML: 755 INJECTION, SOLUTION INTRAVENOUS at 08:28

## 2023-02-07 RX ADMIN — SODIUM CHLORIDE 71 ML: 9 INJECTION, SOLUTION INTRAVENOUS at 08:29

## 2023-02-09 ENCOUNTER — VIRTUAL VISIT (OUTPATIENT)
Dept: UROLOGY | Facility: CLINIC | Age: 78
End: 2023-02-09
Attending: NURSE PRACTITIONER
Payer: COMMERCIAL

## 2023-02-09 DIAGNOSIS — R31.0 GROSS HEMATURIA: ICD-10-CM

## 2023-02-09 PROCEDURE — 99204 OFFICE O/P NEW MOD 45 MIN: CPT | Mod: VID | Performed by: UROLOGY

## 2023-02-09 NOTE — LETTER
2/9/2023       RE: Nahun Villalobos  [address] [address]     Dear Colleague,    Thank you for referring your patient, Nahun Villalobos, to the [facility] at [facility]. Please see a copy of my visit note below.    Video-Visit Details    Type of service:  Video Visit    Video Start Time (time video started): 215 PM     Video End Time (time video stopped): 2:29 PM     Originating Location (pt. Location): Home        Distant Location (provider location):  On-site    Mode of Communication:  Video Conference via [facility]      Urology clinic   Hematuria               Chief Complaint:   Hematuria  History of TURP          Consult or Referral:     Nahun Villalobos is a 77 year old male seen in consultation from Dr. Márquez.         History of Present Illness:    Nahun Villalobos is a very pleasant 77 year old male who presents with a history of gross hematuria which started 2 weeks ago. It is intermittent and he does not report passing any blood clots or any episode of urinary retention or dysuria.     He was a former patient of Dr. Choi who performed TURP on him for an enlarged prostate in 2018 and for the 2 years that he followed up with her, he did not have any issues.     Of note, he was found recently to have a 5.5 cm mesenteric node and is scheduled for resection of the mesenteric node sometimes in late March.          Past Medical History:     Past Medical History:   Diagnosis Date     Antiplatelet or antithrombotic long-term use      Arthritis      CHF (congestive heart failure) (H) 09/16/2020     COPD (chronic obstructive pulmonary disease) (H)      DM (diabetes mellitus) (H)      Dyspnea on exertion      Essential hypertension, benign      Hemorrhage of rectum and anus      Non-small cell lung cancer (H)      Obesity      Personal history of colonic polyps      Sleep apnea      Thrombosis      Tobacco use  disorder      Urinary retention      Walking troubles             Past Surgical History:     Past Surgical History:   Procedure Laterality Date     ABDOMEN SURGERY  1995     APPENDECTOMY       BONE EXOSTOSIS EXCISION Bilateral 9/20/2022    Procedure: EXOSTECTOMIES BOTH 5TH DIGITS WITH SOFT TISSUE RELEASE;  Surgeon: Tong Gray DPM;  Location: [facility]     CHOLECYSTECTOMY       COLONOSCOPY  2014     CYSTOSCOPY, TRANSURETHRAL RESECTION (TUR) PROSTATE, COMBINED N/A 4/30/2018    Procedure: COMBINED CYSTOSCOPY, TRANSURETHRAL RESECTION (TUR) PROSTATE;  Cystoscopy, Transurethral Resection Of The Prostate;  Surgeon: Praveena Burris MD;  Location: [facility] OR     WEDGE RESECTION      lung     ZZC NONSPECIFIC PROCEDURE      cholecystectomy            Medications     Current Outpatient Medications   Medication     albuterol (VENTOLIN HFA) 108 (90 Base) MCG/ACT inhaler     apixaban ANTICOAGULANT (ELIQUIS ANTICOAGULANT) 5 MG tablet     Ascorbic Acid (VITAMIN C PO)     blood glucose (ACCU-CHEK CHACHA) test strip     blood glucose (NO BRAND SPECIFIED) lancets standard     blood glucose monitoring (NO BRAND SPECIFIED) meter device kit     cholestyramine light (QUESTRAN) 4 GM packet     docusate sodium (COLACE) 100 MG capsule     finasteride (PROSCAR) 5 MG tablet     Fluticasone-Umeclidin-Vilant (TRELEGY ELLIPTA) 100-62.5-25 MCG/ACT oral inhaler     furosemide (LASIX) 40 MG tablet     gabapentin (NEURONTIN) 300 MG capsule     lisinopril (ZESTRIL) 40 MG tablet     metFORMIN (GLUCOPHAGE) 500 MG tablet     metoprolol succinate ER (TOPROL XL) 25 MG 24 hr tablet     order for DME     order for DME     acetaminophen (TYLENOL) 500 MG tablet     atorvastatin (LIPITOR) 20 MG tablet     blood glucose (NO BRAND SPECIFIED) test strip     No current facility-administered medications for this visit.            Family History:     Family History   Problem Relation Age of Onset     Hypertension Mother      C.A.D. Mother          ANEURYSM     Cancer - colorectal Mother      Cancer Mother         OVARIAN     Other Cancer Mother      Hypertension Sister      Breast Cancer Sister      Cerebrovascular Disease Father      Hypertension Sister      Breast Cancer Sister      Glaucoma No family hx of      Macular Degeneration No family hx of             Social History:     Social History     Socioeconomic History     Marital status:      Spouse name: Not on file     Number of children: Not on file     Years of education: Not on file     Highest education level: Not on file   Occupational History     Not on file   Tobacco Use     Smoking status: Former     Packs/day: 2.00     Years: 52.00     Pack years: 104.00     Types: Cigarettes, Cigars     Start date: 1960     Quit date: 2013     Years since quittin.6     Smokeless tobacco: Never     Tobacco comments:     Quit, will never start again.   Vaping Use     Vaping Use: Never used   Substance and Sexual Activity     Alcohol use: No     Drug use: No     Sexual activity: Not Currently     Partners: Female     Birth control/protection: Spermicide, None   Other Topics Concern     Parent/sibling w/ CABG, MI or angioplasty before 65F 55M? No      Service Not Asked     Blood Transfusions Not Asked     Caffeine Concern No     Comment: 1-2 a day     Occupational Exposure Not Asked     Hobby Hazards Not Asked     Sleep Concern Not Asked     Stress Concern Not Asked     Weight Concern Not Asked     Special Diet No     Back Care Not Asked     Exercise No     Bike Helmet Not Asked     Seat Belt Yes     Self-Exams Not Asked   Social History Narrative     Not on file     Social Determinants of Health     Financial Resource Strain: Low Risk      Difficulty of Paying Living Expenses: Not hard at all   Food Insecurity: No Food Insecurity     Worried About Running Out of Food in the Last Year: Never true     Ran Out of Food in the Last Year: Never true   Transportation Needs: No Transportation  Needs     Lack of Transportation (Medical): No     Lack of Transportation (Non-Medical): No   Physical Activity: Not on file   Stress: Not on file   Social Connections: Not on file   Intimate Partner Violence: Not on file   Housing Stability: Not on file            Allergies:   No known drug allergies         Review of Systems:  From intake questionnaire     Negative 14 system review except as noted on HPI, nurse's note.         Physical Exam:     Vitals:  No vitals were obtained today due to virtual visit.    Physical Exam   GENERAL: Healthy, alert and no distress  EYES: Eyes grossly normal to inspection.  No discharge or erythema, or obvious scleral/conjunctival abnormalities.  RESP: No audible wheeze, cough, or visible cyanosis.  No visible retractions or increased work of breathing.    SKIN: Visible skin clear. No significant rash, abnormal pigmentation or lesions.  NEURO: Cranial nerves grossly intact.  Mentation and speech appropriate for age.  PSYCH: Mentation appears normal, affect normal/bright, judgement and insight intact, normal speech and appearance well-groomed.            Labs and Pathology:    I reviewed all applicable laboratory and pathology data and went over findings with patient  Significant for     Lab Results   Component Value Date    WBC 11.4 11/23/2022    WBC 7.4 05/27/2021     Lab Results   Component Value Date    RBC 3.63 11/23/2022    RBC 4.02 05/27/2021     Lab Results   Component Value Date    HGB 11.8 11/23/2022    HGB 13.2 05/27/2021     Lab Results   Component Value Date    HCT 36.9 11/23/2022    HCT 41.2 05/27/2021     No components found for: MCT  Lab Results   Component Value Date     11/23/2022     05/27/2021     Lab Results   Component Value Date    MCH 32.5 11/23/2022    MCH 32.8 05/27/2021     Lab Results   Component Value Date    MCHC 32.0 11/23/2022    MCHC 32.0 05/27/2021     Lab Results   Component Value Date    RDW 12.3 11/23/2022    RDW 12.0 05/27/2021      Lab Results   Component Value Date     11/23/2022     05/27/2021       Last Comprehensive Metabolic Panel:  Sodium   Date Value Ref Range Status   11/23/2022 140 136 - 145 mmol/L Final   03/17/2021 135 133 - 144 mmol/L Final     Potassium   Date Value Ref Range Status   11/23/2022 4.6 3.4 - 5.3 mmol/L Final   08/30/2022 4.0 3.4 - 5.3 mmol/L Final   03/17/2021 4.1 3.4 - 5.3 mmol/L Final     Chloride   Date Value Ref Range Status   11/23/2022 102 98 - 107 mmol/L Final   05/20/2022 102 94 - 109 mmol/L Final   03/17/2021 103 94 - 109 mmol/L Final     Carbon Dioxide   Date Value Ref Range Status   03/17/2021 27 20 - 32 mmol/L Final     Carbon Dioxide (CO2)   Date Value Ref Range Status   11/23/2022 26 22 - 29 mmol/L Final   05/20/2022 28 20 - 32 mmol/L Final     Anion Gap   Date Value Ref Range Status   11/23/2022 12 7 - 15 mmol/L Final   05/20/2022 10 3 - 14 mmol/L Final   03/17/2021 5 3 - 14 mmol/L Final     Glucose   Date Value Ref Range Status   11/23/2022 183 (H) 70 - 99 mg/dL Final   09/20/2022 139 (A) 70 - 99 mg/dL Final     Comment:     Lot [id number] Exp 2023-03-29     Urea Nitrogen   Date Value Ref Range Status   11/23/2022 13.5 8.0 - 23.0 mg/dL Final   05/20/2022 12 7 - 30 mg/dL Final   03/17/2021 19 7 - 30 mg/dL Final     Creatinine   Date Value Ref Range Status   11/23/2022 1.14 0.67 - 1.17 mg/dL Final   03/17/2021 1.11 0.66 - 1.25 mg/dL Final     Creatinine POCT   Date Value Ref Range Status   02/07/2023 1.1 0.7 - 1.3 mg/dL Final     GFR Estimate   Date Value Ref Range Status   03/17/2021 64 >60 mL/min/[1.73_m2] Final     Comment:     Non  GFR Calc  Starting 12/18/2018, serum creatinine based estimated GFR (eGFR) will be   calculated using the Chronic Kidney Disease Epidemiology Collaboration   (CKD-EPI) equation.       GFR, ESTIMATED POCT   Date Value Ref Range Status   02/07/2023 >60 >60 mL/min/1.73m2 Final     Calcium   Date Value Ref Range Status   11/23/2022 9.1 8.8 -  10.2 mg/dL Final   03/17/2021 8.9 8.5 - 10.1 mg/dL Final     Bilirubin Total   Date Value Ref Range Status   11/23/2022 0.2 <=1.2 mg/dL Final   03/17/2021 0.3 0.2 - 1.3 mg/dL Final     Alkaline Phosphatase   Date Value Ref Range Status   11/23/2022 71 40 - 129 U/L Final   03/17/2021 61 40 - 150 U/L Final     ALT   Date Value Ref Range Status   11/23/2022 13 10 - 50 U/L Final   03/17/2021 24 0 - 70 U/L Final     AST   Date Value Ref Range Status   11/23/2022 28 10 - 50 U/L Final   03/17/2021 21 0 - 45 U/L Final       INR/Prothrombin Time    Creatinine   Date Value Ref Range Status   11/23/2022 1.14 0.67 - 1.17 mg/dL Final   03/17/2021 1.11 0.66 - 1.25 mg/dL Final     Creatinine POCT   Date Value Ref Range Status   02/07/2023 1.1 0.7 - 1.3 mg/dL Final        PSA   Date Value Ref Range Status   10/27/2017 1.98 0 - 4 ug/L Final     Comment:     Assay Method:  Chemiluminescence using Siemens Vista analyzer     Prostate Specific Antigen Screen   Date Value Ref Range Status   04/04/2022 1.95 0.00 - 4.00 ug/L Final           Imaging:    I reviewed all applicable imaging and went over findings with patient.  Significant for mesenteric LAP and liver lesions concerning for metastasis with unclear primary source     CT Urogram wo & w Contrast    Result Date: 2/7/2023  CT UROGRAM WITHOUT AND WITH CONTRAST 2/7/2023 8:54 AM CLINICAL HISTORY: Gross hematuria. TECHNIQUE: CT scan of the abdomen and pelvis was performed before and following injection of IV contrast. Multiplanar reformats were obtained. Dose reduction techniques were used. CONTRAST: 109mL Isovue-370 COMPARISON: 9/20/2020 FINDINGS: LOWER CHEST: Normal. HEPATOBILIARY: Hepatic steatosis. New 2 cm hypoenhancing lesion in the posterior right hepatic lobe, series 5 image 21. Possible additional subcentimeter lesions in the inferior right hepatic lobe. PANCREAS: Normal. SPLEEN: Normal. ADRENAL GLANDS: Normal. KIDNEYS/BLADDER: Benign renal cysts bilaterally. No urinary tract  calculi. No hydronephrosis. Normal ureters and bladder. BOWEL: Sigmoid colonic diverticulosis without signs of diverticulitis. PELVIC ORGANS: The prostate gland is enlarged and protrudes into the base of the bladder, similar to previous. ADDITIONAL FINDINGS: Lobulated mass in the small bowel mesentery series 5 image 95 measures 4 cm in maximum diameter, not appreciably changed. A 2.8 cm ill-defined reshma mass at the root of the mesentery posterior to the SMA on image 65 has increased in size from previous 1.2 cm. MUSCULOSKELETAL: Normal.     IMPRESSION: 1.  No findings to explain the patient's hematuria. 2.  Increasing mesenteric lymphadenopathy and new liver lesions suspicious for metastases. 3.  No definite small bowel mass or obstruction identified. MEENA PRUITT MD   SYSTEM ID:  [id number]             Assessment and Plan:     Assessment     77 year old male with gross hematuria    The differential diagnosis for hematuria is broad, but the causes can be narrowed into distinct categories. The most important one to rule out would be malignancy including bladder, prostate and kidney malignancies. Other causes of hematuria include infection, stone, trauma, and medical renal disease.          Plan  Schedule for office cystoscopy    45 total minutes spent on the date of the encounter including direct interaction with the patient, performing chart review, documentation and further activities as noted above, exclusive of the time spent on performing cystoscopy.         Kaleigh Onofre MD   Department of Urology   [facility]

## 2023-02-09 NOTE — PROGRESS NOTES
Video-Visit Details    Type of service:  Video Visit    Video Start Time (time video started): 215 PM     Video End Time (time video stopped): 2:29 PM     Originating Location (pt. Location): Home        Distant Location (provider location):  On-site    Mode of Communication:  Video Conference via [facility]      Urology clinic   Hematuria               Chief Complaint:   Hematuria  History of TURP          Consult or Referral:     Nahun Villalobos is a 77 year old male seen in consultation from Dr. Márquez.         History of Present Illness:    Nahun Villalobos is a very pleasant 77 year old male who presents with a history of gross hematuria which started 2 weeks ago. It is intermittent and he does not report passing any blood clots or any episode of urinary retention or dysuria.     He was a former patient of Dr. Choi who performed TURP on him for an enlarged prostate in 2018 and for the 2 years that he followed up with her, he did not have any issues.     Of note, he was found recently to have a 5.5 cm mesenteric node and is scheduled for resection of the mesenteric node sometimes in late March.          Past Medical History:     Past Medical History:   Diagnosis Date     Antiplatelet or antithrombotic long-term use      Arthritis      CHF (congestive heart failure) (H) 09/16/2020     COPD (chronic obstructive pulmonary disease) (H)      DM (diabetes mellitus) (H)      Dyspnea on exertion      Essential hypertension, benign      Hemorrhage of rectum and anus      Non-small cell lung cancer (H)      Obesity      Personal history of colonic polyps      Sleep apnea      Thrombosis      Tobacco use disorder      Urinary retention      Walking troubles             Past Surgical History:     Past Surgical History:   Procedure Laterality Date     ABDOMEN SURGERY  1995     APPENDECTOMY       BONE EXOSTOSIS EXCISION Bilateral 9/20/2022    Procedure: EXOSTECTOMIES BOTH 5TH DIGITS WITH SOFT TISSUE RELEASE;   Surgeon: Tong Gray DPM;  Location: [facility]     CHOLECYSTECTOMY       COLONOSCOPY  2014     CYSTOSCOPY, TRANSURETHRAL RESECTION (TUR) PROSTATE, COMBINED N/A 4/30/2018    Procedure: COMBINED CYSTOSCOPY, TRANSURETHRAL RESECTION (TUR) PROSTATE;  Cystoscopy, Transurethral Resection Of The Prostate;  Surgeon: Praveena Burris MD;  Location: [facility]     WEDGE RESECTION      lung     ZZC NONSPECIFIC PROCEDURE      cholecystectomy            Medications     Current Outpatient Medications   Medication     albuterol (VENTOLIN HFA) 108 (90 Base) MCG/ACT inhaler     apixaban ANTICOAGULANT (ELIQUIS ANTICOAGULANT) 5 MG tablet     Ascorbic Acid (VITAMIN C PO)     blood glucose (ACCU-CHEK CHACHA) test strip     blood glucose (NO BRAND SPECIFIED) lancets standard     blood glucose monitoring (NO BRAND SPECIFIED) meter device kit     cholestyramine light (QUESTRAN) 4 GM packet     docusate sodium (COLACE) 100 MG capsule     finasteride (PROSCAR) 5 MG tablet     Fluticasone-Umeclidin-Vilant (TRELEGY ELLIPTA) 100-62.5-25 MCG/ACT oral inhaler     furosemide (LASIX) 40 MG tablet     gabapentin (NEURONTIN) 300 MG capsule     lisinopril (ZESTRIL) 40 MG tablet     metFORMIN (GLUCOPHAGE) 500 MG tablet     metoprolol succinate ER (TOPROL XL) 25 MG 24 hr tablet     order for DME     order for DME     acetaminophen (TYLENOL) 500 MG tablet     atorvastatin (LIPITOR) 20 MG tablet     blood glucose (NO BRAND SPECIFIED) test strip     No current facility-administered medications for this visit.            Family History:     Family History   Problem Relation Age of Onset     Hypertension Mother      C.A.D. Mother         ANEURYSM     Cancer - colorectal Mother      Cancer Mother         OVARIAN     Other Cancer Mother      Hypertension Sister      Breast Cancer Sister      Cerebrovascular Disease Father      Hypertension Sister      Breast Cancer Sister      Glaucoma No family hx of      Macular Degeneration No family hx of              Social History:     Social History     Socioeconomic History     Marital status:      Spouse name: Not on file     Number of children: Not on file     Years of education: Not on file     Highest education level: Not on file   Occupational History     Not on file   Tobacco Use     Smoking status: Former     Packs/day: 2.00     Years: 52.00     Pack years: 104.00     Types: Cigarettes, Cigars     Start date: 1960     Quit date: 2013     Years since quittin.6     Smokeless tobacco: Never     Tobacco comments:     Quit, will never start again.   Vaping Use     Vaping Use: Never used   Substance and Sexual Activity     Alcohol use: No     Drug use: No     Sexual activity: Not Currently     Partners: Female     Birth control/protection: Spermicide, None   Other Topics Concern     Parent/sibling w/ CABG, MI or angioplasty before 65F 55M? No      Service Not Asked     Blood Transfusions Not Asked     Caffeine Concern No     Comment: 1-2 a day     Occupational Exposure Not Asked     Hobby Hazards Not Asked     Sleep Concern Not Asked     Stress Concern Not Asked     Weight Concern Not Asked     Special Diet No     Back Care Not Asked     Exercise No     Bike Helmet Not Asked     Seat Belt Yes     Self-Exams Not Asked   Social History Narrative     Not on file     Social Determinants of Health     Financial Resource Strain: Low Risk      Difficulty of Paying Living Expenses: Not hard at all   Food Insecurity: No Food Insecurity     Worried About Running Out of Food in the Last Year: Never true     Ran Out of Food in the Last Year: Never true   Transportation Needs: No Transportation Needs     Lack of Transportation (Medical): No     Lack of Transportation (Non-Medical): No   Physical Activity: Not on file   Stress: Not on file   Social Connections: Not on file   Intimate Partner Violence: Not on file   Housing Stability: Not on file            Allergies:   No known drug allergies          Review of Systems:  From intake questionnaire     Negative 14 system review except as noted on HPI, nurse's note.         Physical Exam:     Vitals:  No vitals were obtained today due to virtual visit.    Physical Exam   GENERAL: Healthy, alert and no distress  EYES: Eyes grossly normal to inspection.  No discharge or erythema, or obvious scleral/conjunctival abnormalities.  RESP: No audible wheeze, cough, or visible cyanosis.  No visible retractions or increased work of breathing.    SKIN: Visible skin clear. No significant rash, abnormal pigmentation or lesions.  NEURO: Cranial nerves grossly intact.  Mentation and speech appropriate for age.  PSYCH: Mentation appears normal, affect normal/bright, judgement and insight intact, normal speech and appearance well-groomed.            Labs and Pathology:    I reviewed all applicable laboratory and pathology data and went over findings with patient  Significant for     Lab Results   Component Value Date    WBC 11.4 11/23/2022    WBC 7.4 05/27/2021     Lab Results   Component Value Date    RBC 3.63 11/23/2022    RBC 4.02 05/27/2021     Lab Results   Component Value Date    HGB 11.8 11/23/2022    HGB 13.2 05/27/2021     Lab Results   Component Value Date    HCT 36.9 11/23/2022    HCT 41.2 05/27/2021     No components found for: MCT  Lab Results   Component Value Date     11/23/2022     05/27/2021     Lab Results   Component Value Date    MCH 32.5 11/23/2022    MCH 32.8 05/27/2021     Lab Results   Component Value Date    MCHC 32.0 11/23/2022    MCHC 32.0 05/27/2021     Lab Results   Component Value Date    RDW 12.3 11/23/2022    RDW 12.0 05/27/2021     Lab Results   Component Value Date     11/23/2022     05/27/2021       Last Comprehensive Metabolic Panel:  Sodium   Date Value Ref Range Status   11/23/2022 140 136 - 145 mmol/L Final   03/17/2021 135 133 - 144 mmol/L Final     Potassium   Date Value Ref Range Status   11/23/2022 4.6 3.4 - 5.3  mmol/L Final   08/30/2022 4.0 3.4 - 5.3 mmol/L Final   03/17/2021 4.1 3.4 - 5.3 mmol/L Final     Chloride   Date Value Ref Range Status   11/23/2022 102 98 - 107 mmol/L Final   05/20/2022 102 94 - 109 mmol/L Final   03/17/2021 103 94 - 109 mmol/L Final     Carbon Dioxide   Date Value Ref Range Status   03/17/2021 27 20 - 32 mmol/L Final     Carbon Dioxide (CO2)   Date Value Ref Range Status   11/23/2022 26 22 - 29 mmol/L Final   05/20/2022 28 20 - 32 mmol/L Final     Anion Gap   Date Value Ref Range Status   11/23/2022 12 7 - 15 mmol/L Final   05/20/2022 10 3 - 14 mmol/L Final   03/17/2021 5 3 - 14 mmol/L Final     Glucose   Date Value Ref Range Status   11/23/2022 183 (H) 70 - 99 mg/dL Final   09/20/2022 139 (A) 70 - 99 mg/dL Final     Comment:     Lot [id number] Exp 2023-03-29     Urea Nitrogen   Date Value Ref Range Status   11/23/2022 13.5 8.0 - 23.0 mg/dL Final   05/20/2022 12 7 - 30 mg/dL Final   03/17/2021 19 7 - 30 mg/dL Final     Creatinine   Date Value Ref Range Status   11/23/2022 1.14 0.67 - 1.17 mg/dL Final   03/17/2021 1.11 0.66 - 1.25 mg/dL Final     Creatinine POCT   Date Value Ref Range Status   02/07/2023 1.1 0.7 - 1.3 mg/dL Final     GFR Estimate   Date Value Ref Range Status   03/17/2021 64 >60 mL/min/[1.73_m2] Final     Comment:     Non  GFR Calc  Starting 12/18/2018, serum creatinine based estimated GFR (eGFR) will be   calculated using the Chronic Kidney Disease Epidemiology Collaboration   (CKD-EPI) equation.       GFR, ESTIMATED POCT   Date Value Ref Range Status   02/07/2023 >60 >60 mL/min/1.73m2 Final     Calcium   Date Value Ref Range Status   11/23/2022 9.1 8.8 - 10.2 mg/dL Final   03/17/2021 8.9 8.5 - 10.1 mg/dL Final     Bilirubin Total   Date Value Ref Range Status   11/23/2022 0.2 <=1.2 mg/dL Final   03/17/2021 0.3 0.2 - 1.3 mg/dL Final     Alkaline Phosphatase   Date Value Ref Range Status   11/23/2022 71 40 - 129 U/L Final   03/17/2021 61 40 - 150 U/L Final     ALT    Date Value Ref Range Status   11/23/2022 13 10 - 50 U/L Final   03/17/2021 24 0 - 70 U/L Final     AST   Date Value Ref Range Status   11/23/2022 28 10 - 50 U/L Final   03/17/2021 21 0 - 45 U/L Final       INR/Prothrombin Time    Creatinine   Date Value Ref Range Status   11/23/2022 1.14 0.67 - 1.17 mg/dL Final   03/17/2021 1.11 0.66 - 1.25 mg/dL Final     Creatinine POCT   Date Value Ref Range Status   02/07/2023 1.1 0.7 - 1.3 mg/dL Final        PSA   Date Value Ref Range Status   10/27/2017 1.98 0 - 4 ug/L Final     Comment:     Assay Method:  Chemiluminescence using Siemens Vista analyzer     Prostate Specific Antigen Screen   Date Value Ref Range Status   04/04/2022 1.95 0.00 - 4.00 ug/L Final           Imaging:    I reviewed all applicable imaging and went over findings with patient.  Significant for mesenteric LAP and liver lesions concerning for metastasis with unclear primary source     CT Urogram wo & w Contrast    Result Date: 2/7/2023  CT UROGRAM WITHOUT AND WITH CONTRAST 2/7/2023 8:54 AM CLINICAL HISTORY: Gross hematuria. TECHNIQUE: CT scan of the abdomen and pelvis was performed before and following injection of IV contrast. Multiplanar reformats were obtained. Dose reduction techniques were used. CONTRAST: 109mL Isovue-370 COMPARISON: 9/20/2020 FINDINGS: LOWER CHEST: Normal. HEPATOBILIARY: Hepatic steatosis. New 2 cm hypoenhancing lesion in the posterior right hepatic lobe, series 5 image 21. Possible additional subcentimeter lesions in the inferior right hepatic lobe. PANCREAS: Normal. SPLEEN: Normal. ADRENAL GLANDS: Normal. KIDNEYS/BLADDER: Benign renal cysts bilaterally. No urinary tract calculi. No hydronephrosis. Normal ureters and bladder. BOWEL: Sigmoid colonic diverticulosis without signs of diverticulitis. PELVIC ORGANS: The prostate gland is enlarged and protrudes into the base of the bladder, similar to previous. ADDITIONAL FINDINGS: Lobulated mass in the small bowel mesentery series 5  image 95 measures 4 cm in maximum diameter, not appreciably changed. A 2.8 cm ill-defined reshma mass at the root of the mesentery posterior to the SMA on image 65 has increased in size from previous 1.2 cm. MUSCULOSKELETAL: Normal.     IMPRESSION: 1.  No findings to explain the patient's hematuria. 2.  Increasing mesenteric lymphadenopathy and new liver lesions suspicious for metastases. 3.  No definite small bowel mass or obstruction identified. MEENA PRUITT MD   SYSTEM ID:  [id number]             Assessment and Plan:     Assessment     77 year old male with gross hematuria    The differential diagnosis for hematuria is broad, but the causes can be narrowed into distinct categories. The most important one to rule out would be malignancy including bladder, prostate and kidney malignancies. Other causes of hematuria include infection, stone, trauma, and medical renal disease.          Plan  Schedule for office cystoscopy    45 total minutes spent on the date of the encounter including direct interaction with the patient, performing chart review, documentation and further activities as noted above, exclusive of the time spent on performing cystoscopy.         Kaleigh Onofre MD   Department of Urology   [facility]

## 2023-02-09 NOTE — NURSING NOTE
Is the patient currently in the state of [place]? YES    Visit mode:VIDEO    If the visit is dropped, the patient can be reconnected by: VIDEO VISIT: Text to cell phone: [phone]    Will anyone else be joining the visit? NO      How would you like to obtain your AVS? MyChart    Are changes needed to the allergy or medication list? NO    Comments or concerns regarding today's visit:

## 2023-02-10 ENCOUNTER — PATIENT OUTREACH (OUTPATIENT)
Dept: CARE COORDINATION | Facility: CLINIC | Age: 78
End: 2023-02-10
Payer: COMMERCIAL

## 2023-02-10 ASSESSMENT — ACTIVITIES OF DAILY LIVING (ADL): DEPENDENT_IADLS:: CLEANING;COOKING;LAUNDRY;SHOPPING;MEAL PREPARATION

## 2023-02-10 NOTE — PROGRESS NOTES
Clinic Care Coordination Contact  Care Coordination Clinician Chart Review    Situation: Patient chart reviewed by Care Coordinator.       Background: Care Coordination Program started: 9/24/2020. Initial assessment completed and patient-centered care plan(s) were developed with participation from patient. Lead CC handed patient off to CHW for continued outreaches.       Assessment: Per chart review, patient outreach completed by CC CHW on 1/13/2023.  Patient is actively working to accomplish goal(s). Patient's goal(s) appropriate and relevant at this time. Patient is not due for updated Plan of Care.  Assessments will be completed annually or as needed/with change of patient status.      Care Plan: 1. Improve chronic symptoms     Problem: Lifestyle choices     Goal: I want to improve management of my leg, knee, and hip pain and swelling.     Start Date: 6/3/2021 Expected End Date: 3/30/2023    This Visit's Progress: 70% Recent Progress: 70%    Note:     Barriers: Lymphedema  Strengths: Motivated to improve ability to walk  Patient expressed understanding of goal: Yes  Action steps to achieve this goal:  1. I will apply Jacinto hose to wear throughout the day and remove at night  2. I will walk my dog daily to help with strengthening and endurance  3. I will elevate my legs while sitting and laying down by propping them up with a pillow  4. I will discuss, review, schedule and complete recommended overdue health maintenance with my primary care provider  5. I will I will take my medications as prescribed  6. I will contact my care team with questions, concerns, support needs. I will use the clinic as a resource   7. I will contact my clinic with 24/7 after hours services available                    Care Plan: 2. Improve chronic symptoms     Problem: I would like assistance and support to afford my FreeStyle Mikel sensors to monitor my blood sugar     Priority: Medium    Note:     Goal Statement: I would like assistance  and support to afford my FreeStyle Mikel sensors to monitor my blood sugar.  Date Goal set: 6/27/2022  Barriers: Unable to afford sensors for continuous glucose monitor  Strengths: Advocate for self  Date to Achieve By: 12/27/2022  Patient expressed understanding of goal: Yes  Action steps to achieve this goal:  1. I will follow up with my providers as scheduled/recommended    2. I will take my medications as prescribed  3. I will follow up with my Diabetic Educator as scheduled/recommended  4. I will contact my care team with questions, concerns, support needs   5. I will use the clinic as a resource, and I understand I can contact my clinic with 24/7 after hours services available   6.  Care Coordinator will remain available as needed      Goal: Improve chronic symptoms     Start Date: 6/27/2022 Expected End Date: 3/30/2023    This Visit's Progress: 60% Recent Progress: 60%    Note:     Barriers: Unable to afford sensors for continuous glucose monitor  Strengths: Advocate for self  Patient expressed understanding of goal: Yes  Action steps to achieve this goal:  1. I will follow up with my providers as scheduled/recommended    2. I will take my medications as prescribed  3. I will follow up with my Diabetic Educator as scheduled/recommended  4. I will contact my care team with questions, concerns, support needs   5. I will use the clinic as a resource, and I understand I can contact my clinic with 24/7 after hours services available                         Plan/Recommendations: The patient will continue working with Care Coordination to achieve goal(s) as above. CHW will continue outreaches at minimum every 30 days and will involve Lead CC as needed or if patient is ready to move to Maintenance. Lead CC will continue to monitor CHW outreaches and patient's progress to goal(s) every 6 weeks.     Plan of Care updated and sent to patient: No     Rhea Knapp RN, BSN, PHN  Primary Care / Care Coordinator   [facility]  [facility]  E-mail [email or web address]   [phone]

## 2023-02-24 ENCOUNTER — NURSE TRIAGE (OUTPATIENT)
Dept: CARE COORDINATION | Facility: CLINIC | Age: 78
End: 2023-02-24
Payer: COMMERCIAL

## 2023-02-24 ENCOUNTER — PATIENT OUTREACH (OUTPATIENT)
Dept: CARE COORDINATION | Facility: CLINIC | Age: 78
End: 2023-02-24
Payer: COMMERCIAL

## 2023-02-24 NOTE — TELEPHONE ENCOUNTER
Patient Contact    Attempt # 1    Was call answered?  No.  Left message on voicemail with information to call me back.    Upon call back: please triage the patient's symptoms.    Leslie Summers RN

## 2023-02-24 NOTE — TELEPHONE ENCOUNTER
Clinic Care Coordination Contact      Patient stated he has burning pain on his right side from his hip to his knee.    Patient stated he is experiencing dizziness / less stable walking around his home.    Please call patient to discuss.    Thank you.     Kelley Slaughter JUDITH  Clinic Care Coordination  [facility] Clinics: [place] [place], [place], [place], and [facility]  Phone: [phone]

## 2023-02-27 NOTE — PROGRESS NOTES
Clinic Care Coordination Contact    Community Health Worker Follow Up    Care Gaps:     Health Maintenance Due   Topic Date Due     HF ACTION PLAN  Never done     DIABETIC FOOT EXAM  11/01/2019     EYE EXAM  04/01/2022     LIPID  02/10/2023     TSH W/FREE T4 REFLEX  02/10/2023     A1C  02/28/2023       Care Plan:   Care Plan: 1. Improve chronic symptoms     Problem: Lifestyle choices     Goal: I want to improve management of my leg, knee, and hip pain and swelling.     Start Date: 6/3/2021 Expected End Date: 3/30/2023    This Visit's Progress: 70% Recent Progress: 70%    Note:     Barriers: Lymphedema  Strengths: Motivated to improve ability to walk  Patient expressed understanding of goal: Yes  Action steps to achieve this goal:  1. I will apply Jacinto hose to wear throughout the day and remove at night  2. I will walk my dog daily to help with strengthening and endurance  3. I will elevate my legs while sitting and laying down by propping them up with a pillow  4. I will discuss, review, schedule and complete recommended overdue health maintenance with my primary care provider  5. I will I will take my medications as prescribed  6. I will contact my care team with questions, concerns, support needs. I will use the clinic as a resource   7. I will contact my clinic with 24/7 after hours services available                    Care Plan: 2. Improve chronic symptoms     Problem: I would like assistance and support to afford my FreeStyle Mikel sensors to monitor my blood sugar     Priority: Medium    Note:     Goal Statement: I would like assistance and support to afford my FreeStyle Mikel sensors to monitor my blood sugar.  Date Goal set: 6/27/2022  Barriers: Unable to afford sensors for continuous glucose monitor  Strengths: Advocate for self  Date to Achieve By: 12/27/2022  Patient expressed understanding of goal: Yes  Action steps to achieve this goal:  1. I will follow up with my providers as scheduled/recommended   "  2. I will take my medications as prescribed  3. I will follow up with my Diabetic Educator as scheduled/recommended  4. I will contact my care team with questions, concerns, support needs   5. I will use the clinic as a resource, and I understand I can contact my clinic with 24/7 after hours services available   6.  Care Coordinator will remain available as needed      Goal: Improve chronic symptoms     Start Date: 6/27/2022 Expected End Date: 3/30/2023    This Visit's Progress: 60% Recent Progress: 60%    Note:     Barriers: Unable to afford sensors for continuous glucose monitor  Strengths: Advocate for self  Patient expressed understanding of goal: Yes  Action steps to achieve this goal:  1. I will follow up with my providers as scheduled/recommended    2. I will take my medications as prescribed  3. I will follow up with my Diabetic Educator as scheduled/recommended  4. I will contact my care team with questions, concerns, support needs   5. I will use the clinic as a resource, and I understand I can contact my clinic with 24/7 after hours services available                     Discussed:    Patient stated he has a \"burning\" sensation on his right side between his hip to his knee.    Patient stated he has been experiencing some dizziness / feeling less stable walking around the house.    Patient stated he is having exploratory surgery next month.    Patient stated he had a wonderful time in [place] last month.    Patient stated he is planning on going to [place] in May.  His grandson is graduating.      Intervention and Education during outreach:   CHW encouraged patient to contact CC if there are any other changes or resources needed.  Patient acknowledged understanding.        CHW Plan: will route a message to [facility].  CHW will outreach in one month.    OMAR Kim  Clinic Care Coordination  [facility] Clinics: [facility] [place], [place], [facility], and [facility]  Phone: [phone]  "

## 2023-02-28 NOTE — TELEPHONE ENCOUNTER
Could  see patient at same-day appointment or next day appointment but if this is a primary orthopedic issue patient really should follow up with them as previously ordered

## 2023-02-28 NOTE — TELEPHONE ENCOUNTER
Spoke to patient to relay providers recommendations. Patient agrees to follow-up with Orthopedics and will call clinic if anything changes.

## 2023-02-28 NOTE — TELEPHONE ENCOUNTER
Spoke to patient regarding pain in R outer thigh.     Patient reports burning sensation started 1.5 months ago. He has discussed this with PCP in the past and was referred to Orthopedics. 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 Burning pain is irritating, extends from below hip down to just above knee.  Patient denies swelling. Site is itchy and sometimes numb feeling. The burning pain sometimes wakes him up at night.      Dispo: See in office within 3 days  No appointments with PCP available. Patient prefers to see PCP only, and only if pcp thinks he should be seen.     Routing to PCP                   Reason for Disposition    MODERATE pain (e.g., interferes with normal activities, limping) and present > 3 days    Additional Information    Negative: Looks like a broken bone or dislocated joint (e.g., crooked or deformed)    Negative: Sounds like a life-threatening emergency to the triager    Negative: Followed a hip injury    Negative: Followed a knee injury    Negative: Followed an ankle or foot injury    Negative: Back pain radiating (shooting) into leg(s)    Negative: Foot pain is main symptom    Negative: Ankle pain is main symptom    Negative: Knee pain is main symptom    Negative: Leg swelling is main symptom    Negative: Chest pain    Negative: Difficulty breathing    Negative: Entire foot is cool or blue in comparison to other side    Negative: Unable to walk    Negative: Fever and red area (or area very tender to touch)    Negative: Swollen joint and fever    Negative: Thigh or calf pain in only one leg and present > 1 hour    Negative: Thigh, calf, or ankle swelling in only one leg    Negative: Thigh, calf, or ankle swelling in both legs, but one side is definitely more swollen    Negative: History of prior 'blood clot' in leg or lungs (i.e., deep vein thrombosis, pulmonary embolism)    Negative: History of inherited increased risk of blood clots  "(e.g., factor 5 Leiden, antithrombin 3, protein C or protein S deficiency, prothrombin mutation)    Negative: Major surgery in past month    Negative: Hip or leg fracture (broken bone) in past month (or had cast on leg or ankle in past month)    Negative: Illness requiring prolonged bedrest in past month (e.g., immobilization, long hospital stay)    Negative: Long-distance travel in past month (e.g., car, bus, train, plane; with trip lasting 6 or more hours)    Negative: Cancer treatment in past six months (or has cancer now)    Negative: Patient sounds very sick or weak to the triager    Negative: SEVERE pain (e.g., excruciating, unable to do any normal activities)    Negative: Cast on leg or ankle and now has increasing pain    Negative: Red area or streak > 2 inches (or 5 cm)    Negative: Painful rash with multiple small blisters grouped together (i.e., dermatomal distribution or 'band' or 'stripe')    Negative: Looks like a boil, infected sore, deep ulcer, or other infected rash (spreading redness, pus)    Negative: Localized rash is very painful (no fever)    Negative: Numbness in a leg or foot (i.e., loss of sensation)    Negative: Localized pain, redness or hard lump along vein    Negative: Patient wants to be seen    Answer Assessment - Initial Assessment Questions  1. ONSET: \"When did the pain start?\"       1.5 month ago  2. LOCATION: \"Where is the pain located?\"       R outer thigh  3. PAIN: \"How bad is the pain?\"    (Scale 1-10; or mild, moderate, severe)    -  SEVERE (8-10): excruciating pain, unable to do any normal activities, unable to walk      9/10 when painful. Not painful when walking or driving car. Bothersome when sitting down or waking up. Wakes up at night sometimes.   4. WORK OR EXERCISE: \"Has there been any recent work or exercise that involved this part of the body?\"       No  5. CAUSE: \"What do you think is causing the leg pain?\"      Unknown  6. OTHER SYMPTOMS: \"Do you have any other " "symptoms?\" (e.g., chest pain, back pain, breathing difficulty, swelling, rash, fever, numbness, weakness)      No  7. PREGNANCY: \"Is there any chance you are pregnant?\" \"When was your last menstrual period?\"      N/A    Protocols used: LEG PAIN-A-OH      "

## 2023-03-09 ENCOUNTER — OFFICE VISIT (OUTPATIENT)
Dept: ONCOLOGY | Facility: CLINIC | Age: 78
End: 2023-03-09
Attending: UROLOGY
Payer: COMMERCIAL

## 2023-03-09 VITALS
DIASTOLIC BLOOD PRESSURE: 68 MMHG | TEMPERATURE: 97.5 F | BODY MASS INDEX: 34.53 KG/M2 | RESPIRATION RATE: 16 BRPM | HEIGHT: 67 IN | OXYGEN SATURATION: 98 % | WEIGHT: 220 LBS | SYSTOLIC BLOOD PRESSURE: 116 MMHG | HEART RATE: 61 BPM

## 2023-03-09 DIAGNOSIS — R31.0 GROSS HEMATURIA: Primary | ICD-10-CM

## 2023-03-09 PROCEDURE — 52000 CYSTOURETHROSCOPY: CPT | Performed by: UROLOGY

## 2023-03-09 PROCEDURE — 88112 CYTOPATH CELL ENHANCE TECH: CPT | Mod: TC | Performed by: UROLOGY

## 2023-03-09 PROCEDURE — 250N000013 HC RX MED GY IP 250 OP 250 PS 637: Performed by: UROLOGY

## 2023-03-09 RX ORDER — CIPROFLOXACIN 500 MG/1
500 TABLET, FILM COATED ORAL ONCE
Status: COMPLETED | OUTPATIENT
Start: 2023-03-09 | End: 2023-03-09

## 2023-03-09 RX ADMIN — CIPROFLOXACIN 500 MG: 500 TABLET ORAL at 12:07

## 2023-03-09 ASSESSMENT — PAIN SCALES - GENERAL: PAINLEVEL: NO PAIN (0)

## 2023-03-09 NOTE — NURSING NOTE
"Oncology Rooming Note    March 9, 2023 11:10 AM   Nahun ARCHER Wilfredo is a 77 year old male who presents for:    Chief Complaint   Patient presents with     Oncology Clinic Visit     cysto     Initial Vitals: /68 (Cuff Size: Adult Large)   Pulse 61   Temp 97.5  F (36.4  C) (Tympanic)   Resp 16   Ht 1.702 m (5' 7\")   Wt 99.8 kg (220 lb)   SpO2 98%   BMI 34.46 kg/m   Estimated body mass index is 34.46 kg/m  as calculated from the following:    Height as of this encounter: 1.702 m (5' 7\").    Weight as of this encounter: 99.8 kg (220 lb). Body surface area is 2.17 meters squared.  No Pain (0) Comment: Data Unavailable   No LMP for male patient.  Allergies reviewed: Yes  Medications reviewed: Yes    Medications: Medication refills not needed today.  Pharmacy name entered into [employer]:    [id number] - [place], [place] - [address] [facility] - [facility], [place] - [address] [place] [address]    Clinical concerns: KOKO Flaherty, MICHELLE              "

## 2023-03-09 NOTE — LETTER
3/9/2023         RE: Nahun Villalobos  [address] [facility] [address]        Dear Colleague,    Thank you for referring your patient, Nahun Villalobos, to the [facility]. Please see a copy of my visit note below.    Cystoscopy procedure     Pre procedural diagnosis Gross hematuria    Post procedural diagnosis Same     Indication 77 year old male with history of BPH status post TURP now with gross hematuria and unremarkable CT urogram       Procedure:      After sterile preparation and draping of the patient,  a 16-Sinhala flexible cystoscope was introduced via the urethra.  It was passed without difficulty into the bladder.  He has an enlarged prostatic lobes and a sizeable median lobe. The urethra was open without evidence of stricture.  The ureteral orifices were orthotopic.  The bladder mucosa was evaluated using both antegrade and retroflex views and this showed no evidence of any lesions or trabeculation. 60 ml syringe was used to obtain urine for  cytology. Scope was then removed and patient tolerated the procedure well.       Plan   Return to clinic with worsening hematuria causing clot retention       Kaleigh Onofre MD   Department of Urology   [facility]            Again, thank you for allowing me to participate in the care of your patient.        Sincerely,        Kaleigh Onofre MD

## 2023-03-09 NOTE — PROGRESS NOTES
Cystoscopy procedure     Pre procedural diagnosis Gross hematuria    Post procedural diagnosis Same     Indication 77 year old male with history of BPH status post TURP now with gross hematuria and unremarkable CT urogram       Procedure:      After sterile preparation and draping of the patient,  a 16-Estonian flexible cystoscope was introduced via the urethra.  It was passed without difficulty into the bladder.  He has an enlarged prostatic lobes and a sizeable median lobe. The urethra was open without evidence of stricture.  The ureteral orifices were orthotopic.  The bladder mucosa was evaluated using both antegrade and retroflex views and this showed no evidence of any lesions or trabeculation. 60 ml syringe was used to obtain urine for  cytology. Scope was then removed and patient tolerated the procedure well.       Plan   Return to clinic with worsening hematuria causing clot retention       Kaleigh Onofre MD   Department of Urology   [facility]

## 2023-03-12 LAB
PATH REPORT.COMMENTS IMP SPEC: NORMAL
PATH REPORT.FINAL DX SPEC: NORMAL
PATH REPORT.GROSS SPEC: NORMAL
PATH REPORT.RELEVANT HX SPEC: NORMAL

## 2023-03-12 PROCEDURE — 88112 CYTOPATH CELL ENHANCE TECH: CPT | Mod: 26 | Performed by: PATHOLOGY

## 2023-03-14 ENCOUNTER — TELEPHONE (OUTPATIENT)
Dept: INTERNAL MEDICINE | Facility: CLINIC | Age: 78
End: 2023-03-14
Payer: COMMERCIAL

## 2023-03-14 NOTE — TELEPHONE ENCOUNTER
I would assume patient is having a preoperative assessment for his upcoming procedure at some point.  Usually the anticoagulation is discontinued a couple days prior to his surgical procedure at minimum.

## 2023-03-14 NOTE — TELEPHONE ENCOUNTER
Pt called inWanting to know does he need to stop thisapixaban ANTICOAGULANT (ELIQUIS ANTICOAGULANT) 5 MG tablet    before surgery and how many days before surgery?    Surgery is on 03/24 please advise          Lucio

## 2023-03-15 NOTE — TELEPHONE ENCOUNTER
Would suggest that the patient probably hold his anticoagulation 2 days prior to his surgical procedure.

## 2023-03-15 NOTE — TELEPHONE ENCOUNTER
Upon chart review, patient is scheduled for a pre-op appointment 3/22/23.    Appointments in Next Year    Mar 22, 2023 10:30 AM  (Arrive by 10:10 AM)  Provider Visit with Olegario Castillo MD  [facility] ([facility] ) [phone]     Should patient discontinue Eliquis prior to this appointment?    Leslie Summers RN

## 2023-03-15 NOTE — TELEPHONE ENCOUNTER
Called and spoke to the patient. Relayed message from the provider. Patient expressed understanding and had no additional questions at this time.    Leslie Summers RN

## 2023-03-20 NOTE — TELEPHONE ENCOUNTER
Kenia (No C2C), calling to confirm PCP's eliquis hold orders for patient. Patient gives verbal consent for this encounter.     Relayed providers recommendations to hold Eliquis 2 days prior to 3/24/23 procedure. Kenia verbalized understanding and has no further questions.

## 2023-03-21 ENCOUNTER — OFFICE VISIT (OUTPATIENT)
Dept: INTERNAL MEDICINE | Facility: CLINIC | Age: 78
End: 2023-03-21
Payer: COMMERCIAL

## 2023-03-21 VITALS
BODY MASS INDEX: 34.95 KG/M2 | HEIGHT: 67 IN | HEART RATE: 76 BPM | TEMPERATURE: 97.9 F | DIASTOLIC BLOOD PRESSURE: 72 MMHG | WEIGHT: 222.7 LBS | OXYGEN SATURATION: 96 % | SYSTOLIC BLOOD PRESSURE: 118 MMHG | RESPIRATION RATE: 18 BRPM

## 2023-03-21 DIAGNOSIS — F17.200 TOBACCO USE DISORDER: ICD-10-CM

## 2023-03-21 DIAGNOSIS — E11.9 TYPE 2 DIABETES MELLITUS WITHOUT COMPLICATION, WITHOUT LONG-TERM CURRENT USE OF INSULIN (H): ICD-10-CM

## 2023-03-21 DIAGNOSIS — K63.89 MESENTERIC MASS: ICD-10-CM

## 2023-03-21 DIAGNOSIS — I10 ESSENTIAL HYPERTENSION, BENIGN: ICD-10-CM

## 2023-03-21 DIAGNOSIS — E78.5 HYPERLIPIDEMIA LDL GOAL <100: ICD-10-CM

## 2023-03-21 DIAGNOSIS — I26.99 OTHER PULMONARY EMBOLISM WITHOUT ACUTE COR PULMONALE, UNSPECIFIED CHRONICITY (H): ICD-10-CM

## 2023-03-21 DIAGNOSIS — Z01.818 PREOP GENERAL PHYSICAL EXAM: Primary | ICD-10-CM

## 2023-03-21 LAB
ANION GAP SERPL CALCULATED.3IONS-SCNC: 14 MMOL/L (ref 7–15)
BUN SERPL-MCNC: 15.5 MG/DL (ref 8–23)
CALCIUM SERPL-MCNC: 9.5 MG/DL (ref 8.8–10.2)
CHLORIDE SERPL-SCNC: 102 MMOL/L (ref 98–107)
CREAT SERPL-MCNC: 1.19 MG/DL (ref 0.67–1.17)
DEPRECATED HCO3 PLAS-SCNC: 19 MMOL/L (ref 22–29)
ERYTHROCYTE [DISTWIDTH] IN BLOOD BY AUTOMATED COUNT: 12.7 % (ref 10–15)
GFR SERPL CREATININE-BSD FRML MDRD: 63 ML/MIN/1.73M2
GLUCOSE SERPL-MCNC: 111 MG/DL (ref 70–99)
HBA1C MFR BLD: 6.2 % (ref 0–5.6)
HCT VFR BLD AUTO: 37.5 % (ref 40–53)
HGB BLD-MCNC: 11.9 G/DL (ref 13.3–17.7)
MCH RBC QN AUTO: 31.9 PG (ref 26.5–33)
MCHC RBC AUTO-ENTMCNC: 31.7 G/DL (ref 31.5–36.5)
MCV RBC AUTO: 101 FL (ref 78–100)
PLATELET # BLD AUTO: 144 10E3/UL (ref 150–450)
POTASSIUM SERPL-SCNC: 5 MMOL/L (ref 3.4–5.3)
RBC # BLD AUTO: 3.73 10E6/UL (ref 4.4–5.9)
SODIUM SERPL-SCNC: 135 MMOL/L (ref 136–145)
WBC # BLD AUTO: 9 10E3/UL (ref 4–11)

## 2023-03-21 PROCEDURE — 80048 BASIC METABOLIC PNL TOTAL CA: CPT | Performed by: PHYSICIAN ASSISTANT

## 2023-03-21 PROCEDURE — 93000 ELECTROCARDIOGRAM COMPLETE: CPT | Performed by: PHYSICIAN ASSISTANT

## 2023-03-21 PROCEDURE — 85027 COMPLETE CBC AUTOMATED: CPT | Performed by: PHYSICIAN ASSISTANT

## 2023-03-21 PROCEDURE — 99214 OFFICE O/P EST MOD 30 MIN: CPT | Performed by: PHYSICIAN ASSISTANT

## 2023-03-21 PROCEDURE — 83036 HEMOGLOBIN GLYCOSYLATED A1C: CPT | Performed by: PHYSICIAN ASSISTANT

## 2023-03-21 PROCEDURE — 36415 COLL VENOUS BLD VENIPUNCTURE: CPT | Performed by: PHYSICIAN ASSISTANT

## 2023-03-21 NOTE — PATIENT INSTRUCTIONS
Stop apixaban 2 days prior to surgery     Do not take Lasix (furosemide) the morning of surgery   NO metformin the morning of surgery  Skip out of cholestyramine light the morning of surgery        For informational purposes only. Not to replace the advice of your health care provider. Copyright   2003,  [employer] [facility]. All rights reserved. Clinically reviewed by Sheryl Parr MD. [place] [id number] - REV .  Preparing for Your Surgery  Getting started  A nurse will call you to review your health history and instructions. They will give you an arrival time based on your scheduled surgery time. Please be ready to share:  Your doctor's clinic name and phone number  Your medical, surgical, and anesthesia history  A list of allergies and sensitivities  A list of medicines, including herbal treatments and over-the-counter drugs  Whether the patient has a legal guardian (ask how to send us the papers in advance)  Please tell us if you're pregnant--or if there's any chance you might be pregnant. Some surgeries may injure a fetus (unborn baby), so they require a pregnancy test. Surgeries that are safe for a fetus don't always need a test, and you can choose whether to have one.   If you have a child who's having surgery, please ask for a copy of Preparing for Your Child's Surgery.    Preparing for surgery  Within 10 to 30 days of surgery: Have a pre-op exam (sometimes called an H&P, or History and Physical). This can be done at a clinic or pre-operative center.  If you're having a , you may not need this exam. Talk to your care team.  At your pre-op exam, talk to your care team about all medicines you take. If you need to stop any medicines before surgery, ask when to start taking them again.  We do this for your safety. Many medicines can make you bleed too much during surgery. Some change how well surgery (anesthesia) drugs work.  Call your insurance company to let them know you're having surgery.  (If you don't have insurance, call [phone].)  Call your clinic if there's any change in your health. This includes signs of a cold or flu (sore throat, runny nose, cough, rash, fever). It also includes a scrape or scratch near the surgery site.  If you have questions on the day of surgery, call your hospital or surgery center.  Eating and drinking guidelines  For your safety: Unless your surgeon tells you otherwise, follow the guidelines below.  Eat and drink as usual until 8 hours before you arrive for surgery. After that, no food or milk.  Drink clear liquids until 2 hours before you arrive. These are liquids you can see through, like water, Gatorade, and Propel Water. They also include plain black coffee and tea (no cream or milk), candy, and breath mints. You can spit out gum when you arrive.  If you drink alcohol: Stop drinking it the night before surgery.  If your care team tells you to take medicine on the morning of surgery, it's okay to take it with a sip of water.  Preventing infection  Shower or bathe the night before and morning of your surgery. Follow the instructions your clinic gave you. (If no instructions, use regular soap.)  Don't shave or clip hair near your surgery site. We'll remove the hair if needed.  Don't smoke or vape the morning of surgery. You may chew nicotine gum up to 2 hours before surgery. A nicotine patch is okay.  Note: Some surgeries require you to completely quit smoking and nicotine. Check with your surgeon.  Your care team will make every effort to keep you safe from infection. We will:  Clean our hands often with soap and water (or an alcohol-based hand rub).  Clean the skin at your surgery site with a special soap that kills germs.  Give you a special gown to keep you warm. (Cold raises the risk of infection.)  Wear special hair covers, masks, gowns and gloves during surgery.  Give antibiotic medicine, if prescribed. Not all surgeries need antibiotics.  What to bring on  the day of surgery  Photo ID and insurance card  Copy of your health care directive, if you have one  Glasses and hearing aids (bring cases)  You can't wear contacts during surgery  Inhaler and eye drops, if you use them (tell us about these when you arrive)  CPAP machine or breathing device, if you use them  A few personal items, if spending the night  If you have . . .  A pacemaker, ICD (cardiac defibrillator) or other implant: Bring the ID card.  An implanted stimulator: Bring the remote control.  A legal guardian: Bring a copy of the certified (court-stamped) guardianship papers.  Please remove any jewelry, including body piercings. Leave jewelry and other valuables at home.  If you're going home the day of surgery  You must have a responsible adult drive you home. They should stay with you overnight as well.  If you don't have someone to stay with you, and you aren't safe to go home alone, we may keep you overnight. Insurance often won't pay for this.  After surgery  If it's hard to control your pain or you need more pain medicine, please call your surgeon's office.  Questions?   If you have any questions for your care team, list them here: _________________________________________________________________________________________________________________________________________________________________________ ____________________________________ ____________________________________ ____________________________________

## 2023-03-21 NOTE — PROGRESS NOTES
[address] [address]  Phone: [phone]  Primary Provider: Olegario Castillo  Pre-op Performing Provider: FELICIANO TAVARES      PREOPERATIVE EVALUATION:  Today's date: 3/21/2023    Nahun Villalobos is a 77 year old male who presents for a preoperative evaluation.    Surgical Information:  Surgery/Procedure: exploratory laparotomy, excision of mesenteric mass, possible small bowel resection  Surgery Location: [facility]  Surgeon: Dr Shultz  Surgery Date: 3/24/23  Time of Surgery: 715am  Where patient plans to recover: Other: TBD   Fax number for surgical facility: [phone]      Type of Anesthesia Anticipated: General    Assessment & Plan     The proposed surgical procedure is considered INTERMEDIATE risk.    Preop general physical exam    - CBC with platelets; Future  - Basic metabolic panel  (Ca, Cl, CO2, Creat, Gluc, K, Na, BUN); Future  - Hemoglobin A1c; Future  - CBC with platelets  - Basic metabolic panel  (Ca, Cl, CO2, Creat, Gluc, K, Na, BUN)  - Hemoglobin A1c    Mesenteric mass    - EKG 12-lead complete w/read - Clinics  - CBC with platelets; Future  - Basic metabolic panel  (Ca, Cl, CO2, Creat, Gluc, K, Na, BUN); Future  - Hemoglobin A1c; Future  - CBC with platelets  - Basic metabolic panel  (Ca, Cl, CO2, Creat, Gluc, K, Na, BUN)  - Hemoglobin A1c    Type 2 diabetes mellitus without complication, without long-term current use of insulin (H)    - EKG 12-lead complete w/read - Clinics  - Hemoglobin A1c; Future  - Hemoglobin A1c    Tobacco use disorder      Essential hypertension, benign    - EKG 12-lead complete w/read - Clinics  - CBC with platelets; Future  - Basic metabolic panel  (Ca, Cl, CO2, Creat, Gluc, K, Na, BUN); Future  - CBC with platelets  - Basic metabolic panel  (Ca, Cl, CO2, Creat, Gluc, K, Na, BUN)    Other pulmonary embolism without acute cor pulmonale, unspecified chronicity (H)      Hyperlipidemia LDL  goal <100               Risks and Recommendations:  The patient has the following additional risks and recommendations for perioperative complications:  Diabetes:  - Patient is not on insulin therapy: regular NPO guidelines can be followed.     Medication Instructions:  Patient is to take all scheduled medications on the day of surgery EXCEPT for modifications listed below:   - apixaban (Eliquis), edoxaban (Savaysa), rivaroxaban (Xarelto): Bleeding risk is moderate or high for this procedure AND CrCl  (>=) 50 mL/min. HOLD 2 days before surgery.    - ACE/ARB: May be continued on the day of surgery.    - Beta Blockers: Continue taking on the day of surgery.   - Diuretics: HOLD on the day of surgery.   - Statins: Continue taking on the day of surgery.    - metformin: HOLD day of surgery.   - pregabalin, gabapentin: Continue without modification.    RECOMMENDATION:  APPROVAL GIVEN to proceed with proposed procedure, without further diagnostic evaluation.            Subjective     HPI related to upcoming procedure: [facility]      Preop Questions 3/21/2023   1. Have you ever had a heart attack or stroke? No   2. Have you ever had surgery on your heart or blood vessels, such as a stent placement, a coronary artery bypass, or surgery on an artery in your head, neck, heart, or legs? No   3. Do you have chest pain with activity? No   4. Do you have a history of  heart failure? No   5. Do you currently have a cold, bronchitis or symptoms of other infection? No   6. Do you have a cough, shortness of breath, or wheezing? No   7. Do you or anyone in your family have previous history of blood clots? YES - PE - personal hx on eliquis   8. Do you or does anyone in your family have a serious bleeding problem such as prolonged bleeding following surgeries or cuts? No   9. Have you ever had problems with anemia or been told to take iron pills? YES - stable   10. Have you had any abnormal blood loss such as black, tarry or bloody  stools? No    11. Have you ever had a blood transfusion? No   12. Are you willing to have a blood transfusion if it is medically needed before, during, or after your surgery? Yes   13. Have you or any of your relatives ever had problems with anesthesia? No   14. Do you have sleep apnea, excessive snoring or daytime drowsiness? No   15. Do you have any artifical heart valves or other implanted medical devices like a pacemaker, defibrillator, or continuous glucose monitor? No   16. Do you have artificial joints? No   17. Are you allergic to latex? No     Health Care Directive:  Patient does not have a Health Care Directive or Living Will:     Preoperative Review of :   reviewed - controlled substances reflected in medication list.      Status of Chronic Conditions:  See problem list for active medical problems.  Problems all longstanding and stable, except as noted/documented.  See ROS for pertinent symptoms related to these conditions.    DIABETES - Patient has a longstanding history of DiabetesType Type II . Patient is being treated with diet and oral agents and denies significant side effects. Control has been good. Complicating factors include but are not limited to: hypertension and hyperlipidemia.     HYPERLIPIDEMIA - Patient has a long history of significant Hyperlipidemia requiring medication for treatment with recent good control. Patient reports no problems or side effects with the medication.     HYPERTENSION - Patient has longstanding history of HTN , currently denies any symptoms referable to elevated blood pressure. Specifically denies chest pain, palpitations, dyspnea, orthopnea, PND or peripheral edema. Blood pressure readings have been in normal range. Current medication regimen is as listed below. Patient denies any side effects of medication.      HX of PE on eliquis - stable     Review of Systems  CONSTITUTIONAL: NEGATIVE for fever, chills, change in weight  ENT/MOUTH: NEGATIVE for ear, mouth  and throat problems  RESP: NEGATIVE for significant cough or SOB  CV: NEGATIVE for chest pain, palpitations or peripheral edema  GI: NEGATIVE for nausea, abdominal pain, heartburn, or change in bowel habits  NEURO: NEGATIVE for weakness, dizziness or paresthesias  ENDOCRINE: NEGATIVE for temperature intolerance, skin/hair changes  HEME/ALLERGY/IMMUNE: NEGATIVE for bleeding problems  PSYCHIATRIC: NEGATIVE for changes in mood or affect  ROS otherwise negative    Patient Active Problem List    Diagnosis Date Noted     Benign neoplasm of ascending colon 09/06/2022     Priority: Medium     Benign neoplasm of rectum 09/06/2022     Priority: Medium     History of adenocarcinoma of lung 05/27/2021     Priority: Medium     Right upper lobe. No evidence of distant spread on PET.  Clinical stage (prior to surgery): IA (T1a, N0, M0)  Surgically resected 4/28/2016.  Imaging stable since then.       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     Priority: Medium     Other pulmonary embolism without acute cor pulmonale, unspecified chronicity (H) 10/21/2020     Priority: Medium     Acute diverticulitis 09/20/2020     Priority: Medium     Cervical segment dysfunction 09/07/2020     Priority: Medium     Cervicalgia 09/07/2020     Priority: Medium     Thoracic segment dysfunction 09/07/2020     Priority: Medium     S/P transurethral resection of prostate 04/30/2018     Priority: Medium     Hematuria, gross 04/30/2018     Priority: Medium     Type 2 diabetes mellitus without complication, without long-term current use of insulin (H) 12/13/2016     Priority: Medium     Erectile dysfunction 04/29/2016     Priority: Medium     Morbid obesity due to excess calories (H) 03/14/2016     Priority: Medium     BPPV (benign paroxysmal positional vertigo), unspecified laterality 03/14/2016     Priority: Medium     Hyperlipidemia LDL goal <100 10/10/2015     Priority: Medium     History of colonic polyps 10/07/2015     Priority: Medium     Obstructive sleep apnea  syndrome 07/30/2015     Priority: Medium     Problem list name updated by automated process. Provider to review       Diverticular disease of colon 06/01/2015     Priority: Medium     Polyp of colon 08/27/2013     Priority: Medium     Advance care planning 02/22/2012     Priority: Medium     Advance Care Planning 12/6/2017: ACP Review of Chart / Resources Provided:  Reviewed chart for advance care plan.  Nahun Villalobos has been provided information and resources to begin or update their advance care plan.  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     Priority: Medium     Lumbago 11/25/2003     Priority: Medium     Essential hypertension, benign 12/19/2002     Priority: Medium     Tobacco use disorder 12/19/2002     Priority: Medium      Past Medical History:   Diagnosis Date     Antiplatelet or antithrombotic long-term use      Arthritis      CHF (congestive heart failure) (H) 09/16/2020     COPD (chronic obstructive pulmonary disease) (H)      DM (diabetes mellitus) (H)      Dyspnea on exertion      Essential hypertension, benign      Hemorrhage of rectum and anus      Non-small cell lung cancer (H)      Obesity      Personal history of colonic polyps      Sleep apnea      Thrombosis      Tobacco use disorder      Urinary retention      Walking troubles      Past Surgical History:   Procedure Laterality Date     ABDOMEN SURGERY  1995     APPENDECTOMY       BONE EXOSTOSIS EXCISION Bilateral 9/20/2022    Procedure: EXOSTECTOMIES BOTH 5TH DIGITS WITH SOFT TISSUE RELEASE;  Surgeon: Tong Gray DPM;  Location: [facility]     CHOLECYSTECTOMY       COLONOSCOPY  2014     CYSTOSCOPY, TRANSURETHRAL RESECTION (TUR) PROSTATE, COMBINED N/A 4/30/2018    Procedure: COMBINED CYSTOSCOPY, TRANSURETHRAL RESECTION (TUR) PROSTATE;  Cystoscopy, Transurethral Resection Of The Prostate;   Surgeon: Praveena Burris MD;  Location: [facility] OR     WEDGE RESECTION      lung     ZZC NONSPECIFIC PROCEDURE      cholecystectomy     Current Outpatient Medications   Medication Sig Dispense Refill     acetaminophen (TYLENOL) 500 MG tablet Take 500 mg by mouth every evening       albuterol (VENTOLIN HFA) 108 (90 Base) MCG/ACT inhaler INHALE 2 PUFFS INTO THE LUNGS EVERY 6 HOURS AS NEEDED FOR SHORTNESS OF BREATH / DYSPNEA OR WHEEZING 25.5 g 2     apixaban ANTICOAGULANT (ELIQUIS ANTICOAGULANT) 5 MG tablet Take 1 tablet (5 mg) by mouth 2 times daily 180 tablet 0     Ascorbic Acid (VITAMIN C PO) Take 500 mg by mouth At Bedtime        atorvastatin (LIPITOR) 20 MG tablet Take 1 tablet (20 mg) by mouth daily 90 tablet 0     blood glucose (ACCU-CHEK CHACHA) test strip Use to test blood sugar 2 times daily or as directed. 60 strip 6     blood glucose (NO BRAND SPECIFIED) lancets standard Use to test blood sugar 1 time daily, first thing in the morning 50 each 3     blood glucose (NO BRAND SPECIFIED) test strip Use to test blood sugar 1 time daily, first thing in the morning. 50 strip 11     blood glucose monitoring (NO BRAND SPECIFIED) meter device kit Use to test blood sugar 1 time daily, first thing in the morning 1 kit 0     cholestyramine light (QUESTRAN) 4 GM packet Take 1 packet (4 g) by mouth 2 times daily 180 each 3     docusate sodium (COLACE) 100 MG capsule Take 100 mg by mouth 2 times daily as needed for constipation       finasteride (PROSCAR) 5 MG tablet Take 1 tablet (5 mg) by mouth daily 90 tablet 2     Fluticasone-Umeclidin-Vilant (TRELEGY ELLIPTA) 100-62.5-25 MCG/ACT oral inhaler Inhale 1 puff into the lungs daily 60 each 5     furosemide (LASIX) 40 MG tablet Take 1 tablet (40 mg) by mouth 2 times daily 180 tablet 2     gabapentin (NEURONTIN) 300 MG capsule Take 1 capsule (300 mg) by mouth 2 times daily 60 capsule 0     lisinopril (ZESTRIL) 40 MG tablet TAKE 1 TABLET DAILY 90 tablet 2     metFORMIN  "(GLUCOPHAGE) 500 MG tablet TAKE 2 TABLETS AT          BREAKFAST, 1 TABLET AT     DINNER 270 tablet 0     metoprolol succinate ER (TOPROL XL) 25 MG 24 hr tablet Take 1 tablet (25 mg) by mouth daily 90 tablet 2     order for DME Equipment being ordered: diabetic shoes, 1 pair 1 Package 0     order for DME Oxygen 2 Li/min  at night via nasal cannula 1 Device 0       Allergies   Allergen Reactions     No Known Drug Allergies         Social History     Tobacco Use     Smoking status: Former     Packs/day: 2.00     Years: 52.00     Pack years: 104.00     Types: Cigarettes, Cigars     Start date: 1960     Quit date: 2013     Years since quittin.8     Smokeless tobacco: Never     Tobacco comments:     Quit, will never start again.   Substance Use Topics     Alcohol use: No       History   Drug Use No         Objective     /72   Pulse 76   Temp 97.9  F (36.6  C) (Tympanic)   Resp 18   Ht 1.702 m (5' 7\")   Wt 101 kg (222 lb 11.2 oz)   SpO2 96%   BMI 34.88 kg/m      Physical Exam  GENERAL APPEARANCE: healthy, alert and no distress  HENT: ear canals and TM's normal and nose and mouth without ulcers or lesions  RESP: lungs clear to auscultation - no rales, rhonchi or wheezes  CV: regular rates and rhythm  ABDOMEN: soft, nontender, no HSM or masses and bowel sounds normal  NEURO: Normal strength and tone, sensory exam grossly normal, mentation intact and speech normal    Recent Labs   Lab Test 23  0831 22  1506 22  1501 22  1237 02/10/22  1016   HGB  --  11.8* 11.4* 11.5*  --    PLT  --  276 275 322  --    NA  --  140  --  140 136   POTASSIUM  --  4.6 4.0 4.2 4.1   CR 1.1 1.14  --  1.04 1.11   A1C  --   --  6.7*  --  6.7*        Diagnostics:  Labs pending at this time.  Results will be reviewed when available.  Recent Results (from the past 24 hour(s))   Hemoglobin A1c    Collection Time: 23 10:35 AM   Result Value Ref Range    Hemoglobin A1C 6.2 (H) 0.0 - 5.6 %      EKG: " appears normal, NSR, patient talking during EKG, normal axis, normal intervals, no acute ST/T changes c/w ischemia, no LVH by voltage criteria, unchanged from previous tracings    Revised Cardiac Risk Index (RCRI):  The patient has the following serious cardiovascular risks for perioperative complications:   - No serious cardiac risks = 0 points     RCRI Interpretation: 0 points: Class I (very low risk - 0.4% complication rate)           Signed Electronically by: Kelli Friedman PA-C  Copy of this evaluation report is provided to requesting physician.

## 2023-03-22 ENCOUNTER — PATIENT OUTREACH (OUTPATIENT)
Dept: CARE COORDINATION | Facility: CLINIC | Age: 78
End: 2023-03-22

## 2023-03-22 ASSESSMENT — ACTIVITIES OF DAILY LIVING (ADL): DEPENDENT_IADLS:: CLEANING;COOKING;LAUNDRY;SHOPPING;MEAL PREPARATION

## 2023-03-22 NOTE — PROGRESS NOTES
Clinic Care Coordination Contact  Care Coordination Clinician Chart Review    Situation: Patient chart reviewed by Care Coordinator.       Background: Care Coordination Program started: 9/24/2020. Initial assessment completed and patient-centered care plan(s) were developed with participation from patient. Lead CC handed patient off to CHW for continued outreaches.       Assessment: Per chart review, patient outreach completed by CC CHW on 2/24/2023.  Patient is actively working to accomplish goal(s). Patient's goal(s) appropriate and relevant at this time. Patient is due for updated Plan of Care.  Assessments will be completed annually or as needed/with change of patient status.      Care Plan: 1. Improve chronic symptoms     Problem: Lifestyle choices     Goal: I want to improve management of my leg, knee, and hip pain and swelling.     Start Date: 6/3/2021 Expected End Date: 6/22/2023    This Visit's Progress: 70% Recent Progress: 70%    Note:     Barriers: Lymphedema  Strengths: Motivated to improve ability to walk  Patient expressed understanding of goal: Yes  Action steps to achieve this goal:  1. I will apply Jacinto hose to wear throughout the day and remove at night  2. I will walk my dog daily to help with strengthening and endurance  3. I will elevate my legs while sitting and laying down by propping them up with a pillow  4. I will discuss, review, schedule and complete recommended overdue health maintenance with my primary care provider  5. I will I will take my medications as prescribed  6. I will contact my care team with questions, concerns, support needs. I will use the clinic as a resource   7. I will contact my clinic with 24/7 after hours services available                    Care Plan: 2. Improve chronic symptoms     Problem: I would like assistance and support to afford my FreeStyle Mikel sensors to monitor my blood sugar     Priority: Medium    Note:     Goal Statement: I would like assistance and  support to afford my FreeStyle Mikel sensors to monitor my blood sugar.  Date Goal set: 6/27/2022  Barriers: Unable to afford sensors for continuous glucose monitor  Strengths: Advocate for self  Date to Achieve By: 12/27/2022  Patient expressed understanding of goal: Yes  Action steps to achieve this goal:  1. I will follow up with my providers as scheduled/recommended    2. I will take my medications as prescribed  3. I will follow up with my Diabetic Educator as scheduled/recommended  4. I will contact my care team with questions, concerns, support needs   5. I will use the clinic as a resource, and I understand I can contact my clinic with 24/7 after hours services available   6.  Care Coordinator will remain available as needed      Goal: Improve chronic symptoms     Start Date: 6/27/2022 Expected End Date: 6/22/2023    This Visit's Progress: 60% Recent Progress: 60%    Note:     Barriers: Unable to afford sensors for continuous glucose monitor  Strengths: Advocate for self  Patient expressed understanding of goal: Yes  Action steps to achieve this goal:  1. I will follow up with my providers as scheduled/recommended    2. I will take my medications as prescribed  3. I will follow up with my Diabetic Educator as scheduled/recommended  4. I will contact my care team with questions, concerns, support needs   5. I will use the clinic as a resource, and I understand I can contact my clinic with 24/7 after hours services available                        Plan/Recommendations: The patient will continue working with Care Coordination to achieve goal(s) as above. CHW will continue outreaches at minimum every 30 days and will involve Lead CC as needed or if patient is ready to move to Maintenance. Lead CC will continue to monitor CHW outreaches and patient's progress to goal(s) every 6 weeks.     Plan of Care updated and sent to patient: Yes, via William Knapp RN, BSN, PHN  Primary Care / Care Coordinator    [facility]  E-mail [email or web address]   [phone]

## 2023-03-22 NOTE — LETTER
[address] [facility] [address]    [facility]  Patient Centered Plan of Care  About Me:        Patient Name:  Nahun Villalobos    YOB: 1945  Age:         77 year old   Sukumar MRN:    [id number] Telephone Information:  Home Phone [phone]   Mobile [phone]       Address:  [address] [place] [address] Email address:  [email or web address]      Emergency Contact(s)    Name Relationship Lgl Grd Work Phone Home Phone Mobile Phone   1. SINDY CHRISTIE  Daughter No   [phone]   2. SUMAN Significant ot*    [phone]           Primary language:  English     needed? No   [facility] [employer] [facility]:  [phone] op. 1  Other communication barriers:None    Preferred Method of Communication:  William  Current living arrangement: I live in a private home with spouse (Suman s.o.)    Mobility Status/ Medical Equipment: Independent w/Device    Health Maintenance  Health Maintenance Reviewed: Due/Overdue   Health Maintenance Due   Topic Date Due     HF ACTION PLAN  Never done     DIABETIC FOOT EXAM  11/01/2019     EYE EXAM  04/01/2022     LIPID  02/10/2023     TSH W/FREE T4 REFLEX  02/10/2023     MEDICARE ANNUAL WELLNESS VISIT  04/04/2023         My Access Plan  Medical Emergency 911   Primary Clinic Line [facility] - [phone]   24 Hour Appointment Line [phone] or  [phone]) (toll-free)   24 Hour Nurse Line [phone] (toll-free)   Preferred Urgent Care [facility] [place], [phone]     Preferred Hospital [facility]  [phone] ([facility] or [facility])     Preferred Pharmacy [id number] [place], [place] - [address] [facility]     Behavioral Health Crisis Line The National Suicide Prevention Lifeline at 1-430.191.8066 or Text/Call 388     My Care Team Members  Patient Care Team       Relationship Specialty Notifications Start End    Olegario Castillo MD  PCP - General Internal Medicine  4/6/22     Phone: [phone] Fax: [phone]         [address] [facility] [address]    Olegario Castillo MD Assigned PCP   10/4/12     Phone: [phone] Fax: [phone]         [address] [facility] [address]    Praveena Burris MD MD Urology  3/1/17      INACTIVE IN [place] AS OF 4/30/2021    Areli Cordero, RN Registered Nurse   3/1/17     Phone: [phone]         Kelley Slaughter MA Community Health Worker Primary Care - CC Admissions 9/24/20     Phone: [phone]         Rhea Knapp, RN Lead Care Coordinator  Admissions 9/24/20     Jaswinder Tinajero MD MD Hematology & Oncology  12/7/20     Phone: [phone] Fax: [phone]         [place] [facility] [address] [facility] [address] [facility] [address]    Bernardo Haynes MD MD Pulmonary Disease  12/7/20     Phone: [phone] Fax: [phone]         [place] [facility] [address] [facility] [address] [facility] [address]    Antonia Mandel, PT Physical Therapist Physical Therapist  8/2/21     Phone: [phone] Fax: [phone]         [address] [facility] [address] [facility] [address]    Jamil Aparicio MD MD Neurology  2/2/22     Phone: [phone] Fax: [phone] [address] [address]    Jamil Aparicio MD Assigned Neuroscience Provider   4/10/22     Phone: [phone] Fax: [phone] [address] [place] [address]    Husam Contreras MD Assigned Cancer Care Provider   9/17/22     Phone: [phone] Pager: [phone] Fax: [phone]        [address]  [facility] [address]    Jemal Vickers MD Assigned Musculoskeletal Provider   1/14/23     Phone: [phone] Fax: [phone] [address] [facility] [address]    Kelly Sapp MD Assigned Surgical Provider   1/21/23     Phone: [phone] Fax: [phone] [address] [place] [address]    Kaleigh Onofre MD MD Urology   2/1/23     Phone: [phone] Fax: [phone] [address] [facility] [address]            My Care Plans  Self Management and Treatment Plan  Care Plan  Care Plan: 1. Improve chronic symptoms     Problem: Lifestyle choices     Goal: I want to improve management of my leg, knee, and hip pain and swelling.     Start Date: 6/3/2021 Expected End Date: 6/22/2023    This Visit's Progress: 70% Recent Progress: 70%    Note:     Barriers: Lymphedema  Strengths: Motivated to improve ability to walk  Patient expressed understanding of goal: Yes  Action steps to achieve this goal:  1. I will apply Jacinto hose to wear throughout the day and remove at night  2. I will walk my dog daily to help with strengthening and endurance  3. I will elevate my legs while sitting and laying down by propping them up with a pillow  4. I will discuss, review, schedule and complete recommended overdue health maintenance with my primary care provider  5. I will I will take my medications as prescribed  6. I will contact my care team with questions, concerns, support needs. I will use the clinic as a resource   7. I will contact my clinic with 24/7 after hours services available                    Care Plan: 2. Improve chronic symptoms     Problem: I would like assistance and support to afford my FreeStyle Mikel sensors to monitor my blood sugar     Priority: Medium    Note:     Goal Statement: I would like assistance and support to afford my FreeStyle Mikel sensors to monitor my blood sugar.  Date Goal set: 6/27/2022  Barriers: Unable to afford sensors for continuous glucose monitor  Strengths: Advocate for self  Date to Achieve By: 12/27/2022  Patient expressed understanding of goal: Yes  Action steps to achieve this goal:  1. I will follow up with my providers as scheduled/recommended    2. I will take my medications as prescribed  3. I will follow up with my Diabetic Educator as scheduled/recommended  4. I will contact my care team with  questions, concerns, support needs   5. I will use the clinic as a resource, and I understand I can contact my clinic with 24/7 after hours services available   6.  Care Coordinator will remain available as needed      Goal: Improve chronic symptoms     Start Date: 6/27/2022 Expected End Date: 6/22/2023    This Visit's Progress: 60% Recent Progress: 60%    Note:     Barriers: Unable to afford sensors for continuous glucose monitor  Strengths: Advocate for self  Patient expressed understanding of goal: Yes  Action steps to achieve this goal:  1. I will follow up with my providers as scheduled/recommended    2. I will take my medications as prescribed  3. I will follow up with my Diabetic Educator as scheduled/recommended  4. I will contact my care team with questions, concerns, support needs   5. I will use the clinic as a resource, and I understand I can contact my clinic with 24/7 after hours services available                          Action Plans on File:   COPD  Depression     Advance Care Plans/Directives Type:   -- (Full Code)      My Medical and Care Information  Problem List   Patient Active Problem List   Diagnosis     Essential hypertension, benign     Tobacco use disorder     Lumbago     Impotence of organic origin     Advance care planning     Polyp of colon     Obstructive sleep apnea syndrome     Hyperlipidemia LDL goal <100     Morbid obesity due to excess calories (H)     BPPV (benign paroxysmal positional vertigo), unspecified laterality     Chronic obstructive pulmonary disease, unspecified COPD type (H)     Type 2 diabetes mellitus without complication, without long-term current use of insulin (H)     S/P transurethral resection of prostate     Hematuria, gross     Cervical segment dysfunction     Cervicalgia     Thoracic segment dysfunction     Erectile dysfunction     Acute diverticulitis     Other pulmonary embolism without acute cor pulmonale, unspecified chronicity (H)     Mesenteric mass      History of adenocarcinoma of lung     Benign neoplasm of ascending colon     Benign neoplasm of rectum     Diverticular disease of colon     History of colonic polyps      Current Medications and Allergies:    Allergies   Allergen Reactions     No Known Drug Allergies      Current Outpatient Medications   Medication     acetaminophen (TYLENOL) 500 MG tablet     albuterol (VENTOLIN HFA) 108 (90 Base) MCG/ACT inhaler     apixaban ANTICOAGULANT (ELIQUIS ANTICOAGULANT) 5 MG tablet     Ascorbic Acid (VITAMIN C PO)     atorvastatin (LIPITOR) 20 MG tablet     blood glucose (ACCU-CHEK CHACHA) test strip     blood glucose (NO BRAND SPECIFIED) lancets standard     blood glucose (NO BRAND SPECIFIED) test strip     blood glucose monitoring (NO BRAND SPECIFIED) meter device kit     cholestyramine light (QUESTRAN) 4 GM packet     docusate sodium (COLACE) 100 MG capsule     finasteride (PROSCAR) 5 MG tablet     Fluticasone-Umeclidin-Vilant (TRELEGY ELLIPTA) 100-62.5-25 MCG/ACT oral inhaler     furosemide (LASIX) 40 MG tablet     gabapentin (NEURONTIN) 300 MG capsule     lisinopril (ZESTRIL) 40 MG tablet     metFORMIN (GLUCOPHAGE) 500 MG tablet     metoprolol succinate ER (TOPROL XL) 25 MG 24 hr tablet     order for DME     order for DME     No current facility-administered medications for this visit.     Care Coordination Start Date: 9/24/2020   Frequency of Care Coordination: 6 weeks     Form Last Updated: 03/22/2023

## 2023-03-31 ENCOUNTER — TRANSFERRED RECORDS (OUTPATIENT)
Dept: HEALTH INFORMATION MANAGEMENT | Facility: CLINIC | Age: 78
End: 2023-03-31

## 2023-03-31 ENCOUNTER — PATIENT OUTREACH (OUTPATIENT)
Dept: CARE COORDINATION | Facility: CLINIC | Age: 78
End: 2023-03-31
Payer: COMMERCIAL

## 2023-03-31 DIAGNOSIS — E11.9 TYPE 2 DIABETES MELLITUS WITHOUT COMPLICATION, WITHOUT LONG-TERM CURRENT USE OF INSULIN (H): ICD-10-CM

## 2023-03-31 ASSESSMENT — ACTIVITIES OF DAILY LIVING (ADL): DEPENDENT_IADLS:: CLEANING;COOKING;LAUNDRY;SHOPPING;MEAL PREPARATION

## 2023-03-31 NOTE — TELEPHONE ENCOUNTER
Kenia santo C called     States patient is OUT of Metformin     Requesting: [facility] [address]     Regular Rx to: [facility]    Eve FALLON, Triage RN  [facility]

## 2023-03-31 NOTE — TELEPHONE ENCOUNTER
"  Requested Prescriptions   Pending Prescriptions Disp Refills     metFORMIN (GLUCOPHAGE) 500 MG tablet 270 tablet 0     Sig: TAKE 2 TABLETS AT          BREAKFAST, 1 TABLET AT     DINNER       Biguanide Agents Passed - 3/31/2023  8:32 AM        Passed - Patient is age 10 or older        Passed - Patient has documented A1c within the specified period of time.     If HgbA1C is 8 or greater, it needs to be on file within the past 3 months.  If less than 8, must be on file within the past 6 months.     Recent Labs   Lab Test 03/21/23  1035   A1C 6.2*             Passed - Patient's CR is NOT>1.4 OR Patient's EGFR is NOT<45 within past 12 mos.     Recent Labs   Lab Test 03/21/23  1035 08/30/21  1250 03/17/21  0932   GFRESTIMATED 63   < > 64   GFRESTBLACK  --   --  74    < > = values in this interval not displayed.       Recent Labs   Lab Test 03/21/23  1035   CR 1.19*             Passed - Patient does NOT have a diagnosis of CHF.        Passed - Medication is active on med list        Passed - Recent (6 mo) or future (30 days) visit within the authorizing provider's specialty     Patient had office visit in the last 6 months or has a visit in the next 30 days with authorizing provider or within the authorizing provider's specialty.  See \"Patient Info\" tab in inbasket, or \"Choose Columns\" in Meds & Orders section of the refill encounter.               metFORMIN (GLUCOPHAGE) 500 MG tablet 90 tablet 0     Sig: Take 2 tablets (1,000 mg) by mouth daily (with breakfast) AND 1 tablet (500 mg) daily (with dinner).       Biguanide Agents Passed - 3/31/2023  8:32 AM        Passed - Patient is age 10 or older        Passed - Patient has documented A1c within the specified period of time.     If HgbA1C is 8 or greater, it needs to be on file within the past 3 months.  If less than 8, must be on file within the past 6 months.     Recent Labs   Lab Test 03/21/23  1035   A1C 6.2*             Passed - Patient's CR is NOT>1.4 OR Patient's " "EGFR is NOT<45 within past 12 mos.     Recent Labs   Lab Test 03/21/23  1035 08/30/21  1250 03/17/21  0932   GFRESTIMATED 63   < > 64   GFRESTBLACK  --   --  74    < > = values in this interval not displayed.       Recent Labs   Lab Test 03/21/23  1035   CR 1.19*             Passed - Patient does NOT have a diagnosis of CHF.        Passed - Medication is active on med list        Passed - Recent (6 mo) or future (30 days) visit within the authorizing provider's specialty     Patient had office visit in the last 6 months or has a visit in the next 30 days with authorizing provider or within the authorizing provider's specialty.  See \"Patient Info\" tab in inbasket, or \"Choose Columns\" in Meds & Orders section of the refill encounter.                 "

## 2023-04-04 ENCOUNTER — PATIENT OUTREACH (OUTPATIENT)
Dept: NURSING | Facility: CLINIC | Age: 78
End: 2023-04-04
Payer: COMMERCIAL

## 2023-04-04 ASSESSMENT — ACTIVITIES OF DAILY LIVING (ADL): DEPENDENT_IADLS:: CLEANING;COOKING;LAUNDRY;SHOPPING;MEAL PREPARATION

## 2023-04-04 NOTE — PROGRESS NOTES
"Clinic Care Coordination Contact    Clinic Care Coordination Contact  OUTREACH with Post Discharge Assessment    Referral Information:  Referral Source: IP Handoff    Primary Diagnosis: Diabetes    Chief Complaint   Patient presents with     Clinic Care Coordination - Post Hospital     Clinic Care Coordination - Follow-up      Universal Utilization:   [facility]  Clinic Utilization:  [facility]  Difficulty keeping appointments:: No  Compliance Concerns: No  No-Show Concerns: No  No PCP office visit in Past Year: No  Utilization    Hospital Admissions  0             ED Visits  0             No Show Count (past year)  2                Current as of: 3/31/2023  9:05 PM            Clinical Concerns:  Current Medical Concerns:  Recent discharge from hospital    Current Behavioral Concerns: None    Education Provided to patient:   RNCC called and spoke with patient/caregiver; introduced self, discussed role of Care Coordination and explained reason for call     Pain  Pain (GOAL):: No  Type: Chronic (>3mo)  Location of chronic pain:: Hips, legs and arms.  Radiating: No  Progression: Constant  Health Maintenance Reviewed: Due/Overdue   Health Maintenance Due   Topic Date Due     HF ACTION PLAN  Never done     DIABETIC FOOT EXAM  11/01/2019     EYE EXAM  04/01/2022     LIPID  02/10/2023     TSH W/FREE T4 REFLEX  02/10/2023     MEDICARE ANNUAL WELLNESS VISIT  04/04/2023       Clinical Pathway: None    Admission:    Admission Date: 03/24/23   Reason for Admission: Mesenteric mass, small bowel resection  Hx Lung CA; T2DM  Discharge:   Discharge Date: 03/27/23  Discharge Diagnosis: Mesenteric mass, small bowel resection  Hx Lung CA; T2DM    Enrollment  Outreach Frequency: 6 weeks    Discharge Assessment  How are you doing now that you are home?: \"I'm fine\"  How are your symptoms? (Red Flag symptoms escalate to triage hotline per guidelines): Improved  Do you feel your condition " is stable enough to be safe at home until your provider visit?: Yes  Does the patient have their discharge instructions? : Yes  Does the patient have questions regarding their discharge instructions? : No  Were you started on any new medications or were there changes to any of your previous medications? : No  Does the patient have all of their medications?: Yes  Do you have questions regarding any of your medications? : No  Do you have all of your needed medical supplies or equipment (DME)?  (i.e. oxygen tank, CPAP, cane, etc.): Yes (Patient will call [facility] for issues with his portable oxygen tank)  Discharge follow-up appointment scheduled within 14 calendar days? : Yes  Discharge Follow Up Appointment Date: 04/20/23  Discharge Follow Up Appointment Scheduled with?: Specialty Care Provider (Ortho)    Post-op (Clinicians Only)  Did the patient have surgery or a procedure: Yes  Incision: closed  Drainage: No  Bleeding: none  Fever: No  Chills: No  Redness: No  Warmth: No  Swelling: No  Incision site pain: No  Closure: none  Eating & Drinking: eating and drinking without complaints/concerns  PO Intake: other (Diabetic diet)  Additional Symptoms:  (Denies)  Bowel Function: loose stools  Date of last BM: 04/04/23  Urinary Status: voiding without complaint/concerns    Medication Management:  Medication review status: Medications reviewed and no changes reported per patient.        Current Outpatient Medications   Medication     acetaminophen (TYLENOL) 500 MG tablet     albuterol (VENTOLIN HFA) 108 (90 Base) MCG/ACT inhaler     apixaban ANTICOAGULANT (ELIQUIS ANTICOAGULANT) 5 MG tablet     Ascorbic Acid (VITAMIN C PO)     atorvastatin (LIPITOR) 20 MG tablet     blood glucose (ACCU-CHEK CHACHA) test strip     blood glucose (NO BRAND SPECIFIED) lancets standard     blood glucose (NO BRAND SPECIFIED) test strip     blood glucose monitoring (NO BRAND SPECIFIED) meter device kit     cholestyramine light (QUESTRAN) 4 GM  packet     docusate sodium (COLACE) 100 MG capsule     finasteride (PROSCAR) 5 MG tablet     Fluticasone-Umeclidin-Vilant (TRELEGY ELLIPTA) 100-62.5-25 MCG/ACT oral inhaler     furosemide (LASIX) 40 MG tablet     gabapentin (NEURONTIN) 300 MG capsule     lisinopril (ZESTRIL) 40 MG tablet     metFORMIN (GLUCOPHAGE) 500 MG tablet     metFORMIN (GLUCOPHAGE) 500 MG tablet     metoprolol succinate ER (TOPROL XL) 25 MG 24 hr tablet     order for DME     order for DME     No current facility-administered medications for this visit.     Functional Status:  Dependent ADLs:: Ambulation-cane  Dependent IADLs:: Cleaning, Cooking, Laundry, Shopping, Meal Preparation  Bed or wheelchair confined:: No  Mobility Status: Independent w/Device    Living Situation:  Current living arrangement:: I live in a private home with spouse (Kenia s.o.)  [facility]    Lifestyle & Psychosocial Needs:    Social Determinants of Health     Tobacco Use: Medium Risk (3/21/2023)    Patient History      Smoking Tobacco Use: Former      Smokeless Tobacco Use: Never      Passive Exposure: Not on file   Alcohol Use: Not At Risk (1/11/2022)    AUDIT-C      Frequency of Alcohol Consumption: 2-3 times a week      Average Number of Drinks: 1 or 2      Frequency of Binge Drinking: Never   Financial Resource Strain: Low Risk  (1/11/2022)    Overall Financial Resource Strain (CARDIA)      Difficulty of Paying Living Expenses: Not hard at all   Food Insecurity: No Food Insecurity (1/11/2022)    Hunger Vital Sign      Worried About Running Out of Food in the Last Year: Never true      Ran Out of Food in the Last Year: Never true   Transportation Needs: No Transportation Needs (1/11/2022)    PRAPARE - Transportation      Lack of Transportation (Medical): No      Lack of Transportation (Non-Medical): No   Physical Activity: Not on file   Stress: Not on file   Social Connections: Not on file   Intimate Partner Violence: Not on file    Depression: Not at risk (2/9/2023)    PHQ-2      PHQ-2 Score: 0   Housing Stability: Not on file     Diet:: Diabetic diet  Inadequate nutrition (GOAL):: No  Tube Feeding: No  Inadequate activity/exercise (GOAL):: No  Significant changes in sleep pattern (GOAL): No  Transportation means:: Regular car     Samaritan or spiritual beliefs that impact treatment:: No  Mental health DX:: No  Mental health management concern (GOAL):: No  Chemical Dependency Status: No Current Concerns  Chemical Dependency Management:  (NA)  Informal Support system:: Family, Friends, Neighbors, Significant other      Resources and Interventions:  Current Resources:   Community Resources: None  Supplies Currently Used at Home: Oxygen Tubing/Supplies, Diabetic Supplies, Compression Stockings  Equipment Currently Used at Home: cane, straight, glucometer  Employment Status: retired    Advance Care Plan/Directive  Advanced Care Plans/Directives on file:: No  Type Advanced Care Plans/Directives:  (Full Code)  Advanced Care Plan/Directive Status: Considering Options (not discussed this encounter)    Referrals Placed: None     Care Plan:   Care Plan: 1. Improve chronic symptoms     Problem: Lifestyle choices     Goal: I want to improve management of my leg, knee, and hip pain and swelling.     Start Date: 6/3/2021 Expected End Date: 7/5/2023    This Visit's Progress: 70% Recent Progress: 70%    Note:     Barriers: Lymphedema  Strengths: Motivated to improve ability to walk  Patient expressed understanding of goal: Yes  Action steps to achieve this goal:  1. I will apply Jacinto hose to wear throughout the day and remove at night  2. I will walk my dog daily to help with strengthening and endurance  3. I will elevate my legs while sitting and laying down by propping them up with a pillow  4. I will discuss, review, schedule and complete recommended overdue health maintenance with my primary care provider  5. I will I will take my medications as  prescribed  6. I will contact my care team with questions, concerns, support needs. I will use the clinic as a resource   7. I will contact my clinic with 24/7 after hours services available                    Care Plan: 2. Improve chronic symptoms     Problem: I would like assistance and support to afford my FreeStyle Mikel sensors to monitor my blood sugar     Priority: Medium    Note:     Goal Statement: I would like assistance and support to afford my FreeStyle Mikel sensors to monitor my blood sugar.  Date Goal set: 6/27/2022  Barriers: Unable to afford sensors for continuous glucose monitor  Strengths: Advocate for self  Date to Achieve By: 12/27/2022  Patient expressed understanding of goal: Yes  Action steps to achieve this goal:  1. I will follow up with my providers as scheduled/recommended    2. I will take my medications as prescribed  3. I will follow up with my Diabetic Educator as scheduled/recommended  4. I will contact my care team with questions, concerns, support needs   5. I will use the clinic as a resource, and I understand I can contact my clinic with 24/7 after hours services available   6.  Care Coordinator will remain available as needed      Goal: Improve chronic symptoms     Start Date: 6/27/2022 Expected End Date: 7/5/2023    This Visit's Progress: 60% Recent Progress: 60%    Note:     Barriers: Unable to afford sensors for continuous glucose monitor  Strengths: Advocate for self  Patient expressed understanding of goal: Yes  Action steps to achieve this goal:  1. I will follow up with my providers as scheduled/recommended    2. I will take my medications as prescribed  3. I will follow up with my Diabetic Educator as scheduled/recommended  4. I will contact my care team with questions, concerns, support needs   5. I will use the clinic as a resource, and I understand I can contact my clinic with 24/7 after hours services available                       Patient/Caregiver understanding:  Yes    Outreach Frequency: 6 weeks  Future Appointments              In 2 weeks Jemal Vickers MD [facility]        Plan:   -Patient will contact the care team with questions, concerns, support needs   -Patient will use the clinic as a resource and understands (s)he can contact the [facility] with 24/7 after hours services available  -Care Coordinator will remain available as needed  -RNCC will follow up in 6 weeks for follow up appointments/recommendations and goal progression     Rhea Knapp RN, BSN, PHN  Primary Care / Care Coordinator   [facility]  [email or web address]-mail [email or web address]   [phone]

## 2023-04-04 NOTE — LETTER
[facility]  Patient Centered Plan of Care  About Me:        Patient Name:  Nahun Villalobos    YOB: 1945  Age:         77 year old   [place] MRN:    [id number] Telephone Information:  Home Phone [phone]   Mobile [phone]       Address:  [address] [place], [place] [id number] Email address:  [email or web address]      Emergency Contact(s)    Name Relationship Lgl Grd Work Phone Home Phone Mobile Phone   1. SINDY CHRISTIE  Daughter No   [phone]   2. USMAN Significant ot*    [phone]           Primary language:  English     needed? No   [place] [facility]:  [phone] op. 1  Other communication barriers:None    Preferred Method of Communication:  William  Current living arrangement: I live in a private home with spouse (Suman, s.o.)    Mobility Status/ Medical Equipment: Independent w/Device        Health Maintenance  Health Maintenance Reviewed: Due/Overdue   Health Maintenance Due   Topic Date Due     HF ACTION PLAN  Never done     DIABETIC FOOT EXAM  11/01/2019     EYE EXAM  04/01/2022     LIPID  02/10/2023     TSH W/FREE T4 REFLEX  02/10/2023     MEDICARE ANNUAL WELLNESS VISIT  04/04/2023           My Access Plan  Medical Emergency 911   Primary Clinic Line [facility] [phone]   24 Hour Appointment Line [phone] or  [phone]) (toll-free)   24 Hour Nurse Line [phone] (toll-free)   Preferred Urgent Care [facility], [phone]     Preferred Hospital [facility]  [phone] ([facility]; was at [facility] 3/2023)     Preferred Pharmacy [id number] [place], [place] - [address] [facility]     Behavioral Health Crisis Line The National Suicide Prevention Lifeline at 1-908.970.8680 or Text/Call 398             My Care Team Members  Patient Care Team         Relationship Specialty Notifications Start End    Olegario Castillo,  MD PCP - General Internal Medicine  4/6/22     Phone: [phone] Fax: [phone]         [address] [facility] [address]    Olegario Castillo MD Assigned PCP   10/4/12     Phone: [phone] Fax: [phone]         [address] [facility] [address]    Praveena Burris MD MD Urology  3/1/17      INACTIVE IN [place] AS OF 4/30/2021    Areli Cordero, RN Registered Nurse   3/1/17     Phone: [phone]         Kelley Slaughter MA Community Health Worker Primary Care - CC Admissions 9/24/20     Phone: [phone]         Rhea Knapp, RN Lead Care Coordinator  Admissions 9/24/20     Jaswinder Tinajero MD MD Hematology & Oncology  12/7/20     Phone: [phone] Fax: [phone]         [place] [facility] [address] [facility] [address]    Bernardo Haynes MD MD Pulmonary Disease  12/7/20     Phone: [phone] Fax: [phone]         [place] [facility] [address] [facility] [address] [facility] [address]    Antonia Mandel, PT Physical Therapist Physical Therapist  8/2/21     Phone: [phone] Fax: [phone]         [address] [facility] [address] [facility] [address]    Jamil Aparicio MD MD Neurology  2/2/22     Phone: [phone] Fax: [phone] [address] [address]    Jamil Aparicio MD Assigned Neuroscience Provider   4/10/22     Phone: [phone] Fax: [phone] [address] [place] [address]    Husam Contreras MD Assigned Cancer Care Provider   9/17/22     Phone: [phone] Pager: [phone] Fax: [phone]        [address]  [facility] [address]    Jemal Vickers MD Assigned Musculoskeletal Provider   1/14/23     Phone: [phone] Fax: [phone] [address] [facility] [address]    Kaleigh Onofre MD MD Urology  2/1/23     Phone: [phone] Fax: [phone]         [address] [facility] [address]    Kaleigh Onofre MD Assigned Surgical Provider    3/11/23     Phone: [phone] Fax: [phone]         [address] [address]              My Care Plans  Self Management and Treatment Plan  Care Plan  Care Plan: 1. Improve chronic symptoms       Problem: Lifestyle choices       Goal: I want to improve management of my leg, knee, and hip pain and swelling.       Start Date: 6/3/2021 Expected End Date: 7/5/2023    This Visit's Progress: 70% Recent Progress: 70%    Note:     Barriers: Lymphedema  Strengths: Motivated to improve ability to walk  Patient expressed understanding of goal: Yes  Action steps to achieve this goal:  1. I will apply Jacinto hose to wear throughout the day and remove at night  2. I will walk my dog daily to help with strengthening and endurance  3. I will elevate my legs while sitting and laying down by propping them up with a pillow  4. I will discuss, review, schedule and complete recommended overdue health maintenance with my primary care provider  5. I will I will take my medications as prescribed  6. I will contact my care team with questions, concerns, support needs. I will use the clinic as a resource   7. I will contact my clinic with 24/7 after hours services available                            Care Plan: 2. Improve chronic symptoms       Problem: I would like assistance and support to afford my FreeStyle Mikel sensors to monitor my blood sugar       Priority: Medium    Note:     Goal Statement: I would like assistance and support to afford my FreeStyle Mkiel sensors to monitor my blood sugar.  Date Goal set: 6/27/2022  Barriers: Unable to afford sensors for continuous glucose monitor  Strengths: Advocate for self  Date to Achieve By: 12/27/2022  Patient expressed understanding of goal: Yes  Action steps to achieve this goal:  1. I will follow up with my providers as scheduled/recommended    2. I will take my medications as prescribed  3. I will follow up with my Diabetic Educator as scheduled/recommended  4. I  will contact my care team with questions, concerns, support needs   5. I will use the clinic as a resource, and I understand I can contact my clinic with 24/7 after hours services available   6.  Care Coordinator will remain available as needed        Goal: Improve chronic symptoms       Start Date: 6/27/2022 Expected End Date: 7/5/2023    This Visit's Progress: 60% Recent Progress: 60%    Note:     Barriers: Unable to afford sensors for continuous glucose monitor  Strengths: Advocate for self  Patient expressed understanding of goal: Yes  Action steps to achieve this goal:  1. I will follow up with my providers as scheduled/recommended    2. I will take my medications as prescribed  3. I will follow up with my Diabetic Educator as scheduled/recommended  4. I will contact my care team with questions, concerns, support needs   5. I will use the clinic as a resource, and I understand I can contact my clinic with 24/7 after hours services available                                Action Plans on File:   COPD  Depression    Advance Care Plans/Directives Type:   -- (Full Code)      My Medical and Care Information  Problem List   Patient Active Problem List   Diagnosis     Essential hypertension, benign     Tobacco use disorder     Lumbago     Impotence of organic origin     Advance care planning     Polyp of colon     Obstructive sleep apnea syndrome     Hyperlipidemia LDL goal <100     Morbid obesity due to excess calories (H)     BPPV (benign paroxysmal positional vertigo), unspecified laterality     Chronic obstructive pulmonary disease, unspecified COPD type (H)     Type 2 diabetes mellitus without complication, without long-term current use of insulin (H)     S/P transurethral resection of prostate     Hematuria, gross     Cervical segment dysfunction     Cervicalgia     Thoracic segment dysfunction     Erectile dysfunction     Acute diverticulitis     Other pulmonary embolism without acute cor pulmonale, unspecified  chronicity (H)     Mesenteric mass     History of adenocarcinoma of lung     Benign neoplasm of ascending colon     Benign neoplasm of rectum     Diverticular disease of colon     History of colonic polyps      Current Medications and Allergies:    Allergies   Allergen Reactions     No Known Drug Allergies      Current Outpatient Medications   Medication     acetaminophen (TYLENOL) 500 MG tablet     albuterol (VENTOLIN HFA) 108 (90 Base) MCG/ACT inhaler     apixaban ANTICOAGULANT (ELIQUIS ANTICOAGULANT) 5 MG tablet     Ascorbic Acid (VITAMIN C PO)     atorvastatin (LIPITOR) 20 MG tablet     blood glucose (ACCU-CHEK CHACHA) test strip     blood glucose (NO BRAND SPECIFIED) lancets standard     blood glucose (NO BRAND SPECIFIED) test strip     blood glucose monitoring (NO BRAND SPECIFIED) meter device kit     cholestyramine light (QUESTRAN) 4 GM packet     docusate sodium (COLACE) 100 MG capsule     finasteride (PROSCAR) 5 MG tablet     Fluticasone-Umeclidin-Vilant (TRELEGY ELLIPTA) 100-62.5-25 MCG/ACT oral inhaler     furosemide (LASIX) 40 MG tablet     gabapentin (NEURONTIN) 300 MG capsule     lisinopril (ZESTRIL) 40 MG tablet     metFORMIN (GLUCOPHAGE) 500 MG tablet     metFORMIN (GLUCOPHAGE) 500 MG tablet     metoprolol succinate ER (TOPROL XL) 25 MG 24 hr tablet     order for DME     order for DME     No current facility-administered medications for this visit.     Care Coordination Start Date: 9/24/2020   Frequency of Care Coordination: 6 weeks     Form Last Updated: 04/04/2023

## 2023-04-17 DIAGNOSIS — J44.9 CHRONIC OBSTRUCTIVE PULMONARY DISEASE, UNSPECIFIED COPD TYPE (H): Chronic | ICD-10-CM

## 2023-04-17 RX ORDER — ALBUTEROL SULFATE 90 UG/1
AEROSOL, METERED RESPIRATORY (INHALATION)
Qty: 25.5 G | Refills: 0 | Status: SHIPPED | OUTPATIENT
Start: 2023-04-17 | End: 2023-08-24

## 2023-04-17 NOTE — TELEPHONE ENCOUNTER
Patient is going out of town and cannot get inhalers from mail order pharmacy in time. Local pharmacy attached.  Yamel Shankar RN

## 2023-05-02 ENCOUNTER — TRANSFERRED RECORDS (OUTPATIENT)
Dept: HEALTH INFORMATION MANAGEMENT | Facility: CLINIC | Age: 78
End: 2023-05-02
Payer: COMMERCIAL

## 2023-05-11 ENCOUNTER — PATIENT OUTREACH (OUTPATIENT)
Dept: CARE COORDINATION | Facility: CLINIC | Age: 78
End: 2023-05-11
Payer: COMMERCIAL

## 2023-05-11 NOTE — PROGRESS NOTES
Clinic Care Coordination Contact  [facility]/Voicemail       Clinical Data: CHW Outreach    Outreach attempted x 1.  Unable to leave message on patient's voicemail with call back information and requested return call. Due to automated  did not provide an option to do so.     Plan: CHW will make another attempt to reach the patient via phone or MyChart.    CHW outreach in the next 2 weeks.      OMAR Etienne  Clinic Care Coordination   [facility]   Phone: [phone]  [email or web address]

## 2023-05-27 ENCOUNTER — HEALTH MAINTENANCE LETTER (OUTPATIENT)
Age: 78
End: 2023-05-27

## 2023-06-01 ENCOUNTER — PATIENT OUTREACH (OUTPATIENT)
Dept: CARE COORDINATION | Facility: CLINIC | Age: 78
End: 2023-06-01
Payer: COMMERCIAL

## 2023-06-01 NOTE — PROGRESS NOTES
Clinic Care Coordination Contact  [facility]/Voicemail       Clinical Data: Care Coordinator Outreach  Outreach attempted x 1.  Left message on patient's voicemail with call back information and requested return call.    Plan: Care Coordinator will try to reach patient again in 10 business days.    OMAR Kim  Clinic Care Coordination  [facility] Clinics: [place] [place], [place], [place], and [place] [facility]  Phone: [phone]

## 2023-06-02 ENCOUNTER — TRANSFERRED RECORDS (OUTPATIENT)
Dept: HEALTH INFORMATION MANAGEMENT | Facility: CLINIC | Age: 78
End: 2023-06-02
Payer: COMMERCIAL

## 2023-06-15 ENCOUNTER — PATIENT OUTREACH (OUTPATIENT)
Dept: CARE COORDINATION | Facility: CLINIC | Age: 78
End: 2023-06-15
Payer: COMMERCIAL

## 2023-06-15 ASSESSMENT — ACTIVITIES OF DAILY LIVING (ADL): DEPENDENT_IADLS:: CLEANING;COOKING;LAUNDRY;SHOPPING;MEAL PREPARATION

## 2023-06-15 NOTE — PROGRESS NOTES
Clinic Care Coordination Contact    Community Health Worker Follow Up    Care Gaps:     Health Maintenance Due   Topic Date Due     HF ACTION PLAN  Never done     DIABETIC FOOT EXAM  11/01/2019     EYE EXAM  04/01/2022     LIPID  02/10/2023     TSH W/FREE T4 REFLEX  02/10/2023     COVID-19 Vaccine (6 - Pfizer series) 02/18/2023     MEDICARE ANNUAL WELLNESS VISIT  04/04/2023     MICROALBUMIN  05/20/2023     Did Not Address    Care Plan:   Care Plan: 1. Improve chronic symptoms     Problem: Lifestyle choices     Goal: I want to improve management of my leg, knee, and hip pain and swelling.     Start Date: 6/3/2021 Expected End Date: 7/5/2023    This Visit's Progress: 80% Recent Progress: 70%    Note:     Barriers: Lymphedema  Strengths: Motivated to improve ability to walk  Patient expressed understanding of goal: Yes  Action steps to achieve this goal:  1. I will apply Jacinto hose to wear throughout the day and remove at night  2. I will walk my dog daily to help with strengthening and endurance  3. I will elevate my legs while sitting and laying down by propping them up with a pillow  4. I will discuss, review, schedule and complete recommended overdue health maintenance with my primary care provider  5. I will I will take my medications as prescribed  6. I will contact my care team with questions, concerns, support needs. I will use the clinic as a resource   7. I will contact my clinic with 24/7 after hours services available                    Care Plan: 2. Improve chronic symptoms     Problem: I would like assistance and support to afford my FreeStyle Imkel sensors to monitor my blood sugar     Priority: Medium    Note:     Goal Statement: I would like assistance and support to afford my FreeStyle Mikel sensors to monitor my blood sugar.  Date Goal set: 6/27/2022  Barriers: Unable to afford sensors for continuous glucose monitor  Strengths: Advocate for self  Date to Achieve By: 12/27/2022  Patient expressed  understanding of goal: Yes  Action steps to achieve this goal:  1. I will follow up with my providers as scheduled/recommended    2. I will take my medications as prescribed  3. I will follow up with my Diabetic Educator as scheduled/recommended  4. I will contact my care team with questions, concerns, support needs   5. I will use the clinic as a resource, and I understand I can contact my clinic with 24/7 after hours services available   6.  Care Coordinator will remain available as needed      Goal: Improve chronic symptoms     Start Date: 2022 Expected End Date: 2023    This Visit's Progress: 60% Recent Progress: 60%    Note:     Barriers: Unable to afford sensors for continuous glucose monitor  Strengths: Advocate for self  Patient expressed understanding of goal: Yes  Action steps to achieve this goal:  1. I will follow up with my providers as scheduled/recommended    2. I will take my medications as prescribed  3. I will follow up with my Diabetic Educator as scheduled/recommended  4. I will contact my care team with questions, concerns, support needs   5. I will use the clinic as a resource, and I understand I can contact my clinic with 24/7 after hours services available                     Discussed:    Patient stated he checks his blood sugar every day or every other day.    Patient stated he has an appointment today at [facility] for an injection in his hip to help slow his cancer.  Patient stated today he will be given 1/2 shot to see if his body will be able to tolerate the medication.  Patient stated he will have on going monthly injections.    Patient stated his sister  from cancer three days ago.    Patient stated his girlfriend, Kenia inherited her father's home in [place], [place].  The property has 7 acres of land.  Patient stated he and his girlfriend have been fixing up.  They plan to have this as their cabin.  Patient stated over the , he and his girlfriend will host a get  to know you party.  They invited 200 people.    Patient thanked CHW for calling and requested to be called again.   Patient  stated he appreciates the check-in's.      Intervention and Education during outreach:   CHW encouraged patient to contact CC if there are any other changes or resources needed.  Patient acknowledged understanding.          CHW Plan: will outreach in one month.    Kelley Slaughter, OMAR  Clinic Care Coordination  [facility] Clinics: [place] [place], [place], [place], and [place] [facility]  Phone: [phone]

## 2023-06-15 NOTE — PROGRESS NOTES
Clinic Care Coordination Contact  Care Coordination Clinician Chart Review    Situation: Patient chart reviewed by Care Coordinator.       Background: Care Coordination Program started: 9/24/2020. Initial assessment completed and patient-centered care plan(s) were developed with participation from patient. Lead CC handed patient off to CHW for continued outreaches.       Assessment: Per chart review, patient outreach completed by CC CHW on 6/15/2023.  Patient is actively working to accomplish goal(s). Patient's goal(s) appropriate and relevant at this time. Patient is not due for updated Plan of Care.  Assessments will be completed annually or as needed/with change of patient status.      Care Plan: 1. Improve chronic symptoms     Problem: Lifestyle choices     Goal: I want to improve management of my leg, knee, and hip pain and swelling.     Start Date: 6/3/2021 Expected End Date: 9/15/2023    This Visit's Progress: 80% Recent Progress: 80%    Note:     Barriers: Lymphedema  Strengths: Motivated to improve ability to walk  Patient expressed understanding of goal: Yes  Action steps to achieve this goal:  1. I will apply Jacinto hose to wear throughout the day and remove at night  2. I will walk my dog daily to help with strengthening and endurance  3. I will elevate my legs while sitting and laying down by propping them up with a pillow  4. I will discuss, review, schedule and complete recommended overdue health maintenance with my primary care provider  5. I will I will take my medications as prescribed  6. I will contact my care team with questions, concerns, support needs. I will use the clinic as a resource   7. I will contact my clinic with 24/7 after hours services available                       Plan/Recommendations: The patient will continue working with Care Coordination to achieve goal(s) as above. CHW will continue outreaches at minimum every 30 days and will involve Lead CC as needed or if patient is ready  to move to Maintenance. Lead CC will continue to monitor CHW outreaches and patient's progress to goal(s) every 6 weeks.     Plan of Care updated and sent to patient: Ilene Knapp RN, BSN, PHN  Primary Care / Care Coordinator   [facility]  E-mail [email or web address]   [phone]

## 2023-06-26 DIAGNOSIS — E78.5 HYPERLIPIDEMIA LDL GOAL <100: ICD-10-CM

## 2023-06-26 DIAGNOSIS — I26.99 OTHER PULMONARY EMBOLISM WITHOUT ACUTE COR PULMONALE, UNSPECIFIED CHRONICITY (H): ICD-10-CM

## 2023-06-26 RX ORDER — APIXABAN 5 MG/1
TABLET, FILM COATED ORAL
Qty: 180 TABLET | Refills: 3 | Status: ON HOLD | OUTPATIENT
Start: 2023-06-26 | End: 2024-03-06

## 2023-06-26 RX ORDER — ATORVASTATIN CALCIUM 20 MG/1
TABLET, FILM COATED ORAL
Qty: 90 TABLET | Refills: 3 | Status: SHIPPED | OUTPATIENT
Start: 2023-06-26 | End: 2024-06-03

## 2023-06-26 NOTE — TELEPHONE ENCOUNTER
Prescriptions have been filled but patient is due for follow-up lipid panel, fasting as well as clinic visit

## 2023-07-05 ENCOUNTER — ANCILLARY PROCEDURE (OUTPATIENT)
Dept: GENERAL RADIOLOGY | Facility: CLINIC | Age: 78
End: 2023-07-05
Attending: INTERNAL MEDICINE
Payer: COMMERCIAL

## 2023-07-05 ENCOUNTER — VIRTUAL VISIT (OUTPATIENT)
Dept: INTERNAL MEDICINE | Facility: CLINIC | Age: 78
End: 2023-07-05
Payer: COMMERCIAL

## 2023-07-05 ENCOUNTER — NURSE TRIAGE (OUTPATIENT)
Dept: INTERNAL MEDICINE | Facility: CLINIC | Age: 78
End: 2023-07-05

## 2023-07-05 ENCOUNTER — LAB (OUTPATIENT)
Dept: URGENT CARE | Facility: URGENT CARE | Age: 78
End: 2023-07-05
Attending: INTERNAL MEDICINE
Payer: COMMERCIAL

## 2023-07-05 DIAGNOSIS — R05.9 COUGH, UNSPECIFIED TYPE: ICD-10-CM

## 2023-07-05 DIAGNOSIS — R05.9 COUGH, UNSPECIFIED TYPE: Primary | ICD-10-CM

## 2023-07-05 DIAGNOSIS — E66.01 MORBID (SEVERE) OBESITY DUE TO EXCESS CALORIES (H): ICD-10-CM

## 2023-07-05 DIAGNOSIS — J44.9 CHRONIC OBSTRUCTIVE PULMONARY DISEASE, UNSPECIFIED COPD TYPE (H): ICD-10-CM

## 2023-07-05 PROCEDURE — 99213 OFFICE O/P EST LOW 20 MIN: CPT | Mod: 93 | Performed by: INTERNAL MEDICINE

## 2023-07-05 PROCEDURE — 87635 SARS-COV-2 COVID-19 AMP PRB: CPT

## 2023-07-05 PROCEDURE — 71046 X-RAY EXAM CHEST 2 VIEWS: CPT | Mod: TC | Performed by: RADIOLOGY

## 2023-07-05 PROCEDURE — 99207 PR NO CHARGE LOS: CPT

## 2023-07-05 NOTE — TELEPHONE ENCOUNTER
"Pt has had a frequent, productive cough x 7 days. Symptoms have not improved with OTC meds. Pt reporting symptoms have been worsening and is requesting an appointment with provider. Pt triaged and dispo'd to office within 24 hours. Pt scheduled VV this afternoon.     1. ONSET: \"When did the cough begin?\"       7 days ago   2. SEVERITY: \"How bad is the cough today?\"       Worsening; mild-moderate. Fan makes it worse, cough for a couple minutes, breathing okay, no vomiting. Frequent, several  Times an hour.   3. SPUTUM: \"Describe the color of your sputum\" (none, dry cough; clear, white, yellow, green)      Dark, ricola throat lozenge is black and causing color of sputum. Coricidin is not helping improve symptoms either.   4. HEMOPTYSIS: \"Are you coughing up any blood?\" If so ask: \"How much?\" (flecks, streaks, tablespoons, etc.)      Denies   5. DIFFICULTY BREATHING: \"Are you having difficulty breathing?\" If Yes, ask: \"How bad is it?\" (e.g., mild, moderate, severe)     - MILD: No SOB at rest, mild SOB with walking, speaks normally in sentences, can lie down, no retractions, pulse < 100.     - MODERATE: SOB at rest, SOB with minimal exertion and prefers to sit, cannot lie down flat, speaks in phrases, mild retractions, audible wheezing, pulse 100-120.     - SEVERE: Very SOB at rest, speaks in single words, struggling to breathe, sitting hunched forward, retractions, pulse > 120       Denies   6. FEVER: \"Do you have a fever?\" If Yes, ask: \"What is your temperature, how was it measured, and when did it start?\"      Sweaty, hasnt checked temp   7. CARDIAC HISTORY: \"Do you have any history of heart disease?\" (e.g., heart attack, congestive heart failure)       CHF  8. LUNG HISTORY: \"Do you have any history of lung disease?\"  (e.g., pulmonary embolus, asthma, emphysema)      Lung ca, copd.   9. PE RISK FACTORS: \"Do you have a history of blood clots?\" (or: recent major surgery, recent prolonged travel, bedridden)      " "thrombosis  10. OTHER SYMPTOMS: \"Do you have any other symptoms?\" (e.g., runny nose, wheezing, chest pain)        Denies   11. PREGNANCY: \"Is there any chance you are pregnant?\" \"When was your last menstrual period?\"           12. TRAVEL: \"Have you traveled out of the country in the last month?\" (e.g., travel history, exposures)        Denies but [facility]    Reason for Disposition    [1] Continuous (nonstop) coughing interferes with work or school AND [2] no improvement using cough treatment per Care Advice    Additional Information    Negative: SEVERE difficulty breathing (e.g., struggling for each breath, speaks in single words)    Negative: Bluish (or gray) lips or face now    Negative: [1] Difficulty breathing AND [2] exposure to flames, smoke, or fumes    Negative: [1] Stridor AND [2] difficulty breathing    Negative: Sounds like a life-threatening emergency to the triager    Negative: [1] Previous asthma attacks AND [2] this feels like asthma attack    Negative: Dry cough (non-productive;  no sputum or minimal clear sputum)    Negative: [1] MODERATE difficulty breathing (e.g., speaks in phrases, SOB even at rest, pulse 100-120) AND [2] still present when not coughing    Negative: Chest pain  (Exception: MILD central chest pain, present only when coughing)    Negative: Patient sounds very sick or weak to the triager    Negative: [1] MILD difficulty breathing (e.g., minimal/no SOB at rest, SOB with walking, pulse <100) AND [2] still present when not coughing    Negative: [1] Coughed up blood AND [2] > 1 tablespoon (15 ml)  (Exception: Blood-tinged sputum.)    Negative: Fever > 103 F (39.4 C)    Negative: [1] Fever > 101 F (38.3 C) AND [2] age > 60 years    Negative: [1] Fever > 100.0 F (37.8 C) AND [2] bedridden (e.g., nursing home patient, CVA, chronic illness, recovering from surgery)    Negative: [1] Fever > 100.0 F (37.8 C) AND [2] diabetes mellitus or weak immune system (e.g., HIV positive, cancer " chemo, splenectomy, organ transplant, chronic steroids)    Negative: Wheezing is present    Negative: SEVERE coughing spells (e.g., whooping sound after coughing, vomiting after coughing)    Protocols used: COUGH - ACUTE [id number]

## 2023-07-05 NOTE — PROGRESS NOTES
"Javon is a 77 year old who is being evaluated via a billable telephone visit.      How would you like to obtain your AVS? MyChart  If the video visit is dropped, the invitation should be resent by: home number  Will anyone else be joining your video visit? No        Assessment & Plan     Cough, unspecified type  Symptoms appear to be more viral in etiology.  Suggested a COVID test and a chest x-ray for reassurance.  Supportive care has been recommended pending results.  - XR Chest 2 Views; Future  - Symptomatic COVID-19 Virus (Coronavirus) by PCR Nasopharyngeal; Future    (J44.9) Chronic obstructive pulmonary disease, unspecified COPD type (H)  Comment: Noted as baseline but no new changes in wheezing.  Plan:     (E66.01) Morbid (severe) obesity due to excess calories (H)  Comment: Have encouraged ongoing weight loss  Plan:       BMI:   Estimated body mass index is 34.88 kg/m  as calculated from the following:    Height as of 3/21/23: 1.702 m (5' 7\").    Weight as of 3/21/23: 101 kg (222 lb 11.2 oz).   Weight management plan: Discussed healthy diet and exercise guidelines    Work on weight loss  Regular exercise    Olegario Castillo MD  [facility]   Javon is a 77 year old, presenting for the following health issues:      HPI     Garnica states that he went down to [place] about 5 days ago and while he was down there developed a cold/illness.  The cough is without fever and is relatively nonproductive.  He has been using over-the-counter cough drops.  He does not have any real change in shortness of breath from baseline.  He states that nobody knows of was ill down in [place].   He denies any other real associated symptoms.  Denies hemoptysis or hematemesis.  He has no pleuritic pain.    He has not done a home COVID test.    Review of Systems   CONSTITUTIONAL: NEGATIVE for fever, chills, change in weight  EYES: NEGATIVE for vision changes or irritation  ENT/MOUTH: " NEGATIVE for ear, mouth and throat problems  CV: NEGATIVE for chest pain, palpitations   GI: NEGATIVE for nausea, abdominal pain, heartburn, or change in bowel habits  : NEGATIVE for frequency, dysuria, or hematuria  MUSCULOSKELETAL: NEGATIVE for significant arthralgias or myalgia  NEURO: NEGATIVE for weakness, dizziness or paresthesias  HEME: NEGATIVE for bleeding problems  PSYCHIATRIC: NEGATIVE for changes in mood or affect    Current Outpatient Medications   Medication     acetaminophen (TYLENOL) 500 MG tablet     albuterol (VENTOLIN HFA) 108 (90 Base) MCG/ACT inhaler     Ascorbic Acid (VITAMIN C PO)     atorvastatin (LIPITOR) 20 MG tablet     blood glucose (ACCU-CHEK CHACHA) test strip     blood glucose (NO BRAND SPECIFIED) lancets standard     blood glucose (NO BRAND SPECIFIED) test strip     blood glucose monitoring (NO BRAND SPECIFIED) meter device kit     cholestyramine light (QUESTRAN) 4 GM packet     docusate sodium (COLACE) 100 MG capsule     ELIQUIS ANTICOAGULANT 5 MG tablet     finasteride (PROSCAR) 5 MG tablet     Fluticasone-Umeclidin-Vilant (TRELEGY ELLIPTA) 100-62.5-25 MCG/ACT oral inhaler     furosemide (LASIX) 40 MG tablet     gabapentin (NEURONTIN) 300 MG capsule     lisinopril (ZESTRIL) 40 MG tablet     metFORMIN (GLUCOPHAGE) 500 MG tablet     metFORMIN (GLUCOPHAGE) 500 MG tablet     metoprolol succinate ER (TOPROL XL) 25 MG 24 hr tablet     order for DME     order for DME     No current facility-administered medications for this visit.           Objective         Vitals:  No vitals were obtained today due to virtual visit.    Physical Exam   GENERAL: alert and no distress  RESP: No audible wheeze, cough, or visible cyanosis.    PSYCH: Mentation appears normal, affect normal/bright, judgement and insight intact, normal speech and appearance well-groomed.        Telephone visit:  14 minutes

## 2023-07-06 LAB — SARS-COV-2 RNA RESP QL NAA+PROBE: NEGATIVE

## 2023-07-07 ENCOUNTER — NURSE TRIAGE (OUTPATIENT)
Dept: INTERNAL MEDICINE | Facility: CLINIC | Age: 78
End: 2023-07-07
Payer: COMMERCIAL

## 2023-07-07 ENCOUNTER — MYC MEDICAL ADVICE (OUTPATIENT)
Dept: INTERNAL MEDICINE | Facility: CLINIC | Age: 78
End: 2023-07-07
Payer: COMMERCIAL

## 2023-07-07 NOTE — TELEPHONE ENCOUNTER
Patient triaged today, disposition to continue Home Care.    sent with Home Care advice for treatments of cold and info for Urgent Care if wants to be evaluated in person.

## 2023-07-07 NOTE — TELEPHONE ENCOUNTER
"Nurse Triage SBAR    Is this a 2nd Level Triage? NO    Situation: Patient and spouse calling clinic to report continued cough. Patient reports productive cough with green phlegm and it has not improved since 7/5/2023 VV with PCP.    Background: Patient stated he has COPD but this cough is new and has been present \"for a while now\".     Assessment: Patient did not report any new or worsening symptoms. Patient denies SOB at rest or any additional influenza like symptoms. RN instructed patient to take temperature during phone call, temperature was 98.1. Patient denied nasal congestion or drainage.     Protocol Recommended Disposition:   Home Care    Recommendation: Per disposition, RN advised patient to continue with Home Care but if patient feels symptoms should be addressed in person, UC is available. Patient and spouse verbalized understanding and expressed desire to be seen in UC today. Patient and spouse did not have any additional questions or concerns.     Does the patient meet one of the following criteria for ADS visit consideration? 16+ years old, with an [facility] PCP     TIP  Providers, please consider if this condition is appropriate for management at one of our Acute and Diagnostic Services sites.     If patient is a good candidate, please use dotphrase <dot>triageresponse and select Refer to ADS to document.      Reason for Disposition    Colds with no complications    Additional Information    Negative: SEVERE difficulty breathing (e.g., struggling for each breath, speaks in single words)    Negative: Very weak (can't stand)    Negative: Sounds like a life-threatening emergency to the triager    Negative: Difficulty breathing and not from stuffy nose (e.g., not relieved by cleaning out the nose)    Negative: Runny nose is caused by pollen or other allergies    Negative: Cough is main symptom    Negative: Sore throat is main symptom    Negative: Patient sounds very sick or weak to the triager    Negative: " Fever > 103 F (39.4 C)    Negative: Fever > 101 F (38.3 C) and over 60 years of age    Negative: Fever > 100.0 F (37.8 C) and bedridden (e.g., nursing home patient, stroke, chronic illness, recovering from surgery)    Negative: Fever > 100.0 F (37.8 C) and has diabetes mellitus or a weak immune system (e.g., HIV positive, cancer chemotherapy, organ transplant, splenectomy, chronic steroids)    Negative: Fever present > 3 days (72 hours)    Negative: Fever returns after gone for over 24 hours and symptoms worse or not improved    Negative: Sinus pain (not just congestion) and fever    Negative: Earache    Negative: Sinus congestion (pressure, fullness) present > 10 days    Negative: Nasal discharge present > 10 days    Negative: Using nasal washes and pain medicine > 24 hours and sinus pain (lower forehead, cheekbone, or eye) persists    Negative: Patient wants to be seen    Negative: Sore throat present > 5 days    Protocols used: COMMON COLD-A-OH

## 2023-07-12 ENCOUNTER — MYC MEDICAL ADVICE (OUTPATIENT)
Dept: INTERNAL MEDICINE | Facility: CLINIC | Age: 78
End: 2023-07-12
Payer: COMMERCIAL

## 2023-07-21 ENCOUNTER — DOCUMENTATION ONLY (OUTPATIENT)
Dept: INTERNAL MEDICINE | Facility: CLINIC | Age: 78
End: 2023-07-21
Payer: COMMERCIAL

## 2023-07-21 DIAGNOSIS — J44.9 CHRONIC OBSTRUCTIVE PULMONARY DISEASE, UNSPECIFIED COPD TYPE (H): Primary | ICD-10-CM

## 2023-07-26 ENCOUNTER — TRANSFERRED RECORDS (OUTPATIENT)
Dept: HEALTH INFORMATION MANAGEMENT | Facility: CLINIC | Age: 78
End: 2023-07-26
Payer: COMMERCIAL

## 2023-07-31 ENCOUNTER — OFFICE VISIT (OUTPATIENT)
Dept: INTERNAL MEDICINE | Facility: CLINIC | Age: 78
End: 2023-07-31
Payer: COMMERCIAL

## 2023-07-31 VITALS
OXYGEN SATURATION: 95 % | DIASTOLIC BLOOD PRESSURE: 80 MMHG | SYSTOLIC BLOOD PRESSURE: 154 MMHG | HEART RATE: 69 BPM | TEMPERATURE: 98.7 F | HEIGHT: 67 IN | WEIGHT: 214.3 LBS | BODY MASS INDEX: 33.63 KG/M2

## 2023-07-31 DIAGNOSIS — M54.2 NECK PAIN: ICD-10-CM

## 2023-07-31 DIAGNOSIS — E11.9 TYPE 2 DIABETES MELLITUS WITHOUT COMPLICATION, WITHOUT LONG-TERM CURRENT USE OF INSULIN (H): Primary | ICD-10-CM

## 2023-07-31 PROCEDURE — 99213 OFFICE O/P EST LOW 20 MIN: CPT | Performed by: INTERNAL MEDICINE

## 2023-07-31 RX ORDER — TIZANIDINE 2 MG/1
2 TABLET ORAL 3 TIMES DAILY PRN
Qty: 30 TABLET | Refills: 3 | Status: ON HOLD | OUTPATIENT
Start: 2023-07-31 | End: 2024-03-06

## 2023-07-31 NOTE — PROGRESS NOTES
"  Assessment & Plan       Neck pain  Check x-ray as ordered, clinically seems consistent with paraspinous muscle spasm.  Offered nonsedating muscle relaxant for use as needed, and will arrange for physical therapy.  - XR Cervical Spine 2/3 Views; Future  - tiZANidine (ZANAFLEX) 2 MG tablet; Take 1 tablet (2 mg) by mouth 3 times daily as needed for muscle spasms                 Bernardo Arce MD  [facility]    Mariano Alejandro is a 77 year old, presenting for the following health issues:  Neck Pain      History of Present Illness       Reason for visit:  Neck pain    He eats 0-1 servings of fruits and vegetables daily.He consumes 2 sweetened beverage(s) daily.He exercises with enough effort to increase his heart rate 9 or less minutes per day.  He exercises with enough effort to increase his heart rate 7 days per week.   He is taking medications regularly.       PCP is Dr. Castillo.  Complaining of several weeks of right-sided neck pain.  Pain is well localized to the side of his neck, and is accompanied by an occasional \"locking\" sensation when he tries to rotate his neck.  He denies any radicular pain down into his shoulders or arms, no weakness of arms or legs.  No obvious injury or inciting event recalled.        Review of Systems   Constitutional, HEENT, cardiovascular, pulmonary, GI, , musculoskeletal, neuro, skin, endocrine and psych systems are negative, except as otherwise noted.      Objective    BP (!) 154/80   Pulse 69   Temp 98.7  F (37.1  C) (Temporal)   Ht 1.702 m (5' 7\")   Wt 97.2 kg (214 lb 4.8 oz)   SpO2 95%   BMI 33.56 kg/m    Body mass index is 33.56 kg/m .  Physical Exam   GENERAL: healthy, alert and no distress  NECK: no adenopathy, no asymmetry, masses, or scars and thyroid normal to palpation  RESP: lungs clear to auscultation - no rales, rhonchi or wheezes  MS: Has some tenderness to palpation in his cervical paraspinous musculature on the right " side, and right sternocleidomastoid musculature.  Rotational range of motion of cervical spine slightly reduced.

## 2023-08-01 ENCOUNTER — PATIENT OUTREACH (OUTPATIENT)
Dept: CARE COORDINATION | Facility: CLINIC | Age: 78
End: 2023-08-01
Payer: COMMERCIAL

## 2023-08-01 NOTE — PROGRESS NOTES
Clinic Care Coordination Contact  Community Health Worker Follow Up    Care Gaps:   Health Maintenance Due   Topic Date Due    HF ACTION PLAN  Never done    DIABETIC FOOT EXAM  11/01/2019    EYE EXAM  04/01/2022    LIPID  02/10/2023    TSH W/FREE T4 REFLEX  02/10/2023    COVID-19 Vaccine (6 - Pfizer series) 02/18/2023    MEDICARE ANNUAL WELLNESS VISIT  04/04/2023    MICROALBUMIN  05/20/2023    DTAP/TDAP/TD IMMUNIZATION (2 - Td or Tdap) 08/09/2023     Scheduled Office Visit (Annual Physical) 08/24/2023 10:40 AM [facility]      Care Plan:   Care Plan: 1. Improve chronic symptoms       Problem: Lifestyle choices       Goal: I want to improve management of my leg, knee, and hip pain and swelling.       Start Date: 6/3/2021 Expected End Date: 9/15/2023    This Visit's Progress: 90% Recent Progress: 80%    Note:     Barriers: Lymphedema  Strengths: Motivated to improve ability to walk  Patient expressed understanding of goal: Yes  Action steps to achieve this goal:  1. I will apply Jacinto hose to wear throughout the day and remove at night  2. I will walk my dog daily to help with strengthening and endurance  3. I will elevate my legs while sitting and laying down by propping them up with a pillow  4. I will discuss, review, schedule and complete recommended overdue health maintenance with my primary care provider  5. I will I will take my medications as prescribed  6. I will contact my care team with questions, concerns, support needs. I will use the clinic as a resource   7. I will contact my clinic with 24/7 after hours services available                              Intervention and Education during outreach:     Spoke to Patient (Javon)  CHW Introduced intent of call regarding monthly follow up.   Patients reports that he is doing okay. Further expressing that leg, knee, and hip pain and swelling is improving. Further expressing that Patient is continuously uses Jacinto hose to wear  throughout the day and remove at night and takes medication as prescribed. Patient expressed no additional support is need at this time. CHW acknowledged and encouraged Patient to contact CHW/ CCC Team if anything changes and additional support is needed. Also informing Patient on contacting the clinic with 24/7 after hours services available. Patient acknowledged.   CHW confirms Patient's upcoming appointment; Office Visit (Annual Physical) 08/24/2023 10:40 AM [facility]. Patient acknowledged.     CHW encourages Patient to contact CHW/ CCC Team if additional support is needed. CHW provides Patient with CHW's contact information. Patient acknowledged.     CHW Plan: Next CHW Outreach in One Month   Discuss Patient moving to Maintenance Status

## 2023-08-02 ENCOUNTER — ANCILLARY PROCEDURE (OUTPATIENT)
Dept: GENERAL RADIOLOGY | Facility: CLINIC | Age: 78
End: 2023-08-02
Attending: INTERNAL MEDICINE
Payer: COMMERCIAL

## 2023-08-02 DIAGNOSIS — M54.2 NECK PAIN: ICD-10-CM

## 2023-08-02 PROCEDURE — 72040 X-RAY EXAM NECK SPINE 2-3 VW: CPT | Mod: TC | Performed by: RADIOLOGY

## 2023-08-03 ENCOUNTER — TELEPHONE (OUTPATIENT)
Dept: INTERNAL MEDICINE | Facility: CLINIC | Age: 78
End: 2023-08-03
Payer: COMMERCIAL

## 2023-08-03 DIAGNOSIS — R93.7 ABNORMAL X-RAY OF CERVICAL SPINE: Primary | ICD-10-CM

## 2023-08-03 NOTE — TELEPHONE ENCOUNTER
CC: Jordin Crowe, radiologist calling from [facility] to report findings of recent cervical spine XR.    Dr. Crowe reporting gas in soft tissues - per Dr. Crowe concern for infection or dilated esophagus states patient will need a CT scan.    Images are also available per chart review     Routing HP to PCP to notify (also routing to Dr. Arce who ordered test)- if more information needed please call [phone] and ask for Dr. Crowe - thank you!     Dex Serrano RN  [facility]

## 2023-08-03 NOTE — TELEPHONE ENCOUNTER
Full x-ray report not available yet.  Spoke with Dr. Crowe by phone.  There is question in his mind of a possible gas pocket in the prevertebral soft tissue in his cervical spine region.  Although this could simply represent an unusually dilated esophagus, Dr. Crowe is recommending a CT of his neck/soft tissue to evaluate this further.    Order for this placed, discussed plan of care with patient by phone today, he agrees.

## 2023-08-08 ENCOUNTER — PATIENT OUTREACH (OUTPATIENT)
Dept: CARE COORDINATION | Facility: CLINIC | Age: 78
End: 2023-08-08
Payer: COMMERCIAL

## 2023-08-08 ASSESSMENT — ACTIVITIES OF DAILY LIVING (ADL): DEPENDENT_IADLS:: CLEANING;COOKING;LAUNDRY;SHOPPING;MEAL PREPARATION

## 2023-08-08 NOTE — PROGRESS NOTES
Clinic Care Coordination Contact  Care Coordination Clinician Chart Review    Situation: Patient chart reviewed by Care Coordinator.       Background: Care Coordination Program started: 9/24/2020. Initial assessment completed and patient-centered care plan(s) were developed with participation from patient. Lead CC handed patient off to CHW for continued outreaches.       Assessment: Per chart review, patient outreach completed by CC CHW on 8/1/2023. Patient is actively working to accomplish goal(s). Patient's goal(s) appropriate and relevant at this time. Patient is not due for updated Plan of Care.  Assessments will be completed annually or as needed/with change of patient status.      Care Plan: 1. Improve chronic symptoms       Problem: Lifestyle choices       Goal: I want to improve management of my leg, knee, and hip pain and swelling.       Start Date: 6/3/2021 Expected End Date: 9/15/2023    This Visit's Progress: 90% Recent Progress: 90%    Note:     Barriers: Lymphedema  Strengths: Motivated to improve ability to walk  Patient expressed understanding of goal: Yes  Action steps to achieve this goal:  1. I will apply Jacinto hose to wear throughout the day and remove at night  2. I will walk my dog daily to help with strengthening and endurance  3. I will elevate my legs while sitting and laying down by propping them up with a pillow  4. I will discuss, review, schedule and complete recommended overdue health maintenance with my primary care provider  5. I will I will take my medications as prescribed  6. I will contact my care team with questions, concerns, support needs. I will use the clinic as a resource   7. I will contact my clinic with 24/7 after hours services available                            Plan/Recommendations: The patient will continue working with Care Coordination to achieve goal(s) as above. CHW will continue outreaches at minimum every 30 days and will involve Lead CC as needed or if patient  is ready to move to Maintenance. Lead CC will continue to monitor CHW outreaches and patient's progress to goal(s) every 6 weeks.     Plan of Care updated and sent to patient: Ilene Knapp RN, BSN, PHN  Primary Care / Care Coordinator   [facility]  E-mail [email or web address]   [phone]

## 2023-08-11 ENCOUNTER — ANCILLARY PROCEDURE (OUTPATIENT)
Dept: CT IMAGING | Facility: CLINIC | Age: 78
End: 2023-08-11
Attending: INTERNAL MEDICINE
Payer: COMMERCIAL

## 2023-08-11 DIAGNOSIS — R93.7 ABNORMAL X-RAY OF CERVICAL SPINE: ICD-10-CM

## 2023-08-11 LAB
CREAT BLD-MCNC: 1.3 MG/DL (ref 0.7–1.3)
GFR SERPL CREATININE-BSD FRML MDRD: 56 ML/MIN/1.73M2

## 2023-08-11 PROCEDURE — 82565 ASSAY OF CREATININE: CPT

## 2023-08-11 PROCEDURE — 250N000011 HC RX IP 250 OP 636: Performed by: INTERNAL MEDICINE

## 2023-08-11 PROCEDURE — 250N000009 HC RX 250: Performed by: INTERNAL MEDICINE

## 2023-08-11 PROCEDURE — 70491 CT SOFT TISSUE NECK W/DYE: CPT

## 2023-08-11 RX ORDER — IOPAMIDOL 755 MG/ML
100 INJECTION, SOLUTION INTRAVASCULAR ONCE
Status: COMPLETED | OUTPATIENT
Start: 2023-08-11 | End: 2023-08-11

## 2023-08-11 RX ADMIN — SODIUM CHLORIDE 40 ML: 9 INJECTION, SOLUTION INTRAVENOUS at 15:18

## 2023-08-11 RX ADMIN — IOPAMIDOL 100 ML: 755 INJECTION, SOLUTION INTRAVENOUS at 15:18

## 2023-08-23 ENCOUNTER — TRANSFERRED RECORDS (OUTPATIENT)
Dept: HEALTH INFORMATION MANAGEMENT | Facility: CLINIC | Age: 78
End: 2023-08-23
Payer: COMMERCIAL

## 2023-08-24 ENCOUNTER — OFFICE VISIT (OUTPATIENT)
Dept: INTERNAL MEDICINE | Facility: CLINIC | Age: 78
End: 2023-08-24
Payer: COMMERCIAL

## 2023-08-24 VITALS
BODY MASS INDEX: 34.36 KG/M2 | OXYGEN SATURATION: 95 % | TEMPERATURE: 98.6 F | RESPIRATION RATE: 12 BRPM | HEIGHT: 67 IN | DIASTOLIC BLOOD PRESSURE: 74 MMHG | HEART RATE: 62 BPM | WEIGHT: 218.9 LBS | SYSTOLIC BLOOD PRESSURE: 134 MMHG

## 2023-08-24 DIAGNOSIS — E78.5 HYPERLIPIDEMIA LDL GOAL <100: ICD-10-CM

## 2023-08-24 DIAGNOSIS — J44.9 CHRONIC OBSTRUCTIVE PULMONARY DISEASE, UNSPECIFIED COPD TYPE (H): Chronic | ICD-10-CM

## 2023-08-24 DIAGNOSIS — G47.33 OBSTRUCTIVE SLEEP APNEA SYNDROME: ICD-10-CM

## 2023-08-24 DIAGNOSIS — E11.9 TYPE 2 DIABETES MELLITUS WITHOUT COMPLICATION, WITHOUT LONG-TERM CURRENT USE OF INSULIN (H): ICD-10-CM

## 2023-08-24 DIAGNOSIS — Z12.11 COLON CANCER SCREENING: ICD-10-CM

## 2023-08-24 DIAGNOSIS — Z00.00 ENCOUNTER FOR SUBSEQUENT ANNUAL WELLNESS VISIT IN MEDICARE PATIENT: Primary | ICD-10-CM

## 2023-08-24 DIAGNOSIS — I10 ESSENTIAL HYPERTENSION, BENIGN: ICD-10-CM

## 2023-08-24 DIAGNOSIS — M54.2 CERVICALGIA: ICD-10-CM

## 2023-08-24 DIAGNOSIS — Z12.5 SCREENING PSA (PROSTATE SPECIFIC ANTIGEN): ICD-10-CM

## 2023-08-24 DIAGNOSIS — E66.01 MORBID OBESITY DUE TO EXCESS CALORIES (H): ICD-10-CM

## 2023-08-24 DIAGNOSIS — Z85.118 HISTORY OF ADENOCARCINOMA OF LUNG: ICD-10-CM

## 2023-08-24 DIAGNOSIS — G62.9 PERIPHERAL POLYNEUROPATHY: ICD-10-CM

## 2023-08-24 PROCEDURE — G0439 PPPS, SUBSEQ VISIT: HCPCS | Performed by: INTERNAL MEDICINE

## 2023-08-24 PROCEDURE — 99214 OFFICE O/P EST MOD 30 MIN: CPT | Mod: 25 | Performed by: INTERNAL MEDICINE

## 2023-08-24 RX ORDER — GABAPENTIN 300 MG/1
300 CAPSULE ORAL 2 TIMES DAILY
Qty: 60 CAPSULE | Refills: 0 | Status: SHIPPED | OUTPATIENT
Start: 2023-08-24 | End: 2023-09-29

## 2023-08-24 RX ORDER — ALBUTEROL SULFATE 90 UG/1
AEROSOL, METERED RESPIRATORY (INHALATION)
Qty: 25.5 G | Refills: 0 | Status: SHIPPED | OUTPATIENT
Start: 2023-08-24 | End: 2023-11-05

## 2023-08-24 ASSESSMENT — ACTIVITIES OF DAILY LIVING (ADL): CURRENT_FUNCTION: NO ASSISTANCE NEEDED

## 2023-08-24 NOTE — PATIENT INSTRUCTIONS
Patient Education   Personalized Prevention Plan  You are due for the preventive services outlined below.  Your care team is available to assist you in scheduling these services.  If you have already completed any of these items, please share that information with your care team to update in your medical record.  Health Maintenance Due   Topic Date Due     Heart Failure Action Plan  Never done     Diabetic Foot Exam  11/01/2019     Eye Exam  04/01/2022     Cholesterol Lab  02/10/2023     COVID-19 Vaccine (6 - Pfizer series) 02/18/2023     Annual Wellness Visit  04/04/2023     Kidney Microalbumin Urine Test  05/20/2023     Diptheria Tetanus Pertussis (DTAP/TDAP/TD) Vaccine (2 - Td or Tdap) 08/09/2023     Basic Metabolic Panel  09/21/2023     Learning About Dietary Guidelines  What are the Dietary Guidelines for Americans?     Dietary Guidelines for Americans provide tips for eating well and staying healthy. This helps reduce the risk for long-term (chronic) diseases.  These guidelines recommend that you:  Eat and drink the right amount for you. The U.[place] government's food guide is called MyPlate. It can help you make your own well-balanced eating plan.  Try to balance your eating with your activity. This helps you stay at a healthy weight.  Drink alcohol in moderation, if at all.  Limit foods high in salt, saturated fat, trans fat, and added sugar.  These guidelines are from the U.S. Department of Agriculture and the U.S. Department of Health and Human Services. They are updated every 5 years.  What is MyPlate?  MyPlate is the U.[place]. government's food guide. It can help you make your own well-balanced eating plan. A balanced eating plan means that you eat enough, but not too much, and that your food gives you the nutrients you need to stay healthy.  MyPlate focuses on eating plenty of whole grains, fruits, and vegetables, and on limiting fat and sugar. It is available online at www.ChooseMyPlate.gov.  How can you get  "started?  If you're trying to eat healthier, you can slowly change your eating habits over time. You don't have to make big changes all at once. Start by adding one or two healthy foods to your meals each day.  Grains  Choose whole-grain breads and cereals and whole-wheat pasta and whole-grain crackers.  Vegetables  Eat a variety of vegetables every day. They have lots of nutrients and are part of a heart-healthy diet.  Fruits  Eat a variety of fruits every day. Fruits contain lots of nutrients. Choose fresh fruit instead of fruit juice.  Protein foods  Choose fish and lean poultry more often. Eat red meat and fried meats less often. Dried beans, tofu, and nuts are also good sources of protein.  Dairy  Choose low-fat or fat-free products from this food group. If you have problems digesting milk, try eating cheese or yogurt instead.  Fats and oils  Limit fats and oils if you're trying to cut calories. Choose healthy fats when you cook. These include canola oil and olive oil.  Where can you learn more?  Go to https://www.[employer].com.net/patiented  Enter D676 in the search box to learn more about \"Learning About Dietary Guidelines.\"  Current as of: March 1, 2023               Content Version: 13.7    3551-1816 [employer].   Care instructions adapted under license by your healthcare professional. If you have questions about a medical condition or this instruction, always ask your healthcare professional. [employer] disclaims any warranty or liability for your use of this information.      Bladder Training: Care Instructions  Your Care Instructions     Bladder training is used to treat urge incontinence and stress incontinence. Urge incontinence means that the need to urinate comes on so fast that you can't get to a toilet in time. Stress incontinence means that you leak urine because of pressure on your bladder. For example, it may happen when you laugh, cough, or lift something " heavy.  Bladder training can increase how long you can wait before you have to urinate. It can also help your bladder hold more urine. And it can give you better control over the urge to urinate.  It is important to remember that bladder training takes a few weeks to a few months to make a difference. You may not see results right away, but don't give up.  Follow-up care is a key part of your treatment and safety. Be sure to make and go to all appointments, and call your doctor if you are having problems. It's also a good idea to know your test results and keep a list of the medicines you take.  How can you care for yourself at home?  Work with your doctor to come up with a bladder training program that is right for you. You may use one or more of the following methods.  Delayed urination  In the beginning, try to keep from urinating for 5 minutes after you first feel the need to go.  While you wait, take deep, slow breaths to relax. Kegel exercises can also help you delay the need to go to the bathroom.  After some practice, when you can easily wait 5 minutes to urinate, try to wait 10 minutes before you urinate.  Slowly increase the waiting period until you are able to control when you have to urinate.  Scheduled urination  Empty your bladder when you first wake up in the morning.  Schedule times throughout the day when you will urinate.  Start by going to the bathroom every hour, even if you don't need to go.  Slowly increase the time between trips to the bathroom.  When you have found a schedule that works well for you, keep doing it.  If you wake up during the night and have to urinate, do it. Apply your schedule to waking hours only.  Kegel exercises  These tighten and strengthen pelvic muscles, which can help you control the flow of urine. (If doing these exercises causes pain, stop doing them and talk with your doctor.) To do Kegel exercises:  Squeeze your muscles as if you were trying not to pass gas. Or  "squeeze your muscles as if you were stopping the flow of urine. Your belly, legs, and buttocks shouldn't move.  Hold the squeeze for 3 seconds, then relax for 5 to 10 seconds.  Start with 3 seconds, then add 1 second each week until you are able to squeeze for 10 seconds.  Repeat the exercise 10 times a session. Do 3 to 8 sessions a day.  When should you call for help?  Watch closely for changes in your health, and be sure to contact your doctor if:    Your incontinence is getting worse.     You do not get better as expected.   Where can you learn more?  Go to https://www.[employer].net/patiented  Enter V684 in the search box to learn more about \"Bladder Training: Care Instructions.\"  Current as of: March 1, 2023               Content Version: 13.7    7256-8510 [employer].   Care instructions adapted under license by your healthcare professional. If you have questions about a medical condition or this instruction, always ask your healthcare professional. [employer] disclaims any warranty or liability for your use of this information.         "

## 2023-08-24 NOTE — PROGRESS NOTES
"SUBJECTIVE:   Jack is a 77 year old who presents for Preventive Visit.  Are you in the first 12 months of your Medicare coverage?  No    History of Present Illness       Reason for visit:  Neck pain; renew medications    He eats 0-1 servings of fruits and vegetables daily.He consumes 3 sweetened beverage(s) daily.He exercises with enough effort to increase his heart rate 9 or less minutes per day.  He exercises with enough effort to increase his heart rate 7 days per week.   He is taking medications regularly.  Healthy Habits:     In general, how would you rate your overall health?  Good    Frequency of exercise:  6-7 days/week    Duration of exercise:  Less than 15 minutes    Do you usually eat at least 4 servings of fruit and vegetables a day, include whole grains    & fiber and avoid regularly eating high fat or \"junk\" foods?  No    Taking medications regularly:  0    Medication side effects:  None    Ability to successfully perform activities of daily living:  No assistance needed    Home Safety:  No safety concerns identified    Hearing Impairment:  No hearing concerns    In the past 6 months, have you been bothered by leaking of urine? Yes    In general, how would you rate your overall mental or emotional health?  Excellent    Additional concerns today:  Yes        Have you ever done Advance Care Planning? (For example, a Health Directive, POLST, or a discussion with a medical provider or your loved ones about your wishes): Yes, advance care planning is on file.     Fall risk:  lower    Mini-CogTM Copyright S Linda. Licensed by the author for use in [facility]; reprinted with permission (wisam@.[email or web address]). All rights reserved.      Reviewed and updated as needed this visit by clinical staff   Tobacco  Allergies  Meds              Reviewed and updated as needed this visit by Provider                 Social History     Tobacco Use    Smoking status: Former     Packs/day: 2.00     Years: 52.00     " Pack years: 104.00     Types: Cigarettes, Cigars     Start date: 6/1/1960     Quit date: 6/1/2013     Years since quitting: 10.2    Smokeless tobacco: Never    Tobacco comments:     Quit, will never start again.   Substance Use Topics    Alcohol use: No           9/7/2020     5:15 PM   Alcohol Use   Prescreen: >3 drinks/day or >7 drinks/week? Not Applicable     Do you have a current opioid prescription? No  Do you use any other controlled substances or medications that are not prescribed by a provider? None    Current Outpatient Medications   Medication    acetaminophen (TYLENOL) 500 MG tablet    albuterol (VENTOLIN HFA) 108 (90 Base) MCG/ACT inhaler    Ascorbic Acid (VITAMIN C PO)    atorvastatin (LIPITOR) 20 MG tablet    blood glucose (ACCU-CHEK CHACHA) test strip    blood glucose (NO BRAND SPECIFIED) lancets standard    blood glucose (NO BRAND SPECIFIED) test strip    blood glucose monitoring (NO BRAND SPECIFIED) meter device kit    cholestyramine light (QUESTRAN) 4 GM packet    docusate sodium (COLACE) 100 MG capsule    ELIQUIS ANTICOAGULANT 5 MG tablet    finasteride (PROSCAR) 5 MG tablet    Fluticasone-Umeclidin-Vilant (TRELEGY ELLIPTA) 100-62.5-25 MCG/ACT oral inhaler    furosemide (LASIX) 40 MG tablet    gabapentin (NEURONTIN) 300 MG capsule    lisinopril (ZESTRIL) 40 MG tablet    metFORMIN (GLUCOPHAGE) 500 MG tablet    metoprolol succinate ER (TOPROL XL) 25 MG 24 hr tablet    order for DME    order for DME    tiZANidine (ZANAFLEX) 2 MG tablet    metFORMIN (GLUCOPHAGE) 500 MG tablet     No current facility-administered medications for this visit.     Other concerns:  Hematuria x 3 days now UTD.  Neck pain, given muscle relaxer that is helping   Requesting handicap parking license plate     Current providers sharing in care for this patient include:   Patient Care Team:  Olegario Castillo MD as PCP - General (Internal Medicine)  Olegario Castillo MD as Assigned PCP  Praveena Burris MD as MD  (Urology)  Areli Cordero, RN as Registered Nurse  Kelley Slaughter MA as Community Health Worker (Primary Care - CC)  Rhea Knapp, RN as Lead Care Coordinator  Jaswinder Tinajero MD as MD (Hematology & Oncology)  Bernardo Haynes MD as MD (Pulmonary Disease)  Antonia Mandel PT as Physical Therapist (Physical Therapist)  Jamil Aparicio MD as MD (Neurology)  Jamil Aparicio MD as Assigned Neuroscience Provider  Husam Contreras MD as Assigned Cancer Care Provider  Jemal Vickers MD as Assigned Musculoskeletal Provider  Kaleigh Onofre MD as MD (Urology)  Kaleigh Onofre MD as Assigned Surgical Provider    The following health maintenance items are reviewed in Epic and correct as of today:  Health Maintenance   Topic Date Due    HF ACTION PLAN  Never done    DIABETIC FOOT EXAM  11/01/2019    EYE EXAM  04/01/2022    LIPID  02/10/2023    COVID-19 Vaccine (6 - Pfizer series) 02/18/2023    MEDICARE ANNUAL WELLNESS VISIT  04/04/2023    MICROALBUMIN  05/20/2023    DTAP/TDAP/TD IMMUNIZATION (2 - Td or Tdap) 08/09/2023    BMP  09/21/2023    INFLUENZA VACCINE (1) 09/01/2023    A1C  09/21/2023    ALT  11/23/2023    ANNUAL REVIEW OF HM ORDERS  12/26/2023    FALL RISK ASSESSMENT  01/02/2024    CBC  03/21/2024    ADVANCE CARE PLANNING  08/24/2028    COLORECTAL CANCER SCREENING  09/01/2032    TSH W/FREE T4 REFLEX  Completed    SPIROMETRY  Completed    HEPATITIS C SCREENING  Completed    COPD ACTION PLAN  Completed    PHQ-2 (once per calendar year)  Completed    Pneumococcal Vaccine: 65+ Years  Completed    ZOSTER IMMUNIZATION  Completed    IPV IMMUNIZATION  Aged Out    MENINGITIS IMMUNIZATION  Aged Out    LUNG CANCER SCREENING  Discontinued       Immunization History   Administered Date(s) Administered    COVID-19 Bivalent 12+ (Pfizer) 10/18/2022    COVID-19 MONOVALENT 12+ (Pfizer) 01/30/2021, 02/20/2021, 10/28/2021    COVID-19 Monovalent 12+ (Pfizer 2022) 04/04/2022     "HEPA 12/06/2010, 06/16/2011    HepB 06/16/2011, 12/30/2011, 08/29/2012    Influenza (High Dose) 3 valent vaccine 11/22/2010, 12/13/2016, 12/06/2017, 11/01/2018, 01/23/2020    Influenza (IIV3) PF 11/19/2007, 10/01/2011, 09/30/2013    Influenza Vaccine 65+ (Fluzone HD) 10/01/2020, 10/05/2021, 09/13/2022    Pneumo Conj 13-V (2010&after) 03/14/2016    Pneumococcal 23 valent 03/17/2010, 10/01/2020    Poliovirus, inactivated (IPV) 12/06/2010    TD,PF 7+ (Tenivac) 12/06/2010    TDAP Vaccine (Adacel) 08/09/2013    Typhoid IM 12/06/2010    Yellow Fever 12/06/2010    Zoster recombinant adjuvanted (SHINGRIX) 09/13/2022, 11/30/2022    Zoster vaccine, live 08/09/2013       Review of Systems:    CONSTITUTIONAL: NEGATIVE for fever, chills, change in weight  EYES: NEGATIVE for vision changes or irritation  ENT/MOUTH: NEGATIVE for ear, mouth and throat problems  RESP: NEGATIVE for significant cough or SOB  CV: NEGATIVE for chest pain, palpitations or peripheral edema  GI: NEGATIVE for nausea, abdominal pain, heartburn, or change in bowel habits  : NEGATIVE for frequency, dysuria, or hematuria  MUSCULOSKELETAL: NEGATIVE for significant arthralgias or myalgia  NEURO: NEGATIVE for weakness, dizziness or paresthesias  HEME: NEGATIVE for bleeding problems  PSYCHIATRIC: NEGATIVE for changes in mood or affect    OBJECTIVE:   /74   Pulse 62   Temp 98.6  F (37  C) (Temporal)   Resp 12   Ht 1.702 m (5' 7\")   Wt 99.3 kg (218 lb 14.4 oz)   SpO2 95%   BMI 34.28 kg/m   Estimated body mass index is 34.28 kg/m  as calculated from the following:    Height as of this encounter: 1.702 m (5' 7\").    Weight as of this encounter: 99.3 kg (218 lb 14.4 oz).    Physical Exam:  GENERAL: alert and no distress  EYES: Eyes grossly normal to inspection, PERRL and conjunctivae and sclerae normal  HENT: ear canals and TM's normal, nose and mouth without ulcers or lesions  NECK: no adenopathy, no asymmetry, masses, or scars and thyroid normal to " palpation, mild tenderness cervical strap muscles right greater than left  RESP: lungs clear to auscultation - no rales, rhonchi or wheezes  CV: regular rate and rhythm, normal S1 S2, no S3 or S4, no murmur, click or rub  ABDOMEN: soft, nontender, no hepatosplenomegaly, no masses and bowel sounds normal  NEURO: No focal changes  PSYCH: mentation appears normal, affect normal/bright    ASSESSMENT / PLAN:   (Z00.00) Encounter for subsequent annual wellness visit in Medicare patient  (primary encounter diagnosis)  Comment: Advised routine updated vaccinations as listed and discussed COVID, influenza and RSV  Plan: Lipid panel reflex to direct LDL Non-fasting,         Comprehensive metabolic panel, CBC with         platelets            (E11.9) Type 2 diabetes mellitus without complication, without long-term current use of insulin (H)  Comment: Stable and continue with current medical management.  Plan: Adult Eye  Referral, Lipid panel         reflex to direct LDL Non-fasting, Hemoglobin         A1c            (I10) Essential hypertension, benign  Comment: Currently stable on therapy continue as directed  Plan: Comprehensive metabolic panel            (J44.9) Chronic obstructive pulmonary disease, unspecified COPD type (H)  Comment: Refilled inhalers for as needed use.  Follow-up with lung specialist as noted  Plan: albuterol (VENTOLIN HFA) 108 (90 Base) MCG/ACT         inhaler, OFFICE/OUTPT VISIT,EST,LEVL III,         OFFICE/OUTPT VISIT,EST,LEVL III            (G47.33) Obstructive sleep apnea syndrome  Comment: Patient advised to continue using CPAP as tolerated  Plan:     (E78.5) Hyperlipidemia LDL goal <100  Comment: Abs ordered as fasting per routine with goal LDL discussed  Plan: Lipid panel reflex to direct LDL Non-fasting            (E66.01) Morbid obesity due to excess calories (H)  Comment: Urged ongoing weight loss and weight reduction  Plan:             (Z12.5) Screening PSA (prostate specific  antigen)  Comment: Prostate Specific Antigen Screen  Plan:     (Z12.11) Colon cancer screening  Comment: Prior colonoscopy discussed and recommend FIT testing  Plan: Fecal colorectal cancer screen FIT            (Z85.118) History of adenocarcinoma of lung  Comment: Following up with oncologist as noted through [facility]  Plan:     (M54.2) Cervicalgia  Comment: Physical therapy.  Plan: Physical Therapy Referral, OFFICE/OUTPT         VISIT,EST,LEVL III            Patient has been advised of split billing requirements and indicates understanding: Yes    COUNSELING:  Reviewed preventive health counseling, as reflected in patient instructions       Regular exercise       Healthy diet/nutrition    He reports that he quit smoking about 10 years ago. His smoking use included cigarettes and cigars. He started smoking about 63 years ago. He has a 104.00 pack-year smoking history. He has never used smokeless tobacco.    Appropriate preventive services were discussed with this patient, including applicable screening as appropriate for cardiovascular disease, diabetes, osteopenia/osteoporosis, and glaucoma.  As appropriate for age/gender, discussed screening for colorectal cancer, prostate cancer, breast cancer, and cervical cancer. Checklist reviewing preventive services available has been given to the patient.    Reviewed patients plan of care and provided an AVS. The Basic Care Plan (routine screening as documented in Health Maintenance) for Nahun meets the Care Plan requirement. This Care Plan has been established and reviewed with the Patient.    Olegario Castillo MD  [facility]      The patient was counseled and encouraged to consider modifying their diet and eating habits. He was provided with information on recommended healthy diet options.  Information on urinary incontinence and treatment options given to patient.

## 2023-08-29 ENCOUNTER — MYC MEDICAL ADVICE (OUTPATIENT)
Dept: INTERNAL MEDICINE | Facility: CLINIC | Age: 78
End: 2023-08-29
Payer: COMMERCIAL

## 2023-08-30 DIAGNOSIS — R31.0 GROSS HEMATURIA: ICD-10-CM

## 2023-08-30 DIAGNOSIS — E11.9 TYPE 2 DIABETES MELLITUS WITHOUT COMPLICATION, WITHOUT LONG-TERM CURRENT USE OF INSULIN (H): ICD-10-CM

## 2023-08-30 DIAGNOSIS — G62.9 PERIPHERAL POLYNEUROPATHY: ICD-10-CM

## 2023-08-30 DIAGNOSIS — I10 ESSENTIAL HYPERTENSION, BENIGN: ICD-10-CM

## 2023-08-30 DIAGNOSIS — R60.9 EDEMA, UNSPECIFIED TYPE: ICD-10-CM

## 2023-08-30 RX ORDER — FINASTERIDE 5 MG/1
1 TABLET, FILM COATED ORAL DAILY
Qty: 90 TABLET | Refills: 2 | Status: SHIPPED | OUTPATIENT
Start: 2023-08-30 | End: 2024-02-28

## 2023-08-30 RX ORDER — LISINOPRIL 40 MG/1
TABLET ORAL
Qty: 90 TABLET | Refills: 2 | Status: SHIPPED | OUTPATIENT
Start: 2023-08-30 | End: 2024-02-28

## 2023-08-30 RX ORDER — METOPROLOL SUCCINATE 25 MG/1
25 TABLET, EXTENDED RELEASE ORAL DAILY
Qty: 90 TABLET | Refills: 2 | Status: SHIPPED | OUTPATIENT
Start: 2023-08-30 | End: 2024-02-28

## 2023-08-30 RX ORDER — FUROSEMIDE 40 MG
40 TABLET ORAL 2 TIMES DAILY
Qty: 180 TABLET | Refills: 2 | Status: SHIPPED | OUTPATIENT
Start: 2023-08-30 | End: 2024-02-28

## 2023-08-30 NOTE — TELEPHONE ENCOUNTER
Pt requesting med refill for 6 of his meds - med list reviewed.   Pt taking the following two medications differently than what is listed on active med list. Please review and pend refills if approved:     1.) Pt gabapentin 300 mg take 1 in a.m. 2 at night - recommended by neurology.   2.) Pt taking metformin - unsure why it states pt reported not taking 8/24/2023.     [facility]

## 2023-09-04 DIAGNOSIS — E11.9 TYPE 2 DIABETES MELLITUS WITHOUT COMPLICATION, WITHOUT LONG-TERM CURRENT USE OF INSULIN (H): ICD-10-CM

## 2023-09-07 ENCOUNTER — TELEPHONE (OUTPATIENT)
Dept: UROLOGY | Facility: CLINIC | Age: 78
End: 2023-09-07
Payer: COMMERCIAL

## 2023-09-07 NOTE — TELEPHONE ENCOUNTER
Writer spoke with Dr. Onofre regarding patient symptoms and concerns. He believes patient will have these symptoms off and on due to TURP procedure for BPH but to call if clots are noted or patient can't tolerate or uncomfortable with small amounts of hematuria. Possible TURP maybe needed again according to Dr. Centeno. Patient wife updated and will discuss with patient and see if he would like to meet with Dr. Onofre regarding symptoms again.     Will continue to follow as needed.     Uday Hernandez, RN, BSN.  RN Care Coordinator     [facility]   [phone]

## 2023-09-07 NOTE — TELEPHONE ENCOUNTER
Health Call Center    Phone Message    May a detailed message be left on voicemail: yes     Reason for Call: Symptoms or Concerns     If patient has red-flag symptoms, warm transfer to triage line    Current symptom or concern: blood in urine    Symptoms have been present for:  intermittent for 2 week(s)    Has patient previously been seen for this? Yes    By Dr Onofre  Per pt wife, this can be a side effect of cancer shot pt is receiving and says if this happens to call provider.  Please call pt wife back to discuss. Thank you    Action Taken: Message routed to:  Other: Uro    Travel Screening: Not Applicable

## 2023-09-11 ENCOUNTER — MYC MEDICAL ADVICE (OUTPATIENT)
Dept: INTERNAL MEDICINE | Facility: CLINIC | Age: 78
End: 2023-09-11
Payer: COMMERCIAL

## 2023-09-12 ENCOUNTER — LAB (OUTPATIENT)
Dept: LAB | Facility: CLINIC | Age: 78
End: 2023-09-12
Payer: COMMERCIAL

## 2023-09-12 DIAGNOSIS — I10 ESSENTIAL HYPERTENSION, BENIGN: ICD-10-CM

## 2023-09-12 DIAGNOSIS — Z00.00 ENCOUNTER FOR SUBSEQUENT ANNUAL WELLNESS VISIT IN MEDICARE PATIENT: ICD-10-CM

## 2023-09-12 DIAGNOSIS — E11.9 TYPE 2 DIABETES MELLITUS WITHOUT COMPLICATION, WITHOUT LONG-TERM CURRENT USE OF INSULIN (H): Primary | ICD-10-CM

## 2023-09-12 DIAGNOSIS — Z12.11 COLON CANCER SCREENING: ICD-10-CM

## 2023-09-12 DIAGNOSIS — Z12.5 SCREENING PSA (PROSTATE SPECIFIC ANTIGEN): ICD-10-CM

## 2023-09-12 DIAGNOSIS — E78.5 HYPERLIPIDEMIA LDL GOAL <100: ICD-10-CM

## 2023-09-12 LAB
ALBUMIN SERPL BCG-MCNC: 4.2 G/DL (ref 3.5–5.2)
ALP SERPL-CCNC: 63 U/L (ref 40–129)
ALT SERPL W P-5'-P-CCNC: 12 U/L (ref 0–70)
ANION GAP SERPL CALCULATED.3IONS-SCNC: 11 MMOL/L (ref 7–15)
AST SERPL W P-5'-P-CCNC: 21 U/L (ref 0–45)
BILIRUB SERPL-MCNC: 0.5 MG/DL
BUN SERPL-MCNC: 9.7 MG/DL (ref 8–23)
CALCIUM SERPL-MCNC: 9.3 MG/DL (ref 8.8–10.2)
CHLORIDE SERPL-SCNC: 101 MMOL/L (ref 98–107)
CHOLEST SERPL-MCNC: 140 MG/DL
CREAT SERPL-MCNC: 1.02 MG/DL (ref 0.67–1.17)
DEPRECATED HCO3 PLAS-SCNC: 27 MMOL/L (ref 22–29)
EGFRCR SERPLBLD CKD-EPI 2021: 75 ML/MIN/1.73M2
ERYTHROCYTE [DISTWIDTH] IN BLOOD BY AUTOMATED COUNT: 12.4 % (ref 10–15)
GLUCOSE SERPL-MCNC: 158 MG/DL (ref 70–99)
HBA1C MFR BLD: 6.7 % (ref 0–5.6)
HCT VFR BLD AUTO: 36.1 % (ref 40–53)
HDLC SERPL-MCNC: 46 MG/DL
HGB BLD-MCNC: 12 G/DL (ref 13.3–17.7)
LDLC SERPL CALC-MCNC: 58 MG/DL
MCH RBC QN AUTO: 32.6 PG (ref 26.5–33)
MCHC RBC AUTO-ENTMCNC: 33.2 G/DL (ref 31.5–36.5)
MCV RBC AUTO: 98 FL (ref 78–100)
NONHDLC SERPL-MCNC: 94 MG/DL
PLATELET # BLD AUTO: 265 10E3/UL (ref 150–450)
POTASSIUM SERPL-SCNC: 4.4 MMOL/L (ref 3.4–5.3)
PROT SERPL-MCNC: 7.4 G/DL (ref 6.4–8.3)
PSA SERPL DL<=0.01 NG/ML-MCNC: 1.48 NG/ML (ref 0–6.5)
RBC # BLD AUTO: 3.68 10E6/UL (ref 4.4–5.9)
SODIUM SERPL-SCNC: 139 MMOL/L (ref 136–145)
TRIGL SERPL-MCNC: 180 MG/DL
WBC # BLD AUTO: 7.7 10E3/UL (ref 4–11)

## 2023-09-12 PROCEDURE — 80061 LIPID PANEL: CPT

## 2023-09-12 PROCEDURE — 36415 COLL VENOUS BLD VENIPUNCTURE: CPT

## 2023-09-12 PROCEDURE — G0103 PSA SCREENING: HCPCS

## 2023-09-12 PROCEDURE — 85027 COMPLETE CBC AUTOMATED: CPT

## 2023-09-12 PROCEDURE — 80053 COMPREHEN METABOLIC PANEL: CPT

## 2023-09-12 PROCEDURE — 83036 HEMOGLOBIN GLYCOSYLATED A1C: CPT

## 2023-09-13 ENCOUNTER — PATIENT OUTREACH (OUTPATIENT)
Dept: CARE COORDINATION | Facility: CLINIC | Age: 78
End: 2023-09-13
Payer: COMMERCIAL

## 2023-09-13 NOTE — PROGRESS NOTES
Clinic Care Coordination Contact  The Community Health Worker called for a Care Coordination outreach to follow up on goals and action steps.     Spoke with patient and Kenia.    Patient stated his cancer is back.    Patient stated he had outstanding medical bills from his oncology / cancer shots at [facility].    Patient stated the injections are very expensive.  Patient's out of pocket expense is $1900 for two shots.    CHW will outreach in 3 -5 days    OMAR Kim  Clinic Care Coordination  [facility]: [facility], [facility], [facility], and [facility]  Phone: [phone]

## 2023-09-14 ENCOUNTER — MYC MEDICAL ADVICE (OUTPATIENT)
Dept: INTERNAL MEDICINE | Facility: CLINIC | Age: 78
End: 2023-09-14
Payer: COMMERCIAL

## 2023-09-19 ENCOUNTER — PATIENT OUTREACH (OUTPATIENT)
Dept: CARE COORDINATION | Facility: CLINIC | Age: 78
End: 2023-09-19
Payer: COMMERCIAL

## 2023-09-19 ASSESSMENT — ACTIVITIES OF DAILY LIVING (ADL): DEPENDENT_IADLS:: CLEANING;COOKING;LAUNDRY;SHOPPING;MEAL PREPARATION

## 2023-09-19 NOTE — PROGRESS NOTES
Clinic Care Coordination Contact  Care Coordination Clinician Chart Review    Situation: Patient chart reviewed by Care Coordinator.       Background: Care Coordination Program started: 9/24/2020. Initial assessment completed and patient-centered care plan(s) were developed with participation from patient. Lead CC handed patient off to CHW for continued outreaches.       Assessment: Per chart review, patient outreach completed by CC CHW on 9/13/2023.  Patient is actively working to accomplish goal(s). Patient's goal(s) appropriate and relevant at this time. Patient is due for updated Plan of Care.  Assessments will be completed annually or as needed/with change of patient status.      Care Plan: 1. Improve chronic symptoms       Problem: Lifestyle choices       Goal: I want to improve management of my leg, knee, and hip pain and swelling.       Start Date: 6/3/2021 Expected End Date: 12/19/2023    This Visit's Progress: 90% Recent Progress: 90%    Note:     Barriers: Lymphedema  Strengths: Motivated to improve ability to walk  Patient expressed understanding of goal: Yes  Action steps to achieve this goal:  1. I will apply Jacinto hose to wear throughout the day and remove at night  2. I will walk my dog daily to help with strengthening and endurance  3. I will elevate my legs while sitting and laying down by propping them up with a pillow  4. I will discuss, review, schedule and complete recommended overdue health maintenance with my primary care provider  5. I will I will take my medications as prescribed  6. I will contact my care team with questions, concerns, support needs. I will use the clinic as a resource   7. I will contact my clinic with 24/7 after hours services available                             Plan/Recommendations: The patient will continue working with Care Coordination to achieve goal(s) as above. CHW will continue outreaches at minimum every 30 days and will involve Lead CC as needed or if patient  is ready to move to Maintenance. Lead CC will continue to monitor CHW outreaches and patient's progress to goal(s) every 6 weeks.     Plan of Care updated and sent to patient: Yes, via William Knapp RN, BSN, PHN  Primary Care / Care Coordinator   [facility]  [email or web address]   [phone]

## 2023-09-19 NOTE — LETTER
[facility]  Patient Centered Plan of Care  About Me:        Patient Name:  Nahun Villalobos    YOB: 1945  Age:         77 year old   Sukumar MRN:    [id number] Telephone Information:  Home Phone [phone]   Mobile [phone]       Address:  [address] [place], [place] [address] Email address:  [email or web address]      Emergency Contact(s)    Name Relationship Lgl Grd Work Phone Home Phone Mobile Phone   1. SINDY CHRISTIE Daughter No   [phone]   2. SUMAN Significant ot*    [phone]           Primary language:  English     needed? No   [employer]:  [phone] op. 1  Other communication barriers:None    Preferred Method of Communication:  William  Current living arrangement: I live in a private home with spouse (Suman, s.o.)    Mobility Status/ Medical Equipment: Independent w/Device    Health Maintenance  Health Maintenance Reviewed: Due/Overdue   Health Maintenance Due   Topic Date Due    HF ACTION PLAN  Never done    DIABETIC FOOT EXAM  11/01/2019    COVID-19 Vaccine (6 - Pfizer series) 02/18/2023    MICROALBUMIN  05/20/2023    DTAP/TDAP/TD IMMUNIZATION (2 - Td or Tdap) 08/09/2023    EYE EXAM  08/15/2023     My Access Plan  Medical Emergency 911   Primary Clinic Line [facility] [phone]   24 Hour Appointment Line [phone] or  [phone]) (toll-free)   24 Hour Nurse Line [phone] (toll-free)   Preferred Urgent Care [facility], [phone]     Preferred Hospital [facility]  [phone] ([facility]; was at [facility] 3/2023)     Preferred Pharmacy [id number] [place], [place] - [address] [facility]     Behavioral Health Crisis Line The National Suicide Prevention Lifeline at 1-500.345.2839 or Text/Call 708     My Care Team Members  Patient Care Team         Relationship Specialty Notifications Start End     Olegario Castillo MD PCP - General Internal Medicine  4/6/22     Phone: [phone] Fax: [phone]         [address] [address]    Olegario Castillo MD Assigned PCP   10/4/12     Phone: [phone] Fax: [phone]         [address] [place] [address]    Praveena Burris MD MD Urology  3/1/17      INACTIVE IN [place] AS OF 4/30/2021    Areli Cordero, RN Registered Nurse   3/1/17     Phone: [phone]         Kelley Slaughter MA Community Health Worker Primary Care - CC Admissions 9/24/20     Phone: [phone]         Rhea Knapp RN Lead Care Coordinator  Admissions 9/24/20     Jaswinder Tinajero MD MD Hematology & Oncology  12/7/20     Phone: [phone] Fax: [phone]         [place] [facility] [address] [facility] [address] [facility] [address]    Bernardo Haynes MD MD Pulmonary Disease  12/7/20     Phone: [phone] Fax: [phone]         [place] [facility] [address] [facility] [address] [facility] [address]    Antonia Mandel, PT Physical Therapist Physical Therapist  8/2/21     Phone: [phone] Fax: [phone]         [address] [facility] [address] [facility] [address]    Jamil Aparicio MD MD Neurology  2/2/22     Phone: [phone] Fax: [phone] [address] [address]    Jamil Aparicio MD Assigned Neuroscience Provider   4/10/22     Phone: [phone] Fax: [phone] [address] [place] [address]    Husam Contreras MD Assigned Cancer Care Provider   9/17/22     Phone: [phone] Pager: [phone] Fax: [phone]        [address] [facility] [address] [facility] [address]    Jemal Vickers MD Assigned Musculoskeletal Provider   1/14/23     Phone: [phone] Fax: [phone] [address] [facility] [address]    Kaleigh Onofre MD MD Urology  2/1/23     Phone: [phone] Fax: [phone]          [facility] [address]    Kaleigh Onofre MD Assigned  Surgical Provider   3/11/23     Phone: [phone] Fax: [phone]         [address] [address]              My Care Plans  Self Management and Treatment Plan  Care Plan  Care Plan: 1. Improve chronic symptoms       Problem: Lifestyle choices       Goal: I want to improve management of my leg, knee, and hip pain and swelling.       Start Date: 6/3/2021 Expected End Date: 12/19/2023    This Visit's Progress: 90% Recent Progress: 90%    Note:     Barriers: Lymphedema  Strengths: Motivated to improve ability to walk  Patient expressed understanding of goal: Yes  Action steps to achieve this goal:  1. I will apply Jacinto hose to wear throughout the day and remove at night  2. I will walk my dog daily to help with strengthening and endurance  3. I will elevate my legs while sitting and laying down by propping them up with a pillow  4. I will discuss, review, schedule and complete recommended overdue health maintenance with my primary care provider  5. I will I will take my medications as prescribed  6. I will contact my care team with questions, concerns, support needs. I will use the clinic as a resource   7. I will contact my clinic with 24/7 after hours services available                               Action Plans on File:   COPD  Depression    Advance Care Plans/Directives Type:   -- (Full Code)      My Medical and Care Information  Problem List   Patient Active Problem List   Diagnosis    Essential hypertension, benign    Tobacco use disorder    Lumbago    Impotence of organic origin    Advance care planning    Polyp of colon    Obstructive sleep apnea syndrome    Hyperlipidemia LDL goal <100    BPPV (benign paroxysmal positional vertigo), unspecified laterality    Chronic obstructive pulmonary disease, unspecified COPD type (H)    Type 2 diabetes mellitus without complication, without long-term current use of insulin (H)    S/P transurethral resection of prostate    Hematuria, gross     Cervical segment dysfunction    Cervicalgia    Thoracic segment dysfunction    Erectile dysfunction    Acute diverticulitis    Other pulmonary embolism without acute cor pulmonale, unspecified chronicity (H)    Mesenteric mass    History of adenocarcinoma of lung    Benign neoplasm of ascending colon    Benign neoplasm of rectum    Diverticular disease of colon    History of colonic polyps      Current Medications and Allergies:    Allergies   Allergen Reactions    No Known Drug Allergy      Current Outpatient Medications   Medication    acetaminophen (TYLENOL) 500 MG tablet    albuterol (VENTOLIN HFA) 108 (90 Base) MCG/ACT inhaler    Ascorbic Acid (VITAMIN C PO)    atorvastatin (LIPITOR) 20 MG tablet    blood glucose (ACCU-CHEK CHACHA) test strip    blood glucose (NO BRAND SPECIFIED) lancets standard    blood glucose (NO BRAND SPECIFIED) test strip    blood glucose monitoring (NO BRAND SPECIFIED) meter device kit    cholestyramine light (QUESTRAN) 4 GM packet    docusate sodium (COLACE) 100 MG capsule    ELIQUIS ANTICOAGULANT 5 MG tablet    finasteride (PROSCAR) 5 MG tablet    Fluticasone-Umeclidin-Vilant (TRELEGY ELLIPTA) 100-62.5-25 MCG/ACT oral inhaler    furosemide (LASIX) 40 MG tablet    gabapentin (NEURONTIN) 300 MG capsule    lisinopril (ZESTRIL) 40 MG tablet    metFORMIN (GLUCOPHAGE) 500 MG tablet    metoprolol succinate ER (TOPROL XL) 25 MG 24 hr tablet    order for DME    order for DME    tiZANidine (ZANAFLEX) 2 MG tablet     No current facility-administered medications for this visit.         Care Coordination Start Date: 9/24/2020   Frequency of Care Coordination: 6 weeks     Form Last Updated: 09/19/2023

## 2023-09-20 ENCOUNTER — TRANSFERRED RECORDS (OUTPATIENT)
Dept: HEALTH INFORMATION MANAGEMENT | Facility: CLINIC | Age: 78
End: 2023-09-20
Payer: COMMERCIAL

## 2023-09-21 ENCOUNTER — MYC MEDICAL ADVICE (OUTPATIENT)
Dept: INTERNAL MEDICINE | Facility: CLINIC | Age: 78
End: 2023-09-21

## 2023-09-21 ENCOUNTER — THERAPY VISIT (OUTPATIENT)
Dept: PHYSICAL THERAPY | Facility: CLINIC | Age: 78
End: 2023-09-21
Attending: INTERNAL MEDICINE
Payer: COMMERCIAL

## 2023-09-21 DIAGNOSIS — M54.2 CERVICALGIA: ICD-10-CM

## 2023-09-21 PROCEDURE — 97140 MANUAL THERAPY 1/> REGIONS: CPT | Mod: GP

## 2023-09-21 PROCEDURE — 97110 THERAPEUTIC EXERCISES: CPT | Mod: GP

## 2023-09-21 PROCEDURE — 97161 PT EVAL LOW COMPLEX 20 MIN: CPT | Mod: GP

## 2023-09-21 NOTE — PROGRESS NOTES
"PHYSICAL THERAPY EVALUATION  Type of Visit: Evaluation    See electronic medical record for Abuse and Falls Screening details.    Subjective   Pt notes neck pain starting 1-2 years ago. He notes it doesn't hurt all the time, but will catch and cause pain. He notes pain is on the right that is intermittent to 1-2 times per week. He notes when it \"pops\" it will feel like it is going up into his \"brain\" he notes frequent headaches present when it is popping. He notes particular difficulties when driving turning to the R and checking his blind spots. He states when he was 13 years old he hit his head, did not have resulting neck pain or loss of consciousness. He has been sleeping in a chair for 20 years d/t snoring and difficulties sleeping.       Presenting condition or subjective complaint:    Date of onset: 09/21/23 (Chronic of years)    Relevant medical history:   Present stage 4 colon cancer  Dates & types of surgery:   Lung and gallbladder surgeries over 12 years ago.     Prior diagnostic imaging/testing results:       Prior therapy history for the same diagnosis, illness or injury:        Employment:     for [employer]  Hobbies/Interests:   driving    Patient goals for therapy:  driving without pain       Objective   CERVICAL SPINE EVALUATION  POSTURE: Standing Posture: Rounded shoulders, Forward head, Thoracic kyphosis increased  ROM:   (Degrees) Left AROM Right AROM    Cervical Flexion Chin to chest    Cervical Extension 30    Cervical Side bend 25 18    Cervical Rotation 53 42    Cervical Protrusion     Cervical Retraction     Thoracic Flexion     Thoracic Extension     Thoracic Rotation       Left AROM Left PROM Right AROM Right PROM   Shoulder Flexion Limited with pain  Limited with pain    Shoulder Abduction Limited with pain  Limited with pain    Pain:   End Feel:   MYOTOMES: Noted strength impairments related to pain with shoulder flexion and abduction B. Does not reproduce comparable " symptoms  FLEXIBILITY:  Decreased muscle length to B pec major, B latissimus dorsi, B UT, B LS    PALPATION:  Significant tenderness to palpation of R>L paraspinal musculature  SPINAL SEGMENTAL CONCLUSIONS:  Significant restrictions to cervical rotation including R>L AA rotation where R is less than 10 degrees    Assessment & Plan   CLINICAL IMPRESSIONS  Medical Diagnosis: (P) Cervicalgia    Treatment Diagnosis: (P) Neck pain with mobility deficits   Impression/Assessment: Patient is a 77 year old male with Neck pain complaints.  The following significant findings have been identified: Pain, Decreased ROM/flexibility, Decreased joint mobility, Decreased strength, Decreased proprioception, Impaired muscle performance, Decreased activity tolerance, and Impaired posture. These impairments interfere with their ability to perform self care tasks, work tasks, recreational activities, and driving  as compared to previous level of function.     Clinical Decision Making (Complexity):  Clinical Presentation: Stable/Uncomplicated  Clinical Presentation Rationale: based on medical and personal factors listed in PT evaluation  Clinical Decision Making (Complexity): Low complexity    PLAN OF CARE  Treatment Interventions:  Interventions: Manual Therapy, Neuromuscular Re-education, Therapeutic Activity, Therapeutic Exercise    Long Term Goals     PT Goal 1  Goal Identifier: Driving  Goal Description: Pt will note an ability to check his blindspots when driving corrine minimal to no increase in neck pain for a full week  Rationale: to maximize safety and independence with performance of ADLs and functional tasks  Target Date: 12/21/23      Frequency of Treatment: 1x/week  Duration of Treatment: 12 weeks    Education Assessment:   Learner/Method: Patient;Demonstration;Pictures/Video;No Barriers to Learning    Risks and benefits of evaluation/treatment have been explained.   Patient/Family/caregiver agrees with Plan of Care.      Evaluation Time:     PT Eval, Low Complexity Minutes (91795): (P) 15     Signing Clinician: JOSE G GONZALES [facility]                                                                                   OUTPATIENT PHYSICAL THERAPY      PLAN OF TREATMENT FOR OUTPATIENT REHABILITATION   Patient's Last Name, First Name, Nahun Lee YOB: 1945   Provider's Name   [facility]   Medical Record No.  [id number]     Onset Date: 09/21/23 (Chronic of years)  Start of Care Date: 09/21/23     Medical Diagnosis:  (P) Cervicalgia      PT Treatment Diagnosis:  (P) Neck pain with mobility deficits Plan of Treatment  Frequency/Duration: 1x/week/ 12 weeks    Certification date from 09/21/23 to 12/20/23         See note for plan of treatment details and functional goals     JOSE G NEW                         I CERTIFY THE NEED FOR THESE SERVICES FURNISHED UNDER        THIS PLAN OF TREATMENT AND WHILE UNDER MY CARE     (Physician attestation of this document indicates review and certification of the therapy plan).                Referring Provider:  Olegario Castillo      Initial Assessment  See Epic Evaluation- Start of Care Date: 09/21/23

## 2023-09-25 ENCOUNTER — TRANSFERRED RECORDS (OUTPATIENT)
Dept: HEALTH INFORMATION MANAGEMENT | Facility: CLINIC | Age: 78
End: 2023-09-25
Payer: COMMERCIAL

## 2023-09-25 LAB — RETINOPATHY: NEGATIVE

## 2023-09-28 DIAGNOSIS — G62.9 PERIPHERAL POLYNEUROPATHY: ICD-10-CM

## 2023-09-29 RX ORDER — GABAPENTIN 300 MG/1
300 CAPSULE ORAL 3 TIMES DAILY
Qty: 270 CAPSULE | Refills: 3 | OUTPATIENT
Start: 2023-09-29

## 2023-09-29 RX ORDER — GABAPENTIN 300 MG/1
300 CAPSULE ORAL 3 TIMES DAILY
Qty: 90 CAPSULE | Refills: 0 | Status: SHIPPED | OUTPATIENT
Start: 2023-09-29 | End: 2024-05-08

## 2023-09-29 RX ORDER — GABAPENTIN 300 MG/1
300 CAPSULE ORAL 2 TIMES DAILY
Qty: 60 CAPSULE | Refills: 11 | Status: ON HOLD | OUTPATIENT
Start: 2023-09-29 | End: 2024-03-02

## 2023-09-29 NOTE — TELEPHONE ENCOUNTER
1 month or 90 tablets filled at [facility] dosing but cannot fill more that 1 month as med is considered controlled substance, please inform   room air no

## 2023-09-29 NOTE — TELEPHONE ENCOUNTER
Triage RN received a call from Kenia stating the patient takes 3 Gabapentin per day. Therefore, she is needing an updated script sent to [facility] for 3 per day dosing. In the meantime, she is needing a short supply sent to a local pharmacy since patient is completely out of medication.     Medications pended for review to the requested pharmacies and will route HP to PCP for review.     Leslie Summers RN

## 2023-09-29 NOTE — TELEPHONE ENCOUNTER
Called and spoke to Kenia. Informed Kenia one month supply has been sent to [facility]. Triage RN encouraged Kenia to call the clinic when the patient has about 1-2 weeks left of the Gabapentin so an updated script could be sent to [facility] at that time.     Kenia expressed understanding and had no additional questions at this time.    Leslie Summers RN

## 2023-09-30 ENCOUNTER — NURSE TRIAGE (OUTPATIENT)
Dept: NURSING | Facility: CLINIC | Age: 78
End: 2023-09-30
Payer: COMMERCIAL

## 2023-09-30 DIAGNOSIS — G62.9 NEUROPATHY: Primary | ICD-10-CM

## 2023-09-30 RX ORDER — GABAPENTIN 300 MG/1
300 CAPSULE ORAL 3 TIMES DAILY
Qty: 21 CAPSULE | Refills: 0 | Status: ON HOLD | OUTPATIENT
Start: 2023-09-30 | End: 2024-03-02

## 2023-09-30 NOTE — TELEPHONE ENCOUNTER
Kenia calling. Called yesterday. He's almost out of gabapentin. Spoke with RN and wants to increase prescriptions, three a day. A partial RX was sent to [facility] on [address] and to get one sent to Mail Order. Mail order sent out 90 day supply yesterday. It overrides the [facility] prescription that was sent. Can the MD write for a week's worth until the mail order arrives? He only has meds for today.      I connected with the answering service to page out Dr Rajiv Payne who appears to be on call for the [facility].  12:37 p.m.the page was sent to request a week's worth of gabapentin, one capsule (300 mg) by mouth, 3 times daily. A total of 21 pills.     I then put out a 2nd page at 12:55 p.m. to have MD call me directly. I then connected with the answering service to have them try his cell phone. Dr Rajiv Payne approved the 21 days supply, as listed above. I called and told this to Kenia. She will call the pharmacy to make sure it's ready for .    Radha Marquez, RN  [facility]    Reason for Disposition   [1] Prescription refill request for ESSENTIAL medicine (i.e., likelihood of harm to patient if not taken) AND [2] triager unable to refill per department policy    Additional Information   Negative: New-onset or worsening symptoms, see that guideline (e.g., diarrhea, runny nose, sore throat)   Negative: Medicine question not related to refill or renewal   Negative: Caller (e.g., patient or pharmacist) requesting information about a new medicine   Negative: Caller requesting information unrelated to medicine    Protocols used: Medication Refill and Renewal Call-A-

## 2023-10-09 NOTE — TELEPHONE ENCOUNTER
Called and spoke to [facility] to clarify the message received below. [facility] state they are not able to give out any information since they are not speaking to the patient.     Triage RN called and spoke to the patient. Patient was not sure if he is needing any medications refilled. Patient requested triage to call Kenia, Significant Other. Kenia states they are needing a new script for Questran sent to [facility] (previous script was sent to [facility] and [facility]).    Medication pended to the requested pharmacy and will route to refill pool for review.     Leslie Summers RN     PLEASE CALL- PT IS HAVING MENTAL HEALTH ISSUES- MOM NOT SURE WHAT TO DO-    Janiya Lawson

## 2023-10-16 ENCOUNTER — THERAPY VISIT (OUTPATIENT)
Dept: PHYSICAL THERAPY | Facility: CLINIC | Age: 78
End: 2023-10-16
Payer: COMMERCIAL

## 2023-10-16 DIAGNOSIS — M54.2 CERVICALGIA: Primary | ICD-10-CM

## 2023-10-16 PROCEDURE — 97140 MANUAL THERAPY 1/> REGIONS: CPT | Mod: GP

## 2023-10-16 PROCEDURE — 97110 THERAPEUTIC EXERCISES: CPT | Mod: GP

## 2023-10-16 PROCEDURE — 97112 NEUROMUSCULAR REEDUCATION: CPT | Mod: GP

## 2023-10-17 ENCOUNTER — PATIENT OUTREACH (OUTPATIENT)
Dept: CARE COORDINATION | Facility: CLINIC | Age: 78
End: 2023-10-17
Payer: COMMERCIAL

## 2023-10-17 NOTE — PROGRESS NOTES
Clinic Care Coordination Contact  [facility]/Voicemail       Clinical Data: Care Coordinator Outreach  Outreach attempted x 1.  Left message on patient's voicemail with call back information and requested return call.    Plan: Care Coordinator will try to reach patient again in 10 business days.    OMAR Kim  Clinic Care Coordination  [facility] Clinics: [facility] [place], [place], [place], and [place] [facility]  Phone: [phone]

## 2023-10-23 ENCOUNTER — THERAPY VISIT (OUTPATIENT)
Dept: PHYSICAL THERAPY | Facility: CLINIC | Age: 78
End: 2023-10-23
Payer: COMMERCIAL

## 2023-10-23 DIAGNOSIS — M54.2 CERVICALGIA: Primary | ICD-10-CM

## 2023-10-23 PROCEDURE — 97140 MANUAL THERAPY 1/> REGIONS: CPT | Mod: GP

## 2023-10-23 PROCEDURE — 97110 THERAPEUTIC EXERCISES: CPT | Mod: GP

## 2023-10-23 PROCEDURE — 97112 NEUROMUSCULAR REEDUCATION: CPT | Mod: GP

## 2023-10-26 ENCOUNTER — TELEPHONE (OUTPATIENT)
Dept: CARE COORDINATION | Facility: CLINIC | Age: 78
End: 2023-10-26
Payer: COMMERCIAL

## 2023-10-26 ENCOUNTER — PATIENT OUTREACH (OUTPATIENT)
Dept: CARE COORDINATION | Facility: CLINIC | Age: 78
End: 2023-10-26
Payer: COMMERCIAL

## 2023-10-26 DIAGNOSIS — J44.9 CHRONIC OBSTRUCTIVE PULMONARY DISEASE, UNSPECIFIED COPD TYPE (H): Primary | Chronic | ICD-10-CM

## 2023-10-26 NOTE — PROGRESS NOTES
Clinic Care Coordination Contact    Patient's significant other Kenia Darrin called and [facility] for CC.    CHW called patient.  Patient asked to call Kenia.  Patient is not home.    CHW spoke with Kenia.  Patient's portable oxygen tank does not work.  Kenia stated they have called [facility] in [place] many times without a return phone call.    CHW called [facility] in [place] at [phone].  No one was available. CHW left contact information for a return call.    CHW will sent PCP a message for a new oxygen tank.    OMAR Kim  Clinic Care Coordination  [facility] Clinics: [facility] [place], [place], [place], and [place] [facility]  Phone: [phone]

## 2023-10-26 NOTE — TELEPHONE ENCOUNTER
Clinic Care Coordination Contact    Patient stated his portable oxygen tank does not work at all. Patient is unable to use the portable.  Patient is requesting an order for a new portable oxygen tank.    Thank you.     Kelley Slaughter JUDITH  Clinic Care Coordination  [facility]: [facility] [place], [place], [place], and [facility]  Phone: [phone]

## 2023-10-26 NOTE — TELEPHONE ENCOUNTER
Called and spoke to the patient. Informed patient a new DME order has been placed for a portable oxygen tank. Triage RN asked patient if he has been getting his oxygen through [facility]. Patient states he is driving currently and is requesting a call back.     Called patient back. Patient states he normally goes through [facility]. Advised patient to call [facility] and arrange  of his new portable oxygen tank. Patient expressed understanding and had no additional questions at this time.     Leslie Summers RN

## 2023-10-30 ENCOUNTER — THERAPY VISIT (OUTPATIENT)
Dept: PHYSICAL THERAPY | Facility: CLINIC | Age: 78
End: 2023-10-30
Payer: COMMERCIAL

## 2023-10-30 DIAGNOSIS — M54.2 CERVICALGIA: Primary | ICD-10-CM

## 2023-10-30 PROCEDURE — 97110 THERAPEUTIC EXERCISES: CPT | Mod: GP

## 2023-10-30 PROCEDURE — 97112 NEUROMUSCULAR REEDUCATION: CPT | Mod: GP

## 2023-10-31 ENCOUNTER — PATIENT OUTREACH (OUTPATIENT)
Dept: CARE COORDINATION | Facility: CLINIC | Age: 78
End: 2023-10-31
Payer: COMMERCIAL

## 2023-10-31 ASSESSMENT — ACTIVITIES OF DAILY LIVING (ADL): DEPENDENT_IADLS:: CLEANING;COOKING;LAUNDRY;SHOPPING;MEAL PREPARATION

## 2023-10-31 NOTE — PROGRESS NOTES
Clinic Care Coordination Contact  Care Coordination Clinician Chart Review    Situation: Patient chart reviewed by Care Coordinator.       Background: Care Coordination Program started: 9/24/2020. Initial assessment completed and patient-centered care plan(s) were developed with participation from patient. Lead CC handed patient off to CHW for continued outreaches.       Assessment: Per chart review, patient outreach completed by CC CHW on 10/26/2023.  Patient is actively working to accomplish goal(s). Patient's goal(s) appropriate and relevant at this time. Patient is not due for updated Plan of Care.  Assessments will be completed annually or as needed/with change of patient status.      Care Plan: 1. Improve chronic symptoms       Problem: Lifestyle choices       Goal: I want to improve management of my leg, knee, and hip pain and swelling.       Start Date: 6/3/2021 Expected End Date: 12/19/2023    This Visit's Progress: 90% Recent Progress: 90%    Note:     Barriers: Lymphedema  Strengths: Motivated to improve ability to walk  Patient expressed understanding of goal: Yes  Action steps to achieve this goal:  1. I will apply Jacinto hose to wear throughout the day and remove at night  2. I will walk my dog daily to help with strengthening and endurance  3. I will elevate my legs while sitting and laying down by propping them up with a pillow  4. I will discuss, review, schedule and complete recommended overdue health maintenance with my primary care provider  5. I will I will take my medications as prescribed  6. I will contact my care team with questions, concerns, support needs. I will use the clinic as a resource   7. I will contact my clinic with 24/7 after hours services available                             Plan/Recommendations: The patient will continue working with Care Coordination to achieve goal(s) as above. CHW will continue outreaches at minimum every 30 days and will involve Lead CC as needed or if  patient is ready to move to Maintenance. Lead CC will continue to monitor CHW outreaches and patient's progress to goal(s) every 6 weeks.     Plan of Care updated and sent to patient: Ilene Knapp RN, BSN, PHN  Primary Care / Care Coordinator   [facility]  E-mail [email or web address]   [phone]

## 2023-11-02 ENCOUNTER — PATIENT OUTREACH (OUTPATIENT)
Dept: CARE COORDINATION | Facility: CLINIC | Age: 78
End: 2023-11-02
Payer: COMMERCIAL

## 2023-11-02 NOTE — PROGRESS NOTES
Clinic Care Coordination Contact  [facility]/Voicemail    Clinical Data: Care Coordinator Outreach    Outreach Documentation Number of Outreach Attempt   11/2/2023   2:43 PM 1       Left message on patient's voicemail with call back information and requested return call.    Plan: Care Coordinator will try to reach patient again in 10 business days.    OMAR Kim  Clinic Care Coordination  [facility] Clinics: [facility] [place], [place], [facility], and [place] [facility]  Phone: [phone]

## 2023-11-05 DIAGNOSIS — J44.9 CHRONIC OBSTRUCTIVE PULMONARY DISEASE, UNSPECIFIED COPD TYPE (H): Chronic | ICD-10-CM

## 2023-11-06 RX ORDER — ALBUTEROL SULFATE 90 UG/1
AEROSOL, METERED RESPIRATORY (INHALATION)
Qty: 25.5 G | Refills: 0 | Status: SHIPPED | OUTPATIENT
Start: 2023-11-06 | End: 2024-07-22

## 2023-11-21 ENCOUNTER — TRANSFERRED RECORDS (OUTPATIENT)
Dept: HEALTH INFORMATION MANAGEMENT | Facility: CLINIC | Age: 78
End: 2023-11-21

## 2023-11-28 ENCOUNTER — PATIENT OUTREACH (OUTPATIENT)
Dept: CARE COORDINATION | Facility: CLINIC | Age: 78
End: 2023-11-28
Payer: COMMERCIAL

## 2023-11-28 ENCOUNTER — NURSE TRIAGE (OUTPATIENT)
Dept: CARE COORDINATION | Facility: CLINIC | Age: 78
End: 2023-11-28

## 2023-11-28 NOTE — PROGRESS NOTES
"Clinic Care Coordination Contact  Community Health Worker Follow Up    Care Gaps:     Health Maintenance Due   Topic Date Due    HF ACTION PLAN  Never done    RSV VACCINE (Pregnancy & 60+) (1 - 1-dose 60+ series) Never done    IPV IMMUNIZATION (2 of 3 - Adult catch-up series) 01/03/2011    DIABETIC FOOT EXAM  11/01/2019    MICROALBUMIN  05/20/2023    DTAP/TDAP/TD IMMUNIZATION (2 - Td or Tdap) 08/09/2023     Addressed on 11/28/23      Care Plan:   Care Plan: 1. Improve chronic symptoms       Problem: Lifestyle choices       Goal: I want to improve management of my leg, knee, and hip pain and swelling.       Start Date: 6/3/2021 Expected End Date: 12/19/2023    This Visit's Progress: 90% Recent Progress: 90%    Note:     Barriers: Lymphedema  Strengths: Motivated to improve ability to walk  Patient expressed understanding of goal: Yes  Action steps to achieve this goal:  1. I will apply Jesus hose to wear throughout the day and remove at night  2. I will walk my dog daily to help with strengthening and endurance  3. I will elevate my legs while sitting and laying down by propping them up with a pillow  4. I will discuss, review, schedule and complete recommended overdue health maintenance with my primary care provider  5. I will I will take my medications as prescribed  6. I will contact my care team with questions, concerns, support needs. I will use the clinic as a resource   7. I will contact my clinic with 24/7 after hours services available                            Discussed:  Patient stated he fell one week ago.  Patient stated \"his whole inside of his body hurts\".  Patient stated his chest and arm hurts.  Patient stated the new oxygen tank is working without any issues.  Patient stated he wears his jesus hose stockings most days.  Patient stated he tries to check his blood sugars, but forgets sometimes.  Patient stated he takes his medications as prescribed.      Intervention and Education during outreach: "     CHW asked patient if there are any resource needs, patient declined.    CHW encouraged patient to contact CC if there are any other changes or resources needed.  Patient acknowledged understanding.        CHW Plan:  will route to OX Triage.  CHW will outreach in one month.    Kelley Slaughter, TYESHAW  Clinic Care Coordination  [facility] Clinics: [facility] [place], [place], [facility], and [place] [facility]  Phone: [phone]

## 2023-11-28 NOTE — TELEPHONE ENCOUNTER
"Clinic Care Coordination Contact      Patient stated he fell one week ago.  Patient stated \"his whole inside of his body hurts\".  Patient stated he has some pain.    Please call patient to discuss.    Thank you.     Kelley Slaughter JUDITH  Clinic Care Coordination  [facility] Clinics: [facility] [place], [place], [place], and [place] [facility]  Phone: [phone]   "

## 2023-11-28 NOTE — TELEPHONE ENCOUNTER
Patient needs to be seen for further evaluation if pain persists.  If he feels his symptoms are emergent then an urgent care assessment may be appropriate.

## 2023-11-29 NOTE — TELEPHONE ENCOUNTER
Reason for Disposition    MODERATE back pain (e.g., interferes with normal activities) and present > 3 days    Additional Information    Negative: Passed out (i.e., fainted, collapsed and was not responding)    Negative: Shock suspected (e.g., cold/pale/clammy skin, too weak to stand, low BP, rapid pulse)    Negative: Sounds like a life-threatening emergency to the triager    Negative: Major injury to the back (e.g., MVA, fall > 10 feet or 3 meters, penetrating injury, etc.)    Negative: Pain in the upper back over the ribs (rib cage) that radiates (travels) into the chest    Negative: Pain in the upper back over the ribs (rib cage) and worsened by coughing (or clearly increases with breathing)    Negative: Back pain during pregnancy    Negative: SEVERE back pain of sudden onset and age > 60 years    Negative: SEVERE abdominal pain (e.g., excruciating)    Negative: Abdominal pain and age > 60 years    Negative: Unable to urinate (or only a few drops) and bladder feels very full    Negative: Loss of bladder or bowel control (urine or bowel incontinence; wetting self, leaking stool) of new-onset    Negative: Numbness (loss of sensation) in groin or rectal area    Negative: Age > 50 and no history of prior similar back pain    Negative: SEVERE back pain (e.g., excruciating, unable to do any normal activities) and not improved after pain medicine and CARE ADVICE    Negative: Numbness in an arm or hand (i.e., loss of sensation) and upper back pain    Negative: Numbness in a leg or foot (i.e., loss of sensation)    Negative: High-risk adult (e.g., history of cancer, history of HIV, or history of IV Drug Use)    Negative: Soft tissue infection (e.g., abscess, cellulitis) or other serious infection (e.g., bacteremia) in last 2 weeks    Negative: Painful rash with multiple small blisters grouped together (i.e., dermatomal distribution or 'band' or 'stripe')    Negative: Pain radiates into the thigh or further down the leg,  "and in both legs    Negative: Fever > 100.4 F (38.0 C) and flank pain    Negative: Pain or burning with passing urine (urination)    Negative: Pain radiates into groin, scrotum    Negative: Blood in urine (red, pink, or tea-colored)    Negative: Vomiting and pain over lower ribs of back (i.e., flank - kidney area)    Negative: Weakness of a leg or foot (e.g., unable to bear weight, dragging foot)    Negative: Patient sounds very sick or weak to the triager    Answer Assessment - Initial Assessment Questions  1. ONSET: \"When did the pain begin?\"       10 days ago  2. LOCATION: \"Where does it hurt?\" (upper, mid or lower back)      Whole back  3. SEVERITY: \"How bad is the pain?\"  (e.g., Scale 1-10; mild, moderate, or severe)    - MILD (1-3): Doesn't interfere with normal activities.     - MODERATE (4-7): Interferes with normal activities or awakens from sleep.     - SEVERE (8-10): Excruciating pain, unable to do any normal activities.       moderate  4. PATTERN: \"Is the pain constant?\" (e.g., yes, no; constant, intermittent)       When moving around   5. RADIATION: \"Does the pain shoot into your legs or somewhere else?\"      no  6. CAUSE:  \"What do you think is causing the back pain?\"       fall  7. BACK OVERUSE:  \"Any recent lifting of heavy objects, strenuous work or exercise?\"      Fell 10 days ago  8. MEDICINES: \"What have you taken so far for the pain?\" (e.g., nothing, acetaminophen, NSAIDS)      no  9. NEUROLOGIC SYMPTOMS: \"Do you have any weakness, numbness, or problems with bowel/bladder control?\"      no  10. OTHER SYMPTOMS: \"Do you have any other symptoms?\" (e.g., fever, abdomen pain, burning with urination, blood in urine)        no  11. PREGNANCY: \"Is there any chance you are pregnant?\" \"When was your last menstrual period?\"        no    Protocols used: Back Pain-A-[place]    "

## 2023-11-29 NOTE — TELEPHONE ENCOUNTER
Called patient and triaged he was walking dog and tripped over the leash about 10 days ago and since has been having back pain   He states it feels bruised, no swelling or breaks in skin tylenol does help with the pain     Pain only present when trying to get up from sitting or do activity     Disposition is visit in 3 days     Okay for patient to continue to monitor rest and use tylenol as needed or visit needed?

## 2023-11-30 NOTE — TELEPHONE ENCOUNTER
Okay to continue to monitor unless pain persists and likely should be seen at least for x-rays to rule out vertebral or compression fracture.

## 2023-12-08 ENCOUNTER — THERAPY VISIT (OUTPATIENT)
Dept: PHYSICAL THERAPY | Facility: CLINIC | Age: 78
End: 2023-12-08
Payer: COMMERCIAL

## 2023-12-08 DIAGNOSIS — M54.2 CERVICALGIA: Primary | ICD-10-CM

## 2023-12-08 PROCEDURE — 97112 NEUROMUSCULAR REEDUCATION: CPT | Mod: GP

## 2023-12-08 PROCEDURE — 97110 THERAPEUTIC EXERCISES: CPT | Mod: GP

## 2023-12-19 ENCOUNTER — E-VISIT (OUTPATIENT)
Dept: INTERNAL MEDICINE | Facility: CLINIC | Age: 78
End: 2023-12-19
Payer: COMMERCIAL

## 2023-12-19 ENCOUNTER — PATIENT OUTREACH (OUTPATIENT)
Dept: CARE COORDINATION | Facility: CLINIC | Age: 78
End: 2023-12-19
Payer: COMMERCIAL
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
## 2023-12-19 DIAGNOSIS — M25.569 KNEE PAIN, UNSPECIFIED CHRONICITY, UNSPECIFIED LATERALITY: Primary | ICD-10-CM

## 2023-12-19 PROCEDURE — 99207 PR NON-BILLABLE SERV PER CHARTING: CPT | Performed by: INTERNAL MEDICINE

## 2023-12-19 PROCEDURE — 99421 OL DIG E/M SVC 5-10 MIN: CPT | Performed by: INTERNAL MEDICINE

## 2023-12-19 ASSESSMENT — ACTIVITIES OF DAILY LIVING (ADL): DEPENDENT_IADLS:: CLEANING;COOKING;LAUNDRY;SHOPPING;MEAL PREPARATION

## 2023-12-19 NOTE — PROGRESS NOTES
Clinic Care Coordination Contact  Care Coordination Clinician Chart Review    Situation: Patient chart reviewed by Care Coordinator.       Background: Care Coordination Program started: 9/24/2020. Initial assessment completed and patient-centered care plan(s) were developed with participation from patient. Lead CC handed patient off to CHW for continued outreaches.       Assessment: Per chart review, patient outreach completed by CC CHW on 11/28/2023.  Patient is actively working to accomplish goal(s). Patient's goal(s) appropriate and relevant at this time. Patient is due for updated Plan of Care.  Assessments will be completed annually or as needed/with change of patient status.      Care Plan: 1. Improve chronic symptoms       Problem: Lifestyle choices       Goal: I want to improve management of my leg, knee, and hip pain and swelling.       Start Date: 6/3/2021 Expected End Date: 3/19/2024    This Visit's Progress: 90% Recent Progress: 90%    Note:     Barriers: Lymphedema  Strengths: Motivated to improve ability to walk  Patient expressed understanding of goal: Yes  Action steps to achieve this goal:  1. I will apply Jacinto hose to wear throughout the day and remove at night  2. I will walk my dog daily to help with strengthening and endurance  3. I will elevate my legs while sitting and laying down by propping them up with a pillow  4. I will discuss, review, schedule and complete recommended overdue health maintenance with my primary care provider  5. I will I will take my medications as prescribed  6. I will contact my care team with questions, concerns, support needs. I will use the clinic as a resource   7. I will contact my clinic with 24/7 after hours services available                              Plan/Recommendations: The patient will continue working with Care Coordination to achieve goal(s) as above. CHW will continue outreaches at minimum every 30 days and will involve Lead CC as needed or if patient  is ready to move to Maintenance. Lead CC will continue to monitor CHW outreaches and patient's progress to goal(s) every 6 weeks.     Plan of Care updated and sent to patient: Yes, via William Knapp RN, BSN, PHN  Primary Care / Care Coordinator   [facility]  E-mail [email or web address]   [phone]

## 2023-12-19 NOTE — LETTER
[facility]  Patient Centered Plan of Care  About Me:        Patient Name:  Nahun Villalobos    YOB: 1945  Age:         78 year old   [place] MRN:    [id number] Telephone Information:  Home Phone [phone]   Mobile [phone]       Address:  [address] [id number] Email address:  [email or web address]      Emergency Contact(s)    Name Relationship Lgl Grd Work Phone Home Phone Mobile Phone   1. SINDY CHRISTIE Daughter No   [phone]   2. SUMAN Significant ot*    [phone]           Primary language:  English     needed? No   [place] [facility]:  [phone] op. 1  Other communication barriers:None    Preferred Method of Communication:  William  Current living arrangement: I live in a private home with spouse (Suman, s.o.)    Mobility Status/ Medical Equipment: Independent w/Device    Health Maintenance  Health Maintenance Reviewed: Due/Overdue   Health Maintenance Due   Topic Date Due    HF ACTION PLAN  Never done    RSV VACCINE (Pregnancy & 60+) (1 - 1-dose 60+ series) Never done    IPV IMMUNIZATION (2 of 3 - Adult catch-up series) 01/03/2011    DIABETIC FOOT EXAM  11/01/2019    MICROALBUMIN  05/20/2023    DTAP/TDAP/TD IMMUNIZATION (2 - Td or Tdap) 08/09/2023    FALL RISK ASSESSMENT  01/02/2024     My Access Plan  Medical Emergency 911   Primary Clinic Line [facility] [phone]   24 Hour Appointment Line [phone] or  [phone]) (toll-free)   24 Hour Nurse Line [phone] (toll-free)   Preferred Urgent Care [facility], [phone]     Preferred Hospital [facility]  [phone] ([facility]; was at [facility] 3/2023)     Preferred Pharmacy [id number] - [address] AT [address] [place] [address]     Behavioral Health Crisis Line The National Suicide Prevention Lifeline at  1-873.979.2612 or Text/Call 988     My Care Team Members  Patient Care Team         Relationship Specialty Notifications Start End    Olegario Castillo MD PCP - General Internal Medicine  4/6/22     Phone: [phone] Fax: [phone]         [address] [place] [address]    Olegario Castillo MD Assigned PCP   10/4/12     Phone: [phone] Fax: [phone]         [address] [place] [address]    Praveena Burris MD MD Urology  3/1/17      INACTIVE IN [place] AS OF 4/30/2021    Areli Cordero, RN Registered Nurse   3/1/17     Phone: [phone]         Kelely Slaughter CHW Community Health Worker Primary Care - CC Admissions 9/24/20     Phone: [phone]         Rhea Knapp, RN Lead Care Coordinator  Admissions 9/24/20     Jaswinder Tinajero MD MD Hematology & Oncology  12/7/20     Phone: [phone] Fax: [phone]         [place] [facility] [address] [facility] [address]    Bernardo Haynes MD MD Pulmonary Disease  12/7/20     Phone: [phone] Fax: [phone]         [place] [facility] [address] [facility] [address] [facility] [address]    Antonia Mandel, PT Physical Therapist Physical Therapist  8/2/21     Phone: [phone] Fax: [phone]         [address] [address] [facility] [address]    Jamil Aparicio MD MD Neurology  2/2/22     Phone: [phone] Fax: [phone] [address] [place] [address]    Jamil Aparicio MD Assigned Neuroscience Provider   4/10/22     Phone: [phone] Fax: [phone] [address] [address]    Jemal Vickers MD Assigned Musculoskeletal Provider   1/14/23     Phone: [phone] Fax: [phone] [address] [address]    Kaleigh Onofre MD MD Urology  2/1/23     Phone: [phone] Fax: [phone]         [address] [facility] [address]    Kaleigh Onofre MD Assigned Surgical Provider   3/11/23     Phone:  [phone] Fax: [phone]         [address] [facility] [address] [facility] [address]                My Care Plans  Self Management and Treatment Plan    Care Plan  Care Plan: 1. Improve chronic symptoms       Problem: Lifestyle choices       Goal: I want to improve management of my leg, knee, and hip pain and swelling.       Start Date: 6/3/2021 Expected End Date: 3/19/2024    This Visit's Progress: 90% Recent Progress: 90%    Note:     Barriers: Lymphedema  Strengths: Motivated to improve ability to walk  Patient expressed understanding of goal: Yes  Action steps to achieve this goal:  1. I will apply Jacinto hose to wear throughout the day and remove at night  2. I will walk my dog daily to help with strengthening and endurance  3. I will elevate my legs while sitting and laying down by propping them up with a pillow  4. I will discuss, review, schedule and complete recommended overdue health maintenance with my primary care provider  5. I will I will take my medications as prescribed  6. I will contact my care team with questions, concerns, support needs. I will use the clinic as a resource   7. I will contact my clinic with 24/7 after hours services available                              Action Plans on File:   COPD  Depression    Advance Care Plans/Directives:   Advanced Care Plan/Directives on file:   No    Discussed with patient/caregiver(s): No data recorded           My Medical and Care Information  Problem List   Patient Active Problem List   Diagnosis    Essential hypertension, benign    Tobacco use disorder    Lumbago    Impotence of organic origin    Advance care planning    Polyp of colon    Obstructive sleep apnea syndrome    Hyperlipidemia LDL goal <100    BPPV (benign paroxysmal positional vertigo), unspecified laterality    Chronic obstructive pulmonary disease, unspecified COPD type (H)    Type 2 diabetes mellitus without complication, without long-term current use of insulin (H)    S/P transurethral  resection of prostate    Hematuria, gross    Cervical segment dysfunction    Cervicalgia    Thoracic segment dysfunction    Erectile dysfunction    Acute diverticulitis    Other pulmonary embolism without acute cor pulmonale, unspecified chronicity (H)    Mesenteric mass    History of adenocarcinoma of lung    Benign neoplasm of ascending colon    Benign neoplasm of rectum    Diverticular disease of colon    History of colonic polyps      Current Medications and Allergies:    Allergies   Allergen Reactions    No Known Drug Allergy      Current Outpatient Medications   Medication    acetaminophen (TYLENOL) 500 MG tablet    albuterol (VENTOLIN HFA) 108 (90 Base) MCG/ACT inhaler    Ascorbic Acid (VITAMIN C PO)    atorvastatin (LIPITOR) 20 MG tablet    blood glucose (ACCU-CHEK CHACHA) test strip    blood glucose (NO BRAND SPECIFIED) lancets standard    blood glucose (NO BRAND SPECIFIED) test strip    blood glucose monitoring (NO BRAND SPECIFIED) meter device kit    cholestyramine light (QUESTRAN) 4 GM packet    docusate sodium (COLACE) 100 MG capsule    ELIQUIS ANTICOAGULANT 5 MG tablet    finasteride (PROSCAR) 5 MG tablet    Fluticasone-Umeclidin-Vilant (TRELEGY ELLIPTA) 100-62.5-25 MCG/ACT oral inhaler    furosemide (LASIX) 40 MG tablet    gabapentin (NEURONTIN) 300 MG capsule    gabapentin (NEURONTIN) 300 MG capsule    gabapentin (NEURONTIN) 300 MG capsule    lisinopril (ZESTRIL) 40 MG tablet    metFORMIN (GLUCOPHAGE) 500 MG tablet    metoprolol succinate ER (TOPROL XL) 25 MG 24 hr tablet    order for DME    order for DME    tiZANidine (ZANAFLEX) 2 MG tablet     No current facility-administered medications for this visit.         Care Coordination Start Date: 9/24/2020   Frequency of Care Coordination: 6 weeks, more frequently as needed     Form Last Updated: 12/19/2023

## 2023-12-22 ENCOUNTER — THERAPY VISIT (OUTPATIENT)
Dept: PHYSICAL THERAPY | Facility: CLINIC | Age: 78
End: 2023-12-22
Payer: COMMERCIAL

## 2023-12-22 ENCOUNTER — TELEPHONE (OUTPATIENT)
Dept: INTERNAL MEDICINE | Facility: CLINIC | Age: 78
End: 2023-12-22

## 2023-12-22 DIAGNOSIS — M54.2 CERVICALGIA: Primary | ICD-10-CM

## 2023-12-22 DIAGNOSIS — R26.89 BALANCE DISORDER: ICD-10-CM

## 2023-12-22 DIAGNOSIS — R29.898 WEAKNESS OF BOTH LOWER EXTREMITIES: Primary | ICD-10-CM

## 2023-12-22 PROCEDURE — 97110 THERAPEUTIC EXERCISES: CPT | Mod: 59

## 2023-12-22 PROCEDURE — 97530 THERAPEUTIC ACTIVITIES: CPT | Mod: GP

## 2023-12-22 NOTE — TELEPHONE ENCOUNTER
----- Message from Darrin Miles, PT sent at 12/22/2023  8:26 AM CST -----  Regarding: PT Referral  Hi Dr. Castillo,    I have been working with Nahun Cyr) in physical therapy for cervicalgia.  His neck pain is improving but he has reported repeated falls, including one this week where he was not able to get up from the floor.  I mentioned getting a Life Alert for such instances which he is on board with. I would like to start working more on his lower extremity strength and balance. Are you able to put in a PT referral for this?  Let me know if you have questions.    Thanks, and happy holidays.    -Darrin Miles

## 2023-12-29 ENCOUNTER — TRANSFERRED RECORDS (OUTPATIENT)
Dept: HEALTH INFORMATION MANAGEMENT | Facility: CLINIC | Age: 78
End: 2023-12-29
Payer: COMMERCIAL

## 2024-01-19 DIAGNOSIS — J44.9 CHRONIC OBSTRUCTIVE PULMONARY DISEASE, UNSPECIFIED COPD TYPE (H): Chronic | ICD-10-CM

## 2024-01-20 RX ORDER — FLUTICASONE FUROATE, UMECLIDINIUM BROMIDE AND VILANTEROL TRIFENATATE 100; 62.5; 25 UG/1; UG/1; UG/1
POWDER RESPIRATORY (INHALATION)
Qty: 60 EACH | Refills: 5 | Status: SHIPPED | OUTPATIENT
Start: 2024-01-20 | End: 2024-02-28

## 2024-01-22 ENCOUNTER — PATIENT OUTREACH (OUTPATIENT)
Dept: CARE COORDINATION | Facility: CLINIC | Age: 79
End: 2024-01-22
Payer: COMMERCIAL

## 2024-01-29 ENCOUNTER — PATIENT OUTREACH (OUTPATIENT)
Dept: CARE COORDINATION | Facility: CLINIC | Age: 79
End: 2024-01-29
Payer: COMMERCIAL

## 2024-01-29 ASSESSMENT — ACTIVITIES OF DAILY LIVING (ADL): DEPENDENT_IADLS:: CLEANING;COOKING;LAUNDRY;SHOPPING;MEAL PREPARATION

## 2024-01-29 NOTE — PROGRESS NOTES
Clinic Care Coordination Contact  Care Coordination Clinician Chart Review    Situation: Patient chart reviewed by Care Coordinator.       Background: Care Coordination Program started: 9/24/2020. Initial assessment completed and patient-centered care plan(s) were developed with participation from patient. Lead CC handed patient off to CHW for continued outreaches.       Assessment: Per chart review, patient outreach completed by CC CHW on 1/22/2024.  Patient is actively working to accomplish goal(s). Patient's goal(s) appropriate and relevant at this time. Patient is not due for updated Plan of Care.  Assessments will be completed annually or as needed/with change of patient status.      Care Plan: 1. Improve chronic symptoms       Problem: Lifestyle choices       Goal: I want to improve management of my leg, knee, and hip pain and swelling.       Start Date: 6/3/2021 Expected End Date: 3/19/2024    This Visit's Progress: 90% Recent Progress: 90%    Note:     Barriers: Lymphedema  Strengths: Motivated to improve ability to walk  Patient expressed understanding of goal: Yes  Action steps to achieve this goal:  1. I will apply Jacinto hose to wear throughout the day and remove at night  2. I will walk my dog daily to help with strengthening and endurance  3. I will elevate my legs while sitting and laying down by propping them up with a pillow  4. I will discuss, review, schedule and complete recommended overdue health maintenance with my primary care provider  5. I will I will take my medications as prescribed  6. I will contact my care team with questions, concerns, support needs. I will use the clinic as a resource   7. I will contact my clinic with 24/7 after hours services available                             Plan/Recommendations: The patient will continue working with Care Coordination to achieve goal(s) as above. CHW will continue outreaches at minimum every 30 days and will involve Lead CC as needed or if  patient is ready to move to Maintenance. Lead CC will continue to monitor CHW outreaches and patient's progress to goal(s) every 6 weeks.     Plan of Care updated and sent to patient: Ilene Knapp RN, BSN, PHN  Primary Care / Care Coordinator   [facility]  E-mail [email or web address]   [phone]

## 2024-02-14 ENCOUNTER — TRANSFERRED RECORDS (OUTPATIENT)
Dept: HEALTH INFORMATION MANAGEMENT | Facility: CLINIC | Age: 79
End: 2024-02-14
Payer: COMMERCIAL

## 2024-02-17 DIAGNOSIS — J44.9 CHRONIC OBSTRUCTIVE PULMONARY DISEASE, UNSPECIFIED COPD TYPE (H): Chronic | ICD-10-CM

## 2024-02-17 NOTE — TELEPHONE ENCOUNTER
Medication Question or Refill        What medication are you calling about (include dose and sig)?: Fluticasone-Umeclidin-Vilant (TRELEGY ELLIPTA) 100-62.5-25 MCG/ACT oral inhaler     Preferred Pharmacy:  [facility] #[id number] - [address] [address]  Phone: [phone] Fax: [phone]      Controlled Substance Agreement on file:   CSA -- Patient Level:    CSA: None found at the patient level.       Who prescribed the medication?: PCP    Do you need a refill? Yes    When did you use the medication last? Yesterday    Patient offered an appointment? No    Do you have any questions or concerns?  Yes, Pt mail order will not arrive until next week, pt is requesting a refill sent to local pharmacy so patient does not have to wait.       Could we send this information to you in [facility] or would you prefer to receive a phone call?:   Patient would prefer a phone call   Okay to leave a detailed message?: Yes at Home number on file

## 2024-02-19 RX ORDER — FLUTICASONE FUROATE, UMECLIDINIUM BROMIDE AND VILANTEROL TRIFENATATE 100; 62.5; 25 UG/1; UG/1; UG/1
POWDER RESPIRATORY (INHALATION)
Qty: 60 EACH | Refills: 5 | OUTPATIENT
Start: 2024-02-19

## 2024-02-19 NOTE — TELEPHONE ENCOUNTER
As stated in original message, patient needs short term script sent to local pharmacy. Patient will not receive mail order in time, therefore will run out of medication.     Please send a small script to local pharmacy.         Do you have any questions or concerns?  Yes, Pt mail order will not arrive until next week, pt is requesting a refill sent to local pharmacy so patient does not have to wait.

## 2024-02-19 NOTE — TELEPHONE ENCOUNTER
Unclear on refill request as patient has already had prescription sent to pharmacy.    Fluticasone-Umeclidin-Vilant (TRELEGY ELLIPTA) 100-62.5-25 MCG/ACT oral inhaler 60 each 5 1/20/2024 -- No   Sig: USE 1 INHALATION DAILY   Sent to pharmacy as: Trelegy Ellipta 100-62.5-25 MCG/ACT Aerosol Powder Breath Activated (Fluticasone-Umeclidin-Vilant)   Class: E-Prescribe   Order: [id number]   E-Prescribing Status: Receipt confirmed by pharmacy (1/20/2024  6:18 PM CST)

## 2024-02-22 ENCOUNTER — PATIENT OUTREACH (OUTPATIENT)
Dept: CARE COORDINATION | Facility: CLINIC | Age: 79
End: 2024-02-22
Payer: COMMERCIAL

## 2024-02-22 NOTE — PROGRESS NOTES
Clinic Care Coordination Contact  Community Health Worker Follow Up    Received a call from Kenia, patient's Significant Other.  [employer] is requesting medical device alert resources.    Care Gaps:     Health Maintenance Due   Topic Date Due    HF ACTION PLAN  Never done    RSV VACCINE (Pregnancy & 60+) (1 - 1-dose 60+ series) Never done    IPV IMMUNIZATION (2 of 3 - Adult catch-up series) 01/03/2011    DIABETIC FOOT EXAM  11/01/2019    MICROALBUMIN  05/20/2023    DTAP/TDAP/TD IMMUNIZATION (2 - Td or Tdap) 08/09/2023    PHQ-2 (once per calendar year)  01/01/2024    FALL RISK ASSESSMENT  01/02/2024    BMP  03/12/2024     Did Not Address    Care Plan:   Care Plan: 1. Improve chronic symptoms       Problem: Lifestyle choices       Goal: I want to improve management of my leg, knee, and hip pain and swelling.       Start Date: 6/3/2021 Expected End Date: 3/19/2024    This Visit's Progress: 90% Recent Progress: 90%    Note:     Barriers: Lymphedema  Strengths: Motivated to improve ability to walk  Patient expressed understanding of goal: Yes  Action steps to achieve this goal:  1. I will apply Jacinto hose to wear throughout the day and remove at night  2. I will walk my dog daily to help with strengthening and endurance  3. I will elevate my legs while sitting and laying down by propping them up with a pillow  4. I will discuss, review, schedule and complete recommended overdue health maintenance with my primary care provider  5. I will I will take my medications as prescribed  6. I will contact my care team with questions, concerns, support needs. I will use the clinic as a resource   7. I will contact my clinic with 24/7 after hours services available                          Discussed:  Patient stated he has fallen 5 times within the last two weeks.  Kenia had to call EMS.  Patient stated he had a PET Scan.  Spots were found on his liver.  Patient has an appointment at the [facility] on 3/27/24 to discuss an infusion.  Patient  stated he has a lot of pain in his neck.  Patient stated he had a MRI. Patient stated he was told that his spinal area is narrowing.  Patient has two appointments tomorrow at [facility] and to see a Chiropractor.  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       Intervention and Education during outreach:     CHW provided the following resources:  LifeLine [phone]  Life Alert [phone]  Medical Guardian [phone]    CHW encouraged patient to contact CC if there are any other changes or resources needed.  Patient acknowledged understanding.      CHW Plan: will route a message to PCP.  CHW will outreach in one month     OMAR Kim  Clinic Care Coordination  [facility]: [facility] [place]  Phone: [phone]

## 2024-02-22 NOTE — Clinical Note
Jessenia Castillo, Patient stated he has fallen 5 times within the last 2 weeks. Patient stated he is experiencing a lot of pain. Patient has an appointment with [facility] and to see a Chiropractor tomorrow. Patient stated he wanted you to know this information. Patient has an appointment with you next week on 2/28/24. Thank you. Kelley Slaughter

## 2024-02-26 DIAGNOSIS — E11.9 TYPE 2 DIABETES MELLITUS WITHOUT COMPLICATION, WITHOUT LONG-TERM CURRENT USE OF INSULIN (H): ICD-10-CM

## 2024-02-28 ENCOUNTER — VIRTUAL VISIT (OUTPATIENT)
Dept: INTERNAL MEDICINE | Facility: CLINIC | Age: 79
End: 2024-02-28
Payer: COMMERCIAL

## 2024-02-28 DIAGNOSIS — R31.0 GROSS HEMATURIA: ICD-10-CM

## 2024-02-28 DIAGNOSIS — C7A.019 MALIGNANT CARCINOID TUMOR OF SMALL INTESTINE, UNSPECIFIED LOCATION (H): Primary | ICD-10-CM

## 2024-02-28 DIAGNOSIS — E66.01 MORBID OBESITY DUE TO EXCESS CALORIES (H): ICD-10-CM

## 2024-02-28 DIAGNOSIS — R60.9 EDEMA, UNSPECIFIED TYPE: ICD-10-CM

## 2024-02-28 DIAGNOSIS — R19.7 DIARRHEA, UNSPECIFIED TYPE: ICD-10-CM

## 2024-02-28 DIAGNOSIS — I10 ESSENTIAL HYPERTENSION, BENIGN: ICD-10-CM

## 2024-02-28 DIAGNOSIS — E11.9 TYPE 2 DIABETES MELLITUS WITHOUT COMPLICATION, WITHOUT LONG-TERM CURRENT USE OF INSULIN (H): ICD-10-CM

## 2024-02-28 PROBLEM — C79.51 MALIGNANT NEOPLASM METASTATIC TO BONE (H): Status: ACTIVE | Noted: 2024-02-28

## 2024-02-28 PROBLEM — C34.90 NON-SMALL CELL LUNG CANCER (H): Status: ACTIVE | Noted: 2024-02-28

## 2024-02-28 PROCEDURE — 99442 PR PHYSICIAN TELEPHONE EVALUATION 11-20 MIN: CPT | Mod: 93 | Performed by: INTERNAL MEDICINE

## 2024-02-28 RX ORDER — FINASTERIDE 5 MG/1
1 TABLET, FILM COATED ORAL DAILY
Qty: 90 TABLET | Refills: 3 | Status: SHIPPED | OUTPATIENT
Start: 2024-02-28 | End: 2024-07-22

## 2024-02-28 RX ORDER — LISINOPRIL 40 MG/1
TABLET ORAL
Qty: 90 TABLET | Refills: 3 | Status: SHIPPED | OUTPATIENT
Start: 2024-02-28 | End: 2024-07-22

## 2024-02-28 RX ORDER — FUROSEMIDE 40 MG
40 TABLET ORAL 2 TIMES DAILY
Qty: 180 TABLET | Refills: 3 | Status: ON HOLD | OUTPATIENT
Start: 2024-02-28 | End: 2024-03-06

## 2024-02-28 RX ORDER — METOPROLOL SUCCINATE 25 MG/1
25 TABLET, EXTENDED RELEASE ORAL DAILY
Qty: 90 TABLET | Refills: 3 | Status: SHIPPED | OUTPATIENT
Start: 2024-02-28 | End: 2024-07-22

## 2024-02-28 RX ORDER — CHOLESTYRAMINE LIGHT 4 G/5.7G
4 POWDER, FOR SUSPENSION ORAL 2 TIMES DAILY
Qty: 180 EACH | Refills: 3 | Status: SHIPPED | OUTPATIENT
Start: 2024-02-28 | End: 2024-07-22

## 2024-02-28 NOTE — PROGRESS NOTES
"Nahun is a 78 year old who is being evaluated via a billable telephone visit.      What phone number would you like to be contacted at? [phone] (Kenia- significant other)   How would you like to obtain your AVS? William  Phone call duration: 18 minutes   Originating Location (pt. Location): Home    Distant Location (provider location):  On-site    Assessment & Plan     Malignant carcinoid tumor of small intestine, unspecified location (H)  Discusse with patient and family he is now being referred to the [facility] for ongoing therapy due to likely metastatic changes    Diarrhea, unspecified type  Able and continue with Questran as ordered  - cholestyramine light (QUESTRAN) 4 GM packet; Take 1 packet (4 g) by mouth 2 times daily    Edema, unspecified type  Chronic in nature continuing with diuresis as needed  Creatinine   Date Value Ref Range Status   09/12/2023 1.02 0.67 - 1.17 mg/dL Final   03/17/2021 1.11 0.66 - 1.25 mg/dL Final       - furosemide (LASIX) 40 MG tablet; Take 1 tablet (40 mg) by mouth 2 times daily    Essential hypertension, benign  Stable on therapy continue with current medical manage  - metoprolol succinate ER (TOPROL XL) 25 MG 24 hr tablet; Take 1 tablet (25 mg) by mouth daily  - lisinopril (ZESTRIL) 40 MG tablet; TAKE 1 TABLET DAILY    Type 2 diabetes mellitus without complication, without long-term current use of insulin (H)  Recommended follow-up A1c level.  I was informed that this now is going to be done through the [facility].  - lisinopril (ZESTRIL) 40 MG tablet; TAKE 1 TABLET DAILY    Gross hematuria  Filled per patient request.  - finasteride (PROSCAR) 5 MG tablet; Take 1 tablet (5 mg) by mouth daily.      (E66.01) Morbid obesity due to excess calories (H)  Comment: Recommended ongoing weight loss  Plan:         BMI  Estimated body mass index is 34.28 kg/m  as calculated from the following:    Height as of 8/24/23: 1.702 m (5' 7\").    Weight as of 8/24/23: 99.3 kg (218 lb 14.4 " oz).   Weight management plan: Discussed healthy diet and exercise guidelines      See Patient Instructions    Mariano Garnica is a 78 year old, presenting for the following health issues:  Neck Pain    Patient currently receiving infusion. Visit with significant other (Kenia) who is with patient       History of Present Illness       Reason for visit:  Neck pain, cancer update.  Symptom onset:  More than a month  Symptoms include:  Pain on neck radiates down to my arms. Pain only on the top of both arms from the wrist to elbowof  Symptom intensity:  Moderate  Symptom progression:  Worsening  Had these symptoms before:  No  What makes it worse:  Touching the top my arms, especially the right arm.  What makes it better:  Sleep    He eats 2-3 servings of fruits and vegetables daily.He consumes 2 sweetened beverage(s) daily.He exercises with enough effort to increase his heart rate 9 or less minutes per day.  He exercises with enough effort to increase his heart rate 3 or less days per week. He is missing 1 dose(s) of medications per week.  He is not taking prescribed medications regularly due to remembering to take.       Other concerns:  1. Multiple falls within the last few months. Discuss getting medical alert. Using Rollator in home and cane   2. Recently diagnosed with carcinoid tumor of small intestine. Will be getting treatment with [facility].    Review of Systems  CONSTITUTIONAL: NEGATIVE for fever, chills, change in weight  EYES: NEGATIVE for vision changes or irritation  ENT/MOUTH: NEGATIVE for ear, mouth and throat problems  RESP: NEGATIVE for significant cough or SOB  CV: NEGATIVE for chest pain, palpitations or peripheral edema  GI: NEGATIVE for nausea, abdominal pain, heartburn, or change in bowel habits  : NEGATIVE for frequency, dysuria, or hematuria  MUSCULOSKELETAL: NEGATIVE for significant arthralgias or myalgia  HEME: NEGATIVE for bleeding problems  PSYCHIATRIC: NEGATIVE for changes in mood  or affect      Objective    Vitals - Patient Reported  Weight (Patient Reported): 97.5 kg (215 lb)        Physical Exam   General: Alert and no distress //Respiratory: No audible wheeze, cough, or shortness of breath // Psychiatric:  Appropriate affect, tone, and pace of words    Lab Results   Component Value Date    A1C 6.7 09/12/2023    A1C 6.2 03/21/2023    A1C 6.7 08/30/2022    A1C 6.7 02/10/2022    A1C 7.5 12/10/2021    A1C 6.8 03/17/2021    A1C 7.2 11/11/2020    A1C 7.5 09/20/2020    A1C 7.6 06/18/2020    A1C 7.8 01/23/2020           Phone call duration: 18 minutes  Signed Electronically by: Olegario Castillo MD

## 2024-03-02 ENCOUNTER — APPOINTMENT (OUTPATIENT)
Dept: GENERAL RADIOLOGY | Facility: CLINIC | Age: 79
DRG: 683 | End: 2024-03-02
Attending: EMERGENCY MEDICINE
Payer: COMMERCIAL

## 2024-03-02 ENCOUNTER — APPOINTMENT (OUTPATIENT)
Dept: CT IMAGING | Facility: CLINIC | Age: 79
DRG: 683 | End: 2024-03-02
Attending: HOSPITALIST
Payer: COMMERCIAL

## 2024-03-02 ENCOUNTER — HOSPITAL ENCOUNTER (INPATIENT)
Facility: CLINIC | Age: 79
LOS: 2 days | Discharge: NURSING, REHAB OR LONG-TERM CARE | DRG: 683 | End: 2024-03-06
Attending: EMERGENCY MEDICINE | Admitting: HOSPITALIST
Payer: COMMERCIAL

## 2024-03-02 DIAGNOSIS — E11.9 TYPE 2 DIABETES MELLITUS WITHOUT COMPLICATION, WITHOUT LONG-TERM CURRENT USE OF INSULIN (H): ICD-10-CM

## 2024-03-02 DIAGNOSIS — N39.0 URINARY TRACT INFECTION WITHOUT HEMATURIA, SITE UNSPECIFIED: ICD-10-CM

## 2024-03-02 DIAGNOSIS — R23.9 ALTERATION IN SKIN INTEGRITY DUE TO MOISTURE: Primary | ICD-10-CM

## 2024-03-02 DIAGNOSIS — I10 ESSENTIAL HYPERTENSION, BENIGN: ICD-10-CM

## 2024-03-02 DIAGNOSIS — R53.1 WEAKNESS: ICD-10-CM

## 2024-03-02 DIAGNOSIS — I26.99 OTHER PULMONARY EMBOLISM WITHOUT ACUTE COR PULMONALE, UNSPECIFIED CHRONICITY (H): ICD-10-CM

## 2024-03-02 DIAGNOSIS — B35.4 TINEA CORPORIS: ICD-10-CM

## 2024-03-02 DIAGNOSIS — R26.81 GAIT INSTABILITY: ICD-10-CM

## 2024-03-02 LAB
ALBUMIN SERPL BCG-MCNC: 4.2 G/DL (ref 3.5–5.2)
ALBUMIN UR-MCNC: 100 MG/DL
ALP SERPL-CCNC: 69 U/L (ref 40–150)
ALT SERPL W P-5'-P-CCNC: 15 U/L (ref 0–70)
ANION GAP SERPL CALCULATED.3IONS-SCNC: 15 MMOL/L (ref 7–15)
APPEARANCE UR: CLEAR
AST SERPL W P-5'-P-CCNC: 22 U/L (ref 0–45)
ATRIAL RATE - MUSE: 78 BPM
BACTERIA #/AREA URNS HPF: ABNORMAL /HPF
BASE EXCESS BLDV CALC-SCNC: 3.2 MMOL/L (ref -3–3)
BASOPHILS # BLD AUTO: 0.1 10E3/UL (ref 0–0.2)
BASOPHILS NFR BLD AUTO: 1 %
BILIRUB SERPL-MCNC: 0.6 MG/DL
BILIRUB UR QL STRIP: NEGATIVE
BUN SERPL-MCNC: 16.3 MG/DL (ref 8–23)
CALCIUM SERPL-MCNC: 9.1 MG/DL (ref 8.8–10.2)
CHLORIDE SERPL-SCNC: 95 MMOL/L (ref 98–107)
CK SERPL-CCNC: 130 U/L (ref 39–308)
COLOR UR AUTO: YELLOW
CREAT SERPL-MCNC: 1.34 MG/DL (ref 0.67–1.17)
DEPRECATED HCO3 PLAS-SCNC: 24 MMOL/L (ref 22–29)
DIASTOLIC BLOOD PRESSURE - MUSE: NORMAL MMHG
EGFRCR SERPLBLD CKD-EPI 2021: 54 ML/MIN/1.73M2
EOSINOPHIL # BLD AUTO: 0.1 10E3/UL (ref 0–0.7)
EOSINOPHIL NFR BLD AUTO: 0 %
ERYTHROCYTE [DISTWIDTH] IN BLOOD BY AUTOMATED COUNT: 11.6 % (ref 10–15)
FLUAV RNA SPEC QL NAA+PROBE: NEGATIVE
FLUBV RNA RESP QL NAA+PROBE: NEGATIVE
GLUCOSE BLDC GLUCOMTR-MCNC: 179 MG/DL (ref 70–99)
GLUCOSE BLDC GLUCOMTR-MCNC: 196 MG/DL (ref 70–99)
GLUCOSE SERPL-MCNC: 200 MG/DL (ref 70–99)
GLUCOSE UR STRIP-MCNC: NEGATIVE MG/DL
HBA1C MFR BLD: 7.3 %
HCO3 BLDV-SCNC: 29 MMOL/L (ref 21–28)
HCT VFR BLD AUTO: 37.8 % (ref 40–53)
HGB BLD-MCNC: 12.6 G/DL (ref 13.3–17.7)
HGB UR QL STRIP: ABNORMAL
HOLD SPECIMEN: NORMAL
HOLD SPECIMEN: NORMAL
IMM GRANULOCYTES # BLD: 0 10E3/UL
IMM GRANULOCYTES NFR BLD: 0 %
INTERPRETATION ECG - MUSE: NORMAL
KETONES UR STRIP-MCNC: 10 MG/DL
LEUKOCYTE ESTERASE UR QL STRIP: ABNORMAL
LYMPHOCYTES # BLD AUTO: 1.9 10E3/UL (ref 0.8–5.3)
LYMPHOCYTES NFR BLD AUTO: 14 %
MCH RBC QN AUTO: 33.1 PG (ref 26.5–33)
MCHC RBC AUTO-ENTMCNC: 33.3 G/DL (ref 31.5–36.5)
MCV RBC AUTO: 99 FL (ref 78–100)
MONOCYTES # BLD AUTO: 1.1 10E3/UL (ref 0–1.3)
MONOCYTES NFR BLD AUTO: 9 %
MUCOUS THREADS #/AREA URNS LPF: PRESENT /LPF
NEUTROPHILS # BLD AUTO: 10.3 10E3/UL (ref 1.6–8.3)
NEUTROPHILS NFR BLD AUTO: 76 %
NITRATE UR QL: NEGATIVE
NRBC # BLD AUTO: 0 10E3/UL
NRBC BLD AUTO-RTO: 0 /100
NT-PROBNP SERPL-MCNC: 248 PG/ML (ref 0–1800)
O2/TOTAL GAS SETTING VFR VENT: 0 %
OXYHGB MFR BLDV: 30 % (ref 70–75)
P AXIS - MUSE: 26 DEGREES
PCO2 BLDV: 50 MM HG (ref 40–50)
PH BLDV: 7.38 [PH] (ref 7.32–7.43)
PH UR STRIP: 5.5 [PH] (ref 5–7)
PLATELET # BLD AUTO: 268 10E3/UL (ref 150–450)
PO2 BLDV: 24 MM HG (ref 25–47)
POTASSIUM SERPL-SCNC: 4.5 MMOL/L (ref 3.4–5.3)
PR INTERVAL - MUSE: 118 MS
PROT SERPL-MCNC: 8 G/DL (ref 6.4–8.3)
QRS DURATION - MUSE: 84 MS
QT - MUSE: 348 MS
QTC - MUSE: 396 MS
R AXIS - MUSE: 46 DEGREES
RBC # BLD AUTO: 3.81 10E6/UL (ref 4.4–5.9)
RBC URINE: 7 /HPF
RSV RNA SPEC NAA+PROBE: NEGATIVE
SAO2 % BLDV: 30.3 % (ref 70–75)
SARS-COV-2 RNA RESP QL NAA+PROBE: NEGATIVE
SODIUM SERPL-SCNC: 134 MMOL/L (ref 135–145)
SP GR UR STRIP: 1.03 (ref 1–1.03)
SQUAMOUS EPITHELIAL: 3 /HPF
SYSTOLIC BLOOD PRESSURE - MUSE: NORMAL MMHG
T AXIS - MUSE: 66 DEGREES
TROPONIN T SERPL HS-MCNC: 34 NG/L
TROPONIN T SERPL HS-MCNC: 42 NG/L
UROBILINOGEN UR STRIP-MCNC: NORMAL MG/DL
VENTRICULAR RATE- MUSE: 78 BPM
WBC # BLD AUTO: 13.5 10E3/UL (ref 4–11)
WBC URINE: 21 /HPF

## 2024-03-02 PROCEDURE — 93005 ELECTROCARDIOGRAM TRACING: CPT

## 2024-03-02 PROCEDURE — 84484 ASSAY OF TROPONIN QUANT: CPT | Performed by: EMERGENCY MEDICINE

## 2024-03-02 PROCEDURE — 71046 X-RAY EXAM CHEST 2 VIEWS: CPT

## 2024-03-02 PROCEDURE — 36415 COLL VENOUS BLD VENIPUNCTURE: CPT | Performed by: HOSPITALIST

## 2024-03-02 PROCEDURE — 87637 SARSCOV2&INF A&B&RSV AMP PRB: CPT | Performed by: EMERGENCY MEDICINE

## 2024-03-02 PROCEDURE — G0378 HOSPITAL OBSERVATION PER HR: HCPCS

## 2024-03-02 PROCEDURE — 96361 HYDRATE IV INFUSION ADD-ON: CPT

## 2024-03-02 PROCEDURE — 85049 AUTOMATED PLATELET COUNT: CPT | Performed by: EMERGENCY MEDICINE

## 2024-03-02 PROCEDURE — 87086 URINE CULTURE/COLONY COUNT: CPT | Performed by: HOSPITALIST

## 2024-03-02 PROCEDURE — 250N000012 HC RX MED GY IP 250 OP 636 PS 637: Performed by: HOSPITALIST

## 2024-03-02 PROCEDURE — 84484 ASSAY OF TROPONIN QUANT: CPT | Performed by: HOSPITALIST

## 2024-03-02 PROCEDURE — 81001 URINALYSIS AUTO W/SCOPE: CPT | Performed by: HOSPITALIST

## 2024-03-02 PROCEDURE — 83880 ASSAY OF NATRIURETIC PEPTIDE: CPT | Performed by: EMERGENCY MEDICINE

## 2024-03-02 PROCEDURE — 96366 THER/PROPH/DIAG IV INF ADDON: CPT

## 2024-03-02 PROCEDURE — 36415 COLL VENOUS BLD VENIPUNCTURE: CPT | Performed by: EMERGENCY MEDICINE

## 2024-03-02 PROCEDURE — 83036 HEMOGLOBIN GLYCOSYLATED A1C: CPT | Performed by: HOSPITALIST

## 2024-03-02 PROCEDURE — 80053 COMPREHEN METABOLIC PANEL: CPT | Performed by: EMERGENCY MEDICINE

## 2024-03-02 PROCEDURE — 70450 CT HEAD/BRAIN W/O DYE: CPT

## 2024-03-02 PROCEDURE — 99285 EMERGENCY DEPT VISIT HI MDM: CPT | Mod: 25

## 2024-03-02 PROCEDURE — 96365 THER/PROPH/DIAG IV INF INIT: CPT | Mod: 59

## 2024-03-02 PROCEDURE — 82962 GLUCOSE BLOOD TEST: CPT

## 2024-03-02 PROCEDURE — 82550 ASSAY OF CK (CPK): CPT | Performed by: EMERGENCY MEDICINE

## 2024-03-02 PROCEDURE — 99223 1ST HOSP IP/OBS HIGH 75: CPT | Performed by: HOSPITALIST

## 2024-03-02 PROCEDURE — 250N000013 HC RX MED GY IP 250 OP 250 PS 637: Performed by: HOSPITALIST

## 2024-03-02 PROCEDURE — 258N000003 HC RX IP 258 OP 636: Performed by: EMERGENCY MEDICINE

## 2024-03-02 PROCEDURE — 82805 BLOOD GASES W/O2 SATURATION: CPT | Performed by: HOSPITALIST

## 2024-03-02 RX ORDER — CHOLESTYRAMINE LIGHT 4 G/5.7G
4 POWDER, FOR SUSPENSION ORAL 2 TIMES DAILY
Status: DISCONTINUED | OUTPATIENT
Start: 2024-03-02 | End: 2024-03-06 | Stop reason: HOSPADM

## 2024-03-02 RX ORDER — DEXTROSE MONOHYDRATE 25 G/50ML
25-50 INJECTION, SOLUTION INTRAVENOUS
Status: DISCONTINUED | OUTPATIENT
Start: 2024-03-02 | End: 2024-03-06 | Stop reason: HOSPADM

## 2024-03-02 RX ORDER — FINASTERIDE 5 MG/1
5 TABLET, FILM COATED ORAL DAILY
Status: DISCONTINUED | OUTPATIENT
Start: 2024-03-02 | End: 2024-03-06 | Stop reason: HOSPADM

## 2024-03-02 RX ORDER — HYDRALAZINE HYDROCHLORIDE 20 MG/ML
10 INJECTION INTRAMUSCULAR; INTRAVENOUS EVERY 4 HOURS PRN
Status: DISCONTINUED | OUTPATIENT
Start: 2024-03-02 | End: 2024-03-06 | Stop reason: HOSPADM

## 2024-03-02 RX ORDER — NICOTINE POLACRILEX 4 MG
15-30 LOZENGE BUCCAL
Status: DISCONTINUED | OUTPATIENT
Start: 2024-03-02 | End: 2024-03-06 | Stop reason: HOSPADM

## 2024-03-02 RX ORDER — ACETAMINOPHEN 325 MG/1
650 TABLET ORAL EVERY 4 HOURS PRN
Status: DISCONTINUED | OUTPATIENT
Start: 2024-03-02 | End: 2024-03-06 | Stop reason: HOSPADM

## 2024-03-02 RX ORDER — METOPROLOL SUCCINATE 25 MG/1
25 TABLET, EXTENDED RELEASE ORAL DAILY
Status: DISCONTINUED | OUTPATIENT
Start: 2024-03-02 | End: 2024-03-06 | Stop reason: HOSPADM

## 2024-03-02 RX ORDER — FLUTICASONE FUROATE AND VILANTEROL 100; 25 UG/1; UG/1
1 POWDER RESPIRATORY (INHALATION) DAILY
Status: DISCONTINUED | OUTPATIENT
Start: 2024-03-02 | End: 2024-03-06 | Stop reason: HOSPADM

## 2024-03-02 RX ORDER — ALBUTEROL SULFATE 90 UG/1
2 AEROSOL, METERED RESPIRATORY (INHALATION) EVERY 4 HOURS PRN
Status: DISCONTINUED | OUTPATIENT
Start: 2024-03-02 | End: 2024-03-06 | Stop reason: HOSPADM

## 2024-03-02 RX ORDER — ONDANSETRON 2 MG/ML
4 INJECTION INTRAMUSCULAR; INTRAVENOUS EVERY 6 HOURS PRN
Status: DISCONTINUED | OUTPATIENT
Start: 2024-03-02 | End: 2024-03-06 | Stop reason: HOSPADM

## 2024-03-02 RX ORDER — ACETAMINOPHEN 650 MG/1
650 SUPPOSITORY RECTAL EVERY 4 HOURS PRN
Status: DISCONTINUED | OUTPATIENT
Start: 2024-03-02 | End: 2024-03-06 | Stop reason: HOSPADM

## 2024-03-02 RX ORDER — GABAPENTIN 100 MG/1
100 CAPSULE ORAL 2 TIMES DAILY
Status: DISCONTINUED | OUTPATIENT
Start: 2024-03-02 | End: 2024-03-06 | Stop reason: HOSPADM

## 2024-03-02 RX ORDER — ONDANSETRON 4 MG/1
4 TABLET, ORALLY DISINTEGRATING ORAL EVERY 6 HOURS PRN
Status: DISCONTINUED | OUTPATIENT
Start: 2024-03-02 | End: 2024-03-06 | Stop reason: HOSPADM

## 2024-03-02 RX ORDER — IPRATROPIUM BROMIDE AND ALBUTEROL SULFATE 2.5; .5 MG/3ML; MG/3ML
3 SOLUTION RESPIRATORY (INHALATION) EVERY 4 HOURS PRN
Status: DISCONTINUED | OUTPATIENT
Start: 2024-03-02 | End: 2024-03-06 | Stop reason: HOSPADM

## 2024-03-02 RX ADMIN — UMECLIDINIUM 1 PUFF: 62.5 AEROSOL, POWDER ORAL at 19:05

## 2024-03-02 RX ADMIN — FINASTERIDE 5 MG: 5 TABLET, FILM COATED ORAL at 19:03

## 2024-03-02 RX ADMIN — APIXABAN 2.5 MG: 2.5 TABLET, FILM COATED ORAL at 21:06

## 2024-03-02 RX ADMIN — INSULIN ASPART 2 UNITS: 100 INJECTION, SOLUTION INTRAVENOUS; SUBCUTANEOUS at 19:03

## 2024-03-02 RX ADMIN — FLUTICASONE FUROATE AND VILANTEROL TRIFENATATE 1 PUFF: 100; 25 POWDER RESPIRATORY (INHALATION) at 19:05

## 2024-03-02 RX ADMIN — SODIUM CHLORIDE 500 ML: 9 INJECTION, SOLUTION INTRAVENOUS at 13:24

## 2024-03-02 RX ADMIN — METOPROLOL SUCCINATE 25 MG: 25 TABLET, EXTENDED RELEASE ORAL at 19:03

## 2024-03-02 RX ADMIN — CHOLESTYRAMINE 4 G: 4 POWDER, FOR SUSPENSION ORAL at 21:58

## 2024-03-02 ASSESSMENT — ACTIVITIES OF DAILY LIVING (ADL)
ADLS_ACUITY_SCORE: 39
ADLS_ACUITY_SCORE: 37
ADLS_ACUITY_SCORE: 33
ADLS_ACUITY_SCORE: 37
ADLS_ACUITY_SCORE: 39
ADLS_ACUITY_SCORE: 37

## 2024-03-02 ASSESSMENT — COLUMBIA-SUICIDE SEVERITY RATING SCALE - C-SSRS
6. HAVE YOU EVER DONE ANYTHING, STARTED TO DO ANYTHING, OR PREPARED TO DO ANYTHING TO END YOUR LIFE?: NO
2. HAVE YOU ACTUALLY HAD ANY THOUGHTS OF KILLING YOURSELF IN THE PAST MONTH?: NO
1. IN THE PAST MONTH, HAVE YOU WISHED YOU WERE DEAD OR WISHED YOU COULD GO TO SLEEP AND NOT WAKE UP?: NO

## 2024-03-02 NOTE — ED TRIAGE NOTES
Patient BIBA d/t generalized weakness over last few weeks. Could not get out of shower today. Multiple calls to fire for lift assist at home. Stage IV cancer, unsure of type or recent treatments. VSS.      Triage Assessment (Adult)       Row Name 03/02/24 1234          Triage Assessment    Airway WDL WDL        Respiratory WDL    Respiratory WDL X  SOB, on 2L NC, wheeze        Cardiac WDL    Cardiac WDL WDL        Cognitive/Neuro/Behavioral WDL    Cognitive/Neuro/Behavioral WDL WDL

## 2024-03-02 NOTE — ED NOTES
Bed: ED26  Expected date:   Expected time:   Means of arrival:   Comments:  442  74 M weakness/ho ca and falls  1220

## 2024-03-02 NOTE — ED NOTES
[facility]  ED Nurse Handoff Report    ED Chief complaint: Generalized Weakness      ED Diagnosis:   Final diagnoses:   Weakness   Gait instability       Code Status: DNR    Allergies:   Allergies   Allergen Reactions    No Known Drug Allergy        Patient Story: Pt BIBA from home where he called for a lift assist after falling asleep in the bathtub and was unable to get out by himself.    Focused Assessment:  Pt has cancer and reports getting weaker over the past few weeks. Pt unable to ambulated safely here in the ED with staff assist and walker. Gait unsteady and pt unable to get out of bed independently. Pt is alert and oriented, pleasant and cooperative.     Treatments and/or interventions provided: Labs, CXR, EKG, monitoring  Labs Ordered and Resulted from Time of ED Arrival to Time of ED Departure   COMPREHENSIVE METABOLIC PANEL - Abnormal       Result Value    Sodium 134 (*)     Potassium 4.5      Carbon Dioxide (CO2) 24      Anion Gap 15      Urea Nitrogen 16.3      Creatinine 1.34 (*)     GFR Estimate 54 (*)     Calcium 9.1      Chloride 95 (*)     Glucose 200 (*)     Alkaline Phosphatase 69      AST 22      ALT 15      Protein Total 8.0      Albumin 4.2      Bilirubin Total 0.6     CBC WITH PLATELETS AND DIFFERENTIAL - Abnormal    WBC Count 13.5 (*)     RBC Count 3.81 (*)     Hemoglobin 12.6 (*)     Hematocrit 37.8 (*)     MCV 99      MCH 33.1 (*)     MCHC 33.3      RDW 11.6      Platelet Count 268      % Neutrophils 76      % Lymphocytes 14      % Monocytes 9      % Eosinophils 0      % Basophils 1      % Immature Granulocytes 0      NRBCs per 100 WBC 0      Absolute Neutrophils 10.3 (*)     Absolute Lymphocytes 1.9      Absolute Monocytes 1.1      Absolute Eosinophils 0.1      Absolute Basophils 0.1      Absolute Immature Granulocytes 0.0      Absolute NRBCs 0.0     CK TOTAL - Normal         ROUTINE UA WITH MICROSCOPIC REFLEX TO CULTURE   INFLUENZA A/B, RSV, & SARS-COV2 PCR  "  TROPONIN T, HIGH SENSITIVITY   NT PROBNP INPATIENT     Chest XR,  PA & LAT    (Results Pending)       Patient's response to treatments and/or interventions: Tolerating interventions wlel    To be done/followed up on inpatient unit:  continue plan of care    Does this patient have any cognitive concerns?:  none noted    Activity level - Baseline/Home:  Independent and Walker  Activity Level - Current:   Stand with Assist and Walker    Patient's Preferred language: English   Needed?: No    Isolation: COVID r/o and special precautions  Infection: COVID r/o and special precautions  Patient tested for COVID 19 prior to admission: YES  Bariatric?: No    Vital Signs:   Vitals:    03/02/24 1233   BP: 132/73   Pulse: 83   Resp: 18   Temp: 98.4  F (36.9  C)   TempSrc: Oral   SpO2: 98%   Weight: 99.3 kg (219 lb)   Height: 1.702 m (5' 7\")       Cardiac Rhythm:     Was the PSS-3 completed:   Yes  What interventions are required if any?               Family Comments: Partner at bedside  OBS brochure/video discussed/provided to patient/family: No              Name of person given brochure if not patient: n/a              Relationship to patient: n/a    For the majority of the shift this patient's behavior was Green.   Behavioral interventions performed were Rounding.    ED NURSE PHONE NUMBER: *[phone]         "

## 2024-03-02 NOTE — H&P
[facility]    History and Physical  Hospitalist    Nahun Villalobos MRN# [id number]   Age: 78 year old YOB: 1945     Date of Admission:  3/2/2024    Primary care provider: Olegario Castillo          Assessment and Plan:       Nahun Villalobos is a 78 year old  male with medical history of hypertension, hyperlipidemia, diabetes mellitus, obesity, CIERA on CPAP, COPD, history of PE, malignant carcinoid tumor, lung carcinoma, peripheral neuropathy, cervical myelopathy, brought into the ED via ambulance with generalized weakness and multiple falls.    Physical deconditioning from medical illness, senile frailty, chronic debility.  Falls at home.  Per report generalized weakness ongoing for last few weeks.   Patient lives at home, per his partner's report noted him on the floor yesterday.  Patient unclear how he fell down. Today could not get out of the shower, had to call to fire department for lift assist at home. At baseline minimal ambulation at home, uses assistive device walker.  ---In ED afebrile.  Blood pressure 132/73, heart rate 83, O2 sats 98% on room air.  Sodium 134, creatinine 1.34, potassium 4.5, glucose 200.  Liver enzymes within normal limits.  WBC 13.5, hemoglobin 12.6.  Influenza A, B, RSV, COVID-19 PCR negative.  ----- Admit to observation unit.  Address medical issues as discussed below.  CT head ordered given patient on anticoagulation, unwitnessed fall and unclear events around the fall.  Follow TSH, CK levels.    Follow urine analysis.  Chronic diarrhea.  Will monitor, if overt diarrhea or spikes fever will send out stool studies.    PT, OT evaluation.  Fall precautions.  Care management assistance with transition.    Dyspnea on exertion likely multifactorial from deconditioning, CIERA not compliant with CPAP, underlying COPD, history of lung carcinoma status post resection.  Leukocytosis possible reactive.  Obstructive sleep apnea noncompliant with  CPAP.  COPD not in exacerbation.  Patient reports ongoing shortness of breath for few months now.  Worse with minimal ambulation.  Reports unable to lie down flat, has been sleeping in a recliner.  Reports has not been using the CPAP.  Patient's partner reports he misses dose of blood thinners [at least 2-3 doses a week].  Has not been taking his water pills regularly.  --Afebrile.  No wheezing.  WBC count 13.5.    Influenza A, B, RSV, COVID-19 PCR negative.  --Chest x-ray ordered.   --Cardiac workup as below.  -- Follow proBNP, pCO2.  Trend WBC count, fever curve.  Will consider antibiotics if spikes fever or workup positive.  If ongoing shortness of breath and workup not revealing will consider CT imaging to rule out PE as patient not compliant with anticoagulation.  Received fluids, anticipating improvement in renal function in AM.  As needed DuoNebs.  Resume PTA inhalers after medication reconciliation confirmed.  Sleep studies, lung function test as outpatient    Elevated troponin likely from demand ischemia in the setting of fall, kidney injury.  Patient denies any chest pain at this time.  No palpitations.  No new shortness of breath.  Telemetry monitoring overnight.  Trend troponin.  Follow CK levels.  Echocardiogram ordered.  Already on anticoagulation.    Hypochloremic hyponatremia.  Acute kidney injury likely prerenal.  Sodium 134, chloride 95, creatinine 1.34.  Blood sugars 200s.  Received 500 cc fluid bolus in the ED.  Hold PTA Lasix, lisinopril, metformin.  IV hydration 100 mill per hour for 5 hours.  Monitor BMP in AM.  Avoid nephrotoxic drugs    Hypertension.  As above not compliant with home regimen.  Hold PTA Lasix, lisinopril in the setting of SALLIE.  Continue PTA metoprolol.  As needed IV hydralazine ordered.    History of PE, Not compliant with anticoagulation.  Continue PTA apixaban.    Adenocarcinoma of lung   Status postsurgical resection in 2016.  Reports gets surveillance imaging once every 3  "years.  If ongoing shortness of breath will consider CT imaging.    Malignant carcinoid tumor.  Status post exploratory laparotomy, small bowel resection and removal of mesenteric mass.  Following up at [facility].  Management as outpatient per primary team.    Chronic diarrhea.  Continue PTA cholestyramine.  Barrier cream.  Monitor, if over diarrhea will consider extending out stool studies    Hyperglycemia.  Diabetes mellitus with a hemoglobin A1c of 6.7  Blood sugar of 200 on admission.  Hold PTA metformin in the setting of SALLIE.  Insulin sliding scale.  Follow hemoglobin A1c, monitor blood sugars and optimize regimen.      Hyperlipidemia.  Hold PTA Lipitor while under observation status.    BPH.  Continue PTA finasteride.    History of arthritis.  Peripheral neuropathy, cervical myelopathy.  Resume PTA gabapentin, Zanaflex at lower dose after medications confirmed by pharmacy.  As needed Tylenol.    Obesity with BMI of 34.  Increase all-cause mortality and morbidity.  Consider lifestyle modification with diet and exercise as able to.    Medication noncompliance.  Patient reports forgets to take his medication at least 2-3 times a week.  Will need to consider home RN at the time of discharge.            Clinically Significant Risk Factors Present on Admission     # Drug Induced Coagulation Defect: home medication list includes an anticoagulant medication    # Hypertension: Noted on problem list     # DMII: A1C = N/A within past 6 months    # Obesity: Estimated body mass index is 34.3 kg/m  as calculated from the following:    Height as of this encounter: 1.702 m (5' 7\").    Weight as of this encounter: 99.3 kg (219 lb).               DVT Prophylaxis: SCD, on anticoagulation for  Code Status: Full code.  Discussed with patient    Disposition: Expected discharge likely 3/4 versus 3/5 pending clinical improvement, safe discharge plan in place.  More than 70% of time spent in direct patient care, care coordination, " patient counseling, and formalizing plan of care.     Discussed with patient and ED team.     Ada Bruno MD          Chief Complaint:     History is obtained from patient, ED team, chart review.    Nahun Villalobos is a 78 year old  male with medical history of hypertension, hyperlipidemia, diabetes mellitus, obesity, CIERA on CPAP, COPD, history of PE, malignant carcinoid tumor, lung carcinoma, peripheral neuropathy, cervical myelopathy, brought into the ED via ambulance with generalized weakness and multiple falls.    Per report generalized weakness ongoing for last few weeks.   Patient lives at home, per his partner's report noted him on the floor yesterday.  Patient unclear how he fell down. Today could not get out of the shower, had to call to fire department for lift assist at home. At baseline minimal ambulation at home, uses assistive device walker.    Patient reports ongoing shortness of breath for few months now.  Worse with minimal ambulation.  Reports unable to lie down flat, has been sleeping in a recliner.  Reports has not been using the CPAP.  Patient's partner reports he misses dose of blood thinners [at least 2-3 doses a week].  Has not been taking his water pills regularly.    No fever or chills.  Denies any chest pain or palpitations at this time.  Denies any headache or dizziness.  Denies any nausea or vomiting.  Report of chronic diarrhea.  Denies any blood in the stools or blood in the urine.  Reports chronic pain in his back/neck, neuropathy.    Reports has been following up with [facility], [facility] for carcinoid tumor.  Patient travel.  No known exposure to sick contacts.    In ED afebrile.  Blood pressure 132/73, heart rate 83, O2 sats 98% on room air.  Sodium 134, creatinine 1.34, potassium 4.5, glucose 200.  Liver enzymes within normal limits.  WBC 13.5, hemoglobin 12.6.  Influenza A, B, RSV, COVID-19 PCR negative.         Review of Systems:     GENERAL: see HPI.  EENT: see  HPI.  PULMONARY:see HPI.  CARDIAC: no chest pain, no irregular or fast heart beats   GI: No abdominal pain, nausea, vomiting  : No burning/pain with urination  NEURO: No significant headaches  ENDOCRINE: No excessive thirs  MUSCULOSKELETAL:see HPI.  SKIN: No skin rashes  PSYCHIATRY no anxiety or depression    Medical History:     Past Medical History:   Diagnosis Date    Antiplatelet or antithrombotic long-term use     Arthritis     CHF (congestive heart failure) (H) 09/16/2020    COPD (chronic obstructive pulmonary disease) (H)     DM (diabetes mellitus) (H)     Dyspnea on exertion     Essential hypertension, benign     Hemorrhage of rectum and anus     Non-small cell lung cancer (H)     Obesity     Personal history of colonic polyps     Sleep apnea     Thrombosis     Tobacco use disorder     Urinary retention     Walking troubles         Surgical History:      Past Surgical History:   Procedure Laterality Date    ABDOMEN SURGERY  1995    APPENDECTOMY      BONE EXOSTOSIS EXCISION Bilateral 9/20/2022    Procedure: EXOSTECTOMIES BOTH 5TH DIGITS WITH SOFT TISSUE RELEASE;  Surgeon: Tong Gray DPM;  Location: [facility]    CHOLECYSTECTOMY      COLONOSCOPY  2014    CYSTOSCOPY, TRANSURETHRAL RESECTION (TUR) PROSTATE, COMBINED N/A 4/30/2018    Procedure: COMBINED CYSTOSCOPY, TRANSURETHRAL RESECTION (TUR) PROSTATE;  Cystoscopy, Transurethral Resection Of The Prostate;  Surgeon: Praveena Burris MD;  Location: [facility]    IR LUNG BIOPSY MEDIASTINUM RIGHT  4/6/2016    WEDGE RESECTION      lung    ZZC NONSPECIFIC PROCEDURE      cholecystectomy             Social History:      Social History     Tobacco Use    Smoking status: Former     Packs/day: 2.00     Years: 52.00     Additional pack years: 0.00     Total pack years: 104.00     Types: Cigarettes, Cigars     Start date: 6/1/1960     Quit date: 6/1/2013     Years since quitting: 10.7    Smokeless tobacco: Never    Tobacco comments:     Quit, will never  start again.   Substance Use Topics    Alcohol use: No             Family History:     Family History   Problem Relation Age of Onset    Hypertension Mother     C.A.D. Mother         ANEURYSM    Cancer - colorectal Mother     Cancer Mother         OVARIAN    Other Cancer Mother     Hypertension Sister     Breast Cancer Sister     Cerebrovascular Disease Father     Hypertension Sister     Breast Cancer Sister     Glaucoma No family hx of     Macular Degeneration No family hx of              Allergies:     Allergies   Allergen Reactions    No Known Drug Allergy              Medications:   Home medications reviewed.         Physical Exam      Admission Weight: 99.3 kg (219 lb)    Vital Signs with Ranges  Temp:  [98.4  F (36.9  C)] 98.4  F (36.9  C)  Pulse:  [83] 83  Resp:  [18] 18  BP: (132)/(73) 132/73  SpO2:  [98 %] 98 %    Intake/Output Summary (Last 24 hours) at 3/2/2024 1528  Last data filed at 3/2/2024 1520  Gross per 24 hour   Intake 500 ml   Output --   Net 500 ml       PHYSICAL EXAM  GENERAL: Patient is in no distress. Alert and oriented.  HEENT: Oropharynx pink  HEART: Regular rate and rhythm. S1S2.  LUNGS: Bilateral decreased breath sounds, no wheezing or crackles.  Respirations unlabored  ABDOMEN: Soft, no abdominal tenderness, bowel sounds heard   NEURO: Moving all extremities [range of motion at all extremities limited reports due to pain, arthritis]  EXTREMITIES: 1+ pedal edema.  SKIN: Warm, dry. No rash or bruising.  PSYCHIATRY Cooperative         Data:   All new lab and imaging data was reviewed.

## 2024-03-02 NOTE — ED PROVIDER NOTES
History     Chief Complaint:  Generalized Weakness    The history is provided by the patient.      Nahun Villalobos is a 78 year old male on eliquis with a history of CHF, DM, Hypertension, and NSCLC who presents to the ED via EMS for evaluation of generalized weakness. The patient reports that he fell asleep in the bathtub today and wasn't able to get up. His head didn't go underwater. He needed to be lift assisted out. The patient feels weak and tired today. Denies abdominal pain, pain anywhere, or known fever. His last meal was this morning. Nobody helps him with his medications and he misses them about once a week. Outside of this, he gets injection in his hip for his NSCLC once a week.     Independent Historian:   None - Patient Only    Review of External Notes:        Medications:   Albuterol  Atorvastatin  Cholestyramine  Docusate  Eliquis  Finasteride  Fluticasone-Umeclidin-Vilant  Furosemide  Gabapentin  Lisinopril  Metformin  Metoprolol succinate  Tizanidine    Past Medical History:    Past Medical History:   Diagnosis Date    Antiplatelet or antithrombotic long-term use     Arthritis     CHF (congestive heart failure) (H) 09/16/2020    COPD (chronic obstructive pulmonary disease) (H)     DM (diabetes mellitus) (H)     Dyspnea on exertion     Essential hypertension, benign     Hemorrhage of rectum and anus     Non-small cell lung cancer (H)     Obesity     Personal history of colonic polyps     Sleep apnea     Thrombosis     Tobacco use disorder     Urinary retention     Walking troubles      Past Surgical History:    Past Surgical History:   Procedure Laterality Date    ABDOMEN SURGERY  1995    APPENDECTOMY      BONE EXOSTOSIS EXCISION Bilateral 9/20/2022    Procedure: EXOSTECTOMIES BOTH 5TH DIGITS WITH SOFT TISSUE RELEASE;  Surgeon: Tong Gray DPM;  Location: [facility]    CHOLECYSTECTOMY      COLONOSCOPY  2014    CYSTOSCOPY, TRANSURETHRAL RESECTION (TUR) PROSTATE, COMBINED N/A 4/30/2018     "Procedure: COMBINED CYSTOSCOPY, TRANSURETHRAL RESECTION (TUR) PROSTATE;  Cystoscopy, Transurethral Resection Of The Prostate;  Surgeon: Praveena Burris MD;  Location: [facility]    IR LUNG BIOPSY MEDIASTINUM RIGHT  4/6/2016    WEDGE RESECTION      lung    ZZC NONSPECIFIC PROCEDURE      cholecystectomy      Physical Exam   Patient Vitals for the past 24 hrs:   BP Temp Temp src Pulse Resp SpO2 Height Weight   03/02/24 1233 132/73 98.4  F (36.9  C) Oral 83 18 98 % 1.702 m (5' 7\") 99.3 kg (219 lb)     Physical Exam   Gen: well appearing, in no acute distress  Oral : Mucous membranes moist,   Nose: No rhinorhea  Ears: External near normal, without drainage  Eyes: periorbital tissues and sclera normal   Neck: supple, no abnormal swelling  Lungs: Clear bilaterally, no tachypnea or distress, speaks full sentences  CV: Regular rate, regular rhythm  Abd: soft, nontender, nondistended, no rebound/guarding  Ext: no lower extremity edema  Skin: Mild erythema of perineum with no skin breakdown no ulceration  Neuro: alert, no focal deficit globally weak only able to lift legs about 1 inch off the bed  Psych: pleasant mood, normal affect      Emergency Department Course   ECG  ECG taken at 1340, ECG read at 1353  Normal sinus rhythm  Normal ECG   Rate 78 bpm. LA interval 118 ms. QRS duration 84 ms. QT/QTc 348/396 ms. P-R-T axes 26 46 66.     Imaging:  Chest XR,  PA & LAT    (Results Pending)     Laboratory:  Labs Ordered and Resulted from Time of ED Arrival to Time of ED Departure   COMPREHENSIVE METABOLIC PANEL - Abnormal       Result Value    Sodium 134 (*)     Potassium 4.5      Carbon Dioxide (CO2) 24      Anion Gap 15      Urea Nitrogen 16.3      Creatinine 1.34 (*)     GFR Estimate 54 (*)     Calcium 9.1      Chloride 95 (*)     Glucose 200 (*)     Alkaline Phosphatase 69      AST 22      ALT 15      Protein Total 8.0      Albumin 4.2      Bilirubin Total 0.6     CBC WITH PLATELETS AND DIFFERENTIAL - Abnormal    WBC " Count 13.5 (*)     RBC Count 3.81 (*)     Hemoglobin 12.6 (*)     Hematocrit 37.8 (*)     MCV 99      MCH 33.1 (*)     MCHC 33.3      RDW 11.6      Platelet Count 268      % Neutrophils 76      % Lymphocytes 14      % Monocytes 9      % Eosinophils 0      % Basophils 1      % Immature Granulocytes 0      NRBCs per 100 WBC 0      Absolute Neutrophils 10.3 (*)     Absolute Lymphocytes 1.9      Absolute Monocytes 1.1      Absolute Eosinophils 0.1      Absolute Basophils 0.1      Absolute Immature Granulocytes 0.0      Absolute NRBCs 0.0     CK TOTAL - Normal         ROUTINE UA WITH MICROSCOPIC REFLEX TO CULTURE   INFLUENZA A/B, RSV, & SARS-COV2 PCR   TROPONIN T, HIGH SENSITIVITY   NT PROBNP INPATIENT     Procedures       Emergency Department Course & Assessments:    Interventions:  Medications   sodium chloride 0.9% BOLUS 500 mL (0 mLs Intravenous Stopped 3/2/24 1520)     Assessments:  1300 I obtained history and examined the patient as noted above.     Independent Interpretation (X-rays, CTs, rhythm strip):  None    Consultations/Discussion of Management or Tests:  None        Social Determinants of Health affecting care:   None    Disposition:  The patient was admitted to the hospital under the care of hospitalist team    Impression & Plan    Medical Decision Making:  Patient presents emergency department after getting stuck in his tub and unable to get himself out.  He had more falls at home over the last few weeks, frequently calling the fire department to come and pick him up on the ground when he fell's.  Today he was in the bathtub and he was unable to get himself out after it was drained.  That is pretty unusual for him.  He seems somnolent at times, denies any focal pain his exam is not suggestive of any focal pain.  He is very globally weak legs are very weak can barely lift them up off the bed.  Has decreased range of motion in his legs not so much to pain but stiffness it seems like.  Vital signs are  within normal limits no history of fevers no need for straight cath at this time. When he Urinates we will send a UA.  EKG shows no acute changes, will add on a troponin and BNP as well as COVID and chest x-ray testing.  We attempted to ambulate the patient and he is quite unstable quite weak unable to get him up safely in bed with one-person assist.  Contacted hospitalist team requested inpatient management due to increased weakness.    Diagnosis:    ICD-10-CM    1. Weakness  R53.1       2. Gait instability  R26.81            Discharge Medications:  New Prescriptions    No medications on file     Scribe Disclosure:  I, Laurajaspal William, am serving as a scribe at 1:58 PM on 3/2/2024 to document services personally performed by Gilles Perez MD based on my observations and the provider's statements to me.   3/2/2024   Gilles Perez MD Tschetter, Paul Anthony, MD  03/02/24 1544

## 2024-03-02 NOTE — PHARMACY-ADMISSION MEDICATION HISTORY
Pharmacist Admission Medication History    Admission medication history is complete. The information provided in this note is only as accurate as the sources available at the time of the update.    Information Source(s): Family member and CareEverywhere/SureScripts via in-person        Changes made to PTA medication list:  Added: B complex  Deleted: duplicate gabapentin, docusate  Changed: Eliquis updated to 2.5mg BID (adjusted per oncologist due to bleeding), gabapentin updated to 300mg in the morning and 600mg in the evening, Tylenol updated to 1000mg BID (vs prn), patient hasn't been taking tizanidine    Allergies reviewed with patient and updates made in EHR: yes    Medication History Completed By: Tiffanie Beltran, PharmD 3/2/2024 5:42 PM    PTA Med List   Medication Sig Note Last Dose    acetaminophen (TYLENOL) 500 MG tablet Take 1,000 mg by mouth 2 times daily  3/1/2024 at AM    albuterol (VENTOLIN HFA) 108 (90 Base) MCG/ACT inhaler INHALE 2 PUFFS INTO THE LUNGS EVERY 6 HOURS AS NEEDED FOR SHORTNESS OF BREATH / DYSPNEA OR WHEEZING  prn med    Ascorbic Acid (VITAMIN C PO) Take 500 mg by mouth At Bedtime   3/1/2024 at PM    atorvastatin (LIPITOR) 20 MG tablet TAKE 1 TABLET DAILY  Past Week at PM    cholestyramine light (QUESTRAN) 4 GM packet Take 1 packet (4 g) by mouth 2 times daily  3/1/2024 at AM    ELIQUIS ANTICOAGULANT 5 MG tablet TAKE 1 TABLET TWICE A DAY (Patient taking differently: Take 2.5 mg by mouth 2 times daily) 3/2/2024: Patient is now on 2.5mg twice daily due to bleeding per oncologist.  3/1/2024 at AM    finasteride (PROSCAR) 5 MG tablet Take 1 tablet (5 mg) by mouth daily  3/1/2024 at AM    Fluticasone-Umeclidin-Vilant (TRELEGY ELLIPTA) 100-62.5-25 MCG/ACT oral inhaler Inhale 1 puff into the lungs daily  3/1/2024 at AM    gabapentin (NEURONTIN) 300 MG capsule Take 1 capsule (300 mg) by mouth 3 times daily (Patient taking differently: 300mg in the morning and 600mg in the evening.)  3/1/2024  at AM    lisinopril (ZESTRIL) 40 MG tablet TAKE 1 TABLET DAILY  3/1/2024 at AM    metFORMIN (GLUCOPHAGE) 500 MG tablet TAKE 2 TABLETS (1,000 MG) BY MOUTH DAILY (WITH BREAKFAST) AND 1 TABLET (500 MG) DAILY (WITH DINNER).  3/1/2024 at AM    metoprolol succinate ER (TOPROL XL) 25 MG 24 hr tablet Take 1 tablet (25 mg) by mouth daily  3/1/2024 at AM

## 2024-03-02 NOTE — ED NOTES
Patient able to stand and pivot w/ assist of 2 to commode. Had loose BM. Unable to obtain urine sample d/t BM.

## 2024-03-02 NOTE — PROGRESS NOTES
RECEIVING UNIT ED HANDOFF REVIEW    ED Nurse Handoff Report was reviewed by: Cassandra Nguyen RN on March 2, 2024 at 5:25 PM

## 2024-03-03 ENCOUNTER — APPOINTMENT (OUTPATIENT)
Dept: PHYSICAL THERAPY | Facility: CLINIC | Age: 79
DRG: 683 | End: 2024-03-03
Attending: HOSPITALIST
Payer: COMMERCIAL

## 2024-03-03 ENCOUNTER — APPOINTMENT (OUTPATIENT)
Dept: CARDIOLOGY | Facility: CLINIC | Age: 79
DRG: 683 | End: 2024-03-03
Attending: HOSPITALIST
Payer: COMMERCIAL

## 2024-03-03 LAB
ANION GAP SERPL CALCULATED.3IONS-SCNC: 13 MMOL/L (ref 7–15)
BACTERIA UR CULT: ABNORMAL
BUN SERPL-MCNC: 14.6 MG/DL (ref 8–23)
CALCIUM SERPL-MCNC: 8.2 MG/DL (ref 8.8–10.2)
CHLORIDE SERPL-SCNC: 102 MMOL/L (ref 98–107)
CREAT SERPL-MCNC: 1.2 MG/DL (ref 0.67–1.17)
DEPRECATED HCO3 PLAS-SCNC: 22 MMOL/L (ref 22–29)
EGFRCR SERPLBLD CKD-EPI 2021: 62 ML/MIN/1.73M2
ERYTHROCYTE [DISTWIDTH] IN BLOOD BY AUTOMATED COUNT: 11.8 % (ref 10–15)
GLUCOSE BLDC GLUCOMTR-MCNC: 176 MG/DL (ref 70–99)
GLUCOSE BLDC GLUCOMTR-MCNC: 195 MG/DL (ref 70–99)
GLUCOSE BLDC GLUCOMTR-MCNC: 201 MG/DL (ref 70–99)
GLUCOSE BLDC GLUCOMTR-MCNC: 201 MG/DL (ref 70–99)
GLUCOSE BLDC GLUCOMTR-MCNC: 257 MG/DL (ref 70–99)
GLUCOSE SERPL-MCNC: 238 MG/DL (ref 70–99)
HCT VFR BLD AUTO: 32.9 % (ref 40–53)
HGB BLD-MCNC: 11 G/DL (ref 13.3–17.7)
LVEF ECHO: NORMAL
MCH RBC QN AUTO: 33.7 PG (ref 26.5–33)
MCHC RBC AUTO-ENTMCNC: 33.4 G/DL (ref 31.5–36.5)
MCV RBC AUTO: 101 FL (ref 78–100)
PLATELET # BLD AUTO: 235 10E3/UL (ref 150–450)
POTASSIUM SERPL-SCNC: 4.1 MMOL/L (ref 3.4–5.3)
RBC # BLD AUTO: 3.26 10E6/UL (ref 4.4–5.9)
SODIUM SERPL-SCNC: 137 MMOL/L (ref 135–145)
TROPONIN T SERPL HS-MCNC: 30 NG/L
TSH SERPL DL<=0.005 MIU/L-ACNC: 0.98 UIU/ML (ref 0.3–4.2)
WBC # BLD AUTO: 12.4 10E3/UL (ref 4–11)

## 2024-03-03 PROCEDURE — G0378 HOSPITAL OBSERVATION PER HR: HCPCS

## 2024-03-03 PROCEDURE — 82962 GLUCOSE BLOOD TEST: CPT

## 2024-03-03 PROCEDURE — 97161 PT EVAL LOW COMPLEX 20 MIN: CPT | Mod: GP

## 2024-03-03 PROCEDURE — 87040 BLOOD CULTURE FOR BACTERIA: CPT | Performed by: INTERNAL MEDICINE

## 2024-03-03 PROCEDURE — 999N000208 ECHOCARDIOGRAM COMPLETE

## 2024-03-03 PROCEDURE — 97530 THERAPEUTIC ACTIVITIES: CPT | Mod: GP

## 2024-03-03 PROCEDURE — 84484 ASSAY OF TROPONIN QUANT: CPT | Performed by: HOSPITALIST

## 2024-03-03 PROCEDURE — 255N000002 HC RX 255 OP 636: Performed by: HOSPITALIST

## 2024-03-03 PROCEDURE — 84443 ASSAY THYROID STIM HORMONE: CPT | Performed by: HOSPITALIST

## 2024-03-03 PROCEDURE — 99233 SBSQ HOSP IP/OBS HIGH 50: CPT | Performed by: INTERNAL MEDICINE

## 2024-03-03 PROCEDURE — 96374 THER/PROPH/DIAG INJ IV PUSH: CPT

## 2024-03-03 PROCEDURE — 82374 ASSAY BLOOD CARBON DIOXIDE: CPT | Performed by: HOSPITALIST

## 2024-03-03 PROCEDURE — 93306 TTE W/DOPPLER COMPLETE: CPT | Mod: 26 | Performed by: INTERNAL MEDICINE

## 2024-03-03 PROCEDURE — 36415 COLL VENOUS BLD VENIPUNCTURE: CPT | Performed by: HOSPITALIST

## 2024-03-03 PROCEDURE — 85027 COMPLETE CBC AUTOMATED: CPT | Performed by: HOSPITALIST

## 2024-03-03 PROCEDURE — C8929 TTE W OR WO FOL WCON,DOPPLER: HCPCS

## 2024-03-03 PROCEDURE — 36415 COLL VENOUS BLD VENIPUNCTURE: CPT | Performed by: INTERNAL MEDICINE

## 2024-03-03 PROCEDURE — 250N000013 HC RX MED GY IP 250 OP 250 PS 637: Performed by: HOSPITALIST

## 2024-03-03 PROCEDURE — 250N000011 HC RX IP 250 OP 636: Performed by: INTERNAL MEDICINE

## 2024-03-03 RX ORDER — CEFTRIAXONE 1 G/1
1 INJECTION, POWDER, FOR SOLUTION INTRAMUSCULAR; INTRAVENOUS EVERY 24 HOURS
Status: DISCONTINUED | OUTPATIENT
Start: 2024-03-03 | End: 2024-03-04

## 2024-03-03 RX ADMIN — UMECLIDINIUM 1 PUFF: 62.5 AEROSOL, POWDER ORAL at 08:46

## 2024-03-03 RX ADMIN — ACETAMINOPHEN 650 MG: 325 TABLET, FILM COATED ORAL at 20:23

## 2024-03-03 RX ADMIN — HUMAN ALBUMIN MICROSPHERES AND PERFLUTREN 9 ML: 10; .22 INJECTION, SOLUTION INTRAVENOUS at 10:18

## 2024-03-03 RX ADMIN — METOPROLOL SUCCINATE 25 MG: 25 TABLET, EXTENDED RELEASE ORAL at 08:45

## 2024-03-03 RX ADMIN — INSULIN ASPART 1 UNITS: 100 INJECTION, SOLUTION INTRAVENOUS; SUBCUTANEOUS at 17:24

## 2024-03-03 RX ADMIN — GABAPENTIN 100 MG: 100 CAPSULE ORAL at 08:45

## 2024-03-03 RX ADMIN — GABAPENTIN 100 MG: 100 CAPSULE ORAL at 00:05

## 2024-03-03 RX ADMIN — FINASTERIDE 5 MG: 5 TABLET, FILM COATED ORAL at 08:45

## 2024-03-03 RX ADMIN — INSULIN ASPART 2 UNITS: 100 INJECTION, SOLUTION INTRAVENOUS; SUBCUTANEOUS at 12:17

## 2024-03-03 RX ADMIN — CHOLESTYRAMINE 4 G: 4 POWDER, FOR SUSPENSION ORAL at 08:43

## 2024-03-03 RX ADMIN — APIXABAN 2.5 MG: 2.5 TABLET, FILM COATED ORAL at 08:46

## 2024-03-03 RX ADMIN — CEFTRIAXONE SODIUM 1 G: 1 INJECTION, POWDER, FOR SOLUTION INTRAMUSCULAR; INTRAVENOUS at 17:09

## 2024-03-03 RX ADMIN — INSULIN ASPART 3 UNITS: 100 INJECTION, SOLUTION INTRAVENOUS; SUBCUTANEOUS at 08:56

## 2024-03-03 RX ADMIN — GABAPENTIN 100 MG: 100 CAPSULE ORAL at 20:23

## 2024-03-03 RX ADMIN — APIXABAN 2.5 MG: 2.5 TABLET, FILM COATED ORAL at 20:23

## 2024-03-03 RX ADMIN — ACETAMINOPHEN 650 MG: 325 TABLET, FILM COATED ORAL at 11:26

## 2024-03-03 RX ADMIN — FLUTICASONE FUROATE AND VILANTEROL TRIFENATATE 1 PUFF: 100; 25 POWDER RESPIRATORY (INHALATION) at 08:46

## 2024-03-03 RX ADMIN — ACETAMINOPHEN 650 MG: 325 TABLET, FILM COATED ORAL at 02:31

## 2024-03-03 RX ADMIN — CHOLESTYRAMINE 4 G: 4 POWDER, FOR SUSPENSION ORAL at 20:23

## 2024-03-03 ASSESSMENT — ACTIVITIES OF DAILY LIVING (ADL)
ADLS_ACUITY_SCORE: 39
ADLS_ACUITY_SCORE: 43
ADLS_ACUITY_SCORE: 47
ADLS_ACUITY_SCORE: 39
ADLS_ACUITY_SCORE: 47
ADLS_ACUITY_SCORE: 43
ADLS_ACUITY_SCORE: 39
ADLS_ACUITY_SCORE: 43
ADLS_ACUITY_SCORE: 43
ADLS_ACUITY_SCORE: 44
ADLS_ACUITY_SCORE: 43
ADLS_ACUITY_SCORE: 47
ADLS_ACUITY_SCORE: 44
ADLS_ACUITY_SCORE: 39
ADLS_ACUITY_SCORE: 47
ADLS_ACUITY_SCORE: 43

## 2024-03-03 NOTE — PROGRESS NOTES
Admission/Transfer from: ED  2 RN skin assessment completed. Yes  Name of 2nd RN: Louis Mott  Significant findings include: yes, has open wound/pressure injury at R sided coccyx.   WOC Nurse Consult Ordered? No, left a sticky note for provider.

## 2024-03-03 NOTE — PROGRESS NOTES
03/03/24 1356   Appointment Info   Signing Clinician's Name / Credentials (PT) Josh Stephen, PT, DPT   Quick Adds   Quick Adds Certification   Living Environment   People in Home significant other   Current Living Arrangements house   Home Accessibility stairs to enter home   Number of Stairs, Main Entrance 4   Stair Railings, Main Entrance railings on both sides of stairs   Transportation Anticipated family or friend will provide   Living Environment Comments Patient lives with spouse in one level house, 4 steps to enter with bilateral handrails. Walk-in tub.   Self-Care   Usual Activity Tolerance moderate   Current Activity Tolerance poor   Equipment Currently Used at Home walker, rolling  (4ww)   Fall history within last six months yes   Number of times patient has fallen within last six months 4   Activity/Exercise/Self-Care Comment Patient reported being independent with mobility and cares at baseline, has been using 4ww for past 3 months. SO helps with cooking and cleaning.   General Information   Onset of Illness/Injury or Date of Surgery 03/02/24   Referring Physician Ada Bruno MD   Patient/Family Therapy Goals Statement (PT) Patient would like to get back to normal   Pertinent History of Current Problem (include personal factors and/or comorbidities that impact the POC) 78 year old male with PMHx of hypertension, hyperlipidemia, DM II, obesity, CIERA on CPAP, COPD, history of PE, malignant carcinoid tumor, lung carcinoma, peripheral neuropathy, cervical myelopathy who was admitted on 3/2/2024 for evaluation of generalized weakness and multiple falls.   Existing Precautions/Restrictions fall   Cognition   Affect/Mental Status (Cognition) WFL   Orientation Status (Cognition) oriented x 3   Follows Commands (Cognition) WFL   Cognitive Status Comments read date off of board   Strength (Manual Muscle Testing)   Strength Comments BLE weakness with functional mobility   Bed Mobility   Comment, (Bed  Mobility) sit to supine transfers with min assist x1   Transfers   Comment, (Transfers) sit<>stand with FWW and mod assist x1   Gait/Stairs (Locomotion)   Comment, (Gait/Stairs) unable to ambulate on today's date   Balance   Balance Comments impaired standing balance   Sensory Examination   Sensory Perception Comments baseline peripheral neuropathy in bilateral LE'sd   Clinical Impression   Criteria for Skilled Therapeutic Intervention Yes, treatment indicated   PT Diagnosis (PT) impaired mobility   Influenced by the following impairments weakness, reduced activity tolerance, impaired balance   Functional limitations due to impairments impaired functional mobility, impaired gait, transfers, bed mobility, stair navigation   Clinical Presentation (PT Evaluation Complexity) stable   Clinical Presentation Rationale clinical judgement   Clinical Decision Making (Complexity) low complexity   Planned Therapy Interventions (PT) balance training;bed mobility training;gait training;patient/family education;stair training;strengthening;transfer training;progressive activity/exercise;neuromuscular re-education   Risk & Benefits of therapy have been explained evaluation/treatment results reviewed;care plan/treatment goals reviewed;risks/benefits reviewed;current/potential barriers reviewed;participants voiced agreement with care plan;participants included;patient   PT Total Evaluation Time   PT Eval, Low Complexity Minutes (66054) 7   Therapy Certification   Start of care date 03/02/24   Certification date from 03/03/24   Certification date to 03/10/24   Medical Diagnosis Weakness   Physical Therapy Goals   PT Frequency 5x/week   PT Predicted Duration/Target Date for Goal Attainment 03/10/24   PT Goals Bed Mobility;Transfers;Gait;Stairs   PT: Bed Mobility Modified independent;Supine to/from sit   PT: Transfers Modified independent;Sit to/from stand;Assistive device   PT: Gait Supervision/stand-by assist;Rolling walker;50 feet    PT: Stairs 4 stairs;Minimal assist;Rail on both sides   Interventions   Interventions Quick Adds Therapeutic Activity;Therapeutic Procedure   Therapeutic Procedure/Exercise   Ther. Procedure: strength, endurance, ROM, flexibillity Minutes (12921) 8   Symptoms Noted During/After Treatment fatigue   Treatment Detail/Skilled Intervention Patient performing LE exercises in seated and supine x5e including: heel slides, quad sets, glute sets, LAQ. Verbal and tactile cues utilized throughout to ensure proper form and technique. Provided patient with supine LE exercise handout.   Therapeutic Activity   Therapeutic Activities: dynamic activities to improve functional performance Minutes (46344) 25   Symptoms Noted During/After Treatment Fatigue   Treatment Detail/Skilled Intervention Patient seater in recliner at beginning of session, agreeable to participate in PT. Patient requesting use of commode. Time during session for room set-up. Patient performing sit<>stands from bedside chair with FWW and mod assist x1, cues for hand placement. Difficulty noted and excessive anterior lean requiring assist to remain upright. Cues for upright posture. Performing stand pivot transfer from bedside chair to commode with mod assist x1 and FWW. Cues for sequencing and walker management. Forward flexed trunk posture throughout, cues for upright posture. Difficulty with progressing LLE due to knee buckling on R in stance. Increased time to complete stand pivot transfer. Seated on commode for period of time. Paulette stedy placed in front of patient, performing sit<>stand with initial min assist x1. ASsist for oneal-cares in standing. Transferred from commode to bed with assist x1 and paulette stedy. Performing 2 additional sit<>stands from EOB with paulette stedy and initial min assist x1, progressing to CGA. Cues for controlled, eccentric lowering from stand to sit. Sit to supine with min assist x1 for LE positioning in bed. Assist with scooting  towards HOB with repo sheet. VSS when monitored throughout session, 122/58 mmHg at beginning of session, 150/71 mmHg at end of session. HR 55-65 bpm throughout. Supine in bed at end of session with call light next to him and bed alarm on.   PT Discharge Planning   PT Plan repeated sit<>stands with FWW vs. paulette montiel, initiate gait training as able with FWW and w/c following (R knee buckling in standing), LE exercises, standing balance   PT Discharge Recommendation (DC Rec) Transitional Care Facility   PT Rationale for DC Rec Patient well below baseline functional mobility. Currently requires assist x1-2 to safely mobilize, unable to ambulate on today's date. Lives in house with significant other, 4 steps to enter with bilateral handrails. Recommend discharge to TCU for improvement of strength, activity tolerance, and independence with functional mobility.   PT Brief overview of current status sit to supine with min assist x1, sit<>stand with mod assist x1 and FWW, unable to ambulate on today's date.   Total Session Time   Timed Code Treatment Minutes 33   Total Session Time (sum of timed and untimed services) 40     [facility]  OUTPATIENT PHYSICAL THERAPY EVALUATION  PLAN OF TREATMENT FOR OUTPATIENT REHABILITATION  (COMPLETE FOR INITIAL CLAIMS ONLY)  Patient's Last Name, First Name, M.I.  YOB: 1945  Nahun Villalobos                        Provider's Name  [facility] Medical Record No.  [id number]                             Onset Date:  03/02/24   Start of Care Date:  03/02/24   Type:     _X_PT   ___OT   ___SLP Medical Diagnosis:  Weakness              PT Diagnosis:  impaired mobility Visits from SOC:  1     See note for plan of treatment, functional goals and certification details    I CERTIFY THE NEED FOR THESE SERVICES FURNISHED UNDER        THIS PLAN OF TREATMENT AND WHILE UNDER MY CARE     (Physician co-signature of this document  indicates review and certification of the therapy plan).

## 2024-03-03 NOTE — PROGRESS NOTES
Observation goals  PRIOR TO DISCHARGE        Comments:   -diagnostic tests and consults completed and resulted- NOT MET  -vital signs normal or at patient baseline- NOT MET  -tolerating oral intake to maintain hydration- MET  -adequate pain control on oral analgesics- MET  Nurse to notify provider when observation goals have been met and patient is ready for discharge.

## 2024-03-03 NOTE — PLAN OF CARE
Occupational Therapy: Orders received. Chart reviewed and discussed with care team.? Occupational Therapy not indicated due to per PT, pt will require TCU. PT following during IP stay.? Defer OT to next level of care. Defer discharge recommendations to care team.? Will complete orders.

## 2024-03-03 NOTE — PROGRESS NOTES
PRIMARY Concern: Gen weakness  SAFETY RISK Concerns (fall risk, behaviors, etc.): yes, fall risk      Isolation/Type: none  Tests/Procedures for NEXT shift: Echo pending. UA trace leuko. UC in process.  Consults? (Pending/following, signed-off?) PT, OT, SW consult pending.  Where is patient from? (Home, TCU, etc.): Home  Other Important info for NEXT shift: none  Anticipated DC date & active delays: TBD  _____________________________________________________________________  SUMMARY NOTE:                Orientation/Cognitive: AOX4  Observation Goals (Met/ Not Met): not met  Mobility Level/Assist Equipment: AX2. Noot oob. T/R. Not oob.  Antibiotics & Plan (IV/po, length of tx left): none  Pain Management: Denies  Tele/VS/O2: VSS on RA ex BP elevated. Tele- NSR  ABNL Lab/BG: BG- 196, 179. Na- 134, Creat- 1.34, A1c- 7.3, WBC- 13.5, Trop- 42, 34.  Diet: Reg  Bowel/Bladder: Continent. Voids adequately in urinal.  Skin Concerns: Pressure injury at R sided coccyx. Scab on bila shin.   Drains/Devices: PIV SL  Patient Stated Goal for Today: none      Observation goals  PRIOR TO DISCHARGE       Comments: -diagnostic tests and consults completed and resulted- not met  -vital signs normal or at patient baseline- partially met  -tolerating oral intake to maintain hydration- met  -adequate pain control on oral analgesics- met  Nurse to notify provider when observation goals have been met and patient is ready for discharge.

## 2024-03-03 NOTE — PLAN OF CARE
PRIMARY Concern: Falls, Gen weakness, unable to get out of shower.  SAFETY RISK Concerns (fall risk, behaviors, etc.): Fall Risk   Isolation/Type: none  Tests/Procedures for NEXT shift:  Consults? (Pending/following, signed-off?) PT- rec TCU, OT, SW consulted.  Where is patient from? (Home, TCU, etc.): Home  Other Important info for NEXT shift: neuros intact, echo 55-60%, UC positive    Anticipated DC date & active delays: TBD    SUMMARY NOTE:                Orientation/Cognitive: A&Ox4  Observation Goals (Met/ Not Met): not met  Mobility Level/Assist Equipment: Ax2 paulette steady, T/R q2 hrs  Antibiotics & Plan (IV/po, length of tx left): Needs rocephin (MD wanted to wait to start until cultures were drawn)  Pain Management: PRN Tylenol x1 for neck pain  Tele/VS/O2: VSS on RA ex BP elevated. Tele- NSR  ABNL Lab/BG: BG- 257, 201 Creat- 1.20, WBC- 12.4, Trop- 42, 34.  Diet: Reg  Bowel/Bladder: Continent. Voids in urinal.  Skin Concerns: Coccyx pressure injury (WOC consulted), scattered scabs to shins.  Drains/Devices: PIV SL  Patient Stated Goal for Today: none

## 2024-03-03 NOTE — PROGRESS NOTES
[facility]    Hospitalist Progress Note    Assessment & Plan   Nahun GIANNI Villalobos is a 78 year old male with PMHx of hypertension, hyperlipidemia, DM II, obesity, CIERA on CPAP, COPD, history of PE, malignant carcinoid tumor, lung carcinoma, peripheral neuropathy, cervical myelopathy who was admitted on 3/2/2024 for evaluation of generalized weakness and multiple falls.     Physical deconditioning from medical illness, senile frailty, chronic debility  Falls at home  *Presented to ED with reports of generalized weakness that had been ongoing for the preceding several weeks. Lives at home.the day prior to admission his significant other found him on the floor.  Unclear how he fell.  On the day of admission he was unable to get out of the shower.  The fire department had to be called to the home to assist with lifting him out. At baseline, minimal ambulation at home, uses assistive device walker.  *In ED, was afebrile and hemodynamically stable.  O2 sat stable on room air.  Basic labs generally unremarkable, mild SALLIE with Cr 1.34 but lytes, LFTs stable. WBC 13.5, hgb 12.6.  Viral swabs negative.  CXR negative.  Head CT without contrast showed no acute intracranial abnormality, was identified to have supratentorial ventriculomegaly which was presumed chronic/incidental given similar appearance on prior exam in 4/2023.  Was admitted under observation status for ongoing evaluation and care  -- address medical issues as below  -- PT consult for discharge planning  -- CC/SW consult for discharge planning    1330 Addendum  Notified that patient's UCx growing 10-50K group B strep.  Pathogen seems somewhat unusual to be growing in the urine, will check blood cultures x 2 to rule about possible bacteremia as a source of presenting complaints and abnormal UA. Once blood cultures are drawn, will start IV ceftriaxone. Echo pending     Dyspnea on exertion likely multifactorial from deconditioning, CIERA not  compliant with CPAP, underlying COPD and hx of lung carcinoma status post resection.  Leukocytosis possible reactive  CIERA, noncompliant with CPAP  COPD, not on chronic O2, not in exacerbation  History of PE, noncompliant with anticoagulation  Adenocarcinoma of lung s/p resection in 2016, gets surveillance imaging every 3 years.  *Reported ongoing shortness of breath for few months now.  Worse with minimal ambulation.  Reports unable to lie down flat, has been sleeping in a recliner.  Reports has not been using the CPAP.  Patient's partner reports he misses dose of blood thinners [at least 2-3 doses a week].  Has not been taking his water pills regularly.  *As above, in ED was afebrile and O2 sats stable on RA. No wheezing on exam. WBC 13.5 but viral swabs and CXR neg. ProBNP nl. Trops mildly elevated with ongoing workup as below.   -- cont DOAC  -- supportive cares with inhalers and prn nebs  -- no indication for antibiotics  -- O2 as needed  -- have stressed importance of compliance with CPAP use  -- could consider CT chest with PE protocol given hx of noncompliance with anticoagulation, CT imaging initially deferred given on admission given absence of hypoxia and SALLIE  -- consider outpatient sleep study and PFTs     Elevated troponin likely from demand ischemia in the setting of fall, kidney injury  *Denied any recent symptoms of chest pain or palpitations. Dyspnea as above.   *Trops mildly elevated but flat (42 -- 34). EKG nonischemic.  -- echo ordered  -- on anticoagulation as above      Hypertension  Hyperlipidemia  *Home meds reportedly include statin, lisinopril, Lasix and metoprolol.  *Intermittent noncompliance with home meds.   *Lisinopril and lasix held on admission dt SALLIE, statin held given obs status.   -- cont metoprolol    Hypochloremic hyponatremia.  SALLIE, presumed pre-renal  *BMP on presentation with , Cl 95, Cr 1.3 (baseline Cr 1.0--1.1). BG 200s.   *Given IVFs on admission, nephrotoxic meds  held as above.   -- AM BMP pending    1330 Addendum:  Cr improved to 1.2. Push po intake today and monitor urine output. Cont holding nephrotoxic meds. Will repeat BMP in AM.    Abnl UA  *No urinary sx on admission but WBC 13.5 and UA abnl with trace leuk est with 21 WBCs, RBCs, bactiuria, squams and mucous.  -- UC pending, if abnl will start abx for UTI    1330 Addendum:  UC growing 10-5k group B strep. See treatment plan above    Malignant carcinoid tumor  *Status post exploratory laparotomy, small bowel resection and removal of mesenteric mass.  *Follows at [facility]. No concerns this stay.      Chronic diarrhea  *No s/sx suggestive of acute infection.   *Chronic and stable on PTA cholestyramine.  -- recommend use of barrier cream  -- monitor, if overt diarrhea will consider extending out stool studies     DM II  *A1c 6.7 in 9/2023 --> 7.3 this stay. Manages with metformin 1000mg BID  *BG into 200s on admission. Metformin held dt SALLIE.   -- placed on med sliding scale insulin    Recent Labs   Lab 03/03/24  0206 03/02/24  2200 03/02/24  1811 03/02/24  1254   * 179* 196* 200*       BPH  *Chronic and stable on finasteride.     History of arthritis.  Peripheral neuropathy  Cervical myelopathy  *Chronic and stable on PTA gabapentin.    Obesity with BMI of 34.  *Increase all-cause mortality and morbidity. Consider lifestyle modification with diet and exercise as able to.     Medication noncompliance.  *Patient reports forgets to take his medication at least 2-3 times a week.    *Will need to consider home RN at the time of discharge.    FEN: no IVFs, lytes stable, regular diet  DVT Prophylaxis: DOAC, PCDs  Code Status: Full Code    Clinically Significant Risk Factors Present on Admission               # Drug Induced Coagulation Defect: home medication list includes an anticoagulant medication    # Hypertension: Noted on problem list     # DMII: A1C = 7.3 % (Ref range: <5.7 %) within past 6 months    # Obesity:  "Estimated body mass index is 34.3 kg/m  as calculated from the following:    Height as of this encounter: 1.702 m (5' 7\").    Weight as of this encounter: 99.3 kg (219 lb).              Disposition: Expected discharge in 1-2 days pending completion of workup (echo, labs) this stay. Await PT recs for discharge planning. CC/SW consulted.     Ivanna Suarez, DO    Medical Decision Making       Please see A&P for additional details of medical decision making.       Interval History   Seen this morning. Feeling well. No specific complaints. Still feels weak, especially in his legs. Breathing okay. No chest pain/palpitations. No abd pain/n/v. Taking po. Seems to be in good spirits    -Data reviewed today: I reviewed all new labs and imaging results over the last 24 hours. I personally reviewed no images or EKG's today.    Physical Exam   Temp: 98.6  F (37  C) Temp src: Oral BP: (!) 149/75 Pulse: 63   Resp: 18 SpO2: 93 % O2 Device: None (Room air) Oxygen Delivery: 2 LPM  Vitals:    03/02/24 1233   Weight: 99.3 kg (219 lb)     Vital Signs with Ranges  Temp:  [98.1  F (36.7  C)-98.8  F (37.1  C)] 98.6  F (37  C)  Pulse:  [63-88] 63  Resp:  [18] 18  BP: (132-153)/(68-87) 149/75  SpO2:  [93 %-98 %] 93 %  I/O last 3 completed shifts:  In: 740 [P.O.:240; IV Piggyback:500]  Out: 550 [Urine:550]    Constitutional: Resting comfortably, alert and conversing appropriately, NAD  Respiratory: CTAB, no wheeze, no increased work of breathing  Cardiovascular: HRRR, no MGR, no peripheral edema  GI: S, NT, ND, +BS  Skin/Integumen: warm, dry  Other:      Medications      apixaban ANTICOAGULANT  2.5 mg Oral BID    cholestyramine light  4 g Oral BID    finasteride  5 mg Oral Daily    fluticasone-vilanterol  1 puff Inhalation Daily    And    umeclidinium  1 puff Inhalation Daily    gabapentin  100 mg Oral BID    insulin aspart  1-7 Units Subcutaneous TID AC    insulin aspart  1-5 Units Subcutaneous At Bedtime    metoprolol succinate ER "  25 mg Oral Daily       Data   Recent Labs   Lab 03/03/24  0206 03/02/24  2200 03/02/24  1811 03/02/24  1254   WBC  --   --   --  13.5*   HGB  --   --   --  12.6*   MCV  --   --   --  99   PLT  --   --   --  268   NA  --   --   --  134*   POTASSIUM  --   --   --  4.5   CHLORIDE  --   --   --  95*   CO2  --   --   --  24   BUN  --   --   --  16.3   CR  --   --   --  1.34*   ANIONGAP  --   --   --  15   JESSE  --   --   --  9.1   * 179* 196* 200*   ALBUMIN  --   --   --  4.2   PROTTOTAL  --   --   --  8.0   BILITOTAL  --   --   --  0.6   ALKPHOS  --   --   --  69   ALT  --   --   --  15   AST  --   --   --  22       Recent Results (from the past 24 hour(s))   Chest XR,  PA & LAT    Narrative    EXAM: XR CHEST 2 VIEWS  LOCATION: [facility]  DATE: 3/2/2024    INDICATION: Weakness.  COMPARISON: 07/05/2023.      Impression    IMPRESSION: Negative chest. Lungs clear.     CT Head w/o Contrast    Narrative    EXAM: CT HEAD W/O CONTRAST  LOCATION: [facility]  DATE: 3/2/2024    INDICATION: Report of falls at home, unwitnessed.  On anticoagulation.  Global weakness.  COMPARISON: PET/CT CT 04/25/2023  TECHNIQUE: Routine CT Head without IV contrast. Multiplanar reformats. Dose reduction techniques were used.    FINDINGS:  No evidence of hemorrhage. Supratentorial ventriculomegaly, presumed chronic/incidental, similar in appearance compared to the superior field-of-view on the prior PET/CT from 04/25/2023. No acute osseous abnormality.      Impression    IMPRESSION:  1.  No acute intracranial abnormality.  2.  Supratentorial ventriculomegaly, presumed chronic/incidental, similar in appearance compared to 04/25/2023.

## 2024-03-03 NOTE — PLAN OF CARE
PRIMARY Concern: Falls, Gen weakness, unable to get out of shower.  SAFETY RISK Concerns (fall risk, behaviors, etc.): Fall Risk   Isolation/Type: none  Tests/Procedures for NEXT shift: Echo needed.   Consults? (Pending/following, signed-off?) PT, OT, SW consulted.  Where is patient from? (Home, TCU, etc.): Home  Other Important info for NEXT shift: neuros intact  Anticipated DC date & active delays: TBD    SUMMARY NOTE:                Orientation/Cognitive: A&Ox4, frustrated with frequent wakings.  Observation Goals (Met/ Not Met): not met  Mobility Level/Assist Equipment: Up with asst 2 in ED, not out of bed this shift. T/R q2 hrs  Antibiotics & Plan (IV/po, length of tx left): none  Pain Management: C/o neck pain, PRN Tylenol x1  Tele/VS/O2: VSS on RA ex BP elevated. Tele- NSR  ABNL Lab/BG: BG- 201 Na- 134, Creat- 1.34, A1c- 7.3, WBC- 13.5, Trop- 42, 34.  Diet: Reg  Bowel/Bladder: Continent. Voids in urinal.  Skin Concerns: Coccyx pressure injury, scattered scabs to shins.  Drains/Devices: PIV SL  Patient Stated Goal for Today: none    Observation goals  PRIOR TO DISCHARGE        Comments:   -diagnostic tests and consults completed and resulted- NOT MET  -vital signs normal or at patient baseline- NOT MET  -tolerating oral intake to maintain hydration- MET  -adequate pain control on oral analgesics- MET  Nurse to notify provider when observation goals have been met and patient is ready for discharge.

## 2024-03-04 ENCOUNTER — APPOINTMENT (OUTPATIENT)
Dept: PHYSICAL THERAPY | Facility: CLINIC | Age: 79
DRG: 683 | End: 2024-03-04
Payer: COMMERCIAL

## 2024-03-04 PROBLEM — R26.81 GAIT INSTABILITY: Status: ACTIVE | Noted: 2024-03-04

## 2024-03-04 PROBLEM — R23.9 ALTERATION IN SKIN INTEGRITY DUE TO MOISTURE: Status: ACTIVE | Noted: 2024-03-04

## 2024-03-04 LAB
ANION GAP SERPL CALCULATED.3IONS-SCNC: 14 MMOL/L (ref 7–15)
BUN SERPL-MCNC: 11.7 MG/DL (ref 8–23)
CALCIUM SERPL-MCNC: 8.1 MG/DL (ref 8.8–10.2)
CHLORIDE SERPL-SCNC: 102 MMOL/L (ref 98–107)
CREAT SERPL-MCNC: 1.06 MG/DL (ref 0.67–1.17)
DEPRECATED HCO3 PLAS-SCNC: 21 MMOL/L (ref 22–29)
EGFRCR SERPLBLD CKD-EPI 2021: 71 ML/MIN/1.73M2
ERYTHROCYTE [DISTWIDTH] IN BLOOD BY AUTOMATED COUNT: 11.6 % (ref 10–15)
GLUCOSE BLDC GLUCOMTR-MCNC: 157 MG/DL (ref 70–99)
GLUCOSE BLDC GLUCOMTR-MCNC: 166 MG/DL (ref 70–99)
GLUCOSE BLDC GLUCOMTR-MCNC: 186 MG/DL (ref 70–99)
GLUCOSE BLDC GLUCOMTR-MCNC: 198 MG/DL (ref 70–99)
GLUCOSE BLDC GLUCOMTR-MCNC: 266 MG/DL (ref 70–99)
GLUCOSE SERPL-MCNC: 293 MG/DL (ref 70–99)
HCT VFR BLD AUTO: 35.1 % (ref 40–53)
HGB BLD-MCNC: 11.5 G/DL (ref 13.3–17.7)
MCH RBC QN AUTO: 33.2 PG (ref 26.5–33)
MCHC RBC AUTO-ENTMCNC: 32.8 G/DL (ref 31.5–36.5)
MCV RBC AUTO: 101 FL (ref 78–100)
PLATELET # BLD AUTO: 288 10E3/UL (ref 150–450)
POTASSIUM SERPL-SCNC: 3.8 MMOL/L (ref 3.4–5.3)
RBC # BLD AUTO: 3.46 10E6/UL (ref 4.4–5.9)
SODIUM SERPL-SCNC: 137 MMOL/L (ref 135–145)
WBC # BLD AUTO: 8.6 10E3/UL (ref 4–11)

## 2024-03-04 PROCEDURE — 250N000011 HC RX IP 250 OP 636: Performed by: INTERNAL MEDICINE

## 2024-03-04 PROCEDURE — 85041 AUTOMATED RBC COUNT: CPT | Performed by: PHYSICIAN ASSISTANT

## 2024-03-04 PROCEDURE — G0378 HOSPITAL OBSERVATION PER HR: HCPCS

## 2024-03-04 PROCEDURE — 80048 BASIC METABOLIC PNL TOTAL CA: CPT | Performed by: PHYSICIAN ASSISTANT

## 2024-03-04 PROCEDURE — 36415 COLL VENOUS BLD VENIPUNCTURE: CPT | Performed by: PHYSICIAN ASSISTANT

## 2024-03-04 PROCEDURE — 82962 GLUCOSE BLOOD TEST: CPT

## 2024-03-04 PROCEDURE — 250N000013 HC RX MED GY IP 250 OP 250 PS 637: Performed by: HOSPITALIST

## 2024-03-04 PROCEDURE — G0463 HOSPITAL OUTPT CLINIC VISIT: HCPCS

## 2024-03-04 PROCEDURE — 97530 THERAPEUTIC ACTIVITIES: CPT | Mod: GP

## 2024-03-04 PROCEDURE — 99232 SBSQ HOSP IP/OBS MODERATE 35: CPT | Performed by: PHYSICIAN ASSISTANT

## 2024-03-04 PROCEDURE — 120N000001 HC R&B MED SURG/OB

## 2024-03-04 PROCEDURE — 250N000013 HC RX MED GY IP 250 OP 250 PS 637: Performed by: PHYSICIAN ASSISTANT

## 2024-03-04 RX ORDER — CEFTRIAXONE 2 G/1
2 INJECTION, POWDER, FOR SOLUTION INTRAMUSCULAR; INTRAVENOUS EVERY 24 HOURS
Status: DISCONTINUED | OUTPATIENT
Start: 2024-03-04 | End: 2024-03-06 | Stop reason: HOSPADM

## 2024-03-04 RX ORDER — LISINOPRIL 20 MG/1
20 TABLET ORAL DAILY
Status: DISCONTINUED | OUTPATIENT
Start: 2024-03-04 | End: 2024-03-05

## 2024-03-04 RX ORDER — ATORVASTATIN CALCIUM 20 MG/1
20 TABLET, FILM COATED ORAL DAILY
Status: DISCONTINUED | OUTPATIENT
Start: 2024-03-04 | End: 2024-03-06 | Stop reason: HOSPADM

## 2024-03-04 RX ADMIN — FLUTICASONE FUROATE AND VILANTEROL TRIFENATATE 1 PUFF: 100; 25 POWDER RESPIRATORY (INHALATION) at 07:59

## 2024-03-04 RX ADMIN — GABAPENTIN 100 MG: 100 CAPSULE ORAL at 07:56

## 2024-03-04 RX ADMIN — ACETAMINOPHEN 650 MG: 325 TABLET, FILM COATED ORAL at 05:53

## 2024-03-04 RX ADMIN — INSULIN ASPART 1 UNITS: 100 INJECTION, SOLUTION INTRAVENOUS; SUBCUTANEOUS at 07:59

## 2024-03-04 RX ADMIN — METOPROLOL SUCCINATE 25 MG: 25 TABLET, EXTENDED RELEASE ORAL at 07:56

## 2024-03-04 RX ADMIN — FINASTERIDE 5 MG: 5 TABLET, FILM COATED ORAL at 07:56

## 2024-03-04 RX ADMIN — GABAPENTIN 100 MG: 100 CAPSULE ORAL at 20:08

## 2024-03-04 RX ADMIN — CHOLESTYRAMINE 4 G: 4 POWDER, FOR SUSPENSION ORAL at 07:56

## 2024-03-04 RX ADMIN — APIXABAN 2.5 MG: 2.5 TABLET, FILM COATED ORAL at 20:08

## 2024-03-04 RX ADMIN — METFORMIN HYDROCHLORIDE 500 MG: 500 TABLET ORAL at 17:12

## 2024-03-04 RX ADMIN — APIXABAN 2.5 MG: 2.5 TABLET, FILM COATED ORAL at 07:56

## 2024-03-04 RX ADMIN — CEFTRIAXONE SODIUM 2 G: 2 INJECTION, POWDER, FOR SOLUTION INTRAMUSCULAR; INTRAVENOUS at 17:12

## 2024-03-04 RX ADMIN — CHOLESTYRAMINE 4 G: 4 POWDER, FOR SUSPENSION ORAL at 20:08

## 2024-03-04 RX ADMIN — INSULIN ASPART 1 UNITS: 100 INJECTION, SOLUTION INTRAVENOUS; SUBCUTANEOUS at 17:24

## 2024-03-04 RX ADMIN — ATORVASTATIN CALCIUM 20 MG: 20 TABLET, FILM COATED ORAL at 20:08

## 2024-03-04 RX ADMIN — LISINOPRIL 20 MG: 20 TABLET ORAL at 17:12

## 2024-03-04 RX ADMIN — UMECLIDINIUM 1 PUFF: 62.5 AEROSOL, POWDER ORAL at 07:59

## 2024-03-04 RX ADMIN — INSULIN ASPART 3 UNITS: 100 INJECTION, SOLUTION INTRAVENOUS; SUBCUTANEOUS at 12:31

## 2024-03-04 ASSESSMENT — ACTIVITIES OF DAILY LIVING (ADL)
ADLS_ACUITY_SCORE: 44
ADLS_ACUITY_SCORE: 43
ADLS_ACUITY_SCORE: 44
ADLS_ACUITY_SCORE: 43
ADLS_ACUITY_SCORE: 43
ADLS_ACUITY_SCORE: 44
ADLS_ACUITY_SCORE: 44
ADLS_ACUITY_SCORE: 43
ADLS_ACUITY_SCORE: 44
ADLS_ACUITY_SCORE: 43
ADLS_ACUITY_SCORE: 44
DEPENDENT_IADLS:: CLEANING;COOKING;LAUNDRY;SHOPPING;MEAL PREPARATION
ADLS_ACUITY_SCORE: 44
ADLS_ACUITY_SCORE: 43
ADLS_ACUITY_SCORE: 44

## 2024-03-04 NOTE — PROGRESS NOTES
Observation Goals:  -diagnostic tests and consults completed and resulted: Not met  -vital signs normal or at patient baseline Partially met  -tolerating oral intake to maintain hydration: Met  -adequate pain control on oral analgesics: Met  Nurse to notify provider when observation goals have been met and patient is ready for discharge.

## 2024-03-04 NOTE — PROGRESS NOTES
VSS. A/O. Up 1-2/walker. Denies pain. Neuros intact. Blanchable redness coccyx, open to air. Couple of scab on legs.

## 2024-03-04 NOTE — CONSULTS
Care Management Initial Consult    General Information  Assessment completed with: Patient,    Type of CM/SW Visit: Initial Assessment    Primary Care Provider verified and updated as needed:     Readmission within the last 30 days:        Reason for Consult: discharge planning  Advance Care Planning: Advance Care Planning Reviewed: present on chart          Communication Assessment  Patient's communication style: spoken language (English or Bilingual)    Hearing Difficulty or Deaf: no        Cognitive  Cognitive/Neuro/Behavioral: WDL                      Living Environment:   People in home: significant other     Current living Arrangements: house      Able to return to prior arrangements: other (see comments)  Living Arrangement Comments: PT recommending TCU    Family/Social Support:  Care provided by: self, spouse/significant other  Provides care for: no one  Marital Status: Lives with Significant Other  Significant Other, Children          Description of Support System: Supportive    Support Assessment: Adequate family and caregiver support    Current Resources:   Patient receiving home care services: No     Community Resources: None  Equipment currently used at home: walker, rolling  Supplies currently used at home:      Employment/Financial:  Employment Status: retired        Financial Concerns:             Does the patient's insurance plan have a 3 day qualifying hospital stay waiver?  Yes     Which insurance plan 3 day waiver is available? Alternative insurance waiver    Will the waiver be used for post-acute placement? Yes    Lifestyle & Psychosocial Needs:  Social Determinants of Health     Food Insecurity: No Food Insecurity (1/11/2022)    Hunger Vital Sign     Worried About Running Out of Food in the Last Year: Never true     Ran Out of Food in the Last Year: Never true   Depression: Not at risk (7/5/2023)    PHQ-2     PHQ-2 Score: 0   Housing Stability: Unknown (1/11/2022)    Housing Stability Vital  "Sign     Unable to Pay for Housing in the Last Year: No     Number of Places Lived in the Last Year: Not on file     Unstable Housing in the Last Year: No   Tobacco Use: Medium Risk (2/28/2024)    Patient History     Smoking Tobacco Use: Former     Smokeless Tobacco Use: Never     Passive Exposure: Not on file   Financial Resource Strain: Low Risk  (1/11/2022)    Overall Financial Resource Strain (CARDIA)     Difficulty of Paying Living Expenses: Not hard at all   Alcohol Use: Not At Risk (1/11/2022)    AUDIT-C     Frequency of Alcohol Consumption: 2-3 times a week     Average Number of Drinks: 1 or 2     Frequency of Binge Drinking: Never   Transportation Needs: No Transportation Needs (1/11/2022)    PRAPARE - Transportation     Lack of Transportation (Medical): No     Lack of Transportation (Non-Medical): No   Physical Activity: Not on file   Interpersonal Safety: Not on file   Stress: Not on file   Social Connections: Not on file       Functional Status:  Prior to admission patient needed assistance:   Dependent ADLs:: Ambulation-cane  Dependent IADLs:: Cleaning, Cooking, Laundry, Shopping, Meal Preparation       Mental Health Status:          Chemical Dependency Status:                Values/Beliefs:  Spiritual, Cultural Beliefs, Latter day Practices, Values that affect care:                 Additional Information:  Pt was admitted on 3/2/24 with generalized weakness and multiple falls, per H&P. Pt lives in a house with significant other, states significant other helps with \"everything\" but then clarifies they assist with cooking, cleaning, laundry, and pt is independent with ambulation at baseline. Pt talked about needing assistance from the fire department to get out of the bathtub. Pt has four children, states daughter in [place] is main support (moved to [place] 2 months ago). States wife passed away in 2011. Discussed PT recommendation for TCU at discharge. Pt has never been to TCU and was unfamiliar with this " recommendation. Discussed purpose, average length of stay, insurance authorization, therapy received, Medicare ratings, and provided with SNF list. Pt would like a facility close to home address that is on the higher rated end. Sent referrals.     Addendum 1130: Spoke with significant other Keina and pt, she requested referrals to [facility] and [facility]. Sent referral to [facility], referral already sent to [facility]. Pt has been accepted to [facility], [facility], and [facility] (private room, no fee, for Wednesday). Updated pt and [facility], who want to accept [facility] TCU. Updated [facility] with TCU. [facility] plans to transport at discharge pending how pt is moving, otherwise they are aware of [facility] wheelchair transport and costs.     JANETTE Shultz  Social Work  [facility]

## 2024-03-04 NOTE — PLAN OF CARE
PRIMARY Concern: Falls, Gen weakness.  SAFETY RISK Concerns (fall risk, behaviors, etc.): Fall Risk   Isolation/Type: none  Tests/Procedures for NEXT shift:  Consults? (Pending/following, signed-off?) PT- rec TCU, OT, SW consult completed   Where is patient from? (Home, TCU, etc.): Home  Other Important info for NEXT shift: neuros  every q8  Anticipated DC date & active delays: TBD    SUMMARY NOTE:                Orientation/Cognitive: A&Ox4  Observation Goals (Met/ Not Met): not met  Mobility Level/Assist Equipment: Ax2 paulette steady, T/R q2 hrs  Antibiotics & Plan (IV/po, length of tx left):   Pain Management: complained of neck pain, Gave PRN Tylenol  Tele/VS/O2: VSS on RA ex BP elevated. Tele- NSR  ABNL Lab/BG:   Diet: Reg  Bowel/Bladder: Continent. Voids in urinal.  Skin Concerns: Coccyx pressure injury (WOC consulted), scattered scabs to shins.  Drains/Devices: PIV SL  Patient Stated Goal for Today: sleep

## 2024-03-04 NOTE — UTILIZATION REVIEW
"  Admission Status; Secondary Review Determination         Under the authority of the Utilization Management Committee, the utilization review process indicated a secondary review on the above patient.  The review outcome is based on review of the medical records, discussions with staff, and applying clinical experience noted on the date of the review.        (xxx)      Inpatient Status Appropriate - This patient's medical care is consistent with medical management for inpatient care and reasonable inpatient medical practice.      () Observation Status Appropriate - This patient does not meet hospital inpatient criteria and is placed in observation status. If this patient's primary payer is Medicare and was admitted as an inpatient, Condition Code 44 should be used and patient status changed to \"observation\".   () Admission Status NOT Appropriate - This patient's medical care is not consistent with medical management for Inpatient or Observation Status.          RATIONALE FOR DETERMINATION     Nahun Villalobos is a 78 year old male with PMHx of hypertension, hyperlipidemia, DM II, obesity, CIERA on CPAP, COPD, history of PE, malignant carcinoid tumor, lung carcinoma, peripheral neuropathy, cervical myelopathy who was admitted fto observation on 3/2/2024 for evaluation of generalized weakness and multiple falls.  WBC 13.5 and troponins mildly elevated but flat. On 3/3/2024 urine culture grew group B strep.  Blood cultures x2 drawn and IV antibiotics initiated.  ECHO obtained showing EF 55-60%.  He will likely require at least 1-2 days further hospitalization.  IP status is appropriate at this time.  I spoke with the care team.    The severity of illness, intensity of service provided, expected LOS and risk for adverse outcome make the care complex, high risk and appropriate for hospital admission.        The information on this document is developed by the utilization review team in order for the business office to " ensure compliance.  This only denotes the appropriateness of proper admission status and does not reflect the quality of care rendered.         The definitions of Inpatient Status and Observation Status used in making the determination above are those provided in the CMS Coverage Manual, Chapter 1 and Chapter 6, section 70.4.      Sincerely,     Lesia Raza MD  Physician Advisor   Utilization Review/ Case Management  [facility].

## 2024-03-04 NOTE — PROGRESS NOTES
[facility]    Medicine Progress Note - Hospitalist Service    Date of Admission:  3/2/2024    Assessment & Plan   Nahun Villalobos is a 78 year old male with PMHx of hypertension, hyperlipidemia, DM II, obesity, CIERA on CPAP, COPD, history of PE, malignant carcinoid tumor, lung carcinoma, peripheral neuropathy, cervical myelopathy who was admitted on 3/2/2024 for evaluation of generalized weakness and multiple falls.     Physical deconditioning from medical illness, senile frailty, chronic debility  Falls at home  *Presented to ED with reports of generalized weakness that had been ongoing for the preceding several weeks. Lives at home.the day prior to admission his significant other found him on the floor.  Unclear how he fell.  On the day of admission he was unable to get out of the shower.  The fire department had to be called to the home to assist with lifting him out. At baseline, minimal ambulation at home, uses assistive device walker.  *In ED, was afebrile and hemodynamically stable.  O2 sat stable on room air.  Basic labs generally unremarkable, mild SALLIE with Cr 1.34 but lytes, LFTs stable. WBC 13.5, hgb 12.6.  Viral swabs negative.  CXR negative.  Head CT without contrast showed no acute intracranial abnormality, was identified to have supratentorial ventriculomegaly which was presumed chronic/incidental given similar appearance on prior exam in 4/2023.    -Advance to inpatient status 03/04/24   -address medical issues as below  -PT recommending TCU on discharge, accepted for 3/6, SW/CC assisting     Dyspnea on exertion likely multifactorial from deconditioning, CIERA not compliant with CPAP, underlying COPD and hx of lung carcinoma status post resection.  Leukocytosis possible reactive  CIERA, noncompliant with CPAP  COPD, not on chronic O2, not in exacerbation  History of PE, noncompliant with anticoagulation  Adenocarcinoma of lung s/p resection in 2016, gets surveillance imaging  every 3 years.  *Reported ongoing shortness of breath for few months now.  Worse with minimal ambulation.  Reports unable to lie down flat, has been sleeping in a recliner.  Reports has not been using the CPAP.  Patient's partner reports he misses dose of blood thinners [at least 2-3 doses a week].  Has not been taking his water pills regularly.  *As above, in ED was afebrile and O2 sats stable on RA. No wheezing on exam. WBC 13.5 but viral swabs and CXR neg. ProBNP nl. Trops mildly elevated with ongoing workup as below.   -- cont DOAC  -- supportive cares with inhalers and prn nebs  -- no indication for antibiotics  -- O2 as needed  -- have stressed importance of compliance with CPAP use  -- could consider CT chest with PE protocol given hx of noncompliance with anticoagulation, CT imaging initially deferred given on admission given absence of hypoxia and SALLIE  -- consider outpatient sleep study and PFTs    ? Group B strep UTI  No urinary sx on admission but WBC 13.5 and UA abnl with trace leuk est with 21 WBCs, RBCs, bactiuria, squams and mucous. UCx growing 10-50K group B strep.  Pathogen seems somewhat unusual to be growing in the urine, ordered blood cultures x 2 to rule about possible bacteremia as a source of presenting complaints and abnormal UA.   - Follow up BCx  - Started IV ceftriaxone.      Elevated troponin likely from demand ischemia in the setting of fall, kidney injury  *Denied any recent symptoms of chest pain or palpitations. Dyspnea as above.   *Trops mildly elevated but flat (42 -- 34). EKG nonischemic.  *ECHO EF 55-60%, no RWMA, no significant valvular abnormality  -on anticoagulation as above  -Monitored on telemetry, ok to discontinue      Hypertension  Hyperlipidemia  *Home meds reportedly include statin, lisinopril, Lasix and metoprolol.  *Intermittent noncompliance with home meds.   *Lisinopril and lasix held on admission dt SALLIE, statin held given obs status  -Continue PTA  metoprolol  -Restart PTA lisinopril at reduced dose 20 mg daily and uptitrate as able/needed, with hold parameters  -Continue to hold PTA Lasix and monitor  -Resume PTA statin     Hypochloremic hyponatremia, improving  SALLIE, presumed pre-renal, resolved  *BMP on presentation with , Cl 95, Cr 1.3 (baseline Cr 1.0--1.1). BG 200s.   *Given IVFs on admission, nephrotoxic meds held as above. Cr improved back to baseline.   - Cont holding nephrotoxic meds  - Monitor BMP PRN  - Slowly restart PTA ACEi and diuretic if needed     Malignant carcinoid tumor  *Status post exploratory laparotomy, small bowel resection and removal of mesenteric mass.  *Follows at [facility]. No concerns this stay.      Chronic diarrhea  *No s/sx suggestive of acute infection.   *Chronic and stable on PTA cholestyramine.  -- recommend use of barrier cream  -- monitor, if overt diarrhea will consider extending out stool studies     DM II  *A1c 6.7 in 9/2023 --> 7.3 this stay. Manages with metformin 1000mg BID  *BG into 200s on admission. Metformin held dt SALLIE.  Glucose trending up and SALLIE resolved, restarting metformin 3/4.  -sliding scale insulin medium   - Restart PTA metformin, SALLIE resolved     Recent Labs   Lab 03/04/24  1230 03/04/24  1028 03/04/24  0755 03/04/24  0210 03/03/24  2205 03/03/24  1722   * 293* 186* 198* 195* 176*        BPH  *Chronic and stable on finasteride.     History of arthritis.  Peripheral neuropathy  Cervical myelopathy  *Chronic and stable on PTA gabapentin.     Obesity with BMI of 34.  *Increase all-cause mortality and morbidity. Consider lifestyle modification with diet and exercise as able to.     Medication noncompliance.  *Patient reports forgets to take his medication at least 2-3 times a week.    *Will need to consider home RN at the time of discharge.          Diet: Regular Diet Adult    DVT Prophylaxis: DOAC  Gomez Catheter: Not present  Lines: None     Cardiac Monitoring: None  Code Status: Full  "Code      Clinically Significant Risk Factors Present on Admission                 # Drug Induced Coagulation Defect: home medication list includes an anticoagulant medication    # Hypertension: Noted on problem list     # DMII: A1C = 7.3 % (Ref range: <5.7 %) within past 6 months    # Obesity: Estimated body mass index is 34.3 kg/m  as calculated from the following:    Height as of this encounter: 1.702 m (5' 7\").    Weight as of this encounter: 99.3 kg (219 lb).              Disposition Plan      Expected Discharge Date: 03/06/2024      Destination: inpatient rehabilitation facility  Discharge Comments: weakness/SOB/falls  +UC group B strep  echo- 55-60%  trop 42-39  on Eliquis  PT-TCU  SW/CC to see/Needs placement.   WOC to see          The patient's care was discussed with the Attending Physician, Dr. Blood, Bedside Nurse, and Patient.    Rhea Remy PA-C  Hospitalist Service  [facility]  Securely message with [employer] (more info)  Text page via [facility] Paging/Directory   ______________________________________________________________________    Interval History   Seen and examined.  Feels his generalized weakness is slightly improved.  Discussed urine culture, echocardiogram, and pending blood cultures.  Reports he feels cold sometimes however has not had any fevers or formal chills.  No nausea or vomiting.  Tolerating p.o.  No chest pain, palpitations, or new symptoms.  Opted to rehab for 3/6.  Discussed with UR, patient meets inpatient status, will continue with IV Rocephin while waiting blood cultures. Questions welcomed and answered to the best of my knowledge.    Physical Exam   Vital Signs: Temp: 97.8  F (36.6  C) Temp src: Oral BP: (!) 147/76 Pulse: 57   Resp: 18 SpO2: 96 % O2 Device: None (Room air)    Weight: 219 lbs 0 oz    Physical Exam    General: Awake, alert, very pleasant gentleman who appears stated age. Looks comfortable sitting up in bed. No acute " distress.  HEENT: Extraocular movements intact.   Respiratory: Clear to auscultation bilaterally, no rales, wheezing, or rhonchi.  Cardiovascular: Regular rate and rhythm, +S1 and S2, no murmur auscultated.   Gastrointestinal: Soft, non-tender, obese but non-distended. Bowel sounds present.  Skin: Warm, dry. No obvious rashes or lesions on exposed skin. Dorsalis pedis pulses palpable bilaterally.  Musculoskeletal: No joint swelling, erythema or tenderness. Noninfected appearing abrasions to BLE, scattered. Moves all extremities equally.  Neurologic: AAO x3.   Psychiatric: Appropriate mood and affect. No obvious anxiety or depression.      Medical Decision Making       35 MINUTES SPENT BY ME on the date of service doing chart review, history, exam, documentation & further activities per the note.      Data     I have personally reviewed the following data over the past 24 hrs:    8.6  \   11.5 (L)   / 288     137 102 11.7 /  266 (H)   3.8 21 (L) 1.06 \       Imaging results reviewed over the past 24 hrs:   No results found for this or any previous visit (from the past 24 hour(s)).

## 2024-03-04 NOTE — PLAN OF CARE
Goal Outcome Evaluation:  DATE & TIME: 03/03/2024, [id number]    Cognitive Concerns/ Orientation : A & O times four  BEHAVIOR & AGGRESSION TOOL COLOR: calm   ABNL VS/O2: VSS, room air  MOBILITY: paulette steady to transfer from bed/chair  PAIN MANAGMENT: none  DIET: Regular  BOWEL/BLADDER: uses urinal  ABNL LAB/BG: QHW=938/195  DRAIN/DEVICES: PIV SL  TELEMETRY RHYTHM: NSR  SKIN: reddened coccyx area. Scabs on shins   TESTS/PROCEDURES:   D/C DATE: pending   OTHER IMPORTANT INFO: neuro assessment WD. On IV ABX for positive UA  Continue to monitor

## 2024-03-04 NOTE — CONSULTS
"[facility] Nurse Inpatient Assessment     Consulted for: \"open wound/pressure injury at the bottom\"    Summary: MASD to buttocks and gluteal cleft. [facility] nurse signing off.    Patient History (according to provider note(s):      \"Nahun Villalobos is a 78 year old  male with medical history of hypertension, hyperlipidemia, diabetes mellitus, obesity, CIERA on CPAP, COPD, history of PE, malignant carcinoid tumor, lung carcinoma, peripheral neuropathy, cervical myelopathy, brought into the ED via ambulance with generalized weakness and multiple falls.\"    Assessment:      Areas visualized during today's visit: Sacrum/coccyx    Skin Injury Location: Coccyx and buttocks        Last photo: 3/4  Skin injury due to: Friction and Moisture associated skin damage (MASD)  Skin history and plan of care:   Patient reports skin to his buttocks is painful. He is up working with PT on writer's assessment. Skin is intact.  Affected area:      Skin assessment: Erythema and Maceration     Measurements (length x width x depth, in cm) No measurable wound      Color: dusky     Temperature  normal      Drainage: none .      Color: none      Odor: none  Pain: mild, intermittent and burning  Pain interventions prior to dressing change: no significant pain present  and slow and gentle cares   Treatment goal: Heal , Decrease moisture, and Protection  STATUS: initial assessment  Supplies ordered: supplies stored on unit, discussed with RN, and discussed with patient       Treatment Plan:     Buttock and coccyx wound prevention: BID and PRN with incontinent episodes  Use Darrin spray and Ariel white cloths for perineal care. Avoid moist Bath wipes.  Apply a very thin layer of Critic-aid barrier paste to red, moist areas. Do not scrub to remove. Wipe away soiled layer and reapply PRN.  No brief in bed.  Use only a single incontinence pad under patient.    Orders: Written    RECOMMEND PRIMARY TEAM ORDER: None, at this " "time  Education provided: plan of care and Moisture management  Discussed plan of care with: Patient and Nurse  WOC nurse follow-up plan: signing off  Notify WOC if wound(s) deteriorate.  Nursing to notify the Provider(s) and re-consult the WO Nurse if new skin concern.    DATA:     Current support surface: Standard  Standard gel/foam mattress (IsoFlex, Atmos air, etc)  Containment of urine/stool: Incontinence Protocol, Incontinent pad in bed, and pull on BWAP  BMI: Body mass index is 34.3 kg/m .   Active diet order: Orders Placed This Encounter      Regular Diet Adult     Output: I/O last 3 completed shifts:  In: 330 [P.O.:330]  Out: 675 [Urine:675]     Labs:   Recent Labs   Lab 03/03/24  0713 03/02/24  1254   ALBUMIN  --  4.2   HGB 11.0* 12.6*   WBC 12.4* 13.5*   A1C  --  7.3*     Pressure injury risk assessment:   Sensory Perception: 4-->no impairment  Moisture: 4-->rarely moist  Activity: 2-->chairfast  Mobility: 3-->slightly limited  Nutrition: 3-->adequate  Friction and Shear: 1-->problem  Deuce Score: 17    Marily Fritz RN, CWOCN  Please contact via [employer] at name or group \"[facility] nurse\"- M-F 8A-4P  Leave VM @ *[phone] for non-urgent needs. Checked occasionally M-F.   "

## 2024-03-05 ENCOUNTER — PATIENT OUTREACH (OUTPATIENT)
Dept: CARE COORDINATION | Facility: CLINIC | Age: 79
End: 2024-03-05
Payer: COMMERCIAL

## 2024-03-05 ENCOUNTER — APPOINTMENT (OUTPATIENT)
Dept: PHYSICAL THERAPY | Facility: CLINIC | Age: 79
DRG: 683 | End: 2024-03-05
Payer: COMMERCIAL

## 2024-03-05 LAB
ANION GAP SERPL CALCULATED.3IONS-SCNC: 14 MMOL/L (ref 7–15)
BUN SERPL-MCNC: 9.6 MG/DL (ref 8–23)
CALCIUM SERPL-MCNC: 8 MG/DL (ref 8.8–10.2)
CHLORIDE SERPL-SCNC: 103 MMOL/L (ref 98–107)
CREAT SERPL-MCNC: 0.99 MG/DL (ref 0.67–1.17)
DEPRECATED HCO3 PLAS-SCNC: 21 MMOL/L (ref 22–29)
EGFRCR SERPLBLD CKD-EPI 2021: 77 ML/MIN/1.73M2
ERYTHROCYTE [DISTWIDTH] IN BLOOD BY AUTOMATED COUNT: 11.6 % (ref 10–15)
GLUCOSE BLDC GLUCOMTR-MCNC: 172 MG/DL (ref 70–99)
GLUCOSE BLDC GLUCOMTR-MCNC: 174 MG/DL (ref 70–99)
GLUCOSE BLDC GLUCOMTR-MCNC: 185 MG/DL (ref 70–99)
GLUCOSE BLDC GLUCOMTR-MCNC: 190 MG/DL (ref 70–99)
GLUCOSE BLDC GLUCOMTR-MCNC: 207 MG/DL (ref 70–99)
GLUCOSE SERPL-MCNC: 204 MG/DL (ref 70–99)
HCT VFR BLD AUTO: 33.3 % (ref 40–53)
HGB BLD-MCNC: 11.1 G/DL (ref 13.3–17.7)
MCH RBC QN AUTO: 32.9 PG (ref 26.5–33)
MCHC RBC AUTO-ENTMCNC: 33.3 G/DL (ref 31.5–36.5)
MCV RBC AUTO: 99 FL (ref 78–100)
PLATELET # BLD AUTO: 266 10E3/UL (ref 150–450)
POTASSIUM SERPL-SCNC: 4.2 MMOL/L (ref 3.4–5.3)
RBC # BLD AUTO: 3.37 10E6/UL (ref 4.4–5.9)
SODIUM SERPL-SCNC: 138 MMOL/L (ref 135–145)
WBC # BLD AUTO: 8.8 10E3/UL (ref 4–11)

## 2024-03-05 PROCEDURE — 250N000013 HC RX MED GY IP 250 OP 250 PS 637: Performed by: PHYSICIAN ASSISTANT

## 2024-03-05 PROCEDURE — 250N000013 HC RX MED GY IP 250 OP 250 PS 637: Performed by: HOSPITALIST

## 2024-03-05 PROCEDURE — 97530 THERAPEUTIC ACTIVITIES: CPT | Mod: GP

## 2024-03-05 PROCEDURE — 36415 COLL VENOUS BLD VENIPUNCTURE: CPT | Performed by: PHYSICIAN ASSISTANT

## 2024-03-05 PROCEDURE — 80048 BASIC METABOLIC PNL TOTAL CA: CPT | Performed by: PHYSICIAN ASSISTANT

## 2024-03-05 PROCEDURE — 250N000011 HC RX IP 250 OP 636: Performed by: INTERNAL MEDICINE

## 2024-03-05 PROCEDURE — 120N000001 HC R&B MED SURG/OB

## 2024-03-05 PROCEDURE — 99231 SBSQ HOSP IP/OBS SF/LOW 25: CPT | Performed by: PHYSICIAN ASSISTANT

## 2024-03-05 PROCEDURE — 85027 COMPLETE CBC AUTOMATED: CPT | Performed by: PHYSICIAN ASSISTANT

## 2024-03-05 RX ORDER — LISINOPRIL 20 MG/1
20 TABLET ORAL ONCE
Status: COMPLETED | OUTPATIENT
Start: 2024-03-05 | End: 2024-03-05

## 2024-03-05 RX ORDER — MICONAZOLE NITRATE 20 MG/G
CREAM TOPICAL 2 TIMES DAILY
Status: DISCONTINUED | OUTPATIENT
Start: 2024-03-05 | End: 2024-03-06 | Stop reason: HOSPADM

## 2024-03-05 RX ORDER — LISINOPRIL 40 MG/1
40 TABLET ORAL DAILY
Status: DISCONTINUED | OUTPATIENT
Start: 2024-03-06 | End: 2024-03-06 | Stop reason: HOSPADM

## 2024-03-05 RX ADMIN — ACETAMINOPHEN 650 MG: 325 TABLET, FILM COATED ORAL at 00:08

## 2024-03-05 RX ADMIN — METOPROLOL SUCCINATE 25 MG: 25 TABLET, EXTENDED RELEASE ORAL at 08:19

## 2024-03-05 RX ADMIN — APIXABAN 2.5 MG: 2.5 TABLET, FILM COATED ORAL at 08:19

## 2024-03-05 RX ADMIN — CHOLESTYRAMINE 4 G: 4 POWDER, FOR SUSPENSION ORAL at 08:19

## 2024-03-05 RX ADMIN — INSULIN ASPART 1 UNITS: 100 INJECTION, SOLUTION INTRAVENOUS; SUBCUTANEOUS at 12:40

## 2024-03-05 RX ADMIN — GABAPENTIN 100 MG: 100 CAPSULE ORAL at 08:19

## 2024-03-05 RX ADMIN — INSULIN ASPART 1 UNITS: 100 INJECTION, SOLUTION INTRAVENOUS; SUBCUTANEOUS at 08:19

## 2024-03-05 RX ADMIN — ATORVASTATIN CALCIUM 20 MG: 20 TABLET, FILM COATED ORAL at 20:30

## 2024-03-05 RX ADMIN — MICONAZOLE NITRATE: 20 CREAM TOPICAL at 22:00

## 2024-03-05 RX ADMIN — METFORMIN HYDROCHLORIDE 500 MG: 500 TABLET ORAL at 17:24

## 2024-03-05 RX ADMIN — APIXABAN 2.5 MG: 2.5 TABLET, FILM COATED ORAL at 20:30

## 2024-03-05 RX ADMIN — INSULIN ASPART 1 UNITS: 100 INJECTION, SOLUTION INTRAVENOUS; SUBCUTANEOUS at 17:56

## 2024-03-05 RX ADMIN — ACETAMINOPHEN 650 MG: 325 TABLET, FILM COATED ORAL at 20:30

## 2024-03-05 RX ADMIN — CEFTRIAXONE SODIUM 2 G: 2 INJECTION, POWDER, FOR SOLUTION INTRAMUSCULAR; INTRAVENOUS at 17:24

## 2024-03-05 RX ADMIN — ACETAMINOPHEN 650 MG: 325 TABLET, FILM COATED ORAL at 17:25

## 2024-03-05 RX ADMIN — ACETAMINOPHEN 650 MG: 325 TABLET, FILM COATED ORAL at 10:36

## 2024-03-05 RX ADMIN — METFORMIN HYDROCHLORIDE 1000 MG: 500 TABLET ORAL at 08:19

## 2024-03-05 RX ADMIN — GABAPENTIN 100 MG: 100 CAPSULE ORAL at 20:30

## 2024-03-05 RX ADMIN — FINASTERIDE 5 MG: 5 TABLET, FILM COATED ORAL at 08:19

## 2024-03-05 RX ADMIN — LISINOPRIL 20 MG: 20 TABLET ORAL at 08:19

## 2024-03-05 RX ADMIN — FLUTICASONE FUROATE AND VILANTEROL TRIFENATATE 1 PUFF: 100; 25 POWDER RESPIRATORY (INHALATION) at 08:19

## 2024-03-05 RX ADMIN — CHOLESTYRAMINE 4 G: 4 POWDER, FOR SUSPENSION ORAL at 20:30

## 2024-03-05 RX ADMIN — LISINOPRIL 20 MG: 20 TABLET ORAL at 10:33

## 2024-03-05 RX ADMIN — UMECLIDINIUM 1 PUFF: 62.5 AEROSOL, POWDER ORAL at 08:19

## 2024-03-05 ASSESSMENT — ACTIVITIES OF DAILY LIVING (ADL)
ADLS_ACUITY_SCORE: 48
ADLS_ACUITY_SCORE: 48
ADLS_ACUITY_SCORE: 43
ADLS_ACUITY_SCORE: 48
ADLS_ACUITY_SCORE: 48
ADLS_ACUITY_SCORE: 43
ADLS_ACUITY_SCORE: 48
ADLS_ACUITY_SCORE: 43
ADLS_ACUITY_SCORE: 47
ADLS_ACUITY_SCORE: 48
ADLS_ACUITY_SCORE: 48
ADLS_ACUITY_SCORE: 43
ADLS_ACUITY_SCORE: 43
ADLS_ACUITY_SCORE: 48
ADLS_ACUITY_SCORE: 43
ADLS_ACUITY_SCORE: 47
ADLS_ACUITY_SCORE: 43
ADLS_ACUITY_SCORE: 43
ADLS_ACUITY_SCORE: 48
ADLS_ACUITY_SCORE: 43

## 2024-03-05 NOTE — PLAN OF CARE
PRIMARY Concern: weakness, frequent falls  SAFETY RISK Concerns (fall risk, behaviors, etc.): fall risk      Isolation/Type: none  Tests/Procedures for NEXT shift: none  Consults? (Pending/following, signed-off?) SW following  Where is patient from? (Home, TCU, etc.): home  Other Important info for NEXT shift: encourage activity, up in chair for meals  Anticipated DC date & active delays: 3/6 to [facility] TCU, transportation arranged between 13:10-13:50  _____________________________________________________________________________  SUMMARY NOTE:  Orientation/Cognitive: A&Ox4  Mobility Level/Assist Equipment: assist x2, GB, paulette steady to commode/chair  Antibiotics & Plan (IV/po, length of tx left): IV rocephin for UTI  Pain Management: tylenol given for generalized aches  Tele/VS/O2: BP elevated (172/85), other VSS  ABNL Lab/BG: , 174  Diet: regular  Bowel/Bladder: continent  Skin Concerns: coccyx reddened, scattered scabs on lower extremities  Drains/Devices: N/A  Patient Stated Goal for Today: rest

## 2024-03-05 NOTE — PROGRESS NOTES
PRIMARY Concern: Falls, Gen weakness.  SAFETY RISK Concerns (fall risk, behaviors, etc.): Fall Risk   Isolation/Type: none  Tests/Procedures for NEXT shift:None  Consults? (Pending/following, signed-off?) PT- rec TCU, OT, SW following   Where is patient from? (Home, TCU, etc.): Home  Other Important info for NEXT shift: neuros  every q8  Anticipated DC date & active delays: On 3/6 to [facility], Family to transport if pt continues to improve w/ ambulation.      SUMMARY NOTE:  Orientation/Cognitive: A&Ox4  Observation Goals (Met/ Not Met): not met  Mobility Level/Assist Equipment: A2/G/W, T/R q2 hrs  Antibiotics & Plan (IV/po, length of tx left): IV Rocephin for UTI.   Pain Management: Denies  Tele/VS/O2: VSS on RA ex HTN.  ABNL Lab/BG: Hgb 11.5, BG before dinner 166.   Diet: Reg  Bowel/Bladder: Continent. Voids in urinal.  Skin Concerns: Coccyx pressure injury, seen by WOC today,  scattered scabs to knees/shins.  Drains/Devices: PIV SL  Patient Stated Goal for Today: Rest

## 2024-03-05 NOTE — PROGRESS NOTES
Clinic Care Coordination Contact  Ambulatory Care Coordination to Inpatient Care Management   Hand-In Communication    Date:  March 5, 2024  Name: Nahun Villalobos is enrolled in Ambulatory Care Coordination program and I am the Lead Care Coordinator.  CC Contact Information: Epic InBasket + phone  Payor Source: Payor: MEDICA / Plan: MEDICA ADVANTAGE SOLUTIONS / Product Type: HMO /   Current services in place:     Please see the CC Snaphot and Care Management Flowsheets for specific  details of this Nahun Villalobos care plan.   Additional details/specific concerns r/t this admission:    Discharge Disposition Home vs TCU    I will follow this admission in Epic. Please feel free to contact me with questions or for further collaboration in discharge planning.  Leticia Lee RN, BSN, CPHN, CCM  [facility] Ambulatory Care Management  [facility]  Phone: [phone]  Email: [email or web address]  (On behalf of Rhea Knapp RN CC)

## 2024-03-05 NOTE — PROGRESS NOTES
Care Management Follow Up    Length of Stay (days): 1    Expected Discharge Date: 03/06/2024     Concerns to be Addressed:       Patient plan of care discussed at interdisciplinary rounds: Yes    Anticipated Discharge Disposition: Transitional Care     Anticipated Discharge Services: Transportation Services  Anticipated Discharge DME:      Patient/family educated on Medicare website which has current facility and service quality ratings: yes  Education Provided on the Discharge Plan:    Patient/Family in Agreement with the Plan: yes    Referrals Placed by CM/SW:    Private pay costs discussed: Not applicable    Additional Information:  Pt has been accepted to [facility] on Wednesday, 3/6 if medically ready for discharge. Scheduled [facility] wheelchair transport in case ride is needed, time is [phone]. Completed PAS. Updated Petey at [facility]. Will need to confirm transport with significant other, Kenia, as she wanted to transport if it seemed like a safe plan day of discharge.    JANETTE Shulzt  Social Work  [facility]

## 2024-03-05 NOTE — PROGRESS NOTES
[facility]    Medicine Progress Note - Hospitalist Service    Date of Admission:  3/2/2024    Assessment & Plan   Nahun Villalobos is a 78 year old male with PMHx of hypertension, hyperlipidemia, DM II, obesity, CIERA on CPAP, COPD, history of PE, malignant carcinoid tumor, lung carcinoma, peripheral neuropathy, cervical myelopathy who was admitted on 3/2/2024 for evaluation of generalized weakness and multiple falls.     Physical deconditioning from medical illness, senile frailty, chronic debility  Falls at home  Generalized weakness  *Presented to ED with reports of generalized weakness that had been ongoing for the preceding several weeks. Lives at home.the day prior to admission his significant other found him on the floor.  Unclear how he fell.  On the day of admission he was unable to get out of the shower.  The fire department had to be called to the home to assist with lifting him out. At baseline, minimal ambulation at home, uses assistive device walker.  *In ED, was afebrile and hemodynamically stable.  O2 sat stable on room air.  Basic labs generally unremarkable, mild SALLIE with Cr 1.34 but lytes, LFTs stable. WBC 13.5, hgb 12.6.  Viral swabs negative.  CXR negative.  Head CT without contrast showed no acute intracranial abnormality, was identified to have supratentorial ventriculomegaly which was presumed chronic/incidental given similar appearance on prior exam in 4/2023.    -Advance to inpatient status 03/04/24   -address medical issues as below  -PT recommending TCU on discharge, accepted for 3/6, SW/CC assisting     Dyspnea on exertion likely multifactorial from deconditioning, CIERA not compliant with CPAP, underlying COPD and hx of lung carcinoma status post resection.  Leukocytosis possible reactive  CIERA, noncompliant with CPAP  COPD, not on chronic O2, not in exacerbation  History of PE, noncompliant with anticoagulation  Adenocarcinoma of lung s/p resection in 2016, gets  surveillance imaging every 3 years.  *Reported ongoing shortness of breath for few months now.  Worse with minimal ambulation.  Reports unable to lie down flat, has been sleeping in a recliner.  Reports has not been using the CPAP.  Patient's partner reports he misses dose of blood thinners [at least 2-3 doses a week].  Has not been taking his water pills regularly.  *As above, in ED was afebrile and O2 sats stable on RA. No wheezing on exam. WBC 13.5 but viral swabs and CXR neg. ProBNP nl. Trops mildly elevated with ongoing workup as below.   -- cont DOAC  -- supportive cares with inhalers and prn nebs  -- no indication for antibiotics  -- O2 as needed  -- have stressed importance of compliance with CPAP use  -- could consider CT chest with PE protocol given hx of noncompliance with anticoagulation, CT imaging initially deferred given on admission given absence of hypoxia and SALLIE  -- consider outpatient sleep study and PFTs     ? Group B strep UTI  No urinary sx on admission but WBC 13.5 and UA abnl with trace leuk est with 21 WBCs, RBCs, bactiuria, squams and mucous. UCx growing 10-50K group B strep.  Pathogen seems somewhat unusual to be growing in the urine, ordered blood cultures x 2 to rule about possible bacteremia as a source of presenting complaints and abnormal UA.   - Follow up BCx, NGTD  - Started IV ceftriaxone, plan for discharge on orals to TCU 3/6/24     Elevated troponin likely from demand ischemia in the setting of fall, kidney injury  *Denied any recent symptoms of chest pain or palpitations. Dyspnea as above.   *Trops mildly elevated but flat (42 -- 34). EKG nonischemic.  *ECHO EF 55-60%, no RWMA, no significant valvular abnormality  -on anticoagulation as above  -Monitored on telemetry, ok to discontinue    Suspected tinea corporis  Pruritic patch on posterior neck. Reports has been there many months. Has a dog at home.   - Initiate Topical Ketoconazole x10 days       Hypertension  Hyperlipidemia  *Home meds reportedly include statin, lisinopril, Lasix and metoprolol.  *Intermittent noncompliance with home meds.   *Lisinopril and lasix held on admission dt SALLIE, statin held given obs status  -Continue PTA metoprolol  -Restarted PTA lisinopril at reduced dose 20 mg daily 3/4 and uptitrate back to PTA dose 40mg/d 3/5, with hold parameters  -Continue to hold PTA Lasix and monitor  -Resume PTA statin     Hypochloremic hyponatremia, improving  SALLIE, presumed pre-renal, resolved  *BMP on presentation with , Cl 95, Cr 1.3 (baseline Cr 1.0--1.1). BG 200s.   *Given IVFs on admission, nephrotoxic meds held as above. Cr improved back to baseline.   - Cont holding nephrotoxic meds  - Monitor BMP PRN  - Slowly restart PTA ACEi and diuretic if needed     Malignant carcinoid tumor  *Status post exploratory laparotomy, small bowel resection and removal of mesenteric mass.  *Follows at [facility]. No concerns this stay.      Chronic diarrhea  *No s/sx suggestive of acute infection.   *Chronic and stable on PTA cholestyramine.  -- recommend use of barrier cream  -- monitor, if overt diarrhea will consider extending out stool studies     Non-insulin dependent type 2 diabete mellitus   *A1c 6.7 in 9/2023 --> 7.3 this stay. Manages with metformin 1000mg BID  *BG into 200s on admission. Metformin held dt SALLIE.  Glucose trending up and SALLIE resolved, restarting metformin 3/4.  - Sliding scale insulin medium   - Continue PTA metformin, restarted once SALLIE resolved  - Better glycemic control after restarting PTA metformin     Recent Labs   Lab 03/05/24  1140 03/05/24  0725 03/05/24  0623 03/05/24  0313 03/04/24  2110 03/04/24  1647   * 185* 204* 190* 157* 166*        BPH  *Chronic and stable on finasteride.     History of arthritis.  Peripheral neuropathy  Cervical myelopathy  *Chronic and stable on PTA gabapentin.     Obesity with BMI of 34.  *Increase all-cause mortality and morbidity.  "Consider lifestyle modification with diet and exercise as able to.     Medication noncompliance.  *Patient reports forgets to take his medication at least 2-3 times a week.    *Will need to consider home RN at the time of discharge.          Diet: Regular Diet Adult    DVT Prophylaxis: DOAC  Gomez Catheter: Not present  Lines: None     Cardiac Monitoring: None  Code Status: Full Code      Clinically Significant Risk Factors Present on Admission               # Drug Induced Coagulation Defect: home medication list includes an anticoagulant medication    # Hypertension: Noted on problem list     # DMII: A1C = 7.3 % (Ref range: <5.7 %) within past 6 months    # Obesity: Estimated body mass index is 34.3 kg/m  as calculated from the following:    Height as of this encounter: 1.702 m (5' 7\").    Weight as of this encounter: 99.3 kg (219 lb).              Disposition Plan      Expected Discharge Date: 03/06/2024,  9:00 AM  Discharge Delays: *Medically Ready for Discharge  MD HANDLEY/MAGALIS Order   MD HANDLEY/MAGALIS Med Rec  Destination: inpatient rehabilitation facility  Discharge Comments: Ride for d/c between [id number] 3/6/24  Medically ready for discharge.    TCU placement.          The patient's care was discussed with the Attending Physician, Dr. Blood, Bedside Nurse, Care Coordinator/, and Patient.    Rhea Remy PA-C  Hospitalist Service  [facility]  Securely message with [employer] (more info)  Text page via [facility] Paging/Directory   ______________________________________________________________________    Interval History   Seen and examined. Doing well OOB to chair. Taking PO. Still generally weak. BCx NGTD. No N/V. No CP or palpitations. Reports sometimes coughs and then has a band around his chest afterward, transient. No hypoxia. Reports pruritic patch to posterior neck, has been there awhile, no prior tx. Restarted PTA ACEI, plan to restart PTA furosemide likely 3/6/24. Questions " welcomed and answered to the best of my knowledge.    Physical Exam   Vital Signs: Temp: 97.8  F (36.6  C) Temp src: Oral BP: (!) 172/85 Pulse: 64   Resp: 16 SpO2: 94 % O2 Device: None (Room air)    Weight: 219 lbs 0 oz    General: Awake, alert, very pleasant gentleman who appears stated age. Looks comfortable sitting up in bed. No acute distress.  HEENT: Extraocular movements intact.   Respiratory: Clear to auscultation bilaterally, no rales, wheezing, or rhonchi.  Cardiovascular: Regular rate and rhythm, +S1 and S2, no murmur auscultated.   Gastrointestinal: Soft, non-tender, obese but non-distended. Bowel sounds present.  Skin: Warm, dry. No obvious rashes or lesions on exposed skin. Dorsalis pedis pulses palpable bilaterally.  Musculoskeletal: No joint swelling, erythema or tenderness. Noninfected appearing abrasions to BLE, scattered. Moves all extremities equally.  Neurologic: AAO x3.   Psychiatric: Appropriate mood and affect. No obvious anxiety or depression.    Medical Decision Making       25 MINUTES SPENT BY ME on the date of service doing chart review, history, exam, documentation & further activities per the note.      Data     I have personally reviewed the following data over the past 24 hrs:    8.8  \   11.1 (L)   / 266     138 103 9.6 /  174 (H)   4.2 21 (L) 0.99 \       Imaging results reviewed over the past 24 hrs:   No results found for this or any previous visit (from the past 24 hour(s)).

## 2024-03-06 ENCOUNTER — DOCUMENTATION ONLY (OUTPATIENT)
Dept: GERIATRICS | Facility: CLINIC | Age: 79
End: 2024-03-06
Payer: COMMERCIAL

## 2024-03-06 VITALS
SYSTOLIC BLOOD PRESSURE: 149 MMHG | TEMPERATURE: 98.4 F | HEART RATE: 67 BPM | RESPIRATION RATE: 18 BRPM | BODY MASS INDEX: 34.37 KG/M2 | DIASTOLIC BLOOD PRESSURE: 77 MMHG | HEIGHT: 67 IN | OXYGEN SATURATION: 96 % | WEIGHT: 219 LBS

## 2024-03-06 LAB
GLUCOSE BLDC GLUCOMTR-MCNC: 187 MG/DL (ref 70–99)
GLUCOSE BLDC GLUCOMTR-MCNC: 189 MG/DL (ref 70–99)
GLUCOSE BLDC GLUCOMTR-MCNC: 219 MG/DL (ref 70–99)

## 2024-03-06 PROCEDURE — 250N000013 HC RX MED GY IP 250 OP 250 PS 637: Performed by: PHYSICIAN ASSISTANT

## 2024-03-06 PROCEDURE — 99239 HOSP IP/OBS DSCHRG MGMT >30: CPT | Performed by: PHYSICIAN ASSISTANT

## 2024-03-06 PROCEDURE — 250N000013 HC RX MED GY IP 250 OP 250 PS 637: Performed by: HOSPITALIST

## 2024-03-06 RX ORDER — FUROSEMIDE 20 MG
20 TABLET ORAL 2 TIMES DAILY
Status: DISCONTINUED | OUTPATIENT
Start: 2024-03-06 | End: 2024-03-06 | Stop reason: HOSPADM

## 2024-03-06 RX ORDER — FUROSEMIDE 20 MG
20 TABLET ORAL 2 TIMES DAILY
DISCHARGE
Start: 2024-03-06 | End: 2024-03-12

## 2024-03-06 RX ORDER — MICONAZOLE NITRATE 20 MG/G
CREAM TOPICAL 2 TIMES DAILY
DISCHARGE
Start: 2024-03-06 | End: 2024-03-15

## 2024-03-06 RX ADMIN — FUROSEMIDE 20 MG: 20 TABLET ORAL at 09:18

## 2024-03-06 RX ADMIN — GABAPENTIN 100 MG: 100 CAPSULE ORAL at 09:18

## 2024-03-06 RX ADMIN — FINASTERIDE 5 MG: 5 TABLET, FILM COATED ORAL at 08:16

## 2024-03-06 RX ADMIN — METOPROLOL SUCCINATE 25 MG: 25 TABLET, EXTENDED RELEASE ORAL at 08:15

## 2024-03-06 RX ADMIN — MICONAZOLE NITRATE: 20 CREAM TOPICAL at 08:16

## 2024-03-06 RX ADMIN — METFORMIN HYDROCHLORIDE 1000 MG: 500 TABLET ORAL at 08:15

## 2024-03-06 RX ADMIN — CHOLESTYRAMINE 4 G: 4 POWDER, FOR SUSPENSION ORAL at 08:15

## 2024-03-06 RX ADMIN — FLUTICASONE FUROATE AND VILANTEROL TRIFENATATE 1 PUFF: 100; 25 POWDER RESPIRATORY (INHALATION) at 08:21

## 2024-03-06 RX ADMIN — INSULIN ASPART 1 UNITS: 100 INJECTION, SOLUTION INTRAVENOUS; SUBCUTANEOUS at 08:16

## 2024-03-06 RX ADMIN — LISINOPRIL 40 MG: 40 TABLET ORAL at 08:15

## 2024-03-06 RX ADMIN — APIXABAN 2.5 MG: 2.5 TABLET, FILM COATED ORAL at 08:15

## 2024-03-06 RX ADMIN — INSULIN ASPART 2 UNITS: 100 INJECTION, SOLUTION INTRAVENOUS; SUBCUTANEOUS at 12:41

## 2024-03-06 RX ADMIN — UMECLIDINIUM 1 PUFF: 62.5 AEROSOL, POWDER ORAL at 08:21

## 2024-03-06 RX ADMIN — ACETAMINOPHEN 650 MG: 325 TABLET, FILM COATED ORAL at 08:39

## 2024-03-06 ASSESSMENT — ACTIVITIES OF DAILY LIVING (ADL)
ADLS_ACUITY_SCORE: 48
ADLS_ACUITY_SCORE: 48
ADLS_ACUITY_SCORE: 55
ADLS_ACUITY_SCORE: 49
ADLS_ACUITY_SCORE: 48
ADLS_ACUITY_SCORE: 55
ADLS_ACUITY_SCORE: 48
ADLS_ACUITY_SCORE: 55
ADLS_ACUITY_SCORE: 48
ADLS_ACUITY_SCORE: 48

## 2024-03-06 NOTE — PROGRESS NOTES
Care Management Discharge Note    Discharge Date: 03/06/2024       Discharge Disposition: Transitional Care    Discharge Services: Transportation Services    Discharge DME:      Discharge Transportation: agency, family or friend will provide    Private pay costs discussed: transportation costs    Does the patient's insurance plan have a 3 day qualifying hospital stay waiver?  No    PAS Confirmation Code: [id number]  Patient/family educated on Medicare website which has current facility and service quality ratings: yes    Education Provided on the Discharge Plan:    Persons Notified of Discharge Plans: yes  Patient/Family in Agreement with the Plan: yes    Handoff Referral Completed: Yes    Additional Information:  Patient will be discharging to [facility] today via [facility] wheelchair transport between [id number]. Patient and sig other aware and in agreement. Transport cost was reviewed with sig other. PAS completed. Will send orders when they are signed.     PAS-RR    D: Per DHS regulation, SW completed and submitted PAS-RR to [place] [employer] Direct Connect via the Senior LinkAge Line.  PAS-RR confirmation # is : [id number]    I: SW spoke with patient and they are aware a PAS-RR has been submitted.  SW reviewed with patient that they may be contacted for a follow up appointment within 10 days of hospital discharge if their SNF stay is < 30 days.  Contact information for Senior [employer] Line was also provided.    A: Patient verbalized understanding.    P: Further questions may be directed to [facility] LinkAge Line at #[phone], option #4 for PAS-RR staff.     Aliza Mccloud, DEAN

## 2024-03-06 NOTE — PLAN OF CARE
PRIMARY Concern: weakness, falls  SAFETY RISK Concerns (fall risk, behaviors, etc.): fall risk      Isolation/Type: none  Tests/Procedures for NEXT shift: none  Consults? (Pending/following, signed-off?) SW following  Where is patient from? (Home, TCU, etc.): home  Anticipated DC date & active delays: 3/6, transportation arranged between 13:10-13:50  _____________________________________________________________________________  SUMMARY NOTE:  Orientation/Cognitive: A&Ox4  Mobility Level/Assist Equipment: assist x1-2, walker, GB  Antibiotics & Plan (IV/po, length of tx left): transition to PO abx at discharge  Pain Management: tylenol given for neck pain, HA  Tele/VS/O2: VSS  ABNL Lab/BG:   Diet: regular  Bowel/Bladder: continent  Skin Concerns: scabs on lower extremities, coccyx blanchable  Drains/Devices: none  Patient Stated Goal for Today: rest

## 2024-03-06 NOTE — DISCHARGE SUMMARY
"[facility]  Hospitalist Discharge Summary      Date of Admission:  3/2/2024  Date of Discharge:  3/6/2024  Discharging Provider: Rhea Remy PA-C  Discharge Service: Hospitalist Service    Discharge Diagnoses   Physical deconditioning from medical illness, senile frailty, chronic debility  Falls at home  Generalized weakness  Dyspnea on exertion likely multifactorial from deconditioning, CIERA not compliant with CPAP, underlying COPD and hx of lung carcinoma status post resection.  Leukocytosis, resolved  CIERA, noncompliant with CPAP  ? Group B strep UTI   Elevated troponin likely from demand ischemia in the setting of fall, kidney injury   Suspected tinea corporis   Hypertension   Hypochloremic hyponatremia, improving  SALLIE, presumed pre-renal, resolved    Clinically Significant Risk Factors     # DMII: A1C = 7.3 % (Ref range: <5.7 %) within past 6 months    # Obesity: Estimated body mass index is 34.3 kg/m  as calculated from the following:    Height as of this encounter: 1.702 m (5' 7\").    Weight as of this encounter: 99.3 kg (219 lb).       Follow-ups Needed After Discharge   Follow-up Appointments     Follow Up and recommended labs and tests      Follow up with [facility] physician.  The following labs/tests are   recommended: Repeat basic metabolic panel in 3 to 5 days and determine   whether furosemide dose should increase from 20 mg twice daily to 40 mg   twice daily.  40 mg twice daily is previous home dose.  Follow up with oncology at [facility] as planned.            Unresulted Labs Ordered in the Past 30 Days of this Admission       Date and Time Order Name Status Description    3/3/2024  1:25 PM Blood Culture Wrist, Left Preliminary     3/3/2024  1:25 PM Blood Culture Wrist, Left Preliminary         These results will be followed up by [facility] Provider    Discharge Disposition   Discharged to rehabilitation facility  Condition at discharge: Stable    Hospital Course "   Nahun Villalobos is a 78 year old male with PMHx of hypertension, hyperlipidemia, DM II, obesity, CIERA on CPAP, COPD, history of PE, malignant carcinoid tumor, lung carcinoma, peripheral neuropathy, cervical myelopathy who was admitted on 3/2/2024 for evaluation of generalized weakness and multiple falls.  For full HPI please see admission H&P from Dr. Ada Bruno dated 3/2/2024.     Physical deconditioning from medical illness, senile frailty, chronic debility  Falls at home  Generalized weakness  *Presented to ED with reports of generalized weakness that had been ongoing for the preceding several weeks. Lives at home.the day prior to admission his significant other found him on the floor.  Unclear how he fell.  On the day of admission he was unable to get out of the shower.  The fire department had to be called to the home to assist with lifting him out. At baseline, minimal ambulation at home, uses assistive device walker.  *In ED, was afebrile and hemodynamically stable.  O2 sat stable on room air.  Basic labs generally unremarkable, mild SALLIE with Cr 1.34 but lytes, LFTs stable. WBC 13.5, hgb 12.6.  Viral swabs negative.  CXR negative.  Head CT without contrast showed no acute intracranial abnormality, was identified to have supratentorial ventriculomegaly which was presumed chronic/incidental given similar appearance on prior exam in 4/2023.  Margy registered observation however later transition to observation status.  Evaluated by physical therapy who recommended TCU on discharge, social work assisted in making arrangements.  Day of discharge patient doing well, ready for rehab, no new complaints, all questions answered.     Dyspnea on exertion likely multifactorial from deconditioning, CIERA not compliant with CPAP, underlying COPD and hx of lung carcinoma status post resection.  Leukocytosis possible reactive  CIERA, noncompliant with CPAP  COPD, not on chronic O2, not in exacerbation  History of PE,  noncompliant with anticoagulation  Adenocarcinoma of lung s/p resection in 2016, gets surveillance imaging every 3 years.  *Reported ongoing shortness of breath for few months now.  Worse with minimal ambulation.  Reports unable to lie down flat, has been sleeping in a recliner.  Reports has not been using the CPAP.  Patient's partner reports he misses dose of blood thinners [at least 2-3 doses a week].  Has not been taking his water pills regularly.  *As above, in ED was afebrile and O2 sats stable on RA. No wheezing on exam. WBC 13.5 but viral swabs and CXR neg. ProBNP nl. Trops mildly elevated with ongoing workup as below.   -- cont DOAC  -- supportive cares with inhalers and prn nebs  -- have stressed importance of compliance with CPAP use  -- consider outpatient sleep study and PFTs     ? Group B strep UTI  No urinary sx on admission but WBC 13.5 and UA abnl with trace leuk est with 21 WBCs, RBCs, bactiuria, squams and mucous. UCx growing 10-50K group B strep.  Pathogen seems somewhat unusual to be growing in the urine, ordered blood cultures x 2 to rule about possible bacteremia as a source of presenting complaints and abnormal UA.   - Follow up BCx, NGTD  - Started IV ceftriaxone, plan for discharge on Augmentin to TCU 3/6/24     Elevated troponin likely from demand ischemia in the setting of fall, kidney injury  *Denied any recent symptoms of chest pain or palpitations. Dyspnea as above.   *Trops mildly elevated but flat (42 -- 34). EKG nonischemic.  *ECHO EF 55-60%, no RWMA, no significant valvular abnormality  -on anticoagulation as above  -Monitored on telemetry without significant events, ok to discontinue     Suspected tinea corporis  Pruritic patch on posterior neck. Reports has been there many months. Has a dog at home.   - Initiate Topical miconazole x10 days      Hypertension  Hyperlipidemia  *Home meds reportedly include statin, lisinopril, Lasix and metoprolol.  *Intermittent noncompliance with  home meds.   *Lisinopril and lasix held on admission dt SALLIE, statin held given obs status  -Continue PTA metoprolol  -Restarted PTA lisinopril at reduced dose 20 mg daily 3/4 and uptitrate back to PTA dose 40mg/d 3/5, with hold parameters  -Restart PTA Lasix at 1/2 dose, 20mg BID, repeat labs at TCU and likely increase back to 40mg BID dosing  -Continue PTA statin     Hypochloremic hyponatremia, improving  SALLIE, presumed pre-renal, resolved  *BMP on presentation with , Cl 95, Cr 1.3 (baseline Cr 1.0--1.1). BG 200s.   *Given IVFs on admission, nephrotoxic meds held as above. Cr improved back to baseline.   - Cont holding nephrotoxic meds  - Monitor BMP PRN  - Restarted PTA ACEi, slowly restarting PTA Lasix      Malignant carcinoid tumor  *Status post exploratory laparotomy, small bowel resection and removal of mesenteric mass.  *Follows at [facility]. No concerns this stay.      Chronic diarrhea, stable  *No s/sx suggestive of acute infection.   *Chronic and stable on PTA cholestyramine.  -- recommend use of barrier cream  -- monitor, if overt diarrhea will consider extending out stool studies     Non-insulin dependent type 2 diabete mellitus   *A1c 6.7 in 9/2023 --> 7.3 this stay. Manages with metformin 1000mg Q AM and 500mg Q PM  *BG into 200s on admission. Metformin held dt SALLIE.  Glucose trending up and SALLIE resolved, restarting metformin 3/4.  - Sliding scale insulin medium   - Continue PTA metformin, restarted once SALLIE resolved, increase to 1,000mg BID and monitor  - Better glycemic control after restarting PTA metformin              Recent Labs   Lab 03/06/24  0732 03/06/24  0240 03/05/24  2119 03/05/24  1706 03/05/24  1140 03/05/24  0725   * 187* 207* 172* 174* 185*         BPH  *Chronic and stable on finasteride.     History of arthritis.  Peripheral neuropathy  Cervical myelopathy  *Chronic and stable on PTA gabapentin.     Obesity with BMI of 34.  *Increase all-cause mortality and morbidity.  Consider lifestyle modification with diet and exercise as able to.     Medication noncompliance.  *Patient reports forgets to take his medication at least 2-3 times a week.    *Will need to consider home RN at the time of discharge from TCU    Consultations This Hospital Stay   PHYSICAL THERAPY ADULT IP CONSULT  OCCUPATIONAL THERAPY ADULT IP CONSULT  CARE MANAGEMENT / SOCIAL WORK IP CONSULT  WOUND OSTOMY CONTINENCE NURSE  IP CONSULT  PHYSICAL THERAPY ADULT IP CONSULT  OCCUPATIONAL THERAPY ADULT IP CONSULT    Code Status   Full Code    Time Spent on this Encounter   I, Rhea Remy PA-C, personally saw the patient today and spent greater than 30 minutes discharging this patient.       Rhea Remy PA-C  [facility] EXTENDED RECOVERY AND SHORT STAY  [address] [facility] [address]  Phone: [phone]  ______________________________________________________________________    Physical Exam   Vital Signs: Temp: 98.4  F (36.9  C) Temp src: Oral BP: (!) 149/77 Pulse: 67   Resp: 18 SpO2: 96 % O2 Device: None (Room air)    Weight: 219 lbs 0 oz    General: Awake, alert, very pleasant gentleman who appears stated age. Looks comfortable sitting up in chair. No acute distress.  HEENT: Extraocular movements intact.   Respiratory: Clear to auscultation bilaterally, no rales, wheezing, or rhonchi.  Cardiovascular: Regular rate and rhythm, +S1 and S2, no murmur auscultated. No significant edema.  Gastrointestinal: Soft, non-tender, obese but non-distended. Bowel sounds present.  Skin: Warm, dry. No obvious rashes or lesions on exposed skin. Dorsalis pedis pulses palpable bilaterally. Small quarter size patch to posterior neck appears tinea, pruritic, nontender.  Musculoskeletal: No joint swelling, erythema or tenderness. Noninfected appearing abrasions to BLE, scattered. Moves all extremities equally.  Neurologic: AAO x3.   Psychiatric: Appropriate mood and affect. No obvious anxiety or  depression.       Primary Care Physician   Olegario Castillo    Discharge Orders      Primary Care - Care Coordination Referral      General info for SNF    Length of Stay Estimate: Short Term Care: Estimated # of Days <30  Condition at Discharge: Stable  Level of care:skilled   Rehabilitation Potential: Fair  Admission H&P remains valid and up-to-date: Yes  Recent Chemotherapy: N/A  Use Nursing Home Standing Orders: Yes     Mantoux instructions    Give two-step Mantoux (PPD) Per Facility Policy Yes     Follow Up and recommended labs and tests    Follow up with [facility] physician.  The following labs/tests are recommended: Repeat basic metabolic panel in 3 to 5 days and determine whether furosemide dose should increase from 20 mg twice daily to 40 mg twice daily.  40 mg twice daily is previous home dose.  Follow up with oncology at [facility] as planned.     Reason for your hospital stay    You were admitted to the hospital with generalized weakness.  You were found to have a possible urinary tract infection with bacteria in your urine.  You worked with therapy and were discharged to rehab.  Watch your kidneys as when you first came in your kidney function was decreased however this went back to your baseline level.     Glucose monitor nursing POCT    Before meals and at bedtime     Daily weights    Call Provider for weight gain of more than 2 pounds per day or 5 pounds per week.     Activity - Up with nursing assistance     CPAP - Continue Home CPAP    Continue Home CPAP per home equipment settings.     Additional Discharge Instructions    Compliance with CPAP  Recommend outpatient pulmonary function tests     Full Code     Physical Therapy Adult Consult    Evaluate and treat as clinically indicated.    Reason: Deconditioning     Occupational Therapy Adult Consult    Evaluate and treat as clinically indicated.    Reason: Deconditioning     Fall precautions     Diet    Follow this diet upon discharge: Orders  Placed This Encounter      Regular Diet Adult       Significant Results and Procedures   Results for orders placed or performed during the hospital encounter of 24   Chest XR,  PA & LAT    Narrative    EXAM: XR CHEST 2 VIEWS  LOCATION: [facility]  DATE: 3/2/2024    INDICATION: Weakness.  COMPARISON: 2023.      Impression    IMPRESSION: Negative chest. Lungs clear.     CT Head w/o Contrast    Narrative    EXAM: CT HEAD W/O CONTRAST  LOCATION: [facility]  DATE: 3/2/2024    INDICATION: Report of falls at home, unwitnessed.  On anticoagulation.  Global weakness.  COMPARISON: PET/CT CT 2023  TECHNIQUE: Routine CT Head without IV contrast. Multiplanar reformats. Dose reduction techniques were used.    FINDINGS:  No evidence of hemorrhage. Supratentorial ventriculomegaly, presumed chronic/incidental, similar in appearance compared to the superior field-of-view on the prior PET/CT from 2023. No acute osseous abnormality.      Impression    IMPRESSION:  1.  No acute intracranial abnormality.  2.  Supratentorial ventriculomegaly, presumed chronic/incidental, similar in appearance compared to 2023.   Echocardiogram Complete     Value    LVEF  55-60%    Narrative    [id number]  [id number]  [id number]  [id number]^NATTY^CHASE     [facility]  Echocardiography Laboratory  [address] [place] [address]     Name: KEELY FRANCE  MRN: [id number]  : 1945  Study Date: 2024 09:48 AM  Age: 78 yrs  Gender: Male  Patient Location: [facility]  Reason For Study: Chest Pressure  Ordering Physician: CHASE LUZ  Referring Physician: CHASE LUZ  Performed By: Obi Pittman     BSA: 2.1 m2  Height: 67 in  Weight: 219 lb  HR: 64  BP: 153/78  mmHg  ______________________________________________________________________________  ______________________________________________________________________________  Interpretation Summary     Left ventricular systolic function is normal. EF is 55-60%  No regional wall motion abnormalities noted.  The right ventricle is normal in size and function.  No significant valvular abnormality.     No significant change in comparison to the echo from 9/3/21.     The study was technically limited.  ______________________________________________________________________________  Left Ventricle  Left ventricular systolic function is normal. Diastolic Doppler findings (E/E'  ratio and/or other parameters) suggest left ventricular filling pressures are  normal. The visual ejection fraction is 55-60%. No regional wall motion  abnormalities noted.     Right Ventricle  The right ventricle is normal in size and function.     Atria  Possible small intra-atrial shunt most likely form patentn foramen ovale.     Mitral Valve  There is trace mitral regurgitation.     Tricuspid Valve  The tricuspid valve is not well visualized, but is grossly normal.     Aortic Valve  There is trivial trileaflet aortic sclerosis.     Pulmonic Valve  There is trace pulmonic valvular regurgitation. There is no pulmonic valvular  stenosis.     Vessels  Dilation of the inferior vena cava is present with normal respiratory  variation in diameter.     Pericardium  There is no pericardial effusion.     Rhythm  Sinus rhythm was noted.  ______________________________________________________________________________  MMode/2D Measurements & Calculations  Ao root diam: 3.3 cm     LVOT diam: 2.0 cm  LVOT area: 3.1 cm2  Ao root diam index Ht(cm/m): 1.9  Ao root diam index BSA (cm/m2): 1.6  LA Volume (BP): 40.5 ml  LA Volume Index (BP): 19.3 ml/m2  RV Base: 3.9 cm  TAPSE: 2.2 cm     Doppler Measurements & Calculations  MV E max helen: 74.4 cm/sec  MV A max helen: 80.7  cm/sec  MV E/A: 0.92     MV dec time: 0.23 sec  Ao V2 max: 140.0 cm/sec  Ao max P.0 mmHg  Ao V2 mean: 99.0 cm/sec  Ao mean P.0 mmHg  Ao V2 VTI: 30.2 cm  JEFF(I,D): 2.2 cm2  JEFF(V,D): 2.2 cm2  LV V1 max PG: 3.7 mmHg  LV V1 max: 96.4 cm/sec  LV V1 VTI: 21.6 cm  SV(LVOT): 67.9 ml  SI(LVOT): 32.3 ml/m2  PA acc time: 0.14 sec  AV Hao Ratio (DI): 0.69  JEFF Index (cm2/m2): 1.1  E/E' av.5  Lateral E/e': 6.9  Medial E/e': 10.2  RV S Hao: 13.2 cm/sec     ______________________________________________________________________________  Report approved by: MD Radha Ayala 2024 02:08 PM             Discharge Medications   Current Discharge Medication List        START taking these medications    Details   amoxicillin-clavulanate (AUGMENTIN) 875-125 MG tablet Take 1 tablet by mouth 2 times daily for 7 days    Associated Diagnoses: Urinary tract infection without hematuria, site unspecified      miconazole (MICATIN) 2 % external cream Apply topically 2 times daily for 9 days    Associated Diagnoses: Tinea corporis           CONTINUE these medications which have CHANGED    Details   apixaban ANTICOAGULANT (ELIQUIS) 2.5 MG tablet Take 1 tablet (2.5 mg) by mouth 2 times daily    Associated Diagnoses: Other pulmonary embolism without acute cor pulmonale, unspecified chronicity (H)      furosemide (LASIX) 20 MG tablet Take 1 tablet (20 mg) by mouth 2 times daily    Associated Diagnoses: Essential hypertension, benign      metFORMIN (GLUCOPHAGE) 1000 MG tablet Take 1 tablet (1,000 mg) by mouth 2 times daily (with meals)    Associated Diagnoses: Type 2 diabetes mellitus without complication, without long-term current use of insulin (H)           CONTINUE these medications which have NOT CHANGED    Details   acetaminophen (TYLENOL) 500 MG tablet Take 1,000 mg by mouth 2 times daily      albuterol (VENTOLIN HFA) 108 (90 Base) MCG/ACT inhaler INHALE 2 PUFFS INTO THE LUNGS EVERY 6 HOURS AS NEEDED FOR SHORTNESS OF BREATH /  DYSPNEA OR WHEEZING  Qty: 25.5 g, Refills: 0    Comments: Pharmacy may dispense brand covered by insurance (Proair, or proventil or ventolin or generic albuterol inhaler)  Associated Diagnoses: Chronic obstructive pulmonary disease, unspecified COPD type (H)      Ascorbic Acid (VITAMIN C PO) Take 500 mg by mouth At Bedtime       atorvastatin (LIPITOR) 20 MG tablet TAKE 1 TABLET DAILY  Qty: 90 tablet, Refills: 3    Associated Diagnoses: Hyperlipidemia LDL goal <100      cholestyramine light (QUESTRAN) 4 GM packet Take 1 packet (4 g) by mouth 2 times daily  Qty: 180 each, Refills: 3    Associated Diagnoses: Diarrhea, unspecified type      finasteride (PROSCAR) 5 MG tablet Take 1 tablet (5 mg) by mouth daily  Qty: 90 tablet, Refills: 3    Associated Diagnoses: Gross hematuria      Fluticasone-Umeclidin-Vilant (TRELEGY ELLIPTA) 100-62.5-25 MCG/ACT oral inhaler Inhale 1 puff into the lungs daily  Qty: 28 each, Refills: 0    Associated Diagnoses: Chronic obstructive pulmonary disease, unspecified COPD type (H)      gabapentin (NEURONTIN) 300 MG capsule Take 1 capsule (300 mg) by mouth 3 times daily  Qty: 90 capsule, Refills: 0    Associated Diagnoses: Peripheral polyneuropathy      lisinopril (ZESTRIL) 40 MG tablet TAKE 1 TABLET DAILY  Qty: 90 tablet, Refills: 3    Associated Diagnoses: Essential hypertension, benign; Type 2 diabetes mellitus without complication, without long-term current use of insulin (H)      metoprolol succinate ER (TOPROL XL) 25 MG 24 hr tablet Take 1 tablet (25 mg) by mouth daily  Qty: 90 tablet, Refills: 3    Associated Diagnoses: Essential hypertension, benign      vitamin B-Complex Take 1 tablet by mouth daily      !! blood glucose (ACCU-CHEK CHACHA) test strip Use to test blood sugar 2 times daily or as directed.  Qty: 60 strip, Refills: 6    Comments: Chacha Plus test strips  Associated Diagnoses: DM (diabetes mellitus) (H)      blood glucose (NO BRAND SPECIFIED) lancets standard Use to test  blood sugar 1 time daily, first thing in the morning  Qty: 50 each, Refills: 3    Associated Diagnoses: Type 2 diabetes mellitus without complication, without long-term current use of insulin (H)      !! blood glucose (NO BRAND SPECIFIED) test strip Use to test blood sugar 1 time daily, first thing in the morning.  Qty: 50 strip, Refills: 11    Associated Diagnoses: Type 2 diabetes mellitus without complication, without long-term current use of insulin (H)      blood glucose monitoring (NO BRAND SPECIFIED) meter device kit Use to test blood sugar 1 time daily, first thing in the morning  Qty: 1 kit, Refills: 0    Associated Diagnoses: Type 2 diabetes mellitus without complication, without long-term current use of insulin (H)      !! order for DME Equipment being ordered: diabetic shoes, 1 pair  Qty: 1 Package, Refills: 0    Associated Diagnoses: Type 2 diabetes mellitus without complication, without long-term current use of insulin (H)      !! order for DME Oxygen 2 Li/min  at night via nasal cannula  Qty: 1 Device, Refills: 0    Associated Diagnoses: Nocturnal hypoxemia       !! - Potential duplicate medications found. Please discuss with provider.        STOP taking these medications       tiZANidine (ZANAFLEX) 2 MG tablet Comments:   Reason for Stopping:             Allergies   Allergies   Allergen Reactions    No Known Drug Allergy

## 2024-03-06 NOTE — PLAN OF CARE
Goal Outcome Evaluation:    PRIMARY Concern: weakness, frequent falls  SAFETY RISK Concerns (fall risk, behaviors, etc.): fall risk      Isolation/Type: none  Tests/Procedures for NEXT shift: none  Consults? (Pending/following, signed-off?) SW following  Where is patient from? (Home, TCU, etc.): home  Other Important info for NEXT shift: encourage activity, up in chair for meals  Anticipated DC date & active delays[facility], transportation arranged between 13:10-13:50    SUMMARY NOTE:  Orientation/Cognitive: A&Ox4  Mobility Level/Assist Equipment: assist x2, GB, paulette steady to commode/chair  Antibiotics & Plan (IV/po, length of tx left): IV rocephin for UTI  Pain Management: tylenol given 1x  Tele/VS/O2: VSS expect BP elevated   ABNL Lab/BG: see results  Diet: regular  Bowel/Bladder: continent  Skin Concerns: coccyx reddened, scattered scabs on lower extremities  Drains/Devices: N/A    Anticipated DC date: [facility], transportation arranged between 13:10-13:50

## 2024-03-06 NOTE — PLAN OF CARE
Physical Therapy Discharge Summary    Reason for therapy discharge:    Discharged to transitional care facility.    Progress towards therapy goal(s). See goals on Care Plan in [facility] electronic health record for goal details.  Goals partially met.  Barriers to achieving goals:   discharge from facility.    Therapy recommendation(s):    Continued therapy is recommended.  Rationale/Recommendations:  To further increase independence and mobility.

## 2024-03-07 ENCOUNTER — TRANSITIONAL CARE UNIT VISIT (OUTPATIENT)
Dept: GERIATRICS | Facility: CLINIC | Age: 79
End: 2024-03-07
Payer: COMMERCIAL

## 2024-03-07 ENCOUNTER — PATIENT OUTREACH (OUTPATIENT)
Dept: CARE COORDINATION | Facility: CLINIC | Age: 79
End: 2024-03-07

## 2024-03-07 VITALS
OXYGEN SATURATION: 96 % | WEIGHT: 219 LBS | SYSTOLIC BLOOD PRESSURE: 181 MMHG | DIASTOLIC BLOOD PRESSURE: 86 MMHG | TEMPERATURE: 98.1 F | HEART RATE: 62 BPM | BODY MASS INDEX: 34.37 KG/M2 | HEIGHT: 67 IN | RESPIRATION RATE: 18 BRPM

## 2024-03-07 DIAGNOSIS — Z86.711 HISTORY OF PULMONARY EMBOLISM: ICD-10-CM

## 2024-03-07 DIAGNOSIS — N39.0 URINARY TRACT INFECTION WITHOUT HEMATURIA, SITE UNSPECIFIED: ICD-10-CM

## 2024-03-07 DIAGNOSIS — G47.33 OSA (OBSTRUCTIVE SLEEP APNEA): ICD-10-CM

## 2024-03-07 DIAGNOSIS — M62.81 GENERALIZED MUSCLE WEAKNESS: Primary | ICD-10-CM

## 2024-03-07 DIAGNOSIS — C34.90 ADENOCARCINOMA OF LUNG, UNSPECIFIED LATERALITY (H): ICD-10-CM

## 2024-03-07 DIAGNOSIS — N40.1 BENIGN PROSTATIC HYPERPLASIA WITH LOWER URINARY TRACT SYMPTOMS, SYMPTOM DETAILS UNSPECIFIED: ICD-10-CM
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
## 2024-03-07 DIAGNOSIS — R53.81 PHYSICAL DECONDITIONING: ICD-10-CM

## 2024-03-07 DIAGNOSIS — J42 CHRONIC BRONCHITIS, UNSPECIFIED CHRONIC BRONCHITIS TYPE (H): ICD-10-CM

## 2024-03-07 DIAGNOSIS — I10 ESSENTIAL HYPERTENSION, BENIGN: ICD-10-CM

## 2024-03-07 DIAGNOSIS — E11.69 TYPE 2 DIABETES MELLITUS WITH OTHER SPECIFIED COMPLICATION, WITHOUT LONG-TERM CURRENT USE OF INSULIN (H): ICD-10-CM

## 2024-03-07 DIAGNOSIS — Z78.9: ICD-10-CM

## 2024-03-07 PROCEDURE — 99310 SBSQ NF CARE HIGH MDM 45: CPT

## 2024-03-07 NOTE — LETTER
"    3/7/2024        RE: Nahun Villalobos  [address] [facility] [address]        [facility] GERIATRICS    PRIMARY CARE PROVIDER AND CLINIC:  Olegario Castillo MD, [address] [place] [id number]  Chief Complaint   Patient presents with     [facility] Medical Record Number:  [id number]  Place of Service where encounter took place:  [facility] ([facility]) [[id number]]    HPI:    Nahun Vlilalobos  is a 78 year old  (1945), admitted to the above facility from  [facility]. Hospital stay 3/2/24 through 3/6/24..      Pt with PMHx significant for  hypertension, hyperlipidemia, DM II, obesity, CIERA on CPAP, COPD, history of PE, malignant carcinoid tumor, lung carcinoma, peripheral neuropathy, cervical myelopathy admitted to [facility] from 3/2/2024-3/6/24 for evaluation of generalized weakness and multiple falls.     Hospitalization course as encrypted below from discharge summary.    [facility]  Hospitalist Discharge Summary    Date of Admission:  3/2/2024  Date of Discharge:  3/6/2024    Hospital course:  \"78 year old male with PMHx of hypertension, hyperlipidemia, DM II, obesity, CIERA on CPAP, COPD, history of PE, malignant carcinoid tumor, lung carcinoma, peripheral neuropathy, cervical myelopathy who was admitted on 3/2/2024 for evaluation of generalized weakness and multiple falls.  For full HPI please see admission H&P from Dr. Ada Bruno dated 3/2/2024.     Physical deconditioning from medical illness, senile frailty, chronic debility  Falls at home  Generalized weakness  *Presented to ED with reports of generalized weakness that had been ongoing for the preceding several weeks. Lives at home.the day prior to admission his significant other found him on the floor.  Unclear how he fell.  On the day of admission he was unable to get out of the shower.  The fire department had to be called to " the home to assist with lifting him out. At baseline, minimal ambulation at home, uses assistive device walker.  *In ED, was afebrile and hemodynamically stable.  O2 sat stable on room air.  Basic labs generally unremarkable, mild SALLIE with Cr 1.34 but lytes, LFTs stable. WBC 13.5, hgb 12.6.  Viral swabs negative.  CXR negative.  Head CT without contrast showed no acute intracranial abnormality, was identified to have supratentorial ventriculomegaly which was presumed chronic/incidental given similar appearance on prior exam in 4/2023.  Margy registered observation however later transition to observation status.  Evaluated by physical therapy who recommended TCU on discharge, social work assisted in making arrangements.  Day of discharge patient doing well, ready for rehab, no new complaints, all questions answered.     Dyspnea on exertion likely multifactorial from deconditioning, CIERA not compliant with CPAP, underlying COPD and hx of lung carcinoma status post resection.  Leukocytosis possible reactive  CIERA, noncompliant with CPAP  COPD, not on chronic O2, not in exacerbation  History of PE, noncompliant with anticoagulation  Adenocarcinoma of lung s/p resection in 2016, gets surveillance imaging every 3 years.  *Reported ongoing shortness of breath for few months now.  Worse with minimal ambulation.  Reports unable to lie down flat, has been sleeping in a recliner.  Reports has not been using the CPAP.  Patient's partner reports he misses dose of blood thinners at least 2-3 doses a week.  Has not been taking his water pills regularly.  *As above, in ED was afebrile and O2 sats stable on RA. No wheezing on exam. WBC 13.5 but viral swabs and CXR neg. ProBNP nl. Trops mildly elevated with ongoing workup as below.   -- cont DOAC  -- supportive cares with inhalers and prn nebs  -- have stressed importance of compliance with CPAP use  -- consider outpatient sleep study and PFTs     ? Group B strep UTI  No urinary sx on  admission but WBC 13.5 and UA abnl with trace leuk est with 21 WBCs, RBCs, bactiuria, squams and mucous. UCx growing 10-50K group B strep.  Pathogen seems somewhat unusual to be growing in the urine, ordered blood cultures x 2 to rule about possible bacteremia as a source of presenting complaints and abnormal UA.   - Follow up BCx, NGTD  - Started IV ceftriaxone, plan for discharge on Augmentin to TCU 3/6/24     Elevated troponin likely from demand ischemia in the setting of fall, kidney injury  *Denied any recent symptoms of chest pain or palpitations. Dyspnea as above.   *Trops mildly elevated but flat (42 -- 34). EKG nonischemic.  *ECHO EF 55-60%, no RWMA, no significant valvular abnormality  -on anticoagulation as above  -Monitored on telemetry without significant events, ok to discontinue     Suspected tinea corporis  Pruritic patch on posterior neck. Reports has been there many months. Has a dog at home.   - Initiate Topical miconazole x10 days      Hypertension  Hyperlipidemia  *Home meds reportedly include statin, lisinopril, Lasix and metoprolol.  *Intermittent noncompliance with home meds.   *Lisinopril and lasix held on admission dt SALLIE, statin held given obs status  -Continue PTA metoprolol  -Restarted PTA lisinopril at reduced dose 20 mg daily 3/4 and uptitrate back to PTA dose 40mg/d 3/5, with hold parameters  -Restart PTA Lasix at 1/2 dose, 20mg BID, repeat labs at TCU and likely increase back to 40mg BID dosing  -Continue PTA statin     Hypochloremic hyponatremia, improving  SALLIE, presumed pre-renal, resolved  *BMP on presentation with , Cl 95, Cr 1.3 (baseline Cr 1.0--1.1). BG 200s.   *Given IVFs on admission, nephrotoxic meds held as above. Cr improved back to baseline.   - Cont holding nephrotoxic meds  - Monitor BMP PRN  - Restarted PTA ACEi, slowly restarting PTA Lasix      Malignant carcinoid tumor  *Status post exploratory laparotomy, small bowel resection and removal of mesenteric  "mass.  *Follows at [facility]. No concerns this stay.      Chronic diarrhea, stable  *No s/sx suggestive of acute infection.   *Chronic and stable on PTA cholestyramine.  -- recommend use of barrier cream  -- monitor, if overt diarrhea will consider extending out stool studies     Non-insulin dependent type 2 diabete mellitus   *A1c 6.7 in 9/2023 --> 7.3 this stay. Manages with metformin 1000mg Q AM and 500mg Q PM  *BG into 200s on admission. Metformin held dt SALLIE.  Glucose trending up and SALLIE resolved, restarting metformin 3/4.  - Sliding scale insulin medium   - Continue PTA metformin, restarted once SALLIE resolved, increase to 1,000mg BID and monitor  - Better glycemic control after restarting PTA metformin      BPH  *Chronic and stable on finasteride.     History of arthritis.  Peripheral neuropathy  Cervical myelopathy  *Chronic and stable on PTA gabapentin.     Obesity with BMI of 34.  *Increase all-cause mortality and morbidity. Consider lifestyle modification with diet and exercise as able to.     Medication noncompliance.  *Patient reports forgets to take his medication at least 2-3 times a week.    *Will need to consider home RN at the time of discharge from TCU\".    Overall stabilized and patient discharged to the above TCU in stable condition for rehab.    Today: Patient being seen for initial TCU visit.  Seen and examined in bed.  Alert and oriented.  Lives with a girlfriend at baseline.  Plan is to return home when therapy goals are met.  Ambulating short discharge therapy.  No pain or discomfort. Reports feeling well overall.  Good sense of humor.  Hemodynamically stable.  Denies chest pain or shortness of breath.    CODE STATUS/ADVANCE DIRECTIVES DISCUSSION:  Full Code  CPR/Full code   ALLERGIES:   Allergies   Allergen Reactions     No Known Drug Allergy       PAST MEDICAL HISTORY:   Past Medical History:   Diagnosis Date     Antiplatelet or antithrombotic long-term use      Arthritis      CHF " (congestive heart failure) (H) 09/16/2020     COPD (chronic obstructive pulmonary disease) (H)      DM (diabetes mellitus) (H)      Dyspnea on exertion      Essential hypertension, benign      Hemorrhage of rectum and anus      Non-small cell lung cancer (H)      Obesity      Personal history of colonic polyps      Sleep apnea      Thrombosis      Tobacco use disorder      Urinary retention      Walking troubles       PAST SURGICAL HISTORY:   has a past surgical history that includes NONSPECIFIC PROCEDURE; Cholecystectomy; appendectomy; wedge resection; Cystoscopy, transurethral resection (TUR) prostate, combined (N/A, 4/30/2018); Abdomen surgery (1995); colonoscopy (2014); Bone Exostosis Excision (Bilateral, 9/20/2022); and IR Lung Biopsy Mediastinum Right (4/6/2016).  FAMILY HISTORY: family history includes Breast Cancer in his sister and sister; C.A.D. in his mother; Cancer in his mother; Cancer - colorectal in his mother; Cerebrovascular Disease in his father; Hypertension in his mother, sister, and sister; Other Cancer in his mother.  SOCIAL HISTORY:   reports that he quit smoking about 10 years ago. His smoking use included cigarettes and cigars. He started smoking about 63 years ago. He has a 104 pack-year smoking history. He has never used smokeless tobacco. He reports that he does not drink alcohol and does not use drugs.  Patient's living condition:  lives with significant other.    Post Discharge Medication Reconciliation Status:   MED REC REQUIRED  Post Medication Reconciliation Status: discharge medications reconciled, continue medications without change     Current Outpatient Medications   Medication Sig     acetaminophen (TYLENOL) 500 MG tablet Take 1,000 mg by mouth 2 times daily     albuterol (VENTOLIN HFA) 108 (90 Base) MCG/ACT inhaler INHALE 2 PUFFS INTO THE LUNGS EVERY 6 HOURS AS NEEDED FOR SHORTNESS OF BREATH / DYSPNEA OR WHEEZING     amoxicillin-clavulanate (AUGMENTIN) 875-125 MG tablet Take 1  tablet by mouth 2 times daily for 7 days     apixaban ANTICOAGULANT (ELIQUIS) 2.5 MG tablet Take 1 tablet (2.5 mg) by mouth 2 times daily     Ascorbic Acid (VITAMIN C PO) Take 500 mg by mouth At Bedtime      atorvastatin (LIPITOR) 20 MG tablet TAKE 1 TABLET DAILY     blood glucose (ACCU-CHEK CHACHA) test strip Use to test blood sugar 2 times daily or as directed.     blood glucose (NO BRAND SPECIFIED) lancets standard Use to test blood sugar 1 time daily, first thing in the morning (Patient taking differently: as needed Use to test blood sugar 1 time daily, first thing in the morning)     blood glucose (NO BRAND SPECIFIED) test strip Use to test blood sugar 1 time daily, first thing in the morning.     blood glucose monitoring (NO BRAND SPECIFIED) meter device kit Use to test blood sugar 1 time daily, first thing in the morning     cholestyramine light (QUESTRAN) 4 GM packet Take 1 packet (4 g) by mouth 2 times daily     finasteride (PROSCAR) 5 MG tablet Take 1 tablet (5 mg) by mouth daily     Fluticasone-Umeclidin-Vilant (TRELEGY ELLIPTA) 100-62.5-25 MCG/ACT oral inhaler Inhale 1 puff into the lungs daily     furosemide (LASIX) 20 MG tablet Take 1 tablet (20 mg) by mouth 2 times daily     gabapentin (NEURONTIN) 300 MG capsule Take 1 capsule (300 mg) by mouth 3 times daily (Patient taking differently: 300mg in the morning and 600mg in the evening.)     lisinopril (ZESTRIL) 40 MG tablet TAKE 1 TABLET DAILY     metFORMIN (GLUCOPHAGE) 1000 MG tablet Take 1 tablet (1,000 mg) by mouth 2 times daily (with meals)     metoprolol succinate ER (TOPROL XL) 25 MG 24 hr tablet Take 1 tablet (25 mg) by mouth daily     miconazole (MICATIN) 2 % external cream Apply topically 2 times daily for 9 days     order for DME Equipment being ordered: diabetic shoes, 1 pair     order for DME Oxygen 2 Li/min  at night via nasal cannula     vitamin B-Complex Take 1 tablet by mouth daily     No current facility-administered medications for this  "visit.       ROS:  10 point ROS of systems including Constitutional, Eyes, Respiratory, Cardiovascular, Gastroenterology, Genitourinary, Integumentary, Musculoskeletal, Psychiatric were all negative except for pertinent positives noted in my HPI.    Vitals:  BP (!) 181/86   Pulse 62   Temp 98.1  F (36.7  C)   Resp 18   Ht 1.702 m (5' 7\")   Wt 99.3 kg (219 lb)   SpO2 96%   BMI 34.30 kg/m      Exam:  GENERAL APPEARANCE: In bed, alert and awake  HEENT-EARS: No discharge/drainage  HEENT-NECK: No lymphadenopathy  HEENT-EYES: PERRLA positive, no drainage/discharge  HEENT-NOSE/MOUTH/THROAT: No nasal drainage or erythema, mucous membranes moist   RESPIRATORY: Clear to auscultation, even and unlabored, symmetrical chest wall expansion  CARDIOVASCULAR: RRR, no peripheral edema, S1/S2 normal  GASTROINTESTINAL: Soft, nontender, nondistended, bowel sounds present x4 quadrants  MUSCULOSKELETAL: In bed  INTEGUMENTARY: Small patch to posterior neck, scattered dry abrasions to LE    NEUROLOGICAL: Alert and oriented   PSYCHOLOGICAL: Cooperative       Lab/Diagnostic data:  Recent labs in EPIC reviewed by me today.     ASSESSMENT/PLAN:  Fall  Generalized weakness  Deconditioning  -Lives at home with a girlfriend, ongoing weakness for several weeks per chart review, uses walker at baseline, ambulating short distances in TCU  -Continue therapies, fall precautions, safe disposition    Dyspnea on exertion, resolved  History of COPD and CIERA (CPAP noncompliant)  Adenocarcinoma of lung s/p resection in 2016  History of PE  -Reports of shortness of breath for a few months PTA resolved now, medications noncompliance including not limited to Lasix, outpatient sleep study and PFTs recommended  -Continue PTA Eliquis 2.5 mg twice a day and Lasix 40 mg daily (20 mg daily PTA), continue Trelegy Ellipta daily and PRN inhaler, recommended PFTs and sleep study outpatient, CPAP as patient allows, monitor respiratory status    UTI  -Received IV " ceftriaxone in the hospital and discharged with Augmentin BID for 7 days, no  symptoms in TCU  -Continue Augmentin as ordered, monitor for  symptoms and repeat UA/UC as indicated    Suspected tinea corporis   -Pruritic patch on posterior neck improving on topical miconazole  -Continue current care, monitor    Hypertension  Hyperlipidemia  SALLIE, stable  -Home medications held briefly in the hospital due to SALLIE on discharge, BP acceptable  -Continue PTA Lasix, metoprolol, lisinopril, and statin, check BMP, monitor BP    Hx of malignant carcinoid tumor s/p exploratory laparotomy, small bowel resection and removal of mesenteric mass  -Followed by [facility]  -Oncology follow-up    Chronic diarrhea, stable on PTA cholestyramine   -Monitor    Type 2 diabetes mellitus  -A1c 7.3 on 3/2, PTA on metformin 1000mg Q AM and 500mg Q PM (increased to 1000 mg twice daily inpatient) due to elevated BG,  BG 100s-200s in TCU so far   -Continue metformin and statin, monitor BG    BPH  -Chronic and managed on finasteride    Arthritis  Peripheral neuropathy  Cervical myelopathy  -Chronic and managed on gabapentin and tylenol     Medication noncompliance  -Noncompliance with medications and CPAP, may benefit from home health care service        Orders:  BMP         Electronically signed by:  Jayehs Mitchell DNP,SELMA,AGNP-BC.               The above note was completed in part using Dragon voice recognition software. Although reviewed after completion, some word and grammatical errors may occur. Please contact the author of this note with any questions.                     Sincerely,        SELMA Sotelo CNP

## 2024-03-07 NOTE — PROGRESS NOTES
"Clinic Care Coordination Contact  Care Coordination Transition Communication    Clinical Data: Patient was hospitalized at [facility] from 03/02/2024 to 03/06/2024 with diagnosis of: \"Physical deconditioning from medical illness, senile frailty, chronic debility  Falls at home  Generalized weakness  Dyspnea on exertion likely multifactorial from deconditioning, CIERA not compliant with CPAP, underlying COPD and hx of lung carcinoma status post resection.  Leukocytosis, resolved  CIERA, noncompliant with CPAP  ? Group B strep UTI   Elevated troponin likely from demand ischemia in the setting of fall, kidney injury   Suspected tinea corporis   Hypertension   Hypochloremic hyponatremia, improving  SALLIE, presumed pre-renal, resolved\".     Assessment: Patient has transitioned to TCU/ARU for short term rehabilitation:    Facility Name: [facility]  Transition Communication:  Notified facility of Primary Care- Care Coordination support via Epic In-Basket message.    Plan: Care Coordinator will await notification from facility staff informing of patient's discharge plans/needs. Care Coordinator will review chart and outreach to facility staff every 4 weeks and as needed.     Margy Nunes RN Care Coordinator  [facility] [place], [place], [place]  Email: [email or web address]  Phone: [phone]   "

## 2024-03-07 NOTE — PROGRESS NOTES
"[facility] GERIATRICS    PRIMARY CARE PROVIDER AND CLINIC:  Olegario Castillo MD, [address] [place] [id number]  Chief Complaint   Patient presents with    [facility] Medical Record Number:  [id number]  Place of Service where encounter took place:  [facility] [[id number]]    HPI:    Nahun Villalobos  is a 78 year old  (1945), admitted to the above facility from  [facility]. Hospital stay 3/2/24 through 3/6/24..      Pt with PMHx significant for  hypertension, hyperlipidemia, DM II, obesity, CIERA on CPAP, COPD, history of PE, malignant carcinoid tumor, lung carcinoma, peripheral neuropathy, cervical myelopathy admitted to [facility] from 3/2/2024-3/6/24 for evaluation of generalized weakness and multiple falls.     Hospitalization course as encrypted below from discharge summary.    [facility]  Hospitalist Discharge Summary    Date of Admission:  3/2/2024  Date of Discharge:  3/6/2024    Hospital course:  \"78 year old male with PMHx of hypertension, hyperlipidemia, DM II, obesity, CIERA on CPAP, COPD, history of PE, malignant carcinoid tumor, lung carcinoma, peripheral neuropathy, cervical myelopathy who was admitted on 3/2/2024 for evaluation of generalized weakness and multiple falls.  For full HPI please see admission H&P from Dr. Ada Bruno dated 3/2/2024.     Physical deconditioning from medical illness, senile frailty, chronic debility  Falls at home  Generalized weakness  *Presented to ED with reports of generalized weakness that had been ongoing for the preceding several weeks. Lives at home.the day prior to admission his significant other found him on the floor.  Unclear how he fell.  On the day of admission he was unable to get out of the shower.  The fire department had to be called to the home to assist with lifting him out. At baseline, minimal ambulation at home, uses " assistive device walker.  *In ED, was afebrile and hemodynamically stable.  O2 sat stable on room air.  Basic labs generally unremarkable, mild SALLIE with Cr 1.34 but lytes, LFTs stable. WBC 13.5, hgb 12.6.  Viral swabs negative.  CXR negative.  Head CT without contrast showed no acute intracranial abnormality, was identified to have supratentorial ventriculomegaly which was presumed chronic/incidental given similar appearance on prior exam in 4/2023.  Margy registered observation however later transition to observation status.  Evaluated by physical therapy who recommended TCU on discharge, social work assisted in making arrangements.  Day of discharge patient doing well, ready for rehab, no new complaints, all questions answered.     Dyspnea on exertion likely multifactorial from deconditioning, CIERA not compliant with CPAP, underlying COPD and hx of lung carcinoma status post resection.  Leukocytosis possible reactive  CIERA, noncompliant with CPAP  COPD, not on chronic O2, not in exacerbation  History of PE, noncompliant with anticoagulation  Adenocarcinoma of lung s/p resection in 2016, gets surveillance imaging every 3 years.  *Reported ongoing shortness of breath for few months now.  Worse with minimal ambulation.  Reports unable to lie down flat, has been sleeping in a recliner.  Reports has not been using the CPAP.  Patient's partner reports he misses dose of blood thinners at least 2-3 doses a week.  Has not been taking his water pills regularly.  *As above, in ED was afebrile and O2 sats stable on RA. No wheezing on exam. WBC 13.5 but viral swabs and CXR neg. ProBNP nl. Trops mildly elevated with ongoing workup as below.   -- cont DOAC  -- supportive cares with inhalers and prn nebs  -- have stressed importance of compliance with CPAP use  -- consider outpatient sleep study and PFTs     ? Group B strep UTI  No urinary sx on admission but WBC 13.5 and UA abnl with trace leuk est with 21 WBCs, RBCs, bactiuria,  squams and mucous. UCx growing 10-50K group B strep.  Pathogen seems somewhat unusual to be growing in the urine, ordered blood cultures x 2 to rule about possible bacteremia as a source of presenting complaints and abnormal UA.   - Follow up BCx, NGTD  - Started IV ceftriaxone, plan for discharge on Augmentin to TCU 3/6/24     Elevated troponin likely from demand ischemia in the setting of fall, kidney injury  *Denied any recent symptoms of chest pain or palpitations. Dyspnea as above.   *Trops mildly elevated but flat (42 -- 34). EKG nonischemic.  *ECHO EF 55-60%, no RWMA, no significant valvular abnormality  -on anticoagulation as above  -Monitored on telemetry without significant events, ok to discontinue     Suspected tinea corporis  Pruritic patch on posterior neck. Reports has been there many months. Has a dog at home.   - Initiate Topical miconazole x10 days      Hypertension  Hyperlipidemia  *Home meds reportedly include statin, lisinopril, Lasix and metoprolol.  *Intermittent noncompliance with home meds.   *Lisinopril and lasix held on admission dt SALLIE, statin held given obs status  -Continue PTA metoprolol  -Restarted PTA lisinopril at reduced dose 20 mg daily 3/4 and uptitrate back to PTA dose 40mg/d 3/5, with hold parameters  -Restart PTA Lasix at 1/2 dose, 20mg BID, repeat labs at TCU and likely increase back to 40mg BID dosing  -Continue PTA statin     Hypochloremic hyponatremia, improving  SALLIE, presumed pre-renal, resolved  *BMP on presentation with , Cl 95, Cr 1.3 (baseline Cr 1.0--1.1). BG 200s.   *Given IVFs on admission, nephrotoxic meds held as above. Cr improved back to baseline.   - Cont holding nephrotoxic meds  - Monitor BMP PRN  - Restarted PTA ACEi, slowly restarting PTA Lasix      Malignant carcinoid tumor  *Status post exploratory laparotomy, small bowel resection and removal of mesenteric mass.  *Follows at [facility]. No concerns this stay.      Chronic diarrhea, stable  *No  "s/sx suggestive of acute infection.   *Chronic and stable on PTA cholestyramine.  -- recommend use of barrier cream  -- monitor, if overt diarrhea will consider extending out stool studies     Non-insulin dependent type 2 diabete mellitus   *A1c 6.7 in 9/2023 --> 7.3 this stay. Manages with metformin 1000mg Q AM and 500mg Q PM  *BG into 200s on admission. Metformin held dt SALLIE.  Glucose trending up and SALLIE resolved, restarting metformin 3/4.  - Sliding scale insulin medium   - Continue PTA metformin, restarted once SALLIE resolved, increase to 1,000mg BID and monitor  - Better glycemic control after restarting PTA metformin      BPH  *Chronic and stable on finasteride.     History of arthritis.  Peripheral neuropathy  Cervical myelopathy  *Chronic and stable on PTA gabapentin.     Obesity with BMI of 34.  *Increase all-cause mortality and morbidity. Consider lifestyle modification with diet and exercise as able to.     Medication noncompliance.  *Patient reports forgets to take his medication at least 2-3 times a week.    *Will need to consider home RN at the time of discharge from TCU\".    Overall stabilized and patient discharged to the above TCU in stable condition for rehab.    Today: Patient being seen for initial TCU visit.  Seen and examined in bed.  Alert and oriented.  Lives with a girlfriend at baseline.  Plan is to return home when therapy goals are met.  Ambulating short discharge therapy.  No pain or discomfort. Reports feeling well overall.  Good sense of humor.  Hemodynamically stable.  Denies chest pain or shortness of breath.    CODE STATUS/ADVANCE DIRECTIVES DISCUSSION:  Full Code  CPR/Full code   ALLERGIES:   Allergies   Allergen Reactions    No Known Drug Allergy       PAST MEDICAL HISTORY:   Past Medical History:   Diagnosis Date    Antiplatelet or antithrombotic long-term use     Arthritis     CHF (congestive heart failure) (H) 09/16/2020    COPD (chronic obstructive pulmonary disease) (H)     DM " (diabetes mellitus) (H)     Dyspnea on exertion     Essential hypertension, benign     Hemorrhage of rectum and anus     Non-small cell lung cancer (H)     Obesity     Personal history of colonic polyps     Sleep apnea     Thrombosis     Tobacco use disorder     Urinary retention     Walking troubles       PAST SURGICAL HISTORY:   has a past surgical history that includes NONSPECIFIC PROCEDURE; Cholecystectomy; appendectomy; wedge resection; Cystoscopy, transurethral resection (TUR) prostate, combined (N/A, 4/30/2018); Abdomen surgery (1995); colonoscopy (2014); Bone Exostosis Excision (Bilateral, 9/20/2022); and IR Lung Biopsy Mediastinum Right (4/6/2016).  FAMILY HISTORY: family history includes Breast Cancer in his sister and sister; C.A.D. in his mother; Cancer in his mother; Cancer - colorectal in his mother; Cerebrovascular Disease in his father; Hypertension in his mother, sister, and sister; Other Cancer in his mother.  SOCIAL HISTORY:   reports that he quit smoking about 10 years ago. His smoking use included cigarettes and cigars. He started smoking about 63 years ago. He has a 104 pack-year smoking history. He has never used smokeless tobacco. He reports that he does not drink alcohol and does not use drugs.  Patient's living condition:  lives with significant other.    Post Discharge Medication Reconciliation Status:   MED REC REQUIRED  Post Medication Reconciliation Status: discharge medications reconciled, continue medications without change     Current Outpatient Medications   Medication Sig    acetaminophen (TYLENOL) 500 MG tablet Take 1,000 mg by mouth 2 times daily    albuterol (VENTOLIN HFA) 108 (90 Base) MCG/ACT inhaler INHALE 2 PUFFS INTO THE LUNGS EVERY 6 HOURS AS NEEDED FOR SHORTNESS OF BREATH / DYSPNEA OR WHEEZING    amoxicillin-clavulanate (AUGMENTIN) 875-125 MG tablet Take 1 tablet by mouth 2 times daily for 7 days    apixaban ANTICOAGULANT (ELIQUIS) 2.5 MG tablet Take 1 tablet (2.5 mg) by  mouth 2 times daily    Ascorbic Acid (VITAMIN C PO) Take 500 mg by mouth At Bedtime     atorvastatin (LIPITOR) 20 MG tablet TAKE 1 TABLET DAILY    blood glucose (ACCU-CHEK CHACHA) test strip Use to test blood sugar 2 times daily or as directed.    blood glucose (NO BRAND SPECIFIED) lancets standard Use to test blood sugar 1 time daily, first thing in the morning (Patient taking differently: as needed Use to test blood sugar 1 time daily, first thing in the morning)    blood glucose (NO BRAND SPECIFIED) test strip Use to test blood sugar 1 time daily, first thing in the morning.    blood glucose monitoring (NO BRAND SPECIFIED) meter device kit Use to test blood sugar 1 time daily, first thing in the morning    cholestyramine light (QUESTRAN) 4 GM packet Take 1 packet (4 g) by mouth 2 times daily    finasteride (PROSCAR) 5 MG tablet Take 1 tablet (5 mg) by mouth daily    Fluticasone-Umeclidin-Vilant (TRELEGY ELLIPTA) 100-62.5-25 MCG/ACT oral inhaler Inhale 1 puff into the lungs daily    furosemide (LASIX) 20 MG tablet Take 1 tablet (20 mg) by mouth 2 times daily    gabapentin (NEURONTIN) 300 MG capsule Take 1 capsule (300 mg) by mouth 3 times daily (Patient taking differently: 300mg in the morning and 600mg in the evening.)    lisinopril (ZESTRIL) 40 MG tablet TAKE 1 TABLET DAILY    metFORMIN (GLUCOPHAGE) 1000 MG tablet Take 1 tablet (1,000 mg) by mouth 2 times daily (with meals)    metoprolol succinate ER (TOPROL XL) 25 MG 24 hr tablet Take 1 tablet (25 mg) by mouth daily    miconazole (MICATIN) 2 % external cream Apply topically 2 times daily for 9 days    order for DME Equipment being ordered: diabetic shoes, 1 pair    order for DME Oxygen 2 Li/min  at night via nasal cannula    vitamin B-Complex Take 1 tablet by mouth daily     No current facility-administered medications for this visit.       ROS:  10 point ROS of systems including Constitutional, Eyes, Respiratory, Cardiovascular, Gastroenterology, Genitourinary,  "Integumentary, Musculoskeletal, Psychiatric were all negative except for pertinent positives noted in my HPI.    Vitals:  BP (!) 181/86   Pulse 62   Temp 98.1  F (36.7  C)   Resp 18   Ht 1.702 m (5' 7\")   Wt 99.3 kg (219 lb)   SpO2 96%   BMI 34.30 kg/m      Exam:  GENERAL APPEARANCE: In bed, alert and awake  HEENT-EARS: No discharge/drainage  HEENT-NECK: No lymphadenopathy  HEENT-EYES: PERRLA positive, no drainage/discharge  HEENT-NOSE/MOUTH/THROAT: No nasal drainage or erythema, mucous membranes moist   RESPIRATORY: Clear to auscultation, even and unlabored, symmetrical chest wall expansion  CARDIOVASCULAR: RRR, no peripheral edema, S1/S2 normal  GASTROINTESTINAL: Soft, nontender, nondistended, bowel sounds present x4 quadrants  MUSCULOSKELETAL: In bed  INTEGUMENTARY: Small patch to posterior neck, scattered dry abrasions to LE    NEUROLOGICAL: Alert and oriented   PSYCHOLOGICAL: Cooperative       Lab/Diagnostic data:  Recent labs in [facility] reviewed by me today.     ASSESSMENT/PLAN:  Fall  Generalized weakness  Deconditioning  -Lives at home with a girlfriend, ongoing weakness for several weeks per chart review, uses walker at baseline, ambulating short distances in TCU  -Continue therapies, fall precautions, safe disposition    Dyspnea on exertion, resolved  History of COPD and CIERA (CPAP noncompliant)  Adenocarcinoma of lung s/p resection in 2016  History of PE  -Reports of shortness of breath for a few months PTA resolved now, medications noncompliance including not limited to Lasix, outpatient sleep study and PFTs recommended  -Continue PTA Eliquis 2.5 mg twice a day and Lasix 40 mg daily (20 mg daily PTA), continue Trelegy Ellipta daily and PRN inhaler,  PFTs and sleep study outpatient as recommended, CPAP as patient allows, monitor respiratory status    UTI  -Received IV ceftriaxone in the hospital and discharged with Augmentin BID for 7 days, no  symptoms in TCU  -Continue Augmentin as ordered, monitor " for  symptoms and repeat UA/UC as indicated    Suspected tinea corporis   -Pruritic patch on posterior neck improving on topical miconazole  -Continue current care, monitor    Hypertension  Hyperlipidemia  SALLIE, stable  -Home medications held briefly in the hospital due to SALLIE on discharge, BP acceptable  -Continue PTA Lasix, metoprolol, lisinopril, and statin, check BMP, monitor BP    Hx of malignant carcinoid tumor s/p exploratory laparotomy, small bowel resection and removal of mesenteric mass  -Followed by [facility]  -Oncology follow-up    Chronic diarrhea, stable on PTA cholestyramine   -Monitor    Type 2 diabetes mellitus  -A1c 7.3 on 3/2, PTA on metformin 1000mg Q AM and 500mg Q PM (increased to 1000 mg twice daily inpatient) due to elevated BG,  BG 100s-200s in TCU so far   -Continue metformin and statin, monitor BG    BPH  -Chronic and managed on finasteride    Arthritis  Peripheral neuropathy  Cervical myelopathy  -Chronic and managed on gabapentin and tylenol     Medication noncompliance  -Noncompliance with medications and CPAP, may benefit from home health care service        Orders:  BMP         Electronically signed by:  Jayesh Mitchell,DARRYL,APRN,AGNP-BC.               The above note was completed in part using Dragon voice recognition software. Although reviewed after completion, some word and grammatical errors may occur. Please contact the author of this note with any questions.

## 2024-03-07 NOTE — LETTER
[facility]   To:  Please give to facility    From:   Rhea Knapp RN  Care Coordinator   [facility]   P: [phone]  [email or web address]   Patient Name:  Nahun Villalobos YOB: 1945   Admit date: 03/06/2024      *Information Needed:  Please contact me when the patient will discharge (or if they will move to long term care)- include the discharge date, disposition, and main diagnosis   If the patient is discharged with home care services, please provide the name of the agency    Also- Please inform me if a care conference is being held.   Phone, Fax or Email with information                        Thank you

## 2024-03-08 LAB
BACTERIA BLD CULT: NO GROWTH
BACTERIA BLD CULT: NO GROWTH

## 2024-03-12 ENCOUNTER — TRANSITIONAL CARE UNIT VISIT (OUTPATIENT)
Dept: GERIATRICS | Facility: CLINIC | Age: 79
End: 2024-03-12
Payer: COMMERCIAL

## 2024-03-12 VITALS
WEIGHT: 219 LBS | BODY MASS INDEX: 34.37 KG/M2 | TEMPERATURE: 98 F | RESPIRATION RATE: 18 BRPM | HEIGHT: 67 IN | HEART RATE: 70 BPM | DIASTOLIC BLOOD PRESSURE: 91 MMHG | OXYGEN SATURATION: 94 % | SYSTOLIC BLOOD PRESSURE: 159 MMHG

## 2024-03-12 DIAGNOSIS — I10 ESSENTIAL HYPERTENSION, BENIGN: ICD-10-CM

## 2024-03-12 PROCEDURE — 99309 SBSQ NF CARE MODERATE MDM 30: CPT

## 2024-03-12 RX ORDER — FUROSEMIDE 20 MG
40 TABLET ORAL 2 TIMES DAILY
Status: SHIPPED
Start: 2024-03-12 | End: 2024-06-06

## 2024-03-12 NOTE — PROGRESS NOTES
"[facility]    Chief Complaint   Patient presents with    RECHECK     HPI:  Nahun Villalobos is a 78 year old  (1945), who is being seen today for an episodic care visit at: [facility] ([facility]) [[id number]]. Today's concern is:U Follow Up      Pt with PMHx significant for  hypertension, hyperlipidemia, DM II, obesity, CIERA on CPAP, COPD, history of PE, malignant carcinoid tumor, lung carcinoma, peripheral neuropathy, cervical myelopathy admitted to [facility] from 3/2/2024-3/6/24 for evaluation of generalized weakness and multiple falls.      Hospitalization course as encrypted below from discharge summary.     [facility]  Hospitalist Discharge Summary    Date of Admission:  3/2/2024  Date of Discharge:  3/6/2024     Hospital course:  \"78 year old male with PMHx of hypertension, hyperlipidemia, DM II, obesity, CIERA on CPAP, COPD, history of PE, malignant carcinoid tumor, lung carcinoma, peripheral neuropathy, cervical myelopathy who was admitted on 3/2/2024 for evaluation of generalized weakness and multiple falls.  For full HPI please see admission H&P from Dr. Ada Bruno dated 3/2/2024.     Physical deconditioning from medical illness, senile frailty, chronic debility  Falls at home  Generalized weakness  *Presented to ED with reports of generalized weakness that had been ongoing for the preceding several weeks. Lives at home.the day prior to admission his significant other found him on the floor.  Unclear how he fell.  On the day of admission he was unable to get out of the shower.  The fire department had to be called to the home to assist with lifting him out. At baseline, minimal ambulation at home, uses assistive device walker.  *In ED, was afebrile and hemodynamically stable.  O2 sat stable on room air.  Basic labs generally unremarkable, mild SALLIE with Cr 1.34 but lytes, LFTs stable. WBC 13.5, hgb 12.6.  Viral swabs negative.  " CXR negative.  Head CT without contrast showed no acute intracranial abnormality, was identified to have supratentorial ventriculomegaly which was presumed chronic/incidental given similar appearance on prior exam in 4/2023.  Margy registered observation however later transition to observation status.  Evaluated by physical therapy who recommended TCU on discharge, social work assisted in making arrangements.  Day of discharge patient doing well, ready for rehab, no new complaints, all questions answered.     Dyspnea on exertion likely multifactorial from deconditioning, CIERA not compliant with CPAP, underlying COPD and hx of lung carcinoma status post resection.  Leukocytosis possible reactive  CIERA, noncompliant with CPAP  COPD, not on chronic O2, not in exacerbation  History of PE, noncompliant with anticoagulation  Adenocarcinoma of lung s/p resection in 2016, gets surveillance imaging every 3 years.  *Reported ongoing shortness of breath for few months now.  Worse with minimal ambulation.  Reports unable to lie down flat, has been sleeping in a recliner.  Reports has not been using the CPAP.  Patient's partner reports he misses dose of blood thinners at least 2-3 doses a week.  Has not been taking his water pills regularly.  *As above, in ED was afebrile and O2 sats stable on RA. No wheezing on exam. WBC 13.5 but viral swabs and CXR neg. ProBNP nl. Trops mildly elevated with ongoing workup as below.   -- cont DOAC  -- supportive cares with inhalers and prn nebs  -- have stressed importance of compliance with CPAP use  -- consider outpatient sleep study and PFTs     ? Group B strep UTI  No urinary sx on admission but WBC 13.5 and UA abnl with trace leuk est with 21 WBCs, RBCs, bactiuria, squams and mucous. UCx growing 10-50K group B strep.  Pathogen seems somewhat unusual to be growing in the urine, ordered blood cultures x 2 to rule about possible bacteremia as a source of presenting complaints and abnormal UA.    - Follow up BCx, NGTD  - Started IV ceftriaxone, plan for discharge on Augmentin to TCU 3/6/24     Elevated troponin likely from demand ischemia in the setting of fall, kidney injury  *Denied any recent symptoms of chest pain or palpitations. Dyspnea as above.   *Trops mildly elevated but flat (42 -- 34). EKG nonischemic.  *ECHO EF 55-60%, no RWMA, no significant valvular abnormality  -on anticoagulation as above  -Monitored on telemetry without significant events, ok to discontinue     Suspected tinea corporis  Pruritic patch on posterior neck. Reports has been there many months. Has a dog at home.   - Initiate Topical miconazole x10 days      Hypertension  Hyperlipidemia  *Home meds reportedly include statin, lisinopril, Lasix and metoprolol.  *Intermittent noncompliance with home meds.   *Lisinopril and lasix held on admission dt SALLIE, statin held given obs status  -Continue PTA metoprolol  -Restarted PTA lisinopril at reduced dose 20 mg daily 3/4 and uptitrate back to PTA dose 40mg/d 3/5, with hold parameters  -Restart PTA Lasix at 1/2 dose, 20mg BID, repeat labs at TCU and likely increase back to 40mg BID dosing  -Continue PTA statin     Hypochloremic hyponatremia, improving  SALLIE, presumed pre-renal, resolved  *BMP on presentation with , Cl 95, Cr 1.3 (baseline Cr 1.0--1.1). BG 200s.   *Given IVFs on admission, nephrotoxic meds held as above. Cr improved back to baseline.   - Cont holding nephrotoxic meds  - Monitor BMP PRN  - Restarted PTA ACEi, slowly restarting PTA Lasix      Malignant carcinoid tumor  *Status post exploratory laparotomy, small bowel resection and removal of mesenteric mass.  *Follows at [facility]. No concerns this stay.      Chronic diarrhea, stable  *No s/sx suggestive of acute infection.   *Chronic and stable on PTA cholestyramine.  -- recommend use of barrier cream  -- monitor, if overt diarrhea will consider extending out stool studies     Non-insulin dependent type 2 diabete  "mellitus   *A1c 6.7 in 9/2023 --> 7.3 this stay. Manages with metformin 1000mg Q AM and 500mg Q PM  *BG into 200s on admission. Metformin held dt SALLIE.  Glucose trending up and SALLIE resolved, restarting metformin 3/4.  - Sliding scale insulin medium   - Continue PTA metformin, restarted once SALLIE resolved, increase to 1,000mg BID and monitor  - Better glycemic control after restarting PTA metformin      BPH  *Chronic and stable on finasteride.     History of arthritis.  Peripheral neuropathy  Cervical myelopathy  *Chronic and stable on PTA gabapentin.     Obesity with BMI of 34.  *Increase all-cause mortality and morbidity. Consider lifestyle modification with diet and exercise as able to.     Medication noncompliance.  *Patient reports forgets to take his medication at least 2-3 times a week.    *Will need to consider home RN at the time of discharge from TCU\".     Overall stabilized and patient discharged to the above TCU in stable condition for rehab.     Today: Patient being seen for TCU follow-up visit. Seen and examined in PT session. Up in w/c and exercising his lower extremity.  Energy not at baseline yet though able to ambulate short distances with therapy.  No pain or discomfort.  BP fluctuates. no chest pain or shortness of breath.  Eating and sleeping well.  Staff without concerns.    Allergies, and PMH/PSH reviewed in [facility] today.  REVIEW OF SYSTEMS:  4 point ROS including Respiratory, CV, GI and , other than that noted in the HPI,  is negative    Objective:   BP (!) 159/91   Pulse 70   Temp 98  F (36.7  C)   Resp 18   Ht 1.702 m (5' 7\")   Wt 99.3 kg (219 lb)   SpO2 94%   BMI 34.30 kg/m      Exam:  GENERAL APPEARANCE: NAD  RESPIRATORY: Clear to auscultation, even and unlabored, symmetrical chest wall expansion  CARDIOVASCULAR: RRR, no peripheral edema, S1/S2 normal  GASTROINTESTINAL: Soft, nontender, nondistended, bowel sounds present x4 quadrants  NEUROLOGICAL: Alert and oriented   PSYCHOLOGICAL: " Cooperative       Recent labs in [facility] reviewed by me today.     Assessment/Plan:  Fall  Generalized weakness  Deconditioning  -Lives at home with girlfriend at baseline, ongoing weakness for several weeks per chart review, uses walker at baseline, ambulating short distances in TCU  -Continue therapies, fall precautions, safe disposition     Dyspnea on exertion, resolved  History of COPD and CIERA (CPAP noncompliant)  Adenocarcinoma of lung s/p resection in 2016  History of PE  Hypertension  -Reports of shortness of breath for a few months PTA resolved now, medications noncompliance including not limited to Lasix, outpatient sleep study and PFTs recommended, BP fluctuates  -Increase Lasix to 40 mg twice daily  (home dose ), continue PTA Eliquis 2.5 mg twice a day, continue Trelegy Ellipta daily and PRN inhaler, as recommended PFTs and sleep study outpatient, CPAP as patient allows, monitor respiratory status     UTI  -Received IV ceftriaxone in the hospital and discharged with Augmentin BID for 7 days, no  symptoms in TCU  -Continue Augmentin as ordered, monitor for  symptoms and repeat UA/UC as indicated      MED REC REQUIRED  Post Medication Reconciliation Status: discharge medications reconciled and changed, per note/orders      Orders:  Increase Lasix to 40 mg twice daily  BMP      Electronically signed by:   Jayesh Mitchell,DARRYL,APRN,AGNP-BC.             The above note was completed in part using Dragon voice recognition software. Although reviewed after completion, some word and grammatical errors may occur. Please contact the author of this note with any questions.

## 2024-03-12 NOTE — LETTER
"    3/12/2024        RE: Nahun Villalobos  [address] [address]        [facility]    Chief Complaint   Patient presents with     RECHECK     HPI:  Nahun Villalobos is a 78 year old  (1945), who is being seen today for an episodic care visit at: [facility] ([facility]) [[id number]]. Today's concern is:U Follow Up      Pt with PMHx significant for  hypertension, hyperlipidemia, DM II, obesity, CIERA on CPAP, COPD, history of PE, malignant carcinoid tumor, lung carcinoma, peripheral neuropathy, cervical myelopathy admitted to [facility] from 3/2/2024-3/6/24 for evaluation of generalized weakness and multiple falls.      Hospitalization course as encrypted below from discharge summary.     [facility]  Hospitalist Discharge Summary    Date of Admission:  3/2/2024  Date of Discharge:  3/6/2024     Hospital course:  \"78 year old male with PMHx of hypertension, hyperlipidemia, DM II, obesity, CIERA on CPAP, COPD, history of PE, malignant carcinoid tumor, lung carcinoma, peripheral neuropathy, cervical myelopathy who was admitted on 3/2/2024 for evaluation of generalized weakness and multiple falls.  For full HPI please see admission H&P from Dr. Ada Bruno dated 3/2/2024.     Physical deconditioning from medical illness, senile frailty, chronic debility  Falls at home  Generalized weakness  *Presented to ED with reports of generalized weakness that had been ongoing for the preceding several weeks. Lives at home.the day prior to admission his significant other found him on the floor.  Unclear how he fell.  On the day of admission he was unable to get out of the shower.  The fire department had to be called to the home to assist with lifting him out. At baseline, minimal ambulation at home, uses assistive device walker.  *In ED, was afebrile and hemodynamically stable.  O2 sat stable on room air.  Basic labs generally unremarkable, " mild SALLIE with Cr 1.34 but lytes, LFTs stable. WBC 13.5, hgb 12.6.  Viral swabs negative.  CXR negative.  Head CT without contrast showed no acute intracranial abnormality, was identified to have supratentorial ventriculomegaly which was presumed chronic/incidental given similar appearance on prior exam in 4/2023.  Margy registered observation however later transition to observation status.  Evaluated by physical therapy who recommended TCU on discharge, social work assisted in making arrangements.  Day of discharge patient doing well, ready for rehab, no new complaints, all questions answered.     Dyspnea on exertion likely multifactorial from deconditioning, CIERA not compliant with CPAP, underlying COPD and hx of lung carcinoma status post resection.  Leukocytosis possible reactive  CIERA, noncompliant with CPAP  COPD, not on chronic O2, not in exacerbation  History of PE, noncompliant with anticoagulation  Adenocarcinoma of lung s/p resection in 2016, gets surveillance imaging every 3 years.  *Reported ongoing shortness of breath for few months now.  Worse with minimal ambulation.  Reports unable to lie down flat, has been sleeping in a recliner.  Reports has not been using the CPAP.  Patient's partner reports he misses dose of blood thinners at least 2-3 doses a week.  Has not been taking his water pills regularly.  *As above, in ED was afebrile and O2 sats stable on RA. No wheezing on exam. WBC 13.5 but viral swabs and CXR neg. ProBNP nl. Trops mildly elevated with ongoing workup as below.   -- cont DOAC  -- supportive cares with inhalers and prn nebs  -- have stressed importance of compliance with CPAP use  -- consider outpatient sleep study and PFTs     ? Group B strep UTI  No urinary sx on admission but WBC 13.5 and UA abnl with trace leuk est with 21 WBCs, RBCs, bactiuria, squams and mucous. UCx growing 10-50K group B strep.  Pathogen seems somewhat unusual to be growing in the urine, ordered blood cultures x  2 to rule about possible bacteremia as a source of presenting complaints and abnormal UA.   - Follow up BCx, NGTD  - Started IV ceftriaxone, plan for discharge on Augmentin to TCU 3/6/24     Elevated troponin likely from demand ischemia in the setting of fall, kidney injury  *Denied any recent symptoms of chest pain or palpitations. Dyspnea as above.   *Trops mildly elevated but flat (42 -- 34). EKG nonischemic.  *ECHO EF 55-60%, no RWMA, no significant valvular abnormality  -on anticoagulation as above  -Monitored on telemetry without significant events, ok to discontinue     Suspected tinea corporis  Pruritic patch on posterior neck. Reports has been there many months. Has a dog at home.   - Initiate Topical miconazole x10 days      Hypertension  Hyperlipidemia  *Home meds reportedly include statin, lisinopril, Lasix and metoprolol.  *Intermittent noncompliance with home meds.   *Lisinopril and lasix held on admission dt SALLIE, statin held given obs status  -Continue PTA metoprolol  -Restarted PTA lisinopril at reduced dose 20 mg daily 3/4 and uptitrate back to PTA dose 40mg/d 3/5, with hold parameters  -Restart PTA Lasix at 1/2 dose, 20mg BID, repeat labs at TCU and likely increase back to 40mg BID dosing  -Continue PTA statin     Hypochloremic hyponatremia, improving  SALLIE, presumed pre-renal, resolved  *BMP on presentation with , Cl 95, Cr 1.3 (baseline Cr 1.0--1.1). BG 200s.   *Given IVFs on admission, nephrotoxic meds held as above. Cr improved back to baseline.   - Cont holding nephrotoxic meds  - Monitor BMP PRN  - Restarted PTA ACEi, slowly restarting PTA Lasix      Malignant carcinoid tumor  *Status post exploratory laparotomy, small bowel resection and removal of mesenteric mass.  *Follows at [facility]. No concerns this stay.      Chronic diarrhea, stable  *No s/sx suggestive of acute infection.   *Chronic and stable on PTA cholestyramine.  -- recommend use of barrier cream  -- monitor, if overt  "diarrhea will consider extending out stool studies     Non-insulin dependent type 2 diabete mellitus   *A1c 6.7 in 9/2023 --> 7.3 this stay. Manages with metformin 1000mg Q AM and 500mg Q PM  *BG into 200s on admission. Metformin held dt SALLIE.  Glucose trending up and SALLIE resolved, restarting metformin 3/4.  - Sliding scale insulin medium   - Continue PTA metformin, restarted once SALLIE resolved, increase to 1,000mg BID and monitor  - Better glycemic control after restarting PTA metformin      BPH  *Chronic and stable on finasteride.     History of arthritis.  Peripheral neuropathy  Cervical myelopathy  *Chronic and stable on PTA gabapentin.     Obesity with BMI of 34.  *Increase all-cause mortality and morbidity. Consider lifestyle modification with diet and exercise as able to.     Medication noncompliance.  *Patient reports forgets to take his medication at least 2-3 times a week.    *Will need to consider home RN at the time of discharge from TCU\".     Overall stabilized and patient discharged to the above TCU in stable condition for rehab.     Today: Patient being seen for TCU follow-up visit. Seen and examined in PT session. Up in w/c and exercising his lower extremity.  Energy not at baseline yet though able to ambulate short distances with therapy.  No pain or discomfort.  BP fluctuates. no chest pain or shortness of breath.  Eating and sleeping well.  Staff without concerns.    Allergies, and PMH/PSH reviewed in [facility] today.  REVIEW OF SYSTEMS:  4 point ROS including Respiratory, CV, GI and , other than that noted in the HPI,  is negative    Objective:   BP (!) 159/91   Pulse 70   Temp 98  F (36.7  C)   Resp 18   Ht 1.702 m (5' 7\")   Wt 99.3 kg (219 lb)   SpO2 94%   BMI 34.30 kg/m      Exam:  GENERAL APPEARANCE: NAD  RESPIRATORY: Clear to auscultation, even and unlabored, symmetrical chest wall expansion  CARDIOVASCULAR: RRR, no peripheral edema, S1/S2 normal  GASTROINTESTINAL: Soft, nontender, " nondistended, bowel sounds present x4 quadrants  NEUROLOGICAL: Alert and oriented   PSYCHOLOGICAL: Cooperative       Recent labs in [facility] reviewed by me today.     Assessment/Plan:  Fall  Generalized weakness  Deconditioning  -Lives at home with girlfriend at baseline, ongoing weakness for several weeks per chart review, uses walker at baseline, ambulating short distances in TCU  -Continue therapies, fall precautions, safe disposition     Dyspnea on exertion, resolved  History of COPD and CIERA (CPAP noncompliant)  Adenocarcinoma of lung s/p resection in 2016  History of PE  Hypertension  -Reports of shortness of breath for a few months PTA resolved now, medications noncompliance including not limited to Lasix, outpatient sleep study and PFTs recommended, BP fluctuates  -Increase Lasix to 40 mg twice daily  (home dose ), continue PTA Eliquis 2.5 mg twice a day, continue Trelegy Ellipta daily and PRN inhaler, as recommended PFTs and sleep study outpatient, CPAP as patient allows, monitor respiratory status     UTI  -Received IV ceftriaxone in the hospital and discharged with Augmentin BID for 7 days, no  symptoms in TCU  -Continue Augmentin as ordered, monitor for  symptoms and repeat UA/UC as indicated      MED REC REQUIRED  Post Medication Reconciliation Status: discharge medications reconciled and changed, per note/orders      Orders:  Increase Lasix to 40 mg twice daily  BMP      Electronically signed by:   Jayesh Mitchell DNP,APRCALOS,AGNP-BC.             The above note was completed in part using Dragon voice recognition software. Although reviewed after completion, some word and grammatical errors may occur. Please contact the author of this note with any questions.           Sincerely,        SELMA Sotelo CNP

## 2024-03-21 ENCOUNTER — TELEPHONE (OUTPATIENT)
Dept: INTERNAL MEDICINE | Facility: CLINIC | Age: 79
End: 2024-03-21
Payer: COMMERCIAL

## 2024-03-21 NOTE — TELEPHONE ENCOUNTER
Home Care is calling regarding an established patient with [facility].       Requesting orders from: Olegraio Castillo  Provider is following patient: No       Orders Requested    Physical Therapy  Request for initial certification (first set of orders)   Frequency:  1x/wk for 1 wks and then 2x/wk a week for 4 weeks and 1x/wk for 4 wks   To work on gait, balance, and home program         Speech Therapy  Request for initial evaluation and treatment (one time) (first set of orders)       Confirmed ok to leave a detailed message with call back.  Contact information confirmed and updated as needed.    Daylin Carroll RN

## 2024-03-21 NOTE — TELEPHONE ENCOUNTER
Left detailed message relaying providers approval for requested home care orders, and info to call clinic back if any questions.

## 2024-03-28 DIAGNOSIS — Z53.9 DIAGNOSIS NOT YET DEFINED: Primary | ICD-10-CM

## 2024-03-28 PROCEDURE — G0180 MD CERTIFICATION HHA PATIENT: HCPCS | Performed by: INTERNAL MEDICINE

## 2024-03-29 ENCOUNTER — TRANSFERRED RECORDS (OUTPATIENT)
Dept: HEALTH INFORMATION MANAGEMENT | Facility: CLINIC | Age: 79
End: 2024-03-29
Payer: COMMERCIAL

## 2024-04-01 ENCOUNTER — MEDICAL CORRESPONDENCE (OUTPATIENT)
Dept: HEALTH INFORMATION MANAGEMENT | Facility: CLINIC | Age: 79
End: 2024-04-01

## 2024-04-01 ENCOUNTER — OFFICE VISIT (OUTPATIENT)
Dept: INTERNAL MEDICINE | Facility: CLINIC | Age: 79
End: 2024-04-01
Payer: COMMERCIAL

## 2024-04-01 VITALS
SYSTOLIC BLOOD PRESSURE: 138 MMHG | HEART RATE: 75 BPM | TEMPERATURE: 97 F | OXYGEN SATURATION: 95 % | BODY MASS INDEX: 32.03 KG/M2 | WEIGHT: 204.5 LBS | RESPIRATION RATE: 16 BRPM | DIASTOLIC BLOOD PRESSURE: 76 MMHG

## 2024-04-01 DIAGNOSIS — Z85.118 HISTORY OF ADENOCARCINOMA OF LUNG: ICD-10-CM

## 2024-04-01 DIAGNOSIS — N39.0 URINARY TRACT INFECTION WITHOUT HEMATURIA, SITE UNSPECIFIED: ICD-10-CM

## 2024-04-01 DIAGNOSIS — G47.33 OBSTRUCTIVE SLEEP APNEA SYNDROME: ICD-10-CM

## 2024-04-01 DIAGNOSIS — I10 ESSENTIAL HYPERTENSION, BENIGN: ICD-10-CM

## 2024-04-01 DIAGNOSIS — E11.9 TYPE 2 DIABETES MELLITUS WITHOUT COMPLICATION, WITHOUT LONG-TERM CURRENT USE OF INSULIN (H): ICD-10-CM

## 2024-04-01 DIAGNOSIS — C7A.019 MALIGNANT CARCINOID TUMOR OF SMALL INTESTINE, UNSPECIFIED LOCATION (H): ICD-10-CM

## 2024-04-01 DIAGNOSIS — E66.01 MORBID OBESITY DUE TO EXCESS CALORIES (H): ICD-10-CM

## 2024-04-01 DIAGNOSIS — Z09 HOSPITAL DISCHARGE FOLLOW-UP: Primary | ICD-10-CM

## 2024-04-01 PROCEDURE — 99214 OFFICE O/P EST MOD 30 MIN: CPT | Performed by: INTERNAL MEDICINE

## 2024-04-01 RX ORDER — LANOLIN ALCOHOL/MO/W.PET/CERES
1000 CREAM (GRAM) TOPICAL DAILY
COMMUNITY
End: 2024-07-22

## 2024-04-01 RX ORDER — DOCUSATE SODIUM 100 MG/1
100 CAPSULE, LIQUID FILLED ORAL PRN
COMMUNITY
End: 2024-08-29

## 2024-04-01 RX ORDER — MICONAZOLE NITRATE 20 MG/G
CREAM TOPICAL 2 TIMES DAILY
Qty: 30 G | Refills: 1 | Status: ON HOLD | OUTPATIENT
Start: 2024-04-01 | End: 2024-07-09

## 2024-04-01 NOTE — PROGRESS NOTES
Assessment & Plan     Hospital discharge follow-up  Appears overall stable.  Refilled cream for patient noted buttock rash.  - miconazole (MICATIN) 2 % external cream; Apply topically 2 times daily    Urinary tract infection without hematuria, site unspecified  Doing well and has fully completed oral antibiotic therapy.    Malignant carcinoid tumor of small intestine, unspecified location (H)  Following up with [facility] as directed    Essential hypertension, benign  Stable and continue with medical management as ordered    Morbid obesity due to excess calories (H)  Weight continuing to decrease with diuresis    History of adenocarcinoma of lung  Recent CT scan chest and abdomen reviewed through [facility]    Obstructive sleep apnea syndrome  Offered sleep assessment and PFT evaluation but patient has declined    Type 2 diabetes mellitus without complication, without long-term current use of insulin (H)  Lab Results   Component Value Date    A1C 7.3 03/02/2024    A1C 6.7 09/12/2023    A1C 6.2 03/21/2023    A1C 6.7 08/30/2022    A1C 6.7 02/10/2022    A1C 6.8 03/17/2021    A1C 7.2 11/11/2020    A1C 7.5 09/20/2020    A1C 7.6 06/18/2020    A1C 7.8 01/23/2020     Continue with current medical management considering clinical course      MED REC REQUIRE  Post Medication Reconciliation Status: discharge medications reconciled, continue medications without change    Work on weight loss  Regular exercise    Mariano Garnica is a 78 year old, presenting for the following health issues:  Hospital F/U    HPI     Patient showed up late for his appointment but was still seen.    Nauhn is once again accompanied by his wife.  He is using a wheeled walker.  He states that overall he feels well.  He is tolerating his medication.  He has just come back from his every 3-month follow-up imaging studies at the [facility] which were reviewed with him    He denies any significant shortness of breath.  He is tolerating oral  "intake.  He has no urinary complaints.    His blood sugars have been essentially stable.    He does not need any refills of his additional medications.    We reviewed his immunizations.    He recently had lab work done also through the [facility].    Hospital Follow-up Visit:    Hospital/Nursing Home/IP Rehab Facility: [facility]   Date of Admission: 3/2/24  Date of Discharge: 3/20/24  Reason(s) for Admission: Weakness, gait instability     Was your hospitalization related to COVID-19? No   Problems taking medications regularly:  compliance issue  Medication changes since discharge: None  Problems adhering to non-medication therapy:  yes    Summary of hospitalization:  [facility] discharge summary reviewed  Diagnostic Tests/Treatments reviewed.  Follow up needed: as ordered  Other Healthcare Providers Involved in Patient s Care:         None  Update since discharge: stable.         Plan of care communicated with patient     Hospital course:  \"78 year old male with PMHx of hypertension, hyperlipidemia, DM II, obesity, CIERA on CPAP, COPD, history of PE, malignant carcinoid tumor, lung carcinoma, peripheral neuropathy, cervical myelopathy who was admitted on 3/2/2024 for evaluation of generalized weakness and multiple falls.  For full HPI please see admission H&P from Dr. Ada Bruno dated 3/2/2024.     Physical deconditioning from medical illness, senile frailty, chronic debility  Falls at home  Generalized weakness  *Presented to ED with reports of generalized weakness that had been ongoing for the preceding several weeks. Lives at home.the day prior to admission his significant other found him on the floor.  Unclear how he fell.  On the day of admission he was unable to get out of the shower.  The fire department had to be called to the home to assist with lifting him out. At baseline, minimal ambulation at home, uses assistive device " walker.  *In ED, was afebrile and hemodynamically stable.  O2 sat stable on room air.  Basic labs generally unremarkable, mild SALLIE with Cr 1.34 but lytes, LFTs stable. WBC 13.5, hgb 12.6.  Viral swabs negative.  CXR negative.  Head CT without contrast showed no acute intracranial abnormality, was identified to have supratentorial ventriculomegaly which was presumed chronic/incidental given similar appearance on prior exam in 4/2023.  Margy registered observation however later transition to observation status.  Evaluated by physical therapy who recommended TCU on discharge, social work assisted in making arrangements.  Day of discharge patient doing well, ready for rehab, no new complaints, all questions answered.     Dyspnea on exertion likely multifactorial from deconditioning, CIERA not compliant with CPAP, underlying COPD and hx of lung carcinoma status post resection.  Leukocytosis possible reactive  CIERA, noncompliant with CPAP  COPD, not on chronic O2, not in exacerbation  History of PE, noncompliant with anticoagulation  Adenocarcinoma of lung s/p resection in 2016, gets surveillance imaging every 3 years.  *Reported ongoing shortness of breath for few months now.  Worse with minimal ambulation.  Reports unable to lie down flat, has been sleeping in a recliner.  Reports has not been using the CPAP.  Patient's partner reports he misses dose of blood thinners at least 2-3 doses a week.  Has not been taking his water pills regularly.  *As above, in ED was afebrile and O2 sats stable on RA. No wheezing on exam. WBC 13.5 but viral swabs and CXR neg. ProBNP nl. Trops mildly elevated with ongoing workup as below.   -- cont DOAC  -- supportive cares with inhalers and prn nebs  -- have stressed importance of compliance with CPAP use  -- consider outpatient sleep study and PFTs     ? Group B strep UTI  No urinary sx on admission but WBC 13.5 and UA abnl with trace leuk est with 21 WBCs, RBCs, bactiuria, squams and  mucous. UCx growing 10-50K group B strep.  Pathogen seems somewhat unusual to be growing in the urine, ordered blood cultures x 2 to rule about possible bacteremia as a source of presenting complaints and abnormal UA.   - Follow up BCx, NGTD  - Started IV ceftriaxone, plan for discharge on Augmentin to TCU 3/6/24     Elevated troponin likely from demand ischemia in the setting of fall, kidney injury  *Denied any recent symptoms of chest pain or palpitations. Dyspnea as above.   *Trops mildly elevated but flat (42 -- 34). EKG nonischemic.  *ECHO EF 55-60%, no RWMA, no significant valvular abnormality  -on anticoagulation as above  -Monitored on telemetry without significant events, ok to discontinue     Suspected tinea corporis  Pruritic patch on posterior neck. Reports has been there many months. Has a dog at home.   - Initiate Topical miconazole x10 days      Hypertension  Hyperlipidemia  *Home meds reportedly include statin, lisinopril, Lasix and metoprolol.  *Intermittent noncompliance with home meds.   *Lisinopril and lasix held on admission dt SALLIE, statin held given obs status  -Continue PTA metoprolol  -Restarted PTA lisinopril at reduced dose 20 mg daily 3/4 and uptitrate back to PTA dose 40mg/d 3/5, with hold parameters  -Restart PTA Lasix at 1/2 dose, 20mg BID, repeat labs at TCU and likely increase back to 40mg BID dosing  -Continue PTA statin     Hypochloremic hyponatremia, improving  SALLIE, presumed pre-renal, resolved  *BMP on presentation with , Cl 95, Cr 1.3 (baseline Cr 1.0--1.1). BG 200s.   *Given IVFs on admission, nephrotoxic meds held as above. Cr improved back to baseline.   - Cont holding nephrotoxic meds  - Monitor BMP PRN  - Restarted PTA ACEi, slowly restarting PTA Lasix      Malignant carcinoid tumor  *Status post exploratory laparotomy, small bowel resection and removal of mesenteric mass.  *Follows at [facility]. No concerns this stay.      Chronic diarrhea, stable  *No s/sx  "suggestive of acute infection.   *Chronic and stable on PTA cholestyramine.  -- recommend use of barrier cream  -- monitor, if overt diarrhea will consider extending out stool studies     Non-insulin dependent type 2 diabete mellitus   *A1c 6.7 in 9/2023 --> 7.3 this stay. Manages with metformin 1000mg Q AM and 500mg Q PM  *BG into 200s on admission. Metformin held dt SALLIE.  Glucose trending up and SALLIE resolved, restarting metformin 3/4.  - Sliding scale insulin medium   - Continue PTA metformin, restarted once SALLIE resolved, increase to 1,000mg BID and monitor  - Better glycemic control after restarting PTA metformin      BPH  *Chronic and stable on finasteride.     History of arthritis.  Peripheral neuropathy  Cervical myelopathy  *Chronic and stable on PTA gabapentin.     Obesity with BMI of 34.  *Increase all-cause mortality and morbidity. Consider lifestyle modification with diet and exercise as able to.     Medication noncompliance.  *Patient reports forgets to take his medication at least 2-3 times a week.    *Will need to consider home RN at the time of discharge from TCU\".     Other concerns:  Discuss what dosage or furosemide to continue   Requesting miconazole nitrate 2%     Review of Systems  CONSTITUTIONAL: NEGATIVE for fever, chills, change in weight  EYES: NEGATIVE for vision changes or irritation  ENT/MOUTH: NEGATIVE for ear, mouth and throat problems  RESP: NEGATIVE for significant cough or SOB  CV: NEGATIVE for chest pain, palpitations or peripheral edema  GI: NEGATIVE for nausea, abdominal pain, heartburn, or change in bowel habits  : NEGATIVE for frequency, dysuria, or hematuria  MUSCULOSKELETAL: NEGATIVE for significant arthralgias or myalgia  NEURO: NEGATIVE for weakness, dizziness or paresthesias  ENDOCRINE: NEGATIVE for temperature intolerance, skin/hair changes  HEME: NEGATIVE for bleeding problems  PSYCHIATRIC: NEGATIVE for changes in mood or affect    Current Outpatient Medications "   Medication    acetaminophen (TYLENOL) 500 MG tablet    albuterol (VENTOLIN HFA) 108 (90 Base) MCG/ACT inhaler    apixaban ANTICOAGULANT (ELIQUIS) 2.5 MG tablet    Ascorbic Acid (VITAMIN C PO)    atorvastatin (LIPITOR) 20 MG tablet    blood glucose (ACCU-CHEK CHACHA) test strip    blood glucose (NO BRAND SPECIFIED) lancets standard    blood glucose (NO BRAND SPECIFIED) test strip    blood glucose monitoring (NO BRAND SPECIFIED) meter device kit    Calcium Carbonate Antacid (TUMS PO)    cholestyramine light (QUESTRAN) 4 GM packet    cyanocobalamin (VITAMIN B-12) 1000 MCG tablet    docusate sodium (DSS) 100 MG capsule    finasteride (PROSCAR) 5 MG tablet    Fluticasone-Umeclidin-Vilant (TRELEGY ELLIPTA) 100-62.5-25 MCG/ACT oral inhaler    furosemide (LASIX) 20 MG tablet    gabapentin (NEURONTIN) 300 MG capsule    lisinopril (ZESTRIL) 40 MG tablet    metFORMIN (GLUCOPHAGE) 1000 MG tablet    metoprolol succinate ER (TOPROL XL) 25 MG 24 hr tablet    order for DME    order for DME    vitamin B-Complex     No current facility-administered medications for this visit.           Objective    /76   Pulse 75   Temp 97  F (36.1  C) (Temporal)   Resp 16   Wt 92.8 kg (204 lb 8 oz)   SpO2 95%   BMI 32.03 kg/m    Body mass index is 32.03 kg/m .    Physical Exam   GENERAL: alert and no distress using walker.  EYES: Eyes grossly normal to inspection, PERRL and conjunctivae and sclerae normal  HENT: ear canals and TM's normal, nose and mouth without ulcers or lesions  RESP: lungs clear to auscultation - no rales, rhonchi or wheezes  CV: regular rate and rhythm, normal S1 S2, no S3 or S4, no murmur, click or rub, no peripheral edema  ABDOMEN: obese  MS: no gross musculoskeletal defects noted, noted trace edema  NEURO: No focal changes  PSYCH: mentation appears normal, affect normal/bright        LDL Cholesterol Calculated   Date Value Ref Range Status   09/12/2023 58 <=100 mg/dL Final   03/17/2021 46 <100 mg/dL Final      Comment:     Desirable:       <100 mg/dl         Signed Electronically by: Olegario Castillo MD

## 2024-04-02 ENCOUNTER — MEDICAL CORRESPONDENCE (OUTPATIENT)
Dept: HEALTH INFORMATION MANAGEMENT | Facility: CLINIC | Age: 79
End: 2024-04-02
Payer: COMMERCIAL

## 2024-04-05 ENCOUNTER — PATIENT OUTREACH (OUTPATIENT)
Dept: NURSING | Facility: CLINIC | Age: 79
End: 2024-04-05
Payer: COMMERCIAL

## 2024-04-05 ASSESSMENT — ACTIVITIES OF DAILY LIVING (ADL): DEPENDENT_IADLS:: CLEANING;COOKING;LAUNDRY;SHOPPING;MEAL PREPARATION

## 2024-04-05 NOTE — LETTER
[facility]  Patient Centered Plan of Care  About Me:        Patient Name:  Nahun Villalobos    YOB: 1945  Age:         78 year old   Sukumar MRN:    [id number] Telephone Information:  Home Phone [phone]   Mobile [phone]       Address:  [address] [address] Email address:  [email or web address]      Emergency Contact(s)    Name Relationship Lgl Grd Work Phone Home Phone Mobile Phone   1. SINDY CHRISTIE Daughter No   [phone]   2. SUMAN MOORE Significant ot*    [phone]           Primary language:  English     needed? No   [employer]:  [phone] op. 1  Other communication barriers:None    Preferred Method of Communication:  William  Current living arrangement: I live in a private home with spouse (Suman, s.o.)    Mobility Status/ Medical Equipment: Independent w/Device    Health Maintenance  Health Maintenance Reviewed: Due/Overdue   Health Maintenance Due   Topic Date Due    HF ACTION PLAN  Never done    IPV IMMUNIZATION (2 of 3 - Adult catch-up series) 01/03/2011    DIABETIC FOOT EXAM  11/01/2019    MICROALBUMIN  05/20/2023    DTAP/TDAP/TD IMMUNIZATION (2 - Td or Tdap) 08/09/2023     My Access Plan  Medical Emergency 911   Primary Clinic Line [facility] - [phone]   24 Hour Appointment Line [phone] or  [phone]) (toll-free)   24 Hour Nurse Line [phone] (toll-free)   Preferred Urgent Care [facility], [phone]     Preferred Hospital [facility]  [phone] ([facility]; was at  3/2023)     Preferred Pharmacy [id number] - [address] AT [address] [place] [address]     Behavioral Health Crisis Line The National Suicide Prevention Lifeline at 1-386.693.1376 or Text/Call 068     My Care Team Members  Patient Care Team         Relationship  Specialty Notifications Start End    Olegario Castillo MD PCP - General Internal Medicine  4/6/22     Phone: [phone] Fax: [phone]         [address] [place] [address]    Olegario Castillo MD Assigned PCP   10/4/12     Phone: [phone] Fax: [phone]         [address] [place] [address]    Praveena Burris MD MD Urology  3/1/17      INACTIVE IN [place] AS OF 4/30/2021    Areli Cordero, RN Registered Nurse   3/1/17     Phone: [phone]         Kelley Slaughter CHW Community Health Worker Primary Care - [facility] Admissions 9/24/20     Phone: [phone]         Rhea Knapp RN Lead Care Coordinator  Admissions 9/24/20     Jaswinder Tinajero MD MD Hematology & Oncology  12/7/20     Phone: [phone] Fax: [phone]         [facility] [address] [facility] [address] [facility] [address]    Bernardo Haynes MD MD Pulmonary Disease  12/7/20     Phone: [phone] Fax: [phone]         [place] [facility] [address] [facility] [address] [facility] [address]    Antonia Mandel, PT Physical Therapist Physical Therapist  8/2/21     Phone: [phone] Fax: [phone]         [address] [facility] [address]    Jamil Aparicio MD MD Neurology  2/2/22     Phone: [phone] Fax: [phone] [address] [place] [address]    Jemal Vickers MD Assigned Musculoskeletal Provider   1/14/23     Phone: [phone] Fax: [phone] [address] [facility] [address]    Kaleigh Onofre MD MD Urology  2/1/23     Phone: [phone] Fax: [phone]         [address] [place] [address]    Kaleigh Onofre MD Assigned Surgical Provider   3/11/23     Phone: [phone] Fax: [phone]         [address] [place] [address]                My Care Plans  Self Management and Treatment Plan    Care Plan  Care Plan: 1. Improve chronic symptoms       Problem: Lifestyle choices       Goal: I want to improve  management of my leg, knee, and hip pain and swelling.       Start Date: 6/3/2021 Expected End Date: 7/5/2024    This Visit's Progress: 80% Recent Progress: 90%    Note:     Barriers: Lymphedema  Strengths: Motivated to improve ability to walk  Patient expressed understanding of goal: Yes  Action steps to achieve this goal:  1. I will apply Jacinto hose to wear throughout the day and remove at night  2. I will walk my dog daily to help with strengthening and endurance  3. I will elevate my legs while sitting and laying down by propping them up with a pillow  4. I will discuss, review, schedule and complete recommended overdue health maintenance with my primary care provider  5. I will I will take my medications as prescribed  6. I will contact my care team with questions, concerns, support needs. I will use the clinic as a resource   7. I will contact my clinic with 24/7 after hours services available                            Action Plans on File:   COPD  Depression    Advance Care Plans/Directives:   Advanced Care Plan/Directives on file:   No    Discussed with patient/caregiver(s): No data recorded           My Medical and Care Information  Problem List   Patient Active Problem List   Diagnosis    Essential hypertension, benign    Tobacco use disorder    Lumbago    Impotence of organic origin    Advance care planning    Polyp of colon    Obstructive sleep apnea syndrome    Hyperlipidemia LDL goal <100    Morbid obesity due to excess calories (H)    BPPV (benign paroxysmal positional vertigo), unspecified laterality    Chronic obstructive pulmonary disease, unspecified COPD type (H)    Type 2 diabetes mellitus without complication, without long-term current use of insulin (H)    S/P transurethral resection of prostate    Hematuria, gross    Cervical segment dysfunction    Cervicalgia    Thoracic segment dysfunction    Erectile dysfunction    Acute diverticulitis    Other pulmonary embolism without acute cor  pulmonale, unspecified chronicity (H)    Mesenteric mass    History of adenocarcinoma of lung    Benign neoplasm of ascending colon    Benign neoplasm of rectum    Diverticular disease of colon    History of colonic polyps    Non-small cell lung cancer (H)    Malignant neoplasm metastatic to bone (H)    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, unspecified portion (H)    Weakness    Gait instability    Alteration in skin integrity due to moisture      Current Medications and Allergies:   Allergies   Allergen Reactions    No Known Drug Allergy      Current Outpatient Medications   Medication Sig Dispense Refill    acetaminophen (TYLENOL) 500 MG tablet Take 1,000 mg by mouth 2 times daily      albuterol (VENTOLIN HFA) 108 (90 Base) MCG/ACT inhaler INHALE 2 PUFFS INTO THE LUNGS EVERY 6 HOURS AS NEEDED FOR SHORTNESS OF BREATH / DYSPNEA OR WHEEZING 25.5 g 0    apixaban ANTICOAGULANT (ELIQUIS) 2.5 MG tablet Take 1 tablet (2.5 mg) by mouth 2 times daily      Ascorbic Acid (VITAMIN C PO) Take 500 mg by mouth At Bedtime       atorvastatin (LIPITOR) 20 MG tablet TAKE 1 TABLET DAILY 90 tablet 3    blood glucose (ACCU-CHEK CHACHA) test strip Use to test blood sugar 2 times daily or as directed. 60 strip 6    blood glucose (NO BRAND SPECIFIED) lancets standard Use to test blood sugar 1 time daily, first thing in the morning (Patient taking differently: as needed Use to test blood sugar 1 time daily, first thing in the morning) 50 each 3    blood glucose (NO BRAND SPECIFIED) test strip Use to test blood sugar 1 time daily, first thing in the morning. 50 strip 11    blood glucose monitoring (NO BRAND SPECIFIED) meter device kit Use to test blood sugar 1 time daily, first thing in the morning 1 kit 0    Calcium Carbonate Antacid (TUMS PO) Take 500 mg by mouth as needed      cholestyramine light (QUESTRAN) 4 GM packet Take 1 packet (4 g) by mouth 2 times daily 180 each 3    cyanocobalamin (VITAMIN B-12) 1000 MCG tablet Take  1,000 mcg by mouth daily      docusate sodium (DSS) 100 MG capsule Take 100 mg by mouth as needed for constipation      finasteride (PROSCAR) 5 MG tablet Take 1 tablet (5 mg) by mouth daily 90 tablet 3    Fluticasone-Umeclidin-Vilant (TRELEGY ELLIPTA) 100-62.5-25 MCG/ACT oral inhaler Inhale 1 puff into the lungs daily 28 each 0    furosemide (LASIX) 20 MG tablet Take 2 tablets (40 mg) by mouth 2 times daily      gabapentin (NEURONTIN) 300 MG capsule Take 1 capsule (300 mg) by mouth 3 times daily (Patient taking differently: 300mg in the morning and 600mg in the evening.) 90 capsule 0    lisinopril (ZESTRIL) 40 MG tablet TAKE 1 TABLET DAILY 90 tablet 3    metFORMIN (GLUCOPHAGE) 1000 MG tablet Take 1 tablet (1,000 mg) by mouth 2 times daily (with meals)      metoprolol succinate ER (TOPROL XL) 25 MG 24 hr tablet Take 1 tablet (25 mg) by mouth daily 90 tablet 3    miconazole (MICATIN) 2 % external cream Apply topically 2 times daily 30 g 1    order for DME Equipment being ordered: diabetic shoes, 1 pair 1 Package 0    order for DME Oxygen 2 Li/min  at night via nasal cannula 1 Device 0    vitamin B-Complex Take 1 tablet by mouth daily       No current facility-administered medications for this visit.         Care Coordination Start Date: 9/24/2020   Frequency of Care Coordination: 6 weeks, more frequently as needed     Form Last Updated: 04/05/2024

## 2024-04-05 NOTE — PROGRESS NOTES
"Clinic Care Coordination Contact  Follow Up Progress Note      Assessment:   Patient reports he's doing \"much better\" since hospitalization and Transitional Care Unit stay.  Patient states he uses Oxygen (2 Lpm per NC) when out walking with the assist of a standard walker.  Patient states, s.o., Kenia, helps with his medications and blood sugar checks-although they haven't been consistent with checking his glucose levels.    Patient denies any GI//Respiratory/chest pain and denies any hyper/hypoglycemic events. Patient reports he uses his standard walker more than his rolling walker as the rolling walker is \"dangerous\".  Patient has seen his Primary Care Provider s/p Transitional Care Unit admission.  Patient has no other questions or concerns at this time.    Care Gaps:    Health Maintenance Due   Topic Date Due    HF ACTION PLAN  Never done    IPV IMMUNIZATION (2 of 3 - Adult catch-up series) 01/03/2011    DIABETIC FOOT EXAM  11/01/2019    MICROALBUMIN  05/20/2023    DTAP/TDAP/TD IMMUNIZATION (2 - Td or Tdap) 08/09/2023       Care Gaps Last addressed on 4/5/2024    Care Plans  Care Plan: 1. Improve chronic symptoms       Problem: Lifestyle choices       Goal: I want to improve management of my leg, knee, and hip pain and swelling.       Start Date: 6/3/2021 Expected End Date: 7/5/2024    This Visit's Progress: 80% Recent Progress: 90%    Note:     Barriers: Lymphedema  Strengths: Motivated to improve ability to walk  Patient expressed understanding of goal: Yes  Action steps to achieve this goal:  1. I will apply Jacinto hose to wear throughout the day and remove at night  2. I will walk my dog daily to help with strengthening and endurance  3. I will elevate my legs while sitting and laying down by propping them up with a pillow  4. I will discuss, review, schedule and complete recommended overdue health maintenance with my primary care provider  5. I will I will take my medications as prescribed  6. I will contact " my care team with questions, concerns, support needs. I will use the clinic as a resource   7. I will contact my clinic with 24/7 after hours services available                            Intervention/Education provided during outreach:   RNCC called and spoke with patient/caregiver; introduced self, discussed role of Care Coordination and explained reason for call    Plan:   -Patient will contact the care team with questions, concerns, support needs   -Patient will use the clinic as a resource and understands (s)he can contact the [facility] with 24/7 after hours services available  -Care Coordinator will remain available as needed  -CHW will continue outreaches at minimum every 30 days and will involve Lead CC as needed or if patient is ready to move to Maintenance  -RNCC will continue to monitor CHW outreaches and patient's progress to goal(s) every 6 weeks.     Rhea Knapp RN, BSN, PHN  Primary Care / Care Coordinator   [facility]  [email or web address] [email or web address]   [phone]

## 2024-04-23 ENCOUNTER — TELEPHONE (OUTPATIENT)
Dept: INTERNAL MEDICINE | Facility: CLINIC | Age: 79
End: 2024-04-23
Payer: COMMERCIAL

## 2024-04-23 NOTE — TELEPHONE ENCOUNTER
Not familiar with the patient's current study that he is involved with.  If they want him to stop medication prior to blood draw per study I think that is fine as long as he restarts accordingly.  Suggest patient be compliant with Lasix therapy as edema may be chronic in nature.  Suggest support hose and leg elevation.

## 2024-04-23 NOTE — TELEPHONE ENCOUNTER
Pt verified name and date of birth. Relayed provider message. Pt verbalizes understanding and agrees to plan of care.

## 2024-04-23 NOTE — TELEPHONE ENCOUNTER
"Patient and spouse Kenia calling clinic seeking PCP recommendations. First, patient is taking part in a clinical study known as a \"Hats Memory Study\" and the clinic is requesting patient stop taking his Eliquis 2 days prior to the lab draw on Thursday. Patient is seeking PCP recommendations if he should stop Eliquis.     Patient is also reporting increased swelling of both legs. Kenia reported patient has missed \"about 3 doses\" of lasix over the last 3 days, patient did not report any additional symptoms. RN advised patient to make sure to take lasix dose today and that message will be routed to PCP.     Routing to PCP for review. Patient did not report any additional fluid retention symptoms. Do you have any additional recommendations for this patient? Thank you so much!  "

## 2024-04-29 ENCOUNTER — TRANSFERRED RECORDS (OUTPATIENT)
Dept: HEALTH INFORMATION MANAGEMENT | Facility: CLINIC | Age: 79
End: 2024-04-29
Payer: COMMERCIAL

## 2024-05-01 ENCOUNTER — MEDICAL CORRESPONDENCE (OUTPATIENT)
Dept: HEALTH INFORMATION MANAGEMENT | Facility: CLINIC | Age: 79
End: 2024-05-01
Payer: COMMERCIAL

## 2024-05-07 DIAGNOSIS — G62.9 PERIPHERAL POLYNEUROPATHY: ICD-10-CM

## 2024-05-07 DIAGNOSIS — E11.9 TYPE 2 DIABETES MELLITUS WITHOUT COMPLICATION, WITHOUT LONG-TERM CURRENT USE OF INSULIN (H): ICD-10-CM

## 2024-05-08 RX ORDER — GABAPENTIN 300 MG/1
300 CAPSULE ORAL 3 TIMES DAILY
Qty: 270 CAPSULE | Refills: 1 | Status: ON HOLD | OUTPATIENT
Start: 2024-05-08 | End: 2024-07-09

## 2024-05-08 NOTE — TELEPHONE ENCOUNTER
Called and clarified with patient he is on 1000MG metformin 2 times daily     He is also needing gabapentin 300mg 3 times daily refilled

## 2024-05-08 NOTE — TELEPHONE ENCOUNTER
Triage,    Can we please call the patient and clarify what dose and frequency he is currently taking of this medication? Once confirmed, please send to PCP for approval.     Thank you,  Leslie Summers RN

## 2024-05-09 ENCOUNTER — VIRTUAL VISIT (OUTPATIENT)
Dept: INTERNAL MEDICINE | Facility: CLINIC | Age: 79
End: 2024-05-09
Payer: COMMERCIAL

## 2024-05-09 DIAGNOSIS — J44.9 CHRONIC OBSTRUCTIVE PULMONARY DISEASE, UNSPECIFIED COPD TYPE (H): ICD-10-CM

## 2024-05-09 DIAGNOSIS — I10 ESSENTIAL HYPERTENSION, BENIGN: ICD-10-CM

## 2024-05-09 DIAGNOSIS — E11.9 TYPE 2 DIABETES MELLITUS WITHOUT COMPLICATION, WITHOUT LONG-TERM CURRENT USE OF INSULIN (H): Primary | ICD-10-CM

## 2024-05-09 DIAGNOSIS — G62.9 PERIPHERAL POLYNEUROPATHY: ICD-10-CM

## 2024-05-09 DIAGNOSIS — C7A.019 MALIGNANT CARCINOID TUMOR OF SMALL INTESTINE, UNSPECIFIED LOCATION (H): ICD-10-CM

## 2024-05-09 DIAGNOSIS — E66.01 MORBID OBESITY DUE TO EXCESS CALORIES (H): ICD-10-CM

## 2024-05-09 PROCEDURE — 99442 PR PHYSICIAN TELEPHONE EVALUATION 11-20 MIN: CPT | Mod: 93 | Performed by: INTERNAL MEDICINE

## 2024-05-09 NOTE — PROGRESS NOTES
Nahun is a 78 year old who is being evaluated via a billable telephone visit.    What phone number would you like to be contacted at? [phone]   How would you like to obtain your AVS? William  Originating Location (pt. Location): Home    Distant Location (provider location):  On-site    Assessment & Plan     Type 2 diabetes mellitus without complication, without long-term current use of insulin (H)  Lab Results   Component Value Date    A1C 7.3 03/02/2024    A1C 6.7 09/12/2023    A1C 6.2 03/21/2023    A1C 6.7 08/30/2022    A1C 6.7 02/10/2022    A1C 6.8 03/17/2021    A1C 7.2 11/11/2020    A1C 7.5 09/20/2020    A1C 7.6 06/18/2020    A1C 7.8 01/23/2020     Stable and continue with current medical management.    Peripheral polyneuropathy  Likely component leading to ambulatory restrictions suggest wheelchair for support and reduce risk of fall  - Wheelchair Order for DME - ONLY FOR DME    Morbid obesity due to excess calories (H)  Obesity likely impacting ambulatory status and risk for falls.  - Wheelchair Order for DME - ONLY FOR DME    Essential hypertension, benign  Patient has not been fully compliant with therapy.  Advised patient to continue with compliance and follow-up as directed.    Chronic obstructive pulmonary disease, unspecified COPD type (H)  Baseline history, continuing with inhaler therapy as directed    Malignant carcinoid tumor of small intestine, unspecified location (H)  Follow-up with [facility] as previously directed.        See Patient Instructions    Subjective   Nahun is a 78 year old, presenting for the following health issues:  Leg Problem    Ongoing leg weakness and swelling. Patient has also noticed twitching in right leg while laying down to go to sleep. Was suggested to patient that he get a wheelchair that he is able to self-propel by PT.     Other concerns:  1. Patient was told to get a wheelchair that he can propel with his arms due to ongoing leg instability     The patient  has a mobility limitation that significantly impairs his/her ability to participate in one or more mobility-related activities of daily living (MRADLs).  The patient s mobility limitation cannot be sufficiently resolved by the use of an appropriately fitted cane or walker  The patient s home provides adequate access between rooms, maneuvering space and surfaces for the use of the manual wheelchair provided.  Use of a manual wheelchair will significantly improve the patient s ability to participate in MRADLs and the patient will use it on a regular basis in the home  The patient has not expressed an unwillingness to use the manual wheelchair that is provided in the home.  The patient has sufficient upper extremity function and other physical and mental capabilities needed to safely self-propel the manual wheelchair that is provided in the home during a typical day, OR the patient has a caregiver who is available, willing and able to provide assistance with the wheelchair when the patient has limitations.    Noted risk for falls.  Doing PT in past.    Review of Systems  CONSTITUTIONAL: NEGATIVE for fever, chills, change in weight  EYES: NEGATIVE for vision changes or irritation  ENT/MOUTH: NEGATIVE for ear, mouth and throat problems  GI: NEGATIVE for nausea, abdominal pain, heartburn, or change in bowel habits  : NEGATIVE for frequency, dysuria, or hematuria  NEURO: NEGATIVE for weakness, dizziness or paresthesias  HEME: NEGATIVE for bleeding problems  PSYCHIATRIC: NEGATIVE for changes in mood or affect      Objective       Vitals:  No vitals were obtained today due to virtual visit.    Physical Exam   General: Alert and no distress // Respiratory: No audible wheeze, cough, or shortness of breath // Psychiatric:  Appropriate affect, tone, and pace of words        Phone call duration: 20 minutes  Signed Electronically by: Olegario Castillo MD

## 2024-05-15 ENCOUNTER — MEDICAL CORRESPONDENCE (OUTPATIENT)
Dept: HEALTH INFORMATION MANAGEMENT | Facility: CLINIC | Age: 79
End: 2024-05-15
Payer: COMMERCIAL

## 2024-05-15 ENCOUNTER — PATIENT OUTREACH (OUTPATIENT)
Dept: CARE COORDINATION | Facility: CLINIC | Age: 79
End: 2024-05-15
Payer: COMMERCIAL

## 2024-05-15 NOTE — PROGRESS NOTES
Clinic Care Coordination Contact  Community Health Worker Follow Up    Care Gaps:     Health Maintenance Due   Topic Date Due    HF ACTION PLAN  Never done    IPV IMMUNIZATION (2 of 3 - Adult catch-up series) 2011    DIABETIC FOOT EXAM  2019    MICROALBUMIN  2023    DTAP/TDAP/TD IMMUNIZATION (2 - Td or Tdap) 2023    COVID-19 Vaccine (2023- season) 2024       Did Not Address    Care Plan:   Care Plan: 1. Improve chronic symptoms       Problem: Lifestyle choices       Goal: I want to improve management of my leg, knee, and hip pain and swelling.       Start Date: 6/3/2021 Expected End Date: 2024    This Visit's Progress: 80% Recent Progress: 80%    Note:     Barriers: Lymphedema  Strengths: Motivated to improve ability to walk  Patient expressed understanding of goal: Yes  Action steps to achieve this goal:  1. I will apply Jacinto hose to wear throughout the day and remove at night  2. I will walk my dog daily to help with strengthening and endurance  3. I will elevate my legs while sitting and laying down by propping them up with a pillow  4. I will discuss, review, schedule and complete recommended overdue health maintenance with my primary care provider  5. I will I will take my medications as prescribed  6. I will contact my care team with questions, concerns, support needs. I will use the clinic as a resource   7. I will contact my clinic with  after hours services available                          Discussed:  Patient stated [facility] extended the visits.  Patient stated he is having difficulty with walking.  Patient stated he uses a walker.  Patient stated his PCP placed an order for a wheelchair.  Patient stated he lost some weight.  Patient stated he has a life alert pendant.  Patient stated he takes his medications as prescribed.  Patient stated his cousin  yesterday. His cousin was 84 yrs old.  Patient stated he and Kenia will go to [place] next month.  Patient  thanked CHW for calling.    Intervention and Education during outreach:     CHW encouraged patient to contact CC if there are any other changes or resources needed.  Patient acknowledged understanding.     CHW Plan: will outreach in one month.    OMAR Kim  Clinic Care Coordination  [facility] Clinics: [facility] [place], [facility], [place], and [place] [facility]  Phone: [phone]

## 2024-05-20 ENCOUNTER — MYC MEDICAL ADVICE (OUTPATIENT)
Dept: INTERNAL MEDICINE | Facility: CLINIC | Age: 79
End: 2024-05-20
Payer: COMMERCIAL

## 2024-05-21 NOTE — TELEPHONE ENCOUNTER
May inform patient that I do not see any reason why he could not take co-Q10 as long as the pharmacist reviews his medications and does not feel it would interfere with any of his anticoagulants.

## 2024-05-24 ENCOUNTER — PATIENT OUTREACH (OUTPATIENT)
Dept: CARE COORDINATION | Facility: CLINIC | Age: 79
End: 2024-05-24
Payer: COMMERCIAL

## 2024-05-24 NOTE — PROGRESS NOTES
Clinic Care Coordination Contact  Care Coordination Clinician Chart Review    Situation: Patient chart reviewed by Care Coordinator.       Background: Care Coordination Program started: 9/24/2020. Initial assessment completed and patient-centered care plan(s) were developed with participation from patient. Lead CC handed patient off to CHW for continued outreaches.       Assessment: Per chart review, patient outreach completed by CC CHW on 5/15/2024.  Patient is actively working to accomplish goal(s). Patient's goal(s) appropriate and relevant at this time. Patient is not due for updated Plan of Care.  Assessments will be completed annually or as needed/with change of patient status.      Care Plan: 1. Improve chronic symptoms       Problem: Lifestyle choices       Goal: I want to improve management of my leg, knee, and hip pain and swelling.       Start Date: 6/3/2021 Expected End Date: 7/5/2024    This Visit's Progress: 80% Recent Progress: 80%    Note:     Barriers: Lymphedema  Strengths: Motivated to improve ability to walk  Patient expressed understanding of goal: Yes  Action steps to achieve this goal:  1. I will apply Jacinto hose to wear throughout the day and remove at night  2. I will walk my dog daily to help with strengthening and endurance  3. I will elevate my legs while sitting and laying down by propping them up with a pillow  4. I will discuss, review, schedule and complete recommended overdue health maintenance with my primary care provider  5. I will I will take my medications as prescribed  6. I will contact my care team with questions, concerns, support needs. I will use the clinic as a resource   7. I will contact my clinic with 24/7 after hours services available                                 Plan/Recommendations: The patient will continue working with Care Coordination to achieve goal(s) as above. CHW will continue outreaches at minimum every 30 days and will involve Lead CC as needed or if  patient is ready to move to Maintenance. Lead CC will continue to monitor CHW outreaches and patient's progress to goal(s) every 6 weeks.     Plan of Care updated and sent to patient: Ilene Barr RN Care Coordination   [facility]  Email: [email or web address]  Phone: [phone]

## 2024-05-27 DIAGNOSIS — R60.9 EDEMA, UNSPECIFIED TYPE: ICD-10-CM

## 2024-05-29 ENCOUNTER — MEDICAL CORRESPONDENCE (OUTPATIENT)
Dept: HEALTH INFORMATION MANAGEMENT | Facility: CLINIC | Age: 79
End: 2024-05-29

## 2024-05-29 DIAGNOSIS — Z53.9 DIAGNOSIS NOT YET DEFINED: Primary | ICD-10-CM

## 2024-05-29 PROCEDURE — G0179 MD RECERTIFICATION HHA PT: HCPCS | Performed by: INTERNAL MEDICINE

## 2024-05-29 RX ORDER — FUROSEMIDE 40 MG
40 TABLET ORAL 2 TIMES DAILY
Qty: 180 TABLET | Refills: 3 | OUTPATIENT
Start: 2024-05-29

## 2024-06-02 DIAGNOSIS — E78.5 HYPERLIPIDEMIA LDL GOAL <100: ICD-10-CM

## 2024-06-03 RX ORDER — ATORVASTATIN CALCIUM 20 MG/1
TABLET, FILM COATED ORAL
Qty: 90 TABLET | Refills: 1 | Status: SHIPPED | OUTPATIENT
Start: 2024-06-03 | End: 2024-07-22

## 2024-06-06 DIAGNOSIS — I10 ESSENTIAL HYPERTENSION, BENIGN: ICD-10-CM

## 2024-06-06 RX ORDER — FUROSEMIDE 20 MG
40 TABLET ORAL 2 TIMES DAILY
Qty: 180 TABLET | Refills: 0 | OUTPATIENT
Start: 2024-06-06

## 2024-06-06 NOTE — TELEPHONE ENCOUNTER
Last OV 5/9/24. Last prescription written by geriatrics provider and prior to that PCP. Please advise if still needing additional appointment

## 2024-06-06 NOTE — TELEPHONE ENCOUNTER
Dr. Castillo,    Pharm calling requesting script.   This was rejected a week ago for originating prescriber to order.  The previous prescriber who updated script was Geriatrics discharge.  Previous to that, was prescribed by Dr. Castillo.    Pended for review

## 2024-06-10 RX ORDER — FUROSEMIDE 20 MG
40 TABLET ORAL 2 TIMES DAILY
Qty: 360 TABLET | Refills: 0 | Status: ON HOLD | OUTPATIENT
Start: 2024-06-10 | End: 2024-07-09

## 2024-06-12 ENCOUNTER — TELEPHONE (OUTPATIENT)
Dept: INTERNAL MEDICINE | Facility: CLINIC | Age: 79
End: 2024-06-12
Payer: COMMERCIAL

## 2024-06-13 NOTE — PROGRESS NOTES
"Clinic Care Coordination Contact  Community Health Worker Follow Up    Care Gaps:     Health Maintenance Due   Topic Date Due    HF ACTION PLAN  Never done    RSV VACCINE (Pregnancy & 60+) (1 - 1-dose 60+ series) Never done    IPV IMMUNIZATION (2 of 3 - Adult catch-up series) 01/03/2011    DIABETIC FOOT EXAM  11/01/2019    MICROALBUMIN  05/20/2023    DTAP/TDAP/TD IMMUNIZATION (2 - Td or Tdap) 08/09/2023    PHQ-2 (once per calendar year)  01/01/2024    FALL RISK ASSESSMENT  01/02/2024       Care Gaps Last addressed on 1/22/24    Care Plan:   Care Plan: 1. Improve chronic symptoms       Problem: Lifestyle choices       Goal: I want to improve management of my leg, knee, and hip pain and swelling.       Start Date: 6/3/2021 Expected End Date: 3/19/2024    This Visit's Progress: 90% Recent Progress: 90%    Note:     Barriers: Lymphedema  Strengths: Motivated to improve ability to walk  Patient expressed understanding of goal: Yes  Action steps to achieve this goal:  1. I will apply Jacinto hose to wear throughout the day and remove at night  2. I will walk my dog daily to help with strengthening and endurance  3. I will elevate my legs while sitting and laying down by propping them up with a pillow  4. I will discuss, review, schedule and complete recommended overdue health maintenance with my primary care provider  5. I will I will take my medications as prescribed  6. I will contact my care team with questions, concerns, support needs. I will use the clinic as a resource   7. I will contact my clinic with 24/7 after hours services available                          Discussed:  Patient stated he is having a difficult time walking.  Patient stated his walking is \"bad\".  Patient stated he had his first appointment with the Chiropractor today.  Patient stated the Chiropractor will help with his blood circulation in his legs.  Patient stated he has injections for his cancer once a month.  Patient stated he has stage " 4.  Patient stated he has a scan next week.  Patient stated his portable oxygen tank is working fine.  Patient stated he plans to take a trip to [place] in October to see his daughter.  His daughter recently moved to [place].  Patient stated his daughter is a Senior .  Patient stated he takes his medications as prescribed.        Intervention and Education during outreach:     CHW encouraged patient to contact CC if there are any other changes or resources needed.  Patient acknowledged understanding.      CHW Plan: will outreach in one month.    OMAR Kim  Clinic Care Coordination  [facility] Clinics: [facility] [place], [facility], [place], and [facility]  Phone: [phone]    Is This A New Presentation, Or A Follow-Up?: Skin Lesions How Severe Is Your Skin Lesion?: mild Has Your Skin Lesion Been Treated?: not been treated Additional History: Pt here for 6 mo skin check.

## 2024-06-17 ENCOUNTER — OFFICE VISIT (OUTPATIENT)
Dept: INTERNAL MEDICINE | Facility: CLINIC | Age: 79
End: 2024-06-17
Payer: COMMERCIAL

## 2024-06-17 VITALS
OXYGEN SATURATION: 95 % | HEIGHT: 67 IN | RESPIRATION RATE: 18 BRPM | DIASTOLIC BLOOD PRESSURE: 76 MMHG | HEART RATE: 88 BPM | WEIGHT: 204 LBS | BODY MASS INDEX: 32.02 KG/M2 | SYSTOLIC BLOOD PRESSURE: 132 MMHG

## 2024-06-17 DIAGNOSIS — J44.9 CHRONIC OBSTRUCTIVE PULMONARY DISEASE, UNSPECIFIED COPD TYPE (H): ICD-10-CM

## 2024-06-17 DIAGNOSIS — I10 ESSENTIAL HYPERTENSION, BENIGN: Primary | ICD-10-CM

## 2024-06-17 DIAGNOSIS — C7A.019 MALIGNANT CARCINOID TUMOR OF SMALL INTESTINE, UNSPECIFIED LOCATION (H): ICD-10-CM

## 2024-06-17 DIAGNOSIS — E66.01 MORBID OBESITY DUE TO EXCESS CALORIES (H): ICD-10-CM

## 2024-06-17 DIAGNOSIS — G62.9 PERIPHERAL POLYNEUROPATHY: ICD-10-CM

## 2024-06-17 DIAGNOSIS — E11.9 TYPE 2 DIABETES MELLITUS WITHOUT COMPLICATION, WITHOUT LONG-TERM CURRENT USE OF INSULIN (H): ICD-10-CM

## 2024-06-17 PROCEDURE — G2211 COMPLEX E/M VISIT ADD ON: HCPCS | Performed by: INTERNAL MEDICINE

## 2024-06-17 PROCEDURE — 99214 OFFICE O/P EST MOD 30 MIN: CPT | Performed by: INTERNAL MEDICINE

## 2024-06-17 NOTE — LETTER
June 17, 2024      Nahun Villalobos  [address] [facility] [address] [address]        To Whom It May Concern or Antony Brumfield:    Nahun Villalobos is a longstanding patient of mine in my internal medicine clinic.  It is my understanding that through your airline he has a upcoming flight that is scheduled to depart on October 14, 2024 and return on October 23, 2024.    Due to his underlying medical concerns it is my medical recommendation that he does not travel.  This is considered a permanent condition and restriction.    I would asked that you offer him a refund of his flight due to his inability to travel in relation to his underlying medical concerns.      Sincerely,        Olegario Castillo MD

## 2024-06-17 NOTE — PROGRESS NOTES
Assessment & Plan     Essential hypertension, benign  Stable on medical therapy and continue with meds as ordered.    Type 2 diabetes mellitus without complication, without long-term current use of insulin (H)  Lab Results   Component Value Date    A1C 7.3 03/02/2024    A1C 6.7 09/12/2023    A1C 6.2 03/21/2023    A1C 6.7 08/30/2022    A1C 6.7 02/10/2022    A1C 6.8 03/17/2021    A1C 7.2 11/11/2020    A1C 7.5 09/20/2020    A1C 7.6 06/18/2020    A1C 7.8 01/23/2020   With current therapy as directed with follow-up A1c at 6 months from      Peripheral polyneuropathy  Noted as baseline and subsequently impacting balance and gait    Morbid obesity due to excess calories (H)  Encouraged ongoing weight loss and weight reduction    Chronic obstructive pulmonary disease, unspecified COPD type (H)  Restrictive and continuing with current medical management    Malignant carcinoid tumor of small intestine, unspecified location (H)  Following up with oncology as directed with therapy as noted    Letter was done for the patient on behalf of his travel restrictions and his DMV forms were filled out.    See Patient Instructions    The longitudinal plan of care for the diagnosis(es)/condition(s) as documented were addressed during this visit. Due to the added complexity in care, I will continue to support Nahun in the subsequent management and with ongoing continuity of care.    Mariano Garnica is a 78 year old, presenting for the following health issues:  No Show    HPI     Initially was a no-show but then showed up 20 minutes late for his appointment and was still seen.      Patient is here for form to be filled out for DMV for Handicap plate and also a letter stating patient can't fly   Patient states that he was scheduled to fly to [place] in October of this year but was told by his lung specialist and his oncologist that flying for him would not be in his best interest.  Because of this he needs a letter from me  "regarding restriction in his travel to be sent to the airline and he also needs a form filled out so that he can get disability placed for his car from the [facility] [employer].      Review of Systems  CONSTITUTIONAL: NEGATIVE for fever, chills, change in weight  EYES: NEGATIVE for vision changes or irritation  ENT/MOUTH: NEGATIVE for ear, mouth and throat problems  RESP: NEGATIVE for significant cough or SOB  CV: NEGATIVE for chest pain, palpitations or peripheral edema  GI: NEGATIVE for nausea, abdominal pain, heartburn, or change in bowel habits  : NEGATIVE for frequency, dysuria, or hematuria  NEURO: NEGATIVE for weakness, dizziness or paresthesias  ENDOCRINE: NEGATIVE for temperature intolerance, skin/hair changes  HEME: NEGATIVE for bleeding problems  PSYCHIATRIC: NEGATIVE for changes in mood or affect      Objective    /76   Pulse 88   Resp 18   Ht 1.702 m (5' 7\")   Wt 92.5 kg (204 lb)   SpO2 95%   BMI 31.95 kg/m    Body mass index is 31.95 kg/m .  Physical Exam   GENERAL: alert and no distress  RESP: lungs clear to auscultation - no rales, rhonchi or wheezes  CV: regular rate and rhythm, normal S1 S2, no S3 or S4, no murmur, click or rub  ABDOMEN: obese  MS: Using a wheeled walker, gait is slow and antalgic.  NEURO: No focal changes  PSYCH: mentation appears normal, affect normal/bright            Signed Electronically by: Olegario Castillo MD      "

## 2024-06-24 ENCOUNTER — APPOINTMENT (OUTPATIENT)
Dept: CT IMAGING | Facility: CLINIC | Age: 79
DRG: 552 | End: 2024-06-24
Attending: EMERGENCY MEDICINE
Payer: COMMERCIAL

## 2024-06-24 ENCOUNTER — NURSE TRIAGE (OUTPATIENT)
Dept: CARE COORDINATION | Facility: CLINIC | Age: 79
End: 2024-06-24
Payer: COMMERCIAL

## 2024-06-24 ENCOUNTER — PATIENT OUTREACH (OUTPATIENT)
Dept: CARE COORDINATION | Facility: CLINIC | Age: 79
End: 2024-06-24
Payer: COMMERCIAL

## 2024-06-24 ENCOUNTER — HOSPITAL ENCOUNTER (INPATIENT)
Facility: CLINIC | Age: 79
LOS: 11 days | Discharge: NURSING, REHAB OR LONG-TERM CARE | DRG: 552 | End: 2024-07-09
Attending: EMERGENCY MEDICINE | Admitting: STUDENT IN AN ORGANIZED HEALTH CARE EDUCATION/TRAINING PROGRAM
Payer: COMMERCIAL

## 2024-06-24 DIAGNOSIS — S30.0XXA CONTUSION OF LOWER BACK, INITIAL ENCOUNTER: ICD-10-CM

## 2024-06-24 DIAGNOSIS — Z09 HOSPITAL DISCHARGE FOLLOW-UP: ICD-10-CM

## 2024-06-24 DIAGNOSIS — I10 ESSENTIAL HYPERTENSION, BENIGN: ICD-10-CM

## 2024-06-24 DIAGNOSIS — M79.2 NEUROPATHIC PAIN: ICD-10-CM

## 2024-06-24 DIAGNOSIS — C7A.019 MALIGNANT CARCINOID TUMOR OF THE SMALL INTESTINE, UNSPECIFIED PORTION (H): ICD-10-CM

## 2024-06-24 DIAGNOSIS — R52 PAIN: ICD-10-CM

## 2024-06-24 DIAGNOSIS — R23.9 ALTERATION IN SKIN INTEGRITY DUE TO MOISTURE: ICD-10-CM

## 2024-06-24 DIAGNOSIS — M54.50 ACUTE LEFT-SIDED LOW BACK PAIN WITHOUT SCIATICA: ICD-10-CM

## 2024-06-24 DIAGNOSIS — R26.81 GAIT INSTABILITY: Primary | ICD-10-CM

## 2024-06-24 PROCEDURE — 250N000013 HC RX MED GY IP 250 OP 250 PS 637: Performed by: EMERGENCY MEDICINE

## 2024-06-24 PROCEDURE — 72131 CT LUMBAR SPINE W/O DYE: CPT

## 2024-06-24 PROCEDURE — 72128 CT CHEST SPINE W/O DYE: CPT

## 2024-06-24 PROCEDURE — 99285 EMERGENCY DEPT VISIT HI MDM: CPT | Mod: 25

## 2024-06-24 PROCEDURE — 70450 CT HEAD/BRAIN W/O DYE: CPT

## 2024-06-24 RX ORDER — TRAMADOL HYDROCHLORIDE 50 MG/1
50 TABLET ORAL ONCE
Status: COMPLETED | OUTPATIENT
Start: 2024-06-24 | End: 2024-06-24

## 2024-06-24 RX ORDER — FERROUS SULFATE 325(65) MG
325 TABLET, DELAYED RELEASE (ENTERIC COATED) ORAL EVERY EVENING
COMMUNITY
End: 2024-07-22

## 2024-06-24 RX ADMIN — TRAMADOL HYDROCHLORIDE 50 MG: 50 TABLET, COATED ORAL at 22:37

## 2024-06-24 ASSESSMENT — ACTIVITIES OF DAILY LIVING (ADL)
ADLS_ACUITY_SCORE: 40

## 2024-06-24 NOTE — TELEPHONE ENCOUNTER
"Clinic Care Coordination Contact      Patient stated he fell out of bed on 6/22/24.  Patient stated his back left side by his kidney is \"hurting real bad\".  Please call patient to discuss.    Thank you.     Kelley Slaughter, JUDITH  Clinic Care Coordination  [facility] Clinics: [facility] [place], [facility], [place], and [place] [facility]  Phone: [phone]    "

## 2024-06-24 NOTE — TELEPHONE ENCOUNTER
Pt was called for triage. He fell out of bed on Saturday and landed on a one level bed stair. Left side buttocks and 6 inches higher hurts 10/10 with movement. Half the time it does not hurt at all when not moving. Pt tried several Tylenols and it helped some. No numbness or tingling in the legs or arms. Pt reports having stage 4 cancer. Urine is brownish - but it has been like that for weeks. Not dizzy, meals being made by girlfriend. Advised pt be seen at urgent care today before 8 pm. Pt agreed with this plan.     Reason for Disposition   SEVERE back pain (e.g., excruciating, unable to do any normal activities) and not improved after pain medicine and CARE ADVICE    Additional Information   Negative: Numbness (loss of sensation) in groin or rectal area   Negative: SEVERE pain in kidney area (flank) that follows a direct blow to that site   Negative: Blood in urine (red, pink, or tea-colored)   Negative: Unable to urinate (or only a few drops) > 4 hours and bladder feels very full (e.g., palpable bladder or strong urge to urinate)   Negative: Loss of bladder or bowel control (urine or bowel incontinence; wetting self, leaking stool) of new-onset   Negative: Puncture wound of back   Negative: Skin is split open or gaping (length > 1/2 inch or 12 mm)   Negative: Bleeding won't stop after 10 minutes of direct pressure (using correct technique)   Negative: Sounds like a serious injury to the triager   Negative: Weakness of a leg or foot (e.g., unable to bear weight, dragging foot)   Negative: Numbness of a leg or foot (i.e., loss of sensation)   Negative: Pain radiates into the thigh or further down the leg now   Negative: Landed hard on feet or buttocks and pain over spine    Protocols used: Back Injury-A-[place]

## 2024-06-24 NOTE — PROGRESS NOTES
Clinic Care Coordination Contact    The Community Health Worker called for a Care Coordination outreach to follow up on goals and action steps. Spoke to Patient.    Patient stated he fell out of bed on 6/22/24.  Patient stated his left back area by his kidney is hurting real bad.    CHW routed a message to [facility].    CHW will outreach in 5 - 10 business days.    Kelley Slaughter, OMAR  Clinic Care Coordination  [facility] Clinics: [facility] [place], [facility], [facility], and [place] [facility]  Phone: [phone]

## 2024-06-25 ENCOUNTER — APPOINTMENT (OUTPATIENT)
Dept: GENERAL RADIOLOGY | Facility: CLINIC | Age: 79
DRG: 552 | End: 2024-06-25
Attending: STUDENT IN AN ORGANIZED HEALTH CARE EDUCATION/TRAINING PROGRAM
Payer: COMMERCIAL

## 2024-06-25 ENCOUNTER — APPOINTMENT (OUTPATIENT)
Dept: CT IMAGING | Facility: CLINIC | Age: 79
DRG: 552 | End: 2024-06-25
Attending: HOSPITALIST
Payer: COMMERCIAL

## 2024-06-25 ENCOUNTER — APPOINTMENT (OUTPATIENT)
Dept: PHYSICAL THERAPY | Facility: CLINIC | Age: 79
DRG: 552 | End: 2024-06-25
Attending: STUDENT IN AN ORGANIZED HEALTH CARE EDUCATION/TRAINING PROGRAM
Payer: COMMERCIAL

## 2024-06-25 ENCOUNTER — PATIENT OUTREACH (OUTPATIENT)
Dept: CARE COORDINATION | Facility: CLINIC | Age: 79
End: 2024-06-25

## 2024-06-25 PROBLEM — S30.0XXA CONTUSION OF LOWER BACK, INITIAL ENCOUNTER: Status: ACTIVE | Noted: 2024-06-25

## 2024-06-25 PROBLEM — M54.50 ACUTE LEFT-SIDED LOW BACK PAIN WITHOUT SCIATICA: Status: ACTIVE | Noted: 2024-06-25

## 2024-06-25 LAB
ALBUMIN SERPL BCG-MCNC: 3.9 G/DL (ref 3.5–5.2)
ALP SERPL-CCNC: 67 U/L (ref 40–150)
ALT SERPL W P-5'-P-CCNC: 8 U/L (ref 0–70)
ANION GAP SERPL CALCULATED.3IONS-SCNC: 11 MMOL/L (ref 7–15)
ANION GAP SERPL CALCULATED.3IONS-SCNC: 13 MMOL/L (ref 7–15)
AST SERPL W P-5'-P-CCNC: 17 U/L (ref 0–45)
BILIRUB SERPL-MCNC: 0.5 MG/DL
BUN SERPL-MCNC: 13.3 MG/DL (ref 8–23)
BUN SERPL-MCNC: 14.5 MG/DL (ref 8–23)
CALCIUM SERPL-MCNC: 8.8 MG/DL (ref 8.8–10.2)
CALCIUM SERPL-MCNC: 9.1 MG/DL (ref 8.8–10.2)
CHLORIDE SERPL-SCNC: 102 MMOL/L (ref 98–107)
CHLORIDE SERPL-SCNC: 99 MMOL/L (ref 98–107)
CREAT SERPL-MCNC: 1.16 MG/DL (ref 0.67–1.17)
CREAT SERPL-MCNC: 1.21 MG/DL (ref 0.67–1.17)
DEPRECATED HCO3 PLAS-SCNC: 25 MMOL/L (ref 22–29)
DEPRECATED HCO3 PLAS-SCNC: 25 MMOL/L (ref 22–29)
EGFRCR SERPLBLD CKD-EPI 2021: 61 ML/MIN/1.73M2
EGFRCR SERPLBLD CKD-EPI 2021: 64 ML/MIN/1.73M2
ERYTHROCYTE [DISTWIDTH] IN BLOOD BY AUTOMATED COUNT: 11.4 % (ref 10–15)
ERYTHROCYTE [DISTWIDTH] IN BLOOD BY AUTOMATED COUNT: 11.5 % (ref 10–15)
FERRITIN SERPL-MCNC: 317 NG/ML (ref 31–409)
FOLATE SERPL-MCNC: 33.2 NG/ML (ref 4.6–34.8)
GLUCOSE BLDC GLUCOMTR-MCNC: 110 MG/DL (ref 70–99)
GLUCOSE BLDC GLUCOMTR-MCNC: 139 MG/DL (ref 70–99)
GLUCOSE BLDC GLUCOMTR-MCNC: 155 MG/DL (ref 70–99)
GLUCOSE BLDC GLUCOMTR-MCNC: 168 MG/DL (ref 70–99)
GLUCOSE BLDC GLUCOMTR-MCNC: 174 MG/DL (ref 70–99)
GLUCOSE SERPL-MCNC: 151 MG/DL (ref 70–99)
GLUCOSE SERPL-MCNC: 165 MG/DL (ref 70–99)
HBA1C MFR BLD: 7 %
HCT VFR BLD AUTO: 35.4 % (ref 40–53)
HCT VFR BLD AUTO: 35.4 % (ref 40–53)
HGB BLD-MCNC: 11.6 G/DL (ref 13.3–17.7)
HGB BLD-MCNC: 11.8 G/DL (ref 13.3–17.7)
IRON BINDING CAPACITY (ROCHE): 218 UG/DL (ref 240–430)
IRON SATN MFR SERPL: 9 % (ref 15–46)
IRON SERPL-MCNC: 20 UG/DL (ref 61–157)
MCH RBC QN AUTO: 32.9 PG (ref 26.5–33)
MCH RBC QN AUTO: 33.8 PG (ref 26.5–33)
MCHC RBC AUTO-ENTMCNC: 32.8 G/DL (ref 31.5–36.5)
MCHC RBC AUTO-ENTMCNC: 33.3 G/DL (ref 31.5–36.5)
MCV RBC AUTO: 100 FL (ref 78–100)
MCV RBC AUTO: 101 FL (ref 78–100)
PLATELET # BLD AUTO: 267 10E3/UL (ref 150–450)
PLATELET # BLD AUTO: 283 10E3/UL (ref 150–450)
POTASSIUM SERPL-SCNC: 4.2 MMOL/L (ref 3.4–5.3)
POTASSIUM SERPL-SCNC: 4.4 MMOL/L (ref 3.4–5.3)
PROT SERPL-MCNC: 7.8 G/DL (ref 6.4–8.3)
RBC # BLD AUTO: 3.49 10E6/UL (ref 4.4–5.9)
RBC # BLD AUTO: 3.53 10E6/UL (ref 4.4–5.9)
RETICS # AUTO: 0.06 10E6/UL (ref 0.03–0.1)
RETICS/RBC NFR AUTO: 1.6 % (ref 0.5–2)
SODIUM SERPL-SCNC: 137 MMOL/L (ref 135–145)
SODIUM SERPL-SCNC: 138 MMOL/L (ref 135–145)
VIT B12 SERPL-MCNC: 321 PG/ML (ref 232–1245)
WBC # BLD AUTO: 10.4 10E3/UL (ref 4–11)
WBC # BLD AUTO: 10.6 10E3/UL (ref 4–11)

## 2024-06-25 PROCEDURE — 82607 VITAMIN B-12: CPT | Performed by: STUDENT IN AN ORGANIZED HEALTH CARE EDUCATION/TRAINING PROGRAM

## 2024-06-25 PROCEDURE — 74176 CT ABD & PELVIS W/O CONTRAST: CPT

## 2024-06-25 PROCEDURE — 99204 OFFICE O/P NEW MOD 45 MIN: CPT | Performed by: NURSE PRACTITIONER

## 2024-06-25 PROCEDURE — 82962 GLUCOSE BLOOD TEST: CPT

## 2024-06-25 PROCEDURE — 250N000013 HC RX MED GY IP 250 OP 250 PS 637: Performed by: STUDENT IN AN ORGANIZED HEALTH CARE EDUCATION/TRAINING PROGRAM

## 2024-06-25 PROCEDURE — 96374 THER/PROPH/DIAG INJ IV PUSH: CPT

## 2024-06-25 PROCEDURE — 250N000012 HC RX MED GY IP 250 OP 636 PS 637: Performed by: STUDENT IN AN ORGANIZED HEALTH CARE EDUCATION/TRAINING PROGRAM

## 2024-06-25 PROCEDURE — G0378 HOSPITAL OBSERVATION PER HR: HCPCS

## 2024-06-25 PROCEDURE — 999N000111 HC STATISTIC OT IP EVAL DEFER

## 2024-06-25 PROCEDURE — 99223 1ST HOSP IP/OBS HIGH 75: CPT | Performed by: STUDENT IN AN ORGANIZED HEALTH CARE EDUCATION/TRAINING PROGRAM

## 2024-06-25 PROCEDURE — 36415 COLL VENOUS BLD VENIPUNCTURE: CPT | Performed by: STUDENT IN AN ORGANIZED HEALTH CARE EDUCATION/TRAINING PROGRAM

## 2024-06-25 PROCEDURE — 82247 BILIRUBIN TOTAL: CPT | Performed by: STUDENT IN AN ORGANIZED HEALTH CARE EDUCATION/TRAINING PROGRAM

## 2024-06-25 PROCEDURE — 250N000013 HC RX MED GY IP 250 OP 250 PS 637: Performed by: HOSPITALIST

## 2024-06-25 PROCEDURE — 97161 PT EVAL LOW COMPLEX 20 MIN: CPT | Mod: GP | Performed by: PHYSICAL THERAPIST

## 2024-06-25 PROCEDURE — 82746 ASSAY OF FOLIC ACID SERUM: CPT | Performed by: STUDENT IN AN ORGANIZED HEALTH CARE EDUCATION/TRAINING PROGRAM

## 2024-06-25 PROCEDURE — 83036 HEMOGLOBIN GLYCOSYLATED A1C: CPT | Performed by: STUDENT IN AN ORGANIZED HEALTH CARE EDUCATION/TRAINING PROGRAM

## 2024-06-25 PROCEDURE — 85027 COMPLETE CBC AUTOMATED: CPT | Performed by: STUDENT IN AN ORGANIZED HEALTH CARE EDUCATION/TRAINING PROGRAM

## 2024-06-25 PROCEDURE — 80048 BASIC METABOLIC PNL TOTAL CA: CPT | Performed by: EMERGENCY MEDICINE

## 2024-06-25 PROCEDURE — 97530 THERAPEUTIC ACTIVITIES: CPT | Mod: GP | Performed by: PHYSICAL THERAPIST

## 2024-06-25 PROCEDURE — 83550 IRON BINDING TEST: CPT | Performed by: STUDENT IN AN ORGANIZED HEALTH CARE EDUCATION/TRAINING PROGRAM

## 2024-06-25 PROCEDURE — 250N000011 HC RX IP 250 OP 636: Mod: JZ | Performed by: STUDENT IN AN ORGANIZED HEALTH CARE EDUCATION/TRAINING PROGRAM

## 2024-06-25 PROCEDURE — 99207 PR NO BILLABLE SERVICE THIS VISIT: CPT | Performed by: HOSPITALIST

## 2024-06-25 PROCEDURE — 82374 ASSAY BLOOD CARBON DIOXIDE: CPT | Performed by: STUDENT IN AN ORGANIZED HEALTH CARE EDUCATION/TRAINING PROGRAM

## 2024-06-25 PROCEDURE — 36415 COLL VENOUS BLD VENIPUNCTURE: CPT | Performed by: EMERGENCY MEDICINE

## 2024-06-25 PROCEDURE — 85027 COMPLETE CBC AUTOMATED: CPT | Performed by: EMERGENCY MEDICINE

## 2024-06-25 PROCEDURE — 85045 AUTOMATED RETICULOCYTE COUNT: CPT | Performed by: STUDENT IN AN ORGANIZED HEALTH CARE EDUCATION/TRAINING PROGRAM

## 2024-06-25 PROCEDURE — 82728 ASSAY OF FERRITIN: CPT | Performed by: STUDENT IN AN ORGANIZED HEALTH CARE EDUCATION/TRAINING PROGRAM

## 2024-06-25 PROCEDURE — 71100 X-RAY EXAM RIBS UNI 2 VIEWS: CPT | Mod: RT

## 2024-06-25 RX ORDER — CHOLESTYRAMINE LIGHT 4 G/5.7G
4 POWDER, FOR SUSPENSION ORAL 2 TIMES DAILY
Status: DISCONTINUED | OUTPATIENT
Start: 2024-06-25 | End: 2024-07-09 | Stop reason: HOSPADM

## 2024-06-25 RX ORDER — NALOXONE HYDROCHLORIDE 0.4 MG/ML
0.4 INJECTION, SOLUTION INTRAMUSCULAR; INTRAVENOUS; SUBCUTANEOUS
Status: DISCONTINUED | OUTPATIENT
Start: 2024-06-25 | End: 2024-07-09 | Stop reason: HOSPADM

## 2024-06-25 RX ORDER — FUROSEMIDE 40 MG
40 TABLET ORAL DAILY
Status: DISCONTINUED | OUTPATIENT
Start: 2024-06-25 | End: 2024-07-09 | Stop reason: HOSPADM

## 2024-06-25 RX ORDER — HYDRALAZINE HYDROCHLORIDE 20 MG/ML
10 INJECTION INTRAMUSCULAR; INTRAVENOUS EVERY 4 HOURS PRN
Status: DISCONTINUED | OUTPATIENT
Start: 2024-06-25 | End: 2024-06-28

## 2024-06-25 RX ORDER — HYDROMORPHONE HYDROCHLORIDE 2 MG/1
4 TABLET ORAL EVERY 4 HOURS PRN
Status: DISCONTINUED | OUTPATIENT
Start: 2024-06-25 | End: 2024-07-09 | Stop reason: HOSPADM

## 2024-06-25 RX ORDER — LISINOPRIL 40 MG/1
40 TABLET ORAL DAILY
Status: DISCONTINUED | OUTPATIENT
Start: 2024-06-25 | End: 2024-07-09 | Stop reason: HOSPADM

## 2024-06-25 RX ORDER — ATORVASTATIN CALCIUM 20 MG/1
20 TABLET, FILM COATED ORAL DAILY
Status: DISCONTINUED | OUTPATIENT
Start: 2024-06-25 | End: 2024-07-09 | Stop reason: HOSPADM

## 2024-06-25 RX ORDER — GABAPENTIN 300 MG/1
300 CAPSULE ORAL EVERY MORNING
Status: DISCONTINUED | OUTPATIENT
Start: 2024-06-25 | End: 2024-07-09 | Stop reason: HOSPADM

## 2024-06-25 RX ORDER — FINASTERIDE 5 MG/1
5 TABLET, FILM COATED ORAL DAILY
Status: DISCONTINUED | OUTPATIENT
Start: 2024-06-25 | End: 2024-07-09 | Stop reason: HOSPADM

## 2024-06-25 RX ORDER — GABAPENTIN 300 MG/1
300-600 CAPSULE ORAL 2 TIMES DAILY
Status: DISCONTINUED | OUTPATIENT
Start: 2024-06-25 | End: 2024-06-25

## 2024-06-25 RX ORDER — DEXTROSE MONOHYDRATE 25 G/50ML
25-50 INJECTION, SOLUTION INTRAVENOUS
Status: DISCONTINUED | OUTPATIENT
Start: 2024-06-25 | End: 2024-07-09 | Stop reason: HOSPADM

## 2024-06-25 RX ORDER — ACETAMINOPHEN 325 MG/1
975 TABLET ORAL 3 TIMES DAILY
Status: DISCONTINUED | OUTPATIENT
Start: 2024-06-25 | End: 2024-07-09 | Stop reason: HOSPADM

## 2024-06-25 RX ORDER — NALOXONE HYDROCHLORIDE 0.4 MG/ML
0.2 INJECTION, SOLUTION INTRAMUSCULAR; INTRAVENOUS; SUBCUTANEOUS
Status: DISCONTINUED | OUTPATIENT
Start: 2024-06-25 | End: 2024-07-09 | Stop reason: HOSPADM

## 2024-06-25 RX ORDER — GABAPENTIN 300 MG/1
600 CAPSULE ORAL AT BEDTIME
Status: DISCONTINUED | OUTPATIENT
Start: 2024-06-25 | End: 2024-07-09 | Stop reason: HOSPADM

## 2024-06-25 RX ORDER — METOPROLOL SUCCINATE 25 MG/1
25 TABLET, EXTENDED RELEASE ORAL DAILY
Status: DISCONTINUED | OUTPATIENT
Start: 2024-06-25 | End: 2024-07-09 | Stop reason: HOSPADM

## 2024-06-25 RX ORDER — HYDRALAZINE HYDROCHLORIDE 20 MG/ML
10 INJECTION INTRAMUSCULAR; INTRAVENOUS EVERY 4 HOURS PRN
Status: DISCONTINUED | OUTPATIENT
Start: 2024-06-25 | End: 2024-07-09 | Stop reason: HOSPADM

## 2024-06-25 RX ORDER — HYDRALAZINE HYDROCHLORIDE 10 MG/1
10 TABLET, FILM COATED ORAL EVERY 4 HOURS PRN
Status: DISCONTINUED | OUTPATIENT
Start: 2024-06-25 | End: 2024-07-09 | Stop reason: HOSPADM

## 2024-06-25 RX ORDER — CELECOXIB 200 MG/1
200 CAPSULE ORAL EVERY 12 HOURS
Status: DISCONTINUED | OUTPATIENT
Start: 2024-06-25 | End: 2024-07-09 | Stop reason: HOSPADM

## 2024-06-25 RX ORDER — ONDANSETRON 4 MG/1
4 TABLET, ORALLY DISINTEGRATING ORAL EVERY 6 HOURS PRN
Status: DISCONTINUED | OUTPATIENT
Start: 2024-06-25 | End: 2024-07-09 | Stop reason: HOSPADM

## 2024-06-25 RX ORDER — NICOTINE POLACRILEX 4 MG
15-30 LOZENGE BUCCAL
Status: DISCONTINUED | OUTPATIENT
Start: 2024-06-25 | End: 2024-07-09 | Stop reason: HOSPADM

## 2024-06-25 RX ORDER — CYCLOBENZAPRINE HCL 5 MG
5 TABLET ORAL EVERY 8 HOURS PRN
Status: DISCONTINUED | OUTPATIENT
Start: 2024-06-25 | End: 2024-06-29

## 2024-06-25 RX ORDER — HYDROMORPHONE HYDROCHLORIDE 2 MG/1
2 TABLET ORAL EVERY 4 HOURS PRN
Status: DISCONTINUED | OUTPATIENT
Start: 2024-06-25 | End: 2024-07-09 | Stop reason: HOSPADM

## 2024-06-25 RX ORDER — FUROSEMIDE 10 MG/ML
40 INJECTION INTRAMUSCULAR; INTRAVENOUS ONCE
Status: COMPLETED | OUTPATIENT
Start: 2024-06-25 | End: 2024-06-25

## 2024-06-25 RX ORDER — IPRATROPIUM BROMIDE AND ALBUTEROL SULFATE 2.5; .5 MG/3ML; MG/3ML
3 SOLUTION RESPIRATORY (INHALATION) EVERY 4 HOURS PRN
Status: DISCONTINUED | OUTPATIENT
Start: 2024-06-25 | End: 2024-07-09 | Stop reason: HOSPADM

## 2024-06-25 RX ORDER — ONDANSETRON 2 MG/ML
4 INJECTION INTRAMUSCULAR; INTRAVENOUS EVERY 6 HOURS PRN
Status: DISCONTINUED | OUTPATIENT
Start: 2024-06-25 | End: 2024-07-09 | Stop reason: HOSPADM

## 2024-06-25 RX ADMIN — APIXABAN 2.5 MG: 2.5 TABLET, FILM COATED ORAL at 08:15

## 2024-06-25 RX ADMIN — GABAPENTIN 600 MG: 300 CAPSULE ORAL at 21:49

## 2024-06-25 RX ADMIN — INSULIN ASPART 2 UNITS: 100 INJECTION, SOLUTION INTRAVENOUS; SUBCUTANEOUS at 20:12

## 2024-06-25 RX ADMIN — INSULIN ASPART 5 UNITS: 100 INJECTION, SOLUTION INTRAVENOUS; SUBCUTANEOUS at 12:28

## 2024-06-25 RX ADMIN — GABAPENTIN 300 MG: 300 CAPSULE ORAL at 08:15

## 2024-06-25 RX ADMIN — GABAPENTIN 600 MG: 300 CAPSULE ORAL at 03:53

## 2024-06-25 RX ADMIN — ACETAMINOPHEN 975 MG: 325 TABLET, FILM COATED ORAL at 08:15

## 2024-06-25 RX ADMIN — CELECOXIB 200 MG: 200 CAPSULE ORAL at 08:15

## 2024-06-25 RX ADMIN — ACETAMINOPHEN 975 MG: 325 TABLET, FILM COATED ORAL at 16:57

## 2024-06-25 RX ADMIN — FUROSEMIDE 40 MG: 40 TABLET ORAL at 08:16

## 2024-06-25 RX ADMIN — METFORMIN HYDROCHLORIDE 1000 MG: 500 TABLET ORAL at 18:16

## 2024-06-25 RX ADMIN — APIXABAN 2.5 MG: 2.5 TABLET, FILM COATED ORAL at 20:14

## 2024-06-25 RX ADMIN — APIXABAN 2.5 MG: 2.5 TABLET, FILM COATED ORAL at 03:52

## 2024-06-25 RX ADMIN — CELECOXIB 200 MG: 200 CAPSULE ORAL at 20:14

## 2024-06-25 RX ADMIN — ATORVASTATIN CALCIUM 20 MG: 20 TABLET, FILM COATED ORAL at 20:14

## 2024-06-25 RX ADMIN — ACETAMINOPHEN 975 MG: 325 TABLET, FILM COATED ORAL at 21:49

## 2024-06-25 RX ADMIN — CHOLESTYRAMINE 4 G: 4 POWDER, FOR SUSPENSION ORAL at 20:14

## 2024-06-25 RX ADMIN — FINASTERIDE 5 MG: 5 TABLET, FILM COATED ORAL at 08:16

## 2024-06-25 RX ADMIN — HYDROMORPHONE HYDROCHLORIDE 4 MG: 2 TABLET ORAL at 04:05

## 2024-06-25 RX ADMIN — ATORVASTATIN CALCIUM 20 MG: 20 TABLET, FILM COATED ORAL at 03:52

## 2024-06-25 RX ADMIN — CYCLOBENZAPRINE HYDROCHLORIDE 5 MG: 5 TABLET, FILM COATED ORAL at 16:57

## 2024-06-25 RX ADMIN — METOPROLOL SUCCINATE 25 MG: 25 TABLET, EXTENDED RELEASE ORAL at 08:16

## 2024-06-25 RX ADMIN — FUROSEMIDE 40 MG: 10 INJECTION, SOLUTION INTRAMUSCULAR; INTRAVENOUS at 03:53

## 2024-06-25 RX ADMIN — CHOLESTYRAMINE 4 G: 4 POWDER, FOR SUSPENSION ORAL at 11:36

## 2024-06-25 RX ADMIN — LISINOPRIL 40 MG: 40 TABLET ORAL at 08:16

## 2024-06-25 ASSESSMENT — ACTIVITIES OF DAILY LIVING (ADL)
ADLS_ACUITY_SCORE: 36
ADLS_ACUITY_SCORE: 39
ADLS_ACUITY_SCORE: 35
ADLS_ACUITY_SCORE: 40
ADLS_ACUITY_SCORE: 39
DEPENDENT_IADLS:: CLEANING;COOKING;LAUNDRY;MEAL PREPARATION;SHOPPING;MEDICATION MANAGEMENT;TRANSPORTATION
ADLS_ACUITY_SCORE: 36
ADLS_ACUITY_SCORE: 39
ADLS_ACUITY_SCORE: 35
ADLS_ACUITY_SCORE: 36
ADLS_ACUITY_SCORE: 40
ADLS_ACUITY_SCORE: 35
ADLS_ACUITY_SCORE: 35
ADLS_ACUITY_SCORE: 36
ADLS_ACUITY_SCORE: 35
ADLS_ACUITY_SCORE: 33
ADLS_ACUITY_SCORE: 36
ADLS_ACUITY_SCORE: 40
ADLS_ACUITY_SCORE: 35
ADLS_ACUITY_SCORE: 39

## 2024-06-25 NOTE — ED NOTES
"Writer and the ED Tech in to take pt for road walk using roller walker , pt decline the activity and stated \" I just can't walk, My back hurts\"   The provider updated.  "

## 2024-06-25 NOTE — PHARMACY-ADMISSION MEDICATION HISTORY
Pharmacist Admission Medication History    Admission medication history is complete. The information provided in this note is only as accurate as the sources available at the time of the update.    Information Source(s): Patient and Family member via in-person    Pertinent Information:     Changes made to PTA medication list:  Added: CoQ10, Ferrous Sulfate  Deleted: None  Changed: Miconazole to PRN, Furosemide to once daily, Gabapentin    Allergies reviewed with patient and updates made in EHR: no    Medication History Completed By: Jean-Paul Herrera [facility] 6/24/2024 8:48 PM    PTA Med List   Medication Sig Last Dose    acetaminophen (TYLENOL) 500 MG tablet Take 1,000 mg by mouth 2 times daily 6/24/2024 at x2    albuterol (VENTOLIN HFA) 108 (90 Base) MCG/ACT inhaler INHALE 2 PUFFS INTO THE LUNGS EVERY 6 HOURS AS NEEDED FOR SHORTNESS OF BREATH / DYSPNEA OR WHEEZING  at PRN    apixaban ANTICOAGULANT (ELIQUIS) 2.5 MG tablet Take 1 tablet (2.5 mg) by mouth 2 times daily 6/24/2024 at AM    Ascorbic Acid (VITAMIN C PO) Take 500 mg by mouth At Bedtime  6/23/2024 at HS    atorvastatin (LIPITOR) 20 MG tablet TAKE 1 TABLET DAILY 6/23/2024 at HS    Calcium Carbonate Antacid (TUMS PO) Take 500 mg by mouth as needed  at PRN    cholestyramine light (QUESTRAN) 4 GM packet Take 1 packet (4 g) by mouth 2 times daily 6/24/2024 at AM    Coenzyme Q10 400 MG CAPS Take 400 mg by mouth daily 6/24/2024 at AM    cyanocobalamin (VITAMIN B-12) 1000 MCG tablet Take 1,000 mcg by mouth daily 6/24/2024 at AM    docusate sodium (DSS) 100 MG capsule Take 100 mg by mouth as needed for constipation  at PRN    ferrous sulfate (FE TABS) 325 (65 Fe) MG EC tablet Take 325 mg by mouth every evening 6/23/2024 at PM    finasteride (PROSCAR) 5 MG tablet Take 1 tablet (5 mg) by mouth daily 6/24/2024 at AM    Fluticasone-Umeclidin-Vilant (TRELEGY ELLIPTA) 100-62.5-25 MCG/ACT oral inhaler Inhale 1 puff into the lungs daily 6/24/2024 at AM    furosemide (LASIX) 20 MG  tablet Take 2 tablets (40 mg) by mouth 2 times daily (Patient taking differently: Take 40 mg by mouth daily) 6/24/2024 at AM    gabapentin (NEURONTIN) 300 MG capsule Take 1 capsule (300 mg) by mouth 3 times daily (Patient taking differently: Take 300-600 mg by mouth 2 times daily 300 mh qAM  600 mg qHS) 6/24/2024 at AM    lisinopril (ZESTRIL) 40 MG tablet TAKE 1 TABLET DAILY 6/24/2024 at AM    metFORMIN (GLUCOPHAGE) 1000 MG tablet Take 1 tablet (1,000 mg) by mouth 2 times daily (with meals) 6/24/2024 at AM    metoprolol succinate ER (TOPROL XL) 25 MG 24 hr tablet Take 1 tablet (25 mg) by mouth daily 6/24/2024 at AM    miconazole (MICATIN) 2 % external cream Apply topically 2 times daily (Patient taking differently: Apply topically 2 times daily as needed for other (Rash/ skin irritation))  at PRN    vitamin B-Complex Take 1 tablet by mouth daily 6/24/2024 at AM

## 2024-06-25 NOTE — ED TRIAGE NOTES
Pt fell getting out of bed Saturday 6/22. Pt complaining of L side lumbar/ sacral pain. Pt states pain is worsening and keeping him from being able to walk. Unable to bear weight     100 mcg fentanyl 1929

## 2024-06-25 NOTE — H&P
Assessment / Plan    78 year old male with PMHx of hypertension, hyperlipidemia, DM II, obesity, CIERA , COPD, history of PE, malignant carcinoid tumor, lung carcinoma, peripheral neuropathy, cervical myelopathy presenting w/ R lower back pain following a fall    Intractable back pain post mechanical fall  Multilevel thoracic and lumbar spondylosis w/ Thoracic foraminal stenosis  History of arthritis.  Peripheral neuropathy  Cervical myelopathy  Senile frailty and chronic debilitation   ``Hx: Patient 2d prior to presentation fell from his elevated bed and hit his L lower back and head, without LOC. Since the fall, has lower back pain/L buttock that is severe when flat, and aggravated with movement. No radiculopathy symptoms, no urinary/rectal symptoms that are new. Has decreased ambulation since then. Tylenol failed to alleviate. No other falls since then.  ``Vitals/Exam: Vitally stable, LE 3/5 strength w/ edema, L paraspinal/midline tenderness @ Lumbosacral junction  ``Labs: pending  ``Imaging:  Multilevel thoracic spondylosis with multilevel bridging osteophytes raising the possibility of DISH. Neural foraminal stenosis greatest on the right at T3-T4 and T4-T5. Multilevel lumbar spondylosis greatest at L3-4 and L4-5 as above.  ``ER Course:tramadol  --standing tylenol/celebrix, prn dilaudid  --Neurosx consult  --PT OT  --fall precautions  --resumed gabapentin    COPD, not on chronic O2, not in exacerbation  History of PE  Adenocarcinoma of lung s/p resection in 2016  ``no acute dyspnic symptoms  --duonebs PRN  -- cont DOAC (though unclear why he's on 2.5q12 instead of 5q12)  -- CPAP  -- consider outpatient sleep study and PFTs  --pt should obtain treligy inhaler from home      CKD2  ``baseline creatinine 1, currently 1.2, not yet at SALLIE levels. Patient doesn't appear dehydrated, and likely 2/2 congestion given extensive LE edema  --lasix, and repeat creatinine    Hypertension  Hyperlipidemia  --resumed metoprolol,  lasix, statin, and lisinopril  --prn hydralazine     Malignant metastatic carcinoid tumor  Rib Mass  ``Status post exploratory laparotomy, small bowel resection and removal of mesenteric mass 2023  ``Follows at [facility] w/ monthly lanreotide injections. Reports in 3/2024 suggest slow progressive w/ metastatsis to the liver, bone, retroperitoneum and mesentary  ``Patient complaining of R inferior R nodule that he hasn't noticed below  --follow-up w/ [place] on 6/27/2024 pending  --XR ribs R sided     Chronic diarrhea  *Chronic and stable   --resumed  PTA cholestyramine.    Non-insulin dependent type 2 diabete mellitus   --ISS  --hold metformin  --Mod CHO diet    BPH  --finastride resumed    Morbid Obesity  --outpatient diet and exercise as able to      Chronic macrocytic anemia  ``Hb stable at 11.8  ``no recent anemia workup in the past 2 yrs  --anemia workup    Supportive Care  DVT ppx: eliquis  Diet: Mod CHO  Pain Control: tylenol  Upper GI ppx: zofran  Lower GI ppx: senna  Lines/Catheters: IV  TTE/Dopplers: None  Contact/Seizure/Fall/Delerium Precautions: Fall  PT/OT/Social/Wound/Palliative Consults: PT OT  Code Status: DNR DNI    History of Present Illness   Patient 2d prior to presentation fell from his elevated bed and hit his L lower back and head, without LOC. Since the fall, has lower back pain/L buttock that is severe when flat, and aggravated with movement. No radiculopathy symptoms, no urinary/rectal symptoms that are new. Has decreased ambulation since then. Tylenol failed to alleviate. No other falls since then.    ROS: 10 point ROS neg other than the symptoms noted above in the HPI.     Objective History  BP (P) 130/68   Pulse (P) 70   Temp (P) 98.5  F (36.9  C) (Temporal)   Resp (P) 18   SpO2 (P) 96%     Constitutional: Alert and oriented to person, place and time; no apparent distress.   HEENT: Normocephalic, no scleral icterus  Abdomen: soft, no distention/tenderness/guarding  Lungs: lungs clear  to auscultation bilaterally, R rib mass  Heart: Regular s1s2, no evidence of murmurs  Neuro:No focal strength/sensation deficits, LE bilaterally 3/5  Skin/Extremities: No obvious signs of skin breakdown, LE edema present  Psychiatric: appropriate affect, insight and judgment  Back: Lumbosacral border midline and R paraspinal tenderness.  no CVA tenderness    I have personally spent 81 minutes total time today in preparing to see the patient (eg, review of tests), obtaining and/or reviewing separately obtained history, performing a medically appropriate examination and/or evaluation, counseling and educating the patient/family/caregiver, ordering medications, tests, or procedures, referring and communicating with other health care professionals, documenting clinical information in the electronic or other health record, independently interpreting results and communicating results to the patient/ family/caregiver and care coordination.

## 2024-06-25 NOTE — CONSULTS
[facility]    Neurosurgery Consultation     Date of Admission:  6/24/2024  Date of Consult (When I saw the patient): 06/25/24    Assessment & Plan   Nahun Villalobos is a 78 year old male with history of hypertension, diabetes, COPD, CHF, non-small cell lung cancer, and peripheral neuropathy.  Patient was admitted on 6/24/2024 with low back pain after a fall.      Patient reports chronic neck pain.  Chronic low back pain and weakness in legs that developed 3 months ago after he slipped in the bathtub.  He has been ambulating with a walker for the past 3 months.  Per chart review, patient fell 2 days prior to current hospital admission.  He has left hip flexion weakness on exam, possibly limited due to pain.    Thoracic spine and lumbar spine CT revealed multilevel spondylosis, no fracture noted.    Cervical spine MRI from [facility] on 2/16/2024 reports multilevel spinal canal stenosis at C3-4, C4-5, and C5-6.      -Thoracic and lumbar spine MRIs  -Please obtain cervical spine MRI that was completed at [facility] on 2/16/2024.  Report is in chart, but images are not available to review at this time.  -Left hip xray ordered per Hospitalist   -Continue pain control measures  -Monitor neuro exam  -Appreciate assistance from specialties     I have discussed the following assessment and plan with Dr. Vazquez and patient.     Marcella Reed CNP  [facility] Neurosurgery  Tel [phone]  Pager [phone]    Code Status    No CPR- Do NOT Intubate    Reason for Consult   I was asked by Asya Pacheco MD  to evaluate this patient for:  Intractable back pain     Primary Care Physician   Olegario Castillo    Chief Complaint   Left sided low back pain     History is obtained from the patient and electronic health record    History of Present Illness   Nahun Villalobos is a 78 year old male with history of hypertension, diabetes, COPD, CHF, non-small cell lung cancer, and peripheral neuropathy.   Patient developed low back pain and weakness in legs about 3 months ago after he slipped in the bathtub. He has been ambulating with a walker for the past 3 months. Per chart review, patient fell 2 days prior to current hospital admission. Neurosurgery was consulted to evaluate low  back pain. Thoracic spine and lumbar spine CT reveal multilevel spondylosis, no fracture noted.  Cervical spine MRI from [facility] on 2/16/2024 reports multilevel spinal canal stenosis at C3-4, C4-5, and C5-6.  Patient reports history of chronic neck pain and intermittent paresthesias in fingertips. Denies arm pain, weakness, or dexterity issues. Denies leg pain. Reports chronic neuropathy in legs and feet.     Past Medical History   I have reviewed this patient's medical history and updated it with pertinent information if needed.   Past Medical History:   Diagnosis Date    Antiplatelet or antithrombotic long-term use     Arthritis     CHF (congestive heart failure) (H) 09/16/2020    COPD (chronic obstructive pulmonary disease) (H)     DM (diabetes mellitus) (H)     Dyspnea on exertion     Essential hypertension, benign     Hemorrhage of rectum and anus     Non-small cell lung cancer (H)     Obesity     Personal history of colonic polyps     Sleep apnea     Thrombosis     Tobacco use disorder     Urinary retention     Walking troubles        Past Surgical History   I have reviewed this patient's surgical history and updated it with pertinent information if needed.  Past Surgical History:   Procedure Laterality Date    ABDOMEN SURGERY  1995    APPENDECTOMY      BONE EXOSTOSIS EXCISION Bilateral 9/20/2022    Procedure: EXOSTECTOMIES BOTH 5TH DIGITS WITH SOFT TISSUE RELEASE;  Surgeon: Tong Gray DPM;  Location: [facility] OR    CHOLECYSTECTOMY      COLONOSCOPY  2014    CYSTOSCOPY, TRANSURETHRAL RESECTION (TUR) PROSTATE, COMBINED N/A 4/30/2018    Procedure: COMBINED CYSTOSCOPY, TRANSURETHRAL RESECTION (TUR) PROSTATE;  Cystoscopy,  Transurethral Resection Of The Prostate;  Surgeon: Praveena Burris MD;  Location: [facility] OR    IR LUNG BIOPSY MEDIASTINUM RIGHT  4/6/2016    WEDGE RESECTION      lung    ZZC NONSPECIFIC PROCEDURE      cholecystectomy       Prior to Admission Medications   Prior to Admission Medications   Prescriptions Last Dose Informant Patient Reported? Taking?   Ascorbic Acid (VITAMIN C PO) 6/23/2024 at HS Spouse/Significant Other Yes Yes   Sig: Take 500 mg by mouth At Bedtime    Calcium Carbonate Antacid (TUMS PO)  at PRN  Yes Yes   Sig: Take 500 mg by mouth as needed   Coenzyme Q10 400 MG CAPS 6/24/2024 at AM  Yes Yes   Sig: Take 400 mg by mouth daily   Fluticasone-Umeclidin-Vilant (TRELEGY ELLIPTA) 100-62.5-25 MCG/ACT oral inhaler 6/24/2024 at AM Spouse/Significant Other, Pharmacy No Yes   Sig: Inhale 1 puff into the lungs daily   acetaminophen (TYLENOL) 500 MG tablet 6/24/2024 at x2 Spouse/Significant Other Yes Yes   Sig: Take 1,000 mg by mouth 2 times daily   albuterol (VENTOLIN HFA) 108 (90 Base) MCG/ACT inhaler  at PRN Spouse/Significant Other No Yes   Sig: INHALE 2 PUFFS INTO THE LUNGS EVERY 6 HOURS AS NEEDED FOR SHORTNESS OF BREATH / DYSPNEA OR WHEEZING   apixaban ANTICOAGULANT (ELIQUIS) 2.5 MG tablet 6/24/2024 at AM  No Yes   Sig: Take 1 tablet (2.5 mg) by mouth 2 times daily   atorvastatin (LIPITOR) 20 MG tablet 6/23/2024 at HS  No Yes   Sig: TAKE 1 TABLET DAILY   blood glucose (ACCU-CHEK CHACHA) test strip  Spouse/Significant Other No No   Sig: Use to test blood sugar 2 times daily or as directed.   blood glucose (NO BRAND SPECIFIED) lancets standard  Spouse/Significant Other No No   Sig: Use to test blood sugar 1 time daily, first thing in the morning   Patient taking differently: as needed Use to test blood sugar 1 time daily, first thing in the morning   blood glucose (NO BRAND SPECIFIED) test strip  Spouse/Significant Other No No   Sig: Use to test blood sugar 1 time daily, first thing in the morning.   blood  glucose monitoring (NO BRAND SPECIFIED) meter device kit  Spouse/Significant Other No No   Sig: Use to test blood sugar 1 time daily, first thing in the morning   cholestyramine light (QUESTRAN) 4 GM packet 6/24/2024 at AM Spouse/Significant Other, Pharmacy No Yes   Sig: Take 1 packet (4 g) by mouth 2 times daily   cyanocobalamin (VITAMIN B-12) 1000 MCG tablet 6/24/2024 at AM  Yes Yes   Sig: Take 1,000 mcg by mouth daily   docusate sodium (DSS) 100 MG capsule  at PRN  Yes Yes   Sig: Take 100 mg by mouth as needed for constipation   ferrous sulfate (FE TABS) 325 (65 Fe) MG EC tablet 6/23/2024 at PM  Yes Yes   Sig: Take 325 mg by mouth every evening   finasteride (PROSCAR) 5 MG tablet 6/24/2024 at AM Spouse/Significant Other, Pharmacy No Yes   Sig: Take 1 tablet (5 mg) by mouth daily   furosemide (LASIX) 20 MG tablet 6/24/2024 at AM  No Yes   Sig: Take 2 tablets (40 mg) by mouth 2 times daily   Patient taking differently: Take 40 mg by mouth daily   gabapentin (NEURONTIN) 300 MG capsule 6/24/2024 at AM  No Yes   Sig: Take 1 capsule (300 mg) by mouth 3 times daily   Patient taking differently: Take 300-600 mg by mouth 2 times daily 300 mh qAM  600 mg qHS   lisinopril (ZESTRIL) 40 MG tablet 6/24/2024 at AM Spouse/Significant Other, Pharmacy No Yes   Sig: TAKE 1 TABLET DAILY   metFORMIN (GLUCOPHAGE) 1000 MG tablet 6/24/2024 at AM  No Yes   Sig: Take 1 tablet (1,000 mg) by mouth 2 times daily (with meals)   metoprolol succinate ER (TOPROL XL) 25 MG 24 hr tablet 6/24/2024 at AM Spouse/Significant Other, Pharmacy No Yes   Sig: Take 1 tablet (25 mg) by mouth daily   miconazole (MICATIN) 2 % external cream  at PRN  No Yes   Sig: Apply topically 2 times daily   Patient taking differently: Apply topically 2 times daily as needed for other (Rash/ skin irritation)   order for DME  Spouse/Significant Other No No   Sig: Oxygen 2 Li/min  at night via nasal cannula   order for DME  Spouse/Significant Other No No   Sig: Equipment  being ordered: diabetic shoes, 1 pair   vitamin B-Complex 6/24/2024 at AM  Yes Yes   Sig: Take 1 tablet by mouth daily      Facility-Administered Medications: None     Allergies   Allergies   Allergen Reactions    No Known Drug Allergy        Social History   I have reviewed this patient's social history and updated it with pertinent information if needed. Nahun Villalobos  reports that he quit smoking about 11 years ago. His smoking use included cigarettes and cigars. He started smoking about 64 years ago. He has a 106 pack-year smoking history. He has never used smokeless tobacco. He reports that he does not drink alcohol and does not use drugs.    Family History   I have reviewed this patient's family history and updated it with pertinent information if needed.   Family History   Problem Relation Age of Onset    Hypertension Mother     C.A.D. Mother         ANEURYSM    Cancer - colorectal Mother     Cancer Mother         OVARIAN    Other Cancer Mother     Hypertension Sister     Breast Cancer Sister     Cerebrovascular Disease Father     Hypertension Sister     Breast Cancer Sister     Glaucoma No family hx of     Macular Degeneration No family hx of        Review of Systems   10 point ROS negative other than symptoms noted in HPI.    Physical Exam   Vital signs with ranges:   Temp:  [97.5  F (36.4  C)-98.5  F (36.9  C)] 98.2  F (36.8  C)  Pulse:  [59-71] 59  Resp:  [16-18] 16  BP: (128-144)/(59-87) 144/69  SpO2:  [93 %-96 %] 96 %  0 lbs 0 oz    HEENT:  Normocephalic, atraumatic  Heart:  No peripheral edema  Lungs:  No SOB  Skin:  Warm and dry, good capillary refill.    NEUROLOGICAL EXAMINATION:   Mental status:  Alert and Oriented x 3, speech is fluent.  Motor:    RUE 5/5  LUE 5/5  RLE: hip flexion 4+/5, knee flexion extension 4/5, DF PF 5/5  LLE: difficulty lifting left leg off the bed, knee flexion extension 4/5, DF PF 5/5   Sensation:  Intact to light touch,  baseline neuropathy in feet   Reflexes:    Negative Clonus.  Negative Nino's sign.     Cervical examination reveals pain with range of motion.   No tenderness to palpation of the spine or paraspinous muscles bilaterally.   Straight leg raise is negative bilaterally.     Data   [facility], report in chart, images are not available for review.   Impression:   1. Extensive patient motion artifact, degrading image quality and   limiting evaluation.   2.  No acute osseous abnormality identified.  No suspicious marrow lesion   to suggest metastatic disease.   3.  Low-level articular pillar edema at the right C4-5 facet joint,   compatible with inflammatory/stress reaction from facet arthropathy.   4.  At C3-4, moderate spinal canal stenosis and moderate right/severe left   neuroforaminal stenosis.   5.  At C4-5, severe spinal canal stenosis with mild cord flattening, but   no convincing cord signal abnormality given artifact limitations.    Moderate bilateral neuroforaminal stenosis.   5.  At C5-6, moderate spinal canal stenosis, severe right neuroforaminal   stenosis.   6. At C6-7, moderate right neuroforaminal stenosis.     CT THORACIC SPINE W/O CONTRAST, CT LUMBAR SPINE W/O CONTRAST  LOCATION: [facility]  DATE: 6/24/2024                                         IMPRESSION:  THORACIC SPINE CT:  1.  No fracture or posttraumatic subluxation.  2.  Multilevel thoracic spondylosis with multilevel bridging osteophytes raising the possibility of DISH.  3.  Neural foraminal stenosis greatest on the right at T3-T4 and T4-T5.  LUMBAR SPINE CT:  1.  No fracture or posttraumatic subluxation.  2.  Multilevel lumbar spondylosis greatest at L3-4 and L4-5 as above.    CBC RESULTS:   Recent Labs   Lab Test 06/25/24  0737   WBC 10.6   RBC 3.53*   HGB 11.6*   HCT 35.4*      MCH 32.9   MCHC 32.8   RDW 11.4        Basic Metabolic Panel:  Lab Results   Component Value Date     06/25/2024     03/17/2021       Lab Results   Component Value Date    POTASSIUM 4.2 06/25/2024    POTASSIUM 4.0 08/30/2022    POTASSIUM 4.1 03/17/2021     Lab Results   Component Value Date    CHLORIDE 99 06/25/2024    CHLORIDE 102 05/20/2022    CHLORIDE 103 03/17/2021     Lab Results   Component Value Date    JESSE 9.1 06/25/2024    JESSE 8.9 03/17/2021     Lab Results   Component Value Date    CO2 25 06/25/2024    CO2 28 05/20/2022    CO2 27 03/17/2021     Lab Results   Component Value Date    BUN 13.3 06/25/2024    BUN 12 05/20/2022    BUN 19 03/17/2021     Lab Results   Component Value Date    CR 1.16 06/25/2024    CR 1.11 03/17/2021     Lab Results   Component Value Date     06/25/2024     06/25/2024     09/20/2022     INR:  Lab Results   Component Value Date    INR 1.01 02/09/2018

## 2024-06-25 NOTE — PROGRESS NOTES
[facility]    Medicine Progress Note - Hospitalist Service    Date of Admission:  6/24/2024    Assessment & Plan   Nahnu Villalobos is a 78 year old male admitted on 6/24/2024. PMHx of hypertension, hyperlipidemia, DM II, obesity, CIERA , COPD, history of PE, malignant carcinoid tumor, lung carcinoma, peripheral neuropathy, cervical myelopathy presenting w/ R lower back pain following a fall       Intractable back pain post mechanical fall  Multilevel thoracic and lumbar spondylosis w/ Thoracic foraminal stenosis  History of arthritis.  Peripheral neuropathy  Cervical myelopathy  Senile frailty and chronic debilitation   *Hx: Patient 2d prior to presentation fell from his elevated bed and hit his L lower back and head, without LOC. Since the fall, has lower back pain/L buttock that is severe when flat, and aggravated with movement. No radiculopathy symptoms, no urinary/rectal symptoms that are new. Has decreased ambulation since then. Tylenol failed to alleviate. No other falls since then.  *Vitals/Exam: Vitally stable, LE 3/5 strength w/ edema, L paraspinal/midline tenderness @ Lumbosacral junction  *Imaging:  Multilevel thoracic spondylosis with multilevel bridging osteophytes raising the possibility of DISH. Neural foraminal stenosis greatest on the right at T3-T4 and T4-T5. Multilevel lumbar spondylosis greatest at L3-4 and L4-5 as above.    --tylenol tid /celebrex bid, prn dilaudid, add prn flexeril  --complains primarily of left posterior upper thigh/buttock pain - will obtain XR hip/pelvis  --Neurosx consult pending  --therapy recommends TCU  --fall precautions  --continue PTA gabapentin     COPD, not on chronic O2, not in exacerbation  History of PE  Adenocarcinoma of lung s/p resection in 2016  *no acute dyspnic symptoms  --duonebs PRN  -- cont DOAC (though unclear why he's on 2.5q12 instead of 5q12)  -- CPAP  -- consider outpatient sleep study and PFTs  --pt should obtain treligy  inhaler from home      CKD2  *baseline creatinine 1  --Cr stable at 1.1     Hypertension  Hyperlipidemia  --continue PTA metoprolol, lasix, statin, and lisinopril  --prn hydralazine     Malignant metastatic carcinoid tumor  Rib Mass  *Status post exploratory laparotomy, small bowel resection and removal of mesenteric mass 2023  *Follows at [facility] w/ monthly lanreotide injections. Reports in 3/2024 suggest slow progressive w/ metastatsis to the liver, bone, retroperitoneum and mesentary  *Patient complaining of R inferior R nodule that he hasn't noticed below  --follow-up w/ [place] on 6/27/2024 pending  --XR ribs R sided     Chronic diarrhea  *Chronic and stable   --continue PTA cholestyramine.     Non-insulin dependent type 2 diabete mellitus   *A1C 7%  --ISS  --resume PTA metformin  --Mod CHO diet     BPH  -- continue PTA finasteride      Morbid Obesity  --outpatient diet and exercise as able to      Chronic macrocytic anemia  *Hb stable at 11.8  *iron studies 6/25 consistent with combination iron deficiency and chronic inflammation  --start iron supplement at discharge, follow up with PCP         Observation Goals: -diagnostic tests and consults completed and resulted, -vital signs normal or at patient baseline, -returns to baseline functional status, -safe disposition plan has been identified, Nurse to notify provider when observation goals have been met and patient is ready for discharge.  Diet: Combination Diet No Caffeine Diet, Low Saturated Fat Na <2400mg Diet    DVT Prophylaxis: DOAC  Gomez Catheter: Not present  Lines: None     Cardiac Monitoring: None  Code Status: No CPR- Do NOT Intubate      Clinically Significant Risk Factors Present on Admission               # Drug Induced Coagulation Defect: home medication list includes an anticoagulant medication    # Hypertension: Noted on problem list            # DMII: A1C = 7.0 % (Ref range: <5.7 %) within past 6 months    # Obesity: Estimated body mass  "index is 31.95 kg/m  as calculated from the following:    Height as of 6/17/24: 1.702 m (5' 7\").    Weight as of 6/17/24: 92.5 kg (204 lb).              Disposition Plan     Medically Ready for Discharge: Anticipated Tomorrow             Gilles Blount MD  Hospitalist Service  [facility]  Securely message with [employer] (more info)  Text page via [employer] Paging/Directory   ______________________________________________________________________    Interval History   Only complaint now is left posterior thigh/buttock pain with any movement.  No significant pain at rest.  Not excited about prospect of TCU, but agrees to it if needed.  Cannot recall full events of fall.  Currently denies any chest pain/pressure, palpitations, lightheadedness, infectious symptoms including fever/chills, cough, dyspnea, nausea, diarrhea, dysuria.  Offers no other complaints.     Physical Exam   Vital Signs: Temp: 98.2  F (36.8  C) Temp src: Oral BP: (!) 144/69 Pulse: 59   Resp: 16 SpO2: 96 % O2 Device: None (Room air)    Weight: 0 lbs 0 oz    General Appearance: Well nourished male in NAD  Respiratory: lungs CTAB, no wheezes or crackles  Cardiovascular: RRR, normal s1/s2 without murmur  GI: normal bowel sounds  Skin:   Other: Awake and alert, CN grossly intact      Medical Decision Making       35 MINUTES SPENT BY ME on the date of service doing chart review, history, exam, documentation & further activities per the note.      Data     I have personally reviewed the following data over the past 24 hrs:    10.6  \   11.6 (L)   / 267     137 99 13.3 /  168 (H)   4.2 25 1.16 \     ALT: 8 AST: 17 AP: 67 TBILI: 0.5   ALB: 3.9 TOT PROTEIN: 7.8 LIPASE: N/A     TSH: N/A T4: N/A A1C: 7.0 (H)     Ferritin:  317 % Retic:  1.6 LDH:  N/A       "

## 2024-06-25 NOTE — PROGRESS NOTES
-diagnostic tests and consults completed and resulted-not met to be seen by neurosurgery   -vital signs normal or at patient baseline-met  -returns to baseline functional status-not met   -safe disposition plan has been identified-not met   Nurse to notify provider when observation goals have been met and patient is ready for discharge.      Pt. A&Ox4. PO dilaudid for pain. BG checks. Mod carb diet. Pain to the back left side down to sacral area. Not OOB. Voiding in urinal.

## 2024-06-25 NOTE — ED PROVIDER NOTES
Emergency Department Note      History of Present Illness     Chief Complaint  Fall    HPI  Nahun Villalobos is a 78 year old male on Eliquis with a history of CHF, COPD, hypertension, and hyperlipidemia who presents to the emergency department for evaluation after a fall. EMS reports the patient falling off of his bed, hitting his head, and landing on a step stool 2 days ago and having worsening lower back pain since then. They note that he hasn't been able to ambulate on his own, bear weight on his lower extremities, and has been using a wheelchair. The patient reports taking Tylenol, but it did not help alleviate the pain. Of note, the patient lives with his girlfriend.  He is on a blood thinner.    Independent Historian  EMS as above     Review of External Notes  I reviewed the patient's office visit from 6/17/24    Past Medical History   Medical History and Problem List  Antiplatelet or antithrombolytic long-term use   Arthritis   CHF  COPD  Type 2 diabetes   Dyspnea on exertion   Hypertension   Hemorrhage of rectum and anus   Lung cancer   Obesity   Colonic polyps   Sleep apnea   Thrombosis   Tobacco use disorder   Chronic respiratory failure   Malignant carcinoid tumor of small intestine   Malignant neoplasm metastatic to bone   Acute diverticulitis   Hyperlipidemia   Lumbago     Medications  Eliquis   Lipitor   Questran   DSS  Proscar   Lasix   Neurontin   Zestril   Glucophage   Toprol   Solu-cortef  Somatuline   Solu-medrol     Surgical History   Abdomen surgery   Appendectomy   Bone exostosis excision   Cholecystectomy   Colonoscopy   Cystoscopy transurethral resection prostate combined  Lung biopsy mediastinum right   Wedge resection   Nonspecific procedure     Physical Exam   Patient Vitals for the past 24 hrs:   BP Temp Temp src Pulse Resp SpO2   06/24/24 2300 (P) 130/68 (P) 98.5  F (36.9  C) (P) Temporal (P) 70 (P) 18 (P) 96 %   06/24/24 2046 -- -- -- -- -- 94 %   06/24/24 1959 128/59 -- -- 59 -- 93  %   06/24/24 1947 137/85 -- -- 66 16 93 %     Physical Exam     Nursing note and vitals reviewed.    Constitutional:  Appears comfortable.    HENT:                Nose normal.  No discharge.      Oral mucosa is moist.  Eyes:    Conjunctivae are normal without injection.  Pupils are equal.  Cardiovascular:  Normal rate, regular rhythm with normal S1 and S2.   Pulmonary:  Effort normal and breath sounds clear to auscultation bilaterally.   GI:    Soft. No distension and no mass. No tenderness.   Musculoskeletal:  I can rotate his hips and flex them without pain.  However he is tender a little bit above the SI joint but then along the lower thoracic and upper lumbar spine area on the left side.  Neurological:   Alert and oriented. No focal weakness.  Skin:    Skin is warm and dry. No rash noted.   Psychiatric:   Behavior is normal. Appropriate mood and affect.     Judgment and thought content normal.     Diagnostics   Lab Results   Labs Ordered and Resulted from Time of ED Arrival to Time of ED Departure - No data to display    Imaging  CT Lumbar Spine w/o Contrast   Final Result   IMPRESSION:   THORACIC SPINE CT:   1.  No fracture or posttraumatic subluxation.   2.  Multilevel thoracic spondylosis with multilevel bridging osteophytes raising the possibility of DISH.   3.  Neural foraminal stenosis greatest on the right at T3-T4 and T4-T5.      LUMBAR SPINE CT:   1.  No fracture or posttraumatic subluxation.   2.  Multilevel lumbar spondylosis greatest at L3-4 and L4-5 as above.         CT Thoracic Spine w/o Contrast   Final Result   IMPRESSION:   THORACIC SPINE CT:   1.  No fracture or posttraumatic subluxation.   2.  Multilevel thoracic spondylosis with multilevel bridging osteophytes raising the possibility of DISH.   3.  Neural foraminal stenosis greatest on the right at T3-T4 and T4-T5.      LUMBAR SPINE CT:   1.  No fracture or posttraumatic subluxation.   2.  Multilevel lumbar spondylosis greatest at L3-4 and  L4-5 as above.         CT Head w/o Contrast   Final Result   IMPRESSION:   1.  No CT evidence for acute intracranial process.   2.  Brain atrophy and presumed chronic microvascular ischemic changes similar to the previous exam.          Independent Interpretation  None  ED Course    Medications Administered  Medications   traMADol (ULTRAM) tablet 50 mg (50 mg Oral $Given 6/24/24 2237)       Discussion of Management  Admitting Hospitalist, Dr. Chavis    Social Determinants of Health adding to complexity of care  None    ED Course  ED Course as of 06/25/24 0015   Mon Jun 24, 2024   1745 I obtained history and examined the patient as noted above     2221 I rechecked the patient and explained findings.     2243 I rechecked the patient and explained findings. Patient was comfortable with admission.   Tue Jun 25, 2024   0004 I spoke with Dr. Pacheco, hospitalist, who accepts the patient.      Medical Decision Making / Diagnosis   CMS Diagnoses: None    MIPS     None    [facility]  Nahun Villalobos is a 78 year old male who fell 2 days ago and has increasing pain along the spine on the left.  CT of the head was negative as he said he bumped his head, CT of the thoracic and lumbar spines were normal as well without evidence of fracture.  He was given a tramadol and he still could not get up without significant pain.  The patient will need to come in the hospital and be considered for a TCU tomorrow.  I talked with Dr. Pacheco who will be the admitting hospitalist.  I have ordered some basic labs including a CBC and a basic panel.  The patient is comfortable as long as he does not try to get up or walk.  His girlfriend said that she cannot care for him at home and I agree that he will likely need TCU placement.  PT and OT could see him tomorrow as well.    Disposition  The patient was admitted to the hospital.     ICD-10 Codes:    ICD-10-CM    1. Acute left-sided low back pain without sciatica  M54.50       2. Contusion of  lower back, initial encounter  S30.0XXA            Scribe Disclosure:  I, Sindhu Durbin, am serving as a scribe at 8:01 PM on 6/24/2024 to document services personally performed by Mary Ellen Hunt MD based on my observations and the provider's statements to me.        Mary Ellen Hunt MD  06/25/24 0016

## 2024-06-25 NOTE — ED NOTES
[facility]  ED Nurse Handoff Report    ED Chief complaint: Fall      ED Diagnosis:   Final diagnoses:   Acute left-sided low back pain without sciatica   Contusion of lower back, initial encounter       Code Status: Per the admitting provider     Allergies:   Allergies   Allergen Reactions    No Known Drug Allergy        Patient Story:   Pt c/o Lt sided  non-radiating back pain. According to pt he fell while getting out of bed on Saturday 6/22/24. Pt denied LOC or hitting his had. Since falling , pt reports difficulty ambulating due to increase lower back pain. .     Focused Assessment:   A/O x 4 able to communicate needs to staff. Pt declined to go for walk to assess mobility , due to pain. He received Tramadol  with relief.     Treatments and/or interventions provided: Lab, XR,  Tramadol, CT head, CT, Lumbar spine, blood sugar  Results for orders placed or performed during the hospital encounter of 06/24/24   CT Head w/o Contrast     Status: None    Narrative    EXAM: CT HEAD W/O CONTRAST  LOCATION: [facility]  DATE: 6/24/2024    INDICATION: fall,hit head, on thinners  COMPARISON: CT head 3/2/2024  TECHNIQUE: Routine CT Head without IV contrast. Multiplanar reformats. Dose reduction techniques were used.    FINDINGS:  INTRACRANIAL CONTENTS: No intracranial hemorrhage, extraaxial collection, or mass effect.  No CT evidence of acute infarct. Mild presumed chronic small vessel ischemic changes. Mild to moderate generalized volume loss with disproportionate lateral and   third ventriculomegaly similar to previous exams.     VISUALIZED ORBITS/SINUSES/MASTOIDS: Prior bilateral cataract surgery. Visualized portions of the orbits are otherwise unremarkable. No paranasal sinus mucosal disease. No middle ear or mastoid effusion.    BONES/SOFT TISSUES: No significant scalp hematoma. No skull fracture.      Impression    IMPRESSION:  1.  No CT evidence for acute intracranial  process.  2.  Brain atrophy and presumed chronic microvascular ischemic changes similar to the previous exam.   CT Thoracic Spine w/o Contrast     Status: None    Narrative    EXAM: CT THORACIC SPINE W/O CONTRAST, CT LUMBAR SPINE W/O CONTRAST  LOCATION: [facility]  DATE: 6/24/2024    INDICATION: Fall 2 days ago, pain on the left side of the lower thoracic and upper lumbar spine  COMPARISON: None.  TECHNIQUE:  1) Routine CT Thoracic Spine without IV contrast. Multiplanar reformats. Dose reduction techniques were used.   2) Routine CT Lumbar Spine without IV contrast. Multiplanar reformats. Dose reduction techniques were used.     FINDINGS:    THORACIC SPINE CT:  VERTEBRA: 12 rib-bearing thoracic type vertebra. Broad thoracic dextrocurvature. Multilevel interbody ankylosis and ventral/right lateral bridging osteophytes raising the possibility of DISH. Preserved vertebral body heights. No fracture or posttraumatic   subluxation.     CANAL/FORAMINA: Mild diffuse loss of disc height and endplate spurring. No CT evidence for high-grade central spinal canal stenosis. Moderate to severe right neural foraminal stenosis at T3-T4 and T4-T5.    PARASPINAL: No extraspinal abnormality.    LUMBAR SPINE CT:  VERTEBRA: 5 lumbar type vertebra. Mild lumbar dextrocurvature and slight right lateral listhesis at L4-5. 2 to 3 mm anterolisthesis at L4-5. Preserved vertebral body heights. No fracture or posttraumatic subluxation.     CANAL/FORAMINA: Diffuse lumbar spondylosis, with multilevel loss of disc height and endplate spurring greatest at L4-5 level. Mild to moderate multilevel facet arthropathy, greatest at L4-5. Broad-based posterior disc osteophyte complex, facet   arthropathy, and ligamentum flavum thickening results in moderate trefoil type spinal canal stenosis at L3-4 and L4-5. Moderate right and severe left neural foraminal stenosis at L4-5. Mild right and moderate left neural foraminal stenosis at  L5-S1. Mild   bilateral neural foraminal stenosis at L3-4.    PARASPINAL: No extraspinal abnormality.      Impression    IMPRESSION:  THORACIC SPINE CT:  1.  No fracture or posttraumatic subluxation.  2.  Multilevel thoracic spondylosis with multilevel bridging osteophytes raising the possibility of DISH.  3.  Neural foraminal stenosis greatest on the right at T3-T4 and T4-T5.    LUMBAR SPINE CT:  1.  No fracture or posttraumatic subluxation.  2.  Multilevel lumbar spondylosis greatest at L3-4 and L4-5 as above.     CT Lumbar Spine w/o Contrast     Status: None    Narrative    EXAM: CT THORACIC SPINE W/O CONTRAST, CT LUMBAR SPINE W/O CONTRAST  LOCATION: [facility]  DATE: 6/24/2024    INDICATION: Fall 2 days ago, pain on the left side of the lower thoracic and upper lumbar spine  COMPARISON: None.  TECHNIQUE:  1) Routine CT Thoracic Spine without IV contrast. Multiplanar reformats. Dose reduction techniques were used.   2) Routine CT Lumbar Spine without IV contrast. Multiplanar reformats. Dose reduction techniques were used.     FINDINGS:    THORACIC SPINE CT:  VERTEBRA: 12 rib-bearing thoracic type vertebra. Broad thoracic dextrocurvature. Multilevel interbody ankylosis and ventral/right lateral bridging osteophytes raising the possibility of DISH. Preserved vertebral body heights. No fracture or posttraumatic   subluxation.     CANAL/FORAMINA: Mild diffuse loss of disc height and endplate spurring. No CT evidence for high-grade central spinal canal stenosis. Moderate to severe right neural foraminal stenosis at T3-T4 and T4-T5.    PARASPINAL: No extraspinal abnormality.    LUMBAR SPINE CT:  VERTEBRA: 5 lumbar type vertebra. Mild lumbar dextrocurvature and slight right lateral listhesis at L4-5. 2 to 3 mm anterolisthesis at L4-5. Preserved vertebral body heights. No fracture or posttraumatic subluxation.     CANAL/FORAMINA: Diffuse lumbar spondylosis, with multilevel loss of disc height  and endplate spurring greatest at L4-5 level. Mild to moderate multilevel facet arthropathy, greatest at L4-5. Broad-based posterior disc osteophyte complex, facet   arthropathy, and ligamentum flavum thickening results in moderate trefoil type spinal canal stenosis at L3-4 and L4-5. Moderate right and severe left neural foraminal stenosis at L4-5. Mild right and moderate left neural foraminal stenosis at L5-S1. Mild   bilateral neural foraminal stenosis at L3-4.    PARASPINAL: No extraspinal abnormality.      Impression    IMPRESSION:  THORACIC SPINE CT:  1.  No fracture or posttraumatic subluxation.  2.  Multilevel thoracic spondylosis with multilevel bridging osteophytes raising the possibility of DISH.  3.  Neural foraminal stenosis greatest on the right at T3-T4 and T4-T5.    LUMBAR SPINE CT:  1.  No fracture or posttraumatic subluxation.  2.  Multilevel lumbar spondylosis greatest at L3-4 and L4-5 as above.     Basic metabolic panel     Status: Abnormal   Result Value Ref Range    Sodium 138 135 - 145 mmol/L    Potassium 4.4 3.4 - 5.3 mmol/L    Chloride 102 98 - 107 mmol/L    Carbon Dioxide (CO2) 25 22 - 29 mmol/L    Anion Gap 11 7 - 15 mmol/L    Urea Nitrogen 14.5 8.0 - 23.0 mg/dL    Creatinine 1.21 (H) 0.67 - 1.17 mg/dL    GFR Estimate 61 >60 mL/min/1.73m2    Calcium 8.8 8.8 - 10.2 mg/dL    Glucose 165 (H) 70 - 99 mg/dL   CBC (+ platelets, no diff)     Status: Abnormal   Result Value Ref Range    WBC Count 10.4 4.0 - 11.0 10e3/uL    RBC Count 3.49 (L) 4.40 - 5.90 10e6/uL    Hemoglobin 11.8 (L) 13.3 - 17.7 g/dL    Hematocrit 35.4 (L) 40.0 - 53.0 %     (H) 78 - 100 fL    MCH 33.8 (H) 26.5 - 33.0 pg    MCHC 33.3 31.5 - 36.5 g/dL    RDW 11.5 10.0 - 15.0 %    Platelet Count 283 150 - 450 10e3/uL   Glucose by meter     Status: Abnormal   Result Value Ref Range    GLUCOSE BY METER POCT 174 (H) 70 - 99 mg/dL   Reticulocyte count     Status: Normal   Result Value Ref Range    % Reticulocyte 1.6 0.5 - 2.0 %     Absolute Reticulocyte 0.056 0.025 - 0.095 10e6/uL   Iron & Iron Binding Capacity     Status: Abnormal   Result Value Ref Range    Iron 20 (L) 61 - 157 ug/dL    Iron Binding Capacity 218 (L) 240 - 430 ug/dL    Iron Sat Index 9 (L) 15 - 46 %      Patient's response to treatments and/or interventions: Stable     To be done/followed up on inpatient unit:  See order     Does this patient have any cognitive concerns?: N/A     Activity level - Baseline/Home:  Stand with Assist  Activity Level - Current:   Total Care    Patient's Preferred language: English   Needed?: No    Isolation: None  Infection: Not Applicable  Patient tested for COVID 19 prior to admission: YES  Bariatric?: No    Vital Signs:   Vitals:    06/24/24 1947 06/24/24 1959 06/24/24 2046 06/24/24 2300   BP: 137/85 128/59  130/68   Pulse: 66 59  70   Resp: 16   18   Temp:    98.5  F (36.9  C)   TempSrc:    Temporal   SpO2: 93% 93% 94% 96%       Cardiac Rhythm:     Was the PSS-3 completed:   Yes  What interventions are required if any?               Family Comments: Family left   OBS brochure/video discussed/provided to patient/family: Yes              Name of person given brochure if not patient: N.A               Relationship to patient: N/A     For the majority of the shift this patient's behavior was Green.   Behavioral interventions performed were N/A .    ED NURSE PHONE NUMBER: *[phone]

## 2024-06-25 NOTE — PROGRESS NOTES
Clinic Care Coordination Contact  Ambulatory Care Coordination to Inpatient Care Management   Hand-In Communication    Date:  June 25, 2024  Name: Nahun Villalobos is enrolled in Ambulatory Care Coordination program and I am the Lead Care Coordinator.  CC Contact Information: Epic InBasket + phone  Payor Source: Payor: MEDICA / Plan: MEDICA ADVANTAGE SOLUTIONS / Product Type: HMO /   Current services in place:     Please see the CC Snaphot and Care Management Flowsheets for specific  details of this Nahun Villalobos care plan.   Additional details/specific concerns r/t this admission:    Goals of Care - Improve leg, knee and hip pain and lymphedema and Readmission Risk 92.5%    I will follow this admission in Epic. Please feel free to contact me with questions or for further collaboration in discharge planning.    Kelli Davidson RN Clinic Care Coordinator  [facility] Clinics: [place], [place] (on-site Wednesdays), [facility] (on-site Thursdays) & [facility].  [email or web address]  Phone: [phone]

## 2024-06-25 NOTE — CONSULTS
Care Management Initial Consult    General Information  Assessment completed with: Patient,    Type of CM/SW Visit: Initial Assessment    Primary Care Provider verified and updated as needed:     Readmission within the last 30 days:           Advance Care Planning: Advance Care Planning Reviewed: present on chart          Communication Assessment  Patient's communication style: spoken language (English or Bilingual)    Hearing Difficulty or Deaf: no   Wear Glasses or Blind: yes    Cognitive  Cognitive/Neuro/Behavioral:                        Living Environment:   People in home: significant other  Kenia  Current living Arrangements: house      Able to return to prior arrangements: other (see comments) (Lives in SO's house and she cares for him)  Living Arrangement Comments: Kenia does everything according to Jack    Family/Social Support:  Care provided by: spouse/significant other  Provides care for: no one, unable/limited ability to care for self  Marital Status: Lives with Significant Other  Significant Other, Children          Description of Support System: Supportive, Involved    Support Assessment: Adequate family and caregiver support    Current Resources:   Patient receiving home care services: Yes  Skilled Home Care Services: Physical Therapy  Community Resources: None  Equipment currently used at home: walker, rolling  Supplies currently used at home:      Employment/Financial:  Employment Status: retired        Financial Concerns:             Does the patient's insurance plan have a 3 day qualifying hospital stay waiver?  No    Lifestyle & Psychosocial Needs:  Social Determinants of Health     Food Insecurity: No Food Insecurity (1/11/2022)    Hunger Vital Sign     Worried About Running Out of Food in the Last Year: Never true     Ran Out of Food in the Last Year: Never true   Depression: Not at risk (4/1/2024)    PHQ-2     PHQ-2 Score: 0   Housing Stability: Unknown (1/11/2022)    Housing Stability Vital  "Sign     Unable to Pay for Housing in the Last Year: No     Number of Places Lived in the Last Year: Not on file     Unstable Housing in the Last Year: No   Tobacco Use: Medium Risk (6/25/2024)    Patient History     Smoking Tobacco Use: Former     Smokeless Tobacco Use: Never     Passive Exposure: Not on file   Financial Resource Strain: Low Risk  (1/11/2022)    Overall Financial Resource Strain (CARDIA)     Difficulty of Paying Living Expenses: Not hard at all   Alcohol Use: Not At Risk (1/11/2022)    AUDIT-C     Frequency of Alcohol Consumption: 2-3 times a week     Average Number of Drinks: 1 or 2     Frequency of Binge Drinking: Never   Transportation Needs: No Transportation Needs (1/11/2022)    PRAPARE - Transportation     Lack of Transportation (Medical): No     Lack of Transportation (Non-Medical): No   Physical Activity: Not on file   Interpersonal Safety: Not on file   Stress: Not on file   Social Connections: Unknown (4/10/2023)    [facility]    Social Connections     Frequency of Communication with Friends and Family: Not on file   Health Literacy: Not on file       Functional Status:  Prior to admission patient needed assistance:   Dependent ADLs:: Ambulation-walker  Dependent IADLs:: Cleaning, Cooking, Laundry, Meal Preparation, Shopping, Medication Management, Transportation       Mental Health Status:  Mental Health Status: No Current Concerns       Chemical Dependency Status:                Values/Beliefs:  Spiritual, Cultural Beliefs, Orthodox Practices, Values that affect care:                 Additional Information:  Writer met with patient at bedside and introduced self and role.  Patient has been to TCU in the past and stated \"it doesn't do anything\".  Patient shared that he ws in [facility] recently and declined after he went home.  He does not want to return to [facility],  Writer presented the TCU " list and 4 choices were made and sent via SHEILA Ying RN

## 2024-06-25 NOTE — PROGRESS NOTES
RECEIVING UNIT ED HANDOFF REVIEW    ED Nurse Handoff Report was reviewed by: Judy Finn RN on June 25, 2024 at 2:42 AM

## 2024-06-25 NOTE — PROGRESS NOTES
"   06/25/24 0900   Appointment Info   Signing Clinician's Name / Credentials (PT) Ramsey Cordero DPT   Quick Adds   Quick Adds Certification       Present no   Living Environment   People in Home significant other   Current Living Arrangements house   Home Accessibility stairs to enter home   Number of Stairs, Main Entrance 3   Stair Railings, Main Entrance railing on right side (ascending)   Transportation Anticipated family or friend will provide   Living Environment Comments Pt reports living in a house with his S.O. Stairs to enter. Pt reports his S.O. will pick him up upon discharge. Pt reports his S.O. has \"bad knees\" and is unsure if she can provide physical assist.   Self-Care   Usual Activity Tolerance good   Current Activity Tolerance moderate   Regular Exercise No   Equipment Currently Used at Home walker, rolling   Fall history within last six months yes   Number of times patient has fallen within last six months 10   Activity/Exercise/Self-Care Comment Pt reports being IND at baseline with all ADLs. Pt ambulates w/ a FWW. Pt reports being able to ambulate ~1 block at baseline. Pt drives.   General Information   Onset of Illness/Injury or Date of Surgery 06/25/24   Referring Physician Asya Pacheco MD   Patient/Family Therapy Goals Statement (PT) \"To get better\"   Pertinent History of Current Problem (include personal factors and/or comorbidities that impact the POC) Per Chart: 78 year old male with PMHx of hypertension, hyperlipidemia, DM II, obesity, CIERA , COPD, history of PE, malignant carcinoid tumor, lung carcinoma, peripheral neuropathy, cervical myelopathy presenting w/ R lower back pain following a fall   Existing Precautions/Restrictions fall   Weight-Bearing Status - LLE full weight-bearing   Weight-Bearing Status - RLE full weight-bearing   Cognition   Orientation Status (Cognition) oriented x 3   Pain Assessment   Patient Currently in Pain Yes, see Vital Sign " flowsheet   Integumentary/Edema   Integumentary/Edema Comments 1+ BLE edema   Posture    Posture Forward head position;Protracted shoulders   Range of Motion (ROM)   Range of Motion ROM is WFL   Strength (Manual Muscle Testing)   Strength (Manual Muscle Testing) Deficits observed during functional mobility   Strength Comments LLE Hip Flexion: 3/5; RLE Hip Flexion: 3+/5   Bed Mobility   Comment, (Bed Mobility) Supine>sit w/ mod A x 1   Transfers   Comment, (Transfers) Sit>stand w/ FWW and mod A x 1   Gait/Stairs (Locomotion)   Comment, (Gait/Stairs) Unable to initiate   Balance   Balance Comments Impaired static sitting and standing balance   Sensory Examination   Sensory Perception patient reports no sensory changes   Clinical Impression   Criteria for Skilled Therapeutic Intervention Yes, treatment indicated   PT Diagnosis (PT) Impaired gait   Influenced by the following impairments Decreased activity tolerance; decreased balance; decreased strength   Functional limitations due to impairments Impaired functional mobility   Clinical Presentation (PT Evaluation Complexity) stable   Clinical Presentation Rationale Clinical judgement   Clinical Decision Making (Complexity) low complexity   Planned Therapy Interventions (PT) balance training;bed mobility training;gait training;patient/family education;stair training;strengthening;transfer training;progressive activity/exercise   Risk & Benefits of therapy have been explained evaluation/treatment results reviewed;care plan/treatment goals reviewed;risks/benefits reviewed;current/potential barriers reviewed;participants voiced agreement with care plan;patient;participants included   PT Total Evaluation Time   PT Eval, Low Complexity Minutes (47966) 10   Therapy Certification   Start of care date 06/25/24   Certification date from 06/25/24   Certification date to 06/30/24   Medical Diagnosis Low Back Pain   Physical Therapy Goals   PT Frequency 5x/week   PT Predicted  Duration/Target Date for Goal Attainment 07/01/24   PT Goals Bed Mobility;Transfers;Gait;Stairs   PT: Bed Mobility Supervision/stand-by assist;Supine to/from sit   PT: Transfers Supervision/stand-by assist;Sit to/from stand;Assistive device   PT: Gait Supervision/stand-by assist;Assistive device;100 feet   PT: Stairs Supervision/stand-by assist;3 stairs;Rail on right   Interventions   Interventions Quick Adds Gait Training;Therapeutic Activity   Therapeutic Activity   Therapeutic Activities: dynamic activities to improve functional performance Minutes (54789) 30   Symptoms Noted During/After Treatment Increased pain;Fatigue   Treatment Detail/Skilled Intervention Greeted pt supine in bed, agreed to PT. VSS on RA throughout session. Pt performing all movement slowly due to low back pain and weakness. Pt performed supine>sit via logroll technique and mod A x 1, needing assist to safely lift BLEs off of bed and trunk control. Step-by-step cues for technique. Of note, LLE is weaker than RLE. Once in sitting, pt needed additional assist to scoot to EOB. Pt sitting with posterior lean, requiring BUEs on bed and intermittent assist from PT to maintain sitting in midline. Pt performed sit>stand x 3 w/ FWW and mod A x 1, needing assist to stand fully upright. verbal cues for hand placement. Pt unsteady in standing, hesitant to WB through LLE due to back pain. Pt able to perform standing marches x 6 reps bilaterally using a FWW. Pt able to take 2 steps forward/backward before needing to sit due to fatigue and increased pain. Pt unsteady with all OOB activity. Pt returned to supine via reverse logroll technique and mod A x 1, needing assist to safely lift BLEs back into bed and reposition self. Pt ended session supine in bed, with all needs met and call light within reach.   PT Discharge Planning   PT Plan Bed mobility via logroll technique; repeat sit>stands; pre-gait exercises; gait w/ FWW and WC follow   PT Discharge  Recommendation (DC Rec) Transitional Care Facility   PT Rationale for DC Rec Pt is below baseline. Pt currently requiring heavy A x 1 with all functional mobility. Pt presents with deficits in activity tolerance, balance, strength, and pain. Due to these deficits, pt is a high falls risk. Pt would benefit from continued skilled PT services via TCU to address deficits and improve IND with safety and functional mobility. Pt reports his S.O. will be at home to help but can not provide physical assist, which pt currently requires.   PT Brief overview of current status Supine>sit w/ mod A x 1; sit>stand w/ FWW and mod A x1, unable to initiate gait on this date   Total Session Time   Timed Code Treatment Minutes 30   Total Session Time (sum of timed and untimed services) 40       [facility]  OUTPATIENT PHYSICAL THERAPY EVALUATION  PLAN OF TREATMENT FOR OUTPATIENT REHABILITATION  (COMPLETE FOR INITIAL CLAIMS ONLY)  Patient's Last Name, First Name, M.I.  YOB: 1945  Wilfredo,Nahun ARCHER                        Provider's Name  [facility] Medical Record No.  [id number]                             Onset Date:  06/25/24   Start of Care Date:  06/25/24   Type:     _X_PT   ___OT   ___SLP Medical Diagnosis:  Low Back Pain              PT Diagnosis:  Impaired gait Visits from SOC:  1     See note for plan of treatment, functional goals and certification details    I CERTIFY THE NEED FOR THESE SERVICES FURNISHED UNDER        THIS PLAN OF TREATMENT AND WHILE UNDER MY CARE     (Physician co-signature of this document indicates review and certification of the therapy plan).

## 2024-06-25 NOTE — ED NOTES
Bed: ED01  Expected date:   Expected time:   Means of arrival:   Comments:  [facility]

## 2024-06-25 NOTE — PROGRESS NOTES
Occupational Therapy: Orders received. Chart reviewed and discussed with care team.? Occupational Therapy not indicated due to pt needing TCU at discharge, OBS status. Will defer OT to next level of care to prevent duplication of services while in hospital.? Defer discharge recommendations to care team.? Will complete orders.

## 2024-06-26 ENCOUNTER — APPOINTMENT (OUTPATIENT)
Dept: GENERAL RADIOLOGY | Facility: CLINIC | Age: 79
DRG: 552 | End: 2024-06-26
Attending: HOSPITALIST
Payer: COMMERCIAL

## 2024-06-26 ENCOUNTER — APPOINTMENT (OUTPATIENT)
Dept: PHYSICAL THERAPY | Facility: CLINIC | Age: 79
DRG: 552 | End: 2024-06-26
Payer: COMMERCIAL

## 2024-06-26 LAB
ALBUMIN UR-MCNC: 30 MG/DL
ANION GAP SERPL CALCULATED.3IONS-SCNC: 10 MMOL/L (ref 7–15)
APPEARANCE UR: ABNORMAL
BILIRUB UR QL STRIP: NEGATIVE
BUN SERPL-MCNC: 22.7 MG/DL (ref 8–23)
CALCIUM SERPL-MCNC: 8.9 MG/DL (ref 8.8–10.2)
CHLORIDE SERPL-SCNC: 103 MMOL/L (ref 98–107)
COLOR UR AUTO: YELLOW
CREAT SERPL-MCNC: 1.44 MG/DL (ref 0.67–1.17)
DEPRECATED HCO3 PLAS-SCNC: 26 MMOL/L (ref 22–29)
EGFRCR SERPLBLD CKD-EPI 2021: 49 ML/MIN/1.73M2
GLUCOSE BLDC GLUCOMTR-MCNC: 102 MG/DL (ref 70–99)
GLUCOSE BLDC GLUCOMTR-MCNC: 133 MG/DL (ref 70–99)
GLUCOSE BLDC GLUCOMTR-MCNC: 134 MG/DL (ref 70–99)
GLUCOSE BLDC GLUCOMTR-MCNC: 136 MG/DL (ref 70–99)
GLUCOSE BLDC GLUCOMTR-MCNC: 86 MG/DL (ref 70–99)
GLUCOSE SERPL-MCNC: 145 MG/DL (ref 70–99)
GLUCOSE UR STRIP-MCNC: NEGATIVE MG/DL
HGB UR QL STRIP: ABNORMAL
KETONES UR STRIP-MCNC: NEGATIVE MG/DL
LEUKOCYTE ESTERASE UR QL STRIP: ABNORMAL
MUCOUS THREADS #/AREA URNS LPF: PRESENT /LPF
NITRATE UR QL: NEGATIVE
PH UR STRIP: 5.5 [PH] (ref 5–7)
POTASSIUM SERPL-SCNC: 4.3 MMOL/L (ref 3.4–5.3)
RBC URINE: 9 /HPF
SODIUM SERPL-SCNC: 139 MMOL/L (ref 135–145)
SP GR UR STRIP: 1.03 (ref 1–1.03)
SQUAMOUS EPITHELIAL: 2 /HPF
UROBILINOGEN UR STRIP-MCNC: NORMAL MG/DL
WBC URINE: 100 /HPF

## 2024-06-26 PROCEDURE — 82374 ASSAY BLOOD CARBON DIOXIDE: CPT | Performed by: HOSPITALIST

## 2024-06-26 PROCEDURE — 73502 X-RAY EXAM HIP UNI 2-3 VIEWS: CPT

## 2024-06-26 PROCEDURE — 258N000003 HC RX IP 258 OP 636: Performed by: HOSPITALIST

## 2024-06-26 PROCEDURE — G0378 HOSPITAL OBSERVATION PER HR: HCPCS

## 2024-06-26 PROCEDURE — 250N000013 HC RX MED GY IP 250 OP 250 PS 637: Performed by: HOSPITALIST

## 2024-06-26 PROCEDURE — 97530 THERAPEUTIC ACTIVITIES: CPT | Mod: GP | Performed by: PHYSICAL THERAPIST

## 2024-06-26 PROCEDURE — 99232 SBSQ HOSP IP/OBS MODERATE 35: CPT | Performed by: HOSPITALIST

## 2024-06-26 PROCEDURE — 82962 GLUCOSE BLOOD TEST: CPT

## 2024-06-26 PROCEDURE — 999N000128 HC STATISTIC PERIPHERAL IV START W/O US GUIDANCE

## 2024-06-26 PROCEDURE — 81001 URINALYSIS AUTO W/SCOPE: CPT | Performed by: HOSPITALIST

## 2024-06-26 PROCEDURE — 82565 ASSAY OF CREATININE: CPT | Performed by: HOSPITALIST

## 2024-06-26 PROCEDURE — 250N000013 HC RX MED GY IP 250 OP 250 PS 637: Performed by: STUDENT IN AN ORGANIZED HEALTH CARE EDUCATION/TRAINING PROGRAM

## 2024-06-26 PROCEDURE — 36415 COLL VENOUS BLD VENIPUNCTURE: CPT | Performed by: HOSPITALIST

## 2024-06-26 PROCEDURE — 97116 GAIT TRAINING THERAPY: CPT | Mod: GP | Performed by: PHYSICAL THERAPIST

## 2024-06-26 PROCEDURE — 999N000040 HC STATISTIC CONSULT NO CHARGE VASC ACCESS

## 2024-06-26 RX ADMIN — APIXABAN 2.5 MG: 2.5 TABLET, FILM COATED ORAL at 08:41

## 2024-06-26 RX ADMIN — METFORMIN HYDROCHLORIDE 1000 MG: 500 TABLET ORAL at 08:40

## 2024-06-26 RX ADMIN — SODIUM CHLORIDE 1000 ML: 9 INJECTION, SOLUTION INTRAVENOUS at 14:48

## 2024-06-26 RX ADMIN — CHOLESTYRAMINE 4 G: 4 POWDER, FOR SUSPENSION ORAL at 21:53

## 2024-06-26 RX ADMIN — APIXABAN 2.5 MG: 2.5 TABLET, FILM COATED ORAL at 21:53

## 2024-06-26 RX ADMIN — ATORVASTATIN CALCIUM 20 MG: 20 TABLET, FILM COATED ORAL at 21:53

## 2024-06-26 RX ADMIN — METOPROLOL SUCCINATE 25 MG: 25 TABLET, EXTENDED RELEASE ORAL at 08:40

## 2024-06-26 RX ADMIN — ACETAMINOPHEN 975 MG: 325 TABLET, FILM COATED ORAL at 21:53

## 2024-06-26 RX ADMIN — LISINOPRIL 40 MG: 40 TABLET ORAL at 08:41

## 2024-06-26 RX ADMIN — FINASTERIDE 5 MG: 5 TABLET, FILM COATED ORAL at 08:39

## 2024-06-26 RX ADMIN — GABAPENTIN 300 MG: 300 CAPSULE ORAL at 08:40

## 2024-06-26 RX ADMIN — INSULIN ASPART 6 UNITS: 100 INJECTION, SOLUTION INTRAVENOUS; SUBCUTANEOUS at 08:43

## 2024-06-26 RX ADMIN — ACETAMINOPHEN 975 MG: 325 TABLET, FILM COATED ORAL at 16:19

## 2024-06-26 RX ADMIN — FUROSEMIDE 40 MG: 40 TABLET ORAL at 08:40

## 2024-06-26 RX ADMIN — ACETAMINOPHEN 975 MG: 325 TABLET, FILM COATED ORAL at 08:40

## 2024-06-26 RX ADMIN — CELECOXIB 200 MG: 200 CAPSULE ORAL at 08:40

## 2024-06-26 RX ADMIN — CHOLESTYRAMINE 4 G: 4 POWDER, FOR SUSPENSION ORAL at 10:06

## 2024-06-26 RX ADMIN — METFORMIN HYDROCHLORIDE 1000 MG: 500 TABLET ORAL at 18:53

## 2024-06-26 RX ADMIN — GABAPENTIN 600 MG: 300 CAPSULE ORAL at 21:53

## 2024-06-26 RX ADMIN — INSULIN ASPART 7 UNITS: 100 INJECTION, SOLUTION INTRAVENOUS; SUBCUTANEOUS at 18:52

## 2024-06-26 RX ADMIN — INSULIN ASPART 5 UNITS: 100 INJECTION, SOLUTION INTRAVENOUS; SUBCUTANEOUS at 13:41

## 2024-06-26 ASSESSMENT — ACTIVITIES OF DAILY LIVING (ADL)
ADLS_ACUITY_SCORE: 38
ADLS_ACUITY_SCORE: 39
ADLS_ACUITY_SCORE: 39
ADLS_ACUITY_SCORE: 38
ADLS_ACUITY_SCORE: 39
ADLS_ACUITY_SCORE: 38
ADLS_ACUITY_SCORE: 39
ADLS_ACUITY_SCORE: 38
ADLS_ACUITY_SCORE: 39

## 2024-06-26 NOTE — PROGRESS NOTES
[facility]    Medicine Progress Note - Hospitalist Service    Date of Admission:  6/24/2024    Assessment & Plan   Nahun Villalobos is a 78 year old male admitted on 6/24/2024. PMHx of hypertension, hyperlipidemia, DM II, obesity, CIERA , COPD, history of PE, malignant carcinoid tumor, lung carcinoma, peripheral neuropathy, cervical myelopathy presenting w/ R lower back pain following a fall       Intractable back pain post mechanical fall  Multilevel thoracic and lumbar spondylosis w/ Thoracic foraminal stenosis  History of arthritis.  Peripheral neuropathy  Cervical myelopathy  Senile frailty and chronic debilitation   *Hx: Patient 2d prior to presentation fell from his elevated bed and hit his L lower back and head, without LOC. Since the fall, has lower back pain/L buttock that is severe when flat, and aggravated with movement. No radiculopathy symptoms, no urinary/rectal symptoms that are new. Has decreased ambulation since then. Tylenol failed to alleviate. No other falls since then.  *Vitals/Exam: Vitally stable, LE 3/5 strength w/ edema, L paraspinal/midline tenderness @ Lumbosacral junction  *Imaging:  Multilevel thoracic spondylosis with multilevel bridging osteophytes raising the possibility of DISH. Neural foraminal stenosis greatest on the right at T3-T4 and T4-T5. Multilevel lumbar spondylosis greatest at L3-4 and L4-5 as above.  *Neurosurg recommended MRI but unable to be completed due to metal foreign body in intestine (see below)  *CT abd/pelvis negative for any bony pathology    --tylenol tid, prn dilaudid, prn flexeril  --obtain MRI once metal object passes (discussed with neurosurg 06/26/24, MRI can be obtained non-urgently - if not completed before discharge contact neurosurg to assist in arranging follow up)  --therapy recommends TCU  --fall precautions  --continue PTA gabapentin    Foreign body ingestion  *noted during scan prior to initiation of MRI, though was  present on admission rib XR  *CT abd/pelvis showing a presumably metallic structure measuring 4.5cm in mid small bowel without evidence of perforation  *he denies any intentional ingestion, has no recollection of unintentionally swallowing any object  --monitor abd exam   --reports a large BM on 06/26/24; discussed that will wait and see if he has another tomorrow and can reconsider imaging at that time    SALLIE on CKD2  *baseline creatinine 1, stable at admission  --Cr up to 1.44 on 6/26; suspect due to initiation celebrex on admission in combination with diuretic and ACE  --hold lasix, lisinopril and celebrex (note all given morning of 6/26)  --follow BMP  --check UA  --give 1L NS over 10 hours     COPD, not on chronic O2, not in exacerbation  History of PE  Adenocarcinoma of lung s/p resection in 2016  *no acute dyspnic symptoms  --duonebs PRN  -- cont DOAC (though unclear why he's on 2.5q12 instead of 5q12)  -- CPAP  -- consider outpatient sleep study and PFTs  --pt should obtain trelegy inhaler from home     Hypertension  Hyperlipidemia  --continue PTA metoprolol, statin  --hold diuretic and ACE due to SALLIE as above  --prn hydralazine     Malignant metastatic carcinoid tumor  Rib Mass  *Status post exploratory laparotomy, small bowel resection and removal of mesenteric mass 2023  *Follows at [facility] w/ monthly lanreotide injections. Reports in 3/2024 suggest slow progressive w/ metastatsis to the liver, bone, retroperitoneum and mesentary  *Patient complaining of R inferior R nodule that he hasn't noticed below  --follow-up w/ [place] on 6/27/2024 pending     Chronic diarrhea  *Chronic and stable   --continue PTA cholestyramine.     Non-insulin dependent type 2 diabete mellitus   *A1C 7%  --ISS  --continue PTA metformin  --Mod CHO diet     BPH  -- continue PTA finasteride      Morbid Obesity  --outpatient diet and exercise as able to      Chronic macrocytic anemia  *Hb stable at 11.8  *iron studies 6/25  "consistent with combination iron deficiency and chronic inflammation  --resume iron supplement at discharge, follow up with PCP         Observation Goals: -diagnostic tests and consults completed and resulted, -vital signs normal or at patient baseline, -returns to baseline functional status, -safe disposition plan has been identified, Nurse to notify provider when observation goals have been met and patient is ready for discharge.  Diet: Combination Diet No Caffeine Diet, Low Saturated Fat Na <2400mg Diet    DVT Prophylaxis: DOAC  Gomez Catheter: Not present  Lines: None     Cardiac Monitoring: None  Code Status: No CPR- Do NOT Intubate      Clinically Significant Risk Factors Present on Admission               # Drug Induced Coagulation Defect: home medication list includes an anticoagulant medication    # Hypertension: Noted on problem list            # DMII: A1C = 7.0 % (Ref range: <5.7 %) within past 6 months    # Obesity: Estimated body mass index is 31.95 kg/m  as calculated from the following:    Height as of 6/17/24: 1.702 m (5' 7\").    Weight as of 6/17/24: 92.5 kg (204 lb).              Disposition Plan     Medically Ready for Discharge: Anticipated in 2-4 Days             Gilles Blount MD  Hospitalist Service  [facility]  Securely message with [employer] (more info)  Text page via [employer] Paging/Directory   ______________________________________________________________________    Interval History   Reports left buttock/thigh pain much improved today.  No abdominal pain.  Denies any ingestion and no recollection of swallowing any foreign body.  No other complaints.     Physical Exam   Vital Signs: Temp: 97.4  F (36.3  C) Temp src: Oral BP: (!) 184/77 Pulse: 63   Resp: 16 SpO2: 95 % O2 Device: None (Room air)    Weight: 0 lbs 0 oz    General Appearance: Well nourished male in NAD  Respiratory: lungs CTAB, no wheezes or crackles  Cardiovascular: RRR, normal s1/s2 without murmur  GI: " normal bowel sounds, abdomen soft and nontender  Skin:   Other: Awake and alert, CN grossly intact      Medical Decision Making       30 MINUTES SPENT BY ME on the date of service doing chart review, history, exam, documentation & further activities per the note.  MANAGEMENT DISCUSSED with the following over the past 24 hours: bedside RN   Tests ORDERED & REVIEWED in the past 24 hours:  - BMP  - serial glucose  Medical complexity over the past 24 hours:  - Prescription DRUG MANAGEMENT performed      Data     I have personally reviewed the following data over the past 24 hrs:    N/A  \   N/A   / N/A     139 103 22.7 /  136 (H)   4.3 26 1.44 (H) \

## 2024-06-26 NOTE — PROGRESS NOTES
Diagnosis:Intractable back pain post mechanical fall     Mental Status:A & O x 4  Activity/dangle: Up with 2, gait belt and sera steady  Diet:Cardiac diet  Pain:Tylenol,flexeril  Gomez/Voiding:Voiding in urinal, also incontinent at times  Tele/Restraints/Iso:NA  02/LDA:LOU LOPES  D/C Date:pending  Other Info: MRI and xray -pending, BG covered per order.           Observation Goals.      diagnostic tests and consults completed and resulted -NO  -vital signs normal or at patient baseline -Yes  -returns to baseline functional status -NO  -safe disposition plan has been identified -NO  Nurse to notify provider when observation goals have been met and patient is ready for discharge.NO

## 2024-06-26 NOTE — PROGRESS NOTES
Alert and oriented x 4. VSS, room air. CMS, dar LE neuropathy per baseline. Verbalizes pain with turning/repo, denies pain at rest, no PRN meds given. Voiding in urinal, low urine o/p. Bladder scan at 0630H was 132 ml. Transferred with assist of 2, using lift. Discharge TBD.

## 2024-06-26 NOTE — PROGRESS NOTES
"MRI not completed this shift d/t metal artifact noted on pt's abdomen (see Rad Tech notes  6/25,2251H). CT abdomen/pelvis done (6/25, 2336H) to check for injury fr metal d/t MRI, per result/radiologist  \"hairpin in the lumen of the mid small bowel\", no perforation and recommend hairpin be allowed to completely pass from digestive system and be evacuated prior to any MRI.  "

## 2024-06-26 NOTE — PROGRESS NOTES
[facility]  Neurosurgery Daily Progress Note    Assessment & Plan   Nahun Villalobos is a 78 year old male with history of hypertension, diabetes, COPD, CHF, non-small cell lung cancer, and peripheral neuropathy.  Patient was admitted on 6/24/2024 with low back pain after a fall.  Patient has chronic neck pain.  Chronic low back pain and weakness in legs that developed 3 months ago after he slipped in the bathtub.  He has been ambulating with a walker for the past 3 months.  Per chart review, patient fell 2 days prior to current hospital admission.       Thoracic spine and lumbar spine CT revealed multilevel spondylosis, no fracture noted.    Cervical spine MRI from [facility] on 2/16/2024 reports multilevel spinal canal stenosis at C3-4, C4-5, and C5-6.      Patient reports mild back pain today. His main concern is left hip flexion weakness. Unable to obtain MRIs due to metal foreign body located in the intestine, noted on CT.      -Neurology consult   -Thoracic and lumbar spine MRIs when able  -Please obtain cervical spine MRI that was completed at [facility] on 2/16/2024.  Report is in chart, but images are not available to review at this time.  -Left hip xray ordered per Hospitalist   -Continue pain control measures  -Monitor neuro exam  -Appreciate assistance from specialties     Discussed with Dr. Parvez Reed, [facility]  Tel [phone]  Pager [phone]    Interval History   Patient reports mild back pain today.  Denies any radicular pain or new symptoms.  His main concern is left hip flexion weakness. Unable to obtain MRIs due to metal foreign body located in the intestine, noted on CT.     Physical Exam   Temp: 97.3  F (36.3  C) Temp src: Oral BP: (!) 147/66 Pulse: 58   Resp: 16 SpO2: 97 % O2 Device: None (Room air)      Mental status:  Alert and Oriented x 3, speech is fluent, following commands  Motor:    RUE 5/5  LUE 5/5  RLE: hip flexion  4+/5, knee flexion extension 4/5, DF PF 5/5  LLE: muscle flicker noted with hip flexion but unable to lift left leg off the bed, knee flexion extension 4/5, DF PF 5/5   Sensation:  Intact to light touch,  baseline neuropathy in feet   Reflexes:   Negative Clonus.  Negative Nino's sign.

## 2024-06-26 NOTE — PROCEDURES
Brought pt down to MRI, reviewed checklist with patient and proceeded with mri. Upon scanning the localizer for the lumbar spine I saw a large black metal artifact in the pt's abdomen. I looked at the ct the pt had earlier today to discover a large metal pin of some sort in his abdomen. I Stopped scanning immediately and called a radiologist about how to proceed.   Dr Magdaleno  recommended a ct abdomen and pelvis to show any potential trauma caused to the pt's intestines from being in a high magnetic field. I called the nurse and communicated all of this.    Daysi Castellano Rt(R) MR

## 2024-06-26 NOTE — UTILIZATION REVIEW
Concurrent stay review; Secondary Review Determination     [facility]          Under the authority of the Utilization Management Committee, the utilization review process indicated a secondary review on the above patient.  The review outcome is based on review of the medical records, discussions with staff, and applying clinical experience noted on the date of the review.          (x) Observation Status Appropriate - Concurrent stay review    RATIONALE FOR DETERMINATION          The Patient is 79 y/o  man with PMHx significant for  hypertension, hyperlipidemia, type 2 diabetes mellitus, obesity, obstructive sleep apnea, COPD, history of pulmonary embolism, malignant carcinoid tumor, lung carcinoma, peripheral neuropathy, and cervical myelopathy was admitted on 6/24/2024, presenting with right lower back pain following a fall. Neurosurgery recommended an MRI, but it was not performed due to the presence of a metal foreign body in the intestine. The patient's pain has considerably improved with oral medications. Current vital signs are stable, though there has been an increase in creatinine from 1.21 on admission to 1.44. The CBC remains stable.      Given the patient's significant improvement in pain with oral medications and the stable vital signs, there are no current indications for prolonged inpatient care. The rise in creatinine, although notable, does not appear to be critically severe and can be managed with close outpatient follow-up. The patient's overall stability and response to treatment suggest that he is likely to benefit from a short hospitalization period, with continued management and monitoring on an outpatient basis.    Patient is clinically improving and there is no clear indication to change patient's status to inpatient. The severity of illness, intensity of service provided, expected LOS and risk for adverse outcome make the care appropriate for observation.      This document  was produced using voice recognition software       The information on this document is developed by the utilization review team in order for the business office to ensure compliance.  This only denotes the appropriateness of proper admission status and does not reflect the quality of care rendered.         The definitions of Inpatient Status and Observation Status used in making the determination above are those provided in the CMS Coverage Manual, Chapter 1 and Chapter 6, section 70.4.      Sincerely,     ANGELLA ECHAVARRIA MD   Utilization Review  Physician Advisor  [facility]

## 2024-06-26 NOTE — PROGRESS NOTES
Diagnosis:Intractable back pain post mechanical fall     Mental Status:A & O x4  Activity/dangle: Up with 1-2, gait belt and walker  Diet:Cardiac diet  Pain:tylenol scheduled  Gomez/Voiding:urinal  Tele/Restraints/Iso:NA  02/LDA:RA, IV fluid  D/C Date:Pending  Other Info:BG covered per order.     Observation goals:    diagnostic tests and consults completed and resulted - NO  -vital signs normal or at patient baseline - yes  -returns to baseline functional status -NO  -safe disposition plan has been identified -No

## 2024-06-27 ENCOUNTER — APPOINTMENT (OUTPATIENT)
Dept: PHYSICAL THERAPY | Facility: CLINIC | Age: 79
DRG: 552 | End: 2024-06-27
Payer: COMMERCIAL

## 2024-06-27 ENCOUNTER — APPOINTMENT (OUTPATIENT)
Dept: GENERAL RADIOLOGY | Facility: CLINIC | Age: 79
DRG: 552 | End: 2024-06-27
Attending: HOSPITALIST
Payer: COMMERCIAL

## 2024-06-27 LAB
ANION GAP SERPL CALCULATED.3IONS-SCNC: 7 MMOL/L (ref 7–15)
BUN SERPL-MCNC: 19.1 MG/DL (ref 8–23)
CALCIUM SERPL-MCNC: 8.6 MG/DL (ref 8.8–10.2)
CHLORIDE SERPL-SCNC: 103 MMOL/L (ref 98–107)
CREAT SERPL-MCNC: 1.22 MG/DL (ref 0.67–1.17)
DEPRECATED HCO3 PLAS-SCNC: 27 MMOL/L (ref 22–29)
EGFRCR SERPLBLD CKD-EPI 2021: 60 ML/MIN/1.73M2
GLUCOSE BLDC GLUCOMTR-MCNC: 116 MG/DL (ref 70–99)
GLUCOSE BLDC GLUCOMTR-MCNC: 118 MG/DL (ref 70–99)
GLUCOSE BLDC GLUCOMTR-MCNC: 124 MG/DL (ref 70–99)
GLUCOSE BLDC GLUCOMTR-MCNC: 151 MG/DL (ref 70–99)
GLUCOSE BLDC GLUCOMTR-MCNC: 69 MG/DL (ref 70–99)
GLUCOSE SERPL-MCNC: 156 MG/DL (ref 70–99)
POTASSIUM SERPL-SCNC: 4.8 MMOL/L (ref 3.4–5.3)
SODIUM SERPL-SCNC: 137 MMOL/L (ref 135–145)

## 2024-06-27 PROCEDURE — G0378 HOSPITAL OBSERVATION PER HR: HCPCS

## 2024-06-27 PROCEDURE — 82374 ASSAY BLOOD CARBON DIOXIDE: CPT | Performed by: HOSPITALIST

## 2024-06-27 PROCEDURE — 97530 THERAPEUTIC ACTIVITIES: CPT | Mod: GP

## 2024-06-27 PROCEDURE — 250N000013 HC RX MED GY IP 250 OP 250 PS 637: Performed by: HOSPITALIST

## 2024-06-27 PROCEDURE — 74018 RADEX ABDOMEN 1 VIEW: CPT

## 2024-06-27 PROCEDURE — 82962 GLUCOSE BLOOD TEST: CPT

## 2024-06-27 PROCEDURE — 250N000013 HC RX MED GY IP 250 OP 250 PS 637: Performed by: PHYSICIAN ASSISTANT

## 2024-06-27 PROCEDURE — 36415 COLL VENOUS BLD VENIPUNCTURE: CPT | Performed by: HOSPITALIST

## 2024-06-27 PROCEDURE — 99233 SBSQ HOSP IP/OBS HIGH 50: CPT | Performed by: PHYSICIAN ASSISTANT

## 2024-06-27 PROCEDURE — 250N000013 HC RX MED GY IP 250 OP 250 PS 637: Performed by: STUDENT IN AN ORGANIZED HEALTH CARE EDUCATION/TRAINING PROGRAM

## 2024-06-27 RX ORDER — LIDOCAINE 4 G/G
1 PATCH TOPICAL
Status: COMPLETED | OUTPATIENT
Start: 2024-06-27 | End: 2024-06-29

## 2024-06-27 RX ADMIN — GABAPENTIN 600 MG: 300 CAPSULE ORAL at 21:35

## 2024-06-27 RX ADMIN — INSULIN ASPART 11 UNITS: 100 INJECTION, SOLUTION INTRAVENOUS; SUBCUTANEOUS at 09:29

## 2024-06-27 RX ADMIN — FINASTERIDE 5 MG: 5 TABLET, FILM COATED ORAL at 08:35

## 2024-06-27 RX ADMIN — LIDOCAINE 1 PATCH: 4 PATCH TOPICAL at 12:33

## 2024-06-27 RX ADMIN — METFORMIN HYDROCHLORIDE 1000 MG: 500 TABLET ORAL at 17:32

## 2024-06-27 RX ADMIN — CHOLESTYRAMINE 4 G: 4 POWDER, FOR SUSPENSION ORAL at 22:46

## 2024-06-27 RX ADMIN — ACETAMINOPHEN 975 MG: 325 TABLET, FILM COATED ORAL at 21:35

## 2024-06-27 RX ADMIN — METFORMIN HYDROCHLORIDE 1000 MG: 500 TABLET ORAL at 08:36

## 2024-06-27 RX ADMIN — CHOLESTYRAMINE 4 G: 4 POWDER, FOR SUSPENSION ORAL at 09:29

## 2024-06-27 RX ADMIN — GABAPENTIN 300 MG: 300 CAPSULE ORAL at 08:35

## 2024-06-27 RX ADMIN — INSULIN ASPART 3 UNITS: 100 INJECTION, SOLUTION INTRAVENOUS; SUBCUTANEOUS at 18:27

## 2024-06-27 RX ADMIN — ATORVASTATIN CALCIUM 20 MG: 20 TABLET, FILM COATED ORAL at 21:35

## 2024-06-27 RX ADMIN — APIXABAN 2.5 MG: 2.5 TABLET, FILM COATED ORAL at 21:35

## 2024-06-27 RX ADMIN — METOPROLOL SUCCINATE 25 MG: 25 TABLET, EXTENDED RELEASE ORAL at 08:35

## 2024-06-27 RX ADMIN — INSULIN ASPART 9 UNITS: 100 INJECTION, SOLUTION INTRAVENOUS; SUBCUTANEOUS at 13:42

## 2024-06-27 RX ADMIN — APIXABAN 2.5 MG: 2.5 TABLET, FILM COATED ORAL at 08:36

## 2024-06-27 RX ADMIN — ACETAMINOPHEN 975 MG: 325 TABLET, FILM COATED ORAL at 17:32

## 2024-06-27 RX ADMIN — HYDROMORPHONE HYDROCHLORIDE 2 MG: 2 TABLET ORAL at 17:32

## 2024-06-27 RX ADMIN — ACETAMINOPHEN 975 MG: 325 TABLET, FILM COATED ORAL at 08:35

## 2024-06-27 ASSESSMENT — ACTIVITIES OF DAILY LIVING (ADL)
ADLS_ACUITY_SCORE: 44
ADLS_ACUITY_SCORE: 45
ADLS_ACUITY_SCORE: 45
ADLS_ACUITY_SCORE: 38
ADLS_ACUITY_SCORE: 44
ADLS_ACUITY_SCORE: 38
ADLS_ACUITY_SCORE: 45
ADLS_ACUITY_SCORE: 44
ADLS_ACUITY_SCORE: 38
ADLS_ACUITY_SCORE: 38
ADLS_ACUITY_SCORE: 45
ADLS_ACUITY_SCORE: 44
ADLS_ACUITY_SCORE: 45
ADLS_ACUITY_SCORE: 38
ADLS_ACUITY_SCORE: 45
ADLS_ACUITY_SCORE: 44
ADLS_ACUITY_SCORE: 38

## 2024-06-27 NOTE — PROGRESS NOTES
Care Management Follow Up    Length of Stay (days): 0    Expected Discharge Date: 06/28/2024     Concerns to be Addressed: discharge planning     Patient plan of care discussed at interdisciplinary rounds: Yes    Anticipated Discharge Disposition: Transitional Care     Anticipated Discharge Services:    Anticipated Discharge DME:      Patient/family educated on Medicare website which has current facility and service quality ratings:    Education Provided on the Discharge Plan:    Patient/Family in Agreement with the Plan: yes    Referrals Placed by CM/SW:    Private pay costs discussed: Not applicable    Additional Information:  Patient has TCU and can discharge to [facility] whenever medically ready. It appears patient is still passing a foreign metal object before being discharged. Will continue to follow for discharge planning.     [facility]  Care Management

## 2024-06-27 NOTE — PROGRESS NOTES
Observation goals PRIOR TO DISCHARGE  Comments: -diagnostic tests and consults completed and resulted - met  -vital signs normal or at patient baseline - met  -returns to baseline functional status - note met  -safe disposition plan has been identified - not met  Nurse to notify provider when observation goals have been met and patient is ready for discharge.

## 2024-06-27 NOTE — PROGRESS NOTES
[facility]  Neurosurgery Daily Progress Note    Assessment & Plan   Nahun Villalobos is a 78 year old male with history of hypertension, diabetes, COPD, CHF, non-small cell lung cancer, and peripheral neuropathy.  Patient was admitted on 6/24/2024 with low back pain after a fall.  Patient has chronic neck pain.  Chronic low back pain and weakness in legs that developed 3 months ago after he slipped in the bathtub.  He has been ambulating with a walker for the past 3 months.  Per chart review, patient fell 2 days prior to current hospital admission.  Thoracic spine and lumbar spine CT revealed multilevel spondylosis, no fracture noted. Cervical spine MRI from [facility] on 2/16/2024 reports multilevel spinal canal stenosis at C3-4, C4-5, and C5-6.  Patient has left hip flexion weakness on exam.  Thoracic and lumbar spine MRIs were ordered, but unable to obtain MRIs due to metal foreign body located in the intestine, noted on CT.     Chart reviewed today.  Neurology recommends physical therapy, gabapentin, and outpatient EMG.     -Neurosurgery will peripherally follow.  Please contact our team once thoracic and lumbar spine MRIs are completed.    -Please obtain cervical spine MRI that was completed at [facility] on 2/16/2024.  Report is in chart, but images are not available to review at this time.  -Continue pain control measures  -Monitor neuro exam  -Appreciate assistance from specialties     Marcella Reed CNP  [facility] Neurosurgery  Tel [phone]  Pager [phone]

## 2024-06-27 NOTE — PROGRESS NOTES
[facility]  Hospitalist Progress Note    Assessment & Plan   Nahun Villalobos is a 78 year old male who was registered to short-stay/observation on 6/24/2024 due to intractable back pain and left hip pain following a mechanical fall.     Past medical history significant for hypertension, hyperlipidemia, DM II, obesity, CIERA , COPD, history of PE, malignant carcinoid tumor, lung carcinoma, peripheral neuropathy, cervical myelopathy     Presented w/ lower back pain following a fall.     Intractable back pain post mechanical fall  Multilevel thoracic and lumbar spondylosis w/ Thoracic foraminal stenosis  History of arthritis  Peripheral neuropathy  Cervical myelopathy  Senile frailty and chronic debilitation   *Hx: Patient 2d prior to presentation fell from his elevated bed and hit his L lower back and head, without LOC. Since the fall, has lower back pain/L buttock that is severe when flat, and aggravated with movement. No radiculopathy symptoms, no urinary/rectal symptoms that are new. Has decreased ambulation since then. Tylenol failed to alleviate. No other falls since then.  *Imaging:  Multilevel thoracic spondylosis with multilevel bridging osteophytes raising the possibility of DISH. Neural foraminal stenosis greatest on the right at T3-T4 and T4-T5. Multilevel lumbar spondylosis greatest at L3-4 and L4-5 as above.  *Neurosurg recommended MRI but unable to be completed due to metal foreign body in intestine.  *CT abd/pelvis negative for any bony pathology.  --Neurosurgery consult appreciated  --tylenol tid, prn dilaudid, prn flexeril  --obtain MRI once metal object passes   --Per Hospitalist Progress Note 6/26: MRI can be obtained non-urgently - if not completed before discharge contact neurosurg to assist in arranging follow up  --therapy recommends TCU  --fall precautions  --continue PTA gabapentin     Foreign body ingestion  *noted during scan prior to initiation of MRI, though was  present on admission rib XR  *CT abd/pelvis showing a presumably metallic structure measuring 4.5cm in mid small bowel without evidence of perforation  *He denied any intentional ingestion, has no recollection of unintentionally swallowing any object  *Abd X-ray: Hairpin foreign body has progressed (now projecting over the right hemiabdomen wither within distal small bowel (favored) or proximal colon).    --monitor abd exam     Left hip flexor weakness  Left upper thigh and buttock pain  *Left hip Xray negative for acute fracture or dislocation.    *Thoracic spine and lumbar spine CT revealed multilevel spondylosis, no fracture noted.    *Cervical spine MRI from [facility] on 2/16/2024 reports multilevel spinal canal stenosis at C3-4, C4-5, and C5-6.  --Neurosurgery consult appreciated.  --General Neurology consult appreciated.  (Reviewed consult note)  --MRI of lumbar  spine once able.    --Consider epidural injection.  --PT/OT.    --Gabapentin for pain.    --Follow up with Neurology or Ortho to have an outpatient EMG.    --When able obtain thoracic and lumbar spine MRI.     SALLIE on CKD2  *baseline creatinine 1, stable at admission  --Cr up to 1.44 on 6/26; suspect due to initiation celebrex on admission in combination with diuretic and ACE  --hold lasix, lisinopril and celebrex (note all given morning of 6/26)  --follow BMP     COPD, not on chronic O2, not in exacerbation  History of PE  Adenocarcinoma of lung s/p resection in 2016  *no acute dyspneic symptoms  --duonebs PRN  --cont DOAC (though unclear why he's on 2.5q12 instead of 5q12)  --CPAP  --consider outpatient sleep study and PFTs  --pt should obtain trelegy inhaler from home     Hypertension  Hyperlipidemia  --continue PTA metoprolol, statin  --hold diuretic and ACE due to SALLIE as above  --prn hydralazine     Malignant metastatic carcinoid tumor  Rib Mass  *Status post exploratory laparotomy, small bowel resection and removal of mesenteric mass 2023  *Follows  "at [facility] w/ monthly lanreotide injections. Reports in 3/2024 suggest slow progressive w/ metastatsis to the liver, bone, retroperitoneum and mesentary  *Patient complaining of R inferior R nodule that he hasn't noticed below  --follow-up w/ [facility] on 6/27/2024      Chronic diarrhea  *Chronic and stable   --continue PTA cholestyramine.     Non-insulin dependent type 2 diabetes mellitus   *A1C 7%  --ISS  --continue PTA metformin  --Mod CHO diet     BPH  --continue PTA finasteride      Morbid Obesity  --outpatient diet and exercise as able       Chronic macrocytic anemia  *Hb stable at 11.8  *iron studies 6/25 consistent with combination iron deficiency and chronic inflammation  --resume iron supplement at discharge, follow up with PCP      Clinically Significant Risk Factors Present on Admission               # Drug Induced Coagulation Defect: home medication list includes an anticoagulant medication    # Hypertension: Noted on problem list    # DMII: A1C = 7.0 % (Ref range: <5.7 %) within past 6 months    # Obesity: Estimated body mass index is 31.95 kg/m  as calculated from the following:    Height as of 6/17/24: 1.702 m (5' 7\").    Weight as of 6/17/24: 92.5 kg (204 lb).                Diet: Combination Diet No Caffeine Diet, Low Saturated Fat Na <2400mg Diet     DVT Prophylaxis: DOAC   Gomez Catheter: Not present  Lines: None     Cardiac Monitoring: None  Code Status: No CPR- Do NOT Intubate      Observation Goals: -diagnostic tests and consults completed and resulted, -vital signs normal or at patient baseline, -returns to baseline functional status, -safe disposition plan has been identified, Nurse to notify provider when observation goals have been met and patient is ready for discharge.     Disposition Plan    Observation status.  Patient has been accepted to a TCU but currently can not proceed with recommended imaging studies due to a metal foreign body to assess for injury in the setting of recent fall " and ongoing back and hip pain.      Medically Ready for Discharge: Anticipated in 2-4 Days      The patient's care was discussed with the Patient and Patient's significant other, nursing staff .    The patient has been discussed with Dr. Tinajero, who agrees with the assessment and plan at this time.    Isaias Martínez PA-C  [facility]  Securely message with the Vocera Web Console (learn more here)      Interval History   Patient was resting in bed upon arrival.  He denied fever, chills, chest pain, shortness of breath or abdominal pain.  He has been having bowel movements and no issues with urinating.  He continues to have back and left hip pain.  He is unable to lift his left leg off the bed due to pain.      We reviewed abdominal Xray and he was shown the hairpin in his GI tract.  Spoke with patient's girlfriend on the phone and reviewed what has been occurring since hospitalization.  Patient brought up concerns regarding his cancer treatment and that he missed his scheduled monthly injection.  Advised that his girlfriend call Oncology clinic and ask about the injection.      -Data reviewed today: I reviewed all new labs and imaging results over the last 24 hours. I personally reviewed no images or EKG's today.    Physical Exam   /69 (BP Location: Left arm)   Pulse 72   Temp 98.2  F (36.8  C) (Oral)   Resp 17   SpO2 96%     Constitutional: Awake, alert, cooperative, no apparent distress.    ENT: Normocephalic, without obvious abnormality, atraumatic, oral pharynx with moist mucus membranes, tonsils without erythema or exudates.  Neck: Supple, symmetrical, trachea midline, no adenopathy.  Pulmonary: No increased work of breathing, fair air exchange, clear to auscultation bilaterally, no crackles or wheezing.  Cardiovascular: Regular rate and rhythm, normal S1 and S2, no S3 or S4, and no murmur noted.  GI: Normal bowel sounds, soft, non-distended, non-tender. Obese.     Skin/Integumen: Visualized skin appeared clear.  Neuro: CN II-XII grossly intact.  Left lower extremity weakness noted (unable to lift leg off the bed).    Psych:  Alert and oriented x 3. Normal affect.  Extremities: No lower extremity edema noted, and calves are non-TTP bilaterally.       Medical Decision Making       Please see A&P for additional details of medical decision making.  GREATER THAN 50 MINUTES SPENT BY ME on the date of service doing chart review, history, exam, documentation & further activities per the note.         Medications   Current Facility-Administered Medications   Medication Dose Route Frequency Provider Last Rate Last Admin     Current Facility-Administered Medications   Medication Dose Route Frequency Provider Last Rate Last Admin    acetaminophen (TYLENOL) tablet 975 mg  975 mg Oral TID Asya Pacheco MD   975 mg at 06/27/24 0835    apixaban ANTICOAGULANT (ELIQUIS) tablet 2.5 mg  2.5 mg Oral BID Asya Pacheco MD   2.5 mg at 06/27/24 0836    atorvastatin (LIPITOR) tablet 20 mg  20 mg Oral Daily Asya Pacheco MD   20 mg at 06/26/24 2153    [Held by provider] celecoxib (celeBREX) capsule 200 mg  200 mg Oral Q12H Asya Pacheco MD   200 mg at 06/26/24 0840    cholestyramine light (QUESTRAN) Packet 4 g  4 g Oral BID Asya Pacheoc MD   4 g at 06/27/24 0929    finasteride (PROSCAR) tablet 5 mg  5 mg Oral Daily Asya Pacheco MD   5 mg at 06/27/24 0835    [Held by provider] furosemide (LASIX) tablet 40 mg  40 mg Oral Daily Asya Pacheco MD   40 mg at 06/26/24 0840    gabapentin (NEURONTIN) capsule 300 mg  300 mg Oral QAM Asya Pacheco MD   300 mg at 06/27/24 0835    gabapentin (NEURONTIN) capsule 600 mg  600 mg Oral At Bedtime Asya Pacheco MD   600 mg at 06/26/24 2153    insulin aspart (NovoLOG) injection (RAPID ACTING)   Subcutaneous TID w/meals Asya Pacheco MD   9 Units at 06/27/24 1342    insulin aspart (NovoLOG) injection (RAPID ACTING)  1-7  Units Subcutaneous TID AC Asya Pacheco MD   1 Units at 06/27/24 1342    insulin aspart (NovoLOG) injection (RAPID ACTING)  1-5 Units Subcutaneous At Bedtime Asya Pacheco MD        Lidocaine (LIDOCARE) 4 % Patch 1 patch  1 patch Transdermal Q24H Isaias Martínez PA-C   1 patch at 06/27/24 1233    [Held by provider] lisinopril (ZESTRIL) tablet 40 mg  40 mg Oral Daily Asya Pacheco MD   40 mg at 06/26/24 0841    metFORMIN (GLUCOPHAGE) tablet 1,000 mg  1,000 mg Oral BID w/meals Gilles Blount MD   1,000 mg at 06/27/24 0836    metoprolol succinate ER (TOPROL XL) 24 hr tablet 25 mg  25 mg Oral Daily Asya Pacheco MD   25 mg at 06/27/24 0835       Data   Recent Labs   Lab 06/27/24  1132 06/27/24  0907 06/27/24  0815 06/26/24  0758 06/26/24  0755 06/25/24  1130 06/25/24  0737 06/25/24  0730 06/25/24  0101   WBC  --   --   --   --   --   --  10.6  --  10.4   HGB  --   --   --   --   --   --  11.6*  --  11.8*   MCV  --   --   --   --   --   --  100  --  101*   PLT  --   --   --   --   --   --  267  --  283   NA  --  137  --   --  139  --  137  --  138   POTASSIUM  --  4.8  --   --  4.3  --  4.2  --  4.4   CHLORIDE  --  103  --   --  103  --  99  --  102   CO2  --  27  --   --  26  --  25  --  25   BUN  --  19.1  --   --  22.7  --  13.3  --  14.5   CR  --  1.22*  --   --  1.44*  --  1.16  --  1.21*   ANIONGAP  --  7  --   --  10  --  13  --  11   JESSE  --  8.6*  --   --  8.9  --  9.1  --  8.8   * 156* 116*   < > 145*   < > 151*   < > 165*   ALBUMIN  --   --   --   --   --   --  3.9  --   --    PROTTOTAL  --   --   --   --   --   --  7.8  --   --    BILITOTAL  --   --   --   --   --   --  0.5  --   --    ALKPHOS  --   --   --   --   --   --  67  --   --    ALT  --   --   --   --   --   --  8  --   --    AST  --   --   --   --   --   --  17  --   --     < > = values in this interval not displayed.       Recent Results (from the past 24 hour(s))   XR Pelvis w Hip Left 1 View    Narrative     EXAM: XR PELVIS AND HIP LEFT 1 VIEW  LOCATION: [facility]  DATE: 6/26/2024    INDICATION: recent fall, posterior left upper thigh buttock pain  COMPARISON: CT abdomen/pelvis 6/25/2024      Impression    IMPRESSION:     No evidence of acute fracture or dislocation.    Mild degenerative changes of the bilateral hips.    Scattered pelvic enthesopathy.    Vascular calcifications.   XR Abdomen 1 View    Narrative    ABDOMEN ONE VIEW  6/27/2024 7:22 AM     HISTORY: assess to see if foreign body has passed    COMPARISON: Pelvic radiograph 6/26/2024. CT abdomen and pelvis  6/25/2024.      Impression    IMPRESSION: Hairpin foreign body has progressed, now projecting over  the right hemiabdomen, either within distal small bowel (favored) or  proximal colon. Nonobstructive bowel gas pattern. No gross free air  within the limitations of supine technique. Degenerative changes of  the spine.    SHRADDHA TREVIZO MD         SYSTEM ID:  [id number]

## 2024-06-27 NOTE — PROGRESS NOTES
Alert and oriented x 4. VSS, room air.CMS baseline dar LE neuropathy. Denies pain at rest, verbalizes back pain with movement. Assist of 1, GB/walker to the bathroom. Voiding adequately, uses urinal. BM x 2.  PIV saline locked.  L hip xray done this shift,negative fro fx. Abdominal x ray ordered for today.

## 2024-06-27 NOTE — PROGRESS NOTES
Observation goals PRIOR TO DISCHARGE  Comments: -diagnostic tests and consults completed and resulted - not met  -vital signs normal or at patient baseline - met  -returns to baseline functional status - note met  -safe disposition plan has been identified - not met  Nurse to notify provider when observation goals have been met and patient is ready for discharge.

## 2024-06-27 NOTE — CONSULTS
[facility]    Neurology Consultation     Date of Admission:  6/24/2024    Assessment & Plan   Nahun Villalobos is a 78 year old male who was admitted on 6/24/2024. I was asked to see the patient for low back pain, weakness left hip.  The patient is a 78-year-old gentleman who presents with low back pain and left hip buttock and left lower extremity pain and weakness.  This happened after a fall.  The x-ray of the left hip does not show an abnormality to explain his symptoms.  Very likely the patient has an L3-L4 radiculopathy on the left.  At this time I would recommend    -MRI of the lumbar spine if possible considering the the metal pin in his abdomen  -Consider epidural injection for his pain however the patient is on anticoagulation and this will be contraindication for it.  -Physical therapy Occupational Therapy  -Consider gabapentin for pain.  -The patient should follow-up in neurology clinic or Ortho with an EMG as an outpatient to clarify the level of impingement.  It is too early to do an EMG at this date.      Francesca Bedolla MD    Code Status    No CPR- Do NOT Intubate    Reason for Consult   Reason for consult: I was asked by Dr Pacheco to evaluate this patient for left hip flexion weakness.    Primary Care Physician   Olegario Castillo    Chief Complaint   low back pain, weakness left hip.    History is obtained from the patient and electronic health record    History of Present Illness   Nahun Villalobos is a 78 year old male who presents to the emergency room after having a fall.  The patient states that last Saturday he had a fall and since then he has had a lot of low back pain as well as left lower extremity pain and weakness.  Apparently the patient fell off his bed hitting his head and landing on a stepstool.  The patient states that since the fall he was not able to ambulate and use the left lower extremity for walking.  Prior to this he was walking using a  wheelchair.  The patient states that he has a lot of low back pain which comes and goes and is made worse by any movement of the left lower extremity.  The pain is also in the left buttock and radiates all over the left leg when is very severe.  The patient reports numbness and tingling in both feet and hand which she attributes to peripheral neuropathy.  The patient has also chronic neck pain and it was told he has significant stenosis there as well.    The patient has had a lumbar CT scan which showed broad-based posterior disc osteophyte complex resulting in moderate spinal canal stenosis at L3-L4 and L4-L5 with moderate right and severe left neural foraminal stenosis at L4-L5.    The CT scan of the thoracic spine shows multilevel thoracic spondylosis with neuroforaminal stenosis greatest on the right at T3-T4 and T4-T5.      X-ray of the abdomen shows  Hairpin foreign body has progressed, now projecting over  the right hemiabdomen, either within distal small bowel (favored) or  proximal colon. Nonobstructive bowel gas pattern. No gross free air  within the limitations of supine technique. Degenerative changes of  the spine.      Past Medical History   I have reviewed this patient's medical history and updated it with pertinent information if needed.   Past Medical History:   Diagnosis Date    Antiplatelet or antithrombotic long-term use     Arthritis     CHF (congestive heart failure) (H) 09/16/2020    COPD (chronic obstructive pulmonary disease) (H)     DM (diabetes mellitus) (H)     Dyspnea on exertion     Essential hypertension, benign     Hemorrhage of rectum and anus     Non-small cell lung cancer (H)     Obesity     Personal history of colonic polyps     Sleep apnea     Thrombosis     Tobacco use disorder     Urinary retention     Walking troubles    Malignant carcinoid tumor of small intestine   Malignant neoplasm metastatic to bone   Acute diverticulitis   Hyperlipidemia     Past Surgical History   I  have reviewed this patient's surgical history and updated it with pertinent information if needed.  Past Surgical History:   Procedure Laterality Date    ABDOMEN SURGERY  1995    APPENDECTOMY      BONE EXOSTOSIS EXCISION Bilateral 9/20/2022    Procedure: EXOSTECTOMIES BOTH 5TH DIGITS WITH SOFT TISSUE RELEASE;  Surgeon: Tong Gray DPM;  Location: [facility]    CHOLECYSTECTOMY      COLONOSCOPY  2014    CYSTOSCOPY, TRANSURETHRAL RESECTION (TUR) PROSTATE, COMBINED N/A 4/30/2018    Procedure: COMBINED CYSTOSCOPY, TRANSURETHRAL RESECTION (TUR) PROSTATE;  Cystoscopy, Transurethral Resection Of The Prostate;  Surgeon: Praveena Burris MD;  Location: [facility]    IR LUNG BIOPSY MEDIASTINUM RIGHT  4/6/2016    WEDGE RESECTION      lung    ZZC NONSPECIFIC PROCEDURE      cholecystectomy       Prior to Admission Medications   Prior to Admission Medications   Prescriptions Last Dose Informant Patient Reported? Taking?   Ascorbic Acid (VITAMIN C PO) 6/23/2024 at HS Spouse/Significant Other Yes Yes   Sig: Take 500 mg by mouth At Bedtime    Calcium Carbonate Antacid (TUMS PO)  at PRN  Yes Yes   Sig: Take 500 mg by mouth as needed   Coenzyme Q10 400 MG CAPS 6/24/2024 at AM  Yes Yes   Sig: Take 400 mg by mouth daily   Fluticasone-Umeclidin-Vilant (TRELEGY ELLIPTA) 100-62.5-25 MCG/ACT oral inhaler 6/24/2024 at AM Spouse/Significant Other, Pharmacy No Yes   Sig: Inhale 1 puff into the lungs daily   acetaminophen (TYLENOL) 500 MG tablet 6/24/2024 at x2 Spouse/Significant Other Yes Yes   Sig: Take 1,000 mg by mouth 2 times daily   albuterol (VENTOLIN HFA) 108 (90 Base) MCG/ACT inhaler  at PRN Spouse/Significant Other No Yes   Sig: INHALE 2 PUFFS INTO THE LUNGS EVERY 6 HOURS AS NEEDED FOR SHORTNESS OF BREATH / DYSPNEA OR WHEEZING   apixaban ANTICOAGULANT (ELIQUIS) 2.5 MG tablet 6/24/2024 at AM  No Yes   Sig: Take 1 tablet (2.5 mg) by mouth 2 times daily   atorvastatin (LIPITOR) 20 MG tablet 6/23/2024 at HS  No Yes   Sig: TAKE  1 TABLET DAILY   blood glucose (ACCU-CHEK CHACHA) test strip  Spouse/Significant Other No No   Sig: Use to test blood sugar 2 times daily or as directed.   blood glucose (NO BRAND SPECIFIED) lancets standard  Spouse/Significant Other No No   Sig: Use to test blood sugar 1 time daily, first thing in the morning   Patient taking differently: as needed Use to test blood sugar 1 time daily, first thing in the morning   blood glucose (NO BRAND SPECIFIED) test strip  Spouse/Significant Other No No   Sig: Use to test blood sugar 1 time daily, first thing in the morning.   blood glucose monitoring (NO BRAND SPECIFIED) meter device kit  Spouse/Significant Other No No   Sig: Use to test blood sugar 1 time daily, first thing in the morning   cholestyramine light (QUESTRAN) 4 GM packet 6/24/2024 at AM Spouse/Significant Other, Pharmacy No Yes   Sig: Take 1 packet (4 g) by mouth 2 times daily   cyanocobalamin (VITAMIN B-12) 1000 MCG tablet 6/24/2024 at AM  Yes Yes   Sig: Take 1,000 mcg by mouth daily   docusate sodium (DSS) 100 MG capsule  at PRN  Yes Yes   Sig: Take 100 mg by mouth as needed for constipation   ferrous sulfate (FE TABS) 325 (65 Fe) MG EC tablet 6/23/2024 at PM  Yes Yes   Sig: Take 325 mg by mouth every evening   finasteride (PROSCAR) 5 MG tablet 6/24/2024 at AM Spouse/Significant Other, Pharmacy No Yes   Sig: Take 1 tablet (5 mg) by mouth daily   furosemide (LASIX) 20 MG tablet 6/24/2024 at AM  No Yes   Sig: Take 2 tablets (40 mg) by mouth 2 times daily   Patient taking differently: Take 40 mg by mouth daily   gabapentin (NEURONTIN) 300 MG capsule 6/24/2024 at AM  No Yes   Sig: Take 1 capsule (300 mg) by mouth 3 times daily   Patient taking differently: Take 300-600 mg by mouth 2 times daily 300 mh qAM  600 mg qHS   lisinopril (ZESTRIL) 40 MG tablet 6/24/2024 at AM Spouse/Significant Other, Pharmacy No Yes   Sig: TAKE 1 TABLET DAILY   metFORMIN (GLUCOPHAGE) 1000 MG tablet 6/24/2024 at AM  No Yes   Sig: Take 1  tablet (1,000 mg) by mouth 2 times daily (with meals)   metoprolol succinate ER (TOPROL XL) 25 MG 24 hr tablet 6/24/2024 at AM Spouse/Significant Other, Pharmacy No Yes   Sig: Take 1 tablet (25 mg) by mouth daily   miconazole (MICATIN) 2 % external cream  at PRN  No Yes   Sig: Apply topically 2 times daily   Patient taking differently: Apply topically 2 times daily as needed for other (Rash/ skin irritation)   order for DME  Spouse/Significant Other No No   Sig: Oxygen 2 Li/min  at night via nasal cannula   order for DME  Spouse/Significant Other No No   Sig: Equipment being ordered: diabetic shoes, 1 pair   vitamin B-Complex 6/24/2024 at AM  Yes Yes   Sig: Take 1 tablet by mouth daily      Facility-Administered Medications: None     Allergies   Allergies   Allergen Reactions    No Known Drug Allergy        Social History   I have reviewed this patient's social history and updated it with pertinent information if needed. Nahun Villalobos  reports that he quit smoking about 11 years ago. His smoking use included cigarettes and cigars. He started smoking about 64 years ago. He has a 106 pack-year smoking history. He has never used smokeless tobacco. He reports that he does not drink alcohol and does not use drugs.    Family History   SocialI have reviewed this patient's family history and updated it with pertinent information if needed.   Family History   Problem Relation Age of Onset    Hypertension Mother     C.A.D. Mother         ANEURYSM    Cancer - colorectal Mother     Cancer Mother         OVARIAN    Other Cancer Mother     Hypertension Sister     Breast Cancer Sister     Cerebrovascular Disease Father     Hypertension Sister     Breast Cancer Sister     Glaucoma No family hx of     Macular Degeneration No family hx of        Review of Systems   The 10 point Review of Systems is negative other than noted in the HPI or here.     Physical Exam   Temp: 97.8  F (36.6  C) Temp src: Oral BP: (!) 149/73 Pulse: 67    Resp: 18 SpO2: 97 % O2 Device: None (Room air)    Vital Signs with Ranges  Temp:  [97.3  F (36.3  C)-97.8  F (36.6  C)] 97.8  F (36.6  C)  Pulse:  [52-78] 67  Resp:  [16-18] 18  BP: (142-154)/(66-84) 149/73  SpO2:  [96 %-97 %] 97 %  0 lbs 0 oz    Constitutional: Normal  Eyes: No conjunctival erythema  ENT: Neck is supple  Respiratory: CTA  Cardiovascular: RRR  Skin: No obvious lesions  Musculoskeletal: No pedal edema  The patient is alert oriented to person and place month and year but not to the date.  He knows the name of the president.  Speech is fluent.  Pupils are equal round reactive, small and not reactive to light ,extraocular movements are intact, there is no nystagmus.  Facial muscles are equal bilaterally.  Uvula and tongue are midline.  The patient has no teeth.  Strength is normal in both upper extremities and right lower extremity.  On the left side hip flexion is at about 3 out of 5.  Knee extension and flexion as well as left foot dorsiflexion and plantarflexion are normal strength.  There is no pronator drift.  Reflexes are present symmetric in upper and lower extremities with upgoing toes bilaterally.   Sensory exam is normal to light touch and vibratory sense.    Finger-nose-finger was deferred without dysmetria.  Fine movements are normal.    Gait was deferred   neuropsychiatric: Appropriate    Data   Results for orders placed or performed during the hospital encounter of 06/24/24 (from the past 24 hour(s))   Glucose by meter   Result Value Ref Range    GLUCOSE BY METER POCT 102 (H) 70 - 99 mg/dL   Urine Macroscopic with reflex to Microscopic   Result Value Ref Range    Color Urine Yellow Colorless, Straw, Light Yellow, Yellow    Appearance Urine Slightly Cloudy (A) Clear    Glucose Urine Negative Negative mg/dL    Bilirubin Urine Negative Negative    Ketones Urine Negative Negative mg/dL    Specific Gravity Urine 1.027 1.003 - 1.035    Blood Urine Moderate (A) Negative    pH Urine 5.5 5.0 - 7.0     Protein Albumin Urine 30 (A) Negative mg/dL    Urobilinogen Urine Normal Normal, 2.0 mg/dL    Nitrite Urine Negative Negative    Leukocyte Esterase Urine Large (A) Negative    RBC Urine 9 (H) <=2 /HPF    WBC Urine 100 (H) <=5 /HPF    Squamous Epithelials Urine 2 (H) <=1 /HPF    Mucus Urine Present (A) None Seen /LPF   Glucose by meter   Result Value Ref Range    GLUCOSE BY METER POCT 86 70 - 99 mg/dL   XR Pelvis w Hip Left 1 View    Narrative    EXAM: XR PELVIS AND HIP LEFT 1 VIEW  LOCATION: [facility]  DATE: 6/26/2024    INDICATION: recent fall, posterior left upper thigh buttock pain  COMPARISON: CT abdomen/pelvis 6/25/2024      Impression    IMPRESSION:     No evidence of acute fracture or dislocation.    Mild degenerative changes of the bilateral hips.    Scattered pelvic enthesopathy.    Vascular calcifications.   Glucose by meter   Result Value Ref Range    GLUCOSE BY METER POCT 124 (H) 70 - 99 mg/dL   XR Abdomen 1 View    Narrative    ABDOMEN ONE VIEW  6/27/2024 7:22 AM     HISTORY: assess to see if foreign body has passed    COMPARISON: Pelvic radiograph 6/26/2024. CT abdomen and pelvis  6/25/2024.      Impression    IMPRESSION: Hairpin foreign body has progressed, now projecting over  the right hemiabdomen, either within distal small bowel (favored) or  proximal colon. Nonobstructive bowel gas pattern. No gross free air  within the limitations of supine technique. Degenerative changes of  the spine.    SHRADDHA TREVIZO MD         SYSTEM ID:  [id number]   Glucose by meter   Result Value Ref Range    GLUCOSE BY METER POCT 116 (H) 70 - 99 mg/dL   Basic metabolic panel   Result Value Ref Range    Sodium 137 135 - 145 mmol/L    Potassium 4.8 3.4 - 5.3 mmol/L    Chloride 103 98 - 107 mmol/L    Carbon Dioxide (CO2) 27 22 - 29 mmol/L    Anion Gap 7 7 - 15 mmol/L    Urea Nitrogen 19.1 8.0 - 23.0 mg/dL    Creatinine 1.22 (H) 0.67 - 1.17 mg/dL    GFR Estimate 60 (L) >60 mL/min/1.73m2     Calcium 8.6 (L) 8.8 - 10.2 mg/dL    Glucose 156 (H) 70 - 99 mg/dL   Glucose by meter   Result Value Ref Range    GLUCOSE BY METER POCT 151 (H) 70 - 99 mg/dL

## 2024-06-27 NOTE — PROGRESS NOTES
Observation goals PRIOR TO DISCHARGE  Comments: -diagnostic tests and consults completed and resulted - met  -vital signs normal or at patient baseline - met  -returns to baseline functional status - note met  -safe disposition plan has been identified - not met  Nurse to notify provider when observation goals have been met and patient is ready for discharge.    Mental Status: A&O x4  Activity/dangle: Ast of 2 GB/W  Diet: LSF/ 2400 NA w/ no Caffeine  Pain: Managed with scheduled Tylenol  Gomez/Voiding: Voiding in the urinal  Tele/Restraints/Iso: N/A  02/LDA: Room air. IV saline locked  D/C Date: Pending placement  Other Info: Lidocaine patch on Left lower back. Abrasion on buttock, WOC nurse consulted.

## 2024-06-28 ENCOUNTER — APPOINTMENT (OUTPATIENT)
Dept: GENERAL RADIOLOGY | Facility: CLINIC | Age: 79
DRG: 552 | End: 2024-06-28
Attending: PHYSICIAN ASSISTANT
Payer: COMMERCIAL

## 2024-06-28 LAB
ANION GAP SERPL CALCULATED.3IONS-SCNC: 9 MMOL/L (ref 7–15)
BUN SERPL-MCNC: 12.4 MG/DL (ref 8–23)
CALCIUM SERPL-MCNC: 9.2 MG/DL (ref 8.8–10.2)
CHLORIDE SERPL-SCNC: 104 MMOL/L (ref 98–107)
CREAT SERPL-MCNC: 1 MG/DL (ref 0.67–1.17)
DEPRECATED HCO3 PLAS-SCNC: 24 MMOL/L (ref 22–29)
EGFRCR SERPLBLD CKD-EPI 2021: 77 ML/MIN/1.73M2
ERYTHROCYTE [DISTWIDTH] IN BLOOD BY AUTOMATED COUNT: 11.3 % (ref 10–15)
GLUCOSE BLDC GLUCOMTR-MCNC: 123 MG/DL (ref 70–99)
GLUCOSE BLDC GLUCOMTR-MCNC: 132 MG/DL (ref 70–99)
GLUCOSE BLDC GLUCOMTR-MCNC: 143 MG/DL (ref 70–99)
GLUCOSE BLDC GLUCOMTR-MCNC: 152 MG/DL (ref 70–99)
GLUCOSE BLDC GLUCOMTR-MCNC: 165 MG/DL (ref 70–99)
GLUCOSE SERPL-MCNC: 155 MG/DL (ref 70–99)
HCT VFR BLD AUTO: 37.2 % (ref 40–53)
HGB BLD-MCNC: 12.2 G/DL (ref 13.3–17.7)
MCH RBC QN AUTO: 33.4 PG (ref 26.5–33)
MCHC RBC AUTO-ENTMCNC: 32.8 G/DL (ref 31.5–36.5)
MCV RBC AUTO: 102 FL (ref 78–100)
PLATELET # BLD AUTO: 257 10E3/UL (ref 150–450)
POTASSIUM SERPL-SCNC: 4.5 MMOL/L (ref 3.4–5.3)
RBC # BLD AUTO: 3.65 10E6/UL (ref 4.4–5.9)
SODIUM SERPL-SCNC: 137 MMOL/L (ref 135–145)
WBC # BLD AUTO: 6.9 10E3/UL (ref 4–11)

## 2024-06-28 PROCEDURE — 250N000013 HC RX MED GY IP 250 OP 250 PS 637: Performed by: STUDENT IN AN ORGANIZED HEALTH CARE EDUCATION/TRAINING PROGRAM

## 2024-06-28 PROCEDURE — G0463 HOSPITAL OUTPT CLINIC VISIT: HCPCS

## 2024-06-28 PROCEDURE — 120N000001 HC R&B MED SURG/OB

## 2024-06-28 PROCEDURE — 250N000013 HC RX MED GY IP 250 OP 250 PS 637: Performed by: HOSPITALIST

## 2024-06-28 PROCEDURE — G0378 HOSPITAL OBSERVATION PER HR: HCPCS

## 2024-06-28 PROCEDURE — 36415 COLL VENOUS BLD VENIPUNCTURE: CPT | Performed by: PHYSICIAN ASSISTANT

## 2024-06-28 PROCEDURE — 99233 SBSQ HOSP IP/OBS HIGH 50: CPT | Performed by: PHYSICIAN ASSISTANT

## 2024-06-28 PROCEDURE — 250N000013 HC RX MED GY IP 250 OP 250 PS 637: Performed by: PHYSICIAN ASSISTANT

## 2024-06-28 PROCEDURE — 80048 BASIC METABOLIC PNL TOTAL CA: CPT | Performed by: PHYSICIAN ASSISTANT

## 2024-06-28 PROCEDURE — 82962 GLUCOSE BLOOD TEST: CPT

## 2024-06-28 PROCEDURE — 74018 RADEX ABDOMEN 1 VIEW: CPT

## 2024-06-28 PROCEDURE — 85027 COMPLETE CBC AUTOMATED: CPT | Performed by: PHYSICIAN ASSISTANT

## 2024-06-28 RX ORDER — BISACODYL 10 MG
10 SUPPOSITORY, RECTAL RECTAL DAILY PRN
Status: DISCONTINUED | OUTPATIENT
Start: 2024-06-28 | End: 2024-07-09 | Stop reason: HOSPADM

## 2024-06-28 RX ADMIN — APIXABAN 2.5 MG: 2.5 TABLET, FILM COATED ORAL at 08:25

## 2024-06-28 RX ADMIN — METOPROLOL SUCCINATE 25 MG: 25 TABLET, EXTENDED RELEASE ORAL at 08:26

## 2024-06-28 RX ADMIN — HYDROMORPHONE HYDROCHLORIDE 4 MG: 2 TABLET ORAL at 17:23

## 2024-06-28 RX ADMIN — ATORVASTATIN CALCIUM 20 MG: 20 TABLET, FILM COATED ORAL at 21:16

## 2024-06-28 RX ADMIN — METFORMIN HYDROCHLORIDE 1000 MG: 500 TABLET ORAL at 17:19

## 2024-06-28 RX ADMIN — ACETAMINOPHEN 975 MG: 325 TABLET, FILM COATED ORAL at 17:19

## 2024-06-28 RX ADMIN — LIDOCAINE 1 PATCH: 4 PATCH TOPICAL at 08:26

## 2024-06-28 RX ADMIN — GABAPENTIN 300 MG: 300 CAPSULE ORAL at 08:25

## 2024-06-28 RX ADMIN — APIXABAN 2.5 MG: 2.5 TABLET, FILM COATED ORAL at 21:15

## 2024-06-28 RX ADMIN — GABAPENTIN 600 MG: 300 CAPSULE ORAL at 21:14

## 2024-06-28 RX ADMIN — METFORMIN HYDROCHLORIDE 1000 MG: 500 TABLET ORAL at 08:25

## 2024-06-28 RX ADMIN — ACETAMINOPHEN 975 MG: 325 TABLET, FILM COATED ORAL at 08:23

## 2024-06-28 RX ADMIN — FINASTERIDE 5 MG: 5 TABLET, FILM COATED ORAL at 08:25

## 2024-06-28 RX ADMIN — ACETAMINOPHEN 975 MG: 325 TABLET, FILM COATED ORAL at 21:15

## 2024-06-28 RX ADMIN — CHOLESTYRAMINE 4 G: 4 POWDER, FOR SUSPENSION ORAL at 09:53

## 2024-06-28 RX ADMIN — INSULIN ASPART 12 UNITS: 100 INJECTION, SOLUTION INTRAVENOUS; SUBCUTANEOUS at 09:47

## 2024-06-28 RX ADMIN — INSULIN ASPART 4 UNITS: 100 INJECTION, SOLUTION INTRAVENOUS; SUBCUTANEOUS at 19:02

## 2024-06-28 RX ADMIN — CHOLESTYRAMINE 4 G: 4 POWDER, FOR SUSPENSION ORAL at 21:18

## 2024-06-28 ASSESSMENT — ACTIVITIES OF DAILY LIVING (ADL)
ADLS_ACUITY_SCORE: 45

## 2024-06-28 NOTE — PROGRESS NOTES
Observation goals PRIOR TO DISCHARGE  Comments: -diagnostic tests and consults completed and resulted - met  -vital signs normal or at patient baseline - met  -returns to baseline functional status - not met  -safe disposition plan has been identified - not met  Nurse to notify provider when observation goals have been met and patient is ready for discharge.

## 2024-06-28 NOTE — CONSULTS
"[facility] Nurse Inpatient Assessment     Consulted for: buttock abrasion    Summary: patient wears disposable adult briefs that he brought from home. Discussed hygiene and indication for brief use and wear time    Patient History (according to provider note(s):      Nahun Villalobos is a 78 year old male who was registered to short-stay/observation on 6/24/2024 due to intractable back pain and left hip pain following a mechanical fall.      Past medical history significant for hypertension, hyperlipidemia, DM II, obesity, CIERA , COPD, history of PE, malignant carcinoid tumor, lung carcinoma, peripheral neuropathy, cervical myelopathy      Presented w/ lower back pain following a fall.     Intractable back pain post mechanical fall  Multilevel thoracic and lumbar spondylosis w/ Thoracic foraminal stenosis  History of arthritis  Peripheral neuropathy  Cervical myelopathy  Senile frailty and chronic debilitation     Assessment:      Areas visualized during today's visit: Focused:, Perineal area, and intergluteal    Skin Injury Location: intergluteal, oneal areas      Last photo: 6/28/2024  Skin injury due to: Incontinence associated dermatitis (IAD) and Moisture associated skin damage (MASD)  Skin history and plan of care:   patient has chronic intermittent episodes of urine and bowel incontinence, wears disposable adult briefs most days while sitting up in recliner at home. Can usually get up and turn without assistance but now has intractable pain to left hip area. Education provided to patient on repositioning techniques, use and wear time of body worn absorbant products and hygiene. He reports he frequently uses a \"Desitin style cream on his butt when I'm wet\". Recommend to add ISABELLA pump for moisture management and incorporate good hygiene with moisture barrier application for incontinence associated skin breakdown  Affected area:      Skin assessment: Intact, Erythema, and scar " "tissue     Measurements (length x width x depth, in cm)      Color: pink and red     Temperature  warm     Drainage: none .      Color: none      Odor: mild  Pain: mild, tender  Pain interventions prior to dressing change: patient tolerated well, no significant pain present , and slow and gentle cares   Treatment goal: Heal , Infection control/prevention, Maintain (prevention of deterioration), Protection, and Simplify plan of care  STATUS: initial assessment  Supplies ordered: at bedside, supplies stored on unit, and discussed with patient       Treatment Plan:     intergluteal  BIDand PRN for episodes of incontinence  Cleanse the area with Darrin cleanse and protect, very gently with soft cloth.  Apply thin layer of critic aid paste on top of it.  With repeat application, do not scrub the paste, only remove soiled paste and reapply.  If complete removal of paste is necessary use baby oil/mineral oil (#701024) and soft wash cloth.  Ensure pt has Mikel-cushion (#218631) while sitting up in the chair.  Use only one Covidien pad in between mattress and pt. No brief while in bed.      Pressure Injury Prevention (PIP) Plan:  If patient is declining pressure injury prevention interventions: Explore reason why and address patient's concerns, Educate on pressure injury risk and prevention intervention(s), If patient is still declining, document \"informed refusal\" , and Ensure Care team is aware ( provider, charge nurse, etc)  Mattress: Follow bed algorithm, reassess daily and order specialty mattress, if indicated.  HOB: Maintain at or below 30 degrees, unless contraindicated  Repositioning in bed: Every 1-2 hours , Left/right positioning; avoid supine, and Raise foot of bed prior to raising head of bed, to reduce patient sliding down (shear)  Heels: Keep elevated off mattress and Pillows under calves  Protective Dressing: Sacral Mepilex for prevention (#[id number]),  especially for the agitated patient   Positioning " Equipment:None  Chair positioning: Chair cushion (#[id number]) , Repositions self: patient to shift weight every 15 minutes, Assist patient to reposition hourly, and Do NOT use a donut for sitting (this increases pressure to smaller area and creates a higher potential for injury)    If patient has a buttock pressure injury, or high risk for PI use chair cushion or SPS.  Moisture Management: Perineal cleansing /protection: Follow Incontinence Protocol, Avoid brief in bed, Clean and dry skin folds with bathing , Consider InterDry (#371344) between folds, and Moisturize dry skin  Under Devices: Inspect skin under all medical devices during skin inspection , Ensure tubes are stabilized without tension, and Ensure patient is not lying on medical devices or equipment when repositioned  Ask provider to discontinue device when no longer needed.      Orders: Written    RECOMMEND PRIMARY TEAM ORDER: None, at this time  Education provided: importance of repositioning, plan of care, wound progress, Infection prevention , Moisture management, Hygiene, and Off-loading pressure  Discussed plan of care with: Patient  WOC nurse follow-up plan: weekly  Notify WOC if wound(s) deteriorate.  Nursing to notify the Provider(s) and re-consult the WOC Nurse if new skin concern.    DATA:     Current support surface: Standard  Standard Isoflex gel  Containment of urine/stool: Incontinence Protocol, Brief, Incontinent pad in bed, and recommend to add ISABELLA pump to isoflex support surface  BMI: There is no height or weight on file to calculate BMI.   Active diet order: Orders Placed This Encounter      Combination Diet No Caffeine Diet, Low Saturated Fat Na <2400mg Diet     Output: I/O last 3 completed shifts:  In: -   Out: 1625 [Urine:1625]     Labs:   Recent Labs   Lab 06/28/24  0833 06/25/24  0737   ALBUMIN  --  3.9   HGB 12.2* 11.6*   WBC 6.9 10.6   A1C  --  7.0*     Pressure injury risk assessment:   Sensory Perception: 4-->no  impairment  Moisture: 3-->occasionally moist  Activity: 3-->walks occasionally  Mobility: 3-->slightly limited  Nutrition: 3-->adequate  Friction and Shear: 3-->no apparent problem  Deuce Score: 19    Celestina Bang RN, BSN, CWOCN   Pager no longer is use, please contact through [employer] group: [facility] Nurse [place]  Dept. Office Number: [phone]

## 2024-06-28 NOTE — PROGRESS NOTES
Observation goals PRIOR TO DISCHARGE  Comments: -diagnostic tests and consults completed and resulted - met  -vital signs normal or at patient baseline - met  -returns to baseline functional status - not met  -safe disposition plan has been identified - not met  Nurse to notify provider when observation goals have been met and patient is ready for discharge.    Mental Status: A&O x4  Activity/dangle: Ast of 2 GB/W  Diet: LSF/ 2400 NA w/ no Caffeine  Pain: Managed with scheduled Tylenol  Gomez/Voiding: Voiding in the urinal  Tele/Restraints/Iso: N/A  02/LDA: Room air. IV saline locked  D/C Date: Pending placement  Other Info: Lidocaine patch on Left lower back. Abrasion on buttock.

## 2024-06-28 NOTE — PROGRESS NOTES
Diagnosis: Back Pain  Mental Status: A&Ox4  Activity/dangle up  2 GB walker  Diet: Low saturated fat, 2400 NA with no caffeine  Pain: scheduled tylenol  Gomez/Voiding: urinal  02/LDA: RA. Kevin BLAKE  D/C Date: pending placement  Other Info: abrasion buttock     -diagnostic tests and consults completed and resulted - met  -vital signs normal or at patient baseline - met  -returns to baseline functional status - not met  -safe disposition plan has been identified - not met

## 2024-06-28 NOTE — UTILIZATION REVIEW
Admission Status; Secondary Review Determination       Under the authority of the Utilization Management Committee, the utilization review process indicated a secondary review on the above patient. The review outcome is based on review of the medical records, discussions with staff, and applying clinical experience noted on the date of the review.     (x) Inpatient Status Appropriate - This patient's medical care is consistent with medical management for inpatient care and reasonable inpatient medical practice.     RATIONALE FOR DETERMINATION      Patient requires inpatient admission versus short stay observation or outpatient treatment for the following reasons:          The Patient is 77 y/o  man with PMHx significant for  hypertension, hyperlipidemia, type 2 diabetes mellitus, obesity, obstructive sleep apnea, COPD, history of pulmonary embolism, malignant carcinoid tumor, lung carcinoma, peripheral neuropathy, and cervical myelopathy was admitted on 6/24/2024, presenting with right lower back pain following a fall. Neurosurgery recommended an MRI, but it was not performed due to the presence of a metal foreign body in the intestine.     The patient continues, unable to lift his left leg off the bed.  At rest the pain is controlled with the pain medication, but as soon as he puts weight on the left leg, the pain shoots up to 10/10 intensity.    Neurology consult for the symptoms indicate L3-L4 left radiculopathy and also recommended MRI and possible epidural injection.  The patient will need to be off anticoagulation for the epidural injection.  The metallic hairpin foreign body moved to the right abdomen and it has been followed up daily with x-ray.    78-year-old man presents with acute right lower back pain following a fall, suggestive for L3-L4 radiculopathy.  The pain is manageable at rest, but it increases to 10/10 with standing and activity.  Neurology and neurosurgery recommended MRI which cannot be  done yet because of the metallic foreign body in the bowel.  The patient needs daily monitor for foreign body movement and then MRI to clarify the origin of the severe pain.    The expected length of stay at the time of admission was more than 2 nights because of the severity of illness, intensity of service provided, and risk for adverse outcome. Inpatient admission is appropriate.     [employer] M[employer] --notified    This document was produced using voice recognition software       The information on this document is developed by the utilization review team in order for the business office to ensure compliance. This only denotes the appropriateness of proper admission status and does not reflect the quality of care rendered.   The definitions of Inpatient Status and Observation Status used in making the determination above are those provided in the CMS Coverage Manual, Chapter 1 and Chapter 6, section 70.4.   Sincerely,   ANGELLA ECHAVARRIA MD   Utilization Review  Physician Advisor  [facility]

## 2024-06-29 ENCOUNTER — APPOINTMENT (OUTPATIENT)
Dept: GENERAL RADIOLOGY | Facility: CLINIC | Age: 79
DRG: 552 | End: 2024-06-29
Attending: PHYSICIAN ASSISTANT
Payer: COMMERCIAL

## 2024-06-29 LAB
GLUCOSE BLDC GLUCOMTR-MCNC: 101 MG/DL (ref 70–99)
GLUCOSE BLDC GLUCOMTR-MCNC: 123 MG/DL (ref 70–99)
GLUCOSE BLDC GLUCOMTR-MCNC: 129 MG/DL (ref 70–99)
GLUCOSE BLDC GLUCOMTR-MCNC: 139 MG/DL (ref 70–99)
GLUCOSE BLDC GLUCOMTR-MCNC: 162 MG/DL (ref 70–99)

## 2024-06-29 PROCEDURE — 99233 SBSQ HOSP IP/OBS HIGH 50: CPT | Performed by: INTERNAL MEDICINE

## 2024-06-29 PROCEDURE — 250N000013 HC RX MED GY IP 250 OP 250 PS 637: Performed by: PHYSICIAN ASSISTANT

## 2024-06-29 PROCEDURE — 120N000001 HC R&B MED SURG/OB

## 2024-06-29 PROCEDURE — 74018 RADEX ABDOMEN 1 VIEW: CPT

## 2024-06-29 PROCEDURE — 250N000013 HC RX MED GY IP 250 OP 250 PS 637: Performed by: STUDENT IN AN ORGANIZED HEALTH CARE EDUCATION/TRAINING PROGRAM

## 2024-06-29 PROCEDURE — 250N000013 HC RX MED GY IP 250 OP 250 PS 637: Performed by: INTERNAL MEDICINE

## 2024-06-29 PROCEDURE — 250N000013 HC RX MED GY IP 250 OP 250 PS 637: Performed by: HOSPITALIST

## 2024-06-29 RX ORDER — LIDOCAINE 4 G/G
1-3 PATCH TOPICAL
Status: DISCONTINUED | OUTPATIENT
Start: 2024-06-30 | End: 2024-07-09 | Stop reason: HOSPADM

## 2024-06-29 RX ORDER — METHOCARBAMOL 500 MG/1
500 TABLET, FILM COATED ORAL 4 TIMES DAILY
Status: DISCONTINUED | OUTPATIENT
Start: 2024-06-29 | End: 2024-07-09 | Stop reason: HOSPADM

## 2024-06-29 RX ADMIN — METFORMIN HYDROCHLORIDE 1000 MG: 500 TABLET ORAL at 08:49

## 2024-06-29 RX ADMIN — METOPROLOL SUCCINATE 25 MG: 25 TABLET, EXTENDED RELEASE ORAL at 08:49

## 2024-06-29 RX ADMIN — CHOLESTYRAMINE 4 G: 4 POWDER, FOR SUSPENSION ORAL at 22:46

## 2024-06-29 RX ADMIN — ATORVASTATIN CALCIUM 20 MG: 20 TABLET, FILM COATED ORAL at 22:46

## 2024-06-29 RX ADMIN — INSULIN ASPART 4 UNITS: 100 INJECTION, SOLUTION INTRAVENOUS; SUBCUTANEOUS at 18:58

## 2024-06-29 RX ADMIN — ACETAMINOPHEN 975 MG: 325 TABLET, FILM COATED ORAL at 16:25

## 2024-06-29 RX ADMIN — FUROSEMIDE 40 MG: 40 TABLET ORAL at 08:49

## 2024-06-29 RX ADMIN — METHOCARBAMOL 500 MG: 500 TABLET ORAL at 22:46

## 2024-06-29 RX ADMIN — METHOCARBAMOL 500 MG: 500 TABLET ORAL at 14:40

## 2024-06-29 RX ADMIN — INSULIN ASPART 2 UNITS: 100 INJECTION, SOLUTION INTRAVENOUS; SUBCUTANEOUS at 14:40

## 2024-06-29 RX ADMIN — GABAPENTIN 300 MG: 300 CAPSULE ORAL at 08:49

## 2024-06-29 RX ADMIN — APIXABAN 2.5 MG: 2.5 TABLET, FILM COATED ORAL at 08:48

## 2024-06-29 RX ADMIN — LISINOPRIL 40 MG: 40 TABLET ORAL at 08:49

## 2024-06-29 RX ADMIN — GABAPENTIN 600 MG: 300 CAPSULE ORAL at 22:46

## 2024-06-29 RX ADMIN — METFORMIN HYDROCHLORIDE 1000 MG: 500 TABLET ORAL at 18:42

## 2024-06-29 RX ADMIN — INSULIN ASPART 8 UNITS: 100 INJECTION, SOLUTION INTRAVENOUS; SUBCUTANEOUS at 11:17

## 2024-06-29 RX ADMIN — LIDOCAINE 1 PATCH: 4 PATCH TOPICAL at 08:53

## 2024-06-29 RX ADMIN — HYDROMORPHONE HYDROCHLORIDE 2 MG: 2 TABLET ORAL at 18:41

## 2024-06-29 RX ADMIN — ACETAMINOPHEN 975 MG: 325 TABLET, FILM COATED ORAL at 22:46

## 2024-06-29 RX ADMIN — FINASTERIDE 5 MG: 5 TABLET, FILM COATED ORAL at 08:49

## 2024-06-29 RX ADMIN — METHOCARBAMOL 500 MG: 500 TABLET ORAL at 18:41

## 2024-06-29 RX ADMIN — ACETAMINOPHEN 975 MG: 325 TABLET, FILM COATED ORAL at 08:48

## 2024-06-29 RX ADMIN — CHOLESTYRAMINE 4 G: 4 POWDER, FOR SUSPENSION ORAL at 11:16

## 2024-06-29 ASSESSMENT — ACTIVITIES OF DAILY LIVING (ADL)
ADLS_ACUITY_SCORE: 42
ADLS_ACUITY_SCORE: 45
ADLS_ACUITY_SCORE: 46
ADLS_ACUITY_SCORE: 42
ADLS_ACUITY_SCORE: 46
ADLS_ACUITY_SCORE: 42
ADLS_ACUITY_SCORE: 45
ADLS_ACUITY_SCORE: 46
ADLS_ACUITY_SCORE: 45
ADLS_ACUITY_SCORE: 45
ADLS_ACUITY_SCORE: 42
ADLS_ACUITY_SCORE: 46
ADLS_ACUITY_SCORE: 45
ADLS_ACUITY_SCORE: 42
ADLS_ACUITY_SCORE: 46

## 2024-06-29 NOTE — PROGRESS NOTES
Case reviewed with Dr. Guerrier and Dr. Stafford    Patient still has not passed hairpin   Without MRI cannot definitively say if weakness is related to spine pathology at this time   Can obtain CT thoracic and lumbar myelogram in the meantime and if negative, Dr. Guerrier advised would be safe to discharge and return once hairpin has passed for MRIs    Patient on eliquis so will leave timing up to radiology - likely to be done at the earliest Monday per radiology- they see orders and should be able to obtain Monday per phone call on 6/29  By that time may have passed hairpin      Updated Dr. Nydia Evans PA-C  [facility]  [address] [place] [id number]  Tel [phone]  Fax [phone]  Text page via [facility] Paging/Directory

## 2024-06-29 NOTE — PLAN OF CARE
Diagnosis: Low back pain.  POD#: None.  Mental Status: A&OX4.  Activity/dangle Up with assist of 2 GB/W.  Diet: Low saturated fat, No caffeine, 2400 Na.  Pain: Managed with Tylenol, and Dilaudid.  Gomez/Voiding: Voiding Urinal/ bathroom with assist of 2.  Tele/Restraints/Iso: None.  02/LDA: PIV SL, VSS in room air.  D/C Date: Pending for placement.  Other Info: Pt is BG check no insulin was given, CMS intact, Removed the Lidocaine patch on lower back.

## 2024-06-29 NOTE — PROGRESS NOTES
[facility]    Medicine Progress Note - Hospitalist Service    Date of Admission:  6/24/2024    Assessment & Plan   Nahun Villalobos is a 78 year old male who was registered to short-stay/observation on 6/24/2024 due to intractable back pain and left hip pain following a mechanical fall.      Past medical history significant for hypertension, hyperlipidemia, DM II, obesity, CIERA , COPD, history of PE, malignant carcinoid tumor, lung carcinoma, peripheral neuropathy, cervical myelopathy      Presented w/ lower back pain following a fall.     Intractable back pain post mechanical fall  Multilevel thoracic and lumbar spondylosis w/ Thoracic foraminal stenosis  History of arthritis  Peripheral neuropathy  Cervical myelopathy  Senile frailty and chronic debilitation   *Hx: Patient 2d prior to presentation fell from his elevated bed and hit his L lower back and head, without LOC. Since the fall, has lower back pain/L buttock that is severe when flat, and aggravated with movement. No radiculopathy symptoms, no urinary/rectal symptoms that are new. Has decreased ambulation since then. Tylenol failed to alleviate. No other falls since then.  *Imaging:  Multilevel thoracic spondylosis with multilevel bridging osteophytes raising the possibility of DISH. Neural foraminal stenosis greatest on the right at T3-T4 and T4-T5. Multilevel lumbar spondylosis greatest at L3-4 and L4-5 as above.  *Neurosurgery recommended MRI but unable to be completed due to metal foreign body in intestine.  *CT abd/pelvis negative for any bony pathology.  --Neurosurgery consult appreciated:             --Unclear etiology of left lower extremity weakness (hip flexor) and feel patient should remain in hospital to obtain MRI of lumbar and thoracic spine.  Will need to wait for metal foreign body to be passed.  Will call them again to clarify if it needs to be done as inpatient.  Increase holistic pain management (scheduled  Tylenol, Robaxin, gabapentin, Lidoderm patch )and continue PT with a goal for TCU  --SW/CM consult appreciated:               --Accepted to [facility] once medically ready.    --Fall precautions.    Foreign body ingestion  *noted during scan prior to initiation of MRI, though was present on admission rib XR  *CT abd/pelvis showing a presumably metallic structure measuring 4.5cm in mid small bowel without evidence of perforation  *He denied any intentional ingestion, has no recollection of unintentionally swallowing any object  *Abd X-ray 6/27: Hairpin foreign body has progressed (now projecting over the right hemiabdomen wither within distal small bowel (favored) or proximal colon).    Abd Xray 6/28:  FB remains in the right abdomen in similar position compared to yesterdays exam.    --Monitor abd exam.  --Abd Xray reviewed AM of 6/29.         Left hip flexor weakness  Left upper thigh and buttock pain  *Left hip Xray negative for acute fracture or dislocation.    *Thoracic spine and lumbar spine CT revealed multilevel spondylosis, no fracture noted.    *Cervical spine [facility] on 2/16/2024 reports multilevel spinal canal stenosis at C3-4, C4-5, and C5-6.  --Neurosurgery consult appreciated.  --General Neurology consult appreciated.   --Obtain MRI of lumbar spine once able.    --Consider epidural injection; though will need to hold DOAC.  --PT/OT.    --Gabapentin for pain.    --Follow up with Neurology or Ortho to have an outpatient EMG.    --When able obtain thoracic and lumbar spine MRI.     SALLIE on CKD2 (Resolved)  *Baseline creatinine 1, stable at admission.  --Cr up to 1.44 on 6/26; suspect due to initiation celebrex on admission in combination with diuretic and ACE.    COPD, not on chronic O2, not in exacerbation  History of PE  Adenocarcinoma of lung s/p resection in 2016  *No acute dyspneic symptoms.  --Duonebs available PRN.  --Resumed on PTA DOAC (though unclear why he's on 2.5 mg q12 instead of 5 mg  q12).  --CPAP at night.    --Consider outpatient sleep study and PFTs.  --Patient should obtain trelegy inhaler from home and will be resumed.       Hypertension  Hyperlipidemia  --Continue PTA metoprolol, statin.  -- resume PTA lasix and lisinopril   --PRN hydralazine available.       Malignant metastatic carcinoid tumor  Rib Mass  *Status post exploratory laparotomy, small bowel resection and removal of mesenteric mass 2023  *Follows at [facility] w/ monthly lanreotide injections. Reports in 3/2024 suggest slow progressive w/ metastasis to the liver, bone, retroperitoneum and mesentery  *Patient complaining of R inferior R nodule that he hasn't noticed.    --Patient gets Lanreotide injections monthly in his clinic (last dose was May 29th). Per pt's S/O, Onco MD said ok for pt to get next dose after being discharged from TCU.      Chronic diarrhea  *Chronic and stable.  --Continued on PTA cholestyramine.     Non-insulin dependent type 2 diabetes mellitus   *A1C 7%.  --ISS, medium intensity.    --Continued on PTA metformin.  --Mod CHO diet.     Recent Labs   Lab 06/29/24  0748 06/29/24  0224 06/28/24  2320 06/28/24  1657 06/28/24  1215 06/28/24  0833   * 139* 132* 123* 165* 155*       BPH  --Continue PTA finasteride.     Morbid Obesity  CIERA  --Outpatient diet and exercise as able.  --CPAP.        Chronic macrocytic anemia  *HGB stable at 11.8.  *Iron studies 6/25 consistent with combination iron deficiency and chronic inflammation.  --Resume iron supplement at discharge, follow up with PCP.         Diet: Combination Diet No Caffeine Diet, Low Saturated Fat Na <2400mg Diet    DVT Prophylaxis: DOAC  Gomez Catheter: Not present  Lines: None     Cardiac Monitoring: None  Code Status: No CPR- Do NOT Intubate      Clinically Significant Risk Factors Present on Admission               # Drug Induced Coagulation Defect: home medication list includes an anticoagulant medication    # Hypertension: Noted on problem  "list            # DMII: A1C = 7.0 % (Ref range: <5.7 %) within past 6 months    # Obesity: Estimated body mass index is 31.95 kg/m  as calculated from the following:    Height as of 6/17/24: 1.702 m (5' 7\").    Weight as of 6/17/24: 92.5 kg (204 lb).              Disposition Plan     Medically Ready for Discharge: Anticipated Tomorrow         Frank Stafford MD  Hospitalist Service  [facility]  Securely message with Vocera (more info)  Text page via [facility] Paging/Directory   The above note was dictated using voice recognition software. Although reviewed after completion, some word and grammatical error may remain . Please contact the author for any clarifications.  ______________________________________________________________________    Interval History   History reviewed.  Doing okay when not moving.  Pain significant 9/10 on movement.  Denies any new tingling/numbness.  Frustrated with the metal body    Physical Exam   BP (!) 159/72   Pulse 63   Temp 98.1  F (36.7  C) (Oral)   Resp 16   SpO2 95%   Gen- pleasant   Neck- supple  CVS- I+II+ no m/r/g  RS- CTAB  Abdo- soft, no tenderness . No g/r/r  Ext- no edema     Medical Decision Making       51 MINUTES SPENT BY ME on the date of service doing chart review, history, exam, documentation & further activities per the note.      Data   ------------------------- PAST 24 HR DATA REVIEWED -----------------------------------------------        Imaging results reviewed over the past 24 hrs:   Recent Results (from the past 24 hour(s))   XR Abdomen 1 View    Narrative    ABDOMEN ONE VIEW  6/28/2024 1:44 PM     HISTORY: Assess if patient has passed metallic foreign body.    COMPARISON: June 27, 2024       Impression    IMPRESSION: Metallic foreign body in the right abdomen. This is  similar position to comparison. Small amount of stool.  Nonobstructed  bowel gas pattern.     CHANDAN CHACKO MD         SYSTEM ID:  [id number]     "

## 2024-06-29 NOTE — PLAN OF CARE
Mental Status: A&O x4  Activity/dangle: Ast of 2 GB/W/ Lift  Diet: LSF/ 2400 NA w/ no Caffeine  Pain: Managed with scheduled Tylenol  Gomez/Voiding: Voiding in the urinal  Tele/Restraints/Iso: N/A  02/LDA: Room air. IV saline locked  D/C Date: Pending placement  Other Info: Lidocaine patch on Left lower back.

## 2024-06-29 NOTE — PLAN OF CARE
Goal Outcome Evaluation:      Plan of Care Reviewed With: patient    Overall Patient Progress: improvingOverall Patient Progress: improving    Date/Time:6/28/24 @ [id number]    Trauma/Ortho/Medical (Choose one) Medical    Diagnosis: Intractable back and L hip pain. Malignant carcinoid tumor   POD#:N/A  Mental Status: A and O x 4  Activity/dangle: Stayed in bed the entire shift  Diet: No caffeine. Low sat fat Na<2400 mg  Pain: Denied pain  Gomez/Voiding:Urinal  Tele/Restraints/Iso:N/A  02/LDA:RA. CARSON SL  D/C Date: Pending TCU placement  Other Info: Abrasion on the buttock, left ROSA ISELA. WOC, neuro following.

## 2024-06-30 ENCOUNTER — APPOINTMENT (OUTPATIENT)
Dept: GENERAL RADIOLOGY | Facility: CLINIC | Age: 79
DRG: 552 | End: 2024-06-30
Attending: INTERNAL MEDICINE
Payer: COMMERCIAL

## 2024-06-30 LAB
GLUCOSE BLDC GLUCOMTR-MCNC: 118 MG/DL (ref 70–99)
GLUCOSE BLDC GLUCOMTR-MCNC: 122 MG/DL (ref 70–99)
GLUCOSE BLDC GLUCOMTR-MCNC: 125 MG/DL (ref 70–99)
GLUCOSE BLDC GLUCOMTR-MCNC: 145 MG/DL (ref 70–99)

## 2024-06-30 PROCEDURE — 99232 SBSQ HOSP IP/OBS MODERATE 35: CPT | Performed by: INTERNAL MEDICINE

## 2024-06-30 PROCEDURE — 250N000013 HC RX MED GY IP 250 OP 250 PS 637: Performed by: STUDENT IN AN ORGANIZED HEALTH CARE EDUCATION/TRAINING PROGRAM

## 2024-06-30 PROCEDURE — 250N000013 HC RX MED GY IP 250 OP 250 PS 637: Performed by: INTERNAL MEDICINE

## 2024-06-30 PROCEDURE — 250N000013 HC RX MED GY IP 250 OP 250 PS 637: Performed by: HOSPITALIST

## 2024-06-30 PROCEDURE — 74018 RADEX ABDOMEN 1 VIEW: CPT

## 2024-06-30 PROCEDURE — 120N000001 HC R&B MED SURG/OB

## 2024-06-30 PROCEDURE — 250N000013 HC RX MED GY IP 250 OP 250 PS 637: Performed by: PHYSICIAN ASSISTANT

## 2024-06-30 RX ADMIN — METOPROLOL SUCCINATE 25 MG: 25 TABLET, EXTENDED RELEASE ORAL at 08:55

## 2024-06-30 RX ADMIN — CHOLESTYRAMINE 4 G: 4 POWDER, FOR SUSPENSION ORAL at 21:57

## 2024-06-30 RX ADMIN — INSULIN ASPART 3 UNITS: 100 INJECTION, SOLUTION INTRAVENOUS; SUBCUTANEOUS at 11:48

## 2024-06-30 RX ADMIN — ATORVASTATIN CALCIUM 20 MG: 20 TABLET, FILM COATED ORAL at 21:56

## 2024-06-30 RX ADMIN — LISINOPRIL 40 MG: 40 TABLET ORAL at 08:55

## 2024-06-30 RX ADMIN — FUROSEMIDE 40 MG: 40 TABLET ORAL at 08:55

## 2024-06-30 RX ADMIN — GABAPENTIN 600 MG: 300 CAPSULE ORAL at 21:57

## 2024-06-30 RX ADMIN — ACETAMINOPHEN 975 MG: 325 TABLET, FILM COATED ORAL at 08:54

## 2024-06-30 RX ADMIN — CHOLESTYRAMINE 4 G: 4 POWDER, FOR SUSPENSION ORAL at 11:49

## 2024-06-30 RX ADMIN — ACETAMINOPHEN 975 MG: 325 TABLET, FILM COATED ORAL at 21:56

## 2024-06-30 RX ADMIN — METHOCARBAMOL 500 MG: 500 TABLET ORAL at 21:56

## 2024-06-30 RX ADMIN — ACETAMINOPHEN 975 MG: 325 TABLET, FILM COATED ORAL at 16:26

## 2024-06-30 RX ADMIN — METHOCARBAMOL 500 MG: 500 TABLET ORAL at 08:55

## 2024-06-30 RX ADMIN — METHOCARBAMOL 500 MG: 500 TABLET ORAL at 13:10

## 2024-06-30 RX ADMIN — METHOCARBAMOL 500 MG: 500 TABLET ORAL at 18:12

## 2024-06-30 RX ADMIN — INSULIN ASPART 5 UNITS: 100 INJECTION, SOLUTION INTRAVENOUS; SUBCUTANEOUS at 08:57

## 2024-06-30 RX ADMIN — INSULIN ASPART 3 UNITS: 100 INJECTION, SOLUTION INTRAVENOUS; SUBCUTANEOUS at 19:40

## 2024-06-30 RX ADMIN — GABAPENTIN 300 MG: 300 CAPSULE ORAL at 08:54

## 2024-06-30 RX ADMIN — FINASTERIDE 5 MG: 5 TABLET, FILM COATED ORAL at 08:55

## 2024-06-30 RX ADMIN — LIDOCAINE 1 PATCH: 4 PATCH TOPICAL at 08:57

## 2024-06-30 RX ADMIN — METFORMIN HYDROCHLORIDE 1000 MG: 500 TABLET ORAL at 08:54

## 2024-06-30 RX ADMIN — METFORMIN HYDROCHLORIDE 1000 MG: 500 TABLET ORAL at 18:13

## 2024-06-30 ASSESSMENT — ACTIVITIES OF DAILY LIVING (ADL)
ADLS_ACUITY_SCORE: 41
ADLS_ACUITY_SCORE: 40
ADLS_ACUITY_SCORE: 41
ADLS_ACUITY_SCORE: 41
ADLS_ACUITY_SCORE: 40
ADLS_ACUITY_SCORE: 41
ADLS_ACUITY_SCORE: 41
ADLS_ACUITY_SCORE: 40
ADLS_ACUITY_SCORE: 41
ADLS_ACUITY_SCORE: 40
ADLS_ACUITY_SCORE: 41
ADLS_ACUITY_SCORE: 40
ADLS_ACUITY_SCORE: 40
ADLS_ACUITY_SCORE: 41
ADLS_ACUITY_SCORE: 41
ADLS_ACUITY_SCORE: 40
ADLS_ACUITY_SCORE: 41
ADLS_ACUITY_SCORE: 41
ADLS_ACUITY_SCORE: 42
ADLS_ACUITY_SCORE: 40
ADLS_ACUITY_SCORE: 40
ADLS_ACUITY_SCORE: 41
ADLS_ACUITY_SCORE: 41

## 2024-06-30 NOTE — PLAN OF CARE
Goal Outcome Evaluation:      Plan of Care Reviewed With: patient    Overall Patient Progress: improvingOverall Patient Progress: improving    Date/Time:6/29/24 @ 4524-5432     Trauma/Ortho/Medical (Choose one) Medical     Diagnosis: Intractable back and L hip pain. Malignant carcinoid tumor   POD#:N/A  Mental Status: A and O x 4  Activity/dangle: Stayed in bed the entire shift  Diet: No caffeine. Low sat fat Na<2400 mg  Pain: Denied pain  Gomez/Voiding:Urinal  Tele/Restraints/Iso:N/A  02/LDA:RA. CARSON SL  D/C Date: Pending TCU placement  Other Info: Abrasion on the buttock, left ROSA ISELA. WOC, neuro following.

## 2024-06-30 NOTE — PROGRESS NOTES
[facility]    Medicine Progress Note - Hospitalist Service    Date of Admission:  6/24/2024    Assessment & Plan   Nahun Villalobos is a 78 year old male who was registered to short-stay/observation on 6/24/2024 due to intractable back pain and left hip pain following a mechanical fall.      Past medical history significant for hypertension, hyperlipidemia, DM II, obesity, CIERA , COPD, history of PE, malignant carcinoid tumor, lung carcinoma, peripheral neuropathy, cervical myelopathy      Presented w/ lower back pain following a fall.     Intractable back pain post mechanical fall  Multilevel thoracic and lumbar spondylosis w/ Thoracic foraminal stenosis  History of arthritis  Peripheral neuropathy  Cervical myelopathy  Senile frailty and chronic debilitation   *Hx: Patient 2d prior to presentation fell from his elevated bed and hit his L lower back and head, without LOC. Since the fall, has lower back pain/L buttock that is severe when flat, and aggravated with movement. No radiculopathy symptoms, no urinary/rectal symptoms that are new. Has decreased ambulation since then. Tylenol failed to alleviate. No other falls since then.  *Imaging:  Multilevel thoracic spondylosis with multilevel bridging osteophytes raising the possibility of DISH. Neural foraminal stenosis greatest on the right at T3-T4 and T4-T5. Multilevel lumbar spondylosis greatest at L3-4 and L4-5 as above.  *Neurosurgery recommended MRI but unable to be completed due to metal foreign body in intestine.  *CT abd/pelvis negative for any bony pathology.  --Neurosurgery consult appreciated:             --Unclear etiology of left lower extremity weakness (hip flexor) and feel patient should remain in hospital to obtain MRI of lumbar and thoracic spine.  Will need to wait for metal foreign body to be passed. D/W NSG: MRI vs CT Myelogram  Increase holistic pain management (scheduled Tylenol, Robaxin, gabapentin, Lidoderm patch  )and continue PT with a goal for TCU  --SW/CM consult appreciated:               --Accepted to [facility] once medically ready.    --Fall precautions.    Foreign body ingestion  *noted during scan prior to initiation of MRI, though was present on admission rib XR  *CT abd/pelvis showing a presumably metallic structure measuring 4.5cm in mid small bowel without evidence of perforation  *He denied any intentional ingestion, has no recollection of unintentionally swallowing any object  *Abd X-ray 6/27: Hairpin foreign body has progressed (now projecting over the right hemiabdomen wither within distal small bowel (favored) or proximal colon).    Abd Xray 6/28:  FB remains in the right abdomen in similar position compared to yesterdays exam.    --Monitor abd exam.  --Abd Xray reviewed AM of 6/30       Left hip flexor weakness  Left upper thigh and buttock pain  *Left hip Xray negative for acute fracture or dislocation.    *Thoracic spine and lumbar spine CT revealed multilevel spondylosis, no fracture noted.    *Cervical spine MRI from [facility] on 2/16/2024 reports multilevel spinal canal stenosis at C3-4, C4-5, and C5-6.  --Neurosurgery consult appreciated.  --General Neurology consult appreciated.   --Obtain MRI of lumbar spine once able.    --Consider epidural injection; though will need to hold DOAC.  --PT/OT.    --Gabapentin for pain.    --Follow up with Neurology or Ortho to have an outpatient EMG.    --When able obtain thoracic and lumbar spine MRI.     SALLIE on CKD2 (Resolved)  *Baseline creatinine 1, stable at admission.  --Cr up to 1.44 on 6/26; suspect due to initiation celebrex on admission in combination with diuretic and ACE.    COPD, not on chronic O2, not in exacerbation  History of PE  Adenocarcinoma of lung s/p resection in 2016  *No acute dyspneic symptoms.  --Duonebs available PRN.  --Resumed on PTA DOAC (though unclear why he's on 2.5 mg q12 instead of 5 mg q12).  --CPAP at night.    --Consider outpatient  sleep study and PFTs.  --Patient should obtain trelegy inhaler from home and will be resumed.       Hypertension  Hyperlipidemia  --Continue PTA metoprolol, statin.  -- resume PTA lasix and lisinopril   --PRN hydralazine available.       Malignant metastatic carcinoid tumor  Rib Mass  *Status post exploratory laparotomy, small bowel resection and removal of mesenteric mass 2023  *Follows at [facility] w/ monthly lanreotide injections. Reports in 3/2024 suggest slow progressive w/ metastasis to the liver, bone, retroperitoneum and mesentery  *Patient complaining of R inferior R nodule that he hasn't noticed.    --Patient gets Lanreotide injections monthly in his clinic (last dose was May 29th). Per pt's S/O, Onco MD said ok for pt to get next dose after being discharged from TCU.      Chronic diarrhea  *Chronic and stable.  --Continued on PTA cholestyramine.     Non-insulin dependent type 2 diabetes mellitus   *A1C 7%.  --ISS, medium intensity.    --Continued on PTA metformin.  --Mod CHO diet.     Recent Labs   Lab 06/30/24  1144 06/30/24  0719 06/29/24  2115 06/29/24  1718 06/29/24  1328 06/29/24  0748   * 122* 123* 101* 129* 162*       BPH  --Continue PTA finasteride.     Morbid Obesity  CIERA  --Outpatient diet and exercise as able.  --CPAP.        Chronic macrocytic anemia  *HGB stable at 11.8.  *Iron studies 6/25 consistent with combination iron deficiency and chronic inflammation.  --Resume iron supplement at discharge, follow up with PCP.         Diet: Combination Diet No Caffeine Diet, Low Saturated Fat Na <2400mg Diet    DVT Prophylaxis: DOAC  Gomez Catheter: Not present  Lines: None     Cardiac Monitoring: None  Code Status: No CPR- Do NOT Intubate      Clinically Significant Risk Factors                  # Hypertension: Noted on problem list               # DMII: A1C = 7.0 % (Ref range: <5.7 %) within past 6 months, PRESENT ON ADMISSION  # Obesity: Estimated body mass index is 31.95 kg/m  as  "calculated from the following:    Height as of 6/17/24: 1.702 m (5' 7\").    Weight as of 6/17/24: 92.5 kg (204 lb)., PRESENT ON ADMISSION            Disposition Plan     Medically Ready for Discharge: Anticipated Tomorrow         Frank Stafford MD  Hospitalist Service  [facility]  Securely message with One Touch EMR (more info)  Text page via [employer] Paging/Directory   The above note was dictated using voice recognition software. Although reviewed after completion, some word and grammatical error may remain . Please contact the author for any clarifications.  ______________________________________________________________________    Interval History    Doing okay   Denies any new tingling/numbness.      Physical Exam   /75   Pulse 72   Temp 97.4  F (36.3  C) (Oral)   Resp 16   SpO2 94%   Gen- pleasant   Neck- supple  CVS- I+II+ no m/r/g  RS- CTAB  Abdo- soft, no tenderness . No g/r/r  Ext- no edema     Medical Decision Making       40 MINUTES SPENT BY ME on the date of service doing chart review, history, exam, documentation & further activities per the note.      Data   ------------------------- PAST 24 HR DATA REVIEWED -----------------------------------------------        Imaging results reviewed over the past 24 hrs:   No results found for this or any previous visit (from the past 24 hour(s)).    "

## 2024-06-30 NOTE — PROGRESS NOTES
"CLINICAL NUTRITION SERVICES  -  ASSESSMENT NOTE    Future/Additional Recommendations:   If PO intake declines, consider scheduled oral nutrition supplements -vs- liberalization of diet      Malnutrition:   Unable to determine due to lack of nutrition hx        REASON FOR ASSESSMENT  Nahun Villalobos is a 78 year old male seen by Registered Dietitian for Admission Nutrition Risk Screen for unintentional weight loss.     PMHx of HTN, HLD, DM2, CIERA, COPD, hx of PE, malignant carcinoid tumor, lung carcinoma, peripheral neuropathy, cervical myelopathy.       NUTRITION HISTORY  Unable to obtain nutrition hx from patient, soundly sleeping during attempt to assess patient x2.      CURRENT NUTRITION ORDERS  Diet Order:     Cardiac diet (Low saturated fat, <2400 mg Na)     Current Intake/Tolerance:  Fair appetite, eating 100% of meals per nursing.       NUTRITION FOCUSED PHYSICAL ASSESSMENT FOR DIAGNOSING MALNUTRITION)  Observed:    No nutrition-related physical findings observed      ANTHROPOMETRICS  Height: 67\"  Weight: No wt taken yet this admit. Last weighed 204 lbs on 6/17/24  IBW: 67.3 kg  Weight History: Based on records alone, pt has lost ~15 lbs (7% body weight) over since April.    Wt Readings from Last 10 Encounters:   06/17/24 92.5 kg (204 lb)   04/01/24 92.8 kg (204 lb 8 oz)   03/12/24 99.3 kg (219 lb)   03/07/24 99.3 kg (219 lb)   03/02/24 99.3 kg (219 lb)   08/24/23 99.3 kg (218 lb 14.4 oz)   07/31/23 97.2 kg (214 lb 4.8 oz)   03/21/23 101 kg (222 lb 11.2 oz)   03/09/23 99.8 kg (220 lb)   01/23/23 100.7 kg (222 lb)         LABS  Labs reviewed    MEDICATIONS  Medications reviewed      ASSESSED NUTRITION NEEDS PER APPROVED PRACTICE GUIDELINES:    Dosing Weight 74 kg (adjusted, based on IBW of 67.3 kg and most recent weight of 92.5 kg on 6/17)  Estimated Energy Needs: 4182-8024 kcals (25-30 Kcal/Kg)  Justification: maintenance  Estimated Protein Needs: 75-90 grams protein (1-1.2 g pro/Kg)  Justification: " Repletion  Estimated Fluid Needs: 1479-5343  mL (25-30 mL/kg)  Justification: maintenance    MALNUTRITION:  % Weight Loss:  7% in 2 months (severe malnutrition)  % Intake:  None observed since admission, unable to obtain nutrition hx prior to admission   Subcutaneous Fat Loss:  None observed  Muscle Loss:  None observed  Fluid Retention:  None noted    Malnutrition Diagnosis: Unable to determine due to lack of nutrition hx     NUTRITION DIAGNOSIS:  Unintended weight loss related to potentially reduced oral intake as evidenced by 7% wt loss over 2.5 month period per weight data.       NUTRITION INTERVENTIONS  Recommendations / Nutrition Prescription  See above      Nutrition Goals  Pt to consume at least 75% of three nutritionally adequate meals per day (or equivalent via snacks/supplements).       MONITORING AND EVALUATION:  Progress towards goals will be monitored and evaluated per protocol and Practice Guidelines        Elinor Liao RD, LD, CNSC

## 2024-06-30 NOTE — PLAN OF CARE
Mental Status: A&O x4  Activity/dangle: Ast of 2 GB/W/ Lift  Diet: LSF/ 2400 NA w/ no Caffeine  Pain: Managed with scheduled Tylenol and Robaxin  Gomez/Voiding: Voiding in the urinal  Tele/Restraints/Iso: N/A  02/LDA: Room air. IV saline locked  D/C Date: Pending placement  Other Info: Lidocaine patch on Left lower back.

## 2024-06-30 NOTE — PLAN OF CARE
Diagnosis: Low  back pain.  POD#: None.  Mental Status:A&OX4.  Activity/dangle Up with Assist of 2 GB/W.  Diet: Low saturated fat, no caffeine, 2400 Na.  Pain: Managed with schedule Tylenol, PRN Dilaudid, Robaxin.  Gomez/Voiding: Urinal/ BR.  Tele/Restraints/Iso: None.  02/LDA: PiV SL. VSS on room air.  D/C Date: Pending.  Other Info: Pt BG check no insuline was given on this shift, CMS in tact Lidocaine removed on lower back.

## 2024-07-01 ENCOUNTER — APPOINTMENT (OUTPATIENT)
Dept: CT IMAGING | Facility: CLINIC | Age: 79
DRG: 552 | End: 2024-07-01
Attending: PHYSICIAN ASSISTANT
Payer: COMMERCIAL

## 2024-07-01 ENCOUNTER — MEDICAL CORRESPONDENCE (OUTPATIENT)
Dept: HEALTH INFORMATION MANAGEMENT | Facility: CLINIC | Age: 79
End: 2024-07-01

## 2024-07-01 ENCOUNTER — APPOINTMENT (OUTPATIENT)
Dept: GENERAL RADIOLOGY | Facility: CLINIC | Age: 79
DRG: 552 | End: 2024-07-01
Attending: PHYSICIAN ASSISTANT
Payer: COMMERCIAL

## 2024-07-01 ENCOUNTER — APPOINTMENT (OUTPATIENT)
Dept: PHYSICAL THERAPY | Facility: CLINIC | Age: 79
DRG: 552 | End: 2024-07-01
Payer: COMMERCIAL

## 2024-07-01 ENCOUNTER — APPOINTMENT (OUTPATIENT)
Dept: GENERAL RADIOLOGY | Facility: CLINIC | Age: 79
DRG: 552 | End: 2024-07-01
Attending: INTERNAL MEDICINE
Payer: COMMERCIAL

## 2024-07-01 LAB
GLUCOSE BLDC GLUCOMTR-MCNC: 129 MG/DL (ref 70–99)
GLUCOSE BLDC GLUCOMTR-MCNC: 137 MG/DL (ref 70–99)
GLUCOSE BLDC GLUCOMTR-MCNC: 141 MG/DL (ref 70–99)
GLUCOSE BLDC GLUCOMTR-MCNC: 156 MG/DL (ref 70–99)
GLUCOSE BLDC GLUCOMTR-MCNC: 99 MG/DL (ref 70–99)

## 2024-07-01 PROCEDURE — 250N000011 HC RX IP 250 OP 636: Performed by: PHYSICIAN ASSISTANT

## 2024-07-01 PROCEDURE — 250N000013 HC RX MED GY IP 250 OP 250 PS 637: Performed by: INTERNAL MEDICINE

## 2024-07-01 PROCEDURE — 250N000009 HC RX 250: Performed by: PHYSICIAN ASSISTANT

## 2024-07-01 PROCEDURE — B01B1ZZ FLUOROSCOPY OF SPINAL CORD USING LOW OSMOLAR CONTRAST: ICD-10-PCS | Performed by: PHYSICIAN ASSISTANT

## 2024-07-01 PROCEDURE — 97530 THERAPEUTIC ACTIVITIES: CPT | Mod: GP | Performed by: PHYSICAL THERAPIST

## 2024-07-01 PROCEDURE — 74018 RADEX ABDOMEN 1 VIEW: CPT

## 2024-07-01 PROCEDURE — 250N000013 HC RX MED GY IP 250 OP 250 PS 637: Performed by: HOSPITALIST

## 2024-07-01 PROCEDURE — 97116 GAIT TRAINING THERAPY: CPT | Mod: GP | Performed by: PHYSICAL THERAPIST

## 2024-07-01 PROCEDURE — 120N000001 HC R&B MED SURG/OB

## 2024-07-01 PROCEDURE — 250N000013 HC RX MED GY IP 250 OP 250 PS 637: Performed by: STUDENT IN AN ORGANIZED HEALTH CARE EDUCATION/TRAINING PROGRAM

## 2024-07-01 PROCEDURE — 62303 MYELOGRAPHY LUMBAR INJECTION: CPT

## 2024-07-01 PROCEDURE — 99232 SBSQ HOSP IP/OBS MODERATE 35: CPT | Performed by: INTERNAL MEDICINE

## 2024-07-01 PROCEDURE — 72132 CT LUMBAR SPINE W/DYE: CPT

## 2024-07-01 PROCEDURE — 72129 CT CHEST SPINE W/DYE: CPT

## 2024-07-01 PROCEDURE — 250N000013 HC RX MED GY IP 250 OP 250 PS 637: Performed by: PHYSICIAN ASSISTANT

## 2024-07-01 RX ORDER — IOPAMIDOL 612 MG/ML
10 INJECTION, SOLUTION INTRATHECAL ONCE
Status: COMPLETED | OUTPATIENT
Start: 2024-07-01 | End: 2024-07-01

## 2024-07-01 RX ORDER — LIDOCAINE HYDROCHLORIDE 10 MG/ML
3 INJECTION, SOLUTION EPIDURAL; INFILTRATION; INTRACAUDAL; PERINEURAL ONCE
Status: COMPLETED | OUTPATIENT
Start: 2024-07-01 | End: 2024-07-01

## 2024-07-01 RX ADMIN — METOPROLOL SUCCINATE 25 MG: 25 TABLET, EXTENDED RELEASE ORAL at 08:09

## 2024-07-01 RX ADMIN — ACETAMINOPHEN 975 MG: 325 TABLET, FILM COATED ORAL at 16:25

## 2024-07-01 RX ADMIN — INSULIN ASPART 6 UNITS: 100 INJECTION, SOLUTION INTRAVENOUS; SUBCUTANEOUS at 12:53

## 2024-07-01 RX ADMIN — LISINOPRIL 40 MG: 40 TABLET ORAL at 08:08

## 2024-07-01 RX ADMIN — GABAPENTIN 600 MG: 300 CAPSULE ORAL at 22:03

## 2024-07-01 RX ADMIN — LIDOCAINE 1 PATCH: 4 PATCH TOPICAL at 08:09

## 2024-07-01 RX ADMIN — GABAPENTIN 300 MG: 300 CAPSULE ORAL at 08:08

## 2024-07-01 RX ADMIN — METFORMIN HYDROCHLORIDE 1000 MG: 500 TABLET ORAL at 08:08

## 2024-07-01 RX ADMIN — METFORMIN HYDROCHLORIDE 1000 MG: 500 TABLET ORAL at 18:37

## 2024-07-01 RX ADMIN — CHOLESTYRAMINE 4 G: 4 POWDER, FOR SUSPENSION ORAL at 11:45

## 2024-07-01 RX ADMIN — INSULIN ASPART 11 UNITS: 100 INJECTION, SOLUTION INTRAVENOUS; SUBCUTANEOUS at 08:09

## 2024-07-01 RX ADMIN — IOPAMIDOL 10 ML: 612 INJECTION, SOLUTION INTRATHECAL at 11:04

## 2024-07-01 RX ADMIN — ACETAMINOPHEN 975 MG: 325 TABLET, FILM COATED ORAL at 08:07

## 2024-07-01 RX ADMIN — FUROSEMIDE 40 MG: 40 TABLET ORAL at 08:08

## 2024-07-01 RX ADMIN — LIDOCAINE HYDROCHLORIDE 3 ML: 10 INJECTION, SOLUTION EPIDURAL; INFILTRATION; INTRACAUDAL; PERINEURAL at 11:04

## 2024-07-01 RX ADMIN — METHOCARBAMOL 500 MG: 500 TABLET ORAL at 08:08

## 2024-07-01 RX ADMIN — METHOCARBAMOL 500 MG: 500 TABLET ORAL at 22:03

## 2024-07-01 RX ADMIN — ATORVASTATIN CALCIUM 20 MG: 20 TABLET, FILM COATED ORAL at 22:03

## 2024-07-01 RX ADMIN — ACETAMINOPHEN 975 MG: 325 TABLET, FILM COATED ORAL at 22:03

## 2024-07-01 RX ADMIN — METHOCARBAMOL 500 MG: 500 TABLET ORAL at 12:52

## 2024-07-01 RX ADMIN — FINASTERIDE 5 MG: 5 TABLET, FILM COATED ORAL at 08:08

## 2024-07-01 RX ADMIN — METHOCARBAMOL 500 MG: 500 TABLET ORAL at 18:37

## 2024-07-01 RX ADMIN — CHOLESTYRAMINE 4 G: 4 POWDER, FOR SUSPENSION ORAL at 22:04

## 2024-07-01 ASSESSMENT — ACTIVITIES OF DAILY LIVING (ADL)
ADLS_ACUITY_SCORE: 41
ADLS_ACUITY_SCORE: 41
ADLS_ACUITY_SCORE: 40
ADLS_ACUITY_SCORE: 41
ADLS_ACUITY_SCORE: 40
ADLS_ACUITY_SCORE: 41

## 2024-07-01 NOTE — PROGRESS NOTES
[facility]    Medicine Progress Note - Hospitalist Service    Date of Admission:  6/24/2024    Assessment & Plan   Nahun Villalobos is a 78 year old male who was registered to short-stay/observation on 6/24/2024 due to intractable back pain and left hip pain following a mechanical fall.      Past medical history significant for hypertension, hyperlipidemia, DM II, obesity, CIERA , COPD, history of PE, malignant carcinoid tumor, lung carcinoma, peripheral neuropathy, cervical myelopathy      Presented w/ lower back pain following a fall.     Intractable back pain post mechanical fall  Multilevel thoracic and lumbar spondylosis w/ Thoracic foraminal stenosis  History of arthritis  Peripheral neuropathy  Cervical myelopathy  Senile frailty and chronic debilitation   *Hx: Patient 2d prior to presentation fell from his elevated bed and hit his L lower back and head, without LOC. Since the fall, has lower back pain/L buttock that is severe when flat, and aggravated with movement. No radiculopathy symptoms, no urinary/rectal symptoms that are new. Has decreased ambulation since then. Tylenol failed to alleviate. No other falls since then.  *Imaging:  Multilevel thoracic spondylosis with multilevel bridging osteophytes raising the possibility of DISH. Neural foraminal stenosis greatest on the right at T3-T4 and T4-T5. Multilevel lumbar spondylosis greatest at L3-4 and L4-5 as above.  *Neurosurgery recommended MRI but unable to be completed due to metal foreign body in intestine.  *CT abd/pelvis negative for any bony pathology.  --Neurosurgery consult appreciated:             --Unclear etiology of left lower extremity weakness (hip flexor) and feel patient should remain in hospital to obtain MRI of lumbar and thoracic spine.  Will need to wait for metal foreign body to be passed. D/W NSG: Had CT myelogram today.  Await further review by neurosurgery team.    Increase holistic pain management  (scheduled Tylenol, Robaxin, gabapentin, Lidoderm patch )and continue PT with a goal for TCU  --SW/CM consult appreciated:               --Accepted to [facility] once medically ready.    --Fall precautions.    Foreign body ingestion  *noted during scan prior to initiation of MRI, though was present on admission rib XR  *CT abd/pelvis showing a presumably metallic structure measuring 4.5cm in mid small bowel without evidence of perforation  *He denied any intentional ingestion, has no recollection of unintentionally swallowing any object  *Abd X-ray 6/27: Hairpin foreign body has progressed (now projecting over the right hemiabdomen wither within distal small bowel (favored) or proximal colon).    Abd Xray 6/28:  FB remains in the right abdomen in similar position compared to yesterdays exam.    --Monitor abd exam.  --Abd Xray reviewed AM of 6/30       Left hip flexor weakness  Left upper thigh and buttock pain  *Left hip Xray negative for acute fracture or dislocation.    *Thoracic spine and lumbar spine CT revealed multilevel spondylosis, no fracture noted.    *Cervical spine MRI [facility] on 2/16/2024 reports multilevel spinal canal stenosis at C3-4, C4-5, and C5-6.  --Neurosurgery consult appreciated.  --General Neurology consult appreciated.   --Obtain MRI of lumbar spine once able.    --Consider epidural injection; though will need to hold DOAC.  --PT/OT.    --Gabapentin for pain.    --Follow up with Neurology or Ortho to have an outpatient EMG.    --When able obtain thoracic and lumbar spine MRI.     SALLIE on CKD2 (Resolved)  *Baseline creatinine 1, stable at admission.  --Cr up to 1.44 on 6/26; suspect due to initiation celebrex on admission in combination with diuretic and ACE.    COPD, not on chronic O2, not in exacerbation  History of PE  Adenocarcinoma of lung s/p resection in 2016  *No acute dyspneic symptoms.  --Duonebs available PRN.  --Resumed on PTA DOAC (though unclear why he's on 2.5 mg q12 instead of  5 mg q12).  --CPAP at night.    --Consider outpatient sleep study and PFTs.  --Patient should obtain trelegy inhaler from home and will be resumed.       Hypertension  Hyperlipidemia  --Continue PTA metoprolol, statin.  -- resume PTA lasix and lisinopril   --PRN hydralazine available.       Malignant metastatic carcinoid tumor  Rib Mass  *Status post exploratory laparotomy, small bowel resection and removal of mesenteric mass 2023  *Follows at [facility] w/ monthly lanreotide injections. Reports in 3/2024 suggest slow progressive w/ metastasis to the liver, bone, retroperitoneum and mesentery  *Patient complaining of R inferior R nodule that he hasn't noticed.    --Patient gets Lanreotide injections monthly in his clinic (last dose was May 29th). Per pt's S/O, [facility] MD said ok for pt to get next dose after being discharged from TCU.      Chronic diarrhea  *Chronic and stable.  --Continued on PTA cholestyramine.     Non-insulin dependent type 2 diabetes mellitus   *A1C 7%.  --ISS, medium intensity.    --Continued on PTA metformin.  --Mod CHO diet.     Recent Labs   Lab 07/01/24  1147 07/01/24  0749 07/01/24  0216 06/30/24  2200 06/30/24  1742 06/30/24  1144   * 141* 129* 125* 118* 145*       BPH  --Continue PTA finasteride.     Morbid Obesity  CIERA  --Outpatient diet and exercise as able.  --CPAP.        Chronic macrocytic anemia  *HGB stable at 11.8.  *Iron studies 6/25 consistent with combination iron deficiency and chronic inflammation.  --Resume iron supplement at discharge, follow up with PCP.         Diet: Combination Diet No Caffeine Diet, Low Saturated Fat Na <2400mg Diet    DVT Prophylaxis: DOAC  Gomez Catheter: Not present  Lines: None     Cardiac Monitoring: None  Code Status: No CPR- Do NOT Intubate      Clinically Significant Risk Factors                  # Hypertension: Noted on problem list               # DMII: A1C = 7.0 % (Ref range: <5.7 %) within past 6 months, PRESENT ON ADMISSION  #  "Obesity: Estimated body mass index is 31.95 kg/m  as calculated from the following:    Height as of 6/17/24: 1.702 m (5' 7\").    Weight as of 6/17/24: 92.5 kg (204 lb)., PRESENT ON ADMISSION            Disposition Plan     Medically Ready for Discharge: Anticipated Tomorrow         Frank Stafford MD  Hospitalist Service  [facility]  Securely message with [employer] (more info)  Text page via [employer] Paging/Directory   The above note was dictated using voice recognition software. Although reviewed after completion, some word and grammatical error may remain . Please contact the author for any clarifications.  ______________________________________________________________________    Interval History   Feeling better.  Ambulating with PT today  Denies any new tingling/numbness.      Physical Exam   BP (!) 176/71 (BP Location: Right arm, Patient Position: Semi-Gannon's, Cuff Size: Adult Regular)   Pulse 81   Temp 98.1  F (36.7  C) (Oral)   Resp 18   SpO2 95%   Gen- pleasant   Neck- supple  No respiratory distress      Medical Decision Making       40 MINUTES SPENT BY ME on the date of service doing chart review, history, exam, documentation & further activities per the note.      Data   ------------------------- PAST 24 HR DATA REVIEWED -----------------------------------------------        Imaging results reviewed over the past 24 hrs:   Recent Results (from the past 24 hour(s))   XR Abdomen 1 View    Narrative    ABDOMEN ONE VIEW  7/1/2024 10:20 AM     HISTORY: Follow-up on abdominal metal foreign body    COMPARISON: 6/30/2024.       Impression    IMPRESSION: Metallic hairpin projects in the right lower quadrant,  similar to prior. This may be within distal small bowel or colon. No  findings to suggest bowel obstruction. Moderate stool burden.    CARLO SORENSEN MD         SYSTEM ID:  [id number]   X-ray Myelogram thoracic    Narrative    XR MYELOGRAM THORACIC SPINE                 7/1/2024 " 11:07 AM       History:  Thoracic and Lumbar spine myelogram. Assess for spine  abnormality; new onset hip flexion weakness. Lower extremity weakness.    Procedure: The procedure and its risks were discussed with the patient  prior to the myelogram. The risks explained included but were not  limited to infection, bleeding, headaches, seizure, neural injury,  etc. The patient had no further questions and wished to proceed.     The lower back was prepped and draped in the usual sterile manner.  Lidocaine 1% was used for local anesthesia. A 22 gauge spinal needle  was used to enter the thecal sac at the L2-L3 level under fluoroscopic  guidance. 10 mL of myelographic contrast was infused. The needle was  removed. Estimated blood loss during the procedure was less than 5 mL.  No specimens collected. No complications were encountered.       Fluoroscopy time: 0.5 minutes  Images Obtained: 12  Medications used: 3 mL 1% lidocaine, 10 mL of Isovue-M 300    Findings:  Technically successful lumbar and thoracic myelogram  procedure without complications.  CT exam to be dictated separately.    LORIE SHEPHERD PA-C         SYSTEM ID:  [id number]   CT Lumbar spine w contrast    Narrative    CT THORACIC SPINE MYELOGRAM WITH CONTRAST   7/1/2024 11:47 AM   CT LUMBAR SPINE MYELOGRAM WITH CONTRAST  7/1/2024 11:47 AM     HISTORY: assess for spine abnormality; new onset hip flexion weakness     TECHNIQUE: Axial images of the thoracic and lumbar spine were obtained  following the administration of intrathecal contrast. Intrathecal  contrast dose and myelographic images are dictated on a separate  accession number. Multiplanar reformations were performed.     Radiation dose for this scan was reduced using automated exposure  control, adjustment of the mA and/or kV according to patient size, or  iterative reconstruction technique.    COMPARISON: 6/24/2024 CT.    FINDINGS:    There is good contrast opacification of the thecal sac.    THORACIC  SPINE:  Minimal thoracic dextrocurvature. Normal thoracic kyphosis. Anterior  posterior alignment of the thoracic spine within normal limits. No  loss of vertebral body height. No lucent fracture lines identified.  Mild multilevel disc space height loss. Diffuse idiopathic skeletal  hyperostosis extending from T6 into the lumbar spine. Disc osteophyte  complex at T9-T10 and T11-T12. Otherwise, no significant herniation.  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 Multilevel facet arthropathy. Moderate  right T3-T4 and severe right T4-T5 neural foraminal narrowing due to  facet arthropathy. Otherwise, no significant neural foraminal  narrowing or canal stenosis.    Visualized paraspinous soft tissues demonstrate scattered aortic  atherosclerotic calcifications..    LUMBAR SPINE:  There are 5 lumbar-type vertebral bodies assumed for the purposes of  this dictation.     Lumbar spine alignment is within normal limits other than trace grade  1 anterolisthesis of L4 on L5. No loss of vertebral body height. No  evidence of fracture. Diffuse idiopathic skeletal hyperostosis  extending from the thoracic spine to the level of L1.    Level by level as follows:    T12-L1:  Minimal loss of disc height. No significant herniation.  Minimal facet arthropathy. No significant neural foraminal narrowing  or canal stenosis.     L1-L2:  Minimal loss of disc height. No significant angulation.  Minimal facet arthropathy. No significant neural foraminal narrowing  or canal stenosis.     L2-L3:  No loss of disc height. Trace diffuse disc bulge. Mild facet  arthropathy. Moderate bilateral neural foraminal narrowing without  significant canal stenosis.    L3-L4:  Mild loss of disc height. Posterior disc osteophyte complex.  Mild facet arthropathy. Moderate right and mild left neural foraminal  narrowing in mild canal stenosis.    L4-L5:  Moderate loss of disc height. Posterior disc  osteophyte  complex. Moderate facet arthropathy. Moderate bilateral neural  foraminal narrowing and moderate canal stenosis.    L5-S1: No loss of disc height. Trace diffuse disc bulge. Mild facet  arthropathy. Mild right and moderate left foraminal narrowing without  significant canal stenosis.     Visualized paraspinous tissues: Scattered aortic atherosclerotic  calcifications.      Impression    IMPRESSION:  1. Multilevel thoracolumbar degenerative changes.  2. Moderate right T3-T4 and severe right T4-T5 neural foraminal  narrowing due to facet arthropathy.   3. Moderate lumbar neural foraminal narrowing at L2-L3 bilaterally,  L3-L4 on the right, L4-L5 bilaterally, and L5-S1 on the left.  4. Moderate L4-L5 and mild L3-L4 canal stenosis. No thoracic canal  stenosis.    RAMOS DALAL MD         SYSTEM ID:  [id number]   CT Thoracic spine w contrast    Narrative    CT THORACIC SPINE MYELOGRAM WITH CONTRAST   7/1/2024 11:47 AM   CT LUMBAR SPINE MYELOGRAM WITH CONTRAST  7/1/2024 11:47 AM     HISTORY: assess for spine abnormality; new onset hip flexion weakness     TECHNIQUE: Axial images of the thoracic and lumbar spine were obtained  following the administration of intrathecal contrast. Intrathecal  contrast dose and myelographic images are dictated on a separate  accession number. Multiplanar reformations were performed.     Radiation dose for this scan was reduced using automated exposure  control, adjustment of the mA and/or kV according to patient size, or  iterative reconstruction technique.    COMPARISON: 6/24/2024 CT.    FINDINGS:    There is good contrast opacification of the thecal sac.    THORACIC SPINE:  Minimal thoracic dextrocurvature. Normal thoracic kyphosis. Anterior  posterior alignment of the thoracic spine within normal limits. No  loss of vertebral body height. No lucent fracture lines identified.  Mild multilevel disc space height loss. Diffuse idiopathic skeletal  hyperostosis extending from T6  into the lumbar spine. Disc osteophyte  complex at T9-T10 and T11-T12. Otherwise, no significant herniation.  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 Multilevel facet arthropathy. Moderate  right T3-T4 and severe right T4-T5 neural foraminal narrowing due to  facet arthropathy. Otherwise, no significant neural foraminal  narrowing or canal stenosis.    Visualized paraspinous soft tissues demonstrate scattered aortic  atherosclerotic calcifications..    LUMBAR SPINE:  There are 5 lumbar-type vertebral bodies assumed for the purposes of  this dictation.     Lumbar spine alignment is within normal limits other than trace grade  1 anterolisthesis of L4 on L5. No loss of vertebral body height. No  evidence of fracture. Diffuse idiopathic skeletal hyperostosis  extending from the thoracic spine to the level of L1.    Level by level as follows:    T12-L1:  Minimal loss of disc height. No significant herniation.  Minimal facet arthropathy. No significant neural foraminal narrowing  or canal stenosis.     L1-L2:  Minimal loss of disc height. No significant angulation.  Minimal facet arthropathy. No significant neural foraminal narrowing  or canal stenosis.     L2-L3:  No loss of disc height. Trace diffuse disc bulge. Mild facet  arthropathy. Moderate bilateral neural foraminal narrowing without  significant canal stenosis.    L3-L4:  Mild loss of disc height. Posterior disc osteophyte complex.  Mild facet arthropathy. Moderate right and mild left neural foraminal  narrowing in mild canal stenosis.    L4-L5:  Moderate loss of disc height. Posterior disc osteophyte  complex. Moderate facet arthropathy. Moderate bilateral neural  foraminal narrowing and moderate canal stenosis.    L5-S1: No loss of disc height. Trace diffuse disc bulge. Mild facet  arthropathy. Mild right and moderate left foraminal narrowing without  significant canal stenosis.     Visualized paraspinous  tissues: Scattered aortic atherosclerotic  calcifications.      Impression    IMPRESSION:  1. Multilevel thoracolumbar degenerative changes.  2. Moderate right T3-T4 and severe right T4-T5 neural foraminal  narrowing due to facet arthropathy.   3. Moderate lumbar neural foraminal narrowing at L2-L3 bilaterally,  L3-L4 on the right, L4-L5 bilaterally, and L5-S1 on the left.  4. Moderate L4-L5 and mild L3-L4 canal stenosis. No thoracic canal  stenosis.    RAMOS DALAL MD         SYSTEM ID:  [id number]

## 2024-07-01 NOTE — PLAN OF CARE
Mental Status: A&O x4  Activity/dangle: Ast of 2 GB/W  Diet: LSF/ 2400 NA w/ no Caffeine  Pain: Managed with scheduled Tylenol and Robaxin  Gomez/Voiding: Voiding in the urinal  Tele/Restraints/Iso: N/A  02/LDA: Room air. IV saline locked  D/C Date: Pending placement  Other Info: Lidocaine patch on Left lower back.

## 2024-07-01 NOTE — PROGRESS NOTES
Neurosurgery progress note     Thoracic and lumbar myelogram performed today demonstrated     IMPRESSION:  1. Multilevel thoracolumbar degenerative changes.  2. Moderate right T3-T4 and severe right T4-T5 neural foraminal  narrowing due to facet arthropathy.   3. Moderate lumbar neural foraminal narrowing at L2-L3 bilaterally,  L3-L4 on the right, L4-L5 bilaterally, and L5-S1 on the left.  4. Moderate L4-L5 and mild L3-L4 canal stenosis. No thoracic canal  stenosis.    Patient states he occasionally gets pain in his left hip depending on how he moves.  He does feel its gotten little bit better.  He has been working with physical therapy.     Exam     Alert and oriented no acute distress  5-5 strength in the right lower extremity with hip flexion, knee extension, ankle dorsiflexion plantarflexion    4/5 strength in the left lower extremity with hip flexion, 5-5 strength with knee extension, ankle dorsiflexion plantarflexion     Assessment     Back pain and lower extremity weakness  Metallic foreign body in the abdomen, Precluding patient from a MRI     Myelogram today demonstrates moderate L4-5 and mild L3-4 canal stenosis, no thoracic stenosis, moderate lumbar neuroforaminal stenosis on the left at, L2-3, L4-5 and L5-S1.  Foraminal stenosis at L2-3, could account for some of the symptoms in his left hip.    Plan     Recommend continued physical therapy and conservative therapies at this point.  If patient's symptoms are not improving, an injection could be considered perhaps on the left at L4-5 or L5-S1 depending on how his symptoms are presenting at that time.  He can follow-up with neurosurgery clinic in 6 weeks for reevaluation, if his hairpin eventually passes, he could have an MRI for further evaluation at that time if needed.

## 2024-07-01 NOTE — PROGRESS NOTES
RADIOLOGY PROCEDURE NOTE  Patient name: Nahun Villalobos  MRN: [id number]  : 1945    Pre-procedure diagnosis: Back pain after a fall  Post-procedure diagnosis: Same    Procedure Date/Time: 2024  10:55 AM  Procedure: Thoracic and lumbar myelogram  Estimated blood loss: None  Specimen(s) collected with description: none  The patient tolerated the procedure well with no immediate complications.  Significant findings:none    See imaging dictation for procedural details.    Provider name: Gaurang Valentin PA-C  Assistant(s):None

## 2024-07-01 NOTE — PLAN OF CARE
Diagnosis: Low back pain.  POD#: None.  Mental Status: A&OX4.  Activity/dangle Up with Assist of 2 and Deb steady.   Diet:  Low saturated fat, no caffeine, 2400 Na.  Pain: Managed with schedule Tylenol, PRN Dilaudid, Robaxin.  Gomez/Voiding: bathroom and Urinal.  Tele/Restraints/Iso: None.  02/LDA: CARSON BLAKE.  D/C Date: Pending.  Other Info: Pt VSS on room air, BG check CMS intact, Lidocaine patch removed on lower back.

## 2024-07-01 NOTE — PLAN OF CARE
Goal Outcome Evaluation:                      Diagnosis: Low back pain.  POD#: None.  Mental Status: A&OX4.  Activity/dangle Up with Assist of 2 and Deb steady.   Diet:  Low saturated fat, no caffeine, 2400 Na.  Pain: NA  Gomez/Voiding: bathroom and Urinal.  Tele/Restraints/Iso: None.  02/LDA: CARSON MOSELEY  D/C Date: Pending.

## 2024-07-02 ENCOUNTER — PATIENT OUTREACH (OUTPATIENT)
Dept: CARE COORDINATION | Facility: CLINIC | Age: 79
End: 2024-07-02
Payer: COMMERCIAL

## 2024-07-02 LAB
GLUCOSE BLDC GLUCOMTR-MCNC: 120 MG/DL (ref 70–99)
GLUCOSE BLDC GLUCOMTR-MCNC: 135 MG/DL (ref 70–99)
GLUCOSE BLDC GLUCOMTR-MCNC: 140 MG/DL (ref 70–99)
GLUCOSE BLDC GLUCOMTR-MCNC: 90 MG/DL (ref 70–99)
GLUCOSE SERPL-MCNC: 151 MG/DL (ref 70–99)

## 2024-07-02 PROCEDURE — 36415 COLL VENOUS BLD VENIPUNCTURE: CPT | Performed by: STUDENT IN AN ORGANIZED HEALTH CARE EDUCATION/TRAINING PROGRAM

## 2024-07-02 PROCEDURE — 250N000013 HC RX MED GY IP 250 OP 250 PS 637: Performed by: INTERNAL MEDICINE

## 2024-07-02 PROCEDURE — 250N000013 HC RX MED GY IP 250 OP 250 PS 637: Performed by: STUDENT IN AN ORGANIZED HEALTH CARE EDUCATION/TRAINING PROGRAM

## 2024-07-02 PROCEDURE — 82947 ASSAY GLUCOSE BLOOD QUANT: CPT | Performed by: STUDENT IN AN ORGANIZED HEALTH CARE EDUCATION/TRAINING PROGRAM

## 2024-07-02 PROCEDURE — 250N000013 HC RX MED GY IP 250 OP 250 PS 637: Performed by: PHYSICIAN ASSISTANT

## 2024-07-02 PROCEDURE — 99221 1ST HOSP IP/OBS SF/LOW 40: CPT | Performed by: SURGERY

## 2024-07-02 PROCEDURE — 250N000013 HC RX MED GY IP 250 OP 250 PS 637: Performed by: HOSPITALIST

## 2024-07-02 PROCEDURE — 120N000001 HC R&B MED SURG/OB

## 2024-07-02 PROCEDURE — 99232 SBSQ HOSP IP/OBS MODERATE 35: CPT | Performed by: INTERNAL MEDICINE

## 2024-07-02 RX ADMIN — METHOCARBAMOL 500 MG: 500 TABLET ORAL at 12:55

## 2024-07-02 RX ADMIN — METHOCARBAMOL 500 MG: 500 TABLET ORAL at 21:54

## 2024-07-02 RX ADMIN — CHOLESTYRAMINE 4 G: 4 POWDER, FOR SUSPENSION ORAL at 21:53

## 2024-07-02 RX ADMIN — INSULIN ASPART 6 UNITS: 100 INJECTION, SOLUTION INTRAVENOUS; SUBCUTANEOUS at 08:35

## 2024-07-02 RX ADMIN — METFORMIN HYDROCHLORIDE 1000 MG: 500 TABLET ORAL at 08:32

## 2024-07-02 RX ADMIN — INSULIN ASPART 3 UNITS: 100 INJECTION, SOLUTION INTRAVENOUS; SUBCUTANEOUS at 18:38

## 2024-07-02 RX ADMIN — FINASTERIDE 5 MG: 5 TABLET, FILM COATED ORAL at 08:32

## 2024-07-02 RX ADMIN — ATORVASTATIN CALCIUM 20 MG: 20 TABLET, FILM COATED ORAL at 21:54

## 2024-07-02 RX ADMIN — METHOCARBAMOL 500 MG: 500 TABLET ORAL at 08:32

## 2024-07-02 RX ADMIN — METHOCARBAMOL 500 MG: 500 TABLET ORAL at 18:37

## 2024-07-02 RX ADMIN — LIDOCAINE 1 PATCH: 4 PATCH TOPICAL at 08:36

## 2024-07-02 RX ADMIN — INSULIN ASPART 7 UNITS: 100 INJECTION, SOLUTION INTRAVENOUS; SUBCUTANEOUS at 13:02

## 2024-07-02 RX ADMIN — METFORMIN HYDROCHLORIDE 1000 MG: 500 TABLET ORAL at 18:37

## 2024-07-02 RX ADMIN — METOPROLOL SUCCINATE 25 MG: 25 TABLET, EXTENDED RELEASE ORAL at 08:32

## 2024-07-02 RX ADMIN — ACETAMINOPHEN 975 MG: 325 TABLET, FILM COATED ORAL at 16:02

## 2024-07-02 RX ADMIN — FUROSEMIDE 40 MG: 40 TABLET ORAL at 08:32

## 2024-07-02 RX ADMIN — GABAPENTIN 300 MG: 300 CAPSULE ORAL at 08:32

## 2024-07-02 RX ADMIN — ACETAMINOPHEN 975 MG: 325 TABLET, FILM COATED ORAL at 21:54

## 2024-07-02 RX ADMIN — LISINOPRIL 40 MG: 40 TABLET ORAL at 08:32

## 2024-07-02 RX ADMIN — CHOLESTYRAMINE 4 G: 4 POWDER, FOR SUSPENSION ORAL at 10:28

## 2024-07-02 RX ADMIN — GABAPENTIN 600 MG: 300 CAPSULE ORAL at 21:54

## 2024-07-02 RX ADMIN — ACETAMINOPHEN 975 MG: 325 TABLET, FILM COATED ORAL at 08:32

## 2024-07-02 ASSESSMENT — ACTIVITIES OF DAILY LIVING (ADL)
ADLS_ACUITY_SCORE: 40
ADLS_ACUITY_SCORE: 41
ADLS_ACUITY_SCORE: 40
ADLS_ACUITY_SCORE: 41
ADLS_ACUITY_SCORE: 40
ADLS_ACUITY_SCORE: 41
ADLS_ACUITY_SCORE: 41
ADLS_ACUITY_SCORE: 40
ADLS_ACUITY_SCORE: 40
ADLS_ACUITY_SCORE: 41
ADLS_ACUITY_SCORE: 40
ADLS_ACUITY_SCORE: 41

## 2024-07-02 NOTE — PLAN OF CARE
Diagnosis: Low back pain.  POD#: None surgical.  Mental Status: A&OX4.  Activity/dangle Up with Assist of 2 GD/W and Deb steady.  Diet: Low saturated fat, 2400 NA, no caffeine.  Pain: Managed with schedule Tylenol and Robaxin.  Gomez/Voiding: Urinal.  Tele/Restraints/Iso: None.  02/LDA: PIV SL, VSS in room air.  D/C Date: Pending.  Other Info: Lidocaine patch removed on left lower back, Puncture side CDI.

## 2024-07-02 NOTE — PROGRESS NOTES
Clinic Care Coordination Contact  Care Coordination Clinician Chart Review    Situation: Patient chart reviewed by Care Coordinator.       Background: Care Coordination Program started: 9/24/2020. Initial assessment completed and patient-centered care plan(s) were developed with participation from patient. Lead CC handed patient off to CHW for continued outreaches.       Assessment: Per chart review, patient outreach completed by CC CHW on 6/24/2024.  Patient is actively working to accomplish goal(s). Patient's goal(s) appropriate and relevant at this time. Patient is due for updated Plan of Care.  Assessments will be completed annually or as needed/with change of patient status.      Care Plan: 1. Improve chronic symptoms       Problem: Lifestyle choices       Goal: I want to improve management of my leg, knee, and hip pain and swelling.       Start Date: 6/3/2021 Expected End Date: 7/5/2024    This Visit's Progress: 80% Recent Progress: 80%    Note:     Barriers: Lymphedema  Strengths: Motivated to improve ability to walk  Patient expressed understanding of goal: Yes  Action steps to achieve this goal:  1. I will apply Jacinto hose to wear throughout the day and remove at night  2. I will walk my dog daily to help with strengthening and endurance  3. I will elevate my legs while sitting and laying down by propping them up with a pillow  4. I will discuss, review, schedule and complete recommended overdue health maintenance with my primary care provider  5. I will I will take my medications as prescribed  6. I will contact my care team with questions, concerns, support needs. I will use the clinic as a resource   7. I will contact my clinic with 24/7 after hours services available                                 Plan/Recommendations: The patient will continue working with Care Coordination to achieve goal(s) as above. CHW will continue outreaches at minimum every 30 days and will involve Lead CC as needed or if patient  is ready to move to Maintenance. Lead CC will continue to monitor CHW outreaches and patient's progress to goal(s) every 6 weeks.     Plan of Care updated and sent to patient: Yes, via Alenahart    Kelli Davidson, RN Clinic Care Coordinator  [facility] Clinics: [place], [place] (on-site Wednesdays), [facility] (on-site Thursdays) & [facility].  [email or web address]  Phone: [phone]

## 2024-07-02 NOTE — PROGRESS NOTES
Care Management Follow Up    Length of Stay (days): 4    Expected Discharge Date: 07/03/2024     Concerns to be Addressed: discharge planning     Patient plan of care discussed at interdisciplinary rounds: Yes    Anticipated Discharge Disposition: Transitional Care     Anticipated Discharge Services:    Anticipated Discharge DME:      Patient/family educated on Medicare website which has current facility and service quality ratings:    Education Provided on the Discharge Plan:    Patient/Family in Agreement with the Plan: yes    Referrals Placed by CM/SW:    Private pay costs discussed: Not applicable    Additional Information:  Writer sent message to mary to see if any ability to accommodate pt. Writer is awaiting pt's response.     SERGEI Kerns  Social Work  [facility]

## 2024-07-02 NOTE — PLAN OF CARE
Mental Status: A&O x4  Activity/dangle: Ast of 2 GB/W  Diet: LSF/ 2400 NA w/ no Caffeine  Pain: Managed with scheduled Tylenol and Robaxin  Gomez/Voiding: Voiding in the urinal  Tele/Restraints/Iso: N/A  02/LDA: Room air. IV saline locked  D/C Date: Pending TCU placement  Other Info: Lidocaine patch on Left lower back.

## 2024-07-02 NOTE — PLAN OF CARE
Goal Outcome Evaluation:        Time: 2300-76:30    Medical    Diagnosis: Intractable back pain post mechanical fall  Multilevel thoracic and lumbar spondylosis w/ Thoracic foraminal stenosis    POD#: None mauro  Mental Status: A&OX4.  Activity/dangle Assist of 2 with GB and Deb rivera.  Diet: Combination Diet No Caffeine Diet, Low Saturated Fat Na <2400mg Diet   Pain: Schedule Tylenol   Gomez/Voiding: Urinal.  Tele/Restraints/Iso: NA  02/LDA: CARSON SL: MOSES  D/C Date: TBD    Other Info: CARSON-LOU, observation continue

## 2024-07-02 NOTE — PROGRESS NOTES
"[facility]    Medicine Progress Note - Hospitalist Service    Date of Admission:  6/24/2024    Assessment & Plan   Nahun Villalobos is a 78 year old male who was registered to short-stay/observation on 6/24/2024 due to intractable back pain and left hip pain following a mechanical fall.      Past medical history significant for hypertension, hyperlipidemia, DM II, obesity, CIERA , COPD, history of PE, malignant carcinoid tumor, lung carcinoma, peripheral neuropathy, cervical myelopathy      Presented w/ lower back pain following a fall.     Intractable back pain post mechanical fall  Multilevel thoracic and lumbar spondylosis w/ Thoracic foraminal stenosis  History of arthritis  Peripheral neuropathy  Cervical myelopathy  Senile frailty and chronic debilitation   *Hx: Patient 2d prior to presentation fell from his elevated bed and hit his L lower back and head, without LOC. Since the fall, has lower back pain/L buttock that is severe when flat, and aggravated with movement. No radiculopathy symptoms, no urinary/rectal symptoms that are new. Has decreased ambulation since then. Tylenol failed to alleviate. No other falls since then.  *Imaging:  Multilevel thoracic spondylosis with multilevel bridging osteophytes raising the possibility of DISH. Neural foraminal stenosis greatest on the right at T3-T4 and T4-T5. Multilevel lumbar spondylosis greatest at L3-4 and L4-5 as above.  *Neurosurgery recommended MRI but unable to be completed due to metal foreign body in intestine.  *CT abd/pelvis negative for any bony pathology.  --Neurosurgery consult appreciated:             --Unclear etiology of left lower extremity weakness (hip flexor) and feel patient should remain in hospital to obtain MRI of lumbar and thoracic spine.  Will need to wait for metal foreign body to be passed. D/W NSG: Had CT myelogram.  \"Recommend continued physical therapy and conservative therapies at this point.  If patient's " "symptoms are not improving, an injection could be considered perhaps on the left at L4-5 or L5-S1 depending on how his symptoms are presenting at that time.  He can follow-up with neurosurgery clinic in 6 weeks for reevaluation, if his hairpin eventually passes, he could have an MRI for further evaluation at that time if needed.   \"   Ct holistic pain management (scheduled Tylenol, Robaxin, gabapentin, Lidoderm patch )and continue PT with a goal for TCU  --SW/CM consult appreciated:               --D/W SW 7/2: A/W call back from [place]  --Fall precautions.    Foreign body ingestion  *noted during scan prior to initiation of MRI, though was present on admission rib XR  *CT abd/pelvis showing a presumably metallic structure measuring 4.5cm in mid small bowel without evidence of perforation  *He denied any intentional ingestion, has no recollection of unintentionally swallowing any object  *Abd X-ray 6/27: Hairpin foreign body has progressed (now projecting over the right hemiabdomen wither within distal small bowel (favored) or proximal colon).    Abd Xray 6/28:  FB remains in the right abdomen in similar position compared to yesterdays exam.    --Monitor abd exam.  --Abd Xray reviewed AM of 6/30 . Appreciate general Sx review. Can follow as outpatient. Will request X-ray in 1 week. (Explained to him to monitor for any abdominal signs or symptoms)     Left hip flexor weakness  Left upper thigh and buttock pain  *Left hip Xray negative for acute fracture or dislocation.    *Thoracic spine and lumbar spine CT revealed multilevel spondylosis, no fracture noted.    *Cervical spine MRI from [facility] on 2/16/2024 reports multilevel spinal canal stenosis at C3-4, C4-5, and C5-6.  --Neurosurgery consult appreciated.  --General Neurology consult appreciated.   --Obtain MRI of lumbar spine once able.    --Consider epidural injection; though will need to hold DOAC.  --PT/OT.    --Gabapentin for pain.    --Follow up with Neurology " or Ortho to have an outpatient EMG.      SALLIE on CKD2 (Resolved)  *Baseline creatinine 1, stable at admission.  --Cr up to 1.44 on 6/26; suspect due to initiation celebrex on admission in combination with diuretic and ACE.  Creatinine   Date Value Ref Range Status   06/28/2024 1.00 0.67 - 1.17 mg/dL Final   03/17/2021 1.11 0.66 - 1.25 mg/dL Final     COPD, not on chronic O2, not in exacerbation  History of PE  Adenocarcinoma of lung s/p resection in 2016  *No acute dyspneic symptoms.  --Duonebs available PRN.  --Resumed on PTA DOAC (though unclear why he's on 2.5 mg q12 instead of 5 mg q12).  --CPAP at night.    --Consider outpatient sleep study and PFTs.  --Patient should obtain trelegy inhaler from home and will be resumed.       Hypertension  Hyperlipidemia  --Continue PTA metoprolol, statin.  -- resume PTA lasix and lisinopril   --PRN hydralazine available.       Malignant metastatic carcinoid tumor  Rib Mass  *Status post exploratory laparotomy, small bowel resection and removal of mesenteric mass 2023  *Follows at [facility] w/ monthly lanreotide injections. Reports in 3/2024 suggest slow progressive w/ metastasis to the liver, bone, retroperitoneum and mesentery  *Patient complaining of R inferior R nodule that he hasn't noticed.    --Patient gets Lanreotide injections monthly in his clinic (last dose was May 29th). Per pt's S/O, Onco MD said ok for pt to get next dose after being discharged from TCU.      Chronic diarrhea  *Chronic and stable.  --Continued on PTA cholestyramine.     Non-insulin dependent type 2 diabetes mellitus   *A1C 7%.  --ISS, medium intensity.    --Continued on PTA metformin.  --Mod CHO diet.     Recent Labs   Lab 07/02/24  1213 07/02/24  0759 07/02/24  0737 07/01/24  2212 07/01/24  1658 07/01/24  1147   * 151* 135* 137* 99 156*       BPH  --Continue PTA finasteride.     Morbid Obesity  CIERA  --Outpatient diet and exercise as able.  --CPAP.        Chronic macrocytic anemia  *HGB  "stable at 11.8.  *Iron studies 6/25 consistent with combination iron deficiency and chronic inflammation.  --Resume iron supplement at discharge, follow up with PCP.         Diet: Combination Diet No Caffeine Diet, Low Saturated Fat Na <2400mg Diet    DVT Prophylaxis: DOAC  Gomez Catheter: Not present  Lines: None     Cardiac Monitoring: None  Code Status: No CPR- Do NOT Intubate      Clinically Significant Risk Factors                  # Hypertension: Noted on problem list               # DMII: A1C = 7.0 % (Ref range: <5.7 %) within past 6 months, PRESENT ON ADMISSION  # Obesity: Estimated body mass index is 31.95 kg/m  as calculated from the following:    Height as of 6/17/24: 1.702 m (5' 7\").    Weight as of 6/17/24: 92.5 kg (204 lb)., PRESENT ON ADMISSION            Disposition Plan     Medically Ready for Discharge: Anticipated Tomorrow         Frank Stafford MD  Hospitalist Service  [facility]  Securely message with Vocera (more info)  Text page via [employer] Paging/Directory   The above note was dictated using voice recognition software. Although reviewed after completion, some word and grammatical error may remain . Please contact the author for any clarifications.  ______________________________________________________________________    Interval History   Feeling better. No new concerns  Denies any new tingling/numbness.      Physical Exam   BP (!) 157/78   Pulse 78   Temp 97.6  F (36.4  C) (Oral)   Resp 16   SpO2 95%   Gen- pleasant   Neck- supple  No respiratory distress      Medical Decision Making       40 MINUTES SPENT BY ME on the date of service doing chart review, history, exam, documentation & further activities per the note.      Data   ------------------------- PAST 24 HR DATA REVIEWED -----------------------------------------------        Imaging results reviewed over the past 24 hrs:   No results found for this or any previous visit (from the past 24 hour(s)).      "

## 2024-07-02 NOTE — LETTER
[facility] CARE COORDINATION  [address] [facility] [address]    July 2, 2024        Nahun Villalobos  [address] [facility] [address]          Dear Nahun,     Attached is an updated Patient Centered Plan of Care for your continued enrollment in Care Coordination. Please let us know if you have additional questions, concerns, or goals that we can assist with.    Sincerely,    Kelli Davidson, RN Clinic Care Coordinator  [facility] Clinics: [place], [place] (on-site Wednesdays), [facility] & [facility].  [email or web address]  Phone: [phone]           [facility]  Patient Centered Plan of Care  About Me:        Patient Name:  Nahun Villalobos    YOB: 1945  Age:         78 year old   [place] MRN:    [id number] Telephone Information:  Home Phone [phone]   Mobile [phone]       Address:  [address] [address] Email address:  [email or web address]      Emergency Contact(s)    Name Relationship Lgl Grd Work Phone Home Phone Mobile Phone   1. SINDY CHRISTIE Daughter No   [phone]   2. SUMAN MOORE Significant ot*    [phone]           Primary language:  English     needed? No   [employer]:  [phone] op. 1  Other communication barriers:None    Preferred Method of Communication:  Alenahart  Current living arrangement: I live in a private home with spouse (Suman, s.o.)    Mobility Status/ Medical Equipment: Independent w/Device        Health Maintenance  Health Maintenance Reviewed: Due/Overdue   Health Maintenance Due   Topic Date Due    HF ACTION PLAN  Never done    IPV IMMUNIZATION (2 of 3 - Adult catch-up series) 01/03/2011    DIABETIC FOOT EXAM  11/01/2019    MICROALBUMIN  05/20/2023    DTAP/TDAP/TD IMMUNIZATION (2 - Td or Tdap) 08/09/2023    COVID-19 Vaccine (7 - 2023-24 season) 12/01/2023           My Access Plan  Medical Emergency 911   Primary Clinic Line  [facility]* - [phone]   24 Hour Appointment Line [phone] or  [phone]) (toll-free)   24 Hour Nurse Line [phone] (toll-free)   Preferred Urgent Care [facility], [phone]     Preferred Hospital [facility]  [phone] ([facility]; was at [facility] 3/2023)     Preferred Pharmacy [id number] - [place], [place] - [facility] AT [address] [place] [address] & [facility]     Behavioral Health Crisis Line The National Suicide Prevention Lifeline at 1-840.989.1182 or Text/Call 988           My Care Team Members  Patient Care Team         Relationship Specialty Notifications Start End    Olegario Castillo MD PCP - General Internal Medicine  4/6/22     Phone: [phone] Fax: [phone]         [address] [place] [address]    Olegario Castillo MD Assigned PCP   10/4/12     Phone: [phone] Fax: [phone]         [address] [place] [address]    Praveena Burris MD MD Urology  3/1/17      **INACTIVE IN [place] AS OF 4/30/2021**    Areli Cordero RN Registered Nurse   3/1/17     Phone: [phone]         Kelley Slaughter CHW Community Health Worker Primary Care - CC Admissions 9/24/20     Phone: [phone]         Jaswinder Tinajero MD MD Hematology & Oncology  12/7/20     Phone: [phone] Fax: [phone]         [facility] [address] [facility] [address] [facility] [address]    Bernardo Haynes MD MD Pulmonary Disease  12/7/20     Phone: [phone] Fax: [phone]         [place] [facility] [address] [facility] [address] [facility] [address]    Antonia Madnel, PT Physical Therapist Physical Therapist  8/2/21     Phone: [phone] Fax: [phone]         [address] [place] [address]    Jamil Aparicio MD MD Neurology  2/2/22     Phone: [phone] Fax: [phone] [address] [place] [address]     Jemal Vickers MD Assigned Musculoskeletal Provider   1/14/23     Phone: [phone] Fax: [phone]         [address] [facility] [address]    Kaleigh Onofre MD MD Urology  2/1/23     Phone: [phone] Fax: [phone]         [address] [facility] [address]    Kaleigh Onofre MD Assigned Surgical Provider   3/11/23     Phone: [phone] Fax: [phone]         [address] [facility] [address] [facility] [address]    Kelli Davidson, RN Lead Care Coordinator  Admissions 6/17/24                 My Care Plans  Self Management and Treatment Plan    Care Plan  Care Plan: 1. Improve chronic symptoms       Problem: Lifestyle choices       Goal: I want to improve management of my leg, knee, and hip pain and swelling.       Start Date: 6/3/2021 Expected End Date: 7/5/2024    This Visit's Progress: 80% Recent Progress: 80%    Note:     Barriers: Lymphedema  Strengths: Motivated to improve ability to walk  Patient expressed understanding of goal: Yes  Action steps to achieve this goal:  1. I will apply Jacinto hose to wear throughout the day and remove at night  2. I will walk my dog daily to help with strengthening and endurance  3. I will elevate my legs while sitting and laying down by propping them up with a pillow  4. I will discuss, review, schedule and complete recommended overdue health maintenance with my primary care provider  5. I will I will take my medications as prescribed  6. I will contact my care team with questions, concerns, support needs. I will use the clinic as a resource   7. I will contact my clinic with 24/7 after hours services available                              Action Plans on File:         COPD  Depression          Advance Care Plans/Directives:   Advanced Care Plan/Directives on file:   No    Discussed with patient/caregiver(s): No data recorded           My Medical and Care Information  Problem List   Patient Active Problem List   Diagnosis    Essential hypertension,  benign    Tobacco use disorder    Lumbago    Impotence of organic origin    Advance care planning    Polyp of colon    Obstructive sleep apnea syndrome    Hyperlipidemia LDL goal <100    Morbid obesity due to excess calories (H)    BPPV (benign paroxysmal positional vertigo), unspecified laterality    Chronic obstructive pulmonary disease, unspecified COPD type (H)    Type 2 diabetes mellitus without complication, without long-term current use of insulin (H)    S/P transurethral resection of prostate    Hematuria, gross    Cervical segment dysfunction    Cervicalgia    Thoracic segment dysfunction    Erectile dysfunction    Acute diverticulitis    Other pulmonary embolism without acute cor pulmonale, unspecified chronicity (H)    Mesenteric mass    History of adenocarcinoma of lung    Benign neoplasm of ascending colon    Benign neoplasm of rectum    Diverticular disease of colon    History of colonic polyps    Non-small cell lung cancer (H)    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    Care Coordination Start Date: 9/24/2020   Frequency of Care Coordination: monthly, more frequently as needed     Form Last Updated: 07/02/2024

## 2024-07-02 NOTE — CONSULTS
General Surgery [facility] consultation/H&P    Nahun Villalobos MRN#: [id number]   Age: 78 year old YOB: 1945     Date of Admission:          6/24/2024  Reason for consult/H&P: Small intestine foreign body   Surgeon:      Gilles Crockett MD                  Chief Complaint:   Back pain         History of Present Illness:   This patient is a 78 year old  male who presented to the [facility] ER with concerns of back pain.  Subsequent studies have shown us small intestine foreign body that looks like a Jovanni pin.  He has had no abdominal pain.  He has had no nausea.  He has seen an x-ray of this and understands that it is in his small bowel.  He has a history of carcinoid small bowel tumor.  This is followed at the [facility].  He has had a mesenteric mass removed and gets monthly injections.. History is obtained from the patient and chart.         Past Medical History:    has a past medical history of Antiplatelet or antithrombotic long-term use, Arthritis, CHF (congestive heart failure) (H) (09/16/2020), COPD (chronic obstructive pulmonary disease) (H), DM (diabetes mellitus) (H), Dyspnea on exertion, Essential hypertension, benign, Hemorrhage of rectum and anus, Non-small cell lung cancer (H), Obesity, Personal history of colonic polyps, Sleep apnea, Thrombosis, Tobacco use disorder, Urinary retention, and Walking troubles.          Past Surgical History:     Past Surgical History:   Procedure Laterality Date    ABDOMEN SURGERY  1995    APPENDECTOMY      BONE EXOSTOSIS EXCISION Bilateral 9/20/2022    Procedure: EXOSTECTOMIES BOTH 5TH DIGITS WITH SOFT TISSUE RELEASE;  Surgeon: Tong Gary DPM;  Location: [facility]    CHOLECYSTECTOMY      COLONOSCOPY  2014    CYSTOSCOPY, TRANSURETHRAL RESECTION (TUR) PROSTATE, COMBINED N/A 4/30/2018    Procedure: COMBINED CYSTOSCOPY, TRANSURETHRAL RESECTION (TUR) PROSTATE;  Cystoscopy, Transurethral Resection Of The Prostate;  Surgeon: Fatuma  Praveena Oliver MD;  Location: [facility] OR    IR LUNG BIOPSY MEDIASTINUM RIGHT  4/6/2016    WEDGE RESECTION      lung    ZZC NONSPECIFIC PROCEDURE      cholecystectomy            Medications:     Prior to Admission medications    Medication Sig Start Date End Date Taking? Authorizing Provider   acetaminophen (TYLENOL) 500 MG tablet Take 1,000 mg by mouth 2 times daily   Yes Unknown, Entered By History   albuterol (VENTOLIN HFA) 108 (90 Base) MCG/ACT inhaler INHALE 2 PUFFS INTO THE LUNGS EVERY 6 HOURS AS NEEDED FOR SHORTNESS OF BREATH / DYSPNEA OR WHEEZING 11/6/23  Yes Olegario Castillo MD   apixaban ANTICOAGULANT (ELIQUIS) 2.5 MG tablet Take 1 tablet (2.5 mg) by mouth 2 times daily 3/6/24  Yes Rhea Remy PA-C   Ascorbic Acid (VITAMIN C PO) Take 500 mg by mouth At Bedtime    Yes Reported, Patient   atorvastatin (LIPITOR) 20 MG tablet TAKE 1 TABLET DAILY 6/3/24  Yes Olegario Castillo MD   Calcium Carbonate Antacid (TUMS PO) Take 500 mg by mouth as needed   Yes Reported, Patient   cholestyramine light (QUESTRAN) 4 GM packet Take 1 packet (4 g) by mouth 2 times daily 2/28/24  Yes Olegario Castillo MD   Coenzyme Q10 400 MG CAPS Take 400 mg by mouth daily   Yes Unknown, Entered By History   cyanocobalamin (VITAMIN B-12) 1000 MCG tablet Take 1,000 mcg by mouth daily   Yes Reported, Patient   docusate sodium (DSS) 100 MG capsule Take 100 mg by mouth as needed for constipation   Yes Reported, Patient   ferrous sulfate (FE TABS) 325 (65 Fe) MG EC tablet Take 325 mg by mouth every evening   Yes Unknown, Entered By History   finasteride (PROSCAR) 5 MG tablet Take 1 tablet (5 mg) by mouth daily 2/28/24  Yes Olegario Castillo MD   Fluticasone-Umeclidin-Vilant (TRELEGY ELLIPTA) 100-62.5-25 MCG/ACT oral inhaler Inhale 1 puff into the lungs daily 2/19/24  Yes Olegario Castillo MD   furosemide (LASIX) 20 MG tablet Take 2 tablets (40 mg) by mouth 2 times daily  Patient taking differently: Take 40 mg by mouth daily 6/10/24  Yes  Olegario Castillo MD   gabapentin (NEURONTIN) 300 MG capsule Take 1 capsule (300 mg) by mouth 3 times daily  Patient taking differently: Take 300-600 mg by mouth 2 times daily 300 mh qAM  600 mg qHS 5/8/24  Yes Olegario Castillo MD   lisinopril (ZESTRIL) 40 MG tablet TAKE 1 TABLET DAILY 2/28/24  Yes Olegario Castillo MD   metFORMIN (GLUCOPHAGE) 1000 MG tablet Take 1 tablet (1,000 mg) by mouth 2 times daily (with meals) 5/8/24  Yes Olegario Castillo MD   metoprolol succinate ER (TOPROL XL) 25 MG 24 hr tablet Take 1 tablet (25 mg) by mouth daily 2/28/24  Yes Olegario Castillo MD   miconazole (MICATIN) 2 % external cream Apply topically 2 times daily  Patient taking differently: Apply topically 2 times daily as needed for other (Rash/ skin irritation) 4/1/24  Yes Olegario Castillo MD   vitamin B-Complex Take 1 tablet by mouth daily   Yes Unknown, Entered By History   blood glucose (ACCU-CHEK CHACHA) test strip Use to test blood sugar 2 times daily or as directed. 6/29/15   Olegario Castillo MD   blood glucose (NO BRAND SPECIFIED) lancets standard Use to test blood sugar 1 time daily, first thing in the morning  Patient taking differently: as needed Use to test blood sugar 1 time daily, first thing in the morning 7/6/21   Olegario Castillo MD   blood glucose (NO BRAND SPECIFIED) test strip Use to test blood sugar 1 time daily, first thing in the morning. 7/6/21   Olegario Castillo MD   blood glucose monitoring (NO BRAND SPECIFIED) meter device kit Use to test blood sugar 1 time daily, first thing in the morning 7/6/21   Olegario Castillo MD   order for DME Equipment being ordered: diabetic shoes, 1 pair 12/13/16   Olegario Castillo MD   order for DME Oxygen 2 Li/min  at night via nasal cannula 4/27/16   Goltz, Bennett Ezra, PA-C            Allergies:     Allergies   Allergen Reactions    No Known Drug Allergy             Social History:     Social History     Tobacco Use    Smoking status: Former     Current packs/day: 0.00      Average packs/day: 2.0 packs/day for 53.0 years (106.0 ttl pk-yrs)     Types: Cigarettes, Cigars     Start date: 1960     Quit date: 2013     Years since quittin.0    Smokeless tobacco: Never    Tobacco comments:     Quit, will never start again.   Substance Use Topics    Alcohol use: No             Family History:    This patient has no significant relevant family history.  Family history is reviewed in detail.          Review of Systems:   Complete ROS is negative other than noted in the HPI.  C: NEGATIVE for fever, chills, change in weight  R: NEGATIVE for significant cough or SOB  CV: NEGATIVE for chest pain, palpitations or peripheral edema  GI:  NEGATIVE for dysuria, heartburn, or change in bowel habits  H: NEGATIVE for bleeding problems         Physical Exam:   Blood pressure (!) 157/78, pulse 78, temperature 97.6  F (36.4  C), temperature source Oral, resp. rate 16, SpO2 95%.  I/O last 3 completed shifts:  In: -   Out: 500 [Urine:500]    General - This is a well developed, well nourished male .  HEENT - Normocephalic. Atraumatic. Moist mucous membranes. Pupils equal.  No scleral icterus. Nose normal.  Neck - Supple without masses. No cervical adenopathy or thyromegaly  Lungs - Breathing not labored  Chest - Not tender. CVA's nontender  Heart - Regular rate & rhythm   Abdomen - Soft, nontender, nondistended no organomegaly.  Extremities - Moves all extremities. Warm without edema. Pulses noted  Neurologic - Nonfocal. Alert and oriented          Data:   Labs:  Recent Labs   Lab Test 24  0833 24  0737 24  0101   WBC 6.9 10.6 10.4   HGB 12.2* 11.6* 11.8*   HCT 37.2* 35.4* 35.4*    267 283     Recent Labs   Lab Test 24  0833 24  0907 24  0755   POTASSIUM 4.5 4.8 4.3   CHLORIDE 104 103 103   CO2    BUN 12.4 19.1 22.7   CR 1.00 1.22* 1.44*     Recent Labs   Lab Test 24  0737 24  1254 23  0951 21  0932 20  1441   BILITOTAL  0.5 0.6 0.5   < > 0.5   ALT 8 15 12   < > 32   AST 17 22 21   < > 30   ALKPHOS 67 69 63   < > 67   LIPASE  --   --   --   --  88    < > = values in this interval not displayed.     Recent Labs   Lab Test 02/09/18  1611   INR 1.01   PTT 29     Recent Labs   Lab Test 06/28/24  0833 06/27/24  0907 06/26/24  0755 03/17/21  0932 09/21/20  0754   JESSE 9.2 8.6* 8.9   < > 8.4*   MAG  --   --   --   --  2.4*    < > = values in this interval not displayed.     Recent Labs   Lab Test 06/28/24  0833 06/27/24  0907 06/26/24  1928 06/26/24  0755 06/25/24  0737 03/03/24  0713 03/02/24  1901 03/02/24  1254 09/12/23  0951 03/21/23  1035 01/23/23  1509   ANIONGAP 9 7  --  10 13   < >  --  15 11   < >  --    PROTEIN  --   --  30*  --   --   --  100*  --   --   --  Negative   ALBUMIN  --   --   --   --  3.9  --   --  4.2 4.2  --   --     < > = values in this interval not displayed.       CT scan of the abdomen: Images reviewed.  Plain film images reviewed also.  Metallic foreign body probably in the small bowel.  It seems to be moving with a different location between the CT and the plain film.  This looks like a Jovanni pin no sign of free air or fluid.    Ultrasound of the abdomen: Not done               Assessment:   Asymptomatic small intestinal foreign body.  Metallic.  This should pass on its own.  One could operate to remove this but this is not a good idea.  We can follow this with plain films if desired but I would wait several days for the next 1.         Plan:   Observation for now.    I have discussed the history, physical, and plan with the patient.   Gilles Crockett M.D.

## 2024-07-03 ENCOUNTER — APPOINTMENT (OUTPATIENT)
Dept: PHYSICAL THERAPY | Facility: CLINIC | Age: 79
DRG: 552 | End: 2024-07-03
Payer: COMMERCIAL

## 2024-07-03 ENCOUNTER — MEDICAL CORRESPONDENCE (OUTPATIENT)
Dept: HEALTH INFORMATION MANAGEMENT | Facility: CLINIC | Age: 79
End: 2024-07-03

## 2024-07-03 LAB
GLUCOSE BLDC GLUCOMTR-MCNC: 124 MG/DL (ref 70–99)
GLUCOSE BLDC GLUCOMTR-MCNC: 130 MG/DL (ref 70–99)
GLUCOSE BLDC GLUCOMTR-MCNC: 131 MG/DL (ref 70–99)
GLUCOSE BLDC GLUCOMTR-MCNC: 132 MG/DL (ref 70–99)

## 2024-07-03 PROCEDURE — 250N000013 HC RX MED GY IP 250 OP 250 PS 637: Performed by: INTERNAL MEDICINE

## 2024-07-03 PROCEDURE — 97116 GAIT TRAINING THERAPY: CPT | Mod: GP | Performed by: PHYSICAL THERAPIST

## 2024-07-03 PROCEDURE — 250N000013 HC RX MED GY IP 250 OP 250 PS 637: Performed by: PHYSICIAN ASSISTANT

## 2024-07-03 PROCEDURE — 250N000013 HC RX MED GY IP 250 OP 250 PS 637: Performed by: STUDENT IN AN ORGANIZED HEALTH CARE EDUCATION/TRAINING PROGRAM

## 2024-07-03 PROCEDURE — 99232 SBSQ HOSP IP/OBS MODERATE 35: CPT | Performed by: INTERNAL MEDICINE

## 2024-07-03 PROCEDURE — 120N000001 HC R&B MED SURG/OB

## 2024-07-03 PROCEDURE — 99231 SBSQ HOSP IP/OBS SF/LOW 25: CPT | Mod: FS | Performed by: SURGERY

## 2024-07-03 PROCEDURE — 250N000013 HC RX MED GY IP 250 OP 250 PS 637: Performed by: HOSPITALIST

## 2024-07-03 PROCEDURE — 97530 THERAPEUTIC ACTIVITIES: CPT | Mod: GP | Performed by: PHYSICAL THERAPIST

## 2024-07-03 RX ADMIN — ACETAMINOPHEN 975 MG: 325 TABLET, FILM COATED ORAL at 08:17

## 2024-07-03 RX ADMIN — GABAPENTIN 300 MG: 300 CAPSULE ORAL at 08:19

## 2024-07-03 RX ADMIN — METOPROLOL SUCCINATE 25 MG: 25 TABLET, EXTENDED RELEASE ORAL at 08:18

## 2024-07-03 RX ADMIN — ACETAMINOPHEN 975 MG: 325 TABLET, FILM COATED ORAL at 15:22

## 2024-07-03 RX ADMIN — METFORMIN HYDROCHLORIDE 1000 MG: 500 TABLET ORAL at 18:07

## 2024-07-03 RX ADMIN — APIXABAN 2.5 MG: 2.5 TABLET, FILM COATED ORAL at 14:02

## 2024-07-03 RX ADMIN — ACETAMINOPHEN 975 MG: 325 TABLET, FILM COATED ORAL at 21:15

## 2024-07-03 RX ADMIN — METHOCARBAMOL 500 MG: 500 TABLET ORAL at 08:18

## 2024-07-03 RX ADMIN — FUROSEMIDE 40 MG: 40 TABLET ORAL at 08:18

## 2024-07-03 RX ADMIN — METHOCARBAMOL 500 MG: 500 TABLET ORAL at 18:07

## 2024-07-03 RX ADMIN — GABAPENTIN 600 MG: 300 CAPSULE ORAL at 21:16

## 2024-07-03 RX ADMIN — ATORVASTATIN CALCIUM 20 MG: 20 TABLET, FILM COATED ORAL at 21:16

## 2024-07-03 RX ADMIN — APIXABAN 2.5 MG: 2.5 TABLET, FILM COATED ORAL at 21:15

## 2024-07-03 RX ADMIN — LISINOPRIL 40 MG: 40 TABLET ORAL at 08:18

## 2024-07-03 RX ADMIN — CHOLESTYRAMINE 4 G: 4 POWDER, FOR SUSPENSION ORAL at 09:25

## 2024-07-03 RX ADMIN — INSULIN ASPART 5 UNITS: 100 INJECTION, SOLUTION INTRAVENOUS; SUBCUTANEOUS at 09:26

## 2024-07-03 RX ADMIN — LIDOCAINE 1 PATCH: 4 PATCH TOPICAL at 08:19

## 2024-07-03 RX ADMIN — METHOCARBAMOL 500 MG: 500 TABLET ORAL at 21:16

## 2024-07-03 RX ADMIN — INSULIN ASPART 5 UNITS: 100 INJECTION, SOLUTION INTRAVENOUS; SUBCUTANEOUS at 14:03

## 2024-07-03 RX ADMIN — FINASTERIDE 5 MG: 5 TABLET, FILM COATED ORAL at 08:18

## 2024-07-03 RX ADMIN — METFORMIN HYDROCHLORIDE 1000 MG: 500 TABLET ORAL at 08:19

## 2024-07-03 RX ADMIN — METHOCARBAMOL 500 MG: 500 TABLET ORAL at 14:02

## 2024-07-03 ASSESSMENT — ACTIVITIES OF DAILY LIVING (ADL)
ADLS_ACUITY_SCORE: 44
ADLS_ACUITY_SCORE: 44
ADLS_ACUITY_SCORE: 43
ADLS_ACUITY_SCORE: 44
ADLS_ACUITY_SCORE: 43
ADLS_ACUITY_SCORE: 44
ADLS_ACUITY_SCORE: 43
ADLS_ACUITY_SCORE: 45
ADLS_ACUITY_SCORE: 42
ADLS_ACUITY_SCORE: 43
ADLS_ACUITY_SCORE: 44
ADLS_ACUITY_SCORE: 43
ADLS_ACUITY_SCORE: 43
ADLS_ACUITY_SCORE: 44
ADLS_ACUITY_SCORE: 44
ADLS_ACUITY_SCORE: 43
ADLS_ACUITY_SCORE: 44

## 2024-07-03 NOTE — PROGRESS NOTES
Care Management Follow Up    Length of Stay (days): 5    Expected Discharge Date: 07/03/2024     Concerns to be Addressed: discharge planning     Patient plan of care discussed at interdisciplinary rounds: Yes    Anticipated Discharge Disposition: Transitional Care     Anticipated Discharge Services:    Anticipated Discharge DME:      Patient/family educated on Medicare website which has current facility and service quality ratings:    Education Provided on the Discharge Plan:    Patient/Family in Agreement with the Plan: yes    Referrals Placed by CM/SW:    Private pay costs discussed: Not applicable    Additional Information:  Writer spoke with Lainey with [facility] who states they are still awaiting pt's prior authorization.   Addendum: Writer spoke with Lainey with [facility] who states that they have not received prior authorization as of yet for pt.     SERGEI Kerns  Social Work  [facility]

## 2024-07-03 NOTE — PROGRESS NOTES
General Surgery Progress Note    Assessment and Plan:  78 year old male admitted for back pain, found to have anshu pin in the RLQ- suspect this will pass without surgical intervention.   - Had BM today, will get AXR tomorrow am if here; otherwise follow up with PCP for AXR in 1 week.    - Med mngt per hospitalist, much appreciate your assistance  - Hope for discharge soon    Interval Hx:  Feeling well, denies abdominal pain and n/v, tolerating diet, +Flatus/BM, urinating without difficulty.    Exam:  Vitals: Blood pressure (!) 140/75, pulse 73, temperature 98.2  F (36.8  C), temperature source Oral, resp. rate 18, SpO2 94%.  Temperature Temp  Av.8  F (36.6  C)  Min: 97.4  F (36.3  C)  Max: 98.2  F (36.8  C)   I/O last 3 completed shifts:  In: 250 [P.O.:250]  Out: 975 [Urine:975]    Gen: Awake and in no apparent distress.  Heart: RRR  Lungs: No increased work in breathing.  Abd: soft, nt, nd.    Data:  No new labs/imaging.     Time spent: 30 minutes total time spent on the date of this encounter doing: chart review, review of test results, patient visit/obtaining a history, physical exam, education, counseling, developing plan of care, and documenting.       Shital Hernández PA-C  [facility]  [phone]

## 2024-07-03 NOTE — PLAN OF CARE
Date/Time 7/3, 1979-6821    Trauma/Ortho/Medical (Choose one) Trauma    Diagnosis: fall, lower back pain. No fracture  Mental Status:A&OX4  Activity/dangle: Up with 1 and walker  Diet:lowfat,low sodium diet  Pain: tylenol and robaxin  Gomez/Voiding:urinal  02/LDA:SL  D/C Date:Pending placement  Other Info:VSS, Baseline numbness BLE. General surgery following for anshu pin in RLQ, Abd x-ray ordered for tomorrow.

## 2024-07-03 NOTE — PLAN OF CARE
Goal Outcome Evaluation:                      Goal Outcome Evaluation:    Diagnosis: Low back pain.  POD#: None.  Mental Status: A&OX4.  Activity/dangle not oob yet   Diet:  Low saturated fat, no caffeine, 2400 Na.  Pain: NA  Gomez/Voiding: bathroom and Urinal.  Tele/Restraints/Iso: None.  02/LDA: CARSON BLAKE.  D/C Date: Pending.

## 2024-07-03 NOTE — PLAN OF CARE
Goal Outcome Evaluation:  Date/Time 7/2 jermaine    Trauma/Ortho/Medical (Choose one) ortho    Diagnosis:LBP/ spondylosis and stenosis   Mental Status:a&o. Does not accept assistance- will do/try to do all cares etc. By self  Activity/dangle micro turns with encouragement. Does not allow assistance  Diet:no caffine, low sat fat. <2400 Na+  Pain:declines more that scheduled meds  Gomez/Voiding:voids per urinal  Tele/Restraints/Iso:no  02/LDA:saline lock  D/C Date:possibly tomorrow to [facility]  Other Info:blood sugars WNL- no coverage this evening. Up with 2/walker. Numbness to BLE- baseline.

## 2024-07-03 NOTE — PROGRESS NOTES
"[facility]    Medicine Progress Note - Hospitalist Service    Date of Admission:  6/24/2024    Assessment & Plan   Nahun Villalobos is a 78 year old male who was registered to short-stay/observation on 6/24/2024 due to intractable back pain and left hip pain following a mechanical fall.      Past medical history significant for hypertension, hyperlipidemia, DM II, obesity, CIERA , COPD, history of PE, malignant carcinoid tumor, lung carcinoma, peripheral neuropathy, cervical myelopathy      Presented w/ lower back pain following a fall.     Intractable back pain post mechanical fall  Multilevel thoracic and lumbar spondylosis w/ Thoracic foraminal stenosis  History of arthritis  Peripheral neuropathy  Cervical myelopathy  Senile frailty and chronic debilitation   *Hx: Patient 2d prior to presentation fell from his elevated bed and hit his L lower back and head, without LOC. Since the fall, has lower back pain/L buttock that is severe when flat, and aggravated with movement. No radiculopathy symptoms, no urinary/rectal symptoms that are new. Has decreased ambulation since then. Tylenol failed to alleviate. No other falls since then.  *Imaging:  Multilevel thoracic spondylosis with multilevel bridging osteophytes raising the possibility of DISH. Neural foraminal stenosis greatest on the right at T3-T4 and T4-T5. Multilevel lumbar spondylosis greatest at L3-4 and L4-5 as above.  *Neurosurgery recommended MRI but unable to be completed due to metal foreign body in intestine.  *CT abd/pelvis negative for any bony pathology.  --Neurosurgery consult appreciated:             --Unclear etiology of left lower extremity weakness (hip flexor) and feel patient should remain in hospital to obtain MRI of lumbar and thoracic spine.  Will need to wait for metal foreign body to be passed. D/W NSG: Had CT myelogram.  \"Recommend continued physical therapy and conservative therapies at this point.  If patient's " "symptoms are not improving, an injection could be considered perhaps on the left at L4-5 or L5-S1 depending on how his symptoms are presenting at that time.  He can follow-up with neurosurgery clinic in 6 weeks for reevaluation, if his hairpin eventually passes, he could have an MRI for further evaluation at that time if needed.   \"   Ct holistic pain management (scheduled Tylenol, Robaxin, gabapentin, Lidoderm patch )and continue PT with a goal for TCU  --SW/CM consult appreciated:               --D/W SW 7/3: A/W Prior auth - [facility]  --Fall precautions.    Foreign body ingestion  *noted during scan prior to initiation of MRI, though was present on admission rib XR  *CT abd/pelvis showing a presumably metallic structure measuring 4.5cm in mid small bowel without evidence of perforation  *He denied any intentional ingestion, has no recollection of unintentionally swallowing any object  *Abd X-ray 6/27: Hairpin foreign body has progressed (now projecting over the right hemiabdomen wither within distal small bowel (favored) or proximal colon).    Abd Xray 6/28:  FB remains in the right abdomen in similar position compared to yesterdays exam.    --Monitor abd exam.  --Abd Xray reviewed AM of 6/30 . Appreciate general Sx review. Can follow as outpatient. Will request X-ray in 1 week. (Explained to him to monitor for any abdominal signs or symptoms)     Left hip flexor weakness  Left upper thigh and buttock pain  *Left hip Xray negative for acute fracture or dislocation.    *Thoracic spine and lumbar spine CT revealed multilevel spondylosis, no fracture noted.    *Cervical spine MRI from [facility] on 2/16/2024 reports multilevel spinal canal stenosis at C3-4, C4-5, and C5-6.  --Neurosurgery consult appreciated.  --General Neurology consult appreciated.   --Consider epidural injection; though will need to hold DOAC.  --PT/OT.    --Gabapentin for pain.    --Follow up with Neurology or Ortho to have an outpatient EMG.      SALLIE " on CKD2 (Resolved)  *Baseline creatinine 1, stable at admission.  --Cr up to 1.44 on 6/26; suspect due to initiation celebrex on admission in combination with diuretic and ACE.  Creatinine   Date Value Ref Range Status   06/28/2024 1.00 0.67 - 1.17 mg/dL Final   03/17/2021 1.11 0.66 - 1.25 mg/dL Final     COPD, not on chronic O2, not in exacerbation  History of PE  Adenocarcinoma of lung s/p resection in 2016  *No acute dyspneic symptoms.  --Duonebs available PRN.  --Resumed on PTA DOAC (though unclear why he's on 2.5 mg q12 instead of 5 mg q12).  --CPAP at night.    --Consider outpatient sleep study and PFTs.  --Patient should obtain trelegy inhaler from home and will be resumed.       Hypertension  Hyperlipidemia  --Continue PTA metoprolol, statin.  -- resume PTA lasix and lisinopril   --PRN hydralazine available.       Malignant metastatic carcinoid tumor  Rib Mass  *Status post exploratory laparotomy, small bowel resection and removal of mesenteric mass 2023  *Follows at [facility] w/ monthly lanreotide injections. Reports in 3/2024 suggest slow progressive w/ metastasis to the liver, bone, retroperitoneum and mesentery  *Patient complaining of R inferior R nodule that he hasn't noticed.    --Patient gets Lanreotide injections monthly in his clinic (last dose was May 29th). Per pt's S/O, Onco MD said ok for pt to get next dose after being discharged from TCU.      Chronic diarrhea  *Chronic and stable.  --Continued on PTA cholestyramine.     Non-insulin dependent type 2 diabetes mellitus   *A1C 7%.  --ISS, medium intensity.    --Continued on PTA metformin.  --Mod CHO diet.     Recent Labs   Lab 07/03/24  0833 07/02/24  2114 07/02/24  1807 07/02/24  1213 07/02/24  0759 07/02/24  0737   * 120* 90 140* 151* 135*       BPH  --Continue PTA finasteride.     Morbid Obesity  CIERA  --Outpatient diet and exercise as able.  --CPAP.        Chronic macrocytic anemia  *HGB stable at 11.8.  *Iron studies 6/25 consistent  "with combination iron deficiency and chronic inflammation.  --Resume iron supplement at discharge, follow up with PCP.         Diet: Combination Diet No Caffeine Diet, Low Saturated Fat Na <2400mg Diet    DVT Prophylaxis: DOAC  Gomez Catheter: Not present  Lines: None     Cardiac Monitoring: None  Code Status: No CPR- Do NOT Intubate      Clinically Significant Risk Factors                  # Hypertension: Noted on problem list               # DMII: A1C = 7.0 % (Ref range: <5.7 %) within past 6 months   # Obesity: Estimated body mass index is 31.95 kg/m  as calculated from the following:    Height as of 6/17/24: 1.702 m (5' 7\").    Weight as of 6/17/24: 92.5 kg (204 lb).             Disposition Plan     Medically Ready for Discharge: Anticipated Tomorrow         Frank Stafford MD  Hospitalist Service  [facility]  Securely message with [employer] (more info)  Text page via [employer] Paging/Directory   The above note was dictated using voice recognition software. Although reviewed after completion, some word and grammatical error may remain . Please contact the author for any clarifications.  ______________________________________________________________________    Interval History   Doing ok. No new concerns  Denies any new tingling/numbness.      Physical Exam   BP (!) 147/80 (BP Location: Right arm)   Pulse 76   Temp 98.1  F (36.7  C) (Oral)   Resp 18   SpO2 96%   Gen- pleasant   Neck- supple  No respiratory distress      Medical Decision Making       40 MINUTES SPENT BY ME on the date of service doing chart review, history, exam, documentation & further activities per the note.      Data   ------------------------- PAST 24 HR DATA REVIEWED -----------------------------------------------        Imaging results reviewed over the past 24 hrs:   No results found for this or any previous visit (from the past 24 hour(s)).      "

## 2024-07-04 ENCOUNTER — APPOINTMENT (OUTPATIENT)
Dept: GENERAL RADIOLOGY | Facility: CLINIC | Age: 79
DRG: 552 | End: 2024-07-04
Attending: PHYSICIAN ASSISTANT
Payer: COMMERCIAL

## 2024-07-04 LAB
GLUCOSE BLDC GLUCOMTR-MCNC: 104 MG/DL (ref 70–99)
GLUCOSE BLDC GLUCOMTR-MCNC: 123 MG/DL (ref 70–99)
GLUCOSE BLDC GLUCOMTR-MCNC: 130 MG/DL (ref 70–99)
GLUCOSE BLDC GLUCOMTR-MCNC: 141 MG/DL (ref 70–99)
GLUCOSE BLDC GLUCOMTR-MCNC: 163 MG/DL (ref 70–99)

## 2024-07-04 PROCEDURE — 250N000013 HC RX MED GY IP 250 OP 250 PS 637: Performed by: PHYSICIAN ASSISTANT

## 2024-07-04 PROCEDURE — 74018 RADEX ABDOMEN 1 VIEW: CPT

## 2024-07-04 PROCEDURE — 120N000001 HC R&B MED SURG/OB

## 2024-07-04 PROCEDURE — 99232 SBSQ HOSP IP/OBS MODERATE 35: CPT | Performed by: INTERNAL MEDICINE

## 2024-07-04 PROCEDURE — 250N000013 HC RX MED GY IP 250 OP 250 PS 637: Performed by: STUDENT IN AN ORGANIZED HEALTH CARE EDUCATION/TRAINING PROGRAM

## 2024-07-04 PROCEDURE — 250N000013 HC RX MED GY IP 250 OP 250 PS 637: Performed by: INTERNAL MEDICINE

## 2024-07-04 PROCEDURE — 250N000013 HC RX MED GY IP 250 OP 250 PS 637: Performed by: HOSPITALIST

## 2024-07-04 RX ADMIN — FUROSEMIDE 40 MG: 40 TABLET ORAL at 08:33

## 2024-07-04 RX ADMIN — INSULIN ASPART 6 UNITS: 100 INJECTION, SOLUTION INTRAVENOUS; SUBCUTANEOUS at 14:03

## 2024-07-04 RX ADMIN — METHOCARBAMOL 500 MG: 500 TABLET ORAL at 14:02

## 2024-07-04 RX ADMIN — LIDOCAINE 1 PATCH: 4 PATCH TOPICAL at 08:36

## 2024-07-04 RX ADMIN — ACETAMINOPHEN 975 MG: 325 TABLET, FILM COATED ORAL at 21:11

## 2024-07-04 RX ADMIN — LISINOPRIL 40 MG: 40 TABLET ORAL at 08:34

## 2024-07-04 RX ADMIN — INSULIN ASPART 8 UNITS: 100 INJECTION, SOLUTION INTRAVENOUS; SUBCUTANEOUS at 09:00

## 2024-07-04 RX ADMIN — APIXABAN 2.5 MG: 2.5 TABLET, FILM COATED ORAL at 08:34

## 2024-07-04 RX ADMIN — METFORMIN HYDROCHLORIDE 1000 MG: 500 TABLET ORAL at 17:04

## 2024-07-04 RX ADMIN — METFORMIN HYDROCHLORIDE 1000 MG: 500 TABLET ORAL at 08:34

## 2024-07-04 RX ADMIN — ATORVASTATIN CALCIUM 20 MG: 20 TABLET, FILM COATED ORAL at 21:11

## 2024-07-04 RX ADMIN — METHOCARBAMOL 500 MG: 500 TABLET ORAL at 08:33

## 2024-07-04 RX ADMIN — CHOLESTYRAMINE 4 G: 4 POWDER, FOR SUSPENSION ORAL at 11:00

## 2024-07-04 RX ADMIN — GABAPENTIN 600 MG: 300 CAPSULE ORAL at 21:11

## 2024-07-04 RX ADMIN — METHOCARBAMOL 500 MG: 500 TABLET ORAL at 17:04

## 2024-07-04 RX ADMIN — CHOLESTYRAMINE 4 G: 4 POWDER, FOR SUSPENSION ORAL at 21:11

## 2024-07-04 RX ADMIN — METOPROLOL SUCCINATE 25 MG: 25 TABLET, EXTENDED RELEASE ORAL at 08:34

## 2024-07-04 RX ADMIN — ACETAMINOPHEN 975 MG: 325 TABLET, FILM COATED ORAL at 08:33

## 2024-07-04 RX ADMIN — METHOCARBAMOL 500 MG: 500 TABLET ORAL at 21:12

## 2024-07-04 RX ADMIN — ACETAMINOPHEN 975 MG: 325 TABLET, FILM COATED ORAL at 17:04

## 2024-07-04 RX ADMIN — APIXABAN 2.5 MG: 2.5 TABLET, FILM COATED ORAL at 21:12

## 2024-07-04 RX ADMIN — GABAPENTIN 300 MG: 300 CAPSULE ORAL at 08:33

## 2024-07-04 RX ADMIN — FINASTERIDE 5 MG: 5 TABLET, FILM COATED ORAL at 08:33

## 2024-07-04 ASSESSMENT — ACTIVITIES OF DAILY LIVING (ADL)
ADLS_ACUITY_SCORE: 44
ADLS_ACUITY_SCORE: 45
ADLS_ACUITY_SCORE: 44
ADLS_ACUITY_SCORE: 45
ADLS_ACUITY_SCORE: 44
ADLS_ACUITY_SCORE: 45
ADLS_ACUITY_SCORE: 44
ADLS_ACUITY_SCORE: 45

## 2024-07-04 NOTE — PLAN OF CARE
Date/Time: 7/4, 0700-1930    Trauma/Ortho/Medical (Choose one) trauma, no fracture  Diagnosis: lower back pain  Mental Status: A&OX4  Activity/dangle:up with assist of 1 and walker  Diet:Regular  Pain: scheduled tylenol and robaxin  Gomez/Voiding:urinal  02/LDA:SL  D/C Date: pending insurance auth  Other Info:VSS, baseline numbness BLE.

## 2024-07-04 NOTE — PLAN OF CARE
Goal Outcome Evaluation:      Trauma/Ortho/Medical (Choose one) Trauma    Diagnosis:Fell- Back pain  POD#:No fracture  Mental Status:A&O  Activity/dangle: Up with assist of 1  Diet: Low fat/ low Na+   Pain: Robaxin and Tylenol  Gomez/Voiding:Due to void  Tele/Restraints/Iso:NA  02/LDA:LOU  D/C Date:TBD, waiting on prior authorization  Other Info: Jovanni espinoza still in the RLQ- suspect this will pass without surgical intervention. Another X-ray will be obtain today.

## 2024-07-04 NOTE — PROGRESS NOTES
Care Management Follow Up    Length of Stay (days): 6    Expected Discharge Date: 07/05/2024     Concerns to be Addressed: discharge planning     Patient plan of care discussed at interdisciplinary rounds: Yes    Anticipated Discharge Disposition: Transitional Care     Anticipated Discharge Services:  Therapy  Anticipated Discharge DME:  N/A    Patient/family educated on Medicare website which has current facility and service quality ratings:  N/A  Education Provided on the Discharge Plan:  B/A  Patient/Family in Agreement with the Plan: yes    Referrals Placed by CM/SW:  TCU referral  Private pay costs discussed: Not applicable    Additional Information:  Call placed to [facility] to follow up on the referral and to inquire as to whether they received pre-auth.  Per June, she states that [employer] is  asking for more therapy notes as they are concerned that patient's back pain is chronic.  Explained that there are therapy notes in the chart since June 26.  June states that we need to contact [employer] as we have the therapy information.  Explained that we don't get  the [employer] auth's we only get the [employer] auth's.  Explained that  the therapy notes are in the chart.  June states that she will have the business office follow up tomorrow.    Will continue to follow.      VJ Scherer, [facility]    [phone]  [facility]

## 2024-07-04 NOTE — PROGRESS NOTES
[facility]    Medicine Progress Note - Hospitalist Service    Date of Admission:  6/24/2024    Assessment & Plan   Nahun Villalobos is a 78 year old male who was registered to short-stay/observation on 6/24/2024 due to intractable back pain and left hip pain following a mechanical fall.      Past medical history significant for hypertension, hyperlipidemia, DM II, obesity, CIERA , COPD, history of PE, malignant carcinoid tumor, lung carcinoma, peripheral neuropathy, cervical myelopathy      Presented w/ lower back pain following a fall.     Intractable back pain post mechanical fall  Multilevel thoracic and lumbar spondylosis w/ Thoracic foraminal stenosis  History of arthritis  Peripheral neuropathy  Cervical myelopathy  Senile frailty and chronic debilitation   *Hx: Patient 2d prior to presentation fell from his elevated bed and hit his L lower back and head, without LOC. Since the fall, has lower back pain/L buttock that is severe when flat, and aggravated with movement. No radiculopathy symptoms, no urinary/rectal symptoms that are new. Has decreased ambulation since then. Tylenol failed to alleviate. No other falls since then.  *Imaging:  Multilevel thoracic spondylosis with multilevel bridging osteophytes raising the possibility of DISH. Neural foraminal stenosis greatest on the right at T3-T4 and T4-T5. Multilevel lumbar spondylosis greatest at L3-4 and L4-5 as above.  *Neurosurgery recommended MRI but unable to be completed due to metal foreign body in intestine.(See below)  *CT abd/pelvis negative for any bony pathology.  --Neurosurgery consult appreciated:  -Neurosurgery recommends to continued physical therapy and conservative therapies at this point.  If patient's symptoms are not improving, an injection could be considered perhaps on the left at L4-5 or L5-S1 depending on how his symptoms are presenting at that time.  He can follow-up with neurosurgery clinic in 6 weeks for  reevaluation, if his hairpin eventually passes, he could have an MRI for further evaluation at that time if needed.  -PT OT recommending TCU, awaiting prior Auth  --Fall precautions.     Foreign body ingestion  *noted during scan prior to initiation of MRI, though was present on admission rib XR  *CT abd/pelvis showing a presumably metallic structure measuring 4.5cm in mid small bowel without evidence of perforation  *He denied any intentional ingestion, has no recollection of unintentionally swallowing any object  -General surgery following, appreciate input  *Abd X-ray 6/27: Hairpin foreign body has progressed (now projecting over the right hemiabdomen wither within distal small bowel (favored) or proximal colon).    Abd Xray 6/28:  FB remains in the right abdomen in similar position compared to the day prior  -Patient continues to deny any abdominal pain  -Repeat abdominal x-ray -7/4/2024 shows metallic hairpin projects in the lower abdomen at the level of L4 with moderate rectal stool burden  -Recommend PCP follow-up outpatient for repeat abdominal x-ray to follow-up on foreign body    Left hip flexor weakness  Left upper thigh and buttock pain  *Left hip Xray negative for acute fracture or dislocation.    *Thoracic spine and lumbar spine CT revealed multilevel spondylosis, no fracture noted.    *Cervical spine MRI from [facility] on 2/16/2024 reports multilevel spinal canal stenosis at C3-4, C4-5, and C5-6.  --Neurosurgery consult appreciated.  --General Neurology consult appreciated.   --Consider epidural injection; though will need to hold DOAC and awaiting MRI as above.  --PT/OT.    --Gabapentin for pain.    --Follow up with Neurology or Ortho to have an outpatient EMG.       SALLIE on CKD2 (Resolved)  *Baseline creatinine 1, stable at admission.  --Cr up to 1.44 on 6/26; suspect due to initiation celebrex on admission in combination with diuretic and ACE.  -Most recent creatinine was 1 on 6/28  -Avoid  nephrotoxins, monitor intermittently    COPD, not on chronic O2, not in exacerbation  History of PE  Adenocarcinoma of lung s/p resection in 2016  *No acute dyspneic symptoms.  --Duonebs available PRN.  -- Continue PTA DOAC (though unclear why he's on 2.5 mg q12 instead of 5 mg q12).  --CPAP at night.    --Consider outpatient sleep study and PFTs.  --Patient should obtain trelegy inhaler from home and will be resumed.       Hypertension  Hyperlipidemia  --Continue PTA metoprolol, statin.  -- Continue PTA lasix and lisinopril   --PRN hydralazine available.       Malignant metastatic carcinoid tumor  Rib Mass  *Status post exploratory laparotomy, small bowel resection and removal of mesenteric mass 2023  *Follows at [facility] w/ monthly lanreotide injections. Reports in 3/2024 suggest slow progressive w/ metastasis to the liver, bone, retroperitoneum and mesentery  *Patient complaining of R inferior R nodule that he hasn't noticed.    --Patient gets Lanreotide injections monthly in his clinic (last dose was May 29th). Per pt's S/O, Onco MD said ok for pt to get next dose after being discharged from TCU.      Chronic diarrhea  *Chronic and stable.  --Continued on PTA cholestyramine.     Non-insulin dependent type 2 diabetes mellitus   *A1C 7%.  --ISS, medium intensity.    --Continued on PTA metformin.  --Mod CHO diet.       BPH  --Continue PTA finasteride.     Morbid Obesity  CIERA  --Outpatient diet and exercise as able.  --CPAP.        Chronic macrocytic anemia  *HGB stable at 11.8.  *Iron studies 6/25 consistent with combination iron deficiency and chronic inflammation.  --Resume iron supplement at discharge, follow up with PCP.     Diet: Combination Diet No Caffeine Diet, Low Saturated Fat Na <2400mg Diet    DVT Prophylaxis: DOAC  Gomez Catheter: Not present  Lines: None     Cardiac Monitoring: None  Code Status: No CPR- Do NOT Intubate      Clinically Significant Risk Factors                  # Hypertension: Noted  "on problem list             # DMII: A1C = 7.0 % (Ref range: <5.7 %) within past 6 months   # Obesity: Estimated body mass index is 31.95 kg/m  as calculated from the following:    Height as of 6/17/24: 1.702 m (5' 7\").    Weight as of 6/17/24: 92.5 kg (204 lb).             Disposition Plan     Medically Ready for Discharge: Ready Now awaiting placement             Susanne Patterson MD  Hospitalist Service  [facility]  Securely message with [employer] (more info)  Text page via [facility] Paging/Directory   ______________________________________________________________________    Interval History   Care assumed, chart reviewed.  Patient reports that his pain is under control.  Left lower extremity continues to be weak.  No other nursing concerns    Physical Exam   Vital Signs: Temp: 97.5  F (36.4  C) Temp src: Oral BP: (!) 159/77 Pulse: 73   Resp: 16 SpO2: 95 % O2 Device: None (Room air)    Weight: 0 lbs 0 oz  Exam:  Constitutional: Awake, alert and no distress. Appears comfortable  Head: Normocephalic. No masses, lesions, tenderness or abnormalities  ENMT: ENT exam normal, no neck nodes or sinus tenderness  Cardiovascular: RRR.  No murmurs, no rubs or JVD  Respiratory: Normal WOB,b/l equal air entry, no wheezes or crackles   Gastrointestinal: Abdomen soft, non-tender. BS normal. No masses, organomegaly  : Deferred  Extremities : No edema , no clubbing or cyanosis    Neurologic: Cranial nerves II-XII grossly intact , power decreased left lower extremity, Reflexes normal and symmetric. Sensation grossly WNL.     Medical Decision Making       45 MINUTES SPENT BY ME on the date of service doing chart review, history, exam, documentation & further activities per the note.      Data         Imaging results reviewed over the past 24 hrs:   Recent Results (from the past 24 hour(s))   XR Abdomen Port 1 View    Narrative    EXAM: XR ABDOMEN PORT 1 VIEW  LOCATION: [facility]  DATE: " 7/4/2024    INDICATION: foreign body ingestion  COMPARISON: 07/01/2024.      Impression    IMPRESSION: Metallic hair pin projects in the lower abdomen at the level of L4. Moderate rectal stool burden. Vascular calcifications.     Recent Labs   Lab 07/04/24  1208 07/04/24  0753 07/04/24  0215 06/28/24  1215 06/28/24  0833   WBC  --   --   --   --  6.9   HGB  --   --   --   --  12.2*   MCV  --   --   --   --  102*   PLT  --   --   --   --  257   NA  --   --   --   --  137   POTASSIUM  --   --   --   --  4.5   CHLORIDE  --   --   --   --  104   CO2  --   --   --   --  24   BUN  --   --   --   --  12.4   CR  --   --   --   --  1.00   ANIONGAP  --   --   --   --  9   JESSE  --   --   --   --  9.2   * 130* 123*   < > 155*    < > = values in this interval not displayed.

## 2024-07-05 ENCOUNTER — APPOINTMENT (OUTPATIENT)
Dept: PHYSICAL THERAPY | Facility: CLINIC | Age: 79
DRG: 552 | End: 2024-07-05
Payer: COMMERCIAL

## 2024-07-05 LAB
GLUCOSE BLDC GLUCOMTR-MCNC: 119 MG/DL (ref 70–99)
GLUCOSE BLDC GLUCOMTR-MCNC: 126 MG/DL (ref 70–99)
GLUCOSE BLDC GLUCOMTR-MCNC: 132 MG/DL (ref 70–99)
GLUCOSE BLDC GLUCOMTR-MCNC: 154 MG/DL (ref 70–99)
GLUCOSE BLDC GLUCOMTR-MCNC: 179 MG/DL (ref 70–99)

## 2024-07-05 PROCEDURE — 250N000013 HC RX MED GY IP 250 OP 250 PS 637: Performed by: HOSPITALIST

## 2024-07-05 PROCEDURE — 97116 GAIT TRAINING THERAPY: CPT | Mod: GP | Performed by: PHYSICAL THERAPIST

## 2024-07-05 PROCEDURE — 250N000013 HC RX MED GY IP 250 OP 250 PS 637: Performed by: INTERNAL MEDICINE

## 2024-07-05 PROCEDURE — 250N000013 HC RX MED GY IP 250 OP 250 PS 637: Performed by: STUDENT IN AN ORGANIZED HEALTH CARE EDUCATION/TRAINING PROGRAM

## 2024-07-05 PROCEDURE — 120N000001 HC R&B MED SURG/OB

## 2024-07-05 PROCEDURE — 99232 SBSQ HOSP IP/OBS MODERATE 35: CPT | Performed by: INTERNAL MEDICINE

## 2024-07-05 PROCEDURE — 250N000013 HC RX MED GY IP 250 OP 250 PS 637: Performed by: PHYSICIAN ASSISTANT

## 2024-07-05 PROCEDURE — 97530 THERAPEUTIC ACTIVITIES: CPT | Mod: GP | Performed by: PHYSICAL THERAPIST

## 2024-07-05 RX ADMIN — CHOLESTYRAMINE 4 G: 4 POWDER, FOR SUSPENSION ORAL at 20:12

## 2024-07-05 RX ADMIN — GABAPENTIN 600 MG: 300 CAPSULE ORAL at 22:01

## 2024-07-05 RX ADMIN — INSULIN ASPART 2 UNITS: 100 INJECTION, SOLUTION INTRAVENOUS; SUBCUTANEOUS at 10:29

## 2024-07-05 RX ADMIN — METFORMIN HYDROCHLORIDE 1000 MG: 500 TABLET ORAL at 08:46

## 2024-07-05 RX ADMIN — METHOCARBAMOL 500 MG: 500 TABLET ORAL at 08:46

## 2024-07-05 RX ADMIN — ACETAMINOPHEN 975 MG: 325 TABLET, FILM COATED ORAL at 08:45

## 2024-07-05 RX ADMIN — METFORMIN HYDROCHLORIDE 1000 MG: 500 TABLET ORAL at 17:06

## 2024-07-05 RX ADMIN — METHOCARBAMOL 500 MG: 500 TABLET ORAL at 22:01

## 2024-07-05 RX ADMIN — APIXABAN 2.5 MG: 2.5 TABLET, FILM COATED ORAL at 20:12

## 2024-07-05 RX ADMIN — METHOCARBAMOL 500 MG: 500 TABLET ORAL at 17:06

## 2024-07-05 RX ADMIN — CHOLESTYRAMINE 4 G: 4 POWDER, FOR SUSPENSION ORAL at 10:29

## 2024-07-05 RX ADMIN — ACETAMINOPHEN 975 MG: 325 TABLET, FILM COATED ORAL at 22:01

## 2024-07-05 RX ADMIN — ACETAMINOPHEN 975 MG: 325 TABLET, FILM COATED ORAL at 17:06

## 2024-07-05 RX ADMIN — INSULIN ASPART 3 UNITS: 100 INJECTION, SOLUTION INTRAVENOUS; SUBCUTANEOUS at 13:58

## 2024-07-05 RX ADMIN — FINASTERIDE 5 MG: 5 TABLET, FILM COATED ORAL at 08:46

## 2024-07-05 RX ADMIN — INSULIN ASPART 3 UNITS: 100 INJECTION, SOLUTION INTRAVENOUS; SUBCUTANEOUS at 18:35

## 2024-07-05 RX ADMIN — LISINOPRIL 40 MG: 40 TABLET ORAL at 08:46

## 2024-07-05 RX ADMIN — ATORVASTATIN CALCIUM 20 MG: 20 TABLET, FILM COATED ORAL at 20:12

## 2024-07-05 RX ADMIN — METHOCARBAMOL 500 MG: 500 TABLET ORAL at 13:57

## 2024-07-05 RX ADMIN — FUROSEMIDE 40 MG: 40 TABLET ORAL at 08:46

## 2024-07-05 RX ADMIN — LIDOCAINE 1 PATCH: 4 PATCH TOPICAL at 08:45

## 2024-07-05 RX ADMIN — GABAPENTIN 300 MG: 300 CAPSULE ORAL at 08:46

## 2024-07-05 RX ADMIN — APIXABAN 2.5 MG: 2.5 TABLET, FILM COATED ORAL at 08:46

## 2024-07-05 RX ADMIN — METOPROLOL SUCCINATE 25 MG: 25 TABLET, EXTENDED RELEASE ORAL at 08:46

## 2024-07-05 ASSESSMENT — ACTIVITIES OF DAILY LIVING (ADL)
ADLS_ACUITY_SCORE: 43
ADLS_ACUITY_SCORE: 43
ADLS_ACUITY_SCORE: 44
ADLS_ACUITY_SCORE: 45
ADLS_ACUITY_SCORE: 44
ADLS_ACUITY_SCORE: 44
ADLS_ACUITY_SCORE: 45
ADLS_ACUITY_SCORE: 44
ADLS_ACUITY_SCORE: 45
ADLS_ACUITY_SCORE: 45
ADLS_ACUITY_SCORE: 44
ADLS_ACUITY_SCORE: 43
ADLS_ACUITY_SCORE: 44
ADLS_ACUITY_SCORE: 44
ADLS_ACUITY_SCORE: 45
ADLS_ACUITY_SCORE: 45
ADLS_ACUITY_SCORE: 44

## 2024-07-05 NOTE — PLAN OF CARE
Date/Time: 07/05: 5961-7210    Trauma/Ortho/Medical (Choose one): Ortho/Medical    Diagnosis: Low back pain and (L) Hip pain  POD#: N/A  Mental Status: Oriented x 4  Activity/dangle: Up with one assist with a GB/Walker  Diet: Low sat/Low sodium/No caffeine  Pain: Managed with scheduled robaxin and lidocaine patch  Gomez/Voiding: Bathroom  Tele/Restraints/Iso: None  02/LDA: ROSE BLAKE  D/C Date: Awaiting insurance auth  Other Info: Is on sliding scale coverage. Has baseline numbness and tingling in all extremities

## 2024-07-05 NOTE — PROGRESS NOTES
Care Management Follow Up    Length of Stay (days): 7    Expected Discharge Date: 07/05/2024     Concerns to be Addressed: discharge planning     Patient plan of care discussed at interdisciplinary rounds: Yes    Anticipated Discharge Disposition: Transitional Care     Anticipated Discharge Services:    Anticipated Discharge DME:      Patient/family educated on Medicare website which has current facility and service quality ratings:    Education Provided on the Discharge Plan:    Patient/Family in Agreement with the Plan: yes    Referrals Placed by CM/SW:    Private pay costs discussed: Not applicable    Additional Information:  Message sent to [facility] to see if patient can admit today as he is medically ready.    1245: Writer informed that insurance auth is still pending but Tracy will follow up with business office and let writer know.    1330: Writer asked for new referral to be sent back as they were unable to see it. Referral resent via in basket. Writer asked for updated therapy notes as those are needed for authorization. Flowsheets printed and faxed.     JANETTE Tillman

## 2024-07-05 NOTE — PROGRESS NOTES
CLINICAL NUTRITION SERVICES - REASSESSMENT NOTE    Recommendations Ordered by Registered Dietitian (RD):   Cuellar Gel+ w/ dinner   Continue cardiac diet    Malnutrition: Patient does not meet two of the above criteria necessary for diagnosing malnutrition     EVALUATION OF PROGRESS TOWARD GOALS   Diet:  cardiac diet     Intake/Tolerance: Javon won't be discharging at this time. He endorsed the variable intakes and was ok w/ adding a cherry Gel+ in case he doesn't discharge soon. He dined any reduced intake PTA.    ASSESSED NUTRITION NEEDS:  Dosing Weight 74 kg (adjusted, based on IBW of 67.3 kg and most recent weight of 92.5 kg on 6/17)  Estimated Energy Needs: 5360-9374 kcals (25-30 Kcal/Kg)  Justification: maintenance  Estimated Protein Needs: 75-90 grams protein (1-1.2 g pro/Kg)  Justification: Repletion    NEW FINDINGS:   General/Plan: discharge to TCU     Weight:     Meds: reviewed     Labs: reviewed      Previous Goals:   Pt to consume at least 75% of three nutritionally adequate meals per day (or equivalent via snacks/supplements).   Evaluation: progressing    Previous Nutrition Diagnosis:   Unintended weight loss related to potentially reduced oral intake as evidenced by 7% wt loss over 2.5 month period per weight data.   Evaluation: No change    MALNUTRITION  % Weight Loss:  > 5% in 1 month (severe malnutrition)  % Intake:  No decreased intake noted  Subcutaneous Fat Loss:  None observed  Muscle Loss:  None observed  Fluid Retention:  None noted    Malnutrition Diagnosis: Patient does not meet two of the above criteria necessary for diagnosing malnutrition    CURRENT NUTRITION DIAGNOSIS  Unintended weight loss related to potentially reduced oral intake as evidenced by 7% wt loss over 2.5 month period per weight data.     INTERVENTIONS  Recommendations / Nutrition Prescription  Cherry Gel+ w/ dinner   Continue cardiac diet     Implementation  Medical food supplement therapy      Goals  PO - >75% of meal trays  and/or supplements TID       MONITORING AND EVALUATION:  Progress towards goals will be monitored and evaluated per protocol and Practice Guideline    Rajiv Mckinley RD, LD

## 2024-07-05 NOTE — PROGRESS NOTES
[facility]    Medicine Progress Note - Hospitalist Service    Date of Admission:  6/24/2024    Assessment & Plan   Nahun Villalobos is a 78 year old male who was registered to short-stay/observation on 6/24/2024 due to intractable back pain and left hip pain following a mechanical fall.      Past medical history significant for hypertension, hyperlipidemia, DM II, obesity, CIERA , COPD, history of PE, malignant carcinoid tumor, lung carcinoma, peripheral neuropathy, cervical myelopathy      Presented w/ lower back pain following a fall.     Intractable back pain post mechanical fall  Multilevel thoracic and lumbar spondylosis w/ Thoracic foraminal stenosis  History of arthritis  Peripheral neuropathy  Cervical myelopathy  Senile frailty and chronic debilitation   *Hx: Patient 2d prior to presentation fell from his elevated bed and hit his L lower back and head, without LOC. Since the fall, has lower back pain/L buttock that is severe when flat, and aggravated with movement. No radiculopathy symptoms, no urinary/rectal symptoms that are new. Has decreased ambulation since then. Tylenol failed to alleviate. No other falls since then.  *Imaging:  Multilevel thoracic spondylosis with multilevel bridging osteophytes raising the possibility of DISH. Neural foraminal stenosis greatest on the right at T3-T4 and T4-T5. Multilevel lumbar spondylosis greatest at L3-4 and L4-5 as above.  *Neurosurgery recommended MRI but unable to be completed due to metal foreign body in intestine.(See below)  *CT abd/pelvis negative for any bony pathology.  --Neurosurgery consult appreciated:  -Neurosurgery recommends to continued physical therapy and conservative therapies at this point.  If patient's symptoms are not improving, an injection could be considered perhaps on the left at L4-5 or L5-S1 depending on how his symptoms are presenting at that time.  He can follow-up with neurosurgery clinic in 6 weeks for  reevaluation, if his hairpin eventually passes, he could have an MRI for further evaluation at that time if needed.  -PT OT recommending TCU, awaiting prior Auth  --Fall precautions.     Foreign body ingestion  *noted during scan prior to initiation of MRI, though was present on admission rib XR  *CT abd/pelvis showing a presumably metallic structure measuring 4.5cm in mid small bowel without evidence of perforation  *He denied any intentional ingestion, has no recollection of unintentionally swallowing any object  -General surgery following, appreciate input  *Abd X-ray 6/27: Hairpin foreign body has progressed (now projecting over the right hemiabdomen wither within distal small bowel (favored) or proximal colon).    Abd Xray 6/28:  FB remains in the right abdomen in similar position compared to the day prior  -Patient continues to deny any abdominal pain  -Repeat abdominal x-ray -7/4/2024 shows metallic hairpin projects in the lower abdomen at the level of L4 with moderate rectal stool burden  -Recommend PCP follow-up outpatient for repeat abdominal x-ray to follow-up on foreign body    Left hip flexor weakness  Left upper thigh and buttock pain  *Left hip Xray negative for acute fracture or dislocation.    *Thoracic spine and lumbar spine CT revealed multilevel spondylosis, no fracture noted.    *Cervical spine MRI from [facility] on 2/16/2024 reports multilevel spinal canal stenosis at C3-4, C4-5, and C5-6.  --Neurosurgery consult appreciated.  --General Neurology consult appreciated.   --Consider epidural injection; though will need to hold DOAC and awaiting MRI as above.  --PT/OT.    --Gabapentin for pain.    --Follow up with Neurology or Ortho to have an outpatient EMG.       SALLIE on CKD2 (Resolved)  *Baseline creatinine 1, stable at admission.  --Cr up to 1.44 on 6/26; suspect due to initiation celebrex on admission in combination with diuretic and ACE.  -Most recent creatinine was 1 on 6/28  -Avoid  nephrotoxins, monitor intermittently    COPD, not on chronic O2, not in exacerbation  History of PE  Adenocarcinoma of lung s/p resection in 2016  *No acute dyspneic symptoms.  --Duonebs available PRN.  -- Continue PTA DOAC (though unclear why he's on 2.5 mg q12 instead of 5 mg q12).  --CPAP at night.    --Consider outpatient sleep study and PFTs.  --Patient should obtain trelegy inhaler from home and will be resumed.       Hypertension  Hyperlipidemia  --Continue PTA metoprolol, statin.  -- Continue PTA lasix and lisinopril   --PRN hydralazine available.       Malignant metastatic carcinoid tumor  Rib Mass  *Status post exploratory laparotomy, small bowel resection and removal of mesenteric mass 2023  *Follows at [facility] w/ monthly lanreotide injections. Reports in 3/2024 suggest slow progressive w/ metastasis to the liver, bone, retroperitoneum and mesentery  *Patient complaining of R inferior R nodule that he hasn't noticed.    --Patient gets Lanreotide injections monthly in his clinic (last dose was May 29th). Per pt's S/O, Onco MD said ok for pt to get next dose after being discharged from TCU.      Chronic diarrhea  *Chronic and stable.  --Continued on PTA cholestyramine.     Non-insulin dependent type 2 diabetes mellitus   *A1C 7%.  --ISS, medium intensity.    --Continued on PTA metformin.  --Mod CHO diet.       BPH  --Continue PTA finasteride.     Morbid Obesity  CIERA  --Outpatient diet and exercise as able.  --CPAP.        Chronic macrocytic anemia  *HGB stable at 11.8.  *Iron studies 6/25 consistent with combination iron deficiency and chronic inflammation.  --Resume iron supplement at discharge, follow up with PCP.     Diet: Combination Diet No Caffeine Diet, Low Saturated Fat Na <2400mg Diet    DVT Prophylaxis: DOAC  Gomez Catheter: Not present  Lines: None     Cardiac Monitoring: None  Code Status: No CPR- Do NOT Intubate      Clinically Significant Risk Factors                  # Hypertension: Noted  "on problem list             # DMII: A1C = 7.0 % (Ref range: <5.7 %) within past 6 months   # Obesity: Estimated body mass index is 31.95 kg/m  as calculated from the following:    Height as of 6/17/24: 1.702 m (5' 7\").    Weight as of 6/17/24: 92.5 kg (204 lb).             Disposition Plan     Medically Ready for Discharge: Ready Now awaiting placement             Susanne Patterson MD  Hospitalist Service  [facility]  Securely message with [email or web address] (more info)  Text page via [facility] Paging/Directory   ______________________________________________________________________    Interval History   Patient with no new complaints.  Reports pain is managed with the current pain regimen.  No new nursing concerns.    Physical Exam   Vital Signs: Temp: 98  F (36.7  C) Temp src: Oral BP: 112/66 Pulse: 65   Resp: 18 SpO2: 96 % O2 Device: None (Room air)    Weight: 0 lbs 0 oz  Exam:  Constitutional: Awake, alert and no distress. Appears comfortable  Head: Normocephalic. No masses, lesions, tenderness or abnormalities  ENMT: ENT exam normal, no neck nodes or sinus tenderness  Cardiovascular: RRR.  No murmurs, no rubs or JVD  Respiratory: Normal WOB,b/l equal air entry, no wheezes or crackles   Gastrointestinal: Abdomen soft, non-tender. BS normal. No masses, organomegaly  : Deferred  Extremities : No edema , no clubbing or cyanosis    Neurologic: Cranial nerves II-XII grossly intact , power decreased left lower extremity, Reflexes normal and symmetric. Sensation grossly WNL.     Medical Decision Making       33 MINUTES SPENT BY ME on the date of service doing chart review, history, exam, documentation & further activities per the note.      Data         Imaging results reviewed over the past 24 hrs:   Recent Results (from the past 24 hour(s))   XR Abdomen Port 1 View    Narrative    EXAM: XR ABDOMEN PORT 1 VIEW  LOCATION: [facility]  DATE: 7/4/2024    INDICATION: foreign body " ingestion  COMPARISON: 07/01/2024.      Impression    IMPRESSION: Metallic hair pin projects in the lower abdomen at the level of L4. Moderate rectal stool burden. Vascular calcifications.     Recent Labs   Lab 07/05/24  0208 07/04/24  2109 07/04/24  1659 06/28/24  1215 06/28/24  0833   WBC  --   --   --   --  6.9   HGB  --   --   --   --  12.2*   MCV  --   --   --   --  102*   PLT  --   --   --   --  257   NA  --   --   --   --  137   POTASSIUM  --   --   --   --  4.5   CHLORIDE  --   --   --   --  104   CO2  --   --   --   --  24   BUN  --   --   --   --  12.4   CR  --   --   --   --  1.00   ANIONGAP  --   --   --   --  9   JESSE  --   --   --   --  9.2   * 141* 104*   < > 155*    < > = values in this interval not displayed.

## 2024-07-05 NOTE — PLAN OF CARE
Goal Outcome Evaluation:               Trauma/Ortho/Medical (Choose one): Medical/Ortho     Diagnosis:Low back pain/(L) Hip pain  POD#: N/A  Mental Status: Oriented x 4  Activity/dangle: Up with one assist with a GB/Walker  Diet: Low sat/Low sodium and caffeine free  Pain: Denies  Gomez/Voiding: Bathroom  Tele/Restraints/Iso: None  02/LDA: ROSE BLAKE  D/C Date: Unknown  Other Info: Blood sugar at 0200-Repeat abdominal x-ray -7/4/2024 shows metallic hairpin projects in the lower abdomen at the level of L4 with moderate rectal stool burden

## 2024-07-05 NOTE — PLAN OF CARE
Date/Time: 07/04: 3933-3279    Trauma/Ortho/Medical (Choose one): Medical/Ortho    Diagnosis:Low back pain/(L) Hip pain  POD#: N/A  Mental Status: Oriented x 4  Activity/dangle: Up with one assist with a GB/Walker  Diet: Low sat/Low sodium and caffeine free  Pain: Denies  Gomez/Voiding: Bathroom  Tele/Restraints/Iso: None  02/LDA: ROSE BLAKE  D/C Date: Unknown  Other Info: Is on sliding scale coverage. -Repeat abdominal x-ray -7/4/2024 shows metallic hairpin projects in the lower abdomen at the level of L4 with moderate rectal stool burden

## 2024-07-06 ENCOUNTER — APPOINTMENT (OUTPATIENT)
Dept: PHYSICAL THERAPY | Facility: CLINIC | Age: 79
DRG: 552 | End: 2024-07-06
Payer: COMMERCIAL

## 2024-07-06 LAB
GLUCOSE BLDC GLUCOMTR-MCNC: 121 MG/DL (ref 70–99)
GLUCOSE BLDC GLUCOMTR-MCNC: 133 MG/DL (ref 70–99)

## 2024-07-06 PROCEDURE — 250N000013 HC RX MED GY IP 250 OP 250 PS 637: Performed by: HOSPITALIST

## 2024-07-06 PROCEDURE — 99232 SBSQ HOSP IP/OBS MODERATE 35: CPT | Performed by: INTERNAL MEDICINE

## 2024-07-06 PROCEDURE — 97530 THERAPEUTIC ACTIVITIES: CPT | Mod: GP

## 2024-07-06 PROCEDURE — 250N000013 HC RX MED GY IP 250 OP 250 PS 637: Performed by: PHYSICIAN ASSISTANT

## 2024-07-06 PROCEDURE — 250N000013 HC RX MED GY IP 250 OP 250 PS 637: Performed by: STUDENT IN AN ORGANIZED HEALTH CARE EDUCATION/TRAINING PROGRAM

## 2024-07-06 PROCEDURE — 97116 GAIT TRAINING THERAPY: CPT | Mod: GP

## 2024-07-06 PROCEDURE — 120N000001 HC R&B MED SURG/OB

## 2024-07-06 PROCEDURE — 250N000013 HC RX MED GY IP 250 OP 250 PS 637: Performed by: INTERNAL MEDICINE

## 2024-07-06 RX ADMIN — GABAPENTIN 600 MG: 300 CAPSULE ORAL at 21:27

## 2024-07-06 RX ADMIN — FUROSEMIDE 40 MG: 40 TABLET ORAL at 09:25

## 2024-07-06 RX ADMIN — APIXABAN 2.5 MG: 2.5 TABLET, FILM COATED ORAL at 09:25

## 2024-07-06 RX ADMIN — ACETAMINOPHEN 975 MG: 325 TABLET, FILM COATED ORAL at 09:25

## 2024-07-06 RX ADMIN — INSULIN ASPART 5 UNITS: 100 INJECTION, SOLUTION INTRAVENOUS; SUBCUTANEOUS at 18:30

## 2024-07-06 RX ADMIN — GABAPENTIN 300 MG: 300 CAPSULE ORAL at 09:26

## 2024-07-06 RX ADMIN — INSULIN ASPART 6 UNITS: 100 INJECTION, SOLUTION INTRAVENOUS; SUBCUTANEOUS at 09:18

## 2024-07-06 RX ADMIN — METHOCARBAMOL 500 MG: 500 TABLET ORAL at 13:15

## 2024-07-06 RX ADMIN — METFORMIN HYDROCHLORIDE 1000 MG: 500 TABLET ORAL at 09:25

## 2024-07-06 RX ADMIN — CHOLESTYRAMINE 4 G: 4 POWDER, FOR SUSPENSION ORAL at 21:27

## 2024-07-06 RX ADMIN — METHOCARBAMOL 500 MG: 500 TABLET ORAL at 18:30

## 2024-07-06 RX ADMIN — ACETAMINOPHEN 975 MG: 325 TABLET, FILM COATED ORAL at 18:30

## 2024-07-06 RX ADMIN — METFORMIN HYDROCHLORIDE 1000 MG: 500 TABLET ORAL at 18:29

## 2024-07-06 RX ADMIN — METHOCARBAMOL 500 MG: 500 TABLET ORAL at 21:28

## 2024-07-06 RX ADMIN — LISINOPRIL 40 MG: 40 TABLET ORAL at 09:28

## 2024-07-06 RX ADMIN — METOPROLOL SUCCINATE 25 MG: 25 TABLET, EXTENDED RELEASE ORAL at 09:26

## 2024-07-06 RX ADMIN — LIDOCAINE 1 PATCH: 4 PATCH TOPICAL at 09:40

## 2024-07-06 RX ADMIN — APIXABAN 2.5 MG: 2.5 TABLET, FILM COATED ORAL at 21:28

## 2024-07-06 RX ADMIN — ATORVASTATIN CALCIUM 20 MG: 20 TABLET, FILM COATED ORAL at 21:27

## 2024-07-06 RX ADMIN — ACETAMINOPHEN 975 MG: 325 TABLET, FILM COATED ORAL at 21:27

## 2024-07-06 RX ADMIN — METHOCARBAMOL 500 MG: 500 TABLET ORAL at 09:25

## 2024-07-06 RX ADMIN — INSULIN ASPART 3 UNITS: 100 INJECTION, SOLUTION INTRAVENOUS; SUBCUTANEOUS at 14:54

## 2024-07-06 RX ADMIN — CHOLESTYRAMINE 4 G: 4 POWDER, FOR SUSPENSION ORAL at 09:27

## 2024-07-06 RX ADMIN — FINASTERIDE 5 MG: 5 TABLET, FILM COATED ORAL at 09:28

## 2024-07-06 ASSESSMENT — ACTIVITIES OF DAILY LIVING (ADL)
ADLS_ACUITY_SCORE: 43

## 2024-07-06 NOTE — PROGRESS NOTES
Care Management Follow Up    Length of Stay (days): 8    Expected Discharge Date: 07/07/2024     Concerns to be Addressed: discharge planning     Patient plan of care discussed at interdisciplinary rounds: Yes    Anticipated Discharge Disposition: Transitional Care     Anticipated Discharge Services:  Therapy  Anticipated Discharge DME:  N/A    Patient/family educated on Medicare website which has current facility and service quality ratings:  N/A  Education Provided on the Discharge Plan:  yes  Patient/Family in Agreement with the Plan: yes    Referrals Placed by CM/SW:  TCU referrals  Private pay costs discussed: Not applicable    Additional Information:  Call placed to [facility] to inquire as to whether they have received pre-auth from patient's insurance.  Per Tracy, they have not received pre-auth as of yet.  Updated patient and his daughter Annetta as to this information.  Answered all questions.    Will continue to follow.      VJ Scherer, [facility]    [phone]  [facility]

## 2024-07-06 NOTE — PROGRESS NOTES
[facility]    Medicine Progress Note - Hospitalist Service    Date of Admission:  6/24/2024    Assessment & Plan   Nahun Villalobos is a 78 year old male who was registered to short-stay/observation on 6/24/2024 due to intractable back pain and left hip pain following a mechanical fall.      Past medical history significant for hypertension, hyperlipidemia, DM II, obesity, CIERA , COPD, history of PE, malignant carcinoid tumor, lung carcinoma, peripheral neuropathy, cervical myelopathy      Presented w/ lower back pain following a fall.     Intractable back pain post mechanical fall  Multilevel thoracic and lumbar spondylosis w/ Thoracic foraminal stenosis  History of arthritis  Peripheral neuropathy  Cervical myelopathy  Senile frailty and chronic debilitation   *Hx: Patient 2d prior to presentation fell from his elevated bed and hit his L lower back and head, without LOC. Since the fall, has lower back pain/L buttock that is severe when flat, and aggravated with movement. No radiculopathy symptoms, no urinary/rectal symptoms that are new. Has decreased ambulation since then. Tylenol failed to alleviate. No other falls since then.  *Imaging:  Multilevel thoracic spondylosis with multilevel bridging osteophytes raising the possibility of DISH. Neural foraminal stenosis greatest on the right at T3-T4 and T4-T5. Multilevel lumbar spondylosis greatest at L3-4 and L4-5 as above.  *Neurosurgery recommended MRI but unable to be completed due to metal foreign body in intestine.(See below)  *CT abd/pelvis negative for any bony pathology.  --Neurosurgery consult appreciated:  -Neurosurgery recommends to continued physical therapy and conservative therapies at this point.  If patient's symptoms are not improving, an injection could be considered perhaps on the left at L4-5 or L5-S1 depending on how his symptoms are presenting at that time.  He can follow-up with neurosurgery clinic in 6 weeks for  reevaluation, if his hairpin eventually passes, he could have an MRI for further evaluation at that time if needed.  -PT OT recommending TCU, awaiting prior Auth  --Fall precautions.     Foreign body ingestion  *noted during scan prior to initiation of MRI, though was present on admission rib XR  *CT abd/pelvis showing a presumably metallic structure measuring 4.5cm in mid small bowel without evidence of perforation  *He denied any intentional ingestion, has no recollection of unintentionally swallowing any object  -General surgery following, appreciate input  *Abd X-ray 6/27: Hairpin foreign body has progressed (now projecting over the right hemiabdomen wither within distal small bowel (favored) or proximal colon).    Abd Xray 6/28:  FB remains in the right abdomen in similar position compared to the day prior  -Patient continues to deny any abdominal pain  -Repeat abdominal x-ray -7/4/2024 shows metallic hairpin projects in the lower abdomen at the level of L4 with moderate rectal stool burden  -Recommend PCP follow-up outpatient for repeat abdominal x-ray to follow-up on foreign body    Left hip flexor weakness  Left upper thigh and buttock pain  *Left hip Xray negative for acute fracture or dislocation.    *Thoracic spine and lumbar spine CT revealed multilevel spondylosis, no fracture noted.    *Cervical spine MRI from [facility] on 2/16/2024 reports multilevel spinal canal stenosis at C3-4, C4-5, and C5-6.  --Neurosurgery consult appreciated.  --General Neurology consult appreciated.   --Consider epidural injection; though will need to hold DOAC and awaiting MRI as above.  --PT/OT.    --Gabapentin for pain.    --Follow up with Neurology or Ortho to have an outpatient EMG.       SALLIE on CKD2 (Resolved)  *Baseline creatinine 1, stable at admission.  --Cr up to 1.44 on 6/26; suspect due to initiation celebrex on admission in combination with diuretic and ACE.  -Most recent creatinine was 1 on 6/28  -Avoid  nephrotoxins, monitor intermittently    COPD, not on chronic O2, not in exacerbation  History of PE  Adenocarcinoma of lung s/p resection in 2016  *No acute dyspneic symptoms.  --Duonebs available PRN.  -- Continue PTA DOAC (though unclear why he's on 2.5 mg q12 instead of 5 mg q12).  --CPAP at night.    --Consider outpatient sleep study and PFTs.  --Patient should obtain trelegy inhaler from home and will be resumed.       Hypertension  Hyperlipidemia  --Continue PTA metoprolol, statin.  -- Continue PTA lasix and lisinopril   --PRN hydralazine available.       Malignant metastatic carcinoid tumor  Rib Mass  *Status post exploratory laparotomy, small bowel resection and removal of mesenteric mass 2023  *Follows at [facility] w/ monthly lanreotide injections. Reports in 3/2024 suggest slow progressive w/ metastasis to the liver, bone, retroperitoneum and mesentery  *Patient complaining of R inferior R nodule that he hasn't noticed.    --Patient gets Lanreotide injections monthly in his clinic (last dose was May 29th). Per pt's S/O, Onco MD said ok for pt to get next dose after being discharged from TCU.      Chronic diarrhea  *Chronic and stable.  --Continued on PTA cholestyramine.     Non-insulin dependent type 2 diabetes mellitus   *A1C 7%.  --ISS, medium intensity.    --Continued on PTA metformin.  --Mod CHO diet.       BPH  --Continue PTA finasteride.     Morbid Obesity  CIERA  --Outpatient diet and exercise as able.  --CPAP.        Chronic macrocytic anemia  *HGB stable at 11.8.  *Iron studies 6/25 consistent with combination iron deficiency and chronic inflammation.  --Resume iron supplement at discharge, follow up with PCP.     Diet: Combination Diet No Caffeine Diet, Low Saturated Fat Na <2400mg Diet  Snacks/Supplements Adult: Gelatein Plus; With Meals    DVT Prophylaxis: DOAC  Gomez Catheter: Not present  Lines: None     Cardiac Monitoring: None  Code Status: No CPR- Do NOT Intubate      Clinically  "Significant Risk Factors                  # Hypertension: Noted on problem list             # DMII: A1C = 7.0 % (Ref range: <5.7 %) within past 6 months   # Obesity: Estimated body mass index is 31.95 kg/m  as calculated from the following:    Height as of 6/17/24: 1.702 m (5' 7\").    Weight as of 6/17/24: 92.5 kg (204 lb).             Disposition Plan     Medically Ready for Discharge: Ready Now awaiting placement             Susanne Patterson MD  Hospitalist Service  [facility]  Securely message with [employer] (more info)  Text page via [facility] Paging/Directory   ______________________________________________________________________    Interval History   Patient with no new complaints.  No new nursing concerns.  Discussed with , still have not heard back from [place] regarding insurance Auth    Physical Exam   Vital Signs: Temp: 97.4  F (36.3  C) Temp src: Oral BP: (!) 144/77 Pulse: 61   Resp: 16 SpO2: 95 % O2 Device: None (Room air)    Weight: 0 lbs 0 oz  Exam:  Constitutional: Awake, alert and no distress. Appears comfortable  Head: Normocephalic. No masses, lesions, tenderness or abnormalities  ENMT: ENT exam normal, no neck nodes or sinus tenderness  Cardiovascular: RRR.  No murmurs, no rubs or JVD  Respiratory: Normal WOB,b/l equal air entry, no wheezes or crackles   Gastrointestinal: Abdomen soft, non-tender. BS normal. No masses, organomegaly  : Deferred  Extremities : No edema , no clubbing or cyanosis    Neurologic: Cranial nerves II-XII grossly intact , power decreased left lower extremity, Reflexes normal and symmetric. Sensation grossly WNL.     Medical Decision Making       34 MINUTES SPENT BY ME on the date of service doing chart review, history, exam, documentation & further activities per the note.      Data         Imaging results reviewed over the past 24 hrs:   No results found for this or any previous visit (from the past 24 hour(s)).    Recent Labs   Lab " 07/05/24  2129 07/05/24  1710 07/05/24  1155   * 154* 179*

## 2024-07-06 NOTE — PLAN OF CARE
Goal Outcome Evaluation:  [id number]     Diagnosis: post fall Back pain, L hip pain  Orientation/Cognitive: A&OX4  Mobility Level/dangle: Ax1/walker,not OOB  Pain Management: denies, only with movement per pt  Tele/VS/O2: VSS@RA  Diet: low fat, low sodium/no caffeine  Bowel/Bladder: per urinal  Drains/Devices: PIV SL  Discharge date/time: placement pending insurance auth.

## 2024-07-06 NOTE — PROGRESS NOTES
.Date/Time 7/6/24 [id number]  Diagnosis:Post Fall Back, Hip Pain  POD#:NA  Mental Status:A&OX4  Activity/dangle: A1& walker  Diet:Cardiac diet   Pain:Denies  Gomez/Voiding:BR/ Urinal   Tele/Restraints/Iso:Na  02/LDA:RA/ SL   D/C Date:TCU pending   Other Info:VSS on RA . Coccyx care done. Baseline numbness. BG check with Carb count coverage.

## 2024-07-06 NOTE — PLAN OF CARE
Date/Time: 7/5    Trauma/Ortho/Medical (Choose one) : ortho     Diagnosis:Back pain,left hip pain   POD#:none  Mental Status:A/OX4  Activity/dangle: Up with 1/walker.  Diet:low fat,low sodium and no caffeine diet   Pain:schedule tylenol,robaxin   Gomez/Voiding:urinal   Tele/Restraints/Iso:none  02/LDA:IV SL   D/C Date:Awaiting insurance auth   Other Info:A/OX4,VSS.Diabetic and insulin check done at bedtime and coverage not needed.

## 2024-07-07 LAB
CREAT SERPL-MCNC: 0.99 MG/DL (ref 0.67–1.17)
EGFRCR SERPLBLD CKD-EPI 2021: 77 ML/MIN/1.73M2
GLUCOSE BLDC GLUCOMTR-MCNC: 110 MG/DL (ref 70–99)
GLUCOSE BLDC GLUCOMTR-MCNC: 113 MG/DL (ref 70–99)
GLUCOSE BLDC GLUCOMTR-MCNC: 82 MG/DL (ref 70–99)
HOLD SPECIMEN: NORMAL

## 2024-07-07 PROCEDURE — 120N000001 HC R&B MED SURG/OB

## 2024-07-07 PROCEDURE — 250N000013 HC RX MED GY IP 250 OP 250 PS 637: Performed by: HOSPITALIST

## 2024-07-07 PROCEDURE — 250N000013 HC RX MED GY IP 250 OP 250 PS 637: Performed by: INTERNAL MEDICINE

## 2024-07-07 PROCEDURE — 99232 SBSQ HOSP IP/OBS MODERATE 35: CPT | Performed by: INTERNAL MEDICINE

## 2024-07-07 PROCEDURE — 250N000013 HC RX MED GY IP 250 OP 250 PS 637: Performed by: STUDENT IN AN ORGANIZED HEALTH CARE EDUCATION/TRAINING PROGRAM

## 2024-07-07 PROCEDURE — 82565 ASSAY OF CREATININE: CPT | Performed by: STUDENT IN AN ORGANIZED HEALTH CARE EDUCATION/TRAINING PROGRAM

## 2024-07-07 PROCEDURE — 250N000013 HC RX MED GY IP 250 OP 250 PS 637: Performed by: PHYSICIAN ASSISTANT

## 2024-07-07 PROCEDURE — 36415 COLL VENOUS BLD VENIPUNCTURE: CPT | Performed by: STUDENT IN AN ORGANIZED HEALTH CARE EDUCATION/TRAINING PROGRAM

## 2024-07-07 RX ADMIN — METOPROLOL SUCCINATE 25 MG: 25 TABLET, EXTENDED RELEASE ORAL at 08:14

## 2024-07-07 RX ADMIN — CHOLESTYRAMINE 4 G: 4 POWDER, FOR SUSPENSION ORAL at 20:26

## 2024-07-07 RX ADMIN — GABAPENTIN 300 MG: 300 CAPSULE ORAL at 08:14

## 2024-07-07 RX ADMIN — METHOCARBAMOL 500 MG: 500 TABLET ORAL at 08:14

## 2024-07-07 RX ADMIN — METHOCARBAMOL 500 MG: 500 TABLET ORAL at 19:12

## 2024-07-07 RX ADMIN — METFORMIN HYDROCHLORIDE 1000 MG: 500 TABLET ORAL at 19:12

## 2024-07-07 RX ADMIN — LIDOCAINE 1 PATCH: 4 PATCH TOPICAL at 08:15

## 2024-07-07 RX ADMIN — METHOCARBAMOL 500 MG: 500 TABLET ORAL at 13:19

## 2024-07-07 RX ADMIN — GABAPENTIN 600 MG: 300 CAPSULE ORAL at 21:33

## 2024-07-07 RX ADMIN — ATORVASTATIN CALCIUM 20 MG: 20 TABLET, FILM COATED ORAL at 20:26

## 2024-07-07 RX ADMIN — APIXABAN 2.5 MG: 2.5 TABLET, FILM COATED ORAL at 08:14

## 2024-07-07 RX ADMIN — ACETAMINOPHEN 975 MG: 325 TABLET, FILM COATED ORAL at 21:32

## 2024-07-07 RX ADMIN — APIXABAN 2.5 MG: 2.5 TABLET, FILM COATED ORAL at 20:26

## 2024-07-07 RX ADMIN — METFORMIN HYDROCHLORIDE 1000 MG: 500 TABLET ORAL at 08:14

## 2024-07-07 RX ADMIN — CHOLESTYRAMINE 4 G: 4 POWDER, FOR SUSPENSION ORAL at 10:52

## 2024-07-07 RX ADMIN — ACETAMINOPHEN 975 MG: 325 TABLET, FILM COATED ORAL at 08:14

## 2024-07-07 RX ADMIN — ACETAMINOPHEN 975 MG: 325 TABLET, FILM COATED ORAL at 15:58

## 2024-07-07 RX ADMIN — INSULIN ASPART 6 UNITS: 100 INJECTION, SOLUTION INTRAVENOUS; SUBCUTANEOUS at 08:00

## 2024-07-07 RX ADMIN — FINASTERIDE 5 MG: 5 TABLET, FILM COATED ORAL at 08:14

## 2024-07-07 RX ADMIN — METHOCARBAMOL 500 MG: 500 TABLET ORAL at 21:33

## 2024-07-07 RX ADMIN — LISINOPRIL 40 MG: 40 TABLET ORAL at 08:14

## 2024-07-07 RX ADMIN — INSULIN ASPART 6 UNITS: 100 INJECTION, SOLUTION INTRAVENOUS; SUBCUTANEOUS at 12:59

## 2024-07-07 RX ADMIN — FUROSEMIDE 40 MG: 40 TABLET ORAL at 08:14

## 2024-07-07 RX ADMIN — INSULIN ASPART 3 UNITS: 100 INJECTION, SOLUTION INTRAVENOUS; SUBCUTANEOUS at 16:42

## 2024-07-07 ASSESSMENT — ACTIVITIES OF DAILY LIVING (ADL)
ADLS_ACUITY_SCORE: 43

## 2024-07-07 NOTE — PROGRESS NOTES
[facility]    Medicine Progress Note - Hospitalist Service    Date of Admission:  6/24/2024    Assessment & Plan   Nahun Villalobos is a 78 year old male who was registered to short-stay/observation on 6/24/2024 due to intractable back pain and left hip pain following a mechanical fall.      Past medical history significant for hypertension, hyperlipidemia, DM II, obesity, CIERA , COPD, history of PE, malignant carcinoid tumor, lung carcinoma, peripheral neuropathy, cervical myelopathy      Presented w/ lower back pain following a fall.     Intractable back pain post mechanical fall  Multilevel thoracic and lumbar spondylosis w/ Thoracic foraminal stenosis  History of arthritis  Peripheral neuropathy  Cervical myelopathy  Senile frailty and chronic debilitation   *Hx: Patient 2d prior to presentation fell from his elevated bed and hit his L lower back and head, without LOC. Since the fall, has lower back pain/L buttock that is severe when flat, and aggravated with movement. No radiculopathy symptoms, no urinary/rectal symptoms that are new. Has decreased ambulation since then. Tylenol failed to alleviate. No other falls since then.  *Imaging:  Multilevel thoracic spondylosis with multilevel bridging osteophytes raising the possibility of DISH. Neural foraminal stenosis greatest on the right at T3-T4 and T4-T5. Multilevel lumbar spondylosis greatest at L3-4 and L4-5 as above.  *Neurosurgery recommended MRI but unable to be completed due to metal foreign body in intestine.(See below)  *CT abd/pelvis negative for any bony pathology.  --Neurosurgery consult appreciated:  -Neurosurgery recommends to continued physical therapy and conservative therapies at this point.  If patient's symptoms are not improving, an injection could be considered perhaps on the left at L4-5 or L5-S1 depending on how his symptoms are presenting at that time.  He can follow-up with neurosurgery clinic in 6 weeks for  reevaluation, if his hairpin eventually passes, he could have an MRI for further evaluation at that time if needed.  -PT OT recommending TCU, awaiting prior Auth  --Fall precautions.     Foreign body ingestion  *noted during scan prior to initiation of MRI, though was present on admission rib XR  *CT abd/pelvis showing a presumably metallic structure measuring 4.5cm in mid small bowel without evidence of perforation  *He denied any intentional ingestion, has no recollection of unintentionally swallowing any object  -General surgery following, appreciate input  *Abd X-ray 6/27: Hairpin foreign body has progressed (now projecting over the right hemiabdomen wither within distal small bowel (favored) or proximal colon).    Abd Xray 6/28:  FB remains in the right abdomen in similar position compared to the day prior  -Patient continues to deny any abdominal pain  -Repeat abdominal x-ray -7/4/2024 shows metallic hairpin projects in the lower abdomen at the level of L4 with moderate rectal stool burden  -Reordered KUB for 7/8 as patient has been having multiple bowel movements since his last abdominal x-ray    Left hip flexor weakness  Left upper thigh and buttock pain  *Left hip Xray negative for acute fracture or dislocation.    *Thoracic spine and lumbar spine CT revealed multilevel spondylosis, no fracture noted.    *Cervical spine MRI from [facility] on 2/16/2024 reports multilevel spinal canal stenosis at C3-4, C4-5, and C5-6.  --Neurosurgery consult appreciated.  --General Neurology consult appreciated.   --Consider epidural injection; though will need to hold DOAC and awaiting MRI as above.  --PT/OT.    --Gabapentin for pain.    --Follow up with Neurology or Ortho to have an outpatient EMG.       SALLIE on CKD2 (Resolved)  *Baseline creatinine 1, stable at admission.  --Cr up to 1.44 on 6/26; suspect due to initiation celebrex on admission in combination with diuretic and ACE.  -Most recent creatinine was 1 on  6/28  -Avoid nephrotoxins, monitor intermittently    COPD, not on chronic O2, not in exacerbation  History of PE  Adenocarcinoma of lung s/p resection in 2016  *No acute dyspneic symptoms.  --Duonebs available PRN.  -- Continue PTA DOAC (though unclear why he's on 2.5 mg q12 instead of 5 mg q12).  --CPAP at night.    --Consider outpatient sleep study and PFTs.  --Patient should obtain trelegy inhaler from home and will be resumed.       Hypertension  Hyperlipidemia  --Continue PTA metoprolol, statin.  -- Continue PTA lasix and lisinopril   --PRN hydralazine available.       Malignant metastatic carcinoid tumor  Rib Mass  *Status post exploratory laparotomy, small bowel resection and removal of mesenteric mass 2023  *Follows at [facility] w/ monthly lanreotide injections. Reports in 3/2024 suggest slow progressive w/ metastasis to the liver, bone, retroperitoneum and mesentery  *Patient complaining of R inferior R nodule that he hasn't noticed.    --Patient gets Lanreotide injections monthly in his clinic (last dose was May 29th). Per pt's S/O, Onco MD said ok for pt to get next dose after being discharged from TCU.      Chronic diarrhea  *Chronic and stable.  --Continued on PTA cholestyramine.     Non-insulin dependent type 2 diabetes mellitus   *A1C 7%.  --ISS, medium intensity.    --Continued on PTA metformin.  --Mod CHO diet.       BPH  --Continue PTA finasteride.     Morbid Obesity  CIERA  --Outpatient diet and exercise as able.  --CPAP.        Chronic macrocytic anemia  *HGB stable at 11.8.  *Iron studies 6/25 consistent with combination iron deficiency and chronic inflammation.  --Resume iron supplement at discharge, follow up with PCP.     Diet: Combination Diet No Caffeine Diet, Low Saturated Fat Na <2400mg Diet  Snacks/Supplements Adult: Gelatein Plus; With Meals    DVT Prophylaxis: DOAC  Gomez Catheter: Not present  Lines: None     Cardiac Monitoring: None  Code Status: No CPR- Do NOT Intubate   "    Clinically Significant Risk Factors                  # Hypertension: Noted on problem list             # DMII: A1C = 7.0 % (Ref range: <5.7 %) within past 6 months   # Obesity: Estimated body mass index is 31.95 kg/m  as calculated from the following:    Height as of 6/17/24: 1.702 m (5' 7\").    Weight as of 6/17/24: 92.5 kg (204 lb).             Disposition Plan     Medically Ready for Discharge: Ready Now awaiting placement             Susanne Patterson MD  Hospitalist Service  [facility]  Securely message with [employer] (more info)  Text page via [employer] Paging/Directory   ______________________________________________________________________    Interval History   Patient with no new complaints.  No new nursing concerns.  Was wondering about the hair pain on his abdomen to see if we can do a KUB to recheck the position.  Ordered    Physical Exam   Vital Signs: Temp: 98.1  F (36.7  C) Temp src: Oral BP: (!) 155/80 Pulse: 74   Resp: 16 SpO2: 94 % O2 Device: None (Room air)    Weight: 0 lbs 0 oz  Exam:  Constitutional: Awake, alert and no distress. Appears comfortable  Head: Normocephalic. No masses, lesions, tenderness or abnormalities  ENMT: ENT exam normal, no neck nodes or sinus tenderness  Cardiovascular: RRR.  No murmurs, no rubs or JVD  Respiratory: Normal WOB,b/l equal air entry, no wheezes or crackles   Gastrointestinal: Abdomen soft, non-tender. BS normal. No masses, organomegaly  : Deferred  Extremities : No edema , no clubbing or cyanosis    Neurologic: Cranial nerves II-XII grossly intact , power decreased left lower extremity, Reflexes normal and symmetric. Sensation grossly WNL.     Medical Decision Making       33 MINUTES SPENT BY ME on the date of service doing chart review, history, exam, documentation & further activities per the note.      Data         Imaging results reviewed over the past 24 hrs:   No results found for this or any previous visit (from the past 24 " hour(s)).    Recent Labs   Lab 07/06/24  2213 07/06/24  1150 07/05/24  2129   * 121* 119*

## 2024-07-07 NOTE — PLAN OF CARE
Goal Outcome Evaluation:      [id number]     Diagnosis: post fall Back pain, L hip pain    Orientation/Cognitive: A&OX4  Mobility Level/dangle: Ax1/walker  Pain Management: denies, scheduled tylenol only  Tele/VS/O2: VSS@RA  Diet: low fat, low sodium/no caffeine  Bowel/Bladder: per urinal  Drains/Devices: PIV SL  Discharge date/time: placement pending insurance auth.

## 2024-07-07 NOTE — PROGRESS NOTES
.Date/Time 7/7/24 [id number]  Diagnosis:Post fall back pain & left hip pain.  POD#:NA  Mental Status:A&OX4  Activity/dangle:A1& walker   Diet:Cardiac diet   Pain:scheduled med  Gomez/Voiding:Urinal   Tele/Restraints/Iso:NA  02/LDA:RA/ SL   D/C Date:pending   Other Info:VSS on  RA. Baseline neuropathy BLE.BG check with carb count. Family by bedside. Up in chair for meal . Plan KUB tomorrow.

## 2024-07-08 ENCOUNTER — APPOINTMENT (OUTPATIENT)
Dept: PHYSICAL THERAPY | Facility: CLINIC | Age: 79
DRG: 552 | End: 2024-07-08
Payer: COMMERCIAL

## 2024-07-08 ENCOUNTER — APPOINTMENT (OUTPATIENT)
Dept: GENERAL RADIOLOGY | Facility: CLINIC | Age: 79
DRG: 552 | End: 2024-07-08
Attending: INTERNAL MEDICINE
Payer: COMMERCIAL

## 2024-07-08 LAB
GLUCOSE BLDC GLUCOMTR-MCNC: 125 MG/DL (ref 70–99)
GLUCOSE BLDC GLUCOMTR-MCNC: 126 MG/DL (ref 70–99)
GLUCOSE BLDC GLUCOMTR-MCNC: 128 MG/DL (ref 70–99)
GLUCOSE BLDC GLUCOMTR-MCNC: 72 MG/DL (ref 70–99)
GLUCOSE BLDC GLUCOMTR-MCNC: 94 MG/DL (ref 70–99)

## 2024-07-08 PROCEDURE — 250N000013 HC RX MED GY IP 250 OP 250 PS 637: Performed by: INTERNAL MEDICINE

## 2024-07-08 PROCEDURE — 97116 GAIT TRAINING THERAPY: CPT | Mod: GP | Performed by: PHYSICAL THERAPIST

## 2024-07-08 PROCEDURE — 99232 SBSQ HOSP IP/OBS MODERATE 35: CPT | Performed by: INTERNAL MEDICINE

## 2024-07-08 PROCEDURE — 120N000001 HC R&B MED SURG/OB

## 2024-07-08 PROCEDURE — 250N000013 HC RX MED GY IP 250 OP 250 PS 637: Performed by: STUDENT IN AN ORGANIZED HEALTH CARE EDUCATION/TRAINING PROGRAM

## 2024-07-08 PROCEDURE — 250N000013 HC RX MED GY IP 250 OP 250 PS 637: Performed by: HOSPITALIST

## 2024-07-08 PROCEDURE — 74018 RADEX ABDOMEN 1 VIEW: CPT

## 2024-07-08 PROCEDURE — 97530 THERAPEUTIC ACTIVITIES: CPT | Mod: GP | Performed by: PHYSICAL THERAPIST

## 2024-07-08 PROCEDURE — 250N000013 HC RX MED GY IP 250 OP 250 PS 637: Performed by: PHYSICIAN ASSISTANT

## 2024-07-08 PROCEDURE — G0463 HOSPITAL OUTPT CLINIC VISIT: HCPCS

## 2024-07-08 RX ADMIN — APIXABAN 2.5 MG: 2.5 TABLET, FILM COATED ORAL at 21:19

## 2024-07-08 RX ADMIN — METHOCARBAMOL 500 MG: 500 TABLET ORAL at 21:19

## 2024-07-08 RX ADMIN — ACETAMINOPHEN 975 MG: 325 TABLET, FILM COATED ORAL at 21:18

## 2024-07-08 RX ADMIN — ACETAMINOPHEN 975 MG: 325 TABLET, FILM COATED ORAL at 16:09

## 2024-07-08 RX ADMIN — GABAPENTIN 600 MG: 300 CAPSULE ORAL at 21:18

## 2024-07-08 RX ADMIN — LISINOPRIL 40 MG: 40 TABLET ORAL at 08:36

## 2024-07-08 RX ADMIN — CHOLESTYRAMINE 4 G: 4 POWDER, FOR SUSPENSION ORAL at 21:18

## 2024-07-08 RX ADMIN — INSULIN ASPART 6 UNITS: 100 INJECTION, SOLUTION INTRAVENOUS; SUBCUTANEOUS at 14:31

## 2024-07-08 RX ADMIN — METOPROLOL SUCCINATE 25 MG: 25 TABLET, EXTENDED RELEASE ORAL at 08:36

## 2024-07-08 RX ADMIN — LIDOCAINE 1 PATCH: 4 PATCH TOPICAL at 08:39

## 2024-07-08 RX ADMIN — FINASTERIDE 5 MG: 5 TABLET, FILM COATED ORAL at 08:37

## 2024-07-08 RX ADMIN — INSULIN ASPART 8 UNITS: 100 INJECTION, SOLUTION INTRAVENOUS; SUBCUTANEOUS at 09:50

## 2024-07-08 RX ADMIN — METFORMIN HYDROCHLORIDE 1000 MG: 500 TABLET ORAL at 16:09

## 2024-07-08 RX ADMIN — APIXABAN 2.5 MG: 2.5 TABLET, FILM COATED ORAL at 08:36

## 2024-07-08 RX ADMIN — ACETAMINOPHEN 975 MG: 325 TABLET, FILM COATED ORAL at 08:37

## 2024-07-08 RX ADMIN — METHOCARBAMOL 500 MG: 500 TABLET ORAL at 16:09

## 2024-07-08 RX ADMIN — ATORVASTATIN CALCIUM 20 MG: 20 TABLET, FILM COATED ORAL at 21:19

## 2024-07-08 RX ADMIN — METFORMIN HYDROCHLORIDE 1000 MG: 500 TABLET ORAL at 08:36

## 2024-07-08 RX ADMIN — CHOLESTYRAMINE 4 G: 4 POWDER, FOR SUSPENSION ORAL at 08:36

## 2024-07-08 RX ADMIN — GABAPENTIN 300 MG: 300 CAPSULE ORAL at 08:36

## 2024-07-08 RX ADMIN — METHOCARBAMOL 500 MG: 500 TABLET ORAL at 08:36

## 2024-07-08 RX ADMIN — FUROSEMIDE 40 MG: 40 TABLET ORAL at 08:36

## 2024-07-08 ASSESSMENT — ACTIVITIES OF DAILY LIVING (ADL)
ADLS_ACUITY_SCORE: 40
ADLS_ACUITY_SCORE: 40
ADLS_ACUITY_SCORE: 43
ADLS_ACUITY_SCORE: 40
ADLS_ACUITY_SCORE: 43
ADLS_ACUITY_SCORE: 40
ADLS_ACUITY_SCORE: 43
ADLS_ACUITY_SCORE: 40
ADLS_ACUITY_SCORE: 40
ADLS_ACUITY_SCORE: 43
ADLS_ACUITY_SCORE: 40
ADLS_ACUITY_SCORE: 43
ADLS_ACUITY_SCORE: 43
ADLS_ACUITY_SCORE: 40
ADLS_ACUITY_SCORE: 43
ADLS_ACUITY_SCORE: 40
ADLS_ACUITY_SCORE: 43

## 2024-07-08 NOTE — PROGRESS NOTES
"Writer went into room to administer patients carb count insulin. Writer proceeded to ask patient his name and date of birth. Patient replied, \" I am so sick of you people asking the same stupid assanine questions. You know my name and birthday, I should ask you everytime you come in and see how annoyed you get.You are the only one who asks me everytime you come in here. I should have the nursing assistant come in and do this since they know my name and birthday.\" Writer explained to patient that it was hospital policy for patient to tell writer name and date of birth every time a medication is administered. Patient reluctantly told writer name and date of birth but stated, \"he would not repeat it again.\"   "

## 2024-07-08 NOTE — PLAN OF CARE
Goal Outcome Evaluation:  Pt is Aox4, up A1GBW, regular diet. BG checks and carb count insulin administered per orders. WOC completed wound care this shift. Denies pain. Discharge to [facility] tomorrow.

## 2024-07-08 NOTE — PROGRESS NOTES
[facility]    Medicine Progress Note - Hospitalist Service    Date of Admission:  6/24/2024    Assessment & Plan   Nahun Villalobos is a 78 year old male who was registered to short-stay/observation on 6/24/2024 due to intractable back pain and left hip pain following a mechanical fall.      Past medical history significant for hypertension, hyperlipidemia, DM II, obesity, CIERA , COPD, history of PE, malignant carcinoid tumor, lung carcinoma, peripheral neuropathy, cervical myelopathy      Presented w/ lower back pain following a fall.  Patient is currently medically stable and waiting for insurance authorization for transitional care     Intractable back pain post mechanical fall  Multilevel thoracic and lumbar spondylosis w/ Thoracic foraminal stenosis  History of arthritis  Peripheral neuropathy  Cervical myelopathy  Senile frailty and chronic debilitation   *Hx: Patient 2d prior to presentation fell from his elevated bed and hit his L lower back and head, without LOC. Since the fall, has lower back pain/L buttock that is severe when flat, and aggravated with movement. No radiculopathy symptoms, no urinary/rectal symptoms that are new. Has decreased ambulation since then. Tylenol failed to alleviate. No other falls since then.  *Imaging:  Multilevel thoracic spondylosis with multilevel bridging osteophytes raising the possibility of DISH. Neural foraminal stenosis greatest on the right at T3-T4 and T4-T5. Multilevel lumbar spondylosis greatest at L3-4 and L4-5 as above.  *Neurosurgery recommended MRI but unable to be completed due to metal foreign body in intestine.(See below)  *CT abd/pelvis negative for any bony pathology.  --Neurosurgery consult appreciated:  -Neurosurgery recommends to continued physical therapy and conservative therapies at this point.  If patient's symptoms are not improving, an injection could be considered perhaps on the left at L4-5 or L5-S1 depending on how  his symptoms are presenting at that time.  He can follow-up with neurosurgery clinic in 6 weeks for reevaluation, if his hairpin eventually passes, he could have an MRI for further evaluation at that time if needed.  -PT OT recommending TCU, awaiting prior Auth  --Fall precautions.     Foreign body ingestion  *noted during scan prior to initiation of MRI, though was present on admission rib XR  *CT abd/pelvis showing a presumably metallic structure measuring 4.5cm in mid small bowel without evidence of perforation  *He denied any intentional ingestion, has no recollection of unintentionally swallowing any object  -General surgery following, appreciate input  *Abd X-ray 6/27: Hairpin foreign body has progressed (now projecting over the right hemiabdomen wither within distal small bowel (favored) or proximal colon).    Abd Xray 6/28:  FB remains in the right abdomen in similar position compared to the day prior  -Patient continues to deny any abdominal pain  -Repeat abdominal x-ray -7/4/2024 shows metallic hairpin projects in the lower abdomen at the level of L4 with moderate rectal stool burden  -Reordered KUB for 7/8 as patient has been having multiple bowel movements since his last abdominal x-ray and is interested to know if he has passed the hairpin    Left hip flexor weakness  Left upper thigh and buttock pain  *Left hip Xray negative for acute fracture or dislocation.    *Thoracic spine and lumbar spine CT revealed multilevel spondylosis, no fracture noted.    *Cervical spine MRI from [facility] on 2/16/2024 reports multilevel spinal canal stenosis at C3-4, C4-5, and C5-6.  --Neurosurgery consult appreciated.  --General Neurology consult appreciated.   --Consider epidural injection; though will need to hold DOAC and awaiting MRI as above.  --PT/OT.    --Gabapentin for pain.    --Follow up with Neurology or Ortho to have an outpatient EMG.       SALLIE on CKD2 (Resolved)  *Baseline creatinine 1, stable at  admission.  --Cr up to 1.44 on 6/26; suspect due to initiation celebrex on admission in combination with diuretic and ACE.  -Most recent creatinine was 1 on 6/28  -Avoid nephrotoxins, monitor intermittently    COPD, not on chronic O2, not in exacerbation  History of PE  Adenocarcinoma of lung s/p resection in 2016  *No acute dyspneic symptoms.  --Duonebs available PRN.  -- Continue PTA DOAC (though unclear why he's on 2.5 mg q12 instead of 5 mg q12).  --CPAP at night.    --Consider outpatient sleep study and PFTs.  --Patient should obtain trelegy inhaler from home and will be resumed.       Hypertension  Hyperlipidemia  --Continue PTA metoprolol, statin.  -- Continue PTA lasix and lisinopril   --PRN hydralazine available.       Malignant metastatic carcinoid tumor  Rib Mass  *Status post exploratory laparotomy, small bowel resection and removal of mesenteric mass 2023  *Follows at [facility] w/ monthly lanreotide injections. Reports in 3/2024 suggest slow progressive w/ metastasis to the liver, bone, retroperitoneum and mesentery  *Patient complaining of R inferior R nodule that he hasn't noticed.    --Patient gets Lanreotide injections monthly in his clinic (last dose was May 29th). Per pt's S/O, Onc MD said ok for pt to get next dose after being discharged from TCU.      Chronic diarrhea  *Chronic and stable.  --Continued on PTA cholestyramine.     Non-insulin dependent type 2 diabetes mellitus   *A1C 7%.  --ISS, medium intensity.    --Continued on PTA metformin.  --Mod CHO diet.       BPH  --Continue PTA finasteride.     Morbid Obesity  CIERA  --Outpatient diet and exercise as able.  --CPAP.        Chronic macrocytic anemia  *HGB stable at 11.8.  *Iron studies 6/25 consistent with combination iron deficiency and chronic inflammation.  --Resume iron supplement at discharge, follow up with PCP.     Diet: Combination Diet No Caffeine Diet, Low Saturated Fat Na <2400mg Diet  Snacks/Supplements Adult: Gelatein Plus;  "With Meals    DVT Prophylaxis: DOAC  Gomez Catheter: Not present  Lines: None     Cardiac Monitoring: None  Code Status: No CPR- Do NOT Intubate      Clinically Significant Risk Factors                  # Hypertension: Noted on problem list             # DMII: A1C = 7.0 % (Ref range: <5.7 %) within past 6 months   # Obesity: Estimated body mass index is 31.95 kg/m  as calculated from the following:    Height as of 6/17/24: 1.702 m (5' 7\").    Weight as of 6/17/24: 92.5 kg (204 lb).             Disposition Plan     Medically Ready for Discharge: Ready Now awaiting placement             Susanne Patterson MD  Hospitalist Service  [facility]  Securely message with [employer] (more info)  Text page via [employer] Paging/Directory   ______________________________________________________________________    Interval History   Patient with no new complaints.  Still waiting for insurance authorization and hoping to go to [facility] today.  No other nursing concerns.    Physical Exam   Vital Signs: Temp: 97.6  F (36.4  C) Temp src: Oral BP: (!) 146/80 Pulse: 67   Resp: 16 SpO2: 94 % O2 Device: None (Room air)    Weight: 0 lbs 0 oz  Exam:  Constitutional: Awake, alert and no distress. Appears comfortable  Head: Normocephalic. No masses, lesions, tenderness or abnormalities  ENMT: ENT exam normal, no neck nodes or sinus tenderness  Cardiovascular: RRR.  No murmurs, no rubs or JVD  Respiratory: Normal WOB,b/l equal air entry, no wheezes or crackles   Gastrointestinal: Abdomen soft, non-tender. BS normal. No masses, organomegaly  : Deferred  Extremities : No edema , no clubbing or cyanosis    Neurologic: Cranial nerves II-XII grossly intact , power decreased left lower extremity, Reflexes normal and symmetric. Sensation grossly WNL.     Medical Decision Making       32 MINUTES SPENT BY ME on the date of service doing chart review, history, exam, documentation & further activities per the note.      Data     I have " personally reviewed the following data over the past 24 hrs:    N/A  \   N/A   / N/A     N/A N/A N/A /  125 (H)   N/A N/A N/A \       Imaging results reviewed over the past 24 hrs:   No results found for this or any previous visit (from the past 24 hour(s)).    Recent Labs   Lab 07/08/24  0158 07/07/24  2130 07/07/24  1606 07/07/24  1148 07/07/24  0733   CR  --   --   --   --  0.99   * 110* 82   < >  --     < > = values in this interval not displayed.

## 2024-07-08 NOTE — PROGRESS NOTES
Alert and oriented x 4. VSS, room air. Baseline neuropathy extremities. Denied pain. Repositions independently in bed, was in bed all shift. Voiding adequately using urinal. PIV saline locked. Discharge pending TCU placement. Plan for KUB today.

## 2024-07-08 NOTE — PROGRESS NOTES
[facility]  WO Nurse Inpatient Assessment     Consulted for: buttock abrasion    Summary: Skin to buttocks, coccyx, and gluteal cleft intact Monday 7/8. [facility] nurse will sign off.     Patient History (according to provider note(s):      Nahun Villalobos is a 78 year old male who was registered to short-stay/observation on 6/24/2024 due to intractable back pain and left hip pain following a mechanical fall.      Past medical history significant for hypertension, hyperlipidemia, DM II, obesity, CIERA , COPD, history of PE, malignant carcinoid tumor, lung carcinoma, peripheral neuropathy, cervical myelopathy      Presented w/ lower back pain following a fall.     Intractable back pain post mechanical fall  Multilevel thoracic and lumbar spondylosis w/ Thoracic foraminal stenosis  History of arthritis  Peripheral neuropathy  Cervical myelopathy  Senile frailty and chronic debilitation     Assessment:      Areas visualized during today's visit: Focused:, Perineal area, and intergluteal    Skin Injury Location: intergluteal, oneal areas        Last photo: 7/8  Skin injury due to: Incontinence associated dermatitis (IAD) and Moisture associated skin damage (MASD)  Skin history and plan of care: Critic-aid paste residue present on writer's follow up assessment. Chair cushion is place on room recliner. Patient is able to turn and reposition independently in bed. Wearing a pull on BWAP. Using urinal on writer's arrival.  Affected area:      Skin assessment: Intact     Measurements (length x width x depth, in cm) No measurable wound     Color: pink     Temperature  warm     Drainage: none .      Color: none      Odor: none  Pain: mild, tender, intermittently  Pain interventions prior to dressing change: patient tolerated well, no significant pain present , and slow and gentle cares   Treatment goal: Decrease moisture and Protection  STATUS: healed  Supplies ordered: at bedside, supplies stored on unit,  "and discussed with patient       Treatment Plan:     intergluteal  BIDand PRN for episodes of incontinence  Cleanse the area with Darrin cleanse and protect, very gently with soft cloth.  Apply thin layer of critic aid paste on top of it.  With repeat application, do not scrub the paste, only remove soiled paste and reapply.  If complete removal of paste is necessary use baby oil/mineral oil (#168905) and soft wash cloth.  Ensure pt has Mikel-cushion (#492200) while sitting up in the chair.  Use only one Covidien pad in between mattress and pt. No brief while in bed.      Pressure Injury Prevention (PIP) Plan:  If patient is declining pressure injury prevention interventions: Explore reason why and address patient's concerns, Educate on pressure injury risk and prevention intervention(s), If patient is still declining, document \"informed refusal\" , and Ensure Care team is aware ( provider, charge nurse, etc)  Mattress: Follow bed algorithm, reassess daily and order specialty mattress, if indicated.  HOB: Maintain at or below 30 degrees, unless contraindicated  Repositioning in bed: Every 1-2 hours , Left/right positioning; avoid supine, and Raise foot of bed prior to raising head of bed, to reduce patient sliding down (shear)  Heels: Keep elevated off mattress and Pillows under calves  Protective Dressing: Sacral Mepilex for prevention (#942287),  especially for the agitated patient   Positioning Equipment:None  Chair positioning: Chair cushion (#440246) , Repositions self: patient to shift weight every 15 minutes, Assist patient to reposition hourly, and Do NOT use a donut for sitting (this increases pressure to smaller area and creates a higher potential for injury)    If patient has a buttock pressure injury, or high risk for PI use chair cushion or SPS.  Moisture Management: Perineal cleansing /protection: Follow Incontinence Protocol, Avoid brief in bed, Clean and dry skin folds with bathing , Consider InterDry " "(#[id number]) between folds, and Moisturize dry skin  Under Devices: Inspect skin under all medical devices during skin inspection , Ensure tubes are stabilized without tension, and Ensure patient is not lying on medical devices or equipment when repositioned  Ask provider to discontinue device when no longer needed.    Orders: Reviewed    RECOMMEND PRIMARY TEAM ORDER: None, at this time  Education provided: importance of repositioning, plan of care, wound progress, Infection prevention , Moisture management, Hygiene, and Off-loading pressure  Discussed plan of care with: Patient  WOC nurse follow-up plan: signing off  Notify WOC if wound(s) deteriorate.  Nursing to notify the Provider(s) and re-consult the WOC Nurse if new skin concern.    DATA:     Current support surface: Standard  Standard Isoflex gel  Containment of urine/stool: Incontinence Protocol, Brief, Incontinent pad in bed, and recommend to add ISABELLA pump to isoflex support surface  BMI: There is no height or weight on file to calculate BMI.   Active diet order: Orders Placed This Encounter      Combination Diet No Caffeine Diet, Low Saturated Fat Na <2400mg Diet     Output: I/O last 3 completed shifts:  In: 480 [P.O.:480]  Out: 950 [Urine:950]     Labs:   No lab results found in last 7 days.    Invalid input(s): \"GLUCOMBO\"    Pressure injury risk assessment:   Sensory Perception: 3-->slightly limited  Moisture: 3-->occasionally moist  Activity: 3-->walks occasionally  Mobility: 3-->slightly limited  Nutrition: 3-->adequate  Friction and Shear: 3-->no apparent problem  Deuce Score: 18    Marily Fritz RN, CWOCN  Please contact via [employer] at name or group \"WOC nurse\"- M-F [phone]-4P  Leave VM @ [phone] for non-urgent needs. Checked occasionally M-F.   "

## 2024-07-08 NOTE — PROGRESS NOTES
Care Management Follow Up    Length of Stay (days): 10    Expected Discharge Date: 07/08/2024     Concerns to be Addressed: discharge planning     Patient plan of care discussed at interdisciplinary rounds: Yes    Anticipated Discharge Disposition: Transitional Care     Anticipated Discharge Services:    Anticipated Discharge DME:      Patient/family educated on Medicare website which has current facility and service quality ratings:    Education Provided on the Discharge Plan:    Patient/Family in Agreement with the Plan: yes    Referrals Placed by CM/SW:    Private pay costs discussed:  yes    Additional Information:  Writer messaged June at [facility] regarding insurance auth. They received it and will have a bed tomorrow, 7/9/24. Writer asked what time is preferred and will update patient. Writer updated him that the auth was received but he would like to know the results of his recent scan today and his daughter would like him to have an MRI before he leaves. Writer will pass this on to his nurse. He said that he can arrange a ride and when wrier hears back from facility about timing he'd like to know.    1600: Updated patient that he can go to [facility] tomorrow about 1300. He asked if he could come  back for the MRI and writer confirmed that he can leave [facility] for medical appointments and he is agreeable to this if the anshu pin hasn't left his system yet. He will reach out to his family to update them regarding timing.     JANETTE Tillman

## 2024-07-09 ENCOUNTER — PATIENT OUTREACH (OUTPATIENT)
Dept: CARE COORDINATION | Facility: CLINIC | Age: 79
End: 2024-07-09
Payer: COMMERCIAL

## 2024-07-09 ENCOUNTER — APPOINTMENT (OUTPATIENT)
Dept: PHYSICAL THERAPY | Facility: CLINIC | Age: 79
DRG: 552 | End: 2024-07-09
Payer: COMMERCIAL

## 2024-07-09 ENCOUNTER — LAB REQUISITION (OUTPATIENT)
Dept: LAB | Facility: CLINIC | Age: 79
End: 2024-07-09

## 2024-07-09 ENCOUNTER — DOCUMENTATION ONLY (OUTPATIENT)
Dept: GERIATRICS | Facility: CLINIC | Age: 79
End: 2024-07-09
Payer: COMMERCIAL

## 2024-07-09 VITALS
RESPIRATION RATE: 18 BRPM | TEMPERATURE: 98.4 F | HEART RATE: 86 BPM | DIASTOLIC BLOOD PRESSURE: 87 MMHG | OXYGEN SATURATION: 96 % | SYSTOLIC BLOOD PRESSURE: 153 MMHG

## 2024-07-09 DIAGNOSIS — Z11.1 ENCOUNTER FOR SCREENING FOR RESPIRATORY TUBERCULOSIS: ICD-10-CM

## 2024-07-09 LAB
GLUCOSE BLDC GLUCOMTR-MCNC: 117 MG/DL (ref 70–99)
GLUCOSE BLDC GLUCOMTR-MCNC: 126 MG/DL (ref 70–99)
GLUCOSE BLDC GLUCOMTR-MCNC: 136 MG/DL (ref 70–99)

## 2024-07-09 PROCEDURE — 99239 HOSP IP/OBS DSCHRG MGMT >30: CPT | Performed by: HOSPITALIST

## 2024-07-09 PROCEDURE — 97116 GAIT TRAINING THERAPY: CPT | Mod: GP | Performed by: PHYSICAL THERAPY ASSISTANT

## 2024-07-09 PROCEDURE — 250N000013 HC RX MED GY IP 250 OP 250 PS 637: Performed by: INTERNAL MEDICINE

## 2024-07-09 PROCEDURE — 250N000013 HC RX MED GY IP 250 OP 250 PS 637: Performed by: STUDENT IN AN ORGANIZED HEALTH CARE EDUCATION/TRAINING PROGRAM

## 2024-07-09 PROCEDURE — 250N000013 HC RX MED GY IP 250 OP 250 PS 637: Performed by: HOSPITALIST

## 2024-07-09 PROCEDURE — 97530 THERAPEUTIC ACTIVITIES: CPT | Mod: GP | Performed by: PHYSICAL THERAPY ASSISTANT

## 2024-07-09 PROCEDURE — 250N000013 HC RX MED GY IP 250 OP 250 PS 637: Performed by: PHYSICIAN ASSISTANT

## 2024-07-09 RX ORDER — LIDOCAINE 4 G/G
2 PATCH TOPICAL EVERY 24 HOURS
DISCHARGE
Start: 2024-07-09 | End: 2024-07-22

## 2024-07-09 RX ORDER — GABAPENTIN 300 MG/1
600 CAPSULE ORAL AT BEDTIME
DISCHARGE
Start: 2024-07-09 | End: 2024-07-22

## 2024-07-09 RX ORDER — MICONAZOLE NITRATE 20 MG/G
CREAM TOPICAL 2 TIMES DAILY PRN
DISCHARGE
Start: 2024-07-09

## 2024-07-09 RX ORDER — HYDROMORPHONE HYDROCHLORIDE 2 MG/1
1 TABLET ORAL 2 TIMES DAILY PRN
Qty: 15 TABLET | Refills: 0 | Status: SHIPPED | OUTPATIENT
Start: 2024-07-09 | End: 2024-07-22

## 2024-07-09 RX ORDER — GABAPENTIN 300 MG/1
300 CAPSULE ORAL EVERY MORNING
DISCHARGE
Start: 2024-07-10 | End: 2024-07-22

## 2024-07-09 RX ORDER — FUROSEMIDE 20 MG
40 TABLET ORAL DAILY
DISCHARGE
Start: 2024-07-09 | End: 2024-07-22

## 2024-07-09 RX ORDER — METHOCARBAMOL 500 MG/1
500 TABLET, FILM COATED ORAL 4 TIMES DAILY
DISCHARGE
Start: 2024-07-09 | End: 2024-07-22

## 2024-07-09 RX ORDER — ACETAMINOPHEN 500 MG
1000 TABLET ORAL EVERY 8 HOURS
DISCHARGE
Start: 2024-07-09

## 2024-07-09 RX ADMIN — METHOCARBAMOL 500 MG: 500 TABLET ORAL at 04:56

## 2024-07-09 RX ADMIN — METOPROLOL SUCCINATE 25 MG: 25 TABLET, EXTENDED RELEASE ORAL at 08:26

## 2024-07-09 RX ADMIN — LIDOCAINE 1 PATCH: 4 PATCH TOPICAL at 08:27

## 2024-07-09 RX ADMIN — METHOCARBAMOL 500 MG: 500 TABLET ORAL at 08:26

## 2024-07-09 RX ADMIN — LISINOPRIL 40 MG: 40 TABLET ORAL at 08:27

## 2024-07-09 RX ADMIN — GABAPENTIN 300 MG: 300 CAPSULE ORAL at 08:27

## 2024-07-09 RX ADMIN — INSULIN ASPART 2 UNITS: 100 INJECTION, SOLUTION INTRAVENOUS; SUBCUTANEOUS at 08:28

## 2024-07-09 RX ADMIN — APIXABAN 2.5 MG: 2.5 TABLET, FILM COATED ORAL at 08:26

## 2024-07-09 RX ADMIN — METFORMIN HYDROCHLORIDE 1000 MG: 500 TABLET ORAL at 08:27

## 2024-07-09 RX ADMIN — FINASTERIDE 5 MG: 5 TABLET, FILM COATED ORAL at 08:27

## 2024-07-09 RX ADMIN — FUROSEMIDE 40 MG: 40 TABLET ORAL at 08:26

## 2024-07-09 RX ADMIN — ACETAMINOPHEN 975 MG: 325 TABLET, FILM COATED ORAL at 08:26

## 2024-07-09 ASSESSMENT — ACTIVITIES OF DAILY LIVING (ADL)
ADLS_ACUITY_SCORE: 40
ADLS_ACUITY_SCORE: 41
ADLS_ACUITY_SCORE: 40
ADLS_ACUITY_SCORE: 40

## 2024-07-09 NOTE — PROGRESS NOTES
Alert and oriented x 4. VSS, room air. Baseline neuropathy. Denied pain. Able to turn and reposition independently in bed. Voiding adequately, uses urinal. Plan for discharge today at 1 PM.

## 2024-07-09 NOTE — PROGRESS NOTES
Clinic Care Coordination Contact    The Community Health Worker planned to call for a Care Coordination outreach to follow up on goals and action steps.     Did Not Contact Patient.    Per Chart Review, patient is currently admitted at [facility] as of 6/24/24.    OMAR Kim  Clinic Care Coordination  [facility] Clinics: [facility] [place], [place], [place], and [facility]  Phone: [phone]

## 2024-07-09 NOTE — PROGRESS NOTES
Care Management Discharge Note    Discharge Date: 07/08/2024       Discharge Disposition: Transitional Care    Discharge Services:      Discharge DME:      Discharge Transportation: family or friend will provide    Private pay costs discussed: Not applicable    Does the patient's insurance plan have a 3 day qualifying hospital stay waiver?  No    PAS Confirmation Code:    Patient/family educated on Medicare website which has current facility and service quality ratings:      Education Provided on the Discharge Plan:    Persons Notified of Discharge Plans: [facility]UC, bedside Rn, charge Rn, pt , Doctor, and Lainey with [facility].  Patient/Family in Agreement with the Plan: yes    Handoff Referral Completed: No    Additional Information:  Writer is informed by Dr. Santos that he will be discharging pt to TCU today. Doctor wanting to know if he orders an x ray for a few days if [facility] will help ensure that pt gets to xray. Writer spoke with Lainey with [facility] who states they can accept pt today.. Writer informed Lainey that doctor is wanting to set up xray for pt and wanting to make sure that [facility] will be able to help pt if needed to coordinate for his appointment for this. Lainey states understanding.    Writer met with pt at bedside. Pt states agreement to attend [facility]. He is in agreement to 2pm discharge. He states nurse will bring him down at 2pm to door 6 and his friend will help him into the vehicle and transport him over to [facility]. Writer offered for pt to use wheelchair from hospital and have his friend wheelchair him over the skyway. Pt declines this and states he rather be driven over by his friend so that his friend does not have to walk back over with the wheelchair. Pt states no other questions or concerns at this time.     Writer updated bedside RN of pt want to be brought to door 6 for his friend to pick him up. Bedside Rn feels that this will be ok. Writer updated her of 2pm transport time.  Writer updated Lainey  with [facility] of 2pm transport time. She states understanding. Writer updated huc of 2pm transport time.       Writer completed passr, faxed it to tcu and placed it in pt's chart. Writer faxed discharge orders to TCU.     PAS-RR    D: Per DHS regulation, SW completed and submitted PAS-RR to [place] [employer] Direct Connect via the Senior LinkAge Line.  PAS-RR confirmation # is : [id number]    P: Further questions may be directed to Senior LinkAge Line at #[phone], option #4 for PAS-RR staff.      Leticia Torres, MATHEWW  Social Work  [facility]

## 2024-07-09 NOTE — PLAN OF CARE
A/OX4,VSS,Pain control with scheduled tylenol dn robaxin. Up with 1/walker. Voiding well per urinal. Tolerating diet, blood glucose checks with meals and insulin coverage with breakfast done. Pt is discharging to TCU at 1400.

## 2024-07-09 NOTE — DISCHARGE SUMMARY
"[facility]  Hospitalist Discharge Summary      Date of Admission:  6/24/2024  Date of Discharge:  7/9/2024  Discharging Provider: Ivette Santos MD  Discharge Service: Hospitalist Service    Discharge Diagnoses     Please refer to the hospital course below     Clinically Significant Risk Factors     # DMII: A1C = 7.0 % (Ref range: <5.7 %) within past 6 months  # Obesity: Estimated body mass index is 31.95 kg/m  as calculated from the following:    Height as of 6/17/24: 1.702 m (5' 7\").    Weight as of 6/17/24: 92.5 kg (204 lb).       Follow-ups Needed After Discharge   Follow-up Appointments     Follow Up and recommended labs and tests      Follow up with Nursing home physician. Recommend abdominal XR in 3-4 days   to make sure the foreign body is expelled with bowel movement and no   longer present in the rectum.    Neurosurgery follow up if ongoing back pain in 4-6 weeks for follow up   assessment and MRI thoracic and lumbar spines.    Continue CPAP at TCU as per home settings             Unresulted Labs Ordered in the Past 30 Days of this Admission       No orders found from 5/25/2024 to 6/25/2024.        These results will be followed up by none    Discharge Disposition   Discharged to TCU  Condition at discharge: Stable    Hospital Course     Nahun Villalobos is a 78 year old male who was registered to short-stay/observation on 6/24/2024 due to intractable back pain and left hip pain following a mechanical fall.      Past medical history significant for hypertension, hyperlipidemia, DM II, obesity, CIERA , COPD, history of PE, malignant carcinoid tumor, lung carcinoma, peripheral neuropathy, cervical myelopathy.       Intractable back pain post mechanical fall  Multilevel thoracic and lumbar spondylosis w/ Thoracic foraminal stenosis  History of arthritis  Peripheral neuropathy  Cervical myelopathy  Senile frailty and chronic debilitation   * 2 days prior to presentation, patient " fell from his elevated bed and hit his Lt lower back and head, without LOC. Since the fall, has lower back pain/L buttock that is severe when flat, and aggravated with movement. No radiculopathy symptoms, no urinary/rectal symptoms that are new. Has decreased ambulation since then. Tylenol failed to alleviate. No other falls since then.  *Imaging:  Multilevel thoracic spondylosis with multilevel bridging osteophytes raising the possibility of DISH. Neural foraminal stenosis greatest on the right at T3-T4 and T4-T5. Multilevel lumbar spondylosis greatest at L3-4 and L4-5 as above.  *Neurosurgery recommended MRI but unable to be completed due to metal foreign body in intestine.(See below)  *CT abd/pelvis negative for any bony pathology.  --Neurosurgery consult appreciated:- Neurosurgery recommends to continued physical therapy and conservative therapies at this point.  If patient's symptoms are not improving, an injection could be considered perhaps on the left at L4-5 or L5-S1 depending on how his symptoms are presenting at that time.  He can follow-up with neurosurgery clinic in 6 weeks for reevaluation, if his hairpin eventually passes, he could have an MRI for further evaluation at that time if needed.  -Pain has been controlled with tylenol, Robaxin and lidocaine patch.  Has not needed narcotic.  -PT OT recommending TCU   --Fall precautions.     Foreign body ingestion  *Noted during scan prior to initiation of MRI, though was present on admission rib XR  *CT abd/pelvis showed a presumably metallic structure measuring 4.5cm in mid small bowel without evidence of perforation  *He denied any intentional ingestion, has no recollection of unintentionally swallowing any object  -General surgery consulted, Abd X-ray 6/27: Hairpin foreign body has progressed (now projecting over the right hemiabdomen wither within distal small bowel (favored) or proximal colon).  Abd Xray 6/28:  FB remains in the right abdomen in similar  position compared to the day prior  -Patient continues to deny any abdominal pain. Repeat abdominal x-ray -7/4/2024 shows metallic hairpin projects in the lower abdomen at the level of L4 with moderate rectal stool burden and on 7/8 noted in rectum. Anticipate this will now pass since it has travelled through the intestines without issues.  Discussed with patient that in case of any worsened abdominal pain, he needs to go to ER urgently.  Recommended follow-up x-ray in the next few days.     Left hip flexor weakness  Left upper thigh and buttock pain  *Left hip Xray negative for acute fracture or dislocation.    *Thoracic spine and lumbar spine CT revealed multilevel spondylosis, no fracture noted.    *Cervical spine MRI from [facility] on 2/16/2024 reports multilevel spinal canal stenosis at C3-4, C4-5, and C5-6.  --Neurosurgery consult appreciated.  --General Neurology consult appreciated.   --Consider epidural injection; though will need to hold DOAC and MRI as above.  --PT/OT.    --Gabapentin for pain.    --Follow up with Neurology or Ortho to have an outpatient EMG.       SALLIE on CKD2 (Resolved)  *Baseline creatinine 1, stable at admission.  --Cr up to 1.44 on 6/26; suspect due to initiation celebrex on admission in combination with diuretic and ACE.  -Most recent creatinine was 1 on 6/28  -Avoid nephrotoxins, monitor intermittently     COPD, not on chronic O2, not in exacerbation  History of PE  Adenocarcinoma of lung s/p resection in 2016  *No acute dyspneic symptoms.  --Duonebs available PRN.  --Continue PTA DOAC (though unclear why he's on 2.5 mg q12 instead of 5 mg q12).  Recommend follow-up with PCP to address.  --CPAP at night.    --Consider outpatient sleep study and PFTs.  --Patient should obtain trelegy inhaler from home and will be resumed.       Hypertension  Hyperlipidemia  --Continue PTA metoprolol, statin.  -- Continue PTA lasix and lisinopril   --PRN hydralazine available.       Malignant metastatic  carcinoid tumor  Rib Mass  *Status post exploratory laparotomy, small bowel resection and removal of mesenteric mass 2023  *Follows at [facility] w/ monthly lanreotide injections. Reports in 3/2024 suggest slow progressive w/ metastasis to the liver, bone, retroperitoneum and mesentery  *Patient complaining of R inferior R nodule that he hasn't noticed.    --Patient gets Lanreotide injections monthly in his clinic (last dose was May 29th). Per pt's S/O, Onc MD said ok for pt to get next dose after being discharged from TCU.      Chronic diarrhea  *Chronic and stable.  --Continued on PTA cholestyramine.     Non-insulin dependent type 2 diabetes mellitus   *A1C 7%.  --ISS, medium intensity.  Not continuing sliding scale insulin at [facility] since his blood sugars have been mostly in 100s here.  --Continued on PTA metformin.  --Mod CHO diet.     BPH  --Continue PTA finasteride.     Morbid Obesity  CIERA  --Outpatient diet and exercise as able.  -- Continue with CPAP per home setting.        Chronic macrocytic anemia  *HGB stable at 11.8.  *Iron studies 6/25 consistent with combination iron deficiency and chronic inflammation.  --Resume iron supplement at discharge, follow up with PCP.    Consultations This Hospital Stay   NEUROSURGERY IP CONSULT  OCCUPATIONAL THERAPY ADULT IP CONSULT  PHYSICAL THERAPY ADULT IP CONSULT  CARE MANAGEMENT / SOCIAL WORK IP CONSULT  VASCULAR ACCESS ADULT IP CONSULT  NEUROLOGY IP CONSULT  WOUND OSTOMY CONTINENCE NURSE  IP CONSULT  SOCIAL WORK IP CONSULT  SURGERY GENERAL IP CONSULT  PHYSICAL THERAPY ADULT IP CONSULT  OCCUPATIONAL THERAPY ADULT IP CONSULT    Code Status   No CPR- Do NOT Intubate    Time Spent on this Encounter   I, Ivette Santos MD, personally saw the patient today and spent greater than 30 minutes discharging this patient.       Ivette Santos MD  [facility]  [address] [place] [id number]  Phone: [phone]  Fax:  [phone]  ______________________________________________________________________    Physical Exam   Vital Signs: Temp: 98.4  F (36.9  C) Temp src: Oral BP: (!) 153/87 Pulse: 86   Resp: 18 SpO2: 96 % O2 Device: None (Room air)    Weight: 0 lbs 0 oz    General: AAOx3, appears comfortable.  HEENT: PERRLA EOMI. Mucosa moist.   Lungs: Bilateral equal air entry. Clear to auscultation, normal work of breathing.   CVS: S1S2 regular, no tachycardia or murmur.   Abdomen: Soft, NT, ND. BS heard.  MSK: No edema or deformities.  Neuro: AAOX3. CN 2-12 normal. Strength symmetrical.  Skin: No rash.          Primary Care Physician   Olegario Castillo    Discharge Orders      Primary Care - Care Coordination Referral      General info for SNF    Length of Stay Estimate: Short Term Care: Estimated # of Days <30  Condition at Discharge: Improving  Level of care:skilled   Rehabilitation Potential: Good  Admission H&P remains valid and up-to-date: Yes  Recent Chemotherapy: N/A  Use Nursing Home Standing Orders: Yes     Mantoux instructions    Give two-step Mantoux (PPD) Per Facility Policy Yes     Reason for your hospital stay    Fall and Back pain     Activity - Up with assistive device     Activity - Up with nursing assistance     Follow Up and recommended labs and tests    Follow up with Nursing home physician. Recommend abdominal XR in 3-4 days to make sure the foreign body is expelled with bowel movement and no longer present in the rectum.    Neurosurgery follow up if ongoing back pain in 4-6 weeks for follow up assessment and MRI thoracic and lumbar spines.    Continue CPAP at TCU as per home settings     No CPR- Do NOT Intubate     Physical Therapy Adult Consult    Evaluate and treat as clinically indicated.    Reason:  fall and back pain     Occupational Therapy Adult Consult    Evaluate and treat as clinically indicated.    Reason:  fall and back pain     Fall precautions     Diet    Follow this diet upon discharge: Orders  Placed This Encounter      Snacks/Supplements Adult: Gelatein Plus; With Meals      Combination Diet No Caffeine Diet, Low Saturated Fat Na <2400mg Diet       Significant Results and Procedures   Most Recent 3 CBC's:  Recent Labs   Lab Test 06/28/24  0833 06/25/24  0737 06/25/24  0101   WBC 6.9 10.6 10.4   HGB 12.2* 11.6* 11.8*   * 100 101*    267 283     Most Recent 3 BMP's:  Recent Labs   Lab Test 07/09/24  0750 07/09/24  0105 07/08/24  2112 07/07/24  1148 07/07/24  0733 06/28/24  1215 06/28/24  0833 06/27/24  1132 06/27/24  0907 06/26/24  0758 06/26/24  0755   NA  --   --   --   --   --   --  137  --  137  --  139   POTASSIUM  --   --   --   --   --   --  4.5  --  4.8  --  4.3   CHLORIDE  --   --   --   --   --   --  104  --  103  --  103   CO2  --   --   --   --   --   --  24  --  27  --  26   BUN  --   --   --   --   --   --  12.4  --  19.1  --  22.7   CR  --   --   --   --  0.99  --  1.00  --  1.22*  --  1.44*   ANIONGAP  --   --   --   --   --   --  9  --  7  --  10   JESSE  --   --   --   --   --   --  9.2  --  8.6*  --  8.9   * 117* 94   < >  --    < > 155*   < > 156*   < > 145*    < > = values in this interval not displayed.     Most Recent 2 LFT's:  Recent Labs   Lab Test 06/25/24  0737 03/02/24  1254   AST 17 22   ALT 8 15   ALKPHOS 67 69   BILITOTAL 0.5 0.6   ,   Results for orders placed or performed during the hospital encounter of 06/24/24   CT Head w/o Contrast    Narrative    EXAM: CT HEAD W/O CONTRAST  LOCATION: [facility]  DATE: 6/24/2024    INDICATION: fall,hit head, on thinners  COMPARISON: CT head 3/2/2024  TECHNIQUE: Routine CT Head without IV contrast. Multiplanar reformats. Dose reduction techniques were used.    FINDINGS:  INTRACRANIAL CONTENTS: No intracranial hemorrhage, extraaxial collection, or mass effect.  No CT evidence of acute infarct. Mild presumed chronic small vessel ischemic changes. Mild to moderate generalized volume loss with  disproportionate lateral and   third ventriculomegaly similar to previous exams.     VISUALIZED ORBITS/SINUSES/MASTOIDS: Prior bilateral cataract surgery. Visualized portions of the orbits are otherwise unremarkable. No paranasal sinus mucosal disease. No middle ear or mastoid effusion.    BONES/SOFT TISSUES: No significant scalp hematoma. No skull fracture.      Impression    IMPRESSION:  1.  No CT evidence for acute intracranial process.  2.  Brain atrophy and presumed chronic microvascular ischemic changes similar to the previous exam.   CT Thoracic Spine w/o Contrast    Narrative    EXAM: CT THORACIC SPINE W/O CONTRAST, CT LUMBAR SPINE W/O CONTRAST  LOCATION: [facility]  DATE: 6/24/2024    INDICATION: Fall 2 days ago, pain on the left side of the lower thoracic and upper lumbar spine  COMPARISON: None.  TECHNIQUE:  1) Routine CT Thoracic Spine without IV contrast. Multiplanar reformats. Dose reduction techniques were used.   2) Routine CT Lumbar Spine without IV contrast. Multiplanar reformats. Dose reduction techniques were used.     FINDINGS:    THORACIC SPINE CT:  VERTEBRA: 12 rib-bearing thoracic type vertebra. Broad thoracic dextrocurvature. Multilevel interbody ankylosis and ventral/right lateral bridging osteophytes raising the possibility of DISH. Preserved vertebral body heights. No fracture or posttraumatic   subluxation.     CANAL/FORAMINA: Mild diffuse loss of disc height and endplate spurring. No CT evidence for high-grade central spinal canal stenosis. Moderate to severe right neural foraminal stenosis at T3-T4 and T4-T5.    PARASPINAL: No extraspinal abnormality.    LUMBAR SPINE CT:  VERTEBRA: 5 lumbar type vertebra. Mild lumbar dextrocurvature and slight right lateral listhesis at L4-5. 2 to 3 mm anterolisthesis at L4-5. Preserved vertebral body heights. No fracture or posttraumatic subluxation.     CANAL/FORAMINA: Diffuse lumbar spondylosis, with multilevel loss of disc  height and endplate spurring greatest at L4-5 level. Mild to moderate multilevel facet arthropathy, greatest at L4-5. Broad-based posterior disc osteophyte complex, facet   arthropathy, and ligamentum flavum thickening results in moderate trefoil type spinal canal stenosis at L3-4 and L4-5. Moderate right and severe left neural foraminal stenosis at L4-5. Mild right and moderate left neural foraminal stenosis at L5-S1. Mild   bilateral neural foraminal stenosis at L3-4.    PARASPINAL: No extraspinal abnormality.      Impression    IMPRESSION:  THORACIC SPINE CT:  1.  No fracture or posttraumatic subluxation.  2.  Multilevel thoracic spondylosis with multilevel bridging osteophytes raising the possibility of DISH.  3.  Neural foraminal stenosis greatest on the right at T3-T4 and T4-T5.    LUMBAR SPINE CT:  1.  No fracture or posttraumatic subluxation.  2.  Multilevel lumbar spondylosis greatest at L3-4 and L4-5 as above.     CT Lumbar Spine w/o Contrast    Narrative    EXAM: CT THORACIC SPINE W/O CONTRAST, CT LUMBAR SPINE W/O CONTRAST  LOCATION: [facility]  DATE: 6/24/2024    INDICATION: Fall 2 days ago, pain on the left side of the lower thoracic and upper lumbar spine  COMPARISON: None.  TECHNIQUE:  1) Routine CT Thoracic Spine without IV contrast. Multiplanar reformats. Dose reduction techniques were used.   2) Routine CT Lumbar Spine without IV contrast. Multiplanar reformats. Dose reduction techniques were used.     FINDINGS:    THORACIC SPINE CT:  VERTEBRA: 12 rib-bearing thoracic type vertebra. Broad thoracic dextrocurvature. Multilevel interbody ankylosis and ventral/right lateral bridging osteophytes raising the possibility of DISH. Preserved vertebral body heights. No fracture or posttraumatic   subluxation.     CANAL/FORAMINA: Mild diffuse loss of disc height and endplate spurring. No CT evidence for high-grade central spinal canal stenosis. Moderate to severe right neural  foraminal stenosis at T3-T4 and T4-T5.    PARASPINAL: No extraspinal abnormality.    LUMBAR SPINE CT:  VERTEBRA: 5 lumbar type vertebra. Mild lumbar dextrocurvature and slight right lateral listhesis at L4-5. 2 to 3 mm anterolisthesis at L4-5. Preserved vertebral body heights. No fracture or posttraumatic subluxation.     CANAL/FORAMINA: Diffuse lumbar spondylosis, with multilevel loss of disc height and endplate spurring greatest at L4-5 level. Mild to moderate multilevel facet arthropathy, greatest at L4-5. Broad-based posterior disc osteophyte complex, facet   arthropathy, and ligamentum flavum thickening results in moderate trefoil type spinal canal stenosis at L3-4 and L4-5. Moderate right and severe left neural foraminal stenosis at L4-5. Mild right and moderate left neural foraminal stenosis at L5-S1. Mild   bilateral neural foraminal stenosis at L3-4.    PARASPINAL: No extraspinal abnormality.      Impression    IMPRESSION:  THORACIC SPINE CT:  1.  No fracture or posttraumatic subluxation.  2.  Multilevel thoracic spondylosis with multilevel bridging osteophytes raising the possibility of DISH.  3.  Neural foraminal stenosis greatest on the right at T3-T4 and T4-T5.    LUMBAR SPINE CT:  1.  No fracture or posttraumatic subluxation.  2.  Multilevel lumbar spondylosis greatest at L3-4 and L4-5 as above.     XR Ribs Right 2 Views    Narrative    EXAM: XR RIBS RIGHT 2 VIEWS  LOCATION: [facility]  DATE: 6/25/2024    INDICATION: hx metstatic GI cancer, r o Rib metastasis in the lower anterior R ribs  COMPARISON: 03/02/2024      Impression    IMPRESSION: No discrete evidence of acute fracture or suspicious bony lesion. If there is significant clinical concern for rib metastasis, consider correlation with PET/CT or bone scan. Lungs are clear. Linear metallic focus overlies the right para   midline abdomen, possibly hair clip or other device external to the patient, recommend clinical  correlation. Enterotomy sutures. Nonobstructed bowel gas pattern. Atherosclerotic vascular calcifications. Degenerative changes of the thoracolumbar spine.   CT Abdomen Pelvis w/o Contrast    Narrative    EXAM: CT ABDOMEN PELVIS W/O CONTRAST  LOCATION: [facility]  DATE: 6/25/2024    INDICATION: Nonlocalized abdominal pain.  COMPARISON: 2/7/2023, 12/22/2022.  TECHNIQUE: CT scan of the abdomen and pelvis was performed without IV contrast. Multiplanar reformats were obtained. Dose reduction techniques were used.  CONTRAST: None.    FINDINGS:   LOWER CHEST: Marked coronary artery calcification. No pulmonary infiltrates.    HEPATOBILIARY: Interval increase in size of a low-attenuation lesion posterior right hepatic lobe measuring 2.1 x 1.9 cm. Another low-attenuation lesion in the posteromedial right hepatic lobe measures 1.3 cm, previously 1 cm. Cholecystectomy. No biliary   dilatation.    PANCREAS: Diffuse atrophy. No ductal dilatation or acute inflammatory change.    SPLEEN: Normal size.    ADRENAL GLANDS: Unremarkable.    KIDNEYS/BLADDER: No intrarenal, ureteral or bladder calculi. No urinary collecting system dilatation. Bladder decompressed. Allowing for decompression, there is diffuse bladder wall thickening. Severe prostate enlargement unchanged. Bilateral exophytic   low-attenuation renal lesions compatible with cysts for which no further follow-up is necessary.    BOWEL: Linear radiopaque foreign body within a nondilated loop of small bowel in the left mid abdomen. This measures 4.5 cm and would be most compatible with a hairpin. This correlates to the finding on the recent radiograph 6/25/2024. No bowel   dilatation or overt inflammatory change. Colonic diverticulosis. Postoperative changes of a small bowel anastomosis in the mid abdomen.    LYMPH NODES: Interval decrease in size of lymphadenopathy in the abdomen adjacent to the duodenum. One of largest lymph nodes measures 1.4 cm  adjacent to the duodenum. Another area of conglomerate lymphadenopathy in the mesentery is also decreased in   size measuring 1.4 x 2.2 cm, previously 2.8 x 3.3 cm.    VASCULATURE: Normal-caliber aorta. Moderate vascular calcification.    PELVIC ORGANS: No free fluid.    MUSCULOSKELETAL: No suspicious bone lesions. Multilevel degenerative changes in the spine.      Impression    IMPRESSION:   1.  Minimal interval increase in size of a low-attenuation lesion posterior right hepatic lobe. Another lesion in the right hepatic lobe is also minimally increased in size. Consider further follow-up evaluation with contrast-enhanced MRI for definitive   characterization of what is presumed to be related to metastatic disease.    2.  Interval decrease in size of metastatic lymphadenopathy in the retroperitoneum and mesentery.    3.  4.5 cm structure, presumably metallic, (hairpin) in the lumen of the mid small bowel in the left aspect of the abdomen. No perforation or free intraperitoneal air. If the patient is to undergo MRI, it would be recommended that that this be allowed to   completely passed from the digestive system and be evacuated prior to any MRI. This would avoid any artifact or risk of perforation to the bowel. Recommend continued abdominal radiographic follow-up until complete passage.    4.  Results discussed with patient's nurse at 12:03 AM 6/26/2024.   XR Pelvis w Hip Left 1 View    Narrative    EXAM: XR PELVIS AND HIP LEFT 1 VIEW  LOCATION: [facility]  DATE: 6/26/2024    INDICATION: recent fall, posterior left upper thigh buttock pain  COMPARISON: CT abdomen/pelvis 6/25/2024      Impression    IMPRESSION:     No evidence of acute fracture or dislocation.    Mild degenerative changes of the bilateral hips.    Scattered pelvic enthesopathy.    Vascular calcifications.   XR Abdomen 1 View    Narrative    ABDOMEN ONE VIEW  6/27/2024 7:22 AM     HISTORY: assess to see if foreign body has  passed    COMPARISON: Pelvic radiograph 6/26/2024. CT abdomen and pelvis  6/25/2024.      Impression    IMPRESSION: Hairpin foreign body has progressed, now projecting over  the right hemiabdomen, either within distal small bowel (favored) or  proximal colon. Nonobstructive bowel gas pattern. No gross free air  within the limitations of supine technique. Degenerative changes of  the spine.    SHRADDHA TREVIZO MD         SYSTEM ID:  [id number]   XR Abdomen 1 View    Narrative    ABDOMEN ONE VIEW  6/28/2024 1:44 PM     HISTORY: Assess if patient has passed metallic foreign body.    COMPARISON: June 27, 2024       Impression    IMPRESSION: Metallic foreign body in the right abdomen. This is  similar position to comparison. Small amount of stool.  Nonobstructed  bowel gas pattern.     CHANDAN CHACKO MD         SYSTEM ID:  [id number]   XR Abdomen 1 View    Narrative    EXAM: XR ABDOMEN 1 VIEW  LOCATION: [facility]  DATE: 6/29/2024    INDICATION: Assess if patient has passed metallic foreign body  COMPARISON: 6/28/2024      Impression    IMPRESSION: Metallic hair pin is now in the pelvis. Bowel gas pattern is nonobstructed. No visible free intraperitoneal air.   CT Lumbar spine w contrast    Narrative    CT THORACIC SPINE MYELOGRAM WITH CONTRAST   7/1/2024 11:47 AM   CT LUMBAR SPINE MYELOGRAM WITH CONTRAST  7/1/2024 11:47 AM     HISTORY: assess for spine abnormality; new onset hip flexion weakness     TECHNIQUE: Axial images of the thoracic and lumbar spine were obtained  following the administration of intrathecal contrast. Intrathecal  contrast dose and myelographic images are dictated on a separate  accession number. Multiplanar reformations were performed.     Radiation dose for this scan was reduced using automated exposure  control, adjustment of the mA and/or kV according to patient size, or  iterative reconstruction technique.    COMPARISON: 6/24/2024 CT.    FINDINGS:    There is good  contrast opacification of the thecal sac.    THORACIC SPINE:  Minimal thoracic dextrocurvature. Normal thoracic kyphosis. Anterior  posterior alignment of the thoracic spine within normal limits. No  loss of vertebral body height. No lucent fracture lines identified.  Mild multilevel disc space height loss. Diffuse idiopathic skeletal  hyperostosis extending from T6 into the lumbar spine. Disc osteophyte  complex at T9-T10 and T11-T12. Otherwise, no significant herniation.  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 Multilevel facet arthropathy. Moderate  right T3-T4 and severe right T4-T5 neural foraminal narrowing due to  facet arthropathy. Otherwise, no significant neural foraminal  narrowing or canal stenosis.    Visualized paraspinous soft tissues demonstrate scattered aortic  atherosclerotic calcifications..    LUMBAR SPINE:  There are 5 lumbar-type vertebral bodies assumed for the purposes of  this dictation.     Lumbar spine alignment is within normal limits other than trace grade  1 anterolisthesis of L4 on L5. No loss of vertebral body height. No  evidence of fracture. Diffuse idiopathic skeletal hyperostosis  extending from the thoracic spine to the level of L1.    Level by level as follows:    T12-L1:  Minimal loss of disc height. No significant herniation.  Minimal facet arthropathy. No significant neural foraminal narrowing  or canal stenosis.     L1-L2:  Minimal loss of disc height. No significant angulation.  Minimal facet arthropathy. No significant neural foraminal narrowing  or canal stenosis.     L2-L3:  No loss of disc height. Trace diffuse disc bulge. Mild facet  arthropathy. Moderate bilateral neural foraminal narrowing without  significant canal stenosis.    L3-L4:  Mild loss of disc height. Posterior disc osteophyte complex.  Mild facet arthropathy. Moderate right and mild left neural foraminal  narrowing in mild canal stenosis.    L4-L5:   Moderate loss of disc height. Posterior disc osteophyte  complex. Moderate facet arthropathy. Moderate bilateral neural  foraminal narrowing and moderate canal stenosis.    L5-S1: No loss of disc height. Trace diffuse disc bulge. Mild facet  arthropathy. Mild right and moderate left foraminal narrowing without  significant canal stenosis.     Visualized paraspinous tissues: Scattered aortic atherosclerotic  calcifications.      Impression    IMPRESSION:  1. Multilevel thoracolumbar degenerative changes.  2. Moderate right T3-T4 and severe right T4-T5 neural foraminal  narrowing due to facet arthropathy.   3. Moderate lumbar neural foraminal narrowing at L2-L3 bilaterally,  L3-L4 on the right, L4-L5 bilaterally, and L5-S1 on the left.  4. Moderate L4-L5 and mild L3-L4 canal stenosis. No thoracic canal  stenosis.    RAMOS DALAL MD         SYSTEM ID:  [id number]   X-ray Myelogram thoracic    Narrative    XR MYELOGRAM THORACIC SPINE                 7/1/2024 11:07 AM       History:  Thoracic and Lumbar spine myelogram. Assess for spine  abnormality; new onset hip flexion weakness. Lower extremity weakness.    Procedure: The procedure and its risks were discussed with the patient  prior to the myelogram. The risks explained included but were not  limited to infection, bleeding, headaches, seizure, neural injury,  etc. The patient had no further questions and wished to proceed.     The lower back was prepped and draped in the usual sterile manner.  Lidocaine 1% was used for local anesthesia. A 22 gauge spinal needle  was used to enter the thecal sac at the L2-L3 level under fluoroscopic  guidance. 10 mL of myelographic contrast was infused. The needle was  removed. Estimated blood loss during the procedure was less than 5 mL.  No specimens collected. No complications were encountered.       Fluoroscopy time: 0.5 minutes  Images Obtained: 12  Medications used: 3 mL 1% lidocaine, 10 mL of Isovue-M 300    Findings:   Technically successful lumbar and thoracic myelogram  procedure without complications.  CT exam to be dictated separately.    LORIE SHEPHERD PA-C         SYSTEM ID:  [id number]   CT Thoracic spine w contrast    Narrative    CT THORACIC SPINE MYELOGRAM WITH CONTRAST   7/1/2024 11:47 AM   CT LUMBAR SPINE MYELOGRAM WITH CONTRAST  7/1/2024 11:47 AM     HISTORY: assess for spine abnormality; new onset hip flexion weakness     TECHNIQUE: Axial images of the thoracic and lumbar spine were obtained  following the administration of intrathecal contrast. Intrathecal  contrast dose and myelographic images are dictated on a separate  accession number. Multiplanar reformations were performed.     Radiation dose for this scan was reduced using automated exposure  control, adjustment of the mA and/or kV according to patient size, or  iterative reconstruction technique.    COMPARISON: 6/24/2024 CT.    FINDINGS:    There is good contrast opacification of the thecal sac.    THORACIC SPINE:  Minimal thoracic dextrocurvature. Normal thoracic kyphosis. Anterior  posterior alignment of the thoracic spine within normal limits. No  loss of vertebral body height. No lucent fracture lines identified.  Mild multilevel disc space height loss. Diffuse idiopathic skeletal  hyperostosis extending from T6 into the lumbar spine. Disc osteophyte  complex at T9-T10 and T11-T12. Otherwise, no significant herniation.  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 Multilevel facet arthropathy. Moderate  right T3-T4 and severe right T4-T5 neural foraminal narrowing due to  facet arthropathy. Otherwise, no significant neural foraminal  narrowing or canal stenosis.    Visualized paraspinous soft tissues demonstrate scattered aortic  atherosclerotic calcifications..    LUMBAR SPINE:  There are 5 lumbar-type vertebral bodies assumed for the purposes of  this dictation.     Lumbar spine alignment is within normal  limits other than trace grade  1 anterolisthesis of L4 on L5. No loss of vertebral body height. No  evidence of fracture. Diffuse idiopathic skeletal hyperostosis  extending from the thoracic spine to the level of L1.    Level by level as follows:    T12-L1:  Minimal loss of disc height. No significant herniation.  Minimal facet arthropathy. No significant neural foraminal narrowing  or canal stenosis.     L1-L2:  Minimal loss of disc height. No significant angulation.  Minimal facet arthropathy. No significant neural foraminal narrowing  or canal stenosis.     L2-L3:  No loss of disc height. Trace diffuse disc bulge. Mild facet  arthropathy. Moderate bilateral neural foraminal narrowing without  significant canal stenosis.    L3-L4:  Mild loss of disc height. Posterior disc osteophyte complex.  Mild facet arthropathy. Moderate right and mild left neural foraminal  narrowing in mild canal stenosis.    L4-L5:  Moderate loss of disc height. Posterior disc osteophyte  complex. Moderate facet arthropathy. Moderate bilateral neural  foraminal narrowing and moderate canal stenosis.    L5-S1: No loss of disc height. Trace diffuse disc bulge. Mild facet  arthropathy. Mild right and moderate left foraminal narrowing without  significant canal stenosis.     Visualized paraspinous tissues: Scattered aortic atherosclerotic  calcifications.      Impression    IMPRESSION:  1. Multilevel thoracolumbar degenerative changes.  2. Moderate right T3-T4 and severe right T4-T5 neural foraminal  narrowing due to facet arthropathy.   3. Moderate lumbar neural foraminal narrowing at L2-L3 bilaterally,  L3-L4 on the right, L4-L5 bilaterally, and L5-S1 on the left.  4. Moderate L4-L5 and mild L3-L4 canal stenosis. No thoracic canal  stenosis.    RAMOS DALAL MD         SYSTEM ID:  [id number]   XR Abdomen 1 View    Narrative    EXAM: XR ABDOMEN 1 VIEW  LOCATION: [facility]  DATE: 6/30/2024    INDICATION: follow up  on metal FB  COMPARISON: Abdominal radiograph 6/29/2024      Impression    IMPRESSION: Grossly stable position of metallic hair pin in the right hemipelvis, could be within distal small bowel or colon. No evidence of bowel obstruction in the visualized portions of the abdomen. Bones are unchanged.   XR Abdomen 1 View    Narrative    ABDOMEN ONE VIEW  7/1/2024 10:20 AM     HISTORY: Follow-up on abdominal metal foreign body    COMPARISON: 6/30/2024.       Impression    IMPRESSION: Metallic hairpin projects in the right lower quadrant,  similar to prior. This may be within distal small bowel or colon. No  findings to suggest bowel obstruction. Moderate stool burden.    CARLO SORENSEN MD         SYSTEM ID:  [id number]   XR Abdomen Port 1 View    Narrative    EXAM: XR ABDOMEN PORT 1 VIEW  LOCATION: [facility]  DATE: 7/4/2024    INDICATION: foreign body ingestion  COMPARISON: 07/01/2024.      Impression    IMPRESSION: Metallic hair pin projects in the lower abdomen at the level of L4. Moderate rectal stool burden. Vascular calcifications.   XR Abdomen 1 View    Narrative    ABDOMEN ONE VIEW 7/8/2024 12:22 PM    HISTORY: Evaluation for foreign body.    COMPARISON: 7/1/2024      Impression    IMPRESSION: The metallic hair pin is now in the rectum. Bowel gas  pattern is nonobstructed. No free intraperitoneal air.     MEENA PRUITT MD         SYSTEM ID:  [id number]       Discharge Medications   Current Discharge Medication List        START taking these medications    Details   HYDROmorphone (DILAUDID) 2 MG tablet Take 0.5 tablets (1 mg) by mouth 2 times daily as needed for pain (IF pain not managed with non-pharmacological and non-opioid interventions)  Qty: 15 tablet, Refills: 0    Associated Diagnoses: Pain      Lidocaine (LIDOCARE) 4 % Patch Place 2 patches onto the skin every 24 hours To prevent lidocaine toxicity, patient should be patch free for 12 hrs daily. Apply over the back, area with  pain.    Associated Diagnoses: Pain      methocarbamol (ROBAXIN) 500 MG tablet Take 1 tablet (500 mg) by mouth 4 times daily    Associated Diagnoses: Pain           CONTINUE these medications which have CHANGED    Details   acetaminophen (TYLENOL) 500 MG tablet Take 2 tablets (1,000 mg) by mouth every 8 hours    Associated Diagnoses: Pain      furosemide (LASIX) 20 MG tablet Take 2 tablets (40 mg) by mouth daily    Associated Diagnoses: Essential hypertension, benign      !! gabapentin (NEURONTIN) 300 MG capsule Take 1 capsule (300 mg) by mouth every morning    Associated Diagnoses: Neuropathic pain      !! gabapentin (NEURONTIN) 300 MG capsule Take 2 capsules (600 mg) by mouth at bedtime    Associated Diagnoses: Neuropathic pain      miconazole (MICATIN) 2 % external cream Apply topically 2 times daily as needed for other (Rash/ skin irritation)    Associated Diagnoses: Hospital discharge follow-up       !! - Potential duplicate medications found. Please discuss with provider.        CONTINUE these medications which have NOT CHANGED    Details   albuterol (VENTOLIN HFA) 108 (90 Base) MCG/ACT inhaler INHALE 2 PUFFS INTO THE LUNGS EVERY 6 HOURS AS NEEDED FOR SHORTNESS OF BREATH / DYSPNEA OR WHEEZING  Qty: 25.5 g, Refills: 0    Comments: Pharmacy may dispense brand covered by insurance (Proair, or proventil or ventolin or generic albuterol inhaler)  Associated Diagnoses: Chronic obstructive pulmonary disease, unspecified COPD type (H)      apixaban ANTICOAGULANT (ELIQUIS) 2.5 MG tablet Take 1 tablet (2.5 mg) by mouth 2 times daily    Associated Diagnoses: Other pulmonary embolism without acute cor pulmonale, unspecified chronicity (H)      Ascorbic Acid (VITAMIN C PO) Take 500 mg by mouth At Bedtime       atorvastatin (LIPITOR) 20 MG tablet TAKE 1 TABLET DAILY  Qty: 90 tablet, Refills: 1    Associated Diagnoses: Hyperlipidemia LDL goal <100      Calcium Carbonate Antacid (TUMS PO) Take 500 mg by mouth as needed       cholestyramine light (QUESTRAN) 4 GM packet Take 1 packet (4 g) by mouth 2 times daily  Qty: 180 each, Refills: 3    Associated Diagnoses: Diarrhea, unspecified type      Coenzyme Q10 400 MG CAPS Take 400 mg by mouth daily      cyanocobalamin (VITAMIN B-12) 1000 MCG tablet Take 1,000 mcg by mouth daily      docusate sodium (DSS) 100 MG capsule Take 100 mg by mouth as needed for constipation      ferrous sulfate (FE TABS) 325 (65 Fe) MG EC tablet Take 325 mg by mouth every evening      finasteride (PROSCAR) 5 MG tablet Take 1 tablet (5 mg) by mouth daily  Qty: 90 tablet, Refills: 3    Associated Diagnoses: Gross hematuria      Fluticasone-Umeclidin-Vilant (TRELEGY ELLIPTA) 100-62.5-25 MCG/ACT oral inhaler Inhale 1 puff into the lungs daily  Qty: 28 each, Refills: 0    Associated Diagnoses: Chronic obstructive pulmonary disease, unspecified COPD type (H)      lisinopril (ZESTRIL) 40 MG tablet TAKE 1 TABLET DAILY  Qty: 90 tablet, Refills: 3    Associated Diagnoses: Essential hypertension, benign; Type 2 diabetes mellitus without complication, without long-term current use of insulin (H)      metFORMIN (GLUCOPHAGE) 1000 MG tablet Take 1 tablet (1,000 mg) by mouth 2 times daily (with meals)  Qty: 180 tablet, Refills: 1    Associated Diagnoses: Type 2 diabetes mellitus without complication, without long-term current use of insulin (H)      metoprolol succinate ER (TOPROL XL) 25 MG 24 hr tablet Take 1 tablet (25 mg) by mouth daily  Qty: 90 tablet, Refills: 3    Associated Diagnoses: Essential hypertension, benign      vitamin B-Complex Take 1 tablet by mouth daily      !! blood glucose (ACCU-CHEK CHACHA) test strip Use to test blood sugar 2 times daily or as directed.  Qty: 60 strip, Refills: 6    Comments: Chacha Plus test strips  Associated Diagnoses: DM (diabetes mellitus) (H)      blood glucose (NO BRAND SPECIFIED) lancets standard Use to test blood sugar 1 time daily, first thing in the morning  Qty: 50 each, Refills:  3    Associated Diagnoses: Type 2 diabetes mellitus without complication, without long-term current use of insulin (H)      !! blood glucose (NO BRAND SPECIFIED) test strip Use to test blood sugar 1 time daily, first thing in the morning.  Qty: 50 strip, Refills: 11    Associated Diagnoses: Type 2 diabetes mellitus without complication, without long-term current use of insulin (H)      blood glucose monitoring (NO BRAND SPECIFIED) meter device kit Use to test blood sugar 1 time daily, first thing in the morning  Qty: 1 kit, Refills: 0    Associated Diagnoses: Type 2 diabetes mellitus without complication, without long-term current use of insulin (H)      !! order for DME Equipment being ordered: diabetic shoes, 1 pair  Qty: 1 Package, Refills: 0    Associated Diagnoses: Type 2 diabetes mellitus without complication, without long-term current use of insulin (H)      !! order for DME Oxygen 2 Li/min  at night via nasal cannula  Qty: 1 Device, Refills: 0    Associated Diagnoses: Nocturnal hypoxemia       !! - Potential duplicate medications found. Please discuss with provider.        Allergies   Allergies   Allergen Reactions    No Known Drug Allergy

## 2024-07-10 ENCOUNTER — TRANSITIONAL CARE UNIT VISIT (OUTPATIENT)
Dept: GERIATRICS | Facility: CLINIC | Age: 79
End: 2024-07-10
Payer: COMMERCIAL

## 2024-07-10 ENCOUNTER — PATIENT OUTREACH (OUTPATIENT)
Dept: CARE COORDINATION | Facility: CLINIC | Age: 79
End: 2024-07-10

## 2024-07-10 ENCOUNTER — LAB REQUISITION (OUTPATIENT)
Dept: LAB | Facility: CLINIC | Age: 79
End: 2024-07-10

## 2024-07-10 VITALS
SYSTOLIC BLOOD PRESSURE: 126 MMHG | OXYGEN SATURATION: 98 % | TEMPERATURE: 97.7 F | HEIGHT: 67 IN | HEART RATE: 78 BPM | DIASTOLIC BLOOD PRESSURE: 78 MMHG | BODY MASS INDEX: 31.95 KG/M2 | RESPIRATION RATE: 18 BRPM

## 2024-07-10 DIAGNOSIS — N40.0 BENIGN PROSTATIC HYPERPLASIA WITHOUT LOWER URINARY TRACT SYMPTOMS: ICD-10-CM

## 2024-07-10 DIAGNOSIS — R53.81 PHYSICAL DECONDITIONING: ICD-10-CM

## 2024-07-10 DIAGNOSIS — Z86.711 HISTORY OF PULMONARY EMBOLISM: ICD-10-CM

## 2024-07-10 DIAGNOSIS — G47.33 OSA (OBSTRUCTIVE SLEEP APNEA): ICD-10-CM

## 2024-07-10 DIAGNOSIS — K52.9 CHRONIC DIARRHEA: ICD-10-CM

## 2024-07-10 DIAGNOSIS — E78.5 DYSLIPIDEMIA: ICD-10-CM

## 2024-07-10 DIAGNOSIS — Z71.89 ADVANCED DIRECTIVES, COUNSELING/DISCUSSION: ICD-10-CM

## 2024-07-10 DIAGNOSIS — E11.69 TYPE 2 DIABETES MELLITUS WITH OTHER SPECIFIED COMPLICATION, WITHOUT LONG-TERM CURRENT USE OF INSULIN (H): ICD-10-CM

## 2024-07-10 DIAGNOSIS — J44.9 CHRONIC OBSTRUCTIVE PULMONARY DISEASE, UNSPECIFIED COPD TYPE (H): ICD-10-CM

## 2024-07-10 DIAGNOSIS — D64.9 ANEMIA, UNSPECIFIED: ICD-10-CM

## 2024-07-10 DIAGNOSIS — S30.851A METAL FOREIGN BODY IN ABDOMEN: ICD-10-CM

## 2024-07-10 DIAGNOSIS — N18.9 ACUTE KIDNEY INJURY SUPERIMPOSED ON CKD (H): ICD-10-CM

## 2024-07-10 DIAGNOSIS — M54.9 ACUTE BACK PAIN, UNSPECIFIED BACK LOCATION, UNSPECIFIED BACK PAIN LATERALITY: Primary | ICD-10-CM

## 2024-07-10 DIAGNOSIS — N17.9 ACUTE KIDNEY INJURY SUPERIMPOSED ON CKD (H): ICD-10-CM

## 2024-07-10 DIAGNOSIS — C79.51 MALIGNANT NEOPLASM METASTATIC TO BONE (H): ICD-10-CM

## 2024-07-10 DIAGNOSIS — I10 HYPERTENSION, UNSPECIFIED TYPE: ICD-10-CM

## 2024-07-10 DIAGNOSIS — D53.9 MACROCYTIC ANEMIA: ICD-10-CM

## 2024-07-10 DIAGNOSIS — C34.90 ADENOCARCINOMA OF LUNG, UNSPECIFIED LATERALITY (H): ICD-10-CM

## 2024-07-10 PROCEDURE — P9604 ONE-WAY ALLOW PRORATED TRIP: HCPCS | Performed by: PHYSICIAN ASSISTANT

## 2024-07-10 PROCEDURE — 36415 COLL VENOUS BLD VENIPUNCTURE: CPT | Performed by: PHYSICIAN ASSISTANT

## 2024-07-10 PROCEDURE — 86481 TB AG RESPONSE T-CELL SUSP: CPT | Performed by: PHYSICIAN ASSISTANT

## 2024-07-10 PROCEDURE — 99309 SBSQ NF CARE MODERATE MDM 30: CPT | Performed by: NURSE PRACTITIONER

## 2024-07-10 NOTE — PROGRESS NOTES
[facility] GERIATRICS    PRIMARY CARE PROVIDER AND CLINIC:  Olegario Castillo MD, [address] [place] [id number]  Chief Complaint   Patient presents with    Hospital F/U      [facility] Medical Record Number:  [id number]  Place of Service where encounter took place:  [facility] ([facility]) [[id number]]    Nahun Villalobos  is a 78 year old  (1945), admitted to the above facility from  [facility]. Hospital stay 6/24/24 through 7/9/24..   HPI:    78 year old male PMH hypertension, hyperlipidemia, DM II, obesity, CIERA , COPD, history of PE, malignant carcinoid tumor, lung carcinoma, peripheral neuropathy, cervical myelopathy seen at hospital due to intractable back pain and left hip pain following a mechanical fall. On imaging multilevel thoracic spondylosis with multilevel bridging osteophytes raising the possibility of DISH. Neural foraminal stenosis greatest on the right at T3-T4 and T4-T5. Multilevel lumbar spondylosis greatest at L3-4 and L4-5 as above. MRI not able to complete due to metal foreign body in intestine. NSGY recommended conservative treatment, pain management and therapies. Prior to MRI noted hairpin foreign body in intestine, progressed to rectum in several days and felt it should pass. Recommend repeat abdomen in several days. Left thigh and buttock pain and left hip flexor weakness - consider epidural injection if not better as outpatient. SALLIE on CKD2 with Cr 1 at baseline, up to 1.44 on 6/26 and down to 1 most recently. COPD, hx PE, hx lung cancer: on DOAC, PRN DuoNebs, trelegy inhaler. HTN and HLD: on metoprolol, statin, lasix, lisinopril. Malignant metastatic carcinoid tumor, rib mass: monthly lanreotide injections per oncology, follow-up per home routine.  Chronic diarrhea on PTA cholestyramine. DM2 on Metformin, A1C 7%. BPH on finasteride. CIERA on CPAP. Macrocytic anemia with Hgb stable on 11.8. To TCU for rehab.     Patient is seen for initial TCU  visit, history obtained from patient, staff and extensive review of the chart.  Reports dizzy when initially up. Reports very minimal pain. No headaches, chest pain, dyspnea, bowel or bladder issues. Walked 60 ft with FWW. BP range 103-132/68-73 and sats 98% on room air.  when checked. Asks to be DNR/DNI     CODE STATUS/ADVANCE DIRECTIVES DISCUSSION:  No CPR- Do NOT Intubate  DNR / DNI  ALLERGIES:   Allergies   Allergen Reactions    No Known Drug Allergy       PAST MEDICAL HISTORY:   Past Medical History:   Diagnosis Date    Antiplatelet or antithrombotic long-term use     Arthritis     CHF (congestive heart failure) (H) 09/16/2020    COPD (chronic obstructive pulmonary disease) (H)     DM (diabetes mellitus) (H)     Dyspnea on exertion     Essential hypertension, benign     Hemorrhage of rectum and anus     Non-small cell lung cancer (H)     Obesity     Personal history of colonic polyps     Sleep apnea     Thrombosis     Tobacco use disorder     Urinary retention     Walking troubles       PAST SURGICAL HISTORY:   has a past surgical history that includes NONSPECIFIC PROCEDURE; Cholecystectomy; appendectomy; wedge resection; Cystoscopy, transurethral resection (TUR) prostate, combined (N/A, 4/30/2018); Abdomen surgery (1995); colonoscopy (2014); Bone Exostosis Excision (Bilateral, 9/20/2022); and IR Lung Biopsy Mediastinum Right (4/6/2016).  FAMILY HISTORY: family history includes Breast Cancer in his sister and sister; C.A.D. in his mother; Cancer in his mother; Cancer - colorectal in his mother; Cerebrovascular Disease in his father; Hypertension in his mother, sister, and sister; Other Cancer in his mother.  SOCIAL HISTORY:   reports that he quit smoking about 11 years ago. His smoking use included cigarettes and cigars. He started smoking about 64 years ago. He has a 106 pack-year smoking history. He has never used smokeless tobacco. He reports that he does not drink alcohol and does not use  drugs.  Patient's living condition: lives with partner    Post Discharge Medication Reconciliation Status:   MED REC REQUIRED  Post Medication Reconciliation Status: discharge medications reconciled, continue medications without change       Current Outpatient Medications   Medication Sig Dispense Refill    acetaminophen (TYLENOL) 500 MG tablet Take 2 tablets (1,000 mg) by mouth every 8 hours      albuterol (VENTOLIN HFA) 108 (90 Base) MCG/ACT inhaler INHALE 2 PUFFS INTO THE LUNGS EVERY 6 HOURS AS NEEDED FOR SHORTNESS OF BREATH / DYSPNEA OR WHEEZING 25.5 g 0    apixaban ANTICOAGULANT (ELIQUIS) 2.5 MG tablet Take 1 tablet (2.5 mg) by mouth 2 times daily      Ascorbic Acid (VITAMIN C PO) Take 500 mg by mouth At Bedtime       atorvastatin (LIPITOR) 20 MG tablet TAKE 1 TABLET DAILY 90 tablet 1    Calcium Carbonate Antacid (TUMS PO) Take 500 mg by mouth as needed      cholestyramine light (QUESTRAN) 4 GM packet Take 1 packet (4 g) by mouth 2 times daily 180 each 3    Coenzyme Q10 400 MG CAPS Take 400 mg by mouth daily      cyanocobalamin (VITAMIN B-12) 1000 MCG tablet Take 1,000 mcg by mouth daily      docusate sodium (DSS) 100 MG capsule Take 100 mg by mouth as needed for constipation      ferrous sulfate (FE TABS) 325 (65 Fe) MG EC tablet Take 325 mg by mouth every evening      finasteride (PROSCAR) 5 MG tablet Take 1 tablet (5 mg) by mouth daily 90 tablet 3    Fluticasone-Umeclidin-Vilant (TRELEGY ELLIPTA) 100-62.5-25 MCG/ACT oral inhaler Inhale 1 puff into the lungs daily 28 each 0    furosemide (LASIX) 20 MG tablet Take 2 tablets (40 mg) by mouth daily      gabapentin (NEURONTIN) 300 MG capsule Take 1 capsule (300 mg) by mouth every morning      gabapentin (NEURONTIN) 300 MG capsule Take 2 capsules (600 mg) by mouth at bedtime      HYDROmorphone (DILAUDID) 2 MG tablet Take 0.5 tablets (1 mg) by mouth 2 times daily as needed for pain (IF pain not managed with non-pharmacological and non-opioid interventions) 15  tablet 0    Lidocaine (LIDOCARE) 4 % Patch Place 2 patches onto the skin every 24 hours To prevent lidocaine toxicity, patient should be patch free for 12 hrs daily. Apply over the back, area with pain.      lisinopril (ZESTRIL) 40 MG tablet TAKE 1 TABLET DAILY 90 tablet 3    metFORMIN (GLUCOPHAGE) 1000 MG tablet Take 1 tablet (1,000 mg) by mouth 2 times daily (with meals) 180 tablet 1    methocarbamol (ROBAXIN) 500 MG tablet Take 1 tablet (500 mg) by mouth 4 times daily      metoprolol succinate ER (TOPROL XL) 25 MG 24 hr tablet Take 1 tablet (25 mg) by mouth daily 90 tablet 3    miconazole (MICATIN) 2 % external cream Apply topically 2 times daily as needed for other (Rash/ skin irritation)      order for DME Oxygen 2 Li/min  at night via nasal cannula 1 Device 0    vitamin B-Complex Take 1 tablet by mouth daily      blood glucose (ACCU-CHEK CHACHA) test strip Use to test blood sugar 2 times daily or as directed. (Patient not taking: Reported on 7/10/2024) 60 strip 6    blood glucose (NO BRAND SPECIFIED) lancets standard Use to test blood sugar 1 time daily, first thing in the morning (Patient not taking: Reported on 7/10/2024) 50 each 3    blood glucose (NO BRAND SPECIFIED) test strip Use to test blood sugar 1 time daily, first thing in the morning. (Patient not taking: Reported on 7/10/2024) 50 strip 11    blood glucose monitoring (NO BRAND SPECIFIED) meter device kit Use to test blood sugar 1 time daily, first thing in the morning (Patient not taking: Reported on 7/10/2024) 1 kit 0    order for DME Equipment being ordered: diabetic shoes, 1 pair (Patient not taking: Reported on 7/10/2024) 1 Package 0     No current facility-administered medications for this visit.       ROS:  10 point ROS of systems including Constitutional, Eyes, Respiratory, Cardiovascular, Gastroenterology, Genitourinary, Integumentary, Musculoskeletal, Psychiatric were all negative except for pertinent positives noted in my  "HPI.    Vitals:  /78   Pulse 78   Temp 97.7  F (36.5  C)   Resp 18   Ht 1.702 m (5' 7\")   SpO2 98%   BMI 31.95 kg/m    Exam:  GENERAL APPEARANCE:  Alert, in no distress, pleasant, cooperative, oriented x 4  EYES:  EOM, lids, pupils and irises normal, sclera clear and conjunctiva normal, no discharge or mattering on lids or lashes noted  ENT:  Mouth normal, moist mucous membranes, nose normal without drainage or crusting, external ears without lesions, hearing acuity intact  NECK: supple, symmetrical, trachea midline  RESP:  respiratory effort normal, no chest wall tenderness, no respiratory distress, Lung sounds clear, patient is on room air  CV:  Auscultation of heart done, rate and rhythm controlled and regular, no murmur, no rub or gallop. Edema none bilateral lower extremities  ABDOMEN:  normal bowel sounds, soft, nontender, no palpable masses.  M/S:   Gait and station walks with assist , no tenderness or swelling of the joints; able to move all extremities, digits normal  NEURO: cranial nerves 2-12 grossly intact, no facial asymmetry, no speech deficits and able to follow directions, moves all extremities symmetrically  PSYCH:  insight and judgement and memory appear intact, affect and mood normal     Lab/Diagnostic data:  Most Recent 3 CBC's:  Recent Labs   Lab Test 06/28/24  0833 06/25/24  0737 06/25/24  0101   WBC 6.9 10.6 10.4   HGB 12.2* 11.6* 11.8*   * 100 101*    267 283     Most Recent 3 BMP's:  Recent Labs   Lab Test 07/09/24  1159 07/09/24  0750 07/09/24  0105 07/07/24  1148 07/07/24  0733 06/28/24  1215 06/28/24  0833 06/27/24  1132 06/27/24  0907 06/26/24  0758 06/26/24  0755   NA  --   --   --   --   --   --  137  --  137  --  139   POTASSIUM  --   --   --   --   --   --  4.5  --  4.8  --  4.3   CHLORIDE  --   --   --   --   --   --  104  --  103  --  103   CO2  --   --   --   --   --   --  24  --  27  --  26   BUN  --   --   --   --   --   --  12.4  --  19.1  --  22.7 "   CR  --   --   --   --  0.99  --  1.00  --  1.22*  --  1.44*   ANIONGAP  --   --   --   --   --   --  9  --  7  --  10   JESSE  --   --   --   --   --   --  9.2  --  8.6*  --  8.9   * 126* 117*   < >  --    < > 155*   < > 156*   < > 145*    < > = values in this interval not displayed.     Most Recent TSH and T4:  Recent Labs   Lab Test 03/03/24  0713   TSH 0.98     Most Recent Hemoglobin A1c:  Recent Labs   Lab Test 06/25/24  0737   A1C 7.0*       ASSESSMENT/PLAN:  Acute back pain, unspecified back location, unspecified back pain laterality  Physical deconditioning  Acute, improved. Continue tylenol 1000 mg TID, Neurontin 600 mg HS, dilaudid 1 mg BID PRN pain, lidocaine 4% patches daily, robaxin 500 mg QID. Therapies eval and treat. Neurosurgery follow-up as outpatient.     Metal foreign body in abdomen  Noted incidentally, at discharge progressed to rectum - repeat abdominal xray next week.     Acute kidney injury superimposed on CKD  (H24)  Acute, at discharge Cr 0.99 on 7/9. Check BMP PRN.     Chronic obstructive pulmonary disease, unspecified COPD type (H)  History of pulmonary embolism  Malignant neoplasm metastatic to bone (H)  Adenocarcinoma of lung, unspecified laterality (H)  Chronic. Continue albuterol inhaler PRN, apixaban 2.5 mg BID, trelegy ellipta 1 puff daily, monitor respiratory status.     Type 2 diabetes mellitus with other specified complication, without long-term current use of insulin (H)  Chronic. Check BG BID. Continue Metformin 1000 mg BID, review ranges next visit.     Hypertension, unspecified type  Continue lasix 40 mg daily, lisinopril 40 mg daily, Toprol XL 25 mg daily, monitor vs and review ranges.     Dyslipidemia  Continue PTA Lipitor 20 mg daily    Chronic diarrhea  Continue questran 4 gm BID, effective.    Benign prostatic hyperplasia without lower urinary tract symptoms  Continue Proscar 5 mg daily.     CIERA (obstructive sleep apnea)  Continue CPAP per home routine.      Macrocytic anemia  Chronic. Last Hgb 12.2 on 6/28. Check CBC on 7/11. Continue vit B 12 1000 mcg daily, ferrous sulfate 325 mg daily,     Advanced directives, counseling/discussion  Asks to be DNR/DNI    Orders:  DNR/DNI  CBC on 7/11 diagnosis anemia  BG checks BID diagnosis DM    Electronically signed by:  SELMA Guy CNP

## 2024-07-10 NOTE — PROGRESS NOTES
"Clinic Care Coordination Contact  Care Coordination Transition Communication    Clinical Data: Patient was hospitalized at [facility] from 06/24/2024 to 07/09/2024 with diagnosis of: \"Intractable back pain post mechanical fall  Multilevel thoracic and lumbar spondylosis w/ Thoracic foraminal stenosis  History of arthritis  Peripheral neuropathy  Cervical myelopathy  Senile frailty and chronic debilitation\".     Assessment: Patient has transitioned to TCU/ARU for short term rehabilitation:    Facility Name: [facility]  Transition Communication:  Notified facility of Primary Care- Care Coordination support via TCU hand-in e-mail.    Ambulatory Care Coordination to TCU Hand In Communication:     Name: Nahun Villalobos is a patient of Olegario Castillo MD at [facility] and Kelli Davidson is the care coordinator.   CC Contact Information: Email: [email or web address]   Phone: [phone]   Follow up recommendations:   - Scheduled:   Appointments in Next Year      Jul 10, 2024 7:00 AM  Transitional Care Unit Visit with SELMA Sy CNP  [facility] ([facility] ) [phone] Jul 23, 2024 10:00 AM  (Arrive by 9:40 AM)  Provider Visit with Olegario Castillo MD  [facility] ([facility] ) [phone]     Sep 03, 2024 3:00 PM  (Arrive by 2:40 PM)  MEDICARE ANNUAL WELLNESS VISIT with Olegario Castillo MD  [facility] ([facility] ) [phone]           - To be scheduled: Primary Care Provider follow up within 14 days of TCU discharge.     I would like to collaborate with the TCU Care team during this patient's stay; please invite me to any care conferences.     Please feel free to contact Kelli with questions or further collaboration in discharge planning.   "     Plan: Care Coordinator will await notification from facility staff informing of patient's discharge plans/needs. Care Coordinator will review chart and outreach to facility staff every 4 weeks and as needed.     Margy Nunes RN Care Coordinator  [facility] [place]  Email: [email or web address]  Phone: [phone]

## 2024-07-11 LAB
GAMMA INTERFERON BACKGROUND BLD IA-ACNC: 0.04 IU/ML
M TB IFN-G BLD-IMP: NEGATIVE
M TB IFN-G CD4+ BCKGRND COR BLD-ACNC: 9.96 IU/ML
MITOGEN IGNF BCKGRD COR BLD-ACNC: 0.01 IU/ML
MITOGEN IGNF BCKGRD COR BLD-ACNC: 0.01 IU/ML
QUANTIFERON MITOGEN: 10 IU/ML
QUANTIFERON NIL TUBE: 0.04 IU/ML
QUANTIFERON TB1 TUBE: 0.05 IU/ML
QUANTIFERON TB2 TUBE: 0.05

## 2024-07-12 ENCOUNTER — TRANSFERRED RECORDS (OUTPATIENT)
Dept: HEALTH INFORMATION MANAGEMENT | Facility: CLINIC | Age: 79
End: 2024-07-12
Payer: COMMERCIAL

## 2024-07-15 LAB
ERYTHROCYTE [DISTWIDTH] IN BLOOD BY AUTOMATED COUNT: 12.1 % (ref 10–15)
HCT VFR BLD AUTO: 34.8 % (ref 40–53)
HGB BLD-MCNC: 11.4 G/DL (ref 13.3–17.7)
MCH RBC QN AUTO: 33.6 PG (ref 26.5–33)
MCHC RBC AUTO-ENTMCNC: 32.8 G/DL (ref 31.5–36.5)
MCV RBC AUTO: 103 FL (ref 78–100)
PLATELET # BLD AUTO: 314 10E3/UL (ref 150–450)
RBC # BLD AUTO: 3.39 10E6/UL (ref 4.4–5.9)
WBC # BLD AUTO: 7.9 10E3/UL (ref 4–11)

## 2024-07-15 PROCEDURE — 36415 COLL VENOUS BLD VENIPUNCTURE: CPT | Performed by: PHYSICIAN ASSISTANT

## 2024-07-15 PROCEDURE — 85027 COMPLETE CBC AUTOMATED: CPT | Performed by: PHYSICIAN ASSISTANT

## 2024-07-15 PROCEDURE — P9604 ONE-WAY ALLOW PRORATED TRIP: HCPCS | Performed by: PHYSICIAN ASSISTANT

## 2024-07-16 ENCOUNTER — TRANSITIONAL CARE UNIT VISIT (OUTPATIENT)
Dept: GERIATRICS | Facility: CLINIC | Age: 79
End: 2024-07-16
Payer: COMMERCIAL

## 2024-07-16 VITALS
OXYGEN SATURATION: 95 % | DIASTOLIC BLOOD PRESSURE: 96 MMHG | WEIGHT: 182.6 LBS | TEMPERATURE: 98.1 F | HEART RATE: 88 BPM | RESPIRATION RATE: 16 BRPM | SYSTOLIC BLOOD PRESSURE: 147 MMHG | BODY MASS INDEX: 28.66 KG/M2 | HEIGHT: 67 IN

## 2024-07-16 DIAGNOSIS — N18.9 ACUTE KIDNEY INJURY SUPERIMPOSED ON CKD (H): ICD-10-CM

## 2024-07-16 DIAGNOSIS — J44.9 CHRONIC OBSTRUCTIVE PULMONARY DISEASE, UNSPECIFIED COPD TYPE (H): ICD-10-CM

## 2024-07-16 DIAGNOSIS — M54.9 ACUTE BACK PAIN, UNSPECIFIED BACK LOCATION, UNSPECIFIED BACK PAIN LATERALITY: Primary | ICD-10-CM

## 2024-07-16 DIAGNOSIS — I10 HYPERTENSION, UNSPECIFIED TYPE: ICD-10-CM

## 2024-07-16 DIAGNOSIS — R53.81 PHYSICAL DECONDITIONING: ICD-10-CM

## 2024-07-16 DIAGNOSIS — C34.90 ADENOCARCINOMA OF LUNG, UNSPECIFIED LATERALITY (H): ICD-10-CM

## 2024-07-16 DIAGNOSIS — S30.851A METAL FOREIGN BODY IN ABDOMEN: ICD-10-CM

## 2024-07-16 DIAGNOSIS — N40.0 BENIGN PROSTATIC HYPERPLASIA WITHOUT LOWER URINARY TRACT SYMPTOMS: ICD-10-CM

## 2024-07-16 DIAGNOSIS — N17.9 ACUTE KIDNEY INJURY SUPERIMPOSED ON CKD (H): ICD-10-CM

## 2024-07-16 DIAGNOSIS — Z86.711 HISTORY OF PULMONARY EMBOLISM: ICD-10-CM

## 2024-07-16 DIAGNOSIS — E11.69 TYPE 2 DIABETES MELLITUS WITH OTHER SPECIFIED COMPLICATION, WITHOUT LONG-TERM CURRENT USE OF INSULIN (H): ICD-10-CM

## 2024-07-16 DIAGNOSIS — C79.51 MALIGNANT NEOPLASM METASTATIC TO BONE (H): ICD-10-CM

## 2024-07-16 DIAGNOSIS — G47.33 OSA (OBSTRUCTIVE SLEEP APNEA): ICD-10-CM

## 2024-07-16 PROCEDURE — 99309 SBSQ NF CARE MODERATE MDM 30: CPT | Performed by: PHYSICIAN ASSISTANT

## 2024-07-16 NOTE — PROGRESS NOTES
[facility] GERIATRICS    Chief Complaint   Patient presents with    RECHECK     HPI:  Nahun Villalobos is a 78 year old  (1945), who is being seen today for an episodic care visit at: [facility] ([facility]) [[id number]].     Summary: 78 year old male PMH hypertension, hyperlipidemia, DM II, obesity, CIERA , COPD, history of PE, malignant carcinoid tumor, lung carcinoma, peripheral neuropathy, cervical myelopathy seen at hospital due to intractable back pain and left hip pain following a mechanical fall. On imaging multilevel thoracic spondylosis with multilevel bridging osteophytes raising the possibility of DISH. Neural foraminal stenosis greatest on the right at T3-T4 and T4-T5. Multilevel lumbar spondylosis greatest at L3-4 and L4-5 as above. MRI not able to complete due to metal foreign body in intestine. NSGY recommended conservative treatment, pain management and therapies. Prior to MRI noted hairpin foreign body in intestine, progressed to rectum in several days and felt it should pass. Recommend repeat abdomen in several days. Left thigh and buttock pain and left hip flexor weakness - consider epidural injection if not better as outpatient. SALLIE on CKD2 with Cr 1 at baseline, up to 1.44 on 6/26 and down to 1 most recently. COPD, hx PE, hx lung cancer: on DOAC, PRN DuoNebs, trelegy inhaler. HTN and HLD: on metoprolol, statin, lasix, lisinopril. Malignant metastatic carcinoid tumor, rib mass: monthly lanreotide injections per oncology, follow-up per home routine.  Chronic diarrhea on PTA cholestyramine. DM2 on Metformin, A1C 7%. BPH on finasteride. CIERA on CPAP. Macrocytic anemia with Hgb stable on 11.8. To [facility] for rehab.     Today, pt is seen in follow-up. Headed out door to see oncology at [facility] for screening imaging, oncology visit. Has been progressing in therapies, feels he is doing quite well. Looking to discharge early next week pending therapy recommendations. Tolerating oral intake, denies  "constipation or diarrhea. Remains confused regarding hairpin found in intestines during admission, has not been experiencing abdominal discomfort or pain.    On review of facility records, BPs ranging 121-142, HRs 71-88, BG values 155-201, weight 182.6.    Allergies, and PMH/PSH reviewed in EPIC today.  REVIEW OF SYSTEMS:  4 point ROS including Respiratory, CV, GI and , other than that noted in the HPI,  is negative    Objective:   BP (!) 147/96   Pulse 88   Temp 98.1  F (36.7  C)   Resp 16   Ht 1.702 m (5' 7\")   Wt 82.8 kg (182 lb 9.6 oz)   SpO2 95%   BMI 28.60 kg/m    GEN: well-developed, well-nourished, appears comfortable  HEENT: NCAT, EOM intact bilaterally, nose & mouth patent, mucous membranes moist  CHEST: lungs CTA bilaterally, no increased work of breathing, no wheeze, crackles, rhonchi  HEART: RRR, S1 & S2  ABD: soft, nontender, nondistended, no guarding or rigidity, +BS in all 4 quadrants  MSK: AROM bilateral UE/LE  NEURO: awake, alert. CN II-XII grossly intact. Sensation grossly intact to light touch.   SKIN: warm & dry without rash    Labs done in SNF are in [facility]. Please refer to them using [facility]/Care Everywhere. and Recent labs in [facility] reviewed by me today.     Assessment/Plan:    Acute low back pain  Physical deconditioning  Impaired mobility and ADLs  Improving.  -Continues on tylenol, gabapentin, dilaudid, lidoderm, robaxin  -PT/OT continuing  - for discharge planning    Foreign body, abdomen  XR with presence of hairpin, asymptomatic. Had shown progression on serial imaging while inpatient.  -Repeat Abd XR    SALLIE on CKD  Cr improved to baseline prior to discharge.    COPD  Hx PE  Adenocarcinoma of lung with metastatic dz to bone  Followed by [place] oncology. Stable.  -Continues on inhalers, apixaban    DMII  Chronic, stable. BG values overall controlled.  -Continue metformin    HTN/DLD  Chronic, stable.  -Continue lasix, lisinopril, toprol, statin    BPH  Chronic, " stable.  -Continue proscar    CIERA  -CPAP per home settings    Anemia  Chronic. Last Hgb 12.2.    MED REC REQUIRED  Post Medication Reconciliation Status: medication reconcilation previously completed during another office visit      Electronically signed by: Wesly Kelley PA-C

## 2024-07-22 DIAGNOSIS — I26.99 OTHER PULMONARY EMBOLISM WITHOUT ACUTE COR PULMONALE, UNSPECIFIED CHRONICITY (H): ICD-10-CM

## 2024-07-22 DIAGNOSIS — I10 ESSENTIAL HYPERTENSION, BENIGN: ICD-10-CM

## 2024-07-22 DIAGNOSIS — E78.5 HYPERLIPIDEMIA LDL GOAL <100: ICD-10-CM

## 2024-07-22 DIAGNOSIS — M79.2 NEUROPATHIC PAIN: ICD-10-CM

## 2024-07-22 DIAGNOSIS — E63.9 NUTRITIONAL DEFICIENCY: Primary | ICD-10-CM

## 2024-07-22 DIAGNOSIS — E11.9 TYPE 2 DIABETES MELLITUS WITHOUT COMPLICATION, WITHOUT LONG-TERM CURRENT USE OF INSULIN (H): ICD-10-CM

## 2024-07-22 DIAGNOSIS — R31.0 GROSS HEMATURIA: ICD-10-CM

## 2024-07-22 DIAGNOSIS — R19.7 DIARRHEA, UNSPECIFIED TYPE: ICD-10-CM

## 2024-07-22 DIAGNOSIS — R52 PAIN: ICD-10-CM

## 2024-07-22 DIAGNOSIS — J44.9 CHRONIC OBSTRUCTIVE PULMONARY DISEASE, UNSPECIFIED COPD TYPE (H): Chronic | ICD-10-CM

## 2024-07-22 RX ORDER — HYDROMORPHONE HYDROCHLORIDE 2 MG/1
1 TABLET ORAL 2 TIMES DAILY PRN
Qty: 10 TABLET | Refills: 0 | Status: SHIPPED | OUTPATIENT
Start: 2024-07-22 | End: 2024-08-29

## 2024-07-22 RX ORDER — TIZANIDINE 2 MG/1
2 TABLET ORAL 3 TIMES DAILY PRN
Qty: 30 TABLET | Refills: 0 | Status: SHIPPED | OUTPATIENT
Start: 2024-07-22 | End: 2024-08-29

## 2024-07-22 RX ORDER — METOPROLOL SUCCINATE 25 MG/1
25 TABLET, EXTENDED RELEASE ORAL DAILY
Qty: 90 TABLET | Refills: 3 | Status: SHIPPED | OUTPATIENT
Start: 2024-07-22

## 2024-07-22 RX ORDER — FINASTERIDE 5 MG/1
1 TABLET, FILM COATED ORAL DAILY
Qty: 90 TABLET | Refills: 3 | Status: SHIPPED | OUTPATIENT
Start: 2024-07-22

## 2024-07-22 RX ORDER — GABAPENTIN 300 MG/1
300 CAPSULE ORAL EVERY MORNING
Qty: 30 CAPSULE | Refills: 0 | Status: SHIPPED | OUTPATIENT
Start: 2024-07-22

## 2024-07-22 RX ORDER — ATORVASTATIN CALCIUM 20 MG/1
20 TABLET, FILM COATED ORAL DAILY
Qty: 90 TABLET | Refills: 1 | Status: SHIPPED | OUTPATIENT
Start: 2024-07-22 | End: 2024-09-26

## 2024-07-22 RX ORDER — FUROSEMIDE 20 MG
40 TABLET ORAL DAILY
Qty: 60 TABLET | Refills: 0 | Status: SHIPPED | OUTPATIENT
Start: 2024-07-22

## 2024-07-22 RX ORDER — FERROUS SULFATE 325(65) MG
325 TABLET, DELAYED RELEASE (ENTERIC COATED) ORAL EVERY EVENING
Qty: 30 TABLET | Refills: 0 | Status: SHIPPED | OUTPATIENT
Start: 2024-07-22

## 2024-07-22 RX ORDER — GABAPENTIN 300 MG/1
600 CAPSULE ORAL AT BEDTIME
Qty: 60 CAPSULE | Refills: 0 | Status: SHIPPED | OUTPATIENT
Start: 2024-07-22

## 2024-07-22 RX ORDER — ALBUTEROL SULFATE 90 UG/1
AEROSOL, METERED RESPIRATORY (INHALATION)
Qty: 25.5 G | Refills: 0 | Status: SHIPPED | OUTPATIENT
Start: 2024-07-22

## 2024-07-22 RX ORDER — CHOLESTYRAMINE LIGHT 4 G/5.7G
4 POWDER, FOR SUSPENSION ORAL 2 TIMES DAILY
Qty: 180 EACH | Refills: 3 | Status: SHIPPED | OUTPATIENT
Start: 2024-07-22

## 2024-07-22 RX ORDER — LANOLIN ALCOHOL/MO/W.PET/CERES
1000 CREAM (GRAM) TOPICAL DAILY
Qty: 30 TABLET | Refills: 0 | Status: SHIPPED | OUTPATIENT
Start: 2024-07-22 | End: 2024-08-29

## 2024-07-22 RX ORDER — LISINOPRIL 40 MG/1
TABLET ORAL
Qty: 90 TABLET | Refills: 3 | Status: SHIPPED | OUTPATIENT
Start: 2024-07-22

## 2024-07-22 RX ORDER — LIDOCAINE 4 G/G
2 PATCH TOPICAL EVERY 24 HOURS
Qty: 60 PATCH | Refills: 0 | Status: SHIPPED | OUTPATIENT
Start: 2024-07-22 | End: 2024-07-23

## 2024-07-23 ENCOUNTER — OFFICE VISIT (OUTPATIENT)
Dept: INTERNAL MEDICINE | Facility: CLINIC | Age: 79
End: 2024-07-23
Payer: COMMERCIAL

## 2024-07-23 VITALS
WEIGHT: 187.8 LBS | RESPIRATION RATE: 16 BRPM | DIASTOLIC BLOOD PRESSURE: 74 MMHG | OXYGEN SATURATION: 98 % | HEART RATE: 67 BPM | HEIGHT: 67 IN | BODY MASS INDEX: 29.47 KG/M2 | SYSTOLIC BLOOD PRESSURE: 131 MMHG | TEMPERATURE: 98.2 F

## 2024-07-23 DIAGNOSIS — E78.5 HYPERLIPIDEMIA LDL GOAL <100: ICD-10-CM

## 2024-07-23 DIAGNOSIS — E11.9 TYPE 2 DIABETES MELLITUS WITHOUT COMPLICATION, WITHOUT LONG-TERM CURRENT USE OF INSULIN (H): ICD-10-CM

## 2024-07-23 DIAGNOSIS — J44.9 CHRONIC OBSTRUCTIVE PULMONARY DISEASE, UNSPECIFIED COPD TYPE (H): ICD-10-CM

## 2024-07-23 DIAGNOSIS — C7A.019 MALIGNANT CARCINOID TUMOR OF SMALL INTESTINE, UNSPECIFIED LOCATION (H): ICD-10-CM

## 2024-07-23 DIAGNOSIS — G47.33 OBSTRUCTIVE SLEEP APNEA SYNDROME: ICD-10-CM

## 2024-07-23 DIAGNOSIS — I10 ESSENTIAL HYPERTENSION, BENIGN: Primary | ICD-10-CM

## 2024-07-23 DIAGNOSIS — E66.01 MORBID OBESITY DUE TO EXCESS CALORIES (H): ICD-10-CM

## 2024-07-23 PROCEDURE — G2211 COMPLEX E/M VISIT ADD ON: HCPCS | Performed by: INTERNAL MEDICINE

## 2024-07-23 PROCEDURE — 99214 OFFICE O/P EST MOD 30 MIN: CPT | Performed by: INTERNAL MEDICINE

## 2024-07-23 NOTE — PROGRESS NOTES
Assessment & Plan     Essential hypertension, benign  Stable on therapy as noted    Type 2 diabetes mellitus without complication, without long-term current use of insulin (H)  Labs as fasting    Morbid obesity due to excess calories (H)  Encourage weight loss    Hyperlipidemia LDL goal <100  Labs as fasting    Chronic obstructive pulmonary disease, unspecified COPD type (H)  Stable on therapy    Malignant carcinoid tumor of small intestine, unspecified location (H)  As ordered and at the [facility] as ordered.    Obstructive sleep apnea syndrome  Continuing with CPAP as directed    MED REC REQUIRED  Post Medication Reconciliation Status: discharge medications reconciled, continue medications without change    Work on weight loss  Regular exercise    The longitudinal plan of care for the diagnosis(es)/condition(s) as documented were addressed during this visit. Due to the added complexity in care, I will continue to support Nahun in the subsequent management and with ongoing continuity of care.    Subjective   Nahun is a 78 year old, presenting for the following health issues:  Hospital F/U    History of Present Illness       Back Pain:  He presents for follow up of back pain. Patient's back pain is a new problem.    Original cause of back pain: a fall  First noticed back pain: 1-4 weeks ago  Patient feels back pain: comes and goesLocation of back pain:  Left lower back and left buttock  Description of back pain: sharp    Since patient first noticed back pain, pain is: gradually improving  What makes back pain worse: coughing, certain positions and lying down   Acupuncture: not tried  Acetaminophen: helpful  Activity or exercise: not helpful  Cold: not helpful  Heat: not helpful  Massage: not tried  Muscle relaxants: helpful  NSAIDS: not tried  Opioids: helpful  Physical Therapy: helpful  Rest: helpful  Steroid Injection: not tried  Surgery: not tried  TENS unit: not tried  Topical pain relievers:  helpful  Other healthcare providers patient is seeing for back pain: Physical Therapist    He eats 2-3 servings of fruits and vegetables daily.He exercises with enough effort to increase his heart rate 9 or less minutes per day.  He exercises with enough effort to increase his heart rate 3 or less days per week.   He is taking medications regularly.       Hospital Follow-up Visit:    Hospital/Nursing Home/IP Rehab Facility: [facility]   Date of Admission: 6/24/24  Date of Discharge: 7/23/24  Reason(s) for Admission: Gait instability, acute left-sided low back pain   Was the patient in the ICU or did the patient experience delirium during hospitalization?  No  Do you have any other stressors you would like to discuss with your provider? No    Problems taking medications regularly:  None  Medication changes since discharge: None  Problems adhering to non-medication therapy:  None    Summary of hospitalization:  [facility] discharge summary reviewed  Diagnostic Tests/Treatments reviewed.  Follow up needed: none  Other Healthcare Providers Involved in Patient s Care:         None  Update since discharge: stable.         Plan of care communicated with patient     Nahun is here today for follow-up.  He has now been discharged from the hospital in his home.  He is otherwise doing well.  He denies any major complaints.  He is following up with the [facility] as directed.  He presents today with an x-ray where he apparently has swallowed a Jovanni pin.  Assessed and currently has no symptoms and was told to observe in hopes of passing shortly.  He denies any major symptoms of abdominal pain or nausea.    Review of Systems  CONSTITUTIONAL: NEGATIVE for fever, chills, change in weight  EYES: NEGATIVE for vision changes or irritation  ENT/MOUTH: NEGATIVE for ear, mouth and throat problems  CV: NEGATIVE for chest pain, palpitations or peripheral edema  GI: NEGATIVE  "for nausea, abdominal pain, heartburn, or change in bowel habits  : NEGATIVE for frequency, dysuria, or hematuria  MUSCULOSKELETAL: NEGATIVE for significant arthralgias or myalgia  ENDOCRINE: NEGATIVE for temperature intolerance, skin/hair changes  HEME: NEGATIVE for bleeding problems  PSYCHIATRIC: NEGATIVE for changes in mood or affect      Objective    /74   Pulse 67   Temp 98.2  F (36.8  C) (Temporal)   Resp 16   Ht 1.702 m (5' 7\")   Wt 85.2 kg (187 lb 12.8 oz)   SpO2 98%   BMI 29.41 kg/m    Body mass index is 29.41 kg/m .    Physical Exam   GENERAL: alert and no distress  EYES: Eyes grossly normal to inspection, PERRL and conjunctivae and sclerae normal  HENT: ear canals and TM's normal, nose and mouth without ulcers or lesions  RESP: lungs clear to auscultation - no rales, rhonchi or wheezes  CV: regular rate and rhythm, normal S1 S2, no S3 or S4, no click or rub, no peripheral edema  ABDOMEN: obese, mild  NEURO: No focal changes  PSYCH: mentation appears normal, affect normal/bright        Lab Results   Component Value Date    A1C 7.0 06/25/2024    A1C 7.3 03/02/2024    A1C 6.7 09/12/2023    A1C 6.2 03/21/2023    A1C 6.7 08/30/2022    A1C 6.8 03/17/2021    A1C 7.2 11/11/2020    A1C 7.5 09/20/2020    A1C 7.6 06/18/2020    A1C 7.8 01/23/2020     LDL Cholesterol Calculated   Date Value Ref Range Status   09/12/2023 58 <=100 mg/dL Final   03/17/2021 46 <100 mg/dL Final     Comment:     Desirable:       <100 mg/dl         Signed Electronically by: Olegario Castillo MD    "

## 2024-07-25 ENCOUNTER — PATIENT OUTREACH (OUTPATIENT)
Dept: CARE COORDINATION | Facility: CLINIC | Age: 79
End: 2024-07-25
Payer: COMMERCIAL

## 2024-07-25 NOTE — PROGRESS NOTES
Clinic Care Coordination Contact  Clinic Care Coordination Contact  OUTREACH with Post Discharge Assessment    Referral Information:     Patient doing well at home.   Has home care therapy.   Hasn't been wearing his compression stockings. He will try to start wearing again and elevating his legs.     While walking to the car yesterday, he felt dizzy, had headache, vision changes.   He did not fall. Sat safely in his car.   Resolved on its own.   Advised to contact the clinic to update PCP. He will do so.       Chief Complaint   Patient presents with    Clinic Care Coordination - Post Hospital    Clinic Care Coordination - Follow-up        Universal Utilization: 93.1%     Utilization      No Show Count (past year)  1             ED Visits  2             Hospital Admissions  2                    Current as of: 7/24/2024 10:04 PM                Clinical Concerns:  Current Medical Concerns:  With near syncope episode advised to contact the clinic.     Current Behavioral Concerns: None    Education Provided to patient: When to contact the clinic for symptoms, what home therapy can provide.       Health Maintenance Reviewed:    Health Maintenance Due   Topic Date Due    HF ACTION PLAN  Never done    IPV IMMUNIZATION (2 of 3 - Adult catch-up series) 01/03/2011    DIABETIC FOOT EXAM  11/01/2019    MICROALBUMIN  05/20/2023    DTAP/TDAP/TD IMMUNIZATION (2 - Td or Tdap) 08/09/2023    COVID-19 Vaccine (7 - 2023-24 season) 12/01/2023    MEDICARE ANNUAL WELLNESS VISIT  08/24/2024       Clinical Pathway: None    Transitions of Care Outreach    Most Recent Admission Date: 6/24/2024   Most Recent Admission Diagnosis: Contusion of lower back, initial encounter - S30.0XXA  Acute left-sided low back pain without sciatica - M54.50     Most Recent Discharge Date: 7/9/2024   Most Recent Discharge Diagnosis: Acute left-sided low back pain without sciatica - M54.50  Contusion of lower back, initial encounter - S30.0XXA  Gait instability -  R26.81  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  How are your symptoms? (Red Flag symptoms escalate to triage hotline per guidelines): Improved  Do you know how to contact your clinic care team if you have future questions or changes to your health status? : Yes  Does the patient have their discharge instructions? : Yes  Does the patient have questions regarding their discharge instructions? : No  Were you started on any new medications or were there changes to any of your previous medications? : Yes  Does the patient have all of their medications?: Yes  Do you have questions regarding any of your medications? : No  Do you have all of your needed medical supplies or equipment (DME)?  (i.e. oxygen tank, CPAP, cane, etc.): Yes         Post-op (Clinicians Only)  Did the patient have surgery or a procedure: No  Eating & Drinking: eating and drinking without complaints/concerns  PO Intake: regular diet  Additional Symptoms:  (Denies.)  Bowel Function: normal  Date of last BM: 07/24/24  Urinary Status: voiding without complaint/concerns    Care Management   None    Medication Management:  Medication review status: medications reviewed at PCP visit    Functional Status:       Living Situation:       Lifestyle & Psychosocial Needs:    Social Determinants of Health     Food Insecurity: No Food Insecurity (1/11/2022)    Hunger Vital Sign     Worried About Running Out of Food in the Last Year: Never true     Ran Out of Food in the Last Year: Never true   Depression: Not at risk (4/1/2024)    PHQ-2     PHQ-2 Score: 0   Housing Stability: Unknown (1/11/2022)    Housing Stability Vital Sign     Unable to Pay for  Housing in the Last Year: No     Number of Places Lived in the Last Year: Not on file     Unstable Housing in the Last Year: No   Tobacco Use: Medium Risk (7/14/2024)    Patient History     Smoking Tobacco Use: Former     Smokeless Tobacco Use: Never     Passive Exposure: Not on file   Financial Resource Strain: Low Risk  (1/11/2022)    Overall Financial Resource Strain (CARDIA)     Difficulty of Paying Living Expenses: Not hard at all   Alcohol Use: Not At Risk (1/11/2022)    AUDIT-C     Frequency of Alcohol Consumption: 2-3 times a week     Average Number of Drinks: 1 or 2     Frequency of Binge Drinking: Never   Transportation Needs: No Transportation Needs (1/11/2022)    PRAPARE - Transportation     Lack of Transportation (Medical): No     Lack of Transportation (Non-Medical): No   Physical Activity: Not on file   Interpersonal Safety: Not on file   Stress: Not on file   Social Connections: Unknown (4/10/2023)    [facility], [facility]    Social Connections     Frequency of Communication with Friends and Family: Not on file   Health Literacy: Not on file          Resources and Interventions:  Current Resources:       Care Plan:   Care Plan: 1. Improve chronic symptoms       Problem: Lifestyle choices       Goal: I want to improve management of my leg, knee, and hip pain and swelling.       Start Date: 6/3/2021 Expected End Date: 7/5/2024    Recent Progress: 80%    Note:     Barriers: Lymphedema  Strengths: Motivated to improve ability to walk  Patient expressed understanding of goal: Yes  Action steps to achieve this goal:  1. I will apply Jacinto hose to wear throughout the day and remove at night  2. I will walk my dog daily to help with strengthening and endurance  3. I will elevate my legs while sitting and laying down by propping them up with a pillow  4. I will discuss, review, schedule and complete recommended overdue health  maintenance with my primary care provider  5. I will I will take my medications as prescribed  6. I will contact my care team with questions, concerns, support needs. I will use the clinic as a resource   7. I will contact my clinic with 24/7 after hours services available                              Patient/Caregiver understanding: As above. CC role, goal(s), clinic after hours, appointments, discussed/reviewed. Support provided.     Outreach Frequency: monthly, more frequently as needed  Future Appointments                In 1 month Olegario Castillo MD [facility]    In 5 months Olegario Castillo MD [facility]            Follow up Plan     Discharge Follow-Up  Discharge follow up appointment scheduled in alignment with recommended follow up timeframe or Transitions of Risk Category? (Low = within 30 days; Moderate= within 14 days; High= within 7 days): Yes  Discharge Follow Up Appointment Date: 07/23/24    Future Appointments   Date Time Provider Department Center   9/3/2024  3:00 PM Olegario Castillo MD [facility]   1/7/2025  1:00 PM Olegario Castillo MD [facility]       Outpatient Plan as outlined on AVS reviewed with patient.    For any urgent concerns, please contact our 24 hour nurse triage line: [phone] ([phone])       Kelli Davidson RN Clinic Care Coordinator  [facility] Clinics: [place], [facility] (on-site Wednesdays), [facility] (on-site Thursdays) & [facility].  [email or web address]  Phone: [phone]

## 2024-07-26 ENCOUNTER — TELEPHONE (OUTPATIENT)
Dept: INTERNAL MEDICINE | Facility: CLINIC | Age: 79
End: 2024-07-26
Payer: COMMERCIAL

## 2024-07-26 DIAGNOSIS — Z09 HOSPITAL DISCHARGE FOLLOW-UP: ICD-10-CM

## 2024-07-26 NOTE — TELEPHONE ENCOUNTER
Called and left detailed VM for Rose with provider approval of requested home care orders.    Thank you,  Su Chan RN

## 2024-07-26 NOTE — TELEPHONE ENCOUNTER
Home Care is calling regarding an established patient with [facility].       Requesting orders from: Olegario Castillo  Provider is following patient: Yes  Is this a 60-day recertification request?  No    Orders Requested    Skilled Nursing  Request for initial certification (first set of orders)   Frequency:  1x/wk for 2 wks  Education and monitoring post hospitalization    Confirmed ok to leave a detailed message with call back.  Contact information confirmed and updated as needed.    Jean-Paul Subramanian RN

## 2024-08-07 ENCOUNTER — TELEPHONE (OUTPATIENT)
Dept: INTERNAL MEDICINE | Facility: CLINIC | Age: 79
End: 2024-08-07
Payer: COMMERCIAL

## 2024-08-07 NOTE — TELEPHONE ENCOUNTER
Home Care is calling regarding an established patient with [facility].       Requesting orders from: Olegario Castillo  Provider is following patient: Yes  Is this a 60-day recertification request?  No    Orders Requested    Skilled Nursing  Request for continuation of care with no increase or decrease in frequency  Frequency:  1x/wk for 2 wks effective 8/11          Confirmed ok to leave a detailed message with call back.  Contact information confirmed and updated as needed.    Jean-Paul Subramanian RN

## 2024-08-08 ENCOUNTER — MEDICAL CORRESPONDENCE (OUTPATIENT)
Dept: HEALTH INFORMATION MANAGEMENT | Facility: CLINIC | Age: 79
End: 2024-08-08
Payer: COMMERCIAL

## 2024-08-08 DIAGNOSIS — Z53.9 DIAGNOSIS NOT YET DEFINED: Primary | ICD-10-CM

## 2024-08-08 PROCEDURE — G0180 MD CERTIFICATION HHA PATIENT: HCPCS | Performed by: INTERNAL MEDICINE

## 2024-08-19 ENCOUNTER — DOCUMENTATION ONLY (OUTPATIENT)
Dept: INTERNAL MEDICINE | Facility: CLINIC | Age: 79
End: 2024-08-19
Payer: COMMERCIAL

## 2024-08-19 DIAGNOSIS — J44.9 CHRONIC OBSTRUCTIVE PULMONARY DISEASE, UNSPECIFIED COPD TYPE (H): Primary | ICD-10-CM

## 2024-08-21 ENCOUNTER — MYC MEDICAL ADVICE (OUTPATIENT)
Dept: INTERNAL MEDICINE | Facility: CLINIC | Age: 79
End: 2024-08-21
Payer: COMMERCIAL

## 2024-08-21 ENCOUNTER — TELEPHONE (OUTPATIENT)
Dept: INTERNAL MEDICINE | Facility: CLINIC | Age: 79
End: 2024-08-21
Payer: COMMERCIAL

## 2024-08-21 NOTE — TELEPHONE ENCOUNTER
Called Milagros back and relayed provider's message. Milagros stated understanding and had no further questions.    Jade JEFFERY RN  [facility] Triage Team

## 2024-08-21 NOTE — TELEPHONE ENCOUNTER
Home Care is calling regarding an established patient with [facility].       Requesting orders from: Olegario Castillo  Provider is following patient: Yes  Is this a 60-day recertification request?  No    Orders Requested    Skilled Nursing  Request for continuation of care with increase in frequency due to R buttocks stage 2 pressure ulcer .  Frequency:  2x/wk for 4 wks effective week 8/25/24     Wound Care:   Apply xeroform to wound bed and cover with mepilex 3x3 foam.     Information was gathered and will be sent to provider for review.  RN will contact Home Care with information after provider review.  Confirmed ok to leave a detailed message with call back.  Contact information confirmed and updated as needed.    Cintia Valentin RN

## 2024-08-22 ENCOUNTER — NURSE TRIAGE (OUTPATIENT)
Dept: INTERNAL MEDICINE | Facility: CLINIC | Age: 79
End: 2024-08-22
Payer: COMMERCIAL

## 2024-08-22 ENCOUNTER — MEDICAL CORRESPONDENCE (OUTPATIENT)
Dept: HEALTH INFORMATION MANAGEMENT | Facility: CLINIC | Age: 79
End: 2024-08-22
Payer: COMMERCIAL

## 2024-08-22 NOTE — TELEPHONE ENCOUNTER
Dr. Castillo,    See nurse triage encounter below and advise if patient can be worked into your schedule? Other scheduling recommendations?    Thank you,  Leslie Summers RN

## 2024-08-22 NOTE — TELEPHONE ENCOUNTER
"S-(situation): pt was discharged from hospital 7/9 for back pain. Pt requesting pain medication refills. Per MD, pt to schedule follow up appointment. Pt contacted and triage completed. Please see triage.     B-(background): no openings with PCP; routing schedule request to Dr. Green. Ok to schedule VV tomorrow? Does pt need in person appointment for pain management?    A-(assessment): MD review/recommend    R-(recommendations): pt to answer callback from clinic.       1. ONSET: \"When did the muscle aches or body pains start?\"       3-4 days ago, pain started coming back. Pt was recently hospitalized for pain.   2. LOCATION: \"What part of your body is hurting?\" (e.g., entire body, arms, legs)       Lower left thigh   3. SEVERITY: \"How bad is the pain?\" (Scale 1-10; or mild, moderate, severe)    - MILD (1-3): doesn't interfere with normal activities     - MODERATE (4-7): interferes with normal activities or awakens from sleep     - SEVERE (8-10):  excruciating pain, unable to do any normal activities       Mild-moderate   4. CAUSE: \"What do you think is causing the pains?\"      Unsure   5. FEVER: \"Have you been having fever?\"      Denies   6. OTHER SYMPTOMS: \"Do you have any other symptoms?\" (e.g., chest pain, weakness, rash, cold or flu symptoms, weight loss)      Denies   7. PREGNANCY: \"Is there any chance you are pregnant?\" \"When was your last menstrual period?\"      No   8. TRAVEL: \"Have you traveled out of the country in the last month?\" (e.g., travel history, exposures)      Denies     Reason for Disposition   Diabetes mellitus or weak immune system (e.g., HIV positive, cancer chemo, splenectomy, organ transplant, chronic steroids)     See previous note.   MILD or MODERATE muscle aches or pain and taking a statin medicine (a lipid or cholesterol lowering drug)     Atorvastatin    Additional Information   Negative: Shock suspected (e.g., cold/pale/clammy skin, too weak to stand, low BP, rapid pulse)   " Negative: Difficult to awaken or acting confused (e.g., disoriented, slurred speech)   Negative: Sounds like a life-threatening emergency to the triager   Negative: Chest pain   Negative: Arm pains with exertion (e.g., walking)   Negative: Muscle aches from influenza (flu) suspected   Negative: Muscle aches from heat exposure suspected   Negative: Lyme disease suspected (e.g., bull's eye rash or tick bite / exposure in past month)   Negative: Pain only in back   Negative: Pain in one arm OR arm pains caused by recent vigorous activity (e.g., sports, lifting, overuse)   Negative: Pain in one leg OR leg pains caused by recent vigorous activity (e.g., sports, lifting, overuse)   Negative: Rash over large area or most of the body (widespread or generalized)   Negative: Dark (cola or tea-colored) or red-colored urine   Negative: Drinking very little and dehydration suspected (e.g., no urine > 12 hours, very dry mouth, very lightheaded)   Negative: Patient sounds very sick or weak to the triager   Negative: SEVERE pain (e.g., excruciating, unable to do any normal activities) and not improved 2 hours after pain medicine   Negative: SEVERE pain and taking a statin medicine (a lipid or cholesterol lowering drug)   Negative: Fever > 104 F (40 C)   Negative: Fever > 101 F (38.3 C) and over 60 years of age   Negative: Fever > 100.0 F  (37.8 C) and bedridden (e.g., CVA, chronic illness, recovering from surgery)   Negative: Fever > 100.0 F (37.8 C) and indwelling urinary catheter (e.g., Gomez, coude)   Negative: Fever > 100.0 F (37.8 C) and diabetes mellitus or weak immune system (e.g., HIV positive, cancer chemo, splenectomy, organ transplant, chronic steroids)   Negative: Fever present > 3 days (72 hours)   Negative: Muscle aches are unexplained and occur within 1 month of a tick bite   Negative: MODERATE pain (e.g., interferes with normal activities) and present > 3 days    Protocols used: Muscle Aches and Body Pain-A-[place]

## 2024-08-23 ENCOUNTER — OFFICE VISIT (OUTPATIENT)
Dept: INTERNAL MEDICINE | Facility: CLINIC | Age: 79
End: 2024-08-23
Payer: COMMERCIAL

## 2024-08-23 VITALS
TEMPERATURE: 99.7 F | RESPIRATION RATE: 16 BRPM | WEIGHT: 192.6 LBS | OXYGEN SATURATION: 96 % | SYSTOLIC BLOOD PRESSURE: 141 MMHG | BODY MASS INDEX: 30.17 KG/M2 | HEART RATE: 75 BPM | DIASTOLIC BLOOD PRESSURE: 72 MMHG

## 2024-08-23 DIAGNOSIS — M54.42 LEFT-SIDED LOW BACK PAIN WITH LEFT-SIDED SCIATICA, UNSPECIFIED CHRONICITY: Primary | ICD-10-CM

## 2024-08-23 PROCEDURE — 99213 OFFICE O/P EST LOW 20 MIN: CPT | Performed by: PHYSICIAN ASSISTANT

## 2024-08-23 RX ORDER — METHYLPREDNISOLONE 4 MG
TABLET, DOSE PACK ORAL
Qty: 21 TABLET | Refills: 0 | Status: SHIPPED | OUTPATIENT
Start: 2024-08-23

## 2024-08-23 ASSESSMENT — PAIN SCALES - GENERAL: PAINLEVEL: MODERATE PAIN (4)

## 2024-08-23 NOTE — PROGRESS NOTES
"  Assessment & Plan     Left-sided low back pain with left-sided sciatica, unspecified chronicity  S/p fall in 7/2024  Recent return of pain   Referral done for soon appt to further eval   Neuro plan was for MRI and possible injections if need for pain   - methylPREDNISolone (MEDROL DOSEPAK) 4 MG tablet therapy pack; Follow Package Directions  - Adult Neurosurgery  Referral; Future          BMI  Estimated body mass index is 30.17 kg/m  as calculated from the following:    Height as of 7/23/24: 1.702 m (5' 7\").    Weight as of this encounter: 87.4 kg (192 lb 9.6 oz).             Mariano Garnica is a 78 year old, presenting for the following health issues:  Musculoskeletal Problem (X5 days- Severe back pain mild to moderate. Hasn't had MRI done /Pain medications )        8/23/2024     1:46 PM   Additional Questions   Roomed by Hoda MONTEJO   Accompanied by Kenia     Musculoskeletal Problem    History of Present Illness       Back Pain:  He presents for follow up of back pain. Patient's back pain is a recurring problem.  Location of back pain:  Left lower back, left buttock and left hip  Description of back pain: shooting and stabbing  Back pain spreads: left buttocks and left thigh    Since patient first noticed back pain, pain is: gradually worsening  Does back pain interfere with his job:  Not applicable       He eats 2-3 servings of fruits and vegetables daily.He consumes 2 sweetened beverage(s) daily.He exercises with enough effort to increase his heart rate 10 to 19 minutes per day.  He exercises with enough effort to increase his heart rate 7 days per week.   He is taking medications regularly.         Pain History:  When did you first notice your pain? A month ago    Have you seen this provider for your pain in the past? Yes   Where in your body do you have pain? Lower back left back  Are you seeing anyone else for your pain? No                Chronic Pain Follow Up:    Location of pain: Low " back  Analgesia/pain control:    - Recent changes:  No changes    - Overall control: Tolerable with discomfort    - Current treatments: 1000 mg tylenol a day for pain    Adherence:     - Do you ever take more pain medicine than prescribed? No    - When did you take your last dose of pain medicine?  This morning    Adverse effects: No   PDMP Review         Value Time User    State PDMP site checked  Yes 5/8/2024  8:37 AM Olegario Castillo MD          Last CSA Agreement:   CSA -- Patient Level:    CSA: None found at the patient level.       Patient was hospitalized due to fall in July. See EPIC  CT scan did not show any fractures. He did have left lower back issues and pain in the left leg  Discharge to TCU   Had follow up with PCP after and was doing well then  Over the past 4-5 days pain has returned in the left lower back to the leg  No new trauma/falls  PT has been coming to see him and doing exercises for strength/gait etc.   Patient states having a hard time with the left leg and moving it yet.                             Objective    BP (!) 142/79   Pulse 75   Temp 99.7  F (37.6  C) (Temporal)   Resp 16   Wt 87.4 kg (192 lb 9.6 oz)   SpO2 96%   BMI 30.17 kg/m    Body mass index is 30.17 kg/m .  Physical Exam   GENERAL: alert and no distress  MS: sitting in wheelchair  Some +1 edema in the left leg noted ( chronic per patient )   No redness or skin changes            Signed Electronically by: Kelli Peterson PA-C

## 2024-08-23 NOTE — TELEPHONE ENCOUNTER
Left message for call back and sent mychart   Tentatively scheduled with pita luevano at 1:40 arrival     Obie Garcia RN

## 2024-08-27 ENCOUNTER — TELEPHONE (OUTPATIENT)
Dept: NEUROSURGERY | Facility: CLINIC | Age: 79
End: 2024-08-27
Payer: COMMERCIAL

## 2024-08-29 ENCOUNTER — VIRTUAL VISIT (OUTPATIENT)
Dept: PHARMACY | Facility: CLINIC | Age: 79
End: 2024-08-29
Attending: INTERNAL MEDICINE
Payer: COMMERCIAL

## 2024-08-29 DIAGNOSIS — Z86.711 HISTORY OF PULMONARY EMBOLISM: ICD-10-CM

## 2024-08-29 DIAGNOSIS — R19.7 DIARRHEA, UNSPECIFIED TYPE: ICD-10-CM

## 2024-08-29 DIAGNOSIS — M54.2 CERVICALGIA: Primary | ICD-10-CM

## 2024-08-29 DIAGNOSIS — E78.5 HYPERLIPIDEMIA LDL GOAL <100: ICD-10-CM

## 2024-08-29 DIAGNOSIS — J44.9 CHRONIC OBSTRUCTIVE PULMONARY DISEASE, UNSPECIFIED COPD TYPE (H): Chronic | ICD-10-CM

## 2024-08-29 DIAGNOSIS — I10 ESSENTIAL HYPERTENSION, BENIGN: ICD-10-CM

## 2024-08-29 DIAGNOSIS — E11.9 TYPE 2 DIABETES MELLITUS WITHOUT COMPLICATION, WITHOUT LONG-TERM CURRENT USE OF INSULIN (H): ICD-10-CM

## 2024-08-29 DIAGNOSIS — Z78.9 TAKES DIETARY SUPPLEMENTS: ICD-10-CM

## 2024-08-29 DIAGNOSIS — Z90.79 S/P TRANSURETHRAL RESECTION OF PROSTATE: ICD-10-CM

## 2024-08-29 DIAGNOSIS — R21 RASH: ICD-10-CM

## 2024-08-29 PROCEDURE — 99605 MTMS BY PHARM NP 15 MIN: CPT | Mod: 93 | Performed by: PHARMACIST

## 2024-08-29 NOTE — LETTER
"Recommended To-Do List      Prepared on: Aug 29, 2024       You can get the best results from your medications by completing the items on this \"To-Do List.\"      Bring your To-Do List when you go to your doctor. And, share it with your family or caregivers.    My To-Do List:  What we talked about: What I should do:   What my medicines are for, how to know if my medicines are working, made sure my medicines are safe for me and reviewed how to take my medicines.    Take my medicines every day               "

## 2024-08-29 NOTE — LETTER
_  Medication List        Prepared on: Aug 29, 2024     Bring your Medication List when you go to the doctor, hospital, or   emergency room. And, share it with your family or caregivers.     Note any changes to how you take your medications.  Cross out medications when you no longer use them.    Medication How I take it Why I use it Prescriber   acetaminophen (TYLENOL) 500 MG tablet Take 2 tablets (1,000 mg) by mouth every 8 hours Pain Ivette Santos MD   albuterol (VENTOLIN HFA) 108 (90 Base) MCG/ACT inhaler INHALE 2 PUFFS INTO THE LUNGS EVERY 6 HOURS AS NEEDED FOR SHORTNESS OF BREATH / DYSPNEA OR WHEEZING Chronic obstructive pulmonary disease, unspecified COPD type (H) SELMA Sy CNP   apixaban ANTICOAGULANT (ELIQUIS) 2.5 MG tablet Take 1 tablet (2.5 mg) by mouth 2 times daily Other pulmonary embolism without acute cor pulmonale, unspecified chronicity (H) SELMA Sy CNP   Ascorbic Acid (VITAMIN C) 500 MG CHEW Take 500 mg by mouth at bedtime Nutritional Deficiency SELMA Sy CNP   atorvastatin (LIPITOR) 20 MG tablet Take 1 tablet (20 mg) by mouth daily Hyperlipidemia LDL Goal <100 SELMA Sy CNP   blood glucose (NO BRAND SPECIFIED) lancets standard Use to test blood sugar 1 time daily, first thing in the morning Type 2 diabetes mellitus without complication, without long-term current use of insulin (H) Olegario Castillo MD   Calcium Carbonate Antacid (TUMS PO) Take 500 mg by mouth as needed. Heartburn Self   cholestyramine light (QUESTRAN) 4 GM packet Take 1 packet (4 g) by mouth 2 times daily Diarrhea, unspecified type SELMA Sy CNP   Coenzyme Q10 400 MG CAPS Take 400 mg by mouth daily Nutritional Deficiency SELMA Sy CNP   ferrous sulfate (FE TABS) 325 (65 Fe) MG EC tablet Take 1 tablet (325 mg) by mouth every evening Nutritional Deficiency SELMA Sy CNP   finasteride (PROSCAR) 5 MG tablet Take 1 tablet (5 mg) by mouth daily  Gross Hematuria SELMA Sy CNP   Fluticasone-Umeclidin-Vilant (TRELEGY ELLIPTA) 100-62.5-25 MCG/ACT oral inhaler Inhale 1 puff into the lungs daily Chronic obstructive pulmonary disease, unspecified COPD type (H) SELMA Sy CNP   furosemide (LASIX) 20 MG tablet Take 2 tablets (40 mg) by mouth daily Essential Hypertension, Benign SELMA Sy CNP   gabapentin (NEURONTIN) 300 MG capsule Take 1 capsule (300 mg) by mouth every morning Neuropathic pain Iza Park APRCALOS CNP   gabapentin (NEURONTIN) 300 MG capsule Take 2 capsules (600 mg) by mouth at bedtime Neuropathic pain SELMA Sy CNP   lisinopril (ZESTRIL) 40 MG tablet TAKE 1 TABLET DAILY Essential Hypertension, Benign; Type 2 diabetes mellitus without complication, without long-term current use of insulin (H) SELMA Sy CNP   metFORMIN (GLUCOPHAGE) 1000 MG tablet Take 1 tablet (1,000 mg) by mouth 2 times daily (with meals) Type 2 diabetes mellitus without complication, without long-term current use of insulin (H) Iza Park APRCALOS ABAD   methylPREDNISolone (MEDROL DOSEPAK) 4 MG tablet therapy pack Follow Package Directions Left-sided low back pain with left-sided sciatica, unspecified chronicity Kelli Peterson PA-C   metoprolol succinate ER (TOPROL XL) 25 MG 24 hr tablet Take 1 tablet (25 mg) by mouth daily Essential Hypertension, Benign Iza Park APRN CNP   miconazole (MICATIN) 2 % external cream Apply topically 2 times daily as needed for other (Rash/ skin irritation) Hospital discharge follow-up Ivette Santos MD   order for DME Oxygen 2 Li/minat night via nasal cannula Nocturnal hypoxemia Bennett Ezra Goltz, PA-C   vitamin B-Complex Take 1 tablet by mouth daily pta med Entered By History Unknown         Add new medications, over-the-counter drugs, herbals, vitamins, or  minerals in the blank rows below.    Medication How I take it Why I use it Prescriber                                       Allergies:      - No Known Drug Allergy        Side effects I have had:      Not on File        Other Information:              My notes and questions:

## 2024-08-29 NOTE — LETTER
September 4, 2024  Nahun Villalobos  [address] [address]    Dear Mr. Villalobos, [facility]     Thank you for talking with me on Aug 29, 2024 about your health and medications. As a follow-up to our conversation, I have included two documents:      Your Recommended To-Do List has steps you should take to get the best results from your medications.  Your Medication List will help you keep track of your medications and how to take them.    If you want to talk about these documents, please call Perlita Lucas PharmD at phone: [phone], Monday-Friday 8-4:30pm.    I look forward to working with you and your doctors to make sure your medications work well for you.    Sincerely,  Perlita Lucas PharmD  [facility] Pharmacist, [facility]

## 2024-08-29 NOTE — PROGRESS NOTES
Medication Therapy Management (MTM) Encounter    ASSESSMENT:                            Medication Adherence/Access: No issues identified    Back Pain:   Plan in place.    Diabetes:   Stable.  Patient is meeting A1c goal of < 8% (less aggressive due to age)      Hypertension:   Stable. Patient is not quite meeting blood pressure goal of < 130/80mmHg, is meeting less aggressive goal <140/90mmHg due to age and falls risk.     Hyperlipidemia:   Stable. LDL is at goal <70mg/dL per ADA guidelines.    COPD:   Stable     H/o PE:  Stable.     Diarrhea:  Stable.    BPH:  Stable.    Rash:  Stable.    Supplements:  Stable.     PLAN:                            Continue current medication regimen.     Follow-up: Return if symptoms worsen or fail to improve.    SUBJECTIVE/OBJECTIVE:                          Nahun Villalobos is a 78 year old male seen for an initial visit. He was referred to me from transitions of care. Patient was accompanied by his significant other.     Reason for visit: Comprehensive medication review.  They have no questions or concerns, report things are going well.    Allergies/ADRs: Reviewed in chart  Past Medical History: Reviewed in chart  Tobacco: He reports that he quit smoking about 11 years ago. His smoking use included cigarettes and cigars. He started smoking about 64 years ago. He has a 106 pack-year smoking history. He has been exposed to tobacco smoke. He has never used smokeless tobacco.  Alcohol: none  Other Substance Use: none  Caffeine: 1 cup of coffee/day, recently eliminated soda  Activity: Limited to walking inside the house    Medication Adherence/Access: Patient uses pill box(es).  Patient takes medications 2 time(s) per day.  Per patient, misses medication 0-1 times per week.     Back Pain:  Acetaminophen 1000mg twice daily  Gabapentin 300mg every morning and 600mg every evening   Medrol dosepak  Patient was hospitalized 6/24/24-7/9/24 for intractable back pain post mechanical fall.  Is  scheduled to see neurosurgery on 9/10  He reports pain is improved.  Denies side effects.    Diabetes   Metformin 1000mg twice daily   Not taking aspirin due to Eliquis use  Patient is not experiencing side effects.  Current diabetes symptoms: none  Blood sugar monitorin time(s) daily; Ranges: (patient reported) post-prandial- 135-170mg/dL    Eye exam in the last 12 months? No  Foot exam: due    Hypertension   Furosemide 40mg daily  Lisinopril 40mg daily  Metoprolol succinate 25mg daily  Patient reports no current medication side effects  Patient self monitors blood pressure.  Home BP monitoring 133/62mmHg today     Hyperlipidemia   Atorvastatin 20mg daily  Patient reports no significant myalgias or other side effects.     COPD   Albuterol MDI as needed   Trelegy Ellipta 100mcg daily  O2 as needed   Patient reports no current medication side effects.    Patient reports the following symptoms: none.      H/o PE:  Eliquis 2.5mg twice daily   He denies signs and symptoms of bruising/bleeding.    Diarrhea:  Cholestyramine 4g once daily (prescribed for twice daily)  He reports this is effective  Denies side effects    BPH:  Finasteride 5mg daily  He reports this is effective and denies side effects.    Rash:  Miconazole 2% cream as needed   He reports this is effective and denies side effects.    Supplements   Vitamin C 500mg daily  CoQ10 400mg daily  Ferrous sulfate 325mg daily  Tums as needed   Vitamin B compex daily  No reported issues at this time.       Today's Vitals: There were no vitals taken for this visit.  ----------------    I spent 13 minutes with this patient today. A copy of the visit note was provided to the patient's provider(s).    A summary of these recommendations was sent via [employer].    Perlita Lucas, PharmD, BCACP  Medication Therapy Management Provider  [phone]     Telemedicine Visit Details  Type of service:  Telephone visit  Start Time: 1:05 PM  End Time: 1:18 PM     Medication Therapy  Recommendations  No medication therapy recommendations to display

## 2024-09-02 ASSESSMENT — SOCIAL DETERMINANTS OF HEALTH (SDOH): HOW OFTEN DO YOU GET TOGETHER WITH FRIENDS OR RELATIVES?: ONCE A WEEK

## 2024-09-03 ENCOUNTER — OFFICE VISIT (OUTPATIENT)
Dept: INTERNAL MEDICINE | Facility: CLINIC | Age: 79
End: 2024-09-03
Attending: INTERNAL MEDICINE
Payer: COMMERCIAL

## 2024-09-03 ENCOUNTER — MEDICAL CORRESPONDENCE (OUTPATIENT)
Dept: HEALTH INFORMATION MANAGEMENT | Facility: CLINIC | Age: 79
End: 2024-09-03

## 2024-09-03 VITALS
SYSTOLIC BLOOD PRESSURE: 137 MMHG | DIASTOLIC BLOOD PRESSURE: 73 MMHG | TEMPERATURE: 97.6 F | OXYGEN SATURATION: 97 % | RESPIRATION RATE: 19 BRPM | WEIGHT: 190.1 LBS | HEART RATE: 64 BPM | BODY MASS INDEX: 29.77 KG/M2

## 2024-09-03 DIAGNOSIS — I10 ESSENTIAL HYPERTENSION, BENIGN: ICD-10-CM

## 2024-09-03 DIAGNOSIS — J44.9 CHRONIC OBSTRUCTIVE PULMONARY DISEASE, UNSPECIFIED COPD TYPE (H): ICD-10-CM

## 2024-09-03 DIAGNOSIS — Z12.5 SCREENING PSA (PROSTATE SPECIFIC ANTIGEN): ICD-10-CM

## 2024-09-03 DIAGNOSIS — E11.9 TYPE 2 DIABETES MELLITUS WITHOUT COMPLICATION, WITHOUT LONG-TERM CURRENT USE OF INSULIN (H): ICD-10-CM

## 2024-09-03 DIAGNOSIS — E66.01 MORBID OBESITY DUE TO EXCESS CALORIES (H): ICD-10-CM

## 2024-09-03 DIAGNOSIS — C7B.02 SECONDARY CARCINOID TUMORS OF LIVER (H): ICD-10-CM

## 2024-09-03 DIAGNOSIS — Z00.00 ENCOUNTER FOR MEDICARE ANNUAL WELLNESS EXAM: Primary | ICD-10-CM

## 2024-09-03 PROBLEM — I26.99 OTHER PULMONARY EMBOLISM WITHOUT ACUTE COR PULMONALE, UNSPECIFIED CHRONICITY (H): Status: RESOLVED | Noted: 2020-10-21 | Resolved: 2024-09-03

## 2024-09-03 PROCEDURE — G0439 PPPS, SUBSEQ VISIT: HCPCS | Performed by: INTERNAL MEDICINE

## 2024-09-03 PROCEDURE — 99214 OFFICE O/P EST MOD 30 MIN: CPT | Mod: 25 | Performed by: INTERNAL MEDICINE

## 2024-09-03 RX ORDER — LANREOTIDE ACETATE 120 MG/.5ML
120 INJECTION SUBCUTANEOUS
COMMUNITY
Start: 2024-09-06

## 2024-09-03 NOTE — PROGRESS NOTES
"Preventive Care Visit  [facility]  Olegario Castillo MD, Internal Medicine  Sep 3, 2024      Assessment & Plan     Encounter for Medicare annual wellness exam  Advised patient to continue updated routine vaccines as correct    Type 2 diabetes mellitus without complication, without long-term current use of insulin (H)  Lab Results   Component Value Date    A1C 7.0 06/25/2024    A1C 7.3 03/02/2024    A1C 6.7 09/12/2023    A1C 6.2 03/21/2023    A1C 6.7 08/30/2022    A1C 6.8 03/17/2021    A1C 7.2 11/11/2020    A1C 7.5 09/20/2020    A1C 7.6 06/18/2020    A1C 7.8 01/23/2020     Stable therapy continue with medical management  - Albumin Random Urine Quantitative with Creat Ratio; Future  - Lipid panel reflex to direct LDL Non-fasting; Future    Chronic obstructive pulmonary disease, unspecified COPD type (H)  Stable and continuing with ongoing inhaler use    Secondary carcinoid tumors of liver (H)  Follow up as directed [facility].    Morbid obesity due to excess calories (H)  Encouraged ongoing weight loss.    Essential hypertension, benign  Monotherapy continue with current medical management    Screening PSA (prostate specific antigen)  Following up PSA annual wellness check  - PSA, screen; Future    Patient has been advised of split billing requirements and indicates understanding: Yes        BMI  Estimated body mass index is 29.77 kg/m  as calculated from the following:    Height as of 7/23/24: 1.702 m (5' 7\").    Weight as of this encounter: 86.2 kg (190 lb 1.6 oz).   Weight management plan: Discussed healthy diet and exercise guidelines    Counseling  Appropriate preventive services were addressed with this patient via screening, questionnaire, or discussion as appropriate for fall prevention, nutrition, physical activity, Tobacco-use cessation, social engagement, weight loss and cognition.  Checklist reviewing preventive services available has been given to the patient.  Reviewed patient's " diet, addressing concerns and/or questions.   He is at risk for lack of exercise and has been provided with information to increase physical activity for the benefit of his well-being.   The patient was instructed to see the dentist every 6 months.   Updated plan of care.  Patient reported difficulty with activities of daily living were addressed today.    Work on weight loss  Regular exercise    Mariano Alejandro is a 78 year old, presenting for the following:  Wellness Visit      Health Care Directive  Patient does not have a Health Care Directive or Living Will: Patient states has Advance Directive and will bring in a copy to clinic.    HPI    Other concerns:  Patient gets lanreotide injections once monthly from oncologists. Notices right forearm pain after injections. Oncology did not think arm pain with related to treatment         9/2/2024   General Health   How would you rate your overall physical health? Good   Feel stress (tense, anxious, or unable to sleep) Not at all            9/2/2024   Nutrition   Diet: Low salt    Diabetic       Multiple values from one day are sorted in reverse-chronological order         9/2/2024   Exercise   Days per week of moderate/strenous exercise 2 days   Average minutes spent exercising at this level 20 min      (!) EXERCISE CONCERN      9/2/2024   Social Factors   Frequency of gathering with friends or relatives Once a week   Worry food won't last until get money to buy more No   Food not last or not have enough money for food? No   Do you have housing? (Housing is defined as stable permanent housing and does not include staying ouside in a car, in a tent, in an abandoned building, in an overnight shelter, or couch-surfing.) Yes   Are you worried about losing your housing? No   Lack of transportation? No   Unable to get utilities (heat,electricity)? No            9/3/2024   Fall Risk   Gait Speed Test (Document in seconds) 8.12   Gait Speed Test Interpretation Greater than  5.01 seconds - ABNORMAL               2024   Activities of Daily Living- Home Safety   Needs help with the following daily activites Medication administration   Safety concerns in the home None of the above            2024   Dental   Dentist two times every year? (!) NO            2024   Hearing Screening   Hearing concerns? None of the above            2024   Driving Risk Screening   Patient/family members have concerns about driving No            2024   General Alertness/Fatigue Screening   Have you been more tired than usual lately? No            2024   Urinary Incontinence Screening   Bothered by leaking urine in past 6 months No               Today's PHQ-2 Score:       2024     6:59 PM   PHQ-2 (  [employer])   Q1: Little interest or pleasure in doing things 0   Q2: Feeling down, depressed or hopeless 0   PHQ-2 Score 0   Q1: Little interest or pleasure in doing things Not at all   Q2: Feeling down, depressed or hopeless Not at all   PHQ-2 Score 0           2024   Substance Use   Alcohol more than 3/day or more than 7/wk Not Applicable   Do you have a current opioid prescription? No   How severe/bad is pain from 1 to 10? 5/10   Do you use any other substances recreationally? No        Social History     Tobacco Use    Smoking status: Former     Current packs/day: 0.00     Average packs/day: 2.0 packs/day for 53.0 years (106.0 ttl pk-yrs)     Types: Cigarettes, Cigars     Start date: 1960     Quit date: 2013     Years since quittin.2     Passive exposure: Past    Smokeless tobacco: Never    Tobacco comments:     Quit, will never start again.   Vaping Use    Vaping status: Never Used   Substance Use Topics    Alcohol use: No    Drug use: No       ASCVD Risk   The 10-year ASCVD risk score (Valery KUHN, et al., 2019) is: 43.3%    Values used to calculate the score:      Age: 78 years      Sex: Male      Is Non- : Yes      Diabetic: Yes       Tobacco smoker: No      Systolic Blood Pressure: 137 mmHg      Is BP treated: Yes      HDL Cholesterol: 46 mg/dL      Total Cholesterol: 140 mg/dL      Reviewed and updated as needed this visit by Provider                    Lab work is in process  Current providers sharing in care for this patient include:  Patient Care Team:  Olegario Castillo MD as PCP - General (Internal Medicine)  Olegario Castillo MD as Assigned PCP  Praveena Burris MD as MD (Urology)  Areli Cordero RN as Registered Nurse  Kelley Slaughter CHW as Community Health Worker (Primary Care - CC)  Jaswinder Tinajero MD as MD (Hematology & Oncology)  Bernardo Haynes MD as MD (Pulmonary Disease)  Antonia Mandel PT as Physical Therapist (Physical Therapist)  Jamil Aparicio MD as MD (Neurology)  Kaleigh Onofre MD as MD (Urology)  Kaleigh Onofre MD as Assigned Surgical Provider  Kelli Davidson RN as Lead Care Coordinator  Perlita Lucas, PharmD as Pharmacist (Pharmacist)    The following health maintenance items are reviewed in Epic and correct as of today:  Health Maintenance   Topic Date Due    DIABETIC FOOT EXAM  11/01/2019    MICROALBUMIN  05/20/2023    DTAP/TDAP/TD IMMUNIZATION (2 - Td or Tdap) 08/09/2023    ANNUAL REVIEW OF HM ORDERS  12/26/2023    INFLUENZA VACCINE (1) 09/01/2024    MEDICARE ANNUAL WELLNESS VISIT  08/24/2024    LIPID  09/12/2024    EYE EXAM  09/25/2024    COVID-19 Vaccine (8 - 2023-24 season) 09/16/2024    A1C  12/25/2024    BMP  06/28/2025    FALL RISK ASSESSMENT  09/03/2025    ADVANCE CARE PLANNING  09/03/2029    COLORECTAL CANCER SCREENING  09/01/2032    SPIROMETRY  Completed    HEPATITIS C SCREENING  Completed    COPD ACTION PLAN  Completed    PHQ-2 (once per calendar year)  Completed    Pneumococcal Vaccine: 65+ Years  Completed    ZOSTER IMMUNIZATION  Completed    HPV IMMUNIZATION  Aged Out    MENINGITIS IMMUNIZATION  Aged Out    RSV MONOCLONAL ANTIBODY  Aged Out    LUNG CANCER  "SCREENING  Discontinued         Review of Systems  CONSTITUTIONAL: NEGATIVE for fever, chills, + change in weight  EYES: NEGATIVE for vision changes or irritation  ENT/MOUTH: NEGATIVE for ear, mouth and throat problems  RESP: NEGATIVE for significant cough  CV: NEGATIVE for chest pain, palpitations or peripheral edema  GI: NEGATIVE for nausea, abdominal pain, heartburn, or change in bowel habits  : NEGATIVE for frequency, dysuria, or hematuria  HEME: NEGATIVE for bleeding problems  PSYCHIATRIC: NEGATIVE for changes in mood or affect     Objective    Exam  /73   Pulse 64   Temp 97.6  F (36.4  C) (Temporal)   Resp 19   Wt 86.2 kg (190 lb 1.6 oz)   SpO2 97%   BMI 29.77 kg/m     Estimated body mass index is 29.77 kg/m  as calculated from the following:    Height as of 7/23/24: 1.702 m (5' 7\").    Weight as of this encounter: 86.2 kg (190 lb 1.6 oz).    Physical Exam  GENERAL: alert and no distress  EYES: Eyes grossly normal to inspection, PERRL and conjunctivae and sclerae normal  HENT: ear canals and TM's normal, nose and mouth without ulcers or lesions  RESP: lungs clear to auscultation - no rales, rhonchi or wheezes  CV: regular rate and rhythm, normal S1 S2, no S3 or S4, no murmur, click or rub  NEURO: No focal changes  Antalgic, slowed gait with walker.  PSYCH: mentation appears normal, affect normal/bright    PSA   Date Value Ref Range Status   10/27/2017 1.98 0 - 4 ug/L Final     Comment:     Assay Method:  Chemiluminescence using Siemens Vista analyzer     Prostate Specific Antigen Screen   Date Value Ref Range Status   09/12/2023 1.48 0.00 - 6.50 ng/mL Final   04/04/2022 1.95 0.00 - 4.00 ug/L Final     Lab Results   Component Value Date    A1C 7.0 06/25/2024    A1C 7.3 03/02/2024    A1C 6.7 09/12/2023    A1C 6.2 03/21/2023    A1C 6.7 08/30/2022    A1C 6.8 03/17/2021    A1C 7.2 11/11/2020    A1C 7.5 09/20/2020    A1C 7.6 06/18/2020    A1C 7.8 01/23/2020 2/2/2022   Mini Cog   Clock Draw " Score 2 Normal   3 Item Recall 3 objects recalled   Mini Cog Total Score 5        Signed Electronically by: Olegario Castillo MD

## 2024-09-03 NOTE — TELEPHONE ENCOUNTER
SPINE PATIENTS - NEW PROTOCOL PREVISIT    RECORDS RECEIVED FROM: Care Everywhere   REASON FOR VISIT: Left-sided low back pain with left-sided sciatica, unspecified chronicity   PROVIDER: Rebeca Conte PA-C   DATE OF APPT: 9/10/24 @ 10:30 am    NOTES (FOR ALL VISITS) STATUS DETAILS   OFFICE NOTE from referring provider Internal 8/23/24 Kelli Peterson PA-C @[facility]     OFFICE NOTE from other specialist Care Everywhere 7/17/24 Rancho Santos APRN, C.N.P., D.N.P @[facility]    7/16/24 Wesly Kelley PA-C @[facility]    7/12/24 (Transferred Recs) Lilian Livingston PA-C @[facility]    7/10/24 Iza Park APRN CNP @[facility]     DISCHARGE SUMMARY from hospital Internal 6/24/24-7/9/24 Asya Pacheco MD @[facility]     MEDICATION LIST Internal    IMAGING  (FOR ALL VISITS)     XRAY (SPINE) *NEUROSURGERY* Internal [facility]  7/1/24 XR Myelogram Thoracic Spine     CT (HEAD, NECK, SPINE) Internal [facility]  7/1/24 CT Thoracic Spine  7/1/24 CT Lumbar Spine  6/24/24 CT Lumbar Spine  6/24/24 CT Thoracic Spine

## 2024-09-04 NOTE — PATIENT INSTRUCTIONS
"Recommendations from today's MTM visit:                                                    MTM (medication therapy management) is a service provided by a clinical pharmacist designed to help you get the most of out of your medicines.   Today we reviewed what your medicines are for, how to know if they are working, that your medicines are safe and how to make your medicine regimen as easy as possible.      Continue current medication regimen.     Follow-up: Return if symptoms worsen or fail to improve.    It was great speaking with you today.  I value your experience and would be very thankful for your time in providing feedback in our clinic survey. In the next few days, you may receive an email or text message from [employer] with a link to a survey related to your  clinical pharmacist.\"     To schedule another MTM appointment, please call the clinic directly or you may call the MTM scheduling line at [phone] or toll-free at [phone].     My Clinical Pharmacist's contact information:                                                      Please feel free to contact me with any questions or concerns you have.      Perlita Lucas, PharmD, [facility] Center[facility]  Medication Therapy Management Provider  [phone]    "

## 2024-09-05 ENCOUNTER — MEDICAL CORRESPONDENCE (OUTPATIENT)
Dept: HEALTH INFORMATION MANAGEMENT | Facility: CLINIC | Age: 79
End: 2024-09-05
Payer: COMMERCIAL

## 2024-09-05 ENCOUNTER — PATIENT OUTREACH (OUTPATIENT)
Dept: CARE COORDINATION | Facility: CLINIC | Age: 79
End: 2024-09-05
Payer: COMMERCIAL

## 2024-09-05 NOTE — PROGRESS NOTES
Clinic Care Coordination Contact  [facility]/Voicemail    Clinical Data: Care Coordinator Outreach    Outreach Documentation Number of Outreach Attempt   9/5/2024  12:26 PM 1       Left message on patient's voicemail with call back information and requested return call.    Plan:  Care Coordinator will try to reach patient again in 10 business days.    OMAR Kim  Clinic Care Coordination  [facility] Clinics: [facility] [place], [place], [facility], and [facility]  Phone: [phone]

## 2024-09-06 ENCOUNTER — TRANSFERRED RECORDS (OUTPATIENT)
Dept: HEALTH INFORMATION MANAGEMENT | Facility: CLINIC | Age: 79
End: 2024-09-06
Payer: COMMERCIAL

## 2024-09-07 DIAGNOSIS — M54.42 LEFT-SIDED LOW BACK PAIN WITH LEFT-SIDED SCIATICA, UNSPECIFIED CHRONICITY: Primary | ICD-10-CM

## 2024-09-09 ENCOUNTER — TELEPHONE (OUTPATIENT)
Dept: NEUROSURGERY | Facility: CLINIC | Age: 79
End: 2024-09-09
Payer: COMMERCIAL

## 2024-09-10 ENCOUNTER — PRE VISIT (OUTPATIENT)
Dept: NEUROSURGERY | Facility: CLINIC | Age: 79
End: 2024-09-10

## 2024-09-10 ENCOUNTER — OFFICE VISIT (OUTPATIENT)
Dept: NEUROSURGERY | Facility: CLINIC | Age: 79
End: 2024-09-10
Payer: COMMERCIAL

## 2024-09-10 ENCOUNTER — PATIENT OUTREACH (OUTPATIENT)
Dept: CARE COORDINATION | Facility: CLINIC | Age: 79
End: 2024-09-10

## 2024-09-10 ENCOUNTER — DOCUMENTATION ONLY (OUTPATIENT)
Dept: NEUROSURGERY | Facility: CLINIC | Age: 79
End: 2024-09-10

## 2024-09-10 ENCOUNTER — ANCILLARY PROCEDURE (OUTPATIENT)
Dept: GENERAL RADIOLOGY | Facility: CLINIC | Age: 79
End: 2024-09-10
Attending: PHYSICIAN ASSISTANT
Payer: COMMERCIAL

## 2024-09-10 VITALS
WEIGHT: 190 LBS | BODY MASS INDEX: 30.53 KG/M2 | OXYGEN SATURATION: 97 % | HEART RATE: 69 BPM | DIASTOLIC BLOOD PRESSURE: 82 MMHG | RESPIRATION RATE: 16 BRPM | HEIGHT: 66 IN | SYSTOLIC BLOOD PRESSURE: 144 MMHG

## 2024-09-10 DIAGNOSIS — M48.02 SPINAL STENOSIS IN CERVICAL REGION: ICD-10-CM

## 2024-09-10 DIAGNOSIS — M54.42 LEFT-SIDED LOW BACK PAIN WITH LEFT-SIDED SCIATICA, UNSPECIFIED CHRONICITY: ICD-10-CM

## 2024-09-10 DIAGNOSIS — R20.2 PARESTHESIA OF UPPER EXTREMITY: ICD-10-CM

## 2024-09-10 DIAGNOSIS — R29.898 LEFT LEG WEAKNESS: ICD-10-CM

## 2024-09-10 DIAGNOSIS — R20.2 BILATERAL LEG PARESTHESIA: ICD-10-CM

## 2024-09-10 DIAGNOSIS — R29.2: Primary | ICD-10-CM

## 2024-09-10 DIAGNOSIS — M48.061 SPINAL STENOSIS OF LUMBAR REGION, UNSPECIFIED WHETHER NEUROGENIC CLAUDICATION PRESENT: ICD-10-CM

## 2024-09-10 DIAGNOSIS — R29.2 HYPER REFLEXIA: ICD-10-CM

## 2024-09-10 PROCEDURE — 99215 OFFICE O/P EST HI 40 MIN: CPT | Performed by: PHYSICIAN ASSISTANT

## 2024-09-10 PROCEDURE — 99417 PROLNG OP E/M EACH 15 MIN: CPT | Performed by: PHYSICIAN ASSISTANT

## 2024-09-10 PROCEDURE — 72082 X-RAY EXAM ENTIRE SPI 2/3 VW: CPT | Performed by: STUDENT IN AN ORGANIZED HEALTH CARE EDUCATION/TRAINING PROGRAM

## 2024-09-10 PROCEDURE — 77073 BONE LENGTH STUDIES: CPT | Performed by: STUDENT IN AN ORGANIZED HEALTH CARE EDUCATION/TRAINING PROGRAM

## 2024-09-10 NOTE — LETTER
[facility] COORDINATION  [address] [facility] [address]    September 10, 2024        Nahun Villalobos  [address] [facility] [address]          Dear Nahun,     Attached is an updated Patient Centered Plan of Care for your continued enrollment in Care Coordination. Please let us know if you have additional questions, concerns, or goals that we can assist with.    Sincerely,    Kelli Davidson, RN Clinic Care Coordinator  [facility] Clinics: [place], [place] (on-site Wednesdays), [facility] (on-site Thursdays) & [facility].  [email or web address]  Phone: [phone]           [facility]  Patient Centered Plan of Care  About Me:        Patient Name:  Nahun Villalobos    YOB: 1945  Age:         78 year old   [place] MRN:    [id number] Telephone Information:  Home Phone [phone]   Mobile [phone]       Address:  [address] [facility] [address] Email address:  [email or web address]      Emergency Contact(s)    Name Relationship Lgl Grd Work Phone Home Phone Mobile Phone   1. SINDY CHRISTIE Daughter No   [phone]   2. SUMAN MOORE Significant ot*    [phone]           Primary language:  English     needed? No   [place] [employer] [facility]:  [phone] op. 1  Other communication barriers:None    Preferred Method of Communication:  MyChart  Current living arrangement: I live in a private home with spouse (Suman, s.o.)    Mobility Status/ Medical Equipment: Independent w/Device        Health Maintenance  Health Maintenance Reviewed: Due/Overdue   Health Maintenance Due   Topic Date Due    DIABETIC FOOT EXAM  11/01/2019    MICROALBUMIN  05/20/2023    DTAP/TDAP/TD IMMUNIZATION (2 - Td or Tdap) 08/09/2023    INFLUENZA VACCINE (1) 09/01/2024    LIPID  09/12/2024    COVID-19 Vaccine (8 - 2023-24 season) 09/16/2024    EYE EXAM  09/25/2024           My Access Plan  Medical Emergency 911   Primary Clinic Line M  [facility] - [phone]   24 Hour Appointment Line [phone] or  [phone]) (toll-free)   24 Hour Nurse Line [phone] (toll-free)   Preferred Urgent Care [facility], [phone]     Preferred Hospital [facility]  [phone] ([facility]; was at [facility] 3/2023)     Preferred Pharmacy [id number] - [place], [place] - [address] AT [address] [place] [address] & [facility]     Behavioral Health Crisis Line The National Suicide Prevention Lifeline at 1-590.533.9656 or Text/Call 988           My Care Team Members  Patient Care Team         Relationship Specialty Notifications Start End    Olegario Castillo MD PCP - General Internal Medicine  4/6/22     Phone: [phone] Fax: [phone]         [address] [place] [address]    Olegario Castillo MD Assigned PCP   10/4/12     Phone: [phone] Fax: [phone]         [address] [place] [address]    Praveena Burris MD MD Urology  3/1/17      INACTIVE IN [place] AS OF 4/30/2021    Areli Cordero, RN Registered Nurse   3/1/17     Phone: [phone]         Kelley Slaughter CHW Community Health Worker Primary Care - CC Admissions 9/24/20     Phone: [phone]         Jaswinder Tinajero MD MD Hematology & Oncology  12/7/20     Phone: [phone] Fax: [phone]         [place] [facility] [address] [facility] [address] [facility] [address]    Bernardo Haynes MD MD Pulmonary Disease  12/7/20     Phone: [phone] Fax: [phone]         [place] [facility] [address] [facility] [address] [facility] [address]    Antonia Mandel, PT Physical Therapist Physical Therapist  8/2/21     Phone: [phone] Fax: [phone]         [address] [place] [address] [address] [facility] [address]    Jamil Aparicio MD MD Neurology  2/2/22     Phone: [phone] Fax: [phone] [address] [place] [address]     Kaleigh Onofre MD MD Urology  2/1/23     Phone: [phone] Fax: [phone]         [address] [place] [address]    Kaleigh Onofre MD Assigned Surgical Provider   3/11/23     Phone: [phone] Fax: [phone]         [address] [facility] [address] [facility] [address]    Kelli Davidson, RN Lead Care Coordinator  Admissions 6/17/24                 My Care Plans  Self Management and Treatment Plan    Care Plan  Care Plan: 1. Improve chronic symptoms       Problem: Lifestyle choices       Goal: I want to improve management of my leg, knee, and hip pain and swelling.       Start Date: 6/3/2021 Expected End Date: 7/5/2024    Recent Progress: 80%    Note:     Barriers: Lymphedema  Strengths: Motivated to improve ability to walk  Patient expressed understanding of goal: Yes  Action steps to achieve this goal:  1. I will apply Jacinto hose to wear throughout the day and remove at night  2. I will walk my dog daily to help with strengthening and endurance  3. I will elevate my legs while sitting and laying down by propping them up with a pillow  4. I will discuss, review, schedule and complete recommended overdue health maintenance with my primary care provider  5. I will I will take my medications as prescribed  6. I will contact my care team with questions, concerns, support needs. I will use the clinic as a resource   7. I will contact my clinic with 24/7 after hours services available                              Action Plans on File:         COPD  Depression          Advance Care Plans/Directives:   Advanced Care Plan/Directives on file:   No    Discussed with patient/caregiver(s): No data recorded           My Medical and Care Information  Problem List   Patient Active Problem List   Diagnosis    Essential hypertension, benign    Tobacco use disorder    Lumbago    Impotence of organic origin    Advance care planning    Polyp of colon    Obstructive sleep apnea syndrome    Hyperlipidemia LDL goal <100    Morbid  obesity due to excess calories (H)    BPPV (benign paroxysmal positional vertigo), unspecified laterality    Chronic obstructive pulmonary disease, unspecified COPD type (H)    Type 2 diabetes mellitus without complication, without long-term current use of insulin (H)    S/P transurethral resection of prostate    Hematuria, gross    Cervical segment dysfunction    Cervicalgia    Thoracic segment dysfunction    Erectile dysfunction    Acute diverticulitis    Mesenteric mass    History of adenocarcinoma of lung    Benign neoplasm of ascending colon    Benign neoplasm of rectum    Diverticular disease of colon    History of colonic polyps    Non-small cell lung cancer (H)    Malignant neoplasm metastatic to bone (H)    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, unspecified portion (H)    Weakness    Gait instability    Alteration in skin integrity due to moisture    Contusion of lower back, initial encounter    Acute left-sided low back pain without sciatica    Secondary carcinoid tumors of liver (H)      Current Medications and Allergies:     Allergies   Allergen Reactions    No Known Drug Allergy      Current Outpatient Medications   Medication Sig Dispense Refill    acetaminophen (TYLENOL) 500 MG tablet Take 2 tablets (1,000 mg) by mouth every 8 hours (Patient taking differently: Take 1,000 mg by mouth 2 times daily.)      albuterol (VENTOLIN HFA) 108 (90 Base) MCG/ACT inhaler INHALE 2 PUFFS INTO THE LUNGS EVERY 6 HOURS AS NEEDED FOR SHORTNESS OF BREATH / DYSPNEA OR WHEEZING 25.5 g 0    apixaban ANTICOAGULANT (ELIQUIS) 2.5 MG tablet Take 1 tablet (2.5 mg) by mouth 2 times daily 60 tablet 0    Ascorbic Acid (VITAMIN C) 500 MG CHEW Take 500 mg by mouth at bedtime 30 tablet 0    atorvastatin (LIPITOR) 20 MG tablet Take 1 tablet (20 mg) by mouth daily 90 tablet 1    blood glucose (NO BRAND SPECIFIED) lancets standard Use to test blood sugar 1 time daily, first thing in the morning 50 each 3    Calcium Carbonate  Antacid (TUMS PO) Take 500 mg by mouth as needed.      cholestyramine light (QUESTRAN) 4 GM packet Take 1 packet (4 g) by mouth 2 times daily (Patient taking differently: Take 4 g by mouth daily.) 180 each 3    Coenzyme Q10 400 MG CAPS Take 400 mg by mouth daily 30 capsule 0    ferrous sulfate (FE TABS) 325 (65 Fe) MG EC tablet Take 1 tablet (325 mg) by mouth every evening 30 tablet 0    finasteride (PROSCAR) 5 MG tablet Take 1 tablet (5 mg) by mouth daily 90 tablet 3    Fluticasone-Umeclidin-Vilant (TRELEGY ELLIPTA) 100-62.5-25 MCG/ACT oral inhaler Inhale 1 puff into the lungs daily 28 each 0    furosemide (LASIX) 20 MG tablet Take 2 tablets (40 mg) by mouth daily 60 tablet 0    gabapentin (NEURONTIN) 300 MG capsule Take 1 capsule (300 mg) by mouth every morning 30 capsule 0    gabapentin (NEURONTIN) 300 MG capsule Take 2 capsules (600 mg) by mouth at bedtime 60 capsule 0    lanreotide acetate (SOMATULINE) 120 MG/0.5ML injection Inject 120 mg subcutaneously every 28 days. Do not start before September 6, 2024.      lisinopril (ZESTRIL) 40 MG tablet TAKE 1 TABLET DAILY 90 tablet 3    metFORMIN (GLUCOPHAGE) 1000 MG tablet Take 1 tablet (1,000 mg) by mouth 2 times daily (with meals) 180 tablet 1    methylPREDNISolone (MEDROL DOSEPAK) 4 MG tablet therapy pack Follow Package Directions 21 tablet 0    metoprolol succinate ER (TOPROL XL) 25 MG 24 hr tablet Take 1 tablet (25 mg) by mouth daily 90 tablet 3    miconazole (MICATIN) 2 % external cream Apply topically 2 times daily as needed for other (Rash/ skin irritation)      order for DME Oxygen 2 Li/min  at night via nasal cannula 1 Device 0    vitamin B-Complex Take 1 tablet by mouth daily       No current facility-administered medications for this visit.         Care Coordination Start Date: 9/24/2020   Frequency of Care Coordination: monthly, more frequently as needed     Form Last Updated: 09/10/2024

## 2024-09-10 NOTE — LETTER
9/10/2024       RE: Nahun Villalobos  [address]  [facility] [address]     Dear Colleague,    Thank you for referring your patient, Nahun Villalobos, to the [facility] [place] at [facility]. Please see a copy of my visit note below.      [facility] [place]  [address]  [address]RD FLO[address]  [facility] [address]  Phone: [phone]  Fax: [phone]      Patient:  Nahun Villalobos, Date of birth 1945, 78 year old, male  Referring Provider Kelli Peterson, LINDSAY  [address] [place] [address]    ASSESSMENT & PLAN:  Patient has signs and symptoms concerning for cervical myelopathy.  He has hyperreflexia in his upper and lower extremities on exam as well as bilateral Jaida signs.  He has been experiencing tingling in his fingertips.  In the bilateral upper extremities and fairly persistent numbness in the right forearm and thumb.  He is unable to ambulate due to his left leg being weak after a fall.  This did improve a little bit with physical therapy during TCU and home health.  This is now complete and he is still unable to ambulate adequately.  Additionally has paresthesias in his lower extremities.  While in the hospital he had CT thoracic and lumbar myelograms because of a Jovanni pin in his intestines precluded him from being able to have MRIs.  This does not show up on his current x-ray and he appears to have passed this.      Recommend pool therapy-Christajeff Reeder  Will attempt to get a cervical and lumbar MRI.  Follow-up 2 to 3 days after imaging completed to discuss results and further recommendations.      I spent 80 minutes spent in patient care, independent review and interpretation of medical records/imaging, reviewing old records.    History of Present Illness:    09/10/2024 Visit: - referred for Left-sided low back pain with left-sided sciatica, unspecified  chronicity     Patient is present today with his significant other.     Patient is in a W/C since May 2024 because he fell out of a bed and landed on his left hip on top a collapsible step stool.  He was unable to walk and required paramedics to help.  He was found to have a anshu pin in his GI system.  This passed in while he was in rehab.  He reports his left leg is very painful and he states he cannot get it to function as it was prior to the fall.  Patient points to pain in the low back on the left just above the hip.  The pain does extend down the leg if he moves it wrong.  The pain is getting better since it occurred.  He has been on Norco and MR while IP and Rehab.  He most recently had Medrol DP (about 10 days ago), which improved his pain.  He is no longer taking the other medications. He denies changes in b/b function after fall.  Patient notes weakness in his left leg and has to really concentrate on moving it to move it.      He has chronic neuropathy worse on the left side involving the bottom and top of the foot; only has right plantar neuropathy.      He most currently feels right forearm burning about 4 months ago.  He also endorses numbness and tingling in his fingertips bilaterally.  He denies dropping things with his hands.  Patient recalls that he was told that he would likely need to have a left decompressive surgery for his neck but the oncology doctor told him that the cancer treatment was primary at that time.  He is unsure if he has had a cervical MRI in the past, but there is a CT of the neck in 2023 that reveals likely central canal stenosis that may impact the cord.    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  He is getting hormone deprivation therapy and is being monitored at [facility].  His July visit showed no growth of the tumor.    Hx of left lung surgery for lung cancer.     No prior back surgeries.      Conservative Treatment:  Medrol DP  - improved pain  Norco - not taking anymore  MR - not taking anymore  Gabapentin 300 mg AM; 600 PM - chronic medication for neuropathy  Injections - not tried   PT HH/rehab - finished yesterday - did improve with mobility            Tobacco use:  No - prior smoker    IMAGING   Imaging independently reviewed.    EOS XR 9/10/24 -he appears to have reduced lordosis in the lumbar spine, significant bone spurs on the cervical area with diffuse DDD.  Radiology report is pending.      CT Abdomen Pelvis w Contrast  Result Date: 7/17/2024  EXAM:  CT ABDOMEN PELVIS WITH IV CONTRAST COMPARISON:  CT 3/26/24, PET 1/24/2024 FINDINGS:  Status post small bowel resection in 2023 4 small bowel neuroendocrine tumor. No evidence of recurrent disease at the anastomotic site. The anastomosis appears patent. Stable mesenteric mass infiltrating along the distal SMV and its branches measuring approximately 2.7 x 2.2 cm (series 4, image 79). Stable vague hypodensity measuring 1.2 cm in segment 6 (series 4, image 60). Stable 10 mm hypodensity at the hepatic dome (series 4, image 20). Stable 1.8 cm heterogeneous lesion in the posterior right hepatic lobe (series 4, image 23). At least 9 other subcentimeter lesions are noted throughout both hepatic lobes which appears stable compared to 3/26/2024. These lesions are all better seen on the portal phase. Stable 1.2 cm paraduodenal node/nodule (series 4, image 64). Stable 1.5 cm paraduodenal lymph nodes/mesenteric nodule (series 4, image 71). Stable 1 cm left paraaortic lymph node (series 4, image 59). Bilateral renal hypodensities, likely cysts. Normal spleen, adrenal glands. Cholecystectomy. Mild thickening v. trabeculation in the anterior urinary bladder which appears stable (series 4, image 137). Enlarged prostate. Colonic diverticulosis. The small bowel is normal in caliber. Multi level degenerative changes of the imaged thoracolumbar spine. Osseous metastases seen on PET from 1/24/2024 are  not well appreciated on today's exam.   1. Stable exam compared to 3/26/2024 with stable number and size of the hepatic metastases, stable lymphadenopathy and mesenteric node/mass.     X-ray Myelogram thoracic  Result Date: 7/1/2024  XR MYELOGRAM THORACIC SPINE                 7/1/2024 11:07 AM   History:  Thoracic and Lumbar spine myelogram. Assess for spine abnormality; new onset hip flexion weakness. Lower extremity weakness. Procedure: The procedure and its risks were discussed with the patient prior to the myelogram. The risks explained included but were not limited to infection, bleeding, headaches, seizure, neural injury, etc. The patient had no further questions and wished to proceed.   The lower back was prepped and draped in the usual sterile manner. Lidocaine 1% was used for local anesthesia. A 22 gauge spinal needle was used to enter the thecal sac at the L2-L3 level under fluoroscopic guidance. 10 mL of myelographic contrast was infused. The needle was removed. Estimated blood loss during the procedure was less than 5 mL. No specimens collected. No complications were encountered.   Fluoroscopy time: 0.5 minutes Images Obtained: 12 Medications used: 3 mL 1% lidocaine, 10 mL of Isovue-M 300 Findings:  Technically successful lumbar and thoracic myelogram procedure without complications.  CT exam to be dictated separately. LORIE SHEPHERD PA-C   SYSTEM ID:  [id number]    CT Lumbar spine w contrast  Result Date: 7/1/2024  CT THORACIC SPINE MYELOGRAM WITH CONTRAST   7/1/2024 11:47 AM CT LUMBAR SPINE MYELOGRAM WITH CONTRAST  7/1/2024 11:47 AM HISTORY: assess for spine abnormality; new onset hip flexion weakness  TECHNIQUE: Axial images of the thoracic and lumbar spine were obtained following the administration of intrathecal contrast. Intrathecal contrast dose and myelographic images are dictated on a separate accession number. Multiplanar reformations were performed. Radiation dose for this scan was reduced  using automated exposure control, adjustment of the mA and/or kV according to patient size, or iterative reconstruction technique. COMPARISON: 6/24/2024 CT. FINDINGS:  There is good contrast opacification of the thecal sac. THORACIC SPINE: Minimal thoracic dextrocurvature. Normal thoracic kyphosis. Anterior posterior alignment of the thoracic spine within normal limits. No loss of vertebral body height. No lucent fracture lines identified. Mild multilevel disc space height loss. Diffuse idiopathic skeletal hyperostosis extending from T6 into the lumbar spine. Disc osteophyte complex at T9-T10 and T11-T12. Otherwise, no significant herniation. 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 Multilevel facet arthropathy. Moderate right T3-T4 and severe right T4-T5 neural foraminal narrowing due to facet arthropathy. Otherwise, no significant neural foraminal narrowing or canal stenosis. Visualized paraspinous soft tissues demonstrate scattered aortic atherosclerotic calcifications.. LUMBAR SPINE: There are 5 lumbar-type vertebral bodies assumed for the purposes of this dictation. Lumbar spine alignment is within normal limits other than trace grade 1 anterolisthesis of L4 on L5. No loss of vertebral body height. No evidence of fracture. Diffuse idiopathic skeletal hyperostosis extending from the thoracic spine to the level of L1. Level by level as follows: T12-L1:  Minimal loss of disc height. No significant herniation. Minimal facet arthropathy. No significant neural foraminal narrowing or canal stenosis. L1-L2:  Minimal loss of disc height. No significant angulation. Minimal facet arthropathy. No significant neural foraminal narrowing or canal stenosis. L2-L3:  No loss of disc height. Trace diffuse disc bulge. Mild facet arthropathy. Moderate bilateral neural foraminal narrowing without significant canal stenosis. L3-L4:  Mild loss of disc height. Posterior disc  osteophyte complex. Mild facet arthropathy. Moderate right and mild left neural foraminal narrowing in mild canal stenosis. L4-L5:  Moderate loss of disc height. Posterior disc osteophyte complex. Moderate facet arthropathy. Moderate bilateral neural foraminal narrowing and moderate canal stenosis. L5-S1: No loss of disc height. Trace diffuse disc bulge. Mild facet arthropathy. Mild right and moderate left foraminal narrowing without significant canal stenosis. Visualized paraspinous tissues: Scattered aortic atherosclerotic calcifications.   IMPRESSION: 1. Multilevel thoracolumbar degenerative changes. 2. Moderate right T3-T4 and severe right T4-T5 neural foraminal narrowing due to facet arthropathy. 3. Moderate lumbar neural foraminal narrowing at L2-L3 bilaterally, L3-L4 on the right, L4-L5 bilaterally, and L5-S1 on the left. 4. Moderate L4-L5 and mild L3-L4 canal stenosis. No thoracic canal stenosis. RAMOS DALAL MD   SYSTEM ID:  [id number]    CT Thoracic spine w contrast  Result Date: 7/1/2024  CT THORACIC SPINE MYELOGRAM WITH CONTRAST   7/1/2024 11:47 AM CT LUMBAR SPINE MYELOGRAM WITH CONTRAST  7/1/2024 11:47 AM HISTORY: assess for spine abnormality; new onset hip flexion weakness  TECHNIQUE: Axial images of the thoracic and lumbar spine were obtained following the administration of intrathecal contrast. Intrathecal contrast dose and myelographic images are dictated on a separate accession number. Multiplanar reformations were performed. Radiation dose for this scan was reduced using automated exposure control, adjustment of the mA and/or kV according to patient size, or iterative reconstruction technique. COMPARISON: 6/24/2024 CT. FINDINGS:  There is good contrast opacification of the thecal sac. THORACIC SPINE: Minimal thoracic dextrocurvature. Normal thoracic kyphosis. Anterior posterior alignment of the thoracic spine within normal limits. No loss of vertebral body height. No lucent fracture lines  identified. Mild multilevel disc space height loss. Diffuse idiopathic skeletal hyperostosis extending from T6 into the lumbar spine. Disc osteophyte complex at T9-T10 and T11-T12. Otherwise, no significant herniation. 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 Multilevel facet arthropathy. Moderate right T3-T4 and severe right T4-T5 neural foraminal narrowing due to facet arthropathy. Otherwise, no significant neural foraminal narrowing or canal stenosis. Visualized paraspinous soft tissues demonstrate scattered aortic atherosclerotic calcifications.. LUMBAR SPINE: There are 5 lumbar-type vertebral bodies assumed for the purposes of this dictation. Lumbar spine alignment is within normal limits other than trace grade 1 anterolisthesis of L4 on L5. No loss of vertebral body height. No evidence of fracture. Diffuse idiopathic skeletal hyperostosis extending from the thoracic spine to the level of L1. Level by level as follows: T12-L1:  Minimal loss of disc height. No significant herniation. Minimal facet arthropathy. No significant neural foraminal narrowing or canal stenosis. L1-L2:  Minimal loss of disc height. No significant angulation. Minimal facet arthropathy. No significant neural foraminal narrowing or canal stenosis. L2-L3:  No loss of disc height. Trace diffuse disc bulge. Mild facet arthropathy. Moderate bilateral neural foraminal narrowing without significant canal stenosis. L3-L4:  Mild loss of disc height. Posterior disc osteophyte complex. Mild facet arthropathy. Moderate right and mild left neural foraminal narrowing in mild canal stenosis. L4-L5:  Moderate loss of disc height. Posterior disc osteophyte complex. Moderate facet arthropathy. Moderate bilateral neural foraminal narrowing and moderate canal stenosis. L5-S1: No loss of disc height. Trace diffuse disc bulge. Mild facet arthropathy. Mild right and moderate left foraminal narrowing without  "significant canal stenosis. Visualized paraspinous tissues: Scattered aortic atherosclerotic calcifications.   IMPRESSION: 1. Multilevel thoracolumbar degenerative changes. 2. Moderate right T3-T4 and severe right T4-T5 neural foraminal narrowing due to facet arthropathy. 3. Moderate lumbar neural foraminal narrowing at L2-L3 bilaterally, L3-L4 on the right, L4-L5 bilaterally, and L5-S1 on the left. 4. Moderate L4-L5 and mild L3-L4 canal stenosis. No thoracic canal stenosis. RAMOS DALAL MD   SYSTEM ID:  [id number]      Physical Exam   BP (!) 144/82 (BP Location: Right arm, Patient Position: Sitting)   Pulse 69   Resp 16   Ht 1.676 m (5' 6\")   Wt 86.2 kg (190 lb)   SpO2 97%   BMI 30.67 kg/m      Constitutional: Appears well-developed and well-nourished. Cooperative. No acute distress.   HENT:   Head: Normocephalic and atraumatic.   Eyes: Conjunctivae are normal.  Neck: Neck supple. No tracheal deviation present.  Cardiovascular: Normal rate and regular rhythm.    Pulmonary/Chest: Effort normal and breath sounds normal.  Abdominal: Exhibits no obvious distension.   Musculoskeletal: Able to move all extremities.  No involuntary motor movements. No C/T/L spine tenderness to palpation.  Very stiff through pelvic girdle.  Left Kaitlynn with hip pain.  Negative SLR.    Cervical flexion-extension range of motion: reduced extension, rotation.  ?post left Spurling.   Skin: Skin is warm, dry and intact.   Psychiatric: Normal mood and affect. Speech is normal and behavior is normal.    Neurological:  Alert, NAD, and oriented to person, place, and time.   No cranial nerve deficit   Gait: unable to ambulate without assistance, not tested.  He can self-transfer/pivot from chair to exam table.      Strength (L/R)  5/5 Deltoid (shoulder issues)  5/5 Bicep  5/5 Tricep  4+/4+ Handgrip (right handed)    2/5 Hip Flexion  4/5 Knee Extension  5/5 Ankle Dorsiflexion  5/5 Extensor Hallucis Longus  5/5 Plantar Flexion    Reflexes " (L/R)  3+/3+ Bicep  2+/2+ Brachioradialis  3+/3+ Patellar  1+/1+ Ankle     No Lhermitte's  +/+ Jaida's   2-3 beats on left ankle clonus    Sensation: reduced sensation in right forearm, right thumb.  Tingling in fingertips of both hands and bilateral legs L>R.          Review of Systems   See HPI     Past Medical History:   Diagnosis Date     Antiplatelet or antithrombotic long-term use      Arthritis      CHF (congestive heart failure) (H) 09/16/2020     COPD (chronic obstructive pulmonary disease) (H)      DM (diabetes mellitus) (H)      Dyspnea on exertion      Essential hypertension, benign      Hemorrhage of rectum and anus      Non-small cell lung cancer (H)      Obesity      Personal history of colonic polyps      Sleep apnea      Thrombosis      Tobacco use disorder      Urinary retention      Walking troubles        Past Surgical History:   Procedure Laterality Date     ABDOMEN SURGERY  1995     APPENDECTOMY       BONE EXOSTOSIS EXCISION Bilateral 9/20/2022    Procedure: EXOSTECTOMIES BOTH 5TH DIGITS WITH SOFT TISSUE RELEASE;  Surgeon: Tong Gray DPM;  Location: [facility]     CHOLECYSTECTOMY       COLONOSCOPY  2014     CYSTOSCOPY, TRANSURETHRAL RESECTION (TUR) PROSTATE, COMBINED N/A 4/30/2018    Procedure: COMBINED CYSTOSCOPY, TRANSURETHRAL RESECTION (TUR) PROSTATE;  Cystoscopy, Transurethral Resection Of The Prostate;  Surgeon: Praveena Burris MD;  Location: [facility]     IR LUNG BIOPSY MEDIASTINUM RIGHT  4/6/2016     WEDGE RESECTION      lung     ZZC NONSPECIFIC PROCEDURE      cholecystectomy       Social History     Socioeconomic History     Marital status:      Spouse name: None     Number of children: None     Years of education: None     Highest education level: None   Tobacco Use     Smoking status: Former     Current packs/day: 0.00     Average packs/day: 2.0 packs/day for 53.0 years (106.0 ttl pk-yrs)     Types: Cigarettes, Cigars     Start date: 6/1/1960     Quit date:  2013     Years since quittin.2     Passive exposure: Past     Smokeless tobacco: Never     Tobacco comments:     Quit, will never start again.   Vaping Use     Vaping status: Never Used   Substance and Sexual Activity     Alcohol use: No     Drug use: No     Sexual activity: Not Currently     Partners: Female     Birth control/protection: Spermicide, None   Other Topics Concern     Parent/sibling w/ CABG, MI or angioplasty before 65F 55M? No     Caffeine Concern No     Comment: 1-2 a day     Special Diet No     Exercise No     Seat Belt Yes     Social Determinants of Health     Financial Resource Strain: Low Risk  (2024)    Financial Resource Strain      Within the past 12 months, have you or your family members you live with been unable to get utilities (heat, electricity) when it was really needed?: No   Food Insecurity: Low Risk  (2024)    Food Insecurity      Within the past 12 months, did you worry that your food would run out before you got money to buy more?: No      Within the past 12 months, did the food you bought just not last and you didn t have money to get more?: No   Transportation Needs: Low Risk  (2024)    Transportation Needs      Within the past 12 months, has lack of transportation kept you from medical appointments, getting your medicines, non-medical meetings or appointments, work, or from getting things that you need?: No   Physical Activity: Insufficiently Active (2024)    Exercise Vital Sign      Days of Exercise per Week: 2 days      Minutes of Exercise per Session: 20 min   Stress: No Stress Concern Present (2024)    [employer] - Occupational Stress Questionnaire      Feeling of Stress : Not at all   Social Connections: Unknown (2024)    Social Connection and Isolation Panel [NHANES]      Frequency of Social Gatherings with Friends and Family: Once a week   Housing Stability: Low Risk  (2024)    Housing Stability      Do  you have housing? : Yes      Are you worried about losing your housing?: No       family history includes Breast Cancer in his sister and sister; C.A.D. in his mother; Cancer in his mother; Cancer - colorectal in his mother; Cerebrovascular Disease in his father; Hypertension in his mother, sister, and sister; Other Cancer in his mother.       Rebeca Conte PA-C  Department of Neurosurgery  Office: [phone]        Again, thank you for allowing me to participate in the care of your patient.      Sincerely,    Rebeca oCnte PA-C

## 2024-09-10 NOTE — PROGRESS NOTES
Clinic Care Coordination Contact  Community Health Worker Follow Up    Care Gaps:     Health Maintenance Due   Topic Date Due    DIABETIC FOOT EXAM  11/01/2019    MICROALBUMIN  05/20/2023    DTAP/TDAP/TD IMMUNIZATION (2 - Td or Tdap) 08/09/2023    INFLUENZA VACCINE (1) 09/01/2024    LIPID  09/12/2024    COVID-19 Vaccine (8 - 2023-24 season) 09/16/2024    EYE EXAM  09/25/2024       Did not discuss during outreach    Care Plan:   Care Plan: 1. Improve chronic symptoms       Problem: Lifestyle choices       Goal: I want to improve management of my leg, knee, and hip pain and swelling.       Start Date: 6/3/2021 Expected End Date: 7/5/2024    Recent Progress: 80%    Note:     Barriers: Lymphedema  Strengths: Motivated to improve ability to walk  Patient expressed understanding of goal: Yes  Action steps to achieve this goal:  1. I will apply Jacinto hose to wear throughout the day and remove at night  2. I will walk my dog (recently passed) daily to help with strengthening and endurance  3. I will elevate my legs while sitting and laying down by propping them up with a pillow  4. I will discuss, review, schedule and complete recommended overdue health maintenance with my primary care provider  5. I will I will take my medications as prescribed  6. I will contact my care team with questions, concerns, support needs. I will use the clinic as a resource   7. I will contact my clinic with 24/7 after hours services available                              Intervention and Education during outreach: CHW Introduced intent of call regarding monthly follow up. CHW and patient talked about the following:     Patient stated he had an appt earlier [facility]. Per patient the provider thinks he has Sciatic nerve pain in his left leg. Per patient they will look at options to treat and he is hopeful.  Patient conveyed to CHW that he recently fell  and needed to call the Fire Department to help him get  up. Family member was present at the time. Patient stated he has a fall detector on his device if he needs future assistance.    CHW encouraged patient to contact CC if there are any other changes or resources needed.  Patient acknowledged understanding    CHW Plan: Next follow-up outreach scheduled in one month    OMAR Liu   [phone]  Clinic Care Coordination  [facility]

## 2024-09-10 NOTE — PROGRESS NOTES
[facility] NEUROSURGERY CLINIC [address]  [address] [facility] [address]  Phone: [phone]  Fax: [phone]      Patient:  Nahun Villalobos, Date of birth 1945, 78 year old, male  Referring Provider Kelli Peterson PA-C  [address] [place] [address]    ASSESSMENT & PLAN:  Patient has signs and symptoms concerning for cervical myelopathy.  He has hyperreflexia in his upper and lower extremities on exam as well as bilateral Jaida signs.  He has been experiencing tingling in his fingertips.  In the bilateral upper extremities and fairly persistent numbness in the right forearm and thumb.  He is unable to ambulate due to his left leg being weak after a fall.  This did improve a little bit with physical therapy during TCU and home health.  This is now complete and he is still unable to ambulate adequately.  Additionally has paresthesias in his lower extremities.  While in the hospital he had CT thoracic and lumbar myelograms because of a Jovanni pin in his intestines precluded him from being able to have MRIs.  This does not show up on his current x-ray and he appears to have passed this.      Recommend pool therapy-[facility] Varinder  Will attempt to get a cervical and lumbar MRI.  Follow-up 2 to 3 days after imaging completed to discuss results and further recommendations.      I spent 80 minutes spent in patient care, independent review and interpretation of medical records/imaging, reviewing old records.    History of Present Illness:    09/10/2024 Visit: - referred for Left-sided low back pain with left-sided sciatica, unspecified chronicity     Patient is present today with his significant other.     Patient is in a W/C since May 2024 because he fell out of a bed and landed on his left hip on top a collapsible step stool.  He was unable to walk and required paramedics to help.  He was found to have a jovanni pin in his GI system.  This passed in while he  was in rehab.  He reports his left leg is very painful and he states he cannot get it to function as it was prior to the fall.  Patient points to pain in the low back on the left just above the hip.  The pain does extend down the leg if he moves it wrong.  The pain is getting better since it occurred.  He has been on Norco and MR while IP and Rehab.  He most recently had Medrol DP (about 10 days ago), which improved his pain.  He is no longer taking the other medications. He denies changes in b/b function after fall.  Patient notes weakness in his left leg and has to really concentrate on moving it to move it.      He has chronic neuropathy worse on the left side involving the bottom and top of the foot; only has right plantar neuropathy.      He most currently feels right forearm burning about 4 months ago.  He also endorses numbness and tingling in his fingertips bilaterally.  He denies dropping things with his hands.  Patient recalls that he was told that he would likely need to have a left decompressive surgery for his neck but the oncology doctor told him that the cancer treatment was primary at that time.  He is unsure if he has had a cervical MRI in the past, but there is a CT of the neck in 2023 that reveals likely central canal stenosis that may impact the cord.    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  He is getting hormone deprivation therapy and is being monitored at [facility].  His July visit showed no growth of the tumor.    Hx of left lung surgery for lung cancer.     No prior back surgeries.      Conservative Treatment:  Medrol DP - improved pain  Norco - not taking anymore  MR - not taking anymore  Gabapentin 300 mg AM; 600 PM - chronic medication for neuropathy  Injections - not tried   PT HH/rehab - finished yesterday - did improve with mobility            Tobacco use:  No - prior smoker    IMAGING   Imaging independently reviewed.    EOS XR 9/10/24  -he appears to have reduced lordosis in the lumbar spine, significant bone spurs on the cervical area with diffuse DDD.  Radiology report is pending.      CT Abdomen Pelvis w Contrast  Result Date: 7/17/2024  EXAM:  CT ABDOMEN PELVIS WITH IV CONTRAST COMPARISON:  CT 3/26/24, PET 1/24/2024 FINDINGS:  Status post small bowel resection in 2023 4 small bowel neuroendocrine tumor. No evidence of recurrent disease at the anastomotic site. The anastomosis appears patent. Stable mesenteric mass infiltrating along the distal SMV and its branches measuring approximately 2.7 x 2.2 cm (series 4, image 79). Stable vague hypodensity measuring 1.2 cm in segment 6 (series 4, image 60). Stable 10 mm hypodensity at the hepatic dome (series 4, image 20). Stable 1.8 cm heterogeneous lesion in the posterior right hepatic lobe (series 4, image 23). At least 9 other subcentimeter lesions are noted throughout both hepatic lobes which appears stable compared to 3/26/2024. These lesions are all better seen on the portal phase. Stable 1.2 cm paraduodenal node/nodule (series 4, image 64). Stable 1.5 cm paraduodenal lymph nodes/mesenteric nodule (series 4, image 71). Stable 1 cm left paraaortic lymph node (series 4, image 59). Bilateral renal hypodensities, likely cysts. Normal spleen, adrenal glands. Cholecystectomy. Mild thickening v. trabeculation in the anterior urinary bladder which appears stable (series 4, image 137). Enlarged prostate. Colonic diverticulosis. The small bowel is normal in caliber. Multi level degenerative changes of the imaged thoracolumbar spine. Osseous metastases seen on PET from 1/24/2024 are not well appreciated on today's exam.   1. Stable exam compared to 3/26/2024 with stable number and size of the hepatic metastases, stable lymphadenopathy and mesenteric node/mass.     X-ray Myelogram thoracic  Result Date: 7/1/2024  XR MYELOGRAM THORACIC SPINE                 7/1/2024 11:07 AM   History:  Thoracic and Lumbar  spine myelogram. Assess for spine abnormality; new onset hip flexion weakness. Lower extremity weakness. Procedure: The procedure and its risks were discussed with the patient prior to the myelogram. The risks explained included but were not limited to infection, bleeding, headaches, seizure, neural injury, etc. The patient had no further questions and wished to proceed.   The lower back was prepped and draped in the usual sterile manner. Lidocaine 1% was used for local anesthesia. A 22 gauge spinal needle was used to enter the thecal sac at the L2-L3 level under fluoroscopic guidance. 10 mL of myelographic contrast was infused. The needle was removed. Estimated blood loss during the procedure was less than 5 mL. No specimens collected. No complications were encountered.   Fluoroscopy time: 0.5 minutes Images Obtained: 12 Medications used: 3 mL 1% lidocaine, 10 mL of Isovue-M 300 Findings:  Technically successful lumbar and thoracic myelogram procedure without complications.  CT exam to be dictated separately. LORIE SHEPHERD PA-C   SYSTEM ID:  [id number]    CT Lumbar spine w contrast  Result Date: 7/1/2024  CT THORACIC SPINE MYELOGRAM WITH CONTRAST   7/1/2024 11:47 AM CT LUMBAR SPINE MYELOGRAM WITH CONTRAST  7/1/2024 11:47 AM HISTORY: assess for spine abnormality; new onset hip flexion weakness  TECHNIQUE: Axial images of the thoracic and lumbar spine were obtained following the administration of intrathecal contrast. Intrathecal contrast dose and myelographic images are dictated on a separate accession number. Multiplanar reformations were performed. Radiation dose for this scan was reduced using automated exposure control, adjustment of the mA and/or kV according to patient size, or iterative reconstruction technique. COMPARISON: 6/24/2024 CT. FINDINGS:  There is good contrast opacification of the thecal sac. THORACIC SPINE: Minimal thoracic dextrocurvature. Normal thoracic kyphosis. Anterior posterior alignment of  the thoracic spine within normal limits. No loss of vertebral body height. No lucent fracture lines identified. Mild multilevel disc space height loss. Diffuse idiopathic skeletal hyperostosis extending from T6 into the lumbar spine. Disc osteophyte complex at T9-T10 and T11-T12. Otherwise, no significant herniation. 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 Multilevel facet arthropathy. Moderate right T3-T4 and severe right T4-T5 neural foraminal narrowing due to facet arthropathy. Otherwise, no significant neural foraminal narrowing or canal stenosis. Visualized paraspinous soft tissues demonstrate scattered aortic atherosclerotic calcifications.. LUMBAR SPINE: There are 5 lumbar-type vertebral bodies assumed for the purposes of this dictation. Lumbar spine alignment is within normal limits other than trace grade 1 anterolisthesis of L4 on L5. No loss of vertebral body height. No evidence of fracture. Diffuse idiopathic skeletal hyperostosis extending from the thoracic spine to the level of L1. Level by level as follows: T12-L1:  Minimal loss of disc height. No significant herniation. Minimal facet arthropathy. No significant neural foraminal narrowing or canal stenosis. L1-L2:  Minimal loss of disc height. No significant angulation. Minimal facet arthropathy. No significant neural foraminal narrowing or canal stenosis. L2-L3:  No loss of disc height. Trace diffuse disc bulge. Mild facet arthropathy. Moderate bilateral neural foraminal narrowing without significant canal stenosis. L3-L4:  Mild loss of disc height. Posterior disc osteophyte complex. Mild facet arthropathy. Moderate right and mild left neural foraminal narrowing in mild canal stenosis. L4-L5:  Moderate loss of disc height. Posterior disc osteophyte complex. Moderate facet arthropathy. Moderate bilateral neural foraminal narrowing and moderate canal stenosis. L5-S1: No loss of disc height. Trace  diffuse disc bulge. Mild facet arthropathy. Mild right and moderate left foraminal narrowing without significant canal stenosis. Visualized paraspinous tissues: Scattered aortic atherosclerotic calcifications.   IMPRESSION: 1. Multilevel thoracolumbar degenerative changes. 2. Moderate right T3-T4 and severe right T4-T5 neural foraminal narrowing due to facet arthropathy. 3. Moderate lumbar neural foraminal narrowing at L2-L3 bilaterally, L3-L4 on the right, L4-L5 bilaterally, and L5-S1 on the left. 4. Moderate L4-L5 and mild L3-L4 canal stenosis. No thoracic canal stenosis. RAMOS DALAL MD   SYSTEM ID:  [id number]    CT Thoracic spine w contrast  Result Date: 7/1/2024  CT THORACIC SPINE MYELOGRAM WITH CONTRAST   7/1/2024 11:47 AM CT LUMBAR SPINE MYELOGRAM WITH CONTRAST  7/1/2024 11:47 AM HISTORY: assess for spine abnormality; new onset hip flexion weakness  TECHNIQUE: Axial images of the thoracic and lumbar spine were obtained following the administration of intrathecal contrast. Intrathecal contrast dose and myelographic images are dictated on a separate accession number. Multiplanar reformations were performed. Radiation dose for this scan was reduced using automated exposure control, adjustment of the mA and/or kV according to patient size, or iterative reconstruction technique. COMPARISON: 6/24/2024 CT. FINDINGS:  There is good contrast opacification of the thecal sac. THORACIC SPINE: Minimal thoracic dextrocurvature. Normal thoracic kyphosis. Anterior posterior alignment of the thoracic spine within normal limits. No loss of vertebral body height. No lucent fracture lines identified. Mild multilevel disc space height loss. Diffuse idiopathic skeletal hyperostosis extending from T6 into the lumbar spine. Disc osteophyte complex at T9-T10 and T11-T12. Otherwise, no significant herniation. 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 Multilevel facet arthropathy.  Moderate right T3-T4 and severe right T4-T5 neural foraminal narrowing due to facet arthropathy. Otherwise, no significant neural foraminal narrowing or canal stenosis. Visualized paraspinous soft tissues demonstrate scattered aortic atherosclerotic calcifications.. LUMBAR SPINE: There are 5 lumbar-type vertebral bodies assumed for the purposes of this dictation. Lumbar spine alignment is within normal limits other than trace grade 1 anterolisthesis of L4 on L5. No loss of vertebral body height. No evidence of fracture. Diffuse idiopathic skeletal hyperostosis extending from the thoracic spine to the level of L1. Level by level as follows: T12-L1:  Minimal loss of disc height. No significant herniation. Minimal facet arthropathy. No significant neural foraminal narrowing or canal stenosis. L1-L2:  Minimal loss of disc height. No significant angulation. Minimal facet arthropathy. No significant neural foraminal narrowing or canal stenosis. L2-L3:  No loss of disc height. Trace diffuse disc bulge. Mild facet arthropathy. Moderate bilateral neural foraminal narrowing without significant canal stenosis. L3-L4:  Mild loss of disc height. Posterior disc osteophyte complex. Mild facet arthropathy. Moderate right and mild left neural foraminal narrowing in mild canal stenosis. L4-L5:  Moderate loss of disc height. Posterior disc osteophyte complex. Moderate facet arthropathy. Moderate bilateral neural foraminal narrowing and moderate canal stenosis. L5-S1: No loss of disc height. Trace diffuse disc bulge. Mild facet arthropathy. Mild right and moderate left foraminal narrowing without significant canal stenosis. Visualized paraspinous tissues: Scattered aortic atherosclerotic calcifications.   IMPRESSION: 1. Multilevel thoracolumbar degenerative changes. 2. Moderate right T3-T4 and severe right T4-T5 neural foraminal narrowing due to facet arthropathy. 3. Moderate lumbar neural foraminal narrowing at L2-L3 bilaterally,  "L3-L4 on the right, L4-L5 bilaterally, and L5-S1 on the left. 4. Moderate L4-L5 and mild L3-L4 canal stenosis. No thoracic canal stenosis. RAMOS DALAL MD   SYSTEM ID:  [id number]      Physical Exam   BP (!) 144/82 (BP Location: Right arm, Patient Position: Sitting)   Pulse 69   Resp 16   Ht 1.676 m (5' 6\")   Wt 86.2 kg (190 lb)   SpO2 97%   BMI 30.67 kg/m      Constitutional: Appears well-developed and well-nourished. Cooperative. No acute distress.   HENT:   Head: Normocephalic and atraumatic.   Eyes: Conjunctivae are normal.  Neck: Neck supple. No tracheal deviation present.  Cardiovascular: Normal rate and regular rhythm.    Pulmonary/Chest: Effort normal and breath sounds normal.  Abdominal: Exhibits no obvious distension.   Musculoskeletal: Able to move all extremities.  No involuntary motor movements. No C/T/L spine tenderness to palpation.  Very stiff through pelvic girdle.  Left Kaitlynn with hip pain.  Negative SLR.    Cervical flexion-extension range of motion: reduced extension, rotation.  ?post left Spurling.   Skin: Skin is warm, dry and intact.   Psychiatric: Normal mood and affect. Speech is normal and behavior is normal.    Neurological:  Alert, NAD, and oriented to person, place, and time.   No cranial nerve deficit   Gait: unable to ambulate without assistance, not tested.  He can self-transfer/pivot from chair to exam table.      Strength (L/R)  5/5 Deltoid (shoulder issues)  5/5 Bicep  5/5 Tricep  4+/4+ Handgrip (right handed)    2/5 Hip Flexion  4/5 Knee Extension  5/5 Ankle Dorsiflexion  5/5 Extensor Hallucis Longus  5/5 Plantar Flexion    Reflexes (L/R)  3+/3+ Bicep  2+/2+ Brachioradialis  3+/3+ Patellar  1+/1+ Ankle     No Lhermitte's  +/+ Jaida's   2-3 beats on left ankle clonus    Sensation: reduced sensation in right forearm, right thumb.  Tingling in fingertips of both hands and bilateral legs L>R.          Review of Systems   See HPI     Past Medical History:   Diagnosis Date    " Antiplatelet or antithrombotic long-term use     Arthritis     CHF (congestive heart failure) (H) 2020    COPD (chronic obstructive pulmonary disease) (H)     DM (diabetes mellitus) (H)     Dyspnea on exertion     Essential hypertension, benign     Hemorrhage of rectum and anus     Non-small cell lung cancer (H)     Obesity     Personal history of colonic polyps     Sleep apnea     Thrombosis     Tobacco use disorder     Urinary retention     Walking troubles        Past Surgical History:   Procedure Laterality Date    ABDOMEN SURGERY      APPENDECTOMY      BONE EXOSTOSIS EXCISION Bilateral 2022    Procedure: EXOSTECTOMIES BOTH 5TH DIGITS WITH SOFT TISSUE RELEASE;  Surgeon: Tong Gray DPM;  Location: [facility]    CHOLECYSTECTOMY      COLONOSCOPY      CYSTOSCOPY, TRANSURETHRAL RESECTION (TUR) PROSTATE, COMBINED N/A 2018    Procedure: COMBINED CYSTOSCOPY, TRANSURETHRAL RESECTION (TUR) PROSTATE;  Cystoscopy, Transurethral Resection Of The Prostate;  Surgeon: Praveena Burris MD;  Location: [facility] OR    IR LUNG BIOPSY MEDIASTINUM RIGHT  2016    WEDGE RESECTION      lung    ZZC NONSPECIFIC PROCEDURE      cholecystectomy       Social History     Socioeconomic History    Marital status:      Spouse name: None    Number of children: None    Years of education: None    Highest education level: None   Tobacco Use    Smoking status: Former     Current packs/day: 0.00     Average packs/day: 2.0 packs/day for 53.0 years (106.0 ttl pk-yrs)     Types: Cigarettes, Cigars     Start date: 1960     Quit date: 2013     Years since quittin.2     Passive exposure: Past    Smokeless tobacco: Never    Tobacco comments:     Quit, will never start again.   Vaping Use    Vaping status: Never Used   Substance and Sexual Activity    Alcohol use: No    Drug use: No    Sexual activity: Not Currently     Partners: Female     Birth control/protection: Spermicide, None   Other Topics  Concern    Parent/sibling w/ CABG, MI or angioplasty before 65F 55M? No    Caffeine Concern No     Comment: 1-2 a day    Special Diet No    Exercise No    Seat Belt Yes     Social Determinants of Health     Financial Resource Strain: Low Risk  (9/2/2024)    Financial Resource Strain     Within the past 12 months, have you or your family members you live with been unable to get utilities (heat, electricity) when it was really needed?: No   Food Insecurity: Low Risk  (9/2/2024)    Food Insecurity     Within the past 12 months, did you worry that your food would run out before you got money to buy more?: No     Within the past 12 months, did the food you bought just not last and you didn t have money to get more?: No   Transportation Needs: Low Risk  (9/2/2024)    Transportation Needs     Within the past 12 months, has lack of transportation kept you from medical appointments, getting your medicines, non-medical meetings or appointments, work, or from getting things that you need?: No   Physical Activity: Insufficiently Active (9/2/2024)    Exercise Vital Sign     Days of Exercise per Week: 2 days     Minutes of Exercise per Session: 20 min   Stress: No Stress Concern Present (9/2/2024)    [place] [employer] - Occupational Stress Questionnaire     Feeling of Stress : Not at all   Social Connections: Unknown (9/2/2024)    Social Connection and Isolation Panel [NHANES]     Frequency of Social Gatherings with Friends and Family: Once a week   Housing Stability: Low Risk  (9/2/2024)    Housing Stability     Do you have housing? : Yes     Are you worried about losing your housing?: No       family history includes Breast Cancer in his sister and sister; C.A.D. in his mother; Cancer in his mother; Cancer - colorectal in his mother; Cerebrovascular Disease in his father; Hypertension in his mother, sister, and sister; Other Cancer in his mother.       Rebeca Conte PA-C  Department of  Neurosurgery  Office: [phone]

## 2024-09-10 NOTE — PROGRESS NOTES
Clinic Care Coordination Contact    Situation: Patient chart reviewed by care coordinator.    Background: Care Coordination initial assessment and enrollment to Care Coordination was 9/24/2020. Patient centered goal(s) developed with participation from patient.  RN CC handed patient off to CHW for continued outreach every 30 days. Patient is due for an updated complex care plan. Annual Assessment will be due April 2025.  .  Assessment: CHW CC is in process of outreaching to patient, recently unable to contact patient 9/5/24. Next CHW CC outreach attempt scheduled for 9/10/24.     Plan/Recommendations: CHW CC will route request to RNCC to review for disenrollment per standard work if next outreach attempt is unsuccessful. RNCC will extend and complete clinical review after CHW CC has made contact with patient. RNCC will remain available as needed.       Kelli Davidson, RN Clinic Care Coordinator  [facility] Clinics: [place] [place] (on-site Wednesdays), [facility] (on-site Thursdays) & [facility].  [email or web address]  Phone: [phone]

## 2024-09-10 NOTE — PATIENT INSTRUCTIONS
Referral to pool therapy at [facility] - please call them to schedule.  Order will be faxed over.      Cervical MRI & Lumbar MRI.    F/u 2-3 days after to review imaging.

## 2024-09-12 ENCOUNTER — DOCUMENTATION ONLY (OUTPATIENT)
Dept: NEUROSURGERY | Facility: CLINIC | Age: 79
End: 2024-09-12
Payer: COMMERCIAL

## 2024-09-17 ENCOUNTER — MEDICAL CORRESPONDENCE (OUTPATIENT)
Dept: HEALTH INFORMATION MANAGEMENT | Facility: CLINIC | Age: 79
End: 2024-09-17
Payer: COMMERCIAL

## 2024-09-19 DIAGNOSIS — M48.02 SPINAL STENOSIS IN CERVICAL REGION: ICD-10-CM

## 2024-09-19 DIAGNOSIS — M54.42 LEFT-SIDED LOW BACK PAIN WITH LEFT-SIDED SCIATICA, UNSPECIFIED CHRONICITY: Primary | ICD-10-CM

## 2024-09-19 DIAGNOSIS — R29.898 LEFT LEG WEAKNESS: ICD-10-CM

## 2024-09-19 DIAGNOSIS — M48.061 SPINAL STENOSIS OF LUMBAR REGION, UNSPECIFIED WHETHER NEUROGENIC CLAUDICATION PRESENT: ICD-10-CM

## 2024-09-19 DIAGNOSIS — R20.2 BILATERAL LEG PARESTHESIA: ICD-10-CM

## 2024-09-23 ENCOUNTER — TELEPHONE (OUTPATIENT)
Dept: NEUROSURGERY | Facility: CLINIC | Age: 79
End: 2024-09-23
Payer: COMMERCIAL

## 2024-09-23 NOTE — TELEPHONE ENCOUNTER
Spoke with pt's wife to let her know that the order for pool therapy has been changed, and will be signed tomorrow when the provider gets back.  Wife verbalized understanding.  LRP

## 2024-09-26 DIAGNOSIS — E78.5 HYPERLIPIDEMIA LDL GOAL <100: ICD-10-CM

## 2024-09-26 RX ORDER — ATORVASTATIN CALCIUM 20 MG/1
20 TABLET, FILM COATED ORAL DAILY
Qty: 90 TABLET | Refills: 1 | Status: SHIPPED | OUTPATIENT
Start: 2024-09-26

## 2024-09-26 NOTE — TELEPHONE ENCOUNTER
[facility] called no C2C on file. Got verbal approval from patient. Patient still has a bottle and was asked for renewal via [facility].     At last visit. Elequis is 5mg but is prescription is for 2.5 so pills in half to give right dose. Gets 2 a day, one in the morning and one at night.  Dr. Castillo wondering.     NICKY Miller  [facility] Triage

## 2024-10-14 ENCOUNTER — HOSPITAL ENCOUNTER (OUTPATIENT)
Dept: MRI IMAGING | Facility: CLINIC | Age: 79
Discharge: HOME | End: 2024-10-14
Attending: PHYSICIAN ASSISTANT | Admitting: PHYSICIAN ASSISTANT
Payer: COMMERCIAL

## 2024-10-14 DIAGNOSIS — M54.42 LEFT-SIDED LOW BACK PAIN WITH LEFT-SIDED SCIATICA, UNSPECIFIED CHRONICITY: ICD-10-CM

## 2024-10-14 DIAGNOSIS — R20.2 BILATERAL LEG PARESTHESIA: ICD-10-CM

## 2024-10-14 DIAGNOSIS — R29.2 HYPER REFLEXIA: ICD-10-CM

## 2024-10-14 DIAGNOSIS — M48.02 SPINAL STENOSIS IN CERVICAL REGION: ICD-10-CM

## 2024-10-14 DIAGNOSIS — R20.2 PARESTHESIA OF UPPER EXTREMITY: ICD-10-CM

## 2024-10-14 DIAGNOSIS — M48.061 SPINAL STENOSIS OF LUMBAR REGION, UNSPECIFIED WHETHER NEUROGENIC CLAUDICATION PRESENT: ICD-10-CM

## 2024-10-14 DIAGNOSIS — R29.898 LEFT LEG WEAKNESS: ICD-10-CM

## 2024-10-14 DIAGNOSIS — R29.2: ICD-10-CM

## 2024-10-14 PROCEDURE — 72148 MRI LUMBAR SPINE W/O DYE: CPT

## 2024-10-14 PROCEDURE — 72141 MRI NECK SPINE W/O DYE: CPT

## 2024-10-17 ENCOUNTER — OFFICE VISIT (OUTPATIENT)
Dept: NEUROSURGERY | Facility: CLINIC | Age: 79
End: 2024-10-17
Attending: PHYSICIAN ASSISTANT
Payer: COMMERCIAL

## 2024-10-17 VITALS
HEART RATE: 68 BPM | RESPIRATION RATE: 16 BRPM | DIASTOLIC BLOOD PRESSURE: 85 MMHG | SYSTOLIC BLOOD PRESSURE: 132 MMHG | OXYGEN SATURATION: 98 %

## 2024-10-17 DIAGNOSIS — R29.898 LEFT LEG WEAKNESS: ICD-10-CM

## 2024-10-17 DIAGNOSIS — R29.2: ICD-10-CM

## 2024-10-17 DIAGNOSIS — R20.2 PARESTHESIA OF UPPER EXTREMITY: ICD-10-CM

## 2024-10-17 DIAGNOSIS — R20.2 BILATERAL LEG PARESTHESIA: ICD-10-CM

## 2024-10-17 DIAGNOSIS — M48.02 SPINAL STENOSIS IN CERVICAL REGION: Primary | ICD-10-CM

## 2024-10-17 PROCEDURE — 99417 PROLNG OP E/M EACH 15 MIN: CPT | Performed by: PHYSICIAN ASSISTANT

## 2024-10-17 PROCEDURE — 99215 OFFICE O/P EST HI 40 MIN: CPT | Performed by: PHYSICIAN ASSISTANT

## 2024-10-17 ASSESSMENT — PAIN SCALES - GENERAL: PAINLEVEL: NO PAIN (0)

## 2024-10-17 NOTE — LETTER
10/17/2024       RE: Nahun Villalobos  [address]  [place] [address]     Dear Colleague,    Thank you for referring your patient, Nahun Villalobos, to the [facility]UROS[facility] [place] at [facility]. Please see a copy of my visit note below.      [facility] [place]  [address]  3RD FLO[address]  [facility] [address]  Phone: [phone]  Fax: [phone]      Patient:  Nahun Villalobos, Date of birth 1945, 78 year old, male  Referring Provider Kelli Peterson, LINDSAY  [address] [place] [address]    ASSESSMENT & PLAN:  Patient has imaging and symptoms concerning for cervical myelopathy.  Patient has been dependant on a w/c or walker since June of 2024 after a fall.  Prior to this, he was using a cane to help with balance.  He has had n/t in his fingers for about 5 years which has progressively become more persistent.  He notes burning in his right forearm.  He has positive Nino's signs bilaterally.  He is very weak in the hip flexor of his left leg and otherwise has BTK numbness worse in the feet.      We discussed his MRIs today.  Would recommend he be seen for cervical surgery to address the central stenosis.  We discussed decompression, laminoplasty as possible options.  He may also require lumbar surgery in the future.     Lumbar MRI - 10/14/24 - Most significant L4/5 left NFS (mod-sev)  Cervical MRI - 10/14/24 - C3-6 CCS, most severe at C4/5 w/ myelomalacia at C5 level    He has recently started pool therapy at .  Continue.    Referral placed to Dr. Pinon for surgical consult -      I spent 66 minutes spent in patient care, independent review and interpretation of medical records/imaging, reviewing old records.    History of Present Illness:    9/10/24 Visit: - referred for Left-sided low back pain with left-sided sciatica, unspecified chronicity.  Patient is present today  with his significant other.   Patient is in a W/C since May 2024 because he fell out of a bed and landed on his left hip on top a collapsible step stool.  He was unable to walk and required paramedics to help.  He was found to have a anshu pin in his GI system.  This passed in while he was in rehab.  He reports his left leg is very painful and he states he cannot get it to function as it was prior to the fall.  Patient points to pain in the low back on the left just above the hip.  The pain does extend down the leg if he moves it wrong.  The pain is getting better since it occurred.  He has been on Norco and MR while IP and Rehab.  He most recently had Medrol DP (about 10 days ago), which improved his pain.  He is no longer taking the other medications. He denies changes in b/b function after fall.  Patient notes weakness in his left leg and has to really concentrate on moving it to move it.    He has chronic neuropathy worse on the left side involving the bottom and top of the foot; only has right plantar neuropathy.    He most currently feels right forearm burning about 4 months ago.  He also endorses numbness and tingling in his fingertips bilaterally.  He denies dropping things with his hands.  Patient recalls that he was told that he would likely need to have a left decompressive surgery for his neck but the oncology doctor told him that the cancer treatment was primary at that time.  He is unsure if he has had a cervical MRI in the past, but there is a CT of the neck in 2023 that reveals likely central canal stenosis that may impact the cord.    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  He has been on hormone deprivation therapy and is being monitored at [facility].  He was told he has a long time to live.    Hx of left lung surgery for lung cancer.   No prior back surgeries.   Currently on Eliquis (5 years) - for PE/DVT     10/17/24 Visit - f/u after getting  "cervical and lumbar MRI.    Patient is in a w/c accompanied by his SO.  He denies having any chemo therapy.  The numbness in his fingers (3-middle fingers bilaterally affected the most) started about 5 years ago and has progressively gotten more consistent.  The numbness BTK has been more intense since January.  He describes this as \"bubbly\" in his feet.  He continues to have left leg weakness, but no longer has the sciatica pain.  He has normal sensation of needing to defecate/void.      Conservative Treatment:  Medrol DP - improved pain  Norco - not taking anymore  MR - not taking anymore  Gabapentin 300 mg AM; 600 PM - chronic medication for neuropathy  Injections - not tried   PT HH/rehab - finished yesterday - did improve with mobility.  Has recently started at [facility] for Pool therapy (10 weeks are recommended).              Tobacco use:  No - prior smoker    IMAGING   Imaging independently reviewed.    Cervical & lumbar MRI 10/14/24 -   FINDINGS:  Cervical Spine:  Preserved vertebral height and alignment.. No suspicious marrow  lesion. No prevertebral edema. Focus of T2 hyperintense signal within  the cord at the C5 superior endplate level (sagittal series 18, image  8). Otherwise preserved cord signal. Nonfocal extraspinal structures.   The findings on a level by level basis are as follows:  Craniocervical junction, C1-C2: Within normal limits.  C2-C3: Preserved disc height and signal for patient age. No cord  compression. Normal facets for patient age. No high-grade neural  foraminal stenosis.   C3-C4: Preserved disc height and signal for patient age. Disc bulge,  central protrusion causing ventral CSF space effacement, mild cord  flattening and associated mild canal stenosis. Bilateral  uncovertebral, facet arthropathy contributes to moderate left and mild  right neural foraminal stenosis.  C4-C5: Preserved disc height and signal for patient age. Posterior  disc osteophyte complex, ligamentum flavum " buckling contributes to  cord compression, completely CSF space effacement and severe canal  stenosis. Bilateral uncovertebral and facet arthropathy contribute to  moderate to severe bilateral neural foraminal stenosis.  C5-C6: Mild loss of disc height. Prominent central posterior disc  osteophyte complex causes a focal area of ventral CSF space  effacement, cord flattening and associated mild canal stenosis.  Bilateral uncovertebral and facet arthropathy contribute to mild  bilateral neural foraminal stenosis.  C6-C7: Mild loss of disc height. Prominent central posterior disc  osteophyte complex causes cord flattening and partial ventral CSF  space effacement. Associated mild canal stenosis. Bilateral  uncovertebral and facet arthropathy contribute to mild bilateral  neural foraminal stenosis.  C7-T1: Preserved disc height and signal for patient age. No cord  compression. Normal facets for patient age. No high-grade neural  foraminal stenosis.  Lumbar Spine:  Five lumbar type vertebral body numbering convention. Preserved  vertebral body height, alignment and marrow signal. Conus medullaris  tip at L1. Normal cauda equina and nerve roots. Normal bony pelvis.  Nonfocal extraspinal structures.   On a level by level basis, the findings are as follows:  T12-L1: Preserved disc height and signal for patient age. Normal  facets for patient age. No high-grade spinal canal or neural foraminal  stenosis  L1-L2: Preserved disc height and signal for patient age. Normal facets  for patient age. No high-grade spinal canal or neural foraminal  stenosis.   L2-L3: Preserved disc height and signal for patient age. Symmetric  disc bulge. Normal facets for patient age. No high-grade spinal canal  or neural foraminal stenosis.  L3-L4: Preserved disc height and signal for patient age. Symmetric  disc bulge, bilateral facet arthropathy right greater than left  contributes to mild right neural foraminal stenosis. Patent left  neural  foramen and spinal canal.  L4-L5: Mild loss of disc height, intradiscal T2 signal. Left greater  than right posterior disc osteophyte complex and facet arthropathy  contributes to moderate to severe left, moderate canal and mild right  neural foraminal stenosis.   L5-S1: Preserved disc height and signal for patient age. Symmetric  disc bulge. Normal facets for patient age. No high-grade spinal canal  or neural foraminal stenosis.  IMPRESSION:  Cervical Spine:  1. Multilevel cervical spondylosis, as above. Severe canal stenosis at  C4-C5, compression of the cord and focal area of suspected  myelomalacia at the superior C5 endplate level.  Lumbar Spine:  1.  Multilevel lumbar spondylosis, as above most pronounced at L4-L5.      EOS XR 9/10/24 -he appears to have reduced lordosis in the lumbar spine, significant bone spurs on the cervical area with diffuse DDD.  Radiology report is pending.  Findings:  12 rib bearing vertebral bodies and 5 lumbar type vertebral bodies are  identified.  Coronal Deformity:  There is no substantial coronal curvature of the spine.  No substantial global coronal imbalance.  Sagittal Vertical Axis (A vertical line drawn from the center of C7  (kiran line) to the posterosuperior aspect of the S1 on sagittal  plane):  very positive   Weight bearing axis: (Defined as a line drawn from the center of the  femoral head to the mid aspect of the tibial plafond).      Right: Weight bearing axis crosses through the lateral tibial  spine.       Left: Weight bearing axis crosses through the lateral tibial  spine.  Leg length:  (Measured from the top of the femoral head to the center  of tibial plafond.  It is assumed joints are in similar degrees of  extension bilaterally.  Significant difference is defined when  discrepancy is greater than 1.5 cm).        No significant  leg length discrepancy.  Additional Findings:  Normal heart size. Lungs are relatively clear.  There is a nonobstructive bowel gas  pattern. No acute osseous  abnormality. Straightening of the normal cervical lordosis  Multilevel  degenerative changes of the spine. Vascular calcifications.  Impression:  1. No substantial coronal curvature of the spine.    2. Very positive global sagittal imbalance. No global coronal  imbalance.  3. Weight bearing axis as detailed above.    CT Abdomen Pelvis w Contrast  Result Date: 7/17/2024  EXAM:  CT ABDOMEN PELVIS WITH IV CONTRAST COMPARISON:  CT 3/26/24, PET 1/24/2024 FINDINGS:  Status post small bowel resection in 2023 4 small bowel neuroendocrine tumor. No evidence of recurrent disease at the anastomotic site. The anastomosis appears patent. Stable mesenteric mass infiltrating along the distal SMV and its branches measuring approximately 2.7 x 2.2 cm (series 4, image 79). Stable vague hypodensity measuring 1.2 cm in segment 6 (series 4, image 60). Stable 10 mm hypodensity at the hepatic dome (series 4, image 20). Stable 1.8 cm heterogeneous lesion in the posterior right hepatic lobe (series 4, image 23). At least 9 other subcentimeter lesions are noted throughout both hepatic lobes which appears stable compared to 3/26/2024. These lesions are all better seen on the portal phase. Stable 1.2 cm paraduodenal node/nodule (series 4, image 64). Stable 1.5 cm paraduodenal lymph nodes/mesenteric nodule (series 4, image 71). Stable 1 cm left paraaortic lymph node (series 4, image 59). Bilateral renal hypodensities, likely cysts. Normal spleen, adrenal glands. Cholecystectomy. Mild thickening v. trabeculation in the anterior urinary bladder which appears stable (series 4, image 137). Enlarged prostate. Colonic diverticulosis. The small bowel is normal in caliber. Multi level degenerative changes of the imaged thoracolumbar spine. Osseous metastases seen on PET from 1/24/2024 are not well appreciated on today's exam.   1. Stable exam compared to 3/26/2024 with stable number and size of the hepatic metastases, stable  lymphadenopathy and mesenteric node/mass.     X-ray Myelogram thoracic  Result Date: 7/1/2024  XR MYELOGRAM THORACIC SPINE                 7/1/2024 11:07 AM   History:  Thoracic and Lumbar spine myelogram. Assess for spine abnormality; new onset hip flexion weakness. Lower extremity weakness. Procedure: The procedure and its risks were discussed with the patient prior to the myelogram. The risks explained included but were not limited to infection, bleeding, headaches, seizure, neural injury, etc. The patient had no further questions and wished to proceed.   The lower back was prepped and draped in the usual sterile manner. Lidocaine 1% was used for local anesthesia. A 22 gauge spinal needle was used to enter the thecal sac at the L2-L3 level under fluoroscopic guidance. 10 mL of myelographic contrast was infused. The needle was removed. Estimated blood loss during the procedure was less than 5 mL. No specimens collected. No complications were encountered.   Fluoroscopy time: 0.5 minutes Images Obtained: 12 Medications used: 3 mL 1% lidocaine, 10 mL of Isovue-M 300 Findings:  Technically successful lumbar and thoracic myelogram procedure without complications.  CT exam to be dictated separately. LORIE SHEPHERD PA-C   SYSTEM ID:  [id number]    CT Lumbar spine w contrast  Result Date: 7/1/2024  CT THORACIC SPINE MYELOGRAM WITH CONTRAST   7/1/2024 11:47 AM CT LUMBAR SPINE MYELOGRAM WITH CONTRAST  7/1/2024 11:47 AM HISTORY: assess for spine abnormality; new onset hip flexion weakness  TECHNIQUE: Axial images of the thoracic and lumbar spine were obtained following the administration of intrathecal contrast. Intrathecal contrast dose and myelographic images are dictated on a separate accession number. Multiplanar reformations were performed. Radiation dose for this scan was reduced using automated exposure control, adjustment of the mA and/or kV according to patient size, or iterative reconstruction technique. COMPARISON:  6/24/2024 CT. FINDINGS:  There is good contrast opacification of the thecal sac. THORACIC SPINE: Minimal thoracic dextrocurvature. Normal thoracic kyphosis. Anterior posterior alignment of the thoracic spine within normal limits. No loss of vertebral body height. No lucent fracture lines identified. Mild multilevel disc space height loss. Diffuse idiopathic skeletal hyperostosis extending from T6 into the lumbar spine. Disc osteophyte complex at T9-T10 and T11-T12. Otherwise, no significant herniation. 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 Multilevel facet arthropathy. Moderate right T3-T4 and severe right T4-T5 neural foraminal narrowing due to facet arthropathy. Otherwise, no significant neural foraminal narrowing or canal stenosis. Visualized paraspinous soft tissues demonstrate scattered aortic atherosclerotic calcifications.. LUMBAR SPINE: There are 5 lumbar-type vertebral bodies assumed for the purposes of this dictation. Lumbar spine alignment is within normal limits other than trace grade 1 anterolisthesis of L4 on L5. No loss of vertebral body height. No evidence of fracture. Diffuse idiopathic skeletal hyperostosis extending from the thoracic spine to the level of L1. Level by level as follows: T12-L1:  Minimal loss of disc height. No significant herniation. Minimal facet arthropathy. No significant neural foraminal narrowing or canal stenosis. L1-L2:  Minimal loss of disc height. No significant angulation. Minimal facet arthropathy. No significant neural foraminal narrowing or canal stenosis. L2-L3:  No loss of disc height. Trace diffuse disc bulge. Mild facet arthropathy. Moderate bilateral neural foraminal narrowing without significant canal stenosis. L3-L4:  Mild loss of disc height. Posterior disc osteophyte complex. Mild facet arthropathy. Moderate right and mild left neural foraminal narrowing in mild canal stenosis. L4-L5:  Moderate loss of  disc height. Posterior disc osteophyte complex. Moderate facet arthropathy. Moderate bilateral neural foraminal narrowing and moderate canal stenosis. L5-S1: No loss of disc height. Trace diffuse disc bulge. Mild facet arthropathy. Mild right and moderate left foraminal narrowing without significant canal stenosis. Visualized paraspinous tissues: Scattered aortic atherosclerotic calcifications.   IMPRESSION: 1. Multilevel thoracolumbar degenerative changes. 2. Moderate right T3-T4 and severe right T4-T5 neural foraminal narrowing due to facet arthropathy. 3. Moderate lumbar neural foraminal narrowing at L2-L3 bilaterally, L3-L4 on the right, L4-L5 bilaterally, and L5-S1 on the left. 4. Moderate L4-L5 and mild L3-L4 canal stenosis. No thoracic canal stenosis. RAMOS DALAL MD   SYSTEM ID:  [id number]    CT Thoracic spine w contrast  Result Date: 7/1/2024  CT THORACIC SPINE MYELOGRAM WITH CONTRAST   7/1/2024 11:47 AM CT LUMBAR SPINE MYELOGRAM WITH CONTRAST  7/1/2024 11:47 AM HISTORY: assess for spine abnormality; new onset hip flexion weakness  TECHNIQUE: Axial images of the thoracic and lumbar spine were obtained following the administration of intrathecal contrast. Intrathecal contrast dose and myelographic images are dictated on a separate accession number. Multiplanar reformations were performed. Radiation dose for this scan was reduced using automated exposure control, adjustment of the mA and/or kV according to patient size, or iterative reconstruction technique. COMPARISON: 6/24/2024 CT. FINDINGS:  There is good contrast opacification of the thecal sac. THORACIC SPINE: Minimal thoracic dextrocurvature. Normal thoracic kyphosis. Anterior posterior alignment of the thoracic spine within normal limits. No loss of vertebral body height. No lucent fracture lines identified. Mild multilevel disc space height loss. Diffuse idiopathic skeletal hyperostosis extending from T6 into the lumbar spine. Disc osteophyte  complex at T9-T10 and T11-T12. Otherwise, no significant herniation. 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 Multilevel facet arthropathy. Moderate right T3-T4 and severe right T4-T5 neural foraminal narrowing due to facet arthropathy. Otherwise, no significant neural foraminal narrowing or canal stenosis. Visualized paraspinous soft tissues demonstrate scattered aortic atherosclerotic calcifications.. LUMBAR SPINE: There are 5 lumbar-type vertebral bodies assumed for the purposes of this dictation. Lumbar spine alignment is within normal limits other than trace grade 1 anterolisthesis of L4 on L5. No loss of vertebral body height. No evidence of fracture. Diffuse idiopathic skeletal hyperostosis extending from the thoracic spine to the level of L1. Level by level as follows: T12-L1:  Minimal loss of disc height. No significant herniation. Minimal facet arthropathy. No significant neural foraminal narrowing or canal stenosis. L1-L2:  Minimal loss of disc height. No significant angulation. Minimal facet arthropathy. No significant neural foraminal narrowing or canal stenosis. L2-L3:  No loss of disc height. Trace diffuse disc bulge. Mild facet arthropathy. Moderate bilateral neural foraminal narrowing without significant canal stenosis. L3-L4:  Mild loss of disc height. Posterior disc osteophyte complex. Mild facet arthropathy. Moderate right and mild left neural foraminal narrowing in mild canal stenosis. L4-L5:  Moderate loss of disc height. Posterior disc osteophyte complex. Moderate facet arthropathy. Moderate bilateral neural foraminal narrowing and moderate canal stenosis. L5-S1: No loss of disc height. Trace diffuse disc bulge. Mild facet arthropathy. Mild right and moderate left foraminal narrowing without significant canal stenosis. Visualized paraspinous tissues: Scattered aortic atherosclerotic calcifications.   IMPRESSION: 1. Multilevel thoracolumbar  degenerative changes. 2. Moderate right T3-T4 and severe right T4-T5 neural foraminal narrowing due to facet arthropathy. 3. Moderate lumbar neural foraminal narrowing at L2-L3 bilaterally, L3-L4 on the right, L4-L5 bilaterally, and L5-S1 on the left. 4. Moderate L4-L5 and mild L3-L4 canal stenosis. No thoracic canal stenosis. RAMOS DALAL MD   SYSTEM ID:  [id number]      Physical Exam   /85 (BP Location: Right arm, Patient Position: Sitting)   Pulse 68   Resp 16   SpO2 98%     Neurological:  Alert, NAD, and oriented to person, place, and time.   No cranial nerve deficit   Gait: in w/c, gait deferred     Strength (L/R)  5/5 Deltoid (shoulder issues)  5/5 Bicep  5/5 Tricep  4+/4+ Handgrip (right handed)    2/5 Hip Flexion  4+/5 Knee Extension (w/ encouragement)  5/5 Ankle Dorsiflexion  5/5 Extensor Hallucis Longus  5/5 Plantar Flexion    Reflexes (L/R)  3+/3+ Bicep  2+/2+ Brachioradialis  3+/3+ Patellar  2+/2+ Ankle     No Lhermitte's  +left Spurling w/ neck pain  +/+ Jaida's   No clonus    Sensation: reduced sensation in right forearm, right thumb.  Tingling in fingertips of both hands, bilateral legs worse BTK, but left thigh reduced compared to right.        Review of Systems   See HPI     Past Medical History:   Diagnosis Date     Antiplatelet or antithrombotic long-term use      Arthritis      CHF (congestive heart failure) (H) 09/16/2020     COPD (chronic obstructive pulmonary disease) (H)      DM (diabetes mellitus) (H)      Dyspnea on exertion      Essential hypertension, benign      Hemorrhage of rectum and anus      Non-small cell lung cancer (H)      Obesity      Personal history of colonic polyps      Sleep apnea      Thrombosis      Tobacco use disorder      Urinary retention      Walking troubles        Past Surgical History:   Procedure Laterality Date     ABDOMEN SURGERY  1995     APPENDECTOMY       BONE EXOSTOSIS EXCISION Bilateral 9/20/2022    Procedure: EXOSTECTOMIES BOTH 5TH DIGITS  WITH SOFT TISSUE RELEASE;  Surgeon: Tong Gray DPM;  Location: [facility]     CHOLECYSTECTOMY       COLONOSCOPY       CYSTOSCOPY, TRANSURETHRAL RESECTION (TUR) PROSTATE, COMBINED N/A 2018    Procedure: COMBINED CYSTOSCOPY, TRANSURETHRAL RESECTION (TUR) PROSTATE;  Cystoscopy, Transurethral Resection Of The Prostate;  Surgeon: Praveena Burris MD;  Location: [facility] OR     IR LUNG BIOPSY MEDIASTINUM RIGHT  2016     WEDGE RESECTION      lung     ZZC NONSPECIFIC PROCEDURE      cholecystectomy       Social History     Socioeconomic History     Marital status:      Spouse name: None     Number of children: None     Years of education: None     Highest education level: None   Tobacco Use     Smoking status: Former     Current packs/day: 0.00     Average packs/day: 2.0 packs/day for 53.0 years (106.0 ttl pk-yrs)     Types: Cigarettes, Cigars     Start date: 1960     Quit date: 2013     Years since quittin.2     Passive exposure: Past     Smokeless tobacco: Never     Tobacco comments:     Quit, will never start again.   Vaping Use     Vaping status: Never Used   Substance and Sexual Activity     Alcohol use: No     Drug use: No     Sexual activity: Not Currently     Partners: Female     Birth control/protection: Spermicide, None   Other Topics Concern     Parent/sibling w/ CABG, MI or angioplasty before 65F 55M? No     Caffeine Concern No     Comment: 1-2 a day     Special Diet No     Exercise No     Seat Belt Yes     Social Determinants of Health     Financial Resource Strain: Low Risk  (2024)    Financial Resource Strain      Within the past 12 months, have you or your family members you live with been unable to get utilities (heat, electricity) when it was really needed?: No   Food Insecurity: Low Risk  (2024)    Food Insecurity      Within the past 12 months, did you worry that your food would run out before you got money to buy more?: No      Within the past 12  months, did the food you bought just not last and you didn t have money to get more?: No   Transportation Needs: Low Risk  (9/2/2024)    Transportation Needs      Within the past 12 months, has lack of transportation kept you from medical appointments, getting your medicines, non-medical meetings or appointments, work, or from getting things that you need?: No   Physical Activity: Insufficiently Active (9/2/2024)    Exercise Vital Sign      Days of Exercise per Week: 2 days      Minutes of Exercise per Session: 20 min   Stress: No Stress Concern Present (9/2/2024)    [employer] [place] [employer] Health - Occupational Stress Questionnaire      Feeling of Stress : Not at all   Social Connections: Unknown (9/2/2024)    Social Connection and Isolation Panel [NHANES]      Frequency of Social Gatherings with Friends and Family: Once a week   Housing Stability: Low Risk  (9/2/2024)    Housing Stability      Do you have housing? : Yes      Are you worried about losing your housing?: No       family history includes Breast Cancer in his sister and sister; C.A.D. in his mother; Cancer in his mother; Cancer - colorectal in his mother; Cerebrovascular Disease in his father; Hypertension in his mother, sister, and sister; Other Cancer in his mother.       Rebeca Conte PA-C  Department of Neurosurgery  Office: [phone]        Again, thank you for allowing me to participate in the care of your patient.      Sincerely,    Rebeca Conte PA-C

## 2024-10-17 NOTE — PROGRESS NOTES
[facility] [address]  3RD FLOOR  [facility] [address]  Phone: [phone]  Fax: [phone]      Patient:  Nahun Villalobos, Date of birth 1945, 78 year old, male  Referring Provider Kelli Peterson PA-C  [address] [place] [address]    ASSESSMENT & PLAN:  Patient has imaging and symptoms concerning for cervical myelopathy.  Patient has been dependant on a w/c or walker since June of 2024 after a fall.  Prior to this, he was using a cane to help with balance.  He has had n/t in his fingers for about 5 years which has progressively become more persistent.  He notes burning in his right forearm.  He has positive Nino's signs bilaterally.  He is very weak in the hip flexor of his left leg and otherwise has BTK numbness worse in the feet.      We discussed his MRIs today.  Would recommend he be seen for cervical surgery to address the central stenosis.  We discussed decompression, laminoplasty as possible options.  He may also require lumbar surgery in the future.     Lumbar MRI - 10/14/24 - Most significant L4/5 left NFS (mod-sev)  Cervical MRI - 10/14/24 - C3-6 CCS, most severe at C4/5 w/ myelomalacia at C5 level    He has recently started pool therapy at .  Continue.    Referral placed to Dr. Pinon for surgical consult -      I spent 66 minutes spent in patient care, independent review and interpretation of medical records/imaging, reviewing old records.    History of Present Illness:    9/10/24 Visit: - referred for Left-sided low back pain with left-sided sciatica, unspecified chronicity.  Patient is present today with his significant other.   Patient is in a W/C since May 2024 because he fell out of a bed and landed on his left hip on top a collapsible step stool.  He was unable to walk and required paramedics to help.  He was found to have a anshu pin in his GI system.  This passed in while he was in rehab.  He reports his left  leg is very painful and he states he cannot get it to function as it was prior to the fall.  Patient points to pain in the low back on the left just above the hip.  The pain does extend down the leg if he moves it wrong.  The pain is getting better since it occurred.  He has been on Norco and MR while IP and Rehab.  He most recently had Medrol DP (about 10 days ago), which improved his pain.  He is no longer taking the other medications. He denies changes in b/b function after fall.  Patient notes weakness in his left leg and has to really concentrate on moving it to move it.    He has chronic neuropathy worse on the left side involving the bottom and top of the foot; only has right plantar neuropathy.    He most currently feels right forearm burning about 4 months ago.  He also endorses numbness and tingling in his fingertips bilaterally.  He denies dropping things with his hands.  Patient recalls that he was told that he would likely need to have a left decompressive surgery for his neck but the oncology doctor told him that the cancer treatment was primary at that time.  He is unsure if he has had a cervical MRI in the past, but there is a CT of the neck in 2023 that reveals likely central canal stenosis that may impact the cord.    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  He has been on hormone deprivation therapy and is being monitored at [facility].  He was told he has a long time to live.    Hx of left lung surgery for lung cancer.   No prior back surgeries.   Currently on Eliquis (5 years) - for PE/DVT     10/17/24 Visit - f/u after getting cervical and lumbar MRI.    Patient is in a w/c accompanied by his SO.  He denies having any chemo therapy.  The numbness in his fingers (3-middle fingers bilaterally affected the most) started about 5 years ago and has progressively gotten more consistent.  The numbness BTK has been more intense since January.  He describes  "this as \"bubbly\" in his feet.  He continues to have left leg weakness, but no longer has the sciatica pain.  He has normal sensation of needing to defecate/void.      Conservative Treatment:  Medrol DP - improved pain  Norco - not taking anymore  MR - not taking anymore  Gabapentin 300 mg AM; 600 PM - chronic medication for neuropathy  Injections - not tried   PT HH/rehab - finished yesterday - did improve with mobility.  Has recently started at [facility] for Pool therapy (10 weeks are recommended).              Tobacco use:  No - prior smoker    IMAGING   Imaging independently reviewed.    Cervical & lumbar MRI 10/14/24 -   FINDINGS:  Cervical Spine:  Preserved vertebral height and alignment.. No suspicious marrow  lesion. No prevertebral edema. Focus of T2 hyperintense signal within  the cord at the C5 superior endplate level (sagittal series 18, image  8). Otherwise preserved cord signal. Nonfocal extraspinal structures.   The findings on a level by level basis are as follows:  Craniocervical junction, C1-C2: Within normal limits.  C2-C3: Preserved disc height and signal for patient age. No cord  compression. Normal facets for patient age. No high-grade neural  foraminal stenosis.   C3-C4: Preserved disc height and signal for patient age. Disc bulge,  central protrusion causing ventral CSF space effacement, mild cord  flattening and associated mild canal stenosis. Bilateral  uncovertebral, facet arthropathy contributes to moderate left and mild  right neural foraminal stenosis.  C4-C5: Preserved disc height and signal for patient age. Posterior  disc osteophyte complex, ligamentum flavum buckling contributes to  cord compression, completely CSF space effacement and severe canal  stenosis. Bilateral uncovertebral and facet arthropathy contribute to  moderate to severe bilateral neural foraminal stenosis.  C5-C6: Mild loss of disc height. Prominent central posterior disc  osteophyte complex causes a focal area " of ventral CSF space  effacement, cord flattening and associated mild canal stenosis.  Bilateral uncovertebral and facet arthropathy contribute to mild  bilateral neural foraminal stenosis.  C6-C7: Mild loss of disc height. Prominent central posterior disc  osteophyte complex causes cord flattening and partial ventral CSF  space effacement. Associated mild canal stenosis. Bilateral  uncovertebral and facet arthropathy contribute to mild bilateral  neural foraminal stenosis.  C7-T1: Preserved disc height and signal for patient age. No cord  compression. Normal facets for patient age. No high-grade neural  foraminal stenosis.  Lumbar Spine:  Five lumbar type vertebral body numbering convention. Preserved  vertebral body height, alignment and marrow signal. Conus medullaris  tip at L1. Normal cauda equina and nerve roots. Normal bony pelvis.  Nonfocal extraspinal structures.   On a level by level basis, the findings are as follows:  T12-L1: Preserved disc height and signal for patient age. Normal  facets for patient age. No high-grade spinal canal or neural foraminal  stenosis  L1-L2: Preserved disc height and signal for patient age. Normal facets  for patient age. No high-grade spinal canal or neural foraminal  stenosis.   L2-L3: Preserved disc height and signal for patient age. Symmetric  disc bulge. Normal facets for patient age. No high-grade spinal canal  or neural foraminal stenosis.  L3-L4: Preserved disc height and signal for patient age. Symmetric  disc bulge, bilateral facet arthropathy right greater than left  contributes to mild right neural foraminal stenosis. Patent left  neural foramen and spinal canal.  L4-L5: Mild loss of disc height, intradiscal T2 signal. Left greater  than right posterior disc osteophyte complex and facet arthropathy  contributes to moderate to severe left, moderate canal and mild right  neural foraminal stenosis.   L5-S1: Preserved disc height and signal for patient age.  Symmetric  disc bulge. Normal facets for patient age. No high-grade spinal canal  or neural foraminal stenosis.  IMPRESSION:  Cervical Spine:  1. Multilevel cervical spondylosis, as above. Severe canal stenosis at  C4-C5, compression of the cord and focal area of suspected  myelomalacia at the superior C5 endplate level.  Lumbar Spine:  1.  Multilevel lumbar spondylosis, as above most pronounced at L4-L5.      EOS XR 9/10/24 -he appears to have reduced lordosis in the lumbar spine, significant bone spurs on the cervical area with diffuse DDD.  Radiology report is pending.  Findings:  12 rib bearing vertebral bodies and 5 lumbar type vertebral bodies are  identified.  Coronal Deformity:  There is no substantial coronal curvature of the spine.  No substantial global coronal imbalance.  Sagittal Vertical Axis (A vertical line drawn from the center of C7  (kiran line) to the posterosuperior aspect of the S1 on sagittal  plane):  very positive   Weight bearing axis: (Defined as a line drawn from the center of the  femoral head to the mid aspect of the tibial plafond).      Right: Weight bearing axis crosses through the lateral tibial  spine.       Left: Weight bearing axis crosses through the lateral tibial  spine.  Leg length:  (Measured from the top of the femoral head to the center  of tibial plafond.  It is assumed joints are in similar degrees of  extension bilaterally.  Significant difference is defined when  discrepancy is greater than 1.5 cm).        No significant  leg length discrepancy.  Additional Findings:  Normal heart size. Lungs are relatively clear.  There is a nonobstructive bowel gas pattern. No acute osseous  abnormality. Straightening of the normal cervical lordosis  Multilevel  degenerative changes of the spine. Vascular calcifications.  Impression:  1. No substantial coronal curvature of the spine.    2. Very positive global sagittal imbalance. No global coronal  imbalance.  3. Weight bearing axis  as detailed above.    CT Abdomen Pelvis w Contrast  Result Date: 7/17/2024  EXAM:  CT ABDOMEN PELVIS WITH IV CONTRAST COMPARISON:  CT 3/26/24, PET 1/24/2024 FINDINGS:  Status post small bowel resection in 2023 4 small bowel neuroendocrine tumor. No evidence of recurrent disease at the anastomotic site. The anastomosis appears patent. Stable mesenteric mass infiltrating along the distal SMV and its branches measuring approximately 2.7 x 2.2 cm (series 4, image 79). Stable vague hypodensity measuring 1.2 cm in segment 6 (series 4, image 60). Stable 10 mm hypodensity at the hepatic dome (series 4, image 20). Stable 1.8 cm heterogeneous lesion in the posterior right hepatic lobe (series 4, image 23). At least 9 other subcentimeter lesions are noted throughout both hepatic lobes which appears stable compared to 3/26/2024. These lesions are all better seen on the portal phase. Stable 1.2 cm paraduodenal node/nodule (series 4, image 64). Stable 1.5 cm paraduodenal lymph nodes/mesenteric nodule (series 4, image 71). Stable 1 cm left paraaortic lymph node (series 4, image 59). Bilateral renal hypodensities, likely cysts. Normal spleen, adrenal glands. Cholecystectomy. Mild thickening v. trabeculation in the anterior urinary bladder which appears stable (series 4, image 137). Enlarged prostate. Colonic diverticulosis. The small bowel is normal in caliber. Multi level degenerative changes of the imaged thoracolumbar spine. Osseous metastases seen on PET from 1/24/2024 are not well appreciated on today's exam.   1. Stable exam compared to 3/26/2024 with stable number and size of the hepatic metastases, stable lymphadenopathy and mesenteric node/mass.     X-ray Myelogram thoracic  Result Date: 7/1/2024  XR MYELOGRAM THORACIC SPINE                 7/1/2024 11:07 AM   History:  Thoracic and Lumbar spine myelogram. Assess for spine abnormality; new onset hip flexion weakness. Lower extremity weakness. Procedure: The procedure and  its risks were discussed with the patient prior to the myelogram. The risks explained included but were not limited to infection, bleeding, headaches, seizure, neural injury, etc. The patient had no further questions and wished to proceed.   The lower back was prepped and draped in the usual sterile manner. Lidocaine 1% was used for local anesthesia. A 22 gauge spinal needle was used to enter the thecal sac at the L2-L3 level under fluoroscopic guidance. 10 mL of myelographic contrast was infused. The needle was removed. Estimated blood loss during the procedure was less than 5 mL. No specimens collected. No complications were encountered.   Fluoroscopy time: 0.5 minutes Images Obtained: 12 Medications used: 3 mL 1% lidocaine, 10 mL of Isovue-M 300 Findings:  Technically successful lumbar and thoracic myelogram procedure without complications.  CT exam to be dictated separately. LORIE SHEPHERD PA-C   SYSTEM ID:  [id number]    CT Lumbar spine w contrast  Result Date: 7/1/2024  CT THORACIC SPINE MYELOGRAM WITH CONTRAST   7/1/2024 11:47 AM CT LUMBAR SPINE MYELOGRAM WITH CONTRAST  7/1/2024 11:47 AM HISTORY: assess for spine abnormality; new onset hip flexion weakness  TECHNIQUE: Axial images of the thoracic and lumbar spine were obtained following the administration of intrathecal contrast. Intrathecal contrast dose and myelographic images are dictated on a separate accession number. Multiplanar reformations were performed. Radiation dose for this scan was reduced using automated exposure control, adjustment of the mA and/or kV according to patient size, or iterative reconstruction technique. COMPARISON: 6/24/2024 CT. FINDINGS:  There is good contrast opacification of the thecal sac. THORACIC SPINE: Minimal thoracic dextrocurvature. Normal thoracic kyphosis. Anterior posterior alignment of the thoracic spine within normal limits. No loss of vertebral body height. No lucent fracture lines identified. Mild multilevel disc  space height loss. Diffuse idiopathic skeletal hyperostosis extending from T6 into the lumbar spine. Disc osteophyte complex at T9-T10 and T11-T12. Otherwise, no significant herniation. 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 Multilevel facet arthropathy. Moderate right T3-T4 and severe right T4-T5 neural foraminal narrowing due to facet arthropathy. Otherwise, no significant neural foraminal narrowing or canal stenosis. Visualized paraspinous soft tissues demonstrate scattered aortic atherosclerotic calcifications.. LUMBAR SPINE: There are 5 lumbar-type vertebral bodies assumed for the purposes of this dictation. Lumbar spine alignment is within normal limits other than trace grade 1 anterolisthesis of L4 on L5. No loss of vertebral body height. No evidence of fracture. Diffuse idiopathic skeletal hyperostosis extending from the thoracic spine to the level of L1. Level by level as follows: T12-L1:  Minimal loss of disc height. No significant herniation. Minimal facet arthropathy. No significant neural foraminal narrowing or canal stenosis. L1-L2:  Minimal loss of disc height. No significant angulation. Minimal facet arthropathy. No significant neural foraminal narrowing or canal stenosis. L2-L3:  No loss of disc height. Trace diffuse disc bulge. Mild facet arthropathy. Moderate bilateral neural foraminal narrowing without significant canal stenosis. L3-L4:  Mild loss of disc height. Posterior disc osteophyte complex. Mild facet arthropathy. Moderate right and mild left neural foraminal narrowing in mild canal stenosis. L4-L5:  Moderate loss of disc height. Posterior disc osteophyte complex. Moderate facet arthropathy. Moderate bilateral neural foraminal narrowing and moderate canal stenosis. L5-S1: No loss of disc height. Trace diffuse disc bulge. Mild facet arthropathy. Mild right and moderate left foraminal narrowing without significant canal stenosis.  Visualized paraspinous tissues: Scattered aortic atherosclerotic calcifications.   IMPRESSION: 1. Multilevel thoracolumbar degenerative changes. 2. Moderate right T3-T4 and severe right T4-T5 neural foraminal narrowing due to facet arthropathy. 3. Moderate lumbar neural foraminal narrowing at L2-L3 bilaterally, L3-L4 on the right, L4-L5 bilaterally, and L5-S1 on the left. 4. Moderate L4-L5 and mild L3-L4 canal stenosis. No thoracic canal stenosis. RAMOS DALAL MD   SYSTEM ID:  [id number]    CT Thoracic spine w contrast  Result Date: 7/1/2024  CT THORACIC SPINE MYELOGRAM WITH CONTRAST   7/1/2024 11:47 AM CT LUMBAR SPINE MYELOGRAM WITH CONTRAST  7/1/2024 11:47 AM HISTORY: assess for spine abnormality; new onset hip flexion weakness  TECHNIQUE: Axial images of the thoracic and lumbar spine were obtained following the administration of intrathecal contrast. Intrathecal contrast dose and myelographic images are dictated on a separate accession number. Multiplanar reformations were performed. Radiation dose for this scan was reduced using automated exposure control, adjustment of the mA and/or kV according to patient size, or iterative reconstruction technique. COMPARISON: 6/24/2024 CT. FINDINGS:  There is good contrast opacification of the thecal sac. THORACIC SPINE: Minimal thoracic dextrocurvature. Normal thoracic kyphosis. Anterior posterior alignment of the thoracic spine within normal limits. No loss of vertebral body height. No lucent fracture lines identified. Mild multilevel disc space height loss. Diffuse idiopathic skeletal hyperostosis extending from T6 into the lumbar spine. Disc osteophyte complex at T9-T10 and T11-T12. Otherwise, no significant herniation. 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 Multilevel facet arthropathy. Moderate right T3-T4 and severe right T4-T5 neural foraminal narrowing due to facet arthropathy. Otherwise, no significant neural  foraminal narrowing or canal stenosis. Visualized paraspinous soft tissues demonstrate scattered aortic atherosclerotic calcifications.. LUMBAR SPINE: There are 5 lumbar-type vertebral bodies assumed for the purposes of this dictation. Lumbar spine alignment is within normal limits other than trace grade 1 anterolisthesis of L4 on L5. No loss of vertebral body height. No evidence of fracture. Diffuse idiopathic skeletal hyperostosis extending from the thoracic spine to the level of L1. Level by level as follows: T12-L1:  Minimal loss of disc height. No significant herniation. Minimal facet arthropathy. No significant neural foraminal narrowing or canal stenosis. L1-L2:  Minimal loss of disc height. No significant angulation. Minimal facet arthropathy. No significant neural foraminal narrowing or canal stenosis. L2-L3:  No loss of disc height. Trace diffuse disc bulge. Mild facet arthropathy. Moderate bilateral neural foraminal narrowing without significant canal stenosis. L3-L4:  Mild loss of disc height. Posterior disc osteophyte complex. Mild facet arthropathy. Moderate right and mild left neural foraminal narrowing in mild canal stenosis. L4-L5:  Moderate loss of disc height. Posterior disc osteophyte complex. Moderate facet arthropathy. Moderate bilateral neural foraminal narrowing and moderate canal stenosis. L5-S1: No loss of disc height. Trace diffuse disc bulge. Mild facet arthropathy. Mild right and moderate left foraminal narrowing without significant canal stenosis. Visualized paraspinous tissues: Scattered aortic atherosclerotic calcifications.   IMPRESSION: 1. Multilevel thoracolumbar degenerative changes. 2. Moderate right T3-T4 and severe right T4-T5 neural foraminal narrowing due to facet arthropathy. 3. Moderate lumbar neural foraminal narrowing at L2-L3 bilaterally, L3-L4 on the right, L4-L5 bilaterally, and L5-S1 on the left. 4. Moderate L4-L5 and mild L3-L4 canal stenosis. No thoracic canal  stenosis. RAMOS DALAL MD   SYSTEM ID:  [id number]      Physical Exam   /85 (BP Location: Right arm, Patient Position: Sitting)   Pulse 68   Resp 16   SpO2 98%     Neurological:  Alert, NAD, and oriented to person, place, and time.   No cranial nerve deficit   Gait: in w/c, gait deferred     Strength (L/R)  5/5 Deltoid (shoulder issues)  5/5 Bicep  5/5 Tricep  4+/4+ Handgrip (right handed)    2/5 Hip Flexion  4+/5 Knee Extension (w/ encouragement)  5/5 Ankle Dorsiflexion  5/5 Extensor Hallucis Longus  5/5 Plantar Flexion    Reflexes (L/R)  3+/3+ Bicep  2+/2+ Brachioradialis  3+/3+ Patellar  2+/2+ Ankle     No Lhermitte's  +left Spurling w/ neck pain  +/+ Jaida's   No clonus    Sensation: reduced sensation in right forearm, right thumb.  Tingling in fingertips of both hands, bilateral legs worse BTK, but left thigh reduced compared to right.        Review of Systems   See HPI     Past Medical History:   Diagnosis Date    Antiplatelet or antithrombotic long-term use     Arthritis     CHF (congestive heart failure) (H) 09/16/2020    COPD (chronic obstructive pulmonary disease) (H)     DM (diabetes mellitus) (H)     Dyspnea on exertion     Essential hypertension, benign     Hemorrhage of rectum and anus     Non-small cell lung cancer (H)     Obesity     Personal history of colonic polyps     Sleep apnea     Thrombosis     Tobacco use disorder     Urinary retention     Walking troubles        Past Surgical History:   Procedure Laterality Date    ABDOMEN SURGERY  1995    APPENDECTOMY      BONE EXOSTOSIS EXCISION Bilateral 9/20/2022    Procedure: EXOSTECTOMIES BOTH 5TH DIGITS WITH SOFT TISSUE RELEASE;  Surgeon: Tong Gray DPM;  Location: [facility]    CHOLECYSTECTOMY      COLONOSCOPY  2014    CYSTOSCOPY, TRANSURETHRAL RESECTION (TUR) PROSTATE, COMBINED N/A 4/30/2018    Procedure: COMBINED CYSTOSCOPY, TRANSURETHRAL RESECTION (TUR) PROSTATE;  Cystoscopy, Transurethral Resection Of The Prostate;   Surgeon: Praveena Burris MD;  Location: [facility]    IR LUNG BIOPSY MEDIASTINUM RIGHT  2016    WEDGE RESECTION      lung    ZZC NONSPECIFIC PROCEDURE      cholecystectomy       Social History     Socioeconomic History    Marital status:      Spouse name: None    Number of children: None    Years of education: None    Highest education level: None   Tobacco Use    Smoking status: Former     Current packs/day: 0.00     Average packs/day: 2.0 packs/day for 53.0 years (106.0 ttl pk-yrs)     Types: Cigarettes, Cigars     Start date: 1960     Quit date: 2013     Years since quittin.2     Passive exposure: Past    Smokeless tobacco: Never    Tobacco comments:     Quit, will never start again.   Vaping Use    Vaping status: Never Used   Substance and Sexual Activity    Alcohol use: No    Drug use: No    Sexual activity: Not Currently     Partners: Female     Birth control/protection: Spermicide, None   Other Topics Concern    Parent/sibling w/ CABG, MI or angioplasty before 65F 55M? No    Caffeine Concern No     Comment: 1-2 a day    Special Diet No    Exercise No    Seat Belt Yes     Social Determinants of Health     Financial Resource Strain: Low Risk  (2024)    Financial Resource Strain     Within the past 12 months, have you or your family members you live with been unable to get utilities (heat, electricity) when it was really needed?: No   Food Insecurity: Low Risk  (2024)    Food Insecurity     Within the past 12 months, did you worry that your food would run out before you got money to buy more?: No     Within the past 12 months, did the food you bought just not last and you didn t have money to get more?: No   Transportation Needs: Low Risk  (2024)    Transportation Needs     Within the past 12 months, has lack of transportation kept you from medical appointments, getting your medicines, non-medical meetings or appointments, work, or from getting things that you need?: No    Physical Activity: Insufficiently Active (9/2/2024)    Exercise Vital Sign     Days of Exercise per Week: 2 days     Minutes of Exercise per Session: 20 min   Stress: No Stress Concern Present (9/2/2024)    [employer] - Occupational Stress Questionnaire     Feeling of Stress : Not at all   Social Connections: Unknown (9/2/2024)    Social Connection and Isolation Panel [NHANES]     Frequency of Social Gatherings with Friends and Family: Once a week   Housing Stability: Low Risk  (9/2/2024)    Housing Stability     Do you have housing? : Yes     Are you worried about losing your housing?: No       family history includes Breast Cancer in his sister and sister; C.A.D. in his mother; Cancer in his mother; Cancer - colorectal in his mother; Cerebrovascular Disease in his father; Hypertension in his mother, sister, and sister; Other Cancer in his mother.       Rebeca Conte PA-C  Department of Neurosurgery  Office: [phone]

## 2024-10-21 ENCOUNTER — PATIENT OUTREACH (OUTPATIENT)
Dept: CARE COORDINATION | Facility: CLINIC | Age: 79
End: 2024-10-21
Payer: COMMERCIAL

## 2024-10-21 ENCOUNTER — TELEPHONE (OUTPATIENT)
Dept: CARE COORDINATION | Facility: CLINIC | Age: 79
End: 2024-10-21
Payer: COMMERCIAL

## 2024-10-21 DIAGNOSIS — J44.9 CHRONIC OBSTRUCTIVE PULMONARY DISEASE, UNSPECIFIED COPD TYPE (H): Primary | ICD-10-CM

## 2024-10-21 NOTE — PROGRESS NOTES
Clinic Care Coordination Contact    Kenia called- C2[facility] on file and left a voicemail message.    Patient is requesting a Nova Rollator to assist with walking and to be able to sit when tired.    CHW routed a message to [facility].    Referral placed.    CHW called Kenia to confirm order was placed and provided the contact number to call.    CHW will outreach in 3 weeks.    OMAR Kim  Clinic Care Coordination  [facility] Clinics: [place], [place], [facility], and [place] [facility]  Phone: [phone]

## 2024-10-21 NOTE — TELEPHONE ENCOUNTER
Clinic Care Coordination Contact    Patient is requesting a Nova Rollator to help with walking.  Patient can sit if tired.  Please place an order for this.    Any questions, please call Kenia Clifford at [phone].    Thank you.     Kelley Slaughter, [facility]  Clinic Care Coordination  [facility] Clinics: [place], [place], [place], and [place] [facility]  Phone: [phone]

## 2024-10-22 ENCOUNTER — PATIENT OUTREACH (OUTPATIENT)
Dept: CARE COORDINATION | Facility: CLINIC | Age: 79
End: 2024-10-22
Payer: COMMERCIAL

## 2024-10-22 NOTE — PROGRESS NOTES
Clinic Care Coordination Contact  Care Coordination Clinician Chart Review    Situation: Patient chart reviewed by Care Coordinator.       Background: Care Coordination Program started: 9/24/2020. Initial assessment completed and patient-centered care plan(s) were developed with participation from patient. Lead CC handed patient off to CHW for continued outreaches.       Assessment: Per chart review, patient outreach completed by CC CHW on 10/21/24.  Patient is actively working to accomplish goal(s). Patient's goal(s) appropriate and relevant at this time. Patient is not due for updated Plan of Care.  Assessments will be completed annually or as needed/with change of patient status.      Care Plan: 1. Improve chronic symptoms       Problem: Lifestyle choices       Goal: I want to improve management of my leg, knee, and hip pain and swelling.       Start Date: 6/3/2021 Expected End Date: 7/5/2024    Recent Progress: 80%    Note:     Barriers: Lymphedema  Strengths: Motivated to improve ability to walk  Patient expressed understanding of goal: Yes  Action steps to achieve this goal:  1. I will apply Jacinto hose to wear throughout the day and remove at night  2. I will walk my dog (recently passed) daily to help with strengthening and endurance  3. I will elevate my legs while sitting and laying down by propping them up with a pillow  4. I will discuss, review, schedule and complete recommended overdue health maintenance with my primary care provider  5. I will I will take my medications as prescribed  6. I will contact my care team with questions, concerns, support needs. I will use the clinic as a resource   7. I will contact my clinic with 24/7 after hours services available                                 Plan/Recommendations: The patient will continue working with Care Coordination to achieve goal(s) as above. CHW will continue outreaches at minimum every 30 days and will involve Lead CC as needed or if patient is  ready to move to Maintenance. Lead CC will continue to monitor CHW outreaches and patient's progress to goal(s) every 6 weeks.     Plan of Care updated and sent to patient: No    Kelli Davidson, RN Clinic Care Coordinator  [facility] Clinics: [place], [place] (on-site Wednesdays), [facility] (on-site Thursdays) & [facility].  [email or web address]  Phone: [phone]

## 2024-11-01 ENCOUNTER — TRANSFERRED RECORDS (OUTPATIENT)
Dept: HEALTH INFORMATION MANAGEMENT | Facility: CLINIC | Age: 79
End: 2024-11-01
Payer: COMMERCIAL

## 2024-11-08 ENCOUNTER — OFFICE VISIT (OUTPATIENT)
Dept: INTERNAL MEDICINE | Facility: CLINIC | Age: 79
End: 2024-11-08
Payer: COMMERCIAL

## 2024-11-08 ENCOUNTER — OFFICE VISIT (OUTPATIENT)
Dept: NEUROSURGERY | Facility: CLINIC | Age: 79
End: 2024-11-08
Payer: COMMERCIAL

## 2024-11-08 ENCOUNTER — PREP FOR PROCEDURE (OUTPATIENT)
Dept: NEUROLOGY | Facility: CLINIC | Age: 79
End: 2024-11-08

## 2024-11-08 ENCOUNTER — NURSE TRIAGE (OUTPATIENT)
Dept: INTERNAL MEDICINE | Facility: CLINIC | Age: 79
End: 2024-11-08

## 2024-11-08 VITALS
HEART RATE: 62 BPM | SYSTOLIC BLOOD PRESSURE: 168 MMHG | TEMPERATURE: 99.4 F | OXYGEN SATURATION: 98 % | WEIGHT: 191 LBS | BODY MASS INDEX: 30.83 KG/M2 | DIASTOLIC BLOOD PRESSURE: 88 MMHG | RESPIRATION RATE: 15 BRPM

## 2024-11-08 VITALS — SYSTOLIC BLOOD PRESSURE: 140 MMHG | OXYGEN SATURATION: 97 % | HEART RATE: 71 BPM | DIASTOLIC BLOOD PRESSURE: 86 MMHG

## 2024-11-08 DIAGNOSIS — M47.12 CERVICAL SPONDYLOSIS WITH MYELOPATHY: Primary | ICD-10-CM

## 2024-11-08 DIAGNOSIS — I10 BENIGN ESSENTIAL HYPERTENSION: Primary | ICD-10-CM

## 2024-11-08 DIAGNOSIS — Z23 HIGH PRIORITY FOR 2019-NCOV VACCINE: ICD-10-CM

## 2024-11-08 DIAGNOSIS — M48.02 SPINAL STENOSIS IN CERVICAL REGION: Primary | ICD-10-CM

## 2024-11-08 PROCEDURE — 99204 OFFICE O/P NEW MOD 45 MIN: CPT | Mod: GC | Performed by: NEUROLOGICAL SURGERY

## 2024-11-08 PROCEDURE — 91320 SARSCV2 VAC 30MCG TRS-SUC IM: CPT | Performed by: INTERNAL MEDICINE

## 2024-11-08 PROCEDURE — 99213 OFFICE O/P EST LOW 20 MIN: CPT | Performed by: INTERNAL MEDICINE

## 2024-11-08 PROCEDURE — 90480 ADMN SARSCOV2 VAC 1/ONLY CMP: CPT | Performed by: INTERNAL MEDICINE

## 2024-11-08 RX ORDER — AMLODIPINE BESYLATE 5 MG/1
5 TABLET ORAL DAILY
Qty: 30 TABLET | Refills: 0 | Status: SHIPPED | OUTPATIENT
Start: 2024-11-08

## 2024-11-08 RX ORDER — AMLODIPINE BESYLATE 5 MG/1
5 TABLET ORAL DAILY
Qty: 90 TABLET | Refills: 3 | Status: SHIPPED | OUTPATIENT
Start: 2024-11-08

## 2024-11-08 NOTE — LETTER
11/8/2024       RE: Nahun Villalobos  [address] [address]     Dear Colleague,    Thank you for referring your patient, Nahun Villalobos, to the [facility] [place] at [facility]. Please see a copy of my visit note below.    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     Physical exam:   BP (!) 140/86 (BP Location: Left arm, Patient Position: Sitting, Cuff Size: Adult Regular)   Pulse 71   SpO2 97%   General: Awake and alert and in no acute distress.  Pulm: Breathing comfortably on room air  Neurologic:  - Face symmetric  - Pupils reactive, extraocular movements intact  - Full strength in upper and lower extremities***  - Intact sensation to all extremities  - No clonus, patellar and bicep reflexes 2+  - Intact coordination    Imaging:  MR CERVICAL SPINE W/O CONTRAST, MR LUMBAR SPINE W/O CONTRAST   10/14/2024    IMPRESSION:     Cervical Spine:  1. Multilevel cervical spondylosis, as above. Severe canal stenosis at  C4-C5, compression of the cord and focal area of suspected  myelomalacia at the superior C5 endplate level.     Lumbar Spine:  1.  Multilevel lumbar spondylosis, most pronounced at L4-L5 with Mild loss of disc height, intradiscal T2 signal. Left greater  than right posterior disc osteophyte complex and facet arthropathy  contributes to moderate to severe left, moderate canal and mild right  neural foraminal stenosis.     Full body radiographs using EOS 9/10/2024  Impression:  1. No substantial coronal curvature of the  spine.    2. Very positive global sagittal imbalance. No global coronal  imbalance.  3. Weight bearing axis as detailed    Assessment:      Plan:  ***  - ***    Patient seen and discussed with Dr. Zi MD.  Approximately *** minutes were spent in chart and image review, evaluation of the patient and assessment of next steps.    Rafael Jay MD, PGY-1  Department of Neurosurgery  Pager: [phone]    Please contact neurosurgery resident on call with questions.    Dial * * *[phone], enter [phone] when prompted.       REVIEWED ASSOCIATED CLINICAL DATA AND HISTORY FOUND IN THE MEDICAL RECORD:  Past Medical History:   Past Medical History:   Diagnosis Date     Antiplatelet or antithrombotic long-term use      Arthritis      CHF (congestive heart failure) (H) 09/16/2020     COPD (chronic obstructive pulmonary disease) (H)      DM (diabetes mellitus) (H)      Dyspnea on exertion      Essential hypertension, benign      Hemorrhage of rectum and anus      Non-small cell lung cancer (H)      Obesity      Personal history of colonic polyps      Sleep apnea      Thrombosis      Tobacco use disorder      Urinary retention      Walking troubles        Surgical History:   Past Surgical History:   Procedure Laterality Date     ABDOMEN SURGERY  1995     APPENDECTOMY       BONE EXOSTOSIS EXCISION Bilateral 9/20/2022    Procedure: EXOSTECTOMIES BOTH 5TH DIGITS WITH SOFT TISSUE RELEASE;  Surgeon: Tong Gray DPM;  Location: [facility]     CHOLECYSTECTOMY       COLONOSCOPY  2014     CYSTOSCOPY, TRANSURETHRAL RESECTION (TUR) PROSTATE, COMBINED N/A 4/30/2018    Procedure: COMBINED CYSTOSCOPY, TRANSURETHRAL RESECTION (TUR) PROSTATE;  Cystoscopy, Transurethral Resection Of The Prostate;  Surgeon: Praveena Burris MD;  Location: [facility]     IR LUNG BIOPSY MEDIASTINUM RIGHT  4/6/2016     WEDGE RESECTION      lung     ZZC NONSPECIFIC PROCEDURE      cholecystectomy       Social history:   Social History     Tobacco Use      Smoking status: Former     Current packs/day: 0.00     Average packs/day: 2.0 packs/day for 53.0 years (106.0 ttl pk-yrs)     Types: Cigarettes, Cigars     Start date: 1960     Quit date: 2013     Years since quittin.4     Passive exposure: Past     Smokeless tobacco: Never     Tobacco comments:     Quit, will never start again.   Vaping Use     Vaping status: Never Used   Substance Use Topics     Alcohol use: No     Drug use: No       Family history:   Family History   Problem Relation Age of Onset     Hypertension Mother      C.A.D. Mother         ANEURYSM     Cancer - colorectal Mother      Cancer Mother         OVARIAN     Other Cancer Mother      Hypertension Sister      Breast Cancer Sister      Cerebrovascular Disease Father      Hypertension Sister      Breast Cancer Sister      Glaucoma No family hx of      Macular Degeneration No family hx of        Medications:  Current Outpatient Medications   Medication Sig Dispense Refill     acetaminophen (TYLENOL) 500 MG tablet Take 2 tablets (1,000 mg) by mouth every 8 hours (Patient taking differently: Take 1,000 mg by mouth 2 times daily.)       albuterol (VENTOLIN HFA) 108 (90 Base) MCG/ACT inhaler INHALE 2 PUFFS INTO THE LUNGS EVERY 6 HOURS AS NEEDED FOR SHORTNESS OF BREATH / DYSPNEA OR WHEEZING 25.5 g 0     apixaban ANTICOAGULANT (ELIQUIS) 2.5 MG tablet Take 1 tablet (2.5 mg) by mouth 2 times daily 60 tablet 0     Ascorbic Acid (VITAMIN C) 500 MG CHEW Take 500 mg by mouth at bedtime 30 tablet 0     atorvastatin (LIPITOR) 20 MG tablet Take 1 tablet (20 mg) by mouth daily. 90 tablet 1     blood glucose (NO BRAND SPECIFIED) lancets standard Use to test blood sugar 1 time daily, first thing in the morning 50 each 3     Calcium Carbonate Antacid (TUMS PO) Take 500 mg by mouth as needed.       cholestyramine light (QUESTRAN) 4 GM packet Take 1 packet (4 g) by mouth 2 times daily (Patient taking differently: Take 4 g by mouth daily.) 180 each 3      Coenzyme Q10 400 MG CAPS Take 400 mg by mouth daily 30 capsule 0     ferrous sulfate (FE TABS) 325 (65 Fe) MG EC tablet Take 1 tablet (325 mg) by mouth every evening 30 tablet 0     finasteride (PROSCAR) 5 MG tablet Take 1 tablet (5 mg) by mouth daily 90 tablet 3     Fluticasone-Umeclidin-Vilant (TRELEGY ELLIPTA) 100-62.5-25 MCG/ACT oral inhaler Inhale 1 puff into the lungs daily 28 each 0     furosemide (LASIX) 20 MG tablet Take 2 tablets (40 mg) by mouth daily 60 tablet 0     gabapentin (NEURONTIN) 300 MG capsule Take 1 capsule (300 mg) by mouth every morning 30 capsule 0     gabapentin (NEURONTIN) 300 MG capsule Take 2 capsules (600 mg) by mouth at bedtime 60 capsule 0     lanreotide acetate (SOMATULINE) 120 MG/0.5ML injection Inject 120 mg subcutaneously every 28 days. Do not start before September 6, 2024.       lisinopril (ZESTRIL) 40 MG tablet TAKE 1 TABLET DAILY 90 tablet 3     metFORMIN (GLUCOPHAGE) 1000 MG tablet Take 1 tablet (1,000 mg) by mouth 2 times daily (with meals) 180 tablet 1     methylPREDNISolone (MEDROL DOSEPAK) 4 MG tablet therapy pack Follow Package Directions 21 tablet 0     metoprolol succinate ER (TOPROL XL) 25 MG 24 hr tablet Take 1 tablet (25 mg) by mouth daily 90 tablet 3     miconazole (MICATIN) 2 % external cream Apply topically 2 times daily as needed for other (Rash/ skin irritation)       order for DME Oxygen 2 Li/min  at night via nasal cannula 1 Device 0     vitamin B-Complex Take 1 tablet by mouth daily       No current facility-administered medications for this visit.       Allergies:     Allergies   Allergen Reactions     No Known Drug Allergy        Again, thank you for allowing me to participate in the care of your patient.      Sincerely,    Seda Pinon MD     EC tablet Take 1 tablet (325 mg) by mouth every evening 30 tablet 0     finasteride (PROSCAR) 5 MG tablet Take 1 tablet (5 mg) by mouth daily 90 tablet 3     Fluticasone-Umeclidin-Vilant (TRELEGY ELLIPTA) 100-62.5-25 MCG/ACT oral inhaler Inhale 1 puff into the lungs daily 28 each 0     furosemide (LASIX) 20 MG tablet Take 2 tablets (40 mg) by mouth daily 60 tablet 0     gabapentin (NEURONTIN) 300 MG capsule Take 1 capsule (300 mg) by mouth every morning 30 capsule 0     gabapentin (NEURONTIN) 300 MG capsule Take 2 capsules (600 mg) by mouth at bedtime 60 capsule 0     lanreotide acetate (SOMATULINE) 120 MG/0.5ML injection Inject 120 mg subcutaneously every 28 days. Do not start before September 6, 2024.       lisinopril (ZESTRIL) 40 MG tablet TAKE 1 TABLET DAILY 90 tablet 3     metFORMIN (GLUCOPHAGE) 1000 MG tablet Take 1 tablet (1,000 mg) by mouth 2 times daily (with meals) 180 tablet 1     methylPREDNISolone (MEDROL DOSEPAK) 4 MG tablet therapy pack Follow Package Directions 21 tablet 0     miconazole (MICATIN) 2 % external cream Apply topically 2 times daily as needed for other (Rash/ skin irritation)       order for DME Oxygen 2 Li/min  at night via nasal cannula 1 Device 0     vitamin B-Complex Take 1 tablet by mouth daily       amLODIPine (NORVASC) 5 MG tablet Take 1 tablet (5 mg) by mouth daily. 90 tablet 3     No current facility-administered medications for this visit.       Allergies:     Allergies   Allergen Reactions     No Known Drug Allergy    Again, thank you for allowing me to participate in the care of your patient.      Sincerely,    Seda Pinon MD

## 2024-11-08 NOTE — TELEPHONE ENCOUNTER
"Pts neighbor Kenia (pt gave verbal consent to speak to Kenia about his health info) called the clinic concerned about pts BP.     Pt was at pool therapy yesterday and his BP was 176/106. Today at 0730 pts BP was 165/84 and at 0825 185/87. Pt has not missed any doses of medication for 1.5 weeks. Pt just took his meds around 0820.     Pt denies currently having chest pain, difficulty breathing, HA, changes in vision, weakness, or new numbness.      Pt stated he gets chest pain when he coughs. Its on the right side and has been going on for about a month. Pt also feels weak in the morning when he gets up. This has been happening for about a month as well.     Assisted wit scheduling appt today with provider to be seen. Pt and Kenia agree with plan.      1. BLOOD PRESSURE: \"What is the blood pressure?\" \"Did you take at least two measurements 5 minutes apart?\"      165/84 and then 185/87   2. ONSET: \"When did you take your blood pressure?\"      About 0730 and 0825  3. HOW: \"How did you take your blood pressure?\" (e.g., automatic home BP monitor, visiting nurse)      Automatic   4. HISTORY: \"Do you have a history of high blood pressure?\"      Yes  5. MEDICINES: \"Are you taking any medicines for blood pressure?\" \"Have you missed any doses recently?\"      Pt just took meds. Hasn't missed any doses in the las 1.5 weeks   6. OTHER SYMPTOMS: \"Do you have any symptoms?\" (e.g., blurred vision, chest pain, difficulty breathing, headache, weakness)      Chest pain when he coughs (right side- been going on for about a month), pt feels weak when 1st gets up (been going on for about a month)   7. PREGNANCY: \"Is there any chance you are pregnant?\" \"When was your last menstrual period?\"      NA  Reason for Disposition   Systolic BP >= 180 OR Diastolic >= 110    Additional Information   Negative: Sounds like a life-threatening emergency to the triager   Negative: Symptom is main concern (e.g., headache, chest pain)   Negative: Low blood " pressure is main concern   Negative: Systolic BP >= 160 OR Diastolic >= 100, and any cardiac (e.g., breathing difficulty, chest pain) or neurologic symptoms (e.g., new-onset blurred or double vision)   Negative: Pregnant 20 or more weeks (or postpartum < 6 weeks) with new hand or face swelling   Negative: Pregnant 20 or more weeks (or postpartum < 6 weeks) and Systolic BP >= 160 OR Diastolic >= 110   Negative: Patient sounds very sick or weak to the triager   Negative: Systolic BP >= 200 OR Diastolic >= 120 and having NO cardiac or neurologic symptoms   Negative: Pregnant 20 or more weeks (or postpartum < 6 weeks) with Systolic BP >= 140 OR Diastolic >= 90   Negative: Systolic BP >= 180 OR Diastolic >= 110, and missed most recent dose of blood pressure medication    Protocols used: Blood Pressure - High-A-OH

## 2024-11-08 NOTE — PATIENT INSTRUCTIONS
"Dr Pinon has recommended surgery and you have indicated a willingness to move forward with scheduling surgery.    A member of our scheduling team will call you soon to coordinate a date and time with the understanding you would like to wait until after 3/20/25.   We will also help arrange a visit with our anesthesia doctors in \"[facility]\" to screen for medical problems and to check blood tests prior to surgery.  Once a surgery date has been established our office will mail surgical information to your home.  If you have questions, please call Dr Zi James's RN Care Coordinator at [phone]    "

## 2024-11-08 NOTE — PROGRESS NOTES
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     Patient endorses chronic neck pain, worse with movement, described as crackling and popping sounds heard with neck movement. Pain severity is noted to be 1-3/10 at baseline, and increases to 10/10 with movement. He also endorses clumsiness in the hands, worse on the right, as evidenced by dropping items, and difficulty fastening his buttons. Patient's wife also noted worsening balance. Takes 1g Tylenol BID. Previously trialed KAREEN's to the cervical spine years ago following blunt force trauma to the neck, but not since his recent complaints. Patient has not trialed PT or chiropractic manipulation.     Ambulates with a walker since after the incident in May 2024 when patient fell out of the bed, landing left hip first on a collapsible step stool. Attributes much of his lower extremity weakness and difficulty ambulating to this event. He recently commenced pool therapy which has been helping with trying to optimize mobility.    Physical exam:   BP (!) 140/86 (BP Location: Left arm, Patient Position: Sitting, Cuff Size: Adult Regular)   Pulse 71   SpO2 97%   General: Awake and alert and in no acute distress.  Pulm: Breathing comfortably on room air  Neurologic:  - Face symmetric  - Pupils reactive, extraocular movements intact  - 4+ bilateral  strength, otherwise full strength in the upper extremities  - 4+ RLE  - 4- LLE   - Bilateral lower extremity numbness  - No clonus  - Bilateral samantha's, more profound  on the left   - 2+ reflexes  - Intact coordination    Imaging:  MR CERVICAL SPINE W/O CONTRAST, MR LUMBAR SPINE W/O CONTRAST   10/14/2024    IMPRESSION:     Cervical Spine:  1. Multilevel cervical spondylosis, as above. Severe canal stenosis at  C4-C5, compression of the cord and focal area of suspected  myelomalacia at the superior C5 endplate level.     Lumbar Spine:  1.  Multilevel lumbar spondylosis, most pronounced at L4-L5 with Mild loss of disc height, intradiscal T2 signal. Left greater  than right posterior disc osteophyte complex and facet arthropathy  contributes to moderate to severe left, moderate canal and mild right  neural foraminal stenosis.     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 MR imaging of the cervical spine reviewed, with significant cord compression and mild myelomalacia. Patient will benefit from decompression. 3 level (C3-6) Laminoplasty proposed, with risks, benefits and potential complications of the proposed procedure explained in detail. Patient was also advised that complete recovery of cord function is not guaranteed following this procedure, considering he already has some cord signal changes noted on imaging. Patient elected to proceed with the above mentioned procedure.    Plan:  - Schedule C3-6 Posterior Laminoplasty, possible additional levels    Patient seen and discussed with Dr. Zi MD.  Approximately 45 minutes were spent in chart and image review, evaluation of the patient and assessment of next steps.    Rafael Jay MD, PGY-1  Department of Neurosurgery  Pager: [phone]    Please contact  neurosurgery resident on call with questions.    Dial * * *[phone], enter [phone] when prompted.       REVIEWED ASSOCIATED CLINICAL DATA AND HISTORY FOUND IN THE MEDICAL RECORD:  Past Medical History:   Past Medical History:   Diagnosis Date    Antiplatelet or antithrombotic long-term use     Arthritis     CHF (congestive heart failure) (H) 2020    COPD (chronic obstructive pulmonary disease) (H)     DM (diabetes mellitus) (H)     Dyspnea on exertion     Essential hypertension, benign     Hemorrhage of rectum and anus     Non-small cell lung cancer (H)     Obesity     Personal history of colonic polyps     Sleep apnea     Thrombosis     Tobacco use disorder     Urinary retention     Walking troubles        Surgical History:   Past Surgical History:   Procedure Laterality Date    ABDOMEN SURGERY      APPENDECTOMY      BONE EXOSTOSIS EXCISION Bilateral 2022    Procedure: EXOSTECTOMIES BOTH 5TH DIGITS WITH SOFT TISSUE RELEASE;  Surgeon: Tong Gray DPM;  Location: [facility]    CHOLECYSTECTOMY      COLONOSCOPY      CYSTOSCOPY, TRANSURETHRAL RESECTION (TUR) PROSTATE, COMBINED N/A 2018    Procedure: COMBINED CYSTOSCOPY, TRANSURETHRAL RESECTION (TUR) PROSTATE;  Cystoscopy, Transurethral Resection Of The Prostate;  Surgeon: Praveena Burris MD;  Location: [facility]    IR LUNG BIOPSY MEDIASTINUM RIGHT  2016    WEDGE RESECTION      lung    ZZC NONSPECIFIC PROCEDURE      cholecystectomy       Social history:   Social History     Tobacco Use    Smoking status: Former     Current packs/day: 0.00     Average packs/day: 2.0 packs/day for 53.0 years (106.0 ttl pk-yrs)     Types: Cigarettes, Cigars     Start date: 1960     Quit date: 2013     Years since quittin.4     Passive exposure: Past    Smokeless tobacco: Never    Tobacco comments:     Quit, will never start again.   Vaping Use    Vaping status: Never Used   Substance Use Topics    Alcohol use: No    Drug use: No       Family  history:   Family History   Problem Relation Age of Onset    Hypertension Mother     C.A.D. Mother         ANEURYSM    Cancer - colorectal Mother     Cancer Mother         OVARIAN    Other Cancer Mother     Hypertension Sister     Breast Cancer Sister     Cerebrovascular Disease Father     Hypertension Sister     Breast Cancer Sister     Glaucoma No family hx of     Macular Degeneration No family hx of        Medications:  Current Outpatient Medications   Medication Sig Dispense Refill    acetaminophen (TYLENOL) 500 MG tablet Take 2 tablets (1,000 mg) by mouth every 8 hours (Patient taking differently: Take 1,000 mg by mouth 2 times daily.)      albuterol (VENTOLIN HFA) 108 (90 Base) MCG/ACT inhaler INHALE 2 PUFFS INTO THE LUNGS EVERY 6 HOURS AS NEEDED FOR SHORTNESS OF BREATH / DYSPNEA OR WHEEZING 25.5 g 0    apixaban ANTICOAGULANT (ELIQUIS) 2.5 MG tablet Take 1 tablet (2.5 mg) by mouth 2 times daily 60 tablet 0    Ascorbic Acid (VITAMIN C) 500 MG CHEW Take 500 mg by mouth at bedtime 30 tablet 0    atorvastatin (LIPITOR) 20 MG tablet Take 1 tablet (20 mg) by mouth daily. 90 tablet 1    blood glucose (NO BRAND SPECIFIED) lancets standard Use to test blood sugar 1 time daily, first thing in the morning 50 each 3    Calcium Carbonate Antacid (TUMS PO) Take 500 mg by mouth as needed.      cholestyramine light (QUESTRAN) 4 GM packet Take 1 packet (4 g) by mouth 2 times daily (Patient taking differently: Take 4 g by mouth daily.) 180 each 3    Coenzyme Q10 400 MG CAPS Take 400 mg by mouth daily 30 capsule 0    ferrous sulfate (FE TABS) 325 (65 Fe) MG EC tablet Take 1 tablet (325 mg) by mouth every evening 30 tablet 0    finasteride (PROSCAR) 5 MG tablet Take 1 tablet (5 mg) by mouth daily 90 tablet 3    Fluticasone-Umeclidin-Vilant (TRELEGY ELLIPTA) 100-62.5-25 MCG/ACT oral inhaler Inhale 1 puff into the lungs daily 28 each 0    furosemide (LASIX) 20 MG tablet Take 2 tablets (40 mg) by mouth daily 60 tablet 0     gabapentin (NEURONTIN) 300 MG capsule Take 1 capsule (300 mg) by mouth every morning 30 capsule 0    gabapentin (NEURONTIN) 300 MG capsule Take 2 capsules (600 mg) by mouth at bedtime 60 capsule 0    lanreotide acetate (SOMATULINE) 120 MG/0.5ML injection Inject 120 mg subcutaneously every 28 days. Do not start before September 6, 2024.      lisinopril (ZESTRIL) 40 MG tablet TAKE 1 TABLET DAILY 90 tablet 3    metFORMIN (GLUCOPHAGE) 1000 MG tablet Take 1 tablet (1,000 mg) by mouth 2 times daily (with meals) 180 tablet 1    methylPREDNISolone (MEDROL DOSEPAK) 4 MG tablet therapy pack Follow Package Directions 21 tablet 0    miconazole (MICATIN) 2 % external cream Apply topically 2 times daily as needed for other (Rash/ skin irritation)      order for DME Oxygen 2 Li/min  at night via nasal cannula 1 Device 0    vitamin B-Complex Take 1 tablet by mouth daily      amLODIPine (NORVASC) 5 MG tablet Take 1 tablet (5 mg) by mouth daily. 90 tablet 3     No current facility-administered medications for this visit.       Allergies:     Allergies   Allergen Reactions    No Known Drug Allergy

## 2024-11-08 NOTE — NURSING NOTE
Chief Complaint   Patient presents with    Consult     New cervical spine     Francisco Javier Davis

## 2024-11-08 NOTE — PATIENT INSTRUCTIONS
REPLACE Toprol XL with amlodipine 5mg once daily.    CONTINUE lisinopril 40mg daily.    ---    Blood pressure checks at home - set yourself up for success!     - use a good quality blood pressure machine (Omron brand name recommended)  - use an appropriately sized, upper arm/brachial blood pressure cuff   - wait 2-3 hours after taking your blood pressure medication before checking blood pressure   - take your blood pressure in a quiet room  - take your blood pressure while seated, sitting up straight, with your feet flat on the ground  - avoid stimuli right before checking (coffee, exercise, heated discussions)  - avoid stimuli during your blood pressure check (TV, talking, loud music)  - once seated and blood pressure cuff is on, wait 5 minutes before pushing the button    Goal blood pressures are 110s-130s/60s-80s. Please let me know if your blood pressures are consistently out of this range.

## 2024-11-08 NOTE — PROGRESS NOTES
ASSESSMENT/PLAN                                                      (I10) Benign essential hypertension  (primary encounter diagnosis)  Comment: poorly-controlled on current regimen.   Plan: REPLACE Toprol XL 25 mg daily with amlodipine 5 mg daily; continue lisinopril 40 mg daily and Lasix 40 mg daily without change; monitor blood pressures at home and contact MD if they continue to be elevated.    (Z23) High priority for 2019-nCoV vaccine  Comment: COVID-19 booster given today.      Carole Bryant MD   [facility]  [address] [address]  T: [phone], F: [phone]    SUBJECTIVE                                                      Nahun Villalobos is a very pleasant 79 year old male who presents for blood pressure check:     Accompanied by significant other.    Patient's blood pressure has been significantly elevated since last week. He is currently prescribed Toprol XL 25 mg daily, lisinopril 40 mg daily, and Lasix 40 mg daily, but has not been taking these regularly until several days ago. Tolerating medication(s) well - no adverse side effects.     Patient's blood pressure is elevated today, even on repeat. He is asymptomatic from a blood pressure perspective: no chest pain or palpitations, no shortness of breath, no light-headedness or dizziness, no headaches or vision changes.     OBJECTIVE                                                      BP (!) 168/88   Pulse 62   Temp 99.4  F (37.4  C) (Temporal)   Resp 15   Wt 86.6 kg (191 lb)   SpO2 98%   BMI 30.83 kg/m    Constitutional: well-appearing  Respiratory: normal respiratory effort; clear to auscultation bilaterally  Cardiovascular: regular rate and rhythm  Musculoskeletal: seated in wheelchair  Psych: normal judgment and insight; normal mood and affect; recent and remote memory intact    ---    (Note documentation was completed, in part, with [employer] voice-recognition software. Documentation was reviewed, but  some grammatical, spelling, and word errors may remain.)

## 2024-11-10 DIAGNOSIS — G62.9 PERIPHERAL POLYNEUROPATHY: ICD-10-CM

## 2024-11-10 RX ORDER — GABAPENTIN 300 MG/1
300 CAPSULE ORAL 2 TIMES DAILY
Qty: 180 CAPSULE | Refills: 3 | Status: SHIPPED | OUTPATIENT
Start: 2024-11-10 | End: 2024-11-11

## 2024-11-11 DIAGNOSIS — I26.99 OTHER PULMONARY EMBOLISM WITHOUT ACUTE COR PULMONALE, UNSPECIFIED CHRONICITY (H): ICD-10-CM

## 2024-11-11 DIAGNOSIS — J44.9 CHRONIC OBSTRUCTIVE PULMONARY DISEASE, UNSPECIFIED COPD TYPE (H): Chronic | ICD-10-CM

## 2024-11-11 DIAGNOSIS — G62.9 PERIPHERAL POLYNEUROPATHY: ICD-10-CM

## 2024-11-11 RX ORDER — GABAPENTIN 300 MG/1
300 CAPSULE ORAL 2 TIMES DAILY
Qty: 180 CAPSULE | Refills: 3 | Status: SHIPPED | OUTPATIENT
Start: 2024-11-11

## 2024-11-11 NOTE — TELEPHONE ENCOUNTER
Medication Question or Refill    Contacts       Contact Date/Time Type Contact Phone/Fax    11/11/2024 07:46 AM CST Phone (Incoming) Javon Villalobos (Self) [phone] (H)            What medication are you calling about (include dose and sig)?: Gabapentin  Eliquis  Trelegy inhaler       Preferred Pharmacy:   [facility]   [address] [place] [address]  [phone]          Controlled Substance Agreement on file:   CSA -- Patient Level:    CSA: None found at the patient level.       Who prescribed the medication?: PCP    Do you need a refill? Yes    When did you use the medication last? 11/10/24    Patient offered an appointment? Yes: already had an appt     Do you have any questions or concerns?  Yes: would like a short supply of the Gabapentin and Eliquis    Trelegy inhaler         Could we send this information to you in [facility] or would you prefer to receive a phone call?:   Patient would prefer a phone call   Okay to leave a detailed message?: Yes at Cell number on file:    Telephone Information:   Mobile [phone] or [phone]

## 2024-11-12 ENCOUNTER — HOSPITAL ENCOUNTER (INPATIENT)
Facility: CLINIC | Age: 79
Setting detail: SURGERY ADMIT
End: 2024-11-12
Attending: NEUROLOGICAL SURGERY | Admitting: NEUROLOGICAL SURGERY
Payer: COMMERCIAL

## 2024-11-12 ENCOUNTER — TELEPHONE (OUTPATIENT)
Dept: NEUROSURGERY | Facility: CLINIC | Age: 79
End: 2024-11-12
Payer: COMMERCIAL

## 2024-11-12 NOTE — TELEPHONE ENCOUNTER
Called patient to schedule surgery with Dr. Seda Pinon    Spoke with:  Javon (patient )    Date of Surgery: 4/2/2025    Estimated Arrival time Discussed with Patient:  No    Location of surgery: [facility]     Pre-Op H&P: 3/17/2025 in person with PAC    Post-Op Appts: 4/15/2025 with Rebeca MISTRY     Imaging:  No     Discussed with patient PAC RN will provide arrival time and instructions for surgery at the time of the appointment: [[facility] only]: Yes    Packet sent out: No 11/12/24  via Mail - Standard    Surgical soap: Receiving via Received in clinic by 3/17/2025     Vendor/Rep/Monitoring:   Yes   via Online Portal Confirmation #:     Additional Comments:      All patients questions were answered and patient was instructed to review surgical packet and call back with any questions or concerns.       Carlos Boo on 11/12/2024 at 3:08 PM//

## 2024-11-14 ENCOUNTER — TELEPHONE (OUTPATIENT)
Dept: INTERNAL MEDICINE | Facility: CLINIC | Age: 79
End: 2024-11-14

## 2024-11-14 NOTE — TELEPHONE ENCOUNTER
Medication Question or Refill    Contacts       Contact Date/Time Type Contact Phone/Fax    11/14/2024 04:59 PM CST Phone (Incoming) Kenia Clifford (Emergency Contact) [phone] (M)            What medication are you calling about (include dose and sig)?: Amlodipine 5MG    Preferred Pharmacy:     [id number] - [place], [place] - [address]  [place] [address]  Phone: [phone] Fax: [phone]      Controlled Substance Agreement on file:   CSA -- Patient Level:    CSA: None found at the patient level.       Who prescribed the medication?: Dr. Carole Bryant    Do you need a refill? No    When did you use the medication last? Today    Patient offered an appointment? No    Do you have any questions or concerns?  Yes: Patient's blood pressure have been dropping and he is wondering if that is normal with this medication. Yesterday's reading at 3PM was 165/72. Patient's reading today after pool therapy at 4PM was 150/77. Please give patient a call.      Could we send this information to you in [facility] or would you prefer to receive a phone call?:   Patient would prefer a phone call   Okay to leave a detailed message?: Yes at Home number on file [phone] (home)

## 2024-11-15 ENCOUNTER — LAB (OUTPATIENT)
Dept: LAB | Facility: CLINIC | Age: 79
End: 2024-11-15
Payer: COMMERCIAL

## 2024-11-15 DIAGNOSIS — E11.9 TYPE 2 DIABETES MELLITUS WITHOUT COMPLICATION, WITHOUT LONG-TERM CURRENT USE OF INSULIN (H): ICD-10-CM

## 2024-11-15 DIAGNOSIS — Z12.5 SCREENING PSA (PROSTATE SPECIFIC ANTIGEN): ICD-10-CM

## 2024-11-15 PROCEDURE — 82043 UR ALBUMIN QUANTITATIVE: CPT

## 2024-11-15 PROCEDURE — 80061 LIPID PANEL: CPT

## 2024-11-15 PROCEDURE — G0103 PSA SCREENING: HCPCS

## 2024-11-15 PROCEDURE — 82570 ASSAY OF URINE CREATININE: CPT

## 2024-11-15 PROCEDURE — 36415 COLL VENOUS BLD VENIPUNCTURE: CPT

## 2024-11-15 NOTE — TELEPHONE ENCOUNTER
Please call and get more information about his current blood pressures, see note below.  Eve Hunter BSN, RN

## 2024-11-15 NOTE — TELEPHONE ENCOUNTER
Spoke to patient and gave him information to stay hydrated. He is not drinking enough fluids.     He will continue on blood pressure medication as prescribed.     Patient  was given home advice on how to monitor his blood pressure at home.     Patient  was given advice for heart and elevated blood pressure symptoms.       Afia Mahan RN  [facility]

## 2024-11-16 LAB
CHOLEST SERPL-MCNC: 119 MG/DL
CREAT UR-MCNC: 74.7 MG/DL
FASTING STATUS PATIENT QL REPORTED: NORMAL
HDLC SERPL-MCNC: 48 MG/DL
LDLC SERPL CALC-MCNC: 44 MG/DL
MICROALBUMIN UR-MCNC: 111 MG/L
MICROALBUMIN/CREAT UR: 148.59 MG/G CR (ref 0–17)
NONHDLC SERPL-MCNC: 71 MG/DL
PSA SERPL DL<=0.01 NG/ML-MCNC: 2.94 NG/ML
TRIGL SERPL-MCNC: 133 MG/DL

## 2024-11-21 ENCOUNTER — TELEPHONE (OUTPATIENT)
Dept: INTERNAL MEDICINE | Facility: CLINIC | Age: 79
End: 2024-11-21
Payer: COMMERCIAL

## 2024-11-21 NOTE — TELEPHONE ENCOUNTER
Called and spoke with pt to relay lab result below.   He verbalized understanding and declined further questions at this time.       Javon,     Your cholesterol looks good and I would not change anything at this point but would repeat your labs in 12 months.     In addition, your PSA or prostate screening antigen is still within normal range and could be repeated annually.     Dr. Castillo   Written by Olegario Castillo MD on 11/16/2024  8:35 AM CST    Thank you,  Su Chan RN

## 2024-11-25 DIAGNOSIS — I26.99 OTHER PULMONARY EMBOLISM WITHOUT ACUTE COR PULMONALE, UNSPECIFIED CHRONICITY (H): ICD-10-CM

## 2024-11-25 NOTE — TELEPHONE ENCOUNTER
"Patients significant other called requesting Elquis refill [facility] and 6 tablets to [facility]. Please note pt needs Rx sent to two different pharmacies.  "

## 2024-11-26 ENCOUNTER — TELEPHONE (OUTPATIENT)
Dept: INTERNAL MEDICINE | Facility: CLINIC | Age: 79
End: 2024-11-26
Payer: COMMERCIAL

## 2024-11-26 NOTE — TELEPHONE ENCOUNTER
Pended pharmacy. Pt out of eliquis. Supply has been sent but pt requesting local supply  to bridge until delivery [email or web address].    Please review and order short term fill to local pharmacy if appropriate.    Mayela Levin RN

## 2024-11-26 NOTE — TELEPHONE ENCOUNTER
Medication Question or Refill    Contacts       Contact Date/Time Type Contact Phone/Fax    11/26/2024 02:46 PM CST Phone (Incoming) Kenia Clifford (Emergency Contact) [phone] (M)            What medication are you calling about (include dose and sig)?: Eloquis 5 mg and needs 5 pills until mail order comes in    Preferred Pharmacy:       [id number] [address] [address]  Phone: [phone] Fax: [phone]      Controlled Substance Agreement on file:   CSA -- Patient Level:    CSA: None found at the patient level.       Who prescribed the medication?: Dr. Santi Tinajero    Do you need a refill? Yes    When did you use the medication last? This morning    Patient offered an appointment? No    Do you have any questions or concerns?  No      Could we send this information to you in [facility] or would you prefer to receive a phone call?:   Patient would prefer a phone call   Okay to leave a detailed message?: Yes at Other phone number:      [phone]

## 2024-11-26 NOTE — TELEPHONE ENCOUNTER
Please see request below and also request from yesterday for short term fill until mail order rx arrives    Daja Dee CMA

## 2024-11-29 ENCOUNTER — TRANSFERRED RECORDS (OUTPATIENT)
Dept: HEALTH INFORMATION MANAGEMENT | Facility: CLINIC | Age: 79
End: 2024-11-29
Payer: COMMERCIAL

## 2024-12-01 ENCOUNTER — HOSPITAL ENCOUNTER (OUTPATIENT)
Facility: CLINIC | Age: 79
Setting detail: OBSERVATION
Discharge: HOME HEALTH | End: 2024-12-04
Attending: EMERGENCY MEDICINE | Admitting: STUDENT IN AN ORGANIZED HEALTH CARE EDUCATION/TRAINING PROGRAM
Payer: COMMERCIAL

## 2024-12-01 ENCOUNTER — APPOINTMENT (OUTPATIENT)
Dept: GENERAL RADIOLOGY | Facility: CLINIC | Age: 79
End: 2024-12-01
Attending: EMERGENCY MEDICINE
Payer: COMMERCIAL

## 2024-12-01 ENCOUNTER — APPOINTMENT (OUTPATIENT)
Dept: CT IMAGING | Facility: CLINIC | Age: 79
End: 2024-12-01
Attending: EMERGENCY MEDICINE
Payer: COMMERCIAL

## 2024-12-01 DIAGNOSIS — S83.91XA SPRAIN OF RIGHT KNEE, UNSPECIFIED LIGAMENT, INITIAL ENCOUNTER: ICD-10-CM

## 2024-12-01 DIAGNOSIS — S93.601A FOOT SPRAIN, RIGHT, INITIAL ENCOUNTER: ICD-10-CM

## 2024-12-01 DIAGNOSIS — R19.7 DIARRHEA, UNSPECIFIED TYPE: ICD-10-CM

## 2024-12-01 LAB — GLUCOSE BLDC GLUCOMTR-MCNC: 168 MG/DL (ref 70–99)

## 2024-12-01 PROCEDURE — 250N000013 HC RX MED GY IP 250 OP 250 PS 637: Performed by: STUDENT IN AN ORGANIZED HEALTH CARE EDUCATION/TRAINING PROGRAM

## 2024-12-01 PROCEDURE — 73562 X-RAY EXAM OF KNEE 3: CPT | Mod: RT

## 2024-12-01 PROCEDURE — 70450 CT HEAD/BRAIN W/O DYE: CPT

## 2024-12-01 PROCEDURE — G0378 HOSPITAL OBSERVATION PER HR: HCPCS

## 2024-12-01 PROCEDURE — 99285 EMERGENCY DEPT VISIT HI MDM: CPT | Mod: 25

## 2024-12-01 PROCEDURE — 82962 GLUCOSE BLOOD TEST: CPT

## 2024-12-01 PROCEDURE — 73630 X-RAY EXAM OF FOOT: CPT | Mod: RT

## 2024-12-01 PROCEDURE — 99223 1ST HOSP IP/OBS HIGH 75: CPT | Performed by: STUDENT IN AN ORGANIZED HEALTH CARE EDUCATION/TRAINING PROGRAM

## 2024-12-01 RX ORDER — FUROSEMIDE 40 MG/1
40 TABLET ORAL DAILY
Status: DISCONTINUED | OUTPATIENT
Start: 2024-12-02 | End: 2024-12-04 | Stop reason: HOSPADM

## 2024-12-01 RX ORDER — ACETAMINOPHEN 325 MG/1
975 TABLET ORAL 3 TIMES DAILY
Status: DISCONTINUED | OUTPATIENT
Start: 2024-12-01 | End: 2024-12-04 | Stop reason: HOSPADM

## 2024-12-01 RX ORDER — HYDRALAZINE HYDROCHLORIDE 20 MG/ML
10 INJECTION INTRAMUSCULAR; INTRAVENOUS EVERY 4 HOURS PRN
Status: DISCONTINUED | OUTPATIENT
Start: 2024-12-01 | End: 2024-12-04 | Stop reason: HOSPADM

## 2024-12-01 RX ORDER — CHOLESTYRAMINE 4 G/9G
1 POWDER, FOR SUSPENSION ORAL DAILY
Status: DISCONTINUED | OUTPATIENT
Start: 2024-12-01 | End: 2024-12-04 | Stop reason: HOSPADM

## 2024-12-01 RX ORDER — CALCIUM CARBONATE 500 MG/1
1 TABLET, CHEWABLE ORAL DAILY PRN
COMMUNITY

## 2024-12-01 RX ORDER — GABAPENTIN 300 MG/1
300 CAPSULE ORAL EVERY MORNING
Status: DISCONTINUED | OUTPATIENT
Start: 2024-12-02 | End: 2024-12-04 | Stop reason: HOSPADM

## 2024-12-01 RX ORDER — FINASTERIDE 5 MG/1
5 TABLET, FILM COATED ORAL DAILY
Status: DISCONTINUED | OUTPATIENT
Start: 2024-12-02 | End: 2024-12-04 | Stop reason: HOSPADM

## 2024-12-01 RX ORDER — ONDANSETRON 4 MG/1
4 TABLET, ORALLY DISINTEGRATING ORAL EVERY 6 HOURS PRN
Status: DISCONTINUED | OUTPATIENT
Start: 2024-12-01 | End: 2024-12-04 | Stop reason: HOSPADM

## 2024-12-01 RX ORDER — IPRATROPIUM BROMIDE AND ALBUTEROL SULFATE 2.5; .5 MG/3ML; MG/3ML
3 SOLUTION RESPIRATORY (INHALATION) EVERY 4 HOURS PRN
Status: DISCONTINUED | OUTPATIENT
Start: 2024-12-01 | End: 2024-12-04 | Stop reason: HOSPADM

## 2024-12-01 RX ORDER — LISINOPRIL 40 MG/1
40 TABLET ORAL DAILY
Status: DISCONTINUED | OUTPATIENT
Start: 2024-12-02 | End: 2024-12-04 | Stop reason: HOSPADM

## 2024-12-01 RX ORDER — GABAPENTIN 300 MG/1
300 CAPSULE ORAL 2 TIMES DAILY
Status: DISCONTINUED | OUTPATIENT
Start: 2024-12-01 | End: 2024-12-01

## 2024-12-01 RX ORDER — GABAPENTIN 300 MG/1
600 CAPSULE ORAL AT BEDTIME
Status: DISCONTINUED | OUTPATIENT
Start: 2024-12-02 | End: 2024-12-04 | Stop reason: HOSPADM

## 2024-12-01 RX ORDER — DEXTROSE MONOHYDRATE 25 G/50ML
25-50 INJECTION, SOLUTION INTRAVENOUS
Status: DISCONTINUED | OUTPATIENT
Start: 2024-12-01 | End: 2024-12-04 | Stop reason: HOSPADM

## 2024-12-01 RX ORDER — PROCHLORPERAZINE MALEATE 5 MG/1
5 TABLET ORAL EVERY 6 HOURS PRN
Status: DISCONTINUED | OUTPATIENT
Start: 2024-12-01 | End: 2024-12-04 | Stop reason: HOSPADM

## 2024-12-01 RX ORDER — AMLODIPINE BESYLATE 5 MG/1
5 TABLET ORAL DAILY
Status: DISCONTINUED | OUTPATIENT
Start: 2024-12-02 | End: 2024-12-04 | Stop reason: HOSPADM

## 2024-12-01 RX ORDER — NALOXONE HYDROCHLORIDE 0.4 MG/ML
0.4 INJECTION, SOLUTION INTRAMUSCULAR; INTRAVENOUS; SUBCUTANEOUS
Status: DISCONTINUED | OUTPATIENT
Start: 2024-12-01 | End: 2024-12-04 | Stop reason: HOSPADM

## 2024-12-01 RX ORDER — OXYCODONE HYDROCHLORIDE 5 MG/1
5 TABLET ORAL EVERY 4 HOURS PRN
Status: DISCONTINUED | OUTPATIENT
Start: 2024-12-01 | End: 2024-12-04 | Stop reason: HOSPADM

## 2024-12-01 RX ORDER — NALOXONE HYDROCHLORIDE 0.4 MG/ML
0.2 INJECTION, SOLUTION INTRAMUSCULAR; INTRAVENOUS; SUBCUTANEOUS
Status: DISCONTINUED | OUTPATIENT
Start: 2024-12-01 | End: 2024-12-04 | Stop reason: HOSPADM

## 2024-12-01 RX ORDER — ASCORBIC ACID 500 MG
500 TABLET ORAL DAILY
COMMUNITY

## 2024-12-01 RX ORDER — ONDANSETRON 2 MG/ML
4 INJECTION INTRAMUSCULAR; INTRAVENOUS EVERY 6 HOURS PRN
Status: DISCONTINUED | OUTPATIENT
Start: 2024-12-01 | End: 2024-12-04 | Stop reason: HOSPADM

## 2024-12-01 RX ORDER — ATORVASTATIN CALCIUM 20 MG/1
20 TABLET, FILM COATED ORAL DAILY
Status: DISCONTINUED | OUTPATIENT
Start: 2024-12-01 | End: 2024-12-04 | Stop reason: HOSPADM

## 2024-12-01 RX ORDER — ACETAMINOPHEN 500 MG
1000 TABLET ORAL 2 TIMES DAILY
COMMUNITY

## 2024-12-01 RX ORDER — FERROUS SULFATE 325(65) MG
325 TABLET ORAL EVERY EVENING
Status: DISCONTINUED | OUTPATIENT
Start: 2024-12-01 | End: 2024-12-04 | Stop reason: HOSPADM

## 2024-12-01 RX ORDER — NICOTINE POLACRILEX 4 MG
15-30 LOZENGE BUCCAL
Status: DISCONTINUED | OUTPATIENT
Start: 2024-12-01 | End: 2024-12-04 | Stop reason: HOSPADM

## 2024-12-01 RX ADMIN — APIXABAN 2.5 MG: 2.5 TABLET, FILM COATED ORAL at 19:57

## 2024-12-01 RX ADMIN — ATORVASTATIN CALCIUM 20 MG: 20 TABLET, FILM COATED ORAL at 21:34

## 2024-12-01 RX ADMIN — ACETAMINOPHEN 975 MG: 325 TABLET, FILM COATED ORAL at 19:57

## 2024-12-01 RX ADMIN — FERROUS SULFATE TAB 325 MG (65 MG ELEMENTAL FE) 325 MG: 325 (65 FE) TAB at 21:34

## 2024-12-01 RX ADMIN — GABAPENTIN 300 MG: 300 CAPSULE ORAL at 19:57

## 2024-12-01 ASSESSMENT — ACTIVITIES OF DAILY LIVING (ADL)
ADLS_ACUITY_SCORE: 58
ADLS_ACUITY_SCORE: 41
ADLS_ACUITY_SCORE: 58
ADLS_ACUITY_SCORE: 58
ADLS_ACUITY_SCORE: 44

## 2024-12-01 NOTE — ED PROVIDER NOTES
Emergency Department Note      History of Present Illness     Chief Complaint   Fall and Leg Pain (R leg pain/)      HPI   Nahun Villalobos is a 79 year old male with a history of neuropathy, pulmonary embolism, who presents following a fall.  He states that he has a walk-in shower/tub that he used his walker to get into because he had a bowel movement and needed to clean up.  While in the tub he subsequently fell twisting his right foot under him and hearing what he states was a crack.  He was unable to get up for approximately 30 to 60 minutes.  He denies striking his head.  He states that he falls frequently despite the utilization of his walker secondary to left lower extremity weakness due to his lumbar spinal stenosis.  He is complaining of pain in the right knee and foot, denies any back pain.    Independent Historian   None    Review of External Notes   Outpatient visit from [facility] from November 25 of this year was reviewed for the patient's hyperreflexia and chronic low back pain as well as lumbar spinal stenosis.    Past Medical History     Medical History and Problem List   Past Medical History:   Diagnosis Date    Antiplatelet or antithrombotic long-term use     Arthritis     CHF (congestive heart failure) (H) 09/16/2020    COPD (chronic obstructive pulmonary disease) (H)     DM (diabetes mellitus) (H)     Dyspnea on exertion     Essential hypertension, benign     Hemorrhage of rectum and anus     Non-small cell lung cancer (H)     Obesity     Personal history of colonic polyps     Sleep apnea     Thrombosis     Tobacco use disorder     Urinary retention     Walking troubles        Medications   No current outpatient medications on file.      Surgical History   Past Surgical History:   Procedure Laterality Date    ABDOMEN SURGERY  1995    APPENDECTOMY      BONE EXOSTOSIS EXCISION Bilateral 9/20/2022    Procedure: EXOSTECTOMIES BOTH 5TH DIGITS WITH SOFT TISSUE RELEASE;  Surgeon:  "Tong Gray, JOSE ALBERTO;  Location: [place] [facility]    CHOLECYSTECTOMY      COLONOSCOPY  2014    CYSTOSCOPY, TRANSURETHRAL RESECTION (TUR) PROSTATE, COMBINED N/A 4/30/2018    Procedure: COMBINED CYSTOSCOPY, TRANSURETHRAL RESECTION (TUR) PROSTATE;  Cystoscopy, Transurethral Resection Of The Prostate;  Surgeon: Praveena Burris MD;  Location: [facility]    IR LUNG BIOPSY MEDIASTINUM RIGHT  4/6/2016    WEDGE RESECTION      lung    ZZC NONSPECIFIC PROCEDURE      cholecystectomy       Physical Exam     Patient Vitals for the past 24 hrs:   BP Temp Temp src Pulse Resp SpO2 Height Weight   12/01/24 2050 133/74 98.6  F (37  C) Oral 79 20 96 % -- --   12/01/24 1956 -- -- -- -- 16 98 % -- --   12/01/24 1955 (!) 147/78 -- -- 79 -- -- -- --   12/01/24 1600 -- -- -- -- -- 97 % -- --   12/01/24 1500 125/74 -- -- -- -- 93 % -- --   12/01/24 1454 125/74 -- -- 88 -- 97 % -- --   12/01/24 1425 (!) 145/81 97.8  F (36.6  C) Oral 89 16 98 % 1.702 m (5' 7\") 81.6 kg (180 lb)     Physical Exam  General: Alert, interactive   Head:  Scalp is atraumatic  Eyes:  No scleral icterus  ENT:      Nose:  The external nose is normal  Ears:  External ears are normal      Neck:  Normal range of motion.      There is no rigidity.    Trachea is in the midline         CV:  Regular rate and rhythm      Resp:  Breath sounds are clear bilaterally    Non-labored, no retractions or accessory muscle use      GI:  Abdomen is soft, no distension, no tenderness.       MS:  Normal strength in all 4 extremities, tenderness over the right lateral knee, tenderness to the dorsum of the right foot  Skin:  Warm and dry, No rash or lesions noted.  Ecchymosis over the right fourth and fifth toes, mild ecchymosis over the right lateral knee  Neuro:   Moving all 4 extremities 4 extremities, normal range of motion in the hips, knees, ankles bilaterally    GCS: 15, patient is unable to ambulate even with the assistance of a walker, he states that his right leg is usually " stronger and was unable to bear weight and ambulate with the assistance of a walker and a hard soled shoe.  Psych: Awake. Alert.  Normal affect.      Appropriate interactions.    Diagnostics     Lab Results   Labs Ordered and Resulted from Time of ED Arrival to Time of ED Departure - No data to display    Imaging   CT Head w/o Contrast   Final Result   IMPRESSION:   1.  No CT evidence for acute intracranial process.   2.  Brain atrophy and presumed chronic microvascular ischemic changes as above.      XR Knee Right 3 Views   Final Result   IMPRESSION: No fracture or malalignment. Moderate patellofemoral compartment degenerative change with large marginal osteophytes. Small knee effusion. Large enthesophyte at the tibial insertion of the patellar tendon. Vascular calcification.      Foot  XR, G/E 3 views, right   Final Result   IMPRESSION: No evidence of acute fracture. Chronic resorptive change or resection of the fifth proximal phalangeal head. Hallux valgus with moderate degenerative arthritis first MTP joint. Mild - moderate degenerative arthritis tibiotalar joint and    midfoot. Plantar and Achilles calcaneal spurs. Arterial calcifications.          Independent Interpretation   Right knee radiograph was reviewed, no fractures identified  Right foot radiograph was reviewed, no obvious fractures identified  CT scan of the brain demonstrates no hemorrhage or midline shift.    ED Course      Medications Administered   Medications   ondansetron (ZOFRAN ODT) ODT tab 4 mg (has no administration in time range)     Or   ondansetron (ZOFRAN) injection 4 mg (has no administration in time range)   prochlorperazine (COMPAZINE) injection 5 mg (has no administration in time range)     Or   prochlorperazine (COMPAZINE) tablet 5 mg (has no administration in time range)   acetaminophen (TYLENOL) tablet 975 mg (975 mg Oral $Given 12/1/24 1957)   oxyCODONE IR (ROXICODONE) half-tab 2.5 mg (has no administration in time range)    oxyCODONE (ROXICODONE) tablet 5 mg (has no administration in time range)   melatonin tablet 1 mg (has no administration in time range)   ipratropium - albuterol 0.5 mg/2.5 mg/3 mL (DUONEB) neb solution 3 mL (has no administration in time range)   glucose gel 15-30 g (has no administration in time range)     Or   dextrose 50 % injection 25-50 mL (has no administration in time range)     Or   glucagon injection 1 mg (has no administration in time range)   insulin aspart (NovoLOG) injection (RAPID ACTING) (has no administration in time range)   insulin aspart (NovoLOG) injection (RAPID ACTING) (has no administration in time range)   insulin aspart (NovoLOG) injection (RAPID ACTING) (has no administration in time range)   metFORMIN (GLUCOPHAGE) tablet 1,000 mg (has no administration in time range)   lisinopril (ZESTRIL) tablet 40 mg (has no administration in time range)   gabapentin (NEURONTIN) capsule 300 mg (has no administration in time range)   gabapentin (NEURONTIN) capsule 600 mg (has no administration in time range)   finasteride (PROSCAR) tablet 5 mg (has no administration in time range)   atorvastatin (LIPITOR) tablet 20 mg (has no administration in time range)   apixaban ANTICOAGULANT (ELIQUIS) tablet 2.5 mg (2.5 mg Oral $Given 12/1/24 1957)   hydrALAZINE (APRESOLINE) injection 10 mg (has no administration in time range)   cholestyramine (QUESTRAN) Packet 4 g ( Oral Canceled Entry 12/1/24 2300)   amLODIPine (NORVASC) tablet 5 mg (has no administration in time range)   ferrous sulfate (FEROSUL) tablet 325 mg (has no administration in time range)   furosemide (LASIX) tablet 40 mg (has no administration in time range)       Procedures   Procedures     Discussion of Management   I discussed case with the admitting hospitalist    ED Course    Attempts were made to ambulate the patient with a walker and a hard soled shoe in the emergency department and he was unable to ambulate with this assistance, this is a  change from his baseline according to his significant other.    Additional Documentation  Social Determinants of Health: None    Medical Decision Making / Diagnosis     CMS Diagnoses: None    MIPS       None    [facility]   Nahun Villalobos is a 79 year old male presenting following a mechanical fall.  Radiographs demonstrate no signs of fracture, CT imaging demonstrates no signs of intracranial hemorrhage.  The patient is very clear that this was a mechanical fall therefore no laboratory workup was undertaken here.  Attempts were made to ambulate the patient unfortunately this was unsuccessful therefore he will be brought into the hospital for further evaluation and treatment.  I discussed case with the hospitalist service who is in agreement this plan of action.  Patient and his significant other were in agreement.  There is no signs of an acute infectious etiology or more concerning illness.    Disposition   The patient was admitted to the hospital.     Diagnosis     ICD-10-CM    1. Foot sprain, right, initial encounter  S93.601A Ankle/Foot Bracing Supplies Order Post-op Shoe; Right      2. Sprain of right knee, unspecified ligament, initial encounter  S83.91XA            Discharge Medications   Current Discharge Medication List            MD Nadia Payne Brandon Thomas, MD  12/01/24 0008

## 2024-12-01 NOTE — ED NOTES
Bed: ED28  Expected date:   Expected time:   Means of arrival:   Comments:  415: 79M, leg injury, fall

## 2024-12-01 NOTE — ED NOTES
EDT entered pt room and informed pt a post-op shoe would be placed and a road test would be performed. Pt requested pants be put back on, along with shoes. EDT turned pt with RN assist to pull pants up, and discovered pt soiled himself. Pt did not inform staff, as he reported he wanted to clean himself when he was home.     After pants and shoes back on, EDT assisted patient to sit, then to stand. Pt attempted road test, took one step forward with right leg, then slide left leg forward, before becoming unsteady and falling back onto bed. ROAD test failed.     Doctor and RN informed.    EDT informed pt they would return so he could be cleaned up.

## 2024-12-01 NOTE — ED TRIAGE NOTES
[facility]  ED Arrival Note      Means of Arrival: EMS  Comes from: Home    Story: Pt was brought in by EMS from for fall after walking into the shower to change his dirty brief. Pt has history of cancer and neuropathy on left foot. Pt denies hitting his head, no LOC. Pt is taking eliquis for history of blood clots.         EMS/PD Interventions: N/A  EMS Medications: N/A    Meets Stroke Criteria? No  Meets Trauma Criteria? No  Shock Index (HR/SBP): N/A      Directed to: Main ED  Belongings: Remain with patient

## 2024-12-02 ENCOUNTER — PATIENT OUTREACH (OUTPATIENT)
Dept: CARE COORDINATION | Facility: CLINIC | Age: 79
End: 2024-12-02
Payer: COMMERCIAL

## 2024-12-02 LAB
ALBUMIN SERPL BCG-MCNC: 3.6 G/DL (ref 3.5–5.2)
ALBUMIN UR-MCNC: 30 MG/DL
ALP SERPL-CCNC: 58 U/L (ref 40–150)
ALT SERPL W P-5'-P-CCNC: 10 U/L (ref 0–70)
ANION GAP SERPL CALCULATED.3IONS-SCNC: 13 MMOL/L (ref 7–15)
APPEARANCE UR: ABNORMAL
AST SERPL W P-5'-P-CCNC: 16 U/L (ref 0–45)
BACTERIA #/AREA URNS HPF: ABNORMAL /HPF
BILIRUB SERPL-MCNC: 0.8 MG/DL
BILIRUB UR QL STRIP: NEGATIVE
BUN SERPL-MCNC: 12.1 MG/DL (ref 8–23)
CALCIUM SERPL-MCNC: 8.6 MG/DL (ref 8.8–10.4)
CHLORIDE SERPL-SCNC: 102 MMOL/L (ref 98–107)
COLOR UR AUTO: YELLOW
CREAT SERPL-MCNC: 0.98 MG/DL (ref 0.67–1.17)
EGFRCR SERPLBLD CKD-EPI 2021: 78 ML/MIN/1.73M2
ERYTHROCYTE [DISTWIDTH] IN BLOOD BY AUTOMATED COUNT: 11.5 % (ref 10–15)
EST. AVERAGE GLUCOSE BLD GHB EST-MCNC: 154 MG/DL
GLUCOSE BLDC GLUCOMTR-MCNC: 138 MG/DL (ref 70–99)
GLUCOSE BLDC GLUCOMTR-MCNC: 165 MG/DL (ref 70–99)
GLUCOSE BLDC GLUCOMTR-MCNC: 174 MG/DL (ref 70–99)
GLUCOSE BLDC GLUCOMTR-MCNC: 180 MG/DL (ref 70–99)
GLUCOSE BLDC GLUCOMTR-MCNC: 87 MG/DL (ref 70–99)
GLUCOSE SERPL-MCNC: 169 MG/DL (ref 70–99)
GLUCOSE UR STRIP-MCNC: NEGATIVE MG/DL
HBA1C MFR BLD: 7 %
HCO3 SERPL-SCNC: 23 MMOL/L (ref 22–29)
HCT VFR BLD AUTO: 33.7 % (ref 40–53)
HGB BLD-MCNC: 11.5 G/DL (ref 13.3–17.7)
HGB UR QL STRIP: NEGATIVE
KETONES UR STRIP-MCNC: NEGATIVE MG/DL
LEUKOCYTE ESTERASE UR QL STRIP: ABNORMAL
MCH RBC QN AUTO: 33.7 PG (ref 26.5–33)
MCHC RBC AUTO-ENTMCNC: 34.1 G/DL (ref 31.5–36.5)
MCV RBC AUTO: 99 FL (ref 78–100)
MUCOUS THREADS #/AREA URNS LPF: PRESENT /LPF
NITRATE UR QL: POSITIVE
PH UR STRIP: 6 [PH] (ref 5–7)
PLATELET # BLD AUTO: 280 10E3/UL (ref 150–450)
POTASSIUM SERPL-SCNC: 4 MMOL/L (ref 3.4–5.3)
PROT SERPL-MCNC: 6.7 G/DL (ref 6.4–8.3)
RBC # BLD AUTO: 3.41 10E6/UL (ref 4.4–5.9)
RBC URINE: 5 /HPF
SODIUM SERPL-SCNC: 138 MMOL/L (ref 135–145)
SP GR UR STRIP: 1.02 (ref 1–1.03)
SQUAMOUS EPITHELIAL: 1 /HPF
UROBILINOGEN UR STRIP-MCNC: NORMAL MG/DL
WBC # BLD AUTO: 12.2 10E3/UL (ref 4–11)
WBC URINE: 77 /HPF

## 2024-12-02 PROCEDURE — 83036 HEMOGLOBIN GLYCOSYLATED A1C: CPT | Performed by: STUDENT IN AN ORGANIZED HEALTH CARE EDUCATION/TRAINING PROGRAM

## 2024-12-02 PROCEDURE — 97530 THERAPEUTIC ACTIVITIES: CPT | Mod: GP

## 2024-12-02 PROCEDURE — 85014 HEMATOCRIT: CPT | Performed by: STUDENT IN AN ORGANIZED HEALTH CARE EDUCATION/TRAINING PROGRAM

## 2024-12-02 PROCEDURE — 250N000012 HC RX MED GY IP 250 OP 636 PS 637: Performed by: STUDENT IN AN ORGANIZED HEALTH CARE EDUCATION/TRAINING PROGRAM

## 2024-12-02 PROCEDURE — 99233 SBSQ HOSP IP/OBS HIGH 50: CPT | Performed by: PHYSICIAN ASSISTANT

## 2024-12-02 PROCEDURE — 36415 COLL VENOUS BLD VENIPUNCTURE: CPT | Performed by: STUDENT IN AN ORGANIZED HEALTH CARE EDUCATION/TRAINING PROGRAM

## 2024-12-02 PROCEDURE — 97161 PT EVAL LOW COMPLEX 20 MIN: CPT | Mod: GP

## 2024-12-02 PROCEDURE — G0378 HOSPITAL OBSERVATION PER HR: HCPCS

## 2024-12-02 PROCEDURE — 82310 ASSAY OF CALCIUM: CPT | Performed by: STUDENT IN AN ORGANIZED HEALTH CARE EDUCATION/TRAINING PROGRAM

## 2024-12-02 PROCEDURE — 250N000013 HC RX MED GY IP 250 OP 250 PS 637: Performed by: PHYSICIAN ASSISTANT

## 2024-12-02 PROCEDURE — 81001 URINALYSIS AUTO W/SCOPE: CPT | Performed by: PHYSICIAN ASSISTANT

## 2024-12-02 PROCEDURE — 82962 GLUCOSE BLOOD TEST: CPT

## 2024-12-02 PROCEDURE — 250N000013 HC RX MED GY IP 250 OP 250 PS 637: Performed by: STUDENT IN AN ORGANIZED HEALTH CARE EDUCATION/TRAINING PROGRAM

## 2024-12-02 PROCEDURE — 82947 ASSAY GLUCOSE BLOOD QUANT: CPT | Performed by: STUDENT IN AN ORGANIZED HEALTH CARE EDUCATION/TRAINING PROGRAM

## 2024-12-02 PROCEDURE — 87086 URINE CULTURE/COLONY COUNT: CPT | Performed by: PHYSICIAN ASSISTANT

## 2024-12-02 RX ORDER — ALBUTEROL SULFATE 90 UG/1
2 INHALANT RESPIRATORY (INHALATION) EVERY 6 HOURS PRN
Status: DISCONTINUED | OUTPATIENT
Start: 2024-12-02 | End: 2024-12-04 | Stop reason: HOSPADM

## 2024-12-02 RX ORDER — FLUTICASONE FUROATE AND VILANTEROL 100; 25 UG/1; UG/1
1 POWDER RESPIRATORY (INHALATION) DAILY
Status: DISCONTINUED | OUTPATIENT
Start: 2024-12-02 | End: 2024-12-04 | Stop reason: HOSPADM

## 2024-12-02 RX ADMIN — ACETAMINOPHEN 975 MG: 325 TABLET, FILM COATED ORAL at 09:00

## 2024-12-02 RX ADMIN — APIXABAN 2.5 MG: 2.5 TABLET, FILM COATED ORAL at 08:59

## 2024-12-02 RX ADMIN — GABAPENTIN 600 MG: 300 CAPSULE ORAL at 21:09

## 2024-12-02 RX ADMIN — ACETAMINOPHEN 975 MG: 325 TABLET, FILM COATED ORAL at 14:16

## 2024-12-02 RX ADMIN — APIXABAN 2.5 MG: 2.5 TABLET, FILM COATED ORAL at 21:09

## 2024-12-02 RX ADMIN — FINASTERIDE 5 MG: 5 TABLET, FILM COATED ORAL at 08:59

## 2024-12-02 RX ADMIN — LISINOPRIL 40 MG: 40 TABLET ORAL at 08:59

## 2024-12-02 RX ADMIN — FERROUS SULFATE TAB 325 MG (65 MG ELEMENTAL FE) 325 MG: 325 (65 FE) TAB at 21:09

## 2024-12-02 RX ADMIN — UMECLIDINIUM 1 PUFF: 62.5 AEROSOL, POWDER ORAL at 15:37

## 2024-12-02 RX ADMIN — FUROSEMIDE 40 MG: 40 TABLET ORAL at 08:57

## 2024-12-02 RX ADMIN — INSULIN ASPART 1 UNITS: 100 INJECTION, SOLUTION INTRAVENOUS; SUBCUTANEOUS at 17:46

## 2024-12-02 RX ADMIN — GABAPENTIN 300 MG: 300 CAPSULE ORAL at 08:57

## 2024-12-02 RX ADMIN — ACETAMINOPHEN 975 MG: 325 TABLET, FILM COATED ORAL at 21:09

## 2024-12-02 RX ADMIN — METFORMIN HYDROCHLORIDE 1000 MG: 500 TABLET ORAL at 08:59

## 2024-12-02 RX ADMIN — AMLODIPINE BESYLATE 5 MG: 5 TABLET ORAL at 08:57

## 2024-12-02 RX ADMIN — ATORVASTATIN CALCIUM 20 MG: 20 TABLET, FILM COATED ORAL at 21:09

## 2024-12-02 RX ADMIN — METFORMIN HYDROCHLORIDE 1000 MG: 500 TABLET ORAL at 17:46

## 2024-12-02 RX ADMIN — FLUTICASONE FUROATE AND VILANTEROL TRIFENATATE 1 PUFF: 100; 25 POWDER RESPIRATORY (INHALATION) at 15:37

## 2024-12-02 ASSESSMENT — ACTIVITIES OF DAILY LIVING (ADL)
ADLS_ACUITY_SCORE: 44
ADLS_ACUITY_SCORE: 49
ADLS_ACUITY_SCORE: 44
ADLS_ACUITY_SCORE: 49
ADLS_ACUITY_SCORE: 44
ADLS_ACUITY_SCORE: 49
ADLS_ACUITY_SCORE: 49
ADLS_ACUITY_SCORE: 44
DEPENDENT_IADLS:: CLEANING;COOKING;LAUNDRY;MEAL PREPARATION;SHOPPING;MEDICATION MANAGEMENT;TRANSPORTATION
ADLS_ACUITY_SCORE: 42
ADLS_ACUITY_SCORE: 44
ADLS_ACUITY_SCORE: 42
ADLS_ACUITY_SCORE: 44
ADLS_ACUITY_SCORE: 49
ADLS_ACUITY_SCORE: 44
ADLS_ACUITY_SCORE: 44
ADLS_ACUITY_SCORE: 49
ADLS_ACUITY_SCORE: 44
ADLS_ACUITY_SCORE: 44
ADLS_ACUITY_SCORE: 49
ADLS_ACUITY_SCORE: 44
ADLS_ACUITY_SCORE: 42

## 2024-12-02 NOTE — PROGRESS NOTES
"   12/02/24 1106   Appointment Info   Signing Clinician's Name / Credentials (PT) Colton Summers DPT   Quick Adds   Quick Adds Certification   Living Environment   People in Home significant other   Current Living Arrangements house   Home Accessibility stairs to enter home   Number of Stairs, Main Entrance 3   Stair Railings, Main Entrance railings safe and in good condition   Living Environment Comments Pt lives in house with girlfriend, 3 YOMI with railing both sides (can reach for one at a time).   Self-Care   Usual Activity Tolerance moderate   Current Activity Tolerance poor   Equipment Currently Used at Home walker, rolling;walker, standard;other (see comments)  (pt has transport chair.)   Fall history within last six months yes   Number of times patient has fallen within last six months 6   Activity/Exercise/Self-Care Comment IND with functional mobility, using FWW or 4WW for ambulation. Transport chair for longer distances. Pt reports hx of L LE weakness since last hospitalization (6/2024) and L leg \"drags\" as he walks. He was in [facility], then back home, reports feeling that therapy was little to no help.   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   Existing Precautions/Restrictions fall   Weight-Bearing Status - RLE weight-bearing as tolerated   Cognition   Affect/Mental Status (Cognition) WFL   Orientation Status (Cognition) " "oriented x 4   Follows Commands (Cognition) [facility]   Pain Assessment   Patient Currently in Pain   (R knee pain, general joint pain in UEs reported as well.)   Integumentary/Edema   Integumentary/Edema no deficits were identifed   Posture    Posture Forward head position   Range of Motion (ROM)   ROM Comment decreased ROM in B LEs d/t weakness, R Knee pain with AROM (decreased flexion with AROM and PROM, with full extension pt reporting \"zing\" pain up leg followed with knee flexion reflex).   Strength (Manual Muscle Testing)   Strength (Manual Muscle Testing) Deficits observed during functional mobility   Strength Comments weakness in L hip flexion and L knee extension (unable to complete 1/2 of AROM), 2/5 in L LE, around 3-4/5 in R LE   Bed Mobility   Comment, (Bed Mobility) supine > sit with modA, from slightly elevated HOB, using L bed rail.   Transfers   Comment, (Transfers) STS with FWW and min-modA, from elevated bed height.   Gait/Stairs (Locomotion)   Comment, (Gait/Stairs) unable to ambulate on this date.   Balance   Balance Comments good sitting, poor with standing, needing Ax1 and FWW to maintain, R knee buckling.   Sensory Examination   Sensory Perception Comments diminished sensation to R LE during screen of distal B LEs, reports this being new.   Muscle Tone   Muscle Tone Comments appears to have some nerve tension R LE, ankle DF causing R knee flexion reflex.   Clinical Impression   Criteria for Skilled Therapeutic Intervention Yes, treatment indicated   PT Diagnosis (PT) impaired functional mobility   Influenced by the following impairments pain, decreased ROM, decreased strength and activity tolerance, impaired sensation, impaired balance.   Functional limitations due to impairments difficulty with bed mobility, transfers, and ambulation   Clinical Presentation (PT Evaluation Complexity) stable   Clinical Presentation Rationale clinical judgment   Clinical Decision Making (Complexity) low complexity "   Planned Therapy Interventions (PT) balance training;bed mobility training;gait training;home exercise program;ROM (range of motion);strengthening;transfer training;progressive activity/exercise;stair training   Risk & Benefits of therapy have been explained evaluation/treatment results reviewed;care plan/treatment goals reviewed;risks/benefits reviewed;current/potential barriers reviewed;participants voiced agreement with care plan;participants included;patient   PT Total Evaluation Time   PT Eval, Low Complexity Minutes (41677) 10   Therapy Certification   Start of care date 12/01/24   Certification date from 12/02/24   Certification date to 12/09/24   Medical Diagnosis fall, R knee strain   Physical Therapy Goals   PT Frequency 5x/week   PT Predicted Duration/Target Date for Goal Attainment 12/09/24   PT Goals Bed Mobility;Transfers;Gait;Stairs   PT: Bed Mobility Supervision/stand-by assist;Supine to/from sit   PT: Transfers Supervision/stand-by assist;Sit to/from stand;Assistive device;Bed to/from chair   PT: Gait Minimal assist;Standard walker;50 feet   PT: Stairs 3 stairs;Minimal assist;Rail on left   Interventions   Interventions Quick Adds Therapeutic Activity   Therapeutic Activity   Therapeutic Activities: dynamic activities to improve functional performance Minutes (72558) 24   Symptoms Noted During/After Treatment Fatigue;Increased pain   Treatment Detail/Skilled Intervention Pt supine in bed upon PT arrival, agreeable with encouragement. Pt sitting EOB, Salbador at first, cues on anterior scooting and B feet on ground, anterior weight shift of trunk, pt progressed to SBA. Pt performed STS x3 with FWW and min-modA, from elevated bed height. Blocking at R thigh/knee. Cues for standing lateral weight shift, R knee buckling. Visual demo provided for pre gait activity including sequenc (walker, R LE step, push UEs into walker, L LE step). Pt unable to progress L LE at all, pain with increased WB into the R  knee. Obtained Deb Stedy, performed STS with SS and Salbador, then STS from SS paddles with CGA. Pt sitting in chair at end of session, alarm on, all needs within reach. Discussed POC including potential benefit of some brace  (AFO for the L LE), pt reports not having this since June and didn't know what and AFO was.   PT Discharge Planning   PT Plan review for tone in R LE vs. nerve tension; progress bed mob and transfers, pre-gait activity. Stand-pivots as able.   PT Discharge Recommendation (DC Rec) Transitional Care Facility   PT Rationale for DC Rec Pt at baseline is IND with functional mob, using walker for ambulation. Lives in house with girlfriend. Currently, pt is below baseline with limited ROM, strength and balance. R LE buckling with standing and attempts for gait. L LE with deficits since June, unable to progress with gait trial. Deb Segundo used for transfer to chair. Recommending T[facility].   PT Brief overview of current status Ax1 with SS for transfers; Goals of therapy will be to address safe mobility and make recs for d/c to next level of care. Pt and RN will continue to follow all falls risk precautions as documented by RN staff while hospitalized.   Physical Therapy Time and Intention   Timed Code Treatment Minutes 24   Total Session Time (sum of timed and untimed services) 34     [facility]  OUTPATIENT PHYSICAL THERAPY EVALUATION  PLAN OF TREATMENT FOR OUTPATIENT REHABILITATION  (COMPLETE FOR INITIAL CLAIMS ONLY)  Patient's Last Name, First Name, M.I.  YOB: 1945  Nahun Villalobos                        Provider's Name  [facility] Medical Record No.  [id number]                             Onset Date:  12/01/24   Start of Care Date:  12/01/24   Type:     _X_PT   ___OT   ___SLP Medical Diagnosis:  fall, R knee strain              PT Diagnosis:  impaired functional mobility Visits from SOC:  1     See note for plan of treatment,  functional goals and certification details    I CERTIFY THE NEED FOR THESE SERVICES FURNISHED UNDER        THIS PLAN OF TREATMENT AND WHILE UNDER MY CARE     (Physician co-signature of this document indicates review and certification of the therapy plan).

## 2024-12-02 NOTE — ED NOTES
EDT cleaned up pt with RN assist, replaced chux, and was provided new depends. Pt was reposition in bed. Odor eliminating spray provided to spouse.

## 2024-12-02 NOTE — PROGRESS NOTES
Summary:  who presents following a fall.    Orientation: A/O x4    Vitals/Tele: VSS on RA    IV Access/drains: R PIV SL     Diet: Mod carb    Mobility: Not OOB yet    GI/: Continent of bowel, urinal @ bedside    Wound/Skin: Blanchable redness to coccyx, scattered scabs/bruising, blister to R 3 toe    Consults: PT/OT/WOC    Discharge Plan: TBD      See Flow sheets for assessment

## 2024-12-02 NOTE — CONSULTS
Care Management Initial Consult    General Information  Assessment completed with: Patient,    Type of CM/SW Visit: Initial Assessment    Primary Care Provider verified and updated as needed:     Readmission within the last 30 days:        Reason for Consult: discharge planning  Advance Care Planning: Advance Care Planning Reviewed: present on chart          Communication Assessment  Patient's communication style: spoken language (English or Bilingual)    Hearing Difficulty or Deaf: no   Wear Glasses or Blind: yes    Cognitive  Cognitive/Neuro/Behavioral: WDL                      Living Environment:   People in home: significant other     Current living Arrangements: house      Able to return to prior arrangements: yes       Family/Social Support:  Care provided by: spouse/significant other  Provides care for:    Marital Status: Lives with Significant Other  Support system: Significant Other          Description of Support System: Supportive    Support Assessment: Adequate family and caregiver support    Current Resources:   Patient receiving home care services:          Community Resources: Other (see comment) (outpatient water therapy)  Equipment currently used at home: walker, rolling, walker, standard, other (see comments)  Supplies currently used at home:      Employment/Financial:  Employment Status: retired        Financial Concerns:             Does the patient's insurance plan have a 3 day qualifying hospital stay waiver?  Yes     Which insurance plan 3 day waiver is available? Alternative insurance waiver    Will the waiver be used for post-acute placement? Undetermined at this time    Lifestyle & Psychosocial Needs:  Social Drivers of Health     Food Insecurity: Low Risk  (12/1/2024)    Food Insecurity     Within the past 12 months, did you worry that your food would run out before you got money to buy more?: No     Within the past 12 months, did the food you bought just not last and you didn t have money  to get more?: No   Depression: Not at risk (9/3/2024)    PHQ-2     PHQ-2 Score: 0   Housing Stability: Low Risk  (12/1/2024)    Housing Stability     Do you have housing? : Yes     Are you worried about losing your housing?: No   Tobacco Use: Medium Risk (11/20/2024)    Received from [facility]    Patient History     Smoking Tobacco Use: Former     Smokeless Tobacco Use: Never     Passive Exposure: Past   Financial Resource Strain: Low Risk  (12/1/2024)    Financial Resource Strain     Within the past 12 months, have you or your family members you live with been unable to get utilities (heat, electricity) when it was really needed?: No   Alcohol Use: Not At Risk (1/11/2022)    AUDIT-C     Frequency of Alcohol Consumption: 2-3 times a week     Average Number of Drinks: 1 or 2     Frequency of Binge Drinking: Never   Transportation Needs: Low Risk  (12/1/2024)    Transportation Needs     Within the past 12 months, has lack of transportation kept you from medical appointments, getting your medicines, non-medical meetings or appointments, work, or from getting things that you need?: No   Physical Activity: Insufficiently Active (9/2/2024)    Exercise Vital Sign     Days of Exercise per Week: 2 days     Minutes of Exercise per Session: 20 min   Interpersonal Safety: Not on file   Stress: No Stress Concern Present (9/2/2024)    [place] [employer] Occup[employer] - Occupational Stress Questionnaire     Feeling of Stress : Not at all   Social Connections: Unknown (9/2/2024)    Social Connection and Isolation Panel [NHANES]     Frequency of Communication with Friends and Family: Not on file     Frequency of Social Gatherings with Friends and Family: Once a week     Attends [employer] Services: Not on file     Active Member of Clubs or Organizations: Not on file     Attends Club or Organization Meetings: Not on file     Marital Status: Not on file   Health Literacy: Not on file       Functional Status:  Prior to  "admission patient needed assistance:   Dependent ADLs:: Ambulation-walker  Dependent IADLs:: Cleaning, Cooking, Laundry, Meal Preparation, Shopping, Medication Management, Transportation       Mental Health Status:          Chemical Dependency Status:                Values/Beliefs:  Spiritual, Cultural Beliefs, Hoahaoism Practices, Values that affect care:    Description of Beliefs that Will Affect Care: alireza            Discussed  Partnership in Safe Discharge Planning  document with patient/family: No    Additional Information:  Patient was admitted on 12/1/24 after a mechanical fall, per H&P. Patient lives in a house with significant other. Pt states significant other assist with cleaning, cooking, laundry, patient mobilizes with a walker at baseline. Pt states they have been to multiple TCU's, [place] and [facility], and feels they do not help. Pt states they can do at home what they would do at TCU. Pt particularly did not like the TCU's food. SW stated that the recommendation can also be if home is not supportive enough with how pt is currently doing, pt states significant other assists enough. Pt then said \"well, she's in her 70's though too.\" Pt went back and forth on considering TCU but stated a few times they do not want this plan and would choose to go home. Pt states they do aquatic therapy but couldn't remember where. Provided with SNF list and SW phone number in case pt changes their mind on TCU. Encouraged pt to discuss the recommendation with their significant other, if that is going to be who is helping, explained that sometimes, family feel differently and they should be aware of what is recommended, pt in agreement.    Dejah Hogan, JANETTE  Social Work  [facility]       "

## 2024-12-02 NOTE — PROGRESS NOTES
Observation goals  PRIOR TO DISCHARGE        Comments:   -diagnostic tests and consults completed and resulted  Not met  -vital signs normal or at patient baseline  Met  -returns to baseline functional status  Not met  -safe disposition plan has been identified  Not met  Nurse to notify provider when observation goals have been met and patient is ready for discharge.

## 2024-12-02 NOTE — PLAN OF CARE
Goal Outcome Evaluation:  PRIMARY Concern: fall,   Suspected right knee sprain  SAFETY RISK Concerns (fall risk, behaviors, etc.): fall risk      Aggression Tool Color: green  Isolation/Type: NA  Tests/Procedures for NEXT shift: na  Consults? (Pending/following, signed-off?) PT, OT  Where is patient from? (Home, TCU, etc.): Home  Other Important info for NEXT shift: Anticipated DC date & active delays: TBD  _____________________________________________________________________________  SUMMARY NOTE:     Orientation: A/O x 4  Observation Goals (met & not met): Not Met   Activity Level: Not OOB yet;   Fall Risk: YES  Pain Management: denies has PRN Tylenol and Oxycodone available  ABNL VS/O2: VSS RA  Diet: mod carb  Bowel/Bladder: Using urinal at bed side  Drains/Devices: PIV SL   Other Important Info: Patient is denying pain,        Observation goals  PRIOR TO DISCHARGE        Comments: -diagnostic tests and consults completed and resulted: Not met  -vital signs normal or at patient baseline: Not met  -returns to baseline functional status: Not met  -safe disposition plan has been identified: Not met  Nurse to notify provider when observation goals have been met and patient is ready for discharge

## 2024-12-02 NOTE — ED NOTES
"[facility]  ED Nurse Handoff Report    ED Chief complaint: Fall and Leg Pain (R leg pain/)      ED Diagnosis:   Final diagnoses:   Foot sprain, right, initial encounter   Sprain of right knee, unspecified ligament, initial encounter       Code Status: TBD    Allergies:   Allergies   Allergen Reactions    No Known Drug Allergy        Patient Story:  Pt was brought in by EMS from for fall after walking into the shower to change his dirty brief. Pt has history of cancer and neuropathy on left foot. Pt denies hitting his head, no LOC. Pt is taking eliquis for history of blood clots.   Focused Assessment:  Patient has some arthritis in his knee and some small changes to his right foot. See x-ray. Tried to give patient a boot and road test patient. Patient failed road test so is being admitted into the hospital.    Treatments and/or interventions provided: none  Patient's response to treatments and/or interventions: N/A    To be done/followed up on inpatient unit:  PT/OT consult    Does this patient have any cognitive concerns?:  none    Activity level - Baseline/Home:  Independent and Walker  Activity Level - Current:   Stand with assist x2 and Walker    Patient's Preferred language: English   Needed?: No    Isolation: None  Infection: Not Applicable  Patient tested for COVID 19 prior to admission: NO  Bariatric?: No    Vital Signs:   Vitals:    12/01/24 1425 12/01/24 1454 12/01/24 1500 12/01/24 1600   BP: (!) 145/81 125/74 125/74    Pulse: 89 88     Resp: 16      Temp: 97.8  F (36.6  C)      TempSrc: Oral      SpO2: 98% 97% 93% 97%   Weight: 81.6 kg (180 lb)      Height: 1.702 m (5' 7\")          Cardiac Rhythm: SR    Was the PSS-3 completed:   Yes  What interventions are required if any?  none  Family Comments: wife at bedside    For the majority of the shift this patient's behavior was Green.   Behavioral interventions performed were none.    ED NURSE PHONE NUMBER: [id number]         "

## 2024-12-02 NOTE — PROGRESS NOTES
Clinic Care Coordination Contact  The Community Health Worker planned to call for a Care Coordination outreach to follow up on goals and action steps.     Did Not Contact Patient.    Per Chart Review, patient is currently admitted at [facility] as of 12/1/24    OMAR Kim  Clinic Care Coordination  [facility] Clinics: [place], [place], [place], and [facility]  Phone: [phone]

## 2024-12-02 NOTE — PROGRESS NOTES
diagnostic tests and consults completed and resulted not met, OT consult pending  -vital signs normal or at patient baseline met  -returns to baseline functional status not met AX 1-2 with SS  -safe disposition plan has been identified not met, TCU recommended

## 2024-12-02 NOTE — PROGRESS NOTES
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 No Loc/Head trauma.  At [facility], denies any pain, but during road test, failed to ambulate given acute RLE weakness (note patient has baseline LLE weakness following a fall 6/2024). He reports planned cervical fusion in February 2025.   *CTH negative  *XR R knee has Moderate patellofemoral compartment degenerative change with large marginal osteophytes. Small knee effusion . R foot has degenerative changes  --resumed gabapentin  --standing tylenol, PRN oxycodone  --PT OT, fall precautions- recommending TCU. Pt very reluctant. Reports mobility improved from yesterday. Will reassess with PT again tomorrow with SO here and decide TCU vs home      COPD/CIERA: duonebs prn. Not compliant w/ CPAP  Hx PE on AC: resumed eliquis  T2D: A1C is 7. 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  Diet: Moderate Consistent Carb (60 g CHO per Meal) Diet    DVT Prophylaxis: DOAC  Gomez Catheter: Not present  Lines: None     Cardiac Monitoring: None  Code Status: No CPR- Do NOT Intubate      Clinically Significant Risk Factors Present on Admission           # Hypocalcemia: Lowest Ca = 8.6 mg/dL in last 2 days, will monitor and replace as appropriate      # Drug Induced Coagulation Defect: home medication list includes an anticoagulant medication    # Hypertension: Noted on problem list          # DMII: A1C = 7.0 % (Ref range: <5.7 %) within past 6 months    # Overweight: Estimated body mass index is 28.19 kg/m  as calculated from the following:    Height as of this encounter: 1.702 m (5' 7\").    Weight as of this encounter: 81.6 kg (180 lb).              Social Drivers of Health    Tobacco Use: Medium Risk (11/20/2024)    Received from [facility]    Patient History     Smoking Tobacco Use: Former     Smokeless Tobacco Use: Never     Passive Exposure: Past   Physical Activity: Insufficiently Active (9/2/2024)    Exercise Vital Sign     Days of Exercise per Week: 2 days     Minutes of Exercise per Session: 20 min   Social Connections: Unknown (9/2/2024)    Social Connection and Isolation Panel [NHANES]     Frequency of Social Gatherings with Friends and Family: Once a week          Disposition Plan     Medically Ready for Discharge: 1-2 days pending continued improvement in mobility. Home vs TCU (more likely)       The patient's care was discussed with Dr. Santos who agrees with the above plan     Savannah Villasenor PA-C  Hospitalist Service  [facility]  Securely message with [employer] (more info)  Text page via [facility] Paging/Directory "   ______________________________________________________________________    Interval History   Patient reports pain in R knee radiating down to the foot especially with weight bearing. L leg weak at baseline. Arms feel baseline though R forearm a little sore. He is really hoping to avoid TCU but reports SO cannot provide much physical assistance and is willing to consider if not improved tomorrow. No CP, SOB, dizziness.     Physical Exam   Temp: 98.2  F (36.8  C) Temp src: Oral BP: 134/76 Pulse: 73   Resp: 18 SpO2: 95 % O2 Device: None (Room air)    Vitals:    12/01/24 1425   Weight: 81.6 kg (180 lb)     Vital Signs with Ranges  Temp:  [97.8  F (36.6  C)-98.6  F (37  C)] 98.2  F (36.8  C)  Pulse:  [73-89] 73  Resp:  [16-20] 18  BP: (125-147)/(74-81) 134/76  SpO2:  [93 %-98 %] 95 %  No intake/output data recorded.    Constitutional: Alert and oriented, sitting up in chair eating lunch. Appears comfortable and is appropriately conversant   ENT:  moist mucous membranes  Respiratory: Lungs clear to auscultation bilaterally, no increased work of breathing  Cardiovascular: Regular rate and rhythm, trace pedal edema   GI:  active bowel sounds, abdomen soft, non-tender  Skin/Integumen: third R toe bruised  MSK: dorsi and plantar flexion 5/5 R and 4/5 L. Flexion LLE 0/5 and 4/5 on right.  strength 4/5 bilaterally, can raise both arms. No tenderness to palpation R knee, minimal effusion      Medical Decision Making       60 MINUTES SPENT BY ME on the date of service doing chart review, history, exam, documentation & further activities per the note.      Data     I have personally reviewed the following data over the past 24 hrs:    12.2 (H)  \   11.5 (L)   / 280     138 102 12.1 /  165 (H)   4.0 23 0.98 \     ALT: 10 AST: 16 AP: 58 TBILI: 0.8   ALB: 3.6 TOT PROTEIN: 6.7 LIPASE: N/A     TSH: N/A T4: N/A A1C: 7.0 (H)       Imaging results reviewed over the past 24 hrs:   Recent Results (from the past 24 hours)   Foot  XR,  G/E 3 views, right    Narrative    EXAM: XR FOOT RIGHT G/E 3 VIEWS  LOCATION: [facility]  DATE: 12/1/2024    INDICATION: fall  COMPARISON: None.      Impression    IMPRESSION: No evidence of acute fracture. Chronic resorptive change or resection of the fifth proximal phalangeal head. Hallux valgus with moderate degenerative arthritis first MTP joint. Mild - moderate degenerative arthritis tibiotalar joint and   midfoot. Plantar and Achilles calcaneal spurs. Arterial calcifications.   XR Knee Right 3 Views    Narrative    EXAM: XR KNEE RIGHT 3 VIEWS  LOCATION: [facility]  DATE: 12/1/2024    INDICATION: knee pain  COMPARISON: None.      Impression    IMPRESSION: No fracture or malalignment. Moderate patellofemoral compartment degenerative change with large marginal osteophytes. Small knee effusion. Large enthesophyte at the tibial insertion of the patellar tendon. Vascular calcification.   CT Head w/o Contrast    Narrative    EXAM: CT HEAD W/O CONTRAST  LOCATION: [facility]  DATE: 12/1/2024    INDICATION: Fall. On Eliquis.  COMPARISON: Head CT 6/24/2024.  TECHNIQUE: Routine CT Head without IV contrast. Multiplanar reformats. Dose reduction techniques were used.    FINDINGS:  INTRACRANIAL CONTENTS: No intracranial hemorrhage, extraaxial collection, or mass effect. No CT evidence of acute infarct. Mild presumed chronic small vessel ischemic changes. Moderate generalized volume loss. No hydrocephalus.     VISUALIZED ORBITS/SINUSES/MASTOIDS: No intraorbital abnormality. No paranasal sinus mucosal disease. No middle ear or mastoid effusion.    BONES/SOFT TISSUES: No acute abnormality.      Impression    IMPRESSION:  1.  No CT evidence for acute intracranial process.  2.  Brain atrophy and presumed chronic microvascular ischemic changes as above.

## 2024-12-02 NOTE — PROGRESS NOTES
PRIMARY Concern: fall,  Suspected right knee sprain  SAFETY RISK Concerns (fall risk, behaviors, etc.): fall risk      Aggression Tool Color: green  Isolation/Type: NA  Tests/Procedures for NEXT shift: PT to eval Pt family to see if able to care for Pt at home.   Consults? (Pending/following, signed-off?) PT following. OT and WOC RN consults pending.   Where is patient from? (Home, TCU, etc.): Home  Other Important info for NEXT shift: Anticipated DC date & active delays: TBD  _________________________________________________  SUMMARY NOTE:  Orientation: A/O x 4  Observation Goals (met & not met): Not Met   Mobility Level/Assist Equipment: A1 SaraSteady   Antibiotics & Plan (IV/po, length of tx left): none   Pain Management: denies pain   ABNL Lab/BG: A1C 7, UA positive, UC pending   VS/O2: VSS RA  Diet: mod carb  Bowel/Bladder: Using urinal at bed side  Skin Concerns: R foot third toe scab, scatted brusing   Drains/Devices: PIV SL   Other Important Info: Patient is denying pain.

## 2024-12-02 NOTE — PROGRESS NOTES
Observation goals  PRIOR TO DISCHARGE        Comments: -diagnostic tests and consults completed and resulted: Not met  -vital signs normal or at patient baseline: Not met  -returns to baseline functional status: Not met  -safe disposition plan has been identified: Not met  Nurse to notify provider when observation goals have been met and patient is ready for discharge

## 2024-12-02 NOTE — H&P
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 No Loc/Head trauma.  At [facility], denies any pain, but during road test, failed to ambulate given acute RLE weakness (note patient has baseline LLE weakness following a fall 6/2024).   --Vitals/Exam: /74   Pulse 88   Temp 97.8  F (36.6  C) (Oral)   Resp 16   Ht 1.702 m (5' 7\")   Wt 81.6 kg (180 lb)   SpO2 97%   BMI 28.19 kg/m   4/5 extension of b/l LE, 4/5 flexion of RLE, 0/5 flexion of LLE.   --ER Course: N/A    Mechanical fall w/ failed road test, suspected R knee sprain  Chronic back pain w/ Sciatica  Cervical stenosis w/ myelopathy, neuropathy  Degenerative joint disease  --CTH negative, R knee has Moderate patellofemoral compartment degenerative change with large marginal osteophytes. Small knee effusion . R foot has degenerative changes  --resumed gabapentin  --standing tylenol, PRN oxycodone  --PT OT, fall precautions    COPD/CIERA: duonebs prn. 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 No Loc/Head trauma.  At [facility], denies any pain, but during road test, failed to ambulate given acute RLE weakness (note patient has baseline LLE weakness following a fall 6/2024).     ROS: 10 point ROS neg other than the symptoms noted above in the HPI.     Objective History  /74   Pulse 88   Temp 97.8  F (36.6  C) (Oral)   Resp 16   Ht 1.702 m (5' 7\")   Wt 81.6 kg (180 lb)   SpO2 97%   BMI 28.19 kg/m      Constitutional: Alert and oriented to person, place and time; no apparent distress.   HEENT: Normocephalic, no scleral icterus  Abdomen: soft, no distention/tenderness/guarding  Lungs: lungs clear to auscultation bilaterally  Heart: Regular s1s2, no evidence of murmurs  Neuro:4/5 extension of b/l LE, 4/5 flexion of RlE, 0/5 flexion of LLE  Skin/Extremities: R 3rd toe bruising w/ blister, trace LE edema  Psychiatric: appropriate affect, insight and judgment  Back: No midline tenderness, no CVA tenderness    I have personally spent 75 minutes total time today in preparing to see the patient (eg, review of tests), obtaining and/or reviewing separately obtained history, performing a medically appropriate examination and/or evaluation, counseling and educating the patient/family/caregiver, ordering medications, tests, or procedures, referring and communicating with other health care professionals, documenting clinical information in the electronic or other health record, independently interpreting results and communicating results to the patient/ family/caregiver and care coordination.      "

## 2024-12-02 NOTE — PROGRESS NOTES
diagnostic tests and consults completed and resulted not met, OT consult  and UA results pending, PT to re-eval tomorrow  -vital signs normal or at patient baseline met  -returns to baseline functional status not met AX 1-2 with SS  -safe disposition plan has been identified not met, TCU recommended

## 2024-12-02 NOTE — PROGRESS NOTES
PRIMARY Concern: fall,  Suspected right knee sprain  SAFETY RISK Concerns (fall risk, behaviors, etc.): fall risk      Aggression Tool Color: green  Isolation/Type: NA  Tests/Procedures for NEXT shift: continue BG checks  Consults? (Pending/following, signed-off?) OT pending, PT to re-eval tomorrow with family/spouse, please coordinate this. Family/ SO is planning to be here at 1300 PM tomorrow.  Where is patient from? (Home, TCU, etc.): Home  Other Important info for NEXT shift: Anticipated DC date & active delays: TBD  _____________________________________________________________________________  SUMMARY NOTE:   Orientation: A/O x 4  Observation Goals (met & not met): Not Met   Activity Level: Up with AX1 with Deb Segundo, hxt of dragging his LLE  Fall Risk: YES  Pain Management: mild generalized aches, on scheduled tylenol  ABNL VS/O2: VSS RA,   ABNL labs: WBC up at 12.2, UA looks +.  Diet: mod carb  Bowel/Bladder: Using urinal at bed side  Skin Concerns: R 3 rd toe blister, Mepi on coccyx for precaution  Drains/Devices: No IV  Pt stated goal for today:  Other Important Info: Patient is denying pain,

## 2024-12-02 NOTE — PHARMACY-ADMISSION MEDICATION HISTORY
Pharmacist Admission Medication History    Admission medication history is complete. The information provided in this note is only as accurate as the sources available at the time of the update.    Information Source(s): Family member and CareEverywhere/SureScripts via in-person    Pertinent Information: family member at bedside manages patient's medications, patient admits to being poor historian.   Confirmed with family metoprolol had recently been replaced with amlodipine outpatient in clinic.   Apixaban has fill history for both 5 mg and 2.5 mg tablets, family confirms taking 2.5 mg dose BID.     Changes made to PTA medication list:  Added: None  Deleted: duplicate amlodipine, marked gabapentin 300 mg BID not taking (is taking separately ordered dose for 300 mg AM/600 mg PM), medrol dosepak (Rx from 8/23/24, family confirms completed)   Changed: clarified APAP order (previously reported as patient taking differently)    Allergies reviewed with patient and updates made in EHR: yes    Medication History Completed By: Ariadna Whitfield [facility] 12/1/2024 6:28 PM    PTA Med List   Medication Sig Note Last Dose/Taking    acetaminophen (TYLENOL) 500 MG tablet Take 1,000 mg by mouth 2 times daily.  12/1/2024 Morning    albuterol (VENTOLIN HFA) 108 (90 Base) MCG/ACT inhaler INHALE 2 PUFFS INTO THE LUNGS EVERY 6 HOURS AS NEEDED FOR SHORTNESS OF BREATH / DYSPNEA OR WHEEZING  Unknown    amLODIPine (NORVASC) 5 MG tablet Take 1 tablet (5 mg) by mouth daily.  12/1/2024 Morning    apixaban ANTICOAGULANT (ELIQUIS) 2.5 MG tablet Take 1 tablet (2.5 mg) by mouth 2 times daily.  12/1/2024 Morning    atorvastatin (LIPITOR) 20 MG tablet Take 1 tablet (20 mg) by mouth daily.  11/30/2024 Bedtime    calcium carbonate (TUMS) 500 MG chewable tablet Take 1 chew tab by mouth daily as needed for heartburn. 12/1/2024: No recent use Unknown    cholestyramine light (QUESTRAN) 4 GM packet Take 1 packet (4 g) by mouth 2 times daily (Patient taking  differently: Take 4 g by mouth daily.)  Past Week    Coenzyme Q10 400 MG CAPS Take 400 mg by mouth daily  More than a month    ferrous sulfate (FE TABS) 325 (65 Fe) MG EC tablet Take 1 tablet (325 mg) by mouth every evening  11/30/2024 Evening    finasteride (PROSCAR) 5 MG tablet Take 1 tablet (5 mg) by mouth daily  12/1/2024 Morning    Fluticasone-Umeclidin-Vilant (TRELEGY ELLIPTA) 100-62.5-25 MCG/ACT oral inhaler Inhale 1 puff into the lungs daily.  12/1/2024 Morning    furosemide (LASIX) 20 MG tablet Take 2 tablets (40 mg) by mouth daily  12/1/2024 Morning    gabapentin (NEURONTIN) 300 MG capsule Take 1 capsule (300 mg) by mouth every morning  11/30/2024 Morning    gabapentin (NEURONTIN) 300 MG capsule Take 2 capsules (600 mg) by mouth at bedtime  11/30/2024 Bedtime    lanreotide acetate (SOMATULINE) 120 MG/0.5ML injection Inject 120 mg subcutaneously every 28 days. Do not start before September 6, 2024.  11/1/2024    lisinopril (ZESTRIL) 40 MG tablet TAKE 1 TABLET DAILY  12/1/2024 Morning    metFORMIN (GLUCOPHAGE) 1000 MG tablet Take 1 tablet (1,000 mg) by mouth 2 times daily (with meals)  12/1/2024 Morning    miconazole (MICATIN) 2 % external cream Apply topically 2 times daily as needed for other (Rash/ skin irritation)  Unknown    vitamin B-Complex Take 1 tablet by mouth daily  12/1/2024 Morning    vitamin C (ASCORBIC ACID) 500 MG tablet Take 500 mg by mouth daily.  11/30/2024 Bedtime

## 2024-12-03 ENCOUNTER — PATIENT OUTREACH (OUTPATIENT)
Dept: CARE COORDINATION | Facility: CLINIC | Age: 79
End: 2024-12-03
Payer: COMMERCIAL

## 2024-12-03 LAB
GLUCOSE BLDC GLUCOMTR-MCNC: 122 MG/DL (ref 70–99)
GLUCOSE BLDC GLUCOMTR-MCNC: 156 MG/DL (ref 70–99)
GLUCOSE BLDC GLUCOMTR-MCNC: 180 MG/DL (ref 70–99)
GLUCOSE BLDC GLUCOMTR-MCNC: 191 MG/DL (ref 70–99)
GLUCOSE BLDC GLUCOMTR-MCNC: 222 MG/DL (ref 70–99)
GLUCOSE BLDC GLUCOMTR-MCNC: 265 MG/DL (ref 70–99)

## 2024-12-03 PROCEDURE — G0378 HOSPITAL OBSERVATION PER HR: HCPCS

## 2024-12-03 PROCEDURE — 999N000127 HC STATISTIC PERIPHERAL IV START W US GUIDANCE

## 2024-12-03 PROCEDURE — 97116 GAIT TRAINING THERAPY: CPT | Mod: GP

## 2024-12-03 PROCEDURE — 82962 GLUCOSE BLOOD TEST: CPT

## 2024-12-03 PROCEDURE — G0463 HOSPITAL OUTPT CLINIC VISIT: HCPCS

## 2024-12-03 PROCEDURE — 97530 THERAPEUTIC ACTIVITIES: CPT | Mod: GP

## 2024-12-03 PROCEDURE — 250N000013 HC RX MED GY IP 250 OP 250 PS 637: Performed by: PHYSICIAN ASSISTANT

## 2024-12-03 PROCEDURE — 250N000013 HC RX MED GY IP 250 OP 250 PS 637: Performed by: INTERNAL MEDICINE

## 2024-12-03 PROCEDURE — 250N000013 HC RX MED GY IP 250 OP 250 PS 637: Performed by: STUDENT IN AN ORGANIZED HEALTH CARE EDUCATION/TRAINING PROGRAM

## 2024-12-03 PROCEDURE — 99232 SBSQ HOSP IP/OBS MODERATE 35: CPT | Performed by: PHYSICIAN ASSISTANT

## 2024-12-03 RX ORDER — CEFUROXIME AXETIL 500 MG/1
500 TABLET ORAL EVERY 12 HOURS SCHEDULED
Status: DISCONTINUED | OUTPATIENT
Start: 2024-12-03 | End: 2024-12-04 | Stop reason: HOSPADM

## 2024-12-03 RX ORDER — CEFTRIAXONE 1 G/1
1 INJECTION, POWDER, FOR SOLUTION INTRAMUSCULAR; INTRAVENOUS EVERY 24 HOURS
Status: DISCONTINUED | OUTPATIENT
Start: 2024-12-03 | End: 2024-12-03

## 2024-12-03 RX ORDER — ACETAMINOPHEN 500 MG
500 TABLET ORAL ONCE
Status: COMPLETED | OUTPATIENT
Start: 2024-12-03 | End: 2024-12-03

## 2024-12-03 RX ADMIN — LISINOPRIL 40 MG: 40 TABLET ORAL at 08:34

## 2024-12-03 RX ADMIN — UMECLIDINIUM 1 PUFF: 62.5 AEROSOL, POWDER ORAL at 08:35

## 2024-12-03 RX ADMIN — ACETAMINOPHEN 975 MG: 325 TABLET, FILM COATED ORAL at 21:35

## 2024-12-03 RX ADMIN — ACETAMINOPHEN 975 MG: 325 TABLET, FILM COATED ORAL at 08:33

## 2024-12-03 RX ADMIN — FERROUS SULFATE TAB 325 MG (65 MG ELEMENTAL FE) 325 MG: 325 (65 FE) TAB at 21:35

## 2024-12-03 RX ADMIN — METFORMIN HYDROCHLORIDE 1000 MG: 500 TABLET ORAL at 08:35

## 2024-12-03 RX ADMIN — APIXABAN 2.5 MG: 2.5 TABLET, FILM COATED ORAL at 08:33

## 2024-12-03 RX ADMIN — CHOLESTYRAMINE 4 G: 4 POWDER, FOR SUSPENSION ORAL at 08:35

## 2024-12-03 RX ADMIN — ACETAMINOPHEN 500 MG: 500 TABLET, FILM COATED ORAL at 04:33

## 2024-12-03 RX ADMIN — INSULIN ASPART 1 UNITS: 100 INJECTION, SOLUTION INTRAVENOUS; SUBCUTANEOUS at 18:28

## 2024-12-03 RX ADMIN — GABAPENTIN 600 MG: 300 CAPSULE ORAL at 21:35

## 2024-12-03 RX ADMIN — GABAPENTIN 300 MG: 300 CAPSULE ORAL at 08:34

## 2024-12-03 RX ADMIN — FUROSEMIDE 40 MG: 40 TABLET ORAL at 08:34

## 2024-12-03 RX ADMIN — METFORMIN HYDROCHLORIDE 1000 MG: 500 TABLET ORAL at 18:28

## 2024-12-03 RX ADMIN — APIXABAN 2.5 MG: 2.5 TABLET, FILM COATED ORAL at 21:35

## 2024-12-03 RX ADMIN — ACETAMINOPHEN 975 MG: 325 TABLET, FILM COATED ORAL at 15:59

## 2024-12-03 RX ADMIN — ATORVASTATIN CALCIUM 20 MG: 20 TABLET, FILM COATED ORAL at 21:35

## 2024-12-03 RX ADMIN — CEFUROXIME AXETIL 500 MG: 500 TABLET ORAL at 11:20

## 2024-12-03 RX ADMIN — FINASTERIDE 5 MG: 5 TABLET, FILM COATED ORAL at 08:35

## 2024-12-03 RX ADMIN — AMLODIPINE BESYLATE 5 MG: 5 TABLET ORAL at 08:34

## 2024-12-03 RX ADMIN — INSULIN ASPART 3 UNITS: 100 INJECTION, SOLUTION INTRAVENOUS; SUBCUTANEOUS at 09:38

## 2024-12-03 RX ADMIN — CEFUROXIME AXETIL 500 MG: 500 TABLET ORAL at 21:35

## 2024-12-03 RX ADMIN — FLUTICASONE FUROATE AND VILANTEROL TRIFENATATE 1 PUFF: 100; 25 POWDER RESPIRATORY (INHALATION) at 08:35

## 2024-12-03 ASSESSMENT — ACTIVITIES OF DAILY LIVING (ADL)
ADLS_ACUITY_SCORE: 49
ADLS_ACUITY_SCORE: 49
ADLS_ACUITY_SCORE: 48
ADLS_ACUITY_SCORE: 49
ADLS_ACUITY_SCORE: 45
ADLS_ACUITY_SCORE: 45
ADLS_ACUITY_SCORE: 48
ADLS_ACUITY_SCORE: 49
ADLS_ACUITY_SCORE: 49
ADLS_ACUITY_SCORE: 48
ADLS_ACUITY_SCORE: 49
ADLS_ACUITY_SCORE: 45
ADLS_ACUITY_SCORE: 49
ADLS_ACUITY_SCORE: 48
ADLS_ACUITY_SCORE: 49
ADLS_ACUITY_SCORE: 45
ADLS_ACUITY_SCORE: 45

## 2024-12-03 NOTE — CONSULTS
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 No Loc/Head trauma.  At [facility], denies any pain, but during road test, failed to ambulate given acute RLE weakness (note patient has baseline LLE weakness following a fall 6/2024).\"     Assessment:      Areas visualized during today's visit: Focused:    Wound location: Right third toe          Last photo: 12/3  Wound due to: Trauma  Wound history/plan of care: Intact serous and blood filled blister is almost circumferential, but does not extend to the lateral side of the toe. Patient denies pain. No drainage or redness. Base of blister is mostly pink and can be visualized by gently moving the fluid around within the blister. Left open to air.    Treatment Plan:     No new orders necessary. Woc signing off.    RECOMMEND PRIMARY TEAM ORDER: None, at this time  Education provided: plan of care  Discussed plan of care with: Patient  WOC nurse follow-up plan: signing off  Notify WOC if wound(s) deteriorate.  Nursing to notify the Provider(s) and re-consult the WOC Nurse if new skin concern.    DATA:     Current support surface: Standard  Standard gel mattress (Isoflex)  Containment of urine/stool: Continent of bladder and Continent of bowel  BMI: Body mass index is 28.19 kg/m .   Active diet order: Orders Placed This " Encounter      Moderate Consistent Carb (60 g CHO per Meal) Diet       Output: I/O last 3 completed shifts:  In: 960 [P.O.:960]  Out: 1000 [Urine:1000]     Labs:   Recent Labs   Lab 12/02/24  0657   ALBUMIN 3.6   HGB 11.5*   WBC 12.2*   A1C 7.0*     Pressure injury risk assessment:   Sensory Perception: 3-->slightly limited  Moisture: 3-->occasionally moist  Activity: 2-->chairfast  Mobility: 2-->very limited  Nutrition: 2-->probably inadequate  Friction and Shear: 2-->potential problem  Deuce Score: 14    Betina Greene RN CWCN  Pager no longer is use, please contact through [employer] group: NANY Nurse Kavitha

## 2024-12-03 NOTE — PROGRESS NOTES
Observation goals  PRIOR TO DISCHARGE       Comments:   -diagnostic tests and consults completed and resulted  Not met  -vital signs normal or at patient baseline  Met  -returns to baseline functional status  Not met  -safe disposition plan has been identified  Not met  Nurse to notify provider when observation goals have been met and patient is ready for discharge.         Oscar Phillip(Resident)

## 2024-12-03 NOTE — PROGRESS NOTES
Care Management Follow Up    Length of Stay (days): 0    Expected Discharge Date: 12/04/2024     Concerns to be Addressed:       Patient plan of care discussed at interdisciplinary rounds: Yes    Anticipated Discharge Disposition: Home, Home Care, Transitional Care              Anticipated Discharge Services: Home Care  Anticipated Discharge DME:      Patient/family educated on Medicare website which has current facility and service quality ratings: yes  Education Provided on the Discharge Plan:    Patient/Family in Agreement with the Plan: unable to assess    Referrals Placed by CM/SW:    Private pay costs discussed: Not applicable    Discussed  Partnership in Safe Discharge Planning  document with patient/family: No     Handoff Completed: No, handoff not indicated or clinically appropriate    Additional Information:  Writer and physician assistant met with patient at bedside. Physician assistant went over reasoning for continuation of therapy. Writer provided patient with a list of TCU facilities to choose from. Patient had a referral sent to [facility]. Patient is considering this as a TCU discharge option.  will continue to follow though the discharge process.    Next Steps: TCU placement     Sherry SAVAGE  [facility]  Inpatient Care Coordination

## 2024-12-03 NOTE — PROGRESS NOTES
Clinic Care Coordination Contact    Situation: Patient chart reviewed by care coordinator.    Background: Care Coordination initial assessment and enrollment to Care Coordination was 9/24/2020. Patient centered goal(s) developed with participation from patient.  RN CC handed patient off to CHW for continued outreach every 30 days. Patient is due for an updated complex care plan. Annual Assessment will be due April 2025.    Assessment: patient currently inpatient.     Plan/Recommendations: CHW CC will route request to RNCC to review for disenrollment per standard work if next outreach attempt is unsuccessful. RNCC will extend and complete clinical review after CHW CC has made contact with patient. RNCC will remain available as needed. Will monitor for hospital discharge.     Kelli Davidson RN Clinic Care Coordinator  [facility] Clinics: [place], [place] (on-site Wednesdays), [facility] (on-site Thursdays) & [facility].  [email or web address]  Phone: [phone]

## 2024-12-03 NOTE — PROGRESS NOTES
Care Management Follow Up    Length of Stay (days): 0    Expected Discharge Date: 12/04/2024     Concerns to be Addressed:       Patient plan of care discussed at interdisciplinary rounds: Yes    Anticipated Discharge Disposition: Home, Home Care, Transitional Care              Anticipated Discharge Services: Home Care  Anticipated Discharge DME:      Patient/family educated on Medicare website which has current facility and service quality ratings: yes  Education Provided on the Discharge Plan:    Patient/Family in Agreement with the Plan: unable to assess    Referrals Placed by CM/SW:    Private pay costs discussed: Not applicable    Discussed  Partnership in Safe Discharge Planning  document with patient/family: No     Handoff Completed: No, handoff not indicated or clinically appropriate    Additional Information:  Referrals were sent to the [facility] for placement.     Next Steps: TCU placement    Sherry SAVAGE  [facility] Care Coordination

## 2024-12-03 NOTE — UTILIZATION REVIEW
Concurrent stay review; Secondary Review Determination - [facility]        Under the authority of the Utilization Management Committee, the utilization review process indicated a secondary review on the above patient.  The review outcome is based on review of the medical records, discussions with staff, and applying clinical experience noted on the date of the review.        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  Patient now has acute right-sided lower extremity weakness with probable right knee contusion from fall.  Patient already had some left leg weakness from a previous fall this past summer.  No acute medical/surgical treatment necessary at this time, but patient too weak to return home and is in the hospital awaiting placement.    Patient delayed discharge is related to disposition, there is no medical necessity for inpatient admission at the time of this review. If there is a change in patient status, please resend for review.    The information on this document is developed by the utilization review team in order for the business office to ensure compliance.  This only denotes the appropriateness of proper admission status and does not reflect the quality of care rendered.       The definitions of Inpatient Status and Observation Status used in making the determination above are those provided in the CMS Coverage Manual, Chapter 1 and Chapter 6, section 70.4.       Sincerely,    Marcos Broussard MD, MD

## 2024-12-03 NOTE — PLAN OF CARE
PRIMARY Concern: fall,  Suspected right knee sprain  SAFETY RISK Concerns (fall risk, behaviors, etc.): fall risk      Aggression Tool Color: green  Isolation/Type: NA  Tests/Procedures for NEXT shift: PT to eval Pt family to see if able to care for Pt at home.   Consults? (Pending/following, signed-off?) PT following. WOC signed off.   Where is patient from? (Home, TCU, etc.): Home  Other Important info for NEXT shift: Patient was unable to tolerate walking on stairs, so patient is considering TCU.  Anticipated DC date & active delays: Pending TCU placement  _________________________________________________  SUMMARY NOTE:  Orientation: A/O x 4  Observation Goals (met & not met): Not Met   Mobility Level/Assist Equipment: A1 GB walker pivot.    Antibiotics & Plan (IV/po, length of tx left): oral ceftin for UTI  Pain Management: denies pain   ABNL Lab/BG: A1C 7, , 122, 222, 180. UA positive, UC pending   VS/O2: VSS RA  Diet: mod carb  Bowel/Bladder: Using urinal at bed side  Skin Concerns: R foot third toe scab, scatted brusing   Drains/Devices: PIV SL   Other Important Info: Patient is denying pain.

## 2024-12-03 NOTE — PROGRESS NOTES
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 No Loc/Head trauma.  At [facility], denies any pain, but during road test, failed to ambulate given acute RLE weakness (note patient has baseline LLE weakness following a fall 6/2024). He reports planned cervical fusion in February 2025.   *CTH negative  *XR R knee has Moderate patellofemoral compartment degenerative change with large marginal osteophytes. Small knee effusion . R foot has degenerative changes  --continue gabapentin  --standing tylenol, PRN oxycodone  --PT OT, fall precautions- recommending TCU. Pt very reluctant. Reports mobility improved from yesterday but still unable to do stairs. Still awaiting TCU bed and patient willing to consider. OT to follow as well     UTI  UA grossly abnormal with positive nitrites, 77 WBC, large LE.   WBC 12.2. pt afebrile without evidence of sepsis   --start Ceftin daily   --follow UC      COPD/CIERA: duonebs prn. Not compliant w/ CPAP  Hx PE on AC: resumed eliquis  T2D: A1C is 7. 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  Diet: Moderate Consistent Carb (60 g CHO per Meal) Diet    DVT Prophylaxis: DOAC  Gomez Catheter: Not present  Lines: None     Cardiac Monitoring: None  Code Status: No CPR- Do NOT Intubate      Clinically Significant Risk Factors Present on Admission           # Hypocalcemia: Lowest Ca = 8.6 mg/dL in last 2 days, will monitor and replace as appropriate      # Drug Induced Coagulation Defect: home medication list includes an anticoagulant medication    # Hypertension: Noted on problem list          # DMII: A1C = 7.0 % (Ref range: <5.7 %) within past 6 months    # Overweight: Estimated body mass index is 28.19 kg/m  as calculated from the following:    Height as of this encounter: 1.702 m (5' 7\").    Weight as of this encounter: 81.6 kg (180 lb).              Social Drivers of Health    Tobacco Use: Medium Risk (11/20/2024)    Received from [facility]    Patient History     Smoking Tobacco Use: Former     Smokeless Tobacco Use: Never     Passive Exposure: Past   Physical Activity: Insufficiently Active (9/2/2024)    Exercise Vital Sign     Days of Exercise per Week: 2 days     Minutes of Exercise per Session: 20 min   Social Connections: Unknown (9/2/2024)    Social Connection and Isolation Panel [NHANES]     Frequency of Social Gatherings with Friends and Family: Once a week          Disposition Plan     Medically Ready for Discharge: Anticipated Tomorrow           The patient's care was discussed with Dr. Santos who agrees with the above plan     Savannah Villasenor PA-C  Hospitalist Service  [facility] " [facility]  Securely message with [employer]RAY[employer] (more info)  Text page via [facility] Paging/Directory   ______________________________________________________________________    Interval History   Patient reports mobility improved today but still was not able to do stairs. Having some mild discomfort R knee but not significant pain. No CP, SOB, nausea.   Very reluctant to go to TCU as he has had bad experiences in the past but willing consider as he understands he will have difficulty at home.     Physical Exam   Temp: 97.8  F (36.6  C) Temp src: Oral BP: 115/67 Pulse: 75   Resp: 18 SpO2: 95 % O2 Device: None (Room air)    Vitals:    12/01/24 1425   Weight: 81.6 kg (180 lb)     Vital Signs with Ranges  Temp:  [97.5  F (36.4  C)-98.8  F (37.1  C)] 97.8  F (36.6  C)  Pulse:  [61-82] 75  Resp:  [18] 18  BP: (115-143)/(63-78) 115/67  SpO2:  [94 %-97 %] 95 %  I/O last 3 completed shifts:  In: 480 [P.O.:480]  Out: 600 [Urine:600]    Constitutional: Alert and oriented, sitting up in chair.. Appears comfortable and is appropriately conversant   ENT:  moist mucous membranes  Respiratory: Lungs clear to auscultation bilaterally, no increased work of breathing  Cardiovascular: Regular rate and rhythm, trace pedal edema   GI:  active bowel sounds, abdomen soft, non-tender  Skin/Integumen: third R toe bruised  MSK: dorsi and plantar flexion 5/5 R and 4/5 L. Flexion LLE 0/5 and 4/5 on right.  strength 4/5 bilaterally, can raise both arms. No tenderness to palpation R knee, minimal effusion               Medical Decision Making       45 MINUTES SPENT BY ME on the date of service doing chart review, history, exam, documentation & further activities per the note.      Data         Imaging results reviewed over the past 24 hrs:   No results found for this or any previous visit (from the past 24 hours).

## 2024-12-03 NOTE — PLAN OF CARE
PRIMARY Concern: fall,  Suspected right knee sprain  SAFETY RISK Concerns (fall risk, behaviors, etc.): fall risk      Aggression Tool Color: green  Isolation/Type: NA  Tests/Procedures for NEXT shift: PT to eval Pt family to see if able to care for Pt at home.   Consults? (Pending/following, signed-off?) PT following. OT and WOC RN consults pending.   Where is patient from? (Home, TCU, etc.): Home  Other Important info for NEXT shift: Anticipated DC date & active delays: TBD  _________________________________________________  SUMMARY NOTE:  Orientation: A/O x 4  Observation Goals (met & not met): Not Met   Mobility Level/Assist Equipment: A1 SaraSteady   Antibiotics & Plan (IV/po, length of tx left): none   Pain Management: denies pain   ABNL Lab/BG: A1C 7, UA positive, UC pending   VS/O2: VSS RA  Diet: mod carb  Bowel/Bladder: Using urinal at bed side  Skin Concerns: R foot third toe scab, scatted brusing   Drains/Devices: PIV SL   Other Important Info: Patient is denying pain.              Observation goals  PRIOR TO DISCHARGE       Comments:   -diagnostic tests and consults completed and resulted  Not met  -vital signs normal or at patient baseline  Met  -returns to baseline functional status  Not met  -safe disposition plan has been identified  Not met  Nurse to notify provider when observation goals have been met and patient is ready for discharge.

## 2024-12-04 VITALS
BODY MASS INDEX: 28.25 KG/M2 | OXYGEN SATURATION: 95 % | TEMPERATURE: 97.5 F | RESPIRATION RATE: 18 BRPM | SYSTOLIC BLOOD PRESSURE: 131 MMHG | DIASTOLIC BLOOD PRESSURE: 70 MMHG | HEIGHT: 67 IN | HEART RATE: 85 BPM | WEIGHT: 180 LBS

## 2024-12-04 LAB
BACTERIA UR CULT: NORMAL
GLUCOSE BLDC GLUCOMTR-MCNC: 172 MG/DL (ref 70–99)
GLUCOSE BLDC GLUCOMTR-MCNC: 173 MG/DL (ref 70–99)
GLUCOSE BLDC GLUCOMTR-MCNC: 189 MG/DL (ref 70–99)

## 2024-12-04 PROCEDURE — 97530 THERAPEUTIC ACTIVITIES: CPT | Mod: GP

## 2024-12-04 PROCEDURE — 99239 HOSP IP/OBS DSCHRG MGMT >30: CPT | Performed by: PHYSICIAN ASSISTANT

## 2024-12-04 PROCEDURE — 97530 THERAPEUTIC ACTIVITIES: CPT | Mod: GO

## 2024-12-04 PROCEDURE — 250N000013 HC RX MED GY IP 250 OP 250 PS 637: Performed by: STUDENT IN AN ORGANIZED HEALTH CARE EDUCATION/TRAINING PROGRAM

## 2024-12-04 PROCEDURE — 97165 OT EVAL LOW COMPLEX 30 MIN: CPT | Mod: GO

## 2024-12-04 PROCEDURE — 250N000013 HC RX MED GY IP 250 OP 250 PS 637: Performed by: PHYSICIAN ASSISTANT

## 2024-12-04 PROCEDURE — G0378 HOSPITAL OBSERVATION PER HR: HCPCS

## 2024-12-04 PROCEDURE — 82962 GLUCOSE BLOOD TEST: CPT

## 2024-12-04 PROCEDURE — 97116 GAIT TRAINING THERAPY: CPT | Mod: GP

## 2024-12-04 RX ORDER — CHOLESTYRAMINE LIGHT 4 G/5.7G
4 POWDER, FOR SUSPENSION ORAL DAILY
COMMUNITY
Start: 2024-12-04

## 2024-12-04 RX ADMIN — LISINOPRIL 40 MG: 40 TABLET ORAL at 08:41

## 2024-12-04 RX ADMIN — CEFUROXIME AXETIL 500 MG: 500 TABLET ORAL at 08:42

## 2024-12-04 RX ADMIN — UMECLIDINIUM 1 PUFF: 62.5 AEROSOL, POWDER ORAL at 08:42

## 2024-12-04 RX ADMIN — CHOLESTYRAMINE 4 G: 4 POWDER, FOR SUSPENSION ORAL at 08:40

## 2024-12-04 RX ADMIN — INSULIN ASPART 1 UNITS: 100 INJECTION, SOLUTION INTRAVENOUS; SUBCUTANEOUS at 08:50

## 2024-12-04 RX ADMIN — ACETAMINOPHEN 975 MG: 325 TABLET, FILM COATED ORAL at 08:41

## 2024-12-04 RX ADMIN — FUROSEMIDE 40 MG: 40 TABLET ORAL at 08:42

## 2024-12-04 RX ADMIN — AMLODIPINE BESYLATE 5 MG: 5 TABLET ORAL at 08:41

## 2024-12-04 RX ADMIN — FLUTICASONE FUROATE AND VILANTEROL TRIFENATATE 1 PUFF: 100; 25 POWDER RESPIRATORY (INHALATION) at 08:42

## 2024-12-04 RX ADMIN — INSULIN ASPART 1 UNITS: 100 INJECTION, SOLUTION INTRAVENOUS; SUBCUTANEOUS at 12:20

## 2024-12-04 RX ADMIN — APIXABAN 2.5 MG: 2.5 TABLET, FILM COATED ORAL at 08:42

## 2024-12-04 RX ADMIN — FINASTERIDE 5 MG: 5 TABLET, FILM COATED ORAL at 08:42

## 2024-12-04 RX ADMIN — METFORMIN HYDROCHLORIDE 1000 MG: 500 TABLET ORAL at 08:41

## 2024-12-04 RX ADMIN — GABAPENTIN 300 MG: 300 CAPSULE ORAL at 08:42

## 2024-12-04 ASSESSMENT — ACTIVITIES OF DAILY LIVING (ADL)
ADLS_ACUITY_SCORE: 49
ADLS_ACUITY_SCORE: 48
ADLS_ACUITY_SCORE: 48
ADLS_ACUITY_SCORE: 49
ADLS_ACUITY_SCORE: 48

## 2024-12-04 NOTE — PROGRESS NOTES
Observation goals  PRIOR TO DISCHARGE       Comments:   -diagnostic tests and consults completed and resulted  Progressing  -vital signs normal or at patient baseline  Met  -returns to baseline functional status  Progressing   -safe disposition plan has been identified  Not met  Nurse to notify provider when observation goals have been met and patient is ready for discharge.

## 2024-12-04 NOTE — PLAN OF CARE
PRIMARY Concern: fall,  Suspected right knee sprain  SAFETY RISK Concerns (fall risk, behaviors, etc.): fall risk      Aggression Tool Color: green  Isolation/Type: NA  Tests/Procedures for NEXT shift: PT to eval Pt family to see if able to care for Pt at home.   Consults? (Pending/following, signed-off?) PT following. WOC signed off.   Where is patient from? (Home, TCU, etc.): Home  Other Important info for NEXT shift: Patient was unable to tolerate walking on stairs, so patient is considering TCU.  Anticipated DC date & active delays: Pending TCU placement  _________________________________________________  SUMMARY NOTE:  Orientation: A/O x 4  Observation Goals (met & not met): Not Met   Mobility Level/Assist Equipment: A1 GB walker pivot.    Antibiotics & Plan (IV/po, length of tx left): oral ceftin for UTI  Pain Management: denies pain   ABNL Lab/BG: See chart, UC pending   VS/O2: VSS RA  Diet: mod carb  Bowel/Bladder: Using urinal at bed side  Skin Concerns: R foot third toe scab, scatted brusing   Drains/Devices: PIV SL   Other Important Info: Patient is denying pain.         Observation goals  PRIOR TO DISCHARGE       Comments:   -diagnostic tests and consults completed and resulted  Not met  -vital signs normal or at patient baseline  Met  -returns to baseline functional status  Not met  -safe disposition plan has been identified  Not met  Nurse to notify provider when observation goals have been met and patient is ready for discharge.

## 2024-12-04 NOTE — PROGRESS NOTES
"   12/04/24 1100   Appointment Info   Signing Clinician's Name / Credentials (OT) Tonja Cordero, OTR/L   Rehab Comments (OT) Initial OT Eval   Quick Adds   Quick Adds Certification   Living Environment   People in Home significant other   Current Living Arrangements house   Home Accessibility stairs to enter home   Number of Stairs, Main Entrance 3   Stair Railings, Main Entrance railings safe and in good condition   Transportation Anticipated family or friend will provide   Living Environment Comments Pt lives in house with girlfriend, 3 YOMI with railing both sides (can reach for one at a time). walk in tub. toilet \"that fits me\"   Self-Care   Usual Activity Tolerance moderate   Current Activity Tolerance poor   Equipment Currently Used at Home walker, rolling;walker, standard;other (see comments)   Fall history within last six months yes   Number of times patient has fallen within last six months 6   Activity/Exercise/Self-Care Comment IND with functional mobility, using FWW or 4WW for ambulation. Transport chair for longer distances. Pt reports hx of L LE weakness since last hospitalization (6/2024) and L leg \"drags\" as he walks. He was in TCF, then back home, reports feeling that therapy was little to no help. 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 reports he has a fall pendan   Visual Perception   Visual Impairment/Limitations WFL   Sensory   Sensory Quick Adds sensation intact   Pain Assessment   Patient Currently in Pain Yes, see Vital Sign flowsheet   Posture   Posture forward head position;protracted shoulders   Range of Motion Comprehensive   General Range of Motion no range of motion deficits identified   Strength Comprehensive (MMT)   Comment, General Manual Muscle Testing (MMT) Assessment B LUE ROM deficits, defer to PT   Muscle Tone Assessment   Muscle Tone Quick Adds No deficits were identified   Coordination   Upper Extremity Coordination No deficits were identified   Bed Mobility   Bed Mobility supine-sit   Supine-Sit [place] (Bed Mobility) minimum assist (75% patient effort)   Transfers   Transfers sit-stand transfer   Sit-Stand Transfer   Sit-Stand [place] (Transfers) contact guard   Activities of Daily Living   BADL Assessment/Intervention upper body dressing;lower body dressing;toileting   Upper Body Dressing Assessment/Training   [place] Level (Upper Body Dressing) minimum assist (75% patient effort)   Lower Body Dressing Assessment/Training   [place] Level (Lower Body Dressing) minimum assist (75% patient effort)   Toileting   [place] Level (Toileting) minimum assist (75% patient effort)   Clinical Impression   Criteria for Skilled Therapeutic Interventions Met (OT) Yes, treatment indicated   OT Diagnosis decreased function in ADL   OT Problem List-Impairments impacting ADL problems related to;activity tolerance impaired;cognition;motor control;range  of motion (ROM);strength;pain;coordination;post-surgical precautions   Assessment of Occupational Performance 5 or more Performance Deficits   Identified Performance Deficits all ADL and mobility   Planned Therapy Interventions (OT) ADL retraining;IADL retraining;ROM;strengthening;progressive activity/exercise;cognition   Clinical Decision Making Complexity (OT) problem focused assessment/low complexity   Risk & Benefits of therapy have been explained evaluation/treatment results reviewed;care plan/treatment goals reviewed;risks/benefits reviewed;participants voiced agreement with care plan;current/potential barriers reviewed;patient;participants included   Clinical Impression Comments decreased function in ADL warrants skilled therapy   OT Total Evaluation Time   OT Eval, Low Complexity Minutes (56027) 11   Therapy Certification   Medical Diagnosis fall   Start of Care Date 12/04/24   Certification date from 12/04/24   Certification date to 12/18/24   OT Goals   Therapy Frequency (OT) 5 times/week   OT Predicted Duration/Target Date for Goal Attainment 12/18/24   OT Goals Upper Body Dressing;Lower Body Dressing;Toilet Transfer/Toileting;Transfers   OT: Upper Body Dressing Modified independent   OT: Lower Body Dressing Modified independent   OT: Transfer Modified independent  (simulated walk in tub)   OT: Toilet Transfer/Toileting Modified independent;toilet transfer;cleaning and garment management   Interventions   Interventions Quick Adds Therapeutic Activity   Therapeutic Activities   Therapeutic Activity Minutes (58731) 37   Symptoms noted during/after treatment fatigue   Treatment Detail/Skilled Intervention seen for OT tx session this date. pt reports he is always going to wear his call necklace now, even in the shower if it is water proof. educated on fall risk reduction techniques for home, home modifications. VC/ TC for get to EOB min A. amb in room and hallway with CGA then declining to min A with fatigue.  cues for turns with walker, FWW mgmt and safety. end of session had extended conversation with pt and rebecca regarding home vs TCU. min A to boost in bed. alarm on   OT Discharge Planning   OT Plan LB dressing reacher. sim bathing transfers   OT Discharge Recommendation (DC Rec) Transitional Care Facility;home with home care occupational therapy;home with assist   OT Rationale for DC Rec pt is at risk for falls and admitted with fall. would benefit from TCU. pt does not want to go. stairs are barrier. would need home care if home and assist for bathing. likely would benefit from more supportive living given hx of falls and hospital visits   OT Brief overview of current status Goals of therapy will be to address safe mobility and make recs for d/c to next level of care. Pt and RN will continue to follow all falls risk precautions as documented by RN staff while hospitalized   Total Session Time   Timed Code Treatment Minutes 37   Total Session Time (sum of timed and untimed services) 48                                                                                 [facility]      OUTPATIENT OCCUPATIONAL THERAPY  EVALUATION  PLAN OF TREATMENT FOR OUTPATIENT REHABILITATION  (COMPLETE FOR INITIAL CLAIMS ONLY)  Patient's Last Name, First Name, M.I.  YOB: 1945  WilfredoNahun                          Provider's Name  [facility] Medical Record No.  [id number]                               Onset Date:  12/01/24   Start of Care Date:  12/04/24     Type:     ___PT   _X_OT   ___SLP Medical Diagnosis:  fall                        OT Diagnosis:  decreased function in ADL   Visits from SOC:  1   _________________________________________________________________________________  Plan of Treatment/Functional Goals    Planned Interventions: ADL retraining, IADL retraining, ROM, strengthening, progressive activity/exercise, cognition   Goals: See  Occupational Therapy Goals on Care Plan in [facility] electronic health record.    Therapy Frequency: 5 times/week  Predicted Duration of Therapy Intervention: 12/18/24  _________________________________________________________________________________    I CERTIFY THE NEED FOR THESE SERVICES FURNISHED UNDER        THIS PLAN OF TREATMENT AND WHILE UNDER MY CARE .             Physician Signature               Date    X_____________________________________________________                  Certification date from: 12/04/24, Certification date to: 12/18/24    Referring Physician: Asya Pacheco MD            Initial Assessment        See Occupational Therapy evaluation dated 12/04/24 in Epic electronic health record.

## 2024-12-04 NOTE — DISCHARGE SUMMARY
PRIMARY Concern: fall,  Suspected right knee sprain  SAFETY RISK Concerns (fall risk, behaviors, etc.): fall risk      Aggression Tool Color: green  Isolation/Type: NA  Tests/Procedures for NEXT shift: PT to eval Pt family to see if able to care for Pt at home.   Consults? (Pending/following, signed-off?) PT following. WOC signed off.   Where is patient from? (Home, TCU, etc.): Home  Other Important info for NEXT shift: Patient was unable to tolerate walking on stairs, so patient is considering TCU.  Anticipated DC date & active delays: 12/4/24  _________________________________________________  SUMMARY NOTE:  Orientation: A/O x 4  Observation Goals (met & not met): Met   Mobility Level/Assist Equipment: A1 GB walker pivot.    Antibiotics & Plan (IV/po, length of tx left): oral ceftin for UTI  Pain Management: denies pain   ABNL Lab/BG: See chart, UC pending   VS/O2: VSS RA  Diet: mod carb  Bowel/Bladder: Using urinal at bed side  Skin Concerns: R foot third toe scab, scatted brusing   Drains/Devices: PIV removed   Other Important Info: Patient is denying pain.      Patient cleared to discharge by hospitalist. PT said patient was able to walk up stairs. Patient refusing TCU. Patient accepted by home care. AVS reviewed with patient; all questions answered. PIV removed and changed into home clothing. All belongings gathered. Patient and his girlfriend were brought down to door 2.

## 2024-12-04 NOTE — DISCHARGE SUMMARY
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    Weight as of this encounter: 81.6 kg (180 lb).       Follow-ups Needed After Discharge   Follow-up Appointments       Follow-up and recommended labs and tests       Follow up with primary care provider in one week for hospital follow up. Video visit is ok. 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 No Loc/Head trauma.  At [facility], denies any pain, but during road test, failed to ambulate given acute RLE weakness (note patient has baseline LLE weakness following a fall 6/2024). He reports planned cervical fusion in February 2025.   *CTH negative  *XR R knee has Moderate patellofemoral compartment degenerative change with large marginal osteophytes. Small knee effusion . R foot has degenerative changes  --continue gabapentin  --standing tylenol  --PT OT, fall precautions- recommending TCU. However, patient progressed enough to be able to do stairs on 12/4 and elected to discharge home with home cares      Abnormal UA   UA grossly abnormal with positive nitrites, 77 WBC, large LE.   WBC 12.2. pt afebrile without evidence of sepsis   *uc ultimately grew >100,000 colonies mixed bruno   --stop abx 12/4     COPD/CIERA: duonebs prn. Not compliant w/ CPAP  Hx PE on AC: resumed eliquis  T2D: A1C is 7. 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       Savannah Villasenor PA-C  [facility] EXTENDED RECOVERY AND SHORT STAY  [address] [place] [id number]  Phone:  [phone]  ______________________________________________________________________    Physical Exam   Vital Signs: Temp: 97.5  F (36.4  C) Temp src: Oral BP: 131/70 Pulse: 85   Resp: 18 SpO2: 95 % O2 Device: None (Room air)    Weight: 180 lbs 0 oz    Constitutional: Alert and oriented, sitting up in chair.. Appears comfortable and is appropriately conversant   ENT:  moist mucous membranes  Respiratory: Lungs clear to auscultation bilaterally, no increased work of breathing  Cardiovascular: Regular rate and rhythm, trace pedal edema   GI:  active bowel sounds, abdomen soft, non-tender  Skin/Integumen: third R toe bruised  MSK: dorsi and plantar flexion 5/5 R and 4/5 L. Flexion LLE 0/5 and 4/5 on right.  strength 4/5 bilaterally, can raise both arms. No tenderness to palpation R knee, minimal effusion          Primary Care Physician   Olegario Castillo    Discharge Orders      Primary Care - Care Coordination Referral      Home Care Referral      Reason for your hospital stay    Follow up with primary care provider in one week for hospital follow up     Follow up with your neurosurgery team as previously planned for your neck     Activity    Your activity upon discharge: activity as tolerated     Follow-up and recommended labs and tests     Follow up with primary care provider in one week for hospital follow up. Video visit is ok. Recheck white blood cell count at that time via [facility]    Follow up with your neurosurgery team as previously planned for your neck     Ankle/Foot Bracing Supplies Order Post-op Shoe; Right    Bracing Documentation:   Patient requires the use of the ordered bracing device due to following medical need/condition: Right foot sprain.    I, the undersigned, certify that the above prescribed supplies are medically necessary for this patient and is both reasonable and necessary in reference to accepted standards of medical and necessary in reference to accepted standards of medical practice in  the treatment of this patient's condition and is not prescribed as a convenience.     Diet    Follow this diet upon discharge: Current Diet:Orders Placed This Encounter      Moderate Consistent Carb (60 g CHO per Meal) Diet       Significant Results and Procedures   Most Recent 3 CBC's:  Recent Labs   Lab Test 12/02/24  0657 07/15/24  0900 06/28/24  0833   WBC 12.2* 7.9 6.9   HGB 11.5* 11.4* 12.2*   MCV 99 103* 102*    314 257     Most Recent 3 BMP's:  Recent Labs   Lab Test 12/04/24  1155 12/04/24  0804 12/04/24  0155 12/02/24  0754 12/02/24  0657 07/07/24  1148 07/07/24  0733 06/28/24  1215 06/28/24  0833 06/27/24  1132 06/27/24  0907   NA  --   --   --   --  138  --   --   --  137  --  137   POTASSIUM  --   --   --   --  4.0  --   --   --  4.5  --  4.8   CHLORIDE  --   --   --   --  102  --   --   --  104  --  103   CO2  --   --   --   --  23  --   --   --  24  --  27   BUN  --   --   --   --  12.1  --   --   --  12.4  --  19.1   CR  --   --   --   --  0.98  --  0.99  --  1.00  --  1.22*   ANIONGAP  --   --   --   --  13  --   --   --  9  --  7   JESSE  --   --   --   --  8.6*  --   --   --  9.2  --  8.6*   * 172* 173*   < > 169*   < >  --    < > 155*   < > 156*    < > = values in this interval not displayed.     Most Recent 2 LFT's:  Recent Labs   Lab Test 12/02/24  0657 06/25/24  0737   AST 16 17   ALT 10 8   ALKPHOS 58 67   BILITOTAL 0.8 0.5     Most Recent 3 Troponin's:  Recent Labs   Lab Test 09/20/20  1441 08/18/18  1506 08/18/18  0929   TROPI <0.015 <0.015 <0.015     7-Day Micro Results       Collected Updated Procedure Result Status      12/02/2024 1421 12/04/2024 0542 Urine Culture [[id number]]    Urine, Clean Catch    Final result Component Value   Culture >100,000 CFU/mL Mixture of urogenital bruno                     Most Recent Urinalysis:  Recent Labs   Lab Test 12/02/24  1421 03/02/24  1901 01/23/23  1509   COLOR Yellow   < > Yellow   APPEARANCE Slightly Cloudy*   < > Clear   URINEGLC  Negative   < > Negative   URINEBILI Negative   < > Negative   URINEKETONE Negative   < > Negative   SG 1.019   < > 1.020   UBLD Negative   < > Large*   URINEPH 6.0   < > 5.5   PROTEIN 30*   < > Negative   UROBILINOGEN  --   --  0.2   NITRITE Positive*   < > Negative   LEUKEST Large*   < > Negative   RBCU 5*   < > 25-50*   WBCU 77*   < > None Seen    < > = values in this interval not displayed.   ,   Results for orders placed or performed during the hospital encounter of 12/01/24   Foot  XR, G/E 3 views, right    Narrative    EXAM: XR FOOT RIGHT G/E 3 VIEWS  LOCATION: [facility]  DATE: 12/1/2024    INDICATION: fall  COMPARISON: None.      Impression    IMPRESSION: No evidence of acute fracture. Chronic resorptive change or resection of the fifth proximal phalangeal head. Hallux valgus with moderate degenerative arthritis first MTP joint. Mild - moderate degenerative arthritis tibiotalar joint and   midfoot. Plantar and Achilles calcaneal spurs. Arterial calcifications.   CT Head w/o Contrast    Narrative    EXAM: CT HEAD W/O CONTRAST  LOCATION: [facility]  DATE: 12/1/2024    INDICATION: Fall. On Eliquis.  COMPARISON: Head CT 6/24/2024.  TECHNIQUE: Routine CT Head without IV contrast. Multiplanar reformats. Dose reduction techniques were used.    FINDINGS:  INTRACRANIAL CONTENTS: No intracranial hemorrhage, extraaxial collection, or mass effect. No CT evidence of acute infarct. Mild presumed chronic small vessel ischemic changes. Moderate generalized volume loss. No hydrocephalus.     VISUALIZED ORBITS/SINUSES/MASTOIDS: No intraorbital abnormality. No paranasal sinus mucosal disease. No middle ear or mastoid effusion.    BONES/SOFT TISSUES: No acute abnormality.      Impression    IMPRESSION:  1.  No CT evidence for acute intracranial process.  2.  Brain atrophy and presumed chronic microvascular ischemic changes as above.   XR Knee Right 3 Views    Narrative     EXAM: XR KNEE RIGHT 3 VIEWS  LOCATION: [facility]  DATE: 12/1/2024    INDICATION: knee pain  COMPARISON: None.      Impression    IMPRESSION: No fracture or malalignment. Moderate patellofemoral compartment degenerative change with large marginal osteophytes. Small knee effusion. Large enthesophyte at the tibial insertion of the patellar tendon. Vascular calcification.     *Note: Due to a large number of results and/or encounters for the requested time period, some results have not been displayed. A complete set of results can be found in Results Review.       Discharge Medications   Current Discharge Medication List        CONTINUE these medications which have CHANGED    Details   cholestyramine light (QUESTRAN) 4 GM packet Take 1 packet (4 g) by mouth daily.    Associated Diagnoses: Diarrhea, unspecified type           CONTINUE these medications which have NOT CHANGED    Details   acetaminophen (TYLENOL) 500 MG tablet Take 1,000 mg by mouth 2 times daily.      albuterol (VENTOLIN HFA) 108 (90 Base) MCG/ACT inhaler INHALE 2 PUFFS INTO THE LUNGS EVERY 6 HOURS AS NEEDED FOR SHORTNESS OF BREATH / DYSPNEA OR WHEEZING  Qty: 25.5 g, Refills: 0    Comments: Pharmacy may dispense brand covered by insurance (Proair, or proventil or ventolin or generic albuterol inhaler)  Associated Diagnoses: Chronic obstructive pulmonary disease, unspecified COPD type (H)      amLODIPine (NORVASC) 5 MG tablet Take 1 tablet (5 mg) by mouth daily.  Qty: 90 tablet, Refills: 3    Associated Diagnoses: Benign essential hypertension      apixaban ANTICOAGULANT (ELIQUIS) 2.5 MG tablet Take 1 tablet (2.5 mg) by mouth 2 times daily.  Qty: 90 tablet, Refills: 1    Associated Diagnoses: Other pulmonary embolism without acute cor pulmonale, unspecified chronicity (H)      atorvastatin (LIPITOR) 20 MG tablet Take 1 tablet (20 mg) by mouth daily.  Qty: 90 tablet, Refills: 1    Associated Diagnoses: Hyperlipidemia LDL goal <100       calcium carbonate (TUMS) 500 MG chewable tablet Take 1 chew tab by mouth daily as needed for heartburn.      Coenzyme Q10 400 MG CAPS Take 400 mg by mouth daily  Qty: 30 capsule, Refills: 0    Associated Diagnoses: Nutritional deficiency      ferrous sulfate (FE TABS) 325 (65 Fe) MG EC tablet Take 1 tablet (325 mg) by mouth every evening  Qty: 30 tablet, Refills: 0    Associated Diagnoses: Nutritional deficiency      finasteride (PROSCAR) 5 MG tablet Take 1 tablet (5 mg) by mouth daily  Qty: 90 tablet, Refills: 3    Associated Diagnoses: Gross hematuria      Fluticasone-Umeclidin-Vilant (TRELEGY ELLIPTA) 100-62.5-25 MCG/ACT oral inhaler Inhale 1 puff into the lungs daily.  Qty: 28 each, Refills: 0    Associated Diagnoses: Chronic obstructive pulmonary disease, unspecified COPD type (H)      furosemide (LASIX) 20 MG tablet Take 2 tablets (40 mg) by mouth daily  Qty: 60 tablet, Refills: 0    Associated Diagnoses: Essential hypertension, benign      !! gabapentin (NEURONTIN) 300 MG capsule Take 1 capsule (300 mg) by mouth every morning  Qty: 30 capsule, Refills: 0    Associated Diagnoses: Neuropathic pain      !! gabapentin (NEURONTIN) 300 MG capsule Take 2 capsules (600 mg) by mouth at bedtime  Qty: 60 capsule, Refills: 0    Associated Diagnoses: Neuropathic pain      lanreotide acetate (SOMATULINE) 120 MG/0.5ML injection Inject 120 mg subcutaneously every 28 days. Do not start before September 6, 2024.      lisinopril (ZESTRIL) 40 MG tablet TAKE 1 TABLET DAILY  Qty: 90 tablet, Refills: 3    Associated Diagnoses: Essential hypertension, benign; Type 2 diabetes mellitus without complication, without long-term current use of insulin (H)      metFORMIN (GLUCOPHAGE) 1000 MG tablet Take 1 tablet (1,000 mg) by mouth 2 times daily (with meals)  Qty: 180 tablet, Refills: 1    Associated Diagnoses: Type 2 diabetes mellitus without complication, without long-term current use of insulin (H)      miconazole (MICATIN) 2 %  external cream Apply topically 2 times daily as needed for other (Rash/ skin irritation)    Associated Diagnoses: Hospital discharge follow-up      vitamin B-Complex Take 1 tablet by mouth daily      vitamin C (ASCORBIC ACID) 500 MG tablet Take 500 mg by mouth daily.      blood glucose (NO BRAND SPECIFIED) lancets standard Use to test blood sugar 1 time daily, first thing in the morning  Qty: 50 each, Refills: 3    Associated Diagnoses: Type 2 diabetes mellitus without complication, without long-term current use of insulin (H)      !! gabapentin (NEURONTIN) 300 MG capsule Take 1 capsule (300 mg) by mouth 2 times daily.  Qty: 180 capsule, Refills: 3    Associated Diagnoses: Peripheral polyneuropathy      order for DME Oxygen 2 Li/min  at night via nasal cannula  Qty: 1 Device, Refills: 0    Associated Diagnoses: Nocturnal hypoxemia       !! - Potential duplicate medications found. Please discuss with provider.        Allergies   No Known Allergies

## 2024-12-04 NOTE — PROGRESS NOTES
Care Management Follow Up    Length of Stay (days): 0    Expected Discharge Date: 12/05/2024     Concerns to be Addressed:       Patient plan of care discussed at interdisciplinary rounds: Yes    Anticipated Discharge Disposition: Home, Home Care, Transitional Care              Anticipated Discharge Services: Home Care  Anticipated Discharge DME:      Patient/family educated on Medicare website which has current facility and service quality ratings: yes  Education Provided on the Discharge Plan:    Patient/Family in Agreement with the Plan: unable to assess    Referrals Placed by CM/SW:    Private pay costs discussed: Not applicable    Discussed  Partnership in Safe Discharge Planning  document with patient/family: No     Handoff Completed: No, handoff not indicated or clinically appropriate    Additional Information:  SW met with patient to discuss accepting facilities and see where he might want to go. Patient stated he would much rather go home and get home care. He is very particular about his food and would be more comfortable at home. SW let him know that this would be passed along to the doctor as they have the final say. Patient in agreement with this. He feels he would be safe at home and can continue to work on mobility and strengthening there.     Next Steps: message sent to provider    DEAN Llanos

## 2024-12-04 NOTE — PROGRESS NOTES
Occupational Therapy Discharge Summary    Reason for therapy discharge:    Discharged to home with home therapy.    Progress towards therapy goal(s). See goals on Care Plan in [facility] electronic health record for goal details.  Goals partially met.  Barriers to achieving goals:   discharge from facility.    Therapy recommendation(s):    Continued therapy is recommended.  Rationale/Recommendations:  pt is at risk for falls and admitted with fall. would benefit from TCU. pt does not want to go. stairs are barrier. would need home care if home and assist for bathing. likely would benefit from more supportive living given hx of falls and hospital visits.

## 2024-12-04 NOTE — PROGRESS NOTES
Care Management Follow Up    Length of Stay (days): 0    Expected Discharge Date: 12/05/2024     Concerns to be Addressed:       Patient plan of care discussed at interdisciplinary rounds: Yes    Anticipated Discharge Disposition: Home, Home Care,              Anticipated Discharge Services: Home Care  Anticipated Discharge DME:      Patient/family educated on Medicare website which has current facility and service quality ratings: yes  Education Provided on the Discharge Plan:    Patient/Family in Agreement with the Plan: unable to assess    Referrals Placed by CM/SW:    Private pay costs discussed: Not applicable    Discussed  Partnership in Safe Discharge Planning  document with patient/family: Yes: verbally with patient     Handoff Completed: No, handoff not indicated or clinically appropriate    Additional Information:  Writer sent home care referral to home care hub. Per PT, no [facility].    Next Steps: follow for home care    Lexy Matta RN

## 2024-12-04 NOTE — PROGRESS NOTES
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 No Loc/Head trauma.  At [facility], denies any pain, but during road test, failed to ambulate given acute RLE weakness (note patient has baseline LLE weakness following a fall 6/2024). He reports planned cervical fusion in February 2025.   *CTH negative  *XR R knee has Moderate patellofemoral compartment degenerative change with large marginal osteophytes. Small knee effusion . R foot has degenerative changes  --continue gabapentin  --standing tylenol, PRN oxycodone  --PT OT, fall precautions- recommending TCU. Pt very reluctant. Reports mobility improved from yesterday but still unable to do stairs. Still awaiting TCU bed and patient willing to consider. OT to follow as well     Abnormal UA   UA grossly abnormal with positive nitrites, 77 WBC, large LE.   WBC 12.2. pt afebrile without evidence of sepsis   *uc ultimately grew >100,000 colonies mixed bruno   --stop abx 12/4     COPD/CIERA: duonebs prn. Not compliant w/ CPAP  Hx PE on AC: resumed eliquis  T2D: A1C is 7. Mod CHO diet, ISS, resumed metformin  HTN, Diastolic HF: hydralazine PRN, resumed lisinpril, lasix and amlodipine  BPH: resumed finastride  Diarrhea: resumed cholesytime  HLD: Resumed statin  Hx metastatinc small  "bowel NET s/p bowel resection: resume Lanreotide on discharge  Hx RUL Adenocarcinoma s/p wedge resection: continue outpt oncology follow-up        Observation Goals: -diagnostic tests and consults completed and resulted, -vital signs normal or at patient baseline, -returns to baseline functional status, -safe disposition plan has been identified, Nurse to notify provider when observation goals have been met and patient is ready for discharge.  Diet: Moderate Consistent Carb (60 g CHO per Meal) Diet    DVT Prophylaxis: DOAC  Gomez Catheter: Not present  Lines: None     Cardiac Monitoring: None  Code Status: No CPR- Do NOT Intubate      Clinically Significant Risk Factors Present on Admission   { TIP  This section helps capture the illness of the patient on admission.     - Review diagnoses highlighted in blue; right click, edit & delete if not appropriate   - If blank, no additional diagnoses identified   :71715}             # Drug Induced Coagulation Defect: home medication list includes an anticoagulant medication    # Hypertension: Noted on problem list          # DMII: A1C = 7.0 % (Ref range: <5.7 %) within past 6 months    # Overweight: Estimated body mass index is 28.19 kg/m  as calculated from the following:    Height as of this encounter: 1.702 m (5' 7\").    Weight as of this encounter: 81.6 kg (180 lb).              Social Drivers of Health    Tobacco Use: Medium Risk (11/20/2024)    Received from [facility]    Patient History     Smoking Tobacco Use: Former     Smokeless Tobacco Use: Never     Passive Exposure: Past   Physical Activity: Insufficiently Active (9/2/2024)    Exercise Vital Sign     Days of Exercise per Week: 2 days     Minutes of Exercise per Session: 20 min   Social Connections: Unknown (9/2/2024)    Social Connection and Isolation Panel [NHANES]     Frequency of Social Gatherings with Friends and Family: Once a week          Disposition Plan   {TIP  It is advised to update the Medical " "Readiness for Discharge [MRD] daily, until the patient is 'Ready Now.' Last Documentation- Anticipated Tomorrow 12/04/2024. Use the SmartList below to update for today:308554}  Medically Ready for Discharge: {TIME; MEDICALLY READY FOR DISCHARGE:39241598}           The patient's care was discussed with the { :969375}.    Savannah Villasenor PA-C  Hospitalist Service  [facility]  Securely message with [employer] (more info)  Text page via [employer] Paging/Directory   ______________________________________________________________________    Interval History   {Pertinent overnight events  ;***}    Physical Exam   Vital Signs: Temp: 97.8  F (36.6  C) Temp src: Oral BP: 121/75 Pulse: 71   Resp: 18 SpO2: 96 % O2 Device: None (Room air)    Weight: 180 lbs 0 oz    {Recommend personal SmartPhrase or Notewriter for exam (OPTIONAL)   :147836}    Medical Decision Making   { TIP   MDM Calculator    MDM grid (w/ times)    Coding Support Chat  Billing is now based on time OR medical decision making complexity. Medical decision making included in your A&P does NOT need to be re-documented here.    :20144}    {Time  :696134::\"*** MINUTES SPENT BY ME on the date of service doing chart review, history, exam, documentation & further activities per the note.\"}      Data   {INSERT LABS/IMAGING (OPTIONAL)   :337473}  "

## 2024-12-05 ENCOUNTER — PATIENT OUTREACH (OUTPATIENT)
Dept: CARE COORDINATION | Facility: CLINIC | Age: 79
End: 2024-12-05
Payer: COMMERCIAL

## 2024-12-05 ENCOUNTER — TELEPHONE (OUTPATIENT)
Dept: INTERNAL MEDICINE | Facility: CLINIC | Age: 79
End: 2024-12-05
Payer: COMMERCIAL

## 2024-12-05 NOTE — PROGRESS NOTES
Clinic Care Coordination Contact  [facility]/Voicemail    Clinical Data: Care Coordinator Outreach    Outreach Documentation Number of Outreach Attempt   12/5/2024  11:18 AM 1       Left message on patient's voicemail with call back information and requested return call.      Plan: Care Coordinator will try to reach patient again in 1-2 business days.    Kelli Davidson RN Clinic Care Coordinator  [facility]: [place], [place] (on-site Wednesdays), [facility] (on-site Thursdays) & [facility].  [email or web address]  Phone: [phone]

## 2024-12-05 NOTE — PLAN OF CARE
"Physical Therapy Discharge Summary    Reason for therapy discharge:    Discharged to home with home therapy.    Progress towards therapy goal(s). See goals on Care Plan in [facility] electronic health record for goal details.  Goals not met.  Barriers to achieving goals:   discharge from facility.    Therapy recommendation(s):    Continued therapy is recommended.  Rationale/Recommendations:  Per prior PT notes, \" Currently, pt is below baseline with limited ROM, strength and balance. Pt progressed to ambulate ~30' and performed stairs on this date. Recommending home with assistance (rec Ax1 for stairs to get in/out), HHPT. NOTE: Time spent discussed d/c recommendations with HH PT, goal for transitioning to OP PT, considering AFO for the L ankle/foot pending progress with therapy. Also, recommending looking into ramp for 3 YOMI. SW updated on this and will attempt to provide resources.\".    Patient not seen by this therapist on this date, discharge summary written based on chart review and previous PT notes. Please see PT flowsheets for further details.        "

## 2024-12-05 NOTE — TELEPHONE ENCOUNTER
Reason for Call:  Appointment Request    Patient requesting this type of appt:  Hospital/ED Follow-Up     Requested provider: Olegario Castillo    Reason patient unable to be scheduled: Not with their preferred provider    When does patient want to be seen/preferred time:  Prior to 12/18    Comments:     Could we send this information to you in [facility] or would you prefer to receive a phone call?:   Patient would prefer a phone call   Okay to leave a detailed message?: Yes at Other phone number:  Kenia at [phone]    Call taken on 12/5/2024 at 3:21 PM by Heidy Carter

## 2024-12-05 NOTE — TELEPHONE ENCOUNTER
No C2C in chart for girlfriend, Kenia. Patient provides verbal consent to discuss medical information/scheduling with Kenia for this encounter.     Kenia accepts next Hosp F/U slot with PCP, appt scheduled.    Future Appointments 12/5/2024 - 6/3/2025        Date Visit Type Length Department Provider     12/18/2024 10:00 AM ED/HOSP FOLLOW UP 30 min  INTERNAL MEDICINE Olegario Castillo MD    Location Instructions:     [facility] is in the [facility] at [address] in [place]. This just east of the [address] exit off of [address]. Free parking is available; access the lot from [address] [place] [address] or [facility].Go directly to Floor 2 for check-in.              1/7/2025  1:00 PM OFFICE VISIT 30 min  INTERNAL MEDICINE Olegario Castillo MD    Location Instructions:     [facility] is in the [facility] at [address] in [place]. This just east of the [facility] [address] exit off of [address]. Free parking is available; access the lot from [address] [place] [address] or [facility].Go directly to Floor 2 for check-in.              3/17/2025  1:15 PM PAC EVAL 60 min [facility] PAC Venecia López PA-C    Location Instructions:     The [facility] ([facility]) is [place] area with multiple transportation and parking options. You may wish to review options for  service and self-parking in more detail on the [facility] s website at [email or web address].&nbsp;                4/15/2025 10:30 AM POST-OP NEUROSURG 30 min [facility] NEUROSURGERY Rebeca Conte PA-C    Location Instructions:     The [facility] [place] ([facility]) is in a dense urban area with multiple transportation and parking options. You may wish to review options for  service and self-parking in more detail on the [facility] s website at [email or web address].&nbsp;

## 2024-12-05 NOTE — PROGRESS NOTES
Clinic Care Coordination Contact  [facility]/Voicemail    Clinical Data: Care Coordinator Outreach    Outreach Documentation Number of Outreach Attempt   12/5/2024  11:18 AM 1   12/5/2024   2:42 PM 2     Patient left a voicemail on my line.     Called back.     Left message on patient's voicemail with call back information and requested return call.      Plan: Care Coordinator will try to reach patient again in 1-2 business days.    Kelli Davidson RN Clinic Care Coordinator  [facility] Clinics: [place], [place] (on-site Wednesdays), [facility] (on-site Thursdays) & [facility].  [email or web address]  Phone: [phone]

## 2024-12-06 RX ORDER — CEFAZOLIN SODIUM/WATER 2 G/20 ML
2 SYRINGE (ML) INTRAVENOUS
Status: CANCELLED | OUTPATIENT
Start: 2024-12-06

## 2024-12-06 RX ORDER — CEFAZOLIN SODIUM/WATER 2 G/20 ML
2 SYRINGE (ML) INTRAVENOUS SEE ADMIN INSTRUCTIONS
Status: CANCELLED | OUTPATIENT
Start: 2024-12-06

## 2024-12-06 RX ORDER — GABAPENTIN 100 MG/1
100 CAPSULE ORAL
Status: CANCELLED | OUTPATIENT
Start: 2024-12-06

## 2024-12-09 ENCOUNTER — TELEPHONE (OUTPATIENT)
Dept: INTERNAL MEDICINE | Facility: CLINIC | Age: 79
End: 2024-12-09
Payer: COMMERCIAL

## 2024-12-09 NOTE — TELEPHONE ENCOUNTER
Home Care is calling regarding an established patient with [facility].       Requesting orders from: Olegario Castillo  Provider is following patient: Yes  Is this a 60-day recertification request?  No    Orders Requested    Skilled Nursing  Request for initial certification (first set of orders)   Frequency:  1x/wk for 6 wks - for wound management/checking, labs as needed, disease management, education     Physical Therapy  Request for initial evaluation and treatment (one time)     Occupational Therapy  Request for initial evaluation and treatment (one time)     Social Work  Request for initial evaluation and treatment (one time)     Verbal orders given for PT eval, OT eval, and SW eval.  Information was gathered for Skilled Nursing.  Provider review needed.  RN will contact Home Care with information after provider review.  Confirmed ok to leave a detailed message with call back.  Contact information confirmed and updated as needed.    Tiffanie Harris RN

## 2024-12-17 ENCOUNTER — HOSPITAL ENCOUNTER (OUTPATIENT)
Dept: CT IMAGING | Facility: CLINIC | Age: 79
Discharge: HOME | End: 2024-12-17
Attending: PHYSICIAN ASSISTANT
Payer: COMMERCIAL

## 2024-12-17 DIAGNOSIS — C34.11 PRIMARY NON-SMALL CELL CARCINOMA OF UPPER LOBE OF RIGHT LUNG (H): ICD-10-CM

## 2024-12-17 DIAGNOSIS — C7A.019 MALIGNANT CARCINOID TUMOR OF SMALL INTESTINE (H): ICD-10-CM

## 2024-12-17 PROCEDURE — 250N000009 HC RX 250: Performed by: PHYSICIAN ASSISTANT

## 2024-12-17 PROCEDURE — 250N000011 HC RX IP 250 OP 636: Performed by: PHYSICIAN ASSISTANT

## 2024-12-17 PROCEDURE — 71260 CT THORAX DX C+: CPT

## 2024-12-17 RX ORDER — IOPAMIDOL 755 MG/ML
68 INJECTION, SOLUTION INTRAVASCULAR ONCE
Status: COMPLETED | OUTPATIENT
Start: 2024-12-17 | End: 2024-12-17

## 2024-12-17 RX ADMIN — IOPAMIDOL 68 ML: 755 INJECTION, SOLUTION INTRAVENOUS at 10:58

## 2024-12-17 RX ADMIN — SODIUM CHLORIDE 92 ML: 9 INJECTION, SOLUTION INTRAVENOUS at 10:58

## 2024-12-17 NOTE — PROGRESS NOTES
Assessment & Plan     Hospital discharge follow-up  Posthospital discharge and doing well.    Fall, subsequent encounter  Continuing with home rehabilitation accordingly.  Discussed with patient he has all supportive care he needs at this point.    Type 2 diabetes mellitus without complication, without long-term current use of insulin (H)  Lab Results   Component Value Date    A1C 7.0 12/02/2024    A1C 7.0 06/25/2024    A1C 7.3 03/02/2024    A1C 6.7 09/12/2023    A1C 6.2 03/21/2023    A1C 6.8 03/17/2021    A1C 7.2 11/11/2020    A1C 7.5 09/20/2020    A1C 7.6 06/18/2020    A1C 7.8 01/23/2020   Stable as noted on therapy.      Benign essential hypertension  Stable without evidence of obvious orthostasis.    Morbid obesity due to excess calories (H)  Encouraged ongoing weight loss    MED REC REQUIRED  Post Medication Reconciliation Status: discharge medications reconciled, continue medications without change    See Patient Instructions    Mariano Alejandro is a 79 year old, presenting for the following health issues:  Hospital F/U    [facility]        Hospital Follow-up Visit:    Hospital/Nursing Home/IP Rehab Facility: [facility]  Date of Admission: 12/1/24  Date of Discharge: 12/4/24  Reason(s) for Admission: Foot sprain, right; knee sprain, right   Was the patient in the ICU or did the patient experience delirium during hospitalization?  No  Do you have any other stressors you would like to discuss with your provider? No    Problems taking medications regularly:  None  Medication changes since discharge: None  Problems adhering to non-medication therapy:  None    Summary of hospitalization:  [facility] discharge summary reviewed  Diagnostic Tests/Treatments reviewed.  Follow up needed: none  Other Healthcare Providers Involved in Patient s Care:         None  Update since discharge: improved.     Plan of care communicated with patient      Mechanical fall w/ failed road test,  suspected R knee sprain  Chronic back pain w/ Sciatica  Cervical stenosis w/ myelopathy, neuropathy  Degenerative joint disease  Patient reports while in walk-in tub slipped and fell on his crossed legs, unable to get up afterwards. No Loc/Head trauma.  At [facility], denies any pain, but during road test, failed to ambulate given acute RLE weakness (note patient has baseline LLE weakness following a fall 6/2024). He reports planned cervical fusion in February 2025.   *CTH negative  *XR R knee has Moderate patellofemoral compartment degenerative change with large marginal osteophytes. Small knee effusion . R foot has degenerative changes  --continue gabapentin  --standing tylenol  --PT OT, fall precautions- recommending TCU. However, patient progressed enough to be able to do stairs on 12/4 and elected to discharge home with home cares      Abnormal UA   UA grossly abnormal with positive nitrites, 77 WBC, large LE.   WBC 12.2. pt afebrile without evidence of sepsis   *uc ultimately grew >100,000 colonies mixed bruno   --stop abx 12/4     COPD/CIERA: duonebs prn. Not compliant w/ CPAP  Hx PE on AC: resumed eliquis  T2D: A1C is 7. 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      albuterol (VENTOLIN HFA) 108 (90 Base) MCG/ACT inhaler INHALE 2 PUFFS INTO THE LUNGS EVERY 6 HOURS AS NEEDED FOR SHORTNESS OF BREATH / DYSPNEA OR WHEEZING 25.5 g 0    amLODIPine (NORVASC) 5 MG tablet Take 1 tablet (5 mg) by mouth daily. 90 tablet 3    apixaban ANTICOAGULANT (ELIQUIS) 2.5 MG tablet Take 1  tablet (2.5 mg) by mouth 2 times daily. 90 tablet 1    atorvastatin (LIPITOR) 20 MG tablet Take 1 tablet (20 mg) by mouth daily. 90 tablet 1    blood glucose (NO BRAND SPECIFIED) lancets standard Use to test blood sugar 1 time daily, first thing in the morning 50 each 3    calcium carbonate (TUMS) 500 MG chewable tablet Take 1 chew tab by mouth daily as needed for heartburn.      cholestyramine light (QUESTRAN) 4 GM packet Take 1 packet (4 g) by mouth daily.      Coenzyme Q10 400 MG CAPS Take 400 mg by mouth daily 30 capsule 0    ferrous sulfate (FE TABS) 325 (65 Fe) MG EC tablet Take 1 tablet (325 mg) by mouth every evening 30 tablet 0    finasteride (PROSCAR) 5 MG tablet Take 1 tablet (5 mg) by mouth daily 90 tablet 3    Fluticasone-Umeclidin-Vilant (TRELEGY ELLIPTA) 100-62.5-25 MCG/ACT oral inhaler Inhale 1 puff into the lungs daily. 28 each 0    furosemide (LASIX) 20 MG tablet Take 2 tablets (40 mg) by mouth daily 60 tablet 0    gabapentin (NEURONTIN) 300 MG capsule Take 1 capsule (300 mg) by mouth every morning 30 capsule 0    gabapentin (NEURONTIN) 300 MG capsule Take 2 capsules (600 mg) by mouth at bedtime 60 capsule 0    lanreotide acetate (SOMATULINE) 120 MG/0.5ML injection Inject 120 mg subcutaneously every 28 days. Do not start before September 6, 2024.      lisinopril (ZESTRIL) 40 MG tablet TAKE 1 TABLET DAILY 90 tablet 3    metFORMIN (GLUCOPHAGE) 1000 MG tablet Take 1 tablet (1,000 mg) by mouth 2 times daily (with meals) 180 tablet 1    miconazole (MICATIN) 2 % external cream Apply topically 2 times daily as needed for other (Rash/ skin irritation)      order for DME Oxygen 2 Li/min  at night via nasal cannula 1 Device 0    vitamin B-Complex Take 1 tablet by mouth daily      vitamin C (ASCORBIC ACID) 500 MG tablet Take 500 mg by mouth daily.      gabapentin (NEURONTIN) 300 MG capsule Take 1 capsule (300 mg) by mouth 2 times daily. (Patient not taking: Reported on 12/18/2024) 180 capsule 3     No current  "facility-administered medications for this visit.       Review of Systems  CONSTITUTIONAL: NEGATIVE for fever, chills, change in weight  EYES: NEGATIVE for vision changes or irritation  ENT/MOUTH: NEGATIVE for ear, mouth and throat problems  RESP: NEGATIVE for significant cough or SOB  CV: NEGATIVE for chest pain, palpitations or peripheral edema  GI: NEGATIVE for nausea, abdominal pain, heartburn, or change in bowel habits  : NEGATIVE for frequency, dysuria, or hematuria  NEURO: NEGATIVE for weakness, dizziness or paresthesias  HEME: NEGATIVE for bleeding problems  PSYCHIATRIC: NEGATIVE for changes in mood or affect      Objective    /70   Pulse 65   Temp 98.5  F (36.9  C) (Temporal)   Resp 18   Ht 1.702 m (5' 7\")   SpO2 97%   BMI 28.19 kg/m    Body mass index is 28.19 kg/m .    Physical Exam   GENERAL: alert and no distress  EYES: Eyes grossly normal to inspection, PERRL and conjunctivae and sclerae normal  HENT: ear canals and TM's normal, nose and mouth without ulcers or lesions  RESP: lungs clear to auscultation - no rales, rhonchi or wheezes  CV: regular rate and rhythm, normal S1 S2, no S3 or S4, no murmur, click or rub, no peripheral edema  ABDOMEN: obese  MS: no gross musculoskeletal defects noted, no edema  NEURO: No focal changes in wheelchair  PSYCH: mentation appears normal, affect normal/bright        Signed Electronically by: Olegario Castillo MD    "

## 2024-12-18 ENCOUNTER — OFFICE VISIT (OUTPATIENT)
Dept: INTERNAL MEDICINE | Facility: CLINIC | Age: 79
End: 2024-12-18
Payer: COMMERCIAL

## 2024-12-18 VITALS
HEART RATE: 65 BPM | HEIGHT: 67 IN | SYSTOLIC BLOOD PRESSURE: 114 MMHG | BODY MASS INDEX: 28.19 KG/M2 | OXYGEN SATURATION: 97 % | DIASTOLIC BLOOD PRESSURE: 70 MMHG | RESPIRATION RATE: 18 BRPM | TEMPERATURE: 98.5 F

## 2024-12-18 DIAGNOSIS — E66.01 MORBID OBESITY DUE TO EXCESS CALORIES (H): ICD-10-CM

## 2024-12-18 DIAGNOSIS — E11.9 TYPE 2 DIABETES MELLITUS WITHOUT COMPLICATION, WITHOUT LONG-TERM CURRENT USE OF INSULIN (H): ICD-10-CM

## 2024-12-18 DIAGNOSIS — Z09 HOSPITAL DISCHARGE FOLLOW-UP: Primary | ICD-10-CM

## 2024-12-18 DIAGNOSIS — I10 BENIGN ESSENTIAL HYPERTENSION: ICD-10-CM
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
## 2024-12-18 ASSESSMENT — PAIN SCALES - GENERAL: PAINLEVEL_OUTOF10: MODERATE PAIN (4)

## 2024-12-18 NOTE — TELEPHONE ENCOUNTER
FUTURE VISIT INFORMATION      SURGERY INFORMATION:  Date: 4/2/25  Location: uu or  Surgeon:  Seda Pinon MD   Anesthesia Type:  general  Procedure: posterior cervical 3-6 laminoplasty     RECORDS REQUESTED FROM:       Primary Care Provider: [facility]    Pertinent Medical History: copd, mukesh, hypertension    Most recent EKG+ Tracing: 3/2/24    Most recent ECHO: 3/2/24    Most recent Cardiac Stress Test: 7/13/20

## 2024-12-24 DIAGNOSIS — Z53.9 DIAGNOSIS NOT YET DEFINED: Primary | ICD-10-CM

## 2024-12-24 PROCEDURE — G0180 MD CERTIFICATION HHA PATIENT: HCPCS | Performed by: INTERNAL MEDICINE

## 2025-01-01 ENCOUNTER — NURSE TRIAGE (OUTPATIENT)
Dept: NURSING | Facility: CLINIC | Age: 80
End: 2025-01-01
Payer: COMMERCIAL

## 2025-01-01 NOTE — TELEPHONE ENCOUNTER
"Nurse Triage SBAR    Is this a 2nd Level Triage? NO    Situation: Pt has a cough and \"head cold\" sx     Background: Pt is being treated for cancer with hormone treatment. He has had a cold for 3-4 days     Assessment:   For the last 3-4 days he has had a cough and head cold sx  Temp: 98.1    Using:  Cough drops  Hydration     No chest pain or shortness of breath    Protocol Recommended Disposition:   See PCP Within 3 Days    Recommendation: Care advice given. Pt's wife reports that she will  contact patient's PCP when the office opens    Pt gave verbal consent to speak to his significant other calling       Reason for Disposition   Taking an ACE Inhibitor medicine (e.g., benazepril / LOTENSIN, captopril / CAPOTEN, enalapril / VASOTEC, lisinopril / ZESTRIL)    Additional Information   Negative: SEVERE difficulty breathing (e.g., struggling for each breath, speaks in single words)   Negative: Bluish (or gray) lips or face now   Negative: [1] Rapid onset of cough AND [2] has hives   Negative: Coughing started suddenly after medicine, an allergic food or bee sting   Negative: [1] Difficulty breathing AND [2] exposure to flames, smoke, or fumes   Negative: [1] Stridor AND [2] difficulty breathing   Negative: Sounds like a life-threatening emergency to the triager   Negative: Chest pain is main symptom   Negative: Wet cough (productive; white-yellow, yellow, green, or nain colored sputum)   Negative: Cough lasts > 3 weeks   Negative: [1] Previous asthma attacks AND [2] this feels like asthma attack   Negative: [1] Dry cough (non-productive;  no sputum or minimal clear sputum) AND [2] within 14 days of COVID-19 Exposure   Negative: Choked on object of food that could be caught in the throat   Negative: [1] MODERATE difficulty breathing (e.g., speaks in phrases, SOB even at rest, pulse 100-120) AND [2] still present when not coughing   Negative: Chest pain  (Exception: MILD central chest pain, present only when " coughing.)   Negative: Patient sounds very sick or weak to the triager   Negative: [1] MILD difficulty breathing (e.g., minimal/no SOB at rest, SOB with walking, pulse <100) AND [2] still present when not coughing   Negative: [1] Coughed up blood AND [2] > 1 tablespoon (15 ml)   (Exception: Blood-tinged sputum.)   Negative: Fever > 103 F (39.4 C)   Negative: [1] Fever > 101 F (38.3 C) AND [2] age > 60 years   Negative: [1] Fever > 100.0 F (37.8 C) AND [2] bedridden (e.g., CVA, chronic illness, recovering from surgery)   Negative: [1] Fever > 100.0 F (37.8 C) AND [2] diabetes mellitus or weak immune system (e.g., HIV positive, cancer chemo, splenectomy, organ transplant, chronic steroids)   Negative: Wheezing is present   Negative: [1] Ankle swelling AND [2] swelling is increasing   Negative: SEVERE coughing spells (e.g., whooping sound after coughing, vomiting after coughing)   Negative: [1] Continuous (nonstop) coughing interferes with work or school AND [2] no improvement using cough treatment per Care Advice   Negative: Fever present > 3 days (72 hours)   Negative: [1] Fever returns after gone for over 24 hours AND [2] symptoms worse or not improved   Negative: [1] Using nasal washes and pain medicine > 24 hours AND [2] sinus pain (around cheekbone or eye) persists   Negative: Earache is present   Negative: Cough has been present for > 3 weeks   Negative: [1] Coughed up blood-tinged sputum AND [2] more than once   Negative: [1] Nasal discharge AND [2] present > 10 days   Negative: [1] Patient also has allergy symptoms (e.g., itchy eyes, clear nasal discharge, postnasal drip) AND [2] they are acting up    Protocols used: Cough - Acute Non-Productive-A-AH  Annmarie Bernal RN   Triage Nurse Advisor on 1/1/2025 at 10:28 AM

## 2025-01-02 ENCOUNTER — MEDICAL CORRESPONDENCE (OUTPATIENT)
Dept: HEALTH INFORMATION MANAGEMENT | Facility: CLINIC | Age: 80
End: 2025-01-02
Payer: COMMERCIAL

## 2025-01-02 ENCOUNTER — TELEPHONE (OUTPATIENT)
Dept: INTERNAL MEDICINE | Facility: CLINIC | Age: 80
End: 2025-01-02
Payer: COMMERCIAL

## 2025-01-02 NOTE — TELEPHONE ENCOUNTER
Lasix is not prescribed by me but has been prescribed by Iza Park CNP at 2-20 mg tablets daily or 40 mg.

## 2025-01-02 NOTE — TELEPHONE ENCOUNTER
Krystal with [facility] home care called the clinic and would like to confirm pt's current dose of furosemide (LASIX) 20 MG tablet .     Per current med list, this medication is to be taken two tablets (40 mg total) once per day. This is also how medication was directed per most recent AVS summary.    However, this medication has not been prescribed since July and pt would have been out of medication in August.     Routing to PCP to confirm current dose of this medication should be 40 mg (2 tablets) by mouth daily.     Krystal would like a call back with confirmation ([phone]).           Home Care is calling regarding an established patient with [facility].       Requesting orders from: Olegario Castillo  Provider is following patient: Yes  Is this a 60-day recertification request?  No    Orders Requested    Physical Therapy  Request for continuation of care with no increase or decrease in frequency  Frequency:  2x/wk for 2 wks  For transfers, gait training, balance          Verbal orders given.  Home Care will send orders for provider to sign.  Confirmed ok to leave a detailed message with call back.  Contact information confirmed and updated as needed.    Su Chan, RN

## 2025-01-07 ENCOUNTER — MEDICAL CORRESPONDENCE (OUTPATIENT)
Dept: HEALTH INFORMATION MANAGEMENT | Facility: CLINIC | Age: 80
End: 2025-01-07

## 2025-01-15 ENCOUNTER — TELEPHONE (OUTPATIENT)
Dept: NEUROSURGERY | Facility: CLINIC | Age: 80
End: 2025-01-15

## 2025-01-15 DIAGNOSIS — R29.898 LEFT LEG WEAKNESS: ICD-10-CM

## 2025-01-15 DIAGNOSIS — R20.2 PARESTHESIA OF UPPER EXTREMITY: ICD-10-CM

## 2025-01-15 DIAGNOSIS — M54.42 LEFT-SIDED LOW BACK PAIN WITH LEFT-SIDED SCIATICA, UNSPECIFIED CHRONICITY: ICD-10-CM

## 2025-01-15 DIAGNOSIS — M48.02 SPINAL STENOSIS IN CERVICAL REGION: Primary | ICD-10-CM

## 2025-01-15 NOTE — TELEPHONE ENCOUNTER
[facility] Call Center    Phone Message    May a detailed message be left on voicemail: yes     Reason for Call: Other: Kenia is calling to let us know that the patient was supposed to have 10 sessions of pool therapy. After his 5th session, the patient had a fall and was hospitalized, he has completed his PT and OT. They are wondering how many more sessions of pool therapy this patient should have now and will need a new referral placed. Please review and call back.     Action Taken: Message routed to:  [facility] ([facility]    Travel Screening: Not Applicable

## 2025-01-15 NOTE — TELEPHONE ENCOUNTER
Order entered for additional 5 session of pool therapy.  Yogi Monroe can evaluate progress and request additional session if needed.       Kenia verbalized understanding/agreement with current plan.

## 2025-01-16 ENCOUNTER — PATIENT OUTREACH (OUTPATIENT)
Dept: CARE COORDINATION | Facility: CLINIC | Age: 80
End: 2025-01-16
Payer: COMMERCIAL

## 2025-01-16 NOTE — PROGRESS NOTES
Clinic Care Coordination Contact    Situation: Patient chart reviewed by care coordinator.    Background: Care Coordination initial assessment and enrollment to Care Coordination was 9/24/2020. Patient centered goal(s) developed with participation from patient.  RN CC handed patient off to CHW for continued outreach every 30 days. Patient is not due for an updated complex care plan. Annual Assessment will be due April 2025.    Assessment: CHW CC is in process of outreaching to patient, recently unable to contact patient 1/3/2025. Next CHW CC outreach attempt scheduled for 1/16/2025.     Plan/Recommendations: CHW CC will route request to RNCC to review for disenrollment per standard work if next outreach attempt is unsuccessful. RNCC will extend and complete clinical review after CHW CC has made contact with patient. RNCC will remain available as needed.     Kelli Davidson, RN Clinic Care Coordinator  [facility] Clinics: [place], [place] (on-site Wednesdays), [facility] (on-site Thursdays) & [facility].  [email or web address]  Phone: [phone]

## 2025-01-21 ENCOUNTER — MEDICAL CORRESPONDENCE (OUTPATIENT)
Dept: HEALTH INFORMATION MANAGEMENT | Facility: CLINIC | Age: 80
End: 2025-01-21
Payer: COMMERCIAL

## 2025-01-23 DIAGNOSIS — E11.9 TYPE 2 DIABETES MELLITUS WITHOUT COMPLICATION, WITHOUT LONG-TERM CURRENT USE OF INSULIN (H): ICD-10-CM

## 2025-01-23 DIAGNOSIS — J44.9 CHRONIC OBSTRUCTIVE PULMONARY DISEASE, UNSPECIFIED COPD TYPE (H): Chronic | ICD-10-CM

## 2025-01-23 RX ORDER — METFORMIN HYDROCHLORIDE EXTENDED-RELEASE TABLETS 1000 MG/1
1000 TABLET, FILM COATED, EXTENDED RELEASE ORAL 2 TIMES DAILY WITH MEALS
Qty: 180 TABLET | Refills: 1 | OUTPATIENT
Start: 2025-01-23

## 2025-01-23 RX ORDER — FLUTICASONE FUROATE, UMECLIDINIUM BROMIDE AND VILANTEROL TRIFENATATE 100; 62.5; 25 UG/1; UG/1; UG/1
POWDER RESPIRATORY (INHALATION)
Qty: 60 EACH | Refills: 5 | Status: SHIPPED | OUTPATIENT
Start: 2025-01-23

## 2025-01-23 NOTE — TELEPHONE ENCOUNTER
Please note I am unclear on the refill request for metformin with a comment on osmotic tablets.  Not noted on prior prescription per current med list.

## 2025-01-23 NOTE — TELEPHONE ENCOUNTER
"Called [facility] to obtain more information regarding refill request.   The pharmacist stated that the ER version was requested by mistake hence \"osmotic\" comment.     They will cancel this request on their end.   New script is needed for metFORMIN (GLUCOPHAGE) 1000 MG tablet .  Routing refill request to team for review.     Thank you,  Su Chan RN    "

## 2025-01-28 ENCOUNTER — TRANSFERRED RECORDS (OUTPATIENT)
Dept: MULTI SPECIALTY CLINIC | Facility: CLINIC | Age: 80
End: 2025-01-28

## 2025-01-28 LAB — RETINOPATHY: NORMAL

## 2025-01-31 ENCOUNTER — NURSE TRIAGE (OUTPATIENT)
Dept: CARE COORDINATION | Facility: CLINIC | Age: 80
End: 2025-01-31
Payer: COMMERCIAL

## 2025-01-31 NOTE — PHARMACY-ADMISSION MEDICATION HISTORY
Admission medication history interview status for this patient is complete. See [facility] admission navigator for allergy information, prior to admission medications and immunization status.     Medication history interview done via telephone during Covid-19 pandemic, indicate source(s): Patient and Family  Medication history resources (including written lists, pill bottles, clinic record):None  Pharmacy: -    Changes made to PTA medication list:  Added: es tylenol  Deleted: advair, flomax  Changed: colace to prn    Actions taken by pharmacist (provider contacted, etc):called wife Kenia for mr     Additional medication history information:None    Medication reconciliation/reorder completed by provider prior to medication history?  no   (Y/N)     For patients on insulin therapy: no  (Y/N)  Sliding scale Novolog: -  Do you have a baseline novolog pre-meal dose:   __  units with meals or __ units/carb unit with meals  Do you eat three meals a day:  -  How many times do you check your blood glucose per day:  -  How many episodes of hypoglycemia do you have per week or per month: - (per week/per month)  Do you have a Continuous glucose monitor (CGM) : - (remind pt that not approved for hospital use)  Any specific barriers to therapy? -  (cost, comfortable with injections, confident with current diabetes regimen?)      Prior to Admission medications    Medication Sig Last Dose Taking? Auth Provider   acetaminophen (TYLENOL) 325 MG tablet Take 2 tablets (650 mg) by mouth every 4 hours as needed for mild pain  Yes Kaylin Spivey PA   acetaminophen (TYLENOL) 500 MG tablet Take 1,000 mg by mouth every morning 9/20/2020 at Unknown time Yes Unknown, Entered By History   acetaminophen (TYLENOL) 500 MG tablet Take 500 mg by mouth every evening 9/19/2020 at Unknown time Yes Unknown, Entered By History   albuterol (VENTOLIN HFA) 108 (90 Base) MCG/ACT inhaler INHALE 2 PUFFS INTO THE LUNGS EVERY 6 HOURS AS NEEDED FOR SHORTNESS OF  BREATH / DYSPNEA OR WHEEZING  Yes Olegario Castillo MD   Ascorbic Acid (VITAMIN C PO) Take 500 mg by mouth At Bedtime  9/19/2020 at Unknown time Yes Reported, Patient   aspirin 81 MG tablet Take 1 tablet (81 mg) by mouth daily 9/19/2020 at am Yes Olegario Castillo MD   atorvastatin (LIPITOR) 20 MG tablet Take 1 tablet (20 mg) by mouth daily 9/19/2020 at hs Yes Olegario Castillo MD   ciprofloxacin (CIPRO) 500 MG tablet Take 1 tablet (500 mg) by mouth 2 times daily for 7 days  Yes Jean-Paul Montanez DO   docusate sodium (COLACE) 100 MG capsule Take 100 mg by mouth 2 times daily as needed for constipation  Yes Unknown, Entered By History   finasteride (PROSCAR) 5 MG tablet Take 1 tablet (5 mg) by mouth daily 9/19/2020 at am Yes Praveena Burris MD   Fluticasone-Salmeterol (WIXELA INHUB IN) Inhale 1 puff into the lungs 2 times daily 9/20/2020 at x1 Yes Reported, Patient   furosemide (LASIX) 20 MG tablet Take 20 mg by mouth every evening 9/19/2020 at Unknown time Yes Unknown, Entered By History   furosemide (LASIX) 40 MG tablet Take 40 mg by mouth every morning 9/19/2020 at Unknown time Yes Unknown, Entered By History   HYDROcodone-acetaminophen (NORCO) 5-325 MG tablet Take 1 tablet by mouth every 6 hours as needed for severe pain  Yes Jean-Paul Montanez DO   lisinopril (PRINIVIL/ZESTRIL) 40 MG tablet TAKE 1 TABLET (40 MG) BY MOUTH DAILY 9/19/2020 at am Yes Olegario Castillo MD   metFORMIN (GLUCOPHAGE) 500 MG tablet TAKE 1 TABLET BY MOUTH TWICE A DAY WITH MEALS 9/19/2020 at Unknown time Yes Olegario Castillo MD   metoprolol succinate ER (TOPROL-XL) 25 MG 24 hr tablet Take 1 tablet (25 mg) by mouth daily 9/19/2020 at am Yes Olegario Castillo MD   metroNIDAZOLE (FLAGYL) 500 MG tablet Take 1 tablet (500 mg) by mouth 4 times daily for 7 days  Yes Jean-Paul Montanez DO   ondansetron (ZOFRAN ODT) 4 MG ODT tab Take 1 tablet (4 mg) by mouth every 8 hours as needed for nausea  Yes Jean-Paul Montanez,    sildenafil (VIAGRA) 50 MG tablet  Take 1 tablet (50 mg) by mouth daily as needed (erectile dysgfunction, do not use with Flomax or alpha blockers) Or nitro products  '  Yes Olegario Castillo MD   tiotropium (SPIRIVA) 18 MCG inhaled capsule Inhale 18 mcg into the lungs daily 9/19/2020 at hs Yes Reported, Patient   blood glucose (ACCU-CHEK CHACHA) test strip Use to test blood sugar 2 times daily or as directed.   Olegario Castillo MD   blood glucose monitoring (SOFTCLIX) lancets Use to test blood sugar 2 times daily or as directed.   Olegario Castillo MD   order for DME Equipment being ordered: diabetic shoes, 1 pair   Olegario Castillo MD   order for DME Oxygen 2 Li/min  at night via nasal cannula   Goltz, Bennett Ezra, PA-C   order for DME Equipment delivered; Respironics 760S BIPAP with heated humidification and heated tubing.  Pressure 15/7cm. Respironics Syeda View FF mask (Medium)   Reported, Patient        147

## 2025-01-31 NOTE — TELEPHONE ENCOUNTER
Clinic Care Coordination Contact    Patient stated he is experiencing severe pain on his right side. Patient stated the pain will start in his palm and continue to his shoulder.  At times, patient stated it will continue on his left side.    Please call patient to discuss further.    Thank you.     Kelley Slaughter, JUDITH  Clinic Care Coordination  [facility] Clin[facility]: [facility], [facility], [facility], and [facility]  Phone: [phone]

## 2025-02-03 NOTE — TELEPHONE ENCOUNTER
Patient was called with provider's message. Informed Gabapentin was refilled. Virtual visit scheduled:     Appointments in Next Year      Feb 06, 2025 11:30 AM  (Arrive by 11:25 AM)  Provider Visit with Olegario Castillo MD  [facility] ([facility]) [phone]

## 2025-02-03 NOTE — TELEPHONE ENCOUNTER
Patient states he has numbness in his R hand, burning up the R arm to his shoulder due to his neck.   Pain rating 10/10 at times and drops down to 8/10.      Stops him from moving around the house like he usually is able to.   Present more than 50% of the day.   Pain has been present for about 1 year and has worsened over the past 3-4 days.     Schedule neck Surgery is in April.     Patient is continuing to take Gabapentin until it runs out in 5 days.See 1/23/25 Refill encounter for further     Taking tylenol 1000 mg BID      Dispo: Go to office now  Patient asking if PCP is able to prescribe him a pain medication until he has his surgery in April. Patient will agree to schedule appt only if PCP is able to provide pain medication.     Patient gives verbal consent for clinic to speak with     Can we leave a detailed message on this number? YES  Phone number patient can be reached at:   Kenia (Girlfriend)   [phone] (home)    Shelly Vee RN  [facility] Triage    Reason for Disposition   SEVERE pain (e.g., excruciating, unable to do any normal activities)    Additional Information   Negative: Shock suspected (e.g., cold/pale/clammy skin, too weak to stand, low BP, rapid pulse)   Negative: Similar pain previously and it was from 'heart attack'   Negative: Similar pain previously from 'angina' and not relieved by nitroglycerin   Negative: Sounds like a life-threatening emergency to the triager   Negative: Followed an injury to arm   Negative: Chest pain   Negative: Wound looks infected (e.g., spreading redness, pus)   Negative: Elbow pain is main symptom   Negative: Wrist pain is main symptom   Negative: Difficulty breathing or unusual sweating (e.g., sweating without exertion)   Negative: Chest pain lasting longer than 5 minutes   Negative: Age > 40 and no obvious cause for pain, pain still present even when not moving the arm   Negative: Fever and red area (or area very tender to touch)    Negative: Swollen joint and fever   Negative: Entire arm is swollen   Negative: Patient sounds very sick or weak to the triager    Protocols used: Arm Pain-A-OH

## 2025-02-06 ENCOUNTER — VIRTUAL VISIT (OUTPATIENT)
Dept: INTERNAL MEDICINE | Facility: CLINIC | Age: 80
End: 2025-02-06
Payer: COMMERCIAL

## 2025-02-06 DIAGNOSIS — M79.2 NEUROPATHIC PAIN: ICD-10-CM

## 2025-02-06 DIAGNOSIS — M54.2 NECK PAIN: Primary | ICD-10-CM

## 2025-02-06 DIAGNOSIS — E66.01 MORBID OBESITY DUE TO EXCESS CALORIES (H): ICD-10-CM

## 2025-02-06 DIAGNOSIS — J44.9 CHRONIC OBSTRUCTIVE PULMONARY DISEASE, UNSPECIFIED COPD TYPE (H): ICD-10-CM

## 2025-02-06 DIAGNOSIS — E11.69 TYPE 2 DIABETES MELLITUS WITH OTHER SPECIFIED COMPLICATION, WITHOUT LONG-TERM CURRENT USE OF INSULIN (H): ICD-10-CM

## 2025-02-06 DIAGNOSIS — C7B.03 SECONDARY CARCINOID TUMORS OF BONE (H): ICD-10-CM

## 2025-02-06 RX ORDER — HYDROCODONE BITARTRATE AND ACETAMINOPHEN 5; 325 MG/1; MG/1
1 TABLET ORAL
Qty: 10 TABLET | Refills: 0 | Status: SHIPPED | OUTPATIENT
Start: 2025-02-06 | End: 2025-02-16

## 2025-02-06 RX ORDER — GABAPENTIN 300 MG/1
600 CAPSULE ORAL AT BEDTIME
Qty: 180 CAPSULE | Refills: 0 | Status: CANCELLED | OUTPATIENT
Start: 2025-02-06

## 2025-02-06 RX ORDER — GABAPENTIN 300 MG/1
300 CAPSULE ORAL EVERY MORNING
Qty: 90 CAPSULE | Refills: 0 | Status: CANCELLED | OUTPATIENT
Start: 2025-02-06

## 2025-02-06 NOTE — PROGRESS NOTES
Jack is a 79 year old who is being evaluated via a billable video visit.      What phone number would you like to be contacted at? [phone]  How would you like to obtain your AVS? William      Assessment & Plan     Neck pain  Patient will need to be cautious in using any narcotic pain medicine in combination with his gabapentin to make sure he does not have any side effects.  He states his pain is of significance and would like to have something at least to maybe help him get comfortable at night.  He states he is having considerable difficulty sleeping at night which is impacting his overall health.  We discussed and I have suggested that we give him a trial of hydrocodone at night as needed and he was advised he needs to limit in combination with his current Tylenol dosing.  - HYDROcodone-acetaminophen (NORCO) 5-325 MG tablet; Take 1 tablet by mouth nightly as needed for severe pain.    Also advised to make contact with his surgeon.    PDMP was reviewed    Neuropathic pain  Continuing with meds as discussed above.  - HYDROcodone-acetaminophen (NORCO) 5-325 MG tablet; Take 1 tablet by mouth nightly as needed for severe pain.    Chronic obstructive pulmonary disease, unspecified COPD type (H)  Noted as baseline history.    Secondary carcinoid tumors of bone (H)  Following through oncology as directed    Morbid obesity due to excess calories (H)  Patient ongoing weight loss.    Type 2 diabetes mellitus with other specified complication, without long-term current use of insulin (H)  A1c listed below and essentially stable.    See Patient Instructions    Subjective   Jack is a 79 year old, presenting for the following health issues:  Neck Pain      Video Start Time: 11:28 AM    HPI     Patient states he has numbness in his R hand, burning up the R arm to his shoulder due to his neck.   Pain rating 10/10 at times and drops down to 8/10.      Stops him from moving around the house like he usually is able to.    Present more than 50% of the day.   Pain has been present for about 1 year and has worsened over the past 3-4 days.      Schedule neck Surgery is in April scheduled.      Patient is continuing to take Gabapentin until it runs out in 5 days.  See 1/23/25 Refill encounter for further      Taking tylenol 1000 mg BID also.     Patient asking if PCP is able to prescribe him a pain medication until he has his surgery in April. Patient will agree to schedule appt only if PCP is able to provide pain medication.     Patient is once again accompanied by his wife.  Seen today essentially naked with a follow-up on sitting on bathroom stool.  Reports he is not able to get his surgery scheduled until April and is having significant amount of pain and discomfort in relationship to his neuropathic pain and also his neck.    Current Outpatient Medications   Medication Sig Dispense Refill    acetaminophen (TYLENOL) 500 MG tablet Take 1,000 mg by mouth 2 times daily.      albuterol (VENTOLIN HFA) 108 (90 Base) MCG/ACT inhaler INHALE 2 PUFFS INTO THE LUNGS EVERY 6 HOURS AS NEEDED FOR SHORTNESS OF BREATH / DYSPNEA OR WHEEZING 25.5 g 0    amLODIPine (NORVASC) 5 MG tablet Take 1 tablet (5 mg) by mouth daily. 90 tablet 3    apixaban ANTICOAGULANT (ELIQUIS) 2.5 MG tablet Take 1 tablet (2.5 mg) by mouth 2 times daily. 90 tablet 1    atorvastatin (LIPITOR) 20 MG tablet Take 1 tablet (20 mg) by mouth daily. 90 tablet 1    blood glucose (NO BRAND SPECIFIED) lancets standard Use to test blood sugar 1 time daily, first thing in the morning 50 each 3    calcium carbonate (TUMS) 500 MG chewable tablet Take 1 chew tab by mouth daily as needed for heartburn.      cholestyramine light (QUESTRAN) 4 GM packet Take 1 packet (4 g) by mouth daily.      Coenzyme Q10 400 MG CAPS Take 400 mg by mouth daily 30 capsule 0    ferrous sulfate (FE TABS) 325 (65 Fe) MG EC tablet Take 1 tablet (325 mg) by mouth every evening 30 tablet 0    finasteride (PROSCAR) 5  MG tablet Take 1 tablet (5 mg) by mouth daily 90 tablet 3    Fluticasone-Umeclidin-Vilant (TRELEGY ELLIPTA) 100-62.5-25 MCG/ACT oral inhaler USE 1 INHALATION DAILY 60 each 5    furosemide (LASIX) 20 MG tablet Take 2 tablets (40 mg) by mouth daily 60 tablet 0    gabapentin (NEURONTIN) 300 MG capsule Take 1 capsule (300 mg) by mouth every morning. 90 capsule 0    gabapentin (NEURONTIN) 300 MG capsule Take 2 capsules (600 mg) by mouth at bedtime. 180 capsule 0    HYDROcodone-acetaminophen (NORCO) 5-325 MG tablet Take 1 tablet by mouth nightly as needed for severe pain. 10 tablet 0    lanreotide acetate (SOMATULINE) 120 MG/0.5ML injection Inject 120 mg subcutaneously every 28 days. Do not start before September 6, 2024.      lisinopril (ZESTRIL) 40 MG tablet TAKE 1 TABLET DAILY 90 tablet 3    metFORMIN (GLUCOPHAGE) 1000 MG tablet Take 1 tablet (1,000 mg) by mouth 2 times daily (with meals). 180 tablet 1    miconazole (MICATIN) 2 % external cream Apply topically 2 times daily as needed for other (Rash/ skin irritation)      order for DME Oxygen 2 Li/min  at night via nasal cannula 1 Device 0    vitamin B-Complex Take 1 tablet by mouth daily      vitamin C (ASCORBIC ACID) 500 MG tablet Take 500 mg by mouth daily.       No current facility-administered medications for this visit.         Review of Systems  CONSTITUTIONAL: NEGATIVE for fever, chills, change in weight  EYES: NEGATIVE for vision changes or irritation  ENT/MOUTH: NEGATIVE for ear, mouth and throat problems  RESP: NEGATIVE for significant cough  CV: NEGATIVE for chest pain, palpitations or peripheral edema  GI: NEGATIVE for nausea, abdominal pain, heartburn, or change in bowel habits  : NEGATIVE for frequency, dysuria, or hematuria  HEME: NEGATIVE for bleeding problems  PSYCHIATRIC: NEGATIVE for changes in mood or affect      Objective           Vitals:  No vitals were obtained today due to virtual visit.    Physical Exam     GENERAL: alert and no distress  but appears in discomfort  EYES: Eyes grossly normal to inspection.  No discharge or erythema, or obvious scleral/conjunctival abnormalities.  RESP: No audible wheeze, cough, or visible cyanosis.    NEURO: Cranial nerves grossly intact.  Mentation and speech appropriate for age.  PSYCH: Appropriate affect, tone, and pace of words    Lab Results   Component Value Date    A1C 7.0 12/02/2024    A1C 7.0 06/25/2024    A1C 7.3 03/02/2024    A1C 6.7 09/12/2023    A1C 6.2 03/21/2023    A1C 6.8 03/17/2021    A1C 7.2 11/11/2020    A1C 7.5 09/20/2020    A1C 7.6 06/18/2020    A1C 7.8 01/23/2020         Video-Visit Details    Type of service:  Video Visit   Video End Time:11:49 AM  Originating Location (pt. Location): Home    Distant Location (provider location):  On-site  Platform used for Video Visit: Radha  Signed Electronically by: Olegario Castillo MD

## 2025-02-12 ENCOUNTER — TELEPHONE (OUTPATIENT)
Dept: NEUROSURGERY | Facility: CLINIC | Age: 80
End: 2025-02-12
Payer: COMMERCIAL

## 2025-02-13 NOTE — TELEPHONE ENCOUNTER
Writer returned call to Kenia green RN to inform referral faxed.     Theresa Finn LPN  Neurosurgery

## 2025-02-24 ENCOUNTER — APPOINTMENT (OUTPATIENT)
Dept: GENERAL RADIOLOGY | Facility: CLINIC | Age: 80
DRG: 552 | End: 2025-02-24
Attending: EMERGENCY MEDICINE
Payer: COMMERCIAL

## 2025-02-24 ENCOUNTER — APPOINTMENT (OUTPATIENT)
Dept: CT IMAGING | Facility: CLINIC | Age: 80
DRG: 552 | End: 2025-02-24
Attending: EMERGENCY MEDICINE
Payer: COMMERCIAL

## 2025-02-24 ENCOUNTER — HOSPITAL ENCOUNTER (OUTPATIENT)
Facility: CLINIC | Age: 80
Setting detail: OBSERVATION
Discharge: HOME | End: 2025-02-24
Attending: EMERGENCY MEDICINE | Admitting: INTERNAL MEDICINE
Payer: COMMERCIAL

## 2025-02-24 DIAGNOSIS — G91.2 NPH (NORMAL PRESSURE HYDROCEPHALUS) (H): ICD-10-CM

## 2025-02-24 DIAGNOSIS — G91.9 HYDROCEPHALUS, UNSPECIFIED TYPE (H): ICD-10-CM

## 2025-02-24 DIAGNOSIS — S20.229A CONTUSION OF THORACIC SPINE: ICD-10-CM

## 2025-02-24 DIAGNOSIS — S09.90XA HEAD INJURY, INITIAL ENCOUNTER: ICD-10-CM

## 2025-02-24 DIAGNOSIS — I10 ESSENTIAL HYPERTENSION, BENIGN: ICD-10-CM

## 2025-02-24 DIAGNOSIS — M79.2 NEUROPATHIC PAIN: ICD-10-CM

## 2025-02-24 DIAGNOSIS — S22.071A CLOSED STABLE BURST FRACTURE OF TENTH THORACIC VERTEBRA, INITIAL ENCOUNTER (H): Primary | ICD-10-CM
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
## 2025-02-24 LAB
ANION GAP SERPL CALCULATED.3IONS-SCNC: 15 MMOL/L (ref 7–15)
BASOPHILS # BLD AUTO: 0.1 10E3/UL (ref 0–0.2)
BASOPHILS NFR BLD AUTO: 1 %
BUN SERPL-MCNC: 11.7 MG/DL (ref 8–23)
CALCIUM SERPL-MCNC: 9 MG/DL (ref 8.8–10.4)
CHLORIDE SERPL-SCNC: 98 MMOL/L (ref 98–107)
CREAT SERPL-MCNC: 1.02 MG/DL (ref 0.67–1.17)
EGFRCR SERPLBLD CKD-EPI 2021: 74 ML/MIN/1.73M2
EOSINOPHIL # BLD AUTO: 0.1 10E3/UL (ref 0–0.7)
EOSINOPHIL NFR BLD AUTO: 1 %
ERYTHROCYTE [DISTWIDTH] IN BLOOD BY AUTOMATED COUNT: 11.8 % (ref 10–15)
GLUCOSE BLDC GLUCOMTR-MCNC: 152 MG/DL (ref 70–99)
GLUCOSE SERPL-MCNC: 184 MG/DL (ref 70–99)
HCO3 SERPL-SCNC: 24 MMOL/L (ref 22–29)
HCT VFR BLD AUTO: 35.4 % (ref 40–53)
HGB BLD-MCNC: 12 G/DL (ref 13.3–17.7)
IMM GRANULOCYTES # BLD: 0.1 10E3/UL
IMM GRANULOCYTES NFR BLD: 1 %
LYMPHOCYTES # BLD AUTO: 2.6 10E3/UL (ref 0.8–5.3)
LYMPHOCYTES NFR BLD AUTO: 18 %
MCH RBC QN AUTO: 33.4 PG (ref 26.5–33)
MCHC RBC AUTO-ENTMCNC: 33.9 G/DL (ref 31.5–36.5)
MCV RBC AUTO: 99 FL (ref 78–100)
MONOCYTES # BLD AUTO: 0.9 10E3/UL (ref 0–1.3)
MONOCYTES NFR BLD AUTO: 6 %
NEUTROPHILS # BLD AUTO: 10.7 10E3/UL (ref 1.6–8.3)
NEUTROPHILS NFR BLD AUTO: 74 %
NRBC # BLD AUTO: 0 10E3/UL
NRBC BLD AUTO-RTO: 0 /100
PLATELET # BLD AUTO: 289 10E3/UL (ref 150–450)
POTASSIUM SERPL-SCNC: 3.9 MMOL/L (ref 3.4–5.3)
RBC # BLD AUTO: 3.59 10E6/UL (ref 4.4–5.9)
SODIUM SERPL-SCNC: 137 MMOL/L (ref 135–145)
WBC # BLD AUTO: 14.5 10E3/UL (ref 4–11)

## 2025-02-24 PROCEDURE — 250N000013 HC RX MED GY IP 250 OP 250 PS 637: Performed by: INTERNAL MEDICINE

## 2025-02-24 PROCEDURE — 85004 AUTOMATED DIFF WBC COUNT: CPT | Performed by: EMERGENCY MEDICINE

## 2025-02-24 PROCEDURE — 80048 BASIC METABOLIC PNL TOTAL CA: CPT | Performed by: EMERGENCY MEDICINE

## 2025-02-24 PROCEDURE — 82962 GLUCOSE BLOOD TEST: CPT

## 2025-02-24 PROCEDURE — G0378 HOSPITAL OBSERVATION PER HR: HCPCS

## 2025-02-24 PROCEDURE — 250N000013 HC RX MED GY IP 250 OP 250 PS 637: Performed by: EMERGENCY MEDICINE

## 2025-02-24 PROCEDURE — 70450 CT HEAD/BRAIN W/O DYE: CPT

## 2025-02-24 PROCEDURE — 99285 EMERGENCY DEPT VISIT HI MDM: CPT | Mod: 25

## 2025-02-24 PROCEDURE — 72070 X-RAY EXAM THORAC SPINE 2VWS: CPT

## 2025-02-24 PROCEDURE — 36415 COLL VENOUS BLD VENIPUNCTURE: CPT | Performed by: EMERGENCY MEDICINE

## 2025-02-24 PROCEDURE — 72100 X-RAY EXAM L-S SPINE 2/3 VWS: CPT

## 2025-02-24 PROCEDURE — 99223 1ST HOSP IP/OBS HIGH 75: CPT | Performed by: INTERNAL MEDICINE

## 2025-02-24 RX ORDER — ACETAMINOPHEN 500 MG
1000 TABLET ORAL 2 TIMES DAILY
Status: DISPENSED | OUTPATIENT
Start: 2025-02-24

## 2025-02-24 RX ORDER — FLUTICASONE FUROATE AND VILANTEROL 100; 25 UG/1; UG/1
1 POWDER RESPIRATORY (INHALATION) DAILY
Status: DISPENSED | OUTPATIENT
Start: 2025-02-25

## 2025-02-24 RX ORDER — HYDRALAZINE HYDROCHLORIDE 10 MG/1
10 TABLET, FILM COATED ORAL EVERY 4 HOURS PRN
Status: ACTIVE | OUTPATIENT
Start: 2025-02-24

## 2025-02-24 RX ORDER — CHOLESTYRAMINE LIGHT 4 G/5.7G
4 POWDER, FOR SUSPENSION ORAL 2 TIMES DAILY WITH MEALS
Status: DISPENSED | OUTPATIENT
Start: 2025-02-25

## 2025-02-24 RX ORDER — OXYCODONE HYDROCHLORIDE 5 MG/1
5 TABLET ORAL EVERY 4 HOURS PRN
Status: DISCONTINUED | OUTPATIENT
Start: 2025-02-24 | End: 2025-02-24

## 2025-02-24 RX ORDER — OXYCODONE AND ACETAMINOPHEN 5; 325 MG/1; MG/1
1 TABLET ORAL ONCE
Status: COMPLETED | OUTPATIENT
Start: 2025-02-24 | End: 2025-02-24

## 2025-02-24 RX ORDER — ACETAMINOPHEN 325 MG/1
650 TABLET ORAL EVERY 4 HOURS PRN
Status: ACTIVE | OUTPATIENT
Start: 2025-02-24

## 2025-02-24 RX ORDER — DEXTROSE MONOHYDRATE 25 G/50ML
25-50 INJECTION, SOLUTION INTRAVENOUS
Status: ACTIVE | OUTPATIENT
Start: 2025-02-24

## 2025-02-24 RX ORDER — NICOTINE POLACRILEX 4 MG
15-30 LOZENGE BUCCAL
Status: ACTIVE | OUTPATIENT
Start: 2025-02-24

## 2025-02-24 RX ORDER — OXYCODONE HYDROCHLORIDE 5 MG/1
10 TABLET ORAL EVERY 4 HOURS PRN
Status: DISPENSED | OUTPATIENT
Start: 2025-02-24

## 2025-02-24 RX ORDER — AMOXICILLIN 250 MG
2 CAPSULE ORAL 2 TIMES DAILY PRN
Status: ACTIVE | OUTPATIENT
Start: 2025-02-24

## 2025-02-24 RX ORDER — ATORVASTATIN CALCIUM 20 MG/1
20 TABLET, FILM COATED ORAL AT BEDTIME
Status: DISPENSED | OUTPATIENT
Start: 2025-02-24

## 2025-02-24 RX ORDER — FINASTERIDE 5 MG/1
5 TABLET, FILM COATED ORAL DAILY
Status: DISPENSED | OUTPATIENT
Start: 2025-02-25

## 2025-02-24 RX ORDER — ONDANSETRON 4 MG/1
4 TABLET, ORALLY DISINTEGRATING ORAL EVERY 6 HOURS PRN
Status: ACTIVE | OUTPATIENT
Start: 2025-02-24

## 2025-02-24 RX ORDER — FUROSEMIDE 40 MG/1
40 TABLET ORAL DAILY
Status: DISPENSED | OUTPATIENT
Start: 2025-02-25

## 2025-02-24 RX ORDER — OXYCODONE HYDROCHLORIDE 5 MG/1
5 TABLET ORAL EVERY 4 HOURS PRN
Status: ACTIVE | OUTPATIENT
Start: 2025-02-24

## 2025-02-24 RX ORDER — AMLODIPINE BESYLATE 5 MG/1
5 TABLET ORAL DAILY
Status: DISPENSED | OUTPATIENT
Start: 2025-02-25

## 2025-02-24 RX ORDER — AMOXICILLIN 250 MG
1 CAPSULE ORAL 2 TIMES DAILY PRN
Status: ACTIVE | OUTPATIENT
Start: 2025-02-24

## 2025-02-24 RX ORDER — LISINOPRIL 40 MG/1
40 TABLET ORAL DAILY
Status: DISPENSED | OUTPATIENT
Start: 2025-02-25

## 2025-02-24 RX ORDER — FERROUS SULFATE 325(65) MG
325 TABLET ORAL EVERY EVENING
Status: DISPENSED | OUTPATIENT
Start: 2025-02-24

## 2025-02-24 RX ORDER — GABAPENTIN 300 MG/1
300 CAPSULE ORAL EVERY MORNING
Status: DISPENSED | OUTPATIENT
Start: 2025-02-25

## 2025-02-24 RX ORDER — ONDANSETRON 2 MG/ML
4 INJECTION INTRAMUSCULAR; INTRAVENOUS EVERY 6 HOURS PRN
Status: ACTIVE | OUTPATIENT
Start: 2025-02-24

## 2025-02-24 RX ORDER — HYDRALAZINE HYDROCHLORIDE 20 MG/ML
10 INJECTION INTRAMUSCULAR; INTRAVENOUS EVERY 4 HOURS PRN
Status: ACTIVE | OUTPATIENT
Start: 2025-02-24

## 2025-02-24 RX ORDER — PROCHLORPERAZINE MALEATE 5 MG/1
5 TABLET ORAL EVERY 6 HOURS PRN
Status: ACTIVE | OUTPATIENT
Start: 2025-02-24

## 2025-02-24 RX ORDER — GABAPENTIN 300 MG/1
600 CAPSULE ORAL AT BEDTIME
Status: DISPENSED | OUTPATIENT
Start: 2025-02-24

## 2025-02-24 RX ORDER — ACETAMINOPHEN 650 MG/1
650 SUPPOSITORY RECTAL EVERY 4 HOURS PRN
Status: ACTIVE | OUTPATIENT
Start: 2025-02-24

## 2025-02-24 RX ORDER — OXYCODONE AND ACETAMINOPHEN 5; 325 MG/1; MG/1
2 TABLET ORAL ONCE
Status: COMPLETED | OUTPATIENT
Start: 2025-02-24 | End: 2025-02-24

## 2025-02-24 RX ADMIN — ACETAMINOPHEN 1000 MG: 500 TABLET, FILM COATED ORAL at 23:02

## 2025-02-24 RX ADMIN — GABAPENTIN 600 MG: 300 CAPSULE ORAL at 23:02

## 2025-02-24 RX ADMIN — OXYCODONE HYDROCHLORIDE 10 MG: 5 TABLET ORAL at 23:01

## 2025-02-24 RX ADMIN — FERROUS SULFATE TAB 325 MG (65 MG ELEMENTAL FE) 325 MG: 325 (65 FE) TAB at 23:02

## 2025-02-24 RX ADMIN — ATORVASTATIN CALCIUM 20 MG: 20 TABLET, FILM COATED ORAL at 23:01

## 2025-02-24 RX ADMIN — APIXABAN 2.5 MG: 2.5 TABLET, FILM COATED ORAL at 23:02

## 2025-02-24 RX ADMIN — OXYCODONE HYDROCHLORIDE AND ACETAMINOPHEN 1 TABLET: 5; 325 TABLET ORAL at 17:29

## 2025-02-24 RX ADMIN — OXYCODONE HYDROCHLORIDE AND ACETAMINOPHEN 1 TABLET: 5; 325 TABLET ORAL at 16:28

## 2025-02-24 ASSESSMENT — ACTIVITIES OF DAILY LIVING (ADL)
ADLS_ACUITY_SCORE: 58

## 2025-02-24 NOTE — ED PROVIDER NOTES
Emergency Department Note      History of Present Illness     Chief Complaint   Fall      HPI   Nahun Villalobos is a 79 year old male HTN, COPD, PE on Eliquis who presents after being seen at the doctor today and falling down while attempting to sit down and he is wheel chair and the chair fell backwards causing him to hit  his head on the floor upper back.  He has chronic neck pain nothing worse.  He has chronic radicular symptoms in his upper extremities from his neck pain.  And injuring his he did not sustain any laceration.    Independent Historian   None    Review of External Notes   Note from Dr. Enriquez family medicine patient was seen at the clinic today in occupational medicine he was going to take a seat when the chair moved and he fell backwards hitting his head and back.  He experienced back pain he did not lose consciousness.  He did not he is on anticoagulation.        Past Medical History     Medical History and Problem List   Past Medical History:   Diagnosis Date    Antiplatelet or antithrombotic long-term use     Arthritis     CHF (congestive heart failure) (H) 09/16/2020    COPD (chronic obstructive pulmonary disease) (H)     DM (diabetes mellitus) (H)     Dyspnea on exertion     Essential hypertension, benign     Hemorrhage of rectum and anus     Non-small cell lung cancer (H)     Obesity     Personal history of colonic polyps     Sleep apnea     Thrombosis     Tobacco use disorder     Urinary retention     Walking troubles        Medications   acetaminophen (TYLENOL) 500 MG tablet  albuterol (VENTOLIN HFA) 108 (90 Base) MCG/ACT inhaler  amLODIPine (NORVASC) 5 MG tablet  apixaban ANTICOAGULANT (ELIQUIS) 2.5 MG tablet  atorvastatin (LIPITOR) 20 MG tablet  blood glucose (NO BRAND SPECIFIED) lancets standard  calcium carbonate (TUMS) 500 MG chewable tablet  cholestyramine light (QUESTRAN) 4 GM packet  Coenzyme Q10 400 MG CAPS  ferrous sulfate (FE TABS) 325 (65 Fe) MG EC tablet  finasteride  (PROSCAR) 5 MG tablet  Fluticasone-Umeclidin-Vilant (TRELEGY ELLIPTA) 100-62.5-25 MCG/ACT oral inhaler  furosemide (LASIX) 20 MG tablet  gabapentin (NEURONTIN) 300 MG capsule  gabapentin (NEURONTIN) 300 MG capsule  lanreotide acetate (SOMATULINE) 120 MG/0.5ML injection  lisinopril (ZESTRIL) 40 MG tablet  metFORMIN (GLUCOPHAGE) 1000 MG tablet  miconazole (MICATIN) 2 % external cream  order for DME  vitamin B-Complex  vitamin C (ASCORBIC ACID) 500 MG tablet        Surgical History   Past Surgical History:   Procedure Laterality Date    ABDOMEN SURGERY  1995    APPENDECTOMY      BONE EXOSTOSIS EXCISION Bilateral 9/20/2022    Procedure: EXOSTECTOMIES BOTH 5TH DIGITS WITH SOFT TISSUE RELEASE;  Surgeon: Tong Gray DPM;  Location: [place] [facility]    CHOLECYSTECTOMY      COLONOSCOPY  2014    CYSTOSCOPY, TRANSURETHRAL RESECTION (TUR) PROSTATE, COMBINED N/A 4/30/2018    Procedure: COMBINED CYSTOSCOPY, TRANSURETHRAL RESECTION (TUR) PROSTATE;  Cystoscopy, Transurethral Resection Of The Prostate;  Surgeon: Praveena Burris MD;  Location: [facility] OR    IR LUNG BIOPSY MEDIASTINUM RIGHT  4/6/2016    WEDGE RESECTION      lung    ZZC NONSPECIFIC PROCEDURE      cholecystectomy       Physical Exam     Patient Vitals for the past 24 hrs:   BP Temp Temp src Pulse Resp SpO2 Weight   02/24/25 1732 (!) 150/90 -- -- 85 -- -- --   02/24/25 1717 -- -- -- -- -- 95 % --   02/24/25 1454 (!) 189/104 -- -- 67 18 96 % --   02/24/25 1359 121/73 97.1  F (36.2  C) Temporal 74 18 97 % 83.5 kg (184 lb)     Physical Exam    Physical Exam   Constitutional:  Patient is oriented to person, place, and time. They appear well-developed and well-nourished. Mild distress secondary to upper back pain  HENT:   Mouth/Throat:   Oropharynx is clear and moist.   Eyes:    Conjunctivae normal and EOM are normal. Pupils are equal, round, and reactive to light.   Neck:    Normal range of motion.   Cardiovascular: Normal rate, regular rhythm and normal heart  sounds.  Exam reveals no gallop and no friction rub.  No murmur heard.  Pulmonary/Chest:  Effort normal and breath sounds normal. Patient has no wheezes. Patient has no rales.   Abdominal:   Soft. Bowel sounds are normal. Patient exhibits no mass. There is no tenderness. There is no rebound and no guarding.   Musculoskeletal:  Patient has pain to palpation with mild edema of the upper thoracic area.  No abrasions.  Neurological:   Patient is alert and oriented to person, place, and time. Patient has normal strength. No acute cranial nerve deficit or sensory deficit. GCS 15  Skin:   Skin is warm and dry. No rash noted. No erythema.   Psychiatric:   Patient has a normal mood and affect. Patient's behavior is normal. Judgment and thought content normal.       Diagnostics     Lab Results   Labs Ordered and Resulted from Time of ED Arrival to Time of ED Departure - No data to display    Imaging   Head CT w/o contrast   Final Result   IMPRESSION:   1.  No acute traumatic intracranial abnormality.   2.  Dilated ventricular system in a configuration that could represent normal pressure hydrocephalus.      Lumbar spine XR, 2-3 views   Final Result   IMPRESSION: Shallow dextrocurvature. Sagittal alignment in the thoracic spine is unremarkable. Grade 1 anterolisthesis L4 on L5 measuring 3 mm. Changes of diffuse idiopathic skeletal hyperostosis within the thoracic spine. Vertebral body heights are    maintained. No anterior compression deformity. Mild to moderate L4-5 disc height loss. Mild disc degeneration elsewhere. Moderate lower lumbar facet arthrosis. Vascular calcifications are present within the aorta. Postsurgical changes of the right lung    and right lower quadrant.       XR Thoracic Spine 2 Views   Final Result   IMPRESSION: Shallow dextrocurvature. Sagittal alignment in the thoracic spine is unremarkable. Grade 1 anterolisthesis L4 on L5 measuring 3 mm. Changes of diffuse idiopathic skeletal hyperostosis within the  thoracic spine. Vertebral body heights are    maintained. No anterior compression deformity. Mild to moderate L4-5 disc height loss. Mild disc degeneration elsewhere. Moderate lower lumbar facet arthrosis. Vascular calcifications are present within the aorta. Postsurgical changes of the right lung    and right lower quadrant.             Independent Interpretation   None    ED Course      Medications Administered   Medications   oxyCODONE-acetaminophen (PERCOCET) 5-325 MG per tablet 2 tablet (1 tablet Oral $Given 2/24/25 1627)   oxyCODONE-acetaminophen (PERCOCET) 5-325 MG per tablet 1 tablet (1 tablet Oral $Given 2/24/25 1729)       Procedures   Procedures     Discussion of Management   Admitting Hospitalist, Dr Mackay    ED Course        Additional Documentation  None    Medical Decision Making / Diagnosis     CMS Diagnoses: None    MIPS       None    [facility]   Nahun Villalobos is a 79 year old male who is seen after falling in his chair at clinic.  He did not lose consciousness.  But he is on Eliquis and he did hit his head so I did do a CT of his brain.  Fortunately there is no skull fracture or brain bleed.  There are some mildly dilated ventricles which she has never been told before.  He has felt unsteady on his feet for quite some time this is not acute no acute incontinence.  I did do x-rays of his spine forging there is no evidence of fracture there is chronic findings.  He received oral pain medications here.  We did a road test with a walker and he felt that he was in too much pain to go home.  I will bring him in for placement.    Disposition   The patient was admitted to the hospital.     Diagnosis     ICD-10-CM    1. Hydrocephalus, unspecified type (H)  G91.9       2. Fall, initial encounter  W19.XXXA       3. Head injury, initial encounter  S09.90XA       4. Contusion of thoracic spine  S20.229A            Discharge Medications   New Prescriptions    No medications on file         Kelli Rousseau,  MD Rousseau, Kelli Stack MD  02/24/25 5285

## 2025-02-24 NOTE — ED NOTES
[facility]  ED Nurse Handoff Report    ED Chief complaint: Fall      ED Diagnosis:   Final diagnoses:   Hydrocephalus, unspecified type (H)   Fall, initial encounter   Head injury, initial encounter   Contusion of thoracic spine       Code Status: Full Code    Allergies: No Known Allergies    Patient Story: Hx of HTN, COPD, PE on Eliquis. Fall out of wheelchair backwards causing him to hit his head on the floor. Complains of back pain.  He has chronic neck pain.  He has chronic radicular symptoms in his upper extremities from his neck pain.           Treatments and/or interventions provided:   Medications   oxyCODONE-acetaminophen (PERCOCET) 5-325 MG per tablet 2 tablet (1 tablet Oral $Given 2/24/25 1624)   oxyCODONE-acetaminophen (PERCOCET) 5-325 MG per tablet 1 tablet (1 tablet Oral $Given 2/24/25 1724)       Patient's response to treatments and/or interventions: Improved pain    To be done/followed up on inpatient unit:  pain control    Does this patient have any cognitive concerns?:  none    Activity level - Baseline/Home:  Independent  Activity Level - Current:   Total Care    Patient's Preferred language: English   Needed?: No    Isolation: None  Infection: Not Applicable  Patient tested for COVID 19 prior to admission: NO  Bariatric?: No    Vital Signs:   Vitals:    02/24/25 1359 02/24/25 1454 02/24/25 1717 02/24/25 1732   BP: 121/73 (!) 189/104  (!) 150/90   Pulse: 74 67  85   Resp: 18 18     Temp: 97.1  F (36.2  C)      TempSrc: Temporal      SpO2: 97% 96% 95%    Weight: 83.5 kg (184 lb)          Cardiac Rhythm:       Family Comments: At bedside      For the majority of the shift this patient's behavior was Yellow.   Behavioral interventions performed were .    ED NURSE PHONE NUMBER: [phone]

## 2025-02-24 NOTE — ED NOTES
Bed: ED01  Expected date:   Expected time:   Means of arrival:   Comments:  ROMÁN IN ROOM- PLEASE SEE

## 2025-02-24 NOTE — ED TRIAGE NOTES
Pt BIBA, Pt was at his annual physical. Pt reports giving a urine sample and went to sit in his WC. Pt notes the WC was not locked. Pt fell backward landing on his butt. Pt notes he did hit his head. Pt denies LOC. Pt is on Eliquis.

## 2025-02-24 NOTE — H&P
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    Principal problem:  Mechanical fall  Chronic back pain w/ Sciatica  Cervical stenosis w/ myelopathy, neuropathy  Degenerative joint disease  *CTH negative for acute abnormality, does show dilated ventricular system, see below  *X-ray of thoracic and lumbar spine show no acute fractures  Registered to observation  PT, OT, social work consults requested  Continue gabapentin, Tylenol as needed for pain relief    Dilated cerebral ventricles, consider normal pressure hydrocephalus  Outpatient neurology consult      COPD/CIERA: duonebs prn. Not compliant w/ CPAP  Hx PE on AC: resume PTA eliquis  T2D: A1C 7% on 12/2/2024. 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: resume Lanreotide on discharge  Hx RUL Adenocarcinoma s/p wedge resection: continue outpt oncology follow-up            Diet: Consistent Carbohydrate Diet Moderate Consistent Carb (60 g CHO per Meal) Diet    DVT Prophylaxis: DOAC  Gomez Catheter: Not present  Lines: None     Cardiac Monitoring: None  Code Status:  DNR/DNI    Clinically Significant Risk Factors Present on Admission                # Drug Induced Coagulation  "Defect: home medication list includes an anticoagulant medication    # Hypertension: Noted on problem list          # DMII: A1C = 7.0 % (Ref range: <5.7 %) within past 6 months               Disposition Plan     Medically Ready for Discharge: Anticipated Tomorrow      Ghada Mackay MD  Hospitalist Service  [facility]  Securely message with [employer] (more info)  Text page via [employer] Paging/Directory     ______________________________________________________________________    Chief Complaint   \"They gave me 2 of those pain pills.\"      History of Present Illness   Nahun Villalobos is a 79 year old male ith multiple medical problems including CHF, Benign essential hypertension, Type 2 DM, COPD, history of PE on Apixaban (Eliquis), 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    Patient was apparently at a doctor's visit today when he tried to sit back on his wheelchair and the wheelchair rolled, causing him to fall backwards onto the floor.  He did hit his head.  He has chronic neck pain, also complained of back pain.    Here, workup includes WBC of 14.5, hemoglobin 12.0, platelet count 289,000.  Electrolytes, BUN and creatinine are pending.  Noncontrast head CT shows no acute intracranial abnormality.  The ventricular system is dilated \"in a configuration that could represent normal pressure hydrocephalus.\"  Patient has not been told about this finding in the past.  X-rays of the thoracic and lumbar spine show no acute fracture.    They did a road test with a walker in ED and patient felt that he was in too much pain to go home.  He is registered to observation      Past Medical History    Past Medical History:   Diagnosis Date    Antiplatelet or antithrombotic long-term use     " Arthritis     CHF (congestive heart failure) (H) 09/16/2020    COPD (chronic obstructive pulmonary disease) (H)     DM (diabetes mellitus) (H)     Dyspnea on exertion     Essential hypertension, benign     Hemorrhage of rectum and anus     Non-small cell lung cancer (H)     Obesity     Personal history of colonic polyps     Sleep apnea     Thrombosis     Tobacco use disorder     Urinary retention     Walking troubles        Past Surgical History   Past Surgical History:   Procedure Laterality Date    ABDOMEN SURGERY  1995    APPENDECTOMY      BONE EXOSTOSIS EXCISION Bilateral 9/20/2022    Procedure: EXOSTECTOMIES BOTH 5TH DIGITS WITH SOFT TISSUE RELEASE;  Surgeon: Tong Gray DPM;  Location: [facility]    CHOLECYSTECTOMY      COLONOSCOPY  2014    CYSTOSCOPY, TRANSURETHRAL RESECTION (TUR) PROSTATE, COMBINED N/A 4/30/2018    Procedure: COMBINED CYSTOSCOPY, TRANSURETHRAL RESECTION (TUR) PROSTATE;  Cystoscopy, Transurethral Resection Of The Prostate;  Surgeon: Praveena Burris MD;  Location: [facility]    IR LUNG BIOPSY MEDIASTINUM RIGHT  4/6/2016    WEDGE RESECTION      lung    ZZC NONSPECIFIC PROCEDURE      cholecystectomy       Prior to Admission Medications   Prior to Admission Medications   Prescriptions Last Dose Informant Patient Reported? Taking?   Coenzyme Q10 400 MG CAPS  Other No No   Sig: Take 400 mg by mouth daily   Fluticasone-Umeclidin-Vilant (TRELEGY ELLIPTA) 100-62.5-25 MCG/ACT oral inhaler   No No   Sig: USE 1 INHALATION DAILY   acetaminophen (TYLENOL) 500 MG tablet  Other Yes No   Sig: Take 1,000 mg by mouth 2 times daily.   albuterol (VENTOLIN HFA) 108 (90 Base) MCG/ACT inhaler  Other No No   Sig: INHALE 2 PUFFS INTO THE LUNGS EVERY 6 HOURS AS NEEDED FOR SHORTNESS OF BREATH / DYSPNEA OR WHEEZING   amLODIPine (NORVASC) 5 MG tablet  Other No No   Sig: Take 1 tablet (5 mg) by mouth daily.   apixaban ANTICOAGULANT (ELIQUIS) 2.5 MG tablet  Other No No   Sig: Take 1 tablet (2.5 mg) by mouth 2  times daily.   atorvastatin (LIPITOR) 20 MG tablet  Other No No   Sig: Take 1 tablet (20 mg) by mouth daily.   blood glucose (NO BRAND SPECIFIED) lancets standard  Spouse/Significant Other, Other No No   Sig: Use to test blood sugar 1 time daily, first thing in the morning   calcium carbonate (TUMS) 500 MG chewable tablet  Other Yes No   Sig: Take 1 chew tab by mouth daily as needed for heartburn.   cholestyramine light (QUESTRAN) 4 GM packet   Yes No   Sig: Take 1 packet (4 g) by mouth daily.   ferrous sulfate (FE TABS) 325 (65 Fe) MG EC tablet  Other No No   Sig: Take 1 tablet (325 mg) by mouth every evening   finasteride (PROSCAR) 5 MG tablet  Other No No   Sig: Take 1 tablet (5 mg) by mouth daily   furosemide (LASIX) 20 MG tablet  Other No No   Sig: Take 2 tablets (40 mg) by mouth daily   gabapentin (NEURONTIN) 300 MG capsule   No No   Sig: Take 1 capsule (300 mg) by mouth every morning.   gabapentin (NEURONTIN) 300 MG capsule   No No   Sig: Take 2 capsules (600 mg) by mouth at bedtime.   lanreotide acetate (SOMATULINE) 120 MG/0.5ML injection  Other Yes No   Sig: Inject 120 mg subcutaneously every 28 days. Do not start before September 6, 2024.   lisinopril (ZESTRIL) 40 MG tablet  Other No No   Sig: TAKE 1 TABLET DAILY   metFORMIN (GLUCOPHAGE) 1000 MG tablet   No No   Sig: Take 1 tablet (1,000 mg) by mouth 2 times daily (with meals).   miconazole (MICATIN) 2 % external cream  Other No No   Sig: Apply topically 2 times daily as needed for other (Rash/ skin irritation)   order for DME  Spouse/Significant Other, Other No No   Sig: Oxygen 2 Li/min  at night via nasal cannula   vitamin B-Complex  Other Yes No   Sig: Take 1 tablet by mouth daily   vitamin C (ASCORBIC ACID) 500 MG tablet  Other Yes No   Sig: Take 500 mg by mouth daily.      Facility-Administered Medications: None        Physical Exam   Vital Signs: Temp: 97.1  F (36.2  C) Temp src: Temporal BP: (!) 150/90 Pulse: 85   Resp: 18 SpO2: 95 % O2 Device: None  (Room air)    Weight: 184 lbs 0 oz    Constitutional: Awake, alert, cooperative, no apparent distress  Respiratory: Clear to auscultation bilaterally, no crackles or wheezing  Cardiovascular: Regular rate and rhythm, normal S1 and S2, and no murmur noted  GI: Normal bowel sounds, soft, non-distended, non-tender  Skin/Integumen: No rash on exposed skin.  No lower extremity edema  Other: Mood is very pleasant and outgoing      Medical Decision Making       75 MINUTES SPENT BY ME on the date of service doing chart review, history, exam, documentation & further activities per the note.      Data     I have personally reviewed the following data over the past 24 hrs:    14.5 (H)  \   12.0 (L)   / 289     N/A N/A N/A /  N/A   N/A N/A N/A \       Imaging results reviewed over the past 24 hrs:   Recent Results (from the past 24 hours)   Lumbar spine XR, 2-3 views    Narrative    EXAM: XR THORACIC SPINE 2 VIEWS, XR LUMBAR SPINE 2/3 VIEWS  LOCATION: [facility]  DATE: 2/24/2025    INDICATION: fall back pain  COMPARISON: None.      Impression    IMPRESSION: Shallow dextrocurvature. Sagittal alignment in the thoracic spine is unremarkable. Grade 1 anterolisthesis L4 on L5 measuring 3 mm. Changes of diffuse idiopathic skeletal hyperostosis within the thoracic spine. Vertebral body heights are   maintained. No anterior compression deformity. Mild to moderate L4-5 disc height loss. Mild disc degeneration elsewhere. Moderate lower lumbar facet arthrosis. Vascular calcifications are present within the aorta. Postsurgical changes of the right lung   and right lower quadrant.    XR Thoracic Spine 2 Views    Narrative    EXAM: XR THORACIC SPINE 2 VIEWS, XR LUMBAR SPINE 2/3 VIEWS  LOCATION: [facility]  DATE: 2/24/2025    INDICATION: fall back pain  COMPARISON: None.      Impression    IMPRESSION: Shallow dextrocurvature. Sagittal alignment in the thoracic spine is unremarkable. Grade 1  anterolisthesis L4 on L5 measuring 3 mm. Changes of diffuse idiopathic skeletal hyperostosis within the thoracic spine. Vertebral body heights are   maintained. No anterior compression deformity. Mild to moderate L4-5 disc height loss. Mild disc degeneration elsewhere. Moderate lower lumbar facet arthrosis. Vascular calcifications are present within the aorta. Postsurgical changes of the right lung   and right lower quadrant.    Head CT w/o contrast    Narrative    EXAM: CT HEAD W/O CONTRAST  LOCATION: [facility]  DATE: 2/24/2025    INDICATION: fall and hit head on eliquis  COMPARISON: 12/1/2024.  TECHNIQUE: Routine CT Head without IV contrast. Multiplanar reformats. Dose reduction techniques were used.    FINDINGS:  INTRACRANIAL CONTENTS: No intracranial hemorrhage, extraaxial collection, or mass effect.  No CT evidence of acute infarct. Moderate presumed chronic small vessel ischemic changes. Similar dilated ventricular system with acute callosal angle.     VISUALIZED ORBITS/SINUSES/MASTOIDS: No intraorbital abnormality. No paranasal sinus mucosal disease. No middle ear or mastoid effusion.    BONES/SOFT TISSUES: No acute abnormality.      Impression    IMPRESSION:  1.  No acute traumatic intracranial abnormality.  2.  Dilated ventricular system in a configuration that could represent normal pressure hydrocephalus.

## 2025-02-25 ENCOUNTER — APPOINTMENT (OUTPATIENT)
Dept: MRI IMAGING | Facility: CLINIC | Age: 80
DRG: 552 | End: 2025-02-25
Attending: NURSE PRACTITIONER
Payer: COMMERCIAL

## 2025-02-25 ENCOUNTER — PATIENT OUTREACH (OUTPATIENT)
Dept: CARE COORDINATION | Facility: CLINIC | Age: 80
End: 2025-02-25
Payer: COMMERCIAL

## 2025-02-25 ENCOUNTER — APPOINTMENT (OUTPATIENT)
Dept: PHYSICAL THERAPY | Facility: CLINIC | Age: 80
DRG: 552 | End: 2025-02-25
Attending: INTERNAL MEDICINE
Payer: COMMERCIAL

## 2025-02-25 LAB
ERYTHROCYTE [DISTWIDTH] IN BLOOD BY AUTOMATED COUNT: 11.9 % (ref 10–15)
GLUCOSE BLDC GLUCOMTR-MCNC: 145 MG/DL (ref 70–99)
GLUCOSE BLDC GLUCOMTR-MCNC: 148 MG/DL (ref 70–99)
GLUCOSE BLDC GLUCOMTR-MCNC: 150 MG/DL (ref 70–99)
GLUCOSE BLDC GLUCOMTR-MCNC: 153 MG/DL (ref 70–99)
GLUCOSE BLDC GLUCOMTR-MCNC: 162 MG/DL (ref 70–99)
HCT VFR BLD AUTO: 38.2 % (ref 40–53)
HGB BLD-MCNC: 12.9 G/DL (ref 13.3–17.7)
MCH RBC QN AUTO: 33.2 PG (ref 26.5–33)
MCHC RBC AUTO-ENTMCNC: 33.8 G/DL (ref 31.5–36.5)
MCV RBC AUTO: 99 FL (ref 78–100)
PLATELET # BLD AUTO: 317 10E3/UL (ref 150–450)
RBC # BLD AUTO: 3.88 10E6/UL (ref 4.4–5.9)
WBC # BLD AUTO: 11.7 10E3/UL (ref 4–11)

## 2025-02-25 PROCEDURE — G0378 HOSPITAL OBSERVATION PER HR: HCPCS

## 2025-02-25 PROCEDURE — 97530 THERAPEUTIC ACTIVITIES: CPT | Mod: GP

## 2025-02-25 PROCEDURE — 82962 GLUCOSE BLOOD TEST: CPT

## 2025-02-25 PROCEDURE — 99232 SBSQ HOSP IP/OBS MODERATE 35: CPT | Performed by: NURSE PRACTITIONER

## 2025-02-25 PROCEDURE — A9585 GADOBUTROL INJECTION: HCPCS | Performed by: NURSE PRACTITIONER

## 2025-02-25 PROCEDURE — 97161 PT EVAL LOW COMPLEX 20 MIN: CPT | Mod: GP

## 2025-02-25 PROCEDURE — 85014 HEMATOCRIT: CPT | Performed by: INTERNAL MEDICINE

## 2025-02-25 PROCEDURE — 36415 COLL VENOUS BLD VENIPUNCTURE: CPT | Performed by: INTERNAL MEDICINE

## 2025-02-25 PROCEDURE — 85048 AUTOMATED LEUKOCYTE COUNT: CPT | Performed by: INTERNAL MEDICINE

## 2025-02-25 PROCEDURE — 255N000002 HC RX 255 OP 636: Performed by: NURSE PRACTITIONER

## 2025-02-25 PROCEDURE — 250N000013 HC RX MED GY IP 250 OP 250 PS 637: Performed by: INTERNAL MEDICINE

## 2025-02-25 PROCEDURE — 72158 MRI LUMBAR SPINE W/O & W/DYE: CPT

## 2025-02-25 PROCEDURE — 72157 MRI CHEST SPINE W/O & W/DYE: CPT

## 2025-02-25 RX ORDER — NALOXONE HYDROCHLORIDE 0.4 MG/ML
0.4 INJECTION, SOLUTION INTRAMUSCULAR; INTRAVENOUS; SUBCUTANEOUS
Status: ACTIVE | OUTPATIENT
Start: 2025-02-25

## 2025-02-25 RX ORDER — GADOBUTROL 604.72 MG/ML
7 INJECTION INTRAVENOUS ONCE
Status: COMPLETED | OUTPATIENT
Start: 2025-02-25 | End: 2025-02-25

## 2025-02-25 RX ORDER — NALOXONE HYDROCHLORIDE 0.4 MG/ML
0.2 INJECTION, SOLUTION INTRAMUSCULAR; INTRAVENOUS; SUBCUTANEOUS
Status: ACTIVE | OUTPATIENT
Start: 2025-02-25

## 2025-02-25 RX ADMIN — APIXABAN 2.5 MG: 2.5 TABLET, FILM COATED ORAL at 21:17

## 2025-02-25 RX ADMIN — METFORMIN HYDROCHLORIDE 1000 MG: 500 TABLET, FILM COATED ORAL at 17:30

## 2025-02-25 RX ADMIN — METFORMIN HYDROCHLORIDE 1000 MG: 500 TABLET, FILM COATED ORAL at 08:56

## 2025-02-25 RX ADMIN — FERROUS SULFATE TAB 325 MG (65 MG ELEMENTAL FE) 325 MG: 325 (65 FE) TAB at 21:17

## 2025-02-25 RX ADMIN — ATORVASTATIN CALCIUM 20 MG: 20 TABLET, FILM COATED ORAL at 21:17

## 2025-02-25 RX ADMIN — GABAPENTIN 600 MG: 300 CAPSULE ORAL at 21:17

## 2025-02-25 RX ADMIN — ACETAMINOPHEN 1000 MG: 500 TABLET, FILM COATED ORAL at 21:17

## 2025-02-25 RX ADMIN — GABAPENTIN 300 MG: 300 CAPSULE ORAL at 08:56

## 2025-02-25 RX ADMIN — GADOBUTROL 7 ML: 604.72 INJECTION INTRAVENOUS at 20:02

## 2025-02-25 RX ADMIN — CHOLESTYRAMINE 4 G: 4 POWDER, FOR SUSPENSION ORAL at 17:30

## 2025-02-25 RX ADMIN — LISINOPRIL 40 MG: 40 TABLET ORAL at 08:56

## 2025-02-25 RX ADMIN — FUROSEMIDE 40 MG: 40 TABLET ORAL at 08:57

## 2025-02-25 RX ADMIN — OXYCODONE HYDROCHLORIDE 10 MG: 5 TABLET ORAL at 08:55

## 2025-02-25 RX ADMIN — FINASTERIDE 5 MG: 5 TABLET, FILM COATED ORAL at 08:56

## 2025-02-25 RX ADMIN — ACETAMINOPHEN 1000 MG: 500 TABLET, FILM COATED ORAL at 08:56

## 2025-02-25 RX ADMIN — CHOLESTYRAMINE 4 G: 4 POWDER, FOR SUSPENSION ORAL at 08:56

## 2025-02-25 RX ADMIN — AMLODIPINE BESYLATE 5 MG: 5 TABLET ORAL at 08:57

## 2025-02-25 RX ADMIN — APIXABAN 2.5 MG: 2.5 TABLET, FILM COATED ORAL at 08:57

## 2025-02-25 ASSESSMENT — ACTIVITIES OF DAILY LIVING (ADL)
ADLS_ACUITY_SCORE: 52
ADLS_ACUITY_SCORE: 59
ADLS_ACUITY_SCORE: 52
ADLS_ACUITY_SCORE: 52
DEPENDENT_IADLS:: CLEANING;COOKING;LAUNDRY;SHOPPING;MEAL PREPARATION
ADLS_ACUITY_SCORE: 52
ADLS_ACUITY_SCORE: 53
ADLS_ACUITY_SCORE: 52
ADLS_ACUITY_SCORE: 53
ADLS_ACUITY_SCORE: 59
ADLS_ACUITY_SCORE: 52

## 2025-02-25 NOTE — CONSULTS
Care Management Initial Consult    General Information  Assessment completed with: Javon Sofia  Type of CM/SW Visit: Initial Assessment    Primary Care Provider verified and updated as needed: Yes   Readmission within the last 30 days: no previous admission in last 30 days      Reason for Consult: discharge planning  Advance Care Planning: Advance Care Planning Reviewed: present on chart          Communication Assessment  Patient's communication style: spoken language (English or Bilingual)    Hearing Difficulty or Deaf: no        Cognitive  Cognitive/Neuro/Behavioral: WDL                      Living Environment:   People in home: significant other     Current living Arrangements: house      Able to return to prior arrangements: yes       Family/Social Support:  Care provided by: spouse/significant other  Provides care for: no one, unable/limited ability to care for self  Marital Status: Lives with Significant Other  Support system:  girlfriend          Description of Support System:  support         Current Resources:   Patient receiving home care services: yes  -[employer] is trying to sign him up from       Community Resources:    Equipment currently used at home: walker, rolling  Supplies currently used at home: Oxygen Tubing/Supplies    Employment/Financial:  Employment Status: retired        Financial Concerns: none           Does the patient's insurance plan have a 3 day qualifying hospital stay waiver?  Yes     Which insurance plan 3 day waiver is available? Alternative insurance waiver    Will the waiver be used for post-acute placement? Yes    Lifestyle & Psychosocial Needs:  Social Drivers of Health     Food Insecurity: Low Risk  (12/1/2024)    Food Insecurity     Within the past 12 months, did you worry that your food would run out before you got money to buy more?: No     Within the past 12 months, did the food you bought just not last and you didn t have money to get more?: No   Depression: Not at  risk (9/3/2024)    PHQ-2     PHQ-2 Score: 0   Housing Stability: Low Risk  (12/1/2024)    Housing Stability     Do you have housing? : Yes     Are you worried about losing your housing?: No   Tobacco Use: Medium Risk (2/24/2025)    Received from [facility]    Patient History     Smoking Tobacco Use: Former     Smokeless Tobacco Use: Unknown     Passive Exposure: Not on file   Financial Resource Strain: Low Risk  (12/1/2024)    Financial Resource Strain     Within the past 12 months, have you or your family members you live with been unable to get utilities (heat, electricity) when it was really needed?: No   Alcohol Use: Not At Risk (1/11/2022)    AUDIT-C     Frequency of Alcohol Consumption: 2-3 times a week     Average Number of Drinks: 1 or 2     Frequency of Binge Drinking: Never   Transportation Needs: Low Risk  (12/1/2024)    Transportation Needs     Within the past 12 months, has lack of transportation kept you from medical appointments, getting your medicines, non-medical meetings or appointments, work, or from getting things that you need?: No   Physical Activity: Insufficiently Active (9/2/2024)    Exercise Vital Sign     Days of Exercise per Week: 2 days     Minutes of Exercise per Session: 20 min   Interpersonal Safety: Not on file   Stress: No Stress Concern Present (9/2/2024)    [employer] [place] [employer] - Occupational Stress Questionnaire     Feeling of Stress : Not at all   Social Connections: Unknown (9/2/2024)    Social Connection and Isolation Panel [NHANES]     Frequency of Communication with Friends and Family: Not on file     Frequency of Social Gatherings with Friends and Family: Once a week     Attends Voodoo Services: Not on file     Active Member of Clubs or Organizations: Not on file     Attends Club or Organization Meetings: Not on file     Marital Status: Not on file   Health Literacy: Not on file       Functional Status:  Prior to admission patient needed  "assistance:   Dependent ADLs:: Ambulation-walker  Dependent IADLs:: Cleaning, Cooking, Laundry, Shopping, Meal Preparation       Mental Health Status:  Mental Health Status: No Current Concerns       Chemical Dependency Status:                Values/Beliefs:  Spiritual, Cultural Beliefs, Pent[employer]alism Practices, Values that affect care: no               Discussed  Partnership in Safe Discharge Planning  document with patient/family: Yes: verbally with patient    Additional Information:  Writer met with patient at bedside. He is recommended by PT to go to [facility].   Patient lives with girlfriend in a house in [place]. He works part time. He is having trouble walking now. He would like to be able to get up and walk without any issues.  Writer mentioned TCU, \"I ve been there twice and did not like either place, [facility], they are messed up. [facility], their food was terrible.\"  Writer mentioned possible other options of [facility] or [facility].   \"I'll try those two.\" Patient told writer stories of his family, how he met his girlfriend.      Next Steps: follow for discharge planning.    Lexy Matta RN      "

## 2025-02-25 NOTE — PROGRESS NOTES
Clinic Care Coordination Contact    The Community Health Worker planned to call for a Care Coordination outreach to follow up on goals and action steps.     Did Not Contact Patient.    Per Chart Review, patient is currently admitted a [facility] as of 2/24/25.    OMAR Kim  Clinic Care Coordination  [facility]: [facility], [facility]  Phone: [phone]

## 2025-02-25 NOTE — PLAN OF CARE
Goal Outcome Evaluation:      Plan of Care Reviewed With: patient    Top portion must ALWAYS be completed  PRIMARY Concern: fall  SAFETY RISK Concerns (fall risk, behaviors, etc.): fall risk      Aggression Tool Color: green  Isolation/Type: n/a  Tests/Procedures for NEXT shift: AM labs  Consults? (Pending/following, signed-off?) PT, OT, SW consult pending  Where is patient from? (Home, TCU, etc.): Home   Other Important info for NEXT shift: Pt just arrived to the floor. Needs skin check.  Anticipated DC date & active delays: TBD  _____________________________________________________________________________  SUMMARY NOTE:             Orientation/Cognitive: AOX4  Observation Goals (Met/ Not Met): not met  Mobility Level/Assist Equipment: Not oob.   Antibiotics & Plan (IV/po, length of tx left): none  Pain Management: back pain 10/10. Prn oxy given X1. Scheduled tylenol, gabapentin.   Complete Pain Reassessment: Y/N yes Due next: shift  Tele/VS/O2: VSS on RA ex bp elevated.   ABNL Lab/BG: BG-152. WBC- 14.5  Diet: Mod carb   Bowel/Bladder: continent.   Skin Concerns: skin check not done due to pt having lot of pain. Please do skin check next shift.   Drains/Devices:   Patient Stated Goal for Today: rest

## 2025-02-25 NOTE — PROGRESS NOTES
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    Mechanical fall missed his wheelchair/rolled out from behind him  Acute mid thoracic region back pain  Chronic back pain w/ Sciatica  Cervical stenosis w/ myelopathy, neuropathy  Degenerative joint disease  *CTH 2/24/25 negative for acute abnormality, does show dilated ventricular system, see below  *X-ray of thoracic and lumbar spine 2/24/25 show no acute fractures  -P eval appreciated recommended TCU appreciate RN care coordinator meeting with patient, he is open to possibly going to either "[facility] or [facility] (pt accepting \"starting insurance authorization\", with tentative discharge plan for Wednesday 226 between 2 and 3 PM in the event insurance is authorized  -Appreciate social work evolvement  -Continue gabapentin, scheduled Tylenol for pain relief, as needed oxycodone also available (has required 4 doses since admission)  -If still having significant pain and add lidocaine patch  -MRI T and L spine w/ ongoing pain    Dilated cerebral ventricles, consider normal pressure hydrocephalus  -Outpatient neurology consult      Leukocytosis improving no clear infectious symptoms, no fevers or chills etc.  -Repeat CBC in the a.m.    COPD  CIERA:  - duonebs prn  - Not compliant w/ CPAP  -Continue PTA Breo Ellipta and Incruse Ellipta    Hx PE on AC:   Anemia, chronic, normocytic baseline 11-12g/dL  -continue PTA eliquis  -Continue " "PTA iron supplement    T2DM w/ hyperglycemia A1C 7% on 12/2/2024.  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  Diet: Moderate Consistent Carb (60 g CHO per Meal) Diet    DVT Prophylaxis: DOAC  Gomez Catheter: Not present  Lines: None     Cardiac Monitoring: None  Code Status: No CPR- Do NOT Intubate      Clinically Significant Risk Factors Present on Admission                # Drug Induced Coagulation Defect: home medication list includes an anticoagulant medication    # Hypertension: Noted on problem list          # DMII: A1C = 7.0 % (Ref range: <5.7 %) within past 6 months    # Overweight: Estimated body mass index is 28.11 kg/m  as calculated from the following:    Height as of this encounter: 1.676 m (5' 6\").    Weight as of this encounter: 79 kg (174 lb 2.6 oz).          --increases all cause morbidity and mortality  -OP follow up re: weight loss & lifestyle changes     Social Drivers of Health    Tobacco Use: Medium Risk (2/24/2025)    Received from [employer]    Patient History     Smoking Tobacco Use: Former     Smokeless Tobacco Use: Unknown   Physical Activity: Insufficiently Active (9/2/2024)    Exercise Vital Sign     Days of Exercise per Week: 2 " days     Minutes of Exercise per Session: 20 min   Social Connections: Unknown (9/2/2024)    Social Connection and Isolation Panel [NHANES]     Frequency of Social Gatherings with Friends and Family: Once a week          Disposition Plan     Medically Ready for Discharge: Anticipated Tomorrow 2/26/25 pending MRI result and TCU availability/ insurance authorization       The patient's care was discussed with the Attending Physician, Dr. Ever Franco, Bedside Nurse, and Patient.    [facility]  Hospitalist Service  [facility]  Securely message with [employer] (more info)  Text page via [facility] Paging/Directory   ______________________________________________________________________    Interval History   Patient describes how he was getting his DOT drug test, he drives shuttle for [employer],   After providing the urine sample he missed the wheelchair and fell hitting his head and back on the ground.  He has new back pain in the midthoracic area.  He reports that he does not want nursing home or TCU and is hopeful to go home w/ his girlfriend when medically ready.  At times he is short of breath with the pain.  Reports he been eating and drinking well moving bowels denies fevers chills has some chronic cough.  He told me how he met his girlfriend at [employer].  He mobilizes with a walker in his home.    Physical Exam   Vital Signs: Temp: 98.1  F (36.7  C) Temp src: Oral BP: 115/82 Pulse: 73   Resp: 16 SpO2: 95 % O2 Device: None (Room air)    Weight: 174 lbs 2.61 oz    Constitutional: vs as per EMR  General:  adult pt lying in bed without acute distress  Neuro: +follows commands wiggle toes and show 2 fingers bilat, face symmetric, tongue midline, speech fluent, LLE weaker than the RLE, this is chronic  Head, ENT & mouth: NC/AT,  mouth moist oral mucosa edentulous  Neck: supple  CV S1S2 murmurs  resp: CTAB upper lobes  gi:normoactive bowel sounds, soft, nontender,  nondisteded  Ext: no edema or mottling  Skin: no rashes on exposed skin  Musculoskeletal no bony joint deformities, mid thoracic region tender on palpation    Medical Decision Making       45 MINUTES SPENT BY ME on the date of service doing chart review, history, exam, documentation & further activities per the note.      Data     I have personally reviewed the following data over the past 24 hrs:    11.7 (H)  \   12.9 (L)   / 317     137 98 11.7 /  150 (H)   3.9 24 1.02 \       Imaging results reviewed over the past 24 hrs:   Recent Results (from the past 24 hours)   Lumbar spine XR, 2-3 views    Narrative    EXAM: XR THORACIC SPINE 2 VIEWS, XR LUMBAR SPINE 2/3 VIEWS  LOCATION: [facility]  DATE: 2/24/2025    INDICATION: fall back pain  COMPARISON: None.      Impression    IMPRESSION: Shallow dextrocurvature. Sagittal alignment in the thoracic spine is unremarkable. Grade 1 anterolisthesis L4 on L5 measuring 3 mm. Changes of diffuse idiopathic skeletal hyperostosis within the thoracic spine. Vertebral body heights are   maintained. No anterior compression deformity. Mild to moderate L4-5 disc height loss. Mild disc degeneration elsewhere. Moderate lower lumbar facet arthrosis. Vascular calcifications are present within the aorta. Postsurgical changes of the right lung   and right lower quadrant.    XR Thoracic Spine 2 Views    Narrative    EXAM: XR THORACIC SPINE 2 VIEWS, XR LUMBAR SPINE 2/3 VIEWS  LOCATION: [facility]  DATE: 2/24/2025    INDICATION: fall back pain  COMPARISON: None.      Impression    IMPRESSION: Shallow dextrocurvature. Sagittal alignment in the thoracic spine is unremarkable. Grade 1 anterolisthesis L4 on L5 measuring 3 mm. Changes of diffuse idiopathic skeletal hyperostosis within the thoracic spine. Vertebral body heights are   maintained. No anterior compression deformity. Mild to moderate L4-5 disc height loss. Mild disc degeneration  elsewhere. Moderate lower lumbar facet arthrosis. Vascular calcifications are present within the aorta. Postsurgical changes of the right lung   and right lower quadrant.    Head CT w/o contrast    Narrative    EXAM: CT HEAD W/O CONTRAST  LOCATION: [facility]  DATE: 2/24/2025    INDICATION: fall and hit head on eliquis  COMPARISON: 12/1/2024.  TECHNIQUE: Routine CT Head without IV contrast. Multiplanar reformats. Dose reduction techniques were used.    FINDINGS:  INTRACRANIAL CONTENTS: No intracranial hemorrhage, extraaxial collection, or mass effect.  No CT evidence of acute infarct. Moderate presumed chronic small vessel ischemic changes. Similar dilated ventricular system with acute callosal angle.     VISUALIZED ORBITS/SINUSES/MASTOIDS: No intraorbital abnormality. No paranasal sinus mucosal disease. No middle ear or mastoid effusion.    BONES/SOFT TISSUES: No acute abnormality.      Impression    IMPRESSION:  1.  No acute traumatic intracranial abnormality.  2.  Dilated ventricular system in a configuration that could represent normal pressure hydrocephalus.

## 2025-02-25 NOTE — PROGRESS NOTES
RECEIVING UNIT ED HANDOFF REVIEW    ED Nurse Handoff Report was reviewed by: Cassandra Nguyen RN on February 24, 2025 at 10:19 PM

## 2025-02-25 NOTE — PLAN OF CARE
PRIMARY Concern: fall  SAFETY RISK Concerns (fall risk, behaviors, etc.): fall risk      Aggression Tool Color: green  Isolation/Type: n/a  Tests/Procedures for NEXT shift: AM labs  Consults? (Pending/following, signed-off?) PT, OT, SW consult pending  Where is patient from? (Home, TCU, etc.): Home   Other Important info for NEXT shift: refused to full skin check and to remove clothes due to pain when moving, only bottom was able to be seen- redness blanchable at sacrum part. Refused Vitals check at 4AM, says he wanted to rest and sleep  Anticipated DC date & active delays: TBD    SUMMARY NOTE:  Orientation/Cognitive: AOX4  Observation Goals (Met/ Not Met): not met  Mobility Level/Assist Equipment: Not oob.   Antibiotics & Plan (IV/po, length of tx left): none  Pain Management: Denies kenneth. Scheduled tylenol, gabapentin.   Complete Pain Reassessment: Y Due next: next shift  Tele/VS/O2: VSS on RA  ABNL Lab/BG: BG-153. WBC- 14.5  Diet: Mod carb   Bowel/Bladder: continent uses urinal at BS  Skin Concerns: Redness blanchable at sacrum  Drains/Devices: PIV SL  Patient Stated Goal for Today: rest and sleep    Observation goals  PRIOR TO DISCHARGE:   Comments:   -tolerating oral intake to maintain hydration: MET  -safe disposition plan has been identified: NOT MET, pending repeat labs  Nurse to notify provider when observation goals have been met and patient is ready for discharge.

## 2025-02-25 NOTE — PROGRESS NOTES
Care Management Follow Up    Length of Stay (days): 0    Expected Discharge Date: 02/26/2025     Concerns to be Addressed: discharge planning     Patient plan of care discussed at interdisciplinary rounds: Yes    Anticipated Discharge Disposition: Skilled Nursing Facility, Transitional Care     Anticipated Discharge Services:    Anticipated Discharge DME: None    Patient/family educated on Medicare website which has current facility and service quality ratings: yes  Education Provided on the Discharge Plan: Yes  Patient/Family in Agreement with the Plan: yes    Referrals Placed by CM/SW:    Private pay costs discussed: Not applicable    Discussed  Partnership in Safe Discharge Planning  document with patient/family: No     Handoff Completed: No, handoff not indicated or clinically appropriate    Additional Information:  Pt has been accepted to [facility], they are starting insurance auth.   [facility] wheelchair transport arranged for Wednesday 2/26 between [phone] in case it is needed.    JANETTE Shultz  Social Work  [facility]

## 2025-02-25 NOTE — PLAN OF CARE
PRIMARY Concern: fall  SAFETY RISK Concerns (fall risk, behaviors, etc.): fall risk      Aggression Tool Color: green  Isolation/Type: n/a  Tests/Procedures for NEXT shift: AM labs  Consults? (Pending/following, signed-off?) PT, OT, SW consult pending  Where is patient from? (Home, TCU, etc.): Home   Other Important info for NEXT shift: refused to full skin check and to remove clothes due to pain when moving, only bottom was able to be seen- redness blanchable at sacrum part. Refused Anticipated DC date & active delays: 2/26     SUMMARY NOTE:  Orientation/Cognitive: AOX4  Observation Goals (Met/ Not Met): not met  Mobility Level/Assist Equipment: Not oob.   Antibiotics & Plan (IV/po, length of tx left): none  Pain Management: Denies kenneth. Scheduled tylenol, gabapentin, prn oxycodone  Complete Pain Reassessment: Y Due next: next shift  Tele/VS/O2: VSS on RA  ABNL Lab/BG: BG-150. WBC- 11.7  Diet: Mod carb   Bowel/Bladder: continent uses urinal at BS  Skin Concerns: Redness blanchable at sacrum  Drains/Devices: PIV SL  Patient Stated Goal for Today: rest and sleep        Observation goals  PRIOR TO DISCHARGE:   Comments:   -tolerating oral intake to maintain hydration: MET  -safe disposition plan has been identified: NOT MET  Nurse to notify provider when observation goals have been met and patient is ready for discharge.

## 2025-02-25 NOTE — PROGRESS NOTES
"   02/25/25 4572   Appointment Info   Signing Clinician's Name / Credentials (PT) Armani Thomas DPT   Quick Adds   Quick Adds Certification   Living Environment   People in Home significant other   Current Living Arrangements house   Home Accessibility stairs to enter home   Number of Stairs, Main Entrance 3   Stair Railings, Main Entrance railings safe and in good condition   Transportation Anticipated family or friend will provide   Living Environment Comments Reports living in a house w/ girlfriend. x3 stairs to enter w/ rails on both sides but can only reach one at a time.   Self-Care   Usual Activity Tolerance moderate   Current Activity Tolerance poor   Equipment Currently Used at Home walker, rolling;wheelchair, manual   Fall history within last six months yes   Number of times patient has fallen within last six months 10  (Reports at least once a month)   Activity/Exercise/Self-Care Comment Reports typically ambulates w/ FWW vs 4WW vs transport chair. Notes that he still works as a  and states \"my team lifts me into the truck\". Notes dresses himself and showers himself. 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   Existing Precautions/Restrictions fall   Cognition   Affect/Mental Status (Cognition) WFL "   Orientation Status (Cognition) oriented x 4   Follows Commands (Cognition) WFL   Pain Assessment   Patient Currently in Pain Yes, see Vital Sign flowsheet  (High levels of back pain)   Range of Motion (ROM)   ROM Comment WFLs for mobility and transfers no formal testing completed   Strength (Manual Muscle Testing)   Strength Comments Generalized weakness noted w/ mobility and transfers no formal testing completed   Bed Mobility   Comment, (Bed Mobility) Supine to sitting EOB w/ Mod A x1   Transfers   Comment, (Transfers) Sit to stand w/ FWW and Mod A x1   Gait/Stairs (Locomotion)   Comment, (Gait/Stairs) 2 ft w/ FWW and Min A x1   Balance   Balance Comments Fair standing balance noted   Clinical Impression   Criteria for Skilled Therapeutic Intervention Yes, treatment indicated   PT Diagnosis (PT) Impaired ambulation   Influenced by the following impairments Impaired strength, balance and activity tolerance   Functional limitations due to impairments Impaired ADLs, IADLs and functional mobility   Clinical Presentation (PT Evaluation Complexity) stable   Clinical Presentation Rationale Clinical judgment   Clinical Decision Making (Complexity) low complexity   Planned Therapy Interventions (PT) balance training;bed mobility training;gait training;home exercise program;ROM (range of motion);strengthening;transfer training;progressive activity/exercise;stair training   Risk & Benefits of therapy have been explained evaluation/treatment results reviewed;care plan/treatment goals reviewed;risks/benefits reviewed;current/potential barriers reviewed;participants voiced agreement with care plan;participants included;patient   PT Total Evaluation Time   PT Triston Low Complexity Minutes (67984) 10   Therapy Certification   Start of care date 02/25/25   Certification date from 02/25/25   Certification date to 03/07/25   Medical Diagnosis Fall   Physical Therapy Goals   PT Frequency 5x/week   PT Predicted Duration/Target Date  "for Goal Attainment 03/07/25   PT Goals Bed Mobility;Transfers;Gait;Stairs   PT: Bed Mobility Supervision/stand-by assist;Supine to/from sit   PT: Transfers Modified independent;Sit to/from stand;Assistive device   PT: Gait Modified independent;100 feet;Rolling walker   PT: Stairs Modified independent;4 stairs;Rail on right   Interventions   Interventions Quick Adds Therapeutic Activity   Therapeutic Activity   Therapeutic Activities: dynamic activities to improve functional performance Minutes (00051) 25   Symptoms Noted During/After Treatment Increased pain   Treatment Detail/Skilled Intervention Pt supine in bed at start of session. Pt agreeable to PT. Step by step cues for supine to sit transfer. Limited by high levels of back pain. HOB elevated and use of bed rail. A x1 to scoot forward at EOB. Initially poor sitting balance EOB, bracing heavily w/ RUE on bed rail. Multiple attempts needed to get to sitting EOB. Stand pivot from EOB to bedside chair w/ FWW and Mod A x1. Difficulty advancing LLE. Slow torin. Heavy reliance on FWW. Pt completing sit to stand from bedside chair x2 w/ FWW and Mod A x1. Needing multiple attempts to get standing on each attempt d/t pain. Having to walk body up FWW to get to full standing. Attempted to complete standing marches staticly after first stand but unable to complete. Reports \"I normally just slide by L leg forward\". Pt ambulating ~4 ft x1 and 8 ft x1 w/ FWW and Min A x1 w/ close chair follow. Very slow torin. Exagerated R sided weight shift to advance LLE forward. Heavy reliance on FWW. Cued for smaller step length. Seated in bedside chair at end of session w/ call light in place and chair alarm on.   PT Discharge Planning   PT Plan Repeat STS, amb distance w/ chair follow, stairs when able   PT Discharge Recommendation (DC Rec) Transitional Care Facility   PT Rationale for DC Rec Pt well below baseline of Mod I w/ FWW. Needing A x1 for short distance mobility. Limited " by pain and weakness. Anticipate when medically ready Pt can DC to home w/ HHPT.   PT Brief overview of current status Goals of therapy will be to address safe mobility and make recs for d/c to next level of care. Pt and RN will continue to follow all falls risk precautions as documented by RN staff while hospitalized   PT Total Distance Amb During Session (feet) 12   Physical Therapy Time and Intention   Timed Code Treatment Minutes 25   Total Session Time (sum of timed and untimed services) 35   [facility]                                                                                   OUTPATIENT PHYSICAL THERAPY    PLAN OF TREATMENT FOR OUTPATIENT REHABILITATION   Patient's Last Name, First Name, BEVERLY AlejandromanNahun YOB: 1945   Provider's Name   [facility]   Medical Record No.  [id number]     Onset Date: 02/24/25 Start of Care Date: 02/25/25     Medical Diagnosis:  Fall               PT Diagnosis:  Impaired ambulation Certification Dates:  From: 02/25/25  To: 03/07/25       See note for plan of treatment, functional goals, and certification details.    I CERTIFY THE NEED FOR THESE SERVICES FURNISHED UNDER        THIS PLAN OF TREATMENT AND WHILE UNDER MY CARE (Physician co-signature of this document indicates review and certification of the therapy plan).

## 2025-02-25 NOTE — PHARMACY-ADMISSION MEDICATION HISTORY
Pharmacist Admission Medication History    Admission medication history is complete. The information provided in this note is only as accurate as the sources available at the time of the update.    Information Source(s): Patient, Family member, and CareEverywhere/SureScripts via in-person    Pertinent Information: Med hx obtained from patient and family member. Cholestyramine has not been filled since 2/28/24 per [employer], but family confirmed patient is taking and reports patient takes BID in orange juice.   -Confirmed furosemide dose is 40 mg  -Confirmed no longer on metoprolol or hydrocodone-acetaminophen    Changes made to PTA medication list:  Added: None  Deleted: None  Changed: cholestyramine QD to BID    Allergies reviewed with patient and updates made in EHR: yes    Medication History Completed By: Deisi Davey RPH 2/24/2025 6:56 PM    PTA Med List   Medication Sig Note Last Dose/Taking    acetaminophen (TYLENOL) 500 MG tablet Take 1,000 mg by mouth 2 times daily.  2/24/2025 Morning    albuterol (VENTOLIN HFA) 108 (90 Base) MCG/ACT inhaler INHALE 2 PUFFS INTO THE LUNGS EVERY 6 HOURS AS NEEDED FOR SHORTNESS OF BREATH / DYSPNEA OR WHEEZING  Taking    amLODIPine (NORVASC) 5 MG tablet Take 1 tablet (5 mg) by mouth daily.  2/24/2025 Morning    apixaban ANTICOAGULANT (ELIQUIS) 2.5 MG tablet Take 1 tablet (2.5 mg) by mouth 2 times daily.  2/24/2025 Morning    atorvastatin (LIPITOR) 20 MG tablet Take 1 tablet (20 mg) by mouth daily.  2/23/2025 Bedtime    calcium carbonate (TUMS) 500 MG chewable tablet Take 1 chew tab by mouth daily as needed for heartburn.  Taking As Needed    cholestyramine light (QUESTRAN) 4 GM packet Take 4 g by mouth 2 times daily (with meals).  2/24/2025 Morning    Coenzyme Q10 400 MG CAPS Take 400 mg by mouth daily  2/23/2025 Evening    ferrous sulfate (FE TABS) 325 (65 Fe) MG EC tablet Take 1 tablet (325 mg) by mouth every evening  2/23/2025 Evening    finasteride (PROSCAR) 5 MG tablet Take 1  tablet (5 mg) by mouth daily  2/24/2025 Morning    Fluticasone-Umeclidin-Vilant (TRELEGY ELLIPTA) 100-62.5-25 MCG/ACT oral inhaler USE 1 INHALATION DAILY  2/24/2025 Morning    furosemide (LASIX) 20 MG tablet Take 2 tablets (40 mg) by mouth daily  2/24/2025 Morning    gabapentin (NEURONTIN) 300 MG capsule Take 1 capsule (300 mg) by mouth every morning.  2/24/2025 Morning    gabapentin (NEURONTIN) 300 MG capsule Take 2 capsules (600 mg) by mouth at bedtime.  2/23/2025 Bedtime    lanreotide acetate (SOMATULINE) 120 MG/0.5ML injection Inject 120 mg subcutaneously every 28 days. Do not start before September 6, 2024. 2/24/2025: Administered by [facility]  2/21/2025    lisinopril (ZESTRIL) 40 MG tablet TAKE 1 TABLET DAILY  2/24/2025 Morning    metFORMIN (GLUCOPHAGE) 1000 MG tablet Take 1 tablet (1,000 mg) by mouth 2 times daily (with meals).  2/24/2025 Morning    miconazole (MICATIN) 2 % external cream Apply topically 2 times daily as needed for other (Rash/ skin irritation)  Taking As Needed    vitamin B-Complex Take 1 tablet by mouth daily  2/24/2025 Morning    vitamin C (ASCORBIC ACID) 500 MG tablet Take 500 mg by mouth daily.  2/23/2025 Bedtime

## 2025-02-25 NOTE — PLAN OF CARE
Observation goals  PRIOR TO DISCHARGE:   Comments:   -tolerating oral intake to maintain hydration: MET  -safe disposition plan has been identified: NOT MET, pending repeat labs  Nurse to notify provider when observation goals have been met and patient is ready for discharge.

## 2025-02-26 ENCOUNTER — DOCUMENTATION ONLY (OUTPATIENT)
Dept: ORTHOPEDICS | Facility: CLINIC | Age: 80
End: 2025-02-26
Payer: COMMERCIAL

## 2025-02-26 ENCOUNTER — APPOINTMENT (OUTPATIENT)
Dept: CT IMAGING | Facility: CLINIC | Age: 80
DRG: 552 | End: 2025-02-26
Attending: PHYSICIAN ASSISTANT
Payer: COMMERCIAL

## 2025-02-26 ENCOUNTER — TELEPHONE (OUTPATIENT)
Dept: INTERNAL MEDICINE | Facility: CLINIC | Age: 80
End: 2025-02-26
Payer: COMMERCIAL

## 2025-02-26 LAB
ERYTHROCYTE [DISTWIDTH] IN BLOOD BY AUTOMATED COUNT: 11.9 % (ref 10–15)
GLUCOSE BLDC GLUCOMTR-MCNC: 151 MG/DL (ref 70–99)
GLUCOSE BLDC GLUCOMTR-MCNC: 154 MG/DL (ref 70–99)
GLUCOSE BLDC GLUCOMTR-MCNC: 155 MG/DL (ref 70–99)
GLUCOSE BLDC GLUCOMTR-MCNC: 166 MG/DL (ref 70–99)
GLUCOSE BLDC GLUCOMTR-MCNC: 175 MG/DL (ref 70–99)
HCT VFR BLD AUTO: 38.1 % (ref 40–53)
HGB BLD-MCNC: 12.8 G/DL (ref 13.3–17.7)
MCH RBC QN AUTO: 33.2 PG (ref 26.5–33)
MCHC RBC AUTO-ENTMCNC: 33.6 G/DL (ref 31.5–36.5)
MCV RBC AUTO: 99 FL (ref 78–100)
PLATELET # BLD AUTO: 262 10E3/UL (ref 150–450)
RBC # BLD AUTO: 3.85 10E6/UL (ref 4.4–5.9)
WBC # BLD AUTO: 11.3 10E3/UL (ref 4–11)

## 2025-02-26 PROCEDURE — 72128 CT CHEST SPINE W/O DYE: CPT

## 2025-02-26 PROCEDURE — 999N000111 HC STATISTIC OT IP EVAL DEFER: Performed by: OCCUPATIONAL THERAPIST

## 2025-02-26 PROCEDURE — G0378 HOSPITAL OBSERVATION PER HR: HCPCS

## 2025-02-26 PROCEDURE — 36415 COLL VENOUS BLD VENIPUNCTURE: CPT | Performed by: NURSE PRACTITIONER

## 2025-02-26 PROCEDURE — 250N000013 HC RX MED GY IP 250 OP 250 PS 637: Performed by: INTERNAL MEDICINE

## 2025-02-26 PROCEDURE — 85018 HEMOGLOBIN: CPT | Performed by: NURSE PRACTITIONER

## 2025-02-26 PROCEDURE — 99231 SBSQ HOSP IP/OBS SF/LOW 25: CPT | Performed by: PHYSICIAN ASSISTANT

## 2025-02-26 PROCEDURE — 82962 GLUCOSE BLOOD TEST: CPT

## 2025-02-26 PROCEDURE — 99222 1ST HOSP IP/OBS MODERATE 55: CPT | Performed by: PHYSICIAN ASSISTANT

## 2025-02-26 RX ADMIN — APIXABAN 2.5 MG: 2.5 TABLET, FILM COATED ORAL at 20:21

## 2025-02-26 RX ADMIN — OXYCODONE HYDROCHLORIDE 10 MG: 5 TABLET ORAL at 03:41

## 2025-02-26 RX ADMIN — METFORMIN HYDROCHLORIDE 1000 MG: 500 TABLET, FILM COATED ORAL at 08:05

## 2025-02-26 RX ADMIN — ATORVASTATIN CALCIUM 20 MG: 20 TABLET, FILM COATED ORAL at 21:15

## 2025-02-26 RX ADMIN — ACETAMINOPHEN 1000 MG: 500 TABLET, FILM COATED ORAL at 20:21

## 2025-02-26 RX ADMIN — CHOLESTYRAMINE 4 G: 4 POWDER, FOR SUSPENSION ORAL at 08:07

## 2025-02-26 RX ADMIN — OXYCODONE HYDROCHLORIDE 10 MG: 5 TABLET ORAL at 20:26

## 2025-02-26 RX ADMIN — LISINOPRIL 40 MG: 40 TABLET ORAL at 08:06

## 2025-02-26 RX ADMIN — FERROUS SULFATE TAB 325 MG (65 MG ELEMENTAL FE) 325 MG: 325 (65 FE) TAB at 20:21

## 2025-02-26 RX ADMIN — METFORMIN HYDROCHLORIDE 1000 MG: 500 TABLET, FILM COATED ORAL at 18:06

## 2025-02-26 RX ADMIN — AMLODIPINE BESYLATE 5 MG: 5 TABLET ORAL at 08:07

## 2025-02-26 RX ADMIN — CHOLESTYRAMINE 4 G: 4 POWDER, FOR SUSPENSION ORAL at 18:06

## 2025-02-26 RX ADMIN — APIXABAN 2.5 MG: 2.5 TABLET, FILM COATED ORAL at 08:07

## 2025-02-26 RX ADMIN — FUROSEMIDE 40 MG: 40 TABLET ORAL at 08:07

## 2025-02-26 RX ADMIN — GABAPENTIN 300 MG: 300 CAPSULE ORAL at 08:06

## 2025-02-26 RX ADMIN — GABAPENTIN 600 MG: 300 CAPSULE ORAL at 21:16

## 2025-02-26 RX ADMIN — ACETAMINOPHEN 1000 MG: 500 TABLET, FILM COATED ORAL at 08:06

## 2025-02-26 RX ADMIN — FINASTERIDE 5 MG: 5 TABLET, FILM COATED ORAL at 08:09

## 2025-02-26 ASSESSMENT — ACTIVITIES OF DAILY LIVING (ADL)
ADLS_ACUITY_SCORE: 61
ADLS_ACUITY_SCORE: 60
ADLS_ACUITY_SCORE: 61
ADLS_ACUITY_SCORE: 60
ADLS_ACUITY_SCORE: 60
ADLS_ACUITY_SCORE: 61
ADLS_ACUITY_SCORE: 60
ADLS_ACUITY_SCORE: 60
ADLS_ACUITY_SCORE: 61
ADLS_ACUITY_SCORE: 61
ADLS_ACUITY_SCORE: 60
ADLS_ACUITY_SCORE: 61
ADLS_ACUITY_SCORE: 60
ADLS_ACUITY_SCORE: 59
ADLS_ACUITY_SCORE: 61

## 2025-02-26 NOTE — PROGRESS NOTES
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    Neuro stable.     TODAY'S PLAN:     Mr. 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    Orthotics team to fit, supply and provide donning and doffing instructions for a custom TLSO. The spinal brace will be used to reduce pain by restricting mobility of the trunk, to facilitate healing following the injury to the spine and related soft tissue, and to support weak spinal muscles. We instructed the patient to wear the brace when out of bed, but may shower without the brace on. The brace will most likely be required for 3 months. We will order upright x-rays to include ap and lateral views to be obtained in the brace prior to discharge. Our team will also facilitate a follow-up evaluation in approximately one month with updated imaging.    In the interim, we feel that it would be in his best interest to proceed with a conservative approach by pain management set forth by the Medical team here at [facility]. We do suggest that he not lift anything greater than 5-10 pounds and avoid excessive bending, twisting, and turning.     We did review the images together and we explained his condition and the recommended treatment.     In the event that patient's symptoms worsen or change we would appreciate being contacted. We did discuss signs of a worsening problem that he should seek being evaluated.     We did review the above information with the patient whom agrees with the plan and did verbalize understanding.   ________________________________________________________________     Mr. Villalobos overall feels well and denies any significant discomfort.     /88 (BP Location: Left arm)   Pulse 75   Temp 98.4  F (36.9  C) (Oral)   Resp 18   Ht 1.676 m (5' 6\")   Wt 79 kg (174 lb 2.6 oz)   SpO2 94%   BMI 28.11 kg/m   "     Pt in bed. Appears comfortable and in no apparent distress, moving all extremities.   CN II-XII intact, alert and appropriate with conversation and following commands.   Bilateral upper and lower extremities with appropriate strength. DTR's WNL. Spine is non tender to palpation throughout. Nino's and Babinski sign neg. Sensation intact throughout. Acceptable ROM.   Calves soft and non-tender bilaterally.     All pertinent labs and updated imaging reviewed in EPIC.     Jericho Ness PA-C   Neurosurgery     Reviewed with Dr. Kumar

## 2025-02-26 NOTE — PROGRESS NOTES
Observation goals  PRIOR TO DISCHARGE          -tolerating oral intake to maintain hydration- met  -safe disposition plan has been identified- not met, insurance autho pending    Nurse to notify provider when observation goals have been met and patient is ready for discharge.

## 2025-02-26 NOTE — PROGRESS NOTES
X-cover    Pt admitted with mechanical fall. Having back pain. Has acute DISH fracture involving T10 w/ interruption of anterior longitudinal ligament  - d/w neurosurgery, they will see, appreciate assistance  - bedrest    Louis Desai MD

## 2025-02-26 NOTE — PLAN OF CARE
Goal Outcome Evaluation:      Plan of Care Reviewed With: patient    PRIMARY Concern: fall  SAFETY RISK Concerns (fall risk, behaviors, etc.): fall risk      Aggression Tool Color: green  Isolation/Type: n/a  Tests/Procedures for NEXT shift: MR Lumbar spine and MR thoracic spine completed result pending.  Consults? (Pending/following, signed-off?) PT rec TCU, OT, SW following- pt accepted to [facility], insurance auth pending.  Where is patient from? (Home, TCU, etc.): Home   Other Important info for NEXT shift: BG check  Anticipated DC date & active delays: [facility] wheel chair ride set up tomorrow 2/26 between 1400-3061 if needed.     SUMMARY NOTE:  Orientation/Cognitive: AOX4  Observation Goals (Met/ Not Met): not met  Mobility Level/Assist Equipment: AX2 gb/walker pivot to chair. Sat up in chair this shift.   Antibiotics & Plan (IV/po, length of tx left): none  Pain Management: Denies pain at rest. Scheduled tylenol, gabapentin.   Complete Pain Reassessment: Y Due next: next shift  Tele/VS/O2: VSS on RA  ABNL Lab/BG: BG-148, 145. WBC- 14.5  Diet: Mod carb   Bowel/Bladder: continent uses urinal at BS.   Skin Concerns: Redness blanchable at sacrum/coccyx and perineum redness.   Drains/Devices: PIV SL  Patient Stated Goal for Today: rest and sleep    Observation goals  PRIOR TO DISCHARGE:   Comments:   -tolerating oral intake to maintain hydration: MET  -safe disposition plan has been identified: partially MET, pt accepted to [facility], pending insurance autho.   Nurse to notify provider when observation goals have been met and patient is ready for discharge.

## 2025-02-26 NOTE — TELEPHONE ENCOUNTER
FYI-  Patients partner Kenia, called to make doctor Jonathan aware pt fell Monday and is at the [facility].

## 2025-02-26 NOTE — PROGRESS NOTES
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     Acute fracture s/p mechanical fall  Acute mid-thoracic region back pain in setting above   Cervical stenosis w/ myelopathy and neuropathy  DJD  History of chronic back pain secondary to above  History of recurrent mechanical falls   *Patient presented after he sustained a fall backwards as he was attempting to get into his wheelchair in clinic just prior to arrival 02/25/2025. Patient noted to fall directly onto back and experienced traumatic head strike upon landing. Following fall patient reporting severe thoracic back pain with new bilateral upper extremity paraesthesias and weakness (usually unilateral, RUE). History of chronic back pain secondary to cervical spinal stenosis with peripheral neuropathy. Plan for surgical intervention in April 2025. On chronic anticoagulation (Eliquis). History of bone mets from prior carcinoid of small bowel.   *CTH 2/24/25 negative for acute abnormality, does show dilated ventricular system, see below.   *XR of thoracic and lumbar spine 2/24/25 show no acute fractures.  *CT thoracic w/o contrast with acute fracture through the anterior osteophyte formation at T9-T10 disc space level extending obliquely through T10 vertebral body to its inferior endplate. No retropulsion or canal  compromise.   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 As this is an unstable fracture in a rigid spine neurosurgical consultation is recommended.No traumatic subluxation or high-grade canal stenosis. Mild lumbar spondylosis, worse at L4-L5 where there is severe left lateral recess stenosis and severe left foraminal stenosis. Correlate for left L4 and L5 radiculopathy. Advanced multilevel cervical spondylitic changes with multilevel canal flattening and canal stenoses, suboptimally assessed on the current examination.   - Neurosurgery consulted, appreciate the cares. Custom TLSO ordered. Suspect will need to wear minimum three months., Bed rest until placed. Discharge pending upright imaging following placement.   -MRI T and L spine notable for acute DISH fracture at T10 with disruption of the anterior longitudinal ligament. CT thoracic spine with similar findings.   - Continue gabapentin, scheduled Tylenol for pain relief, as needed oxycodone also available (has required 4 doses since admission). PRN adjuncts include lidocaine patch PRN.     Dilated cerebral ventricles, consider normal pressure hydrocephalus  - Outpatient neurology consult upon discharge for further evaluation and management.       Leukocytosis improving no clear infectious symptoms, no fevers or chills etc.  - Continue to monitor with morning labs.      COPD  CIERA  *Patient noted to not be compliant w/ CPAP.  - Duonebs PRN available.   - Continue PTA Breo Ellipta and Incruse Ellipta.     Hx PE on AC  Anemia, chronic, normocytic   Baseline Hgb 11-12g/dL. Hgb 12.8 02/26.   - Continue PTA Eliquis.  - Continue PTA iron supplement.     T2DM w/ hyperglycemia A1C 7% on 12/2/2024.          Recent Labs   Lab 02/25/25  1153 02/25/25  0741 02/25/25  0229 02/24/25  2250 02/24/25  1757   * 150* 153* 152* 184*      - Mod CHO diet, ISS tid ac.  - Continue metformin following medication  reconciliation.     HTN  HFpEF, chronic compensated  - Hydralazine PRN  - Continue lisinopril, lasix and amlodipine.      BPH:   - Continue PTA finasteride.     Diarrhea:   - Continue PTA cholesytime.     HLD:   - Continue PTA statin.     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     Hx RUL Adenocarcinoma s/p wedge resection:   - Continue outpt oncology follow-up.        Observation Goals: -tolerating oral intake to maintain hydration, -safe disposition plan has been identified, Nurse to notify provider when observation goals have been met and patient is ready for discharge.  Diet: Moderate Consistent Carb (60 g CHO per Meal) Diet    DVT Prophylaxis: DOAC  Gomez Catheter: Not present  Lines: None     Cardiac Monitoring: None  Code Status: No CPR- Do NOT Intubate      Social Drivers of Health    Tobacco Use: Medium Risk (2/24/2025)    Received from [employer]    Patient History     Smoking Tobacco Use: Former     Smokeless Tobacco Use: Unknown   Physical Activity: Insufficiently Active (9/2/2024)    Exercise Vital Sign     Days of Exercise per Week: 2 days     Minutes of Exercise per Session: 20 min   Social Connections: Unknown (9/2/2024)    Social Connection and Isolation Panel [NHANES]     Frequency of Social Gatherings with Friends and Family: Once a week          Disposition Plan     Medically Ready for Discharge: Anticipated Tomorrow     The patient's care was discussed with the Attending Physician, Dr. Esparza .    Magdalene Izaguirre PA-C  Hospitalist Service  [facility]  Securely message with [employer] (more info)  Text page via [facility] Paging/Directory   ______________________________________________________________________    Interval History   VSS. Afebrile. No acute events overnight. Pain intermittently severe with movement. Continues to have bilateral upper extremity numbness and tingling. No loss of bladder or bowel. No  worsening of LE symptoms. Tolerating diet. Bed rest pending custom brace placement. Patient voicing not planning to ambulate given pain uncontrolled with movement. No additional concerns or complaints at this time.     Physical Exam   Vital Signs: Temp: 98.4  F (36.9  C) Temp src: Oral BP: 127/88 Pulse: 75   Resp: 18 SpO2: 94 % O2 Device: None (Room air)    Weight: 174 lbs 2.61 oz    GENERAL:  Pleasant, cooperative, alert. Laying in bed comfortably upon exam in no acute distress.   HEENT: Normocephalic, atraumatic.    PULMONOLOGY: Clear to auscultation bilaterally. No increased work of breathing.   CARDIAC: Regular rate and rhythm.    ABDOMEN: Soft, nontender non distended. BS +.   MUSCULOSKELETAL:  Moving x 4 spontaneously.  Normal bulk and tone.  No LE edema.  Radial pulses 2+ bilaterally.    NEURO: Alert and oriented x3.  CN II-XII grossly intact and symmetric.Strength bilateral upper and lower extremities intact and symmetric. Diffuse tenderness with gentle palpation thoracic paraspinal muscles. No midline tenderness. No ataxia or tremor.  Nonfocal exam.      Medical Decision Making       49 MINUTES SPENT BY ME on the date of service doing chart review, history, exam, documentation & further activities per the note.      Data   ------------------------- PAST 24 HR DATA REVIEWED -----------------------------------------------    I have personally reviewed the following data over the past 24 hrs:    11.3 (H)  \   12.8 (L)   / 262     N/A N/A N/A /  166 (H)   N/A N/A N/A \       Imaging results reviewed over the past 24 hrs:   Recent Results (from the past 24 hours)   CT Thoracic Spine w/o Contrast    Narrative    EXAM: CT THORACIC SPINE WITHOUT CONTRAST    LOCATION: [facility]  DATE: 2/26/2025    INDICATION: T10 fracture noted on MRI.  COMPARISON: 2/25/2025.  TECHNIQUE: CT thoracic spine without contrast. Dose reduction techniques were performed.    FINDINGS: There is good anatomic  alignment of the thoracic spine. The vertebral body heights are well-maintained throughout. There is a fracture through the anterior osteophyte formation of the T9-T10 disc space level that that extends obliquely through   the T10 vertebral body to its inferior endplate. The posterior elements are intact. There is no evidence of retropulsion to lead to canal compromise. No other acute thoracic spine fracture is visualized. There are diffuse endplate changes and anterior   osteophyte formations throughout the thoracic region. There is no significant canal compromise or significant neural foraminal narrowing throughout the thoracic line. The paraspinal soft tissues are unremarkable. The lungs visualized on this study are   clear.      Impression    IMPRESSION:  1.  Acute fracture through the anterior osteophyte formation at the T9-T10 disc space level extending obliquely through the T10 vertebral body to its inferior endplate.  2.  No evidence of retropulsion or canal compromise.  3.  No significant canal compromise or neural foraminal narrowing throughout thoracic spine.

## 2025-02-26 NOTE — PROGRESS NOTES
Care Management Follow Up    Length of Stay (days): 0    Expected Discharge Date: 02/26/2025     Concerns to be Addressed: discharge planning     Patient plan of care discussed at interdisciplinary rounds: Yes    Anticipated Discharge Disposition: Skilled Nursing Facility, Transitional Care     Anticipated Discharge Services:    Anticipated Discharge DME: None    Patient/family educated on Medicare website which has current facility and service quality ratings: yes  Education Provided on the Discharge Plan: Yes  Patient/Family in Agreement with the Plan: yes    Referrals Placed by CM/SW:    Private pay costs discussed: private room/amenity fees and transportation costs    Discussed  Partnership in Safe Discharge Planning  document with patient/family: No     Handoff Completed: No, handoff not indicated or clinically appropriate    Additional Information:  [facility] has accepted pt and [employer] auth has been approved. They have a private room ($71.11/day) or semi-private available. Spoke with pt to update. Pt wanted SW to go over what the need for TCU is and asked how long they'd need to be there. Pt spoke about their outpatient pool therapy and spoke fondly of their prior job as a . Pt is okay with going to the TCU. He confirms he likely would need transportation. Informed pt we are waiting to hear when pt will be medically stable. Pt states they would like to start in the semi-private room and see how it goes with a roommate and then decide from there.     [facility] wheelchair transport arranged today between [id number]. Loki at [facility] is aware.    Addendum 1232: informed by hospitalist that pt will stay today and plan on a discharge tomorrow. Critical access hospital wheelchair transport arranged for [phone]. Updated Loki at [facility].    JANETTE Shultz  Social Work  [facility]

## 2025-02-26 NOTE — PLAN OF CARE
OT order received and chart reviewed.  Per chart review and discussion with evaluating PT, discharge plan is for TCU tomorrow after TLSO received.  OT evaluation not needed for insurance authorization.  PT will continue to follow for activity progression while IP. Will defer skilled OT evaluation to next level of care.  Will complete orders.

## 2025-02-26 NOTE — PROGRESS NOTES
Contacted regarding 80 yo male on Eliquis who presented to the ER 2/24/25 after fall out of wheelchair.    MRI reveals T10 fracture in the setting of DISH.    Imaging:    MRI thoracic spine:    IMPRESSION:  1.  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 As this is an unstable fracture in a rigid spine neurosurgical consultation is recommended.  2.  No traumatic subluxation or high-grade canal stenosis.  3.  Mild lumbar spondylosis, worse at L4-L5 where there is severe left lateral recess stenosis and severe left foraminal stenosis. Correlate for left L4 and L5 radiculopathy.  4.  Advanced multilevel cervical spondylitic changes with multilevel canal flattening and canal stenoses, suboptimally assessed on the current examination. Consider dedicated MRI cervical spine to further evaluate.    RECOMMENDATIONS:  Bed rest.  Thoracic CT.  Plan for TLSO.    MATT Thakkar  [facility] Neurosurgery  [address]  [address] [place] [id number]    Tel [phone]  Pager [phone]

## 2025-02-26 NOTE — PLAN OF CARE
PRIMARY Concern: fall  SAFETY RISK Concerns (fall risk, behaviors, etc.): fall risk      Aggression Tool Color: green  Isolation/Type: n/a  Tests/Procedures for NEXT shift: Thoracic CT  Consults? (Pending/following, signed-off?) PT rec TCU, OT, SW following- pt accepted to [facility], insurance auth pending. Neurosurgery following  Where is patient from? (Home, TCU, etc.): Home   Other Important info for NEXT shift: Pt is on bedrest due to back injury   Anticipated DC date & active delays: TBD    SUMMARY NOTE:  Orientation/Cognitive: AOX4  Observation Goals (Met/ Not Met): not met  Mobility Level/Assist Equipment: Bedrest/AX2 gb/walker pivot to chair.   Antibiotics & Plan (IV/po, length of tx left): none  Pain Management: Denies pain at rest. Scheduled tylenol, gabapentin.   Complete Pain Reassessment: Y Due next: next shift  Tele/VS/O2: VSS on RA  ABNL Lab/BG: See results  Diet: Mod carb   Bowel/Bladder: continent uses urinal at BS.   Skin Concerns: Redness blanchable at sacrum/coccyx and perineum redness.   Drains/Devices: PIV SL  Patient Stated Goal for Today:     Observation goals  PRIOR TO DISCHARGE:   Comments:   -tolerating oral intake to maintain hydration: MET  -safe disposition plan has been identified: partially MET, pt accepted to [facility], pending insurance autho.   Nurse to notify provider when observation goals have been met and patient is ready for discharge.

## 2025-02-26 NOTE — PROGRESS NOTES
S: Nahun presents today at the [facility] room #[id number] for an evaluation and measurements of a custom TLSO. The patient has a closed stable burst fx of the T10 region with no known DOI.    O: The patient presents in his hospital bed with pain of 3/10 when not moving. If he attempts to move laying sideways, his pain goes up to 9/10.    A: The patient was measured supine for the custom TLSO. I showed pictures to Nahun on how the TLSO will look like. He expressed satisfaction.    P: The TLSO will be fabricated by [employer] and will be delivered to me tomorrow morning. I plan on delivering and fitting the custom TLSO tomorrow, around the afternoon.    Electronically signed by: ROE Lovell

## 2025-02-26 NOTE — PROGRESS NOTES
Observation goals  PRIOR TO DISCHARGE        Comments: -tolerating oral intake to maintain hydration- met  -safe disposition plan has been identified- not met, insurance autho pending  Nurse to notify provider when observation goals have been met and patient is ready for discharge.

## 2025-02-26 NOTE — PROGRESS NOTES
Observation goals  PRIOR TO DISCHARGE          -tolerating oral intake to maintain hydration- met  -safe disposition plan has been identified- met    Nurse to notify provider when observation goals have been met and patient is ready for discharge.

## 2025-02-26 NOTE — PROGRESS NOTES
PRIMARY Concern: Fall, fx to T9-T10  SAFETY RISK Concerns (fall risk, behaviors, etc.): Fall risk       Aggression Tool Color: yellow  Isolation/Type: none  Tests/Procedures for NEXT shift: XR thoracic Lumbar  Consults? (Pending/following, signed-off?) PT  Where is patient from? (Home, TCU, etc.): home  Other Important info for NEXT shift: Pt was easily irritated throughout shift  Anticipated DC date & active delays: 2/27  _____________________________________________________________________________  SUMMARY NOTE:  Orientation/Cognitive: A&Ox 4  Observation Goals (Met/ Not Met): not met   Mobility Level/Assist Equipment: Bedrest   Antibiotics & Plan (IV/po, length of tx left): none  Pain Management: scheduled tylnol   Complete Pain Reassessment: Yes Due next: TBD  Tele/VS/O2: VSS on RA   ABNL Lab/BG:   Diet: Mod carb  Bowel/Bladder: saturated brief, continent bowel   Skin Concerns: scattered bruises, mepilex on bottom   Drains/Devices: PIV SL   Patient Stated Goal for Today: to discharge

## 2025-02-26 NOTE — CONSULTS
"Neurosurgery Consult    Assessment  78 yo male presenting to ER after fall with back pain in which imaging reveals an acute T10 fracture.  Possible NPH  Cervical stenosis.    Plan:  In regard to the T10 fracture, recommend CT thoracic spine.  Plan for custom TLSO.  Bed rest until TLSO.  NS will follow.    In regarding to possible NPH, can continue work un outpatient.    Cervical pathology being addressed at [facility] with scheduled surgery.        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     Patient was apparently at a doctor's visit 2/24?25 when he tried to sit back on his wheelchair and the wheelchair rolled, causing him to fall backwards onto the floor.  He did hit his head.  He has chronic neck pain (scheduled for a C3-C6 laminoplasty with Dr. Pinon 4/2/25.  In addition he complained of back pain and thoracic and lumbar MRI's were obtained revealing DISH and a T10 fracture.    Head CT shows no acute intracranial abnormality.  The ventricular system is dilated \"in a configuration that could represent normal pressure hydrocephalus.\"  Patient has not been told about this finding in the past.  X-rays of the thoracic and lumbar spine show no acute fracture.    Exam  B/P: 164/89, T: 97.4, P: 82, R: 18   Sleeping upon entering room,  He wakes up easily, when introduced self patient stated, \"you need to see me at 2 AM\"  He then closed his eyes.  He is able to move all four extremities but decreased strength to anti-gravity on left, however will need to return for full neuro exam when patient more awake and willing to cooperate. "     Imaging    Thoracic MRI:    IMPRESSION:  1.  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 As this is an unstable fracture in a rigid spine neurosurgical consultation is recommended.  2.  No traumatic subluxation or high-grade canal stenosis.  3.  Mild lumbar spondylosis, worse at L4-L5 where there is severe left lateral recess stenosis and severe left foraminal stenosis. Correlate for left L4 and L5 radiculopathy.  4.  Advanced multilevel cervical spondylitic changes with multilevel canal flattening and canal stenoses, suboptimally assessed on the current examination. Consider dedicated MRI cervical spine to further evaluate.    Head CT:  IMPRESSION:  1.  No acute traumatic intracranial abnormality.  2.  Dilated ventricular system in a configuration that could represent normal pressure hydrocephalus.    Discussed with Dr. Jim, MPAS  [facility]  [address] [address]    Tel [phone]  Pager [phone]

## 2025-02-27 ENCOUNTER — DOCUMENTATION ONLY (OUTPATIENT)
Dept: GERIATRICS | Facility: CLINIC | Age: 80
End: 2025-02-27
Payer: COMMERCIAL

## 2025-02-27 ENCOUNTER — PATIENT OUTREACH (OUTPATIENT)
Dept: CARE COORDINATION | Facility: CLINIC | Age: 80
End: 2025-02-27
Payer: COMMERCIAL

## 2025-02-27 ENCOUNTER — APPOINTMENT (OUTPATIENT)
Dept: GENERAL RADIOLOGY | Facility: CLINIC | Age: 80
DRG: 552 | End: 2025-02-27
Attending: PHYSICIAN ASSISTANT
Payer: COMMERCIAL

## 2025-02-27 ENCOUNTER — APPOINTMENT (OUTPATIENT)
Dept: PHYSICAL THERAPY | Facility: CLINIC | Age: 80
DRG: 552 | End: 2025-02-27
Payer: COMMERCIAL

## 2025-02-27 ENCOUNTER — DOCUMENTATION ONLY (OUTPATIENT)
Dept: ORTHOPEDICS | Facility: CLINIC | Age: 80
End: 2025-02-27
Payer: COMMERCIAL

## 2025-02-27 VITALS
WEIGHT: 174.16 LBS | HEIGHT: 66 IN | OXYGEN SATURATION: 92 % | DIASTOLIC BLOOD PRESSURE: 79 MMHG | TEMPERATURE: 98.1 F | SYSTOLIC BLOOD PRESSURE: 129 MMHG | HEART RATE: 90 BPM | BODY MASS INDEX: 27.99 KG/M2 | RESPIRATION RATE: 16 BRPM

## 2025-02-27 LAB
ANION GAP SERPL CALCULATED.3IONS-SCNC: 11 MMOL/L (ref 7–15)
BUN SERPL-MCNC: 14.5 MG/DL (ref 8–23)
CALCIUM SERPL-MCNC: 9 MG/DL (ref 8.8–10.4)
CHLORIDE SERPL-SCNC: 101 MMOL/L (ref 98–107)
CREAT SERPL-MCNC: 1.21 MG/DL (ref 0.67–1.17)
EGFRCR SERPLBLD CKD-EPI 2021: 61 ML/MIN/1.73M2
ERYTHROCYTE [DISTWIDTH] IN BLOOD BY AUTOMATED COUNT: 11.9 % (ref 10–15)
GLUCOSE BLDC GLUCOMTR-MCNC: 134 MG/DL (ref 70–99)
GLUCOSE BLDC GLUCOMTR-MCNC: 144 MG/DL (ref 70–99)
GLUCOSE BLDC GLUCOMTR-MCNC: 151 MG/DL (ref 70–99)
GLUCOSE BLDC GLUCOMTR-MCNC: 161 MG/DL (ref 70–99)
GLUCOSE BLDC GLUCOMTR-MCNC: 170 MG/DL (ref 70–99)
GLUCOSE SERPL-MCNC: 154 MG/DL (ref 70–99)
HCO3 SERPL-SCNC: 27 MMOL/L (ref 22–29)
HCT VFR BLD AUTO: 34.8 % (ref 40–53)
HGB BLD-MCNC: 11.6 G/DL (ref 13.3–17.7)
MCH RBC QN AUTO: 33.3 PG (ref 26.5–33)
MCHC RBC AUTO-ENTMCNC: 33.3 G/DL (ref 31.5–36.5)
MCV RBC AUTO: 100 FL (ref 78–100)
PLATELET # BLD AUTO: 277 10E3/UL (ref 150–450)
POTASSIUM SERPL-SCNC: 4.2 MMOL/L (ref 3.4–5.3)
RBC # BLD AUTO: 3.48 10E6/UL (ref 4.4–5.9)
SODIUM SERPL-SCNC: 139 MMOL/L (ref 135–145)
WBC # BLD AUTO: 10.5 10E3/UL (ref 4–11)

## 2025-02-27 PROCEDURE — G0378 HOSPITAL OBSERVATION PER HR: HCPCS

## 2025-02-27 PROCEDURE — 96374 THER/PROPH/DIAG INJ IV PUSH: CPT

## 2025-02-27 PROCEDURE — 80048 BASIC METABOLIC PNL TOTAL CA: CPT

## 2025-02-27 PROCEDURE — 99231 SBSQ HOSP IP/OBS SF/LOW 25: CPT | Performed by: PHYSICIAN ASSISTANT

## 2025-02-27 PROCEDURE — 97530 THERAPEUTIC ACTIVITIES: CPT | Mod: GP

## 2025-02-27 PROCEDURE — 250N000011 HC RX IP 250 OP 636: Mod: JZ

## 2025-02-27 PROCEDURE — 82962 GLUCOSE BLOOD TEST: CPT

## 2025-02-27 PROCEDURE — 250N000013 HC RX MED GY IP 250 OP 250 PS 637: Performed by: INTERNAL MEDICINE

## 2025-02-27 PROCEDURE — 72080 X-RAY EXAM THORACOLMB 2/> VW: CPT

## 2025-02-27 PROCEDURE — 82374 ASSAY BLOOD CARBON DIOXIDE: CPT

## 2025-02-27 PROCEDURE — 85027 COMPLETE CBC AUTOMATED: CPT

## 2025-02-27 PROCEDURE — 36415 COLL VENOUS BLD VENIPUNCTURE: CPT

## 2025-02-27 PROCEDURE — L0486 TLSO RIGIDLINED CUST FAB TWO: HCPCS

## 2025-02-27 RX ORDER — HYDROMORPHONE HCL IN WATER/PF 6 MG/30 ML
0.2 PATIENT CONTROLLED ANALGESIA SYRINGE INTRAVENOUS ONCE
Status: COMPLETED | OUTPATIENT
Start: 2025-02-27 | End: 2025-02-27

## 2025-02-27 RX ADMIN — OXYCODONE HYDROCHLORIDE 10 MG: 5 TABLET ORAL at 15:33

## 2025-02-27 RX ADMIN — UMECLIDINIUM 1 PUFF: 62.5 AEROSOL, POWDER ORAL at 09:38

## 2025-02-27 RX ADMIN — FLUTICASONE FUROATE AND VILANTEROL TRIFENATATE 1 PUFF: 100; 25 POWDER RESPIRATORY (INHALATION) at 09:37

## 2025-02-27 RX ADMIN — CHOLESTYRAMINE 4 G: 4 POWDER, FOR SUSPENSION ORAL at 09:36

## 2025-02-27 RX ADMIN — GABAPENTIN 300 MG: 300 CAPSULE ORAL at 09:36

## 2025-02-27 RX ADMIN — ATORVASTATIN CALCIUM 20 MG: 20 TABLET, FILM COATED ORAL at 21:25

## 2025-02-27 RX ADMIN — FINASTERIDE 5 MG: 5 TABLET, FILM COATED ORAL at 09:36

## 2025-02-27 RX ADMIN — METFORMIN HYDROCHLORIDE 1000 MG: 500 TABLET, FILM COATED ORAL at 19:05

## 2025-02-27 RX ADMIN — LISINOPRIL 40 MG: 40 TABLET ORAL at 09:36

## 2025-02-27 RX ADMIN — APIXABAN 2.5 MG: 2.5 TABLET, FILM COATED ORAL at 09:37

## 2025-02-27 RX ADMIN — APIXABAN 2.5 MG: 2.5 TABLET, FILM COATED ORAL at 19:05

## 2025-02-27 RX ADMIN — ACETAMINOPHEN 1000 MG: 500 TABLET, FILM COATED ORAL at 19:05

## 2025-02-27 RX ADMIN — FUROSEMIDE 40 MG: 40 TABLET ORAL at 09:37

## 2025-02-27 RX ADMIN — FERROUS SULFATE TAB 325 MG (65 MG ELEMENTAL FE) 325 MG: 325 (65 FE) TAB at 19:06

## 2025-02-27 RX ADMIN — GABAPENTIN 600 MG: 300 CAPSULE ORAL at 21:25

## 2025-02-27 RX ADMIN — AMLODIPINE BESYLATE 5 MG: 5 TABLET ORAL at 09:37

## 2025-02-27 RX ADMIN — OXYCODONE HYDROCHLORIDE 10 MG: 5 TABLET ORAL at 21:26

## 2025-02-27 RX ADMIN — CHOLESTYRAMINE 4 G: 4 POWDER, FOR SUSPENSION ORAL at 19:05

## 2025-02-27 RX ADMIN — HYDROMORPHONE HYDROCHLORIDE 0.2 MG: 0.2 INJECTION, SOLUTION INTRAMUSCULAR; INTRAVENOUS; SUBCUTANEOUS at 18:17

## 2025-02-27 RX ADMIN — METFORMIN HYDROCHLORIDE 1000 MG: 500 TABLET, FILM COATED ORAL at 09:37

## 2025-02-27 RX ADMIN — ACETAMINOPHEN 1000 MG: 500 TABLET, FILM COATED ORAL at 09:36

## 2025-02-27 ASSESSMENT — ACTIVITIES OF DAILY LIVING (ADL)
ADLS_ACUITY_SCORE: 61
ADLS_ACUITY_SCORE: 60
ADLS_ACUITY_SCORE: 65
ADLS_ACUITY_SCORE: 60
ADLS_ACUITY_SCORE: 65
ADLS_ACUITY_SCORE: 65
ADLS_ACUITY_SCORE: 60
ADLS_ACUITY_SCORE: 65
ADLS_ACUITY_SCORE: 61
ADLS_ACUITY_SCORE: 60
ADLS_ACUITY_SCORE: 60
ADLS_ACUITY_SCORE: 65
ADLS_ACUITY_SCORE: 65
ADLS_ACUITY_SCORE: 61
ADLS_ACUITY_SCORE: 65
ADLS_ACUITY_SCORE: 60
ADLS_ACUITY_SCORE: 65

## 2025-02-27 NOTE — PROGRESS NOTES
Care Management Follow Up    Length of Stay (days): 0    Expected Discharge Date: 02/27/2025     Concerns to be Addressed: discharge planning     Patient plan of care discussed at interdisciplinary rounds: Yes    Anticipated Discharge Disposition: Skilled Nursing Facility, Transitional Care      Anticipated Discharge Services:    Anticipated Discharge DME: None    Patient/family educated on Medicare website which has current facility and service quality ratings: yes  Education Provided on the Discharge Plan: Yes  Patient/Family in Agreement with the Plan: yes    Referrals Placed by CM/SW:    Private pay costs discussed: Not applicable    Discussed  Partnership in Safe Discharge Planning  document with patient/family: No     Handoff Completed: No, handoff not indicated or clinically appropriate    Additional Information:  Informed pt will not discharge today, plan for tomorrow. Updated Loki at [place].  Rescheduled [facility] wheelchair ride to Friday between 5297-0566.    JANETTE Shultz  Social Work  [facility]

## 2025-02-27 NOTE — PROGRESS NOTES
"S: Nahun was seen at the [facility] #[id number] bedside for TLSO fitting/delivery and instructions today.      O: No changes from previous appointment.     A: I fit the TLSO and delivered the custom TLSO while laying supine. The charge nurse was present and was able to assist with log rolling and donning. The instructions were given to the patient.    P: FU PRN.      G: The goal is to stabilize the spinal column.     Fitting a Nancy TLSO     1. Patient should be supine. Palpate for waist (below ribs but above hips) so you know where to line up waist grooves of the brace.     2. Logroll patient and slide back section of brace under patient lining up waist groove of brace with patient's waist.     3. Roll patient onto their back with the posterior section behind them. Recheck alignment of waist groove on brace with patient's waist. It may be necessary to logroll patient to the opposite side to get posterior section centered on patient, where it extends anteriorly equal amounts on patient's sides. Repeat logrolling side to side as necessary.     4. Once posterior section positioned correctly, lay anterior section on patient. Again, line up waist groove of brace to patient's waist. Waist grooves of anterior and posterior section should match up and fit together. Anterior shell should go over the top of posterior shell. Velcro straps should line up with the chafes/D-rings, if they don't, either the anterior or the posterior sections are not in the proper place.     5. Fasten the middle straps first and tighten both sides evenly. Then fasten either top or bottom straps in the same manner. Brace should be snug to prevent brace from sliding up on patient. If it is too loose, the brace will slide up and cause discomfort to the patient in the chin and/or axilla area.     6. When properly fit, brace should be about 1\" inferior to the manubrium and about 1.5-2\" inferior to the axilla. The inferior aspect should allow " clearance so as not to cut into the tops of the thighs when sitting but needs to be as low in the pelvic area as possible.     For optimal fit brace should NOT be donned with patient sitting. Ideal fit is achieved by donning in either laying or standing position. Due to changes in belly tissue, it is difficult to achieve a proper fit when patient is sitting.     Brace migration is inevitable, especially when patient is going from laying to upright in bed. Bed will push posterior section of brace up and will push the whole brace up. Migration will also occur when patient is sitting in a hard chair. Don't be afraid to readjust brace and pull it down and back to its proper position.    This note has been electronically signed by José Osman, Board Eligible

## 2025-02-27 NOTE — PROGRESS NOTES
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     Neuro stable.     TODAY'S PLAN:     -Custom TLSO should arrive today. Once the brace arrives he will get upright (ap/lat) thoracic x-rays IN THE BRACE. Once the imaging has been completed he should be able to discharge to home.    -Navigator updated with our recommendations.   -Advance activity as tolerated.  -Continue supportive and symptomatic treatment.  -Continue physical therapy.  -Pain control measures.  -Advance diet as tolerated.     In the event that patient's symptoms worsen or change we would appreciate being contacted. We did discuss signs of a worsening problem that he should seek being evaluated.     We did review the above information with the patient whom agrees with the plan and did verbalize understanding.   ________________________________________________________________     Mr. Villalobos overall feels well and denies any significant discomfort. Tolerating regular diet without n/v.     BP (!) 161/93 (BP Location: Left arm)   Pulse 88   Temp 98.6  F (37  C) (Oral)   Resp 16   Ht 1.676 m (5' 6\")   Wt 79 kg (174 lb 2.6 oz)   SpO2 95%   BMI 28.11 kg/m       Pt in bed. Appears comfortable and in no apparent distress, moving all extremities.   CN II-XII intact, alert and appropriate with conversation and following commands.   Bilateral upper and lower extremities with appropriate strength. DTR's WNL. Spine is non tender to palpation throughout. Sensation intact throughout. Acceptable ROM.   Calves soft and non-tender bilaterally.     All pertinent labs and updated imaging reviewed in EPIC.     Jericho Ness PA-C   Neurosurgery       "

## 2025-02-27 NOTE — PLAN OF CARE
Goal Outcome Evaluation:    PRIMARY Concern: Fall, Fx to T9-T10  SAFETY RISK Concerns (fall risk, behaviors, etc.): Fall risk      Aggression Tool Color: Yellow  Isolation/Type: N/a  Tests/Procedures for NEXT shift: Xray in brace needed  Consults? (Pending/following, signed-off?) PT pending   Where is patient from? (Home, TCU, etc.): Home  Other Important info for NEXT shift: WCB at baseline  Anticipated DC date & active delays: 2/28 to [facility] between 1904-1089  ____________________________________  SUMMARY NOTE:  Orientation/Cognitive: A&Ox4  Observation Goals (Met/ Not Met): Not met  Mobility Level/Assist Equipment: Not OOB, TLSO brace on  Antibiotics & Plan (IV/po, length of tx left): N/a  Pain Management: Scheduled tylenol and gabapentin   Complete Pain Reassessment: Yes   Due next: Next shift  Tele/VS/O2: VSS on RA  ABNL Lab/BG: /134  Diet: Mod carb  Bowel/Bladder: Incontinent at times, urinal in bed  Skin Concerns: Scattered bruising  Drains/Devices: IV SL  Patient Stated Goal for Today: Rest

## 2025-02-27 NOTE — PROGRESS NOTES
[id number]  PRIMARY Concern: Fall, Fx to T9-T10  SAFETY RISK Concerns (fall risk, behaviors, etc.): Fall risk      Aggression Tool Color: Yellow  Isolation/Type:   Tests/Procedures for NEXT shift:   Consults? (Pending/following, signed-off?) PT  Where is patient from? (Home, TCU, etc.): Home  Other Important info for NEXT shift: WCB at baseline  Anticipated DC date & active delays: 2/27 to [facility] between 3823-0147  _____________________________________________________________________________  SUMMARY NOTE:  Orientation/Cognitive: A&Ox4  Observation Goals (Met/ Not Met): Not met  Mobility Level/Assist Equipment: Bedrest until TLSO brace arrives  Antibiotics & Plan (IV/po, length of tx left):   Pain Management: PRN oxy x1, scheduled tylenol and gabapentin   Complete Pain Reassessment: Yes   Due next: Next shift  Tele/VS/O2: VSS on RA  ABNL Lab/BG:   Diet: Mod carb  Bowel/Bladder: Incontinent at times, urinal in bed  Skin Concerns: Scattered bruising  Drains/Devices: IV SL  Patient Stated Goal for Today:

## 2025-02-27 NOTE — LETTER
[facility] CARE COORDINATION  [address] [facility] [address]    February 27, 2025        Nahun Villalobos  [address] [facility] [address]          Dear Nahun,     Attached is an updated Patient Centered Plan of Care for your continued enrollment in Care Coordination. Please let us know if you have additional questions, concerns, or goals that we can assist with.    Sincerely,    Kelli Davidson, RN Clinic Care Coordinator  [facility] Clinics: [place], [place] (on-site Wednesdays), [facility] & [facility].  [email or web address]  Phone: [phone]               [facility]  Patient Centered Plan of Care  About Me:        Patient Name:  Nahun Villalobos    YOB: 1945  Age:         79 year old   [facility] MRN:    [id number] Telephone Information:  Home Phone [phone]   Mobile [phone]       Address:  [address] [address] Email address:  [email or web address])      Emergency Contact(s)    Name Relationship Lgl Grd Work Phone Home Phone Mobile Phone   1. SINDY CHRISTIE Daughter No   [phone]   2. SUMAN MOORE Significant ot*    [phone]           Primary language:  English     needed? No   [facility] [employer]:  [phone] op. 1  Other communication barriers:None    Preferred Method of Communication:  Alenahart  Current living arrangement: I live in a private home with spouse (Suman, s.o.)    Mobility Status/ Medical Equipment: Independent w/Device        Health Maintenance  Health Maintenance Reviewed: Due/Overdue   Health Maintenance Due   Topic Date Due    DIABETIC FOOT EXAM  11/01/2019    DTAP/TDAP/TD IMMUNIZATION (2 - Td or Tdap) 08/09/2023    EYE EXAM  09/25/2024    PHQ-2 (once per calendar year)  01/01/2025          My Access Plan  Medical Emergency 911   Primary Clinic Line [facility]* - [phone]   24 Hour Appointment Line [phone]  or  [phone]) (toll-free)   24 Hour Nurse Line [phone] (toll-free)   Preferred Urgent Care [facility], [phone]     Preferred Hospital [facility]  [phone] ([facility]; was at [facility] 3/2023)     Preferred Pharmacy [id number] [place] [address] AT [address] [place] [address] & [facility]     Behavioral Health Crisis Line The National Suicide Prevention Lifeline at 1-407.732.1889 or Text/Call 988           My Care Team Members  Patient Care Team         Relationship Specialty Notifications Start End    Olegario Castillo MD PCP - General Internal Medicine  4/6/22     Phone: [phone] Fax: [phone]         [address] [place] [address]    Olegario Castillo MD Assigned PCP   10/4/12     Phone: [phone] Fax: [phone]         [address] [place] [address]    Praveena Burris MD MD Urology  3/1/17      INACTIVE IN [place] AS OF 4/30/2021    Areli Cordero, RN Registered Nurse   3/1/17     Phone: [phone]         Kelley Slaughter CHW Community Health Worker Primary Care - CC Admissions 9/24/20     Phone: [phone]         Jaswinder Tinajero MD MD Hematology & Oncology  12/7/20     Phone: [phone] Fax: [phone]         [place] [facility] [address] [facility] [address]    Bernardo Haynes MD MD Pulmonary Disease  12/7/20     Phone: [phone] Fax: [phone]         [facility] [address] [facility] [address]    Antonia Mandel, PT Physical Therapist Physical Therapist  8/2/21     Phone: [phone] Fax: [phone]         [address] [facility] [address] [facility] [address]    Jamil Aparicio MD MD Neurology  2/2/22     Phone: [phone] Fax: [phone] [address] [place] [address]    Kaleigh Onofre MD MD Urology  2/1/23     Phone: [phone] Fax: [phone] [address]  [facility] [address]    Kelli Davidson, RN Lead Care Coordinator  Admissions 6/17/24     Rebeca Conte PA-C Assigned Neuroscience Provider   9/23/24     Phone: [phone] Fax: [phone] [address] [facility] [address]    Perlita Lucas, PharmD Assigned MTM Pharmacist   9/23/24     Phone: [phone] Fax: [phone] [address] [facility] [address]                My Care Plans  Self Management and Treatment Plan    Care Plan  Care Plan: 1. Improve chronic symptoms       Problem: Lifestyle choices       Goal: I want to improve management of my leg, knee, and hip pain and swelling.       Start Date: 6/3/2021 Expected End Date: 4/28/2025    This Visit's Progress: 80% Recent Progress: 80%    Note:     Barriers: Lymphedema  Strengths: Motivated to improve ability to walk  Patient expressed understanding of goal: Yes  Action steps to achieve this goal:  1. I will apply Jacinto hose to wear throughout the day and remove at night  2. I will walk my dog (recently passed) daily to help with strengthening and endurance  3. I will elevate my legs while sitting and laying down by propping them up with a pillow  4. I will discuss, review, schedule and complete recommended overdue health maintenance with my primary care provider  5. I will I will take my medications as prescribed  6. I will contact my care team with questions, concerns, support needs. I will use the clinic as a resource   7. I will contact my clinic with 24/7 after hours services available                              Action Plans on File:         COPD  Depression          Advance Care Plans/Directives:   Advanced Care Plan/Directives on file: No    Discussed with patient/caregiver(s): No data recorded             My Medical and Care Information  Problem List   Patient Active Problem List   Diagnosis    Essential hypertension, benign    Tobacco use disorder    Lumbago    Impotence of organic origin    Advance care  planning    Polyp of colon    Obstructive sleep apnea syndrome    Hyperlipidemia LDL goal <100    Morbid obesity due to excess calories (H)    BPPV (benign paroxysmal positional vertigo), unspecified laterality    Chronic obstructive pulmonary disease, unspecified COPD type (H)    Type 2 diabetes mellitus with other specified complication, without long-term current use of insulin (H)    S/P transurethral resection of prostate    Hematuria, gross    Cervical segment dysfunction    Cervicalgia    Thoracic segment dysfunction    Erectile dysfunction    Acute diverticulitis    Mesenteric mass    History of adenocarcinoma of lung    Benign neoplasm of ascending colon    Benign neoplasm of rectum    Diverticular disease of colon    History of colonic polyps    Non-small cell lung cancer (H)    Malignant neoplasm metastatic to bone (H)    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, unspecified portion (H)    Weakness    Gait instability    Alteration in skin integrity due to moisture    Contusion of lower back, initial encounter    Acute left-sided low back pain without sciatica    Secondary carcinoid tumors of liver (H)    Foot sprain, right, initial encounter    Sprain of right knee, unspecified ligament, initial encounter    Fall, initial encounter    Head injury, initial encounter    Hydrocephalus, unspecified type (H)    Contusion of thoracic spine      Current Medications:  Please refer to the most recent medication list provided to you by your medical team and reach out to your provider with any questions or to make any corrections.    Care Coordination Start Date: 9/24/2020   Frequency of Care Coordination: monthly, more frequently as needed     Form Last Updated: 02/27/2025

## 2025-02-27 NOTE — PLAN OF CARE
PRIMARY Concern: Fall, Fx to T9-T10  SAFETY RISK Concerns (fall risk, behaviors, etc.): Fall risk      Aggression Tool Color: Yellow  Isolation/Type:   Tests/Procedures for NEXT shift:   Consults? (Pending/following, signed-off?) PT  Where is patient from? (Home, TCU, etc.): Home  Other Important info for NEXT shift: WCB at baseline  Anticipated DC date & active delays: Possible discharge tomorrow (2/26/25)  _____________________________________________________________________________  SUMMARY NOTE:  Orientation/Cognitive: A&Ox4  Observation Goals (Met/ Not Met): Not met  Mobility Level/Assist Equipment: Bedrest until TLSO brace arrives  Antibiotics & Plan (IV/po, length of tx left):   Pain Management: Denies pain  Complete Pain Reassessment: Yes Due next: Next shift  Tele/VS/O2: VSS on RA  ABNL Lab/BG: , 175, 166; Pt refused novolog insulin this evening  Diet: Mod carb  Bowel/Bladder: Incontinent  Skin Concerns: Scattered bruising  Drains/Devices: IV SL  Patient Stated Goal for Today:

## 2025-02-27 NOTE — PROGRESS NOTES
Clinic Care Coordination Contact    Situation: Patient chart reviewed by care coordinator.    Background: Care Coordination initial assessment and enrollment to Care Coordination was 9/24/2020. Patient centered goal(s) developed with participation from patient.  RN CC handed patient off to CHW for continued outreach every 30 days. Patient is due for an updated complex care plan. Annual Assessment will be due April 2025    Assessment: Patient currently inpatient.     Plan/Recommendations: Care Coordination will monitor for discharge notification.     Kelli Davidson RN Clinic Care Coordinator  [facility] Clinics: [place], [place] (on-site Wednesdays), [facility] (on-site Thursdays) & [facility].  [email or web address]  Phone: [phone]

## 2025-02-27 NOTE — PLAN OF CARE
Goal Outcome Evaluation:    [id number]  PRIMARY Concern: Fall, Fx to T9-T10  SAFETY RISK Concerns (fall risk, behaviors, etc.): Fall risk      Aggression Tool Color: Yellow  Isolation/Type: N/A  Tests/Procedures for NEXT shift:   Consults? (Pending/following, signed-off?) PT  Where is patient from? (Home, TCU, etc.): Home  Other Important info for NEXT shift: WCB at baseline  Anticipated DC date & active delays: 2/27 to [facility] between 1912-0811  ______________________  Orientation/Cognitive: A&Ox4  Observation Goals (Met/ Not Met): Not met  Mobility Level/Assist Equipment: Bedrest until TLSO brace arrives  Antibiotics & Plan (IV/po, length of tx left):   Pain Management: no PRNs given during this shift   Complete Pain Reassessment: Yes   Due next: Next shift  Tele/VS/O2: VSS on RA  ABNL Lab/BG:   Diet: Mod carb  Bowel/Bladder: Incontinent at times, urinal in bed  Skin Concerns: Scattered bruising  Drains/Devices: IV SL  Patient Stated Goal for Today:

## 2025-02-27 NOTE — PROGRESS NOTES
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 ED and was subsequently registered observation on 02/24/2025 following a mechanical fall witnessed at the doctor's office that occurred just prior to arrival.     Acute fracture s/p mechanical fall  Acute mid-thoracic region back pain in setting above   Cervical stenosis w/ myelopathy and neuropathy  DJD  History of chronic back pain secondary to above  History of recurrent mechanical falls   *Patient presented after he sustained a fall backwards as he was attempting to get into his wheelchair in clinic just prior to arrival 02/25/2025. Patient noted to fall directly onto back and experienced traumatic head strike upon landing. Following fall patient reporting severe thoracic back pain with new bilateral upper extremity paraesthesias and weakness (usually unilateral, RUE). History of chronic back pain secondary to cervical spinal stenosis with peripheral neuropathy. Plan for surgical intervention in April 2025. On chronic anticoagulation (Eliquis). History of bone mets from prior carcinoid of small bowel.   *CTH 2/24/25 negative for acute abnormality, does show dilated ventricular system, see below.   *XR of thoracic and lumbar spine 2/24/25 show no acute fractures.  *CT thoracic w/o contrast with acute fracture through the anterior osteophyte formation at T9-T10 disc space level extending obliquely through T10 vertebral body to its inferior endplate. No retropulsion or canal  compromise.   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 As this is an unstable fracture in a rigid spine neurosurgical consultation is recommended.No traumatic subluxation or high-grade canal stenosis. Mild lumbar spondylosis, worse at L4-L5 where there is severe left lateral recess stenosis and severe left foraminal stenosis. Correlate for left L4 and L5 radiculopathy. Advanced multilevel cervical spondylitic changes with multilevel canal flattening and canal stenoses, suboptimally assessed on the current examination.   *Patient with continued thoracic spinal tenderness with movement. Improved at rest. Denies worsening numbness, tingling or weakness bilateral upper or lower extremities (baseline left lower extremity weakness and intermittent sciatica, left upper extremity paraesthesias). No changes to or loss of bladder or bowel. Denies chest pain, difficulty breathing, N/V or chills.  - Neurosurgery consulted, appreciate the cares. Custom TLSO ordered. Suspect will need to wear minimum three months. Initially placed on bed rest until following brace placement 02/27 AM. Awaiting upright imaging following brace placement. Delay in imaging secondary to patient refusal reportedly due to severe pain with even minor movement. Discussed need for improved pain management with narcotics as patient hesitant to use medications stronger than Tylenol. Discharge pending imaging results and improvement in pain management as currently uncontrolled. Pending medical clearance, TCU available with discharge between [id number].  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 CT thoracic spine with similar findings.   - Continue gabapentin, scheduled Tylenol for pain relief and as needed oxycodone also available. PRN adjuncts include lidocaine patch PRN.     Dilated cerebral ventricles, consider  normal pressure hydrocephalus  - Outpatient neurology consult upon discharge for further evaluation and management.       Leukocytosis, resolved  *Patient remains afebrile with no evidence of acute infection.   - Continue to monitor with morning labs.      COPD  CIERA  *Patient noted to not be compliant w/ CPAP.  - Duonebs PRN available.   - Continue PTA Breo Ellipta and Incruse Ellipta.     Hx PE on AC  Anemia, chronic, normocytic   *Baseline Hgb 11-12g/dL. Hgb 11.6 02/27. Denies recent evidence of bleed including hematemesis, hematuria, melena or RBPR.  - Continue PTA Eliquis.  - Continue PTA iron supplement.     T2DM w/ hyperglycemia A1C 7% on 12/2/2024.  - Mod CHO diet.   - Medium intensity sliding scale insulin ordered.  - Continue metformin following medication reconciliation.  - Glucose checks as ordered.     HTN  HFpEF, chronic compensated  *Intermittently hypertensive in setting of unmanaged acute pain.   - Hydralazine PRN.  - Continue lisinopril, lasix and amlodipine.   - Improved pain management with pain regimen above.      BPH  - Continue PTA finasteride.     Diarrhea  - Continue PTA cholesytime.     HLD  - Continue PTA statin.     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  - Follow-up with Oncology/Hematology upon discharge for continued management.      Hx RUL adenocarcinoma s/p wedge resection:   - Follow-up with Oncology/Hematology upon discharge for continued management.        Observation Goals: -tolerating oral intake to maintain hydration, -safe disposition plan has been identified, Nurse to notify provider when observation goals have been met and patient is ready for discharge.  Diet: Moderate Consistent Carb (60 g CHO per Meal) Diet    DVT Prophylaxis: DOAC  Gomez Catheter: Not present  Lines: None     Cardiac Monitoring: None  Code Status: No CPR- Do NOT Intubate      [employer]    Tobacco Use: Medium Risk (2/24/2025)    Received from  [facility]    Patient History     Smoking Tobacco Use: Former     Smokeless Tobacco Use: Unknown   Physical Activity: Insufficiently Active (9/2/2024)    Exercise Vital Sign     Days of Exercise per Week: 2 days     Minutes of Exercise per Session: 20 min   Social Connections: Unknown (9/2/2024)    Social Connection and Isolation Panel [NHANES]     Frequency of Social Gatherings with Friends and Family: Once a week          Disposition Plan     Medically Ready for Discharge: Anticipated Tomorrow       The patient's care was discussed with the Attending Physician, Dr. Esparza .    Magdalene Izaguirre PA-C  Hospitalist Service  [facility]  Securely message with [employer] (more info)  Text page via [facility] Paging/Directory   ______________________________________________________________________    Interval History   VSS. Afebrile. No acute events overnight. Custom brace placed this morning. Patient initially motivated to attempt to rise to standing for completion of imaging studies and discharge to TCU, however, upon repeat discussion in afternoon patient refusing imaging secondary to reported severe pain (12/10 sharp pain localized to thoracic spine) with even minor movement. Improved at rest. Denies worsening numbness, tingling or weakness bilateral upper or lower extremities (baseline left lower extremity weakness and intermittent sciatica, left upper extremity paraesthesias). No changes to or loss of bladder or bowel. Denies chest pain, difficulty breathing, N/V or chills.Agreeable to narcotic pain medications for improved pain management despite concern for developing addiction to medication. Discussed discharge pending imaging and improved pain control. Patient otherwise denying pain. Tolerating diet. No additional concerns or complaints at this time.     Physical Exam   Vital Signs: Temp: 98.6  F (37  C) Temp src: Oral BP: (!) 161/93 Pulse: 88   Resp: 16 SpO2: 95 % O2 Device: None  (Room air)    Weight: 174 lbs 2.61 oz    GENERAL:  Pleasant, cooperative, alert. Laying in bed comfortably upon exam in no acute distress with brace in place.   HEENT: Normocephalic, atraumatic.    PULMONOLOGY: Clear to auscultation bilaterally. No increased work of breathing.   CARDIAC: Regular rate and rhythm.    ABDOMEN: Unable to assess secondary to brace.   MUSCULOSKELETAL:  Moving x 4 spontaneously.  Normal bulk and tone. No LE edema.  NEURO: Alert and oriented x3. CN II-XII grossly intact and symmetric. Strength and sensation bilateral upper and lower extremities intact and symmetric.     Medical Decision Making       49 MINUTES SPENT BY ME on the date of service doing chart review, history, exam, documentation & further activities per the note.      Data   ------------------------- PAST 24 HR DATA REVIEWED -----------------------------------------------    I have personally reviewed the following data over the past 24 hrs:    10.5  \   11.6 (L)   / 277     139 101 14.5 /  161 (H)   4.2 27 1.21 (H) \       Imaging results reviewed over the past 24 hrs:   No results found for this or any previous visit (from the past 24 hours).

## 2025-02-28 LAB
GLUCOSE BLDC GLUCOMTR-MCNC: 136 MG/DL (ref 70–99)
GLUCOSE BLDC GLUCOMTR-MCNC: 156 MG/DL (ref 70–99)
GLUCOSE BLDC GLUCOMTR-MCNC: 163 MG/DL (ref 70–99)
GLUCOSE BLDC GLUCOMTR-MCNC: 178 MG/DL (ref 70–99)
GLUCOSE BLDC GLUCOMTR-MCNC: 185 MG/DL (ref 70–99)

## 2025-02-28 PROCEDURE — 82962 GLUCOSE BLOOD TEST: CPT

## 2025-02-28 PROCEDURE — G0378 HOSPITAL OBSERVATION PER HR: HCPCS

## 2025-02-28 PROCEDURE — 250N000013 HC RX MED GY IP 250 OP 250 PS 637: Performed by: INTERNAL MEDICINE

## 2025-02-28 PROCEDURE — 250N000013 HC RX MED GY IP 250 OP 250 PS 637

## 2025-02-28 RX ORDER — OXYCODONE HYDROCHLORIDE 5 MG/1
5 TABLET ORAL
Status: DISCONTINUED | OUTPATIENT
Start: 2025-02-28 | End: 2025-03-04

## 2025-02-28 RX ORDER — CALCITONIN SALMON 200 [IU]/.09ML
1 SPRAY, METERED NASAL DAILY
Status: DISCONTINUED | OUTPATIENT
Start: 2025-02-28 | End: 2025-03-04 | Stop reason: HOSPADM

## 2025-02-28 RX ORDER — METHOCARBAMOL 500 MG/1
500 TABLET, FILM COATED ORAL 4 TIMES DAILY
Status: DISCONTINUED | OUTPATIENT
Start: 2025-02-28 | End: 2025-03-04 | Stop reason: HOSPADM

## 2025-02-28 RX ADMIN — METFORMIN HYDROCHLORIDE 1000 MG: 500 TABLET, FILM COATED ORAL at 16:17

## 2025-02-28 RX ADMIN — OXYCODONE HYDROCHLORIDE 5 MG: 5 TABLET ORAL at 01:30

## 2025-02-28 RX ADMIN — APIXABAN 2.5 MG: 2.5 TABLET, FILM COATED ORAL at 20:34

## 2025-02-28 RX ADMIN — METFORMIN HYDROCHLORIDE 1000 MG: 500 TABLET, FILM COATED ORAL at 09:11

## 2025-02-28 RX ADMIN — CHOLESTYRAMINE 4 G: 4 POWDER, FOR SUSPENSION ORAL at 16:17

## 2025-02-28 RX ADMIN — FLUTICASONE FUROATE AND VILANTEROL TRIFENATATE 1 PUFF: 100; 25 POWDER RESPIRATORY (INHALATION) at 09:14

## 2025-02-28 RX ADMIN — AMLODIPINE BESYLATE 5 MG: 5 TABLET ORAL at 09:11

## 2025-02-28 RX ADMIN — OXYCODONE HYDROCHLORIDE 5 MG: 5 TABLET ORAL at 11:33

## 2025-02-28 RX ADMIN — CALCITONIN SALMON 1 SPRAY: 200 SPRAY, METERED NASAL at 16:17

## 2025-02-28 RX ADMIN — METHOCARBAMOL 500 MG: 500 TABLET ORAL at 20:34

## 2025-02-28 RX ADMIN — APIXABAN 2.5 MG: 2.5 TABLET, FILM COATED ORAL at 09:11

## 2025-02-28 RX ADMIN — FERROUS SULFATE TAB 325 MG (65 MG ELEMENTAL FE) 325 MG: 325 (65 FE) TAB at 20:34

## 2025-02-28 RX ADMIN — ACETAMINOPHEN 1000 MG: 500 TABLET, FILM COATED ORAL at 09:11

## 2025-02-28 RX ADMIN — CHOLESTYRAMINE 4 G: 4 POWDER, FOR SUSPENSION ORAL at 09:11

## 2025-02-28 RX ADMIN — METHOCARBAMOL 500 MG: 500 TABLET ORAL at 16:17

## 2025-02-28 RX ADMIN — FINASTERIDE 5 MG: 5 TABLET, FILM COATED ORAL at 09:11

## 2025-02-28 RX ADMIN — ACETAMINOPHEN 1000 MG: 500 TABLET, FILM COATED ORAL at 20:34

## 2025-02-28 RX ADMIN — ATORVASTATIN CALCIUM 20 MG: 20 TABLET, FILM COATED ORAL at 21:59

## 2025-02-28 RX ADMIN — GABAPENTIN 300 MG: 300 CAPSULE ORAL at 09:11

## 2025-02-28 RX ADMIN — GABAPENTIN 600 MG: 300 CAPSULE ORAL at 22:00

## 2025-02-28 RX ADMIN — LISINOPRIL 40 MG: 40 TABLET ORAL at 09:11

## 2025-02-28 RX ADMIN — UMECLIDINIUM 1 PUFF: 62.5 AEROSOL, POWDER ORAL at 09:14

## 2025-02-28 RX ADMIN — FUROSEMIDE 40 MG: 40 TABLET ORAL at 09:11

## 2025-02-28 ASSESSMENT — ACTIVITIES OF DAILY LIVING (ADL)
ADLS_ACUITY_SCORE: 60
ADLS_ACUITY_SCORE: 60
ADLS_ACUITY_SCORE: 65
ADLS_ACUITY_SCORE: 60
ADLS_ACUITY_SCORE: 62
ADLS_ACUITY_SCORE: 65
ADLS_ACUITY_SCORE: 65
ADLS_ACUITY_SCORE: 62
ADLS_ACUITY_SCORE: 65
ADLS_ACUITY_SCORE: 60
ADLS_ACUITY_SCORE: 62
ADLS_ACUITY_SCORE: 62
ADLS_ACUITY_SCORE: 60
ADLS_ACUITY_SCORE: 65
ADLS_ACUITY_SCORE: 65
ADLS_ACUITY_SCORE: 60
ADLS_ACUITY_SCORE: 60
ADLS_ACUITY_SCORE: 62
ADLS_ACUITY_SCORE: 65
ADLS_ACUITY_SCORE: 60

## 2025-02-28 NOTE — PROGRESS NOTES
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     Neuro stable.     TODAY'S PLAN:     -Custom TLSO when out of bed  -Navigator updated with our recommendations.   -Advance activity as tolerated.  -Continue supportive and symptomatic treatment.  -Continue physical therapy.  -Pain control measures.  -Advance diet as tolerated.  - Neurosurgery will sign off, please do not hesitate to call with questions/concerns   - follow-up in 6 weeks with repeat x-rays      In the event that patient's symptoms worsen or change we would appreciate being contacted. We did discuss signs of a worsening problem that he should seek being evaluated.     We did review the above information with the patient whom agrees with the plan and did verbalize understanding.   ________________________________________________________________     Mr. Villalobos overall feels well and denies any significant discomfort. Tolerating regular diet without n/v.     /70 (BP Location: Left arm)   Pulse 77   Temp 98.2  F (36.8  C) (Oral)   Resp 18   Ht 1.676 m (5' 6\")   Wt 79 kg (174 lb 2.6 oz)   SpO2 (!) 91%   BMI 28.11 kg/m       Pt in bed. Appears comfortable and in no apparent distress, moving all extremities.   CN II-XII intact, alert and appropriate with conversation and following commands.   Bilateral upper and lower extremities with appropriate strength. DTR's WNL. Spine is non tender to palpation throughout. Sensation intact throughout. Acceptable ROM.   Calves soft and non-tender bilaterally.     All pertinent labs and updated imaging reviewed in EPIC.     Shakira Molina,  SELMA, CNP  Department of Neurosurgery  Pager: [phone]        "

## 2025-02-28 NOTE — PROVIDER NOTIFICATION
MD Notification    Notified Person: MD    Notified Person Name: Zina     Notification Date/Time: 6:00pm    Notification Interaction: Dao    Purpose of Notification: One time dose of pain meds before x-ray    Orders Received: IV dilaudid ordered x1    Comments:

## 2025-02-28 NOTE — PROGRESS NOTES
PRIMARY Concern: Fall, Fx to T9-T10  SAFETY RISK Concerns (fall risk, behaviors, etc.): Fall risk      Aggression Tool Color: Green  Isolation/Type: N/a  Tests/Procedures for NEXT shift:   Consults? (Pending/following, signed-off?) PT, SW following   Where is patient from? (Home, TCU, etc.): Home  Other Important info for NEXT shift: WCB at baseline. Waiting for x-ray results.  Anticipated DC date & active delays: 2/28 to [facility] between 5339-0528  ____________________________________  SUMMARY NOTE:  Orientation/Cognitive: A&Ox4  Observation Goals (Met/ Not Met): Not met  Mobility Level/Assist Equipment: A2 gb/w, TLSO brace on  Antibiotics & Plan (IV/po, length of tx left): N/a  Pain Management: Scheduled tylenol and gabapentin. PRN oxy given x2, PRN dilaudid given x1 before x-ray  Complete Pain Reassessment: Yes   Due next: Next shift  Tele/VS/O2: VSS on RA  ABNL Lab/BG:   Diet: Mod carb  Bowel/Bladder: Incontinent at times, urinal in bed  Skin Concerns: Scattered bruising  Drains/Devices: IV SL  Patient Stated Goal for Today: Rest

## 2025-02-28 NOTE — PLAN OF CARE
[id number]  Orientation: a&ox4  Aggression Stop Light: green  Activity: a2 gb/w. TLSO brace on  Diet/BS Checks: mod carb. BS ACHS  Tele:  n/a  IV Access/Drains: R PIV SL  Pain Management: oxy x1 c/o neuropathy pain and back pain  Abnormal VS/Results: HTN on RA  Bowel/Bladder: incont at times. Urinal at bedside. BM x1  Skin/Wounds: scattered bruising  Consults: SW, neurosurgery, orthotist  D/C Disposition: 2/28 to [place] between 9538-4900  Other Info:

## 2025-02-28 NOTE — PROGRESS NOTES
Care Management Follow Up    Length of Stay (days): 0    Expected Discharge Date: 02/28/2025     Concerns to be Addressed: discharge planning     Patient plan of care discussed at interdisciplinary rounds: Yes    Anticipated Discharge Disposition: Skilled Nursing Facility, Transitional Care     Anticipated Discharge Services:    Anticipated Discharge DME: None    Patient/family educated on Medicare website which has current facility and service quality ratings: yes  Education Provided on the Discharge Plan: Yes  Patient/Family in Agreement with the Plan: yes    Referrals Placed by CM/SW:    Private pay costs discussed: Not applicable    Discussed  Partnership in Safe Discharge Planning  document with patient/family: No     Handoff Completed: No, handoff not indicated or clinically appropriate    Additional Information:  Informed pt is not medically ready to discharge today. [facility] They do have weekend admissions and can accept pt this weekend if he's ready.    [facility] wheelchair transport rescheduled to Saturday 8027-0734 if pt is medically ready.    JANETTE Shultz  Social Work  [facility]

## 2025-02-28 NOTE — PROGRESS NOTES
-tolerating oral intake to maintain hydration- MET  -safe disposition plan has been identified- MET

## 2025-02-28 NOTE — PROGRESS NOTES
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     Uncontrolled pain in setting of acute fracture s/p mechanical fall  Acute mid-thoracic region back pain in setting above   Cervical stenosis w/ myelopathy and neuropathy  History of DJD  History of chronic back pain secondary to above  History of recurrent mechanical falls   *Patient presented after he sustained a fall backwards as he was attempting to get into his wheelchair in clinic just prior to arrival 02/25/2025. Patient noted to fall directly onto back and experienced traumatic head strike upon landing. Following fall patient reporting severe thoracic back pain with new bilateral upper extremity paraesthesias and weakness (usually unilateral, RUE). History of chronic back pain secondary to cervical spinal stenosis with peripheral neuropathy. Plan for surgical intervention in April 2025. On chronic anticoagulation (Eliquis). History of bone mets from prior carcinoid of small bowel.   *CTH 2/24/25 negative for acute abnormality, does show dilated ventricular system, see below.   *XR of thoracic and lumbar spine 2/24/25 show no acute fractures.  *CT thoracic w/o contrast with acute fracture through the anterior osteophyte formation at T9-T10 disc space level extending obliquely through T10 vertebral body to its  inferior endplate. No retropulsion or canal compromise.   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 As this is an unstable fracture in a rigid spine neurosurgical consultation is recommended.No traumatic subluxation or high-grade canal stenosis. Mild lumbar spondylosis, worse at L4-L5 where there is severe left lateral recess stenosis and severe left foraminal stenosis. Correlate for left L4 and L5 radiculopathy. Advanced multilevel cervical spondylitic changes with multilevel canal flattening and canal stenoses, suboptimally assessed on the current examination.   *Patient with continued thoracic spinal tenderness with movement. Improved at rest. Denies worsening numbness, tingling or weakness bilateral upper or lower extremities (baseline left lower extremity weakness and intermittent sciatica, left upper extremity paraesthesias). No changes to or loss of bladder or bowel. Denies chest pain, difficulty breathing, N/V or chills.  - Neurosurgery consulted, appreciate the cares. Custom TLSO ordered. Suspect will need to wear minimum three months. Initially placed on bed rest until following brace placement 02/27. Delay in imaging secondary to patient refusal reportedly due to severe pain with even minor movement. Upright imaging obtained following IV Dilaudid. Stable.   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 CT thoracic spine with similar findings.   - Continue gabapentin, scheduled Tylenol for pain relief and as needed oxycodone also available. PRN adjuncts include lidocaine patch PRN, Robaxin and Miacalcin.   - Discussed need for improved pain management with narcotics as patient hesitant to use medications stronger than Tylenol. Patient agreeable for additional pain medications and adjunct measures but reports unable to ambulate secondary to severe 10/10 pain of thoracic  spine and worsening of his LE neuropathy. Discussed with Neurosurgery 02/28 who added additional adjunct pain measures.   - Discussed with PT supervisor and requested PT support given need for increased ambulation following failed ambulation trial 02/28 (patient could only take one step following a brief transfer secondary to pain). Discharge pending improvement in pain management as currently uncontrolled. Plan to discharge to TCU.  Should pain be better managed in AM, discharge rescheduled to Saturday 1717-6878 pending clearance.   - Oxygenations noted to be lower 02/28 in setting of increased narcotic use. Noted history of asthma. Continue to monitor closely. Start I/S.   - Continue to monitor with morning labs.     Dilated cerebral ventricles, consider normal pressure hydrocephalus  - Outpatient neurology consult upon discharge for further evaluation and management.       Leukocytosis, resolved  *Patient remains afebrile with no evidence of acute infection.   - Continue to monitor with morning labs.      COPD  CIERA  *Patient noted to not be compliant w/ CPAP.  - Duonebs PRN available.   - Continue PTA Breo Ellipta and Incruse Ellipta.     Hx PE on AC  Anemia, chronic, normocytic   *Baseline Hgb 11-12g/dL. Hgb 11.6 02/27. Denies recent evidence of bleed including hematemesis, hematuria, melena or RBPR.  - Continue PTA Eliquis.  - Continue PTA iron supplement.     T2DM w/ hyperglycemia A1C 7% on 12/2/2024.  - Mod CHO diet.   - Medium intensity sliding scale insulin ordered.  - Continue metformin following medication reconciliation.  - Glucose checks as ordered.     HTN  HFpEF, chronic compensated  *Intermittently hypertensive in setting of unmanaged acute pain.   - Hydralazine PRN.  - Continue lisinopril, lasix and amlodipine.   - Improved pain management with pain regimen above.      BPH  - Continue PTA finasteride.     Diarrhea  - Continue PTA cholesytime.     HLD  - Continue PTA statin.     Hx metastatic small  bowel NET s/p bowel resection:   - Continue Lanreotide on discharge.  - Follow-up with Oncology/Hematology upon discharge for continued management.      Hx RUL adenocarcinoma s/p wedge resection:   - Follow-up with Oncology/Hematology upon discharge for continued management.        Observation Goals: -tolerating oral intake to maintain hydration, -safe disposition plan has been identified, Nurse to notify provider when observation goals have been met and patient is ready for discharge.  Diet: Moderate Consistent Carb (60 g CHO per Meal) Diet    DVT Prophylaxis: DOAC  Gomez Catheter: Not present  Lines: None     Cardiac Monitoring: None  Code Status: No CPR- Do NOT Intubate      Social Drivers of Health    Tobacco Use: Medium Risk (2/24/2025)    Received from [employer]    Patient History     Smoking Tobacco Use: Former     Smokeless Tobacco Use: Unknown   Physical Activity: Insufficiently Active (9/2/2024)    Exercise Vital Sign     Days of Exercise per Week: 2 days     Minutes of Exercise per Session: 20 min   Social Connections: Unknown (9/2/2024)    Social Connection and Isolation Panel [NHANES]     Frequency of Social Gatherings with Friends and Family: Once a week          Disposition Plan     Medically Ready for Discharge: Anticipated Tomorrow       The patient's care was discussed with the Attending Physician, Dr. Esparza .    Magdalene Izaguirre PA-C  Hospitalist Service  [facility]  Securely message with [employer] (more info)  Text page via better. Paging/Directory   ______________________________________________________________________    Interval History   No acute events overnight. VSS although oxygenations noted to be lower 02/28. Noted history of asthma. Afebrile. Patient reports pain is still uncontrolled with movement. Reports severe 10/10 pain of thoracic spine and worsening of his LE neuropathy with even minimal movement. Previously voiced worried about taking  narcotics but now agreeable and also open to additional adjunct measures. Tolerating diet. No chest pain, SOB, cough or URI symptoms, abdominal pain, N/V or changes to bladder or bowel. No additional concerns or complaints at this time.      Physical Exam   Vital Signs: Temp: 98  F (36.7  C) Temp src: Oral BP: 133/74 Pulse: 78   Resp: 18 SpO2: 92 % O2 Device: None (Room air)    Weight: 174 lbs 2.61 oz    GENERAL:  Pleasant, cooperative, alert. Laying in bed uncomfortably upon exam in no acute distress with brace in place.   HEENT: Normocephalic, atraumatic.    PULMONOLOGY: Clear to auscultation bilaterally. No increased work of breathing.   CARDIAC: Regular rate and rhythm.    ABDOMEN: Unable to assess secondary to brace.   MUSCULOSKELETAL:  Moving x 4 spontaneously.  Normal bulk and tone. No LE edema.  NEURO: Alert and oriented x3. CN II-XII grossly intact and symmetric. Strength and sensation bilateral upper and lower extremities intact and symmetric.     Medical Decision Making       37 MINUTES SPENT BY ME on the date of service doing chart review, history, exam, documentation & further activities per the note.      Data   ------------------------- PAST 24 HR DATA REVIEWED -----------------------------------------------        Imaging results reviewed over the past 24 hrs:   Recent Results (from the past 24 hours)   XR Thoracic Lumbar Standing 2 Views    Narrative    EXAM: XR THORACIC LUMBAR STANDING 2 VIEWS  LOCATION: [facility]  DATE: 2/27/2025    INDICATION: acute DISH fracture at T10  COMPARISON: MRI of the thoracic and lumbar spine dated 02/25/2025 and CT of the thoracic spine dated 02/26/2025      Impression    IMPRESSION: Patient is in a brace. Stable nondisplaced fracture through the bridging anterior endplate osteophyte at the T9-T10 level. Extension of the fracture into the T10 vertebral body is not well seen radiographically. No other fractures. Stable   diffuse idiopathic  skeletal hyperostosis. Stable slight degenerative anterolisthesis of L4 on L5 is otherwise normal vertebral alignment. Normal vertebral body heights. Mild to moderate multilevel degenerative disc disease. Mild to moderate degenerative   arthritis involving the articular facets in the lumbar spine. Normal extraspinal structures.

## 2025-02-28 NOTE — PLAN OF CARE
Goal Outcome Evaluation:    PRIMARY Concern: Fall, T9-10 fracture   SAFETY RISK Concerns (fall risk, behaviors, etc.): Fall       Aggression Tool Color: Green   Isolation/Type: None   Tests/Procedures for NEXT shift: AM labs   Consults? (Pending/following, signed-off?) Neurosurgery signed off. Pain/SW/PT following   Where is patient from? (Home, TCU, etc.): Home   Other Important info for NEXT shift: WCB at baseline  Anticipated DC date & active delays: 3/1 to TCU pending medical readiness. Wheelchair ride set-up for 3:30-4:30 pm to [facility] if medically ready, bed on hold throughout weekend  _____________________________________________________________________________  SUMMARY NOTE:   Orientation/Cognitive: AOx4  Observation Goals (Met/ Not Met): Not met   Mobility Level/Assist Equipment: A2/GB/W. Pivoting to chair. TLSO brace intact   Antibiotics & Plan (IV/po, length of tx left): None   Pain Management: C/o no pain at rest, 8/10 pain during activity. Oxycodone given x 1   Tele/VS/O2: VSS on RA, O2 88-92% on RA, monitoring   ABNL Lab/BG: , 163, 185  Diet: MCHO   Bowel/Bladder: Incontinent at times, using urinal in bed   Skin Concerns: Scattered bruising  Drains/Devices: PIV SL   Patient Stated Goal for Today: Rest

## 2025-02-28 NOTE — PROGRESS NOTES
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 Neurosurgery recommendations reveiwed, they did sign off. .   Custom TLSO brace  Multimodal Medication Therapy:    Adjuvants: APAP 1000mg bid, calcitonin nasal spray, gabapentin 300mg qam and 600mg at bedtime, Robaxin 500mg qid: just started, this will be helpful.  Opioids: oxycodone 5-10mg q4hprn  Non-medication interventions- Ice, PT, brace  Constipation Prophylaxis- SennaS BIDPRN    Follow up /Discharge Recommendations - We recommend prescribing the following at the time of discharge:        Subjective:  Pain around ribcage while sitting in chair.   Pain in back, does not radiate to legs 'much'.   Describes some neuropathy, and the gabapentin is helpful for the neuropathic pain as well as RLS.   \"Moving is bad\", \"laying down and sleeping is wonderful.   Had two BM today. 'my bowels are screwed up'. Loose bowels, which is normal for him.   Patient is moderately sleepy.    Appears to be having end of dose failure 'oxycodone works well for 2-3 hours'   Has tried Norco and Dilauidid in the past (not this hospitalization) and thinks oxycodone works best.    Will trial lower dose of oxycodone (5mg vs 5-10), with current slight sedation, and allow for more frequent dosings due to end of dose failure    Could consider increase in gabapentin if continuing to have sciatic pain with movement, however, will trial these current changes first, as do not want to add on any increase in sedation possible.  Discussed with rafi De La Torre, CNS, pain services    HPI:  Nahun Villalobos is a 79 year old male who was admitted on 2/24/2025.  I was asked by Magdalene Isidro PA-C  to see the patient for fx pain..  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 . Describes pain as 'not too bad right now'/10 and says that 'every day it gets a little better'.    History of chronic back pain secondary to cervical spinal stenosis with peripheral neuropathy. Plan for surgical intervention in 2025.     Has used 30mg of oxycodone and 0.2mg of IV dilaudid in 24 hours for MME = 50.     PDMP RESULTS:   25 Norco 7 tab  25 Gabapentin 300mg qday  24 Dilaudid 2mg 7 tab     History   Drug Use No         Tobacco Use      Smoking status: Former        Packs/day: 0.00        Years: 2.0 packs/day for 53.0 years (106.0 ttl pk-yrs)        Types: Cigarettes, Cigars        Start date: 1960        Quit date: 2013        Years since quittin.7        Passive exposure: Past      Smokeless tobacco: Never      Tobacco comments: Quit, will never start again.    Radha Baig, PharmD  Acute Care Pain Management  Team  Hours of pain coverage Mon-Fri 8-1600  After hours contact the primary team  [facility] ([place], [place], [place], [place])   Page via [employer] text web console -Click for [employer]

## 2025-02-28 NOTE — PROGRESS NOTES
"Attempted to go on a walk with an assist of 2 staff, gait belt and walker. Pt was able to sit at the edge of the bed and with assist of 2 was able to stand up with a walker. Attempted to walk to the door and back and was unsuccessful. Pt was only able to take one step with the right leg and was unsuccessful with stepping with the left leg. Pt felt was going to \"fall\" and was assisted to pivot and sit in the chair. Writer asked about pain level at rest before movement and pt stated \"zero.\" During movement and attempt to ambulate, pt's pain went up to 8/10 as well as his L sided sciatica pain.   "

## 2025-03-01 ENCOUNTER — APPOINTMENT (OUTPATIENT)
Dept: PHYSICAL THERAPY | Facility: CLINIC | Age: 80
DRG: 552 | End: 2025-03-01
Payer: COMMERCIAL

## 2025-03-01 PROBLEM — S22.071A: Status: ACTIVE | Noted: 2025-03-01

## 2025-03-01 LAB
ANION GAP SERPL CALCULATED.3IONS-SCNC: 14 MMOL/L (ref 7–15)
BUN SERPL-MCNC: 33 MG/DL (ref 8–23)
CALCIUM SERPL-MCNC: 8.7 MG/DL (ref 8.8–10.4)
CHLORIDE SERPL-SCNC: 98 MMOL/L (ref 98–107)
CREAT SERPL-MCNC: 2.24 MG/DL (ref 0.67–1.17)
EGFRCR SERPLBLD CKD-EPI 2021: 29 ML/MIN/1.73M2
ERYTHROCYTE [DISTWIDTH] IN BLOOD BY AUTOMATED COUNT: 11.9 % (ref 10–15)
GLUCOSE BLDC GLUCOMTR-MCNC: 139 MG/DL (ref 70–99)
GLUCOSE BLDC GLUCOMTR-MCNC: 147 MG/DL (ref 70–99)
GLUCOSE BLDC GLUCOMTR-MCNC: 151 MG/DL (ref 70–99)
GLUCOSE BLDC GLUCOMTR-MCNC: 155 MG/DL (ref 70–99)
GLUCOSE BLDC GLUCOMTR-MCNC: 167 MG/DL (ref 70–99)
GLUCOSE SERPL-MCNC: 172 MG/DL (ref 70–99)
HCO3 SERPL-SCNC: 23 MMOL/L (ref 22–29)
HCT VFR BLD AUTO: 34.7 % (ref 40–53)
HGB BLD-MCNC: 11.5 G/DL (ref 13.3–17.7)
MCH RBC QN AUTO: 33.8 PG (ref 26.5–33)
MCHC RBC AUTO-ENTMCNC: 33.1 G/DL (ref 31.5–36.5)
MCV RBC AUTO: 102 FL (ref 78–100)
PLATELET # BLD AUTO: 278 10E3/UL (ref 150–450)
POTASSIUM SERPL-SCNC: 4.9 MMOL/L (ref 3.4–5.3)
RBC # BLD AUTO: 3.4 10E6/UL (ref 4.4–5.9)
SODIUM SERPL-SCNC: 135 MMOL/L (ref 135–145)
WBC # BLD AUTO: 12.3 10E3/UL (ref 4–11)

## 2025-03-01 PROCEDURE — 97530 THERAPEUTIC ACTIVITIES: CPT | Mod: GP

## 2025-03-01 PROCEDURE — 99232 SBSQ HOSP IP/OBS MODERATE 35: CPT | Performed by: INTERNAL MEDICINE

## 2025-03-01 PROCEDURE — 82565 ASSAY OF CREATININE: CPT

## 2025-03-01 PROCEDURE — 120N000001 HC R&B MED SURG/OB

## 2025-03-01 PROCEDURE — 250N000013 HC RX MED GY IP 250 OP 250 PS 637

## 2025-03-01 PROCEDURE — 258N000003 HC RX IP 258 OP 636: Performed by: INTERNAL MEDICINE

## 2025-03-01 PROCEDURE — 250N000013 HC RX MED GY IP 250 OP 250 PS 637: Performed by: CLINICAL NURSE SPECIALIST

## 2025-03-01 PROCEDURE — 250N000013 HC RX MED GY IP 250 OP 250 PS 637: Performed by: INTERNAL MEDICINE

## 2025-03-01 PROCEDURE — 80048 BASIC METABOLIC PNL TOTAL CA: CPT

## 2025-03-01 PROCEDURE — G0378 HOSPITAL OBSERVATION PER HR: HCPCS

## 2025-03-01 PROCEDURE — 36415 COLL VENOUS BLD VENIPUNCTURE: CPT

## 2025-03-01 PROCEDURE — 85014 HEMATOCRIT: CPT

## 2025-03-01 PROCEDURE — 82962 GLUCOSE BLOOD TEST: CPT

## 2025-03-01 RX ORDER — SODIUM CHLORIDE 9 MG/ML
INJECTION, SOLUTION INTRAVENOUS CONTINUOUS
Status: DISCONTINUED | OUTPATIENT
Start: 2025-03-01 | End: 2025-03-02

## 2025-03-01 RX ADMIN — METHOCARBAMOL 500 MG: 500 TABLET ORAL at 08:57

## 2025-03-01 RX ADMIN — CHOLESTYRAMINE 4 G: 4 POWDER, FOR SUSPENSION ORAL at 10:03

## 2025-03-01 RX ADMIN — UMECLIDINIUM 1 PUFF: 62.5 AEROSOL, POWDER ORAL at 08:53

## 2025-03-01 RX ADMIN — LISINOPRIL 40 MG: 40 TABLET ORAL at 08:56

## 2025-03-01 RX ADMIN — METHOCARBAMOL 500 MG: 500 TABLET ORAL at 21:00

## 2025-03-01 RX ADMIN — APIXABAN 2.5 MG: 2.5 TABLET, FILM COATED ORAL at 21:00

## 2025-03-01 RX ADMIN — GABAPENTIN 300 MG: 300 CAPSULE ORAL at 08:56

## 2025-03-01 RX ADMIN — METHOCARBAMOL 500 MG: 500 TABLET ORAL at 11:48

## 2025-03-01 RX ADMIN — ACETAMINOPHEN 1000 MG: 500 TABLET, FILM COATED ORAL at 08:56

## 2025-03-01 RX ADMIN — METFORMIN HYDROCHLORIDE 1000 MG: 500 TABLET, FILM COATED ORAL at 08:57

## 2025-03-01 RX ADMIN — ATORVASTATIN CALCIUM 20 MG: 20 TABLET, FILM COATED ORAL at 21:00

## 2025-03-01 RX ADMIN — FUROSEMIDE 40 MG: 40 TABLET ORAL at 08:56

## 2025-03-01 RX ADMIN — ACETAMINOPHEN 1000 MG: 500 TABLET, FILM COATED ORAL at 21:00

## 2025-03-01 RX ADMIN — APIXABAN 2.5 MG: 2.5 TABLET, FILM COATED ORAL at 08:57

## 2025-03-01 RX ADMIN — CALCITONIN SALMON 1 SPRAY: 200 SPRAY, METERED NASAL at 08:54

## 2025-03-01 RX ADMIN — OXYCODONE HYDROCHLORIDE 5 MG: 5 TABLET ORAL at 09:54

## 2025-03-01 RX ADMIN — FINASTERIDE 5 MG: 5 TABLET, FILM COATED ORAL at 08:56

## 2025-03-01 RX ADMIN — FERROUS SULFATE TAB 325 MG (65 MG ELEMENTAL FE) 325 MG: 325 (65 FE) TAB at 21:00

## 2025-03-01 RX ADMIN — AMLODIPINE BESYLATE 5 MG: 5 TABLET ORAL at 08:56

## 2025-03-01 RX ADMIN — SODIUM CHLORIDE: 0.9 INJECTION, SOLUTION INTRAVENOUS at 13:50

## 2025-03-01 RX ADMIN — FLUTICASONE FUROATE AND VILANTEROL TRIFENATATE 1 PUFF: 100; 25 POWDER RESPIRATORY (INHALATION) at 08:53

## 2025-03-01 RX ADMIN — METHOCARBAMOL 500 MG: 500 TABLET ORAL at 16:01

## 2025-03-01 RX ADMIN — SODIUM CHLORIDE: 0.9 INJECTION, SOLUTION INTRAVENOUS at 23:34

## 2025-03-01 RX ADMIN — CHOLESTYRAMINE 4 G: 4 POWDER, FOR SUSPENSION ORAL at 18:13

## 2025-03-01 ASSESSMENT — ACTIVITIES OF DAILY LIVING (ADL)
ADLS_ACUITY_SCORE: 64
ADLS_ACUITY_SCORE: 60
ADLS_ACUITY_SCORE: 60
ADLS_ACUITY_SCORE: 68
ADLS_ACUITY_SCORE: 60
ADLS_ACUITY_SCORE: 68
ADLS_ACUITY_SCORE: 60
ADLS_ACUITY_SCORE: 68
ADLS_ACUITY_SCORE: 60
ADLS_ACUITY_SCORE: 68
ADLS_ACUITY_SCORE: 60
ADLS_ACUITY_SCORE: 60
ADLS_ACUITY_SCORE: 68

## 2025-03-01 NOTE — PLAN OF CARE
Orientation: alert and oriented x4    Vitals/Tele: vitals stable on room air, denies pain this shift except for when working with PT- oxycodone x1 and scheduled tylenol    IV Access/drains: L PIV infusing NS 100ml/hr     Diet: modcarb    Mobility: assist 2 with walker/paulette steady     GI/: incontinent of bowel and bladder- uses urinal when continent- elevated creatinine, encouraged lots of oral fluids    Wound/Skin: scattered bruising, dry/flaky feet    Consults: PT, SW    Discharge Plan: [facility] 3/2 between [id number]      See Flow sheets for assessment

## 2025-03-01 NOTE — PROVIDER NOTIFICATION
MD Notification    Notified Person: SW    Notified Person Name: Catracho Tapia    Notification Date/Time: 3/1 @ 8473    Notification Interaction: norberto    Purpose of Notification: Pt is not discharging today d/t elevated creatinine. Will you please change ride and let [facility] know? Thanks Avelina SAUNDERS [phone]    Orders Received: Ride changed to [id number]    Comments:

## 2025-03-01 NOTE — PROGRESS NOTES
Care Management Follow Up    Length of Stay (days): 0    Expected Discharge Date: 03/02/2025     Concerns to be Addressed: discharge planning     Patient plan of care discussed at interdisciplinary rounds: Yes    Anticipated Discharge Disposition: Skilled Nursing Facility, Transitional Care              Anticipated Discharge Services:    Anticipated Discharge DME: None    Patient/family educated on Medicare website which has current facility and service quality ratings: yes  Education Provided on the Discharge Plan: Yes  Patient/Family in Agreement with the Plan: yes    Referrals Placed by CM/SW:    Private pay costs discussed: Not applicable    Discussed  Partnership in Safe Discharge Planning  document with patient/family: No     Handoff Completed: No, handoff not indicated or clinically appropriate    Additional Information:  Writer reached out to the hospitalist who informed the writer patient wont be ready today. Writer was also informed by the RN to plan for tomorrow.     Writer contacted [facility] and pushed out the ride between [id number].    Writer contacted [facility] ([phone]) and spoke with the weekend supervisor who stated they can admit tomorrow.     Writer spoke with bedside rn who stated they will update the patient.    Next Steps: discharge tomorrow.     JANETTE Carpio  [facility]  Social Work

## 2025-03-01 NOTE — PLAN OF CARE
Goal Outcome Evaluation:    Pt A&Ox4, VSs on room air, turn and repo. Denies pain at rest, endorses pain with activities, scheduled Tylenol given. Incontinent at times, uses urinal. BG checks with ranges.

## 2025-03-02 LAB
ANION GAP SERPL CALCULATED.3IONS-SCNC: 12 MMOL/L (ref 7–15)
BUN SERPL-MCNC: 25.3 MG/DL (ref 8–23)
CALCIUM SERPL-MCNC: 8.5 MG/DL (ref 8.8–10.4)
CHLORIDE SERPL-SCNC: 105 MMOL/L (ref 98–107)
CREAT SERPL-MCNC: 1.57 MG/DL (ref 0.67–1.17)
EGFRCR SERPLBLD CKD-EPI 2021: 44 ML/MIN/1.73M2
GLUCOSE BLDC GLUCOMTR-MCNC: 125 MG/DL (ref 70–99)
GLUCOSE BLDC GLUCOMTR-MCNC: 137 MG/DL (ref 70–99)
GLUCOSE BLDC GLUCOMTR-MCNC: 140 MG/DL (ref 70–99)
GLUCOSE BLDC GLUCOMTR-MCNC: 167 MG/DL (ref 70–99)
GLUCOSE BLDC GLUCOMTR-MCNC: 177 MG/DL (ref 70–99)
GLUCOSE SERPL-MCNC: 163 MG/DL (ref 70–99)
HCO3 SERPL-SCNC: 24 MMOL/L (ref 22–29)
HOLD SPECIMEN: NORMAL
POTASSIUM SERPL-SCNC: 4.8 MMOL/L (ref 3.4–5.3)
SODIUM SERPL-SCNC: 141 MMOL/L (ref 135–145)

## 2025-03-02 PROCEDURE — 250N000013 HC RX MED GY IP 250 OP 250 PS 637: Performed by: CLINICAL NURSE SPECIALIST

## 2025-03-02 PROCEDURE — 120N000001 HC R&B MED SURG/OB

## 2025-03-02 PROCEDURE — 36415 COLL VENOUS BLD VENIPUNCTURE: CPT | Performed by: INTERNAL MEDICINE

## 2025-03-02 PROCEDURE — 250N000013 HC RX MED GY IP 250 OP 250 PS 637

## 2025-03-02 PROCEDURE — 99232 SBSQ HOSP IP/OBS MODERATE 35: CPT | Performed by: INTERNAL MEDICINE

## 2025-03-02 PROCEDURE — 250N000013 HC RX MED GY IP 250 OP 250 PS 637: Performed by: INTERNAL MEDICINE

## 2025-03-02 PROCEDURE — 80048 BASIC METABOLIC PNL TOTAL CA: CPT | Performed by: INTERNAL MEDICINE

## 2025-03-02 PROCEDURE — 82374 ASSAY BLOOD CARBON DIOXIDE: CPT | Performed by: INTERNAL MEDICINE

## 2025-03-02 RX ADMIN — ACETAMINOPHEN 1000 MG: 500 TABLET, FILM COATED ORAL at 09:07

## 2025-03-02 RX ADMIN — APIXABAN 2.5 MG: 2.5 TABLET, FILM COATED ORAL at 09:07

## 2025-03-02 RX ADMIN — FERROUS SULFATE TAB 325 MG (65 MG ELEMENTAL FE) 325 MG: 325 (65 FE) TAB at 20:53

## 2025-03-02 RX ADMIN — APIXABAN 2.5 MG: 2.5 TABLET, FILM COATED ORAL at 20:53

## 2025-03-02 RX ADMIN — FUROSEMIDE 40 MG: 40 TABLET ORAL at 09:08

## 2025-03-02 RX ADMIN — CHOLESTYRAMINE 4 G: 4 POWDER, FOR SUSPENSION ORAL at 17:23

## 2025-03-02 RX ADMIN — FLUTICASONE FUROATE AND VILANTEROL TRIFENATATE 1 PUFF: 100; 25 POWDER RESPIRATORY (INHALATION) at 09:08

## 2025-03-02 RX ADMIN — METHOCARBAMOL 500 MG: 500 TABLET ORAL at 14:00

## 2025-03-02 RX ADMIN — AMLODIPINE BESYLATE 5 MG: 5 TABLET ORAL at 09:08

## 2025-03-02 RX ADMIN — ATORVASTATIN CALCIUM 20 MG: 20 TABLET, FILM COATED ORAL at 22:25

## 2025-03-02 RX ADMIN — FINASTERIDE 5 MG: 5 TABLET, FILM COATED ORAL at 09:07

## 2025-03-02 RX ADMIN — OXYCODONE HYDROCHLORIDE 5 MG: 5 TABLET ORAL at 22:25

## 2025-03-02 RX ADMIN — CALCITONIN SALMON 1 SPRAY: 200 SPRAY, METERED NASAL at 09:08

## 2025-03-02 RX ADMIN — METHOCARBAMOL 500 MG: 500 TABLET ORAL at 09:07

## 2025-03-02 RX ADMIN — METHOCARBAMOL 500 MG: 500 TABLET ORAL at 17:23

## 2025-03-02 RX ADMIN — ACETAMINOPHEN 1000 MG: 500 TABLET, FILM COATED ORAL at 20:53

## 2025-03-02 RX ADMIN — UMECLIDINIUM 1 PUFF: 62.5 AEROSOL, POWDER ORAL at 09:08

## 2025-03-02 RX ADMIN — METHOCARBAMOL 500 MG: 500 TABLET ORAL at 20:53

## 2025-03-02 ASSESSMENT — ACTIVITIES OF DAILY LIVING (ADL)
ADLS_ACUITY_SCORE: 68
ADLS_ACUITY_SCORE: 64
ADLS_ACUITY_SCORE: 68

## 2025-03-02 NOTE — PLAN OF CARE
Goal Outcome Evaluation:    PRIMARY Concern: Fall, T9-10 fracture   SAFETY RISK Concerns (fall risk, behaviors, etc.): Fall       Aggression Tool Color: Green   Isolation/Type: None   Tests/Procedures for NEXT shift: AM labs   Consults? (Pending/following, signed-off?) Neurosurgery signed off. Pain/SW/PT following   Where is patient from? (Home, TCU, etc.): Home   Other Important info for NEXT shift: WCB at baseline  Anticipated DC date & active delays: 3/1 to TCU pending medical readiness. Wheelchair ride set-up for 3:30-4:30 pm to [facility] if medically ready, bed on hold throughout weekend  _____________________________________________________________________________  SUMMARY NOTE:   Orientation/Cognitive: AOx4  Observation Goals (Met/ Not Met): Not met   Mobility Level/Assist Equipment: A2/GB/W. Pivoting to chair. TLSO brace intact   Antibiotics & Plan (IV/po, length of tx left): None   Pain Management: C/o no pain at rest  Tele/VS/O2: VSS on RA, O2 88-92% on RA, monitoring   ABNL Lab/BG: BUN 33, Creat 2.24, WBC 12.3  Diet: MCHO   Bowel/Bladder: Incontinent at times, using urinal in bed   Skin Concerns: Scattered bruising  Drains/Devices: PIV NS@100 ml/hr  Patient Stated Goal for Today: Rest     Girlfriend here till after supper. Pt resting in bed, denies pain, using urinal per self.

## 2025-03-02 NOTE — PROGRESS NOTES
Care Management Follow Up    Length of Stay (days): 1    Expected Discharge Date: 03/03/2025     Concerns to be Addressed: discharge planning     Patient plan of care discussed at interdisciplinary rounds: Yes    Anticipated Discharge Disposition: Transitional Care       Anticipated Discharge Services:    Anticipated Discharge DME: None    Patient/family educated on Medicare website which has current facility and service quality ratings: yes  Education Provided on the Discharge Plan: Yes  Patient/Family in Agreement with the Plan: yes    Referrals Placed by CM/SW: Post Acute Facilities, Transportation  Private pay costs discussed: Not applicable    Discussed  Partnership in Safe Discharge Planning  document with patient/family: No     Handoff Completed: Yes, [facility] PCP: Internal handoff referral completed    Additional Information:  Pt not ready for discharge today. [facility] ride canceled and re-scheduled for Monday with  between [id number]. Supervisor at [facility] updated.     Next Steps: SW following.     VJ Ortiz, LICSW  [phone] Desk phone  [phone] Cell/text (Preferred)  [facility]

## 2025-03-02 NOTE — PROGRESS NOTES
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 ED and was subsequently registered observation on 02/24/2025 following a mechanical fall witnessed at the doctor's office that occurred just prior to arrival.     SALLIE  -Creatinine up to 2.24  -Likely prerenal in the setting of poor oral intake  -Encourage oral intake and IVF overnight and recheck BMP tomorrow a.m.    Uncontrolled pain in setting of acute fracture s/p mechanical fall  Acute mid-thoracic region back pain in setting above   Cervical stenosis w/ myelopathy and neuropathy  History of DJD  History of chronic back pain secondary to above  History of recurrent mechanical falls   *Patient presented after he sustained a fall backwards as he was attempting to get into his wheelchair in clinic just prior to arrival 02/25/2025. Patient noted to fall directly onto back and experienced traumatic head strike upon landing. Following fall patient reporting severe thoracic back pain with new bilateral upper extremity paraesthesias and weakness (usually unilateral, RUE). History of chronic back pain secondary to cervical spinal stenosis with peripheral neuropathy. Plan for surgical intervention in April 2025. On chronic anticoagulation (Eliquis). History of bone mets from prior carcinoid of small bowel.   *CTH 2/24/25 negative for acute abnormality, does show dilated ventricular system, see below.   *XR of thoracic and lumbar spine 2/24/25 show no acute fractures.  *CT thoracic w/o  contrast with acute fracture through the anterior osteophyte formation at T9-T10 disc space level extending obliquely through T10 vertebral body to its inferior endplate. No retropulsion or canal compromise.   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 As this is an unstable fracture in a rigid spine neurosurgical consultation is recommended.No traumatic subluxation or high-grade canal stenosis. Mild lumbar spondylosis, worse at L4-L5 where there is severe left lateral recess stenosis and severe left foraminal stenosis. Correlate for left L4 and L5 radiculopathy. Advanced multilevel cervical spondylitic changes with multilevel canal flattening and canal stenoses, suboptimally assessed on the current examination.   *Patient with continued thoracic spinal tenderness with movement. Improved at rest. Denies worsening numbness, tingling or weakness bilateral upper or lower extremities (baseline left lower extremity weakness and intermittent sciatica, left upper extremity paraesthesias). No changes to or loss of bladder or bowel. Denies chest pain, difficulty breathing, N/V or chills.  - Neurosurgery consulted, appreciate the cares. Custom TLSO ordered. Suspect will need to wear minimum three months. Initially placed on bed rest until following brace placement 02/27. Delay in imaging secondary to patient refusal reportedly due to severe pain with even minor movement. Upright imaging obtained following IV Dilaudid. Stable.   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 CT thoracic spine with similar findings.   - Continue gabapentin, scheduled Tylenol for pain relief and as needed oxycodone also available. PRN adjuncts include lidocaine patch PRN, Robaxin and Miacalcin.   -Consult to pain management for appropriate pain control  - Oxygenations noted to be lower 02/28 in setting of  increased narcotic use. Noted history of asthma. Continue to monitor closely. Start I/S.   - Continue to monitor with morning labs.     Dilated cerebral ventricles, consider normal pressure hydrocephalus  - Outpatient neurology consult upon discharge for further evaluation and management.       Leukocytosis, resolved  *Patient remains afebrile with no evidence of acute infection.   - Continue to monitor with morning labs.      COPD  CIERA  *Patient noted to not be compliant w/ CPAP.  - Duonebs PRN available.   - Continue PTA Breo Ellipta and Incruse Ellipta.     Hx PE on AC  Anemia, chronic, normocytic   *Baseline Hgb 11-12g/dL. Hgb 11.6 02/27. Denies recent evidence of bleed including hematemesis, hematuria, melena or RBPR.  - Continue PTA Eliquis.  - Continue PTA iron supplement.     T2DM w/ hyperglycemia A1C 7% on 12/2/2024.  - Mod CHO diet.   - Medium intensity sliding scale insulin ordered.  - Continue metformin following medication reconciliation.  - Glucose checks as ordered.     HTN  HFpEF, chronic compensated  *Intermittently hypertensive in setting of unmanaged acute pain.   - Hydralazine PRN.  - Continue lisinopril, lasix and amlodipine.   - Improved pain management with pain regimen above.      BPH  - Continue PTA finasteride.     Diarrhea  - Continue PTA cholesytime.     HLD  - Continue PTA statin.     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  - Follow-up with Oncology/Hematology upon discharge for continued management.      Hx RUL adenocarcinoma s/p wedge resection:   - Follow-up with Oncology/Hematology upon discharge for continued management.           Diet: Moderate Consistent Carb (60 g CHO per Meal) Diet    DVT Prophylaxis: Pneumatic Compression Devices  Gomez Catheter: Not present  Lines: None     Cardiac Monitoring: None  Code Status: No CPR- Do NOT Intubate      Clinically Significant Risk Factors Present on Admission                # Drug Induced  "Coagulation Defect: home medication list includes an anticoagulant medication   # Acute Kidney Injury, unspecified: based on a >150% or 0.3 mg/dL increase in last creatinine compared to past 90 day average, will monitor renal function  # Hypertension: Noted on problem list          # DMII: A1C = 7.0 % (Ref range: <5.7 %) within past 6 months    # Overweight: Estimated body mass index is 28.11 kg/m  as calculated from the following:    Height as of this encounter: 1.676 m (5' 6\").    Weight as of this encounter: 79 kg (174 lb 2.6 oz).       # Financial/Environmental Concerns: none         Social Drivers of Health    Tobacco Use: Medium Risk (2/24/2025)    Received from [employer]    Patient History     Smoking Tobacco Use: Former     Smokeless Tobacco Use: Unknown   Physical Activity: Insufficiently Active (9/2/2024)    Exercise Vital Sign     Days of Exercise per Week: 2 days     Minutes of Exercise per Session: 20 min   Social Connections: Unknown (9/2/2024)    Social Connection and Isolation Panel [NHANES]     Frequency of Social Gatherings with Friends and Family: Once a week          Disposition Plan     Medically Ready for Discharge: Anticipated Tomorrow             Ivan Borjsa MD  Hospitalist Service  [facility]  Securely message with [employer] (more info)  Text page via [employer] Paging/Directory   ______________________________________________________________________    Interval History   Patient is sitting up in bed, was hoping for discharge, discussed about worsening creatinine, and patient states that he has not been drinking much of any fluids/water.  Discussed about increasing oral intake and rechecking BMP tomorrow.  Patient is in agreement.    Physical Exam   Vital Signs: Temp: 98.3  F (36.8  C) Temp src: Oral BP: 114/61 Pulse: 78   Resp: 18 SpO2: 92 % O2 Device: None (Room air)    Weight: 174 lbs 2.61 oz    Constitutional: Awake, alert, cooperative, no apparent " distress  Respiratory: Clear to auscultation bilaterally, no crackles or wheezing  Cardiovascular: Regular rate and rhythm, normal S1 and S2, and no murmur noted  GI: Normal bowel sounds, soft, non-distended, non-tender  Skin/Integumen: No rashes, no cyanosis, no edema  Other: Alert oriented x 3, no apparent focal deficits.

## 2025-03-02 NOTE — PLAN OF CARE
Goal Outcome Evaluation:    Date/Time: 3/1/2025 0307-0225     Trauma/Ortho/Medical (Choose one) Trauma     Diagnosis: Closed stable burst fracture of 10 th thoracic vertebra  POD#: NA  Mental Status: A / O x3  Activity/dangle : 2 paulette steady/ wheelchair bound at baseline  Diet: Mod CHO  Pain: Denied  Gomez/Voiding: Urinal  Tele/Restraints/Iso:NA  02/LDA: RA /PIV infusing  D/C Date: Possible today pending BMP result  Other Info:  TLSO brace on at all times.BG checks. Baseline neuropathy BLE.

## 2025-03-02 NOTE — PROGRESS NOTES
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     SALLIE: Prerenal  -Creatinine up to 2.24  -Likely prerenal in the setting of poor oral intake  -Patient received IV fluids on 3/1, discontinue IV fluids on 3/2 improving creatinine encourage patient to have good oral intake of fluids throughout the day  -Continue to hold lisinopril, metformin, Lasix, gabapentin  -If creatinine improves, patient likely discharge tomorrow.    Uncontrolled pain in setting of acute fracture s/p mechanical fall  Acute mid-thoracic region back pain in setting above   Cervical stenosis w/ myelopathy and neuropathy  History of DJD  History of chronic back pain secondary to above  History of recurrent mechanical falls   *Patient presented after he sustained a fall backwards as he was attempting to get into his wheelchair in clinic just prior to arrival 02/25/2025. Patient noted to fall directly onto back and experienced traumatic head strike upon landing. Following fall patient reporting severe thoracic back pain with new bilateral upper extremity paraesthesias and weakness (usually unilateral, RUE). History of chronic back pain secondary to cervical spinal stenosis with peripheral neuropathy. Plan for surgical intervention in April 2025. On chronic anticoagulation (Eliquis). History of bone mets  from prior carcinoid of small bowel.   *CTH 2/24/25 negative for acute abnormality, does show dilated ventricular system, see below.   *XR of thoracic and lumbar spine 2/24/25 show no acute fractures.  *CT thoracic w/o contrast with acute fracture through the anterior osteophyte formation at T9-T10 disc space level extending obliquely through T10 vertebral body to its inferior endplate. No retropulsion or canal compromise.   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 As this is an unstable fracture in a rigid spine neurosurgical consultation is recommended.No traumatic subluxation or high-grade canal stenosis. Mild lumbar spondylosis, worse at L4-L5 where there is severe left lateral recess stenosis and severe left foraminal stenosis. Correlate for left L4 and L5 radiculopathy. Advanced multilevel cervical spondylitic changes with multilevel canal flattening and canal stenoses, suboptimally assessed on the current examination.   *Patient with continued thoracic spinal tenderness with movement. Improved at rest. Denies worsening numbness, tingling or weakness bilateral upper or lower extremities (baseline left lower extremity weakness and intermittent sciatica, left upper extremity paraesthesias). No changes to or loss of bladder or bowel. Denies chest pain, difficulty breathing, N/V or chills.  - Neurosurgery consulted, appreciate the cares. Custom TLSO ordered. Suspect will need to wear minimum three months. Initially placed on bed rest until following brace placement 02/27. Delay in imaging secondary to patient refusal reportedly due to severe pain with even minor movement. Upright imaging obtained following IV Dilaudid. Stable.   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 CT thoracic spine with similar findings.   - Continue  scheduled Tylenol for pain relief and as needed  oxycodone also available. PRN adjuncts include lidocaine patch PRN, Robaxin and Miacalcin.   -Consult to pain management for appropriate pain control gabapentin currently on hold.      Dilated cerebral ventricles, consider normal pressure hydrocephalus  - Outpatient neurology consult upon discharge for further evaluation and management.       Leukocytosis, resolved  *Patient remains afebrile with no evidence of acute infection.   - Continue to monitor with morning labs.      COPD  CIERA  *Patient noted to not be compliant w/ CPAP.  - Duonebs PRN available.   - Continue PTA Breo Ellipta and Incruse Ellipta.     Hx PE on AC  Anemia, chronic, normocytic   *Baseline Hgb 11-12g/dL. Hgb 11.6 02/27. Denies recent evidence of bleed including hematemesis, hematuria, melena or RBPR.  - Continue PTA Eliquis.  - Continue PTA iron supplement.     T2DM w/ hyperglycemia A1C 7% on 12/2/2024.  - Mod CHO diet.   - Medium intensity sliding scale insulin ordered.  - Continue metformin following medication reconciliation.  - Glucose checks as ordered.     HTN  HFpEF, chronic compensated  *Intermittently hypertensive in setting of unmanaged acute pain.   - Hydralazine PRN.  - Continue amlodipine.   -Lisinopril and Lasix currently on hold, reassess creatinine and restart as able.     BPH  - Continue PTA finasteride.     Diarrhea  - Continue PTA cholesytime.     HLD  - Continue PTA statin.     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  - Follow-up with Oncology/Hematology upon discharge for continued management.      Hx RUL adenocarcinoma s/p wedge resection:   - Follow-up with Oncology/Hematology upon discharge for continued management.           Diet: Moderate Consistent Carb (60 g CHO per Meal) Diet    DVT Prophylaxis: Pneumatic Compression Devices  Gomez Catheter: Not present  Lines: None     Cardiac Monitoring: None  Code Status: No CPR- Do NOT Intubate      Clinically Significant Risk  "Factors Present on Admission                # Drug Induced Coagulation Defect: home medication list includes an anticoagulant medication    # Hypertension: Noted on problem list          # DMII: A1C = N/A within past 6 months    # Overweight: Estimated body mass index is 28.11 kg/m  as calculated from the following:    Height as of this encounter: 1.676 m (5' 6\").    Weight as of this encounter: 79 kg (174 lb 2.6 oz).       # Financial/Environmental Concerns: none         Social Drivers of Health    Tobacco Use: Medium Risk (2/24/2025)    Received from [employer]    Patient History     Smoking Tobacco Use: Former     Smokeless Tobacco Use: Unknown   Physical Activity: Insufficiently Active (9/2/2024)    Exercise Vital Sign     Days of Exercise per Week: 2 days     Minutes of Exercise per Session: 20 min   Social Connections: Unknown (9/2/2024)    Social Connection and Isolation Panel [NHANES]     Frequency of Social Gatherings with Friends and Family: Once a week          Disposition Plan     Medically Ready for Discharge: Anticipated Tomorrow             Ivan Borjas MD  Hospitalist Service  [facility]  Securely message with [employer] (more info)  Text page via [employer] Paging/Directory   ______________________________________________________________________    Interval History   Patient is resting in bed, feels very tired, did not sleep much overnight.  Patient received IV fluids overnight, has been trying to increase his oral intake throughout the day.  Discussed with patient that his creatinine has improved from yesterday but still elevated from baseline, discussed with patient the recommendation to increase his fluid intake through the day and recheck creatinine tomorrow morning.    Physical Exam   Vital Signs: Temp: 98.8  F (37.1  C) Temp src: Oral BP: (!) 152/84 Pulse: 79   Resp: 18 SpO2: 92 % O2 Device: None (Room air)    Weight: 174 lbs 2.61 oz    Constitutional: Is feeling tired " cooperative, no apparent distress  Respiratory: Clear to auscultation bilaterally, no crackles or wheezing  Cardiovascular: Regular rate and rhythm, normal S1 and S2, and no murmur noted  GI: Normal bowel sounds, soft, non-distended, non-tender  Skin/Integumen: No rashes, no cyanosis, no edema  Other: Alert oriented x 3, no apparent focal deficits.

## 2025-03-03 LAB
ANION GAP SERPL CALCULATED.3IONS-SCNC: 11 MMOL/L (ref 7–15)
BUN SERPL-MCNC: 19.7 MG/DL (ref 8–23)
CALCIUM SERPL-MCNC: 8.8 MG/DL (ref 8.8–10.4)
CHLORIDE SERPL-SCNC: 102 MMOL/L (ref 98–107)
CREAT SERPL-MCNC: 1.29 MG/DL (ref 0.67–1.17)
EGFRCR SERPLBLD CKD-EPI 2021: 56 ML/MIN/1.73M2
GLUCOSE BLDC GLUCOMTR-MCNC: 130 MG/DL (ref 70–99)
GLUCOSE BLDC GLUCOMTR-MCNC: 138 MG/DL (ref 70–99)
GLUCOSE BLDC GLUCOMTR-MCNC: 139 MG/DL (ref 70–99)
GLUCOSE BLDC GLUCOMTR-MCNC: 155 MG/DL (ref 70–99)
GLUCOSE BLDC GLUCOMTR-MCNC: 173 MG/DL (ref 70–99)
GLUCOSE SERPL-MCNC: 144 MG/DL (ref 70–99)
HCO3 SERPL-SCNC: 24 MMOL/L (ref 22–29)
HOLD SPECIMEN: NORMAL
POTASSIUM SERPL-SCNC: 4.7 MMOL/L (ref 3.4–5.3)
SODIUM SERPL-SCNC: 137 MMOL/L (ref 135–145)

## 2025-03-03 PROCEDURE — 250N000013 HC RX MED GY IP 250 OP 250 PS 637: Performed by: CLINICAL NURSE SPECIALIST

## 2025-03-03 PROCEDURE — 250N000013 HC RX MED GY IP 250 OP 250 PS 637

## 2025-03-03 PROCEDURE — 80048 BASIC METABOLIC PNL TOTAL CA: CPT | Performed by: INTERNAL MEDICINE

## 2025-03-03 PROCEDURE — 99239 HOSP IP/OBS DSCHRG MGMT >30: CPT | Performed by: INTERNAL MEDICINE

## 2025-03-03 PROCEDURE — 999N000197 HC STATISTIC WOC PT EDUCATION, 0-15 MIN

## 2025-03-03 PROCEDURE — 120N000001 HC R&B MED SURG/OB

## 2025-03-03 PROCEDURE — 36415 COLL VENOUS BLD VENIPUNCTURE: CPT | Performed by: INTERNAL MEDICINE

## 2025-03-03 PROCEDURE — 250N000013 HC RX MED GY IP 250 OP 250 PS 637: Performed by: INTERNAL MEDICINE

## 2025-03-03 PROCEDURE — 99222 1ST HOSP IP/OBS MODERATE 55: CPT

## 2025-03-03 PROCEDURE — 82374 ASSAY BLOOD CARBON DIOXIDE: CPT | Performed by: INTERNAL MEDICINE

## 2025-03-03 RX ORDER — GABAPENTIN 300 MG/1
300 CAPSULE ORAL AT BEDTIME
Qty: 180 CAPSULE | Refills: 0 | Status: SHIPPED | OUTPATIENT
Start: 2025-03-03

## 2025-03-03 RX ORDER — METHOCARBAMOL 500 MG/1
500 TABLET, FILM COATED ORAL 4 TIMES DAILY PRN
DISCHARGE
Start: 2025-03-03

## 2025-03-03 RX ORDER — GABAPENTIN 300 MG/1
300 CAPSULE ORAL EVERY MORNING
Status: DISCONTINUED | OUTPATIENT
Start: 2025-03-03 | End: 2025-03-03

## 2025-03-03 RX ORDER — FUROSEMIDE 40 MG/1
40 TABLET ORAL DAILY
DISCHARGE
Start: 2025-03-03

## 2025-03-03 RX ORDER — GABAPENTIN 300 MG/1
300 CAPSULE ORAL 2 TIMES DAILY
Status: DISCONTINUED | OUTPATIENT
Start: 2025-03-03 | End: 2025-03-04 | Stop reason: HOSPADM

## 2025-03-03 RX ORDER — OXYCODONE HYDROCHLORIDE 5 MG/1
5 TABLET ORAL EVERY 8 HOURS PRN
Qty: 9 TABLET | Refills: 0 | Status: SHIPPED | OUTPATIENT
Start: 2025-03-03 | End: 2025-03-04

## 2025-03-03 RX ADMIN — FINASTERIDE 5 MG: 5 TABLET, FILM COATED ORAL at 07:39

## 2025-03-03 RX ADMIN — GABAPENTIN 300 MG: 300 CAPSULE ORAL at 20:20

## 2025-03-03 RX ADMIN — FLUTICASONE FUROATE AND VILANTEROL TRIFENATATE 1 PUFF: 100; 25 POWDER RESPIRATORY (INHALATION) at 07:44

## 2025-03-03 RX ADMIN — METHOCARBAMOL 500 MG: 500 TABLET ORAL at 18:11

## 2025-03-03 RX ADMIN — CHOLESTYRAMINE 4 G: 4 POWDER, FOR SUSPENSION ORAL at 07:43

## 2025-03-03 RX ADMIN — UMECLIDINIUM 1 PUFF: 62.5 AEROSOL, POWDER ORAL at 07:43

## 2025-03-03 RX ADMIN — ACETAMINOPHEN 1000 MG: 500 TABLET, FILM COATED ORAL at 20:19

## 2025-03-03 RX ADMIN — METHOCARBAMOL 500 MG: 500 TABLET ORAL at 22:44

## 2025-03-03 RX ADMIN — ATORVASTATIN CALCIUM 20 MG: 20 TABLET, FILM COATED ORAL at 21:45

## 2025-03-03 RX ADMIN — FERROUS SULFATE TAB 325 MG (65 MG ELEMENTAL FE) 325 MG: 325 (65 FE) TAB at 20:20

## 2025-03-03 RX ADMIN — METHOCARBAMOL 500 MG: 500 TABLET ORAL at 13:29

## 2025-03-03 RX ADMIN — ACETAMINOPHEN 650 MG: 325 TABLET, FILM COATED ORAL at 05:49

## 2025-03-03 RX ADMIN — METHOCARBAMOL 500 MG: 500 TABLET ORAL at 07:40

## 2025-03-03 RX ADMIN — APIXABAN 2.5 MG: 2.5 TABLET, FILM COATED ORAL at 20:20

## 2025-03-03 RX ADMIN — CALCITONIN SALMON 1 SPRAY: 200 SPRAY, METERED NASAL at 07:43

## 2025-03-03 RX ADMIN — APIXABAN 2.5 MG: 2.5 TABLET, FILM COATED ORAL at 07:40

## 2025-03-03 RX ADMIN — OXYCODONE HYDROCHLORIDE 5 MG: 5 TABLET ORAL at 05:49

## 2025-03-03 RX ADMIN — AMLODIPINE BESYLATE 5 MG: 5 TABLET ORAL at 07:40

## 2025-03-03 RX ADMIN — GABAPENTIN 300 MG: 300 CAPSULE ORAL at 09:38

## 2025-03-03 RX ADMIN — ACETAMINOPHEN 1000 MG: 500 TABLET, FILM COATED ORAL at 07:39

## 2025-03-03 ASSESSMENT — ACTIVITIES OF DAILY LIVING (ADL)
ADLS_ACUITY_SCORE: 68
ADLS_ACUITY_SCORE: 64
ADLS_ACUITY_SCORE: 68
ADLS_ACUITY_SCORE: 68
ADLS_ACUITY_SCORE: 64
ADLS_ACUITY_SCORE: 64
ADLS_ACUITY_SCORE: 68
ADLS_ACUITY_SCORE: 64
ADLS_ACUITY_SCORE: 68

## 2025-03-03 NOTE — PROGRESS NOTES
MD Notification    Notified Person: MD    Notified Person Name:  Gris     Notification Date/Time: 1123 3/3/25    Notification Interaction: [employer] messaging / telephone    Purpose of Notification: Removed brace for skin assessment, pt has friction blister (L lower Abdomen/flank) and friction rubs on bilateral lower flanks from the brace. Can you order WOC consult?     Orders Received: MD placed WOC consult.    Comments:  Discussed w/ WOC team. They are unable to see pt before 1:30 when scheduled discharge ride is. Pt is on priority list to be seen after initial visits are completed, but they cannot guarantee pt will be seen today.     Discussed above w/ Dr. Landry, plan to push back discharge ride until WOC sees.     I discussed w/ Dejah (SILVA). Transport rescheduled for [id number]. If WOC unable to see by this time RN to let SILVA and MD know and plan for discharge tomorrow.     L lower abdomen/flank  blister intact. Master friction abrasions cleansed w/ wound cleanser and mepilex placed over abrasions (not over blister). Removed shirt pt was wearing under brace. Back and armpits cleansed w/ soap and water. New gown placed.

## 2025-03-03 NOTE — CONSULTS
"[facility]    [facility], [facility], [facility], [facility], [place]   PAIN CONSULT     Assessment/Plan:  Nahun Villalobos is a 79 year old male with a past history of COPD, Type 2 DM, history of PE on anticoagulation, malignant carcinoid tumor who was admitted on 2/24/2025 following a mechanical fall.  Pain team was asked by Magdalene Izaguirre PA-C to see the patient for uncontrolled pain management s/p T9-T10 DISH fracture.   shows routine gabapentin fills, two previous opioid prescriptions most recently norco on 2/6/25. Describes pain as \"hurts, ow\" and states he has \"too many pains to care\", including tingling in his hands due to neuropathy, low back pain with left sided radiculopathy that inhibits his ability to ambulate. Primary pain complaints today related to his TLSO brace causing discomfort. His pain from his fracture is \"not too bad\" today.    In the last 24 hours, Nahun has received: 2 (5mg) oxycodone    PLAN:  Acute pain secondary to T-T10 DISH fracture, in the setting of chronic back pain secondary to cervical spinal stenosis with peripheral neuropathy.   Pain improving.  Plan to discharge to TCU today.  Multimodal Medication Therapy:   Adjuvants:   Acetaminophen 1000mg bid   Nasal Calcitonin x14 days   Robaxin 500mg qid   Gabapentin 300mg bid resumed - creatinine improving, max daily dose 900mg in 2-3 divided doses  Opioids: Oxycodone 5mg q3h prn   Non-medication interventions- Ice, heat prn   Constipation Prophylaxis- prn senna   Follow up /Discharge Recommendations - We recommend prescribing the following at the time of discharge:    Oxycodone 5mg q8h prn   Continue robaxin 500mg q6h   Acetaminophen 1000mg bid   Nasal calcitonin x14 days total   Gabapentin 300mg bid       Subjective:  Patient is seen resting in bed and appears comfortable. Currently endorsing pain to the left lower extremity, and discomfort primarily related to his TLSO brace. He states he " "has \"too many pains to care\", and that his pain related to his fall is improving and \"doesn't hurt too bad\"/10. Denies nausea, vomiting, diarrhea, constipation, chest pain, shortness of breath. Last bowel movement yesterday.    Medication regimen and adjustments made reviewed. Patient verbalized understanding and is in agreement with the plan. All questions answered.      Discussed discharge recommendations with hospitalist.       History   Drug Use No         Tobacco Use      Smoking status: Former        Packs/day: 0.00        Years: 2.0 packs/day for 53.0 years (106.0 ttl pk-yrs)        Types: Cigarettes, Cigars        Start date: 1960        Quit date: 2013        Years since quittin.7        Passive exposure: Past      Smokeless tobacco: Never      Tobacco comments: Quit, will never start again.        Objective:  Vital signs in last 24 hours:  B/P: 126/75, T: 98.9, P: 74, R: 18   Blood pressure 126/75, pulse 74, temperature 98.9  F (37.2  C), temperature source Oral, resp. rate 18, height 1.676 m (5' 6\"), weight 79 kg (174 lb 2.6 oz), SpO2 96%.        Review of Systems:   As per subjective, all others negative.    Physical Exam  General: in no apparent distress and non-toxic, resting in bed and appears comfortable  HEENT: Head normocephalic atraumatic, oral mucosa moist. Sclerae anicteric  Resp: Respirations unlabored  Skin: No rashes or lesions to visualized skin  Extremities: Able to move all four extremities spontaneously   Psych: Normal affect  Neuro: Alert and oriented x3          Imaging:  Personally Reviewed.    Results for orders placed or performed during the hospital encounter of 25   Head CT w/o contrast    Impression    IMPRESSION:  1.  No acute traumatic intracranial abnormality.  2.  Dilated ventricular system in a configuration that could represent normal pressure hydrocephalus.   Lumbar spine XR, 2-3 views    Impression    IMPRESSION: Shallow dextrocurvature. Sagittal " alignment in the thoracic spine is unremarkable. Grade 1 anterolisthesis L4 on L5 measuring 3 mm. Changes of diffuse idiopathic skeletal hyperostosis within the thoracic spine. Vertebral body heights are   maintained. No anterior compression deformity. Mild to moderate L4-5 disc height loss. Mild disc degeneration elsewhere. Moderate lower lumbar facet arthrosis. Vascular calcifications are present within the aorta. Postsurgical changes of the right lung   and right lower quadrant.    XR Thoracic Spine 2 Views    Impression    IMPRESSION: Shallow dextrocurvature. Sagittal alignment in the thoracic spine is unremarkable. Grade 1 anterolisthesis L4 on L5 measuring 3 mm. Changes of diffuse idiopathic skeletal hyperostosis within the thoracic spine. Vertebral body heights are   maintained. No anterior compression deformity. Mild to moderate L4-5 disc height loss. Mild disc degeneration elsewhere. Moderate lower lumbar facet arthrosis. Vascular calcifications are present within the aorta. Postsurgical changes of the right lung   and right lower quadrant.    MR Thoracic Spine w/o & w Contrast    Impression    IMPRESSION:  1.  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 As this is an unstable fracture in a rigid spine neurosurgical consultation is recommended.  2.  No traumatic subluxation or high-grade canal stenosis.  3.  Mild lumbar spondylosis, worse at L4-L5 where there is severe left lateral recess stenosis and severe left foraminal stenosis. Correlate for left L4 and L5 radiculopathy.  4.  Advanced multilevel cervical spondylitic changes with multilevel canal flattening and canal stenoses, suboptimally assessed on the current examination. Consider dedicated MRI cervical spine to further evaluate.   MR Lumbar Spine w/o & w Contrast    Impression    IMPRESSION:  1.  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 As this is an unstable fracture in  a rigid spine neurosurgical consultation is recommended.  2.  No traumatic subluxation or high-grade canal stenosis.  3.  Mild lumbar spondylosis, worse at L4-L5 where there is severe left lateral recess stenosis and severe left foraminal stenosis. Correlate for left L4 and L5 radiculopathy.  4.  Advanced multilevel cervical spondylitic changes with multilevel canal flattening and canal stenoses, suboptimally assessed on the current examination. Consider dedicated MRI cervical spine to further evaluate.   CT Thoracic Spine w/o Contrast    Impression    IMPRESSION:  1.  Acute fracture through the anterior osteophyte formation at the T9-T10 disc space level extending obliquely through the T10 vertebral body to its inferior endplate.  2.  No evidence of retropulsion or canal compromise.  3.  No significant canal compromise or neural foraminal narrowing throughout thoracic spine.     XR Thoracic Lumbar Standing 2 Views    Impression    IMPRESSION: Patient is in a brace. Stable nondisplaced fracture through the bridging anterior endplate osteophyte at the T9-T10 level. Extension of the fracture into the T10 vertebral body is not well seen radiographically. No other fractures. Stable   diffuse idiopathic skeletal hyperostosis. Stable slight degenerative anterolisthesis of L4 on L5 is otherwise normal vertebral alignment. Normal vertebral body heights. Mild to moderate multilevel degenerative disc disease. Mild to moderate degenerative   arthritis involving the articular facets in the lumbar spine. Normal extraspinal structures.         Lab Results:  Personally Reviewed.   Last Comprehensive Metabolic Panel:  Sodium   Date Value Ref Range Status   03/02/2025 141 135 - 145 mmol/L Final   03/17/2021 135 133 - 144 mmol/L Final     Potassium   Date Value Ref Range Status   03/02/2025 4.8 3.4 - 5.3 mmol/L Final   08/30/2022 4.0 3.4 - 5.3 mmol/L Final   03/17/2021 4.1 3.4 - 5.3 mmol/L Final     Chloride   Date Value Ref Range  "Status   03/02/2025 105 98 - 107 mmol/L Final   05/20/2022 102 94 - 109 mmol/L Final   03/17/2021 103 94 - 109 mmol/L Final     Carbon Dioxide   Date Value Ref Range Status   03/17/2021 27 20 - 32 mmol/L Final     Carbon Dioxide (CO2)   Date Value Ref Range Status   03/02/2025 24 22 - 29 mmol/L Final   05/20/2022 28 20 - 32 mmol/L Final     Anion Gap   Date Value Ref Range Status   03/02/2025 12 7 - 15 mmol/L Final   05/20/2022 10 3 - 14 mmol/L Final   03/17/2021 5 3 - 14 mmol/L Final     Glucose   Date Value Ref Range Status   09/20/2022 139 (A) 70 - 99 mg/dL Final     Comment:     Lot [id number] Exp 2023-03-29     GLUCOSE BY METER POCT   Date Value Ref Range Status   03/03/2025 130 (H) 70 - 99 mg/dL Final     Urea Nitrogen   Date Value Ref Range Status   03/02/2025 25.3 (H) 8.0 - 23.0 mg/dL Final   05/20/2022 12 7 - 30 mg/dL Final   03/17/2021 19 7 - 30 mg/dL Final     Creatinine   Date Value Ref Range Status   03/02/2025 1.57 (H) 0.67 - 1.17 mg/dL Final   03/17/2021 1.11 0.66 - 1.25 mg/dL Final     GFR Estimate   Date Value Ref Range Status   03/02/2025 44 (L) >60 mL/min/1.73m2 Final     Comment:     eGFR calculated using 2021 CKD-EPI equation.   03/17/2021 64 >60 mL/min/[1.73_m2] Final     Comment:     Non  GFR Calc  Starting 12/18/2018, serum creatinine based estimated GFR (eGFR) will be   calculated using the Chronic Kidney Disease Epidemiology Collaboration   (CKD-EPI) equation.       GFR, ESTIMATED POCT   Date Value Ref Range Status   08/11/2023 56 (L) >60 mL/min/1.73m2 Final     Calcium   Date Value Ref Range Status   03/02/2025 8.5 (L) 8.8 - 10.4 mg/dL Final   03/17/2021 8.9 8.5 - 10.1 mg/dL Final        UA: No results found for: \"UAMP\", \"UBARB\", \"BENZODIAZEUR\", \"UCANN\", \"UCOC\", \"OPIT\", \"UPCP\"           Please see A&P for additional details of medical decision making.  MANAGEMENT DISCUSSED with the following over the past 24 hours:   -Hospitalist  NOTE(S)/MEDICAL RECORDS REVIEWED over the past " 24 hours:   -Hospitalist: PMGUSTABO, HPI reviewed  -Social work: Discharging to TCU today  -Pain: Reviewed regimen and changes made on Friday  -Neurosurgery: Neuro stable continue supportive and symptomatic treatment  Tests personally interpreted in the past 24 hours:  - CT thoracic spine showing acute fracture at T9-T10  -Head CT showing no acute findings  Tests REVIEWED in the past 24 hours:  - BMP  - CBC  Medical complexity over the past 24 hours:  - Prescription DRUG MANAGEMENT performed      BEE Dsouza-BC  Acute Care Pain Management Program   Hours of pain coverage Mon-Fri [phone], afterhours please call the primary team  [facility] (CORINE, NERY[place], [place], [place])   Page via Epic Messaging or [employer] web console -Click for [employer]

## 2025-03-03 NOTE — PROGRESS NOTES
Care Management Follow Up    Length of Stay (days): 2    Expected Discharge Date: 03/03/2025     Concerns to be Addressed: discharge planning     Patient plan of care discussed at interdisciplinary rounds: Yes    Anticipated Discharge Disposition: Transitional Care     Anticipated Discharge Services:    Anticipated Discharge DME: None    Patient/family educated on Medicare website which has current facility and service quality ratings: yes  Education Provided on the Discharge Plan: Yes  Patient/Family in Agreement with the Plan: yes    Referrals Placed by CM/SW: Post Acute Facilities, Transportation  Private pay costs discussed: Not applicable    Discussed  Partnership in Safe Discharge Planning  document with patient/family: No     Handoff Completed: No, handoff not indicated or clinically appropriate    Additional Information:  Confirmed with Loki in admissions at [facility] they can accept pt today in a semi-private room if pt is ready for discharge. [facility] wheelchair transport set for [phone] today. Sent message to hospitalist to see if pt is ready for discharge.    JANETTE Shultz  Social Work  [facility]

## 2025-03-03 NOTE — PLAN OF CARE
Physical Therapy Discharge Summary    Reason for therapy discharge:    Discharged to transitional care facility.    Progress towards therapy goal(s). See goals on Care Plan in [facility] electronic health record for goal details.  Goals not met.  Barriers to achieving goals:   discharge from facility.    Therapy recommendation(s):    Continued therapy is recommended.  Rationale/Recommendations:  Patient significantly below baseline, requiring Ax2 for bed mobility and transfers out of bed. Patient will benefit from skilled PT intervention at TCU to progress strength and independence wiith mobility..    Patient not seen by this therapist on this date, discharge summary written based on chart review and previous PT notes. Please see PT flowsheets for further details.

## 2025-03-03 NOTE — DISCHARGE SUMMARY
[facility]    Discharge Summary  Hospitalist    Date of Admission:  2/24/2025  Date of Discharge:  3/3/2025  Discharging Provider: Larry Landry MD    Discharge Diagnoses      SALLIE  Acute fracture through the anterior osteophyte formation at T9-T10  Intractable pain due to above  Mechanical fall  Cervical stenosis w/ myelopathy and neuropathy  History of DJD  History of chronic back pain  History of recurrent mechanical falls   ?Normal pressure hydrocephalus  Leukocytosis, resolved  COPD  CIERA  Hx PE on AC  Anemia, chronic, normocytic   T2DM w/ hyperglycemia   HTN  HFpEF, chronic compensated  BPH  Diarrhea  HLD  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:   Hx RUL adenocarcinoma s/p wedge resection:        Hospital Course:    Nahun Villalobos is a 79 year old male with multiple medical problems including HFpEF, benign essential hypertension, Type 2 DM, COPD, history of PE on Apixaban (Eliquis), 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     SALLIE  -Creatinine up to 2.24  -Likely prerenal in the setting of poor oral intake  -Improving, down to 1.29, off IV fluids, continue to encourage generous fluid intake.     Uncontrolled pain in setting of acute fracture s/p mechanical fall  Acute mid-thoracic region back pain in setting above   Cervical stenosis w/ myelopathy and neuropathy  History of DJD  History of chronic back pain secondary to above  History of recurrent mechanical falls   *Patient presented after he sustained a fall backwards as he was attempting to get into his wheelchair in clinic just prior to arrival 02/25/2025.Following fall  patient reporting severe thoracic back pain with new bilateral upper extremity paraesthesias and weakness (usually unilateral, RUE). History of chronic back pain secondary to cervical spinal stenosis with peripheral neuropathy.History of bone mets from prior carcinoid of small bowel.   *CTH 2/24/25 negative for acute abnormality, does show dilated ventricular system, see below.   *XR of thoracic and lumbar spine 2/24/25 show no acute fractures.  *CT thoracic w/o contrast with acute fracture through the anterior osteophyte formation at T9-T10 disc space level extending obliquely through T10 vertebral body to its inferior endplate. No retropulsion or canal compromise.   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 As this is an unstable fracture in a rigid spine neurosurgical consultation is recommended.No traumatic subluxation or high-grade canal stenosis. Mild lumbar spondylosis, worse at L4-L5 where there is severe left lateral recess stenosis and severe left foraminal stenosis. Correlate for left L4 and L5 radiculopathy. Advanced multilevel cervical spondylitic changes with multilevel canal flattening and canal stenoses, suboptimally assessed on the current examination.   -Neurosurgery followed. Custom TLSO ordered. Will need to wear minimum three months.   -MRI T and L spine notable for acute DISH fracture at T10 with disruption of the anterior longitudinal ligament. CT thoracic spine with similar findings.   -Continue gabapentin, as needed Tylenol, Robaxin and oxycodone for pain control.  Pain team was consulted, they recommended continuing current regimen without any changes.     Dilated cerebral ventricles, consider normal pressure hydrocephalus  -Outpatient neurology referral placed.    Leukocytosis, resolved  *Patient remains afebrile with no evidence of acute infection.      COPD  CIERA  *Patient noted to not be compliant w/ CPAP.  -Trina  PRN available.   -Continue PTA Breo Ellipta and Incruse Ellipta.     Hx PE on AC  Anemia, chronic, normocytic   *Baseline Hgb 11-12g/dL. Hgb 11.6 02/27. Denies recent evidence of bleed including hematemesis, hematuria, melena or RBPR.  -Continue PTA Eliquis.  -Continue PTA iron supplement.     T2DM w/ hyperglycemia A1C 7% on 12/2/2024.  -Continue metformin       HTN  HFpEF, chronic compensated  *Intermittently hypertensive in setting of unmanaged acute pain.   -Hydralazine PRN.  -Continue amlodipine. Hold lisinopril  -Resume Lasix at a lower dose of 40 mg daily  -BMP in the next 3 to 4 days as outpatient  -Adjust antihypertensives as clinically indicated as outpatient        BPH  -Continue PTA finasteride.     Diarrhea  -Continue PTA cholesytime.     HLD  -Continue PTA statin.     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  -Follow-up with Oncology/Hematology upon discharge for continued management.      Hx RUL adenocarcinoma s/p wedge resection:   - Follow-up with Oncology/Hematology upon discharge for continued management.           Larry Landry MD    Significant Results and Procedures   See below    Pending Results     Unresulted Labs Ordered in the Past 30 Days of this Admission       No orders found from 1/25/2025 to 2/25/2025.            Code Status   DNR / DNI       Primary Care Physician   Olegario Castillo    Physical Exam   Temp: 98.9  F (37.2  C) Temp src: Oral BP: 126/75 Pulse: 74   Resp: 18 SpO2: 96 % O2 Device: None (Room air)      Constitutional: AAOX3, NAD  Respiratory: CTA B/L, Normal WOB  Cardiovascular: RRR, No murmur  GI: Soft, Non- tender, BS- normoactive  Neuro: CN- grossly intact    Discharge Disposition   Discharged to short-term care facility  Condition at discharge: Stable    Consultations This Hospital Stay   CARE MANAGEMENT / SOCIAL WORK IP CONSULT  PHYSICAL THERAPY ADULT IP CONSULT  OCCUPATIONAL THERAPY ADULT IP CONSULT  CARE MANAGEMENT /  SOCIAL WORK IP CONSULT  CARE MANAGEMENT / SOCIAL WORK IP CONSULT  NEUROSURGERY IP CONSULT  PAIN MANAGEMENT ADULT IP CONSULT  PHYSICAL THERAPY ADULT IP CONSULT  PHYSICAL THERAPY ADULT IP CONSULT  OCCUPATIONAL THERAPY ADULT IP CONSULT    Time Spent on this Encounter   ILarry MD, personally saw the patient today and spent greater than 30 minutes discharging this patient.    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     Activity    Please limit your lifting to no more that ten pounds and avoid excessive bending, twisting and turning at the lumbar spine. You should also avoid excessive jostling and jarring activities.     Follow Up    Your Neurosurgical follow-up appointments have been scheduled. You may call [phone] to make, confirm or change your appointment dates and/or times.     Wound care and dressings    We instructed the patient to wear the brace when out of bed, but may shower without the brace on. The brace will most likely be required for 3 months.     General info for SNF    Length of Stay Estimate: Short Term Care: Estimated # of Days <30  Condition at Discharge: Improving  Level of care:skilled   Rehabilitation Potential: Excellent  Admission H&P remains valid and up-to-date: Yes  Recent Chemotherapy: N/A  Use Nursing Home Standing Orders: Yes     Mantoux instructions    Give two-step Mantoux (PPD) Per Facility Policy Yes     Follow Up and recommended labs and tests    Follow up with Nursing home physician.  Neurosurgery in 6 weeks     Reason for your hospital stay    Mechanical fall, thoracic spine fracture     Daily weights    Call Provider for weight gain of more than 2 pounds per day or 5 pounds per week.     Activity - Up with nursing assistance     No CPR- Do NOT Intubate     Physical Therapy Adult Consult     Evaluate and treat as clinically indicated.    Reason:  Deconditioning     Occupational Therapy Adult Consult    Evaluate and treat as clinically indicated.    Reason:  Deconditioning     Fall precautions     Diet    Follow this diet upon discharge: Current Diet:Orders Placed This Encounter      Moderate Consistent Carb (60 g CHO per Meal) Diet     Discharge Medications      Review of your medicines        START taking        Dose / Directions   methocarbamol 500 MG tablet  Commonly known as: ROBAXIN      Dose: 500 mg  Take 1 tablet (500 mg) by mouth 4 times daily as needed for muscle spasms.  Refills: 0     oxyCODONE 5 MG tablet  Commonly known as: ROXICODONE      Dose: 5 mg  Take 1 tablet (5 mg) by mouth every 8 hours as needed for severe pain (IF pain not managed with non-pharmacological and non-opioid interventions).  Quantity: 9 tablet  Refills: 0            CONTINUE these medicines which may have CHANGED, or have new prescriptions. If we are uncertain of the size of tablets/capsules you have at home, strength may be listed as something that might have changed.        Dose / Directions   furosemide 40 MG tablet  Commonly known as: LASIX  Indication: High Blood Pressure  This may have changed: medication strength  Used for: Essential hypertension, benign      Dose: 40 mg  Take 1 tablet (40 mg) by mouth daily.  Refills: 0     * gabapentin 300 MG capsule  Commonly known as: NEURONTIN  Indication: Neuropathic Pain  This may have changed: Another medication with the same name was changed. Make sure you understand how and when to take each.  Used for: Neuropathic pain      Dose: 300 mg  Take 1 capsule (300 mg) by mouth every morning.  Quantity: 90 capsule  Refills: 0     * gabapentin 300 MG capsule  Commonly known as: NEURONTIN  This may have changed: how much to take  Used for: Neuropathic pain      Dose: 300 mg  Take 1 capsule (300 mg) by mouth at bedtime.  Quantity: 180 capsule  Refills: 0           * This list  has 2 medication(s) that are the same as other medications prescribed for you. Read the directions carefully, and ask your doctor or other care provider to review them with you.                CONTINUE these medicines which have NOT CHANGED        Dose / Directions   acetaminophen 500 MG tablet  Commonly known as: TYLENOL      Dose: 1,000 mg  Take 1,000 mg by mouth 2 times daily.  Refills: 0     albuterol 108 (90 Base) MCG/ACT inhaler  Commonly known as: Ventolin HFA  Indication: Chronic Obstructive Lung Disease  Used for: Chronic obstructive pulmonary disease, unspecified COPD type (H)      INHALE 2 PUFFS INTO THE LUNGS EVERY 6 HOURS AS NEEDED FOR SHORTNESS OF BREATH / DYSPNEA OR WHEEZING  Quantity: 25.5 g  Refills: 0     amLODIPine 5 MG tablet  Commonly known as: NORVASC  Used for: Benign essential hypertension      Dose: 5 mg  Take 1 tablet (5 mg) by mouth daily.  Quantity: 90 tablet  Refills: 3     apixaban ANTICOAGULANT 2.5 MG tablet  Commonly known as: ELIQUIS  Indication: DVT-PE Treatment  Used for: Other pulmonary embolism without acute cor pulmonale, unspecified chronicity (H)      Dose: 2.5 mg  Take 1 tablet (2.5 mg) by mouth 2 times daily.  Quantity: 90 tablet  Refills: 1     atorvastatin 20 MG tablet  Commonly known as: LIPITOR  Indication: High Amount of Fats in the Blood  Used for: Hyperlipidemia LDL goal <100      Dose: 20 mg  Take 1 tablet (20 mg) by mouth daily.  Quantity: 90 tablet  Refills: 1     blood glucose lancets standard  Commonly known as: NO BRAND SPECIFIED  Used for: Type 2 diabetes mellitus without complication, without long-term current use of insulin (H)      Use to test blood sugar 1 time daily, first thing in the morning  Quantity: 50 each  Refills: 3     calcium carbonate 500 MG chewable tablet  Commonly known as: TUMS      Dose: 1 chew tab  Take 1 chew tab by mouth daily as needed for heartburn.  Refills: 0     cholestyramine light 4 GM packet  Commonly known as:  QUESTRAN  Indication: Diarrhea  Used for: Diarrhea, unspecified type      Dose: 4 g  Take 4 g by mouth 2 times daily (with meals).  Refills: 0     Coenzyme Q10 400 MG Caps  Used for: Nutritional deficiency      Dose: 400 mg  Take 400 mg by mouth daily  Quantity: 30 capsule  Refills: 0     ferrous sulfate 325 (65 Fe) MG EC tablet  Commonly known as: FE TABS  Used for: Nutritional deficiency      Dose: 325 mg  Take 1 tablet (325 mg) by mouth every evening  Quantity: 30 tablet  Refills: 0     finasteride 5 MG tablet  Commonly known as: PROSCAR  Indication: Benign Enlargement of Prostate  Used for: Gross hematuria      Dose: 1 tablet  Take 1 tablet (5 mg) by mouth daily  Quantity: 90 tablet  Refills: 3     lanreotide acetate 120 MG/0.5ML injection  Commonly known as: SOMATULINE      Dose: 120 mg  Inject 120 mg subcutaneously every 28 days. Do not start before September 6, 2024.  Refills: 0     metFORMIN 1000 MG tablet  Commonly known as: GLUCOPHAGE  Used for: Type 2 diabetes mellitus without complication, without long-term current use of insulin (H)      Dose: 1,000 mg  Take 1 tablet (1,000 mg) by mouth 2 times daily (with meals).  Quantity: 180 tablet  Refills: 1     miconazole 2 % external cream  Commonly known as: MICATIN  Used for: Hospital discharge follow-up      Apply topically 2 times daily as needed for other (Rash/ skin irritation)  Refills: 0     order for DME  Used for: Nocturnal hypoxemia      Oxygen 2 Li/min  at night via nasal cannula  Quantity: 1 Device  Refills: 0     Trelegy Ellipta 100-62.5-25 MCG/ACT oral inhaler  Used for: Chronic obstructive pulmonary disease, unspecified COPD type (H)  Generic drug: Fluticasone-Umeclidin-Vilant      USE 1 INHALATION DAILY  Quantity: 60 each  Refills: 5     vitamin B-Complex  Indication: pta med      Dose: 1 tablet  Take 1 tablet by mouth daily  Refills: 0     vitamin C 500 MG tablet  Commonly known as: ASCORBIC ACID      Dose: 500 mg  Take 500 mg by mouth  daily.  Refills: 0            STOP taking      lisinopril 40 MG tablet  Commonly known as: ZESTRIL                  Where to get your medicines        These medications were sent to [facility] [place], [place] - [address] [place] [address]  [address] [facility] [address]      Phone: [phone]   gabapentin 300 MG capsule       Some of these will need a paper prescription and others can be bought over the counter. Ask your nurse if you have questions.    Bring a paper prescription for each of these medications  oxyCODONE 5 MG tablet          Allergies   No Known Allergies  Data   Most Recent 3 CBC's:  Recent Labs   Lab Test 03/01/25  0943 02/27/25  0739 02/26/25  0830   WBC 12.3* 10.5 11.3*   HGB 11.5* 11.6* 12.8*   * 100 99    277 262      Most Recent 3 BMP's:  Recent Labs   Lab Test 03/03/25  0755 03/03/25  0726 03/03/25  0245 03/02/25  0816 03/02/25  0754 03/01/25  1126 03/01/25  0943   NA  --  137  --   --  141  --  135   POTASSIUM  --  4.7  --   --  4.8  --  4.9   CHLORIDE  --  102  --   --  105  --  98   CO2  --  24  --   --  24  --  23   BUN  --  19.7  --   --  25.3*  --  33.0*   CR  --  1.29*  --   --  1.57*  --  2.24*   ANIONGAP  --  11  --   --  12  --  14   JESSE  --  8.8  --   --  8.5*  --  8.7*   * 144* 130*   < > 163*   < > 172*    < > = values in this interval not displayed.     Most Recent 2 LFT's:  Recent Labs   Lab Test 12/02/24  0657 06/25/24  0737   AST 16 17   ALT 10 8   ALKPHOS 58 67   BILITOTAL 0.8 0.5     Most Recent INR's and Anticoagulation Dosing History:  Anticoagulation Dose History          Latest Ref Rng & Units 2/9/2018   Recent Dosing and Labs   INR 0.86 - 1.14 1.01      Most Recent 3 Troponin's:  Recent Labs   Lab Test 09/20/20  1441 08/18/18  1506 08/18/18  0929   TROPI <0.015 <0.015 <0.015     Most Recent Cholesterol Panel:  Recent Labs   Lab Test 11/15/24  1455   CHOL 119   LDL 44   HDL 48   TRIG 133     Most Recent 6 Bacteria Isolates  From Any Culture (See EPIC Reports for Culture Details):  Recent Labs   Lab Test 09/20/20  1817 02/18/20  1137 06/26/18  0243 04/30/18  0730 02/07/18  0817   CULT No growth  No growth <10,000 colonies/mL  urogenital bruno  Susceptibility testing not routinely done   No growth <1000 colonies/mL  urogenital bruno   No growth     Most Recent TSH, T4 and A1c Labs:  Recent Labs   Lab Test 12/02/24  0657 06/25/24  0737 03/03/24  0713   TSH  --   --  0.98   A1C 7.0*   < >  --     < > = values in this interval not displayed.       Results for orders placed or performed during the hospital encounter of 02/24/25   Head CT w/o contrast    Narrative    EXAM: CT HEAD W/O CONTRAST  LOCATION: [facility]  DATE: 2/24/2025    INDICATION: fall and hit head on eliquis  COMPARISON: 12/1/2024.  TECHNIQUE: Routine CT Head without IV contrast. Multiplanar reformats. Dose reduction techniques were used.    FINDINGS:  INTRACRANIAL CONTENTS: No intracranial hemorrhage, extraaxial collection, or mass effect.  No CT evidence of acute infarct. Moderate presumed chronic small vessel ischemic changes. Similar dilated ventricular system with acute callosal angle.     VISUALIZED ORBITS/SINUSES/MASTOIDS: No intraorbital abnormality. No paranasal sinus mucosal disease. No middle ear or mastoid effusion.    BONES/SOFT TISSUES: No acute abnormality.      Impression    IMPRESSION:  1.  No acute traumatic intracranial abnormality.  2.  Dilated ventricular system in a configuration that could represent normal pressure hydrocephalus.   Lumbar spine XR, 2-3 views    Narrative    EXAM: XR THORACIC SPINE 2 VIEWS, XR LUMBAR SPINE 2/3 VIEWS  LOCATION: [facility]  DATE: 2/24/2025    INDICATION: fall back pain  COMPARISON: None.      Impression    IMPRESSION: Shallow dextrocurvature. Sagittal alignment in the thoracic spine is unremarkable. Grade 1 anterolisthesis L4 on L5 measuring 3 mm. Changes of diffuse  idiopathic skeletal hyperostosis within the thoracic spine. Vertebral body heights are   maintained. No anterior compression deformity. Mild to moderate L4-5 disc height loss. Mild disc degeneration elsewhere. Moderate lower lumbar facet arthrosis. Vascular calcifications are present within the aorta. Postsurgical changes of the right lung   and right lower quadrant.    XR Thoracic Spine 2 Views    Narrative    EXAM: XR THORACIC SPINE 2 VIEWS, XR LUMBAR SPINE 2/3 VIEWS  LOCATION: [facility]  DATE: 2/24/2025    INDICATION: fall back pain  COMPARISON: None.      Impression    IMPRESSION: Shallow dextrocurvature. Sagittal alignment in the thoracic spine is unremarkable. Grade 1 anterolisthesis L4 on L5 measuring 3 mm. Changes of diffuse idiopathic skeletal hyperostosis within the thoracic spine. Vertebral body heights are   maintained. No anterior compression deformity. Mild to moderate L4-5 disc height loss. Mild disc degeneration elsewhere. Moderate lower lumbar facet arthrosis. Vascular calcifications are present within the aorta. Postsurgical changes of the right lung   and right lower quadrant.    MR Thoracic Spine w/o & w Contrast    Addendum: 2/26/2025    COMMUNICATION ADDENDUM:  Findings were discussed with Dr. Desai on 2/26/2025 1:16 AM CST.    END ADDENDUM      Narrative    EXAM: MR THORACIC SPINE W/O and W CONTRAST, MR LUMBAR SPINE W/O and W CONTRAST  LOCATION: [facility]  DATE/TIME: 2/25/2025 8:00 PM CST    INDICATION: Fall w/acute mid back pain,xr neg  COMPARISON: None.  CONTRAST: 7 mL Gadavist  TECHNIQUE:   1) Routine Thoracic Spine MRI without and with IV contrast.  2) Routine Lumbar Spine MRI without and with IV contrast.    FINDINGS:  THORACIC SPINE:  On the localizer image there is advanced multilevel cervical spondylitic changes with multilevel canal flattening and canal stenoses, suboptimally assessed on the current examination.    Flowing  ventral osteophytes in the thoracic spine compatible with DISH. There is fluid within an acute fracture extending along the ventral syndesmophyte at T10 into the T10 inferior endplate. Slight widening along the anterior aspect of the fracture   where there is disruption of the anterior longitudinal ligament. No traumatic subluxation. The posterior longitudinal ligament, ligamentum flavum, and intraspinous ligaments are grossly intact. Otherwise, no suspicious marrow signal abnormality. Mild   thoracic spondylitic changes without high-grade canal or foraminal stenosis. No abnormal cord signal. No abnormal intrathecal enhancement.    Redemonstration of the cystic lesions in the right hepatic lobe.    LUMBAR SPINE:   Nomenclature is based on 5 lumbar type vertebral bodies with L5-S1 defined on image 50 of series 5. 3 mm degenerative anterolisthesis of L4 on L5. Vertebral body heights maintained.  No suspicious marrow signal abnormality. Normal distal spinal cord and   cauda equina with conus medullaris at L1. Alteration of CSF flow dynamics at L4-L5 on the pre and postcontrast axial T1. No abnormal intrathecal enhancement, particularly on the sagittal series.     Prevertebral soft tissues are unremarkable. Dorsal paraspinal muscular atrophy. Visualized intra-abdominal structures are unremarkable.    Mild lumbar spondylosis, worse at L4-L5 where there is grade 1 degenerative anterolisthesis due to severe facet arthropathy, mild canal stenosis, severe left lateral recess stenosis, and severe left foraminal stenosis with compression of the exiting left   L4 nerve root. No high-grade canal or stenosis at the remaining levels.      Impression    IMPRESSION:  1.  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 As this is an unstable fracture in a rigid spine neurosurgical consultation is recommended.  2.  No traumatic subluxation or high-grade canal stenosis.  3.  Mild lumbar spondylosis,  worse at L4-L5 where there is severe left lateral recess stenosis and severe left foraminal stenosis. Correlate for left L4 and L5 radiculopathy.  4.  Advanced multilevel cervical spondylitic changes with multilevel canal flattening and canal stenoses, suboptimally assessed on the current examination. Consider dedicated MRI cervical spine to further evaluate.   MR Lumbar Spine w/o & w Contrast    Addendum: 2/26/2025    COMMUNICATION ADDENDUM:  Findings were discussed with Dr. Desai on 2/26/2025 1:16 AM CST.    END ADDENDUM      Narrative    EXAM: MR THORACIC SPINE W/O and W CONTRAST, MR LUMBAR SPINE W/O and W CONTRAST  LOCATION: [facility]  DATE/TIME: 2/25/2025 8:00 PM CST    INDICATION: Fall w/acute mid back pain,xr neg  COMPARISON: None.  CONTRAST: 7 mL Gadavist  TECHNIQUE:   1) Routine Thoracic Spine MRI without and with IV contrast.  2) Routine Lumbar Spine MRI without and with IV contrast.    FINDINGS:  THORACIC SPINE:  On the localizer image there is advanced multilevel cervical spondylitic changes with multilevel canal flattening and canal stenoses, suboptimally assessed on the current examination.    Flowing ventral osteophytes in the thoracic spine compatible with DISH. There is fluid within an acute fracture extending along the ventral syndesmophyte at T10 into the T10 inferior endplate. Slight widening along the anterior aspect of the fracture   where there is disruption of the anterior longitudinal ligament. No traumatic subluxation. The posterior longitudinal ligament, ligamentum flavum, and intraspinous ligaments are grossly intact. Otherwise, no suspicious marrow signal abnormality. Mild   thoracic spondylitic changes without high-grade canal or foraminal stenosis. No abnormal cord signal. No abnormal intrathecal enhancement.    Redemonstration of the cystic lesions in the right hepatic lobe.    LUMBAR SPINE:   Nomenclature is based on 5 lumbar type vertebral bodies with  L5-S1 defined on image 50 of series 5. 3 mm degenerative anterolisthesis of L4 on L5. Vertebral body heights maintained.  No suspicious marrow signal abnormality. Normal distal spinal cord and   cauda equina with conus medullaris at L1. Alteration of CSF flow dynamics at L4-L5 on the pre and postcontrast axial T1. No abnormal intrathecal enhancement, particularly on the sagittal series.     Prevertebral soft tissues are unremarkable. Dorsal paraspinal muscular atrophy. Visualized intra-abdominal structures are unremarkable.    Mild lumbar spondylosis, worse at L4-L5 where there is grade 1 degenerative anterolisthesis due to severe facet arthropathy, mild canal stenosis, severe left lateral recess stenosis, and severe left foraminal stenosis with compression of the exiting left   L4 nerve root. No high-grade canal or stenosis at the remaining levels.      Impression    IMPRESSION:  1.  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 As this is an unstable fracture in a rigid spine neurosurgical consultation is recommended.  2.  No traumatic subluxation or high-grade canal stenosis.  3.  Mild lumbar spondylosis, worse at L4-L5 where there is severe left lateral recess stenosis and severe left foraminal stenosis. Correlate for left L4 and L5 radiculopathy.  4.  Advanced multilevel cervical spondylitic changes with multilevel canal flattening and canal stenoses, suboptimally assessed on the current examination. Consider dedicated MRI cervical spine to further evaluate.   CT Thoracic Spine w/o Contrast    Narrative    EXAM: CT THORACIC SPINE WITHOUT CONTRAST    LOCATION: [facility]  DATE: 2/26/2025    INDICATION: T10 fracture noted on MRI.  COMPARISON: 2/25/2025.  TECHNIQUE: CT thoracic spine without contrast. Dose reduction techniques were performed.    FINDINGS: There is good anatomic alignment of the thoracic spine. The vertebral body heights are  well-maintained throughout. There is a fracture through the anterior osteophyte formation of the T9-T10 disc space level that that extends obliquely through   the T10 vertebral body to its inferior endplate. The posterior elements are intact. There is no evidence of retropulsion to lead to canal compromise. No other acute thoracic spine fracture is visualized. There are diffuse endplate changes and anterior   osteophyte formations throughout the thoracic region. There is no significant canal compromise or significant neural foraminal narrowing throughout the thoracic line. The paraspinal soft tissues are unremarkable. The lungs visualized on this study are   clear.      Impression    IMPRESSION:  1.  Acute fracture through the anterior osteophyte formation at the T9-T10 disc space level extending obliquely through the T10 vertebral body to its inferior endplate.  2.  No evidence of retropulsion or canal compromise.  3.  No significant canal compromise or neural foraminal narrowing throughout thoracic spine.     XR Thoracic Lumbar Standing 2 Views    Narrative    EXAM: XR THORACIC LUMBAR STANDING 2 VIEWS  LOCATION: [facility]  DATE: 2/27/2025    INDICATION: acute DISH fracture at T10  COMPARISON: MRI of the thoracic and lumbar spine dated 02/25/2025 and CT of the thoracic spine dated 02/26/2025      Impression    IMPRESSION: Patient is in a brace. Stable nondisplaced fracture through the bridging anterior endplate osteophyte at the T9-T10 level. Extension of the fracture into the T10 vertebral body is not well seen radiographically. No other fractures. Stable   diffuse idiopathic skeletal hyperostosis. Stable slight degenerative anterolisthesis of L4 on L5 is otherwise normal vertebral alignment. Normal vertebral body heights. Mild to moderate multilevel degenerative disc disease. Mild to moderate degenerative   arthritis involving the articular facets in the lumbar spine. Normal extraspinal  structures.      *Note: Due to a large number of results and/or encounters for the requested time period, some results have not been displayed. A complete set of results can be found in Results Review.

## 2025-03-03 NOTE — PLAN OF CARE
Goal Outcome Evaluation:    PRIMARY Concern: Fall, T9-10 fracture   SAFETY RISK Concerns (fall risk, behaviors, etc.): Fall       Aggression Tool Color: Green   Isolation/Type: None   Tests/Procedures for NEXT shift: AM labs   Consults? (Pending/following, signed-off?) Neurosurgery signed off. Pain/SW/PT following   Where is patient from? (Home, TCU, etc.): Home   Other Important info for NEXT shift: Encourage fluids   Anticipated DC date & active delays: 3/3 to TCU pending medical readiness. Wheelchair ride set-up for 1:30-2:30pm to [facility] if medically ready  _____________________________________________________________________________  SUMMARY NOTE:  Orientation/Cognitive: AOx4  Observation Goals (Met/ Not Met): Inp   Mobility Level/Assist Equipment: A2/GB/W. Pivoting to chair. TLSO brace intact   Antibiotics & Plan (IV/po, length of tx left): None   Pain Management: C/o no pain at rest  Tele/VS/O2: VSS on RA, O2 90-92% on RA, monitoring   ABNL Lab/BG: Creat 1.57  Diet: MCHO   Bowel/Bladder: Incontinent at times, using urinal in bed   Skin Concerns: Scattered bruising  Drains/Devices: PIV SL  Patient Stated Goal for Today: Rest

## 2025-03-03 NOTE — PROGRESS NOTES
Care Management Discharge Note    Discharge Date: 03/03/2025       Discharge Disposition: Transitional Care    Discharge Services:  [facility] wheelchair [phone]    Discharge DME: None    Discharge Transportation: [facility]  Private pay costs discussed: transportation costs    Does the patient's insurance plan have a 3 day qualifying hospital stay waiver?  Yes     Which insurance plan 3 day waiver is available? Alternative insurance waiver    Will the waiver be used for post-acute placement? Yes    PAS Confirmation Code: [id number]  Patient/family educated on Medicare website which has current facility and service quality ratings: yes    Education Provided on the Discharge Plan: Yes  Persons Notified of Discharge Plans: Hospitalist, HUC, Charge RN, bedside RN, pt   Patient/Family in Agreement with the Plan: yes    Handoff Referral Completed: No, handoff not indicated or clinically appropriate    Additional Information:  Patient is discharging to [facility] today. PAS completed. Discharge orders faxed to TCU. [facility] wheelchair transport arranged for [phone].     PAS-RR    D: Per DHS regulation, SW completed and submitted PAS-RR to [place] [employer] Direct Connect via the Senior LinkAge Line.  PAS-RR confirmation # is : [id number]    P: Further questions may be directed to [facility] LinkAge Line at #[phone], option #4 for PAS-RR staff.     Addendum 1257: informed that pt needs to be seen by [facility] team prior to discharge. Pushed back [facility] wheelchair transport to [phone]. Updated Loki at [facility].    Addendum 1530: WO hasn't seen patient, updated Loki with TCU admissions, confirmed plan to cancel discharge today and plan on tomorrow. [facility] transport arranged on Tuesday 3/4 between [phone] (transport will call if something opens up sooner).     Dejah Hogan, JANETTE  Social Work  [facility]

## 2025-03-03 NOTE — PLAN OF CARE
A&Ox4, can be forgetful. VSS on RA. Friction injuries from custom TLSO brace (See below), WOC to see. Plan for discharge tomorrow 3/4/25 [phone] to [facility] via [facility] wheelchair transport       PRIMARY Concern: Fall, T9-10 fracture   SAFETY RISK Concerns (fall risk, behaviors, etc.): Fall       Aggression Tool Color: Green   Isolation/Type: None   Tests/Procedures for NEXT shift: N/A  Consults? (Pending/following, signed-off?) Neurosurgery signed off 2/28/25. Pain following while in hospital. SW/CC for placement. WOC to see  Where is patient from? (Home, TCU, etc.): Home   Other Important info for NEXT shift: Uses wheelchair baseline  _____________________________________________________________________________  SUMMARY NOTE:   Orientation/Cognitive: A&Ox4  Observation Goals (Met/ Not Met): INPATIENT  Mobility Level/Assist Equipment: A2/GB/W. Pivot to BSC. TLSO brace off while in bed.   Antibiotics & Plan (IV/po, length of tx left): N/A   Pain Management: Back pain controlled w/ scheduled tylenol and robaxin. Pain team restarted gabapentin. Prefers to use oxycodone only at bedtime.  Tele/VS/O2: VSS on RA  ABNL Lab/BG: BUN normalized 19.7, Creat 1.29 (trending down), WBC 12.3. , 173, and 138.  Diet: Mod CHO   Bowel/Bladder: Incontinent at times, using urinal in bed. Continent stool,   Skin Concerns: Scattered bruising. R knee scab. Removed TLSO brace for skin check; friction blister (L lower Abdomen/flank) and friction abrasions on bilateral lower flanks/abdomen from the brace. Areas cleansed and dar mepilex applied (not over blister; blister intact). Blanchable redness sacrum/coccyx, darker area of red R buttock (quarter size, intact, still blanchable, monitor closely). Sacral mepilex applied. Back care provided. Reposition as pain allows. Discussed recommendation for no brief in bed w/ tad pad in place but pt prefers to wear brief (uses a particular brand his significant other brought from home) as  well as have a tad pad underneath him.   Drains/Devices: PIV removed

## 2025-03-03 NOTE — PLAN OF CARE
Goal Outcome Evaluation:    PRIMARY Concern: Fall, T9-10 fracture   SAFETY RISK Concerns (fall risk, behaviors, etc.): Fall       Aggression Tool Color: Green   Isolation/Type: None   Tests/Procedures for NEXT shift: AM labs   Consults? (Pending/following, signed-off?) Neurosurgery signed off. Pain/SW/PT following   Where is patient from? (Home, TCU, etc.): Home   Other Important info for NEXT shift: Encourage fluids   Anticipated DC date & active delays: Plan for Wheelchair ride set-up for today at 1:37-2:22pm to [facility] if medically ready.  _____________________________________________________________________________  SUMMARY NOTE:  Orientation/Cognitive: AOx4  Observation Goals (Met/ Not Met): Inpatient    Mobility Level/Assist Equipment: Astx2/GBW. Pivoting to chair. TLSO brace intact   Antibiotics & Plan (IV/po, length of tx left): None   Pain Management: PRN oxycodone and tylenol.   Tele/VS/O2: VSS on RA  ABNL Lab/BG: Creat 1.57  Diet: Mod carb   Bowel/Bladder: Incontinent at times, using urinal in bed   Skin Concerns: Scattered bruising, redness perineum ,between buttocks.   Drains/Devices: PIV SL  Patient Stated Goal for Today: Rest

## 2025-03-04 ENCOUNTER — TELEPHONE (OUTPATIENT)
Dept: INTERNAL MEDICINE | Facility: CLINIC | Age: 80
End: 2025-03-04
Payer: COMMERCIAL

## 2025-03-04 VITALS
HEART RATE: 81 BPM | WEIGHT: 174.16 LBS | HEIGHT: 66 IN | SYSTOLIC BLOOD PRESSURE: 143 MMHG | RESPIRATION RATE: 18 BRPM | DIASTOLIC BLOOD PRESSURE: 81 MMHG | OXYGEN SATURATION: 94 % | TEMPERATURE: 98.1 F | BODY MASS INDEX: 27.99 KG/M2

## 2025-03-04 LAB
ANION GAP SERPL CALCULATED.3IONS-SCNC: 12 MMOL/L (ref 7–15)
BUN SERPL-MCNC: 18.7 MG/DL (ref 8–23)
CALCIUM SERPL-MCNC: 8.9 MG/DL (ref 8.8–10.4)
CHLORIDE SERPL-SCNC: 103 MMOL/L (ref 98–107)
CREAT SERPL-MCNC: 1.17 MG/DL (ref 0.67–1.17)
EGFRCR SERPLBLD CKD-EPI 2021: 63 ML/MIN/1.73M2
GLUCOSE BLDC GLUCOMTR-MCNC: 151 MG/DL (ref 70–99)
GLUCOSE BLDC GLUCOMTR-MCNC: 159 MG/DL (ref 70–99)
GLUCOSE SERPL-MCNC: 171 MG/DL (ref 70–99)
HCO3 SERPL-SCNC: 22 MMOL/L (ref 22–29)
POTASSIUM SERPL-SCNC: 4.4 MMOL/L (ref 3.4–5.3)
SODIUM SERPL-SCNC: 137 MMOL/L (ref 135–145)

## 2025-03-04 PROCEDURE — 250N000013 HC RX MED GY IP 250 OP 250 PS 637

## 2025-03-04 PROCEDURE — 36415 COLL VENOUS BLD VENIPUNCTURE: CPT | Performed by: INTERNAL MEDICINE

## 2025-03-04 PROCEDURE — 250N000013 HC RX MED GY IP 250 OP 250 PS 637: Performed by: INTERNAL MEDICINE

## 2025-03-04 PROCEDURE — 80048 BASIC METABOLIC PNL TOTAL CA: CPT | Performed by: INTERNAL MEDICINE

## 2025-03-04 PROCEDURE — 99232 SBSQ HOSP IP/OBS MODERATE 35: CPT

## 2025-03-04 RX ADMIN — UMECLIDINIUM 1 PUFF: 62.5 AEROSOL, POWDER ORAL at 09:07

## 2025-03-04 RX ADMIN — FINASTERIDE 5 MG: 5 TABLET, FILM COATED ORAL at 09:05

## 2025-03-04 RX ADMIN — AMLODIPINE BESYLATE 5 MG: 5 TABLET ORAL at 09:01

## 2025-03-04 RX ADMIN — FLUTICASONE FUROATE AND VILANTEROL TRIFENATATE 1 PUFF: 100; 25 POWDER RESPIRATORY (INHALATION) at 09:06

## 2025-03-04 RX ADMIN — METHOCARBAMOL 500 MG: 500 TABLET ORAL at 09:08

## 2025-03-04 RX ADMIN — CHOLESTYRAMINE 4 G: 4 POWDER, FOR SUSPENSION ORAL at 09:03

## 2025-03-04 RX ADMIN — ACETAMINOPHEN 1000 MG: 500 TABLET, FILM COATED ORAL at 08:57

## 2025-03-04 RX ADMIN — GABAPENTIN 300 MG: 300 CAPSULE ORAL at 09:07

## 2025-03-04 RX ADMIN — CALCITONIN SALMON 1 SPRAY: 200 SPRAY, METERED NASAL at 09:04

## 2025-03-04 RX ADMIN — APIXABAN 2.5 MG: 2.5 TABLET, FILM COATED ORAL at 09:02

## 2025-03-04 ASSESSMENT — ACTIVITIES OF DAILY LIVING (ADL)
ADLS_ACUITY_SCORE: 68
ADLS_ACUITY_SCORE: 71
ADLS_ACUITY_SCORE: 71
ADLS_ACUITY_SCORE: 72

## 2025-03-04 NOTE — DISCHARGE INSTRUCTIONS
"Wound Care    Bilateral posterolateral torso wound(s): Daily   Assess under dressings for signs of deterioration.   Cleanse with wound cleanser and gauze. Pat dry.  Swab oneal-wound skin with skin prep (Cavilon, SurePrep) and let dry.  Apply cut-to-fit piece of oil emulsion gauze (Adaptic) over wounds.  Cover with 5 layer foam border dressing to protect skin from pressure. Initial and date dressing change.     Pressure Injury Prevention (PIP) Plan:  -If patient is declining pressure injury prevention interventions: Explore reason why and address patient's concerns, Educate on pressure injury risk and prevention intervention(s), If patient is still declining, document \"informed refusal\" , and Ensure Care team is aware ( provider, charge nurse, etc)  -Mattress: Add ISABELLA pump to mattress PRN moisture issues  -HOB: Maintain at or below 30 degrees, unless contraindicated  -Repositioning in bed: Remind/encourage repositioning Every 1-2 hours , Left/right positioning; avoid supine, and Raise foot of bed prior to raising head of bed, to reduce patient sliding down (shear)  -Heels: Keep elevated off mattress  -Protective Dressin layer foam border dressings under TLSO brace   -Positioning Equipment: pillows  -Chair positioning: Repositions self: patient to shift weight every 15 minutes, Assist patient to reposition hourly, and Do NOT use a donut for sitting (this increases pressure to smaller area and creates a higher potential for injury)    -Moisture Management: Perineal cleansing /protection: Follow Incontinence Protocol, Avoid brief in bed, and Clean and dry skin folds with bathing   -Under Devices: Inspect skin under all medical devices during skin inspection , Ensure tubes are stabilized without tension, and Ensure patient is not lying on medical devices or equipment when repositioned  "

## 2025-03-04 NOTE — PROGRESS NOTES
Discharge packet printed, sent w/ pt to TCU. [facility] wheelchair ride set up for pt at discharge. TLSO brace on.

## 2025-03-04 NOTE — PROGRESS NOTES
Care Management Discharge Note    Discharge Date: 03/04/2025       Discharge Disposition: Transitional Care    Discharge Services:  Transport    Discharge DME: None    Discharge Transportation: [facility] wheelchair [phone]    Private pay costs discussed: transportation costs    Does the patient's insurance plan have a 3 day qualifying hospital stay waiver?  Yes     Which insurance plan 3 day waiver is available? Alternative insurance waiver    Will the waiver be used for post-acute placement? Yes    PAS Confirmation Code: [id number]  Patient/family educated on Medicare website which has current facility and service quality ratings: yes    Education Provided on the Discharge Plan: Yes  Persons Notified of Discharge Plans: Hospitalist, HUC, Charge RN, bedside RN, pt   Patient/Family in Agreement with the Plan: yes    Handoff Referral Completed: No, handoff not indicated or clinically appropriate    Additional Information:  Patient is discharging to [facility] today. PAS completed yesterday, script faxed. Discharge orders faxed to TCU. Called [facility] transport to see if any rides opened up for earlier. New ride time set for [phone]. Updated [facility] admissions. Bedside RN to update patient on new ride time.     JANETTE Shultz  Social Work  [facility]

## 2025-03-04 NOTE — DISCHARGE SUMMARY
[facility]    Discharge Summary  Hospitalist    Date of Admission:  2/24/2025  Date of Discharge:  3/4/2025  Discharging Provider: Larry Landry MD    Discharge Diagnoses     SALLIE  Acute fracture through the anterior osteophyte formation at T9-T10  Intractable pain due to above  Mechanical fall  Cervical stenosis w/ myelopathy and neuropathy  Multiple stage II pressure injuries to bilateral flank area-hospital-acquired  History of DJD  History of chronic back pain  History of recurrent mechanical falls   ?Normal pressure hydrocephalus  Leukocytosis, resolved  COPD  CIERA  Hx PE on AC  Anemia, chronic, normocytic   T2DM w/ hyperglycemia   HTN  HFpEF, chronic compensated  BPH  Diarrhea  HLD  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  Hx RUL adenocarcinoma s/p wedge resection      Hospital Course:    Nahun Villalobos is a 79 year old male with multiple medical problems including HFpEF, benign essential hypertension, Type 2 DM, COPD, history of PE on Apixaban (Eliquis), 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     SALLIE  -Creatinine up to 2.24  -Likely prerenal in the setting of poor oral intake  -Improving, down to 1.29, off IV fluids, continue to encourage generous fluid intake.     Uncontrolled pain in setting of acute fracture s/p mechanical fall  Acute mid-thoracic region back pain in setting above   Cervical stenosis w/ myelopathy and neuropathy  History of DJD  History of chronic back pain secondary to above  History of recurrent mechanical falls   *Patient presented after he sustained a fall backwards as he was attempting to get into  his wheelchair in clinic just prior to arrival 02/25/2025.Following fall patient reporting severe thoracic back pain with new bilateral upper extremity paraesthesias and weakness (usually unilateral, RUE). History of chronic back pain secondary to cervical spinal stenosis with peripheral neuropathy.History of bone mets from prior carcinoid of small bowel.   *CTH 2/24/25 negative for acute abnormality, does show dilated ventricular system, see below.   *XR of thoracic and lumbar spine 2/24/25 show no acute fractures.  *CT thoracic w/o contrast with acute fracture through the anterior osteophyte formation at T9-T10 disc space level extending obliquely through T10 vertebral body to its inferior endplate. No retropulsion or canal compromise.   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 As this is an unstable fracture in a rigid spine neurosurgical consultation is recommended.No traumatic subluxation or high-grade canal stenosis. Mild lumbar spondylosis, worse at L4-L5 where there is severe left lateral recess stenosis and severe left foraminal stenosis. Correlate for left L4 and L5 radiculopathy. Advanced multilevel cervical spondylitic changes with multilevel canal flattening and canal stenoses, suboptimally assessed on the current examination.   -Neurosurgery followed. Custom TLSO ordered. Will need to wear minimum three months.   -MRI T and L spine notable for acute DISH fracture at T10 with disruption of the anterior longitudinal ligament. CT thoracic spine with similar findings.   -Continue gabapentin, as needed Tylenol, Robaxin and pain control.  Patient feels like he was not tolerating oxycodone very well therefore this was discontinued prior to discharge.  Pain team was consulted, they recommended continuing current regimen without any changes.     Dilated cerebral ventricles, consider normal pressure hydrocephalus  -Outpatient neurology  referral placed.     Leukocytosis, resolved  *Patient remains afebrile with no evidence of acute infection.      COPD  CIERA  *Patient noted to not be compliant w/ CPAP.  -Duonebs PRN available.   -Continue PTA Breo Ellipta and Incruse Ellipta.     Hx PE on AC  Anemia, chronic, normocytic   *Baseline Hgb 11-12g/dL. Hgb 11.6 02/27. Denies recent evidence of bleed including hematemesis, hematuria, melena or RBPR.  -Continue PTA Eliquis.  -Continue PTA iron supplement.     T2DM w/ hyperglycemia A1C 7% on 12/2/2024.  -Continue metformin       HTN  HFpEF, chronic compensated  *Intermittently hypertensive in setting of unmanaged acute pain.   -Hydralazine PRN.  -Continue amlodipine. Hold lisinopril  -Resume Lasix at a lower dose of 40 mg daily  -BMP in the next 3 to 4 days as outpatient  -Adjust antihypertensives as clinically indicated as outpatient     BPH  -Continue PTA finasteride.     Diarrhea  -Continue PTA cholesytime.     HLD  -Continue PTA statin.     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  -Follow-up with Oncology/Hematology upon discharge for continued management.      Hx RUL adenocarcinoma s/p wedge resection:   - Follow-up with Oncology/Hematology upon discharge for continued management.     Multiple stage II pressure injuries to bilateral flank area-hospital-acquired  -Also found to have pressure injuries around bilateral flank area, likely contributed by TLSO brace.  Evaluated by wound care             Larry Landry MD    Significant Results and Procedures       Pending Results     Unresulted Labs Ordered in the Past 30 Days of this Admission       No orders found from 1/25/2025 to 2/25/2025.            Code Status   DNR / DNI       Primary Care Physician   Olegario Castillo    Physical Exam   Temp: 98.1  F (36.7  C) Temp src: Oral BP: (!) 143/81 Pulse: 81   Resp: 18 SpO2: 94 % O2 Device: None (Room air)      Constitutional: AAOX3, NAD  Respiratory: CTA B/L,  Normal WOB  Cardiovascular: RRR, No murmur  GI: Soft, Non- tender, BS- normoactive  Neuro: CN- grossly intact, Moving all 4 extremities.     Discharge Disposition   Discharged to short-term care facility  Condition at discharge: Stable    Consultations This Hospital Stay   CARE MANAGEMENT / SOCIAL WORK IP CONSULT  PHYSICAL THERAPY ADULT IP CONSULT  OCCUPATIONAL THERAPY ADULT IP CONSULT  CARE MANAGEMENT / SOCIAL WORK IP CONSULT  CARE MANAGEMENT / SOCIAL WORK IP CONSULT  NEUROSURGERY IP CONSULT  PAIN MANAGEMENT ADULT IP CONSULT  PHYSICAL THERAPY ADULT IP CONSULT  PHYSICAL THERAPY ADULT IP CONSULT  OCCUPATIONAL THERAPY ADULT IP CONSULT  WOUND OSTOMY CONTINENCE NURSE  IP CONSULT    Time Spent on this Encounter   ILarry MD, personally saw the patient today and spent greater than 30 minutes discharging this patient.    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     Activity    Please limit your lifting to no more that ten pounds and avoid excessive bending, twisting and turning at the lumbar spine. You should also avoid excessive jostling and jarring activities.     Follow Up    Your Neurosurgical follow-up appointments have been scheduled. You may call [phone] to make, confirm or change your appointment dates and/or times.     Wound care and dressings    We instructed the patient to wear the brace when out of bed, but may shower without the brace on. The brace will most likely be required for 3 months.     General info for SNF    Length of Stay Estimate: Short Term Care: Estimated # of Days <30  Condition at Discharge: Improving  Level of care:skilled   Rehabilitation Potential: Excellent  Admission H&P remains valid and up-to-date: Yes  Recent Chemotherapy: N/A  Use Nursing Home Standing  Orders: Yes     Mantoux instructions    Give two-step Mantoux (PPD) Per Facility Policy Yes     Reason for your hospital stay    Mechanical fall, thoracic spine fracture     Daily weights    Call Provider for weight gain of more than 2 pounds per day or 5 pounds per week.     Activity - Up with nursing assistance     Follow Up and recommended labs and tests    Follow up with Nursing home physician.  Recommend BMP on 3/7/2025  Neurosurgery in 6 weeks     No CPR- Do NOT Intubate     Physical Therapy Adult Consult    Evaluate and treat as clinically indicated.    Reason:  Deconditioning     Occupational Therapy Adult Consult    Evaluate and treat as clinically indicated.    Reason:  Deconditioning     Fall precautions     Diet    Follow this diet upon discharge: Current Diet:Orders Placed This Encounter      Moderate Consistent Carb (60 g CHO per Meal) Diet     Discharge Medications   Current Discharge Medication List        START taking these medications    Details   methocarbamol (ROBAXIN) 500 MG tablet Take 1 tablet (500 mg) by mouth 4 times daily as needed for muscle spasms.    Associated Diagnoses: Closed stable burst fracture of tenth thoracic vertebra, initial encounter (H)           CONTINUE these medications which have CHANGED    Details   furosemide (LASIX) 40 MG tablet Take 1 tablet (40 mg) by mouth daily.    Associated Diagnoses: Essential hypertension, benign      !! gabapentin (NEURONTIN) 300 MG capsule Take 1 capsule (300 mg) by mouth at bedtime.  Qty: 180 capsule, Refills: 0    Associated Diagnoses: Neuropathic pain       !! - Potential duplicate medications found. Please discuss with provider.        CONTINUE these medications which have NOT CHANGED    Details   acetaminophen (TYLENOL) 500 MG tablet Take 1,000 mg by mouth 2 times daily.      albuterol (VENTOLIN HFA) 108 (90 Base) MCG/ACT inhaler INHALE 2 PUFFS INTO THE LUNGS EVERY 6 HOURS AS NEEDED FOR SHORTNESS OF BREATH / DYSPNEA OR WHEEZING  Qty:  25.5 g, Refills: 0    Comments: Pharmacy may dispense brand covered by insurance (Proair, or proventil or ventolin or generic albuterol inhaler)  Associated Diagnoses: Chronic obstructive pulmonary disease, unspecified COPD type (H)      amLODIPine (NORVASC) 5 MG tablet Take 1 tablet (5 mg) by mouth daily.  Qty: 90 tablet, Refills: 3    Associated Diagnoses: Benign essential hypertension      apixaban ANTICOAGULANT (ELIQUIS) 2.5 MG tablet Take 1 tablet (2.5 mg) by mouth 2 times daily.  Qty: 90 tablet, Refills: 1    Associated Diagnoses: Other pulmonary embolism without acute cor pulmonale, unspecified chronicity (H)      atorvastatin (LIPITOR) 20 MG tablet Take 1 tablet (20 mg) by mouth daily.  Qty: 90 tablet, Refills: 1    Associated Diagnoses: Hyperlipidemia LDL goal <100      calcium carbonate (TUMS) 500 MG chewable tablet Take 1 chew tab by mouth daily as needed for heartburn.      cholestyramine light (QUESTRAN) 4 GM packet Take 4 g by mouth 2 times daily (with meals).    Associated Diagnoses: Diarrhea, unspecified type      Coenzyme Q10 400 MG CAPS Take 400 mg by mouth daily  Qty: 30 capsule, Refills: 0    Associated Diagnoses: Nutritional deficiency      ferrous sulfate (FE TABS) 325 (65 Fe) MG EC tablet Take 1 tablet (325 mg) by mouth every evening  Qty: 30 tablet, Refills: 0    Associated Diagnoses: Nutritional deficiency      finasteride (PROSCAR) 5 MG tablet Take 1 tablet (5 mg) by mouth daily  Qty: 90 tablet, Refills: 3    Associated Diagnoses: Gross hematuria      Fluticasone-Umeclidin-Vilant (TRELEGY ELLIPTA) 100-62.5-25 MCG/ACT oral inhaler USE 1 INHALATION DAILY  Qty: 60 each, Refills: 5    Associated Diagnoses: Chronic obstructive pulmonary disease, unspecified COPD type (H)      !! gabapentin (NEURONTIN) 300 MG capsule Take 1 capsule (300 mg) by mouth every morning.  Qty: 90 capsule, Refills: 0    Associated Diagnoses: Neuropathic pain      lanreotide acetate (SOMATULINE) 120 MG/0.5ML injection  Inject 120 mg subcutaneously every 28 days. Do not start before September 6, 2024.      metFORMIN (GLUCOPHAGE) 1000 MG tablet Take 1 tablet (1,000 mg) by mouth 2 times daily (with meals).  Qty: 180 tablet, Refills: 1    Associated Diagnoses: Type 2 diabetes mellitus without complication, without long-term current use of insulin (H)      miconazole (MICATIN) 2 % external cream Apply topically 2 times daily as needed for other (Rash/ skin irritation)    Associated Diagnoses: Hospital discharge follow-up      vitamin B-Complex Take 1 tablet by mouth daily      vitamin C (ASCORBIC ACID) 500 MG tablet Take 500 mg by mouth daily.      blood glucose (NO BRAND SPECIFIED) lancets standard Use to test blood sugar 1 time daily, first thing in the morning  Qty: 50 each, Refills: 3    Associated Diagnoses: Type 2 diabetes mellitus without complication, without long-term current use of insulin (H)      order for DME Oxygen 2 Li/min  at night via nasal cannula  Qty: 1 Device, Refills: 0    Associated Diagnoses: Nocturnal hypoxemia       !! - Potential duplicate medications found. Please discuss with provider.        STOP taking these medications       lisinopril (ZESTRIL) 40 MG tablet Comments:   Reason for Stopping:             Allergies   No Known Allergies  Data   Most Recent 3 CBC's:  Recent Labs   Lab Test 03/01/25  0943 02/27/25  0739 02/26/25  0830   WBC 12.3* 10.5 11.3*   HGB 11.5* 11.6* 12.8*   * 100 99    277 262      Most Recent 3 BMP's:  Recent Labs   Lab Test 03/04/25  0822 03/04/25  0736 03/04/25  0251 03/03/25  0755 03/03/25  0726 03/02/25  0816 03/02/25  0754     --   --   --  137  --  141   POTASSIUM 4.4  --   --   --  4.7  --  4.8   CHLORIDE 103  --   --   --  102  --  105   CO2 22  --   --   --  24  --  24   BUN 18.7  --   --   --  19.7  --  25.3*   CR 1.17  --   --   --  1.29*  --  1.57*   ANIONGAP 12  --   --   --  11  --  12   JESSE 8.9  --   --   --  8.8  --  8.5*   * 159* 151*   < >  144*   < > 163*    < > = values in this interval not displayed.     Most Recent 2 LFT's:  Recent Labs   Lab Test 12/02/24  0657 06/25/24  0737   AST 16 17   ALT 10 8   ALKPHOS 58 67   BILITOTAL 0.8 0.5     Most Recent INR's and Anticoagulation Dosing History:  Anticoagulation Dose History          Latest Ref Rng & Units 2/9/2018   Recent Dosing and Labs   INR 0.86 - 1.14 1.01      Most Recent 3 Troponin's:  Recent Labs   Lab Test 09/20/20  1441 08/18/18  1506 08/18/18  0929   TROPI <0.015 <0.015 <0.015     Most Recent Cholesterol Panel:  Recent Labs   Lab Test 11/15/24  1455   CHOL 119   LDL 44   HDL 48   TRIG 133     Most Recent 6 Bacteria Isolates From Any Culture (See EPIC Reports for Culture Details):  Recent Labs   Lab Test 09/20/20  1817 02/18/20  1137 06/26/18  0243 04/30/18  0730 02/07/18  0817   CULT No growth  No growth <10,000 colonies/mL  urogenital bruno  Susceptibility testing not routinely done   No growth <1000 colonies/mL  urogenital bruno   No growth     Most Recent TSH, T4 and A1c Labs:  Recent Labs   Lab Test 12/02/24  0657 06/25/24  0737 03/03/24  0713   TSH  --   --  0.98   A1C 7.0*   < >  --     < > = values in this interval not displayed.       Results for orders placed or performed during the hospital encounter of 02/24/25   Head CT w/o contrast    Narrative    EXAM: CT HEAD W/O CONTRAST  LOCATION: [facility]  DATE: 2/24/2025    INDICATION: fall and hit head on eliquis  COMPARISON: 12/1/2024.  TECHNIQUE: Routine CT Head without IV contrast. Multiplanar reformats. Dose reduction techniques were used.    FINDINGS:  INTRACRANIAL CONTENTS: No intracranial hemorrhage, extraaxial collection, or mass effect.  No CT evidence of acute infarct. Moderate presumed chronic small vessel ischemic changes. Similar dilated ventricular system with acute callosal angle.     VISUALIZED ORBITS/SINUSES/MASTOIDS: No intraorbital abnormality. No paranasal sinus mucosal disease. No middle  ear or mastoid effusion.    BONES/SOFT TISSUES: No acute abnormality.      Impression    IMPRESSION:  1.  No acute traumatic intracranial abnormality.  2.  Dilated ventricular system in a configuration that could represent normal pressure hydrocephalus.   Lumbar spine XR, 2-3 views    Narrative    EXAM: XR THORACIC SPINE 2 VIEWS, XR LUMBAR SPINE 2/3 VIEWS  LOCATION: [facility]  DATE: 2/24/2025    INDICATION: fall back pain  COMPARISON: None.      Impression    IMPRESSION: Shallow dextrocurvature. Sagittal alignment in the thoracic spine is unremarkable. Grade 1 anterolisthesis L4 on L5 measuring 3 mm. Changes of diffuse idiopathic skeletal hyperostosis within the thoracic spine. Vertebral body heights are   maintained. No anterior compression deformity. Mild to moderate L4-5 disc height loss. Mild disc degeneration elsewhere. Moderate lower lumbar facet arthrosis. Vascular calcifications are present within the aorta. Postsurgical changes of the right lung   and right lower quadrant.    XR Thoracic Spine 2 Views    Narrative    EXAM: XR THORACIC SPINE 2 VIEWS, XR LUMBAR SPINE 2/3 VIEWS  LOCATION: [facility]  DATE: 2/24/2025    INDICATION: fall back pain  COMPARISON: None.      Impression    IMPRESSION: Shallow dextrocurvature. Sagittal alignment in the thoracic spine is unremarkable. Grade 1 anterolisthesis L4 on L5 measuring 3 mm. Changes of diffuse idiopathic skeletal hyperostosis within the thoracic spine. Vertebral body heights are   maintained. No anterior compression deformity. Mild to moderate L4-5 disc height loss. Mild disc degeneration elsewhere. Moderate lower lumbar facet arthrosis. Vascular calcifications are present within the aorta. Postsurgical changes of the right lung   and right lower quadrant.    MR Thoracic Spine w/o & w Contrast    Addendum: 2/26/2025    COMMUNICATION ADDENDUM:  Findings were discussed with Dr. Desai on 2/26/2025 1:16 AM  CST.    END ADDENDUM      Narrative    EXAM: MR THORACIC SPINE W/O and W CONTRAST, MR LUMBAR SPINE W/O and W CONTRAST  LOCATION: [facility]  DATE/TIME: 2/25/2025 8:00 PM CST    INDICATION: Fall w/acute mid back pain,xr neg  COMPARISON: None.  CONTRAST: 7 mL Gadavist  TECHNIQUE:   1) Routine Thoracic Spine MRI without and with IV contrast.  2) Routine Lumbar Spine MRI without and with IV contrast.    FINDINGS:  THORACIC SPINE:  On the localizer image there is advanced multilevel cervical spondylitic changes with multilevel canal flattening and canal stenoses, suboptimally assessed on the current examination.    Flowing ventral osteophytes in the thoracic spine compatible with DISH. There is fluid within an acute fracture extending along the ventral syndesmophyte at T10 into the T10 inferior endplate. Slight widening along the anterior aspect of the fracture   where there is disruption of the anterior longitudinal ligament. No traumatic subluxation. The posterior longitudinal ligament, ligamentum flavum, and intraspinous ligaments are grossly intact. Otherwise, no suspicious marrow signal abnormality. Mild   thoracic spondylitic changes without high-grade canal or foraminal stenosis. No abnormal cord signal. No abnormal intrathecal enhancement.    Redemonstration of the cystic lesions in the right hepatic lobe.    LUMBAR SPINE:   Nomenclature is based on 5 lumbar type vertebral bodies with L5-S1 defined on image 50 of series 5. 3 mm degenerative anterolisthesis of L4 on L5. Vertebral body heights maintained.  No suspicious marrow signal abnormality. Normal distal spinal cord and   cauda equina with conus medullaris at L1. Alteration of CSF flow dynamics at L4-L5 on the pre and postcontrast axial T1. No abnormal intrathecal enhancement, particularly on the sagittal series.     Prevertebral soft tissues are unremarkable. Dorsal paraspinal muscular atrophy. Visualized intra-abdominal structures  are unremarkable.    Mild lumbar spondylosis, worse at L4-L5 where there is grade 1 degenerative anterolisthesis due to severe facet arthropathy, mild canal stenosis, severe left lateral recess stenosis, and severe left foraminal stenosis with compression of the exiting left   L4 nerve root. No high-grade canal or stenosis at the remaining levels.      Impression    IMPRESSION:  1.  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 As this is an unstable fracture in a rigid spine neurosurgical consultation is recommended.  2.  No traumatic subluxation or high-grade canal stenosis.  3.  Mild lumbar spondylosis, worse at L4-L5 where there is severe left lateral recess stenosis and severe left foraminal stenosis. Correlate for left L4 and L5 radiculopathy.  4.  Advanced multilevel cervical spondylitic changes with multilevel canal flattening and canal stenoses, suboptimally assessed on the current examination. Consider dedicated MRI cervical spine to further evaluate.   MR Lumbar Spine w/o & w Contrast    Addendum: 2/26/2025    COMMUNICATION ADDENDUM:  Findings were discussed with Dr. Desai on 2/26/2025 1:16 AM CST.    END ADDENDUM      Narrative    EXAM: MR THORACIC SPINE W/O and W CONTRAST, MR LUMBAR SPINE W/O and W CONTRAST  LOCATION: [facility]  DATE/TIME: 2/25/2025 8:00 PM CST    INDICATION: Fall w/acute mid back pain,xr neg  COMPARISON: None.  CONTRAST: 7 mL Gadavist  TECHNIQUE:   1) Routine Thoracic Spine MRI without and with IV contrast.  2) Routine Lumbar Spine MRI without and with IV contrast.    FINDINGS:  THORACIC SPINE:  On the localizer image there is advanced multilevel cervical spondylitic changes with multilevel canal flattening and canal stenoses, suboptimally assessed on the current examination.    Flowing ventral osteophytes in the thoracic spine compatible with DISH. There is fluid within an acute fracture extending along the ventral  syndesmophyte at T10 into the T10 inferior endplate. Slight widening along the anterior aspect of the fracture   where there is disruption of the anterior longitudinal ligament. No traumatic subluxation. The posterior longitudinal ligament, ligamentum flavum, and intraspinous ligaments are grossly intact. Otherwise, no suspicious marrow signal abnormality. Mild   thoracic spondylitic changes without high-grade canal or foraminal stenosis. No abnormal cord signal. No abnormal intrathecal enhancement.    Redemonstration of the cystic lesions in the right hepatic lobe.    LUMBAR SPINE:   Nomenclature is based on 5 lumbar type vertebral bodies with L5-S1 defined on image 50 of series 5. 3 mm degenerative anterolisthesis of L4 on L5. Vertebral body heights maintained.  No suspicious marrow signal abnormality. Normal distal spinal cord and   cauda equina with conus medullaris at L1. Alteration of CSF flow dynamics at L4-L5 on the pre and postcontrast axial T1. No abnormal intrathecal enhancement, particularly on the sagittal series.     Prevertebral soft tissues are unremarkable. Dorsal paraspinal muscular atrophy. Visualized intra-abdominal structures are unremarkable.    Mild lumbar spondylosis, worse at L4-L5 where there is grade 1 degenerative anterolisthesis due to severe facet arthropathy, mild canal stenosis, severe left lateral recess stenosis, and severe left foraminal stenosis with compression of the exiting left   L4 nerve root. No high-grade canal or stenosis at the remaining levels.      Impression    IMPRESSION:  1.  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 As this is an unstable fracture in a rigid spine neurosurgical consultation is recommended.  2.  No traumatic subluxation or high-grade canal stenosis.  3.  Mild lumbar spondylosis, worse at L4-L5 where there is severe left lateral recess stenosis and severe left foraminal stenosis. Correlate for left L4 and L5  radiculopathy.  4.  Advanced multilevel cervical spondylitic changes with multilevel canal flattening and canal stenoses, suboptimally assessed on the current examination. Consider dedicated MRI cervical spine to further evaluate.   CT Thoracic Spine w/o Contrast    Narrative    EXAM: CT THORACIC SPINE WITHOUT CONTRAST    LOCATION: [facility]  DATE: 2/26/2025    INDICATION: T10 fracture noted on MRI.  COMPARISON: 2/25/2025.  TECHNIQUE: CT thoracic spine without contrast. Dose reduction techniques were performed.    FINDINGS: There is good anatomic alignment of the thoracic spine. The vertebral body heights are well-maintained throughout. There is a fracture through the anterior osteophyte formation of the T9-T10 disc space level that that extends obliquely through   the T10 vertebral body to its inferior endplate. The posterior elements are intact. There is no evidence of retropulsion to lead to canal compromise. No other acute thoracic spine fracture is visualized. There are diffuse endplate changes and anterior   osteophyte formations throughout the thoracic region. There is no significant canal compromise or significant neural foraminal narrowing throughout the thoracic line. The paraspinal soft tissues are unremarkable. The lungs visualized on this study are   clear.      Impression    IMPRESSION:  1.  Acute fracture through the anterior osteophyte formation at the T9-T10 disc space level extending obliquely through the T10 vertebral body to its inferior endplate.  2.  No evidence of retropulsion or canal compromise.  3.  No significant canal compromise or neural foraminal narrowing throughout thoracic spine.     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 Stable nondisplaced fracture through the bridging anterior endplate osteophyte at the T9-T10 level. Extension of the fracture into the T10 vertebral body is not well seen radiographically. No other fractures. Stable   diffuse idiopathic skeletal hyperostosis. Stable slight degenerative anterolisthesis of L4 on L5 is otherwise normal vertebral alignment. Normal vertebral body heights. Mild to moderate multilevel degenerative disc disease. Mild to moderate degenerative   arthritis involving the articular facets in the lumbar spine. Normal extraspinal structures.      *Note: Due to a large number of results and/or encounters for the requested time period, some results have not been displayed. A complete set of results can be found in Results Review.

## 2025-03-04 NOTE — PLAN OF CARE
PRIMARY Concern: Fall at clinic, T9-10 fracture   Tests/Procedures for NEXT shift: none   Consults? (Pending/following, signed-off?): Neurosurgery signed off. SW following, WOC consult   Safety Risk CONCERNS (fall risk, behaviors, etc.): fall risk       Where is patient from? (Home, TCU, etc.): home   Isolation/Type: none   Other Important info for next shift: TLSO brace in place, dressing padding in place  Anticipated DC date & active delays: TCU [facility]. W/C transport to TCU  at [id number]  SUMMARY NOTE:  Orientation/Cognitive: A/Ox4   Observation Goals (Met/ Not Met): inpt   Mobility Level/Assist Equipment: A1-2 GbW to BSC. TLSO brace on when OOB  Antibiotics & Plan (IV/po, length of tx left): none   Pain Management: scheduled tylenol   Tele/VS/O2: VSS on room air   ABNL Lab/BG:   Diet: mod carb   Bowel/Bladder: uses urinal   Skin Concerns: Scattered bruising, friction blister to LLQ/bilat flank, dressing applied by WOC RN; mepilex to coccyx   Drains/Devices: IV removed   Patient Stated Goal for Today: rest

## 2025-03-04 NOTE — PROGRESS NOTES
"[facility] Nurse Note    S: [facility] team received new consult for patient 3/3 at 11:24 AM for \"b/l\" flanks. RN reached out to [facility] via e-SENS text message to notify that new consult ordered and patient discharging today between 1330 and 1430. MD requesting that patient be seen before discharge.    B: Patient with TLSO brace placed this admission. Skin injuries identified today, 3/3.     A: Reviewed patient chart. Communicated with RN via e-SENS text.     R: Recommend cleansing and covering wounds until WO Nurse can assess. Initiate flowsheets for wounds. WO nurse assessment should not be a barrier to discharge. WO nurse will see patient Tuesday 3/4 if he does not discharge today.    Marily Fritz RN, CWOCN  "

## 2025-03-04 NOTE — PROGRESS NOTES
1940: Per pt request I spoke w/ pt's daughter, Annetta, regarding pt status and plan of care. Answered questions to the best of my ability. Pt and daughter request rounding physician call Annetta tomorrow after rounding w/ update. Annetta lives in [place]. Pt's significant other, Kenia, in room as well during phone call and update.

## 2025-03-04 NOTE — PROGRESS NOTES
"[facility] ACUTE INPATIENT PAIN SERVICE    [facility], [facility], [facility], [facility], [place]   PAIN progress note    Assessment/Plan:  Nahun Villalobos is a 79 year old male with a past history of COPD, Type 2 DM, history of PE on anticoagulation, malignant carcinoid tumor who was admitted on 2/24/2025 following a mechanical fall.  Pain team was asked by Magdalene Izaguirre PA-C to see the patient for uncontrolled pain management s/p T9-T10 DISH fracture.   shows routine gabapentin fills, two previous opioid prescriptions most recently norco on 2/6/25. Describes pain as \"hurts, ow\" and states he has \"too many pains to care\", including tingling in his hands due to neuropathy, low back pain with left sided radiculopathy that inhibits his ability to ambulate. Primary pain complaints today related to his TLSO brace causing discomfort. His pain from his fracture is \"not too bad\" today.     In the last 24 hours, Nahun has received: 2 (5mg) oxycodone     PLAN:  Acute pain secondary to T-T10 DISH fracture, in the setting of chronic back pain secondary to cervical spinal stenosis with peripheral neuropathy.   Pain improving.  Plan to discharge to TCU today.  Multimodal Medication Therapy:   Adjuvants:   Acetaminophen 1000mg bid   Nasal Calcitonin x14 days   Robaxin 500mg qid   Gabapentin 300mg bid resumed - creatinine improving, max daily dose 900mg in 2-3 divided doses  Opioids: Oxycodone decreased to 2.5mg bid prn   Non-medication interventions- Ice, heat prn   Constipation Prophylaxis- prn senna   Follow up /Discharge Recommendations - We recommend prescribing the following at the time of discharge:    No oxycodone  Continue robaxin 500mg q6h   Acetaminophen 1000mg bid   Nasal calcitonin x14 days total   Gabapentin 300mg bi        Subjective:  Patient is seen resting in bed and appears comfortable. Currently endorsing no pain but states if we tried to move his leg or \"pick him up\" pain would increase " "significantly. Denies nausea, vomiting, diarrhea, constipation, chest pain, shortness of breath.     Patient reports 5mg oxycodone caused hallucinations. Of note, last dose of oxycodone was at 0549 3/3/25, prior to this he was receiving about one dose per day. Hospitalist updated.    Medication regimen and adjustments made reviewed. Patient verbalized understanding and is in agreement with the plan. All questions answered.        History   Drug Use No         Tobacco Use      Smoking status: Former        Packs/day: 0.00        Years: 2.0 packs/day for 53.0 years (106.0 ttl pk-yrs)        Types: Cigarettes, Cigars        Start date: 1960        Quit date: 2013        Years since quittin.7        Passive exposure: Past      Smokeless tobacco: Never      Tobacco comments: Quit, will never start again.        Objective:  Vital signs in last 24 hours:  B/P: 143/81, T: 98.1, P: 81, R: 18   Blood pressure (!) 143/81, pulse 81, temperature 98.1  F (36.7  C), temperature source Oral, resp. rate 18, height 1.676 m (5' 6\"), weight 79 kg (174 lb 2.6 oz), SpO2 94%.        Review of Systems:   As per subjective, all others negative.    Physical Exam  General: in no apparent distress and non-toxic, resting in bed and appears comfortable  HEENT: Head normocephalic atraumatic, oral mucosa moist. Sclerae anicteric  Resp: Respirations unlabored, on room air   Skin: No rashes or lesions  Extremities: No peripheral edema  Psych: Normal affect, pleasant  Neuro: Alert and oriented x3       Imaging:  Personally Reviewed.    Results for orders placed or performed during the hospital encounter of 25   Head CT w/o contrast    Impression    IMPRESSION:  1.  No acute traumatic intracranial abnormality.  2.  Dilated ventricular system in a configuration that could represent normal pressure hydrocephalus.   Lumbar spine XR, 2-3 views    Impression    IMPRESSION: Shallow dextrocurvature. Sagittal alignment in the thoracic spine " is unremarkable. Grade 1 anterolisthesis L4 on L5 measuring 3 mm. Changes of diffuse idiopathic skeletal hyperostosis within the thoracic spine. Vertebral body heights are   maintained. No anterior compression deformity. Mild to moderate L4-5 disc height loss. Mild disc degeneration elsewhere. Moderate lower lumbar facet arthrosis. Vascular calcifications are present within the aorta. Postsurgical changes of the right lung   and right lower quadrant.    XR Thoracic Spine 2 Views    Impression    IMPRESSION: Shallow dextrocurvature. Sagittal alignment in the thoracic spine is unremarkable. Grade 1 anterolisthesis L4 on L5 measuring 3 mm. Changes of diffuse idiopathic skeletal hyperostosis within the thoracic spine. Vertebral body heights are   maintained. No anterior compression deformity. Mild to moderate L4-5 disc height loss. Mild disc degeneration elsewhere. Moderate lower lumbar facet arthrosis. Vascular calcifications are present within the aorta. Postsurgical changes of the right lung   and right lower quadrant.    MR Thoracic Spine w/o & w Contrast    Impression    IMPRESSION:  1.  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 As this is an unstable fracture in a rigid spine neurosurgical consultation is recommended.  2.  No traumatic subluxation or high-grade canal stenosis.  3.  Mild lumbar spondylosis, worse at L4-L5 where there is severe left lateral recess stenosis and severe left foraminal stenosis. Correlate for left L4 and L5 radiculopathy.  4.  Advanced multilevel cervical spondylitic changes with multilevel canal flattening and canal stenoses, suboptimally assessed on the current examination. Consider dedicated MRI cervical spine to further evaluate.   MR Lumbar Spine w/o & w Contrast    Impression    IMPRESSION:  1.  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 As this is an unstable fracture in a rigid spine neurosurgical  consultation is recommended.  2.  No traumatic subluxation or high-grade canal stenosis.  3.  Mild lumbar spondylosis, worse at L4-L5 where there is severe left lateral recess stenosis and severe left foraminal stenosis. Correlate for left L4 and L5 radiculopathy.  4.  Advanced multilevel cervical spondylitic changes with multilevel canal flattening and canal stenoses, suboptimally assessed on the current examination. Consider dedicated MRI cervical spine to further evaluate.   CT Thoracic Spine w/o Contrast    Impression    IMPRESSION:  1.  Acute fracture through the anterior osteophyte formation at the T9-T10 disc space level extending obliquely through the T10 vertebral body to its inferior endplate.  2.  No evidence of retropulsion or canal compromise.  3.  No significant canal compromise or neural foraminal narrowing throughout thoracic spine.     XR Thoracic Lumbar Standing 2 Views    Impression    IMPRESSION: Patient is in a brace. Stable nondisplaced fracture through the bridging anterior endplate osteophyte at the T9-T10 level. Extension of the fracture into the T10 vertebral body is not well seen radiographically. No other fractures. Stable   diffuse idiopathic skeletal hyperostosis. Stable slight degenerative anterolisthesis of L4 on L5 is otherwise normal vertebral alignment. Normal vertebral body heights. Mild to moderate multilevel degenerative disc disease. Mild to moderate degenerative   arthritis involving the articular facets in the lumbar spine. Normal extraspinal structures.         Lab Results:  Personally Reviewed.   Last Comprehensive Metabolic Panel:  Sodium   Date Value Ref Range Status   03/04/2025 137 135 - 145 mmol/L Final   03/17/2021 135 133 - 144 mmol/L Final     Potassium   Date Value Ref Range Status   03/04/2025 4.4 3.4 - 5.3 mmol/L Final   08/30/2022 4.0 3.4 - 5.3 mmol/L Final   03/17/2021 4.1 3.4 - 5.3 mmol/L Final     Chloride   Date Value Ref Range Status   03/04/2025 103 98 -  "107 mmol/L Final   05/20/2022 102 94 - 109 mmol/L Final   03/17/2021 103 94 - 109 mmol/L Final     Carbon Dioxide   Date Value Ref Range Status   03/17/2021 27 20 - 32 mmol/L Final     Carbon Dioxide (CO2)   Date Value Ref Range Status   03/04/2025 22 22 - 29 mmol/L Final   05/20/2022 28 20 - 32 mmol/L Final     Anion Gap   Date Value Ref Range Status   03/04/2025 12 7 - 15 mmol/L Final   05/20/2022 10 3 - 14 mmol/L Final   03/17/2021 5 3 - 14 mmol/L Final     Glucose   Date Value Ref Range Status   03/04/2025 171 (H) 70 - 99 mg/dL Final   09/20/2022 139 (A) 70 - 99 mg/dL Final     Comment:     Lot [id number] Exp 2023-03-29     GLUCOSE BY METER POCT   Date Value Ref Range Status   03/04/2025 159 (H) 70 - 99 mg/dL Final     Urea Nitrogen   Date Value Ref Range Status   03/04/2025 18.7 8.0 - 23.0 mg/dL Final   05/20/2022 12 7 - 30 mg/dL Final   03/17/2021 19 7 - 30 mg/dL Final     Creatinine   Date Value Ref Range Status   03/04/2025 1.17 0.67 - 1.17 mg/dL Final   03/17/2021 1.11 0.66 - 1.25 mg/dL Final     GFR Estimate   Date Value Ref Range Status   03/04/2025 63 >60 mL/min/1.73m2 Final     Comment:     eGFR calculated using 2021 CKD-EPI equation.   03/17/2021 64 >60 mL/min/[1.73_m2] Final     Comment:     Non  GFR Calc  Starting 12/18/2018, serum creatinine based estimated GFR (eGFR) will be   calculated using the Chronic Kidney Disease Epidemiology Collaboration   (CKD-EPI) equation.       GFR, ESTIMATED POCT   Date Value Ref Range Status   08/11/2023 56 (L) >60 mL/min/1.73m2 Final     Calcium   Date Value Ref Range Status   03/04/2025 8.9 8.8 - 10.4 mg/dL Final   03/17/2021 8.9 8.5 - 10.1 mg/dL Final        UA: No results found for: \"UAMP\", \"UBARB\", \"BENZODIAZEUR\", \"UCANN\", \"UCOC\", \"OPIT\", \"UPCP\"       Please see A&P for additional details of medical decision making.  MANAGEMENT DISCUSSED with the following over the past 24 hours:   -Hospitalist updated on adjustments  made to pain plan  "   NOTE(S)/MEDICAL RECORDS REVIEWED over the past 24 hours:   -Hospitalist interval history reviewed  Tests REVIEWED in the past 24 hours:  - BMP  Medical complexity over the past 24 hours:  - Prescription DRUG MANAGEMENT performed        BEE Dsouza-BC  Acute Care Pain Management Program   Hours of pain coverage Mon-Fri [id number], afterhours please call the primary team   [employer] (CORINE, [place], [place], RH)   Page via Epic Messaging or Crunch[employer] web console -Click for Hatsize

## 2025-03-04 NOTE — PLAN OF CARE
Physical Therapy Discharge Summary    Reason for therapy discharge:    Discharged to transitional care facility.    Progress towards therapy goal(s). See goals on Care Plan in [facility] electronic health record for goal details.  Goals not met.  Barriers to achieving goals:   discharge from facility.    Therapy recommendation(s):    Continued therapy is recommended.  Rationale/Recommendations:  Patient would benefit from continued skilled PT to progress activity tolerance and independence. Currently requiring A of 2 for mobility.    Goal Outcome Evaluation:

## 2025-03-04 NOTE — TELEPHONE ENCOUNTER
"Received a call from the patient's Daughter, Annetta (C2C on file), stating the patient recently fractured his back walking into a clinic and the patient was transferred to [facility]. Annetta states the patient was provided with a brace upon discharge to [place] but the brace has been causing some wounds for the patient. Annetta states they placed \"stickers\" to try to prevent the wounds from reoccurring but Annetta states she would like to get a second opinion on possibly a different brace the patient can use?    Annetta is wondering about a possible referral to Orthopedics or a possible different speciality that could assist with a different product for the patient?    Ortho referral pended (if appropriate). Will route to PCP for review.     Can we leave a detailed message on this number? YES  Phone number patient can be reached at: Other phone number: [phone]    Leslie Summers RN  [facility] Triage  "

## 2025-03-04 NOTE — CONSULTS
"[facility] Nurse Inpatient Assessment     Consulted for: b/l flanks    Summary: Multiple stage 2 pressure injuries to bilateral lateral torso, hospital acquired.   Patient not wearing TLSO brace on writer's arrival. Unable to definitively say wounds are the result of the brace, though brace is a contributing factor.   Recommend thorough and regular assessments under brace per unit policy or MD orders. Follow PIP Plan.     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, maintained on Lanreotide acetate, non small cell lung carcinoma s/p wedge resection and sleep apnea, among others, who presented to [facility] ED and was subsequently registered observation on 02/24/2025 following a mechanical fall witnessed at the doctor's office that occurred just prior to arrival.\"    Assessment:      Areas visualized during today's visit:  Posterior and lateral torso    Pressure Injury Location: Bilateral posterolateral torso and abdominal folds    Right low posterior torso  Stage 2 pressure injury, hospital acquired  Intact serous blisters with non-blanchable erythema surrounding  Left 1 x 1.5 x 0 cm; right 0.5 x 1.5 x 0 cm    Right lower lateral torso  Stage 2 pressure injury, hospital acquired  Linear non blanchable red/maroon tissue with open area at distal end exposing moist tissue; oval shaped non-blanchable erythema to the left of wound is consistent with stage 1 pressure injury  10.2 x 1.3 x 0.1 cm    Left lateral lower torso  Stage 2 pressure injury, hospital acquired  Intact red fluid filled blister  1.5 x 3.5 x 0 cm    Left lower lateral abdominal fold  Stage 2 pressure injury, " hospital acquired  Non blanchable maroon epidermis with open area at bottom margin exposing red, moist tissue  1.5 x 4.5 x 0.1 cm      Last photo: 3/4  Wound type: Pressure Injury and Mixed Etiology: friction, moisture     Pressure Injury Stage: 2, hospital acquired      Wound history/plan of care: Patient fitted for TLSO brace 2/27. Per patient, brace was on for four days. RN removed brace 3/3 and noted wounds under the brace. Patient reports injured areas were painful when brace was in place. Patient not wearing TLSO brace on writer's arrival to room, and patient declined to put brace on. Writer not able to determine if brace was sole contributing factor. Based on structure of brace, RN report, patient's report of pain, and appearance of wounds, TLSO brace was a contributing factor to wound development.   Wound base: See above     Palpation of the wound bed: normal, intact blisters are boggy     Drainage: small     Description of drainage: serous and serosanguinous     Measurements (length x width x depth, in cm) See above      Tunneling N/A     Undermining N/A  Periwound skin:  Erythema      Color: pink and red      Temperature: normal   Odor: none  Pain: moderate and during dressing change, intermittent and tender  Pain intervention prior to dressing change: patient tolerated well, slow and gentle cares , and distraction  Treatment goal: Heal , Drainage control, Infection control/prevention, and Protection  STATUS: initial assessment and evolving  Supplies ordered: gathered, supplies stored on unit, discussed with RN, and discussed with patient     My PI Risk Assessment     Sensory Perception: 3 - Slightly Limited     Moisture: 3 - Occasionally moist      Activity: 3 - Walks occasionally      Mobility: 3 - Slightly limited      Nutrition: 3 - Adequate     Friction/Shear: 1 - Problem     TOTAL: 16      Treatment Plan:     Bilateral posterolateral torso wound(s): Daily   Assess under dressings for signs of  "deterioration.   Cleanse with wound cleanser and gauze. Pat dry.  Swab oneal-wound skin with skin prep (Cavilon, SurePrep) and let dry.  Apply cut-to-fit piece of oil emulsion gauze (Adaptic) over wounds.  Cover with 5 layer foam border dressing to protect skin from pressure. Initial and date dressing change.     Pressure Injury Prevention (PIP) Plan:  -If patient is declining pressure injury prevention interventions: Explore reason why and address patient's concerns, Educate on pressure injury risk and prevention intervention(s), If patient is still declining, document \"informed refusal\" , and Ensure Care team is aware ( provider, charge nurse, etc)  -Mattress: Add ISABELLA pump to mattress PRN moisture issues  -HOB: Maintain at or below 30 degrees, unless contraindicated  -Repositioning in bed: Remind/encourage repositioning Every 1-2 hours , Left/right positioning; avoid supine, and Raise foot of bed prior to raising head of bed, to reduce patient sliding down (shear)  -Heels: Keep elevated off mattress  -Protective Dressin layer foam border dressings under TLSO brace   -Positioning Equipment:  pillows  -Chair positioning: Repositions self: patient to shift weight every 15 minutes, Assist patient to reposition hourly, and Do NOT use a donut for sitting (this increases pressure to smaller area and creates a higher potential for injury)    -Moisture Management: Perineal cleansing /protection: Follow Incontinence Protocol, Avoid brief in bed, and Clean and dry skin folds with bathing   -Under Devices: Inspect skin under all medical devices during skin inspection , Ensure tubes are stabilized without tension, and Ensure patient is not lying on medical devices or equipment when repositioned     Orders: Written    RECOMMEND PRIMARY TEAM ORDER: None, at this time  Education provided: importance of repositioning, plan of care, wound progress, Infection prevention , and Off-loading pressure  Discussed plan of care with: " "Patient, Family, and Nurse  Notify [facility] if wound(s) deteriorate.  Nursing to notify the Provider(s) and re-consult the WO Nurse if new skin concern.    DATA:     Current support surface: Standard  Standard gel mattress (Isoflex)  Containment of urine/stool: Incontinence Protocol, Urinal, and Incontinent pad in bed  BMI: Body mass index is 28.11 kg/m .   Active diet order: Orders Placed This Encounter      Moderate Consistent Carb (60 g CHO per Meal) Diet      Diet     Output: I/O last 3 completed shifts:  In: -   Out: 700 [Urine:700]     Labs:   Recent Labs   Lab 03/01/25  0943   HGB 11.5*   WBC 12.3*     Pressure injury risk assessment:   Sensory Perception: 3-->slightly limited  Moisture: 3-->occasionally moist  Activity: 2-->chairfast  Mobility: 3-->slightly limited  Nutrition: 3-->adequate  Friction and Shear: 2-->potential problem  Deuce Score: 16    Marily Fritz RN, CWOCN  Please contact via [employer] at group \"[facility] nurse\"- M-F [phone]-[phone]  "

## 2025-03-04 NOTE — PLAN OF CARE
PRIMARY Concern: Fall at clinic, T9-10 fracture   SAFETY RISK Concerns (fall risk, behaviors, etc.): Fall       Aggression Tool Color: Green   Isolation/Type: None   Tests/Procedures for NEXT shift: AM labs   Consults? (Pending/following, signed-off?) Neurosurgery signed off. SW following, WOC consult pending  Where is patient from? (Home, TCU, etc.): Home   Other Important info for NEXT shift: Encourage fluids   Anticipated DC date & active delays: WOC to see before discharge to TCU [place]. W/C transport to TCU 3/4 5p-6p  _____________________________________________________________________________  SUMMARY NOTE:  Orientation/Cognitive: AOx4  Observation Goals (Met/ Not Met): Inpatient  Mobility Level/Assist Equipment: A2 GbW to BSC. TLSO brace OOB but off  Antibiotics & Plan (IV/po, length of tx left): None   Pain Management: Tony tyl, gabapentin and robaxin  Tele/VS/O2: VSS on RA x HTN at times  ABNL Lab/BG: Creat 1.57, -151  Diet: Mod CHO   Bowel/Bladder: Incontinent at times, using urinal in bed   Skin Concerns: Scattered bruising, friction blister to LLQ/bilat flank, mepilex to coccyx  Drains/Devices: PIV SL  Patient Stated Goal for Today: Rest

## 2025-03-05 ENCOUNTER — TRANSITIONAL CARE UNIT VISIT (OUTPATIENT)
Dept: GERIATRICS | Facility: CLINIC | Age: 80
End: 2025-03-05
Payer: COMMERCIAL

## 2025-03-05 ENCOUNTER — TELEPHONE (OUTPATIENT)
Dept: PHARMACY | Facility: OTHER | Age: 80
End: 2025-03-05

## 2025-03-05 ENCOUNTER — TELEPHONE (OUTPATIENT)
Dept: NEUROSURGERY | Facility: CLINIC | Age: 80
End: 2025-03-05

## 2025-03-05 VITALS
BODY MASS INDEX: 28.03 KG/M2 | SYSTOLIC BLOOD PRESSURE: 126 MMHG | WEIGHT: 174.4 LBS | HEIGHT: 66 IN | RESPIRATION RATE: 16 BRPM | HEART RATE: 64 BPM | OXYGEN SATURATION: 93 % | DIASTOLIC BLOOD PRESSURE: 63 MMHG | TEMPERATURE: 97.9 F

## 2025-03-05 DIAGNOSIS — G91.9 HYDROCEPHALUS, UNSPECIFIED TYPE (H): ICD-10-CM

## 2025-03-05 DIAGNOSIS — Z98.890 HISTORY OF LUNG SURGERY: ICD-10-CM

## 2025-03-05 DIAGNOSIS — I10 HYPERTENSION, UNSPECIFIED TYPE: ICD-10-CM

## 2025-03-05 DIAGNOSIS — R53.1 GENERALIZED WEAKNESS: ICD-10-CM

## 2025-03-05 DIAGNOSIS — Z79.01 ON ANTICOAGULANT THERAPY: ICD-10-CM

## 2025-03-05 DIAGNOSIS — G47.33 OSA (OBSTRUCTIVE SLEEP APNEA): ICD-10-CM

## 2025-03-05 DIAGNOSIS — C79.51 MALIGNANT NEOPLASM METASTATIC TO BONE (H): ICD-10-CM

## 2025-03-05 DIAGNOSIS — M62.81 MUSCLE WEAKNESS (GENERALIZED): ICD-10-CM

## 2025-03-05 DIAGNOSIS — M54.6 ACUTE BILATERAL THORACIC BACK PAIN: ICD-10-CM

## 2025-03-05 DIAGNOSIS — G62.9 NEUROPATHY: ICD-10-CM

## 2025-03-05 DIAGNOSIS — Z79.01 ON APIXABAN THERAPY: ICD-10-CM

## 2025-03-05 DIAGNOSIS — M54.9 CHRONIC BACK PAIN, UNSPECIFIED BACK LOCATION, UNSPECIFIED BACK PAIN LATERALITY: ICD-10-CM

## 2025-03-05 DIAGNOSIS — D63.1 ANEMIA DUE TO STAGE 3A CHRONIC KIDNEY DISEASE (H): ICD-10-CM

## 2025-03-05 DIAGNOSIS — N18.31 ANEMIA DUE TO STAGE 3A CHRONIC KIDNEY DISEASE (H): ICD-10-CM

## 2025-03-05 DIAGNOSIS — M19.90 OSTEOARTHRITIS, UNSPECIFIED OSTEOARTHRITIS TYPE, UNSPECIFIED SITE: ICD-10-CM

## 2025-03-05 DIAGNOSIS — C34.11 MALIGNANT NEOPLASM OF UPPER LOBE OF RIGHT LUNG (H): ICD-10-CM

## 2025-03-05 DIAGNOSIS — R53.81 PHYSICAL DECONDITIONING: ICD-10-CM

## 2025-03-05 DIAGNOSIS — L89.102 PRESSURE INJURY OF BACK, STAGE 2 (H): ICD-10-CM
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
## 2025-03-05 DIAGNOSIS — J44.9 CHRONIC OBSTRUCTIVE PULMONARY DISEASE, UNSPECIFIED COPD TYPE (H): ICD-10-CM

## 2025-03-05 DIAGNOSIS — G89.29 CHRONIC BACK PAIN, UNSPECIFIED BACK LOCATION, UNSPECIFIED BACK PAIN LATERALITY: ICD-10-CM

## 2025-03-05 DIAGNOSIS — N40.0 BENIGN PROSTATIC HYPERPLASIA WITHOUT LOWER URINARY TRACT SYMPTOMS: ICD-10-CM

## 2025-03-05 DIAGNOSIS — S22.079D CLOSED FRACTURE OF NINTH THORACIC VERTEBRA WITH ROUTINE HEALING, UNSPECIFIED FRACTURE MORPHOLOGY, SUBSEQUENT ENCOUNTER: ICD-10-CM

## 2025-03-05 DIAGNOSIS — R20.2 PARESTHESIAS: ICD-10-CM

## 2025-03-05 DIAGNOSIS — K52.9 CHRONIC DIARRHEA: ICD-10-CM

## 2025-03-05 DIAGNOSIS — M48.02 CERVICAL STENOSIS OF SPINAL CANAL: ICD-10-CM

## 2025-03-05 DIAGNOSIS — E11.69 TYPE 2 DIABETES MELLITUS WITH OTHER SPECIFIED COMPLICATION, WITHOUT LONG-TERM CURRENT USE OF INSULIN (H): ICD-10-CM

## 2025-03-05 DIAGNOSIS — I50.30 HEART FAILURE WITH PRESERVED EJECTION FRACTION, NYHA CLASS II (H): ICD-10-CM

## 2025-03-05 DIAGNOSIS — R29.6 RECURRENT FALLS: ICD-10-CM

## 2025-03-05 DIAGNOSIS — C34.90 ADENOCARCINOMA OF LUNG, UNSPECIFIED LATERALITY (H): ICD-10-CM

## 2025-03-05 DIAGNOSIS — Z86.711 HISTORY OF PULMONARY EMBOLISM: ICD-10-CM

## 2025-03-05 PROCEDURE — 99310 SBSQ NF CARE HIGH MDM 45: CPT | Performed by: NURSE PRACTITIONER

## 2025-03-05 RX ORDER — OXYCODONE HYDROCHLORIDE 5 MG/1
5 CAPSULE ORAL EVERY 8 HOURS PRN
COMMUNITY
End: 2025-03-05

## 2025-03-05 RX ORDER — ACETAMINOPHEN 500 MG
1000 TABLET ORAL 2 TIMES DAILY PRN
Status: SHIPPED
Start: 2025-03-05

## 2025-03-05 NOTE — TELEPHONE ENCOUNTER
It appears back brace was set up by Neurosurgery and suggest that patient contact them to help coordinate issues of back brace.

## 2025-03-05 NOTE — TELEPHONE ENCOUNTER
MTM referral from: Transitions of Care (recent hospital discharge, TCU discharge, or ED visit)    MTM referral outreach attempt #1 on March 5, 2025 at 1:27 PM      Outcome: Spoke with patient declined    Use [facility] medicare part D MAP, [place] for the carrier/Plan on the flowsheet          Kami Vázquez [facility]  -[facility]  [phone]

## 2025-03-05 NOTE — PROGRESS NOTES
[facility] GERIATRICS    PRIMARY CARE PROVIDER AND CLINIC:  Olegario Castillo MD, [address] [place] [address] / [facility] [id number]  Chief Complaint   Patient presents with    Hospital F/U      [place] Medical Record Number:  [id number]  Place of Service where encounter took place:  [facility] (TCU) [[id number]]    Nahun Villalobos  is a 79 year old  (1945), admitted to the above facility from  [facility]. Hospital stay 2/24/25 through 3/4/25..       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  He reported severe thoracic back pain with new bilateral upper extremity paraesthesias and weakness (usually unilateral, RUE). History of chronic back pain secondary to cervical spinal stenosis with peripheral neuropathy.  CT showed acute fracture through the anterior osteophyte formation of T9-T10.  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  Neurosurgery followed. Custom TLSO ordered. Will need to wear minimum three months     Today has constant neuropathy in hands and feet  Has sciatic nerve, left worse than right particularly with movement  Has stage IV cancer  - monthly shot is $26,000.   He believes it is a cure but is not sure if it is cure or maintence.    Has 10 grandchildren  5 children - 4 live in [facility] - youngest lives in [place].         CODE STATUS/ADVANCE DIRECTIVES DISCUSSION:  No CPR- Do NOT Intubate    ALLERGIES: No Known Allergies   PAST MEDICAL HISTORY:   Past Medical History:   Diagnosis Date    Antiplatelet or antithrombotic long-term use     Arthritis     CHF (congestive heart failure) (H) 09/16/2020    COPD (chronic obstructive pulmonary disease) (H)     DM (diabetes mellitus) (H)     Dyspnea on exertion     Essential hypertension, benign     Hemorrhage of rectum and anus     Non-small cell lung cancer (H)     Obesity     Personal history of colonic polyps     Sleep apnea     Thrombosis     Tobacco use disorder     Urinary retention     Walking troubles       PAST SURGICAL HISTORY:   has a past surgical history that includes NONSPECIFIC PROCEDURE; Cholecystectomy; appendectomy; wedge resection; Cystoscopy, transurethral resection (TUR) prostate, combined (N/A, 4/30/2018); Abdomen surgery (1995); colonoscopy (2014); Bone Exostosis Excision (Bilateral, 9/20/2022); and IR Lung Biopsy Mediastinum Right (4/6/2016).  FAMILY HISTORY: family history includes Breast Cancer in his sister and sister; C.A.D. in his mother; Cancer in his mother; Cancer - colorectal in his mother; Cerebrovascular Disease in his father; Hypertension in his mother, sister, and sister; Other Cancer in his mother.  SOCIAL HISTORY:   reports that he quit smoking about 11 years ago. His smoking use included cigarettes and cigars. He started smoking about 64 years ago. He has a 106 pack-year smoking history. He has been exposed to tobacco smoke. He has never used smokeless tobacco. He reports that he does not drink alcohol and does not use drugs.  Patient's living condition: lives with partner    Post Discharge Medication Reconciliation Status:   MED REC REQUIRED  Post Medication Reconciliation Status:  Discharge medications reconciled and changed, see notes/orders       Current Outpatient Medications   Medication Sig Dispense Refill    oxyCODONE (OXY-IR) 5 MG capsule  Take 5 mg by mouth every 8 hours as needed for severe pain.      acetaminophen (TYLENOL) 500 MG tablet Take 1,000 mg by mouth 2 times daily.      albuterol (VENTOLIN HFA) 108 (90 Base) MCG/ACT inhaler INHALE 2 PUFFS INTO THE LUNGS EVERY 6 HOURS AS NEEDED FOR SHORTNESS OF BREATH / DYSPNEA OR WHEEZING 25.5 g 0    amLODIPine (NORVASC) 5 MG tablet Take 1 tablet (5 mg) by mouth daily. 90 tablet 3    apixaban ANTICOAGULANT (ELIQUIS) 2.5 MG tablet Take 1 tablet (2.5 mg) by mouth 2 times daily. 90 tablet 1    atorvastatin (LIPITOR) 20 MG tablet Take 1 tablet (20 mg) by mouth daily. 90 tablet 1    blood glucose (NO BRAND SPECIFIED) lancets standard Use to test blood sugar 1 time daily, first thing in the morning 50 each 3    calcium carbonate (TUMS) 500 MG chewable tablet Take 1 chew tab by mouth daily as needed for heartburn.      cholestyramine light (QUESTRAN) 4 GM packet Take 4 g by mouth 2 times daily (with meals). (Patient not taking: Reported on 3/5/2025)      Coenzyme Q10 400 MG CAPS Take 400 mg by mouth daily 30 capsule 0    ferrous sulfate (FE TABS) 325 (65 Fe) MG EC tablet Take 1 tablet (325 mg) by mouth every evening 30 tablet 0    finasteride (PROSCAR) 5 MG tablet Take 1 tablet (5 mg) by mouth daily 90 tablet 3    Fluticasone-Umeclidin-Vilant (TRELEGY ELLIPTA) 100-62.5-25 MCG/ACT oral inhaler USE 1 INHALATION DAILY 60 each 5    furosemide (LASIX) 40 MG tablet Take 1 tablet (40 mg) by mouth daily.      gabapentin (NEURONTIN) 300 MG capsule Take 1 capsule (300 mg) by mouth at bedtime. 180 capsule 0    gabapentin (NEURONTIN) 300 MG capsule Take 1 capsule (300 mg) by mouth every morning. 90 capsule 0    lanreotide acetate (SOMATULINE) 120 MG/0.5ML injection Inject 120 mg subcutaneously every 28 days. Do not start before September 6, 2024. (Patient not taking: Reported on 3/5/2025)      metFORMIN (GLUCOPHAGE) 1000 MG tablet Take 1 tablet (1,000 mg) by mouth 2 times daily (with meals). 180 tablet 1    methocarbamol  "(ROBAXIN) 500 MG tablet Take 1 tablet (500 mg) by mouth 4 times daily as needed for muscle spasms.      miconazole (MICATIN) 2 % external cream Apply topically 2 times daily as needed for other (Rash/ skin irritation)      order for DME Oxygen 2 Li/min  at night via nasal cannula 1 Device 0    vitamin B-Complex Take 1 tablet by mouth daily      vitamin C (ASCORBIC ACID) 500 MG tablet Take 500 mg by mouth daily.       No current facility-administered medications for this visit.       ROS:  10 point ROS of systems including Constitutional, Eyes, Respiratory, Cardiovascular, Gastroenterology, Genitourinary, Integumentary, Musculoskeletal, Psychiatric were all negative except for pertinent positives noted in my HPI.    Vitals:  /63   Pulse 64   Temp 97.9  F (36.6  C)   Resp 16   Ht 1.676 m (5' 6\")   Wt 79.1 kg (174 lb 6.4 oz)   SpO2 93%   BMI 28.15 kg/m    Exam:  GENERAL APPEARANCE:  Alert, in no distress  RESP:  lungs clear to auscultation , no respiratory distress  CV:  rate-normal  PSYCH:  oriented X 3    Lab/Diagnostic data:  Recent labs in [employer] reviewed by me today.     ASSESSMENT/PLAN:      (G91.9) Hydrocephalus, unspecified type (H)  Comment: Dilated cerebral ventricles, consider normal pressure hydrocephalus   Plan: Referral to neurology placed from hospital    (W19.XXXD) Fall, subsequent encounter  (primary encounter diagnosis)  (M54.6) Acute bilateral thoracic back pain  (S22.079D) Closed fracture of ninth thoracic vertebra with routine healing, unspecified fracture morphology, subsequent encounter  (R29.6) Recurrent falls  (G62.9) Neuropathy  (R20.2) Paresthesias  (M62.81) Muscle weakness (generalized)  (M54.9,  G89.29) Chronic back pain, unspecified back location, unspecified back pain laterality  (M48.02) Cervical stenosis of spinal canal  (R53.81) Physical deconditioning  (R53.1) Generalized weakness  Comment:   -CT thoracic w/o contrast with acute fracture through the anterior osteophyte " formation at T9-T10 disc space level extending obliquely through T10 vertebral body to its inferior endplate. No retropulsion or canal compromise.   -TLSO brace for 3 months  -pain service recommended   Oxycodone 5mg q8h prn   Continue robaxin 500mg q6h   Acetaminophen 1000mg bid   Nasal calcitonin x14 days total   Gabapentin 300mg bid   Plan: Therapy to evaluate and abigail  Discontinue oxycodone - caused hallucination    (L89.90) Pressure injury of skin, unspecified injury stage, unspecified location  Comment: Acute  From TLSO brace  Plan: Monitor skin  Refer to nursing for wound care    (M19.90) Osteoarthritis, unspecified osteoarthritis type, unspecified site  Comment: Chronic  Plan: Continue with plan of care no changes at this time, adjustment as needed          (J44.9) Chronic obstructive pulmonary disease, unspecified COPD type (H)  (G47.33) CIERA (obstructive sleep apnea)  Comment: Chronic  Patient does not use CPAP  Continue with Breo Ellipta and Incruse Ellipta   Plan: Continue with plan of care no changes at this time, adjustment as needed      (Z86.711) History of pulmonary embolism  (Z79.01) On anticoagulant therapy  (Z79.01) On apixaban therapy  (N18.31,  D63.1) Anemia due to stage 3a chronic kidney disease (H)  Comment: Chronic  Baseline Hgb 11-12g/dL   Currently taking apixaban twice daily and iron every other day  Plan: Monitor BMP and CBC    (E11.69) Type 2 diabetes mellitus with other specified complication, without long-term current use of insulin (H)  Comment: Chronic  Currently taking metformin  Plan: Monitor blood sugars intermittently, BMP and CBC    (I10) Hypertension, unspecified type  (I50.30) Heart failure with preserved ejection fraction, NYHA class II (H)  Comment:   Currently taking amlodipine  In the hospital lisinopril was held  In the hospital Lasix was decreased to 40 mg daily  Plan: Monitor blood pressure, daily weights, BMP and CBC    (N40.0) Benign prostatic hyperplasia without  lower urinary tract symptoms  Comment: Chronic  Taking finasteride   Plan: Continue with plan of care no changes at this time, adjustment as needed        (C79.51) Malignant neoplasm metastatic to bone (H)  (C34.90) Adenocarcinoma of lung, unspecified laterality (H)  (C34.11) Malignant neoplasm of upper lobe of right lung (H)  (Z98.890) History of lung surgery  Comment:   -Hx RUL adenocarcinoma s/p wedge resection   -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   -Taking Lanreotide   Plan: Follow-up with Oncology/Hematology     [K52.9] Chronic diarrhea   Comment:  chronic  cholesytime daily   Plan: Monitor BMP and CBC      Electronically signed by:      SELAM Mccall CNP on 3/5/2025 at 2:32 PM        45 minutes was spent reviewing medical record from [facility], assessing patient, reviewing medical notes from previous primary care provider, talking with pt and answering their questions/concerns, coordinating above plan of care with nursing , SW, therapy and dietitian and patient and reviewed medications with patient and counseled pt. on above plan of care.  Counseled on medications and side effects.

## 2025-03-05 NOTE — TELEPHONE ENCOUNTER
[facility] Call Center    Phone Message    May a detailed message be left on voicemail: yes     Reason for Call: Other: Daughter calling stating her dad is needing a new back brace as the one he has is causing a lot of pain and has gotten wounds from it.  Please call daughter back to advise.      Action Taken: Message routed to:  [facility] Neurosurgery     Travel Screening: Not Applicable     Date of Service:

## 2025-03-06 ENCOUNTER — TELEPHONE (OUTPATIENT)
Dept: GERIATRICS | Facility: CLINIC | Age: 80
End: 2025-03-06
Payer: COMMERCIAL

## 2025-03-06 ENCOUNTER — HOSPITAL ENCOUNTER (INPATIENT)
Facility: CLINIC | Age: 80
Setting detail: SURGERY ADMIT
End: 2025-03-06
Attending: NEUROLOGICAL SURGERY | Admitting: NEUROLOGICAL SURGERY
Payer: COMMERCIAL

## 2025-03-06 DIAGNOSIS — S22.079D CLOSED FRACTURE OF NINTH THORACIC VERTEBRA WITH ROUTINE HEALING, UNSPECIFIED FRACTURE MORPHOLOGY, SUBSEQUENT ENCOUNTER: Primary | ICD-10-CM

## 2025-03-06 NOTE — TELEPHONE ENCOUNTER
Pt's daughter called, Pt is at [facility], she wants someone to call her back to let her know what she needs to do to get the new brace.  Please call her back to discuss.

## 2025-03-06 NOTE — TELEPHONE ENCOUNTER
Patient Contact    Attempt # 1    Was call answered?  No.  Left message on voicemail with information to call me back.

## 2025-03-06 NOTE — TELEPHONE ENCOUNTER
Pt has 3 blisters on torso from TLSO brace.   Spoke with neurosurgery RN   Writer ordered a orthotics referral for custom TLSO brace.       SELMA Mccall CNP on 3/6/2025 at 12:09 PM        Therapy did help pt put brace on more comfortably

## 2025-03-06 NOTE — TELEPHONE ENCOUNTER
Annetta (C2C on file) called the clinic back.  Transferred her to Neurology clinic as she was under the impression that's who she was calling back.    Thank you,  Su Chan RN

## 2025-03-07 ENCOUNTER — LAB REQUISITION (OUTPATIENT)
Dept: LAB | Facility: CLINIC | Age: 80
End: 2025-03-07
Payer: COMMERCIAL

## 2025-03-07 DIAGNOSIS — S22.071D: ICD-10-CM

## 2025-03-10 ENCOUNTER — TRANSITIONAL CARE UNIT VISIT (OUTPATIENT)
Dept: GERIATRICS | Facility: CLINIC | Age: 80
End: 2025-03-10
Payer: COMMERCIAL

## 2025-03-10 VITALS
TEMPERATURE: 98.2 F | HEART RATE: 70 BPM | DIASTOLIC BLOOD PRESSURE: 86 MMHG | HEIGHT: 66 IN | RESPIRATION RATE: 18 BRPM | BODY MASS INDEX: 27.42 KG/M2 | OXYGEN SATURATION: 94 % | WEIGHT: 170.6 LBS | SYSTOLIC BLOOD PRESSURE: 146 MMHG

## 2025-03-10 DIAGNOSIS — N18.31 ANEMIA DUE TO STAGE 3A CHRONIC KIDNEY DISEASE (H): ICD-10-CM

## 2025-03-10 DIAGNOSIS — M19.90 OSTEOARTHRITIS, UNSPECIFIED OSTEOARTHRITIS TYPE, UNSPECIFIED SITE: ICD-10-CM

## 2025-03-10 DIAGNOSIS — C79.51 MALIGNANT NEOPLASM METASTATIC TO BONE (H): ICD-10-CM

## 2025-03-10 DIAGNOSIS — I50.30 HEART FAILURE WITH PRESERVED EJECTION FRACTION, NYHA CLASS II (H): ICD-10-CM

## 2025-03-10 DIAGNOSIS — L89.102 PRESSURE INJURY OF BACK, STAGE 2 (H): ICD-10-CM

## 2025-03-10 DIAGNOSIS — M54.2 CERVICALGIA: ICD-10-CM

## 2025-03-10 DIAGNOSIS — Z79.01 ON ANTICOAGULANT THERAPY: ICD-10-CM

## 2025-03-10 DIAGNOSIS — G62.9 NEUROPATHY: ICD-10-CM

## 2025-03-10 DIAGNOSIS — M48.02 CERVICAL STENOSIS OF SPINAL CANAL: ICD-10-CM

## 2025-03-10 DIAGNOSIS — R53.1 GENERALIZED WEAKNESS: ICD-10-CM

## 2025-03-10 DIAGNOSIS — Z86.711 HISTORY OF PULMONARY EMBOLISM: ICD-10-CM

## 2025-03-10 DIAGNOSIS — M54.6 ACUTE BILATERAL THORACIC BACK PAIN: ICD-10-CM

## 2025-03-10 DIAGNOSIS — S22.079D CLOSED FRACTURE OF NINTH THORACIC VERTEBRA WITH ROUTINE HEALING, UNSPECIFIED FRACTURE MORPHOLOGY, SUBSEQUENT ENCOUNTER: Primary | ICD-10-CM

## 2025-03-10 DIAGNOSIS — R29.6 RECURRENT FALLS: ICD-10-CM

## 2025-03-10 DIAGNOSIS — G47.33 OSA (OBSTRUCTIVE SLEEP APNEA): ICD-10-CM

## 2025-03-10 DIAGNOSIS — Z79.01 ON APIXABAN THERAPY: ICD-10-CM

## 2025-03-10 DIAGNOSIS — C34.11 MALIGNANT NEOPLASM OF UPPER LOBE OF RIGHT LUNG (H): ICD-10-CM

## 2025-03-10 DIAGNOSIS — K52.9 CHRONIC DIARRHEA: ICD-10-CM
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
## 2025-03-10 DIAGNOSIS — G91.9 HYDROCEPHALUS, UNSPECIFIED TYPE (H): ICD-10-CM

## 2025-03-10 DIAGNOSIS — J44.9 CHRONIC OBSTRUCTIVE PULMONARY DISEASE, UNSPECIFIED COPD TYPE (H): ICD-10-CM

## 2025-03-10 DIAGNOSIS — D63.1 ANEMIA DUE TO STAGE 3A CHRONIC KIDNEY DISEASE (H): ICD-10-CM

## 2025-03-10 DIAGNOSIS — R20.2 PARESTHESIAS: ICD-10-CM

## 2025-03-10 DIAGNOSIS — I10 HYPERTENSION, UNSPECIFIED TYPE: ICD-10-CM

## 2025-03-10 DIAGNOSIS — N40.0 BENIGN PROSTATIC HYPERPLASIA WITHOUT LOWER URINARY TRACT SYMPTOMS: ICD-10-CM

## 2025-03-10 DIAGNOSIS — M54.9 CHRONIC BACK PAIN, UNSPECIFIED BACK LOCATION, UNSPECIFIED BACK PAIN LATERALITY: ICD-10-CM

## 2025-03-10 DIAGNOSIS — C34.90 ADENOCARCINOMA OF LUNG, UNSPECIFIED LATERALITY (H): ICD-10-CM

## 2025-03-10 DIAGNOSIS — M62.81 MUSCLE WEAKNESS (GENERALIZED): ICD-10-CM

## 2025-03-10 DIAGNOSIS — E11.69 TYPE 2 DIABETES MELLITUS WITH OTHER SPECIFIED COMPLICATION, WITHOUT LONG-TERM CURRENT USE OF INSULIN (H): ICD-10-CM

## 2025-03-10 DIAGNOSIS — Z98.890 HISTORY OF LUNG SURGERY: ICD-10-CM

## 2025-03-10 DIAGNOSIS — R53.81 PHYSICAL DECONDITIONING: ICD-10-CM

## 2025-03-10 DIAGNOSIS — G89.29 CHRONIC BACK PAIN, UNSPECIFIED BACK LOCATION, UNSPECIFIED BACK PAIN LATERALITY: ICD-10-CM

## 2025-03-10 PROCEDURE — 99309 SBSQ NF CARE MODERATE MDM 30: CPT | Performed by: NURSE PRACTITIONER

## 2025-03-10 NOTE — PROGRESS NOTES
[facility] GERIATRICS        Visit Type: RECHECK     Facility:   [facility] ([facility]) [[id number]]         History of Present Illness: Nahun Villalobos is a 79 year old male       His medical history includes  SALLIE  Acute fracture through the anterior osteophyte formation at T9-T10  Cervical stenosis w/ myelopathy and neuropathy  Multiple stage II pressure injuries to bilateral flank area-hospital-acquired  DJD  chronic back pain   recurrent mechanical falls   ?Normal pressure hydrocephalus  COPD  CIERA  Hx PE on AC  Anemia, chronic, normocytic   T2DM w/ hyperglycemia   HTN  HFpEF, chronic compensated  BPH  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  He reported severe thoracic back pain with new bilateral upper extremity paraesthesias and weakness (usually unilateral, RUE). History of chronic back pain secondary to cervical spinal stenosis with peripheral neuropathy.  CT showed acute fracture through the anterior osteophyte formation of T9-T10.  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  Neurosurgery followed. Custom TLSO ordered. Will need to wear minimum three months        Today Nahun Villalobos feels okay.  Continues to have back pain.   Frustrated with staying in TCU and sharing a room but doesn't want to pay for a single.    VS are stable  Weight is stable  Per nursing Requires Max A x 1 with bathing, lower body dressing, and bed mobility. Min A x 1 with upper body dressing. Max A x 2 with toileting and transfers using the EZ stand. Ambulates with PT only. Uses a w/c for locomotion. SBA with grooming and oral cares while seated. Feeds himself independently after set up.   Occasionally incontinent of both bowel and bladder. Staff assist with toileting needs.    Current Outpatient Medications    Medication Sig Dispense Refill    acetaminophen (TYLENOL) 500 MG tablet Take 2 tablets (1,000 mg) by mouth 2 times daily as needed for mild pain.      acetaminophen (TYLENOL) 500 MG tablet Take 1,000 mg by mouth 2 times daily.      albuterol (VENTOLIN HFA) 108 (90 Base) MCG/ACT inhaler INHALE 2 PUFFS INTO THE LUNGS EVERY 6 HOURS AS NEEDED FOR SHORTNESS OF BREATH / DYSPNEA OR WHEEZING 25.5 g 0    amLODIPine (NORVASC) 5 MG tablet Take 1 tablet (5 mg) by mouth daily. 90 tablet 3    apixaban ANTICOAGULANT (ELIQUIS) 2.5 MG tablet Take 1 tablet (2.5 mg) by mouth 2 times daily. 90 tablet 1    atorvastatin (LIPITOR) 20 MG tablet Take 1 tablet (20 mg) by mouth daily. 90 tablet 2    blood glucose (NO BRAND SPECIFIED) lancets standard Use to test blood sugar 1 time daily, first thing in the morning 50 each 3    calcium carbonate (TUMS) 500 MG chewable tablet Take 1 chew tab by mouth daily as needed for heartburn.      cholestyramine light (QUESTRAN) 4 GM packet Take 4 g by mouth 2 times daily (with meals). (Patient not taking: Reported on 3/5/2025)      Coenzyme Q10 400 MG CAPS Take 400 mg by mouth daily 30 capsule 0    ferrous sulfate (FE TABS) 325 (65 Fe) MG EC tablet Take 1 tablet (325 mg) by mouth every evening 30 tablet 0    finasteride (PROSCAR) 5 MG tablet Take 1 tablet (5 mg) by mouth daily 90 tablet 3    Fluticasone-Umeclidin-Vilant (TRELEGY ELLIPTA) 100-62.5-25 MCG/ACT oral inhaler USE 1 INHALATION DAILY 60 each 5    furosemide (LASIX) 40 MG tablet Take 1 tablet (40 mg) by mouth daily.      gabapentin (NEURONTIN) 300 MG capsule Take 1 capsule (300 mg) by mouth at bedtime. 180 capsule 0    gabapentin (NEURONTIN) 300 MG capsule Take 1 capsule (300 mg) by mouth every morning. 90 capsule 0    lanreotide acetate (SOMATULINE) 120 MG/0.5ML injection Inject 120 mg subcutaneously every 28 days. Do not start before September 6, 2024. (Patient not taking: Reported on 3/5/2025)      metFORMIN (GLUCOPHAGE) 1000 MG tablet Take 1  "tablet (1,000 mg) by mouth 2 times daily (with meals). 180 tablet 1    methocarbamol (ROBAXIN) 500 MG tablet Take 1 tablet (500 mg) by mouth 4 times daily as needed for muscle spasms.      miconazole (MICATIN) 2 % external cream Apply topically 2 times daily as needed for other (Rash/ skin irritation)      order for DME Oxygen 2 Li/min  at night via nasal cannula 1 Device 0    vitamin B-Complex Take 1 tablet by mouth daily      vitamin C (ASCORBIC ACID) 500 MG tablet Take 500 mg by mouth daily.         ALLERGIES:Patient has no known allergies.    Vitals:  BP (!) 146/86   Pulse 70   Temp 98.2  F (36.8  C)   Resp 18   Ht 1.676 m (5' 6\")   Wt 77.4 kg (170 lb 9.6 oz)   SpO2 94%   BMI 27.54 kg/m    Body mass index is 27.54 kg/m .    Review of Systems   All other systems reviewed and are negative.         Physical Exam  Vitals and nursing note reviewed.   Cardiovascular:      Rate and Rhythm: Normal rate.   Pulmonary:      Effort: Pulmonary effort is normal.   Neurological:      Mental Status: He is alert. Mental status is at baseline.           Labs:     Most Recent 3 CBC's:  Recent Labs   Lab Test 03/01/25  0943 02/27/25  0739 02/26/25  0830   WBC 12.3* 10.5 11.3*   HGB 11.5* 11.6* 12.8*   * 100 99    277 262     Most Recent 3 BMP's:  Recent Labs   Lab Test 03/04/25  0822 03/04/25  0736 03/04/25  0251 03/03/25  0755 03/03/25  0726 03/02/25  0816 03/02/25  0754     --   --   --  137  --  141   POTASSIUM 4.4  --   --   --  4.7  --  4.8   CHLORIDE 103  --   --   --  102  --  105   CO2 22  --   --   --  24  --  24   BUN 18.7  --   --   --  19.7  --  25.3*   CR 1.17  --   --   --  1.29*  --  1.57*   ANIONGAP 12  --   --   --  11  --  12   JESSE 8.9  --   --   --  8.8  --  8.5*   * 159* 151*   < > 144*   < > 163*    < > = values in this interval not displayed.              Assessment/Plan:       (G91.9) Hydrocephalus, unspecified type (H)  Comment: Dilated cerebral ventricles, consider normal " pressure hydrocephalus   Plan: follow up neurology on 4/15     (W19.XXXD) Fall, subsequent encounter  (primary encounter diagnosis)  (M54.6) Acute bilateral thoracic back pain  (S22.079D) Closed fracture of ninth thoracic vertebra with routine healing, unspecified fracture morphology, subsequent encounter  (R29.6) Recurrent falls  (G62.9) Neuropathy  (R20.2) Paresthesias  (M62.81) Muscle weakness (generalized)  (M54.9,  G89.29) Chronic back pain, unspecified back location, unspecified back pain laterality  (M48.02) Cervical stenosis of spinal canal  (R53.81) Physical deconditioning  (R53.1) Generalized weakness  Comment:   -CT thoracic w/o contrast with acute fracture through the anterior osteophyte formation at T9-T10 disc space level extending obliquely through T10 vertebral body to its inferior endplate. No retropulsion or canal compromise.   -TLSO brace for 3 months  -pain service recommended   Oxycodone 5mg q8h prn   Continue robaxin 500mg q6h   Acetaminophen 1000mg bid   Nasal calcitonin x14 days total   Gabapentin 300mg bid   Plan: new referral placed for TLSO to orthotics due to skin breakdown on old TLSO brace     (L89.90) Pressure injury of skin, unspecified injury stage, unspecified location  Comment: Acute  From TLSO brace  Plan: see above  Monitor skin      (M19.90) Osteoarthritis, unspecified osteoarthritis type, unspecified site  Comment: Chronic  Plan: Continue with plan of care no changes at this time, adjustment as needed              (J44.9) Chronic obstructive pulmonary disease, unspecified COPD type (H)  (G47.33) CIERA (obstructive sleep apnea)  Comment: Chronic  Patient does not use CPAP  Continue with Breo Ellipta and Incruse Ellipta   Plan: Continue with plan of care no changes at this time, adjustment as needed        (Z86.711) History of pulmonary embolism  (Z79.01) On anticoagulant therapy  (Z79.01) On apixaban therapy  (N18.31,  D63.1) Anemia due to stage 3a chronic kidney disease  (H)  Comment: Chronic  Baseline Hgb 11-12g/dL   Currently taking apixaban twice daily and iron every other day  Plan: Monitor BMP and CBC     (E11.69) Type 2 diabetes mellitus with other specified complication, without long-term current use of insulin (H)  Comment: Chronic  Currently taking metformin  Plan: Monitor BMP and CBC     (I10) Hypertension, unspecified type  (I50.30) Heart failure with preserved ejection fraction, NYHA class II (H)  Comment:   Currently taking amlodipine  In the hospital lisinopril was held  In the hospital Lasix was decreased to 40 mg daily  Plan: Monitor blood pressure, daily weights, BMP and CBC     (N40.0) Benign prostatic hyperplasia without lower urinary tract symptoms  Comment: Chronic  Taking finasteride   Plan: Continue with plan of care no changes at this time, adjustment as needed           (C79.51) Malignant neoplasm metastatic to bone (H)  (C34.90) Adenocarcinoma of lung, unspecified laterality (H)  (C34.11) Malignant neoplasm of upper lobe of right lung (H)  (Z98.890) History of lung surgery  Comment:   -Hx RUL adenocarcinoma s/p wedge resection   -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

## 2025-03-11 ENCOUNTER — PATIENT OUTREACH (OUTPATIENT)
Dept: CARE COORDINATION | Facility: CLINIC | Age: 80
End: 2025-03-11
Payer: COMMERCIAL

## 2025-03-11 NOTE — PROGRESS NOTES
Clinic Care Coordination Contact    The Community Health Worker planned to call for a Care Coordination outreach to follow up on goals and action steps.     Did Not Contact Patient.    Per Chart Review, patient is currently at a [facility]-     [facility] as of 3/4/25    OMAR Kim  Clinic Care Coordination  [facility]: [facility] [place]  Phone: [phone]

## 2025-03-12 LAB
ERYTHROCYTE [DISTWIDTH] IN BLOOD BY AUTOMATED COUNT: 12.2 % (ref 10–15)
HCT VFR BLD AUTO: 39.6 % (ref 40–53)
HGB BLD-MCNC: 12.4 G/DL (ref 13.3–17.7)
MCH RBC QN AUTO: 32.7 PG (ref 26.5–33)
MCHC RBC AUTO-ENTMCNC: 31.3 G/DL (ref 31.5–36.5)
MCV RBC AUTO: 105 FL (ref 78–100)
PLATELET # BLD AUTO: 448 10E3/UL (ref 150–450)
RBC # BLD AUTO: 3.79 10E6/UL (ref 4.4–5.9)
WBC # BLD AUTO: 10.6 10E3/UL (ref 4–11)

## 2025-03-12 PROCEDURE — 36415 COLL VENOUS BLD VENIPUNCTURE: CPT | Mod: ORL | Performed by: NURSE PRACTITIONER

## 2025-03-12 PROCEDURE — 80048 BASIC METABOLIC PNL TOTAL CA: CPT | Mod: ORL | Performed by: NURSE PRACTITIONER

## 2025-03-12 PROCEDURE — 85027 COMPLETE CBC AUTOMATED: CPT | Mod: ORL | Performed by: NURSE PRACTITIONER

## 2025-03-12 PROCEDURE — P9604 ONE-WAY ALLOW PRORATED TRIP: HCPCS | Mod: ORL | Performed by: NURSE PRACTITIONER

## 2025-03-13 LAB
ANION GAP SERPL CALCULATED.3IONS-SCNC: 13 MMOL/L (ref 7–15)
BUN SERPL-MCNC: 18 MG/DL (ref 8–23)
CALCIUM SERPL-MCNC: 9.7 MG/DL (ref 8.8–10.4)
CHLORIDE SERPL-SCNC: 101 MMOL/L (ref 98–107)
CREAT SERPL-MCNC: 1.18 MG/DL (ref 0.67–1.17)
EGFRCR SERPLBLD CKD-EPI 2021: 62 ML/MIN/1.73M2
GLUCOSE SERPL-MCNC: 156 MG/DL (ref 70–99)
HCO3 SERPL-SCNC: 23 MMOL/L (ref 22–29)
POTASSIUM SERPL-SCNC: 4.9 MMOL/L (ref 3.4–5.3)
SODIUM SERPL-SCNC: 137 MMOL/L (ref 135–145)

## 2025-03-17 ENCOUNTER — PRE VISIT (OUTPATIENT)
Dept: SURGERY | Facility: CLINIC | Age: 80
End: 2025-03-17

## 2025-03-19 ENCOUNTER — TRANSITIONAL CARE UNIT VISIT (OUTPATIENT)
Dept: GERIATRICS | Facility: CLINIC | Age: 80
End: 2025-03-19
Payer: COMMERCIAL

## 2025-03-19 VITALS
WEIGHT: 172.2 LBS | HEIGHT: 66 IN | OXYGEN SATURATION: 94 % | SYSTOLIC BLOOD PRESSURE: 143 MMHG | RESPIRATION RATE: 18 BRPM | HEART RATE: 78 BPM | BODY MASS INDEX: 27.68 KG/M2 | DIASTOLIC BLOOD PRESSURE: 74 MMHG | TEMPERATURE: 97.8 F

## 2025-03-19 DIAGNOSIS — R20.2 PARESTHESIAS: ICD-10-CM

## 2025-03-19 DIAGNOSIS — Z86.711 HISTORY OF PULMONARY EMBOLISM: ICD-10-CM

## 2025-03-19 DIAGNOSIS — Z98.890 HISTORY OF LUNG SURGERY: ICD-10-CM

## 2025-03-19 DIAGNOSIS — S22.079D CLOSED FRACTURE OF NINTH THORACIC VERTEBRA WITH ROUTINE HEALING, UNSPECIFIED FRACTURE MORPHOLOGY, SUBSEQUENT ENCOUNTER: Primary | ICD-10-CM

## 2025-03-19 DIAGNOSIS — N18.31 ANEMIA DUE TO STAGE 3A CHRONIC KIDNEY DISEASE (H): ICD-10-CM

## 2025-03-19 DIAGNOSIS — R53.81 PHYSICAL DECONDITIONING: ICD-10-CM

## 2025-03-19 DIAGNOSIS — M62.81 MUSCLE WEAKNESS (GENERALIZED): ICD-10-CM

## 2025-03-19 DIAGNOSIS — G47.33 OSA (OBSTRUCTIVE SLEEP APNEA): ICD-10-CM

## 2025-03-19 DIAGNOSIS — G91.9 HYDROCEPHALUS, UNSPECIFIED TYPE (H): ICD-10-CM

## 2025-03-19 DIAGNOSIS — Z79.01 ON APIXABAN THERAPY: ICD-10-CM

## 2025-03-19 DIAGNOSIS — C34.90 ADENOCARCINOMA OF LUNG, UNSPECIFIED LATERALITY (H): ICD-10-CM

## 2025-03-19 DIAGNOSIS — E11.69 TYPE 2 DIABETES MELLITUS WITH OTHER SPECIFIED COMPLICATION, WITHOUT LONG-TERM CURRENT USE OF INSULIN (H): ICD-10-CM

## 2025-03-19 DIAGNOSIS — Z79.01 ON ANTICOAGULANT THERAPY: ICD-10-CM

## 2025-03-19 DIAGNOSIS — R53.1 GENERALIZED WEAKNESS: ICD-10-CM

## 2025-03-19 DIAGNOSIS — C79.51 MALIGNANT NEOPLASM METASTATIC TO BONE (H): ICD-10-CM

## 2025-03-19 DIAGNOSIS — R29.6 RECURRENT FALLS: ICD-10-CM

## 2025-03-19 DIAGNOSIS — M54.6 ACUTE BILATERAL THORACIC BACK PAIN: ICD-10-CM

## 2025-03-19 DIAGNOSIS — D63.1 ANEMIA DUE TO STAGE 3A CHRONIC KIDNEY DISEASE (H): ICD-10-CM
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
## 2025-03-19 DIAGNOSIS — I10 HYPERTENSION, UNSPECIFIED TYPE: ICD-10-CM

## 2025-03-19 DIAGNOSIS — K52.9 CHRONIC DIARRHEA: ICD-10-CM

## 2025-03-19 DIAGNOSIS — M54.9 CHRONIC BACK PAIN, UNSPECIFIED BACK LOCATION, UNSPECIFIED BACK PAIN LATERALITY: ICD-10-CM

## 2025-03-19 DIAGNOSIS — J44.9 CHRONIC OBSTRUCTIVE PULMONARY DISEASE, UNSPECIFIED COPD TYPE (H): ICD-10-CM

## 2025-03-19 DIAGNOSIS — M48.02 CERVICAL STENOSIS OF SPINAL CANAL: ICD-10-CM

## 2025-03-19 DIAGNOSIS — C34.11 MALIGNANT NEOPLASM OF UPPER LOBE OF RIGHT LUNG (H): ICD-10-CM

## 2025-03-19 DIAGNOSIS — G89.29 CHRONIC BACK PAIN, UNSPECIFIED BACK LOCATION, UNSPECIFIED BACK PAIN LATERALITY: ICD-10-CM

## 2025-03-19 DIAGNOSIS — L89.102 PRESSURE INJURY OF BACK, STAGE 2 (H): ICD-10-CM

## 2025-03-19 DIAGNOSIS — N40.0 BENIGN PROSTATIC HYPERPLASIA WITHOUT LOWER URINARY TRACT SYMPTOMS: ICD-10-CM

## 2025-03-19 DIAGNOSIS — M19.90 OSTEOARTHRITIS, UNSPECIFIED OSTEOARTHRITIS TYPE, UNSPECIFIED SITE: ICD-10-CM

## 2025-03-19 DIAGNOSIS — G62.9 NEUROPATHY: ICD-10-CM

## 2025-03-19 DIAGNOSIS — I50.30 HEART FAILURE WITH PRESERVED EJECTION FRACTION, NYHA CLASS II (H): ICD-10-CM

## 2025-03-19 PROCEDURE — 99309 SBSQ NF CARE MODERATE MDM 30: CPT | Performed by: NURSE PRACTITIONER

## 2025-03-19 NOTE — PROGRESS NOTES
"[facility]    Chief Complaint   Patient presents with    RECHECK     HPI:  Nahun Villalobos is a 79 year old  (1945), who is being seen today for an episodic care visit at: [facility] ([facility]) [[id number]].     Pt would like to go home next week rather than 4/3/2025 when therapy has recommended -therapy to follow-up  Weight has been stable  Blood pressure stable  Wearing TLSO brace has follow-up for new brace on 3/24/2025  Per nursing Patient requires Max x1 with bathing, lower body dressing, and bed mobility. Min Ax1 with upper body dressing. Max Ax2 with toileting and transfers. Setup with meals and he is able to eat independently. SBA with grooming and oral cares wile seated. He uses a bedside commode. Uses a wheelchair for locomotion and requires assistance to propel      Allergies, and PMH/PSH reviewed in [facility] today.  REVIEW OF SYSTEMS:  10 point ROS of systems including Constitutional, Eyes, Respiratory, Cardiovascular, Gastroenterology, Genitourinary, Integumentary, Musculoskeletal, Psychiatric were all negative except for pertinent positives noted in my HPI.    Objective:   BP (!) 143/74   Pulse 78   Temp 97.8  F (36.6  C)   Resp 18   Ht 1.676 m (5' 6\")   Wt 78.1 kg (172 lb 3.2 oz)   SpO2 94%   BMI 27.79 kg/m    GENERAL APPEARANCE:  Alert, in no distress  RESP:  lungs clear to auscultation , no respiratory distress  CV:  rate-normal  PSYCH:  oriented X 3    Most Recent 3 CBC's:  Recent Labs   Lab Test 03/12/25  0943 03/01/25  0943 02/27/25  0739   WBC 10.6 12.3* 10.5   HGB 12.4* 11.5* 11.6*   * 102* 100    278 277     Most Recent 3 BMP's:  Recent Labs   Lab Test 03/12/25  0943 03/04/25  0822 03/04/25  0736 03/03/25  0755 03/03/25  0726    137  --   --  137   POTASSIUM 4.9 4.4  --   --  4.7   CHLORIDE 101 103  --   --  102   CO2 23 22  --   --  24   BUN 18.0 18.7  --   --  19.7   CR 1.18* 1.17  --   --  1.29*   ANIONGAP 13 12  --   --  11   JESSE 9.7 " 8.9  --   --  8.8   * 171* 159*   < > 144*    < > = values in this interval not displayed.       Assessment/Plan:       (G91.9) Hydrocephalus, unspecified type (H)  Comment: Dilated cerebral ventricles, consider normal pressure hydrocephalus   Refused SLUMS in TCU   Plan: Referral to neurology placed from hospital     (W19.XXXD) Fall, subsequent encounter  (primary encounter diagnosis)  (M54.6) Acute bilateral thoracic back pain  (S22.079D) Closed fracture of ninth thoracic vertebra with routine healing, unspecified fracture morphology, subsequent encounter  (R29.6) Recurrent falls  (G62.9) Neuropathy  (R20.2) Paresthesias  (M62.81) Muscle weakness (generalized)  (M54.9,  G89.29) Chronic back pain, unspecified back location, unspecified back pain laterality  (M48.02) Cervical stenosis of spinal canal  (R53.81) Physical deconditioning  (R53.1) Generalized weakness  Comment:   -CT thoracic w/o contrast with acute fracture through the anterior osteophyte formation at T9-T10 disc space level extending obliquely through T10 vertebral body to its inferior endplate. No retropulsion or canal compromise.   -TLSO brace for 3 months  -pain service recommended   Acetaminophen 1000mg bid   Gabapentin 300mg bid   Plan: Discontinue robaxin         (L89.90) Pressure injury of skin, unspecified injury stage, unspecified location  Comment: healing  From TLSO brace  Plan: Monitor skin  Refer to nursing for wound care     (M19.90) Osteoarthritis, unspecified osteoarthritis type, unspecified site  Comment: Chronic  Plan: Continue with plan of care no changes at this time, adjustment as needed              (J44.9) Chronic obstructive pulmonary disease, unspecified COPD type (H)  (G47.33) CIERA (obstructive sleep apnea)  Comment: Chronic  Patient does not use CPAP  Continue with Breo Ellipta and Incruse Ellipta   Plan: Continue with plan of care no changes at this time, adjustment as needed        (Z86.711) History of pulmonary  embolism  (Z79.01) On anticoagulant therapy  (Z79.01) On apixaban therapy  (N18.31,  D63.1) Anemia due to stage 3a chronic kidney disease (H)  Comment: Chronic  Baseline Hgb 11-12g/dL   Currently taking apixaban twice daily and iron every other day  Plan: could consider stopping iron       (E11.69) Type 2 diabetes mellitus with other specified complication, without long-term current use of insulin (H)  Comment: Chronic  Currently taking metformin  Plan: Continue with plan of care no changes at this time, adjustment as needed       (I10) Hypertension, unspecified type  (I50.30) Heart failure with preserved ejection fraction, NYHA class II (H)  Comment:   Currently taking amlodipine  In the hospital lisinopril was held  In the hospital Lasix was decreased to 40 mg daily  Plan:Continue with plan of care no changes at this time, adjustment as needed       (N40.0) Benign prostatic hyperplasia without lower urinary tract symptoms  Comment: Chronic  Taking finasteride   Plan: Continue with plan of care no changes at this time, adjustment as needed           (C79.51) Malignant neoplasm metastatic to bone (H)  (C34.90) Adenocarcinoma of lung, unspecified laterality (H)  (C34.11) Malignant neoplasm of upper lobe of right lung (H)  (Z98.890) History of lung surgery  Comment:   -Hx RUL adenocarcinoma s/p wedge resection   -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

## 2025-03-20 NOTE — PROGRESS NOTES
[facility]   2025     Name: Nahun Villalobos   : 1945       Orders:  Discontinue methocarbamol   BMP and CBC next lab day.   Dx anemia     Electronically signed by     SELMA Mccall CNP on 3/20/2025 at 5:42 AM

## 2025-03-21 ENCOUNTER — LAB REQUISITION (OUTPATIENT)
Dept: LAB | Facility: CLINIC | Age: 80
End: 2025-03-21
Payer: COMMERCIAL

## 2025-03-21 DIAGNOSIS — D64.9 ANEMIA, UNSPECIFIED: ICD-10-CM

## 2025-03-24 ENCOUNTER — TRANSITIONAL CARE UNIT VISIT (OUTPATIENT)
Dept: GERIATRICS | Facility: CLINIC | Age: 80
End: 2025-03-24
Payer: COMMERCIAL

## 2025-03-24 VITALS
HEIGHT: 66 IN | HEART RATE: 62 BPM | DIASTOLIC BLOOD PRESSURE: 58 MMHG | SYSTOLIC BLOOD PRESSURE: 117 MMHG | RESPIRATION RATE: 18 BRPM | OXYGEN SATURATION: 93 % | BODY MASS INDEX: 27.37 KG/M2 | TEMPERATURE: 97.9 F | WEIGHT: 170.3 LBS

## 2025-03-24 DIAGNOSIS — M54.6 ACUTE BILATERAL THORACIC BACK PAIN: ICD-10-CM

## 2025-03-24 DIAGNOSIS — S22.079D CLOSED FRACTURE OF NINTH THORACIC VERTEBRA WITH ROUTINE HEALING, UNSPECIFIED FRACTURE MORPHOLOGY, SUBSEQUENT ENCOUNTER: Primary | ICD-10-CM

## 2025-03-24 DIAGNOSIS — C79.51 MALIGNANT NEOPLASM METASTATIC TO BONE (H): ICD-10-CM

## 2025-03-24 DIAGNOSIS — G62.9 NEUROPATHY: ICD-10-CM

## 2025-03-24 DIAGNOSIS — E11.69 TYPE 2 DIABETES MELLITUS WITH OTHER SPECIFIED COMPLICATION, WITHOUT LONG-TERM CURRENT USE OF INSULIN (H): ICD-10-CM

## 2025-03-24 DIAGNOSIS — K52.9 CHRONIC DIARRHEA: ICD-10-CM

## 2025-03-24 DIAGNOSIS — I50.30 HEART FAILURE WITH PRESERVED EJECTION FRACTION, NYHA CLASS II (H): ICD-10-CM

## 2025-03-24 DIAGNOSIS — J44.9 CHRONIC OBSTRUCTIVE PULMONARY DISEASE, UNSPECIFIED COPD TYPE (H): ICD-10-CM

## 2025-03-24 DIAGNOSIS — C7A.019 MALIGNANT CARCINOID TUMOR OF SMALL INTESTINE, UNSPECIFIED LOCATION (H): ICD-10-CM
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
## 2025-03-24 DIAGNOSIS — M62.81 MUSCLE WEAKNESS (GENERALIZED): ICD-10-CM

## 2025-03-24 LAB
ANION GAP SERPL CALCULATED.3IONS-SCNC: 14 MMOL/L (ref 7–15)
BUN SERPL-MCNC: 14.1 MG/DL (ref 8–23)
CALCIUM SERPL-MCNC: 9.1 MG/DL (ref 8.8–10.4)
CHLORIDE SERPL-SCNC: 102 MMOL/L (ref 98–107)
CREAT SERPL-MCNC: 1.02 MG/DL (ref 0.67–1.17)
EGFRCR SERPLBLD CKD-EPI 2021: 74 ML/MIN/1.73M2
ERYTHROCYTE [DISTWIDTH] IN BLOOD BY AUTOMATED COUNT: 12.1 % (ref 10–15)
GLUCOSE SERPL-MCNC: 158 MG/DL (ref 70–99)
HCO3 SERPL-SCNC: 22 MMOL/L (ref 22–29)
HCT VFR BLD AUTO: 38.9 % (ref 40–53)
HGB BLD-MCNC: 12.1 G/DL (ref 13.3–17.7)
MCH RBC QN AUTO: 33 PG (ref 26.5–33)
MCHC RBC AUTO-ENTMCNC: 31.1 G/DL (ref 31.5–36.5)
MCV RBC AUTO: 106 FL (ref 78–100)
PLATELET # BLD AUTO: 316 10E3/UL (ref 150–450)
POTASSIUM SERPL-SCNC: 4.9 MMOL/L (ref 3.4–5.3)
RBC # BLD AUTO: 3.67 10E6/UL (ref 4.4–5.9)
SODIUM SERPL-SCNC: 138 MMOL/L (ref 135–145)
WBC # BLD AUTO: 9.3 10E3/UL (ref 4–11)

## 2025-03-24 PROCEDURE — P9604 ONE-WAY ALLOW PRORATED TRIP: HCPCS | Mod: ORL | Performed by: NURSE PRACTITIONER

## 2025-03-24 PROCEDURE — 99305 1ST NF CARE MODERATE MDM 35: CPT | Performed by: INTERNAL MEDICINE

## 2025-03-24 PROCEDURE — 36415 COLL VENOUS BLD VENIPUNCTURE: CPT | Mod: ORL | Performed by: NURSE PRACTITIONER

## 2025-03-24 PROCEDURE — 80048 BASIC METABOLIC PNL TOTAL CA: CPT | Mod: ORL | Performed by: NURSE PRACTITIONER

## 2025-03-24 PROCEDURE — 85027 COMPLETE CBC AUTOMATED: CPT | Mod: ORL | Performed by: NURSE PRACTITIONER

## 2025-03-24 NOTE — PROGRESS NOTES
"Nahun Villalobos is a 79 year old male seen March 24, 2025 where he was admitted after [facility] hospitalization 2/24 to 3/4 following a fall at his doctor's office   In the ED he was found to be in SALLIE, and severe thoracic back pain with BUE paraesthesias.  Imaging showed acute DISCH fracture T9-10, anterior osteophyte fracture and disruption of the anterior longitudinal ligament.  He was seen by NS and a custom TLSO was placed to worn for 3 months.  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     Bilateral platform walker   \"I have cancer and neuropathy\"  Breathing is okay so far.    Reports he is doing very well with therapies and plans to discharge home on Thursday this week.      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; he is treated with lanreotide     Past Medical History:   Diagnosis Date    Antiplatelet or antithrombotic long-term use     Arthritis     CHF (congestive heart failure) (H) 09/16/2020    COPD (chronic obstructive pulmonary disease) (H)     DM (diabetes mellitus) (H)     Dyspnea on exertion     Essential hypertension, benign     Hemorrhage of rectum and anus     Non-small cell lung cancer (H)     Obesity     Personal history of colonic polyps     Sleep apnea     Thrombosis     Tobacco use disorder     Urinary retention     Walking troubles        Past Surgical History:   Procedure Laterality Date    ABDOMEN SURGERY  1995    APPENDECTOMY      BONE EXOSTOSIS EXCISION Bilateral 9/20/2022    Procedure: EXOSTECTOMIES BOTH 5TH DIGITS WITH SOFT TISSUE RELEASE;  Surgeon: Tong Gray DPM;  " Location: [facility]    CHOLECYSTECTOMY      COLONOSCOPY      CYSTOSCOPY, TRANSURETHRAL RESECTION (TUR) PROSTATE, COMBINED N/A 2018    Procedure: COMBINED CYSTOSCOPY, TRANSURETHRAL RESECTION (TUR) PROSTATE;  Cystoscopy, Transurethral Resection Of The Prostate;  Surgeon: Praveena Burris MD;  Location: [facility] OR    IR LUNG BIOPSY MEDIASTINUM RIGHT  2016    WEDGE RESECTION      lung    ZZC NONSPECIFIC PROCEDURE      cholecystectomy       Family History   Problem Relation Age of Onset    Hypertension Mother     C.A.D. Mother         ANEURYSM    Cancer - colorectal Mother     Cancer Mother         OVARIAN    Other Cancer Mother     Hypertension Sister     Breast Cancer Sister     Cerebrovascular Disease Father     Hypertension Sister     Breast Cancer Sister     Glaucoma No family hx of     Macular Degeneration No family hx of        Social History     Tobacco Use    Smoking status: Former     Current packs/day: 0.00     Average packs/day: 2.0 packs/day for 53.0 years (106.0 ttl pk-yrs)     Types: Cigarettes, Cigars     Start date: 1960     Quit date: 2013     Years since quittin.8     Passive exposure: Past    Smokeless tobacco: Never    Tobacco comments:     Quit, will never start again.   Substance Use Topics    Alcohol use: No      SH: Lives with [place] girlfriend Kenia, 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     Review Of Systems  Skin: negative   Eyes: impaired vision  Ears/Nose/Throat: hearing loss  Respiratory: dyspnea on exertion, cough  Cardiovascular: lower extremity edema and exercise intolerance  Gastrointestinal: diarrhea  Genitourinary: negative  Musculoskeletal: generalized weakness, fall risk   Ambulates with platform walker  Needs assist with dressing and hygiene  Neurologic: paresthesias, neuropathy  Psychiatric:  "negative  Hematologic/Lymphatic/Immunologic: negative  Endocrine: diabetes      GENERAL APPEARANCE: alert and no distress, wearing TLSO brace.   /58   Pulse 62   Temp 97.9  F (36.6  C)   Resp 18   Ht 1.676 m (5' 6\")   Wt 77.2 kg (170 lb 4.8 oz)   SpO2 93%   BMI 27.49 kg/m     HEENT: normocephalic, no lesion or abnormalities  NECK: no adenopathy, no asymmetry, masses, or scars and thyroid normal to palpation  RESP: decreased BS  CV: regular rate and rhythm, normal S1 S2  ABDOMEN:  soft, nontender, no HSM or masses and bowel sounds normal  MS: extremities with 1+ edema    SKIN: no suspicious lesions or rashes  NEURO: Normal strength and tone, sensory exam grossly normal, and speech normal  PSYCH: affect okay, pleasant and chatty   LYMPHATICS: No cervical,  or supraclavicular nodes     Lab Results   Component Value Date     03/24/2025    POTASSIUM 4.9 03/24/2025    CHLORIDE 102 03/24/2025    CO2 22 03/24/2025    ANIONGAP 14 03/24/2025     (H) 03/24/2025    BUN 14.1 03/24/2025    CR 1.02 03/24/2025    GFRESTIMATED 74 03/24/2025    JESSE 9.1 03/24/2025     Lab Results   Component Value Date    AST 16 12/02/2024      ALBUMIN 3.6 12/02/2024      ALKPHOS 58 12/02/2024     Lab Results   Component Value Date    WBC 9.3 03/24/2025      HGB 12.1 03/24/2025       03/24/2025       03/24/2025     TSH   Date Value Ref Range Status   03/03/2024 0.98 0.30 - 4.20 uIU/mL Final   02/10/2022 2.47 0.40 - 4.00 mU/L Final   08/28/2019 2.88 0.40 - 4.00 mU/L Final     CT THORACIC SPINE WITHOUT CONTRAST 2/26/2025  FINDINGS: There is good anatomic alignment of the thoracic spine. The vertebral body heights are well-maintained throughout. There is a fracture through the anterior osteophyte formation of the T9-T10 disc space level that that extends obliquely through the T10 vertebral body to its inferior endplate. The posterior elements are intact. There is no evidence of retropulsion to lead to canal " compromise. No other acute thoracic spine fracture is visualized. There are diffuse endplate changes and anterior osteophyte formations throughout the thoracic region. There is no significant canal compromise or significant neural foraminal narrowing throughout the thoracic line. The paraspinal soft tissues are unremarkable. The lungs visualized on this study are clear.                  IMPRESSION:  1.  Acute fracture through the anterior osteophyte formation at the T9-T10 disc space level extending obliquely through the T10 vertebral body to its inferior endplate.  2.  No evidence of retropulsion or canal compromise.  3.  No significant canal compromise or neural foraminal narrowing throughout thoracic spine.     CT HEAD W/O CONTRAST   2/24/2025  INTRACRANIAL CONTENTS: No intracranial hemorrhage, extraaxial collection, or mass effect.  No CT evidence of acute infarct. Moderate presumed chronic small vessel ischemic changes. Similar dilated ventricular system with acute callosal angle.                                                          IMPRESSION:  1.  No acute traumatic intracranial abnormality.  2.  Dilated ventricular system in a configuration that could represent normal pressure hydrocephalus     MR THORACIC SPINE W/O and W CONTRAST, MR LUMBAR SPINE W/O and W CONTRAST  2/25/2025   1.  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 As this is an unstable fracture in a rigid spine neurosurgical consultation is recommended.  2.  No traumatic subluxation or high-grade canal stenosis.  3.  Mild lumbar spondylosis, worse at L4-L5 where there is severe left lateral recess stenosis and severe left foraminal stenosis. Correlate for left L4 and L5 radiculopathy.  4.  Advanced multilevel cervical spondylitic changes with multilevel canal flattening and canal stenoses, suboptimally assessed on the current examination. Consider dedicated MRI cervical spine to further  evaluate.      IMP/PLAN:   (S22.079D) Closed fracture of ninth thoracic vertebra with routine healing, unspecified fracture morphology, subsequent encounter    (M54.6) Acute bilateral thoracic back pain  Comment: injuries as above   Plan: TLSO x3 months, follow up with Neurosurgery   Has an appointment with Orthotist today to try and make TLSO more comfortable to wear.      Pain management with acetaminophen 1000 mg bid and PRN   Nasal calcitonin x14 days     Gabapentin 300 mg bid     (W19.XXXD) Fall, subsequent encounter  (M62.81) Muscle weakness (generalized)  Comment: continued fall risk   Plan: PHYSICAL THERAPY /OCCUPATIONAL THERAPY for strengthening, balance, gait, ADLs.   Discharge goal is return home with family support   Hopes to be discharged later this week.      (G62.9) Neuropathy  Comment: by hx  Plan: gabapentin 300 mg bid     H/o PE   Plan: Apixaban 2.5 mg bid     (I50.30) Heart failure with preserved ejection fraction, NYHA class II (H)  Comment: HTN    BP Readings from Last 3 Encounters:   03/24/25 117/58   03/19/25 (!) 143/74   03/10/25 (!) 146/86      Plan: amlodipine 5 mg/ day   Furosemide 40 mg/day   Lisinopril held in hospital   Follow exam, bps and BMP     (E11.69) Type 2 diabetes mellitus with other specified complication, without long-term current use of insulin (H)  Comment:   Lab Results   Component Value Date    A1C 7.0 12/02/2024     Plan: metformin 1000 mg bid   BGM prn     (J44.9) Chronic obstructive pulmonary disease, unspecified COPD type (H)  Comment: STACY, cough   Does not use CPAP for CIERA     Plan: Trelegy Ellipta daily   Albuterol MDI prn     (C7A.019) Malignant carcinoid tumor of small intestine, unspecified location (H)  (C79.51) Malignant neoplasm metastatic to bone (H)  Comment: and liver mets   Plan: lanreotide 120 mg subcutaneous monthly   Follow up with Oncology as scheduled     (K52.9) Chronic diarrhea  Comment: may be related to carcinoid, meds   Plan: cholestyramine 4g  bid   Will hold Fe while in TCU      Lexi Gtz MD

## 2025-03-26 ENCOUNTER — DISCHARGE SUMMARY NURSING HOME (OUTPATIENT)
Dept: GERIATRICS | Facility: CLINIC | Age: 80
End: 2025-03-26
Payer: COMMERCIAL

## 2025-03-26 VITALS
SYSTOLIC BLOOD PRESSURE: 127 MMHG | DIASTOLIC BLOOD PRESSURE: 68 MMHG | BODY MASS INDEX: 27.48 KG/M2 | HEART RATE: 68 BPM | TEMPERATURE: 97.9 F | WEIGHT: 171 LBS | RESPIRATION RATE: 18 BRPM | OXYGEN SATURATION: 97 % | HEIGHT: 66 IN

## 2025-03-26 DIAGNOSIS — I50.22 CHRONIC SYSTOLIC CONGESTIVE HEART FAILURE (H): ICD-10-CM

## 2025-03-26 DIAGNOSIS — K52.9 CHRONIC DIARRHEA: ICD-10-CM

## 2025-03-26 DIAGNOSIS — I12.9 CHRONIC KIDNEY DISEASE DUE TO HYPERTENSION: ICD-10-CM

## 2025-03-26 DIAGNOSIS — I10 ESSENTIAL HYPERTENSION, BENIGN: ICD-10-CM

## 2025-03-26 DIAGNOSIS — G47.33 OBSTRUCTIVE SLEEP APNEA SYNDROME: ICD-10-CM

## 2025-03-26 DIAGNOSIS — D50.8 OTHER IRON DEFICIENCY ANEMIA: ICD-10-CM

## 2025-03-26 DIAGNOSIS — J44.9 CHRONIC OBSTRUCTIVE PULMONARY DISEASE, UNSPECIFIED COPD TYPE (H): Chronic | ICD-10-CM

## 2025-03-26 DIAGNOSIS — E11.69 TYPE 2 DIABETES MELLITUS WITH OTHER SPECIFIED COMPLICATION, WITHOUT LONG-TERM CURRENT USE OF INSULIN (H): ICD-10-CM

## 2025-03-26 DIAGNOSIS — C7A.019 MALIGNANT CARCINOID TUMOR OF SMALL INTESTINE, UNSPECIFIED LOCATION (H): ICD-10-CM

## 2025-03-26 DIAGNOSIS — S22.008S OTHER CLOSED FRACTURE OF THORACIC VERTEBRA, UNSPECIFIED THORACIC VERTEBRAL LEVEL, SEQUELA: Primary | ICD-10-CM

## 2025-03-26 DIAGNOSIS — Z86.711 HISTORY OF PULMONARY EMBOLISM: ICD-10-CM

## 2025-03-26 DIAGNOSIS — M62.81 GENERALIZED MUSCLE WEAKNESS: ICD-10-CM

## 2025-03-26 PROBLEM — R05.9 COUGH: Status: ACTIVE | Noted: 2025-03-26

## 2025-03-26 PROBLEM — D50.9 IRON DEFICIENCY ANEMIA: Status: ACTIVE | Noted: 2025-02-24

## 2025-03-26 PROBLEM — G62.9 PERIPHERAL NERVE DISEASE: Status: ACTIVE | Noted: 2025-02-24

## 2025-03-26 PROBLEM — M47.815: Status: ACTIVE | Noted: 2024-07-09

## 2025-03-26 PROBLEM — R19.7 DIARRHEA: Status: ACTIVE | Noted: 2024-07-09

## 2025-03-26 PROBLEM — Z90.49 ACQUIRED ABSENCE OF OTHER SPECIFIED PARTS OF DIGESTIVE TRACT: Status: ACTIVE | Noted: 2024-07-09

## 2025-03-26 PROBLEM — I50.9 CHF (CONGESTIVE HEART FAILURE) (H): Status: ACTIVE | Noted: 2020-09-16

## 2025-03-26 PROBLEM — M47.819 SPONDYLOSIS WITHOUT MYELOPATHY: Status: ACTIVE | Noted: 2024-07-09

## 2025-03-26 PROBLEM — D64.9 ANEMIA: Status: ACTIVE | Noted: 2024-07-09

## 2025-03-26 PROBLEM — J96.11 CHRONIC RESPIRATORY FAILURE WITH HYPOXIA (H): Status: ACTIVE | Noted: 2023-03-25

## 2025-03-26 PROBLEM — S30.92XA: Status: ACTIVE | Noted: 2024-07-09

## 2025-03-26 PROBLEM — D49.1: Status: ACTIVE | Noted: 2024-07-09

## 2025-03-26 PROBLEM — N40.0 BENIGN PROSTATIC HYPERPLASIA WITHOUT LOWER URINARY TRACT SYMPTOMS: Status: ACTIVE | Noted: 2024-07-09

## 2025-03-26 PROCEDURE — 99315 NF DSCHRG MGMT 30 MIN/LESS: CPT | Performed by: NURSE PRACTITIONER

## 2025-03-26 NOTE — LETTER
3/26/2025      Nahun Villalobos  [address] [facility] [facility] [address]                                                                                                                     [facility]      Code Status:  DNR  Visit Type: Discharge Summary Nursing Home     Facility:   [facility] (U) [[id number]]  PCP:  Olegario Castillo  [phone]       Admission Date to our Facility: 3/4/2025  Discharge Date from our Facility: 3/27/2025          History of Present Illness: Nahun Villalobos is a 79 year old male with a past medical history for HFpEF, HTN, DM2, COPD, hx of PE on Eliquis, 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  NSCLC s/p wedge resection, CIERA.  He was recently hospitalized after a fall at his doctor's office.  He was found to have acute anterior osteophyte formation at T9-T10 disc space level extending obliquely through T10 vertebral body to its inferior endplate, acute DISH fracture at T10, left L4-L5 radiculopathy.  Was seen by NSG and ordered TLSO for 3 months.      Incidentally found dilated cerebral ventricles with concern for normal pressure hydrocephalus, neurology referral was placed.      Had leukocytosis resolved and no s/s of infection.     Skilled Nursing Facility Course:  Therapy recommending patient stay in TCU for another 1-2 weeks due to high risk for falls needing increase strengthening.  Patient adamant about discharging tomorrow.  Ambulating with 4WW and SBA.  Needing cues to keep eyes forward when ambulating.      Discharge Diagnosis:    acute fracture s/p mechanical fall  Acute mid-thoracic region back pain in setting above   History of DJD  Pain is controlled, continue with  Advised patient that I was stopping his scheduled apap and he should use prn apap.  He has PTA gabapentin.  Advised patient to keep moving and follow up with NSG on 4/15.    Continue to wear TLSO when out of bed.      COPD  CIERA  Does  not consistently wear CPAP.   -Duonebs PRN available.   -Continue PTA Breo Ellipta and Incruse Ellipta.      Hx PE on AC  Anemia, chronic, normocytic   Continue with PTA Eliquis  Continue with iron supplement  Last hgb 12.1 which is his baseline.      T2DM w/ hyperglycemia A1C 7% on 12/2/2024.  A1c 7%  Controlled, continue with metformin     HTN  HFpEF, chronic compensated  BP controlled, continue with amlodipine , no longer on Lisinopril.    Compensated, continue with Lasix.    Renal function stable      BPH  -Continue with PTA finasteride     Diarrhea  Controlled, Continue with PTA cholestyramine.      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         Discharge Plan:  Would advise patient to stay in TCU however he is adamant to discharge.  Will discharge home with home PT/OT. Follow up with PCP and NSG.      Discharge Medications:   Current Outpatient Medications   Medication Sig Dispense Refill     acetaminophen (TYLENOL) 500 MG tablet Take 2 tablets (1,000 mg) by mouth 2 times daily as needed for mild pain.       acetaminophen (TYLENOL) 500 MG tablet Take 1,000 mg by mouth 2 times daily.       albuterol (VENTOLIN HFA) 108 (90 Base) MCG/ACT inhaler INHALE 2 PUFFS INTO THE LUNGS EVERY 6 HOURS AS NEEDED FOR SHORTNESS OF BREATH / DYSPNEA OR WHEEZING 25.5 g 0     amLODIPine (NORVASC) 5 MG tablet Take 1 tablet (5 mg) by mouth daily. 90 tablet 3     apixaban ANTICOAGULANT (ELIQUIS) 2.5 MG tablet Take 1 tablet (2.5 mg) by mouth 2 times daily. 90 tablet 1     atorvastatin (LIPITOR) 20 MG tablet Take 1 tablet (20 mg) by mouth daily. 90 tablet 2     blood glucose (NO BRAND SPECIFIED) lancets standard Use to test blood sugar 1 time daily, first thing in the morning 50 each 3     calcium carbonate (TUMS) 500 MG chewable tablet Take 1 chew tab by mouth daily as needed for heartburn.       cholestyramine light (QUESTRAN) 4 GM packet Take 4 g by mouth 2 times daily (with  "meals).       Coenzyme Q10 400 MG CAPS Take 400 mg by mouth daily 30 capsule 0     ferrous sulfate (FE TABS) 325 (65 Fe) MG EC tablet Take 1 tablet (325 mg) by mouth every evening 30 tablet 0     finasteride (PROSCAR) 5 MG tablet Take 1 tablet (5 mg) by mouth daily 90 tablet 3     Fluticasone-Umeclidin-Vilant (TRELEGY ELLIPTA) 100-62.5-25 MCG/ACT oral inhaler USE 1 INHALATION DAILY 60 each 5     furosemide (LASIX) 40 MG tablet Take 1 tablet (40 mg) by mouth daily.       gabapentin (NEURONTIN) 300 MG capsule Take 1 capsule (300 mg) by mouth at bedtime. 180 capsule 0     gabapentin (NEURONTIN) 300 MG capsule Take 1 capsule (300 mg) by mouth every morning. 90 capsule 0     lanreotide acetate (SOMATULINE) 120 MG/0.5ML injection Inject 120 mg subcutaneously every 28 days.       metFORMIN (GLUCOPHAGE) 1000 MG tablet Take 1 tablet (1,000 mg) by mouth 2 times daily (with meals). 180 tablet 1     miconazole (MICATIN) 2 % external cream Apply topically 2 times daily as needed for other (Rash/ skin irritation)       order for DME Oxygen 2 Li/min  at night via nasal cannula 1 Device 0     vitamin B-Complex Take 1 tablet by mouth daily       vitamin C (ASCORBIC ACID) 500 MG tablet Take 500 mg by mouth daily.         Review of Systems   Patient denies fever, chills, headache, lightheadedness, dizziness, rhinorrhea, cough, congestion, shortness of breath, chest pain, palpitations, abdominal pain, n/v, diarrhea, constipation, change in appetite, change in sleep pattern, dysuria, frequency, burning or pain with urination.  Other than stated in HPI all other review of systems is negative.       Physical Exam  Vital signs:/68   Pulse 68   Temp 97.9  F (36.6  C)   Resp 18   Ht 1.676 m (5' 6\")   Wt 77.6 kg (171 lb)   SpO2 97%   BMI 27.60 kg/m     GENERAL APPEARANCE: Well developed, well nourished, in no acute distress.  HEENT: normocephalic, atraumatic   sclerae anicteric, conjunctivae clear and moist, EOM intact  LUNGS: " respiratory effort normal.  CARD: RRR, S1, S2, without murmurs, gallops, rubs  ABD: Soft, nondistended and nontender with normal bowel sounds.   MSK: Muscle strength and tone were equal bilaterally. Moves all extremities easily and intentionally.   EXTREMITIES: No cyanosis, clubbing or edema.  NEURO: Alert and oriented x 3.Face is symmetric.  SKIN: Inspection of the skin reveals no rashes, ulcerations or petechiae.  PSYCH: euthymic        Labs:    Last Comprehensive Metabolic Panel:  Lab Results   Component Value Date     03/24/2025    POTASSIUM 4.9 03/24/2025    CHLORIDE 102 03/24/2025    CO2 22 03/24/2025    ANIONGAP 14 03/24/2025     (H) 03/24/2025    BUN 14.1 03/24/2025    CR 1.02 03/24/2025    GFRESTIMATED 74 03/24/2025    JESSE 9.1 03/24/2025       Lab Results   Component Value Date    WBC 9.3 03/24/2025    WBC 7.4 05/27/2021     Lab Results   Component Value Date    RBC 3.67 03/24/2025    RBC 4.02 05/27/2021     Lab Results   Component Value Date    HGB 12.1 03/24/2025    HGB 13.2 05/27/2021     Lab Results   Component Value Date    HCT 38.9 03/24/2025    HCT 41.2 05/27/2021     Lab Results   Component Value Date     03/24/2025     05/27/2021     Lab Results   Component Value Date    MCH 33.0 03/24/2025    MCH 32.8 05/27/2021     Lab Results   Component Value Date    MCHC 31.1 03/24/2025    MCHC 32.0 05/27/2021     Lab Results   Component Value Date    RDW 12.1 03/24/2025    RDW 12.0 05/27/2021     Lab Results   Component Value Date     03/24/2025     05/27/2021           MEDICAL EQUIPMENT NEEDS:  NA      DISCHARGE PLAN/FACE TO FACE:  I certify that services are/were furnished while this patient was under the care of a physician and that a physician or an allowed non-physician practitioner (NPP), had a face-to-face encounter that meets the physician face-to-face encounter requirements. The encounter was in whole, or in part, related to the primary reason for home health.  The patient is confined to his/her home and needs intermittent skilled nursing, physical therapy, speech-language pathology, or the continued need for occupational therapy. A plan of care has been established by a physician and is periodically reviewed by a physician.    I certify that this patient is under my care and that I, or a nurse practitioner or physician's assistant working with me, had a face-to-face encounter that meets the physician face-to-face encounter requirements with this patient.   Date of Face-to-Face Encounter: 3/26/2025    I certify that, based on my findings, the following services are medically necessary home health services:  PT/OT    My clinical findings support the need for the above skilled services because:  PT/OT for ongoing strengthening and endurance    This patient is homebound because:  He requires maximum effort in order to get out into the community on a regular basis.     The patient is, or has been, under my care and I have initiated the establishment of the plan of care. This patient will be followed by a physician who will periodically review the plan of care.        Electronically signed by: Shona Tripathi NP                                                                              Sincerely,        Shona Tripathi NP    Electronically signed

## 2025-03-26 NOTE — PROGRESS NOTES
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  NSCLC s/p wedge resection, CIERA.  He was recently hospitalized after a fall at his doctor's office.  He was found to have acute anterior osteophyte formation at T9-T10 disc space level extending obliquely through T10 vertebral body to its inferior endplate, acute DISH fracture at T10, left L4-L5 radiculopathy.  Was seen by NSG and ordered TLSO for 3 months.      Incidentally found dilated cerebral ventricles with concern for normal pressure hydrocephalus, neurology referral was placed.      Had leukocytosis resolved and no s/s of infection.     Skilled Nursing Facility Course:  Therapy recommending patient stay in TCU for another 1-2 weeks due to high risk for falls needing increase strengthening.  Patient adamant about discharging tomorrow.  Ambulating with 4WW and SBA.  Needing cues to keep eyes forward when ambulating.      Discharge Diagnosis:    acute fracture s/p mechanical fall  Acute mid-thoracic region back pain in setting above   History of DJD  Pain is controlled, continue with  Advised patient that I was stopping his scheduled apap and he should use prn apap.  He has PTA gabapentin.  Advised patient to keep moving and follow up with NSG on 4/15.    Continue to wear TLSO when out of bed.      COPD  CIERA  Does not consistently wear CPAP.   -Duonebs PRN available.   -Continue PTA Breo  Ellipta and Incruse Ellipta.      Hx PE on AC  Anemia, chronic, normocytic   Continue with PTA Eliquis  Continue with iron supplement  Last hgb 12.1 which is his baseline.      T2DM w/ hyperglycemia A1C 7% on 12/2/2024.  A1c 7%  Controlled, continue with metformin     HTN  HFpEF, chronic compensated  BP controlled, continue with amlodipine , no longer on Lisinopril.    Compensated, continue with Lasix.    Renal function stable      BPH  -Continue with PTA finasteride     Diarrhea  Controlled, Continue with PTA cholestyramine.      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         Discharge Plan:  Would advise patient to stay in TCU however he is adamant to discharge.  Will discharge home with home PT/OT. Follow up with PCP and NSG.      Discharge Medications:   Current Outpatient Medications   Medication Sig Dispense Refill    acetaminophen (TYLENOL) 500 MG tablet Take 2 tablets (1,000 mg) by mouth 2 times daily as needed for mild pain.      acetaminophen (TYLENOL) 500 MG tablet Take 1,000 mg by mouth 2 times daily.      albuterol (VENTOLIN HFA) 108 (90 Base) MCG/ACT inhaler INHALE 2 PUFFS INTO THE LUNGS EVERY 6 HOURS AS NEEDED FOR SHORTNESS OF BREATH / DYSPNEA OR WHEEZING 25.5 g 0    amLODIPine (NORVASC) 5 MG tablet Take 1 tablet (5 mg) by mouth daily. 90 tablet 3    apixaban ANTICOAGULANT (ELIQUIS) 2.5 MG tablet Take 1 tablet (2.5 mg) by mouth 2 times daily. 90 tablet 1    atorvastatin (LIPITOR) 20 MG tablet Take 1 tablet (20 mg) by mouth daily. 90 tablet 2    blood glucose (NO BRAND SPECIFIED) lancets standard Use to test blood sugar 1 time daily, first thing in the morning 50 each 3    calcium carbonate (TUMS) 500 MG chewable tablet Take 1 chew tab by mouth daily as needed for heartburn.      cholestyramine light (QUESTRAN) 4 GM packet Take 4 g by mouth 2 times daily (with meals).      Coenzyme Q10 400 MG CAPS Take 400 mg by mouth daily 30 capsule 0     "ferrous sulfate (FE TABS) 325 (65 Fe) MG EC tablet Take 1 tablet (325 mg) by mouth every evening 30 tablet 0    finasteride (PROSCAR) 5 MG tablet Take 1 tablet (5 mg) by mouth daily 90 tablet 3    Fluticasone-Umeclidin-Vilant (TRELEGY ELLIPTA) 100-62.5-25 MCG/ACT oral inhaler USE 1 INHALATION DAILY 60 each 5    furosemide (LASIX) 40 MG tablet Take 1 tablet (40 mg) by mouth daily.      gabapentin (NEURONTIN) 300 MG capsule Take 1 capsule (300 mg) by mouth at bedtime. 180 capsule 0    gabapentin (NEURONTIN) 300 MG capsule Take 1 capsule (300 mg) by mouth every morning. 90 capsule 0    lanreotide acetate (SOMATULINE) 120 MG/0.5ML injection Inject 120 mg subcutaneously every 28 days.      metFORMIN (GLUCOPHAGE) 1000 MG tablet Take 1 tablet (1,000 mg) by mouth 2 times daily (with meals). 180 tablet 1    miconazole (MICATIN) 2 % external cream Apply topically 2 times daily as needed for other (Rash/ skin irritation)      order for DME Oxygen 2 Li/min  at night via nasal cannula 1 Device 0    vitamin B-Complex Take 1 tablet by mouth daily      vitamin C (ASCORBIC ACID) 500 MG tablet Take 500 mg by mouth daily.         Review of Systems   Patient denies fever, chills, headache, lightheadedness, dizziness, rhinorrhea, cough, congestion, shortness of breath, chest pain, palpitations, abdominal pain, n/v, diarrhea, constipation, change in appetite, change in sleep pattern, dysuria, frequency, burning or pain with urination.  Other than stated in HPI all other review of systems is negative.       Physical Exam  Vital signs:/68   Pulse 68   Temp 97.9  F (36.6  C)   Resp 18   Ht 1.676 m (5' 6\")   Wt 77.6 kg (171 lb)   SpO2 97%   BMI 27.60 kg/m     GENERAL APPEARANCE: Well developed, well nourished, in no acute distress.  HEENT: normocephalic, atraumatic   sclerae anicteric, conjunctivae clear and moist, EOM intact  LUNGS: respiratory effort normal.  CARD: RRR, S1, S2, without murmurs, gallops, rubs  ABD: Soft, " nondistended and nontender with normal bowel sounds.   MSK: Muscle strength and tone were equal bilaterally. Moves all extremities easily and intentionally.   EXTREMITIES: No cyanosis, clubbing or edema.  NEURO: Alert and oriented x 3.Face is symmetric.  SKIN: Inspection of the skin reveals no rashes, ulcerations or petechiae.  PSYCH: euthymic        Labs:    Last Comprehensive Metabolic Panel:  Lab Results   Component Value Date     03/24/2025    POTASSIUM 4.9 03/24/2025    CHLORIDE 102 03/24/2025    CO2 22 03/24/2025    ANIONGAP 14 03/24/2025     (H) 03/24/2025    BUN 14.1 03/24/2025    CR 1.02 03/24/2025    GFRESTIMATED 74 03/24/2025    JESSE 9.1 03/24/2025       Lab Results   Component Value Date    WBC 9.3 03/24/2025    WBC 7.4 05/27/2021     Lab Results   Component Value Date    RBC 3.67 03/24/2025    RBC 4.02 05/27/2021     Lab Results   Component Value Date    HGB 12.1 03/24/2025    HGB 13.2 05/27/2021     Lab Results   Component Value Date    HCT 38.9 03/24/2025    HCT 41.2 05/27/2021     Lab Results   Component Value Date     03/24/2025     05/27/2021     Lab Results   Component Value Date    MCH 33.0 03/24/2025    MCH 32.8 05/27/2021     Lab Results   Component Value Date    MCHC 31.1 03/24/2025    MCHC 32.0 05/27/2021     Lab Results   Component Value Date    RDW 12.1 03/24/2025    RDW 12.0 05/27/2021     Lab Results   Component Value Date     03/24/2025     05/27/2021           MEDICAL EQUIPMENT NEEDS:  NA      DISCHARGE PLAN/FACE TO FACE:  I certify that services are/were furnished while this patient was under the care of a physician and that a physician or an allowed non-physician practitioner (NPP), had a face-to-face encounter that meets the physician face-to-face encounter requirements. The encounter was in whole, or in part, related to the primary reason for home health. The patient is confined to his/her home and needs intermittent skilled nursing, physical  therapy, speech-language pathology, or the continued need for occupational therapy. A plan of care has been established by a physician and is periodically reviewed by a physician.    I certify that this patient is under my care and that I, or a nurse practitioner or physician's assistant working with me, had a face-to-face encounter that meets the physician face-to-face encounter requirements with this patient.   Date of Face-to-Face Encounter: 3/26/2025    I certify that, based on my findings, the following services are medically necessary home health services:  PT/OT    My clinical findings support the need for the above skilled services because:  PT/OT for ongoing strengthening and endurance    This patient is homebound because:  He requires maximum effort in order to get out into the community on a regular basis.     The patient is, or has been, under my care and I have initiated the establishment of the plan of care. This patient will be followed by a physician who will periodically review the plan of care.        Electronically signed by: Shona Tripathi NP

## 2025-03-31 DIAGNOSIS — Z09 HOSPITAL DISCHARGE FOLLOW-UP: ICD-10-CM

## 2025-04-02 ENCOUNTER — TELEPHONE (OUTPATIENT)
Dept: INTERNAL MEDICINE | Facility: CLINIC | Age: 80
End: 2025-04-02
Payer: COMMERCIAL

## 2025-04-02 NOTE — TELEPHONE ENCOUNTER
MTM referral from: Transitions of Care (recent hospital discharge, TCU discharge, or ED visit)    MTM referral outreach attempt #1 on April 2, 2025 at 12:17 PM      Outcome: Spoke with patient he declined to schedule at this time    [facility]

## 2025-04-07 ENCOUNTER — PATIENT OUTREACH (OUTPATIENT)
Dept: CARE COORDINATION | Facility: CLINIC | Age: 80
End: 2025-04-07
Payer: COMMERCIAL

## 2025-04-07 ASSESSMENT — ACTIVITIES OF DAILY LIVING (ADL): DEPENDENT_IADLS:: CLEANING;COOKING;LAUNDRY;SHOPPING;MEAL PREPARATION

## 2025-04-07 NOTE — PROGRESS NOTES
Clinic Care Coordination Contact  [facility]/Voicemail    Clinical Data: Care Coordinator Outreach    Outreach Documentation Number of Outreach Attempt   4/7/2025   1:10 PM 1   4/8/2025   9:57 AM 2     Left message on patient's voicemail with call back information and requested return call.    Plan: Care Coordinator as scheduled for regular outreaches.    Leticia Lee RN, BSN, CPRUBEN, [facility], and [facility]  [email or web address]  Office: [phone]  Employed by [facility]   On behalf of Margy Nunes RN CC/Kelli Davidson RN CC    Clinic Care Coordination Contact  [facility]/Voicemail    Clinical Data: Care Coordinator Outreach    Outreach Documentation Number of Outreach Attempt   4/7/2025   1:10 PM 1     Left message on patient's voicemail with call back information and requested return call.    Plan: Care Coordinator will try to reach patient again in 1-2 business days.    Leticia Lee RN, BSN, GELA, [facility] Care [facility], and [facility]  [email or web address]  Office: [phone]  Employed by [facility]   On behalf of Margy Nunes RN CC/Kelli Davidson, RN CC

## 2025-04-15 DIAGNOSIS — Z53.9 DIAGNOSIS NOT YET DEFINED: Primary | ICD-10-CM

## 2025-04-15 PROCEDURE — 99207 PR MD CERTIFICATION HHA PATIENT: CPT | Performed by: INTERNAL MEDICINE

## 2025-04-17 ENCOUNTER — OFFICE VISIT (OUTPATIENT)
Dept: INTERNAL MEDICINE | Facility: CLINIC | Age: 80
End: 2025-04-17
Payer: COMMERCIAL

## 2025-04-17 VITALS — HEART RATE: 73 BPM | OXYGEN SATURATION: 94 % | SYSTOLIC BLOOD PRESSURE: 126 MMHG | DIASTOLIC BLOOD PRESSURE: 64 MMHG

## 2025-04-17 DIAGNOSIS — S20.229A CONTUSION OF THORACIC SPINE: ICD-10-CM

## 2025-04-17 DIAGNOSIS — E11.69 TYPE 2 DIABETES MELLITUS WITH OTHER SPECIFIED COMPLICATION, WITHOUT LONG-TERM CURRENT USE OF INSULIN (H): Primary | ICD-10-CM

## 2025-04-17 DIAGNOSIS — J96.11 CHRONIC RESPIRATORY FAILURE WITH HYPOXIA (H): ICD-10-CM

## 2025-04-17 DIAGNOSIS — D63.1 ANEMIA DUE TO STAGE 3A CHRONIC KIDNEY DISEASE (H): ICD-10-CM

## 2025-04-17 DIAGNOSIS — C7A.019 MALIGNANT CARCINOID TUMOR OF SMALL INTESTINE, UNSPECIFIED LOCATION (H): ICD-10-CM

## 2025-04-17 DIAGNOSIS — D50.9 IRON DEFICIENCY ANEMIA, UNSPECIFIED IRON DEFICIENCY ANEMIA TYPE: ICD-10-CM

## 2025-04-17 DIAGNOSIS — L89.102 PRESSURE INJURY OF BACK, STAGE 2 (H): ICD-10-CM

## 2025-04-17 DIAGNOSIS — N18.31 ANEMIA DUE TO STAGE 3A CHRONIC KIDNEY DISEASE (H): ICD-10-CM

## 2025-04-17 DIAGNOSIS — E11.9 TYPE 2 DIABETES MELLITUS WITHOUT COMPLICATION, WITHOUT LONG-TERM CURRENT USE OF INSULIN (H): ICD-10-CM

## 2025-04-17 DIAGNOSIS — I10 ESSENTIAL HYPERTENSION, BENIGN: ICD-10-CM

## 2025-04-17 RX ORDER — ZOLEDRONIC ACID 4 MG/5ML
3.5 INJECTION INTRAVENOUS
COMMUNITY
Start: 2025-05-16

## 2025-04-17 RX ORDER — METHOCARBAMOL 500 MG/1
500 TABLET, FILM COATED ORAL 4 TIMES DAILY PRN
Qty: 120 TABLET | Refills: 0 | Status: SHIPPED | OUTPATIENT
Start: 2025-04-17

## 2025-04-17 RX ORDER — HYDROCORTISONE SODIUM SUCCINATE 100 MG/2ML
100 INJECTION INTRAMUSCULAR; INTRAVENOUS
COMMUNITY
Start: 2025-03-21

## 2025-04-17 RX ORDER — VITAMIN B COMPLEX
25 TABLET ORAL DAILY
COMMUNITY

## 2025-04-17 RX ORDER — METHOCARBAMOL 500 MG/1
500 TABLET, FILM COATED ORAL 4 TIMES DAILY PRN
COMMUNITY
End: 2025-04-17

## 2025-04-17 NOTE — PROGRESS NOTES
"  Assessment & Plan     Type 2 diabetes mellitus with other specified complication, without long-term current use of insulin (H)      Malignant carcinoid tumor of small intestine, unspecified location (H)      Essential hypertension, benign      Contusion of thoracic spine    - methocarbamol (ROBAXIN) 500 MG tablet  Dispense: 120 tablet; Refill: 0    Iron deficiency anemia, unspecified iron deficiency anemia type      Pressure injury of back, stage 2 (H)      Anemia due to stage 3a chronic kidney disease (H)      Chronic respiratory failure with hypoxia (H)      Type 2 diabetes mellitus without complication, without long-term current use of insulin (H)  - metFORMIN (GLUCOPHAGE) 1000 MG tablet  Dispense: 180 tablet; Refill: 1      Methocarbamol refilled.    MED REC REQUIRED  Post Medication Reconciliation Status:   BMI  Estimated body mass index is 27.6 kg/m  as calculated from the following:    Height as of 3/26/25: 1.676 m (5' 6\").    Weight as of 3/26/25: 77.6 kg (171 lb).         Follow-up  Follow-up in 3 months.    Mariano Alejandro is a 79 year old, presenting for the following health issues:  Hospital F/U      Via the Health Maintenance questionnaire, the patient has reported the following services have been completed -Eye Exam: [facility], Dr. Priest 2025-01-28, this information has been sent to the abstraction team.  HPI          4/7/2025   Post Discharge Outreach   Discharge Follow Up Appointment Date 4/10/2025   Discharge Follow Up Appointment Scheduled with? --       Hospital Follow-up Visit:    Hospital/Nursing Home/IP Rehab Facility: [facility]  Most Recent Admission Date: 2/24/2025   Most Recent Admission Diagnosis: Fall, initial encounter - W19.XXXA  Head injury, initial encounter - S09.90XA  Hydrocephalus, unspecified type (H) - G91.9  Contusion of thoracic spine - S20.229A  Closed stable burst fracture of tenth thoracic vertebra, initial encounter (H) - S22.071A "     Most Recent Discharge Date: 3/4/2025   Most Recent Discharge Diagnosis: Hydrocephalus, unspecified type (H) - G91.9  Fall, initial encounter - W19.XXXA  Head injury, initial encounter - S09.90XA  Contusion of thoracic spine - S20.229A  Closed stable burst fracture of tenth thoracic vertebra, initial encounter (H) - S22.071A  Neuropathic pain - M79.2  NPH (normal pressure hydrocephalus) (H) - G91.2  Essential hypertension, benign - I10   Was the patient in the ICU or did the patient experience delirium during hospitalization?  No  Do you have any other stressors you would like to discuss with your provider? No    Problems taking medications regularly:  None  Medication changes since discharge: None  Problems adhering to non-medication therapy:  None    Summary of hospitalization:  [facility] discharge summary reviewed  Diagnostic Tests/Treatments reviewed.  Follow up needed: none  Other Healthcare Providers Involved in Patient s Care:         None  Update since discharge: improved.         Plan of care communicated with patient           Patient is here with his caregiver    Has a history of carcinoid, planning to start radiation.  Was seen in [facility].    Patient  sustained a fall and had thoracic vertebral fracture and a contusion.  He is wearing a chest brace.  Requesting refill on muscle relaxant methocarbamol.      Has a history of anemia, was taken off of iron supplements at his discharge from rehab facility.  Has an appointment with oncology tomorrow.      Review of Systems  Constitutional, HEENT, cardiovascular, pulmonary, gi and gu systems are negative, except as otherwise noted.      Objective    /64   Pulse 73   SpO2 94%   There is no height or weight on file to calculate BMI.  Physical Exam   GENERAL: alert and no distress  NECK: no adenopathy, no asymmetry, masses, or scars  RESP: lungs clear to auscultation - no rales, rhonchi or wheezes  CV: regular rate and rhythm,  normal S1 S2, no S3 or S4, no murmur, click or rub, no peripheral edema  ABDOMEN: soft, nontender, no hepatosplenomegaly, no masses and bowel sounds normal  MS: no gross musculoskeletal defects noted, no edema            Signed Electronically by: Melba Green MD

## 2025-04-22 ENCOUNTER — ANCILLARY PROCEDURE (OUTPATIENT)
Dept: GENERAL RADIOLOGY | Facility: CLINIC | Age: 80
End: 2025-04-22
Attending: PHYSICIAN ASSISTANT
Payer: OTHER MISCELLANEOUS

## 2025-04-22 ENCOUNTER — OFFICE VISIT (OUTPATIENT)
Dept: NEUROSURGERY | Facility: CLINIC | Age: 80
End: 2025-04-22
Payer: OTHER MISCELLANEOUS

## 2025-04-22 VITALS
OXYGEN SATURATION: 96 % | RESPIRATION RATE: 16 BRPM | SYSTOLIC BLOOD PRESSURE: 113 MMHG | DIASTOLIC BLOOD PRESSURE: 68 MMHG | HEART RATE: 69 BPM

## 2025-04-22 DIAGNOSIS — S22.078D OTHER CLOSED FRACTURE OF TENTH THORACIC VERTEBRA WITH ROUTINE HEALING, SUBSEQUENT ENCOUNTER: ICD-10-CM

## 2025-04-22 DIAGNOSIS — S22.079D CLOSED FRACTURE OF TENTH THORACIC VERTEBRA WITH ROUTINE HEALING, UNSPECIFIED FRACTURE MORPHOLOGY, SUBSEQUENT ENCOUNTER: Primary | ICD-10-CM

## 2025-04-22 PROCEDURE — 99214 OFFICE O/P EST MOD 30 MIN: CPT | Performed by: PHYSICIAN ASSISTANT

## 2025-04-22 PROCEDURE — 72080 X-RAY EXAM THORACOLMB 2/> VW: CPT | Performed by: STUDENT IN AN ORGANIZED HEALTH CARE EDUCATION/TRAINING PROGRAM

## 2025-04-22 RX ORDER — OLANZAPINE 5 MG/1
5 TABLET, FILM COATED ORAL
COMMUNITY
Start: 2025-05-15

## 2025-04-22 NOTE — PATIENT INSTRUCTIONS
Follow up in 4 weeks with CT thoracic.    Continue to wear the TLSO brace.      Discuss increase of gabapentin with PCP.  Consider 300 mg bid, 600 HS.

## 2025-04-22 NOTE — LETTER
4/22/2025       RE: Nahun Villalobos  [address] [address]     Dear Colleague,    Thank you for referring your patient, Nahun Villalobos, to the [facility] [place] at [facility]. Please see a copy of my visit note below.      [facility] [place]  [address]  [address]RD FLO[address]  [place] [address]  Phone: [phone]  Fax: [phone]      Patient:  Nahun Villalobos, Date of birth 1945, 79 year old, male  Referring Provider No referring provider defined for this encounter.    ASSESSMENT & PLAN:  Patient is f/u today for T10 fracture that occurred after a fall 2/24/25.  He was supposed to have C3-6 laminoplasty in April, but is on hold until he heals from the spinal fracture.  I discussed the progression of his cancer with Dr. Pinon.  She agreed to see him in clinic again after he has healed from the spinal fracture to discuss possible laminectomy instead of a laminoplasty to make healing less of an issue.      The alignment on XR today is stable.    I will plan to see patient back in 4 weeks with a CT thoracic as I am unable to adequately visualize the fracture on XR.    Recommend he ask his PCP to increase the gabapentin to 300 mg BID and 600 HS to see if this helps with his UE paresthesias and cramping in the interim.      I spent 39 minutes in patient care, independent review and interpretation of medical records/imaging, reviewing old records and discussed his case with Dr. Pinon.      History of Present Illness:  Patient is following up after an ED visit w/ nsgy consult 2/26/25, staffed by Dr. Kumar for DISH with T10 fracture and ALL disruption after a fall on 2/24/25.  Patient was provided a TLSO brace for management.  He represented to the ED 3/4/25 for SALLIE, intractable pain and was given custom TLSO.  Unfortunately he was found to have pressure sores in the bilateral  flanks from the brace.  Patient was previously following in our nsgy clinic and was planning to have C3-6 laminoplasty with Dr. Pinon 4/2/25.  This was postponed until he is cleared from the spinal fracture.    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      4/22/25 Visit:  Patient overall is tolerating the TLSO brace.  He has more complaints about the sensations and function of his hands.  He notes his hands are cramping up and is wondering what he can do while he has to wait for surgery to control this.  Patient follows at [facility] for his cancer of which there has been progression.  His treatment will involve radiation and chemotherapy of about 4 treatment, 8 weeks apart starting in May.      IMAGING   Imaging independently reviewed.    Thoracic XR 4/11/25 -   Findings:  Stable fracture through the bridging anterior osteophyte at T10.  Stable alignment. Extension of fracture into the T10 vertebral body is  better visualized on prior MRI.  Flowing anterior thoracolumbar osteophytes. The visualized lungs are  clear. Cardiomediastinal silhouette is within normal limits. Vascular  calcifications.  Impression:  Stable alignment fracture through the bridging anterior osteophyte at  T10. Extension of of the fracture into the T10 vertebral body is  better visualized on prior MRI.     XR Thoracic Lumbar Standing 2 Views  Result Date: 2/28/2025  EXAM: XR THORACIC LUMBAR STANDING 2 VIEWS LOCATION: [facility] DATE: 2/27/2025 INDICATION: acute DISH fracture at T10 COMPARISON: MRI of the thoracic and lumbar spine dated 02/25/2025 and CT of the thoracic spine dated 02/26/2025   IMPRESSION: Patient is in a brace. Stable nondisplaced fracture through the bridging anterior endplate osteophyte at the T9-T10 level. Extension of the fracture into the T10 vertebral body is not well seen radiographically. No other fractures. Stable diffuse  idiopathic skeletal hyperostosis. Stable slight degenerative anterolisthesis of L4 on L5 is otherwise normal vertebral alignment. Normal vertebral body heights. Mild to moderate multilevel degenerative disc disease. Mild to moderate degenerative arthritis involving the articular facets in the lumbar spine. Normal extraspinal structures.     CT Thoracic Spine w/o Contrast  Result Date: 2/26/2025  EXAM: CT THORACIC SPINE WITHOUT CONTRAST LOCATION: [facility] DATE: 2/26/2025 INDICATION: T10 fracture noted on MRI. COMPARISON: 2/25/2025. TECHNIQUE: CT thoracic spine without contrast. Dose reduction techniques were performed. FINDINGS: There is good anatomic alignment of the thoracic spine. The vertebral body heights are well-maintained throughout. There is a fracture through the anterior osteophyte formation of the T9-T10 disc space level that that extends obliquely through the T10 vertebral body to its inferior endplate. The posterior elements are intact. There is no evidence of retropulsion to lead to canal compromise. No other acute thoracic spine fracture is visualized. There are diffuse endplate changes and anterior osteophyte formations throughout the thoracic region. There is no significant canal compromise or significant neural foraminal narrowing throughout the thoracic line. The paraspinal soft tissues are unremarkable. The lungs visualized on this study are clear.   IMPRESSION: 1.  Acute fracture through the anterior osteophyte formation at the T9-T10 disc space level extending obliquely through the T10 vertebral body to its inferior endplate. 2.  No evidence of retropulsion or canal compromise. 3.  No significant canal compromise or neural foraminal narrowing throughout thoracic spine.     MR Thoracic Spine w/o & w Contrast  Addendum Date: 2/26/2025    COMMUNICATION ADDENDUM: Findings were discussed with Dr. Desai on 2/26/2025 1:16 AM CST. END ADDENDUM  Result Date:  2/26/2025  EXAM: MR THORACIC SPINE W/O and W CONTRAST, MR LUMBAR SPINE W/O and W CONTRAST LOCATION: [facility] DATE/TIME: 2/25/2025 8:00 PM CST INDICATION: Fall w/acute mid back pain,xr neg COMPARISON: None. CONTRAST: 7 mL Gadavist TECHNIQUE: 1) Routine Thoracic Spine MRI without and with IV contrast. 2) Routine Lumbar Spine MRI without and with IV contrast. FINDINGS: THORACIC SPINE: On the localizer image there is advanced multilevel cervical spondylitic changes with multilevel canal flattening and canal stenoses, suboptimally assessed on the current examination. Flowing ventral osteophytes in the thoracic spine compatible with DISH. There is fluid within an acute fracture extending along the ventral syndesmophyte at T10 into the T10 inferior endplate. Slight widening along the anterior aspect of the fracture where there is disruption of the anterior longitudinal ligament. No traumatic subluxation. The posterior longitudinal ligament, ligamentum flavum, and intraspinous ligaments are grossly intact. Otherwise, no suspicious marrow signal abnormality. Mild thoracic spondylitic changes without high-grade canal or foraminal stenosis. No abnormal cord signal. No abnormal intrathecal enhancement. Redemonstration of the cystic lesions in the right hepatic lobe. LUMBAR SPINE: Nomenclature is based on 5 lumbar type vertebral bodies with L5-S1 defined on image 50 of series 5. 3 mm degenerative anterolisthesis of L4 on L5. Vertebral body heights maintained.  No suspicious marrow signal abnormality. Normal distal spinal cord and cauda equina with conus medullaris at L1. Alteration of CSF flow dynamics at L4-L5 on the pre and postcontrast axial T1. No abnormal intrathecal enhancement, particularly on the sagittal series. Prevertebral soft tissues are unremarkable. Dorsal paraspinal muscular atrophy. Visualized intra-abdominal structures are unremarkable. Mild lumbar spondylosis, worse at L4-L5 where  there is grade 1 degenerative anterolisthesis due to severe facet arthropathy, mild canal stenosis, severe left lateral recess stenosis, and severe left foraminal stenosis with compression of the exiting left  L4 nerve root. No high-grade canal or stenosis at the remaining levels.   IMPRESSION: 1.  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 As this is an unstable fracture in a rigid spine neurosurgical consultation is recommended. 2.  No traumatic subluxation or high-grade canal stenosis. 3.  Mild lumbar spondylosis, worse at L4-L5 where there is severe left lateral recess stenosis and severe left foraminal stenosis. Correlate for left L4 and L5 radiculopathy. 4.  Advanced multilevel cervical spondylitic changes with multilevel canal flattening and canal stenoses, suboptimally assessed on the current examination. Consider dedicated MRI cervical spine to further evaluate.    MR Lumbar Spine w/o & w Contrast  Addendum Date: 2/26/2025    COMMUNICATION ADDENDUM: Findings were discussed with Dr. Desai on 2/26/2025 1:16 AM CST. END ADDENDUM  Result Date: 2/26/2025  EXAM: MR THORACIC SPINE W/O and W CONTRAST, MR LUMBAR SPINE W/O and W CONTRAST LOCATION: [facility] DATE/TIME: 2/25/2025 8:00 PM CST INDICATION: Fall w/acute mid back pain,xr neg COMPARISON: None. CONTRAST: 7 mL Gadavist TECHNIQUE: 1) Routine Thoracic Spine MRI without and with IV contrast. 2) Routine Lumbar Spine MRI without and with IV contrast. FINDINGS: THORACIC SPINE: On the localizer image there is advanced multilevel cervical spondylitic changes with multilevel canal flattening and canal stenoses, suboptimally assessed on the current examination. Flowing ventral osteophytes in the thoracic spine compatible with DISH. There is fluid within an acute fracture extending along the ventral syndesmophyte at T10 into the T10 inferior endplate. Slight widening along the anterior aspect of the  fracture where there is disruption of the anterior longitudinal ligament. No traumatic subluxation. The posterior longitudinal ligament, ligamentum flavum, and intraspinous ligaments are grossly intact. Otherwise, no suspicious marrow signal abnormality. Mild thoracic spondylitic changes without high-grade canal or foraminal stenosis. No abnormal cord signal. No abnormal intrathecal enhancement. Redemonstration of the cystic lesions in the right hepatic lobe. LUMBAR SPINE: Nomenclature is based on 5 lumbar type vertebral bodies with L5-S1 defined on image 50 of series 5. 3 mm degenerative anterolisthesis of L4 on L5. Vertebral body heights maintained.  No suspicious marrow signal abnormality. Normal distal spinal cord and cauda equina with conus medullaris at L1. Alteration of CSF flow dynamics at L4-L5 on the pre and postcontrast axial T1. No abnormal intrathecal enhancement, particularly on the sagittal series. Prevertebral soft tissues are unremarkable. Dorsal paraspinal muscular atrophy. Visualized intra-abdominal structures are unremarkable. Mild lumbar spondylosis, worse at L4-L5 where there is grade 1 degenerative anterolisthesis due to severe facet arthropathy, mild canal stenosis, severe left lateral recess stenosis, and severe left foraminal stenosis with compression of the exiting left  L4 nerve root. No high-grade canal or stenosis at the remaining levels.   IMPRESSION: 1.  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 As this is an unstable fracture in a rigid spine neurosurgical consultation is recommended. 2.  No traumatic subluxation or high-grade canal stenosis. 3.  Mild lumbar spondylosis, worse at L4-L5 where there is severe left lateral recess stenosis and severe left foraminal stenosis. Correlate for left L4 and L5 radiculopathy. 4.  Advanced multilevel cervical spondylitic changes with multilevel canal flattening and canal stenoses, suboptimally assessed on  the current examination. Consider dedicated MRI cervical spine to further evaluate.    Physical Exam   /68 (BP Location: Right arm, Patient Position: Sitting)   Pulse 69   Resp 16   SpO2 96%     Speech clear and fluent  Thought process logical, intact  Using w/c, gait deferred  WINSLOW equally well  Wearing TLSO brace    Review of Systems   See HPI     Past Medical History:   Diagnosis Date     Anemia 07/09/2024     Antiplatelet or antithrombotic long-term use      Arthritis      CHF (congestive heart failure) (H) 09/16/2020     COPD (chronic obstructive pulmonary disease) (H)      DM (diabetes mellitus) (H)      Dyspnea on exertion      Essential hypertension, benign      Hemorrhage of rectum and anus      Non-small cell lung cancer (H)      Obesity      Personal history of colonic polyps      Sleep apnea      Thrombosis      Tobacco use disorder      Urinary retention      Walking troubles        Past Surgical History:   Procedure Laterality Date     ABDOMEN SURGERY  1995     APPENDECTOMY       BONE EXOSTOSIS EXCISION Bilateral 09/20/2022    Procedure: EXOSTECTOMIES BOTH 5TH DIGITS WITH SOFT TISSUE RELEASE;  Surgeon: Tong Gray DPM;  Location: [facility]     CHOLECYSTECTOMY       COLONOSCOPY  2014     CYSTOSCOPY, TRANSURETHRAL RESECTION (TUR) PROSTATE, COMBINED N/A 04/30/2018    Procedure: COMBINED CYSTOSCOPY, TRANSURETHRAL RESECTION (TUR) PROSTATE;  Cystoscopy, Transurethral Resection Of The Prostate;  Surgeon: Praveena Burris MD;  Location: [facility]     IR LUNG BIOPSY MEDIASTINUM RIGHT  04/06/2016     WEDGE RESECTION      lung     ZZC NONSPECIFIC PROCEDURE      cholecystectomy       Social History     Socioeconomic History     Marital status:    Tobacco Use     Smoking status: Former     Current packs/day: 0.00     Average packs/day: 2.0 packs/day for 53.0 years (106.0 ttl pk-yrs)     Types: Cigarettes, Cigars     Start date: 6/1/1960     Quit date: 6/1/2013     Years since quitting:  11.8     Passive exposure: Past     Smokeless tobacco: Never     Tobacco comments:     Quit, will never start again.   Vaping Use     Vaping status: Never Used   Substance and Sexual Activity     Alcohol use: No     Drug use: No     Sexual activity: Not Currently     Partners: Female     Birth control/protection: Spermicide, None   Other Topics Concern     Parent/sibling w/ CABG, MI or angioplasty before 65F 55M? No     Caffeine Concern No     Comment: 1-2 a day     Special Diet No     Exercise No     Seat Belt Yes     Social Drivers of Health     Financial Resource Strain: Low Risk  (12/1/2024)    Financial Resource Strain      Within the past 12 months, have you or your family members you live with been unable to get utilities (heat, electricity) when it was really needed?: No   Food Insecurity: Low Risk  (12/1/2024)    Food Insecurity      Within the past 12 months, did you worry that your food would run out before you got money to buy more?: No      Within the past 12 months, did the food you bought just not last and you didn t have money to get more?: No   Transportation Needs: Low Risk  (12/1/2024)    Transportation Needs      Within the past 12 months, has lack of transportation kept you from medical appointments, getting your medicines, non-medical meetings or appointments, work, or from getting things that you need?: No   Physical Activity: Insufficiently Active (9/2/2024)    Exercise Vital Sign      Days of Exercise per Week: 2 days      Minutes of Exercise per Session: 20 min   Stress: No Stress Concern Present (9/2/2024)    [employer] [place] [employer] - Occupational Stress Questionnaire      Feeling of Stress : Not at all   Social Connections: Unknown (9/2/2024)    Social Connection and Isolation Panel [NHANES]      Frequency of Social Gatherings with Friends and Family: Once a week   Housing Stability: Low Risk  (12/1/2024)    Housing Stability      Do you have housing? : Yes      Are you  worried about losing your housing?: No       family history includes Breast Cancer in his sister and sister; C.A.D. in his mother; Cancer in his mother; Cancer - colorectal in his mother; Cerebrovascular Disease in his father; Hypertension in his mother, sister, and sister; Other Cancer in his mother.       Rebeca Conte PA-C  Department of Neurosurgery  Office: [phone]        Again, thank you for allowing me to participate in the care of your patient.      Sincerely,    Rebeca Conte PA-C

## 2025-04-22 NOTE — PROGRESS NOTES
[facility] NEUROSURGERY CLINIC [address]  3RD FLOOR  [facility] [address]  Phone: [phone]  Fax: [phone]      Patient:  Nahun Villalobos, Date of birth 1945, 79 year old, male  Referring Provider No referring provider defined for this encounter.    ASSESSMENT & PLAN:  Patient is f/u today for T10 fracture that occurred after a fall 2/24/25.  He was supposed to have C3-6 laminoplasty in April, but is on hold until he heals from the spinal fracture.  I discussed the progression of his cancer with Dr. Pinon.  She agreed to see him in clinic again after he has healed from the spinal fracture to discuss possible laminectomy instead of a laminoplasty to make healing less of an issue.      The alignment on XR today is stable.    I will plan to see patient back in 4 weeks with a CT thoracic as I am unable to adequately visualize the fracture on XR.    Recommend he ask his PCP to increase the gabapentin to 300 mg BID and 600 HS to see if this helps with his UE paresthesias and cramping in the interim.      I spent 39 minutes in patient care, independent review and interpretation of medical records/imaging, reviewing old records and discussed his case with Dr. Pinon.      History of Present Illness:  Patient is following up after an ED visit w/ nsgy consult 2/26/25, staffed by Dr. Kumar for DISH with T10 fracture and ALL disruption after a fall on 2/24/25.  Patient was provided a TLSO brace for management.  He represented to the ED 3/4/25 for SALLIE, intractable pain and was given custom TLSO.  Unfortunately he was found to have pressure sores in the bilateral flanks from the brace.  Patient was previously following in our nsgy clinic and was planning to have C3-6 laminoplasty with Dr. Pinon 4/2/25.  This was postponed until he is cleared from the spinal fracture.    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       4/22/25 Visit:  Patient overall is tolerating the TLSO brace.  He has more complaints about the sensations and function of his hands.  He notes his hands are cramping up and is wondering what he can do while he has to wait for surgery to control this.  Patient follows at [facility] for his cancer of which there has been progression.  His treatment will involve radiation and chemotherapy of about 4 treatment, 8 weeks apart starting in May.      IMAGING   Imaging independently reviewed.    Thoracic XR 4/11/25 -   Findings:  Stable fracture through the bridging anterior osteophyte at T10.  Stable alignment. Extension of fracture into the T10 vertebral body is  better visualized on prior MRI.  Flowing anterior thoracolumbar osteophytes. The visualized lungs are  clear. Cardiomediastinal silhouette is within normal limits. Vascular  calcifications.  Impression:  Stable alignment fracture through the bridging anterior osteophyte at  T10. Extension of of the fracture into the T10 vertebral body is  better visualized on prior MRI.     XR Thoracic Lumbar Standing 2 Views  Result Date: 2/28/2025  EXAM: XR THORACIC LUMBAR STANDING 2 VIEWS LOCATION: [facility] DATE: 2/27/2025 INDICATION: acute DISH fracture at T10 COMPARISON: MRI of the thoracic and lumbar spine dated 02/25/2025 and CT of the thoracic spine dated 02/26/2025   IMPRESSION: Patient is in a brace. Stable nondisplaced fracture through the bridging anterior endplate osteophyte at the T9-T10 level. Extension of the fracture into the T10 vertebral body is not well seen radiographically. No other fractures. Stable diffuse idiopathic skeletal hyperostosis. Stable slight degenerative anterolisthesis of L4 on L5 is otherwise normal vertebral alignment. Normal vertebral body heights. Mild to moderate multilevel degenerative disc disease. Mild to moderate degenerative arthritis involving the articular facets in the lumbar spine. Normal extraspinal  structures.     CT Thoracic Spine w/o Contrast  Result Date: 2/26/2025  EXAM: CT THORACIC SPINE WITHOUT CONTRAST LOCATION: [facility] DATE: 2/26/2025 INDICATION: T10 fracture noted on MRI. COMPARISON: 2/25/2025. TECHNIQUE: CT thoracic spine without contrast. Dose reduction techniques were performed. FINDINGS: There is good anatomic alignment of the thoracic spine. The vertebral body heights are well-maintained throughout. There is a fracture through the anterior osteophyte formation of the T9-T10 disc space level that that extends obliquely through the T10 vertebral body to its inferior endplate. The posterior elements are intact. There is no evidence of retropulsion to lead to canal compromise. No other acute thoracic spine fracture is visualized. There are diffuse endplate changes and anterior osteophyte formations throughout the thoracic region. There is no significant canal compromise or significant neural foraminal narrowing throughout the thoracic line. The paraspinal soft tissues are unremarkable. The lungs visualized on this study are clear.   IMPRESSION: 1.  Acute fracture through the anterior osteophyte formation at the T9-T10 disc space level extending obliquely through the T10 vertebral body to its inferior endplate. 2.  No evidence of retropulsion or canal compromise. 3.  No significant canal compromise or neural foraminal narrowing throughout thoracic spine.     MR Thoracic Spine w/o & w Contrast  Addendum Date: 2/26/2025    COMMUNICATION ADDENDUM: Findings were discussed with Dr. Desai on 2/26/2025 1:16 AM CST. END ADDENDUM  Result Date: 2/26/2025  EXAM: MR THORACIC SPINE W/O and W CONTRAST, MR LUMBAR SPINE W/O and W CONTRAST LOCATION: [facility] DATE/TIME: 2/25/2025 8:00 PM CST INDICATION: Fall w/acute mid back pain,xr neg COMPARISON: None. CONTRAST: 7 mL Gadavist TECHNIQUE: 1) Routine Thoracic Spine MRI without and with IV contrast. 2) Routine  Lumbar Spine MRI without and with IV contrast. FINDINGS: THORACIC SPINE: On the localizer image there is advanced multilevel cervical spondylitic changes with multilevel canal flattening and canal stenoses, suboptimally assessed on the current examination. Flowing ventral osteophytes in the thoracic spine compatible with DISH. There is fluid within an acute fracture extending along the ventral syndesmophyte at T10 into the T10 inferior endplate. Slight widening along the anterior aspect of the fracture where there is disruption of the anterior longitudinal ligament. No traumatic subluxation. The posterior longitudinal ligament, ligamentum flavum, and intraspinous ligaments are grossly intact. Otherwise, no suspicious marrow signal abnormality. Mild thoracic spondylitic changes without high-grade canal or foraminal stenosis. No abnormal cord signal. No abnormal intrathecal enhancement. Redemonstration of the cystic lesions in the right hepatic lobe. LUMBAR SPINE: Nomenclature is based on 5 lumbar type vertebral bodies with L5-S1 defined on image 50 of series 5. 3 mm degenerative anterolisthesis of L4 on L5. Vertebral body heights maintained.  No suspicious marrow signal abnormality. Normal distal spinal cord and cauda equina with conus medullaris at L1. Alteration of CSF flow dynamics at L4-L5 on the pre and postcontrast axial T1. No abnormal intrathecal enhancement, particularly on the sagittal series. Prevertebral soft tissues are unremarkable. Dorsal paraspinal muscular atrophy. Visualized intra-abdominal structures are unremarkable. Mild lumbar spondylosis, worse at L4-L5 where there is grade 1 degenerative anterolisthesis due to severe facet arthropathy, mild canal stenosis, severe left lateral recess stenosis, and severe left foraminal stenosis with compression of the exiting left  L4 nerve root. No high-grade canal or stenosis at the remaining levels.   IMPRESSION: 1.  Acute DISH fracture at T10 with  disruption of the anterior longitudinal ligament. As this is an unstable fracture in a rigid spine neurosurgical consultation is recommended. 2.  No traumatic subluxation or high-grade canal stenosis. 3.  Mild lumbar spondylosis, worse at L4-L5 where there is severe left lateral recess stenosis and severe left foraminal stenosis. Correlate for left L4 and L5 radiculopathy. 4.  Advanced multilevel cervical spondylitic changes with multilevel canal flattening and canal stenoses, suboptimally assessed on the current examination. Consider dedicated MRI cervical spine to further evaluate.    MR Lumbar Spine w/o & w Contrast  Addendum Date: 2/26/2025    COMMUNICATION ADDENDUM: Findings were discussed with Dr. Desai on 2/26/2025 1:16 AM CST. END ADDENDUM  Result Date: 2/26/2025  EXAM: MR THORACIC SPINE W/O and W CONTRAST, MR LUMBAR SPINE W/O and W CONTRAST LOCATION: [facility] DATE/TIME: 2/25/2025 8:00 PM CST INDICATION: Fall w/acute mid back pain,xr neg COMPARISON: None. CONTRAST: 7 mL Gadavist TECHNIQUE: 1) Routine Thoracic Spine MRI without and with IV contrast. 2) Routine Lumbar Spine MRI without and with IV contrast. FINDINGS: THORACIC SPINE: On the localizer image there is advanced multilevel cervical spondylitic changes with multilevel canal flattening and canal stenoses, suboptimally assessed on the current examination. Flowing ventral osteophytes in the thoracic spine compatible with DISH. There is fluid within an acute fracture extending along the ventral syndesmophyte at T10 into the T10 inferior endplate. Slight widening along the anterior aspect of the fracture where there is disruption of the anterior longitudinal ligament. No traumatic subluxation. The posterior longitudinal ligament, ligamentum flavum, and intraspinous ligaments are grossly intact. Otherwise, no suspicious marrow signal abnormality. Mild thoracic spondylitic changes without high-grade canal or foraminal  stenosis. No abnormal cord signal. No abnormal intrathecal enhancement. Redemonstration of the cystic lesions in the right hepatic lobe. LUMBAR SPINE: Nomenclature is based on 5 lumbar type vertebral bodies with L5-S1 defined on image 50 of series 5. 3 mm degenerative anterolisthesis of L4 on L5. Vertebral body heights maintained.  No suspicious marrow signal abnormality. Normal distal spinal cord and cauda equina with conus medullaris at L1. Alteration of CSF flow dynamics at L4-L5 on the pre and postcontrast axial T1. No abnormal intrathecal enhancement, particularly on the sagittal series. Prevertebral soft tissues are unremarkable. Dorsal paraspinal muscular atrophy. Visualized intra-abdominal structures are unremarkable. Mild lumbar spondylosis, worse at L4-L5 where there is grade 1 degenerative anterolisthesis due to severe facet arthropathy, mild canal stenosis, severe left lateral recess stenosis, and severe left foraminal stenosis with compression of the exiting left  L4 nerve root. No high-grade canal or stenosis at the remaining levels.   IMPRESSION: 1.  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 As this is an unstable fracture in a rigid spine neurosurgical consultation is recommended. 2.  No traumatic subluxation or high-grade canal stenosis. 3.  Mild lumbar spondylosis, worse at L4-L5 where there is severe left lateral recess stenosis and severe left foraminal stenosis. Correlate for left L4 and L5 radiculopathy. 4.  Advanced multilevel cervical spondylitic changes with multilevel canal flattening and canal stenoses, suboptimally assessed on the current examination. Consider dedicated MRI cervical spine to further evaluate.    Physical Exam   /68 (BP Location: Right arm, Patient Position: Sitting)   Pulse 69   Resp 16   SpO2 96%     Speech clear and fluent  Thought process logical, intact  Using w/c, gait deferred  WINSLOW equally well  Wearing TLSO  brace    Review of Systems   See HPI     Past Medical History:   Diagnosis Date    Anemia 2024    Antiplatelet or antithrombotic long-term use     Arthritis     CHF (congestive heart failure) (H) 2020    COPD (chronic obstructive pulmonary disease) (H)     DM (diabetes mellitus) (H)     Dyspnea on exertion     Essential hypertension, benign     Hemorrhage of rectum and anus     Non-small cell lung cancer (H)     Obesity     Personal history of colonic polyps     Sleep apnea     Thrombosis     Tobacco use disorder     Urinary retention     Walking troubles        Past Surgical History:   Procedure Laterality Date    ABDOMEN SURGERY      APPENDECTOMY      BONE EXOSTOSIS EXCISION Bilateral 2022    Procedure: EXOSTECTOMIES BOTH 5TH DIGITS WITH SOFT TISSUE RELEASE;  Surgeon: Tong Gray DPM;  Location: [facility]    CHOLECYSTECTOMY      COLONOSCOPY      CYSTOSCOPY, TRANSURETHRAL RESECTION (TUR) PROSTATE, COMBINED N/A 2018    Procedure: COMBINED CYSTOSCOPY, TRANSURETHRAL RESECTION (TUR) PROSTATE;  Cystoscopy, Transurethral Resection Of The Prostate;  Surgeon: Praveena Burris MD;  Location: [facility]    IR LUNG BIOPSY MEDIASTINUM RIGHT  2016    WEDGE RESECTION      lung    ZZC NONSPECIFIC PROCEDURE      cholecystectomy       Social History     Socioeconomic History    Marital status:    Tobacco Use    Smoking status: Former     Current packs/day: 0.00     Average packs/day: 2.0 packs/day for 53.0 years (106.0 ttl pk-yrs)     Types: Cigarettes, Cigars     Start date: 1960     Quit date: 2013     Years since quittin.8     Passive exposure: Past    Smokeless tobacco: Never    Tobacco comments:     Quit, will never start again.   Vaping Use    Vaping status: Never Used   Substance and Sexual Activity    Alcohol use: No    Drug use: No    Sexual activity: Not Currently     Partners: Female     Birth control/protection: Spermicide, None   Other Topics  Concern    Parent/sibling w/ CABG, MI or angioplasty before 65F 55M? No    Caffeine Concern No     Comment: 1-2 a day    Special Diet No    Exercise No    Seat Belt Yes     Social Drivers of Health     Financial Resource Strain: Low Risk  (12/1/2024)    Financial Resource Strain     Within the past 12 months, have you or your family members you live with been unable to get utilities (heat, electricity) when it was really needed?: No   Food Insecurity: Low Risk  (12/1/2024)    Food Insecurity     Within the past 12 months, did you worry that your food would run out before you got money to buy more?: No     Within the past 12 months, did the food you bought just not last and you didn t have money to get more?: No   Transportation Needs: Low Risk  (12/1/2024)    Transportation Needs     Within the past 12 months, has lack of transportation kept you from medical appointments, getting your medicines, non-medical meetings or appointments, work, or from getting things that you need?: No   Physical Activity: Insufficiently Active (9/2/2024)    Exercise Vital Sign     Days of Exercise per Week: 2 days     Minutes of Exercise per Session: 20 min   Stress: No Stress Concern Present (9/2/2024)    [employer] - Occupational Stress Questionnaire     Feeling of Stress : Not at all   Social Connections: Unknown (9/2/2024)    Social Connection and Isolation Panel [NHANES]     Frequency of Social Gatherings with Friends and Family: Once a week   Housing Stability: Low Risk  (12/1/2024)    Housing Stability     Do you have housing? : Yes     Are you worried about losing your housing?: No       family history includes Breast Cancer in his sister and sister; C.A.D. in his mother; Cancer in his mother; Cancer - colorectal in his mother; Cerebrovascular Disease in his father; Hypertension in his mother, sister, and sister; Other Cancer in his mother.       Rebeca Conte PA-C  Department of  Neurosurgery  Office: [phone]

## 2025-04-23 ENCOUNTER — TELEPHONE (OUTPATIENT)
Dept: INTERNAL MEDICINE | Facility: CLINIC | Age: 80
End: 2025-04-23
Payer: COMMERCIAL

## 2025-04-23 DIAGNOSIS — I26.99 OTHER PULMONARY EMBOLISM WITHOUT ACUTE COR PULMONALE, UNSPECIFIED CHRONICITY (H): ICD-10-CM

## 2025-04-23 DIAGNOSIS — M79.2 NEUROPATHIC PAIN: ICD-10-CM

## 2025-04-23 RX ORDER — GABAPENTIN 300 MG/1
600 CAPSULE ORAL 2 TIMES DAILY WITH MEALS
Qty: 180 CAPSULE | Refills: 0 | Status: SHIPPED | OUTPATIENT
Start: 2025-04-23

## 2025-04-23 NOTE — TELEPHONE ENCOUNTER
If he has not been taking the medication at 300/600 mg respectively then he should give that a little trial but he certainly at that point if no improvement is noted can increase it to 600 twice daily.

## 2025-04-23 NOTE — TELEPHONE ENCOUNTER
"Per OV note from 4/22/25 from neuro \"Recommend he ask his PCP to increase the gabapentin to 300 mg BID and 600 HS to see if this helps with his UE paresthesias and cramping in the interim.\" .     Still ok for previous (Take 2 capsules (600 mg) by mouth 2 times daily (with meals)) recommended gabapentin?         "

## 2025-04-23 NOTE — TELEPHONE ENCOUNTER
Pts SO other called the clinic. Pt gave verbal consent to speak to her about his health information.       Kenia stated-     Pts legs will shake at times. Pt has been complaining about his neuropathy in feet and hands.    Specialist told them to have PCP increase gabapentin.      Pt currently takes gabapentin 300mg in AM and 600mg in the evening.      [facility]     Routing to PCP to review and advise.     Please call Kenia back with PCPs recommendations.

## 2025-04-23 NOTE — TELEPHONE ENCOUNTER
Unclear to me why specialist cannot adjust medication if they are recommending such?  Suggest the patient take 2 tablets or 600 mg in the a.m. and p.m. see how he responds to this.  I have placed a refill request to his listed pharmacy.

## 2025-04-24 RX ORDER — APIXABAN 2.5 MG/1
2.5 TABLET, FILM COATED ORAL 2 TIMES DAILY
Qty: 180 TABLET | Refills: 2 | Status: SHIPPED | OUTPATIENT
Start: 2025-04-24

## 2025-04-29 ENCOUNTER — PATIENT OUTREACH (OUTPATIENT)
Dept: CARE COORDINATION | Facility: CLINIC | Age: 80
End: 2025-04-29
Payer: COMMERCIAL

## 2025-04-29 NOTE — PROGRESS NOTES
Clinic Care Coordination Contact  [facility]/Voicemail    Clinical Data: Care Coordinator Outreach    Outreach Documentation Number of Outreach Attempt   4/7/2025   1:10 PM 1   4/8/2025   9:57 AM 2   4/29/2025  12:29 PM 1       Left message on patient's voicemail with call back information and requested return call.      Plan: Care Coordinator will try to reach patient again in 10 business days.    OMAR Kim  Clinic Care Coordination  [facility] Clinics: [facility], [place], [place], and [place] [facility]  Phone: [phone]

## 2025-05-02 ENCOUNTER — TELEPHONE (OUTPATIENT)
Dept: INTERNAL MEDICINE | Facility: CLINIC | Age: 80
End: 2025-05-02
Payer: COMMERCIAL

## 2025-05-02 NOTE — TELEPHONE ENCOUNTER
Triage connected with  RN and shared PCP's verbal consent to wound care orders noted below:        Tiffanie Harris RN

## 2025-05-02 NOTE — TELEPHONE ENCOUNTER
Home Care is calling regarding an established patient with [facility].  Requesting orders from: Olegario Castillo RN APPROVED: RN able to provide verbal orders.  Home Care will send orders for signature.  RN will close encounter.  OTHER ACTION: Provider approval for wound care and signature for workman's comp.   Is this a request for a temporary pause in the home care episode?  No    Orders Requested    Skilled Nursing  Request for initial certification (first set of orders)   Frequency: Twice a week for 4 weeks.  RN gave verbal order: Yes    Needs approval for the following wound care orders for Stage 2 wound on coccyx 0.2 X 0.2.  Barrier cream daily.  Change Mepilx dressing twice weekly. Faxing for provider signature today. This is workman's comp and home care needs to get approved orders to workman's comp. Today.         Phone number Home Care can be reached at: [phone]  Okay to leave a detailed message?: Yes    Contacts       Contact Date/Time Type Contact Phone/Fax    05/02/2025 10:34 AM CDT Phone (Incoming) [facility] [phone]     confidential VM          Yamel Shankar, RN

## 2025-05-02 NOTE — TELEPHONE ENCOUNTER
Dr. Castillo, please advise.    Triage spoke to pt's HH nurse who confirmed that pt's WC provider reported that she only covered his wound care throughout his stay in rehab.  RN also reports that these current wounds are new.    If orders are approved/signed, please FAX a copy to the following #: [phone]    Tiffanie Harris RN

## 2025-05-02 NOTE — TELEPHONE ENCOUNTER
May approve but I have not been involved with claimants workman's comp claim, perhaps this needs to be approved via the WC provider.

## 2025-05-02 NOTE — LETTER
Dr. Jonathan Ruelas has approved the following wound care orders for this patient.   This was approved by him on 5/2/25 at 11:15am.       Stage 2 wound on coccyx 0.2 X 0.2.  Barrier cream daily.  Change Mepilx dressing twice weekly.       Please call the clinic at [phone] with any further questions/concerns.   Thank you,  Triage Nurse   Electronically signed

## 2025-05-03 ENCOUNTER — HEALTH MAINTENANCE LETTER (OUTPATIENT)
Age: 80
End: 2025-05-03

## 2025-05-05 NOTE — TELEPHONE ENCOUNTER
Home care called the clinic to follow-up as they have not received fax with wound care orders.   Writer will assist with creating letter and faxing to [phone].     Thank you,  Su Chan RN

## 2025-05-07 ENCOUNTER — NURSE TRIAGE (OUTPATIENT)
Dept: INTERNAL MEDICINE | Facility: CLINIC | Age: 80
End: 2025-05-07
Payer: COMMERCIAL

## 2025-05-07 NOTE — TELEPHONE ENCOUNTER
Dr. Castillo, please clarify your recommendation.   Suggest wound clinic referral? Or a visit with you?  Or is home care okay to continue managing this wound?      Thank you,  Su Chan RN

## 2025-05-07 NOTE — TELEPHONE ENCOUNTER
Home care nurse called to report patients buttocks wound is getting larger. Initially wound was .2 x .2 cm o 5/2 /2025 and now is .9 x .9 cm. Vitals are stable. Next home care visit is scheduled on 05/14/2025 since work comp insurance only allows one home care visit weekly.  Caller denies signs of infections.     Patients girlfriend has been advice on wound cares.     Home care needs a call back if any further recommendations from PCP.

## 2025-05-08 ENCOUNTER — TELEPHONE (OUTPATIENT)
Dept: INTERNAL MEDICINE | Facility: CLINIC | Age: 80
End: 2025-05-08
Payer: COMMERCIAL

## 2025-05-08 DIAGNOSIS — Z46.6 URINARY CATHETER CHANGE REQUIRED: Primary | ICD-10-CM

## 2025-05-08 NOTE — TELEPHONE ENCOUNTER
Relayed provider message to home care nurse. Symptoms worsening, home care recommending in person appointment for eval/tx of wound. Plan for writer to call pt, triage and schedule appointment as needed.

## 2025-05-08 NOTE — TELEPHONE ENCOUNTER
Naren MISTRY from [facility] called to inform Dr. Castillo that patient is going to be receiving lutathera therapy radioactive therapy via IV once every other month  Excess radioactive material is excreated through urine so they will be placing a philip catheter for patient during infusions which needed to stay in place until at least day 5. The catheter should be removed between day 5-7 after infusion     Cycle 1 is scheduled on may 28th   Remove no earlier than 6/2 but by 6/4  confirmed with [facility] no special precautions for disposal of cath or urine    4 cycles 2 months apart     They are needing Dr. Castillo to help facility the cath removal.     RN suggestion is to have RN give verbal order to home care for removal? Or place urology referral so patient can schedule with urology for removal     [phone] number for naren MISTRY with any questions

## 2025-05-08 NOTE — TELEPHONE ENCOUNTER
Please see note from provider.     Writer called pt to triage after speaking to home care nurse (please see previous note). Home care is reporting that due to workman's comp, pt has 1xwk visits. Pt's next home care visit is scheduled 5/14. Home care nurse reporting wound increased in size since last visit and thinks the pt would benefit from an in person visit with provider for assess/tx.     Upon callback, please triage pt and assist with scheduling as needed.

## 2025-05-08 NOTE — TELEPHONE ENCOUNTER
Nurse Triage SBAR    Is this a 2nd Level Triage? NO    Situation: Home care nurse called clinic to report patient buttocks wound has been getting bigger.    Background:Pt report pain at wound site, but denies drainage  or fever.     Assessment: Pt needs to be evaluated       Protocol Recommended Disposition:   See in Office Today or Tomorrow    Recommendation: OV today or tomorrow. See UC today if increased , pain , fever swelling or drainage.       Does the patient meet one of the following criteria for ADS visit consideration? No      Reason for Disposition   Wound becomes more painful or swollen, 3 or more days after injury    Additional Information   Negative: Wound infection diagnosed and taking an antibiotic   Negative: Cellulitis diagnosed and taking an antibiotic   Negative: Animal bite wound infection suspected   Negative: Boil suspected (painful red lump)   Negative: Surgical wound infection suspected (post-op)   Negative: Skin glue used to close wound and not infected   Negative: Stitches and not infected   Negative: SEVERE pain in the wound   Negative: SEVERE pain with bending of finger (or toe) in wound on hand (or foot)   Negative: Bright red, wide-spread, sunburn-like rash   Negative: Fever > 103 F (39.4 C)   Negative: Black (necrotic), dark purple, or blisters develop in area of wound   Negative: Patient sounds very sick or weak to the triager   Negative: Looks infected (spreading redness, red streak, pus) and fever   Negative: Looks infected (spreading redness, pus) and face wound   Negative: Red streak runs from the wound and longer than 1 inch (2.5 cm)   Negative: Finger or toe wound and entire finger or toe swollen   Negative: Looks infected (e.g., spreading redness, pus) and no fever   Negative: Pus or cloudy fluid draining from wound   Negative: No prior tetanus shots (or is not fully vaccinated) and any wound (e.g., cut or scrape)   Negative: HIV positive or severe immunodeficiency (severely  weak immune system) and DIRTY cut or scrape   Negative: Patient wants to be seen    Protocols used: Wound Infection [id number]

## 2025-05-12 NOTE — TELEPHONE ENCOUNTER
Spoke to patient for further information. Patient handed phone to wife, stating wife is making his plans for him.     Wife is waiting to hear back from Oncology regarding possible neck surgery, hoping this can be done before infusions are started. Patient is willing to postpone his infusions if he is able to have surgery done first. Writer explained [facility] for philip removal needing a F2F visit for insurance coverage, wife understands and will call the clinic back after she hears back from Oncology regarding surgery plan.   She is expecting a call back tomorrow.

## 2025-05-12 NOTE — TELEPHONE ENCOUNTER
Patient currently only received Wound Cares via [facility].     Please place home care referral for SN visits for catheter removal between day 5-7 after IV lutathera therapy radioactive therapy.

## 2025-05-20 ENCOUNTER — OFFICE VISIT (OUTPATIENT)
Dept: NEUROSURGERY | Facility: CLINIC | Age: 80
End: 2025-05-20
Attending: PHYSICIAN ASSISTANT
Payer: OTHER MISCELLANEOUS

## 2025-05-20 ENCOUNTER — ANCILLARY PROCEDURE (OUTPATIENT)
Dept: CT IMAGING | Facility: CLINIC | Age: 80
End: 2025-05-20
Attending: PHYSICIAN ASSISTANT
Payer: OTHER MISCELLANEOUS

## 2025-05-20 ENCOUNTER — PATIENT OUTREACH (OUTPATIENT)
Dept: CARE COORDINATION | Facility: CLINIC | Age: 80
End: 2025-05-20

## 2025-05-20 ENCOUNTER — PREP FOR PROCEDURE (OUTPATIENT)
Dept: NEUROLOGY | Facility: CLINIC | Age: 80
End: 2025-05-20

## 2025-05-20 ENCOUNTER — OFFICE VISIT (OUTPATIENT)
Dept: NEUROSURGERY | Facility: CLINIC | Age: 80
End: 2025-05-20
Payer: COMMERCIAL

## 2025-05-20 VITALS
OXYGEN SATURATION: 93 % | HEART RATE: 64 BPM | SYSTOLIC BLOOD PRESSURE: 135 MMHG | DIASTOLIC BLOOD PRESSURE: 70 MMHG | RESPIRATION RATE: 16 BRPM

## 2025-05-20 DIAGNOSIS — M48.02 SPINAL STENOSIS IN CERVICAL REGION: ICD-10-CM

## 2025-05-20 DIAGNOSIS — S22.079D CLOSED FRACTURE OF TENTH THORACIC VERTEBRA WITH ROUTINE HEALING, UNSPECIFIED FRACTURE MORPHOLOGY, SUBSEQUENT ENCOUNTER: Primary | ICD-10-CM

## 2025-05-20 DIAGNOSIS — M47.12 CERVICAL SPONDYLOSIS WITH MYELOPATHY: Primary | ICD-10-CM

## 2025-05-20 DIAGNOSIS — S22.079D CLOSED FRACTURE OF TENTH THORACIC VERTEBRA WITH ROUTINE HEALING, UNSPECIFIED FRACTURE MORPHOLOGY, SUBSEQUENT ENCOUNTER: ICD-10-CM

## 2025-05-20 PROCEDURE — 72128 CT CHEST SPINE W/O DYE: CPT | Mod: GC | Performed by: RADIOLOGY

## 2025-05-20 NOTE — PROGRESS NOTES
[facility] NEUROSURGERY [facility]INIC [address]  [address] [facility] [address]  Phone: [phone]  Fax: [phone]      Patient:  Nahun Villalobos, Date of birth 1945, 79 year old, male  Referring Provider No referring provider defined for this encounter.    ASSESSMENT & PLAN:  Patient is f/u today for T10 fracture that occurred after a fall 2/24/25.  He was supposed to have C3-6 laminoplasty in April, but is on hold until he heals from the spinal fracture.  I discussed the progression of his cancer with Dr. Pinon.  She agreed to see him in clinic again after he has healed from the spinal fracture to discuss possible laminectomy instead of a laminoplasty to make healing less of an issue.      CT thoracic shows some interval healing of the fracture.  I discussed the imaging with Dr. Pinon.  Okay to wean him from the brace at this time.  Discussed recommendation for doing this with the patient.  He does not require further follow for this unless he is having increased back pain.      I spent 33 minutes in patient care, independent review and interpretation of medical records/imaging, reviewing old records and discussed his case with Dr. Pinon.      History of Present Illness:  Patient is following up after an ED visit w/ nsgy consult 2/26/25, staffed by Dr. Kumar for DISH with T10 fracture and ALL disruption after a fall on 2/24/25.  Patient was provided a TLSO brace for management.  He represented to the ED 3/4/25 for SALLIE, intractable pain and was given custom TLSO.  Unfortunately he was found to have pressure sores in the bilateral flanks from the brace.  Patient was previously following in our nsgy clinic and was planning to have C3-6 laminoplasty with Dr. Pinon 4/2/25.  This was postponed until he is cleared from the spinal fracture.    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      4/22/25 Visit:  Patient  overall is tolerating the TLSO brace.  He has more complaints about the sensations and function of his hands.  He notes his hands are cramping up and is wondering what he can do while he has to wait for surgery to control this.  Patient follows at [facility] for his cancer of which there has been progression.  His treatment will involve radiation and chemotherapy of about 4 treatment, 8 weeks apart starting in May.      5/20/25 visit - f/u after CT thoracic.  Will also have visit with Dr. Pinon today to discuss cervical surgery.    Patient is in w/c, wearing clam shell TLSO brace.  Patient has not been very active at home and so has not needed to wear the brace much. He does wear it when he goes out to places.  He denies back pain at rest.  He has had some pain in the area of his fracture when someone helps him get out of bed.  He has reduced ability to ambulate and use his arms and has had to stop PT.  His cancer treatment has been put on hold.  His oncologist was okay with him getting surgery for his neck and starting treatment after he healed.  The treatment planned is IV systemic radiation therapy to be done at the [facility].        Patient notes electrical shocks when he is trying to use his arms to use the walker or participate in PT.     IMAGING   Imaging independently reviewed.    CT thoracic 5/20/25 - (reviewed with Dr. Pinon) healing oblique fracture through T10 VB and anterior osteophytes.     FINDINGS:  Intact thoracic vertebral alignment. Stable dextrocurvature of the  thoracic spine. Stable fracture through the anterior bridging  osteophyte at T9-10 with oblique extension through the vertebral body  and superior endplate of T10. Sclerosis along portions of the the  fracture line extending obliquely through the inferior endplate of  T10. No new fracture. Flowing anterior endplate osteophytes in the mid  and inferior thoracic spine. No prevertebral edema. Patent spinal  canal and neural foramina. Severe right neural  foraminal stenosis at  T3-4 and T4-5. Similar multilevel degenerative endplate changes and  facet arthropathy.  The visualized paraspinous tissues are within normal limits.   IMPRESSION:  1. Healing fracture of the anterior bridging osteophyte at T9-10 with  oblique extension through the superior and inferior endplates of T10.  No new osseous abnormality.  2. Similar thoracic spondylosis with severe right neural foraminal  stenosis at T3-4 and T4-5. No high-grade spinal canal stenosis.  3. Findings compatible with DISH.      Thoracic XR 4/11/25 -   Findings:  Stable fracture through the bridging anterior osteophyte at T10.  Stable alignment. Extension of fracture into the T10 vertebral body is  better visualized on prior MRI.  Flowing anterior thoracolumbar osteophytes. The visualized lungs are  clear. Cardiomediastinal silhouette is within normal limits. Vascular  calcifications.  Impression:  Stable alignment fracture through the bridging anterior osteophyte at  T10. Extension of of the fracture into the T10 vertebral body is  better visualized on prior MRI.     XR Thoracic Lumbar Standing 2 Views  Result Date: 2/28/2025  EXAM: XR THORACIC LUMBAR STANDING 2 VIEWS LOCATION: [facility] DATE: 2/27/2025 INDICATION: acute DISH fracture at T10 COMPARISON: MRI of the thoracic and lumbar spine dated 02/25/2025 and CT of the thoracic spine dated 02/26/2025   IMPRESSION: Patient is in a brace. Stable nondisplaced fracture through the bridging anterior endplate osteophyte at the T9-T10 level. Extension of the fracture into the T10 vertebral body is not well seen radiographically. No other fractures. Stable diffuse idiopathic skeletal hyperostosis. Stable slight degenerative anterolisthesis of L4 on L5 is otherwise normal vertebral alignment. Normal vertebral body heights. Mild to moderate multilevel degenerative disc disease. Mild to moderate degenerative arthritis involving the articular facets in  the lumbar spine. Normal extraspinal structures.     CT Thoracic Spine w/o Contrast  Result Date: 2/26/2025  EXAM: CT THORACIC SPINE WITHOUT CONTRAST LOCATION: [facility] DATE: 2/26/2025 INDICATION: T10 fracture noted on MRI. COMPARISON: 2/25/2025. TECHNIQUE: CT thoracic spine without contrast. Dose reduction techniques were performed. FINDINGS: There is good anatomic alignment of the thoracic spine. The vertebral body heights are well-maintained throughout. There is a fracture through the anterior osteophyte formation of the T9-T10 disc space level that that extends obliquely through the T10 vertebral body to its inferior endplate. The posterior elements are intact. There is no evidence of retropulsion to lead to canal compromise. No other acute thoracic spine fracture is visualized. There are diffuse endplate changes and anterior osteophyte formations throughout the thoracic region. There is no significant canal compromise or significant neural foraminal narrowing throughout the thoracic line. The paraspinal soft tissues are unremarkable. The lungs visualized on this study are clear.   IMPRESSION: 1.  Acute fracture through the anterior osteophyte formation at the T9-T10 disc space level extending obliquely through the T10 vertebral body to its inferior endplate. 2.  No evidence of retropulsion or canal compromise. 3.  No significant canal compromise or neural foraminal narrowing throughout thoracic spine.     MR Thoracic Spine w/o & w Contrast  Addendum Date: 2/26/2025    COMMUNICATION ADDENDUM: Findings were discussed with Dr. Desai on 2/26/2025 1:16 AM CST. END ADDENDUM  Result Date: 2/26/2025  EXAM: MR THORACIC SPINE W/O and W CONTRAST, MR LUMBAR SPINE W/O and W CONTRAST LOCATION: [facility] DATE/TIME: 2/25/2025 8:00 PM CST INDICATION: Fall w/acute mid back pain,xr neg COMPARISON: None. CONTRAST: 7 mL Gadavist TECHNIQUE: 1) Routine Thoracic Spine MRI  without and with IV contrast. 2) Routine Lumbar Spine MRI without and with IV contrast. FINDINGS: THORACIC SPINE: On the localizer image there is advanced multilevel cervical spondylitic changes with multilevel canal flattening and canal stenoses, suboptimally assessed on the current examination. Flowing ventral osteophytes in the thoracic spine compatible with DISH. There is fluid within an acute fracture extending along the ventral syndesmophyte at T10 into the T10 inferior endplate. Slight widening along the anterior aspect of the fracture where there is disruption of the anterior longitudinal ligament. No traumatic subluxation. The posterior longitudinal ligament, ligamentum flavum, and intraspinous ligaments are grossly intact. Otherwise, no suspicious marrow signal abnormality. Mild thoracic spondylitic changes without high-grade canal or foraminal stenosis. No abnormal cord signal. No abnormal intrathecal enhancement. Redemonstration of the cystic lesions in the right hepatic lobe. LUMBAR SPINE: Nomenclature is based on 5 lumbar type vertebral bodies with L5-S1 defined on image 50 of series 5. 3 mm degenerative anterolisthesis of L4 on L5. Vertebral body heights maintained.  No suspicious marrow signal abnormality. Normal distal spinal cord and cauda equina with conus medullaris at L1. Alteration of CSF flow dynamics at L4-L5 on the pre and postcontrast axial T1. No abnormal intrathecal enhancement, particularly on the sagittal series. Prevertebral soft tissues are unremarkable. Dorsal paraspinal muscular atrophy. Visualized intra-abdominal structures are unremarkable. Mild lumbar spondylosis, worse at L4-L5 where there is grade 1 degenerative anterolisthesis due to severe facet arthropathy, mild canal stenosis, severe left lateral recess stenosis, and severe left foraminal stenosis with compression of the exiting left  L4 nerve root. No high-grade canal or stenosis at the remaining levels.   IMPRESSION: 1.   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 As this is an unstable fracture in a rigid spine neurosurgical consultation is recommended. 2.  No traumatic subluxation or high-grade canal stenosis. 3.  Mild lumbar spondylosis, worse at L4-L5 where there is severe left lateral recess stenosis and severe left foraminal stenosis. Correlate for left L4 and L5 radiculopathy. 4.  Advanced multilevel cervical spondylitic changes with multilevel canal flattening and canal stenoses, suboptimally assessed on the current examination. Consider dedicated MRI cervical spine to further evaluate.    MR Lumbar Spine w/o & w Contrast  Addendum Date: 2/26/2025    COMMUNICATION ADDENDUM: Findings were discussed with Dr. Desai on 2/26/2025 1:16 AM CST. END ADDENDUM  Result Date: 2/26/2025  EXAM: MR THORACIC SPINE W/O and W CONTRAST, MR LUMBAR SPINE W/O and W CONTRAST LOCATION: [facility] DATE/TIME: 2/25/2025 8:00 PM CST INDICATION: Fall w/acute mid back pain,xr neg COMPARISON: None. CONTRAST: 7 mL Gadavist TECHNIQUE: 1) Routine Thoracic Spine MRI without and with IV contrast. 2) Routine Lumbar Spine MRI without and with IV contrast. FINDINGS: THORACIC SPINE: On the localizer image there is advanced multilevel cervical spondylitic changes with multilevel canal flattening and canal stenoses, suboptimally assessed on the current examination. Flowing ventral osteophytes in the thoracic spine compatible with DISH. There is fluid within an acute fracture extending along the ventral syndesmophyte at T10 into the T10 inferior endplate. Slight widening along the anterior aspect of the fracture where there is disruption of the anterior longitudinal ligament. No traumatic subluxation. The posterior longitudinal ligament, ligamentum flavum, and intraspinous ligaments are grossly intact. Otherwise, no suspicious marrow signal abnormality. Mild thoracic spondylitic changes without  high-grade canal or foraminal stenosis. No abnormal cord signal. No abnormal intrathecal enhancement. Redemonstration of the cystic lesions in the right hepatic lobe. LUMBAR SPINE: Nomenclature is based on 5 lumbar type vertebral bodies with L5-S1 defined on image 50 of series 5. 3 mm degenerative anterolisthesis of L4 on L5. Vertebral body heights maintained.  No suspicious marrow signal abnormality. Normal distal spinal cord and cauda equina with conus medullaris at L1. Alteration of CSF flow dynamics at L4-L5 on the pre and postcontrast axial T1. No abnormal intrathecal enhancement, particularly on the sagittal series. Prevertebral soft tissues are unremarkable. Dorsal paraspinal muscular atrophy. Visualized intra-abdominal structures are unremarkable. Mild lumbar spondylosis, worse at L4-L5 where there is grade 1 degenerative anterolisthesis due to severe facet arthropathy, mild canal stenosis, severe left lateral recess stenosis, and severe left foraminal stenosis with compression of the exiting left  L4 nerve root. No high-grade canal or stenosis at the remaining levels.   IMPRESSION: 1.  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 As this is an unstable fracture in a rigid spine neurosurgical consultation is recommended. 2.  No traumatic subluxation or high-grade canal stenosis. 3.  Mild lumbar spondylosis, worse at L4-L5 where there is severe left lateral recess stenosis and severe left foraminal stenosis. Correlate for left L4 and L5 radiculopathy. 4.  Advanced multilevel cervical spondylitic changes with multilevel canal flattening and canal stenoses, suboptimally assessed on the current examination. Consider dedicated MRI cervical spine to further evaluate.    Physical Exam   /70 (BP Location: Right arm, Patient Position: Sitting)   Pulse 64   Resp 16   SpO2 93%     Speech clear and fluent  Thought process logical, intact  Using w/c, gait deferred  No pain in  fracture area with F/E, twisting of his back w/ TLSO brace removed.    WINSLOW equally well    Review of Systems   See HPI     Past Medical History:   Diagnosis Date    Anemia 2024    Antiplatelet or antithrombotic long-term use     Arthritis     CHF (congestive heart failure) (H) 2020    COPD (chronic obstructive pulmonary disease) (H)     DM (diabetes mellitus) (H)     Dyspnea on exertion     Essential hypertension, benign     Hemorrhage of rectum and anus     Non-small cell lung cancer (H)     Obesity     Personal history of colonic polyps     Sleep apnea     Thrombosis     Tobacco use disorder     Urinary retention     Walking troubles        Past Surgical History:   Procedure Laterality Date    ABDOMEN SURGERY      APPENDECTOMY      BONE EXOSTOSIS EXCISION Bilateral 2022    Procedure: EXOSTECTOMIES BOTH 5TH DIGITS WITH SOFT TISSUE RELEASE;  Surgeon: Tong Gray DPM;  Location: [facility]    CHOLECYSTECTOMY      COLONOSCOPY      CYSTOSCOPY, TRANSURETHRAL RESECTION (TUR) PROSTATE, COMBINED N/A 2018    Procedure: COMBINED CYSTOSCOPY, TRANSURETHRAL RESECTION (TUR) PROSTATE;  Cystoscopy, Transurethral Resection Of The Prostate;  Surgeon: Praveena Burris MD;  Location: [facility] OR    IR LUNG BIOPSY MEDIASTINUM RIGHT  2016    WEDGE RESECTION      lung    ZZC NONSPECIFIC PROCEDURE      cholecystectomy       Social History     Socioeconomic History    Marital status:    Tobacco Use    Smoking status: Former     Current packs/day: 0.00     Average packs/day: 2.0 packs/day for 53.0 years (106.0 ttl pk-yrs)     Types: Cigarettes, Cigars     Start date: 1960     Quit date: 2013     Years since quittin.8     Passive exposure: Past    Smokeless tobacco: Never    Tobacco comments:     Quit, will never start again.   Vaping Use    Vaping status: Never Used   Substance and Sexual Activity    Alcohol use: No    Drug use: No    Sexual activity: Not Currently      Partners: Female     Birth control/protection: Spermicide, None   Other Topics Concern    Parent/sibling w/ CABG, MI or angioplasty before 65F 55M? No    Caffeine Concern No     Comment: 1-2 a day    Special Diet No    Exercise No    Seat Belt Yes     Social Drivers of Health     Financial Resource Strain: Low Risk  (12/1/2024)    Financial Resource Strain     Within the past 12 months, have you or your family members you live with been unable to get utilities (heat, electricity) when it was really needed?: No   Food Insecurity: Low Risk  (12/1/2024)    Food Insecurity     Within the past 12 months, did you worry that your food would run out before you got money to buy more?: No     Within the past 12 months, did the food you bought just not last and you didn t have money to get more?: No   Transportation Needs: Low Risk  (12/1/2024)    Transportation Needs     Within the past 12 months, has lack of transportation kept you from medical appointments, getting your medicines, non-medical meetings or appointments, work, or from getting things that you need?: No   Physical Activity: Insufficiently Active (9/2/2024)    Exercise Vital Sign     Days of Exercise per Week: 2 days     Minutes of Exercise per Session: 20 min   Stress: No Stress Concern Present (9/2/2024)    [employer] [place] [employer] - Occupational Stress Questionnaire     Feeling of Stress : Not at all   Social Connections: Unknown (9/2/2024)    Social Connection and Isolation Panel [NHANES]     Frequency of Social Gatherings with Friends and Family: Once a week   Housing Stability: Low Risk  (12/1/2024)    Housing Stability     Do you have housing? : Yes     Are you worried about losing your housing?: No       family history includes Breast Cancer in his sister and sister; C.A.D. in his mother; Cancer in his mother; Cancer - colorectal in his mother; Cerebrovascular Disease in his father; Hypertension in his mother, sister, and sister; Other  Cancer in his mother.       Rebeca Conte PA-C  Department of Neurosurgery  Office: [phone]

## 2025-05-20 NOTE — PATIENT INSTRUCTIONS
Okay to wean the TLSO brace.  Wear in 1-2 hours less each day until you don't need it anymore.      No further fracture management needed unless you have worsening back pain.

## 2025-05-20 NOTE — PATIENT INSTRUCTIONS
You were seen today with Dr. Seda Pinon.    Next steps:  Schedule surgery  Schedule PAC appointment  Schedule 2 week post up follow up and wound check      How to Contact Us  Send a [employer] message to your provider.   Call the clinic - your call will be routed appropriately.   Neurosurgery Clinic: [phone]  To speak directly to an RN Care Coordinator:  GIANNI RN: [phone]      Note: We do our best to check voicemail frequently throughout the day and make every effort to return calls within 1-2 business days. For urgent matters, please use the general clinic phone numbers listed above.    Patient Instructions      Pre-Op Prep    You will need a preoperative assessment within 30 days of your surgery.   We will schedule this for you once we know your surgery date.   This appointment will be with our Preoperative Assessment Center on the 5th floor or virtually.   Before surgery, call the clinic if you have any changes in your health or you are having more frequent or severe symptoms. Please let us know if you develop a respiratory illness (cold, flu, or COVID-19) or other illness or infection in the weeks leading up to your surgery. We are not currently requiring a COVID-19 test prior to your surgery, unless you are having symptoms.  In the 1-2 weeks prior to your surgery, follow these recommendations to protect yourself:  Wear a face covering outside your home.  Use social distancing and stay 6 feet away from others whenever you can.  Wash your hands often.  Having diabetes or smoking can cause delayed wound healing and increase your risk of a surgical site infection. Quitting smoking and lowering your blood sugars can make a big difference.  If you would like support with this, please let us know or work with your primary care provider.    EATING AND DRINKING BEFORE SURGERY  Eat and drink as usual until 8 hours before your surgery arrival time. After that, no food or milk.   Drink clear liquids until 2 hours before  your surgery arrival time. Clear liquids are liquids you can see through, like water, Gatorade, and Propel. You may also have black coffee and tea (no cream or milk).   Nothing by mouth within 2 hours of your surgery arrival time. This includes gum, candy, and breath mints.   If your care team tells you take medicine on the morning of surgery, it is okay to take medicine with a sip of water.   Do not drink alcohol for at least 24 hours before surgery. Do not use marijuana for 7 days prior to surgery.    SHOWERING INSTRUCTIONS  Shower or bathe the night before and morning of your surgery following the instructions on your handout,  Showering Before Surgery.  Only use the antiseptic soap from your neck to your toes (do not use on your hair or head). Your care team will clean the skin at your surgery site.   Don t shave or clip hair near your surgery site. We ll remove the hair if needed.     SMOKING AND NICOTINE  Don t smoke or vape the morning of surgery. You may chew nicotine gum up to 2 hours before surgery. A nicotine patch is okay.   We strongly recommend quitting smoking and other tobacco products. Nicotine can cause delayed healing and wound complications after surgery.     PARKING: Self-park and  parking (24 hours, 7 days a week) are available at the hospital. Self-park and  rates are the same. If the Patient Visitors Ramp is full or if a patient or visitor has limited mobility, please follow the signs to the  located at the main entrance. For more information, please visit: [email or web address]        Medications    You will receive specific instructions about how to take your medications at your preoperative assessment visit. Inform your care team of every medication that you take, including over-the-counter medications, vitamins, and supplements. Some medications can make you bleed too much during surgery, and some change how well anesthesia drugs work. Follow  these general instructions for common medications, unless otherwise directed.     INSTRUCTIONS MEDICATION   Hold for 7 days before surgery  Supplements   Multivitamins   Naproxen (Aleve)  Ibuprofen (Advil, Motrin)  Aspirin (note: some medications can contain aspirin, like Kat[place])     Talk with the Preoperative Assessment Center (PAC) about how to take these medications before surgery    Insulin or oral medications for diabetes   Blood pressure medications   Anticoagulant medications, including:    warfarin (Coumadin)   enoxaparin (Lovenox)   dabigatran (Pradaxa)   apixaban (Eliquis)   rivaroxaban (Xarelto)   Antiplatelet medications, including:   clopidogrel bisulfate (Plavix)   cilostazol (Pletal)      Okay to keep taking for pain, as needed    Acetaminophen (Tylenol)        Pain Management    You will have post-operative incisional pain which may require pain medications and muscle relaxants. You will receive medication upon discharge.  You may resume taking NSAIDs (ex. Ibuprofen, aleve, naproxen) immediately post-op   Do NOT drive while taking narcotic pain medication  Do NOT drink alcohol while using any pain medication  You can utilize ice as needed (no longer than 20 minutes at one time)  Avoid putting heat on the incision    Incision Care    Wash your incision areas daily with warm, mild soapy water, and gently pat them dry.   Don't use hydrogen peroxide or alcohol, which can slow healing.   Keep the areas clean and dry.   No submerging incision in water such as pools, hot tubs, or baths until incision is healed.   Showers are fine.   Your incisions may feel numb, this is normal.   Small nerves are cut during surgery and will take time to heal.   If needed, sutures or staples will need to be removed 10-14 days post op.    Activity Restrictions    For the first 6-8 weeks, no lifting > 10 pounds, limited bending, twisting, or overhead reaching.  Take stairs in moderation   Ok to walk as tolerated, take  short frequent walks. You may gradually increase the distance as tolerated.   Avoid bed rest and prolonged sitting for longer than 30 minutes (change positions frequently while awake)  No contact sports until after follow up visit  No high impact activities such as; running/jogging, snowmobile or 4 cummings riding or any other recreational vehicles  Please call the clinic if you develop any of the following symptoms:  Swelling and/or warmth in one or both legs  Pain or tenderness in your leg, ankle, foot, or arm   Red or discolored skin     Home Recovery    Everyone's recovery is different based on their health condition, age, and overall health status. Plan to have an adult stay with you for at least 24 hours after you get home from the hospital. Arrange for help at home. It is common to feel tired for the first few days (maybe weeks) and you will need to avoid some activities. Many people find that having someone help with household tasks, such as cleaning, cooking, and running errands is helpful. Make your home safe by removing tripping hazards and moving items you need to a place where you can easily reach them.     Warning Signs    Watch for signs of infection:   Redness  Swelling  Warmth  Drainage  Fever of 101 degrees or higher  Notify clinic [phone]    Go to the nearest emergency room for evaluation if you develop:    You have a fever with a stiff neck or a severe headache.  Swollen lymph nodes in your neck, armpits, or groin.  You have any sudden vision changes.  You have new or worse headaches.  You are sleeping more than you are awake.  You fall and hit your head.  You have pain that does not get better after you take pain medicine.  Vomiting    Post-Op Follow Up Appointments    2 week incision check with PA/NP  6 week post op follow up visit with PA/NP  3 months post op with Dr. Pinon   Please call to schedule follow up appointment at [phone]    Resources    If you are currently employed, you  will need to be off work for 2-4 weeks for post op recovery and healing.  Please fax any FMLA/short term disability paperwork to [phone]  You may call our clinic when you'd like to return to work and we can provide a work letter  To allow staff adequate time to complete paperwork, please send as soon as possible     Frequent Asked Questions:    COVID-19 and Influenza vaccines: Some people experience temporary side effects from vaccines, and we don't want to confuse these symptoms with side effects or complications from surgery (example: mild fever). Avoid getting your flu or COVID vaccine within 1 week prior to your surgery date, and no earlier than 4 weeks after your surgery.  Dental work: Avoid dental cleanings and dental work for at least 6 weeks after your surgery.  Driving: You should not drive if you are taking narcotic pain medication, you have had vision changes, or you have had a seizure and have been told not to drive. If you are dizzy after surgery, you should avoid driving until you feel better. Turning your head quickly, such as to check your blind spot in a car, can cause dizziness. When you begin driving again, start with short trips around your neighborhood, and gradually increase the distance as you feel comfortable and as your symptoms allow.     Contact Us  If you have a serious emergency, call 911 or come to the emergency room. If you can, come to the hospital where you had your surgery.     After Hours, Weekends, & Holidays    Phone  Notes    Hospital  (available 24/7)    [phone]      Ask to speak with the on-call     Neurosurgery resident    During Clinic Hours    Phone  Fax    Neurosurgery    [phone]

## 2025-05-20 NOTE — LETTER
5/20/2025       RE: Nahun Villalobos  [address] [facility] [address]     Dear Colleague,    Thank you for referring your patient, Nahun Villalobos, to the [facility] [place] at [facility]. Please see a copy of my visit note below.      [facility] [place]  [address]  [address]RD FLO[address]  [facility] [address]  Phone: [phone]  Fax: [phone]      Patient:  Nahun Villalobos, Date of birth 1945, 79 year old, male  Referring Provider No referring provider defined for this encounter.    ASSESSMENT & PLAN:  Patient is f/u today for T10 fracture that occurred after a fall 2/24/25.  He was supposed to have C3-6 laminoplasty in April, but is on hold until he heals from the spinal fracture.  I discussed the progression of his cancer with Dr. Pinon.  She agreed to see him in clinic again after he has healed from the spinal fracture to discuss possible laminectomy instead of a laminoplasty to make healing less of an issue.      CT thoracic shows some interval healing of the fracture.  I discussed the imaging with Dr. Pinon.  Okay to wean him from the brace at this time.  Discussed recommendation for doing this with the patient.  He does not require further follow for this unless he is having increased back pain.      I spent 33 minutes in patient care, independent review and interpretation of medical records/imaging, reviewing old records and discussed his case with Dr. Pionn.      History of Present Illness:  Patient is following up after an ED visit w/ nsgy consult 2/26/25, staffed by Dr. Kumar for DISH with T10 fracture and ALL disruption after a fall on 2/24/25.  Patient was provided a TLSO brace for management.  He represented to the ED 3/4/25 for SALLIE, intractable pain and was given custom TLSO.  Unfortunately he was found to have pressure sores in the bilateral flanks from the brace.  Patient  was previously following in our nsgy clinic and was planning to have C3-6 laminoplasty with Dr. Pinon 4/2/25.  This was postponed until he is cleared from the spinal fracture.    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      4/22/25 Visit:  Patient overall is tolerating the TLSO brace.  He has more complaints about the sensations and function of his hands.  He notes his hands are cramping up and is wondering what he can do while he has to wait for surgery to control this.  Patient follows at [facility] for his cancer of which there has been progression.  His treatment will involve radiation and chemotherapy of about 4 treatment, 8 weeks apart starting in May.      5/20/25 visit - f/u after CT thoracic.  Will also have visit with Dr. Pinon today to discuss cervical surgery.    Patient is in w/c, wearing clam shell TLSO brace.  Patient has not been very active at home and so has not needed to wear the brace much. He does wear it when he goes out to places.  He denies back pain at rest.  He has had some pain in the area of his fracture when someone helps him get out of bed.  He has reduced ability to ambulate and use his arms and has had to stop PT.  His cancer treatment has been put on hold.  His oncologist was okay with him getting surgery for his neck and starting treatment after he healed.  The treatment planned is IV systemic radiation therapy to be done at the [facility].        Patient notes electrical shocks when he is trying to use his arms to use the walker or participate in PT.     IMAGING   Imaging independently reviewed.    CT thoracic 5/20/25 - (reviewed with Dr. Pinon) healing oblique fracture through T10 VB and anterior osteophytes.     FINDINGS:  Intact thoracic vertebral alignment. Stable dextrocurvature of the  thoracic spine. Stable fracture through the anterior bridging  osteophyte at T9-10 with oblique extension through the vertebral body  and  superior endplate of T10. Sclerosis along portions of the the  fracture line extending obliquely through the inferior endplate of  T10. No new fracture. Flowing anterior endplate osteophytes in the mid  and inferior thoracic spine. No prevertebral edema. Patent spinal  canal and neural foramina. Severe right neural foraminal stenosis at  T3-4 and T4-5. Similar multilevel degenerative endplate changes and  facet arthropathy.  The visualized paraspinous tissues are within normal limits.   IMPRESSION:  1. Healing fracture of the anterior bridging osteophyte at T9-10 with  oblique extension through the superior and inferior endplates of T10.  No new osseous abnormality.  2. Similar thoracic spondylosis with severe right neural foraminal  stenosis at T3-4 and T4-5. No high-grade spinal canal stenosis.  3. Findings compatible with DISH.      Thoracic XR 4/11/25 -   Findings:  Stable fracture through the bridging anterior osteophyte at T10.  Stable alignment. Extension of fracture into the T10 vertebral body is  better visualized on prior MRI.  Flowing anterior thoracolumbar osteophytes. The visualized lungs are  clear. Cardiomediastinal silhouette is within normal limits. Vascular  calcifications.  Impression:  Stable alignment fracture through the bridging anterior osteophyte at  T10. Extension of of the fracture into the T10 vertebral body is  better visualized on prior MRI.     XR Thoracic Lumbar Standing 2 Views  Result Date: 2/28/2025  EXAM: XR THORACIC LUMBAR STANDING 2 VIEWS LOCATION: [facility] DATE: 2/27/2025 INDICATION: acute DISH fracture at T10 COMPARISON: MRI of the thoracic and lumbar spine dated 02/25/2025 and CT of the thoracic spine dated 02/26/2025   IMPRESSION: Patient is in a brace. Stable nondisplaced fracture through the bridging anterior endplate osteophyte at the T9-T10 level. Extension of the fracture into the T10 vertebral body is not well seen radiographically. No other  fractures. Stable diffuse idiopathic skeletal hyperostosis. Stable slight degenerative anterolisthesis of L4 on L5 is otherwise normal vertebral alignment. Normal vertebral body heights. Mild to moderate multilevel degenerative disc disease. Mild to moderate degenerative arthritis involving the articular facets in the lumbar spine. Normal extraspinal structures.     CT Thoracic Spine w/o Contrast  Result Date: 2/26/2025  EXAM: CT THORACIC SPINE WITHOUT CONTRAST LOCATION: [facility] DATE: 2/26/2025 INDICATION: T10 fracture noted on MRI. COMPARISON: 2/25/2025. TECHNIQUE: CT thoracic spine without contrast. Dose reduction techniques were performed. FINDINGS: There is good anatomic alignment of the thoracic spine. The vertebral body heights are well-maintained throughout. There is a fracture through the anterior osteophyte formation of the T9-T10 disc space level that that extends obliquely through the T10 vertebral body to its inferior endplate. The posterior elements are intact. There is no evidence of retropulsion to lead to canal compromise. No other acute thoracic spine fracture is visualized. There are diffuse endplate changes and anterior osteophyte formations throughout the thoracic region. There is no significant canal compromise or significant neural foraminal narrowing throughout the thoracic line. The paraspinal soft tissues are unremarkable. The lungs visualized on this study are clear.   IMPRESSION: 1.  Acute fracture through the anterior osteophyte formation at the T9-T10 disc space level extending obliquely through the T10 vertebral body to its inferior endplate. 2.  No evidence of retropulsion or canal compromise. 3.  No significant canal compromise or neural foraminal narrowing throughout thoracic spine.     MR Thoracic Spine w/o & w Contrast  Addendum Date: 2/26/2025    COMMUNICATION ADDENDUM: Findings were discussed with Dr. Desai on 2/26/2025 1:16 AM CST. END  ADDENDUM  Result Date: 2/26/2025  EXAM: MR THORACIC SPINE W/O and W CONTRAST, MR LUMBAR SPINE W/O and W CONTRAST LOCATION: [facility] DATE/TIME: 2/25/2025 8:00 PM CST INDICATION: Fall w/acute mid back pain,xr neg COMPARISON: None. CONTRAST: 7 mL Gadavist TECHNIQUE: 1) Routine Thoracic Spine MRI without and with IV contrast. 2) Routine Lumbar Spine MRI without and with IV contrast. FINDINGS: THORACIC SPINE: On the localizer image there is advanced multilevel cervical spondylitic changes with multilevel canal flattening and canal stenoses, suboptimally assessed on the current examination. Flowing ventral osteophytes in the thoracic spine compatible with DISH. There is fluid within an acute fracture extending along the ventral syndesmophyte at T10 into the T10 inferior endplate. Slight widening along the anterior aspect of the fracture where there is disruption of the anterior longitudinal ligament. No traumatic subluxation. The posterior longitudinal ligament, ligamentum flavum, and intraspinous ligaments are grossly intact. Otherwise, no suspicious marrow signal abnormality. Mild thoracic spondylitic changes without high-grade canal or foraminal stenosis. No abnormal cord signal. No abnormal intrathecal enhancement. Redemonstration of the cystic lesions in the right hepatic lobe. LUMBAR SPINE: Nomenclature is based on 5 lumbar type vertebral bodies with L5-S1 defined on image 50 of series 5. 3 mm degenerative anterolisthesis of L4 on L5. Vertebral body heights maintained.  No suspicious marrow signal abnormality. Normal distal spinal cord and cauda equina with conus medullaris at L1. Alteration of CSF flow dynamics at L4-L5 on the pre and postcontrast axial T1. No abnormal intrathecal enhancement, particularly on the sagittal series. Prevertebral soft tissues are unremarkable. Dorsal paraspinal muscular atrophy. Visualized intra-abdominal structures are unremarkable. Mild lumbar spondylosis,  worse at L4-L5 where there is grade 1 degenerative anterolisthesis due to severe facet arthropathy, mild canal stenosis, severe left lateral recess stenosis, and severe left foraminal stenosis with compression of the exiting left  L4 nerve root. No high-grade canal or stenosis at the remaining levels.   IMPRESSION: 1.  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 As this is an unstable fracture in a rigid spine neurosurgical consultation is recommended. 2.  No traumatic subluxation or high-grade canal stenosis. 3.  Mild lumbar spondylosis, worse at L4-L5 where there is severe left lateral recess stenosis and severe left foraminal stenosis. Correlate for left L4 and L5 radiculopathy. 4.  Advanced multilevel cervical spondylitic changes with multilevel canal flattening and canal stenoses, suboptimally assessed on the current examination. Consider dedicated MRI cervical spine to further evaluate.    MR Lumbar Spine w/o & w Contrast  Addendum Date: 2/26/2025    COMMUNICATION ADDENDUM: Findings were discussed with Dr. Desai on 2/26/2025 1:16 AM CST. END ADDENDUM  Result Date: 2/26/2025  EXAM: MR THORACIC SPINE W/O and W CONTRAST, MR LUMBAR SPINE W/O and W CONTRAST LOCATION: [facility] DATE/TIME: 2/25/2025 8:00 PM CST INDICATION: Fall w/acute mid back pain,xr neg COMPARISON: None. CONTRAST: 7 mL Gadavist TECHNIQUE: 1) Routine Thoracic Spine MRI without and with IV contrast. 2) Routine Lumbar Spine MRI without and with IV contrast. FINDINGS: THORACIC SPINE: On the localizer image there is advanced multilevel cervical spondylitic changes with multilevel canal flattening and canal stenoses, suboptimally assessed on the current examination. Flowing ventral osteophytes in the thoracic spine compatible with DISH. There is fluid within an acute fracture extending along the ventral syndesmophyte at T10 into the T10 inferior endplate. Slight widening along the  anterior aspect of the fracture where there is disruption of the anterior longitudinal ligament. No traumatic subluxation. The posterior longitudinal ligament, ligamentum flavum, and intraspinous ligaments are grossly intact. Otherwise, no suspicious marrow signal abnormality. Mild thoracic spondylitic changes without high-grade canal or foraminal stenosis. No abnormal cord signal. No abnormal intrathecal enhancement. Redemonstration of the cystic lesions in the right hepatic lobe. LUMBAR SPINE: Nomenclature is based on 5 lumbar type vertebral bodies with L5-S1 defined on image 50 of series 5. 3 mm degenerative anterolisthesis of L4 on L5. Vertebral body heights maintained.  No suspicious marrow signal abnormality. Normal distal spinal cord and cauda equina with conus medullaris at L1. Alteration of CSF flow dynamics at L4-L5 on the pre and postcontrast axial T1. No abnormal intrathecal enhancement, particularly on the sagittal series. Prevertebral soft tissues are unremarkable. Dorsal paraspinal muscular atrophy. Visualized intra-abdominal structures are unremarkable. Mild lumbar spondylosis, worse at L4-L5 where there is grade 1 degenerative anterolisthesis due to severe facet arthropathy, mild canal stenosis, severe left lateral recess stenosis, and severe left foraminal stenosis with compression of the exiting left  L4 nerve root. No high-grade canal or stenosis at the remaining levels.   IMPRESSION: 1.  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 As this is an unstable fracture in a rigid spine neurosurgical consultation is recommended. 2.  No traumatic subluxation or high-grade canal stenosis. 3.  Mild lumbar spondylosis, worse at L4-L5 where there is severe left lateral recess stenosis and severe left foraminal stenosis. Correlate for left L4 and L5 radiculopathy. 4.  Advanced multilevel cervical spondylitic changes with multilevel canal flattening and canal stenoses,  suboptimally assessed on the current examination. Consider dedicated MRI cervical spine to further evaluate.    Physical Exam   /70 (BP Location: Right arm, Patient Position: Sitting)   Pulse 64   Resp 16   SpO2 93%     Speech clear and fluent  Thought process logical, intact  Using w/c, gait deferred  No pain in fracture area with F/E, twisting of his back w/ TLSO brace removed.    WINSLOW equally well    Review of Systems   See HPI     Past Medical History:   Diagnosis Date     Anemia 07/09/2024     Antiplatelet or antithrombotic long-term use      Arthritis      CHF (congestive heart failure) (H) 09/16/2020     COPD (chronic obstructive pulmonary disease) (H)      DM (diabetes mellitus) (H)      Dyspnea on exertion      Essential hypertension, benign      Hemorrhage of rectum and anus      Non-small cell lung cancer (H)      Obesity      Personal history of colonic polyps      Sleep apnea      Thrombosis      Tobacco use disorder      Urinary retention      Walking troubles        Past Surgical History:   Procedure Laterality Date     ABDOMEN SURGERY  1995     APPENDECTOMY       BONE EXOSTOSIS EXCISION Bilateral 09/20/2022    Procedure: EXOSTECTOMIES BOTH 5TH DIGITS WITH SOFT TISSUE RELEASE;  Surgeon: Tong Gray DPM;  Location: [facility]     CHOLECYSTECTOMY       COLONOSCOPY  2014     CYSTOSCOPY, TRANSURETHRAL RESECTION (TUR) PROSTATE, COMBINED N/A 04/30/2018    Procedure: COMBINED CYSTOSCOPY, TRANSURETHRAL RESECTION (TUR) PROSTATE;  Cystoscopy, Transurethral Resection Of The Prostate;  Surgeon: Praveena Burris MD;  Location: [facility] OR     IR LUNG BIOPSY MEDIASTINUM RIGHT  04/06/2016     WEDGE RESECTION      lung     ZZC NONSPECIFIC PROCEDURE      cholecystectomy       Social History     Socioeconomic History     Marital status:    Tobacco Use     Smoking status: Former     Current packs/day: 0.00     Average packs/day: 2.0 packs/day for 53.0 years (106.0 ttl pk-yrs)     Types:  Cigarettes, Cigars     Start date: 1960     Quit date: 2013     Years since quittin.8     Passive exposure: Past     Smokeless tobacco: Never     Tobacco comments:     Quit, will never start again.   Vaping Use     Vaping status: Never Used   Substance and Sexual Activity     Alcohol use: No     Drug use: No     Sexual activity: Not Currently     Partners: Female     Birth control/protection: Spermicide, None   Other Topics Concern     Parent/sibling w/ CABG, MI or angioplasty before 65F 55M? No     Caffeine Concern No     Comment: 1-2 a day     Special Diet No     Exercise No     Seat Belt Yes     Social Drivers of Health     Financial Resource Strain: Low Risk  (2024)    Financial Resource Strain      Within the past 12 months, have you or your family members you live with been unable to get utilities (heat, electricity) when it was really needed?: No   Food Insecurity: Low Risk  (2024)    Food Insecurity      Within the past 12 months, did you worry that your food would run out before you got money to buy more?: No      Within the past 12 months, did the food you bought just not last and you didn t have money to get more?: No   Transportation Needs: Low Risk  (2024)    Transportation Needs      Within the past 12 months, has lack of transportation kept you from medical appointments, getting your medicines, non-medical meetings or appointments, work, or from getting things that you need?: No   Physical Activity: Insufficiently Active (2024)    Exercise Vital Sign      Days of Exercise per Week: 2 days      Minutes of Exercise per Session: 20 min   Stress: No Stress Concern Present (2024)    [employer] - Occupational Stress Questionnaire      Feeling of Stress : Not at all   Social Connections: Unknown (2024)    Social Connection and Isolation Panel [NHANES]      Frequency of Social Gatherings with Friends and Family: Once a week   Housing  Stability: Low Risk  (12/1/2024)    Housing Stability      Do you have housing? : Yes      Are you worried about losing your housing?: No       family history includes Breast Cancer in his sister and sister; C.A.D. in his mother; Cancer in his mother; Cancer - colorectal in his mother; Cerebrovascular Disease in his father; Hypertension in his mother, sister, and sister; Other Cancer in his mother.       Rebeca Conte PA-C  Department of Neurosurgery  Office: [phone]        Again, thank you for allowing me to participate in the care of your patient.      Sincerely,    Rebeca Conte PA-C

## 2025-05-20 NOTE — LETTER
5/20/2025       RE: Nahun Villalobos  [address] [address]     Dear Colleague,    Thank you for referring your patient, Nahun Villalobos, to the [facility]. Please see a copy of my visit note below.      [facility]  [address]  [address]RD FLOOR  [facility] [address]  Phone: [phone]  Fax: [phone]      Patient:  Nahun Villalobos, Date of birth 1945, 79 year old, male  Referring Provider Referred Self, MD  No address on file    ASSESSMENT & PLAN:  Patient seen and discussed with Dr. Pinon today for spondylitic cervical myelopathy.  Because of his comorbidities and that it will be easier for him to heal from a lesser surgery, we will plan to do a C3-6 laminectomy instead of the previously planned a C3-6 laminoplasty.    Dr. Pinon discussed reasonable expectations, complications profile, and the surgical procedure with the patient and his significant other.  The patient understands that his neck pain is likely not to resolve with surgery and may actually be worse after the surgery.  The pain in his arms and some of the function should get better.  He may not get full function back in his arms or legs.  Patient wishes to proceed with the surgery being offered as soon as possible.    I spent 30 minutes in patient care, independent review and interpretation of medical records/imaging, reviewing old records.    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 Pinon 11/8/24) and was scheduled for C3-6  laminoplasty in April but unfortunately 2/24/2025, he had a fall that resulted in a T10 vertebral body fracture and his surgery needed to be postponed.  He followed up earlier today and the fracture in his thoracic spine appears to be healing and he is transitioning out of the TLSO brace.  He has had some progression of the malignant carcinoid small intestine cancer with mets to the liver and bones.  He is treating at the [facility] for this they recommended systemic IV radiation.  Because of how debilitated he is from the spinal canal stenosis with the use of his hands, positively needs time, reduced ambulatory ability, they told him it would be okay for him to have his neck treated first and then start treatment.  He was told that this could wait up to 1 year.      Patient would like to proceed with the cervical surgery to try to limit the amount of pain he has in his arms when he tries to use his walker.  He has significant numbness in his hands and has been dropping things often.  He has significant issues with ambulating.  He also endorses neck pain but is realistic in the expectation that surgery will cure this.    IMAGING   Imaging independently reviewed.    CT Thoracic spine w/o contrast  Result Date: 5/20/2025  EXAM: CT THORACIC SPINE W/O CONTRAST  5/20/2025 11:21 AM HISTORY: T10 fracture; Closed fracture of tenth thoracic vertebra with routine healing, unspecified fracture morphology, subsequent encounter   COMPARISON: CT 2/26/2025. MRI 2/25/2025. X-rays 4/22/2025. TECHNIQUE: Using multidetector thin collimation helical acquisition technique, axial, sagittal and coronal 3 mm thickness CT reconstructions were obtained through the thoracic spine without intravenous contrast.  Images were viewed in bone and soft tissue windows. FINDINGS: Intact thoracic vertebral alignment. Stable dextrocurvature of the thoracic spine. Stable fracture through the anterior bridging osteophyte at T9-10 with oblique extension  through the vertebral body and superior endplate of T10. Sclerosis along portions of the the fracture line extending obliquely through the inferior endplate of T10. No new fracture. Flowing anterior endplate osteophytes in the mid and inferior thoracic spine. No prevertebral edema. Patent spinal canal and neural foramina. Severe right neural foraminal stenosis at T3-4 and T4-5. Similar multilevel degenerative endplate changes and facet arthropathy. The visualized paraspinous tissues are within normal limits.   IMPRESSION: 1. Healing fracture of the anterior bridging osteophyte at T9-10 with oblique extension through the superior and inferior endplates of T10. No new osseous abnormality. 2. Similar thoracic spondylosis with severe right neural foraminal stenosis at T3-4 and T4-5. No high-grade spinal canal stenosis. 3. Findings compatible with DISH. I have personally reviewed the examination and initial interpretation and I agree with the findings. JENNIFER JON MD   SYSTEM ID:  [id number]    PET CT Skull to Thigh DOTATATE  Result Date: 4/15/2025  EXAM:  PET CT SKULL TO THIGH DOTATATE RADIOPHARMACEUTICAL/MEDS: Route: intravenous gallium Ga 68 dotatate injection (Ga-68 NetSpot),3.84 millicurie TECHNIQUE:  Ga-68 DOTATATE PET/CT scan was performed from the vertex through the upper thighs with low dose, non-contrast, free-breathing CT images for attenuation correction and anatomic localization (AC/AL), with imaging beginning at approximately 60 minutes after radiotracer injection.     COMPARISON:  Abdomen/pelvis CT from yesterday 4/14/2025. Outside DOTATATE PET/CT 1/24/2024. INDICATION:  Metastatic small bowel carcinoid neoplasm. Small bowel resection. History of lanreotide therapy. Subsequent treatment strategy. FINDINGS:  No evidence of DOTATATE avid small bowel recurrence of carcinoid neoplasm. Corresponding to yesterday's abdomen/pelvis CT, widespread DOTATATE avid liver metastases have increased in number since  1/24/2024. Moderately to markedly DOTATATE avid node metastases, predominantly periportal, mesenteric root and retroperitoneal not significantly changed since 1/24/2024. Stable scattered subcentimeter mediastinal and hilar lymph nodes with mild activity most likely  reactive. Since 1/24/2024, there are a few new DOTATATE avid bone metastases including T10, anterior and posterior left iliac wing, right sacral ala and posterior right iliac bone. A few bone metastases are slightly larger or more DOTATATE avid, as examples, anterolateral left sixth rib (SUV max 11.4, previously 5.4) and anterolateral right ninth rib (SUV max 9.7, previously 3.3) while a couple of metastases are slightly less DOTATATE avid, as examples, proximal left femur (SUV max 15.8, previously 22.6) and  medial right acetabulum (SUV max 4.0, previously 5.8). No other suspicious DOTATATE avid lesions. Significant incidental findings on the low-dose unenhanced CT images: Beyond the findings reported on the recent diagnostic CT abdomen/pelvis from 4/14/2025, stable postoperative changes right lung. Lateral ventricular dilatation unchanged since prior head CTs. Diffuse carotid and coronary artery calcification.  1. Corresponding to CT findings from yesterday, progression of DOTATATE avid widespread liver metastatic disease since outside DOTATATE PET/CT 1/24/2024. 2. Overall progression of DOTATATE avid bone metastases since 1/24/2024. 3. No significant change in DOTATATE avid node metastatic disease. Krenning score:  4    CT Abdomen Pelvis w Contrast  Result Date: 4/14/2025  EXAM:  CT ABDOMEN PELVIS WITH IV CONTRAST COMPARISON:  CTs 11/20/2024, 07/16/2024, 03/26/2024 FINDINGS:  Many (at least 20) bilobar hepatic cysts consistent with metastases. These have progressed in size and number since 11/20/2024. Examples: -In segment 8 measuring 1.7 cm (Series 4, Image 33), new -In segment 6 measuring 1.0 cm (Series 4, Image 44), new Mesenteric lymphadenopathy  and metastatic nodes adjacent to the duodenum have not definitely changed. Stable retroperitoneal lymphadenopathy. Small bowel resection with enteroenterostomy. Colonic diverticulosis. Heterogeneous enlarged prostate. Diffuse urothelial hyperenhancement in the urinary bladder. Bilateral renal cysts. Cholecystectomy.  1. Hepatic metastases have increased in number. 2. Metastatic mesenteric and retroperitoneal lymphadenopathy is not definitely changed.    MRI Cervical spine w/o contrast  Result Date: 10/15/2024  EXAM: MR CERVICAL SPINE W/O CONTRAST, MR LUMBAR SPINE W/O CONTRAST 10/14/2024 2:24 PM HISTORY: Hyper-reflexia, +Nino's, LLE weakness; Neck pain; Neurologic deficit, non-traumatic; Nino's sign; No known/automatically detected potential contraindications to imaging; Left-sided low back pain with left-sided sciatica, unspecified chronicity; Nino's reflex positive; Paresthesia of upper extremity; Left leg weakness; Bilateral leg paresthesia. COMPARISON: None TECHNIQUE: Multiplanar, multisequence MR images of the cervical spine and lumbar were obtained without intravenous contrast. CONTRAST: None. FINDINGS: Cervical Spine: Preserved vertebral height and alignment.. No suspicious marrow lesion. No prevertebral edema. Focus of T2 hyperintense signal within the cord at the C5 superior endplate level (sagittal series 18, image 8). Otherwise preserved cord signal. Nonfocal extraspinal structures. The findings on a level by level basis are as follows: Craniocervical junction, C1-C2: Within normal limits. C2-C3: Preserved disc height and signal for patient age. No cord compression. Normal facets for patient age. No high-grade neural foraminal stenosis. C3-C4: Preserved disc height and signal for patient age. Disc bulge, central protrusion causing ventral CSF space effacement, mild cord flattening and associated mild canal stenosis. Bilateral uncovertebral, facet arthropathy contributes to moderate left and mild  right neural foraminal stenosis. C4-C5: Preserved disc height and signal for patient age. Posterior disc osteophyte complex, ligamentum flavum buckling contributes to cord compression, completely CSF space effacement and severe canal stenosis. Bilateral uncovertebral and facet arthropathy contribute to moderate to severe bilateral neural foraminal stenosis. C5-C6: Mild loss of disc height. Prominent central posterior disc osteophyte complex causes a focal area of ventral CSF space effacement, cord flattening and associated mild canal stenosis. Bilateral uncovertebral and facet arthropathy contribute to mild bilateral neural foraminal stenosis. C6-C7: Mild loss of disc height. Prominent central posterior disc osteophyte complex causes cord flattening and partial ventral CSF space effacement. Associated mild canal stenosis. Bilateral uncovertebral and facet arthropathy contribute to mild bilateral neural foraminal stenosis. C7-T1: Preserved disc height and signal for patient age. No cord compression. Normal facets for patient age. No high-grade neural foraminal stenosis. Lumbar Spine: Five lumbar type vertebral body numbering convention. Preserved vertebral body height, alignment and marrow signal. Conus medullaris tip at L1. Normal cauda equina and nerve roots. Normal bony pelvis. Nonfocal extraspinal structures. On a level by level basis, the findings are as follows: T12-L1: Preserved disc height and signal for patient age. Normal facets for patient age. No high-grade spinal canal or neural foraminal stenosis L1-L2: Preserved disc height and signal for patient age. Normal facets for patient age. No high-grade spinal canal or neural foraminal stenosis. L2-L3: Preserved disc height and signal for patient age. Symmetric disc bulge. Normal facets for patient age. No high-grade spinal canal or neural foraminal stenosis. L3-L4: Preserved disc height and signal for patient age. Symmetric disc bulge, bilateral facet  arthropathy right greater than left contributes to mild right neural foraminal stenosis. Patent left neural foramen and spinal canal. L4-L5: Mild loss of disc height, intradiscal T2 signal. Left greater than right posterior disc osteophyte complex and facet arthropathy contributes to moderate to severe left, moderate canal and mild right neural foraminal stenosis. L5-S1: Preserved disc height and signal for patient age. Symmetric disc bulge. Normal facets for patient age. No high-grade spinal canal or neural foraminal stenosis.   IMPRESSION: Cervical Spine: 1. Multilevel cervical spondylosis, as above. Severe canal stenosis at C4-C5, compression of the cord and focal area of suspected myelomalacia at the superior C5 endplate level. Lumbar Spine: 1.  Multilevel lumbar spondylosis, as above most pronounced at L4-L5. NESS RAM DO   SYSTEM ID:  [id number]    XR EOS Total Body  Result Date: 9/10/2024  Exam: Full body radiographs using EOS History: Left-sided low back pain with left-sided sciatica, unspecified chronicity Techniques: AP and lateral images of full body and secondary images of AP and lateral views of spine were submitted for interpretation. Comparison:  CT lumbar spine 7/1/2024 Findings: 12 rib bearing vertebral bodies and 5 lumbar type vertebral bodies are identified. Coronal Deformity: There is no substantial coronal curvature of the spine. No substantial global coronal imbalance. Sagittal Vertical Axis (A vertical line drawn from the center of C7 (kiran line) to the posterosuperior aspect of the S1 on sagittal plane):  very positive Weight bearing axis: (Defined as a line drawn from the center of the femoral head to the mid aspect of the tibial plafond).     Right: Weight bearing axis crosses through the lateral tibial spine.      Left: Weight bearing axis crosses through the lateral tibial spine. Leg length:  (Measured from the top of the femoral head to the center of tibial plafond.  It is assumed  joints are in similar degrees of extension bilaterally.  Significant difference is defined when discrepancy is greater than 1.5 cm).       No significant  leg length discrepancy. Additional Findings: Normal heart size. Lungs are relatively clear. There is a nonobstructive bowel gas pattern. No acute osseous abnormality. Straightening of the normal cervical lordosis  Multilevel degenerative changes of the spine. Vascular calcifications.   Impression: 1. No substantial coronal curvature of the spine.  2. Very positive global sagittal imbalance. No global coronal imbalance. 3. Weight bearing axis as detailed above. TAI ESPINOZA MD   SYSTEM ID:  [id number]    Physical Exam   There were no vitals taken for this visit.    Neurological:  Alert, NAD, and oriented to person, place, and time.   No cranial nerve deficit   Gait: deferred, in wheelchair.    Strength (L/R)  5/5 Deltoid  5/5 Bicep  5/5 Tricep  4+/4+ Handgrip     2/4 Hip Flexion  5/5 Knee Extension  5/5 Ankle Dorsiflexion  5/5 Extensor Hallucis Longus  5/5 Plantar Flexion    Reflexes (L/R)  Nonpathological, symmetrical BUE/BLE    + Jaida's   No ankle clonus    Sensation: diminished sensation in hands and BTK, and thighs      Review of Systems   See HPI     Past Medical History:   Diagnosis Date     Anemia 07/09/2024     Antiplatelet or antithrombotic long-term use      Arthritis      CHF (congestive heart failure) (H) 09/16/2020     COPD (chronic obstructive pulmonary disease) (H)      DM (diabetes mellitus) (H)      Dyspnea on exertion      Essential hypertension, benign      Hemorrhage of rectum and anus      Non-small cell lung cancer (H)      Obesity      Personal history of colonic polyps      Sleep apnea      Thrombosis      Tobacco use disorder      Urinary retention      Walking troubles        Past Surgical History:   Procedure Laterality Date     ABDOMEN SURGERY  1995     APPENDECTOMY       BONE EXOSTOSIS EXCISION Bilateral 09/20/2022    Procedure:  EXOSTECTOMIES BOTH 5TH DIGITS WITH SOFT TISSUE RELEASE;  Surgeon: Tong Gray DPM;  Location: [facility]     CHOLECYSTECTOMY       COLONOSCOPY       CYSTOSCOPY, TRANSURETHRAL RESECTION (TUR) PROSTATE, COMBINED N/A 2018    Procedure: COMBINED CYSTOSCOPY, TRANSURETHRAL RESECTION (TUR) PROSTATE;  Cystoscopy, Transurethral Resection Of The Prostate;  Surgeon: Praveena Burris MD;  Location: [facility] OR     IR LUNG BIOPSY MEDIASTINUM RIGHT  2016     WEDGE RESECTION      lung     ZZC NONSPECIFIC PROCEDURE      cholecystectomy       Social History     Socioeconomic History     Marital status:    Tobacco Use     Smoking status: Former     Current packs/day: 0.00     Average packs/day: 2.0 packs/day for 53.0 years (106.0 ttl pk-yrs)     Types: Cigarettes, Cigars     Start date: 1960     Quit date: 2013     Years since quittin.9     Passive exposure: Past     Smokeless tobacco: Never     Tobacco comments:     Quit, will never start again.   Vaping Use     Vaping status: Never Used   Substance and Sexual Activity     Alcohol use: No     Drug use: No     Sexual activity: Not Currently     Partners: Female     Birth control/protection: Spermicide, None   Other Topics Concern     Parent/sibling w/ CABG, MI or angioplasty before 65F 55M? No     Caffeine Concern No     Comment: 1-2 a day     Special Diet No     Exercise No     Seat Belt Yes     Social Drivers of Health     Financial Resource Strain: Low Risk  (2024)    Financial Resource Strain      Within the past 12 months, have you or your family members you live with been unable to get utilities (heat, electricity) when it was really needed?: No   Food Insecurity: No Food Insecurity (2025)    [facility]    Hunger Vital Sign      Worried About Running Out of Food in the Last Year: Never true      Ran Out of Food in the Last Year: Never true   Transportation Needs: No Transportation Needs (2025)     Received from [facility]    PRAPARE - Transportation      Lack of Transportation (Medical): No      Lack of Transportation (Non-Medical): No   Physical Activity: Insufficiently Active (4/24/2025)    Received from [facility]    Exercise Vital Sign      Days of Exercise per Week: 1 day      Minutes of Exercise per Session: 10 min   Stress: No Stress Concern Present (9/2/2024)    [employer] [place] [employer] - Occupational Stress Questionnaire      Feeling of Stress : Not at all   Social Connections: Unknown (9/2/2024)    Social Connection and Isolation Panel [NHANES]      Frequency of Social Gatherings with Friends and Family: Once a week   Interpersonal Safety: Low Risk  (4/17/2025)    Interpersonal Safety      Do you feel physically and emotionally safe where you currently live?: Yes      Within the past 12 months, have you been hit, slapped, kicked or otherwise physically hurt by someone?: No      Within the past 12 months, have you been humiliated or emotionally abused in other ways by your partner or ex-partner?: No   Housing Stability: Low Risk  (4/24/2025)    Received from [facility]    Housing Stability      What is your living situation today?: I have a steady place to live       family history includes Breast Cancer in his sister and sister; C.A.D. in his mother; Cancer in his mother; Cancer - colorectal in his mother; Cerebrovascular Disease in his father; Hypertension in his mother, sister, and sister; Other Cancer in his mother.       Rebeca Conte PA-C  Department of Neurosurgery  Office: [phone]        Again, thank you for allowing me to participate in the care of your patient.      Sincerely,    Seda Pinon MD

## 2025-05-20 NOTE — PROGRESS NOTES
Clinic Care Coordination Contact  Community Health Worker Follow Up    Care Gaps:     Health Maintenance Due   Topic Date Due    HF ACTION PLAN  Never done    DIABETIC FOOT EXAM  11/01/2019    DTAP/TDAP/TD IMMUNIZATION (2 - Td or Tdap) 08/09/2023    COVID-19 Vaccine (8 - Pfizer risk 2024-25 season) 05/08/2025    A1C  06/02/2025           Care Plan:   Care Plan: 1. Improve chronic symptoms       Problem: Lifestyle choices       Goal: I want to improve management of my leg, knee, and hip pain and swelling.       Start Date: 6/3/2021 Expected End Date: 4/28/2025    This Visit's Progress: 100% Recent Progress: 80%    Note:     Barriers: Lymphedema  Strengths: Motivated to improve ability to walk  Patient expressed understanding of goal: Yes  Action steps to achieve this goal:  1. I will apply Ed hose to wear throughout the day and remove at night (completed 05/20/2025 )   2. I will walk my dog (recently passed) daily to help with strengthening and endurance  3. I will elevate my legs while sitting and laying down by propping them up with a pillow (completed 05/20/2025)   4. I will discuss, review, schedule and complete recommended overdue health maintenance with my primary care provider  5. I will I will take my medications as prescribed (Completed 05/20/2025)  6. I will contact my care team with questions, concerns, support needs. I will use the clinic as a resource   7. I will contact my clinic with 24/7 after hours services available                              Intervention and Education during outreach: CHW spoke to Kenia and Patient who reports patient feet is no longer swollen so the ED hose is no longer needs. Patient report he does still elevate his legs when needed but he is doing fine and has no current concerns or needs.     CHW Plan: CHW will route to lead CC for further review for maintenance/graduation.    Dianne [facility] Health Worker   [facility]  Coordination  [email or web address]  Office: [phone]

## 2025-05-21 ENCOUNTER — PATIENT OUTREACH (OUTPATIENT)
Dept: CARE COORDINATION | Facility: CLINIC | Age: 80
End: 2025-05-21
Payer: COMMERCIAL

## 2025-05-21 ENCOUNTER — TELEPHONE (OUTPATIENT)
Dept: NEUROSURGERY | Facility: CLINIC | Age: 80
End: 2025-05-21
Payer: COMMERCIAL

## 2025-05-21 NOTE — PROGRESS NOTES
[facility] NEUROSURGERY CLINIC [address]  3RD FLOOR  [facility] [address]  Phone: [phone]  Fax: [phone]      Patient:  Nahun Villalobos, Date of birth 1945, 79 year old, male  Referring Provider Referred Self, MD  No address on file    ASSESSMENT & PLAN:  Patient seen and discussed with Dr. Pinon today for spondylitic cervical myelopathy.  Because of his comorbidities and that it will be easier for him to heal from a lesser surgery, we will plan to do a C3-6 laminectomy instead of the previously planned a C3-6 laminoplasty.    Dr. Pinon discussed reasonable expectations, complications profile, and the surgical procedure with the patient and his significant other.  The patient understands that his neck pain is likely not to resolve with surgery and may actually be worse after the surgery.  The pain in his arms and some of the function should get better.  He may not get full function back in his arms or legs.  Patient wishes to proceed with the surgery being offered as soon as possible.    I spent 30 minutes in patient care, independent review and interpretation of medical records/imaging, reviewing old records.    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 Pinon 11/8/24) and was scheduled for C3-6 laminoplasty in April but unfortunately 2/24/2025, he had a fall that resulted in a T10 vertebral body fracture and his surgery needed to be postponed.  He followed up earlier today and the fracture in his thoracic spine appears to be healing and he is transitioning out of the TLSO brace.  He has had some progression of the  malignant carcinoid small intestine cancer with mets to the liver and bones.  He is treating at the [facility] for this they recommended systemic IV radiation.  Because of how debilitated he is from the spinal canal stenosis with the use of his hands, positively needs time, reduced ambulatory ability, they told him it would be okay for him to have his neck treated first and then start treatment.  He was told that this could wait up to 1 year.      Patient would like to proceed with the cervical surgery to try to limit the amount of pain he has in his arms when he tries to use his walker.  He has significant numbness in his hands and has been dropping things often.  He has significant issues with ambulating.  He also endorses neck pain but is realistic in the expectation that surgery will cure this.    IMAGING   Imaging independently reviewed.    CT Thoracic spine w/o contrast  Result Date: 5/20/2025  EXAM: CT THORACIC SPINE W/O CONTRAST  5/20/2025 11:21 AM HISTORY: T10 fracture; Closed fracture of tenth thoracic vertebra with routine healing, unspecified fracture morphology, subsequent encounter   COMPARISON: CT 2/26/2025. MRI 2/25/2025. X-rays 4/22/2025. TECHNIQUE: Using multidetector thin collimation helical acquisition technique, axial, sagittal and coronal 3 mm thickness CT reconstructions were obtained through the thoracic spine without intravenous contrast.  Images were viewed in bone and soft tissue windows. FINDINGS: Intact thoracic vertebral alignment. Stable dextrocurvature of the thoracic spine. Stable fracture through the anterior bridging osteophyte at T9-10 with oblique extension through the vertebral body and superior endplate of T10. Sclerosis along portions of the the fracture line extending obliquely through the inferior endplate of T10. No new fracture. Flowing anterior endplate osteophytes in the mid and inferior thoracic spine. No prevertebral edema. Patent spinal canal and neural foramina.  Severe right neural foraminal stenosis at T3-4 and T4-5. Similar multilevel degenerative endplate changes and facet arthropathy. The visualized paraspinous tissues are within normal limits.   IMPRESSION: 1. Healing fracture of the anterior bridging osteophyte at T9-10 with oblique extension through the superior and inferior endplates of T10. No new osseous abnormality. 2. Similar thoracic spondylosis with severe right neural foraminal stenosis at T3-4 and T4-5. No high-grade spinal canal stenosis. 3. Findings compatible with DISH. I have personally reviewed the examination and initial interpretation and I agree with the findings. JENNIFER JON MD   SYSTEM ID:  [id number]    PET CT Skull to Thigh DOTATATE  Result Date: 4/15/2025  EXAM:  PET CT SKULL TO THIGH DOTATATE RADIOPHARMACEUTICAL/MEDS: Route: intravenous gallium Ga 68 dotatate injection (Ga-68 NetSpot),3.84 millicurie TECHNIQUE:  Ga-68 DOTATATE PET/CT scan was performed from the vertex through the upper thighs with low dose, non-contrast, free-breathing CT images for attenuation correction and anatomic localization (AC/AL), with imaging beginning at approximately 60 minutes after radiotracer injection.     COMPARISON:  Abdomen/pelvis CT from yesterday 4/14/2025. Outside DOTATATE PET/CT 1/24/2024. INDICATION:  Metastatic small bowel carcinoid neoplasm. Small bowel resection. History of lanreotide therapy. Subsequent treatment strategy. FINDINGS:  No evidence of DOTATATE avid small bowel recurrence of carcinoid neoplasm. Corresponding to yesterday's abdomen/pelvis CT, widespread DOTATATE avid liver metastases have increased in number since 1/24/2024. Moderately to markedly DOTATATE avid node metastases, predominantly periportal, mesenteric root and retroperitoneal not significantly changed since 1/24/2024. Stable scattered subcentimeter mediastinal and hilar lymph nodes with mild activity most likely  reactive. Since 1/24/2024, there are a few new DOTATATE avid  bone metastases including T10, anterior and posterior left iliac wing, right sacral ala and posterior right iliac bone. A few bone metastases are slightly larger or more DOTATATE avid, as examples, anterolateral left sixth rib (SUV max 11.4, previously 5.4) and anterolateral right ninth rib (SUV max 9.7, previously 3.3) while a couple of metastases are slightly less DOTATATE avid, as examples, proximal left femur (SUV max 15.8, previously 22.6) and  medial right acetabulum (SUV max 4.0, previously 5.8). No other suspicious DOTATATE avid lesions. Significant incidental findings on the low-dose unenhanced CT images: Beyond the findings reported on the recent diagnostic CT abdomen/pelvis from 4/14/2025, stable postoperative changes right lung. Lateral ventricular dilatation unchanged since prior head CTs. Diffuse carotid and coronary artery calcification.  1. Corresponding to CT findings from yesterday, progression of DOTATATE avid widespread liver metastatic disease since outside DOTATATE PET/CT 1/24/2024. 2. Overall progression of DOTATATE avid bone metastases since 1/24/2024. 3. No significant change in DOTATATE avid node metastatic disease. Krenning score:  4    CT Abdomen Pelvis w Contrast  Result Date: 4/14/2025  EXAM:  CT ABDOMEN PELVIS WITH IV CONTRAST COMPARISON:  CTs 11/20/2024, 07/16/2024, 03/26/2024 FINDINGS:  Many (at least 20) bilobar hepatic cysts consistent with metastases. These have progressed in size and number since 11/20/2024. Examples: -In segment 8 measuring 1.7 cm (Series 4, Image 33), new -In segment 6 measuring 1.0 cm (Series 4, Image 44), new Mesenteric lymphadenopathy and metastatic nodes adjacent to the duodenum have not definitely changed. Stable retroperitoneal lymphadenopathy. Small bowel resection with enteroenterostomy. Colonic diverticulosis. Heterogeneous enlarged prostate. Diffuse urothelial hyperenhancement in the urinary bladder. Bilateral renal cysts. Cholecystectomy.  1. Hepatic  metastases have increased in number. 2. Metastatic mesenteric and retroperitoneal lymphadenopathy is not definitely changed.    MRI Cervical spine w/o contrast  Result Date: 10/15/2024  EXAM: MR CERVICAL SPINE W/O CONTRAST, MR LUMBAR SPINE W/O CONTRAST 10/14/2024 2:24 PM HISTORY: Hyper-reflexia, +Nino's, LLE weakness; Neck pain; Neurologic deficit, non-traumatic; Nino's sign; No known/automatically detected potential contraindications to imaging; Left-sided low back pain with left-sided sciatica, unspecified chronicity; Nino's reflex positive; Paresthesia of upper extremity; Left leg weakness; Bilateral leg paresthesia. COMPARISON: None TECHNIQUE: Multiplanar, multisequence MR images of the cervical spine and lumbar were obtained without intravenous contrast. CONTRAST: None. FINDINGS: Cervical Spine: Preserved vertebral height and alignment.. No suspicious marrow lesion. No prevertebral edema. Focus of T2 hyperintense signal within the cord at the C5 superior endplate level (sagittal series 18, image 8). Otherwise preserved cord signal. Nonfocal extraspinal structures. The findings on a level by level basis are as follows: Craniocervical junction, C1-C2: Within normal limits. C2-C3: Preserved disc height and signal for patient age. No cord compression. Normal facets for patient age. No high-grade neural foraminal stenosis. C3-C4: Preserved disc height and signal for patient age. Disc bulge, central protrusion causing ventral CSF space effacement, mild cord flattening and associated mild canal stenosis. Bilateral uncovertebral, facet arthropathy contributes to moderate left and mild right neural foraminal stenosis. C4-C5: Preserved disc height and signal for patient age. Posterior disc osteophyte complex, ligamentum flavum buckling contributes to cord compression, completely CSF space effacement and severe canal stenosis. Bilateral uncovertebral and facet arthropathy contribute to moderate to severe  bilateral neural foraminal stenosis. C5-C6: Mild loss of disc height. Prominent central posterior disc osteophyte complex causes a focal area of ventral CSF space effacement, cord flattening and associated mild canal stenosis. Bilateral uncovertebral and facet arthropathy contribute to mild bilateral neural foraminal stenosis. C6-C7: Mild loss of disc height. Prominent central posterior disc osteophyte complex causes cord flattening and partial ventral CSF space effacement. Associated mild canal stenosis. Bilateral uncovertebral and facet arthropathy contribute to mild bilateral neural foraminal stenosis. C7-T1: Preserved disc height and signal for patient age. No cord compression. Normal facets for patient age. No high-grade neural foraminal stenosis. Lumbar Spine: Five lumbar type vertebral body numbering convention. Preserved vertebral body height, alignment and marrow signal. Conus medullaris tip at L1. Normal cauda equina and nerve roots. Normal bony pelvis. Nonfocal extraspinal structures. On a level by level basis, the findings are as follows: T12-L1: Preserved disc height and signal for patient age. Normal facets for patient age. No high-grade spinal canal or neural foraminal stenosis L1-L2: Preserved disc height and signal for patient age. Normal facets for patient age. No high-grade spinal canal or neural foraminal stenosis. L2-L3: Preserved disc height and signal for patient age. Symmetric disc bulge. Normal facets for patient age. No high-grade spinal canal or neural foraminal stenosis. L3-L4: Preserved disc height and signal for patient age. Symmetric disc bulge, bilateral facet arthropathy right greater than left contributes to mild right neural foraminal stenosis. Patent left neural foramen and spinal canal. L4-L5: Mild loss of disc height, intradiscal T2 signal. Left greater than right posterior disc osteophyte complex and facet arthropathy contributes to moderate to severe left, moderate canal and  mild right neural foraminal stenosis. L5-S1: Preserved disc height and signal for patient age. Symmetric disc bulge. Normal facets for patient age. No high-grade spinal canal or neural foraminal stenosis.   IMPRESSION: Cervical Spine: 1. Multilevel cervical spondylosis, as above. Severe canal stenosis at C4-C5, compression of the cord and focal area of suspected myelomalacia at the superior C5 endplate level. Lumbar Spine: 1.  Multilevel lumbar spondylosis, as above most pronounced at L4-L5. NESS RAM DO   SYSTEM ID:  [id number]    XR EOS Total Body  Result Date: 9/10/2024  Exam: Full body radiographs using EOS History: Left-sided low back pain with left-sided sciatica, unspecified chronicity Techniques: AP and lateral images of full body and secondary images of AP and lateral views of spine were submitted for interpretation. Comparison:  CT lumbar spine 7/1/2024 Findings: 12 rib bearing vertebral bodies and 5 lumbar type vertebral bodies are identified. Coronal Deformity: There is no substantial coronal curvature of the spine. No substantial global coronal imbalance. Sagittal Vertical Axis (A vertical line drawn from the center of C7 (kiran line) to the posterosuperior aspect of the S1 on sagittal plane):  very positive Weight bearing axis: (Defined as a line drawn from the center of the femoral head to the mid aspect of the tibial plafond).     Right: Weight bearing axis crosses through the lateral tibial spine.      Left: Weight bearing axis crosses through the lateral tibial spine. Leg length:  (Measured from the top of the femoral head to the center of tibial plafond.  It is assumed joints are in similar degrees of extension bilaterally.  Significant difference is defined when discrepancy is greater than 1.5 cm).       No significant  leg length discrepancy. Additional Findings: Normal heart size. Lungs are relatively clear. There is a nonobstructive bowel gas pattern. No acute osseous abnormality.  Straightening of the normal cervical lordosis  Multilevel degenerative changes of the spine. Vascular calcifications.   Impression: 1. No substantial coronal curvature of the spine.  2. Very positive global sagittal imbalance. No global coronal imbalance. 3. Weight bearing axis as detailed above. TAI ESPINOZA MD   SYSTEM ID:  [id number]    Physical Exam   There were no vitals taken for this visit.    Neurological:  Alert, NAD, and oriented to person, place, and time.   No cranial nerve deficit   Gait: deferred, in wheelchair.    Strength (L/R)  5/5 Deltoid  5/5 Bicep  5/5 Tricep  4+/4+ Handgrip     2/4 Hip Flexion  5/5 Knee Extension  5/5 Ankle Dorsiflexion  5/5 Extensor Hallucis Longus  5/5 Plantar Flexion    Reflexes (L/R)  Nonpathological, symmetrical BUE/BLE    + Jaida's   No ankle clonus    Sensation: diminished sensation in hands and BTK, and thighs      Review of Systems   See HPI     Past Medical History:   Diagnosis Date    Anemia 07/09/2024    Antiplatelet or antithrombotic long-term use     Arthritis     CHF (congestive heart failure) (H) 09/16/2020    COPD (chronic obstructive pulmonary disease) (H)     DM (diabetes mellitus) (H)     Dyspnea on exertion     Essential hypertension, benign     Hemorrhage of rectum and anus     Non-small cell lung cancer (H)     Obesity     Personal history of colonic polyps     Sleep apnea     Thrombosis     Tobacco use disorder     Urinary retention     Walking troubles        Past Surgical History:   Procedure Laterality Date    ABDOMEN SURGERY  1995    APPENDECTOMY      BONE EXOSTOSIS EXCISION Bilateral 09/20/2022    Procedure: EXOSTECTOMIES BOTH 5TH DIGITS WITH SOFT TISSUE RELEASE;  Surgeon: Tong Gray DPM;  Location: [facility] OR    CHOLECYSTECTOMY      COLONOSCOPY  2014    CYSTOSCOPY, TRANSURETHRAL RESECTION (TUR) PROSTATE, COMBINED N/A 04/30/2018    Procedure: COMBINED CYSTOSCOPY, TRANSURETHRAL RESECTION (TUR) PROSTATE;  Cystoscopy, Transurethral  Resection Of The Prostate;  Surgeon: Praveena Burris MD;  Location: [facility] OR    IR LUNG BIOPSY MEDIASTINUM RIGHT  2016    WEDGE RESECTION      lung    ZZC NONSPECIFIC PROCEDURE      cholecystectomy       Social History     Socioeconomic History    Marital status:    Tobacco Use    Smoking status: Former     Current packs/day: 0.00     Average packs/day: 2.0 packs/day for 53.0 years (106.0 ttl pk-yrs)     Types: Cigarettes, Cigars     Start date: 1960     Quit date: 2013     Years since quittin.9     Passive exposure: Past    Smokeless tobacco: Never    Tobacco comments:     Quit, will never start again.   Vaping Use    Vaping status: Never Used   Substance and Sexual Activity    Alcohol use: No    Drug use: No    Sexual activity: Not Currently     Partners: Female     Birth control/protection: Spermicide, None   Other Topics Concern    Parent/sibling w/ CABG, MI or angioplasty before 65F 55M? No    Caffeine Concern No     Comment: 1-2 a day    Special Diet No    Exercise No    Seat Belt Yes     Social Drivers of Health     Financial Resource Strain: Low Risk  (2024)    Financial Resource Strain     Within the past 12 months, have you or your family members you live with been unable to get utilities (heat, electricity) when it was really needed?: No   Food Insecurity: No Food Insecurity (2025)    Received from [facility]    Hunger Vital Sign     Worried About Running Out of Food in the Last Year: Never true     Ran Out of Food in the Last Year: Never true   Transportation Needs: No Transportation Needs (2025)    Received from [facility]    PRAPARE - Transportation     Lack of Transportation (Medical): No     Lack of Transportation (Non-Medical): No   Physical Activity: Insufficiently Active (2025)    Received from [facility]    Exercise Vital Sign     Days of Exercise per Week: 1 day     Minutes of Exercise per Session: 10 min   Stress: No Stress Concern Present  (9/2/2024)    Citizen of [place] [employer] - Occupational Stress Questionnaire     Feeling of Stress : Not at all   Social Connections: Unknown (9/2/2024)    Social Connection and Isolation Panel [NHANES]     Frequency of Social Gatherings with Friends and Family: Once a week   Interpersonal Safety: Low Risk  (4/17/2025)    Interpersonal Safety     Do you feel physically and emotionally safe where you currently live?: Yes     Within the past 12 months, have you been hit, slapped, kicked or otherwise physically hurt by someone?: No     Within the past 12 months, have you been humiliated or emotionally abused in other ways by your partner or ex-partner?: No   Housing Stability: Low Risk  (4/24/2025)    Received from [facility]    Housing Stability     What is your living situation today?: I have a steady place to live       family history includes Breast Cancer in his sister and sister; C.A.D. in his mother; Cancer in his mother; Cancer - colorectal in his mother; Cerebrovascular Disease in his father; Hypertension in his mother, sister, and sister; Other Cancer in his mother.       ROSE Corona[facility]  Department of Neurosurgery  Office: [phone]  I have seen this patient with the PA and formulated a plan and agree with this note.  AMP

## 2025-05-21 NOTE — PROGRESS NOTES
Clinic Care Coordination Contact    Situation: Patient due for 6 week clinician chart review to monitor [facility] outreaches & goal progress.     Background: Care Coordination initial assessment and enrollment took place 9/24/2020. [facility] outreach completed with patient on 05/20/2025. Current care plan goal progress 100%. Patient expressed no new goals, needs, resources, questions, concerns, support needs from clinic care coordination team at this time.     Assessment: Clinic Care Coordination enrollment appropriately changed to maintenance      Plan/Recommendations: Care coordinator will follow up with patient in 2 months to identify any outstanding questions, concerns, resource and/or support needs and review if appropriate to transition to clinic care coordination graduation at this time. Care coordinator will remain available if/as needed.     Margy Nunes RN Care Coordinator  [facility] [place]  Email: [email or web address]  Phone: [phone]

## 2025-05-21 NOTE — TELEPHONE ENCOUNTER
Called patient to schedule surgery with Dr. Seda Pinon    Spoke with:  Javon (patient)    Date of Surgery: 5/27/2025    Estimated Arrival time Discussed with Patient:  No    Location of surgery: [facility]     Pre-Op H&P: 5/23/2025 with Kelli MISTRY    Post-Op Appts: 6/10/2025 with Rebeca MISTRY     Imaging:  No      Vendor/Rep/Monitoring:   Yes  via Online Portal Confirmation #:     Additional Comments:      All patients questions were answered and patient was instructed to review surgical packet and call back with any questions or concerns.       Carlos Boo on 5/21/2025 at 9:23 AM

## 2025-05-23 ENCOUNTER — ANESTHESIA EVENT (OUTPATIENT)
Dept: SURGERY | Facility: CLINIC | Age: 80
End: 2025-05-23
Payer: COMMERCIAL

## 2025-05-23 ENCOUNTER — RESULTS FOLLOW-UP (OUTPATIENT)
Dept: INTERNAL MEDICINE | Facility: CLINIC | Age: 80
End: 2025-05-23

## 2025-05-23 PROBLEM — S30.92XA: Status: RESOLVED | Noted: 2024-07-09 | Resolved: 2025-05-23

## 2025-05-23 PROBLEM — K57.92 ACUTE DIVERTICULITIS: Status: RESOLVED | Noted: 2020-09-20 | Resolved: 2025-05-23

## 2025-05-23 PROBLEM — S93.601A FOOT SPRAIN, RIGHT, INITIAL ENCOUNTER: Status: RESOLVED | Noted: 2024-12-01 | Resolved: 2025-05-23

## 2025-05-23 PROBLEM — M54.50 ACUTE LEFT-SIDED LOW BACK PAIN WITHOUT SCIATICA: Status: RESOLVED | Noted: 2024-06-25 | Resolved: 2025-05-23

## 2025-05-23 PROBLEM — S83.91XA SPRAIN OF RIGHT KNEE, UNSPECIFIED LIGAMENT, INITIAL ENCOUNTER: Status: RESOLVED | Noted: 2024-12-01 | Resolved: 2025-05-23

## 2025-05-23 PROBLEM — S20.229A CONTUSION OF THORACIC SPINE: Status: RESOLVED | Noted: 2025-02-24 | Resolved: 2025-05-23

## 2025-05-23 PROBLEM — S30.0XXA CONTUSION OF LOWER BACK, INITIAL ENCOUNTER: Status: RESOLVED | Noted: 2024-06-25 | Resolved: 2025-05-23

## 2025-05-23 PROBLEM — S09.90XA HEAD INJURY, INITIAL ENCOUNTER: Status: RESOLVED | Noted: 2025-02-24 | Resolved: 2025-05-23

## 2025-05-23 ASSESSMENT — COPD QUESTIONNAIRES: COPD: 1

## 2025-05-23 NOTE — ANESTHESIA PREPROCEDURE EVALUATION
Anesthesia Pre-Procedure Evaluation    Patient: Nahun Villalobos   MRN: [id number] : 1945          Procedure : Procedure(s):  Open Cervical 3-6 laminectomy         Past Medical History:   Diagnosis Date    Acute diverticulitis 2020    Anemia 2024    Antiplatelet or antithrombotic long-term use     Arthritis     CHF (congestive heart failure) (H) 2020    COPD (chronic obstructive pulmonary disease) (H)     DM (diabetes mellitus) (H)     Dyspnea on exertion     Essential hypertension, benign     Hemorrhage of rectum and anus     Non-small cell lung cancer (H)     Obesity     Personal history of colonic polyps     Sleep apnea     Thrombosis     Tobacco use disorder     Urinary retention     Walking troubles       Past Surgical History:   Procedure Laterality Date    ABDOMEN SURGERY      APPENDECTOMY      BONE EXOSTOSIS EXCISION Bilateral 2022    Procedure: EXOSTECTOMIES BOTH 5TH DIGITS WITH SOFT TISSUE RELEASE;  Surgeon: Tong Gray DPM;  Location: [facility]    CHOLECYSTECTOMY      COLONOSCOPY      CYSTOSCOPY, TRANSURETHRAL RESECTION (TUR) PROSTATE, COMBINED N/A 2018    Procedure: COMBINED CYSTOSCOPY, TRANSURETHRAL RESECTION (TUR) PROSTATE;  Cystoscopy, Transurethral Resection Of The Prostate;  Surgeon: Praveena Burris MD;  Location: [facility]    IR LUNG BIOPSY MEDIASTINUM RIGHT  2016    WEDGE RESECTION      lung    ZZC NONSPECIFIC PROCEDURE      cholecystectomy      No Known Allergies   Social History     Tobacco Use    Smoking status: Former     Current packs/day: 0.00     Average packs/day: 2.0 packs/day for 53.0 years (106.0 ttl pk-yrs)     Types: Cigarettes, Cigars     Start date: 1960     Quit date: 2013     Years since quittin.9     Passive exposure: Past    Smokeless tobacco: Never    Tobacco comments:     Quit, will never start again.   Substance Use Topics    Alcohol use: No      Wt Readings from Last 1 Encounters:   25  81.8 kg (180 lb 4.8 oz)        Anesthesia Evaluation            ROS/MED HX  ENT/Pulmonary: Comment: H/o lung cancer s/p RUL wedge resection 2016    (+) sleep apnea,                         COPD (106 pack years, quit 2013),              Neurologic:       Cardiovascular: Comment: HFpEF    (+) Dyslipidemia hypertension- -   -  - -                                 Previous cardiac testing   Echo: Date: 3/24 Results:  Interpretation Summary     Left ventricular systolic function is normal. EF is 55-60%  No regional wall motion abnormalities noted.  The right ventricle is normal in size and function.  No significant valvular abnormality.     No significant change in comparison to the echo from 9/3/21.     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    Other Findings       OUTSIDE LABS:  CBC:   Lab Results   Component Value Date    WBC 7.1 05/23/2025    WBC 9.3 03/24/2025    HGB 12.2 (L) 05/23/2025    HGB 12.1 (L) 03/24/2025    HCT 36.3 (L) 05/23/2025    HCT 38.9 (L) 03/24/2025     05/23/2025     03/24/2025     BMP:    Lab Results   Component Value Date     05/23/2025     03/24/2025    POTASSIUM 4.6 05/23/2025    POTASSIUM 4.9 03/24/2025    CHLORIDE 106 05/23/2025    CHLORIDE 102 03/24/2025    CO2 28 05/23/2025    CO2 22 03/24/2025    BUN 10 05/23/2025    BUN 14.1 03/24/2025    CR 1.10 05/23/2025    CR 1.02 03/24/2025     (H) 05/23/2025     (H) 03/24/2025     COAGS:   Lab Results   Component Value Date    PTT 29 02/09/2018    INR 1.01 02/09/2018     POC:   Lab Results   Component Value Date     (H) 09/22/2020     HEPATIC:   Lab Results   Component Value Date    ALBUMIN 3.6 12/02/2024    PROTTOTAL 6.7 12/02/2024    ALT 10 12/02/2024    AST 16 12/02/2024    ALKPHOS 58 12/02/2024    BILITOTAL 0.8 12/02/2024     OTHER:   Lab Results   Component Value Date    PH 7.40 06/25/2015    A1C 6.4 (H) 05/23/2025    JESSE 9.6 05/23/2025    MAG 2.4 (H) 09/21/2020    LIPASE 88 09/20/2020    TSH 0.98 03/03/2024       Anesthesia Plan    ASA Status:  3       Anesthesia Type: General.  Airway: oral.  Induction: intravenous.  Maintenance: TIVA.   Techniques and Equipment:     - Airway:  Planned airway equipment includes fiberoptic scope.  Arterial Line Kit: Radial     - Monitoring Plan: standard ASA monitoring, train of four monitoring, arterial line kit, electrophysiologic monitoring     Consents            Postoperative Care    Pain management: non-narcotic analgesics, plan for postoperative opioid use, multimodal analgesia.     Comments:                   Gerardo Abraham MD    I have reviewed the pertinent notes and labs in the chart from the past 30 days and (re)examined the patient.  Any updates or changes from those notes are reflected in this note.    Clinically Significant Risk Factors Present on Admission                   # Hypertension: Noted on problem list  # Chronic heart failure with preserved ejection fraction: heart failure noted on problem list and last echo with EF >50%          #  "Overweight: Estimated body mass index is 29.1 kg/m  as calculated from the following:    Height as of 5/23/25: 1.676 m (5' 6\").    Weight as of 5/23/25: 81.8 kg (180 lb 4.8 oz).       # Financial/Environmental Concerns:                 "

## 2025-05-26 ENCOUNTER — TELEPHONE (OUTPATIENT)
Dept: NURSING | Facility: CLINIC | Age: 80
End: 2025-05-26
Payer: COMMERCIAL

## 2025-05-26 NOTE — TELEPHONE ENCOUNTER
Patient's significant other Kenia calling with patient giving verbal consent to communicate. Reports the patient has surgery tomorrow and is wondering if he needs to stop his Tylenol and Lisinopril. Reviewed office visit notes from 5/23 with patient to hold his Tylenol day of surgery as it is not a scheduled medication and continue the Lisinopril. No further questions.     Margy Howell RN 05/26/25 7:51 AM   [facility] Triage Nurse Advisor

## 2025-05-27 ENCOUNTER — ANESTHESIA (OUTPATIENT)
Dept: SURGERY | Facility: CLINIC | Age: 80
End: 2025-05-27
Payer: COMMERCIAL

## 2025-05-27 ENCOUNTER — APPOINTMENT (OUTPATIENT)
Dept: GENERAL RADIOLOGY | Facility: CLINIC | Age: 80
DRG: 516 | End: 2025-05-27
Attending: NEUROLOGICAL SURGERY
Payer: COMMERCIAL

## 2025-05-27 ENCOUNTER — HOSPITAL ENCOUNTER (INPATIENT)
Facility: CLINIC | Age: 80
DRG: 516 | End: 2025-05-27
Attending: NEUROLOGICAL SURGERY | Admitting: NEUROLOGICAL SURGERY
Payer: COMMERCIAL

## 2025-05-27 DIAGNOSIS — I26.99 OTHER PULMONARY EMBOLISM WITHOUT ACUTE COR PULMONALE, UNSPECIFIED CHRONICITY (H): ICD-10-CM

## 2025-05-27 DIAGNOSIS — M47.12 CERVICAL SPONDYLOSIS WITH MYELOPATHY: ICD-10-CM

## 2025-05-27 DIAGNOSIS — Z98.890 S/P LAMINECTOMY: Primary | ICD-10-CM

## 2025-05-27 DIAGNOSIS — M79.2 NEUROPATHIC PAIN: ICD-10-CM

## 2025-05-27 DIAGNOSIS — M47.815 SPONDYLOSIS WITHOUT MYELOPATHY OR RADICULOPATHY, THORACOLUMBAR REGION: ICD-10-CM

## 2025-05-27 LAB
ABO + RH BLD: NORMAL
APTT PPP: 26 SECONDS (ref 22–38)
BLD GP AB SCN SERPL QL: NEGATIVE
ERYTHROCYTE [DISTWIDTH] IN BLOOD BY AUTOMATED COUNT: 11.8 % (ref 10–15)
GLUCOSE BLDC GLUCOMTR-MCNC: 150 MG/DL (ref 70–99)
GLUCOSE BLDC GLUCOMTR-MCNC: 175 MG/DL (ref 70–99)
GLUCOSE BLDC GLUCOMTR-MCNC: 208 MG/DL (ref 70–99)
GLUCOSE BLDC GLUCOMTR-MCNC: 217 MG/DL (ref 70–99)
HCT VFR BLD AUTO: 36.1 % (ref 40–53)
HGB BLD-MCNC: 11.9 G/DL (ref 13.3–17.7)
INR PPP: 1.03 (ref 0.85–1.15)
MCH RBC QN AUTO: 33 PG (ref 26.5–33)
MCHC RBC AUTO-ENTMCNC: 33 G/DL (ref 31.5–36.5)
MCV RBC AUTO: 100 FL (ref 78–100)
PLATELET # BLD AUTO: 298 10E3/UL (ref 150–450)
PROTHROMBIN TIME: 13.5 SECONDS (ref 11.8–14.8)
RBC # BLD AUTO: 3.61 10E6/UL (ref 4.4–5.9)
SPECIMEN EXP DATE BLD: NORMAL
WBC # BLD AUTO: 11.6 10E3/UL (ref 4–11)

## 2025-05-27 PROCEDURE — 85014 HEMATOCRIT: CPT

## 2025-05-27 PROCEDURE — 85610 PROTHROMBIN TIME: CPT

## 2025-05-27 PROCEDURE — 250N000011 HC RX IP 250 OP 636: Performed by: NEUROLOGICAL SURGERY

## 2025-05-27 PROCEDURE — 258N000003 HC RX IP 258 OP 636

## 2025-05-27 PROCEDURE — 250N000011 HC RX IP 250 OP 636

## 2025-05-27 PROCEDURE — 01N10ZZ RELEASE CERVICAL NERVE, OPEN APPROACH: ICD-10-PCS | Performed by: NEUROLOGICAL SURGERY

## 2025-05-27 PROCEDURE — 710N000010 HC RECOVERY PHASE 1, LEVEL 2, PER MIN: Performed by: NEUROLOGICAL SURGERY

## 2025-05-27 PROCEDURE — 99222 1ST HOSP IP/OBS MODERATE 55: CPT

## 2025-05-27 PROCEDURE — 360N000084 HC SURGERY LEVEL 4 W/ FLUORO, PER MIN: Performed by: NEUROLOGICAL SURGERY

## 2025-05-27 PROCEDURE — 250N000013 HC RX MED GY IP 250 OP 250 PS 637

## 2025-05-27 PROCEDURE — 86850 RBC ANTIBODY SCREEN: CPT

## 2025-05-27 PROCEDURE — 250N000009 HC RX 250: Performed by: ANESTHESIOLOGY

## 2025-05-27 PROCEDURE — 250N000012 HC RX MED GY IP 250 OP 636 PS 637

## 2025-05-27 PROCEDURE — 999N000179 XR SURGERY CARM FLUORO LESS THAN 5 MIN W STILLS

## 2025-05-27 PROCEDURE — 370N000017 HC ANESTHESIA TECHNICAL FEE, PER MIN: Performed by: NEUROLOGICAL SURGERY

## 2025-05-27 PROCEDURE — 250N000009 HC RX 250: Performed by: NEUROLOGICAL SURGERY

## 2025-05-27 PROCEDURE — 272N000001 HC OR GENERAL SUPPLY STERILE: Performed by: NEUROLOGICAL SURGERY

## 2025-05-27 PROCEDURE — 85730 THROMBOPLASTIN TIME PARTIAL: CPT

## 2025-05-27 PROCEDURE — 36415 COLL VENOUS BLD VENIPUNCTURE: CPT

## 2025-05-27 PROCEDURE — 120N000002 HC R&B MED SURG/OB UMMC

## 2025-05-27 PROCEDURE — 4A11X4G MONITORING OF PERIPHERAL NERVOUS ELECTRICAL ACTIVITY, INTRAOPERATIVE, EXTERNAL APPROACH: ICD-10-PCS | Performed by: NEUROLOGICAL SURGERY

## 2025-05-27 PROCEDURE — 250N000013 HC RX MED GY IP 250 OP 250 PS 637: Performed by: NEUROLOGICAL SURGERY

## 2025-05-27 PROCEDURE — 63015 REMOVE SPINE LAMINA >2 CRVCL: CPT | Mod: GC | Performed by: NEUROLOGICAL SURGERY

## 2025-05-27 PROCEDURE — 999N000141 HC STATISTIC PRE-PROCEDURE NURSING ASSESSMENT: Performed by: NEUROLOGICAL SURGERY

## 2025-05-27 PROCEDURE — 250N000009 HC RX 250

## 2025-05-27 PROCEDURE — 82962 GLUCOSE BLOOD TEST: CPT

## 2025-05-27 RX ORDER — FUROSEMIDE 20 MG/1
40 TABLET ORAL DAILY
Status: DISCONTINUED | OUTPATIENT
Start: 2025-05-28 | End: 2025-06-03 | Stop reason: HOSPADM

## 2025-05-27 RX ORDER — METHOCARBAMOL 500 MG/1
500 TABLET, FILM COATED ORAL EVERY 6 HOURS PRN
Status: DISCONTINUED | OUTPATIENT
Start: 2025-05-27 | End: 2025-05-27

## 2025-05-27 RX ORDER — OXYCODONE HYDROCHLORIDE 10 MG/1
10 TABLET ORAL EVERY 4 HOURS PRN
Status: DISCONTINUED | OUTPATIENT
Start: 2025-05-27 | End: 2025-05-29

## 2025-05-27 RX ORDER — NICOTINE POLACRILEX 4 MG
15-30 LOZENGE BUCCAL
Status: DISCONTINUED | OUTPATIENT
Start: 2025-05-27 | End: 2025-06-03 | Stop reason: HOSPADM

## 2025-05-27 RX ORDER — LISINOPRIL 20 MG/1
20 TABLET ORAL DAILY
COMMUNITY

## 2025-05-27 RX ORDER — ATORVASTATIN CALCIUM 20 MG/1
20 TABLET, FILM COATED ORAL EVERY EVENING
Status: DISCONTINUED | OUTPATIENT
Start: 2025-05-27 | End: 2025-06-03 | Stop reason: HOSPADM

## 2025-05-27 RX ORDER — METHOCARBAMOL 500 MG/1
500 TABLET, FILM COATED ORAL 4 TIMES DAILY PRN
Status: DISCONTINUED | OUTPATIENT
Start: 2025-05-27 | End: 2025-06-01

## 2025-05-27 RX ORDER — FAMOTIDINE 20 MG/1
20 TABLET, FILM COATED ORAL 2 TIMES DAILY
Status: DISCONTINUED | OUTPATIENT
Start: 2025-05-27 | End: 2025-05-30

## 2025-05-27 RX ORDER — CEFAZOLIN SODIUM/WATER 2 G/20 ML
2 SYRINGE (ML) INTRAVENOUS EVERY 8 HOURS
Status: DISCONTINUED | OUTPATIENT
Start: 2025-05-27 | End: 2025-05-27

## 2025-05-27 RX ORDER — SODIUM CHLORIDE, SODIUM LACTATE, POTASSIUM CHLORIDE, CALCIUM CHLORIDE 600; 310; 30; 20 MG/100ML; MG/100ML; MG/100ML; MG/100ML
INJECTION, SOLUTION INTRAVENOUS CONTINUOUS
Status: DISCONTINUED | OUTPATIENT
Start: 2025-05-27 | End: 2025-05-27 | Stop reason: HOSPADM

## 2025-05-27 RX ORDER — SODIUM CHLORIDE 9 MG/ML
INJECTION, SOLUTION INTRAVENOUS CONTINUOUS
Status: DISCONTINUED | OUTPATIENT
Start: 2025-05-27 | End: 2025-05-28

## 2025-05-27 RX ORDER — SODIUM CHLORIDE, SODIUM LACTATE, POTASSIUM CHLORIDE, CALCIUM CHLORIDE 600; 310; 30; 20 MG/100ML; MG/100ML; MG/100ML; MG/100ML
INJECTION, SOLUTION INTRAVENOUS CONTINUOUS PRN
Status: DISCONTINUED | OUTPATIENT
Start: 2025-05-27 | End: 2025-05-27

## 2025-05-27 RX ORDER — HYDRALAZINE HYDROCHLORIDE 20 MG/ML
10-20 INJECTION INTRAMUSCULAR; INTRAVENOUS EVERY 30 MIN PRN
Status: DISCONTINUED | OUTPATIENT
Start: 2025-05-27 | End: 2025-05-28

## 2025-05-27 RX ORDER — AMOXICILLIN 250 MG
1 CAPSULE ORAL 2 TIMES DAILY
Status: DISCONTINUED | OUTPATIENT
Start: 2025-05-27 | End: 2025-05-30

## 2025-05-27 RX ORDER — ACETAMINOPHEN 325 MG/1
975 TABLET ORAL ONCE
Status: COMPLETED | OUTPATIENT
Start: 2025-05-27 | End: 2025-05-27

## 2025-05-27 RX ORDER — LISINOPRIL 20 MG/1
20 TABLET ORAL DAILY
Status: DISCONTINUED | OUTPATIENT
Start: 2025-05-28 | End: 2025-06-03 | Stop reason: HOSPADM

## 2025-05-27 RX ORDER — PROCHLORPERAZINE MALEATE 5 MG/1
5 TABLET ORAL EVERY 6 HOURS PRN
Status: DISCONTINUED | OUTPATIENT
Start: 2025-05-27 | End: 2025-06-03 | Stop reason: HOSPADM

## 2025-05-27 RX ORDER — NALOXONE HYDROCHLORIDE 0.4 MG/ML
0.1 INJECTION, SOLUTION INTRAMUSCULAR; INTRAVENOUS; SUBCUTANEOUS
Status: DISCONTINUED | OUTPATIENT
Start: 2025-05-27 | End: 2025-05-27

## 2025-05-27 RX ORDER — NALOXONE HYDROCHLORIDE 0.4 MG/ML
0.2 INJECTION, SOLUTION INTRAMUSCULAR; INTRAVENOUS; SUBCUTANEOUS
Status: DISCONTINUED | OUTPATIENT
Start: 2025-05-27 | End: 2025-06-03 | Stop reason: HOSPADM

## 2025-05-27 RX ORDER — LABETALOL 20 MG/4 ML (5 MG/ML) INTRAVENOUS SYRINGE
PRN
Status: DISCONTINUED | OUTPATIENT
Start: 2025-05-27 | End: 2025-05-27

## 2025-05-27 RX ORDER — GABAPENTIN 100 MG/1
100 CAPSULE ORAL
Status: DISCONTINUED | OUTPATIENT
Start: 2025-05-27 | End: 2025-05-27 | Stop reason: HOSPADM

## 2025-05-27 RX ORDER — OXYCODONE HYDROCHLORIDE 5 MG/1
5 TABLET ORAL EVERY 4 HOURS PRN
Status: DISCONTINUED | OUTPATIENT
Start: 2025-05-27 | End: 2025-06-03 | Stop reason: HOSPADM

## 2025-05-27 RX ORDER — ESMOLOL HYDROCHLORIDE 10 MG/ML
INJECTION INTRAVENOUS PRN
Status: DISCONTINUED | OUTPATIENT
Start: 2025-05-27 | End: 2025-05-27

## 2025-05-27 RX ORDER — SODIUM CHLORIDE, SODIUM GLUCONATE, SODIUM ACETATE, POTASSIUM CHLORIDE AND MAGNESIUM CHLORIDE 526; 502; 368; 37; 30 MG/100ML; MG/100ML; MG/100ML; MG/100ML; MG/100ML
INJECTION, SOLUTION INTRAVENOUS CONTINUOUS PRN
Status: DISCONTINUED | OUTPATIENT
Start: 2025-05-27 | End: 2025-05-27

## 2025-05-27 RX ORDER — ALBUTEROL SULFATE 90 UG/1
2 INHALANT RESPIRATORY (INHALATION) EVERY 6 HOURS PRN
Status: DISCONTINUED | OUTPATIENT
Start: 2025-05-27 | End: 2025-06-03 | Stop reason: HOSPADM

## 2025-05-27 RX ORDER — POLYETHYLENE GLYCOL 3350 17 G/17G
17 POWDER, FOR SOLUTION ORAL DAILY
Status: DISCONTINUED | OUTPATIENT
Start: 2025-05-28 | End: 2025-05-28

## 2025-05-27 RX ORDER — NALOXONE HYDROCHLORIDE 0.4 MG/ML
0.4 INJECTION, SOLUTION INTRAMUSCULAR; INTRAVENOUS; SUBCUTANEOUS
Status: DISCONTINUED | OUTPATIENT
Start: 2025-05-27 | End: 2025-06-03 | Stop reason: HOSPADM

## 2025-05-27 RX ORDER — FENTANYL CITRATE 50 UG/ML
25 INJECTION, SOLUTION INTRAMUSCULAR; INTRAVENOUS EVERY 5 MIN PRN
Status: DISCONTINUED | OUTPATIENT
Start: 2025-05-27 | End: 2025-05-27

## 2025-05-27 RX ORDER — CEFAZOLIN SODIUM/WATER 2 G/20 ML
2 SYRINGE (ML) INTRAVENOUS EVERY 8 HOURS
Status: DISCONTINUED | OUTPATIENT
Start: 2025-05-28 | End: 2025-05-27

## 2025-05-27 RX ORDER — GABAPENTIN 300 MG/1
300 CAPSULE ORAL EVERY MORNING
Status: DISCONTINUED | OUTPATIENT
Start: 2025-05-28 | End: 2025-05-28

## 2025-05-27 RX ORDER — DEXAMETHASONE SODIUM PHOSPHATE 4 MG/ML
4 INJECTION, SOLUTION INTRA-ARTICULAR; INTRALESIONAL; INTRAMUSCULAR; INTRAVENOUS; SOFT TISSUE
Status: DISCONTINUED | OUTPATIENT
Start: 2025-05-27 | End: 2025-05-27

## 2025-05-27 RX ORDER — DEXTROSE MONOHYDRATE 25 G/50ML
25-50 INJECTION, SOLUTION INTRAVENOUS
Status: DISCONTINUED | OUTPATIENT
Start: 2025-05-27 | End: 2025-06-03 | Stop reason: HOSPADM

## 2025-05-27 RX ORDER — PROPOFOL 10 MG/ML
INJECTION, EMULSION INTRAVENOUS PRN
Status: DISCONTINUED | OUTPATIENT
Start: 2025-05-27 | End: 2025-05-27

## 2025-05-27 RX ORDER — ONDANSETRON 2 MG/ML
4 INJECTION INTRAMUSCULAR; INTRAVENOUS EVERY 6 HOURS PRN
Status: DISCONTINUED | OUTPATIENT
Start: 2025-05-27 | End: 2025-05-30

## 2025-05-27 RX ORDER — VITAMIN B COMPLEX
25 TABLET ORAL DAILY
Status: DISCONTINUED | OUTPATIENT
Start: 2025-05-28 | End: 2025-06-03 | Stop reason: HOSPADM

## 2025-05-27 RX ORDER — GABAPENTIN 300 MG/1
300 CAPSULE ORAL 2 TIMES DAILY
Status: DISCONTINUED | OUTPATIENT
Start: 2025-05-27 | End: 2025-05-28

## 2025-05-27 RX ORDER — SODIUM CHLORIDE, SODIUM LACTATE, POTASSIUM CHLORIDE, CALCIUM CHLORIDE 600; 310; 30; 20 MG/100ML; MG/100ML; MG/100ML; MG/100ML
INJECTION, SOLUTION INTRAVENOUS CONTINUOUS
Status: DISCONTINUED | OUTPATIENT
Start: 2025-05-27 | End: 2025-05-27

## 2025-05-27 RX ORDER — ONDANSETRON 2 MG/ML
4 INJECTION INTRAMUSCULAR; INTRAVENOUS EVERY 30 MIN PRN
Status: DISCONTINUED | OUTPATIENT
Start: 2025-05-27 | End: 2025-05-27

## 2025-05-27 RX ORDER — ACETAMINOPHEN 325 MG/1
975 TABLET ORAL ONCE
Status: DISCONTINUED | OUTPATIENT
Start: 2025-05-27 | End: 2025-05-27 | Stop reason: HOSPADM

## 2025-05-27 RX ORDER — CEFAZOLIN SODIUM 1 G/3ML
2 INJECTION, POWDER, FOR SOLUTION INTRAMUSCULAR; INTRAVENOUS EVERY 8 HOURS
Status: DISCONTINUED | OUTPATIENT
Start: 2025-05-28 | End: 2025-05-28

## 2025-05-27 RX ORDER — LIDOCAINE HYDROCHLORIDE AND EPINEPHRINE 10; 10 MG/ML; UG/ML
INJECTION, SOLUTION INFILTRATION; PERINEURAL PRN
Status: DISCONTINUED | OUTPATIENT
Start: 2025-05-27 | End: 2025-05-27 | Stop reason: HOSPADM

## 2025-05-27 RX ORDER — ONDANSETRON 2 MG/ML
INJECTION INTRAMUSCULAR; INTRAVENOUS PRN
Status: DISCONTINUED | OUTPATIENT
Start: 2025-05-27 | End: 2025-05-27

## 2025-05-27 RX ORDER — LIDOCAINE HYDROCHLORIDE 10 MG/ML
INJECTION, SOLUTION INFILTRATION; PERINEURAL
Status: DISCONTINUED | OUTPATIENT
Start: 2025-05-27 | End: 2025-05-27

## 2025-05-27 RX ORDER — PROPOFOL 10 MG/ML
INJECTION, EMULSION INTRAVENOUS CONTINUOUS PRN
Status: DISCONTINUED | OUTPATIENT
Start: 2025-05-27 | End: 2025-05-27

## 2025-05-27 RX ORDER — AMLODIPINE BESYLATE 5 MG/1
5 TABLET ORAL DAILY
Status: DISCONTINUED | OUTPATIENT
Start: 2025-05-28 | End: 2025-06-03 | Stop reason: HOSPADM

## 2025-05-27 RX ORDER — CEFAZOLIN SODIUM/WATER 2 G/20 ML
2 SYRINGE (ML) INTRAVENOUS SEE ADMIN INSTRUCTIONS
Status: DISCONTINUED | OUTPATIENT
Start: 2025-05-27 | End: 2025-05-27 | Stop reason: HOSPADM

## 2025-05-27 RX ORDER — HYDROMORPHONE HCL IN WATER/PF 6 MG/30 ML
0.2 PATIENT CONTROLLED ANALGESIA SYRINGE INTRAVENOUS EVERY 5 MIN PRN
Status: DISCONTINUED | OUTPATIENT
Start: 2025-05-27 | End: 2025-05-27

## 2025-05-27 RX ORDER — FINASTERIDE 5 MG/1
5 TABLET, FILM COATED ORAL DAILY
Status: DISCONTINUED | OUTPATIENT
Start: 2025-05-28 | End: 2025-06-03 | Stop reason: HOSPADM

## 2025-05-27 RX ORDER — ONDANSETRON 4 MG/1
4 TABLET, ORALLY DISINTEGRATING ORAL EVERY 6 HOURS PRN
Status: DISCONTINUED | OUTPATIENT
Start: 2025-05-27 | End: 2025-06-03 | Stop reason: HOSPADM

## 2025-05-27 RX ORDER — ONDANSETRON 4 MG/1
4 TABLET, ORALLY DISINTEGRATING ORAL EVERY 30 MIN PRN
Status: DISCONTINUED | OUTPATIENT
Start: 2025-05-27 | End: 2025-05-27

## 2025-05-27 RX ORDER — DEXAMETHASONE SODIUM PHOSPHATE 4 MG/ML
INJECTION, SOLUTION INTRA-ARTICULAR; INTRALESIONAL; INTRAMUSCULAR; INTRAVENOUS; SOFT TISSUE PRN
Status: DISCONTINUED | OUTPATIENT
Start: 2025-05-27 | End: 2025-05-27

## 2025-05-27 RX ORDER — LIDOCAINE 40 MG/G
CREAM TOPICAL
Status: DISCONTINUED | OUTPATIENT
Start: 2025-05-27 | End: 2025-06-03 | Stop reason: HOSPADM

## 2025-05-27 RX ORDER — CEFAZOLIN SODIUM/WATER 2 G/20 ML
2 SYRINGE (ML) INTRAVENOUS
Status: DISCONTINUED | OUTPATIENT
Start: 2025-05-27 | End: 2025-05-27 | Stop reason: HOSPADM

## 2025-05-27 RX ORDER — HYDROMORPHONE HCL IN WATER/PF 6 MG/30 ML
0.4 PATIENT CONTROLLED ANALGESIA SYRINGE INTRAVENOUS EVERY 5 MIN PRN
Status: DISCONTINUED | OUTPATIENT
Start: 2025-05-27 | End: 2025-05-27

## 2025-05-27 RX ORDER — FENTANYL CITRATE 50 UG/ML
INJECTION, SOLUTION INTRAMUSCULAR; INTRAVENOUS PRN
Status: DISCONTINUED | OUTPATIENT
Start: 2025-05-27 | End: 2025-05-27

## 2025-05-27 RX ORDER — CEFAZOLIN SODIUM/WATER 2 G/20 ML
2 SYRINGE (ML) INTRAVENOUS
Status: COMPLETED | OUTPATIENT
Start: 2025-05-27 | End: 2025-05-27

## 2025-05-27 RX ORDER — GABAPENTIN 300 MG/1
CAPSULE ORAL
Qty: 180 CAPSULE | Refills: 1 | Status: SHIPPED | OUTPATIENT
Start: 2025-05-27

## 2025-05-27 RX ORDER — LABETALOL HYDROCHLORIDE 5 MG/ML
10-20 INJECTION, SOLUTION INTRAVENOUS EVERY 10 MIN PRN
Status: DISCONTINUED | OUTPATIENT
Start: 2025-05-27 | End: 2025-05-28

## 2025-05-27 RX ORDER — FENTANYL CITRATE 50 UG/ML
50 INJECTION, SOLUTION INTRAMUSCULAR; INTRAVENOUS EVERY 5 MIN PRN
Status: DISCONTINUED | OUTPATIENT
Start: 2025-05-27 | End: 2025-05-27

## 2025-05-27 RX ORDER — BISACODYL 10 MG
10 SUPPOSITORY, RECTAL RECTAL DAILY PRN
Status: DISCONTINUED | OUTPATIENT
Start: 2025-05-30 | End: 2025-06-03 | Stop reason: HOSPADM

## 2025-05-27 RX ORDER — ASCORBIC ACID 500 MG
500 TABLET ORAL DAILY
Status: DISCONTINUED | OUTPATIENT
Start: 2025-05-28 | End: 2025-06-03 | Stop reason: HOSPADM

## 2025-05-27 RX ORDER — HYDROMORPHONE HCL IN WATER/PF 6 MG/30 ML
0.2 PATIENT CONTROLLED ANALGESIA SYRINGE INTRAVENOUS
Status: DISCONTINUED | OUTPATIENT
Start: 2025-05-27 | End: 2025-05-28

## 2025-05-27 RX ORDER — LIDOCAINE 40 MG/G
CREAM TOPICAL
Status: DISCONTINUED | OUTPATIENT
Start: 2025-05-27 | End: 2025-05-27 | Stop reason: HOSPADM

## 2025-05-27 RX ORDER — HYDROMORPHONE HCL IN WATER/PF 6 MG/30 ML
0.4 PATIENT CONTROLLED ANALGESIA SYRINGE INTRAVENOUS
Status: DISCONTINUED | OUTPATIENT
Start: 2025-05-27 | End: 2025-05-28

## 2025-05-27 RX ADMIN — FENTANYL CITRATE 100 MCG: 50 INJECTION INTRAMUSCULAR; INTRAVENOUS at 08:55

## 2025-05-27 RX ADMIN — PHENYLEPHRINE HYDROCHLORIDE 0.5 MCG/KG/MIN: 10 INJECTION INTRAVENOUS at 09:12

## 2025-05-27 RX ADMIN — PROPOFOL 20 MG: 10 INJECTION, EMULSION INTRAVENOUS at 09:22

## 2025-05-27 RX ADMIN — FENTANYL CITRATE 100 MCG: 50 INJECTION INTRAMUSCULAR; INTRAVENOUS at 08:59

## 2025-05-27 RX ADMIN — GABAPENTIN 300 MG: 300 CAPSULE ORAL at 19:19

## 2025-05-27 RX ADMIN — ESMOLOL HYDROCHLORIDE 20 MG: 10 INJECTION, SOLUTION INTRAVENOUS at 12:47

## 2025-05-27 RX ADMIN — PROPOFOL 20 MG: 10 INJECTION, EMULSION INTRAVENOUS at 12:36

## 2025-05-27 RX ADMIN — SODIUM CHLORIDE, SODIUM LACTATE, POTASSIUM CHLORIDE, AND CALCIUM CHLORIDE: .6; .31; .03; .02 INJECTION, SOLUTION INTRAVENOUS at 08:57

## 2025-05-27 RX ADMIN — FENTANYL CITRATE 100 MCG: 50 INJECTION INTRAMUSCULAR; INTRAVENOUS at 08:58

## 2025-05-27 RX ADMIN — SODIUM CHLORIDE, SODIUM GLUCONATE, SODIUM ACETATE, POTASSIUM CHLORIDE AND MAGNESIUM CHLORIDE: 526; 502; 368; 37; 30 INJECTION, SOLUTION INTRAVENOUS at 08:57

## 2025-05-27 RX ADMIN — LABETALOL 20 MG/4 ML (5 MG/ML) INTRAVENOUS SYRINGE 20 MG: at 12:49

## 2025-05-27 RX ADMIN — FENTANYL CITRATE 100 MCG: 50 INJECTION INTRAMUSCULAR; INTRAVENOUS at 11:06

## 2025-05-27 RX ADMIN — FAMOTIDINE 20 MG: 20 TABLET, FILM COATED ORAL at 19:19

## 2025-05-27 RX ADMIN — Medication 2 G: at 09:14

## 2025-05-27 RX ADMIN — DEXAMETHASONE SODIUM PHOSPHATE 4 MG: 4 INJECTION, SOLUTION INTRAMUSCULAR; INTRAVENOUS at 09:44

## 2025-05-27 RX ADMIN — OXYCODONE HYDROCHLORIDE 10 MG: 10 TABLET ORAL at 22:14

## 2025-05-27 RX ADMIN — ATORVASTATIN CALCIUM 20 MG: 20 TABLET, FILM COATED ORAL at 19:19

## 2025-05-27 RX ADMIN — FENTANYL CITRATE 50 MCG: 50 INJECTION INTRAMUSCULAR; INTRAVENOUS at 10:32

## 2025-05-27 RX ADMIN — Medication 25 MG: at 09:04

## 2025-05-27 RX ADMIN — PROPOFOL 200 MCG/KG/MIN: 10 INJECTION, EMULSION INTRAVENOUS at 08:59

## 2025-05-27 RX ADMIN — INSULIN ASPART 2 UNITS: 100 INJECTION, SOLUTION INTRAVENOUS; SUBCUTANEOUS at 19:34

## 2025-05-27 RX ADMIN — FENTANYL CITRATE 150 MCG: 50 INJECTION INTRAMUSCULAR; INTRAVENOUS at 09:22

## 2025-05-27 RX ADMIN — FENTANYL CITRATE 100 MCG: 50 INJECTION INTRAMUSCULAR; INTRAVENOUS at 08:57

## 2025-05-27 RX ADMIN — FENTANYL CITRATE 100 MCG: 50 INJECTION INTRAMUSCULAR; INTRAVENOUS at 10:00

## 2025-05-27 RX ADMIN — ACETAMINOPHEN 975 MG: 325 TABLET, FILM COATED ORAL at 07:52

## 2025-05-27 RX ADMIN — OXYCODONE HYDROCHLORIDE 10 MG: 10 TABLET ORAL at 17:35

## 2025-05-27 RX ADMIN — LIDOCAINE HYDROCHLORIDE 1 ML: 10 INJECTION, SOLUTION INFILTRATION; PERINEURAL at 08:50

## 2025-05-27 RX ADMIN — FENTANYL CITRATE 50 MCG: 50 INJECTION INTRAMUSCULAR; INTRAVENOUS at 11:47

## 2025-05-27 RX ADMIN — Medication 2 G: at 08:05

## 2025-05-27 RX ADMIN — SODIUM CHLORIDE: 0.9 INJECTION, SOLUTION INTRAVENOUS at 13:37

## 2025-05-27 RX ADMIN — FENTANYL CITRATE 100 MCG: 50 INJECTION INTRAMUSCULAR; INTRAVENOUS at 09:36

## 2025-05-27 RX ADMIN — PHENYLEPHRINE HYDROCHLORIDE 100 MCG: 10 INJECTION INTRAVENOUS at 09:57

## 2025-05-27 RX ADMIN — SODIUM CHLORIDE, SODIUM LACTATE, POTASSIUM CHLORIDE, AND CALCIUM CHLORIDE: .6; .31; .03; .02 INJECTION, SOLUTION INTRAVENOUS at 16:34

## 2025-05-27 RX ADMIN — Medication 2 G: at 17:35

## 2025-05-27 RX ADMIN — SENNOSIDES AND DOCUSATE SODIUM 1 TABLET: 50; 8.6 TABLET ORAL at 19:19

## 2025-05-27 RX ADMIN — ONDANSETRON 4 MG: 2 INJECTION INTRAMUSCULAR; INTRAVENOUS at 12:24

## 2025-05-27 ASSESSMENT — ACTIVITIES OF DAILY LIVING (ADL)
ADLS_ACUITY_SCORE: 57
ADLS_ACUITY_SCORE: 66
ADLS_ACUITY_SCORE: 57
ADLS_ACUITY_SCORE: 66
ADLS_ACUITY_SCORE: 59
ADLS_ACUITY_SCORE: 57
ADLS_ACUITY_SCORE: 57
ADLS_ACUITY_SCORE: 66
ADLS_ACUITY_SCORE: 66
ADLS_ACUITY_SCORE: 57
ADLS_ACUITY_SCORE: 57
ADLS_ACUITY_SCORE: 59
ADLS_ACUITY_SCORE: 59
ADLS_ACUITY_SCORE: 66
ADLS_ACUITY_SCORE: 59
ADLS_ACUITY_SCORE: 66
ADLS_ACUITY_SCORE: 57
ADLS_ACUITY_SCORE: 57

## 2025-05-27 NOTE — BRIEF OP NOTE
[facility]    Brief Operative Note    Pre-operative diagnosis: Cervical spondylosis with myelopathy [M47.12]  Post-operative diagnosis Same as pre-operative diagnosis    Procedure: posterior Open Cervical 3-6 laminectomy, N/A - Spine    Surgeon: Surgeons and Role:     * Seda Pinon MD - Primary     * Sae Johnson MD - Resident - Assisting  Anesthesia: General   Estimated Blood Loss: Less than 50 ml    Drains: Hemovac  Specimens: * No specimens in log *  Findings:   Compressed lamina at C3-6, decompressed by end of case.  Complications: None.  Implants: * No implants in log *        Sae Johnson MD PGY-2  Department of Neurosurgery  Personal pager: [phone]  Please page [phone] (urgent)/[phone](non-urgent) on-call resident for questions/concerns

## 2025-05-27 NOTE — ANESTHESIA CARE TRANSFER NOTE
Patient: Nahun Villalobos    Procedure: Procedure(s):  posterior Open Cervical 3-6 laminectomy       Diagnosis: Cervical spondylosis with myelopathy [M47.12]  Diagnosis Additional Information: No value filed.    Anesthesia Type:   General     Note:    Oropharynx: oropharynx clear of all foreign objects and spontaneously breathing  Level of Consciousness: awake  Oxygen Supplementation: face mask  Level of Supplemental Oxygen (L/min / FiO2): 6  Independent Airway: airway patency satisfactory and stable  Dentition: dentition unchanged  Vital Signs Stable: post-procedure vital signs reviewed and stable  Report to RN Given: handoff report given  Patient transferred to: PACU    Handoff Report: Identifed the Patient, Identified the Reponsible Provider, Reviewed the pertinent medical history, Discussed the surgical course, Reviewed Intra-OP anesthesia mangement and issues during anesthesia, Set expectations for post-procedure period and Allowed opportunity for questions and acknowledgement of understanding      Vitals:  Vitals Value Taken Time   BP     Temp     Pulse 75 05/27/25 13:11   Resp 17 05/27/25 13:11   SpO2 100 % 05/27/25 13:11   Vitals shown include unfiled device data.    Electronically Signed By: Gerardo Abraham MD  May 27, 2025  1:12 PM

## 2025-05-27 NOTE — ANESTHESIA POSTPROCEDURE EVALUATION
Patient: Nahun Villalobos    Procedure: Procedure(s):  posterior Open Cervical 3-6 laminectomy       Anesthesia Type:  General    Note:  Disposition: Outpatient   Postop Pain Control: Uneventful            Sign Out: Well controlled pain   PONV: No   Neuro/Psych: Uneventful            Sign Out: Acceptable/Baseline neuro status   Airway/Respiratory: Uneventful            Sign Out: Acceptable/Baseline resp. status   CV/Hemodynamics: Uneventful            Sign Out: Acceptable CV status; No obvious hypovolemia; No obvious fluid overload   Other NRE: NONE   DID A NON-ROUTINE EVENT OCCUR? No           Last vitals:  Vitals Value Taken Time   /60 05/27/25 15:30   Temp 36.4  C (97.5  F) 05/27/25 15:00   Pulse 80 05/27/25 15:37   Resp 21 05/27/25 15:37   SpO2 95 % 05/27/25 15:37   Vitals shown include unfiled device data.    Electronically Signed By: Dequan Caldwell MD  May 27, 2025  3:37 PM

## 2025-05-27 NOTE — OP NOTE
PATIENT NAME: Nahun Villalobos  PATIENT MRN: [id number]  PATIENT ACCOUNT: [id number]  PATIENT YOB: 1945    NEUROSURGERY OPERATIVE REPORT     DATE OF SURGERY: 05/27/25     PREOPERATIVE DIAGNOSIS:  1. Cervical spondylosis   2. Cervical myelopathy     POSTOPERATIVE DIAGNOSIS:  1. Same as above  2. Same as above     PROCEDURES PERFORMED:  1. Posterior complete cervical 3-5 laminectomy  2. Posterior Partial cervical 6 laminectomy  3. Neuromonitoring (SSEP and motors)  4. C-arm fluoroscopy     ATTENDING SURGEON: Seda Pinon MD     RESIDENT SURGEONS: Sae Johnson MD     ANESTHESIA: General endotracheal anesthesia     ESTIMATED BLOOD LOSS: 50 mL     IMPLANTS: None    EXPLANTS: None     DRAINS: Hemovac drain to full suction     FINDINGS:  Compressed spinal cord by lamina at C3-6 widely decompressed by end of the case. No CSF leak observed. No significant neuromonitoring changes compared to pre-operative Motors and SSEPs.      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 He was originally planned for a C3-6 laminoplasty in 4/2025, but given his overall comorbidity and age and at the time a new thoracic fracture, a C3-6 laminectomy was offered at the affected levels thought to be faster and safer. After a thorough discussion of risks/benefits of the surgical approach, the patient and his family elected to proceed with surgery.      DESCRIPTION OF PROCEDURE:    Nahun Villalobos was brought to the operating room. his identity was verified. Our anesthesia colleagues then placed 2 IV lines, an  arterial line, and then induced general anesthesia. Neuromonitoring electrodes were placed by the technologist. The patient was then pinned using the [place] head-ortiz. We proceeded to then prone the patient, and the head was secured with 10-20 degrees of chin tuck. Tape was used retract the shoulders and flatten the natural neck folds the patient had. A linear incision was marked in the patient's midline from C2 to C6 using anatomical landmarks. The patient was cleaned, prepared, and draped in sterile fashion. 10 of local anesthetic was used. Timeout was performed.     The incision was made using a 10-blade. Monopolar cautery was used to taken the incision to the C3 spinous processes. Upon exposure of C3 and C4, an chago clamp was used to demarcate the location, and a C-arm was draped. Fluoroscopic imaging confirmed the C3 location, and a 0-vicryl suture was placed to tag this site. We then proceeded to extend the incision down to the spinous process of C6. Monopolar and bipolar cautery was used to expose the lamina bilaterally in a wide fashion. Upon exposure, a high-speed matchstick drill as used to drill a trough in bilateral lamina the the ligamentum flavum. The spinous processes were taken down by a [place] ronjour. The drill was used above the spinous process to connect bilateral troughs. The 2-0 Kerisson punch was used to then bite the remainder of the bone to isolate the lamina. Upon circumferential dissection of the multiple laminae, a kocher instrument was used to hold the lamina up, and then the sublaminar space was cleared using a Frankie dental. Hemostasis was achieved in the region with hemostatic agents, with care to avoid further compression of the spinal cord. The spinal cord was relaxed by the end of the case and exposure. No active bleeding was noted prior to closure. No CSF leak was observed. A hemovac drain was tunneled inferiorly and to the left, left to full suction. Motor potentials and  SSEPs were all stable to pre-operative levels.    We then turned our attention to closure. 0 vicryl were used to close in standard multilayer fashion starting with the muscular layer, fascial layer. 2-0 vicryl approximated the deep dermal layer. 3-0 nylon approximated the superficial skin using a locked running suture. Sterile dressings were applied. The patient was extubated and returned to the intensive care unit without incident. At the end of the procedure, sponge and needle counts were correct. Estimated blood loss totaled 50 ml.     Dr. Seda Pinon was present for the critical portions of the procedure and immediately available for the remainder.     Operative note prepared by:  Sae Johnson MD  Neurosurgery Resident, PGY-2  I was present for the critical portions of the operation and immediately available for the remainder.  AMP

## 2025-05-27 NOTE — ANESTHESIA PROCEDURE NOTES
Airway       Patient location during procedure: OR       Procedure Start/Stop Times: 5/27/2025 9:06 AM  Staff -        Anesthesiologist:  José Roy MD       Resident/Fellow: Gerardo Abraham MD       Performed By: resident and with residents       Procedure performed by resident/fellow/CRNA in presence of a teaching physician.    Consent for Airway        Urgency: elective  Indications and Patient Condition       Indications for airway management: oneal-procedural       Induction type:intravenous       Mask difficulty assessment: 1 - vent by mask    Final Airway Details       Final airway type: endotracheal airway       Successful airway: ETT - single  Endotracheal Airway Details        ETT size (mm): 7.5       Cuffed: yes       Successful intubation technique: video laryngoscopy       VL Blade Size: Glidescope 4       Grade View of Cords: 2       Adjucts: stylet       Position: Right       Measured from: gums/teeth       Secured at (cm): 21       Bite block used: None    Post intubation assessment        Placement verified by: capnometry, equal breath sounds and chest rise        Number of attempts at approach: 1       Number of other approaches attempted: 0       Secured with: tape       Ease of procedure: easy       Dentition: Unchanged    Medication(s) Administered   Medication Administration Time: 5/27/2025 9:06 AM

## 2025-05-27 NOTE — ANESTHESIA PROCEDURE NOTES
Arterial Line Procedure Note    Pre-Procedure   Staff -        Anesthesiologist:  José Roy MD       Resident/Fellow: Gerardo Abraham MD       Performed By: resident       Location: OR       Pre-Anesthestic Checklist: patient identified, IV checked, risks and benefits discussed, informed consent, monitors and equipment checked, pre-op evaluation and at physician/surgeon's request  Timeout:       Correct Patient: Yes        Correct Procedure: Yes        Correct Site: Yes        Correct Position: Yes   Line Placement:   This line was placed Pre Induction starting at 5/27/2025 8:50 AM and ending at 5/27/2025 9:00 AM  Procedure   Procedure: arterial line       Laterality: left       Insertion Site: radial.  Sterile Prep        Standard elements of sterile barrier followed       Skin prep: Chloraprep  Insertion/Injection        Technique: ultrasound guided        1. Ultrasound was used to evaluate the access site.       2. Artery evaluated via ultrasound for patency/adequacy.       3. Using real-time ultrasound the needle/catheter was observed entering the artery/vein.       Catheter Type/Size: 20 G, 12 cm  Narrative        Tegaderm dressing used.       Complications: None apparent,        Arterial waveform: Yes

## 2025-05-27 NOTE — H&P
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 He was originally planned for a C3-6 laminoplasty, but given his overall comorbidity and age, a C3-6 laminectomy was offered at the affected levels.     ROS: 10 point ROS in other systems negative other than noted in HPI.    RELEVANT HISTORY:  PAST MEDICAL HISTORY:   Past Medical History:   Diagnosis Date    Acute diverticulitis 09/20/2020    Anemia 07/09/2024    Antiplatelet or antithrombotic long-term use     Arthritis     CHF (congestive heart failure) (H) 09/16/2020    COPD (chronic obstructive pulmonary disease) (H)     DM (diabetes mellitus) (H)     Dyspnea on exertion     Essential hypertension, benign     Hemorrhage of rectum and anus     Non-small cell lung cancer (H)     Obesity     Personal history of colonic polyps     Sleep apnea     Thrombosis     Tobacco use disorder     Urinary retention     Walking troubles        PAST SURGICAL HISTORY:   Past Surgical History:   Procedure Laterality Date    ABDOMEN SURGERY  1995    APPENDECTOMY      BONE EXOSTOSIS EXCISION Bilateral 09/20/2022    Procedure: EXOSTECTOMIES BOTH 5TH DIGITS WITH SOFT TISSUE RELEASE;  Surgeon: Tong Gray DPM;  Location: [place] [facility]    CHOLECYSTECTOMY      COLONOSCOPY  2014    CYSTOSCOPY, TRANSURETHRAL RESECTION (TUR) PROSTATE, COMBINED N/A 04/30/2018    Procedure: COMBINED CYSTOSCOPY, TRANSURETHRAL RESECTION (TUR) PROSTATE;  Cystoscopy, Transurethral Resection Of The Prostate;  Surgeon:  Praveena Burris MD;  Location: [facility] OR    IR LUNG BIOPSY MEDIASTINUM RIGHT  2016    WEDGE RESECTION      lung    ZZC NONSPECIFIC PROCEDURE      cholecystectomy       FAMILY HISTORY:   Family History   Problem Relation Age of Onset    Hypertension Mother     C.A.D. Mother         ANEURYSM    Cancer - colorectal Mother     Cancer Mother         OVARIAN    Other Cancer Mother     Hypertension Sister     Breast Cancer Sister     Cerebrovascular Disease Father     Hypertension Sister     Breast Cancer Sister     Glaucoma No family hx of     Macular Degeneration No family hx of        SOCIAL HISTORY:   Social History     Tobacco Use    Smoking status: Former     Current packs/day: 0.00     Average packs/day: 2.0 packs/day for 53.0 years (106.0 ttl pk-yrs)     Types: Cigarettes, Cigars     Start date: 1960     Quit date: 2013     Years since quittin.9     Passive exposure: Past    Smokeless tobacco: Never    Tobacco comments:     Quit, will never start again.   Substance Use Topics    Alcohol use: No       MEDICATIONS:  Current Outpatient Medications   Medication Sig Dispense Refill    albuterol (VENTOLIN HFA) 108 (90 Base) MCG/ACT inhaler INHALE 2 PUFFS INTO THE LUNGS EVERY 6 HOURS AS NEEDED FOR SHORTNESS OF BREATH / DYSPNEA OR WHEEZING 25.5 g 0    amLODIPine (NORVASC) 5 MG tablet Take 1 tablet (5 mg) by mouth daily. 90 tablet 3    apixaban ANTICOAGULANT (ELIQUIS ANTICOAGULANT) 2.5 MG tablet TAKE 1 TABLET TWICE A  tablet 2    atorvastatin (LIPITOR) 20 MG tablet Take 1 tablet (20 mg) by mouth daily. 90 tablet 2    finasteride (PROSCAR) 5 MG tablet Take 1 tablet (5 mg) by mouth daily 90 tablet 3    Fluticasone-Umeclidin-Vilant (TRELEGY ELLIPTA) 100-62.5-25 MCG/ACT oral inhaler USE 1 INHALATION DAILY 60 each 5    furosemide (LASIX) 40 MG tablet Take 1 tablet (40 mg) by mouth daily.      gabapentin (NEURONTIN) 300 MG capsule Take 2 capsules (600 mg) by mouth 2 times daily (with meals). 180  capsule 0    methocarbamol (ROBAXIN) 500 MG tablet Take 1 tablet (500 mg) by mouth 4 times daily as needed for muscle spasms. 120 tablet 0    acetaminophen (TYLENOL) 500 MG tablet Take 2 tablets (1,000 mg) by mouth 2 times daily as needed for mild pain.      acetaminophen (TYLENOL) 500 MG tablet Take 1,000 mg by mouth 2 times daily.      blood glucose (NO BRAND SPECIFIED) lancets standard Use to test blood sugar 1 time daily, first thing in the morning 50 each 3    calcium carbonate (TUMS) 500 MG chewable tablet Take 1 chew tab by mouth daily as needed for heartburn.      cholestyramine light (QUESTRAN) 4 GM packet Take 4 g by mouth 2 times daily (with meals).      Coenzyme Q10 400 MG CAPS Take 400 mg by mouth daily 30 capsule 0    ferrous sulfate (FE TABS) 325 (65 Fe) MG EC tablet Take 1 tablet (325 mg) by mouth every evening 30 tablet 0    gabapentin (NEURONTIN) 300 MG capsule Take 1 capsule (300 mg) by mouth every morning. 90 capsule 0    hydrocortisone sodium succinate PF (SOLU-CORTEF) 100 mg/2 mL injection Inject 100 mg into the vein.      lanreotide acetate (SOMATULINE) 120 MG/0.5ML injection Inject 120 mg subcutaneously every 28 days.      metFORMIN (GLUCOPHAGE) 1000 MG tablet Take 1 tablet (1,000 mg) by mouth 2 times daily (with meals). 180 tablet 1    miconazole (MICATIN) 2 % external cream Apply topically 2 times daily as needed for other (Rash/ skin irritation)      order for DME Oxygen 2 Li/min  at night via nasal cannula 1 Device 0    vitamin B-Complex Take 1 tablet by mouth daily      vitamin C (ASCORBIC ACID) 500 MG tablet Take 500 mg by mouth daily.      Vitamin D3 (VITAMIN D-1000 MAX ST) 25 mcg (1000 units) tablet Take 25 mcg by mouth daily.      zoledronic acid (ZOMETA) 4 MG/5ML injection Inject 3.5 mg into the vein.         Allergies:  No Known Allergies    IMAGING:  No results found for this or any previous visit (from the past 24 hours).    LABS:   Last Comprehensive Metabolic Panel:  Lab  Results   Component Value Date     05/23/2025    POTASSIUM 4.6 05/23/2025    CHLORIDE 106 05/23/2025    CO2 28 05/23/2025    ANIONGAP 5 05/23/2025     (H) 05/23/2025    BUN 10 05/23/2025    CR 1.10 05/23/2025    GFRESTIMATED 68 05/23/2025    JESSE 9.6 05/23/2025         Lab Results   Component Value Date    WBC 7.1 05/23/2025    WBC 7.4 05/27/2021     Lab Results   Component Value Date    RBC 3.67 05/23/2025    RBC 4.02 05/27/2021     Lab Results   Component Value Date    HGB 12.2 05/23/2025    HGB 13.2 05/27/2021     Lab Results   Component Value Date    HCT 36.3 05/23/2025    HCT 41.2 05/27/2021     Lab Results   Component Value Date    MCV 99 05/23/2025     05/27/2021     Lab Results   Component Value Date    MCH 33.2 05/23/2025    MCH 32.8 05/27/2021     Lab Results   Component Value Date    MCHC 33.6 05/23/2025    MCHC 32.0 05/27/2021     Lab Results   Component Value Date    RDW 11.6 05/23/2025    RDW 12.0 05/27/2021     Lab Results   Component Value Date     05/23/2025     05/27/2021     INR   Date Value Ref Range Status   02/09/2018 1.01 0.86 - 1.14 Final      PTT   Date Value Ref Range Status   02/09/2018 29 22 - 37 sec Final        PHYSICAL EXAM:  There were no vitals taken for this visit.  General: Awake, alert, no acute distress  HEENT: Neck supple, non-tender  PULM: Saturating well on room air, unlabored breathing  MSK: Normal muscle tone  NEUROLOGIC:  -- Awake; Alert; oriented x 3  -- Follows commands briskly  -- +repetition, calculation, and naming  -- Speech fluent, spontaneous. No aphasia or dysarthria.  -- no gaze preference. No apparent hemineglect.  Cranial Nerves:  -- visual fields full to confrontation, PERRL 3-2mm bilat and brisk, extraocular movements intact  -- face symmetrical, tongue midline  -- sensory V1-V3 intact bilaterally  -- palate elevates symmetrically, uvula midline  -- hearing grossly intact bilat  -- Trapezii 5/5 strength bilat symmetric  --  Cerebellar: Finger nose finger without dysmetria, intact rapid alternating motions bilaterally    Motor:  No muscle wasting or fasciculations  No Pronator Drift     Delt Bi Tri Hand Flexion/  Extension Iliopsoas Quadriceps Hamstrings Tibialis Anterior Gastroc    C5 C6 C7 C8/T1 L2 L3 L4-S1 L4 S1   R 5 5 5 5 3 4+ 4 4- 4   L 5 5 5 5 5 5 5 5 5     Sensory:  Diminished sensation in legs in asymmetric distribution, diminished sensation in all fingers in hands, length dependent neuropathy in upper extremities    Reflexes: 3+ patellar reflex bilaterally, 2 beats of clonus on right leg. Right-arm lake   Gait: deferred     ASSESSMENT:  79 year old male with hx of symptomatic cervical myelopathy, with significant comorbidity - age, COPD, recent carcinoid tumor, hx of Pes on Eliquis, recent T10 fracture with ALL disruption managed with TLSO brace. He was originally planned for a C3-6 laminoplasty on 4/2/2025, but given his spinal fracture, elected to proceed with laminectomy only.     PLAN:  -- Planning for OR today - marked and consented at bedside  -- NPO  -- Coags    The patient's presentation, exam, and imaging and the listed recommendations/plan were discussed with  Dr. Pinon, neurosurgery staff.    Sae Johnson MD  Department of Neurosurgery  [facility]  Pager: [phone]     I have seen this patient with the resident and formulated a plan and agree with this note.  AMP

## 2025-05-28 ENCOUNTER — APPOINTMENT (OUTPATIENT)
Dept: GENERAL RADIOLOGY | Facility: CLINIC | Age: 80
DRG: 516 | End: 2025-05-28
Payer: COMMERCIAL

## 2025-05-28 LAB
AMMONIA PLAS-SCNC: 23 UMOL/L (ref 16–60)
ANION GAP SERPL CALCULATED.3IONS-SCNC: 13 MMOL/L (ref 7–15)
BASE EXCESS BLDV CALC-SCNC: 4.8 MMOL/L (ref -3–3)
BUN SERPL-MCNC: 9.2 MG/DL (ref 8–23)
CA-I BLD-MCNC: 4.2 MG/DL (ref 4.4–5.2)
CALCIUM SERPL-MCNC: 8.4 MG/DL (ref 8.8–10.4)
CHLORIDE SERPL-SCNC: 101 MMOL/L (ref 98–107)
CREAT SERPL-MCNC: 0.95 MG/DL (ref 0.67–1.17)
EGFRCR SERPLBLD CKD-EPI 2021: 81 ML/MIN/1.73M2
ERYTHROCYTE [DISTWIDTH] IN BLOOD BY AUTOMATED COUNT: 11.8 % (ref 10–15)
GLUCOSE BLDC GLUCOMTR-MCNC: 156 MG/DL (ref 70–99)
GLUCOSE BLDC GLUCOMTR-MCNC: 166 MG/DL (ref 70–99)
GLUCOSE BLDC GLUCOMTR-MCNC: 168 MG/DL (ref 70–99)
GLUCOSE BLDC GLUCOMTR-MCNC: 193 MG/DL (ref 70–99)
GLUCOSE BLDC GLUCOMTR-MCNC: 202 MG/DL (ref 70–99)
GLUCOSE SERPL-MCNC: 172 MG/DL (ref 70–99)
HCO3 BLDV-SCNC: 30 MMOL/L (ref 21–28)
HCO3 SERPL-SCNC: 24 MMOL/L (ref 22–29)
HCT VFR BLD AUTO: 30.6 % (ref 40–53)
HGB BLD-MCNC: 10.3 G/DL (ref 13.3–17.7)
MAGNESIUM SERPL-MCNC: 1.4 MG/DL (ref 1.7–2.3)
MCH RBC QN AUTO: 33.1 PG (ref 26.5–33)
MCHC RBC AUTO-ENTMCNC: 33.7 G/DL (ref 31.5–36.5)
MCV RBC AUTO: 98 FL (ref 78–100)
O2/TOTAL GAS SETTING VFR VENT: 21 %
OXYHGB MFR BLDV: 76 % (ref 70–75)
PCO2 BLDV: 47 MM HG (ref 40–50)
PH BLDV: 7.42 [PH] (ref 7.32–7.43)
PHOSPHATE SERPL-MCNC: 2.8 MG/DL (ref 2.5–4.5)
PLATELET # BLD AUTO: 244 10E3/UL (ref 150–450)
PO2 BLDV: 41 MM HG (ref 25–47)
POTASSIUM SERPL-SCNC: 4.4 MMOL/L (ref 3.4–5.3)
RBC # BLD AUTO: 3.11 10E6/UL (ref 4.4–5.9)
SAO2 % BLDV: 77.7 % (ref 70–75)
SODIUM SERPL-SCNC: 138 MMOL/L (ref 135–145)
WBC # BLD AUTO: 15.1 10E3/UL (ref 4–11)

## 2025-05-28 PROCEDURE — 36415 COLL VENOUS BLD VENIPUNCTURE: CPT

## 2025-05-28 PROCEDURE — 250N000013 HC RX MED GY IP 250 OP 250 PS 637: Performed by: NURSE PRACTITIONER

## 2025-05-28 PROCEDURE — 85027 COMPLETE CBC AUTOMATED: CPT

## 2025-05-28 PROCEDURE — 250N000013 HC RX MED GY IP 250 OP 250 PS 637

## 2025-05-28 PROCEDURE — 250N000011 HC RX IP 250 OP 636: Mod: JZ

## 2025-05-28 PROCEDURE — 84100 ASSAY OF PHOSPHORUS: CPT

## 2025-05-28 PROCEDURE — 83735 ASSAY OF MAGNESIUM: CPT

## 2025-05-28 PROCEDURE — 82805 BLOOD GASES W/O2 SATURATION: CPT

## 2025-05-28 PROCEDURE — 120N000002 HC R&B MED SURG/OB UMMC

## 2025-05-28 PROCEDURE — 82140 ASSAY OF AMMONIA: CPT

## 2025-05-28 PROCEDURE — 71045 X-RAY EXAM CHEST 1 VIEW: CPT

## 2025-05-28 PROCEDURE — 258N000003 HC RX IP 258 OP 636

## 2025-05-28 PROCEDURE — 82565 ASSAY OF CREATININE: CPT

## 2025-05-28 PROCEDURE — 99233 SBSQ HOSP IP/OBS HIGH 50: CPT

## 2025-05-28 PROCEDURE — 71045 X-RAY EXAM CHEST 1 VIEW: CPT | Mod: 26 | Performed by: RADIOLOGY

## 2025-05-28 PROCEDURE — 82330 ASSAY OF CALCIUM: CPT

## 2025-05-28 PROCEDURE — 250N000011 HC RX IP 250 OP 636

## 2025-05-28 PROCEDURE — 250N000013 HC RX MED GY IP 250 OP 250 PS 637: Performed by: NEUROLOGICAL SURGERY

## 2025-05-28 RX ORDER — AMOXICILLIN 250 MG
1 CAPSULE ORAL 2 TIMES DAILY
Qty: 20 TABLET | Refills: 0 | Status: SHIPPED | OUTPATIENT
Start: 2025-05-28

## 2025-05-28 RX ORDER — MAGNESIUM SULFATE HEPTAHYDRATE 40 MG/ML
4 INJECTION, SOLUTION INTRAVENOUS ONCE
Status: COMPLETED | OUTPATIENT
Start: 2025-05-28 | End: 2025-05-28

## 2025-05-28 RX ORDER — POLYETHYLENE GLYCOL 3350 17 G/17G
17 POWDER, FOR SOLUTION ORAL 3 TIMES DAILY
Status: DISCONTINUED | OUTPATIENT
Start: 2025-05-28 | End: 2025-06-03 | Stop reason: HOSPADM

## 2025-05-28 RX ORDER — CALCIUM GLUCONATE 20 MG/ML
1 INJECTION, SOLUTION INTRAVENOUS ONCE
Status: COMPLETED | OUTPATIENT
Start: 2025-05-28 | End: 2025-05-28

## 2025-05-28 RX ORDER — GABAPENTIN 300 MG/1
600 CAPSULE ORAL 2 TIMES DAILY
Status: DISCONTINUED | OUTPATIENT
Start: 2025-05-28 | End: 2025-06-03 | Stop reason: HOSPADM

## 2025-05-28 RX ADMIN — SENNOSIDES AND DOCUSATE SODIUM 1 TABLET: 50; 8.6 TABLET ORAL at 19:58

## 2025-05-28 RX ADMIN — AMLODIPINE BESYLATE 5 MG: 5 TABLET ORAL at 08:50

## 2025-05-28 RX ADMIN — ATORVASTATIN CALCIUM 20 MG: 20 TABLET, FILM COATED ORAL at 19:57

## 2025-05-28 RX ADMIN — SENNOSIDES AND DOCUSATE SODIUM 1 TABLET: 50; 8.6 TABLET ORAL at 08:47

## 2025-05-28 RX ADMIN — POLYETHYLENE GLYCOL 3350 17 G: 17 POWDER, FOR SOLUTION ORAL at 08:47

## 2025-05-28 RX ADMIN — METFORMIN HYDROCHLORIDE 1000 MG: 500 TABLET ORAL at 19:57

## 2025-05-28 RX ADMIN — HYDROMORPHONE HYDROCHLORIDE 0.2 MG: 0.2 INJECTION, SOLUTION INTRAMUSCULAR; INTRAVENOUS; SUBCUTANEOUS at 00:27

## 2025-05-28 RX ADMIN — POLYETHYLENE GLYCOL 3350 17 G: 17 POWDER, FOR SOLUTION ORAL at 19:57

## 2025-05-28 RX ADMIN — FAMOTIDINE 20 MG: 20 TABLET, FILM COATED ORAL at 19:58

## 2025-05-28 RX ADMIN — Medication 25 MCG: at 08:50

## 2025-05-28 RX ADMIN — FINASTERIDE 5 MG: 5 TABLET, FILM COATED ORAL at 08:57

## 2025-05-28 RX ADMIN — MAGNESIUM SULFATE HEPTAHYDRATE 4 G: 40 INJECTION, SOLUTION INTRAVENOUS at 21:10

## 2025-05-28 RX ADMIN — FAMOTIDINE 20 MG: 20 TABLET, FILM COATED ORAL at 08:49

## 2025-05-28 RX ADMIN — SODIUM CHLORIDE: 0.9 INJECTION, SOLUTION INTRAVENOUS at 01:11

## 2025-05-28 RX ADMIN — CALCIUM GLUCONATE 1 G: 20 INJECTION, SOLUTION INTRAVENOUS at 19:58

## 2025-05-28 RX ADMIN — POLYETHYLENE GLYCOL 3350 17 G: 17 POWDER, FOR SOLUTION ORAL at 13:38

## 2025-05-28 RX ADMIN — Medication 3 MG: at 22:30

## 2025-05-28 RX ADMIN — OXYCODONE HYDROCHLORIDE 5 MG: 5 TABLET ORAL at 13:37

## 2025-05-28 RX ADMIN — LISINOPRIL 20 MG: 20 TABLET ORAL at 08:51

## 2025-05-28 RX ADMIN — METHOCARBAMOL 500 MG: 500 TABLET ORAL at 12:28

## 2025-05-28 RX ADMIN — OXYCODONE HYDROCHLORIDE AND ACETAMINOPHEN 500 MG: 500 TABLET ORAL at 08:50

## 2025-05-28 RX ADMIN — OXYCODONE HYDROCHLORIDE 5 MG: 5 TABLET ORAL at 19:02

## 2025-05-28 RX ADMIN — INSULIN ASPART 1 UNITS: 100 INJECTION, SOLUTION INTRAVENOUS; SUBCUTANEOUS at 13:42

## 2025-05-28 RX ADMIN — GABAPENTIN 600 MG: 300 CAPSULE ORAL at 19:57

## 2025-05-28 ASSESSMENT — ACTIVITIES OF DAILY LIVING (ADL)
ADLS_ACUITY_SCORE: 66
ADLS_ACUITY_SCORE: 66
ADLS_ACUITY_SCORE: 67
ADLS_ACUITY_SCORE: 67
ADLS_ACUITY_SCORE: 66
ADLS_ACUITY_SCORE: 67
ADLS_ACUITY_SCORE: 66
ADLS_ACUITY_SCORE: 66
ADLS_ACUITY_SCORE: 60
ADLS_ACUITY_SCORE: 66
ADLS_ACUITY_SCORE: 66
ADLS_ACUITY_SCORE: 67
ADLS_ACUITY_SCORE: 66
ADLS_ACUITY_SCORE: 67
ADLS_ACUITY_SCORE: 66
ADLS_ACUITY_SCORE: 66
ADLS_ACUITY_SCORE: 67
ADLS_ACUITY_SCORE: 66
ADLS_ACUITY_SCORE: 66
ADLS_ACUITY_SCORE: 67

## 2025-05-28 NOTE — CONSULTS
[facility]  Consult Note - Hospitalist Service, GOLD TEAM   Date of Admission:  5/27/2025  Consult Requested by: Dr. Pinon  Reason for Consult: postoperative management in medically complex patient     Assessment & Plan   Nahun Villalobos is a 79 year old male admitted on 5/27/2025. 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 He was originally planned for a C3-6 laminoplasty, but given his overall comorbidity and age, a C3-6 laminectomy was offered by neurosurgery at the affected levels. Internal medicine was consulted postoperatively for medical comanagement given medical complexity.   ____________________________    # S/p  posterior complete cervical 3-5 laminectomy, posterior partial cervical 6 laminectomy  # Cervical stenosis w/ myelopathy and neuropathy  # History of recurrent mechanical falls   # Chronic back pain with Sciatica  Chronic back pain with sciatica, cervical stenosis with myelopathy and neuropathy complicated by recurrent falls. Most recent fall being 2/24/2025 when he attempted to sit down and flipped backwards in his wheelchair - sustained T10 fracture.  He was originally planned for a C3-6 laminoplasty, but given his overall comorbidity and age, a C3-6 laminectomy was offered by neurosurgery at the affected levels. Surgery was without complication and was conducted by Dr. Pinon and resident Dr. Johnson. EBL 50mL. Patient is hemodynamically stable and pain is well controlled. He states that he feels neck stiffness but otherwise is okay.   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 He previously underwent an exploratory laparotomy with small-bowel resection in March of 2023 and subsequently was managed on lanreotide. Following most recent PET which revealed mets, oncology team recommended transitioning therapy to Lutathera. Follows with [facility] - seen most recently by Marisa Abbott DNP on 4/23/25.   - Follow-up with Oncology/Hematology upon discharge for continued management.   - Planning to follow with nuclear medicine soon and transition to Lutathera     # COPD  # CIERA  Patient non adherent w/ CPAP.  - Duonebs PRN available.   - Continue PTA Breo Ellipta and Incruse Ellipta.     # Hx PE on AC 2020  - PTA Eliquis as managed per surgical team - HOLD through procedure  - Continue PTA iron supplement.    # Anemia, chronic, normocytic   # Mild leukocytosis   Baseline Hgb 11-12g/dL - hgb 11.9 on day of operation. WBC 11.6. No infectious symptoms - may be related to cancer diagnosis.   - AM CBC      # T2DM w/ hyperglycemia   A1C 6.4% on 5/23/2025.  - HOLD metformin    - MSSI   - Hypoglycemia protocol   - BG checks TID AC + qHS + 0200     # HTN  # HFpEF, chronic compensated  Expect some hypertension in the setting of acute postoperative pain. In 2024 EF was 55-60% .  - Continue amlodipine, lisinopril  - HOLD lasix for now, consider resumption in AM   - Intake and output, daily weights   - Should establish with cardiology on discharge      # BPH  - Continue PTA finasteride.     # Chronic diarrhea  - Continue PTA cholestyramine.     # HLD  - Continue PTA statin.     # Recent multiple stage II pressure injuries to bilateral flank  Noted on recent hospitalization.   - RN care order placed  "to assess for pressure injuries of flank    # Hx tobacco use disorder  2.0 packs/day for 53.0 years   - Quit in 2013          The patient's care was discussed with the Patient.    Clinically Significant Risk Factors Present on Admission                # Drug Induced Coagulation Defect: home medication list includes an anticoagulant medication    # Hypertension: Noted on problem list  # Chronic heart failure with preserved ejection fraction: heart failure noted on problem list and last echo with EF >50%          # Overweight: Estimated body mass index is 29.1 kg/m  as calculated from the following:    Height as of this encounter: 1.676 m (5' 6\").    Weight as of this encounter: 81.8 kg (180 lb 4.8 oz).       # Financial/Environmental Concerns:           Joesph Espinoza PA-C  Hospitalist Service, GOLD TEAM   Securely message with [employer] (more info)  Text page via [employer] Paging/Directory   See signed in provider for up to date coverage information  ______________________________________________________________________    Chief Complaint   Postop     History is obtained from the patient    History of Present Illness   Nahun Villalobos is a 79 year old male who underwent the above procedure. He complains of neck stiffness but otherwise feels his pain is under control. Denies nausea or vomiting.       Past Medical History    Past Medical History:   Diagnosis Date    Acute diverticulitis 09/20/2020    Anemia 07/09/2024    Antiplatelet or antithrombotic long-term use     Arthritis     CHF (congestive heart failure) (H) 09/16/2020    COPD (chronic obstructive pulmonary disease) (H)     DM (diabetes mellitus) (H)     Dyspnea on exertion     Essential hypertension, benign     Hemorrhage of rectum and anus     Non-small cell lung cancer (H)     Obesity     Personal history of colonic polyps     Sleep apnea     Thrombosis     Tobacco use disorder     Urinary retention     Walking troubles        Past Surgical History   Past Surgical " History:   Procedure Laterality Date    ABDOMEN SURGERY  1995    APPENDECTOMY      BONE EXOSTOSIS EXCISION Bilateral 09/20/2022    Procedure: EXOSTECTOMIES BOTH 5TH DIGITS WITH SOFT TISSUE RELEASE;  Surgeon: Tong Gray DPM;  Location: [place] [facility]    CHOLECYSTECTOMY      COLONOSCOPY  2014    CYSTOSCOPY, TRANSURETHRAL RESECTION (TUR) PROSTATE, COMBINED N/A 04/30/2018    Procedure: COMBINED CYSTOSCOPY, TRANSURETHRAL RESECTION (TUR) PROSTATE;  Cystoscopy, Transurethral Resection Of The Prostate;  Surgeon: Praveena Burris MD;  Location: [facility] OR    IR LUNG BIOPSY MEDIASTINUM RIGHT  04/06/2016    WEDGE RESECTION      lung    ZZC NONSPECIFIC PROCEDURE      cholecystectomy       Medications   Medications Prior to Admission   Medication Sig Dispense Refill Last Dose/Taking    acetaminophen (TYLENOL) 500 MG tablet Take 1,000 mg by mouth 2 times daily.   5/26/2025 at  7:00 PM    albuterol (VENTOLIN HFA) 108 (90 Base) MCG/ACT inhaler INHALE 2 PUFFS INTO THE LUNGS EVERY 6 HOURS AS NEEDED FOR SHORTNESS OF BREATH / DYSPNEA OR WHEEZING 25.5 g 0 More than a month    amLODIPine (NORVASC) 5 MG tablet Take 1 tablet (5 mg) by mouth daily. 90 tablet 3 5/27/2025 at  5:00 AM    apixaban ANTICOAGULANT (ELIQUIS ANTICOAGULANT) 2.5 MG tablet TAKE 1 TABLET TWICE A  tablet 2 Past Week    atorvastatin (LIPITOR) 20 MG tablet Take 1 tablet (20 mg) by mouth daily. 90 tablet 2 5/26/2025 at  7:00 PM    cholestyramine light (QUESTRAN) 4 GM packet Take 4 g by mouth 2 times daily (with meals).   5/26/2025 at  7:00 PM    Coenzyme Q10 400 MG CAPS Take 400 mg by mouth daily 30 capsule 0 Past Week    ferrous sulfate (FE TABS) 325 (65 Fe) MG EC tablet Take 1 tablet (325 mg) by mouth every evening 30 tablet 0 Past Week    finasteride (PROSCAR) 5 MG tablet Take 1 tablet (5 mg) by mouth daily 90 tablet 3 5/26/2025 at  8:00 AM    Fluticasone-Umeclidin-Vilant (TRELEGY ELLIPTA) 100-62.5-25 MCG/ACT oral inhaler USE 1 INHALATION DAILY 60 each 5  5/27/2025 at  5:00 AM    furosemide (LASIX) 40 MG tablet Take 1 tablet (40 mg) by mouth daily.   5/27/2025 at  5:00 AM    lisinopril (ZESTRIL) 20 MG tablet Take 20 mg by mouth daily.   5/27/2025 at  5:00 AM    metFORMIN (GLUCOPHAGE) 1000 MG tablet Take 1 tablet (1,000 mg) by mouth 2 times daily (with meals). 180 tablet 1 5/26/2025 at  7:00 PM    methocarbamol (ROBAXIN) 500 MG tablet Take 1 tablet (500 mg) by mouth 4 times daily as needed for muscle spasms. 120 tablet 0 5/26/2025 at  7:00 PM    vitamin B-Complex Take 1 tablet by mouth daily   Past Week    vitamin C (ASCORBIC ACID) 500 MG tablet Take 500 mg by mouth daily.   Past Week    Vitamin D3 (VITAMIN D-1000 MAX ST) 25 mcg (1000 units) tablet Take 25 mcg by mouth daily.   Past Week    zoledronic acid (ZOMETA) 4 MG/5ML injection Inject 3.5 mg into the vein.   More than a month    acetaminophen (TYLENOL) 500 MG tablet Take 2 tablets (1,000 mg) by mouth 2 times daily as needed for mild pain.       blood glucose (NO BRAND SPECIFIED) lancets standard Use to test blood sugar 1 time daily, first thing in the morning 50 each 3     calcium carbonate (TUMS) 500 MG chewable tablet Take 1 chew tab by mouth daily as needed for heartburn.   Unknown    gabapentin (NEURONTIN) 300 MG capsule TAKE 2 CAPSULES TWICE A DAY WITH MEALS 180 capsule 1     gabapentin (NEURONTIN) 300 MG capsule Take 1 capsule (300 mg) by mouth every morning. 90 capsule 0     hydrocortisone sodium succinate PF (SOLU-CORTEF) 100 mg/2 mL injection Inject 100 mg into the vein.       lanreotide acetate (SOMATULINE) 120 MG/0.5ML injection Inject 120 mg subcutaneously every 28 days.       miconazole (MICATIN) 2 % external cream Apply topically 2 times daily as needed for other (Rash/ skin irritation)   Unknown    order for DME Oxygen 2 Li/min  at night via nasal cannula 1 Device 0              Physical Exam   Vital Signs: Temp: 98.4  F (36.9  C) Temp src: Oral BP: (!) 144/79 Pulse: 71   Resp: 14 SpO2: 96 % O2  Device: None (Room air) Oxygen Delivery: 2 LPM  Weight: 180 lbs 4.82 oz    Constitutional: awake, alert, cooperative, appears stated age   Respiratory: No increased work of breathing on capnography, good air exchange, clear to auscultation bilaterally, no crackles or wheezing  Cardiovascular: Normal apical impulse, regular rate and rhythm, and no murmur noted  GI: normal bowel sounds, soft, non-distended, non-tender  Skin: no bruising or bleeding of visible skin, no jaundice   Musculoskeletal: There is no redness, warmth, or swelling of the visible joints, no JAMEL, no calve swelling   Neurologic: Awake, alert, oriented to name, place and time.        Medical Decision Making       60 MINUTES SPENT BY ME on the date of service doing chart review, history, exam, documentation & further activities per the note.      Data   Recent Labs   Lab 05/27/25  1926 05/27/25  1314 05/27/25  0731 05/27/25  0625 05/23/25  0836   WBC  --   --   --  11.6* 7.1   HGB  --   --   --  11.9* 12.2*   MCV  --   --   --  100 99   PLT  --   --   --  298 357   INR  --   --   --  1.03  --    NA  --   --   --   --  139   POTASSIUM  --   --   --   --  4.6   CHLORIDE  --   --   --   --  106   CO2  --   --   --   --  28   BUN  --   --   --   --  10   CR  --   --   --   --  1.10   ANIONGAP  --   --   --   --  5   JESSE  --   --   --   --  9.6   * 175* 150*  --  178*

## 2025-05-28 NOTE — PROGRESS NOTES
"[facility], [place]   Neurosurgery Progress Note:    Date of service: 5/28/2025    Assessment: Nahun Villalobos is a 79 year old male  s/p posterior Open Cervical 3-6 laminectomy on 5/27/2025 with Dr. Pinon.     Clinically Significant Risk Factors Present on Admission               # Drug Induced Coagulation Defect: home medication list includes an anticoagulant medication    # Hypertension: Noted on problem list  # Chronic heart failure with preserved ejection fraction: heart failure noted on problem list and last echo with EF >50%          # Overweight: Estimated body mass index is 29.1 kg/m  as calculated from the following:    Height as of this encounter: 1.676 m (5' 6\").    Weight as of this encounter: 81.8 kg (180 lb 4.8 oz).           Plan:  - Neuro checks/vital signs: Every 4 hours  - Pain control  - Activity: up as tolerated  - Restrictions/Bracing: none  - Diet: Regular  - Drain: Hemovac drain in place  - DVT prophylaxis: SCDs  - PT/OT: ordered  - Medicine consult - appreciate recommendations   - Hold Eliquis until POD 14      Interval History:  Patient denies pain this AM.  Plan to remove Hemovac drain later today. PT/OT to assess patient.           Objective:   Temp:  [97.5  F (36.4  C)-98.5  F (36.9  C)] 98.5  F (36.9  C)  Pulse:  [63-76] 63  Resp:  [13-34] 15  BP: (119-148)/(60-86) 145/74  MAP:  [78 mmHg-113 mmHg] 78 mmHg  Arterial Line BP: ()/() 91/65  SpO2:  [93 %-100 %] 93 %  I/O last 3 completed shifts:  In: 2317.58 [I.V.:2317.58]  Out: 1140 [Urine:1000; Drains:90; Blood:50]    Gen: Appears comfortable, NAD  Wound: Incision, clean, dry, intact without strikethrough  Neurologic:  - Alert & Oriented to person, place, time, and situation  - Follows commands briskly  - Speech fluent, spontaneous. No aphasia or dysarthria.  - No gaze preference. No apparent hemineglect.  - PERRL, EOMI  - Strong eye closure, jaw clench, and cheek puff  - Face symmetric with " sensation intact to light touch  - Palate elevates symmetrically, uvula midline, tongue protrudes midline  - Trapezii and sternocleidomastoid muscles 5/5 bilaterally  - No pronator drift     Del Tr Bi WE WF Gr   R 5 5 5 5 5 5   L 5 5 5 5 5 5    HF KE KF DF PF EHL   R 5 5 5 5 5 5   L 5 5 5 5 5 5     Reflexes 2+ throughout    Sensation intact and symmetric to light touch throughout    LABS  Recent Labs   Lab Test 05/27/25  0625 05/23/25  0836 03/24/25  0655   WBC 11.6* 7.1 9.3   HGB 11.9* 12.2* 12.1*    99 106*    357 316       Recent Labs   Lab Test 05/28/25  0703 05/27/25  2218 05/27/25  1926 05/27/25  0731 05/23/25  0836 03/24/25  0655 03/12/25  0943   NA  --   --   --   --  139 138 137   POTASSIUM  --   --   --   --  4.6 4.9 4.9   CHLORIDE  --   --   --   --  106 102 101   CO2  --   --   --   --  28 22 23   BUN  --   --   --   --  10 14.1 18.0   CR  --   --   --   --  1.10 1.02 1.18*   ANIONGAP  --   --   --   --  5 14 13   JESSE  --   --   --   --  9.6 9.1 9.7   * 217* 208*   < > 178* 158* 156*    < > = values in this interval not displayed.

## 2025-05-28 NOTE — PLAN OF CARE
"BP (!) 148/71 (BP Location: Right arm, Patient Position: Semi-Gannon's, Cuff Size: Adult Regular)   Pulse 71   Temp 98.5  F (36.9  C) (Oral)   Resp 14   Ht 1.676 m (5' 6\")   Wt 81.8 kg (180 lb 4.8 oz)   SpO2 96%   BMI 29.10 kg/m      0356-6249    Patient A&O to self, on room air, assist x2, full liquid diet, afebrile, high BP but OVSS. Pain stated 7/10, oxycodone given x2. 90 mL output from hemovac. NS infusing at 85 mL/hr. 450 mL output, no BM or gas this shift. Baseline weakness in extremities, other neuros WDL. Wife was at bedside, she is attentive and supportive. Continue POC.         "

## 2025-05-28 NOTE — PROGRESS NOTES
[facility]    Medicine Progress Note - Hospitalist Service, GOLD TEAM 7    Date of Admission:  5/27/2025    Assessment & Plan   Nahun Villalobos is a 79 year old male admitted on 5/27/2025. 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 He was originally planned for a C3-6 laminoplasty, but given his overall comorbidity and age, a C3-6 laminectomy was offered by neurosurgery at the affected levels. Internal medicine was consulted postoperatively for medical comanagement given medical complexity.   ____________________________     # S/p  posterior complete cervical 3-5 laminectomy, posterior partial cervical 6 laminectomy  # Cervical stenosis w/ myelopathy and neuropathy  # History of recurrent mechanical falls   # Chronic back pain with Sciatica  Chronic back pain with sciatica, cervical stenosis with myelopathy and neuropathy complicated by recurrent falls. Most recent fall being 2/24/2025 when he attempted to sit down and flipped backwards in his wheelchair - sustained T10 fracture.  He was originally planned for a C3-6 laminoplasty, but given his overall comorbidity and age, a C3-6 laminectomy was offered by neurosurgery at the affected levels. Surgery was without complication and was conducted by Dr. Pinon and resident Dr. Johnson. EBL 50mL. Patient is hemodynamically stable and pain is well controlled. He states that he feels neck stiffness but otherwise is okay.   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 He previously underwent an exploratory laparotomy with small-bowel resection in March of 2023 and subsequently was managed on lanreotide. Following most recent PET which revealed mets, oncology team recommended transitioning therapy to Lutathera. Follows with [facility] - seen most recently by Marisa Abbott DNP on 4/23/25.   - Follow-up with Oncology/Hematology upon discharge for continued management.   - Planning to follow with nuclear medicine soon and transition to Lutathera     Hypomagenesemia  Hypocacemia  -replace    Leukocytosis  Likely reactive  -will monitor     # COPD  # CIERA  Patient non adherent w/ CPAP.  - Duonebs PRN available.   - Continue PTA Breo Ellipta and Incruse Ellipta.     # Hx PE on AC 2020  - PTA Eliquis as managed per surgical team - HOLD through procedure  - Continue PTA iron supplement.     # Anemia, chronic, normocytic   # Mild leukocytosis   Baseline Hgb 11-12g/dL - hgb 11.9 on day of operation. WBC 11.6. No infectious symptoms - may be related to cancer diagnosis.   - AM CBC      # T2DM w/ hyperglycemia   A1C 6.4% on 5/23/2025.  - HOLD metformin    - MSSI   - Hypoglycemia protocol   - BG checks TID AC + qHS + 0200     # HTN  # HFpEF, chronic compensated  Expect some hypertension in the setting of acute postoperative pain. In 2024 EF was 55-60% .  - Continue amlodipine, lisinopril  - HOLD lasix for now, consider resumption in AM   - Intake and output, daily weights   - Should establish with cardiology on discharge      # BPH  - Continue PTA finasteride.     # Chronic diarrhea  - Continue PTA cholestyramine.     # HLD  - Continue PTA statin.     # Recent multiple stage II pressure injuries to bilateral flank  Noted on recent hospitalization.   - RN care  "order placed to assess for pressure injuries of flank     # Hx tobacco use disorder  2.0 packs/day for 53.0 years   - Quit in 2013           Diet: Advance Diet as Tolerated: Regular Diet Adult    DVT Prophylaxis: Pneumatic Compression Devices  Gomez Catheter: Not present  Lines: None     Cardiac Monitoring: None  Code Status: Full Code      Clinically Significant Risk Factors           # Hypocalcemia: Lowest iCa = 4.2 mg/dL in last 2 days, will monitor and replace as appropriate   # Hypomagnesemia: Lowest Mg = 1.4 mg/dL in last 2 days, will replace as needed       # Hypertension: Noted on problem list  # Chronic heart failure with preserved ejection fraction: heart failure noted on problem list and last echo with EF >50%           # Overweight: Estimated body mass index is 29.1 kg/m  as calculated from the following:    Height as of this encounter: 1.676 m (5' 6\").    Weight as of this encounter: 81.8 kg (180 lb 4.8 oz)., PRESENT ON ADMISSION     # Financial/Environmental Concerns:           Social Drivers of Health    Tobacco Use: Medium Risk (5/27/2025)    Patient History     Smoking Tobacco Use: Former     Smokeless Tobacco Use: Never     Passive Exposure: Past   Physical Activity: Insufficiently Active (4/24/2025)    Received from [facility]    Exercise Vital Sign     Days of Exercise per Week: 1 day     Minutes of Exercise per Session: 10 min   Social Connections: Unknown (9/2/2024)    Social Connection and Isolation Panel [NHANES]     Frequency of Social Gatherings with Friends and Family: Once a week            Vincent Farley MD  Hospitalist Service, [facility] TEAM 7  [facility]  Securely message with Dao (more info)  Text page via [facility] Paging/Directory   See signed in provider for up to date coverage information  ______________________________________________________________________  Physical Exam   Vital Signs: Temp: 98.5  F (36.9  C) Temp src: Oral BP: (!) 143/78 " Pulse: 85   Resp: 15 SpO2: 93 % O2 Device: None (Room air)    Interval History   General Appearance: Not in acute disteress   Respiratory: Vesicular breath sounds,  bilaterally  Cardiovascular: No JVD, RRR. No murmurs, rubs or gallops.  GI: Soft, nontender, nondistended  Skin: No rash. No ecchymoses or petechiae.  Musculoskeletal: Normal muscle bulk and tone.  No edema  Neurologic: AOx4.         Weight: 180 lbs 4.82 oz        Medical Decision Making       55 MINUTES SPENT BY ME on the date of service doing chart review, history, exam, documentation & further activities per the note.      Data     I have personally reviewed the following data over the past 24 hrs:    15.1 (H)  \   10.3 (L)   / 244     138 101 9.2 /  168 (H)   4.4 24 0.95 \

## 2025-05-28 NOTE — PLAN OF CARE
POD1 posterior complete cervical 3-5 laminectomy, posterior partial cervical 6 laminectomy.   A&Ox1; Disoriented to time, place and situation. At the beginning of the shift pt was yelling for Kenia and needed to be redirected and reoriented multiple times.   Hx - Cervical stenosis w/ myelopathy and neuropathy; CHF, type 2 DM, COPD, malignant lesions in the small intestine with mets to the liver and ribs and non-small cell lung cancer and recent diagnosis of a T10 vertebral fracture   Lines - PIV x1 NS at 85mLs/hr   Diet - Full liquid   Activity - NOOB this shift  GI/ - Voiding via urinal; LBM PTA not passing gas   Updates this shift -     Goal Outcome Evaluation:      Plan of Care Reviewed With: patient    Overall Patient Progress: no changeOverall Patient Progress: no change

## 2025-05-29 ENCOUNTER — APPOINTMENT (OUTPATIENT)
Dept: OCCUPATIONAL THERAPY | Facility: CLINIC | Age: 80
DRG: 516 | End: 2025-05-29
Payer: COMMERCIAL

## 2025-05-29 VITALS
DIASTOLIC BLOOD PRESSURE: 64 MMHG | HEART RATE: 77 BPM | RESPIRATION RATE: 16 BRPM | SYSTOLIC BLOOD PRESSURE: 130 MMHG | HEIGHT: 66 IN | BODY MASS INDEX: 28.98 KG/M2 | TEMPERATURE: 99.5 F | WEIGHT: 180.3 LBS | OXYGEN SATURATION: 98 %

## 2025-05-29 LAB
ANION GAP SERPL CALCULATED.3IONS-SCNC: 10 MMOL/L (ref 7–15)
BUN SERPL-MCNC: 9.2 MG/DL (ref 8–23)
CALCIUM SERPL-MCNC: 8.8 MG/DL (ref 8.8–10.4)
CHLORIDE SERPL-SCNC: 100 MMOL/L (ref 98–107)
CREAT SERPL-MCNC: 0.91 MG/DL (ref 0.67–1.17)
EGFRCR SERPLBLD CKD-EPI 2021: 85 ML/MIN/1.73M2
ERYTHROCYTE [DISTWIDTH] IN BLOOD BY AUTOMATED COUNT: 11.9 % (ref 10–15)
GLUCOSE BLDC GLUCOMTR-MCNC: 180 MG/DL (ref 70–99)
GLUCOSE BLDC GLUCOMTR-MCNC: 193 MG/DL (ref 70–99)
GLUCOSE BLDC GLUCOMTR-MCNC: 195 MG/DL (ref 70–99)
GLUCOSE BLDC GLUCOMTR-MCNC: 211 MG/DL (ref 70–99)
GLUCOSE BLDC GLUCOMTR-MCNC: 212 MG/DL (ref 70–99)
GLUCOSE SERPL-MCNC: 194 MG/DL (ref 70–99)
HCO3 SERPL-SCNC: 25 MMOL/L (ref 22–29)
HCT VFR BLD AUTO: 29.9 % (ref 40–53)
HGB BLD-MCNC: 9.9 G/DL (ref 13.3–17.7)
MAGNESIUM SERPL-MCNC: 3 MG/DL (ref 1.7–2.3)
MCH RBC QN AUTO: 33.2 PG (ref 26.5–33)
MCHC RBC AUTO-ENTMCNC: 33.1 G/DL (ref 31.5–36.5)
MCV RBC AUTO: 100 FL (ref 78–100)
PHOSPHATE SERPL-MCNC: 2.5 MG/DL (ref 2.5–4.5)
PLATELET # BLD AUTO: 214 10E3/UL (ref 150–450)
POTASSIUM SERPL-SCNC: 4.3 MMOL/L (ref 3.4–5.3)
RBC # BLD AUTO: 2.98 10E6/UL (ref 4.4–5.9)
SODIUM SERPL-SCNC: 135 MMOL/L (ref 135–145)
WBC # BLD AUTO: 16.3 10E3/UL (ref 4–11)

## 2025-05-29 PROCEDURE — 250N000013 HC RX MED GY IP 250 OP 250 PS 637: Performed by: NEUROLOGICAL SURGERY

## 2025-05-29 PROCEDURE — 97535 SELF CARE MNGMENT TRAINING: CPT | Mod: GO | Performed by: OCCUPATIONAL THERAPIST

## 2025-05-29 PROCEDURE — 36415 COLL VENOUS BLD VENIPUNCTURE: CPT

## 2025-05-29 PROCEDURE — 83735 ASSAY OF MAGNESIUM: CPT

## 2025-05-29 PROCEDURE — 97165 OT EVAL LOW COMPLEX 30 MIN: CPT | Mod: GO | Performed by: OCCUPATIONAL THERAPIST

## 2025-05-29 PROCEDURE — 250N000013 HC RX MED GY IP 250 OP 250 PS 637

## 2025-05-29 PROCEDURE — 250N000013 HC RX MED GY IP 250 OP 250 PS 637: Performed by: NURSE PRACTITIONER

## 2025-05-29 PROCEDURE — 120N000002 HC R&B MED SURG/OB UMMC

## 2025-05-29 PROCEDURE — 85041 AUTOMATED RBC COUNT: CPT

## 2025-05-29 PROCEDURE — 250N000011 HC RX IP 250 OP 636: Performed by: NURSE PRACTITIONER

## 2025-05-29 PROCEDURE — 82565 ASSAY OF CREATININE: CPT

## 2025-05-29 PROCEDURE — 84100 ASSAY OF PHOSPHORUS: CPT

## 2025-05-29 PROCEDURE — 99232 SBSQ HOSP IP/OBS MODERATE 35: CPT

## 2025-05-29 RX ORDER — HEPARIN SODIUM 5000 [USP'U]/.5ML
5000 INJECTION, SOLUTION INTRAVENOUS; SUBCUTANEOUS EVERY 8 HOURS
Status: DISCONTINUED | OUTPATIENT
Start: 2025-05-29 | End: 2025-06-03 | Stop reason: HOSPADM

## 2025-05-29 RX ADMIN — FAMOTIDINE 20 MG: 20 TABLET, FILM COATED ORAL at 19:55

## 2025-05-29 RX ADMIN — ATORVASTATIN CALCIUM 20 MG: 20 TABLET, FILM COATED ORAL at 19:55

## 2025-05-29 RX ADMIN — OXYCODONE HYDROCHLORIDE 5 MG: 5 TABLET ORAL at 19:55

## 2025-05-29 RX ADMIN — OXYCODONE HYDROCHLORIDE 5 MG: 5 TABLET ORAL at 08:53

## 2025-05-29 RX ADMIN — SENNOSIDES AND DOCUSATE SODIUM 1 TABLET: 50; 8.6 TABLET ORAL at 19:55

## 2025-05-29 RX ADMIN — SENNOSIDES AND DOCUSATE SODIUM 1 TABLET: 50; 8.6 TABLET ORAL at 08:42

## 2025-05-29 RX ADMIN — METFORMIN HYDROCHLORIDE 1000 MG: 500 TABLET ORAL at 08:43

## 2025-05-29 RX ADMIN — METHOCARBAMOL 500 MG: 500 TABLET ORAL at 01:26

## 2025-05-29 RX ADMIN — OXYCODONE HYDROCHLORIDE AND ACETAMINOPHEN 500 MG: 500 TABLET ORAL at 08:43

## 2025-05-29 RX ADMIN — HEPARIN SODIUM 5000 UNITS: 5000 INJECTION, SOLUTION INTRAVENOUS; SUBCUTANEOUS at 12:12

## 2025-05-29 RX ADMIN — POLYETHYLENE GLYCOL 3350 17 G: 17 POWDER, FOR SOLUTION ORAL at 14:22

## 2025-05-29 RX ADMIN — GABAPENTIN 600 MG: 300 CAPSULE ORAL at 08:42

## 2025-05-29 RX ADMIN — INSULIN ASPART 2 UNITS: 100 INJECTION, SOLUTION INTRAVENOUS; SUBCUTANEOUS at 12:11

## 2025-05-29 RX ADMIN — FUROSEMIDE 40 MG: 20 TABLET ORAL at 08:53

## 2025-05-29 RX ADMIN — GABAPENTIN 600 MG: 300 CAPSULE ORAL at 19:55

## 2025-05-29 RX ADMIN — Medication 25 MCG: at 08:43

## 2025-05-29 RX ADMIN — METHOCARBAMOL 500 MG: 500 TABLET ORAL at 12:12

## 2025-05-29 RX ADMIN — FAMOTIDINE 20 MG: 20 TABLET, FILM COATED ORAL at 08:42

## 2025-05-29 RX ADMIN — POLYETHYLENE GLYCOL 3350 17 G: 17 POWDER, FOR SOLUTION ORAL at 08:42

## 2025-05-29 RX ADMIN — POLYETHYLENE GLYCOL 3350 17 G: 17 POWDER, FOR SOLUTION ORAL at 19:54

## 2025-05-29 RX ADMIN — HEPARIN SODIUM 5000 UNITS: 5000 INJECTION, SOLUTION INTRAVENOUS; SUBCUTANEOUS at 19:54

## 2025-05-29 RX ADMIN — Medication 3 MG: at 23:00

## 2025-05-29 RX ADMIN — INSULIN ASPART 2 UNITS: 100 INJECTION, SOLUTION INTRAVENOUS; SUBCUTANEOUS at 18:09

## 2025-05-29 RX ADMIN — LISINOPRIL 20 MG: 20 TABLET ORAL at 08:42

## 2025-05-29 RX ADMIN — METFORMIN HYDROCHLORIDE 1000 MG: 500 TABLET ORAL at 18:09

## 2025-05-29 RX ADMIN — INSULIN ASPART 1 UNITS: 100 INJECTION, SOLUTION INTRAVENOUS; SUBCUTANEOUS at 08:44

## 2025-05-29 RX ADMIN — FINASTERIDE 5 MG: 5 TABLET, FILM COATED ORAL at 08:43

## 2025-05-29 RX ADMIN — AMLODIPINE BESYLATE 5 MG: 5 TABLET ORAL at 08:43

## 2025-05-29 ASSESSMENT — ACTIVITIES OF DAILY LIVING (ADL)
ADLS_ACUITY_SCORE: 76
ADLS_ACUITY_SCORE: 72
ADLS_ACUITY_SCORE: 76
ADLS_ACUITY_SCORE: 76
ADLS_ACUITY_SCORE: 72
ADLS_ACUITY_SCORE: 76
ADLS_ACUITY_SCORE: 60
ADLS_ACUITY_SCORE: 76
ADLS_ACUITY_SCORE: 72
DEPENDENT_IADLS:: COOKING;CLEANING;LAUNDRY;SHOPPING;TRANSPORTATION
ADLS_ACUITY_SCORE: 76
ADLS_ACUITY_SCORE: 72
ADLS_ACUITY_SCORE: 76
ADLS_ACUITY_SCORE: 72
ADLS_ACUITY_SCORE: 76
ADLS_ACUITY_SCORE: 72

## 2025-05-29 NOTE — PROGRESS NOTES
Admitted/transferred from: PACU  2 RN full   skin assessment completed by Dewayne Fernandez RN and Lilian PAREDES RN from 6A.  Skin assessment finding: issues found blanchable redness on his coccyx, left great toe small black spot, hard to touch, it doesn't seem new. Surgical posterior incision dressing changed. Old surgical scars on his abdomen. Scabbing on left knee.    Interventions/actions: skin interventions Mepi on his coccyx, dressing changed for loose and wet drainage on his surgical posterior neck. Repositioning q2h. Off heels with pillows.      Will continue to monitor.

## 2025-05-29 NOTE — PROGRESS NOTES
"CLINICAL NUTRITION SERVICES - ASSESSMENT NOTE    RECOMMENDATIONS FOR MDs/PROVIDERS TO ORDER:  Appreciate encouragement with PO    Registered Dietitian Interventions:  None today    Future/Additional Recommendations:  Monitor oral intake, weight.     REASON FOR ASSESSMENT  Positive admission nutrition risk screen    INFORMATION OBTAINED  Assessed patient in room. He reports that eating is going ok. He's had decreased appetite for the last 3 months. He says this is because he hasn't been eating as much as he's gotten older. He declined nutrition supplements today.    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    LABS  Nutrition-relevant labs: Reviewed    MEDICATIONS  Nutrition-relevant medications: lasix, novolog, metformin BID, miralax, senna, vitamin C 500 mg, vitamin D3 25 mcg    ANTHROPOMETRICS  Height: 167.6 cm (5' 6\")  Admission Weight: 81.8 kg (180 lb 4.8 oz) (05/27/25 0615)   Most Recent Weight: 81.8 kg (180 lb 4.8 oz) (05/27/25 0615)  IBW: 64.5 kg (127% IBW)  BMI: Body mass index is 29.1 kg/m .   Weight History: Wt trending up over the last 2 months. Overall 5% wt loss in 10 months and 11.6% wt loss in 1 year.  Wt Readings from Last 20 Encounters:   05/27/25 81.8 kg (180 lb 4.8 oz)   05/23/25 81.8 kg (180 lb 4.8 oz)   05/09/25 79.8 kg (175 lb 14.4 oz)   03/26/25 77.6 kg (171 lb)   03/24/25 77.2 kg (170 lb 4.8 oz)   03/19/25 78.1 kg (172 lb 3.2 oz)   03/10/25 77.4 kg (170 lb 9.6 oz)   03/05/25 79.1 kg (174 lb 6.4 oz)   02/24/25 79 kg (174 lb 2.6 oz)   12/01/24 81.6 kg (180 lb)   11/08/24 86.6 kg (191 lb)   09/10/24 86.2 kg (190 " "lb)   09/03/24 86.2 kg (190 lb 1.6 oz)   08/23/24 87.4 kg (192 lb 9.6 oz)   07/23/24 85.2 kg (187 lb 12.8 oz)   07/16/24 82.8 kg (182 lb 9.6 oz)   06/17/24 92.5 kg (204 lb)   04/01/24 92.8 kg (204 lb 8 oz)   03/12/24 99.3 kg (219 lb)   03/07/24 99.3 kg (219 lb)     Dosing Weight: 69 kg, based on adjusted wt    ASSESSED NUTRITION NEEDS  Estimated Energy Needs: 1725 - 2070 kcals/day (25 - 30 kcals/kg)  Justification: Maintenance  Estimated Protein Needs: 83 - 103 grams protein/day (1.2 - 1.5 grams of pro/kg)  Justification: Increased needs  Estimated Fluid Needs: 1 mL/kcal  Justification: Maintenance    SYSTEM AND PHYSICAL FINDINGS    GI symptoms: Reviewed  Skin/wounds: Reviewed    MALNUTRITION  % Intake: Decreased intake does not meet criteria - difficult to quantify PTA intake  % Weight Loss: Weight loss does not meet criteria   Subcutaneous Fat Loss: Orbital: Moderate, Buccal: Moderate, and Triceps: Moderate  Muscle Loss: Temples (temporalis muscle): Moderate and Shoulders (deltoids): Moderate  Fluid Accumulation/Edema: None noted  Malnutrition Diagnosis: Moderate malnutrition in the context of chronic illness  Malnutrition Present on Admission: Yes    NUTRITION DIAGNOSIS  Inadequate oral intake related to decreased appetite as evidenced by pt report.    INTERVENTIONS  None today    GOALS  Patient to consume % of nutritionally adequate meal trays TID, or the equivalent with supplements/snacks.     MONITORING/EVALUATION  Progress toward goals will be monitored and evaluated per policy.    Daryn Nance, RD, LD  Available on CPower  Weekend/Holiday RD [employer] - \"Weekend Clinical Dietitian\"   "

## 2025-05-29 NOTE — PLAN OF CARE
Goal Outcome Evaluation:      Plan of Care Reviewed With: patient    Overall Patient Progress: no change    VSS, on 2LPM NC intermittently takes off cannula and sats fine while awake. Patient was orientated x4 but intermittently confused. Garbled speech and difficult to understand at times. BUE 5/5 BLE 3/5. Numbness to hands and feet. Posterior neck incision, ROSA ISELA. Hemovac removed today, primapor placed over site. C/o of incisional pain at surgery site- gave PRN Oxy and Robaxin with relief. Voids spontaneously using primo fit. Incontinent of bowel and bladder. PT and OT consults in. Continue with POC.

## 2025-05-29 NOTE — CONSULTS
Care Management Initial Consult    General Information  Assessment completed with: Javon Sofia  Type of CM/SW Visit: Initial Assessment    Primary Care Provider verified and updated as needed: Yes   Readmission within the last 30 days:           Advance Care Planning:          Communication Assessment  Patient's communication style: spoken language (English or Bilingual)    Hearing Difficulty or Deaf: no   Wear Glasses or Blind: yes    Cognitive  Cognitive/Neuro/Behavioral: .WDL except, speech, orientation  Level of Consciousness: intermittent confusion  Arousal Level: opens eyes spontaneously  Orientation: oriented x 4  Mood/Behavior: agitated, uncooperative, cooperative, calm, labile  Best Language: 0 - No aphasia  Speech: garbled    Living Environment:   People in home: significant other  Kenia  Current living Arrangements: house      Able to return to prior arrangements: yes     Family/Social Support:  Care provided by: self  Provides care for: no one  Marital Status: Lives with Significant Other  Support system: Significant Other       Kenia  Description of Support System: Involved, Supportive    Support Assessment: Adequate family and caregiver support    Current Resources:   Patient receiving home care services: Yes  Skilled Home Care Services: Physical Therapy, Home Health Aid     Community Resources: Home Care  Equipment currently used at home: cane, straight, walker, standard, wheelchair, manual, grab bar, tub/shower  Supplies currently used at home: None (denied)    Employment/Financial:  Employment Status:          Financial Concerns:     Referral to Financial Worker: No     Does the patient's insurance plan have a 3 day qualifying hospital stay waiver?  Yes   Which insurance plan 3 day waiver is available? Alternative insurance waiver  Will the waiver be used for post-acute placement? Undetermined at this time    Lifestyle & Psychosocial Needs:  Social Drivers of Health     Food Insecurity: Low Risk   (5/28/2025)    Food Insecurity     Within the past 12 months, did you worry that your food would run out before you got money to buy more?: No     Within the past 12 months, did the food you bought just not last and you didn t have money to get more?: No   Depression: Not at risk (4/17/2025)    PHQ-2     PHQ-2 Score: 0   Housing Stability: High Risk (5/28/2025)    Housing Stability     Do you have housing? : No     Are you worried about losing your housing?: No   Tobacco Use: Medium Risk (5/27/2025)    Patient History     Smoking Tobacco Use: Former     Smokeless Tobacco Use: Never     Passive Exposure: Past   Financial Resource Strain: Low Risk  (5/28/2025)    Financial Resource Strain     Within the past 12 months, have you or your family members you live with been unable to get utilities (heat, electricity) when it was really needed?: No   Alcohol Use: Not At Risk (1/11/2022)    AUDIT-C     Frequency of Alcohol Consumption: 2-3 times a week     Average Number of Drinks: 1 or 2     Frequency of Binge Drinking: Never   Transportation Needs: Low Risk  (5/28/2025)    Transportation Needs     Within the past 12 months, has lack of transportation kept you from medical appointments, getting your medicines, non-medical meetings or appointments, work, or from getting things that you need?: No   Physical Activity: Insufficiently Active (4/24/2025)    Received from [facility]    Exercise Vital Sign     Days of Exercise per Week: 1 day     Minutes of Exercise per Session: 10 min   Interpersonal Safety: Low Risk  (5/27/2025)    Interpersonal Safety     Do you feel physically and emotionally safe where you currently live?: Yes     Within the past 12 months, have you been hit, slapped, kicked or otherwise physically hurt by someone?: No     Within the past 12 months, have you been humiliated or emotionally abused in other ways by your partner or ex-partner?: No   Stress: No Stress Concern Present (9/2/2024)    [employer] - Occupational Stress Questionnaire     Feeling of Stress : Not at all   Social Connections: Unknown (9/2/2024)    Social Connection and Isolation Panel [NHANES]     Frequency of Communication with Friends and Family: Not on file     Frequency of Social Gatherings with Friends and Family: Once a week     Attends Scientologist Services: Not on file     Active Member of Clubs or Organizations: Not on file     Attends Club or Organization Meetings: Not on file     Marital Status: Not on file   Health Literacy: Not on file     Functional Status:  Prior to admission patient needed assistance:   Dependent ADLs:: Independent  Dependent IADLs:: Cooking, Cleaning, Laundry, Shopping, Transportation     Mental Health Status:  Mental Health Status: No Current Concerns       Chemical Dependency Status:  Chemical Dependency Status: No Current Concerns           Values/Beliefs:  Spiritual, Cultural Beliefs, Scientologist Practices, Values that affect care:               Discussed  Partnership in Safe Discharge Planning  document with patient/family: No    Additional Information:  Met with patient to complete initial care management assessment. Patient currently lives in a house in [place] with his significant other, Kenia. He states he uses a cane, walker, wheelchair and grab bars in the restroom. He reports he is independent with ADLs at baseline. Kenia assists with IADLs. He states he receives home care services but does not recall what services or which agency. He states he has Kenia at home for support and would anticipate her to be his ride home at discharge.    Discussed with patient CM ability to assist with discharge planning. OT currently recommending ARU and PT eval is pending.     Messaged patient's outpatient care coordinator, patient was previously receiving home care services from [facility]. Call placed to [facility], spoke with Eve. Eve states patient was receiving  home PT/HHA through workers comp.     Next Steps:   CM will continue to follow and assist with discharge planning once PT recommendations are available.     [facility] - open for PT/HHA  Ph: [phone]  Fax: [phone]    Annmarie Galeano, RN, BSN  6A RN Care Coordinator  Ph: [phone]   Vocera: 6A Neuro RNCC

## 2025-05-29 NOTE — PROGRESS NOTES
"[facility], [place]   Neurosurgery Progress Note:    Date of service: 5/29/2025    Assessment: Nahun Villalobos is a 79 year old male  s/p posterior Open Cervical 3-6 laminectomy on 5/27/2025 with Dr. Pinon.     Clinically Significant Risk Factors Present on Admission          # Hypocalcemia: Lowest iCa = 4.2 mg/dL in last 2 days, will monitor and replace as appropriate   # Hypomagnesemia: Lowest Mg = 1.4 mg/dL in last 2 days, will replace as needed       # Hypertension: Noted on problem list  # Chronic heart failure with preserved ejection fraction: heart failure noted on problem list and last echo with EF >50%           # Overweight: Estimated body mass index is 29.1 kg/m  as calculated from the following:    Height as of this encounter: 1.676 m (5' 6\").    Weight as of this encounter: 81.8 kg (180 lb 4.8 oz)., PRESENT ON ADMISSION         Plan:  - Neuro checks/vital signs: Every 4 hours  - Pain control  - Activity: up as tolerated  - Restrictions/Bracing: none  - Diet: Regular  - Drain: Hemovac drain in place, plan to remove today  - DVT prophylaxis: SCDs and subcutaneous heparin   - PT/OT: ordered  - Medicine consult - appreciate recommendations   - Hold Eliquis until POD 14  - Delirium precautions       Interval History:  Patient reports mild pain this AM.  Overnight patient was confused.  Delirium precautions ordered. Plan to remove Hemovac drain later today. PT/OT to assess patient.           Objective:   Temp:  [98.8  F (37.1  C)-101  F (38.3  C)] 98.8  F (37.1  C)  Pulse:  [] 71  Resp:  [16-18] 16  BP: (126-164)/(58-79) 130/65  SpO2:  [91 %-98 %] 98 %  I/O last 3 completed shifts:  In: 240 [P.O.:240]  Out: 1918 [Urine:1875; Drains:43]    Gen: Appears comfortable, NAD  Wound: Incision, clean, dry, intact without strikethrough  Neurologic:  - Alert & Oriented to person, place  - Follows commands   - Speech fluent, spontaneous. No aphasia or dysarthria.  - No gaze preference. " No apparent hemineglect.  - PERRL, EOMI  - Strong eye closure, jaw clench, and cheek puff  - Face symmetric with sensation intact to light touch  - Palate elevates symmetrically, uvula midline, tongue protrudes midline  - Trapezii and sternocleidomastoid muscles 5/5 bilaterally  - No pronator drift     Del Tr Bi WE WF Gr   R 5 5 5 5 5 5   L 5 5 5 5 5 5    HF KE KF DF PF EHL   R 5 5 5 5 5 5   L 5 5 5 5 5 5     Reflexes 2+ throughout    Sensation intact and symmetric to light touch throughout    LABS  Recent Labs   Lab Test 05/29/25  0657 05/28/25  0906 05/27/25  0625   WBC 16.3* 15.1* 11.6*   HGB 9.9* 10.3* 11.9*    98 100    244 298       Recent Labs   Lab Test 05/29/25  0803 05/29/25  0657 05/29/25  0205 05/28/25  1140 05/28/25  0906 05/27/25  0731 05/23/25  0836   NA  --  135  --   --  138  --  139   POTASSIUM  --  4.3  --   --  4.4  --  4.6   CHLORIDE  --  100  --   --  101  --  106   CO2  --  25  --   --  24  --  28   BUN  --  9.2  --   --  9.2  --  10   CR  --  0.91  --   --  0.95  --  1.10   ANIONGAP  --  10  --   --  13  --  5   JESSE  --  8.8  --   --  8.4*  --  9.6   * 194* 193*   < > 172*   < > 178*    < > = values in this interval not displayed.

## 2025-05-29 NOTE — PLAN OF CARE
Status: Pt. s/p posterior Open Cervical 3-6 laminectomy on 5/27/2025 with Dr. Pinon.   Vitals: VSS on RA while awake, 2L NC NOC, HTN in 140's with no parameters or PRNs in place.   Neuros: Difficult to assess pts orientation status as pt. Will frequently just repeat questions back to you. Stated he thought he was in [place]. Aware in hospital for back surgery. D/o to time. BUE 5/5. BLE 3/5, LLE slightly weaker. Generalized weakness. Baseline neuropathy to hands and feet.   IV: L. PIV, SL. R. PIV infusing TKO  Labs/Electrolytes: 0200   Resp: Continuous pulse ox in place. Requiring 2L NC overnight for SpO2 dipping down to high 80's, pt intermittently ripping off NC. Incentive spirometry at bedside, continue to encourage   Diet: Regular   GI: LBM 5/26. Has scheduled senna and miralax   : Voids spontaneously via urinal with intermittent incontinence. External catheter placed overnight   Skin: Posterior neck incision with primapore, CDI and hemovac x1 to full suction being emptied q3emc-1bG output total this shift. L. Big toe necrotic. Blanchable erythema to bottom with mepilex in place   Pain: Back incision pain managed with PRN robaxin   Activity: A2/lift. Turn/repo q2hrs. PCDs on   Social: Intermittently irritable and uncooperative with assessments   Plan: Hold Eliquis until POD 14. PT/OT consults. Continue to monitor and follow POC       Goal Outcome Evaluation:      Plan of Care Reviewed With: patient    Overall Patient Progress: no change    Outcome Evaluation: Hemovac in place. PT/OT to assess pt

## 2025-05-29 NOTE — PROGRESS NOTES
Pt transferred to  this evening from PACU boarding. Pt refused formal assessment upon transfer. Pt settled in room - call light within reach. Report given to oncoming RN.

## 2025-05-29 NOTE — PROVIDER NOTIFICATION
Oral temp 101. Neurosurgery team and Hospitalist team notified. No new order at this time. Being encouraged to use IS with staff assist.

## 2025-05-29 NOTE — PLAN OF CARE
"BP (!) 159/79 (Cuff Size: Adult Regular)   Pulse 101   Temp 101  F (38.3  C) (Oral)   Resp 15   Ht 1.676 m (5' 6\")   Wt 81.8 kg (180 lb 4.8 oz)   SpO2 94%   BMI 29.10 kg/m      Goal Outcome Evaluation:  Plan of care reviewed with: patient   Overall patient progress: no change    Time: 5947-9718  Status: s/p posterior open cervical 3-6 laminectomy for dx of cervical spondylosis with myelopathy.   Neuro/Pain: intermittent confusion. Didn't answer appropriately for orientation question, for example, when asked for place, he asked me back if I don't know where I am. Follow commands most of the time.   Activity: lift device.   Cardiac/vs: hypertensive. Fever 101 orally, team notified.   Respiratory: on room air while awake. 1-2LNC when asleep, mouth breather.   GI/: No bm this shift, active bowel sound. Hasn't voided this shift.   Diet: regular, didn't eat dinner.   Skin: blanchable redness on his coccyx, surgical incision on his posterior neck. Necrotic small on left great toe, not new per pt.   LDAs: PIV saline locked.   Labs/Imaging: ACHS blood glucose.   Change this shift: transferred from PACU at around 1900.   Plan: encourage IS, needs assistance. Monitor temp and BP. Neurosurgery team for any concerns.     "

## 2025-05-29 NOTE — PROGRESS NOTES
[facility]    Medicine Progress Note - Hospitalist Service, GOLD TEAM 7    Date of Admission:  5/27/2025    Assessment & Plan   Nahun Villalobos is a 79 year old male admitted on 5/27/2025. 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 He was originally planned for a C3-6 laminoplasty, but given his overall comorbidity and age, a C3-6 laminectomy was offered by neurosurgery at the affected levels. Internal medicine was consulted postoperatively for medical comanagement given medical complexity.   ____________________________     # S/p  posterior complete cervical 3-5 laminectomy, posterior partial cervical 6 laminectomy  # Cervical stenosis w/ myelopathy and neuropathy  # History of recurrent mechanical falls   # Chronic back pain with Sciatica  Chronic back pain with sciatica, cervical stenosis with myelopathy and neuropathy complicated by recurrent falls. Most recent fall being 2/24/2025 when he attempted to sit down and flipped backwards in his wheelchair - sustained T10 fracture.  He was originally planned for a C3-6 laminoplasty, but given his overall comorbidity and age, a C3-6 laminectomy was offered by neurosurgery at the affected levels. Surgery was without complication and was conducted by Dr. Pinon and resident Dr. Johnson. EBL 50mL. Patient is hemodynamically stable and pain is well controlled. He states that he feels neck stiffness but otherwise is okay.   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 He previously underwent an exploratory laparotomy with small-bowel resection in March of 2023 and subsequently was managed on lanreotide. Following most recent PET which revealed mets, oncology team recommended transitioning therapy to Lutathera. Follows with [facility] - seen most recently by Marisa Abbott DNP on 4/23/25.   - Follow-up with Oncology/Hematology upon discharge for continued management.   - Planning to follow with nuclear medicine soon and transition to Lutathera     #Hypomagenesemia  #Hypocacemia  -replace    Leukocytosis  Likely reactive  -will monitor     # COPD  # CIERA  Patient non adherent w/ CPAP.  - Duonebs PRN available.   - Continue PTA Breo Ellipta and Incruse Ellipta.     # Hx PE on AC 2020  - PTA Eliquis as managed per surgical team - HOLD through procedure  - Continue PTA iron supplement.     # Anemia, chronic, normocytic   # Mild leukocytosis   Baseline Hgb 11-12g/dL - hgb 11.9 on day of operation. WBC 11.6. No infectious symptoms - may be related to cancer diagnosis.   - AM CBC      # T2DM w/ hyperglycemia   A1C 6.4% on 5/23/2025.  - HOLD metformin    - MSSI   - Hypoglycemia protocol   - BG checks TID AC + qHS + 0200     # HTN  # HFpEF, chronic compensated  Expect some hypertension in the setting of acute postoperative pain. In 2024 EF was 55-60% .  - Continue amlodipine, lisinopril  - resume  lasix for now  - Intake and output, daily weights   - Should establish with cardiology on discharge      # BPH  - Continue PTA finasteride.     # Chronic diarrhea  - Continue PTA cholestyramine.     # HLD  - Continue PTA statin.     # Recent multiple stage II pressure injuries to bilateral flank  Noted on recent hospitalization.   - RN care order placed to assess  "for pressure injuries of flank     # Hx tobacco use disorder  2.0 packs/day for 53.0 years   - Quit in 2013    Moderate malnutrition  -decreased po intake  , subcutaneous fat loss and muscle loss  -nutrition consult, appreciate recs             Diet: Advance Diet as Tolerated: Regular Diet Adult    DVT Prophylaxis: Pneumatic Compression Devices  Gomez Catheter: Not present  Lines: None     Cardiac Monitoring: None  Code Status: Full Code      Clinically Significant Risk Factors           # Hypocalcemia: Lowest iCa = 4.2 mg/dL in last 2 days, will monitor and replace as appropriate   # Hypomagnesemia: Lowest Mg = 1.4 mg/dL in last 2 days, will replace as needed       # Hypertension: Noted on problem list    # Chronic heart failure with preserved ejection fraction: heart failure noted on problem list and last echo with EF >50%           # Overweight: Estimated body mass index is 29.1 kg/m  as calculated from the following:    Height as of this encounter: 1.676 m (5' 6\").    Weight as of this encounter: 81.8 kg (180 lb 4.8 oz)., PRESENT ON ADMISSION  # Moderate Malnutrition: based on nutrition assessment and treatment provided per dietitian's recommendations., PRESENT ON ADMISSION     # Financial/Environmental Concerns:           Social Drivers of Health    Housing Stability: High Risk (5/28/2025)    Housing Stability     Do you have housing? : No     Are you worried about losing your housing?: No   Tobacco Use: Medium Risk (5/27/2025)    Patient History     Smoking Tobacco Use: Former     Smokeless Tobacco Use: Never     Passive Exposure: Past   Physical Activity: Insufficiently Active (4/24/2025)    Received from [facility]    Exercise Vital Sign     Days of Exercise per Week: 1 day     Minutes of Exercise per Session: 10 min   Social Connections: Unknown (9/2/2024)    Social Connection and Isolation Panel [NHANES]     Frequency of Social Gatherings with Friends and Family: Once a week            Vincent Farley, " MD  Hospitalist Service, GOLD TEAM 7  [facility]  Securely message with PaeDae (more info)  Text page via [employer] Paging/Directory   See signed in provider for up to date coverage information  ______________________________________________________________________  Physical Exam   Vital Signs: Temp: 99.5  F (37.5  C) Temp src: Oral BP: 121/59 Pulse: 79   Resp: 18 SpO2: 96 % O2 Device: Nasal cannula    Interval History   General Appearance: Not in acute disteress   Respiratory: Vesicular breath sounds,  bilaterally  Cardiovascular: No JVD, RRR. No murmurs, rubs or gallops.  GI: Soft, nontender, nondistended  Skin: No rash. No ecchymoses or petechiae.  Musculoskeletal: Normal muscle bulk and tone.  No edema  Neurologic: AOx4.      Oxygen Delivery: 2 LPM  Weight: 180 lbs 4.82 oz        Medical Decision Making       55 MINUTES SPENT BY ME on the date of service doing chart review, history, exam, documentation & further activities per the note.      Data     I have personally reviewed the following data over the past 24 hrs:    16.3 (H)  \   9.9 (L)   / 214     135 100 9.2 /  195 (H)   4.3 25 0.91 \

## 2025-05-29 NOTE — PLAN OF CARE
Goal Outcome Evaluation:      Plan of Care Reviewed With: patient    Overall Patient Progress: no changeOverall Patient Progress: no change     Pt transferred up to 6A by bed accompanied by partner.     Transferred to: 6A  Report given: 1830, to RN Catherine Li: packed and sent w pt during transfer    While pt w this RN, A&O fluctuating, VSS on 2L NC x mild tachycardia. Pt endorsing pain, oxycodone and robaxin PRN given to address. Pt voiding spont, but soiling self dt difficulty moving/misunderstanding surroundings, assisted w ADLs/cleaning as needed. No gas output this shift per pt, no BM this shift. X1 incision on back of neck UTV covered w primapore w dried drainage. Hemovac in place w small serosang output, UTV insertion site. Ax2, pt does not utilize call light for all needs, frequent rounding completed.

## 2025-05-29 NOTE — PROGRESS NOTES
Neurosurgery Brief Progress Note    Hemovac drain removal    Drain not deemed to be necessary with low output. Drain site was prepped in usual sterile fashion with chloraprep. The drain was taken off suction. The sutures securing the drain were cut and the site was re-prepped. The drain was removed with tip intact. No immediate complication was observed and the patient tolerated the procedure well.       SELMA Melendez, CNP  Neurosurgery  Pager [phone]

## 2025-05-29 NOTE — PHARMACY-CONSULT NOTE
Pharmacy Delirium Chart Review    Upon chart review, the following medications may contribute to possible patient delirium:     *Famotidine - case reports of delirium in elderly patients.    Since famotidine was not a home medication and was restarted here, consider discontinuing and starting Protonix if needed.    Please consult unit pharmacist with further questions.      Sabino Pandey, PharmD, BCPS  May 29, 2025

## 2025-05-30 ENCOUNTER — APPOINTMENT (OUTPATIENT)
Dept: OCCUPATIONAL THERAPY | Facility: CLINIC | Age: 80
DRG: 516 | End: 2025-05-30
Attending: NEUROLOGICAL SURGERY
Payer: COMMERCIAL

## 2025-05-30 ENCOUNTER — APPOINTMENT (OUTPATIENT)
Dept: PHYSICAL THERAPY | Facility: CLINIC | Age: 80
DRG: 516 | End: 2025-05-30
Payer: COMMERCIAL

## 2025-05-30 LAB
ANION GAP SERPL CALCULATED.3IONS-SCNC: 10 MMOL/L (ref 7–15)
BUN SERPL-MCNC: 11.7 MG/DL (ref 8–23)
CALCIUM SERPL-MCNC: 8.5 MG/DL (ref 8.8–10.4)
CHLORIDE SERPL-SCNC: 99 MMOL/L (ref 98–107)
CREAT SERPL-MCNC: 1.11 MG/DL (ref 0.67–1.17)
EGFRCR SERPLBLD CKD-EPI 2021: 67 ML/MIN/1.73M2
ERYTHROCYTE [DISTWIDTH] IN BLOOD BY AUTOMATED COUNT: 11.7 % (ref 10–15)
GLUCOSE BLDC GLUCOMTR-MCNC: 162 MG/DL (ref 70–99)
GLUCOSE BLDC GLUCOMTR-MCNC: 164 MG/DL (ref 70–99)
GLUCOSE BLDC GLUCOMTR-MCNC: 179 MG/DL (ref 70–99)
GLUCOSE BLDC GLUCOMTR-MCNC: 210 MG/DL (ref 70–99)
GLUCOSE BLDC GLUCOMTR-MCNC: 222 MG/DL (ref 70–99)
GLUCOSE SERPL-MCNC: 187 MG/DL (ref 70–99)
HCO3 SERPL-SCNC: 28 MMOL/L (ref 22–29)
HCT VFR BLD AUTO: 28.1 % (ref 40–53)
HGB BLD-MCNC: 9.2 G/DL (ref 13.3–17.7)
MAGNESIUM SERPL-MCNC: 2 MG/DL (ref 1.7–2.3)
MCH RBC QN AUTO: 33.2 PG (ref 26.5–33)
MCHC RBC AUTO-ENTMCNC: 32.7 G/DL (ref 31.5–36.5)
MCV RBC AUTO: 101 FL (ref 78–100)
PLATELET # BLD AUTO: 219 10E3/UL (ref 150–450)
POTASSIUM SERPL-SCNC: 4.3 MMOL/L (ref 3.4–5.3)
RBC # BLD AUTO: 2.77 10E6/UL (ref 4.4–5.9)
SODIUM SERPL-SCNC: 137 MMOL/L (ref 135–145)
WBC # BLD AUTO: 16.3 10E3/UL (ref 4–11)

## 2025-05-30 PROCEDURE — 250N000013 HC RX MED GY IP 250 OP 250 PS 637: Performed by: INTERNAL MEDICINE

## 2025-05-30 PROCEDURE — 84132 ASSAY OF SERUM POTASSIUM: CPT

## 2025-05-30 PROCEDURE — 250N000013 HC RX MED GY IP 250 OP 250 PS 637: Performed by: NURSE PRACTITIONER

## 2025-05-30 PROCEDURE — 83735 ASSAY OF MAGNESIUM: CPT

## 2025-05-30 PROCEDURE — 99232 SBSQ HOSP IP/OBS MODERATE 35: CPT

## 2025-05-30 PROCEDURE — 97535 SELF CARE MNGMENT TRAINING: CPT | Mod: GO | Performed by: OCCUPATIONAL THERAPIST

## 2025-05-30 PROCEDURE — 97162 PT EVAL MOD COMPLEX 30 MIN: CPT | Mod: GP | Performed by: PHYSICAL THERAPIST

## 2025-05-30 PROCEDURE — 97530 THERAPEUTIC ACTIVITIES: CPT | Mod: GO | Performed by: OCCUPATIONAL THERAPIST

## 2025-05-30 PROCEDURE — 250N000013 HC RX MED GY IP 250 OP 250 PS 637

## 2025-05-30 PROCEDURE — 97530 THERAPEUTIC ACTIVITIES: CPT | Mod: GP | Performed by: PHYSICAL THERAPIST

## 2025-05-30 PROCEDURE — 250N000011 HC RX IP 250 OP 636: Performed by: NURSE PRACTITIONER

## 2025-05-30 PROCEDURE — 36415 COLL VENOUS BLD VENIPUNCTURE: CPT

## 2025-05-30 PROCEDURE — 250N000013 HC RX MED GY IP 250 OP 250 PS 637: Performed by: NEUROLOGICAL SURGERY

## 2025-05-30 PROCEDURE — 120N000002 HC R&B MED SURG/OB UMMC

## 2025-05-30 PROCEDURE — 85027 COMPLETE CBC AUTOMATED: CPT

## 2025-05-30 RX ORDER — MAGNESIUM OXIDE 400 MG/1
400 TABLET ORAL EVERY 4 HOURS
Status: COMPLETED | OUTPATIENT
Start: 2025-05-30 | End: 2025-05-30

## 2025-05-30 RX ORDER — AMOXICILLIN 250 MG
2 CAPSULE ORAL 2 TIMES DAILY
Status: DISCONTINUED | OUTPATIENT
Start: 2025-05-30 | End: 2025-06-03 | Stop reason: HOSPADM

## 2025-05-30 RX ADMIN — HEPARIN SODIUM 5000 UNITS: 5000 INJECTION, SOLUTION INTRAVENOUS; SUBCUTANEOUS at 05:00

## 2025-05-30 RX ADMIN — METHOCARBAMOL 500 MG: 500 TABLET ORAL at 00:56

## 2025-05-30 RX ADMIN — GABAPENTIN 600 MG: 300 CAPSULE ORAL at 07:54

## 2025-05-30 RX ADMIN — Medication 25 MCG: at 07:54

## 2025-05-30 RX ADMIN — AMLODIPINE BESYLATE 5 MG: 5 TABLET ORAL at 07:55

## 2025-05-30 RX ADMIN — FUROSEMIDE 40 MG: 20 TABLET ORAL at 07:54

## 2025-05-30 RX ADMIN — METHOCARBAMOL 500 MG: 500 TABLET ORAL at 07:55

## 2025-05-30 RX ADMIN — INSULIN ASPART 2 UNITS: 100 INJECTION, SOLUTION INTRAVENOUS; SUBCUTANEOUS at 14:09

## 2025-05-30 RX ADMIN — METFORMIN HYDROCHLORIDE 1000 MG: 500 TABLET ORAL at 17:50

## 2025-05-30 RX ADMIN — POTASSIUM & SODIUM PHOSPHATES POWDER PACK 280-160-250 MG 1 PACKET: 280-160-250 PACK at 07:53

## 2025-05-30 RX ADMIN — HEPARIN SODIUM 5000 UNITS: 5000 INJECTION, SOLUTION INTRAVENOUS; SUBCUTANEOUS at 14:06

## 2025-05-30 RX ADMIN — INSULIN ASPART 2 UNITS: 100 INJECTION, SOLUTION INTRAVENOUS; SUBCUTANEOUS at 17:50

## 2025-05-30 RX ADMIN — MAGNESIUM OXIDE TAB 400 MG (241.3 MG ELEMENTAL MG) 400 MG: 400 (241.3 MG) TAB at 07:54

## 2025-05-30 RX ADMIN — GABAPENTIN 600 MG: 300 CAPSULE ORAL at 19:23

## 2025-05-30 RX ADMIN — OXYCODONE HYDROCHLORIDE 5 MG: 5 TABLET ORAL at 05:22

## 2025-05-30 RX ADMIN — OXYCODONE HYDROCHLORIDE AND ACETAMINOPHEN 500 MG: 500 TABLET ORAL at 07:55

## 2025-05-30 RX ADMIN — MAGNESIUM OXIDE TAB 400 MG (241.3 MG ELEMENTAL MG) 400 MG: 400 (241.3 MG) TAB at 14:06

## 2025-05-30 RX ADMIN — LISINOPRIL 20 MG: 20 TABLET ORAL at 07:55

## 2025-05-30 RX ADMIN — METFORMIN HYDROCHLORIDE 1000 MG: 500 TABLET ORAL at 07:54

## 2025-05-30 RX ADMIN — POTASSIUM & SODIUM PHOSPHATES POWDER PACK 280-160-250 MG 1 PACKET: 280-160-250 PACK at 14:06

## 2025-05-30 RX ADMIN — OXYCODONE HYDROCHLORIDE 5 MG: 5 TABLET ORAL at 16:14

## 2025-05-30 RX ADMIN — FINASTERIDE 5 MG: 5 TABLET, FILM COATED ORAL at 07:54

## 2025-05-30 RX ADMIN — Medication 3 MG: at 22:14

## 2025-05-30 RX ADMIN — ATORVASTATIN CALCIUM 20 MG: 20 TABLET, FILM COATED ORAL at 19:23

## 2025-05-30 RX ADMIN — HEPARIN SODIUM 5000 UNITS: 5000 INJECTION, SOLUTION INTRAVENOUS; SUBCUTANEOUS at 19:23

## 2025-05-30 RX ADMIN — INSULIN ASPART 1 UNITS: 100 INJECTION, SOLUTION INTRAVENOUS; SUBCUTANEOUS at 08:07

## 2025-05-30 RX ADMIN — SENNOSIDES AND DOCUSATE SODIUM 1 TABLET: 50; 8.6 TABLET ORAL at 07:54

## 2025-05-30 ASSESSMENT — ACTIVITIES OF DAILY LIVING (ADL)
ADLS_ACUITY_SCORE: 78
ADLS_ACUITY_SCORE: 72
ADLS_ACUITY_SCORE: 78
ADLS_ACUITY_SCORE: 76
ADLS_ACUITY_SCORE: 78
ADLS_ACUITY_SCORE: 76
ADLS_ACUITY_SCORE: 78
ADLS_ACUITY_SCORE: 72
ADLS_ACUITY_SCORE: 76
ADLS_ACUITY_SCORE: 78

## 2025-05-30 NOTE — PLAN OF CARE
Status: Pt. s/p posterior Open Cervical 3-6 laminectomy on 5/27/2025 with Dr. Pinon.   Vitals: VSS on RA while awake, 1-2L NC NOC  Neuros: A&Ox3, D/o to time. BUE 5/5. BLE 3/5, LLE slightly weaker. Generalized weakness. Baseline neuropathy to hands and feet.   IV: L. PIV SL. R. PIV infusing TKO   Labs/Electrolytes: 0200 . Mag and phos replacement ordered for this morning once approved by pharmacy  Resp: Continuous pulse ox in place. Incentive spirometry at bedside, continue to encourage   Diet: Regular   GI: Fecal incontinence, had a large BM this shift x2  : External catheter in place   Skin: Posterior neck incision ROSA ISELA with sutures-hemovac removed with primapore at site CDI. L. Big toe necrotic. Blanchable erythema to bottom   Pain: Back incision pain managed with PRN robaxin, oxycodone, and cold packs   Activity: A2/lift. Turn/repo q2hrs. PCDs on   Plan: Hold Eliquis until POD 14. PT/OT consults. Continue to monitor and follow POC       Goal Outcome Evaluation:      Plan of Care Reviewed With: patient    Overall Patient Progress: no changeOverall Patient Progress: no change    Outcome Evaluation: Hemovac removed. PT/OT

## 2025-05-30 NOTE — PHARMACY-ADMISSION MEDICATION HISTORY
Pharmacy Intern Admission Medication History    Admission medication history is complete. The information provided in this note is only as accurate as the sources available at the time of the update.    Information Source(s): Family member and CareEverywhere/SureScripts via phone    Pertinent Information:   Spoke to patient's girlfriend, Kenia, to conduct med hx  Eliquis: on hold for 3 days prior to surgery  Lanreotide acetate injection: last injection per Kenia was on 05/16/25  Zoledronic acid injection: per Kenia, patient hasn't had injection recently - on hold due to surgery. Unsure of last injection date.  Supplements on hold for 7 days due to surgery:  Coenzyme Q10 400 mg capsules  Ferrous sulfate 325 mg tablet  Vitamin B-Complex  Vitamin C 500 mg tablet  Vitamin D3 25 mcg tablet    Changes made to PTA medication list:  Added: None  Deleted:   Acetaminophen 500 mg tablet - duplicate order  Hydrocortisone sodium succinate  mg/2ml injection  Changed:   Zoledronic acid: Inject 3.5 mg into the vein --> Inject 4 mg into the vein every 2 months     Allergies reviewed with patient and updates made in EHR: yes    Medication History Completed By: Rachel Tran 5/29/2025 7:52 PM    PTA Med List   Medication Sig Last Dose/Taking    acetaminophen (TYLENOL) 500 MG tablet Take 1,000 mg by mouth 2 times daily. 5/26/2025 at  7:00 PM    albuterol (VENTOLIN HFA) 108 (90 Base) MCG/ACT inhaler INHALE 2 PUFFS INTO THE LUNGS EVERY 6 HOURS AS NEEDED FOR SHORTNESS OF BREATH / DYSPNEA OR WHEEZING More than a month    amLODIPine (NORVASC) 5 MG tablet Take 1 tablet (5 mg) by mouth daily. 5/27/2025 at  5:00 AM    apixaban ANTICOAGULANT (ELIQUIS ANTICOAGULANT) 2.5 MG tablet TAKE 1 TABLET TWICE A DAY 5/24/2025    atorvastatin (LIPITOR) 20 MG tablet Take 1 tablet (20 mg) by mouth daily. 5/26/2025 at  7:00 PM    calcium carbonate (TUMS) 500 MG chewable tablet Take 1 chew tab by mouth daily as needed for heartburn. More than a month     cholestyramine light (QUESTRAN) 4 GM packet Take 4 g by mouth 2 times daily (with meals). 5/26/2025 at  7:00 PM    Coenzyme Q10 400 MG CAPS Take 400 mg by mouth daily 5/20/2025    ferrous sulfate (FE TABS) 325 (65 Fe) MG EC tablet Take 1 tablet (325 mg) by mouth every evening 5/20/2025    finasteride (PROSCAR) 5 MG tablet Take 1 tablet (5 mg) by mouth daily 5/26/2025 at  8:00 AM    Fluticasone-Umeclidin-Vilant (TRELEGY ELLIPTA) 100-62.5-25 MCG/ACT oral inhaler USE 1 INHALATION DAILY 5/27/2025 at  5:00 AM    furosemide (LASIX) 40 MG tablet Take 1 tablet (40 mg) by mouth daily. 5/27/2025 at  5:00 AM    gabapentin (NEURONTIN) 300 MG capsule TAKE 2 CAPSULES TWICE A DAY WITH MEALS Past Week    lanreotide acetate (SOMATULINE) 120 MG/0.5ML injection Inject 120 mg subcutaneously every 28 days. 5/16/2025    lisinopril (ZESTRIL) 20 MG tablet Take 20 mg by mouth daily. 5/27/2025 at  5:00 AM    metFORMIN (GLUCOPHAGE) 1000 MG tablet Take 1 tablet (1,000 mg) by mouth 2 times daily (with meals). 5/26/2025 at  7:00 PM    methocarbamol (ROBAXIN) 500 MG tablet Take 1 tablet (500 mg) by mouth 4 times daily as needed for muscle spasms. 5/26/2025 at  7:00 PM    miconazole (MICATIN) 2 % external cream Apply topically 2 times daily as needed for other (Rash/ skin irritation) Past Month    order for DME Oxygen 2 Li/min  at night via nasal cannula Past Month    vitamin B-Complex Take 1 tablet by mouth daily 5/20/2025    vitamin C (ASCORBIC ACID) 500 MG tablet Take 500 mg by mouth daily. 5/20/2025    Vitamin D3 (VITAMIN D-1000 MAX ST) 25 mcg (1000 units) tablet Take 25 mcg by mouth daily. 5/20/2025    zoledronic acid (ZOMETA) 4 MG/5ML injection Inject 4 mg into the vein every 2 months. More than a month

## 2025-05-30 NOTE — PLAN OF CARE
"Status: Pt. s/p posterior Open Cervical 3-6 laminectomy on 5/27/2025 with Dr. Pinon.   Vitals: VSS  Neuros: Alert, intermittently needs choices with location and times but also tends to provide sarcastic responses or give little effort during assessments. LLE slightly weaker than RLE, but both are 3/5. BUE 5/5 when encouraged to demonstrate full usage  IV: L PIV SL'd, R PIV at tko  Labs/Electrolytes: mag and phos replaced per protocol, monitoring BG AC/HS  Resp: on 1-2 lpm nasal cannula during HS. LS coarse throughout with weak cough. Pt does demonstrate ability to utilize I/S competently when encouraged.   Diet: tolerating reg diet with good PO intake  GI: small BM this shift, loose and incontinent. 2 large BM, incontinent earlier in day 5/30 (during night shift)  : voiding with incontinence. Encouraged to use urinal during day and primofit at HS  Skin: neck incision with sutures in place, ROSA ISELA.   Pain: managed effectively with prn robaxin and oxycodone  Activity: up with a lift, turn/repo q2h attempts. Pt generally refuses when asked.   Social: Kenia (SO) at bedside, supportive and helpful  Plan: discharging to ARU when medically ready  Updates this shift: pt needs encouragement for activity but responds better to interventions when told \"it's time for...\" rather than inquisition.          "

## 2025-05-30 NOTE — PROGRESS NOTES
05/30/25 0900   Appointment Info   Signing Clinician's Name / Credentials (PT) tanner aguilera, roney   Rehab Comments (PT) per chart order, no brace needed   Living Environment   People in Home significant other   Current Living Arrangements house   Home Accessibility stairs to enter home   Number of Stairs, Main Entrance 3   Stair Railings, Main Entrance railings safe and in good condition   Transportation Anticipated family or friend will provide   Living Environment Comments patient may be unreliable historian   Self-Care   Usual Activity Tolerance good   Current Activity Tolerance poor   Regular Exercise No   Equipment Currently Used at Home cane, straight;walker, rolling;wheelchair, manual;grab bar, tub/shower   Fall history within last six months yes   Number of times patient has fallen within last six months 6   Activity/Exercise/Self-Care Comment reports mod IND with functional mobility with walker; used manual wheelchair intermittently.  may be unreliable historian   General Information   Onset of Illness/Injury or Date of Surgery 05/27/25   Referring Physician Carol Pérez MD   Patient/Family Therapy Goals Statement (PT) return home   Pertinent History of Current Problem (include personal factors and/or comorbidities that impact the POC) Nahun Villalobos is a 79 year old male admitted on 5/27/2025. 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 He was originally planned for a C3-6 laminoplasty, but given his overall comorbidity and age, a C3-6 laminectomy was offered by neurosurgery at the affected levels. Internal medicine was consulted postoperatively for  medical comanagement given medical complexity.   Existing Precautions/Restrictions other (see comments)  (no brace needed)   Cognition   Orientation Status (Cognition) disoriented to;place   Follows Commands (Cognition) WFL   Cognitive Status Comments appears confused at times, can be tangential   Pain Assessment   Patient Currently in Pain Yes, see Vital Sign flowsheet   Posture    Posture Forward head position;Protracted shoulders;Kyphosis   Range of Motion (ROM)   ROM Comment B LE grossly WFL   Strength (Manual Muscle Testing)   Strength Comments B hip flexion 2+/5; B knee extension/dorsiflexion 4/5   Bed Mobility   Comment, (Bed Mobility) supine > sit mod assist   Transfers   Comment, (Transfers) sit > stand assist x 2   Balance   Balance Comments required B UE support and assist x 2 to maintain standing balance   Sensory Examination   Sensory Perception Comments light touch absent B feet, proprioception absent B great toe/ankles   Muscle Tone   Muscle Tone no deficits were identified   Clinical Impression   Criteria for Skilled Therapeutic Intervention Yes, treatment indicated   PT Diagnosis (PT) impaired functional mobility s/p cervical laminectomy   Influenced by the following impairments pain, confusion, impaired sensation, decreased strength, impaired balance, decreased activity tolerance   Functional limitations due to impairments impaired bed mobility, impaired transfers, impaired gait   Clinical Presentation (PT Evaluation Complexity) evolving   Clinical Presentation Rationale clinical judgement, comorbidities   Clinical Decision Making (Complexity) moderate complexity   Planned Therapy Interventions (PT) balance training;bed mobility training;gait training;stair training;transfer training;progressive activity/exercise   Risk & Benefits of therapy have been explained evaluation/treatment results reviewed;care plan/treatment goals reviewed   PT Total Evaluation Time   PT Eval, Moderate Complexity Minutes  (16369) 8   Physical Therapy Goals   PT Frequency 5x/week   PT Predicted Duration/Target Date for Goal Attainment 06/11/25   PT Goals Bed Mobility;Transfers;Gait;Stairs   PT: Bed Mobility Independent;Supine to/from sit;Within precautions   PT: Transfers Modified independent;Sit to/from stand;Assistive device  (FWW)   PT: Gait Minimal assist;Rolling walker;25 feet   PT: Stairs Minimal assist;3 stairs  (1 rail)   PT Discharge Planning   PT Plan supine >sit; standing tolerance, bed <> chair   PT Discharge Recommendation (DC Rec) Transitional Care Facility   PT Rationale for DC Rec dependence with functional mobility; unable to ambulate functional distance. needs significant  physical assistance for OOB transfers   PT Brief overview of current status supine > sit mod assist; sit > stand and bed >chair with assist x 2   Physical Therapy Time and Intention   Total Session Time (sum of timed and untimed services) 8

## 2025-05-30 NOTE — PROGRESS NOTES
"[facility], [place]   Neurosurgery Progress Note:    Date of service: 5/30/2025    Assessment: Nahun Villalobos is a 79 year old male  s/p posterior Open Cervical 3-6 laminectomy on 5/27/2025 with Dr. Pinon.     Clinically Significant Risk Factors Present on Admission          # Hypocalcemia: Lowest iCa = 4.2 mg/dL in last 2 days, will monitor and replace as appropriate   # Hypomagnesemia: Lowest Mg = 1.4 mg/dL in last 2 days, will replace as needed       # Hypertension: Noted on problem list  # Chronic heart failure with preserved ejection fraction: heart failure noted on problem list and last echo with EF >50%           # Overweight: Estimated body mass index is 29.1 kg/m  as calculated from the following:    Height as of this encounter: 1.676 m (5' 6\").    Weight as of this encounter: 81.8 kg (180 lb 4.8 oz)., PRESENT ON ADMISSION  # Moderate Malnutrition: based on nutrition assessment and treatment provided per dietitian's recommendations., PRESENT ON ADMISSION       Plan:  - Neuro checks/vital signs: Every 4 hours  - Pain control  - Activity: up as tolerated  - Restrictions/Bracing: none  - Diet: Regular  - DVT prophylaxis: SCDs and subcutaneous heparin   - PT/OT: recommending ARU  - Medicine consult - appreciate recommendations   - Hold Eliquis until POD 14  - Delirium precautions   - Medically ready for ARU      Interval History:  Patient reports mild pain this AM.  PT/OT recommending ARU          Objective:   Temp:  [98.5  F (36.9  C)-99.5  F (37.5  C)] 98.5  F (36.9  C)  Pulse:  [76-94] 94  Resp:  [16-18] 16  BP: (121-133)/(59-73) 133/73  SpO2:  [95 %-100 %] 99 %  I/O last 3 completed shifts:  In: 670 [P.O.:660; I.V.:10]  Out: 1150 [Urine:1150]    Gen: Appears comfortable, NAD  Wound: Incision, clean, dry, intact without strikethrough  Neurologic:  - Alert & Oriented to person, place  - Follows commands   - Speech fluent, spontaneous. No aphasia or dysarthria.  - No gaze " preference. No apparent hemineglect.  - PERRL, EOMI  - Strong eye closure, jaw clench, and cheek puff  - Face symmetric with sensation intact to light touch  - Palate elevates symmetrically, uvula midline, tongue protrudes midline  - Trapezii and sternocleidomastoid muscles 5/5 bilaterally  - No pronator drift     Del Tr Bi WE WF Gr   R 5 5 5 5 5 5   L 5 5 5 5 5 5    HF KE KF DF PF EHL   R 5 5 5 5 5 5   L 5 5 5 5 5 5     Reflexes 2+ throughout    Sensation intact and symmetric to light touch throughout    LABS  Recent Labs   Lab Test 05/30/25  0500 05/29/25  0657 05/28/25  0906   WBC 16.3* 16.3* 15.1*   HGB 9.2* 9.9* 10.3*   * 100 98    214 244       Recent Labs   Lab Test 05/30/25  0805 05/30/25  0500 05/30/25  0158 05/29/25  0803 05/29/25  0657 05/28/25  1140 05/28/25  0906   NA  --  137  --   --  135  --  138   POTASSIUM  --  4.3  --   --  4.3  --  4.4   CHLORIDE  --  99  --   --  100  --  101   CO2  --  28  --   --  25  --  24   BUN  --  11.7  --   --  9.2  --  9.2   CR  --  1.11  --   --  0.91  --  0.95   ANIONGAP  --  10  --   --  10  --  13   JESSE  --  8.5*  --   --  8.8  --  8.4*   * 187* 162*   < > 194*   < > 172*    < > = values in this interval not displayed.

## 2025-05-30 NOTE — PLAN OF CARE
Status: Pt. s/p posterior Open Cervical 3-6 laminectomy on 5/27/2025 with Dr. Pinon.   Vitals: VSS on RA while awake  Neuros: A&Ox3-4, intermittently following commands, generalized weakness, strength BUE 5/5, RLE 4/5 LLE 3/5   IV: PIV SL  Labs/Electrolytes: ACHS BG checks   Resp: On RA during the day  Diet: Regular diet    GI: LBM 5/26  : Voids spontaneously via external catheter  Skin: Posterior neck incision-ROSA ISELA. Left Big toe necrotic. Blanchable erythema to bottom with mepilex in place   Pain: Cervical incision pain managed with PRN Oxy and scheduled meds   Activity: A2/lift. Turn/repo q2hrs. PCDs on   Plan: Continue to monitor and follow POC     Goal Outcome Evaluation:      Plan of Care Reviewed With: patient    Overall Patient Progress: no changeOverall Patient Progress: no change    Outcome Evaluation: Hemovac removed today, PT/OT following

## 2025-05-30 NOTE — PROGRESS NOTES
[facility]    Medicine Progress Note - Hospitalist Service, GOLD TEAM 7    Date of Admission:  5/27/2025    Assessment & Plan   Nahun Villalobos is a 79 year old male admitted on 5/27/2025. 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 He was originally planned for a C3-6 laminoplasty, but given his overall comorbidity and age, a C3-6 laminectomy was offered by neurosurgery at the affected levels. Internal medicine was consulted postoperatively for medical comanagement given medical complexity.   ____________________________     # S/p  posterior complete cervical 3-5 laminectomy, posterior partial cervical 6 laminectomy  # Cervical stenosis w/ myelopathy and neuropathy  # History of recurrent mechanical falls   # Chronic back pain with Sciatica  Chronic back pain with sciatica, cervical stenosis with myelopathy and neuropathy complicated by recurrent falls. Most recent fall being 2/24/2025 when he attempted to sit down and flipped backwards in his wheelchair - sustained T10 fracture.  He was originally planned for a C3-6 laminoplasty, but given his overall comorbidity and age, a C3-6 laminectomy was offered by neurosurgery at the affected levels. Surgery was without complication and was conducted by Dr. Pinon and resident Dr. Johnson. EBL 50mL. Patient is hemodynamically stable and pain is well controlled. He states that he feels neck stiffness but otherwise is okay.   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 He previously underwent an exploratory laparotomy with small-bowel resection in March of 2023 and subsequently was managed on lanreotide. Following most recent PET which revealed mets, oncology team recommended transitioning therapy to Lutathera. Follows with [facility] - seen most recently by Marisa Abbott DNP on 4/23/25.   - Follow-up with Oncology/Hematology upon discharge for continued management.   - Planning to follow with nuclear medicine soon and transition to Lutathera     #Hypomagenesemia  #Hypocacemia  -replace    Leukocytosis  Leukocytosis persisting. Patient has neck pain but no fever. Vital signs remains stable  -If worsening vitals or fever, sepsis work up     # COPD  # CIERA  Patient non adherent w/ CPAP.  - Duonebs PRN available.   - Continue PTA Breo Ellipta and Incruse Ellipta.     # Hx PE on AC 2020  - PTA Eliquis as managed per surgical team - HOLD through procedure  - Continue PTA iron supplement.     # Anemia, chronic, normocytic   # Mild leukocytosis   Baseline Hgb 11-12g/dL - hgb 11.9 on day of operation. WBC 11.6. No infectious symptoms - may be related to cancer diagnosis.   - AM CBC      # T2DM w/ hyperglycemia   A1C 6.4% on 5/23/2025.  - HOLD metformin    - MSSI   - Hypoglycemia protocol   - BG checks TID AC + qHS + 0200     # HTN  # HFpEF, chronic compensated  Expect some hypertension in the setting of acute postoperative pain. In 2024 EF was 55-60% .  - Continue amlodipine, lisinopril  - resume  lasix for now  - Intake and output, daily weights   - Should establish with cardiology on discharge      # BPH  - Continue PTA finasteride.     # Chronic diarrhea  - Continue PTA cholestyramine.     # HLD  - Continue PTA statin.     # Recent multiple stage II  "pressure injuries to bilateral flank  Noted on recent hospitalization.   - RN care order placed to assess for pressure injuries of flank     # Hx tobacco use disorder  2.0 packs/day for 53.0 years   - Quit in 2013    Moderate malnutrition  -decreased po intake  , subcutaneous fat loss and muscle loss  -nutrition consult, appreciate recs             Diet: Advance Diet as Tolerated: Regular Diet Adult    DVT Prophylaxis: Pneumatic Compression Devices  Gomez Catheter: Not present  Lines: None     Cardiac Monitoring: None  Code Status: Full Code      Clinically Significant Risk Factors                   # Hypertension: Noted on problem list    # Chronic heart failure with preserved ejection fraction: heart failure noted on problem list and last echo with EF >50%           # Overweight: Estimated body mass index is 29.1 kg/m  as calculated from the following:    Height as of this encounter: 1.676 m (5' 6\").    Weight as of this encounter: 81.8 kg (180 lb 4.8 oz)., PRESENT ON ADMISSION  # Moderate Malnutrition: based on nutrition assessment and treatment provided per dietitian's recommendations., PRESENT ON ADMISSION     # Financial/Environmental Concerns:           Social Drivers of Health    Housing Stability: High Risk (5/28/2025)    Housing Stability     Do you have housing? : No     Are you worried about losing your housing?: No   Tobacco Use: Medium Risk (5/27/2025)    Patient History     Smoking Tobacco Use: Former     Smokeless Tobacco Use: Never     Passive Exposure: Past   Physical Activity: Insufficiently Active (4/24/2025)    Received from [facility]    Exercise Vital Sign     Days of Exercise per Week: 1 day     Minutes of Exercise per Session: 10 min   Social Connections: Unknown (9/2/2024)    Social Connection and Isolation Panel [NHANES]     Frequency of Social Gatherings with Friends and Family: Once a week            Vincent Farley MD  Hospitalist Service, [facility] TEAM 7  [facility]" [facility]  Securely message with [employer] (more info)  Text page via [employer] Paging/Directory   See signed in provider for up to date coverage information  ______________________________________________________________________  Physical Exam   Vital Signs: Temp: 98.5  F (36.9  C) Temp src: Oral BP: 133/73 Pulse: 94   Resp: 16 SpO2: 99 % O2 Device: Nasal cannula    Interval History     Complains neck pain. it  General Appearance: sleepy interacts.   Neck: clean wound with mild tenderess on cervical  Respiratory: Vesicular breath sounds,  bilaterally  Cardiovascular: No JVD, RRR. No murmurs, rubs or gallops.  GI: Soft, nontender, nondistended  Skin: No rash. No ecchymoses or petechiae.  Musculoskeletal: Normal muscle bulk and tone.  No edema  Neurologic: AOx4.      Oxygen Delivery: 2 LPM  Weight: 180 lbs 4.82 oz        Medical Decision Making       55 MINUTES SPENT BY ME on the date of service doing chart review, history, exam, documentation & further activities per the note.      Data     I have personally reviewed the following data over the past 24 hrs:    16.3 (H)  \   9.2 (L)   / 219     137 99 11.7 /  222 (H)   4.3 28 1.11 \

## 2025-05-31 ENCOUNTER — APPOINTMENT (OUTPATIENT)
Dept: PHYSICAL THERAPY | Facility: CLINIC | Age: 80
DRG: 516 | End: 2025-05-31
Attending: NEUROLOGICAL SURGERY
Payer: COMMERCIAL

## 2025-05-31 ENCOUNTER — APPOINTMENT (OUTPATIENT)
Dept: GENERAL RADIOLOGY | Facility: CLINIC | Age: 80
DRG: 516 | End: 2025-05-31
Payer: COMMERCIAL

## 2025-05-31 LAB
ALBUMIN UR-MCNC: 50 MG/DL
ANION GAP SERPL CALCULATED.3IONS-SCNC: 9 MMOL/L (ref 7–15)
APPEARANCE UR: ABNORMAL
BACTERIA #/AREA URNS HPF: ABNORMAL /HPF
BILIRUB UR QL STRIP: NEGATIVE
BUN SERPL-MCNC: 14.5 MG/DL (ref 8–23)
CALCIUM SERPL-MCNC: 8.5 MG/DL (ref 8.8–10.4)
CHLORIDE SERPL-SCNC: 101 MMOL/L (ref 98–107)
COLOR UR AUTO: ABNORMAL
CREAT SERPL-MCNC: 1.09 MG/DL (ref 0.67–1.17)
EGFRCR SERPLBLD CKD-EPI 2021: 69 ML/MIN/1.73M2
ERYTHROCYTE [DISTWIDTH] IN BLOOD BY AUTOMATED COUNT: 11.6 % (ref 10–15)
GLUCOSE BLDC GLUCOMTR-MCNC: 150 MG/DL (ref 70–99)
GLUCOSE BLDC GLUCOMTR-MCNC: 177 MG/DL (ref 70–99)
GLUCOSE BLDC GLUCOMTR-MCNC: 238 MG/DL (ref 70–99)
GLUCOSE SERPL-MCNC: 184 MG/DL (ref 70–99)
GLUCOSE UR STRIP-MCNC: NEGATIVE MG/DL
HCO3 SERPL-SCNC: 28 MMOL/L (ref 22–29)
HCT VFR BLD AUTO: 27.6 % (ref 40–53)
HGB BLD-MCNC: 9 G/DL (ref 13.3–17.7)
HGB UR QL STRIP: ABNORMAL
KETONES UR STRIP-MCNC: NEGATIVE MG/DL
LEUKOCYTE ESTERASE UR QL STRIP: ABNORMAL
MAGNESIUM SERPL-MCNC: 1.9 MG/DL (ref 1.7–2.3)
MCH RBC QN AUTO: 32.8 PG (ref 26.5–33)
MCHC RBC AUTO-ENTMCNC: 32.6 G/DL (ref 31.5–36.5)
MCV RBC AUTO: 101 FL (ref 78–100)
MUCOUS THREADS #/AREA URNS LPF: PRESENT /LPF
NITRATE UR QL: NEGATIVE
PH UR STRIP: 5.5 [PH] (ref 5–7)
PHOSPHATE SERPL-MCNC: 2.9 MG/DL (ref 2.5–4.5)
PLATELET # BLD AUTO: 230 10E3/UL (ref 150–450)
POTASSIUM SERPL-SCNC: 4.4 MMOL/L (ref 3.4–5.3)
RBC # BLD AUTO: 2.74 10E6/UL (ref 4.4–5.9)
RBC URINE: 6 /HPF
SODIUM SERPL-SCNC: 138 MMOL/L (ref 135–145)
SP GR UR STRIP: 1.02 (ref 1–1.03)
SQUAMOUS EPITHELIAL: 8 /HPF
UROBILINOGEN UR STRIP-MCNC: NORMAL MG/DL
WBC # BLD AUTO: 16 10E3/UL (ref 4–11)
WBC CLUMPS #/AREA URNS HPF: PRESENT /HPF
WBC URINE: >182 /HPF

## 2025-05-31 PROCEDURE — 99232 SBSQ HOSP IP/OBS MODERATE 35: CPT

## 2025-05-31 PROCEDURE — 71045 X-RAY EXAM CHEST 1 VIEW: CPT | Mod: 26 | Performed by: RADIOLOGY

## 2025-05-31 PROCEDURE — 81001 URINALYSIS AUTO W/SCOPE: CPT

## 2025-05-31 PROCEDURE — 250N000013 HC RX MED GY IP 250 OP 250 PS 637: Performed by: NURSE PRACTITIONER

## 2025-05-31 PROCEDURE — 97530 THERAPEUTIC ACTIVITIES: CPT | Mod: GP

## 2025-05-31 PROCEDURE — 71045 X-RAY EXAM CHEST 1 VIEW: CPT

## 2025-05-31 PROCEDURE — 87186 SC STD MICRODIL/AGAR DIL: CPT

## 2025-05-31 PROCEDURE — 250N000013 HC RX MED GY IP 250 OP 250 PS 637

## 2025-05-31 PROCEDURE — 250N000011 HC RX IP 250 OP 636

## 2025-05-31 PROCEDURE — 250N000013 HC RX MED GY IP 250 OP 250 PS 637: Performed by: NEUROLOGICAL SURGERY

## 2025-05-31 PROCEDURE — 85048 AUTOMATED LEUKOCYTE COUNT: CPT

## 2025-05-31 PROCEDURE — 84100 ASSAY OF PHOSPHORUS: CPT | Performed by: INTERNAL MEDICINE

## 2025-05-31 PROCEDURE — 120N000002 HC R&B MED SURG/OB UMMC

## 2025-05-31 PROCEDURE — 36415 COLL VENOUS BLD VENIPUNCTURE: CPT

## 2025-05-31 PROCEDURE — 250N000011 HC RX IP 250 OP 636: Performed by: NURSE PRACTITIONER

## 2025-05-31 PROCEDURE — 83735 ASSAY OF MAGNESIUM: CPT | Performed by: INTERNAL MEDICINE

## 2025-05-31 PROCEDURE — 82565 ASSAY OF CREATININE: CPT

## 2025-05-31 RX ORDER — MAGNESIUM OXIDE 400 MG/1
400 TABLET ORAL EVERY 4 HOURS
Status: COMPLETED | OUTPATIENT
Start: 2025-05-31 | End: 2025-05-31

## 2025-05-31 RX ORDER — CEFTRIAXONE 2 G/1
2 INJECTION, POWDER, FOR SOLUTION INTRAMUSCULAR; INTRAVENOUS EVERY 24 HOURS
Status: DISCONTINUED | OUTPATIENT
Start: 2025-05-31 | End: 2025-06-02

## 2025-05-31 RX ADMIN — CEFTRIAXONE SODIUM 2 G: 2 INJECTION, POWDER, FOR SOLUTION INTRAMUSCULAR; INTRAVENOUS at 16:39

## 2025-05-31 RX ADMIN — AMLODIPINE BESYLATE 5 MG: 5 TABLET ORAL at 08:48

## 2025-05-31 RX ADMIN — INSULIN ASPART 2 UNITS: 100 INJECTION, SOLUTION INTRAVENOUS; SUBCUTANEOUS at 14:05

## 2025-05-31 RX ADMIN — Medication 25 MCG: at 08:47

## 2025-05-31 RX ADMIN — INSULIN ASPART 1 UNITS: 100 INJECTION, SOLUTION INTRAVENOUS; SUBCUTANEOUS at 08:47

## 2025-05-31 RX ADMIN — METFORMIN HYDROCHLORIDE 1000 MG: 500 TABLET ORAL at 08:47

## 2025-05-31 RX ADMIN — OXYCODONE HYDROCHLORIDE 5 MG: 5 TABLET ORAL at 06:07

## 2025-05-31 RX ADMIN — MAGNESIUM OXIDE TAB 400 MG (241.3 MG ELEMENTAL MG) 400 MG: 400 (241.3 MG) TAB at 14:50

## 2025-05-31 RX ADMIN — LISINOPRIL 20 MG: 20 TABLET ORAL at 08:48

## 2025-05-31 RX ADMIN — METHOCARBAMOL 500 MG: 500 TABLET ORAL at 05:01

## 2025-05-31 RX ADMIN — FUROSEMIDE 40 MG: 20 TABLET ORAL at 08:47

## 2025-05-31 RX ADMIN — HEPARIN SODIUM 5000 UNITS: 5000 INJECTION, SOLUTION INTRAVENOUS; SUBCUTANEOUS at 05:01

## 2025-05-31 RX ADMIN — OXYCODONE HYDROCHLORIDE AND ACETAMINOPHEN 500 MG: 500 TABLET ORAL at 08:48

## 2025-05-31 RX ADMIN — METFORMIN HYDROCHLORIDE 1000 MG: 500 TABLET ORAL at 19:00

## 2025-05-31 RX ADMIN — METHOCARBAMOL 500 MG: 500 TABLET ORAL at 19:00

## 2025-05-31 RX ADMIN — HEPARIN SODIUM 5000 UNITS: 5000 INJECTION, SOLUTION INTRAVENOUS; SUBCUTANEOUS at 11:31

## 2025-05-31 RX ADMIN — MAGNESIUM OXIDE TAB 400 MG (241.3 MG ELEMENTAL MG) 400 MG: 400 (241.3 MG) TAB at 19:00

## 2025-05-31 RX ADMIN — OXYCODONE HYDROCHLORIDE 5 MG: 5 TABLET ORAL at 11:31

## 2025-05-31 RX ADMIN — Medication 3 MG: at 20:50

## 2025-05-31 RX ADMIN — OXYCODONE HYDROCHLORIDE 5 MG: 5 TABLET ORAL at 01:49

## 2025-05-31 RX ADMIN — GABAPENTIN 600 MG: 300 CAPSULE ORAL at 08:47

## 2025-05-31 RX ADMIN — HEPARIN SODIUM 5000 UNITS: 5000 INJECTION, SOLUTION INTRAVENOUS; SUBCUTANEOUS at 20:50

## 2025-05-31 RX ADMIN — FINASTERIDE 5 MG: 5 TABLET, FILM COATED ORAL at 08:47

## 2025-05-31 RX ADMIN — ATORVASTATIN CALCIUM 20 MG: 20 TABLET, FILM COATED ORAL at 20:50

## 2025-05-31 RX ADMIN — INSULIN ASPART 1 UNITS: 100 INJECTION, SOLUTION INTRAVENOUS; SUBCUTANEOUS at 18:59

## 2025-05-31 RX ADMIN — GABAPENTIN 600 MG: 300 CAPSULE ORAL at 20:50

## 2025-05-31 ASSESSMENT — ACTIVITIES OF DAILY LIVING (ADL)
ADLS_ACUITY_SCORE: 78

## 2025-05-31 NOTE — PROGRESS NOTES
"[facility], [place]   Neurosurgery Progress Note:    Date of service: 5/30/2025    Assessment: Nahun Villalobos is a 79 year old male  s/p posterior Open Cervical 3-6 laminectomy on 5/27/2025 with Dr. Pinon.     Clinically Significant Risk Factors Present on Admission                  # Hypertension: Noted on problem list  # Chronic heart failure with preserved ejection fraction: heart failure noted on problem list and last echo with EF >50%           # Overweight: Estimated body mass index is 29.1 kg/m  as calculated from the following:    Height as of this encounter: 1.676 m (5' 6\").    Weight as of this encounter: 81.8 kg (180 lb 4.8 oz)., PRESENT ON ADMISSION  # Moderate Malnutrition: based on nutrition assessment and treatment provided per dietitian's recommendations., PRESENT ON ADMISSION       Plan:  - Neuro checks/vital signs: Every 4 hours  - Pain control  - Activity: up as tolerated  - Restrictions/Bracing: none  - Diet: Regular  - DVT prophylaxis: SCDs and subcutaneous heparin   - PT/OT: recommending ARU  - Medicine consult - appreciate recommendations   - Hold Eliquis until POD 14  - Delirium precautions   - Medically ready for TCU      Interval History:  No acute interval events. PT/OT re-evaluated yesterday for TCU. Patient coached on incentive spirometry this AM.  Stable leukocytosis at 16, afebrile.        Objective:   Temp:  [98  F (36.7  C)-98.9  F (37.2  C)] 98.9  F (37.2  C)  Pulse:  [79-91] 79  Resp:  [16] 16  BP: (107-146)/(57-75) 107/57  SpO2:  [93 %] 93 %  No intake/output data recorded.    Gen: Appears comfortable, NAD  Wound: Incision, clean, dry, intact without strikethrough  Neurologic:  - Alert & Oriented to person, place  - Follows commands   - Speech fluent, spontaneous. No aphasia or dysarthria.  - No gaze preference. No apparent hemineglect.  - PERRL, EOMI  - Strong eye closure, jaw clench, and cheek puff  - Face symmetric with sensation intact to light " touch  - Palate elevates symmetrically, uvula midline, tongue protrudes midline  - Trapezii and sternocleidomastoid muscles 5/5 bilaterally  - No pronator drift     Del Tr Bi WE WF Gr   R 5 5 5 5 5 5   L 5 5 5 5 5 5    HF KE KF DF PF EHL   R 5 5 5 5 5 5   L 5 5 5 5 5 5     Reflexes 2+ throughout    Sensation intact and symmetric to light touch throughout    LABS  Recent Labs   Lab Test 05/31/25  0712 05/30/25  0500 05/29/25  0657   WBC 16.0* 16.3* 16.3*   HGB 9.0* 9.2* 9.9*   * 101* 100    219 214       Recent Labs   Lab Test 05/31/25  0846 05/31/25  0712 05/30/25  2213 05/30/25  0805 05/30/25  0500 05/29/25  0803 05/29/25  0657   NA  --  138  --   --  137  --  135   POTASSIUM  --  4.4  --   --  4.3  --  4.3   CHLORIDE  --  101  --   --  99  --  100   CO2  --  28  --   --  28  --  25   BUN  --  14.5  --   --  11.7  --  9.2   CR  --  1.09  --   --  1.11  --  0.91   ANIONGAP  --  9  --   --  10  --  10   JESSE  --  8.5*  --   --  8.5*  --  8.8   * 184* 164*   < > 187*   < > 194*    < > = values in this interval not displayed.

## 2025-05-31 NOTE — PROGRESS NOTES
Care Management Follow Up    Length of Stay (days): 4  Expected Discharge Date: 06/01/2025  Concerns to be Addressed: discharge planning     Patient plan of care discussed at interdisciplinary rounds: No  Anticipated Discharge Disposition: Transitional Care  Anticipated Discharge Services: None  Anticipated Discharge DME: None    Patient/family educated on Medicare website which has current facility and service quality ratings: yes  Education Provided on the Discharge Plan: Yes  Patient/Family in Agreement with the Plan: yes    Referrals Placed by CM/SW: Post Acute Facilities    [facility]  [address] [place] [address]  Admissions: [phone], Fax: [phone]   TCU Fax: [phone]  TCU Adms.: [phone]  Main: [phone]  - In-basket/electronic referral sent, message left for admissions.   - spoke to RN supervisor Héctor and faxed referral to [phone] for him to review    Private pay costs discussed: not discussed today.     Discussed  Partnership in Safe Discharge Planning  document with patient/family: No     Handoff Completed: will do at time of discharge     Additional Information:  Weekend SW following for discharge planning - anticipate will need TCU placement (OT had recommended ARU, but PT recommended TCU). Met with Javon to discuss, his friend Leong also on phone during discussion to encourage Javon. Javon only agreeable to TCU referral to [facility], would not consider any other options despite education that may need option options if only choice is full. Has also been to [facility] [address] but will not return there. He asked me to call his s.arabella Stack. We spoke via phone, emailed her map of [place] and [facility] TCUs so she may help select some other options ([email or web address]). She is also aware that if [facility] full, we need other options.     Next Steps:   [] TCU referral follow-up, will likely need add'l TCU choices   []  Transportation  [] PAS  [] IMM  [] Internal hand off - Olegario Castillo, [facility], MSW  5/31/2025  , Casual Staff, Care Management Department   SEARCHABLE in [facility] - search SOCIAL WORK     [facility] (0800 - 1630) Saturday and Sunday     Units: 4A Vocera, 4C Vocera, 4E Vocera      Units: 5A 5485-1273 Vocera, 5A 9347-3700 Vocera , BMT SW 1 BMT SW 2, BMT SW 3 & BMT SW 4   5C Off Service 5401 - 5416  5C Off Service [id number]   Units: 6A Vocera, 6B Vocera    Units: 6C Vocera   Units: 7A Vocera, 7B Vocera    Units: 7C Med Surg 7401 thru 7418, 7C Med Surg 7502 thru 7521    Unit: [facility] ED Vocera, [facility] Obs Vocera     [facility] (1838-2950) Saturday and Sunday      Units: 5 Ortho Vocera, 5 Med Surg Vocera, WB ED Vocera   Units: 6 Med Surg Vocera, 8 Med Surg Vocera, 10 ICU Vocera      After hours Vocera [facility] and After Hours Vocera [facility]   Please NOTE changes to times below:  **Saturday & Sunday (1630 - 2030)  **Mon-Fri (3517-2813)   **[facility] Recognized Holidays  (6968-1815)    Units: ALL   - see above VOCERA links to units

## 2025-05-31 NOTE — PROGRESS NOTES
[facility]    Medicine Progress Note - Hospitalist Service, GOLD TEAM 7    Date of Admission:  5/27/2025    Assessment & Plan   Nahun Villalobos is a 79 year old male admitted on 5/27/2025. 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 He was originally planned for a C3-6 laminoplasty, but given his overall comorbidity and age, a C3-6 laminectomy was offered by neurosurgery at the affected levels. Internal medicine was consulted postoperatively for medical comanagement given medical complexity.   ____________________________     # S/p  posterior complete cervical 3-5 laminectomy, posterior partial cervical 6 laminectomy  # Cervical stenosis w/ myelopathy and neuropathy  # History of recurrent mechanical falls   # Chronic back pain with Sciatica  Chronic back pain with sciatica, cervical stenosis with myelopathy and neuropathy complicated by recurrent falls. Most recent fall being 2/24/2025 when he attempted to sit down and flipped backwards in his wheelchair - sustained T10 fracture.  He was originally planned for a C3-6 laminoplasty, but given his overall comorbidity and age, a C3-6 laminectomy was offered by neurosurgery at the affected levels. Surgery was without complication and was conducted by Dr. Pinon and resident Dr. Johnson. EBL 50mL. Patient is hemodynamically stable and pain is well controlled. He states that he feels neck stiffness but otherwise is okay.   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 He previously underwent an exploratory laparotomy with small-bowel resection in March of 2023 and subsequently was managed on lanreotide. Following most recent PET which revealed mets, oncology team recommended transitioning therapy to Lutathera. Follows with [facility] - seen most recently by Marisa Abbott DNP on 4/23/25.   - Follow-up with Oncology/Hematology upon discharge for continued management.   - Planning to follow with nuclear medicine soon and transition to Lutathera     #Hypomagenesemia  #Hypocacemia  -replace    #Leukocytosis  #increased urine frequency and urine colour change suspect UTI  Leukocytosis persisting.  Increasing frequency and discoloration of the urine.   she also has cough.  Chest x-ray without acute abnormalities.  Urinalysis increased blood cells.    -Ceftriaxone 2 g daily (Consider discontinuing it ifurine culture is negative)  - CBC daily  - Follow urine culture       # COPD  # CIERA  Patient non adherent w/ CPAP.  - Duonebs PRN available.   - Continue PTA Breo Ellipta and Incruse Ellipta.     # Hx PE on AC 2020  - PTA Eliquis as managed per surgical team - HOLD through procedure  - Continue PTA iron supplement.     # Anemia, chronic, normocytic   # Mild leukocytosis   Baseline Hgb 11-12g/dL - hgb 11.9 on day of operation. WBC 11.6. No infectious symptoms - may be related to cancer diagnosis.   - AM CBC      # T2DM w/ hyperglycemia   A1C 6.4% on 5/23/2025.  - HOLD metformin    - MSSI   - Hypoglycemia protocol   - BG checks TID AC + qHS + 0200     # HTN  # HFpEF, chronic compensated  Expect some hypertension in the setting of acute postoperative pain. In 2024 EF was 55-60% .  - Continue amlodipine, lisinopril  - resume  lasix for now  - Intake and  "output, daily weights   - Should establish with cardiology on discharge      # BPH  - Continue PTA finasteride.     # Chronic diarrhea  - Continue PTA cholestyramine.     # HLD  - Continue PTA statin.     # Recent multiple stage II pressure injuries to bilateral flank  Noted on recent hospitalization.   - RN care order placed to assess for pressure injuries of flank     # Hx tobacco use disorder  2.0 packs/day for 53.0 years   - Quit in 2013    Moderate malnutrition  -decreased po intake  , subcutaneous fat loss and muscle loss  -nutrition consult, appreciate recs             Diet: Advance Diet as Tolerated: Regular Diet Adult    DVT Prophylaxis: Pneumatic Compression Devices  Gomez Catheter: Not present  Lines: None     Cardiac Monitoring: None  Code Status: Full Code      Clinically Significant Risk Factors                   # Hypertension: Noted on problem list    # Chronic heart failure with preserved ejection fraction: heart failure noted on problem list and last echo with EF >50%           # Overweight: Estimated body mass index is 29.1 kg/m  as calculated from the following:    Height as of this encounter: 1.676 m (5' 6\").    Weight as of this encounter: 81.8 kg (180 lb 4.8 oz).   # Moderate Malnutrition: based on nutrition assessment and treatment provided per dietitian's recommendations.      # Financial/Environmental Concerns:           Social Drivers of Health    Housing Stability: High Risk (5/28/2025)    Housing Stability     Do you have housing? : No     Are you worried about losing your housing?: No   Tobacco Use: Medium Risk (5/27/2025)    Patient History     Smoking Tobacco Use: Former     Smokeless Tobacco Use: Never     Passive Exposure: Past   Physical Activity: Insufficiently Active (4/24/2025)    Received from [facility]    Exercise Vital Sign     Days of Exercise per Week: 1 day     Minutes of Exercise per Session: 10 min   Social Connections: Unknown (9/2/2024)    Social Connection and " Isolation Panel [NHANES]     Frequency of Social Gatherings with Friends and Family: Once a week            Vincent Farley MD  Hospitalist Service, GOLD TEAM 7  [facility]  Securely message with [employer] (more info)  Text page via [employer] Paging/Directory   See signed in provider for up to date coverage information  ______________________________________________________________________  Physical Exam   Vital Signs: Temp: 98.9  F (37.2  C) Temp src: Oral BP: 107/57 Pulse: 79   Resp: 16 SpO2: 93 % O2 Device: None (Room air)    Interval History     Complains neck pain.  He still he also has cough.  He has increased urine frequency and urgency.  Also has urine discoloration.    General Appearance: sleepy interacts.   Neck: clean wound with mild tenderess on cervical  Respiratory: Vesicular breath sounds,  bilaterally  Cardiovascular: No JVD, RRR. No murmurs, rubs or gallops.  GI: Soft, nontender, nondistended  Skin: No rash. No ecchymoses or petechiae.  Musculoskeletal: Normal muscle bulk and tone.  No edema  Neurologic: AOx4.         Weight: 180 lbs 4.82 oz        Medical Decision Making       55 MINUTES SPENT BY ME on the date of service doing chart review, history, exam, documentation & further activities per the note.      Data     I have personally reviewed the following data over the past 24 hrs:    16.0 (H)  \   9.0 (L)   / 230     138 101 14.5 /  238 (H)   4.4 28 1.09 \

## 2025-05-31 NOTE — PLAN OF CARE
Status: Pt. s/p posterior Open Cervical 3-6 laminectomy on 5/27/2025 with Dr. Pionn.   Vitals: VSS  Neuros: Alert, intermittently needs choices with location and times but also tends to provide sarcastic responses or give little effort during assessments. LLE slightly weaker than RLE, but both are 3/5. BUE 5/5 when encouraged to demonstrate full usage  IV: PIV x2, SL'd  Labs/Electrolytes: +UTI  Resp: on 1-2 lpm nasal cannula during HS. LS coarse throughout with weak cough. Pt does demonstrate ability to utilize I/S competently when encouraged.   Diet: tolerating reg diet with fair PO intake  GI: medium BM this shift, soft, incontinent  : voiding with incontinence. Encouraged to use urinal during day and primofit at HS  Skin: neck incision with sutures in place, ROSA ISELA. Blanchable redness to coccyx  Pain: managed effectively with prn robaxin and oxycodone  Activity: up with a lift, turn/repo q2h attempts. Pt generally refuses when asked.   Social: Kenia (TIGIST) at bedside, supportive and helpful  Plan: discharging to ARU when medically ready  Update: abx started this shift

## 2025-05-31 NOTE — PLAN OF CARE
Status: Pt. s/p posterior Open Cervical 3-6 laminectomy on 5/27/2025 with Dr. Pinon.   Vitals: VSS  Neuros: Alert, intermittently needs choices with location and times but also tends to provide sarcastic responses or give little effort during assessments. LLE slightly weaker than RLE, but both are 3/5. BUE 5/5 when encouraged to demonstrate full usage  IV: PIV x2, SL'd  Labs/Electrolytes: mag replaced per protocol, monitoring BG AC/HS  Resp: on 1-2 lpm nasal cannula during HS. LS coarse throughout with weak cough. Pt does demonstrate ability to utilize I/S competently when encouraged.   Diet: tolerating reg diet with good PO intake  GI: medium BM this shift, soft, incontinent  : voiding with incontinence. Encouraged to use urinal during day and primofit at HS  Skin: neck incision with sutures in place, ROSA ISELA. Blanchable redness to coccyx  Pain: managed effectively with prn robaxin and oxycodone  Activity: up with a lift, turn/repo q2h attempts. Pt generally refuses when asked.   Social: Kenia (SO) at bedside, supportive and helpful  Plan: discharging to ARU when medically ready  Updates this shift: pt sat in chair for total of 3 hours this shift.

## 2025-06-01 LAB
ANION GAP SERPL CALCULATED.3IONS-SCNC: 11 MMOL/L (ref 7–15)
BUN SERPL-MCNC: 15.6 MG/DL (ref 8–23)
CALCIUM SERPL-MCNC: 8.9 MG/DL (ref 8.8–10.4)
CHLORIDE SERPL-SCNC: 100 MMOL/L (ref 98–107)
CREAT SERPL-MCNC: 1.07 MG/DL (ref 0.67–1.17)
EGFRCR SERPLBLD CKD-EPI 2021: 70 ML/MIN/1.73M2
ERYTHROCYTE [DISTWIDTH] IN BLOOD BY AUTOMATED COUNT: 11.4 % (ref 10–15)
GLUCOSE BLDC GLUCOMTR-MCNC: 186 MG/DL (ref 70–99)
GLUCOSE BLDC GLUCOMTR-MCNC: 192 MG/DL (ref 70–99)
GLUCOSE BLDC GLUCOMTR-MCNC: 222 MG/DL (ref 70–99)
GLUCOSE SERPL-MCNC: 203 MG/DL (ref 70–99)
HCO3 SERPL-SCNC: 26 MMOL/L (ref 22–29)
HCT VFR BLD AUTO: 27.8 % (ref 40–53)
HGB BLD-MCNC: 9 G/DL (ref 13.3–17.7)
MAGNESIUM SERPL-MCNC: 2.2 MG/DL (ref 1.7–2.3)
MCH RBC QN AUTO: 32.1 PG (ref 26.5–33)
MCHC RBC AUTO-ENTMCNC: 32.4 G/DL (ref 31.5–36.5)
MCV RBC AUTO: 99 FL (ref 78–100)
PLATELET # BLD AUTO: 278 10E3/UL (ref 150–450)
POTASSIUM SERPL-SCNC: 4.3 MMOL/L (ref 3.4–5.3)
RBC # BLD AUTO: 2.8 10E6/UL (ref 4.4–5.9)
SODIUM SERPL-SCNC: 137 MMOL/L (ref 135–145)
WBC # BLD AUTO: 14.2 10E3/UL (ref 4–11)

## 2025-06-01 PROCEDURE — 250N000013 HC RX MED GY IP 250 OP 250 PS 637

## 2025-06-01 PROCEDURE — 250N000013 HC RX MED GY IP 250 OP 250 PS 637: Performed by: NEUROLOGICAL SURGERY

## 2025-06-01 PROCEDURE — 99232 SBSQ HOSP IP/OBS MODERATE 35: CPT

## 2025-06-01 PROCEDURE — 85014 HEMATOCRIT: CPT

## 2025-06-01 PROCEDURE — 250N000013 HC RX MED GY IP 250 OP 250 PS 637: Performed by: NURSE PRACTITIONER

## 2025-06-01 PROCEDURE — 250N000011 HC RX IP 250 OP 636: Performed by: NURSE PRACTITIONER

## 2025-06-01 PROCEDURE — 83735 ASSAY OF MAGNESIUM: CPT | Performed by: NEUROLOGICAL SURGERY

## 2025-06-01 PROCEDURE — 36415 COLL VENOUS BLD VENIPUNCTURE: CPT

## 2025-06-01 PROCEDURE — 120N000002 HC R&B MED SURG/OB UMMC

## 2025-06-01 PROCEDURE — 250N000011 HC RX IP 250 OP 636

## 2025-06-01 PROCEDURE — 82310 ASSAY OF CALCIUM: CPT

## 2025-06-01 RX ORDER — METHOCARBAMOL 500 MG/1
250 TABLET ORAL 4 TIMES DAILY PRN
Status: DISCONTINUED | OUTPATIENT
Start: 2025-06-01 | End: 2025-06-03 | Stop reason: HOSPADM

## 2025-06-01 RX ORDER — CHOLESTYRAMINE LIGHT 4 G/5.7G
4 POWDER, FOR SUSPENSION ORAL 2 TIMES DAILY WITH MEALS
Status: DISCONTINUED | OUTPATIENT
Start: 2025-06-01 | End: 2025-06-03 | Stop reason: HOSPADM

## 2025-06-01 RX ADMIN — FINASTERIDE 5 MG: 5 TABLET, FILM COATED ORAL at 09:21

## 2025-06-01 RX ADMIN — HEPARIN SODIUM 5000 UNITS: 5000 INJECTION, SOLUTION INTRAVENOUS; SUBCUTANEOUS at 16:12

## 2025-06-01 RX ADMIN — HEPARIN SODIUM 5000 UNITS: 5000 INJECTION, SOLUTION INTRAVENOUS; SUBCUTANEOUS at 09:20

## 2025-06-01 RX ADMIN — METFORMIN HYDROCHLORIDE 1000 MG: 500 TABLET ORAL at 09:21

## 2025-06-01 RX ADMIN — INSULIN ASPART 1 UNITS: 100 INJECTION, SOLUTION INTRAVENOUS; SUBCUTANEOUS at 09:30

## 2025-06-01 RX ADMIN — Medication 25 MCG: at 09:21

## 2025-06-01 RX ADMIN — OXYCODONE HYDROCHLORIDE 5 MG: 5 TABLET ORAL at 11:43

## 2025-06-01 RX ADMIN — Medication 3 MG: at 20:26

## 2025-06-01 RX ADMIN — METFORMIN HYDROCHLORIDE 1000 MG: 500 TABLET ORAL at 18:27

## 2025-06-01 RX ADMIN — CHOLEYSTYRAMINE LIGHT 4 G: 4 POWDER, FOR SUSPENSION ORAL at 22:12

## 2025-06-01 RX ADMIN — ATORVASTATIN CALCIUM 20 MG: 20 TABLET, FILM COATED ORAL at 20:27

## 2025-06-01 RX ADMIN — CEFTRIAXONE SODIUM 2 G: 2 INJECTION, POWDER, FOR SOLUTION INTRAMUSCULAR; INTRAVENOUS at 16:12

## 2025-06-01 RX ADMIN — OXYCODONE HYDROCHLORIDE 5 MG: 5 TABLET ORAL at 16:12

## 2025-06-01 RX ADMIN — GABAPENTIN 600 MG: 300 CAPSULE ORAL at 20:27

## 2025-06-01 RX ADMIN — GABAPENTIN 600 MG: 300 CAPSULE ORAL at 09:20

## 2025-06-01 RX ADMIN — OXYCODONE HYDROCHLORIDE AND ACETAMINOPHEN 500 MG: 500 TABLET ORAL at 09:21

## 2025-06-01 RX ADMIN — FUROSEMIDE 40 MG: 20 TABLET ORAL at 09:21

## 2025-06-01 RX ADMIN — LISINOPRIL 20 MG: 20 TABLET ORAL at 09:20

## 2025-06-01 RX ADMIN — AMLODIPINE BESYLATE 5 MG: 5 TABLET ORAL at 09:21

## 2025-06-01 RX ADMIN — INSULIN ASPART 2 UNITS: 100 INJECTION, SOLUTION INTRAVENOUS; SUBCUTANEOUS at 12:17

## 2025-06-01 ASSESSMENT — ACTIVITIES OF DAILY LIVING (ADL)
ADLS_ACUITY_SCORE: 78
ADLS_ACUITY_SCORE: 74
ADLS_ACUITY_SCORE: 78
ADLS_ACUITY_SCORE: 74
ADLS_ACUITY_SCORE: 74
ADLS_ACUITY_SCORE: 78
ADLS_ACUITY_SCORE: 78
ADLS_ACUITY_SCORE: 74
ADLS_ACUITY_SCORE: 74
ADLS_ACUITY_SCORE: 78
ADLS_ACUITY_SCORE: 74
ADLS_ACUITY_SCORE: 78

## 2025-06-01 NOTE — PLAN OF CARE
Status: POD#4 for C3-6 laminectomy. 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 Moving all extremities, uppers 5/5, lowers 3/5.   IV: PIV SL  Resp: on room air  Diet: regular diet  GI: incontinent BMx2 overnight, LBM 6/1  : incontinent   Skin: no new deficits  Pain: declined medications  Activity: up with lift, turn repo q2 - refuses intermittently  Plan: Pending discharge to ARU

## 2025-06-01 NOTE — PLAN OF CARE
[id number]:  Status: Pt. s/p posterior Open Cervical 3-6 laminectomy on 5/27/2025 with Dr. Pinon.   Vitals: VSS on RA  Neuros: Alert, intermittently needs choices with location and times but also tends to provide sarcastic responses or give little effort during assessments. LLE slightly weaker than RLE, but both are 3/5. BUE 5/5 when encouraged to demonstrate full usage  IV: PIV x2, SL'd  Labs/Electrolytes: monitoring BG AC/HS  Resp: on 1-2 lpm nasal cannula during HS. LS coarse throughout with weak cough. Pt does demonstrate ability to utilize I/S competently when encouraged.   Diet: tolerating reg diet with good PO intake  GI: large BM today  : voiding with incontinence. Encouraged to use urinal during day and primofit at HS  Skin: neck incision with sutures in place, ROSA ISELA. Blanchable redness to coccyx  Pain: managed effectively with prn robaxin and oxycodone, not both at a time  Activity: up with a lift, turn/repo q2h attempts.   Social: Kenia (TIGIST) at bedside, supportive and helpful  Plan: discharging to ARU when medically ready

## 2025-06-01 NOTE — PLAN OF CARE
"Status: Pt. s/p posterior Open Cervical 3-6 laminectomy on 5/27/2025 with Dr. Pinon.   Vitals: VSS  Neuros: Alert, intermittently needs choices with location and times but also tends to provide sarcastic responses or give little effort during assessments. LLE slightly weaker than RLE, but both are 3/5. BUE 5/5 when encouraged to demonstrate full usage  IV: PIV x2, SL'd  Labs/Electrolytes: monitoring BG AC/HS  Resp: on 1-2 lpm nasal cannula during HS. LS coarse throughout with weak cough. Pt does demonstrate ability to utilize I/S competently when encouraged.   Diet: tolerating reg diet with good PO intake  GI: large BM this shift, soft, incontinent  : voiding with incontinence. Encouraged to use urinal during day and primofit at HS  Skin: neck incision with sutures in place, ROSA ISELA. Blanchable redness to coccyx  Pain: managed effectively with prn robaxin and oxycodone, not both at a time  Activity: up with a lift, turn/repo q2h attempts. Pt generally refuses to reposition when asked, more luck with just telling pt \"It's time to (do task)\"   Social: Kenia (SO) at bedside, supportive and helpful  Plan: discharging to ARU when medically ready  Updates this shift: pt sat in chair for total of 3 hours this shift.  "

## 2025-06-01 NOTE — PROGRESS NOTES
[facility]    Medicine Progress Note - Hospitalist Service, GOLD TEAM 7    Date of Admission:  5/27/2025    Assessment & Plan   Nahun Villalobos is a 79 year old male admitted on 5/27/2025. 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 He was originally planned for a C3-6 laminoplasty, but given his overall comorbidity and age, a C3-6 laminectomy was offered by neurosurgery at the affected levels. Internal medicine was consulted postoperatively for medical comanagement given medical complexity.       Looks more alert and communicative today     # S/p  posterior complete cervical 3-5 laminectomy, posterior partial cervical 6 laminectomy  # Cervical stenosis w/ myelopathy and neuropathy  # History of recurrent mechanical falls   # Chronic back pain with Sciatica  Chronic back pain with sciatica, cervical stenosis with myelopathy and neuropathy complicated by recurrent falls. Most recent fall being 2/24/2025 when he attempted to sit down and flipped backwards in his wheelchair - sustained T10 fracture.  He was originally planned for a C3-6 laminoplasty, but given his overall comorbidity and age, a C3-6 laminectomy was offered by neurosurgery at the affected levels. Surgery was without complication and was conducted by Dr. Pinon and resident Dr. Johnson. EBL 50mL. Patient is hemodynamically stable and pain is well controlled. He states that he feels neck stiffness but otherwise is okay.   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 He previously underwent an exploratory laparotomy with small-bowel resection in March of 2023 and subsequently was managed on lanreotide. Following most recent PET which revealed mets, oncology team recommended transitioning therapy to Lutathera. Follows with [facility] - seen most recently by Marisa Abbott DNP on 4/23/25.   - Follow-up with Oncology/Hematology upon discharge for continued management.   - Planning to follow with nuclear medicine soon and transition to Lutathera     #Hypomagenesemia  #Hypocacemia  -replace    Leukocytosis, Improving  Persistent leukocytosis after operation.  No fever.  Hemodynamically stable.  Urinalysis looks dirty but culture is pending.  Ceftriaxone IV started.  Leukocytosis improving now.  -follow urine culture  - Continue IV ceftriaxone    # COPD  # CIERA  Patient non adherent w/ CPAP.  - Duonebs PRN available.   - Continue PTA Breo Ellipta and Incruse Ellipta.     # Hx PE on AC 2020  - PTA Eliquis as managed per surgical team - HOLD through procedure  - Continue PTA iron supplement.     # Anemia, chronic, normocytic   # Mild leukocytosis   Baseline Hgb 11-12g/dL - hgb 11.9 on day of operation. WBC 11.6. No infectious symptoms - may be related to cancer diagnosis.   - AM CBC      # T2DM w/ hyperglycemia   A1C 6.4% on 5/23/2025.  - HOLD metformin    - MSSI   - Hypoglycemia protocol   - BG checks TID AC + qHS + 0200     # HTN  # HFpEF, chronic compensated  Expect some hypertension in the setting of acute postoperative pain. In 2024 EF was 55-60% .  - Continue amlodipine, lisinopril  - resume  lasix for now  - Intake and output, daily weights   - Should establish with cardiology on discharge      # BPH  - Continue PTA  "finasteride.     # Chronic diarrhea  - Continue PTA cholestyramine.     # HLD  - Continue PTA statin.     # Recent multiple stage II pressure injuries to bilateral flank  Noted on recent hospitalization.   - RN care order placed to assess for pressure injuries of flank     # Hx tobacco use disorder  2.0 packs/day for 53.0 years   - Quit in 2013    Moderate malnutrition  -decreased po intake  , subcutaneous fat loss and muscle loss  -nutrition consult, appreciate recs             Diet: Advance Diet as Tolerated: Regular Diet Adult    DVT Prophylaxis: Pneumatic Compression Devices  Gomez Catheter: Not present  Lines: None     Cardiac Monitoring: None  Code Status: Full Code      Clinically Significant Risk Factors                   # Hypertension: Noted on problem list    # Chronic heart failure with preserved ejection fraction: heart failure noted on problem list and last echo with EF >50%           # Overweight: Estimated body mass index is 29.1 kg/m  as calculated from the following:    Height as of this encounter: 1.676 m (5' 6\").    Weight as of this encounter: 81.8 kg (180 lb 4.8 oz).   # Moderate Malnutrition: based on nutrition assessment and treatment provided per dietitian's recommendations.      # Financial/Environmental Concerns:           Social Drivers of Health    Housing Stability: High Risk (5/28/2025)    Housing Stability     Do you have housing? : No     Are you worried about losing your housing?: No   Tobacco Use: Medium Risk (5/27/2025)    Patient History     Smoking Tobacco Use: Former     Smokeless Tobacco Use: Never     Passive Exposure: Past   Physical Activity: Insufficiently Active (4/24/2025)    Received from [facility]    Exercise Vital Sign     Days of Exercise per Week: 1 day     Minutes of Exercise per Session: 10 min   Social Connections: Unknown (9/2/2024)    Social Connection and Isolation Panel [NHANES]     Frequency of Social Gatherings with Friends and Family: Once a week      "       Vincent Farley MD  Hospitalist Service, GOLD TEAM 7  [facility]  Securely message with [employer] (more info)  Text page via [employer] Paging/Directory   See signed in provider for up to date coverage information  _____________________________________________________________________  Interval History   He had urine frequency.  He says pain has improved. No fever of chills  Physical Exam   Vital Signs: Temp: 99.5  F (37.5  C) Temp src: Oral BP: 125/64 Pulse: 94   Resp: 16 SpO2: 92 % O2 Device: None (Room air)    General Appearance: alert    Neck: clean wound with mild tenderess on cervical  Respiratory: Vesicular breath sounds,  bilaterally  Cardiovascular: No JVD, RRR. No murmurs, rubs or gallops.  GI: Soft, nontender, nondistended  Skin: No rash. No ecchymoses or petechiae.  Musculoskeletal: Normal muscle bulk and tone.  No edema  Neurologic: AOx4.      Oxygen Delivery: 1 LPM  Weight: 180 lbs 4.82 oz        Medical Decision Making       55 MINUTES SPENT BY ME on the date of service doing chart review, history, exam, documentation & further activities per the note.      Data     I have personally reviewed the following data over the past 24 hrs:    14.2 (H)  \   9.0 (L)   / 278     137 100 15.6 /  222 (H)   4.3 26 1.07 \

## 2025-06-01 NOTE — PROGRESS NOTES
"[facility], [place]   Neurosurgery Progress Note:    Date of service: 6/1/2025    Assessment: Nahun Villalobos is a 79 year old male  s/p posterior Open Cervical 3-6 laminectomy on 5/27/2025 with Dr. Pinon.     Clinically Significant Risk Factors Present on Admission                   # Hypertension: Noted on problem list  # Chronic heart failure with preserved ejection fraction: heart failure noted on problem list and last echo with EF >50%           # Overweight: Estimated body mass index is 29.1 kg/m  as calculated from the following:    Height as of this encounter: 1.676 m (5' 6\").    Weight as of this encounter: 81.8 kg (180 lb 4.8 oz).   # Moderate Malnutrition: based on nutrition assessment and treatment provided per dietitian's recommendations.      Plan:  - Neuro checks/vital signs: Every 4 hours  - Pain control  - Activity: up as tolerated  - Restrictions/Bracing: none  - Diet: Regular  - DVT prophylaxis: SCDs and subcutaneous heparin   - PT/OT: recommending ARU  - Medicine consult - appreciate recommendations   - Hold Eliquis until POD 14  - Delirium precautions   - Medically ready for TCU    Aris Wilde MD  Neurosurgery  Pager: [phone]     Please contact neurosurgery resident on call with questions.    Dial * * [phone], enter [phone] when prompted.     Interval History:  No acute interval events.     Objective:   Temp:  [98.7  F (37.1  C)-98.9  F (37.2  C)] 98.7  F (37.1  C)  Pulse:  [79] 79  Resp:  [16] 16  BP: (107-138)/(57-72) 138/66  SpO2:  [93 %-100 %] 96 %  No intake/output data recorded.    Gen: Appears comfortable, NAD  Wound: Incision, clean, dry, intact without strikethrough  Neurologic:  - Alert & Oriented to person, place  - Follows commands   - Speech fluent, spontaneous. No aphasia or dysarthria.  - No gaze preference. No apparent hemineglect.  - PERRL, EOMI  - Strong eye closure, jaw clench, and cheek puff  - Face symmetric with sensation intact to light touch  - " Palate elevates symmetrically, uvula midline, tongue protrudes midline  - Trapezii and sternocleidomastoid muscles 5/5 bilaterally  - No pronator drift     Del Tr Bi WE WF Gr   R 5 5 5 5 5 5   L 5 5 5 5 5 5    HF KE KF DF PF EHL   R 5 5 5 5 5 5   L 5 5 5 5 5 5     Reflexes 2+ throughout    Sensation intact and symmetric to light touch throughout    LABS  Recent Labs   Lab Test 05/31/25  0712 05/30/25  0500 05/29/25  0657   WBC 16.0* 16.3* 16.3*   HGB 9.0* 9.2* 9.9*   * 101* 100    219 214     Recent Labs   Lab Test 05/31/25  1805 05/31/25  1345 05/31/25  0846 05/31/25  0712 05/30/25  0805 05/30/25  0500 05/29/25  0803 05/29/25  0657   NA  --   --   --  138  --  137  --  135   POTASSIUM  --   --   --  4.4  --  4.3  --  4.3   CHLORIDE  --   --   --  101  --  99  --  100   CO2  --   --   --  28  --  28  --  25   BUN  --   --   --  14.5  --  11.7  --  9.2   CR  --   --   --  1.09  --  1.11  --  0.91   ANIONGAP  --   --   --  9  --  10  --  10   JESSE  --   --   --  8.5*  --  8.5*  --  8.8   * 238* 177* 184*   < > 187*   < > 194*    < > = values in this interval not displayed.

## 2025-06-02 LAB
ANION GAP SERPL CALCULATED.3IONS-SCNC: 11 MMOL/L (ref 7–15)
BUN SERPL-MCNC: 16.2 MG/DL (ref 8–23)
CALCIUM SERPL-MCNC: 8.9 MG/DL (ref 8.8–10.4)
CHLORIDE SERPL-SCNC: 101 MMOL/L (ref 98–107)
CREAT SERPL-MCNC: 1.05 MG/DL (ref 0.67–1.17)
EGFRCR SERPLBLD CKD-EPI 2021: 72 ML/MIN/1.73M2
ERYTHROCYTE [DISTWIDTH] IN BLOOD BY AUTOMATED COUNT: 11.4 % (ref 10–15)
GLUCOSE BLDC GLUCOMTR-MCNC: 170 MG/DL (ref 70–99)
GLUCOSE BLDC GLUCOMTR-MCNC: 186 MG/DL (ref 70–99)
GLUCOSE BLDC GLUCOMTR-MCNC: 193 MG/DL (ref 70–99)
GLUCOSE SERPL-MCNC: 203 MG/DL (ref 70–99)
HCO3 SERPL-SCNC: 26 MMOL/L (ref 22–29)
HCT VFR BLD AUTO: 28 % (ref 40–53)
HGB BLD-MCNC: 9 G/DL (ref 13.3–17.7)
MCH RBC QN AUTO: 32.7 PG (ref 26.5–33)
MCHC RBC AUTO-ENTMCNC: 32.1 G/DL (ref 31.5–36.5)
MCV RBC AUTO: 102 FL (ref 78–100)
PLATELET # BLD AUTO: 303 10E3/UL (ref 150–450)
POTASSIUM SERPL-SCNC: 4.1 MMOL/L (ref 3.4–5.3)
RBC # BLD AUTO: 2.75 10E6/UL (ref 4.4–5.9)
SODIUM SERPL-SCNC: 138 MMOL/L (ref 135–145)
WBC # BLD AUTO: 15.1 10E3/UL (ref 4–11)

## 2025-06-02 PROCEDURE — 250N000013 HC RX MED GY IP 250 OP 250 PS 637

## 2025-06-02 PROCEDURE — 85027 COMPLETE CBC AUTOMATED: CPT

## 2025-06-02 PROCEDURE — 250N000013 HC RX MED GY IP 250 OP 250 PS 637: Performed by: NURSE PRACTITIONER

## 2025-06-02 PROCEDURE — 250N000013 HC RX MED GY IP 250 OP 250 PS 637: Performed by: NEUROLOGICAL SURGERY

## 2025-06-02 PROCEDURE — 36415 COLL VENOUS BLD VENIPUNCTURE: CPT

## 2025-06-02 PROCEDURE — 97530 THERAPEUTIC ACTIVITIES: CPT | Mod: GO

## 2025-06-02 PROCEDURE — 250N000011 HC RX IP 250 OP 636: Performed by: NURSE PRACTITIONER

## 2025-06-02 PROCEDURE — 250N000011 HC RX IP 250 OP 636

## 2025-06-02 PROCEDURE — 80048 BASIC METABOLIC PNL TOTAL CA: CPT

## 2025-06-02 PROCEDURE — 120N000002 HC R&B MED SURG/OB UMMC

## 2025-06-02 PROCEDURE — 99232 SBSQ HOSP IP/OBS MODERATE 35: CPT

## 2025-06-02 RX ORDER — ACETAMINOPHEN 650 MG/1
650 SUPPOSITORY RECTAL EVERY 4 HOURS PRN
Status: DISCONTINUED | OUTPATIENT
Start: 2025-06-02 | End: 2025-06-03 | Stop reason: HOSPADM

## 2025-06-02 RX ORDER — ACETAMINOPHEN 325 MG/1
650 TABLET ORAL EVERY 4 HOURS PRN
Status: DISCONTINUED | OUTPATIENT
Start: 2025-06-02 | End: 2025-06-03 | Stop reason: HOSPADM

## 2025-06-02 RX ORDER — CEFDINIR 300 MG/1
300 CAPSULE ORAL EVERY 12 HOURS SCHEDULED
Status: DISCONTINUED | OUTPATIENT
Start: 2025-06-03 | End: 2025-06-03 | Stop reason: HOSPADM

## 2025-06-02 RX ADMIN — GABAPENTIN 600 MG: 300 CAPSULE ORAL at 19:58

## 2025-06-02 RX ADMIN — Medication 25 MCG: at 09:07

## 2025-06-02 RX ADMIN — AMLODIPINE BESYLATE 5 MG: 5 TABLET ORAL at 09:07

## 2025-06-02 RX ADMIN — CHOLEYSTYRAMINE LIGHT 4 G: 4 POWDER, FOR SUSPENSION ORAL at 09:53

## 2025-06-02 RX ADMIN — OXYCODONE HYDROCHLORIDE AND ACETAMINOPHEN 500 MG: 500 TABLET ORAL at 09:08

## 2025-06-02 RX ADMIN — HEPARIN SODIUM 5000 UNITS: 5000 INJECTION, SOLUTION INTRAVENOUS; SUBCUTANEOUS at 00:11

## 2025-06-02 RX ADMIN — HEPARIN SODIUM 5000 UNITS: 5000 INJECTION, SOLUTION INTRAVENOUS; SUBCUTANEOUS at 15:59

## 2025-06-02 RX ADMIN — ACETAMINOPHEN 650 MG: 325 TABLET ORAL at 17:25

## 2025-06-02 RX ADMIN — CHOLEYSTYRAMINE LIGHT 4 G: 4 POWDER, FOR SUSPENSION ORAL at 17:24

## 2025-06-02 RX ADMIN — Medication 3 MG: at 21:11

## 2025-06-02 RX ADMIN — INSULIN ASPART 2 UNITS: 100 INJECTION, SOLUTION INTRAVENOUS; SUBCUTANEOUS at 09:54

## 2025-06-02 RX ADMIN — HEPARIN SODIUM 5000 UNITS: 5000 INJECTION, SOLUTION INTRAVENOUS; SUBCUTANEOUS at 09:06

## 2025-06-02 RX ADMIN — FINASTERIDE 5 MG: 5 TABLET, FILM COATED ORAL at 09:08

## 2025-06-02 RX ADMIN — FUROSEMIDE 40 MG: 20 TABLET ORAL at 09:06

## 2025-06-02 RX ADMIN — METFORMIN HYDROCHLORIDE 1000 MG: 500 TABLET ORAL at 09:06

## 2025-06-02 RX ADMIN — METFORMIN HYDROCHLORIDE 1000 MG: 500 TABLET ORAL at 17:25

## 2025-06-02 RX ADMIN — ATORVASTATIN CALCIUM 20 MG: 20 TABLET, FILM COATED ORAL at 19:58

## 2025-06-02 RX ADMIN — Medication 250 MG: at 16:57

## 2025-06-02 RX ADMIN — INSULIN ASPART 1 UNITS: 100 INJECTION, SOLUTION INTRAVENOUS; SUBCUTANEOUS at 16:03

## 2025-06-02 RX ADMIN — CEFTRIAXONE SODIUM 2 G: 2 INJECTION, POWDER, FOR SOLUTION INTRAMUSCULAR; INTRAVENOUS at 15:59

## 2025-06-02 RX ADMIN — LISINOPRIL 20 MG: 20 TABLET ORAL at 09:07

## 2025-06-02 RX ADMIN — GABAPENTIN 600 MG: 300 CAPSULE ORAL at 09:07

## 2025-06-02 ASSESSMENT — ACTIVITIES OF DAILY LIVING (ADL)
ADLS_ACUITY_SCORE: 70
ADLS_ACUITY_SCORE: 78
ADLS_ACUITY_SCORE: 78
ADLS_ACUITY_SCORE: 70
ADLS_ACUITY_SCORE: 74
ADLS_ACUITY_SCORE: 70
ADLS_ACUITY_SCORE: 70
ADLS_ACUITY_SCORE: 78
ADLS_ACUITY_SCORE: 70
ADLS_ACUITY_SCORE: 78
ADLS_ACUITY_SCORE: 70
ADLS_ACUITY_SCORE: 74
ADLS_ACUITY_SCORE: 70

## 2025-06-02 NOTE — PROGRESS NOTES
Care Management Follow Up     Length of Stay (days): 6     Expected Discharge Date: 06/06/2025     Concerns to be Addressed: Discharge planning     Patient plan of care discussed at interdisciplinary rounds: Yes     Anticipated Discharge Disposition:   Short term TCU placement as recommended by  OT and Physical Therapy     Anticipated Discharge Services: Short term TCU placment  Anticipated Discharge DME: None     Patient/family educated on Medicare website which has current facility and service quality ratings: Yes  Education Provided on the Discharge Plan: Yes  Patient/Family in Agreement with the Plan: Yes     Referrals Placed by CM/SW: Post Acute  Care Facilities  Private pay costs discussed: Not applicable at this time     Discussed  Partnership in Safe Discharge Planning  document with patient/family: No      Handoff Completed: No, handoff not indicated or clinically appropriate     Additional Information:  SW left a message for pt's significant other (Kenia) at 10:23am today updating in regards to discharge.  At 11:58am, SW received a call from Admissions (Loki) at [facility] who confirmed that he received prior auth from pt's insurance.   At 2:08pm, SILVA spoke with Kenia and updated in regards to discharge  planning.          Next Steps:   SILVA will continue to follow for discharge planning.     JANETTE Viera  Social Work, 6A  Phone:  [phone]  Pager:  [phone]  6/2/2025

## 2025-06-02 NOTE — PLAN OF CARE
Goal Outcome Evaluation:      Plan of Care Reviewed With: patient    Overall Patient Progress: no changeOverall Patient Progress: no change     Status: Pt. s/p posterior Open Cervical 3-6 laminectomy on 5/27/2025 with Dr. Pinon.   Vitals: VSS on 1 L NC NOC with cont SAT monitor.  Neuros: Alert, intermittently needs choices with location and time but also tends to provide sarcastic responses or give little effort during assessments. LLE slightly weaker than RLE, but both are 3/5. BUE 5/5 when encouraged to demonstrate full usage  IV: PIV x2, SL'd  Labs/Electrolytes: monitoring BG AC/HS  Resp: on 1-2 lpm nasal cannula during HS. LS coarse throughout with weak cough. Pt does demonstrate ability to utilize I/S competently when encouraged.   Diet: tolerating reg diet with good PO intake  GI: BM this shift x3 large, soft, incontinent  : voiding with incontinence. Primofit at HS  Skin: neck incision with sutures in place, ROSA ISELA. Blanchable redness to coccyx  Pain: managed effectively with tylenol/gabapentin  Activity: up with a lift, turn/repo q2h.  Plan: discharging to ARU when medically ready  Updates this shift: uneventful

## 2025-06-02 NOTE — PROGRESS NOTES
Care Management Follow Up    Length of Stay (days): 6    Expected Discharge Date: 06/06/2025     Concerns to be Addressed: Discharge planning     Patient plan of care discussed at interdisciplinary rounds: Yes    Anticipated Discharge Disposition:   Short term TCU placement as recommended by  OT and Physical Therapy     Anticipated Discharge Services: Short term TCU placment  Anticipated Discharge DME: None    Patient/family educated on Medicare website which has current facility and service quality ratings: Yes  Education Provided on the Discharge Plan: Yes  Patient/Family in Agreement with the Plan: Yes    Referrals Placed by CM/SW: Post Acute  Care Facilities  Private pay costs discussed: Not applicable at this time    Discussed  Partnership in Safe Discharge Planning  document with patient/family: No     Handoff Completed: No, handoff not indicated or clinically appropriate    Additional Information:  SW  is following pt for discharge planning.  OT and Physical Therapy are recommending short term TCU placement.  Pt has been accepted for admit to [facility] pending receipt of prior auth from insurance.   Shakira Jesse NP states that they are awaiting results of final urine cultures.  Shakira anticipates readiness for discharge on 6/3/2025.   SILVA spoke with Admissions (Loki [phone]) at [facility] and updated.  Loki confirms that he is still awaiting prior auth.  Loki states that he anticipates bed availability for pt on 6/3/2025.  SILVA spoke with pt's floor nurse (Sameera) who states that pt will need stretcher transport to the receiving facility as pt is not able to tolerate sitting up the length of the ride due to pain and weakness.    SILVA met with pt and provided a discharge planning update.  Pt voiced understanding  of the recommended discharge plan and agreement with the recommended discharge plan.      Next Steps:   SILVA will continue to follow for discharge planning.    Zaria  JANETTE Zarco  Social Work, 6A  Phone:  [phone]  Pager:  [phone]  6/2/2025       SERGEI Lorenz

## 2025-06-02 NOTE — PLAN OF CARE
Goal Outcome Evaluation:      Plan of Care Reviewed With: patient    Overall Patient Progress: improvingOverall Patient Progress: improving    Outcome Evaluation: possible d/c 6/3 if bed availability    Status: Pt s/p posterior Open Cervical 3-6 laminectomy on 5/27/2025 with Dr. Pinon.   Vitals: VSS on RA while awake  Neuros: Alert, intermittently needs choices with time. Pt stated he was very disoriented this morning, but was oriented x4. Tearful this AM. LLE slightly weaker than RLE, but both are 3/5. BUE 5/5 when encouraged to demonstrate full usage  IV: PIV x2, SL'd  Labs/Electrolytes: monitoring BG AC/HS  Resp: LS coarse throughout with weak cough. Pt does demonstrate ability to utilize I/S competently when encouraged.   Diet: tolerating reg diet with good PO intake  GI: Frequent loose stool this shift. LBM 6/2  : voiding with incontinence. Primofit per pt preference.  Skin: neck incision with sutures in place, ROSA ISELA. Blanchable redness to coccyx  Pain: managed effectively with tylenol/gabapentin  Activity: up with a lift, turn/repo q2h.  Plan: discharging to TCU potentially tomorrow.  Updates this shift: uneventful

## 2025-06-02 NOTE — PROGRESS NOTES
"[facility], [place]   Neurosurgery Progress Note:    Date of service: 6/2/2025    Assessment: Nahun Villalobos is a 79 year old male  s/p posterior Open Cervical 3-6 laminectomy on 5/27/2025 with Dr. Pinon.     Clinically Significant Risk Factors Present on Admission                   # Hypertension: Noted on problem list  # Chronic heart failure with preserved ejection fraction: heart failure noted on problem list and last echo with EF >50%           # Overweight: Estimated body mass index is 29.1 kg/m  as calculated from the following:    Height as of this encounter: 1.676 m (5' 6\").    Weight as of this encounter: 81.8 kg (180 lb 4.8 oz).   # Moderate Malnutrition: based on nutrition assessment and treatment provided per dietitian's recommendations.      Plan:  - Neuro checks/vital signs: Every 4 hours  - Pain control  - Activity: up as tolerated  - Restrictions/Bracing: none  - Diet: Regular  - DVT prophylaxis: SCDs and subcutaneous heparin   - PT/OT: recommending ARU  - Medicine consult - appreciate recommendations   - Hold Eliquis until POD 14  - Delirium precautions   - Awaiting sensitivity results per Medicine team for UTI treatment     SELMA Lucas, CNP  Department of Neurosurgery  Pager: [phone]      Please contact neurosurgery resident on call with questions.    Dial * * *[phone], enter [phone] when prompted.     Interval History:  No acute interval events.  Awaiting final urine sensitives for definitive UTI treatment.  Pain controlled.      Objective:   Temp:  [98  F (36.7  C)-99.6  F (37.6  C)] 98.9  F (37.2  C)  Pulse:  [87-99] 92  Resp:  [16-18] 16  BP: (124-150)/(61-92) 142/81  SpO2:  [92 %-97 %] 96 %  I/O last 3 completed shifts:  In: 250 [P.O.:250]  Out: 800 [Urine:800]    Gen: Appears comfortable, NAD  Wound: Incision, clean, dry, intact without strikethrough  Neurologic:  - Alert & Oriented to person, place  - Follows commands   - Speech fluent, spontaneous. No " aphasia or dysarthria.  - No gaze preference. No apparent hemineglect.  - PERRL, EOMI  - Strong eye closure, jaw clench, and cheek puff  - Face symmetric with sensation intact to light touch  - Palate elevates symmetrically, uvula midline, tongue protrudes midline  - Trapezii and sternocleidomastoid muscles 5/5 bilaterally  - No pronator drift     Del Tr Bi WE WF Gr   R 5 5 5 5 5 5   L 5 5 5 5 5 5    HF KE KF DF PF EHL   R 5 5 5 5 5 5   L 5 5 5 5 5 5     Reflexes 2+ throughout    Sensation intact and symmetric to light touch throughout    LABS  Recent Labs   Lab Test 06/02/25 0622 06/01/25 0627 05/31/25  0712   WBC 15.1* 14.2* 16.0*   HGB 9.0* 9.0* 9.0*   * 99 101*    278 230     Recent Labs   Lab Test 06/02/25  0948 06/02/25 0622 06/01/25  2213 06/01/25  0929 06/01/25 0627 05/31/25  0846 05/31/25  0712   NA  --  138  --   --  137  --  138   POTASSIUM  --  4.1  --   --  4.3  --  4.4   CHLORIDE  --  101  --   --  100  --  101   CO2  --  26  --   --  26  --  28   BUN  --  16.2  --   --  15.6  --  14.5   CR  --  1.05  --   --  1.07  --  1.09   ANIONGAP  --  11  --   --  11  --  9   JESSE  --  8.9  --   --  8.9  --  8.5*   * 203* 192*   < > 203*   < > 184*    < > = values in this interval not displayed.

## 2025-06-02 NOTE — PROGRESS NOTES
[facility]    Medicine Progress Note - Hospitalist Service, GOLD TEAM 7    Date of Admission:  5/27/2025        Assessment & Plan   Nahun Villalobos is a 79 year old male admitted on 5/27/2025. 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 He was originally planned for a C3-6 laminoplasty, but given his overall comorbidity and age, a C3-6 laminectomy was offered by neurosurgery at the affected levels. Internal medicine was consulted postoperatively for medical comanagement given medical complexity.     Today's updates  - awaiting for sensitivity of E. coli to discalate antibiotics     # S/p  posterior complete cervical 3-5 laminectomy, posterior partial cervical 6 laminectomy  # Cervical stenosis w/ myelopathy and neuropathy  # History of recurrent mechanical falls   # Chronic back pain with Sciatica  Chronic back pain with sciatica, cervical stenosis with myelopathy and neuropathy complicated by recurrent falls. Most recent fall being 2/24/2025 when he attempted to sit down and flipped backwards in his wheelchair - sustained T10 fracture.  He was originally planned for a C3-6 laminoplasty, but given his overall comorbidity and age, a C3-6 laminectomy was offered by neurosurgery at the affected levels. Surgery was without complication and was conducted by Dr. Pinon and resident Dr. Johnson. EBL 50mL. Patient is hemodynamically stable and pain is well controlled. He states that he feels neck stiffness but otherwise is okay.   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 He previously underwent an exploratory laparotomy with small-bowel resection in March of 2023 and subsequently was managed on lanreotide. Following most recent PET which revealed mets, oncology team recommended transitioning therapy to Lutathera. Follows with [facility] - seen most recently by Marisa Abbott DNP on 4/23/25.   - Follow-up with Oncology/Hematology upon discharge for continued management.   - Planning to follow with nuclear medicine soon and transition to Lutathera     #Hypomagenesemia  #Hypocacemia  -replace    Leukocytosis, Improving  E. coli urinary tract infection  Persistent leukocytosis after operation.  No fever.  Hemodynamically stable.  Urinalysis looks dirty but culture is pending.  Ceftriaxone IV started.  Leukocytosis improving now.  -follow culture and sensitivity   - Continue IV ceftriaxone, deescalate with antibiotics once sensitivity comes back    # COPD  # CIERA  Patient non adherent w/ CPAP.  - Duonebs PRN available.   - Continue PTA Breo Ellipta and Incruse Ellipta.     # Hx PE on AC 2020  - PTA Eliquis as managed per surgical team - HOLD through procedure  - Continue PTA iron supplement.     # Anemia, chronic, normocytic   # Mild leukocytosis   Baseline Hgb 11-12g/dL - hgb 11.9 on day of operation. WBC 11.6. No infectious symptoms - may be related to cancer diagnosis.   - AM CBC      # T2DM w/ hyperglycemia   A1C 6.4% on 5/23/2025.  - HOLD metformin    - MSSI   - Hypoglycemia protocol   - BG checks TID AC + qHS + 0200     # HTN  # HFpEF, chronic compensated  Expect some hypertension in the setting of acute postoperative pain. In 2024 EF was 55-60% .  - Continue amlodipine,  "lisinopril  - resume  lasix for now  - Intake and output, daily weights   - Should establish with cardiology on discharge      # BPH  - Continue PTA finasteride.     # Chronic diarrhea  - Continue PTA cholestyramine.     # HLD  - Continue PTA statin.     # Recent multiple stage II pressure injuries to bilateral flank  Noted on recent hospitalization.   - RN care order placed to assess for pressure injuries of flank     # Hx tobacco use disorder  2.0 packs/day for 53.0 years   - Quit in 2013    Moderate malnutrition  -decreased po intake  , subcutaneous fat loss and muscle loss  -nutrition consult, appreciate recs             Diet: Advance Diet as Tolerated: Regular Diet Adult    DVT Prophylaxis: Pneumatic Compression Devices  Gomze Catheter: Not present  Lines: None     Cardiac Monitoring: None  Code Status: Full Code      Clinically Significant Risk Factors                   # Hypertension: Noted on problem list    # Chronic heart failure with preserved ejection fraction: heart failure noted on problem list and last echo with EF >50%           # Overweight: Estimated body mass index is 29.1 kg/m  as calculated from the following:    Height as of this encounter: 1.676 m (5' 6\").    Weight as of this encounter: 81.8 kg (180 lb 4.8 oz).   # Moderate Malnutrition: based on nutrition assessment and treatment provided per dietitian's recommendations.      # Financial/Environmental Concerns:           Social Drivers of Health    Housing Stability: High Risk (5/28/2025)    Housing Stability     Do you have housing? : No     Are you worried about losing your housing?: No   Tobacco Use: Medium Risk (5/27/2025)    Patient History     Smoking Tobacco Use: Former     Smokeless Tobacco Use: Never     Passive Exposure: Past   Physical Activity: Insufficiently Active (4/24/2025)    Received from [facility]    Exercise Vital Sign     Days of Exercise per Week: 1 day     Minutes of Exercise per Session: 10 min   Social Connections: " Unknown (9/2/2024)    Social Connection and Isolation Panel [NHANES]     Frequency of Social Gatherings with Friends and Family: Once a week            Vincent Farley MD  Hospitalist Service, GOLD TEAM 7  [facility]  Securely message with [employer] (more info)  Text page via [employer] Paging/Directory   See signed in provider for up to date coverage information  _____________________________________________________________________  Interval History     No acute events overnight. Urine frequency but improving    Physical Exam   Vital Signs: Temp: 99.3  F (37.4  C) Temp src: Oral BP: 121/72 Pulse: 71   Resp: 16 SpO2: 100 % O2 Device: Nasal cannula    General Appearance: alert    Neck: clean wound with mild tenderess on cervical  Respiratory: Vesicular breath sounds,  bilaterally  Cardiovascular: No JVD, RRR. No murmurs, rubs or gallops.  GI: Soft, nontender, nondistended  Skin: No rash. No ecchymoses or petechiae.  Musculoskeletal: Normal muscle bulk and tone.  No edema  Neurologic: AOx4.      Oxygen Delivery: 1 LPM  Weight: 180 lbs 4.82 oz        Medical Decision Making       55 MINUTES SPENT BY ME on the date of service doing chart review, history, exam, documentation & further activities per the note.      Data     I have personally reviewed the following data over the past 24 hrs:    15.1 (H)  \   9.0 (L)   / 303     138 101 16.2 /  193 (H)   4.1 26 1.05 \

## 2025-06-03 ENCOUNTER — PATIENT OUTREACH (OUTPATIENT)
Dept: CARE COORDINATION | Facility: CLINIC | Age: 80
End: 2025-06-03
Payer: COMMERCIAL

## 2025-06-03 VITALS
OXYGEN SATURATION: 95 % | HEART RATE: 72 BPM | RESPIRATION RATE: 16 BRPM | TEMPERATURE: 98.6 F | DIASTOLIC BLOOD PRESSURE: 66 MMHG | BODY MASS INDEX: 28.98 KG/M2 | HEIGHT: 66 IN | WEIGHT: 180.3 LBS | SYSTOLIC BLOOD PRESSURE: 139 MMHG

## 2025-06-03 LAB
ANION GAP SERPL CALCULATED.3IONS-SCNC: 9 MMOL/L (ref 7–15)
BACTERIA UR CULT: ABNORMAL
BACTERIA UR CULT: ABNORMAL
BUN SERPL-MCNC: 13.9 MG/DL (ref 8–23)
CALCIUM SERPL-MCNC: 9 MG/DL (ref 8.8–10.4)
CHLORIDE SERPL-SCNC: 99 MMOL/L (ref 98–107)
CREAT SERPL-MCNC: 0.92 MG/DL (ref 0.67–1.17)
EGFRCR SERPLBLD CKD-EPI 2021: 84 ML/MIN/1.73M2
ERYTHROCYTE [DISTWIDTH] IN BLOOD BY AUTOMATED COUNT: 11.4 % (ref 10–15)
GLUCOSE BLDC GLUCOMTR-MCNC: 183 MG/DL (ref 70–99)
GLUCOSE BLDC GLUCOMTR-MCNC: 193 MG/DL (ref 70–99)
GLUCOSE BLDC GLUCOMTR-MCNC: 198 MG/DL (ref 70–99)
GLUCOSE SERPL-MCNC: 185 MG/DL (ref 70–99)
HCO3 SERPL-SCNC: 26 MMOL/L (ref 22–29)
HCT VFR BLD AUTO: 28.7 % (ref 40–53)
HGB BLD-MCNC: 9.4 G/DL (ref 13.3–17.7)
MCH RBC QN AUTO: 32.8 PG (ref 26.5–33)
MCHC RBC AUTO-ENTMCNC: 32.8 G/DL (ref 31.5–36.5)
MCV RBC AUTO: 100 FL (ref 78–100)
PLATELET # BLD AUTO: 310 10E3/UL (ref 150–450)
POTASSIUM SERPL-SCNC: 5 MMOL/L (ref 3.4–5.3)
RBC # BLD AUTO: 2.87 10E6/UL (ref 4.4–5.9)
SODIUM SERPL-SCNC: 134 MMOL/L (ref 135–145)
WBC # BLD AUTO: 13.4 10E3/UL (ref 4–11)

## 2025-06-03 PROCEDURE — 250N000013 HC RX MED GY IP 250 OP 250 PS 637

## 2025-06-03 PROCEDURE — 82374 ASSAY BLOOD CARBON DIOXIDE: CPT

## 2025-06-03 PROCEDURE — 250N000013 HC RX MED GY IP 250 OP 250 PS 637: Performed by: NURSE PRACTITIONER

## 2025-06-03 PROCEDURE — 85027 COMPLETE CBC AUTOMATED: CPT

## 2025-06-03 PROCEDURE — 250N000011 HC RX IP 250 OP 636: Performed by: NURSE PRACTITIONER

## 2025-06-03 PROCEDURE — 250N000013 HC RX MED GY IP 250 OP 250 PS 637: Performed by: NEUROLOGICAL SURGERY

## 2025-06-03 PROCEDURE — 36415 COLL VENOUS BLD VENIPUNCTURE: CPT

## 2025-06-03 RX ORDER — METHOCARBAMOL 500 MG/1
250 TABLET, FILM COATED ORAL 4 TIMES DAILY PRN
DISCHARGE
Start: 2025-06-03

## 2025-06-03 RX ORDER — OXYCODONE HYDROCHLORIDE 5 MG/1
5 TABLET ORAL EVERY 6 HOURS PRN
Qty: 15 TABLET | Refills: 0 | Status: SHIPPED | OUTPATIENT
Start: 2025-06-03

## 2025-06-03 RX ORDER — HEPARIN SODIUM 5000 [USP'U]/.5ML
5000 INJECTION, SOLUTION INTRAVENOUS; SUBCUTANEOUS EVERY 8 HOURS
DISCHARGE
Start: 2025-06-03

## 2025-06-03 RX ORDER — CEFDINIR 300 MG/1
300 CAPSULE ORAL EVERY 12 HOURS
DISCHARGE
Start: 2025-06-03 | End: 2025-06-07

## 2025-06-03 RX ADMIN — INSULIN ASPART 1 UNITS: 100 INJECTION, SOLUTION INTRAVENOUS; SUBCUTANEOUS at 09:11

## 2025-06-03 RX ADMIN — ACETAMINOPHEN 650 MG: 325 TABLET ORAL at 05:21

## 2025-06-03 RX ADMIN — AMLODIPINE BESYLATE 5 MG: 5 TABLET ORAL at 09:18

## 2025-06-03 RX ADMIN — HEPARIN SODIUM 5000 UNITS: 5000 INJECTION, SOLUTION INTRAVENOUS; SUBCUTANEOUS at 00:28

## 2025-06-03 RX ADMIN — OXYCODONE HYDROCHLORIDE AND ACETAMINOPHEN 500 MG: 500 TABLET ORAL at 09:18

## 2025-06-03 RX ADMIN — ACETAMINOPHEN 650 MG: 325 TABLET ORAL at 09:25

## 2025-06-03 RX ADMIN — Medication 25 MCG: at 09:18

## 2025-06-03 RX ADMIN — LISINOPRIL 20 MG: 20 TABLET ORAL at 09:18

## 2025-06-03 RX ADMIN — HEPARIN SODIUM 5000 UNITS: 5000 INJECTION, SOLUTION INTRAVENOUS; SUBCUTANEOUS at 09:18

## 2025-06-03 RX ADMIN — METFORMIN HYDROCHLORIDE 1000 MG: 500 TABLET ORAL at 09:19

## 2025-06-03 RX ADMIN — FINASTERIDE 5 MG: 5 TABLET, FILM COATED ORAL at 09:18

## 2025-06-03 RX ADMIN — CEFDINIR 300 MG: 300 CAPSULE ORAL at 09:18

## 2025-06-03 RX ADMIN — ACETAMINOPHEN 650 MG: 325 TABLET ORAL at 01:01

## 2025-06-03 RX ADMIN — FUROSEMIDE 40 MG: 20 TABLET ORAL at 09:18

## 2025-06-03 RX ADMIN — GABAPENTIN 600 MG: 300 CAPSULE ORAL at 09:18

## 2025-06-03 ASSESSMENT — ACTIVITIES OF DAILY LIVING (ADL)
ADLS_ACUITY_SCORE: 66
ADLS_ACUITY_SCORE: 66
ADLS_ACUITY_SCORE: 70
ADLS_ACUITY_SCORE: 66
ADLS_ACUITY_SCORE: 70
ADLS_ACUITY_SCORE: 66
ADLS_ACUITY_SCORE: 70
ADLS_ACUITY_SCORE: 70

## 2025-06-03 NOTE — PROGRESS NOTES
Discharge  time/date: 1230 6/3/25  Walked or Wheelchair: EMS transport via stretcher  PIV removed: yes  Reviewed AVS with patient: n/a  Medication due times added to AVS in EPIC: yes  Verbalized understanding of discharge with teachback: n/a  Medications retrieved from pharmacy: n/a  Supplies sent home: n/a  Belongings from security with patient: n/a

## 2025-06-03 NOTE — PROGRESS NOTES
"Clinic Care Coordination Contact  Care Coordination Transition Communication    Clinical Data: Patient was hospitalized at [facility] from 05/27/25 to 06/03/25 with diagnosis of: \"Cervical spondylosis with myelopathy [M47.12] \".     Assessment: Patient has transitioned to TCU/ARU for short term rehabilitation:    Facility Name: [facility]  Transition Communication:  Notified facility of Primary Care- Care Coordination support via Epic fax.    Plan: Care Coordinator will await notification from facility staff informing of patient's discharge plans/needs. Care Coordinator will review chart and outreach to facility staff every 4 weeks and as needed.     Margy Nunes RN Care Coordinator  [facility] [place], [place], [place]  Email: [email or web address]  Phone: [phone]   "

## 2025-06-03 NOTE — LETTER
[facility]   To: Please give to facility    From:   Kelli Davidson  RN  Care Coordinator   [facility]   P: [phone]  [email or web address]   Patient Name:  Nahun Villalobos  YOB: 1945   Admit date: 06/03/2025      *Information Needed:  Please contact me when the patient will discharge (or if they will move to long term care)- include the discharge date, disposition, and main diagnosis   If the patient is discharged with home care services, please provide the name of the agency    Also- Please inform me if a care conference is being held.   Phone, Fax or Email with information                        Thank you

## 2025-06-03 NOTE — PLAN OF CARE
Physical Therapy Discharge Summary    Reason for therapy discharge:    Discharged to transitional care facility.    Progress towards therapy goal(s). See goals on Care Plan in [facility] electronic health record for goal goals unmet, pt discharged to facility    Therapy recommendation(s):    Continued therapy is recommended.  Rationale/Recommendations:  TCU to maximize safety and IND.

## 2025-06-03 NOTE — PLAN OF CARE
"BP (!) 158/78 (BP Location: Right arm)   Pulse 73   Temp 97.7  F (36.5  C) (Axillary)   Resp 16   Ht 1.676 m (5' 6\")   Wt 81.8 kg (180 lb 4.8 oz)   SpO2 93%   BMI 29.10 kg/m      Plan of care reviewed with: patient   Overall patient progress: no change    Time: 7405-2097  Status: s/p posterior open cervical 3-6 laminectomy for dx of cervical spondylosis with myelopathy.   Neuro/Pain: A&Ox3, disoriented to time. Follow commands. C/o surgical site pain, PRN Tylenol twice this shift. Refused Oxycodone 5 mg at it makes him sleepy per pt report.    Activity: lift device.   Cardiac/vs: hypertensive 158/78, didn't meet parameter for team notification. Afebrile.   Respiratory: on room air while awake.   GI/: No bm this shift, active bowel sound. Primofit external cath with adequate urine output.   Diet: regular  Skin: blanchable redness on his coccyx, surgical incision on his posterior neck. Scabbing small on left great toe, not new per pt. Mepi on his coccyx. Side to side repositioning q2h. Heels pressure off with pillows. Surgical dressing CDI.   LDAs: PIV saline locked.   Labs/Imaging: ACHS blood glucose.   Change this shift: no change   Plan: encourage IS, needs assistance. Monitor temp and BP. Neurosurgery team for any concerns. Possible TCU discharge today.     Goal Outcome Evaluation:    "

## 2025-06-03 NOTE — PLAN OF CARE
Goal Outcome Evaluation:      Plan of Care Reviewed With: patient    Overall Patient Progress: improving    Status: S/p posterior complete cervical 3-5 laminectomy, posterior partial cervical 6 laminectomy 5/27. Hx CHF, T2DM, COPD, history of PE on Eliquis, malignant carcinoid tumor in the small intestine, mets to the liver and ribs, lung carcinoma s/p wedge resection, sleep apnea, recent diagnosis of T10 vertebral body fx.  Vitals: VSS ex HTN w/in parameters.  Neuros: Alert, disoriented to time. Uppers 5/5, lowers 3/5. Neuropathy to hands and feet at baseline.  IV: PIV SL x2  Labs/Electrolytes: BG ACHS.  Resp: LS diminished t/o. Infrequent cough, not noticed this shift.  Diet: Regular diet, good intake.  GI: Loose BM this shift, bowel meds withheld.  : Voiding with incontinence, Primofit in place per pt request.  Skin: Neck incision sutured and ROSA ISELA. Blanchable redness to coccyx.  Pain: Managed with Tylenol and Robaxin.  Activity: A2/lift, T/R Q2.  Social: Spouse Kenia visited today.  Plan: Potential discharge tomorrow 6/3 to TCU.

## 2025-06-03 NOTE — PROGRESS NOTES
"Care Management Discharge Note    Discharge Date: 06/03/2025       Discharge Disposition:   [facility] ([phone])    Discharge Services:  Short term TCU placement at [facility]    Discharge DME: Not applicable at this time    Discharge Transportation:  Per 6/2/2025 conversation with pt's floor nurse (Sameera) pt will need stretcher transport to the receiving facility as pt is not able to tolerate sitting up the length of the ride due to pain and weakness.   SW arranged for [facility] ([place] [phone]) to provide stretcher transport with a service window  time of 10:56am - 11:41am.    Private pay costs discussed: Not applicable    Does the patient's insurance plan have a 3 day qualifying hospital stay waiver?  Yes     Which insurance plan 3 day waiver is available? Alternative insurance waiver    Will the waiver be used for post-acute placement? No    PAS Confirmation Code: [id number]    Patient/family educated on Medicare website which has current facility and service quality ratings: Yes    Education Provided on the Discharge Plan: Yes  Persons Notified of Discharge Plans:   pt, pt's significant other (Kenia), 6A nursing and Ramsey Gautam NP  Patient/Family in Agreement with the Plan: Yes    Handoff Referral Completed: Yes, [facility] PCP: Internal handoff referral completed    Additional Information:  - Ramsey Gautam NP confirmed readiness for discharge  - Admissions (Loki) at  [facility]  confirmed acceptance for admit and receipt of prior auth from insurance  - SW completed \"Important Message from Medicare\" with pt  - SW completed on-line \"Physicians Certification for Transportation\" form  - SW faxed  pt's completed/signed discharge orders, discharge summary and narcotic script to  [facility] .    JANETTE Viera  Social Work, 6A  Phone:  [phone]  Pager:  [phone]  6/3/2025       SERGEI Lorenz     "

## 2025-06-03 NOTE — DISCHARGE SUMMARY
"[facility] Discharge Summary and Instructions    Nahun Villalobos MRN# [id number]   Age: 79 year old YOB: 1945     Date of Admission:  5/27/2025  Date of Discharge::  6/3/2025  Admitting Physician:  Seda Pinon MD  Discharge Physician:  Seda Pinon MD          Admission Diagnoses:   Cervical spondylosis with myelopathy [M47.12]          Discharge Diagnosis:     Cervical spondylosis with myelopathy [M47.12]     Clinically Significant Risk Factors Present on Admission         # Hyponatremia: Lowest Na = 134 mmol/L in last 2 days, will monitor as appropriate               # Hypertension: Noted on problem list  # Chronic heart failure with preserved ejection fraction: heart failure noted on problem list and last echo with EF >50%               # Overweight: Estimated body mass index is 29.1 kg/m  as calculated from the following:    Height as of this encounter: 1.676 m (5' 6\").    Weight as of this encounter: 81.8 kg (180 lb 4.8 oz).       # Moderate Malnutrition: based on nutrition assessment and treatment provided per dietitian's recommendations.            Procedures:   Posterior Open Cervical 3-6 laminectomy on 5/27/2025 with Dr. Pinon.            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 He was originally planned for a C3-6 laminoplasty, but given his overall comorbidity and age, a C3-6 laminectomy was offered at the affected levels.  Patient has elected to undergo above-mentioned procedure.           Hospital Course:   Patient underwent above-mentioned procedure on " 5/27/2025.  Following the procedure the patient was transferred to surgical floor.  The patient's course was complicated by generalized weakness and UTI. PT/OT assessed patient and recommended TCU. UA/UC was positive on 5/31/2025 and was treated with IV rocephin and discharged on PO cefdinir.  On post operative day 7, he was ambulating, voiding without a philip, eating a regular diet, pain was well controlled and therefore he was discharged to TCU.  Patient will follow-up with Neurosurgery on 6/10/2025.            Discharge Medications:     Current Discharge Medication List        START taking these medications    Details   cefdinir (OMNICEF) 300 MG capsule Take 1 capsule (300 mg) by mouth every 12 hours for 4 days.    Comments: UTI  Associated Diagnoses: S/P laminectomy      Heparin Sodium, Porcine, (HEPARIN ANTICOAGULANT) 5000 UNIT/0.5ML injection Inject 0.5 mLs (5,000 Units) subcutaneously every 8 hours.    Comments: DVT prophylaxis. Okay to discontinue when ambulating at baseline  Associated Diagnoses: S/P laminectomy      melatonin 3 MG tablet Take 1 tablet (3 mg) by mouth at bedtime.    Comments: sleep  Associated Diagnoses: S/P laminectomy      oxyCODONE (ROXICODONE) 5 MG tablet Take 1 tablet (5 mg) by mouth every 6 hours as needed for moderate pain.  Qty: 15 tablet, Refills: 0    Associated Diagnoses: S/P laminectomy      senna-docusate (SENOKOT-S/PERICOLACE) 8.6-50 MG tablet Take 1 tablet by mouth 2 times daily.  Qty: 20 tablet, Refills: 0    Associated Diagnoses: S/P laminectomy           CONTINUE these medications which have CHANGED    Details   apixaban ANTICOAGULANT (ELIQUIS ANTICOAGULANT) 2.5 MG tablet Take 1 tablet (2.5 mg) by mouth 2 times daily.  Qty: 180 tablet, Refills: 2    Comments: Okay to resume on POD 14 (6/10)  Associated Diagnoses: Other pulmonary embolism without acute cor pulmonale, unspecified chronicity (H)      methocarbamol (ROBAXIN) 500 MG tablet Take 0.5 tablets (250 mg) by mouth 4  times daily as needed for muscle spasms.    Associated Diagnoses: S/P laminectomy           CONTINUE these medications which have NOT CHANGED    Details   acetaminophen (TYLENOL) 500 MG tablet Take 1,000 mg by mouth 2 times daily.      albuterol (VENTOLIN HFA) 108 (90 Base) MCG/ACT inhaler INHALE 2 PUFFS INTO THE LUNGS EVERY 6 HOURS AS NEEDED FOR SHORTNESS OF BREATH / DYSPNEA OR WHEEZING  Qty: 25.5 g, Refills: 0    Comments: Pharmacy may dispense brand covered by insurance (Proair, or proventil or ventolin or generic albuterol inhaler)  Associated Diagnoses: Chronic obstructive pulmonary disease, unspecified COPD type (H)      amLODIPine (NORVASC) 5 MG tablet Take 1 tablet (5 mg) by mouth daily.  Qty: 90 tablet, Refills: 3    Associated Diagnoses: Benign essential hypertension      atorvastatin (LIPITOR) 20 MG tablet Take 1 tablet (20 mg) by mouth daily.  Qty: 90 tablet, Refills: 2    Associated Diagnoses: Hyperlipidemia LDL goal <100      calcium carbonate (TUMS) 500 MG chewable tablet Take 1 chew tab by mouth daily as needed for heartburn.      cholestyramine light (QUESTRAN) 4 GM packet Take 4 g by mouth 2 times daily (with meals).    Associated Diagnoses: Diarrhea, unspecified type      Coenzyme Q10 400 MG CAPS Take 400 mg by mouth daily  Qty: 30 capsule, Refills: 0    Associated Diagnoses: Nutritional deficiency      ferrous sulfate (FE TABS) 325 (65 Fe) MG EC tablet Take 1 tablet (325 mg) by mouth every evening  Qty: 30 tablet, Refills: 0    Associated Diagnoses: Nutritional deficiency      finasteride (PROSCAR) 5 MG tablet Take 1 tablet (5 mg) by mouth daily  Qty: 90 tablet, Refills: 3    Associated Diagnoses: Gross hematuria      Fluticasone-Umeclidin-Vilant (TRELEGY ELLIPTA) 100-62.5-25 MCG/ACT oral inhaler USE 1 INHALATION DAILY  Qty: 60 each, Refills: 5    Associated Diagnoses: Chronic obstructive pulmonary disease, unspecified COPD type (H)      furosemide (LASIX) 40 MG tablet Take 1 tablet (40 mg) by  "mouth daily.    Associated Diagnoses: Essential hypertension, benign      gabapentin (NEURONTIN) 300 MG capsule TAKE 2 CAPSULES TWICE A DAY WITH MEALS  Qty: 180 capsule, Refills: 1    Associated Diagnoses: Neuropathic pain      lanreotide acetate (SOMATULINE) 120 MG/0.5ML injection Inject 120 mg subcutaneously every 28 days.      lisinopril (ZESTRIL) 20 MG tablet Take 20 mg by mouth daily.      metFORMIN (GLUCOPHAGE) 1000 MG tablet Take 1 tablet (1,000 mg) by mouth 2 times daily (with meals).  Qty: 180 tablet, Refills: 1    Associated Diagnoses: Type 2 diabetes mellitus without complication, without long-term current use of insulin (H)      miconazole (MICATIN) 2 % external cream Apply topically 2 times daily as needed for other (Rash/ skin irritation)    Associated Diagnoses: Hospital discharge follow-up      order for DME Oxygen 2 Li/min  at night via nasal cannula  Qty: 1 Device, Refills: 0    Associated Diagnoses: Nocturnal hypoxemia      vitamin B-Complex Take 1 tablet by mouth daily      vitamin C (ASCORBIC ACID) 500 MG tablet Take 500 mg by mouth daily.      Vitamin D3 (VITAMIN D-1000 MAX ST) 25 mcg (1000 units) tablet Take 25 mcg by mouth daily.      zoledronic acid (ZOMETA) 4 MG/5ML injection Inject 4 mg into the vein every 2 months.      blood glucose (NO BRAND SPECIFIED) lancets standard Use to test blood sugar 1 time daily, first thing in the morning  Qty: 50 each, Refills: 3    Associated Diagnoses: Type 2 diabetes mellitus without complication, without long-term current use of insulin (H)                    Exam:   Physical Exam  BP (!) 157/79 (BP Location: Left arm)   Pulse 70   Temp 98  F (36.7  C) (Oral)   Resp 15   Ht 1.676 m (5' 6\")   Wt 81.8 kg (180 lb 4.8 oz)   SpO2 93%   BMI 29.10 kg/m    General: Appears comfortable, NAD  Wound: Incision, clean, dry, intact without strikethrough  NEUROLOGIC:  -- Awake; Alert; oriented x 3  -- Follows commands briskly  -- +repetition, calculation, and " naming  -- Speech fluent, spontaneous. No aphasia or dysarthria.  -- no gaze preference. No apparent hemineglect.  Cranial Nerves:  -- visual fields full to confrontation, PERRL 3-2mm bilat and brisk, extraocular movements intact  -- face symmetrical, tongue midline  -- sensory V1-V3 intact bilaterally  -- palate elevates symmetrically, uvula midline  -- hearing grossly intact bilat  -- Trapezii 5/5 strength bilat symmetric  -- Cerebellar: Finger nose finger without dysmetria, intact rapid alternating motions bilaterally     Motor:  No muscle wasting or fasciculations  No Pronator Drift       Delt Bi Tri Hand Flexion/  Extension Iliopsoas Quadriceps Hamstrings Tibialis Anterior Gastroc     C5 C6 C7 C8/T1 L2 L3 L4-S1 L4 S1   R 5 5 5 5 3 4+ 4 4- 4   L 5 5 5 5 5 5 5 5 5      Sensory:  Diminished sensation in legs in asymmetric distribution, diminished sensation in all fingers in hands, length dependent neuropathy in upper extremities     Reflexes: 3+ patellar reflex bilaterally, 2 beats of clonus on right leg. Right-arm lake   Gait: deferred            Discharge Instructions and Follow-Up:     Discharge diet: Regular   Discharge activity: You may advance activity as tolerated. No strenuous exercise or heay lifting greater than 10 lbs for 4 weeks or until seen and cleared in clinic.   Please continue to wear brace until cleared by Neurosurgical provider.    Discharge follow-up: Follow-up with Neurosurgery [facility]ON[facility] in 1 weeks for wound check/post-hospital evaluation, all additional follow-up visits to be determined by Dr. Seda Pinon MD       Wound care: Ok to shower,however no scrubbing of the wound and no soaking of the wound, meaning no bathtubs or swimming pools. Pat dry only. Leave wound open to air.  Patient to have wound checked 2 weeks following surgery.    Wound location: neck  Closure technique: sutures  Dressing needs: none  Post-op care at follow-up: Keep dry and clean         Please call if you  have:  1. increased pain, redness, drainage, swelling at your incision  2. fevers > 101.5 F degrees  3. with any questions or concerns.  You may reach the Neurosurgery clinic at [phone] during regular work hours. ER at [phone].    and ask for the Neurosurgery Resident on call at [phone], during off hours or weekends.         Discharge Disposition:     Discharged to home        SELMA Melendez, CNP  Neurosurgery  Pager [phone]

## 2025-06-04 ENCOUNTER — PATIENT OUTREACH (OUTPATIENT)
Dept: CARE COORDINATION | Facility: CLINIC | Age: 80
End: 2025-06-04

## 2025-06-04 ENCOUNTER — TRANSITIONAL CARE UNIT VISIT (OUTPATIENT)
Dept: GERIATRICS | Facility: CLINIC | Age: 80
End: 2025-06-04
Payer: COMMERCIAL

## 2025-06-04 ENCOUNTER — DOCUMENTATION ONLY (OUTPATIENT)
Dept: GERIATRICS | Facility: CLINIC | Age: 80
End: 2025-06-04

## 2025-06-04 VITALS
OXYGEN SATURATION: 95 % | WEIGHT: 167 LBS | RESPIRATION RATE: 18 BRPM | BODY MASS INDEX: 26.84 KG/M2 | HEART RATE: 77 BPM | TEMPERATURE: 98.1 F | SYSTOLIC BLOOD PRESSURE: 164 MMHG | DIASTOLIC BLOOD PRESSURE: 81 MMHG | HEIGHT: 66 IN

## 2025-06-04 DIAGNOSIS — G47.33 OBSTRUCTIVE SLEEP APNEA SYNDROME: ICD-10-CM

## 2025-06-04 DIAGNOSIS — E87.1 HYPONATREMIA: ICD-10-CM

## 2025-06-04 DIAGNOSIS — N40.0 BENIGN PROSTATIC HYPERPLASIA WITHOUT LOWER URINARY TRACT SYMPTOMS: ICD-10-CM

## 2025-06-04 DIAGNOSIS — D64.9 ANEMIA, UNSPECIFIED TYPE: ICD-10-CM

## 2025-06-04 DIAGNOSIS — Z98.890 H/O LAMINECTOMY: ICD-10-CM

## 2025-06-04 DIAGNOSIS — M48.02 CERVICAL STENOSIS OF SPINAL CANAL: Primary | ICD-10-CM

## 2025-06-04 DIAGNOSIS — Z86.711 HISTORY OF PULMONARY EMBOLISM: ICD-10-CM

## 2025-06-04 DIAGNOSIS — Z79.01 ON ANTICOAGULANT THERAPY: ICD-10-CM

## 2025-06-04 DIAGNOSIS — C79.51 MALIGNANT NEOPLASM METASTATIC TO BONE (H): ICD-10-CM

## 2025-06-04 DIAGNOSIS — E11.69 TYPE 2 DIABETES MELLITUS WITH OTHER SPECIFIED COMPLICATION, WITHOUT LONG-TERM CURRENT USE OF INSULIN (H): ICD-10-CM

## 2025-06-04 DIAGNOSIS — C34.11 MALIGNANT NEOPLASM OF UPPER LOBE OF RIGHT LUNG (H): ICD-10-CM

## 2025-06-04 DIAGNOSIS — J44.9 CHRONIC OBSTRUCTIVE PULMONARY DISEASE, UNSPECIFIED COPD TYPE (H): ICD-10-CM

## 2025-06-04 DIAGNOSIS — Z79.01 ON APIXABAN THERAPY: ICD-10-CM

## 2025-06-04 DIAGNOSIS — I10 ESSENTIAL HYPERTENSION, BENIGN: ICD-10-CM

## 2025-06-04 DIAGNOSIS — N30.00 ACUTE CYSTITIS WITHOUT HEMATURIA: ICD-10-CM

## 2025-06-04 DIAGNOSIS — I50.30 HEART FAILURE WITH PRESERVED EJECTION FRACTION, NYHA CLASS II (H): ICD-10-CM

## 2025-06-04 DIAGNOSIS — R53.1 GENERALIZED WEAKNESS: ICD-10-CM

## 2025-06-04 DIAGNOSIS — R53.81 PHYSICAL DECONDITIONING: ICD-10-CM

## 2025-06-04 DIAGNOSIS — Z98.890 HISTORY OF LUNG SURGERY: ICD-10-CM

## 2025-06-04 NOTE — LETTER
"[facility]   To:             Please give to facility    From:   Kelli Davidson  RN  Care Coordinator   [facility]   P: [phone]  [email or web address]   Patient Name:  Nahun \"Javon\" Wilfredo YOB: 1945   Admit date: 6/3/25      *Information Needed:  Please contact me when the patient will discharge (or if they will move to long term care)- include the discharge date, disposition, and main diagnosis   If the patient is discharged with home care services, please provide the name of the agency    Also- Please inform me if a care conference is being held.   Phone or Email with information         "

## 2025-06-04 NOTE — PROGRESS NOTES
[facility] GERIATRICS    PRIMARY CARE PROVIDER AND CLINIC:  Olegario Castillo MD, [address] [place] [id number]  Chief Complaint   Patient presents with    Hospital F/U      [facility] Medical Record Number:  [id number]  Place of Service where encounter took place:  [facility] (TCU) [[id number]]    Nahun Villalobos  is a 79 year old  (1945), admitted to the above facility from  [facility]. Hospital stay 5/27/25 through 6/3/25..         His medical history includes  Cervical stenosis s/p Posterior Open Cervical 3-6 laminectomy on 5/27/2025 with Dr. Pinon.   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 Pinon. His hospital course was complicated by generalized weakness and UTI and discharged on PO cefdinir.     Today he is in his chair wearing this TLSO brace  He is sarcastic  Incision is healing      CODE STATUS/ADVANCE DIRECTIVES DISCUSSION:  Prior  CPR/Full code   ALLERGIES: No Known Allergies   PAST MEDICAL HISTORY:   Past Medical History:   Diagnosis Date    Acute diverticulitis 09/20/2020    Anemia 07/09/2024    Antiplatelet or antithrombotic long-term use     Arthritis     CHF (congestive heart failure) (H) 09/16/2020    COPD (chronic obstructive pulmonary disease) (H)     DM (diabetes mellitus) (H)     Dyspnea on exertion     Essential hypertension, benign     Hemorrhage of rectum and anus     Non-small cell lung cancer (H)     Obesity     Personal history of colonic polyps     Sleep apnea     Thrombosis     Tobacco use disorder     Urinary retention      Walking troubles       PAST SURGICAL HISTORY:   has a past surgical history that includes NONSPECIFIC PROCEDURE; Cholecystectomy; appendectomy; wedge resection; Cystoscopy, transurethral resection (TUR) prostate, combined (N/A, 04/30/2018); Abdomen surgery (1995); colonoscopy (2014); Bone Exostosis Excision (Bilateral, 09/20/2022); IR Lung Biopsy Mediastinum Right (04/06/2016); and Decompression Cervical Posterior Three+ Levels (N/A, 5/27/2025).  FAMILY HISTORY: family history includes Breast Cancer in his sister and sister; C.A.D. in his mother; Cancer in his mother; Cancer - colorectal in his mother; Cerebrovascular Disease in his father; Hypertension in his mother, sister, and sister; Other Cancer in his mother.  SOCIAL HISTORY:   reports that he quit smoking about 12 years ago. His smoking use included cigarettes and cigars. He started smoking about 65 years ago. He has a 106 pack-year smoking history. He has been exposed to tobacco smoke. He has never used smokeless tobacco. He reports that he does not drink alcohol and does not use drugs.  Patient's living condition: lives with spouse    Post Discharge Medication Reconciliation Status:   MED REC REQUIRED  Post Medication Reconciliation Status:  Discharge medications reconciled and changed, see notes/orders       Current Outpatient Medications   Medication Sig Dispense Refill    acetaminophen (TYLENOL) 500 MG tablet Take 1,000 mg by mouth 2 times daily.      albuterol (VENTOLIN HFA) 108 (90 Base) MCG/ACT inhaler INHALE 2 PUFFS INTO THE LUNGS EVERY 6 HOURS AS NEEDED FOR SHORTNESS OF BREATH / DYSPNEA OR WHEEZING 25.5 g 0    amLODIPine (NORVASC) 5 MG tablet Take 1 tablet (5 mg) by mouth daily. 90 tablet 3    [START ON 6/10/2025] apixaban ANTICOAGULANT (ELIQUIS ANTICOAGULANT) 2.5 MG tablet Take 1 tablet (2.5 mg) by mouth 2 times daily. (Patient not taking: Reported on 6/5/2025) 180 tablet 2    atorvastatin (LIPITOR) 20 MG tablet Take 1 tablet (20 mg) by mouth  daily. 90 tablet 2    blood glucose (NO BRAND SPECIFIED) lancets standard Use to test blood sugar 1 time daily, first thing in the morning 50 each 3    calcium carbonate (TUMS) 500 MG chewable tablet Take 1 chew tab by mouth daily as needed for heartburn.      cefdinir (OMNICEF) 300 MG capsule Take 1 capsule (300 mg) by mouth every 12 hours for 4 days.      cholestyramine light (QUESTRAN) 4 GM packet Take 4 g by mouth 2 times daily (with meals).      Coenzyme Q10 400 MG CAPS Take 400 mg by mouth daily 30 capsule 0    ferrous sulfate (FE TABS) 325 (65 Fe) MG EC tablet Take 1 tablet (325 mg) by mouth every evening 30 tablet 0    finasteride (PROSCAR) 5 MG tablet Take 1 tablet (5 mg) by mouth daily 90 tablet 3    Fluticasone-Umeclidin-Vilant (TRELEGY ELLIPTA) 100-62.5-25 MCG/ACT oral inhaler USE 1 INHALATION DAILY 60 each 5    furosemide (LASIX) 40 MG tablet Take 1 tablet (40 mg) by mouth daily.      gabapentin (NEURONTIN) 300 MG capsule TAKE 2 CAPSULES TWICE A DAY WITH MEALS 180 capsule 1    Heparin Sodium, Porcine, (HEPARIN ANTICOAGULANT) 5000 UNIT/0.5ML injection Inject 0.5 mLs (5,000 Units) subcutaneously every 8 hours.      lanreotide acetate (SOMATULINE) 120 MG/0.5ML injection Inject 120 mg subcutaneously every 28 days. (Patient not taking: Reported on 6/5/2025)      lisinopril (ZESTRIL) 20 MG tablet Take 20 mg by mouth daily.      melatonin 3 MG tablet Take 1 tablet (3 mg) by mouth at bedtime.      metFORMIN (GLUCOPHAGE) 1000 MG tablet Take 1 tablet (1,000 mg) by mouth 2 times daily (with meals). 180 tablet 1    methocarbamol (ROBAXIN) 500 MG tablet Take 0.5 tablets (250 mg) by mouth 4 times daily as needed for muscle spasms.      miconazole (MICATIN) 2 % external cream Apply topically 2 times daily as needed for other (Rash/ skin irritation)      order for DME Oxygen 2 Li/min  at night via nasal cannula 1 Device 0    oxyCODONE (ROXICODONE) 5 MG tablet Take 1 tablet (5 mg) by mouth every 6 hours as needed for  "moderate pain. 15 tablet 0    senna-docusate (SENOKOT-S/PERICOLACE) 8.6-50 MG tablet Take 1 tablet by mouth 2 times daily. 20 tablet 0    vitamin B-Complex Take 1 tablet by mouth daily      vitamin C (ASCORBIC ACID) 500 MG tablet Take 500 mg by mouth daily.      Vitamin D3 (VITAMIN D-1000 MAX ST) 25 mcg (1000 units) tablet Take 25 mcg by mouth daily.      zoledronic acid (ZOMETA) 4 MG/5ML injection Inject 4 mg into the vein every 2 months. (Patient not taking: Reported on 6/5/2025)       No current facility-administered medications for this visit.       ROS:  10 point ROS of systems including Constitutional, Eyes, Respiratory, Cardiovascular, Gastroenterology, Genitourinary, Integumentary, Musculoskeletal, Psychiatric were all negative except for pertinent positives noted in my HPI.    Vitals:  BP (!) 164/81   Pulse 77   Temp 98.1  F (36.7  C)   Resp 18   Ht 1.676 m (5' 6\")   Wt 75.8 kg (167 lb)   SpO2 95%   BMI 26.95 kg/m    Exam:  GENERAL APPEARANCE:  Alert, in no distress, abnormal mouth movements  RESP:  lungs clear to auscultation , no respiratory distress  CV:  rate-normal  SKIN:  incision on posterior neck CDI  PSYCH:  sarcastic    Lab/Diagnostic data:  Most Recent 3 CBC's:  Recent Labs   Lab Test 06/03/25  0620 06/02/25  0622 06/01/25  0627   WBC 13.4* 15.1* 14.2*   HGB 9.4* 9.0* 9.0*    102* 99    303 278     Most Recent 3 BMP's:  Recent Labs   Lab Test 06/03/25  1140 06/03/25  0824 06/03/25  0620 06/02/25  0948 06/02/25  0622 06/01/25  0929 06/01/25  0627   NA  --   --  134*  --  138  --  137   POTASSIUM  --   --  5.0  --  4.1  --  4.3   CHLORIDE  --   --  99  --  101  --  100   CO2  --   --  26  --  26  --  26   BUN  --   --  13.9  --  16.2  --  15.6   CR  --   --  0.92  --  1.05  --  1.07   ANIONGAP  --   --  9  --  11  --  11   JESSE  --   --  9.0  --  8.9  --  8.9   * 183* 185*   < > 203*   < > 203*    < > = values in this interval not displayed.     Most Recent Hemoglobin " A1c:  Recent Labs   Lab Test 05/23/25  0836   A1C 6.4*       ASSESSMENT/PLAN:  (M48.02) Cervical stenosis of spinal canal  (Z98.890) H/O laminectomy  (R53.81) Physical deconditioning  (R53.1) Generalized weakness  Comment: Posterior Open Cervical 3-6 laminectomy with Dr. Pinon on 5/27  - heparin injection and discontinue when ambulation  - restart apixan on POD 14 (6/10)   - No strenuous exercise or heay lifting greater than 10 lbs for 4 weeks or until seen and cleared in clinic.   - wear brace until cleared by Neurosurgical provider.  - incision CDI    Plan: follow-up with Neurosurgery [facility] in 1 weeks for wound check/post-hospital evaluation,   Therapy to evaluate and treat    [E87.1] Hyponatremia   Comment:  acute  Plan: monitor BMP and CBC     [N30.00]Acute cystitis without hematuria  Comment: improving   Treated with Cefdnir until 6/7  Plan: Continue with plan of care no changes at this time, adjustment as needed             (J44.9) Chronic obstructive pulmonary disease, unspecified COPD type (H)  (G47.33) CIERA (obstructive sleep apnea)  Comment: Chronic  Patient does not use CPAP  Continue with Breo Ellipta and Incruse Ellipta   Plan: Continue with plan of care no changes at this time, adjustment as needed        (Z86.711) History of pulmonary embolism  (Z79.01) On anticoagulant therapy  (Z79.01) On apixaban therapy  (D64) Anemia   Comment: Chronic  Restart apixaban twice daily on 6/7  Plan: Monitor BMP and CBC     (E11.69) Type 2 diabetes mellitus with other specified complication, without long-term current use of insulin (H)  Comment: Chronic  Currently taking metformin  Plan: Monitor blood sugars intermittently, BMP and CBC     (I10) Hypertension, unspecified type  (I50.30) Heart failure with preserved ejection fraction, NYHA class II (H)  Comment:   Currently taking amlodipine  Plan: Monitor blood pressure, daily weights, BMP and CBC     (N40.0) Benign prostatic hyperplasia without lower urinary tract  symptoms  Comment: Chronic  Taking finasteride   Plan: Continue with plan of care no changes at this time, adjustment as needed           (C79.51) Malignant neoplasm metastatic to bone (H)  (C34.11) Malignant neoplasm of upper lobe of right lung (H)  (Z98.890) History of lung surgery  Comment:   -Hx RUL adenocarcinoma s/p wedge resection   -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 on above plan of care.  Counseled on medications and side effects.

## 2025-06-04 NOTE — PROGRESS NOTES
Clinic Care Coordination Contact  Care Coordination Transition Communication    Clinical Data: Patient was hospitalized at [facility] from 5/27/25 to 6/3/25 with diagnosis of    Cervical spondylosis with myelopathy [M47.12]     Assessment: Patient has transitioned to TCU/ARU for short term rehabilitation:    [facility]  Transition Communication:  Notified facility of Primary Care- Care Coordination support via Epic fax.    Plan: Care Coordinator will await notification from facility staff informing of patient's discharge plans/needs. Care Coordinator will review chart and outreach to facility staff every 4 weeks and as needed.     Kelli Davidson, RN Clinic Care Coordinator  [facility] Clinics: [place], [place] (on-site Wednesdays), [facility] (on-site Thursdays) & [facility].  [email or web address]  Phone: [phone]

## 2025-06-09 ENCOUNTER — LAB REQUISITION (OUTPATIENT)
Dept: LAB | Facility: CLINIC | Age: 80
End: 2025-06-09
Payer: COMMERCIAL

## 2025-06-09 ENCOUNTER — TRANSITIONAL CARE UNIT VISIT (OUTPATIENT)
Dept: GERIATRICS | Facility: CLINIC | Age: 80
End: 2025-06-09
Payer: COMMERCIAL

## 2025-06-09 VITALS
HEIGHT: 66 IN | DIASTOLIC BLOOD PRESSURE: 73 MMHG | OXYGEN SATURATION: 95 % | RESPIRATION RATE: 18 BRPM | SYSTOLIC BLOOD PRESSURE: 150 MMHG | BODY MASS INDEX: 26.08 KG/M2 | TEMPERATURE: 97.6 F | WEIGHT: 162.3 LBS | HEART RATE: 68 BPM

## 2025-06-09 DIAGNOSIS — D50.8 OTHER IRON DEFICIENCY ANEMIA: ICD-10-CM

## 2025-06-09 DIAGNOSIS — C34.11 MALIGNANT NEOPLASM OF UPPER LOBE OF RIGHT LUNG (H): ICD-10-CM

## 2025-06-09 DIAGNOSIS — Z79.01 ON APIXABAN THERAPY: ICD-10-CM

## 2025-06-09 DIAGNOSIS — M48.02 CERVICAL STENOSIS OF SPINAL CANAL: ICD-10-CM

## 2025-06-09 DIAGNOSIS — M62.81 GENERALIZED MUSCLE WEAKNESS: ICD-10-CM

## 2025-06-09 DIAGNOSIS — Z47.89 ENCOUNTER FOR OTHER ORTHOPEDIC AFTERCARE: ICD-10-CM

## 2025-06-09 DIAGNOSIS — N40.0 BENIGN PROSTATIC HYPERPLASIA WITHOUT LOWER URINARY TRACT SYMPTOMS: ICD-10-CM

## 2025-06-09 DIAGNOSIS — I50.30 HEART FAILURE WITH PRESERVED EJECTION FRACTION, NYHA CLASS II (H): ICD-10-CM

## 2025-06-09 DIAGNOSIS — Z86.711 HISTORY OF PULMONARY EMBOLISM: ICD-10-CM

## 2025-06-09 DIAGNOSIS — N30.00 ACUTE CYSTITIS WITHOUT HEMATURIA: ICD-10-CM

## 2025-06-09 DIAGNOSIS — S22.008S OTHER CLOSED FRACTURE OF THORACIC VERTEBRA, UNSPECIFIED THORACIC VERTEBRAL LEVEL, SEQUELA: ICD-10-CM

## 2025-06-09 DIAGNOSIS — K52.9 CHRONIC DIARRHEA: ICD-10-CM

## 2025-06-09 DIAGNOSIS — M62.81 MUSCLE WEAKNESS (GENERALIZED): ICD-10-CM
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
## 2025-06-09 DIAGNOSIS — J44.9 CHRONIC OBSTRUCTIVE PULMONARY DISEASE, UNSPECIFIED COPD TYPE (H): ICD-10-CM

## 2025-06-09 DIAGNOSIS — C79.51 MALIGNANT NEOPLASM METASTATIC TO BONE (H): ICD-10-CM

## 2025-06-09 DIAGNOSIS — E11.69 TYPE 2 DIABETES MELLITUS WITH OTHER SPECIFIED COMPLICATION, WITHOUT LONG-TERM CURRENT USE OF INSULIN (H): ICD-10-CM

## 2025-06-09 DIAGNOSIS — R41.89 COGNITIVE IMPAIRMENT: Primary | ICD-10-CM

## 2025-06-09 DIAGNOSIS — I50.22 CHRONIC SYSTOLIC CONGESTIVE HEART FAILURE (H): ICD-10-CM

## 2025-06-09 DIAGNOSIS — I12.9 CHRONIC KIDNEY DISEASE DUE TO HYPERTENSION: ICD-10-CM

## 2025-06-09 DIAGNOSIS — G62.9 NEUROPATHY: ICD-10-CM

## 2025-06-09 DIAGNOSIS — S22.079D CLOSED FRACTURE OF NINTH THORACIC VERTEBRA WITH ROUTINE HEALING, UNSPECIFIED FRACTURE MORPHOLOGY, SUBSEQUENT ENCOUNTER: ICD-10-CM

## 2025-06-09 DIAGNOSIS — C7A.019 MALIGNANT CARCINOID TUMOR OF SMALL INTESTINE, UNSPECIFIED LOCATION (H): ICD-10-CM

## 2025-06-09 DIAGNOSIS — E87.1 HYPONATREMIA: ICD-10-CM

## 2025-06-09 DIAGNOSIS — Z98.890 HISTORY OF LUNG SURGERY: ICD-10-CM

## 2025-06-09 DIAGNOSIS — R53.1 GENERALIZED WEAKNESS: ICD-10-CM

## 2025-06-09 DIAGNOSIS — Z79.01 ON ANTICOAGULANT THERAPY: ICD-10-CM

## 2025-06-09 DIAGNOSIS — D64.9 ANEMIA, UNSPECIFIED TYPE: ICD-10-CM

## 2025-06-09 DIAGNOSIS — M54.6 ACUTE BILATERAL THORACIC BACK PAIN: ICD-10-CM

## 2025-06-09 DIAGNOSIS — Z98.890 H/O LAMINECTOMY: ICD-10-CM

## 2025-06-09 DIAGNOSIS — R53.81 PHYSICAL DECONDITIONING: ICD-10-CM

## 2025-06-09 DIAGNOSIS — I10 ESSENTIAL HYPERTENSION, BENIGN: ICD-10-CM

## 2025-06-09 DIAGNOSIS — G47.33 OBSTRUCTIVE SLEEP APNEA SYNDROME: ICD-10-CM

## 2025-06-09 PROCEDURE — 99309 SBSQ NF CARE MODERATE MDM 30: CPT | Performed by: NURSE PRACTITIONER

## 2025-06-09 NOTE — PROGRESS NOTES
"[facility] GERIATRICS    Chief Complaint   Patient presents with    RECHECK     HPI:  Nahun Villalobos is a 79 year old  (1945), who is being seen today for an episodic care visit at: [facility] ([facility]) [[id number]].     UMS 16/30  Stated he didn't get therapy for 3 days.  Per therapy had therapy within the past 3 days and refused this morning    Allergies, and PMH/PSH reviewed in EPIC today.  REVIEW OF SYSTEMS:  4 point ROS including Respiratory, CV, GI and , other than that noted in the HPI,  is negative    Objective:   BP (!) 150/73   Pulse 68   Temp 97.6  F (36.4  C)   Resp 18   Ht 1.676 m (5' 6\")   Wt 73.6 kg (162 lb 4.8 oz)   SpO2 95%   BMI 26.20 kg/m    GENERAL APPEARANCE:  Alert, in no distress  RESP:  no respiratory distress  PSYCH:  insight and judgement impaired    Most Recent 3 CBC's:  Recent Labs   Lab Test 06/03/25  0620 06/02/25  0622 06/01/25  0627   WBC 13.4* 15.1* 14.2*   HGB 9.4* 9.0* 9.0*    102* 99    303 278     Most Recent 3 BMP's:  Recent Labs   Lab Test 06/03/25  1140 06/03/25  0824 06/03/25  0620 06/02/25  0948 06/02/25  0622 06/01/25  0929 06/01/25  0627   NA  --   --  134*  --  138  --  137   POTASSIUM  --   --  5.0  --  4.1  --  4.3   CHLORIDE  --   --  99  --  101  --  100   CO2  --   --  26  --  26  --  26   BUN  --   --  13.9  --  16.2  --  15.6   CR  --   --  0.92  --  1.05  --  1.07   ANIONGAP  --   --  9  --  11  --  11   JESSE  --   --  9.0  --  8.9  --  8.9   * 183* 185*   < > 203*   < > 203*    < > = values in this interval not displayed.       Assessment/Plan:    (M48.02) Cervical stenosis of spinal canal  (Z98.890) H/O laminectomy  (R53.81) Physical deconditioning  (R53.1) Generalized weakness  Comment: Posterior Open Cervical 3-6 laminectomy with Dr. Pinon on 5/27  - heparin injection and discontinue when ambulation  - restart apixan on POD 14 (6/10)   - No strenuous exercise or heay lifting greater than 10 lbs for 4 weeks " or until seen and cleared in clinic.   - wear brace until cleared by Neurosurgical provider.  - incision CDI    Plan: : Rebeca Conte PA-C Neurosurgery: 6/10/25      (R41.89) Cognitive impairment  (primary encounter diagnosis)  Comment: SLUMS 16/30  Plan: goal to go home         [E87.1] Hyponatremia   Comment:  acute  Plan: monitor BMP and CBC      [N30.00]Acute cystitis without hematuria  Comment: improving   Treated with Cefdnir until 6/7  Plan: Continue with plan of care no changes at this time, adjustment as needed              (J44.9) Chronic obstructive pulmonary disease, unspecified COPD type (H)  (G47.33) CIERA (obstructive sleep apnea)  Comment: Chronic  Patient does not use CPAP  Continue with Breo Ellipta and Incruse Ellipta   Plan: Continue with plan of care no changes at this time, adjustment as needed        (Z86.711) History of pulmonary embolism  (Z79.01) On anticoagulant therapy  (Z79.01) On apixaban therapy  (D64) Anemia   Comment: Chronic  Restart apixaban twice daily on 6/10  Plan: labs on 6/11     (E11.69) Type 2 diabetes mellitus with other specified complication, without long-term current use of insulin (H)  Comment: Chronic  Currently taking metformin  BS overall controlled  Plan: Monitor blood sugars intermittently, BMP and CBC     (I10) Hypertension, unspecified type  (I50.30) Heart failure with preserved ejection fraction, NYHA class II (H)  Comment:   Currently taking amlodipine  Plan: decreasing weight     (N40.0) Benign prostatic hyperplasia without lower urinary tract symptoms  Comment: Chronic  Taking finasteride   Plan: Continue with plan of care no changes at this time, adjustment as needed           (C79.51) Malignant neoplasm metastatic to bone (H)  (C34.11) Malignant neoplasm of upper lobe of right lung (H)  (Z98.890) History of lung surgery  Comment:   -Hx RUL adenocarcinoma s/p wedge resection   -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

## 2025-06-10 ENCOUNTER — OFFICE VISIT (OUTPATIENT)
Dept: NEUROSURGERY | Facility: CLINIC | Age: 80
End: 2025-06-10
Payer: COMMERCIAL

## 2025-06-10 ENCOUNTER — LAB REQUISITION (OUTPATIENT)
Dept: LAB | Facility: CLINIC | Age: 80
End: 2025-06-10
Payer: COMMERCIAL

## 2025-06-10 ENCOUNTER — MEDICAL CORRESPONDENCE (OUTPATIENT)
Dept: HEALTH INFORMATION MANAGEMENT | Facility: CLINIC | Age: 80
End: 2025-06-10

## 2025-06-10 VITALS
DIASTOLIC BLOOD PRESSURE: 69 MMHG | HEART RATE: 74 BPM | SYSTOLIC BLOOD PRESSURE: 123 MMHG | OXYGEN SATURATION: 96 % | RESPIRATION RATE: 16 BRPM

## 2025-06-10 DIAGNOSIS — L53.9 ERYTHEMA OF WOUND: ICD-10-CM

## 2025-06-10 DIAGNOSIS — Z98.890 S/P SPINAL SURGERY: Primary | ICD-10-CM

## 2025-06-10 DIAGNOSIS — I10 ESSENTIAL (PRIMARY) HYPERTENSION: ICD-10-CM

## 2025-06-10 DIAGNOSIS — M47.12 CERVICAL SPONDYLOSIS WITH MYELOPATHY: ICD-10-CM

## 2025-06-10 PROCEDURE — 3078F DIAST BP <80 MM HG: CPT | Performed by: PHYSICIAN ASSISTANT

## 2025-06-10 PROCEDURE — 99024 POSTOP FOLLOW-UP VISIT: CPT | Performed by: PHYSICIAN ASSISTANT

## 2025-06-10 PROCEDURE — 3074F SYST BP LT 130 MM HG: CPT | Performed by: PHYSICIAN ASSISTANT

## 2025-06-10 RX ORDER — CEPHALEXIN 500 MG/1
500 CAPSULE ORAL 3 TIMES DAILY PRN
Qty: 14 CAPSULE | Refills: 0 | Status: SHIPPED | OUTPATIENT
Start: 2025-06-10 | End: 2025-06-17

## 2025-06-10 NOTE — LETTER
6/10/2025       RE: Nahun Villalobos  [address] [facility] [address] [facility] [address]     Dear Colleague,    Thank you for referring your patient, Nahun Villalobos, to the [facility] NEUROSURGERY CLINIC [place] at [facility]. Please see a copy of my visit note below.        Neurosurgery Clinic Note    Chief Complaint: 2 week post-op visit    DATE OF VISIT: 6/10/2025      DATE OF SURGERY: 05/27/25     PREOPERATIVE DIAGNOSIS:  1. Cervical spondylosis   2. Cervical myelopathy     POSTOPERATIVE DIAGNOSIS:  1. Same as above  2. Same as above     PROCEDURES PERFORMED:  1. Posterior complete cervical 3-5 laminectomy  2. Posterior Partial cervical 6 laminectomy  3. Neuromonitoring (SSEP and motors)  4. C-arm fluoroscopy     ATTENDING SURGEON: Seda Pinon MD     RESIDENT SURGEONS: Sae Johnson MD     ANESTHESIA: General endotracheal anesthesia     ESTIMATED BLOOD LOSS: 50 mL     IMPLANTS: None     EXPLANTS: None     DRAINS: Hemovac drain to full suction     FINDINGS:  Compressed spinal cord by lamina at C3-6 widely decompressed by end of the case. No CSF leak observed. No significant neuromonitoring changes compared to pre-operative Motors and SSEPs.      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 He was originally planned for a C3-6 laminoplasty in 4/2025, but given his overall comorbidity and age and at the time a new thoracic fracture, a C3-6 laminectomy was offered at the affected levels thought to be faster and safer. After a thorough discussion of  risks/benefits of the surgical approach, the patient and his family elected to proceed with surgery.      Hospital Course:  Patient underwent above-mentioned procedure on 5/27/2025.  Following the procedure the patient was transferred to surgical floor.  The patient's course was complicated by generalized weakness and UTI. PT/OT assessed patient and recommended TCU. UA/UC was positive on 5/31/2025 and was treated with IV rocephin and discharged on PO cefdinir.  On post operative day 7, he was ambulating, voiding without a philip, eating a regular diet, pain was well controlled and therefore he was discharged to TCU ([place]).  Patient will follow-up with Neurosurgery on 6/10/2025.      HPI: Nahun Villalobos is a pleasant 79 year old male who is s/p C3-5 laminectomy by Dr. Pinon on 5/27/25 for cervical spondylotic myelopathy.    Currently, patient is 2 weeks out from surgery.  Patient notes neck pain is 10/10 intensity.  He has resolution of arm pain, but notes his fingers feel like he is wearing cut-off gloves (numbness).  He continues to endorse left>right hand weakness and left leg weakness.  He is currently at TCU and has therapy working with him.       Physical Exam   /69 (BP Location: Right arm, Patient Position: Sitting)   Pulse 74   Resp 16   SpO2 96%     Constitutional: Alert, no acute distress.    Creede:  in w/c, gait deferred    Neurological:    Strength (L/R)  4+/5 Deltoid  5/5 Bicep  5/5 Tricep  5/5 Finger Abduction  4-/4+ Handgrip    4/3 Hip Flexion  5/5 Knee Extension  5/4 Ankle Dorsiflexion  5/4 Extensor Hallucis Longus  5/4+ Plantar Flexion    Reflexes (L/R)  2+/2+ Bicep  2+/2+ Brachioradialis  3+/3+ Patellar  2+/2+ Ankle  No clonus  No Nino's    Sensation: SILT    Incision:  Clean, dry and intact.  +erythema, No induration or fluctuance.  No drainage noted.  Sutures present.    IMAGING:  No new imaging.    ASSESSMENT & PLAN:  Nahun Villalobos is a 79 year old male who is s/p C3-5  laminectomy by Dr. Pinon on 5/27/25 for cervical spondylotic myelopathy.    Patient is about 2 weeks out from surgery.  He has expected amount of neck pain.  There is significant erythema involving the entire incision.  No drainage or dehiscence.  Given he is predisposed to infection, will treat this prophylacticly with short course of antibiotics and see him next week for a wound check.  (Keflex 500 mg tid for 7 days prescribed)    Sutures removed today and replaced with steri-strips.  Keep applied for 1 week.  Okay to shower and let water drip over incision, but avoid directly spraying or soaking in water.  Pat dry.  Patient instructed to keep fingers off his incision.  Okay to put a cold pack over the incision to help with itch for up to 10 minutes at a time.        You may now use NSAIDs, in additional to Tylenol, muscle relaxer, ice/heat for pain.    Wean off narcotic pain medications such as Oxycodone if you are still taking those as able.    Resume apixaban  No lifting more than 10 pounds, avoid ROM activities with neck.   D/c TLSO brace that was being used for back fracture.    Discussed with patient the TCU has criteria for him to be able to return home.  He needs to work with PT/OT and be able to take care of his ADLs independently.  He should continue to work with them to meet those goals.  He is early in his recovery and it will take some time for us to know how much improvement he will have now that his spinal cord is decompressed.  It is common for people to have some new or evolving symptoms as he is healing.      Patient is in agreement with this plan and states no further questions.      Rebeca Conte PA-C  Department of Neurosurgery  Office: [phone]            Again, thank you for allowing me to participate in the care of your patient.      Sincerely,    Rebeca Conte PA-C

## 2025-06-10 NOTE — PATIENT INSTRUCTIONS
D/c TLSO brace  No lifting more than 10 pounds.  Avoid bending/twisting activities.   Keep steri-strips on for 1 week.    Keflex 500 mg tid for 7 days  Follow up with neurosurgery in 1 week for wound check  Continue working with PT/OT.      Keep the fingers off of the incisions!!

## 2025-06-10 NOTE — PROGRESS NOTES
Neurosurgery Clinic Note    Chief Complaint: 2 week post-op visit    DATE OF VISIT: 6/10/2025      DATE OF SURGERY: 05/27/25     PREOPERATIVE DIAGNOSIS:  1. Cervical spondylosis   2. Cervical myelopathy     POSTOPERATIVE DIAGNOSIS:  1. Same as above  2. Same as above     PROCEDURES PERFORMED:  1. Posterior complete cervical 3-5 laminectomy  2. Posterior Partial cervical 6 laminectomy  3. Neuromonitoring (SSEP and motors)  4. C-arm fluoroscopy     ATTENDING SURGEON: Seda Pinon MD     RESIDENT SURGEONS: Sae Johnson MD     ANESTHESIA: General endotracheal anesthesia     ESTIMATED BLOOD LOSS: 50 mL     IMPLANTS: None     EXPLANTS: None     DRAINS: Hemovac drain to full suction     FINDINGS:  Compressed spinal cord by lamina at C3-6 widely decompressed by end of the case. No CSF leak observed. No significant neuromonitoring changes compared to pre-operative Motors and SSEPs.      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 He was originally planned for a C3-6 laminoplasty in 4/2025, but given his overall comorbidity and age and at the time a new thoracic fracture, a C3-6 laminectomy was offered at the affected levels thought to be faster and safer. After a thorough discussion of risks/benefits of the surgical approach, the patient and his family elected to proceed with surgery.      Hospital Course:  Patient underwent above-mentioned procedure on 5/27/2025.  Following the procedure the patient was transferred to surgical floor.  The patient's course was complicated by generalized weakness and  UTI. PT/OT assessed patient and recommended TCU. UA/UC was positive on 5/31/2025 and was treated with IV rocephin and discharged on PO cefdinir.  On post operative day 7, he was ambulating, voiding without a philip, eating a regular diet, pain was well controlled and therefore he was discharged to TCU ([place]).  Patient will follow-up with Neurosurgery on 6/10/2025.      HPI: Nahun Villalobos is a pleasant 79 year old male who is s/p C3-5 laminectomy by Dr. Pinon on 5/27/25 for cervical spondylotic myelopathy.    Currently, patient is 2 weeks out from surgery.  Patient notes neck pain is 10/10 intensity.  He has resolution of arm pain, but notes his fingers feel like he is wearing cut-off gloves (numbness).  He continues to endorse left>right hand weakness and left leg weakness.  He is currently at TCU and has therapy working with him.       Physical Exam   /69 (BP Location: Right arm, Patient Position: Sitting)   Pulse 74   Resp 16   SpO2 96%     Constitutional: Alert, no acute distress.    Wheeler:  in w/c, gait deferred    Neurological:    Strength (L/R)  4+/5 Deltoid  5/5 Bicep  5/5 Tricep  5/5 Finger Abduction  4-/4+ Handgrip    4/3 Hip Flexion  5/5 Knee Extension  5/4 Ankle Dorsiflexion  5/4 Extensor Hallucis Longus  5/4+ Plantar Flexion    Reflexes (L/R)  2+/2+ Bicep  2+/2+ Brachioradialis  3+/3+ Patellar  2+/2+ Ankle  No clonus  No Nino's    Sensation: SILT    Incision:  Clean, dry and intact.  +erythema, No induration or fluctuance.  No drainage noted.  Sutures present.    IMAGING:  No new imaging.    ASSESSMENT & PLAN:  Nahun Villalobos is a 79 year old male who is s/p C3-5 laminectomy by Dr. Pinon on 5/27/25 for cervical spondylotic myelopathy.    Patient is about 2 weeks out from surgery.  He has expected amount of neck pain.  There is significant erythema involving the entire incision.  No drainage or dehiscence.  Given he is predisposed to infection, will treat this prophylacticly with  short course of antibiotics and see him next week for a wound check.  (Keflex 500 mg tid for 7 days prescribed)    Sutures removed today and replaced with steri-strips.  Keep applied for 1 week.  Okay to shower and let water drip over incision, but avoid directly spraying or soaking in water.  Pat dry.  Patient instructed to keep fingers off his incision.  Okay to put a cold pack over the incision to help with itch for up to 10 minutes at a time.        You may now use NSAIDs, in additional to Tylenol, muscle relaxer, ice/heat for pain.    Wean off narcotic pain medications such as Oxycodone if you are still taking those as able.    Resume apixaban  No lifting more than 10 pounds, avoid ROM activities with neck.   D/c TLSO brace that was being used for back fracture.    Discussed with patient the TCU has criteria for him to be able to return home.  He needs to work with PT/OT and be able to take care of his ADLs independently.  He should continue to work with them to meet those goals.  He is early in his recovery and it will take some time for us to know how much improvement he will have now that his spinal cord is decompressed.  It is common for people to have some new or evolving symptoms as he is healing.      Patient is in agreement with this plan and states no further questions.      Rebeca Conte PA-C  Department of Neurosurgery  Office: [phone]

## 2025-06-11 DIAGNOSIS — Z98.890 S/P LAMINECTOMY: ICD-10-CM

## 2025-06-11 LAB
ANION GAP SERPL CALCULATED.3IONS-SCNC: 11 MMOL/L (ref 7–15)
BASOPHILS # BLD AUTO: 0.1 10E3/UL (ref 0–0.2)
BASOPHILS NFR BLD AUTO: 1 %
BUN SERPL-MCNC: 15.1 MG/DL (ref 8–23)
CALCIUM SERPL-MCNC: 9.1 MG/DL (ref 8.8–10.4)
CHLORIDE SERPL-SCNC: 104 MMOL/L (ref 98–107)
CREAT SERPL-MCNC: 1.03 MG/DL (ref 0.67–1.17)
EGFRCR SERPLBLD CKD-EPI 2021: 73 ML/MIN/1.73M2
EOSINOPHIL # BLD AUTO: 0.6 10E3/UL (ref 0–0.7)
EOSINOPHIL NFR BLD AUTO: 5 %
ERYTHROCYTE [DISTWIDTH] IN BLOOD BY AUTOMATED COUNT: 12.1 % (ref 10–15)
GLUCOSE SERPL-MCNC: 137 MG/DL (ref 70–99)
HCO3 SERPL-SCNC: 25 MMOL/L (ref 22–29)
HCT VFR BLD AUTO: 31.9 % (ref 40–53)
HGB BLD-MCNC: 9.8 G/DL (ref 13.3–17.7)
IMM GRANULOCYTES # BLD: 0.1 10E3/UL
IMM GRANULOCYTES NFR BLD: 1 %
LYMPHOCYTES # BLD AUTO: 3.8 10E3/UL (ref 0.8–5.3)
LYMPHOCYTES NFR BLD AUTO: 35 %
MCH RBC QN AUTO: 32.8 PG (ref 26.5–33)
MCHC RBC AUTO-ENTMCNC: 30.7 G/DL (ref 31.5–36.5)
MCV RBC AUTO: 107 FL (ref 78–100)
MONOCYTES # BLD AUTO: 0.9 10E3/UL (ref 0–1.3)
MONOCYTES NFR BLD AUTO: 8 %
NEUTROPHILS # BLD AUTO: 5.6 10E3/UL (ref 1.6–8.3)
NEUTROPHILS NFR BLD AUTO: 50 %
NRBC # BLD AUTO: 0 10E3/UL
NRBC BLD AUTO-RTO: 0 /100
PLATELET # BLD AUTO: 405 10E3/UL (ref 150–450)
POTASSIUM SERPL-SCNC: 4.5 MMOL/L (ref 3.4–5.3)
RBC # BLD AUTO: 2.99 10E6/UL (ref 4.4–5.9)
SODIUM SERPL-SCNC: 140 MMOL/L (ref 135–145)
WBC # BLD AUTO: 11.1 10E3/UL (ref 4–11)

## 2025-06-11 PROCEDURE — 80048 BASIC METABOLIC PNL TOTAL CA: CPT | Performed by: NURSE PRACTITIONER

## 2025-06-11 PROCEDURE — 85025 COMPLETE CBC W/AUTO DIFF WBC: CPT | Mod: ORL | Performed by: NURSE PRACTITIONER

## 2025-06-11 PROCEDURE — 36415 COLL VENOUS BLD VENIPUNCTURE: CPT | Mod: ORL | Performed by: NURSE PRACTITIONER

## 2025-06-11 PROCEDURE — P9603 ONE-WAY ALLOW PRORATED MILES: HCPCS | Mod: ORL | Performed by: NURSE PRACTITIONER

## 2025-06-11 PROCEDURE — 80048 BASIC METABOLIC PNL TOTAL CA: CPT | Mod: ORL | Performed by: NURSE PRACTITIONER

## 2025-06-11 RX ORDER — OXYCODONE HYDROCHLORIDE 5 MG/1
5 TABLET ORAL EVERY 6 HOURS PRN
Qty: 60 TABLET | Refills: 0 | Status: SHIPPED | OUTPATIENT
Start: 2025-06-11

## 2025-06-16 ENCOUNTER — TRANSITIONAL CARE UNIT VISIT (OUTPATIENT)
Dept: GERIATRICS | Facility: CLINIC | Age: 80
End: 2025-06-16
Payer: COMMERCIAL

## 2025-06-16 VITALS
BODY MASS INDEX: 27.16 KG/M2 | HEART RATE: 73 BPM | SYSTOLIC BLOOD PRESSURE: 142 MMHG | WEIGHT: 169 LBS | HEIGHT: 66 IN | OXYGEN SATURATION: 94 % | TEMPERATURE: 97.9 F | RESPIRATION RATE: 18 BRPM | DIASTOLIC BLOOD PRESSURE: 75 MMHG

## 2025-06-16 DIAGNOSIS — M48.02 CERVICAL STENOSIS OF SPINAL CANAL: Primary | ICD-10-CM

## 2025-06-16 DIAGNOSIS — R53.1 GENERALIZED WEAKNESS: ICD-10-CM

## 2025-06-16 DIAGNOSIS — N40.0 BENIGN PROSTATIC HYPERPLASIA WITHOUT LOWER URINARY TRACT SYMPTOMS: ICD-10-CM

## 2025-06-16 DIAGNOSIS — Z79.01 ON ANTICOAGULANT THERAPY: ICD-10-CM

## 2025-06-16 DIAGNOSIS — M62.81 GENERALIZED MUSCLE WEAKNESS: ICD-10-CM

## 2025-06-16 DIAGNOSIS — I10 ESSENTIAL HYPERTENSION, BENIGN: ICD-10-CM

## 2025-06-16 DIAGNOSIS — Z86.711 HISTORY OF PULMONARY EMBOLISM: ICD-10-CM

## 2025-06-16 DIAGNOSIS — N30.00 ACUTE CYSTITIS WITHOUT HEMATURIA: ICD-10-CM

## 2025-06-16 DIAGNOSIS — I50.30 HEART FAILURE WITH PRESERVED EJECTION FRACTION, NYHA CLASS II (H): ICD-10-CM

## 2025-06-16 DIAGNOSIS — G47.33 OBSTRUCTIVE SLEEP APNEA SYNDROME: ICD-10-CM

## 2025-06-16 DIAGNOSIS — I12.9 CHRONIC KIDNEY DISEASE DUE TO HYPERTENSION: ICD-10-CM

## 2025-06-16 DIAGNOSIS — C34.11 MALIGNANT NEOPLASM OF UPPER LOBE OF RIGHT LUNG (H): ICD-10-CM

## 2025-06-16 DIAGNOSIS — Z98.890 H/O LAMINECTOMY: ICD-10-CM

## 2025-06-16 DIAGNOSIS — S22.008S OTHER CLOSED FRACTURE OF THORACIC VERTEBRA, UNSPECIFIED THORACIC VERTEBRAL LEVEL, SEQUELA: ICD-10-CM

## 2025-06-16 DIAGNOSIS — I50.22 CHRONIC SYSTOLIC CONGESTIVE HEART FAILURE (H): ICD-10-CM

## 2025-06-16 DIAGNOSIS — J44.9 CHRONIC OBSTRUCTIVE PULMONARY DISEASE, UNSPECIFIED COPD TYPE (H): ICD-10-CM

## 2025-06-16 DIAGNOSIS — Z98.890 S/P LAMINECTOMY: ICD-10-CM

## 2025-06-16 DIAGNOSIS — D64.9 ANEMIA, UNSPECIFIED TYPE: ICD-10-CM

## 2025-06-16 DIAGNOSIS — R53.81 PHYSICAL DECONDITIONING: ICD-10-CM

## 2025-06-16 DIAGNOSIS — Z98.890 HISTORY OF LUNG SURGERY: ICD-10-CM

## 2025-06-16 DIAGNOSIS — D50.8 OTHER IRON DEFICIENCY ANEMIA: ICD-10-CM

## 2025-06-16 DIAGNOSIS — C7A.019 MALIGNANT CARCINOID TUMOR OF SMALL INTESTINE, UNSPECIFIED LOCATION (H): ICD-10-CM

## 2025-06-16 DIAGNOSIS — Z79.01 ON APIXABAN THERAPY: ICD-10-CM

## 2025-06-16 DIAGNOSIS — C79.51 MALIGNANT NEOPLASM METASTATIC TO BONE (H): ICD-10-CM

## 2025-06-16 DIAGNOSIS — E11.69 TYPE 2 DIABETES MELLITUS WITH OTHER SPECIFIED COMPLICATION, WITHOUT LONG-TERM CURRENT USE OF INSULIN (H): ICD-10-CM

## 2025-06-16 DIAGNOSIS — E87.1 HYPONATREMIA: ICD-10-CM

## 2025-06-16 PROCEDURE — 99309 SBSQ NF CARE MODERATE MDM 30: CPT | Performed by: NURSE PRACTITIONER

## 2025-06-16 NOTE — PROGRESS NOTES
"[facility] GERIATRICS    Chief Complaint   Patient presents with    RECHECK     HPI:  Nahun Villalobos is a 79 year old  (1945), who [facility] [[id number]].     Having pain in the neck - refusing therapy intermittently   Per nursing, Pt. requires MAX Ax1 with bathing, upper and lower dressing. Ind. with eating after setup. SBA with grooming/oral care while seated. Total dependence with toileting, transfers using a Damien lift. MOD Ax2 with bed mobility. Ambulates with PT ONLY using a 2WW. Uses a wheelchair for locomotion.  VS stable    Allergies, and PMH/PSH reviewed in EPIC today.  REVIEW OF SYSTEMS:  4 point ROS including Respiratory, CV, GI and , other than that noted in the HPI,  is negative    Objective:   BP (!) 142/75   Pulse 73   Temp 97.9  F (36.6  C)   Resp 18   Ht 1.676 m (5' 6\")   Wt 76.7 kg (169 lb)   SpO2 94%   BMI 27.28 kg/m    GENERAL APPEARANCE:  Alert  SKIN:  slightly erthymic around incision but stable.     Most Recent 3 CBC's:  Recent Labs   Lab Test 06/11/25  0640 06/03/25  0620 06/02/25  0622   WBC 11.1* 13.4* 15.1*   HGB 9.8* 9.4* 9.0*   * 100 102*    310 303     Most Recent 3 BMP's:  Recent Labs   Lab Test 06/11/25  0640 06/03/25  1140 06/03/25  0824 06/03/25  0620 06/02/25  0948 06/02/25  0622     --   --  134*  --  138   POTASSIUM 4.5  --   --  5.0  --  4.1   CHLORIDE 104  --   --  99  --  101   CO2 25  --   --  26  --  26   BUN 15.1  --   --  13.9  --  16.2   CR 1.03  --   --  0.92  --  1.05   ANIONGAP 11  --   --  9  --  11   JESSE 9.1  --   --  9.0  --  8.9   * 193* 183* 185*   < > 203*    < > = values in this interval not displayed.       Assessment/Plan:  (M48.02) Cervical stenosis of spinal canal  (Z98.890) H/O laminectomy  (R53.81) Physical deconditioning  (R53.1) Generalized weakness  Comment: Posterior Open Cervical 3-6 laminectomy with Dr. Pinon on 5/27  - jessica " restarted   - No strenuous exercise or heay lifting greater than 10 lbs for 4 weeks or until seen and cleared in clinic.   - wear brace until cleared by Neurosurgical provider.  - incision slightly red around but stable - cefdinir finished on 6/7, keflex from 6/10-6/17  Plan:   Continue with therapy   Have pt sit up for meals 3 times a day. Lay down in between.  Increase Methocarbamol Oral Tablet (Methocarbamol) to TID   Neurosurgery Nurse Date & Time of Appointment: 6/19/25 @ 10:00 Phone Number: [phone] Address: [address] [place] [address]     [E87.1] Hyponatremia   Comment: resolved  Sodium 140 on 6/11/2025  Plan: Continue with plan of care no changes at this time, adjustment as needed    [N30.00]Acute cystitis without hematuria  Comment: improving   Treated with Cefdnir until 6/7  Plan: Continue with plan of care no changes at this time, adjustment as needed        (J44.9) Chronic obstructive pulmonary disease, unspecified COPD type (H)  (G47.33) CIERA (obstructive sleep apnea)  Comment: Chronic  Patient does not use CPAP  Continue with Breo Ellipta and Incruse Ellipta   Plan: Continue with plan of care no changes at this time, adjustment as needed        (Z86.711) History of pulmonary embolism  (Z79.01) On anticoagulant therapy  (Z79.01) On apixaban therapy  (D64) Anemia   Comment: Chronic  Restart apixaban twice daily on 6/7  Hemoglobin   Date Value Ref Range Status   06/11/2025 9.8 (L) 13.3 - 17.7 g/dL Final   05/27/2021 13.2 (L) 13.3 - 17.7 g/dL Final   Plan: Continue with plan of care no changes at this time, adjustment as needed       (E11.69) Type 2 diabetes mellitus with other specified complication, without long-term current use of insulin (H)  Comment: Chronic  Currently taking metformin  BS controlled.   Plan: Continue with plan of care no changes at this time, adjustment as needed       (I10) Hypertension, unspecified type  (I50.30) Heart failure with preserved ejection fraction, NYHA class II  (H)  Comment:   Currently taking amlodipine  Plan: Continue with plan of care no changes at this time, adjustment as needed       (N40.0) Benign prostatic hyperplasia without lower urinary tract symptoms  Comment: Chronic  Taking finasteride   Plan: Continue with plan of care no changes at this time, adjustment as needed           (C79.51) Malignant neoplasm metastatic to bone (H)  (C34.11) Malignant neoplasm of upper lobe of right lung (H)  (Z98.890) History of lung surgery  Comment:   -Hx RUL adenocarcinoma s/p wedge resection   -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

## 2025-06-17 ENCOUNTER — TELEPHONE (OUTPATIENT)
Dept: NEUROSURGERY | Facility: CLINIC | Age: 80
End: 2025-06-17
Payer: COMMERCIAL

## 2025-06-17 NOTE — TELEPHONE ENCOUNTER
Followed up with TCU nursing staff regarding concerns shared by the patient and his wife about pain medication timing in relation to therapy sessions. Staff confirmed that the patient has appropriate pain medication available for pain management.    They noted that there was a miscommunication today between the nursing and therapy teams regarding the timing of the therapy session. As a result, the patient s pain medication had already been administered earlier, and another dose was not due at the time of therapy. Because of this, the patient did not receive medication immediately prior to therapy and ultimately declined to participate in the session.    Staff assured that they are aware of the concern and will continue to coordinate medication timing with therapy when possible.  I thanked the staff for their time and clarified that I was simply relaying the concerns the patient and his wife had shared with our office.    STEPH Lenz, RN  RN Coordinator Neurosurgery

## 2025-06-17 NOTE — TELEPHONE ENCOUNTER
[facility] Call Center    Phone Message    May a detailed message be left on voicemail: yes     Reason for Call: Other: Patients significant other called stating patient is having a lot of pain in his neck. He is not wanting to do PT due to the pain. The pain is behind where the stitches are. Patient says he has a hard time doing anything due to the horrible pain. She wants to know what to do. Patient has an appointment on 6/19/2025. Please review and call back.     Action Taken: Message routed to:  [facility] ([facility]     Travel Screening: Not Applicable     Date of Service:

## 2025-06-17 NOTE — TELEPHONE ENCOUNTER
Patient and his significant other called with concerns regarding pain management and therapy scheduling at his transitional care unit (TCU). They reported that the timing of pain medication administration is not aligning with therapy sessions, resulting in significant pain during therapy and limiting his participation.    Patient requested that I call and speak with the nursing staff at the TCU on his behalf to ensure that his pain medications are given prior to therapy sessions to allow for better participation. I informed the patient that I would follow up with the facility to relay this concern and advocate for appropriate medication timing.    STEPH Lenz, RN  RN Coordinator Neurosurgery

## 2025-06-18 ENCOUNTER — LAB REQUISITION (OUTPATIENT)
Dept: LAB | Facility: CLINIC | Age: 80
End: 2025-06-18
Payer: COMMERCIAL

## 2025-06-18 DIAGNOSIS — D64.9 ANEMIA, UNSPECIFIED: ICD-10-CM

## 2025-06-18 PROBLEM — J96.11 CHRONIC RESPIRATORY FAILURE WITH HYPOXIA (H): Status: RESOLVED | Noted: 2023-03-25 | Resolved: 2025-06-18

## 2025-06-18 RX ORDER — METHOCARBAMOL 500 MG/1
250 TABLET, FILM COATED ORAL 2 TIMES DAILY
Status: SHIPPED
Start: 2025-06-18

## 2025-06-18 NOTE — PROGRESS NOTES
[facility]   2025     Name: Nahun Villalobos   : 1945         Orders:  BMp and CBC on .  Dx anemia     Electronically signed by     SELMA Mccall CNP on 2025 at 9:17 AM

## 2025-06-19 ENCOUNTER — OFFICE VISIT (OUTPATIENT)
Dept: NEUROSURGERY | Facility: CLINIC | Age: 80
End: 2025-06-19
Payer: COMMERCIAL

## 2025-06-19 VITALS
DIASTOLIC BLOOD PRESSURE: 65 MMHG | SYSTOLIC BLOOD PRESSURE: 105 MMHG | OXYGEN SATURATION: 97 % | HEART RATE: 74 BPM | RESPIRATION RATE: 16 BRPM

## 2025-06-19 DIAGNOSIS — Z98.890 S/P SPINAL SURGERY: Primary | ICD-10-CM

## 2025-06-19 NOTE — LETTER
6/19/2025       RE: Nahun Villalobos  [address] [facility] [id number]     Dear Colleague,    Thank you for referring your patient, Nahun Villalobos, to the [facility] NEUROSURGERY CLINIC [place] at [facility]. Please see a copy of my visit note below.        Neurosurgery Clinic Note    Chief Complaint: wound check    DATE OF SURGERY: 05/27/25     PREOPERATIVE DIAGNOSIS:  1. Cervical spondylosis   2. Cervical myelopathy     POSTOPERATIVE DIAGNOSIS:  1. Same as above  2. Same as above     PROCEDURES PERFORMED:  1. Posterior complete cervical 3-5 laminectomy  2. Posterior Partial cervical 6 laminectomy  3. Neuromonitoring (SSEP and motors)  4. C-arm fluoroscopy     ATTENDING SURGEON: Seda Pinon MD     RESIDENT SURGEONS: Sae Johnson MD     ANESTHESIA: General endotracheal anesthesia     ESTIMATED BLOOD LOSS: 50 mL     IMPLANTS: None     EXPLANTS: None     DRAINS: Hemovac drain to full suction     FINDINGS:  Compressed spinal cord by lamina at C3-6 widely decompressed by end of the case. No CSF leak observed. No significant neuromonitoring changes compared to pre-operative Motors and SSEPs.      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 He was originally planned for a C3-6 laminoplasty in 4/2025, but given his overall comorbidity and age and at the time a new thoracic fracture, a C3-6 laminectomy was offered at the affected levels thought to be faster and safer. After a thorough discussion of risks/benefits of the surgical approach,  the patient and his family elected to proceed with surgery.      Hospital Course:  Patient underwent above-mentioned procedure on 5/27/2025.  Following the procedure the patient was transferred to surgical floor.  The patient's course was complicated by generalized weakness and UTI. PT/OT assessed patient and recommended TCU. UA/UC was positive on 5/31/2025 and was treated with IV rocephin and discharged on PO cefdinir.  On post operative day 7, he was ambulating, voiding without a philip, eating a regular diet, pain was well controlled and therefore he was discharged to TCU ([facility]).  Patient will follow-up with Neurosurgery on 6/10/2025.      HPI: Nahun Villalobos is a pleasant 79 year old male who is s/p C3-5 laminectomy by Dr. Pinon on 5/27/25 for cervical spondylotic myelopathy.    6/10/25 Visit - ~2 weeks out from surgery.  Patient notes neck pain is 10/10 intensity.  He has resolution of arm pain, but notes his fingers feel like he is wearing cut-off gloves (numbness).  He continues to endorse left>right hand weakness and left leg weakness.  He is currently at TCU and has therapy working with him.      6/19/25 visit - wound check 3 weeks p/o.  Patient is continuing to work with PT at TCU.  He is working on walking.  He notes some soreness in his head and having trouble with keeping his head upright for long.  He has not been working on any shoulder neck strengthening yet.  He is wondering about using a soft collar or something to help keep his head up.  He has been experimenting with different neck devices which have not helped much.  He denies any new issues/concerns.      Physical Exam   /65 (BP Location: Right arm, Patient Position: Sitting)   Pulse 74   Resp 16   SpO2 97%     Constitutional: Alert, no acute distress.  [place]:  in w/c, gait deferred  Cervical ROM - good    Neurological:    Strength (L/R)  4+/5 Deltoid  5/5 Bicep  5/5 Tricep  5/5 Finger Abduction  4-/4+ Handgrip    4/3 Hip  Flexion  5/5 Knee Extension  5/4 Ankle Dorsiflexion  5/4 Extensor Hallucis Longus  5/4+ Plantar Flexion    Reflexes (L/R)  2+/2+ Bicep  2+/2+ Brachioradialis  3+/3+ Patellar  2+/2+ Ankle  No clonus  No Nino's    Sensation: SILT    Incision:  steri strips present, wound healing well    IMAGING:  No new imaging.    ASSESSMENT & PLAN:  Nahun Villalobos is a 79 year old male who is s/p C3-5 laminectomy by Dr. Pinon on 5/27/25 for cervical spondylotic myelopathy.    Patient is about 3 weeks out from surgery.  Incision is healing well.  Steri strips removed today.  Okay to leave open to air.    Discussed that using braces to hold up his head, instead of him using his own muscles, will not help him in the long run and he should start working with PT to strengthen his shoulder and neck muscles.  I would recommend that he recline when resting instead of falling asleep upright in his chair to avoid his head dropping to the chin and sustaining that position for long periods of time. This will lead to muscle soreness.      No lifting more than 10 pounds, avoid excessive ROM activities with neck.   F/u in 3 weeks for regular 6 week p/o visit.  No imaging.      Patient is in agreement with this plan and states no further questions.      Rebeca Conte PA-C  Department of Neurosurgery  Office: [phone]            Again, thank you for allowing me to participate in the care of your patient.      Sincerely,    Rebeca Conte PA-C

## 2025-06-19 NOTE — PROGRESS NOTES
Neurosurgery Clinic Note    Chief Complaint: wound check    DATE OF SURGERY: 05/27/25     PREOPERATIVE DIAGNOSIS:  1. Cervical spondylosis   2. Cervical myelopathy     POSTOPERATIVE DIAGNOSIS:  1. Same as above  2. Same as above     PROCEDURES PERFORMED:  1. Posterior complete cervical 3-5 laminectomy  2. Posterior Partial cervical 6 laminectomy  3. Neuromonitoring (SSEP and motors)  4. C-arm fluoroscopy     ATTENDING SURGEON: Seda Pinon MD     RESIDENT SURGEONS: Sae Johnson MD     ANESTHESIA: General endotracheal anesthesia     ESTIMATED BLOOD LOSS: 50 mL     IMPLANTS: None     EXPLANTS: None     DRAINS: Hemovac drain to full suction     FINDINGS:  Compressed spinal cord by lamina at C3-6 widely decompressed by end of the case. No CSF leak observed. No significant neuromonitoring changes compared to pre-operative Motors and SSEPs.      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 He was originally planned for a C3-6 laminoplasty in 4/2025, but given his overall comorbidity and age and at the time a new thoracic fracture, a C3-6 laminectomy was offered at the affected levels thought to be faster and safer. After a thorough discussion of risks/benefits of the surgical approach, the patient and his family elected to proceed with surgery.      Hospital Course:  Patient underwent above-mentioned procedure on 5/27/2025.  Following the procedure the patient was transferred to surgical floor.  The patient's course was complicated by generalized weakness and UTI. PT/OT assessed patient and  recommended TCU. UA/UC was positive on 5/31/2025 and was treated with IV rocephin and discharged on PO cefdinir.  On post operative day 7, he was ambulating, voiding without a philip, eating a regular diet, pain was well controlled and therefore he was discharged to TCU ([facility]).  Patient will follow-up with Neurosurgery on 6/10/2025.      HPI: Nahun Villalobos is a pleasant 79 year old male who is s/p C3-5 laminectomy by Dr. Pinon on 5/27/25 for cervical spondylotic myelopathy.    6/10/25 Visit - ~2 weeks out from surgery.  Patient notes neck pain is 10/10 intensity.  He has resolution of arm pain, but notes his fingers feel like he is wearing cut-off gloves (numbness).  He continues to endorse left>right hand weakness and left leg weakness.  He is currently at TCU and has therapy working with him.      6/19/25 visit - wound check 3 weeks p/o.  Patient is continuing to work with PT at TCU.  He is working on walking.  He notes some soreness in his head and having trouble with keeping his head upright for long.  He has not been working on any shoulder neck strengthening yet.  He is wondering about using a soft collar or something to help keep his head up.  He has been experimenting with different neck devices which have not helped much.  He denies any new issues/concerns.      Physical Exam   /65 (BP Location: Right arm, Patient Position: Sitting)   Pulse 74   Resp 16   SpO2 97%     Constitutional: Alert, no acute distress.  [place]:  in w/c, gait deferred  Cervical ROM - good    Neurological:    Strength (L/R)  4+/5 Deltoid  5/5 Bicep  5/5 Tricep  5/5 Finger Abduction  4-/4+ Handgrip    4/3 Hip Flexion  5/5 Knee Extension  5/4 Ankle Dorsiflexion  5/4 Extensor Hallucis Longus  5/4+ Plantar Flexion    Reflexes (L/R)  2+/2+ Bicep  2+/2+ Brachioradialis  3+/3+ Patellar  2+/2+ Ankle  No clonus  No Nino's    Sensation: SILT    Incision:  steri strips present, wound healing well    IMAGING:  No new  imaging.    ASSESSMENT & PLAN:  Nahun Villalobos is a 79 year old male who is s/p C3-5 laminectomy by Dr. Pinon on 5/27/25 for cervical spondylotic myelopathy.    Patient is about 3 weeks out from surgery.  Incision is healing well.  Steri strips removed today.  Okay to leave open to air.    Discussed that using braces to hold up his head, instead of him using his own muscles, will not help him in the long run and he should start working with PT to strengthen his shoulder and neck muscles.  I would recommend that he recline when resting instead of falling asleep upright in his chair to avoid his head dropping to the chin and sustaining that position for long periods of time. This will lead to muscle soreness.      No lifting more than 10 pounds, avoid excessive ROM activities with neck.   F/u in 3 weeks for regular 6 week p/o visit.  No imaging.      Patient is in agreement with this plan and states no further questions.      Rebeca Conte PA-C  Department of Neurosurgery  Office: [phone]

## 2025-06-23 ENCOUNTER — RESULTS FOLLOW-UP (OUTPATIENT)
Dept: GERIATRICS | Facility: CLINIC | Age: 80
End: 2025-06-23

## 2025-06-23 ENCOUNTER — TRANSITIONAL CARE UNIT VISIT (OUTPATIENT)
Dept: GERIATRICS | Facility: CLINIC | Age: 80
End: 2025-06-23
Payer: COMMERCIAL

## 2025-06-23 VITALS
WEIGHT: 169.6 LBS | DIASTOLIC BLOOD PRESSURE: 65 MMHG | SYSTOLIC BLOOD PRESSURE: 107 MMHG | TEMPERATURE: 98.1 F | HEART RATE: 66 BPM | HEIGHT: 66 IN | RESPIRATION RATE: 18 BRPM | OXYGEN SATURATION: 94 % | BODY MASS INDEX: 27.26 KG/M2

## 2025-06-23 DIAGNOSIS — I10 ESSENTIAL HYPERTENSION, BENIGN: ICD-10-CM

## 2025-06-23 DIAGNOSIS — C79.51 MALIGNANT NEOPLASM METASTATIC TO BONE (H): ICD-10-CM

## 2025-06-23 DIAGNOSIS — Z86.711 HISTORY OF PULMONARY EMBOLISM: ICD-10-CM

## 2025-06-23 DIAGNOSIS — R53.81 PHYSICAL DECONDITIONING: ICD-10-CM

## 2025-06-23 DIAGNOSIS — M48.02 CERVICAL STENOSIS OF SPINAL CANAL: Primary | ICD-10-CM

## 2025-06-23 DIAGNOSIS — Z79.01 ON ANTICOAGULANT THERAPY: ICD-10-CM

## 2025-06-23 DIAGNOSIS — Z98.890 S/P LAMINECTOMY: ICD-10-CM

## 2025-06-23 DIAGNOSIS — E11.69 TYPE 2 DIABETES MELLITUS WITH OTHER SPECIFIED COMPLICATION, WITHOUT LONG-TERM CURRENT USE OF INSULIN (H): ICD-10-CM

## 2025-06-23 DIAGNOSIS — D64.9 ANEMIA, UNSPECIFIED TYPE: ICD-10-CM

## 2025-06-23 DIAGNOSIS — C34.11 MALIGNANT NEOPLASM OF UPPER LOBE OF RIGHT LUNG (H): ICD-10-CM

## 2025-06-23 DIAGNOSIS — J44.9 CHRONIC OBSTRUCTIVE PULMONARY DISEASE, UNSPECIFIED COPD TYPE (H): ICD-10-CM

## 2025-06-23 LAB
ANION GAP SERPL CALCULATED.3IONS-SCNC: 12 MMOL/L (ref 7–15)
BUN SERPL-MCNC: 14.8 MG/DL (ref 8–23)
CALCIUM SERPL-MCNC: 9 MG/DL (ref 8.8–10.4)
CHLORIDE SERPL-SCNC: 104 MMOL/L (ref 98–107)
CREAT SERPL-MCNC: 1.04 MG/DL (ref 0.67–1.17)
EGFRCR SERPLBLD CKD-EPI 2021: 73 ML/MIN/1.73M2
ERYTHROCYTE [DISTWIDTH] IN BLOOD BY AUTOMATED COUNT: 12.4 % (ref 10–15)
GLUCOSE SERPL-MCNC: 138 MG/DL (ref 70–99)
HCO3 SERPL-SCNC: 21 MMOL/L (ref 22–29)
HCT VFR BLD AUTO: 33.7 % (ref 40–53)
HGB BLD-MCNC: 10.4 G/DL (ref 13.3–17.7)
MCH RBC QN AUTO: 32.8 PG (ref 26.5–33)
MCHC RBC AUTO-ENTMCNC: 30.9 G/DL (ref 31.5–36.5)
MCV RBC AUTO: 106 FL (ref 78–100)
PLATELET # BLD AUTO: 303 10E3/UL (ref 150–450)
POTASSIUM SERPL-SCNC: 4.6 MMOL/L (ref 3.4–5.3)
RBC # BLD AUTO: 3.17 10E6/UL (ref 4.4–5.9)
SODIUM SERPL-SCNC: 137 MMOL/L (ref 135–145)
WBC # BLD AUTO: 8.3 10E3/UL (ref 4–11)

## 2025-06-23 PROCEDURE — 80048 BASIC METABOLIC PNL TOTAL CA: CPT | Performed by: NURSE PRACTITIONER

## 2025-06-23 PROCEDURE — 36415 COLL VENOUS BLD VENIPUNCTURE: CPT | Performed by: NURSE PRACTITIONER

## 2025-06-23 PROCEDURE — P9604 ONE-WAY ALLOW PRORATED TRIP: HCPCS | Performed by: NURSE PRACTITIONER

## 2025-06-23 PROCEDURE — 85014 HEMATOCRIT: CPT | Performed by: NURSE PRACTITIONER

## 2025-06-23 PROCEDURE — 82310 ASSAY OF CALCIUM: CPT | Performed by: NURSE PRACTITIONER

## 2025-06-23 NOTE — PROGRESS NOTES
Nahun Villalobos is a 79 year old male seen June 23, 2025 where he was admitted after [facility] hospitalization 5/27 to 6/3 for cervical spondylosis with myelopathy.    He underwent a C3-6 laminectomy on 5/27   Post operative course complicated by generalized weakness and an E coli UTI, latter treated with ceftriaxone.   Stable and discharged to TCU for ongoing recovery and Rehab.       Today pt is seen in his room up to , with his girlfriend Kenia present   Has had persistent neck pain, and he is wearing a neck support pillow    Cautioned against using this so that he can maintain strength in his neck muscles, and he reports he is only using it in Physical Therapy   Reports he is eating okay, no dyspnea or CV symptoms   Able to work with therapies and hoping to return home soon.       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  He also is followed at [facility] for malignant carcinoid tumor of small intestine primary and mets to liver and ribs; he is treated with lanreotide   He had a February 2025 [facility] hospitalization following a fall at his doctor's office   Forgot to lock the brakes on his 4WW.   In the ED he was found to be in SALLIE, and severe thoracic back pain with BUE paraesthesias.  Imaging showed acute fracture T9-10, anterior osteophyte fracture and disruption of the anterior longitudinal ligament.  He was seen by NS and a custom TLSO was placed to be worn for 3 months.  Pain regimen increased and he discharged to TCU, then returned home in late March 2025       Past Medical History:   Diagnosis Date    Acute diverticulitis 09/20/2020    Anemia 07/09/2024    Antiplatelet or antithrombotic long-term use     Arthritis     CHF (congestive heart failure) (H) 09/16/2020    COPD (chronic obstructive pulmonary disease) (H)     DM (diabetes mellitus) (H)     Dyspnea on exertion     Essential hypertension, benign      Hemorrhage of rectum and anus     Non-small cell lung cancer (H)     Obesity     Personal history of colonic polyps     Sleep apnea     Thrombosis     Tobacco use disorder     Urinary retention     Walking troubles        Past Surgical History:   Procedure Laterality Date    ABDOMEN SURGERY      APPENDECTOMY      BONE EXOSTOSIS EXCISION Bilateral 2022    Procedure: EXOSTECTOMIES BOTH 5TH DIGITS WITH SOFT TISSUE RELEASE;  Surgeon: Tong Gray DPM;  Location: [facility]    CHOLECYSTECTOMY      COLONOSCOPY      CYSTOSCOPY, TRANSURETHRAL RESECTION (TUR) PROSTATE, COMBINED N/A 2018    Procedure: COMBINED CYSTOSCOPY, TRANSURETHRAL RESECTION (TUR) PROSTATE;  Cystoscopy, Transurethral Resection Of The Prostate;  Surgeon: Praveena Burris MD;  Location: [facility] OR    DECOMPRESSION CERVICAL POSTERIOR THREE+ LEVELS N/A 2025    Procedure: posterior Open Cervical 3-6 laminectomy;  Surgeon: Seda Pinon MD;  Location: [facility] OR    IR LUNG BIOPSY MEDIASTINUM RIGHT  2016    WEDGE RESECTION      lung    ZZC NONSPECIFIC PROCEDURE      cholecystectomy       Family History   Problem Relation Age of Onset    Hypertension Mother     C.A.D. Mother         ANEURYSM    Cancer - colorectal Mother     Cancer Mother         OVARIAN    Other Cancer Mother     Hypertension Sister     Breast Cancer Sister     Cerebrovascular Disease Father     Hypertension Sister     Breast Cancer Sister     Glaucoma No family hx of     Macular Degeneration No family hx of        Social History     Tobacco Use    Smoking status: Former     Current packs/day: 0.00     Average packs/day: 2.0 packs/day for 53.0 years (106.0 ttl pk-yrs)     Types: Cigarettes, Cigars     Start date: 1960     Quit date: 2013     Years since quittin.0     Passive exposure: Past    Smokeless tobacco: Never    Tobacco comments:     Quit, will never start again.   Substance Use Topics    Alcohol use: No      SH: Lives with his  "girlfriend Kenia, house in [facility].   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[employer] [place] area   Daughter Annetta manages finances, etc., lives in [place]    Review Of Systems  Skin: negative   Eyes: impaired vision  Ears/Nose/Throat: hearing loss  Respiratory: dyspnea on exertion, cough  CIERA, does not use CPAP consistently    Cardiovascular: lower extremity edema and exercise intolerance  Gastrointestinal: diarrhea, chronic  Genitourinary: negative  Musculoskeletal: generalized weakness, fall risk   Ambulates with platform walker and assist   Needs assist with dressing and hygiene  Neurologic: paresthesias, neuropathy   SLUMS 16/30     Psychiatric: negative  Hematologic/Lymphatic/Immunologic: negative  Endocrine: diabetes   Weight in Nov 2024 was 191 lbs    Wt Readings from Last 5 Encounters:   06/23/25 76.9 kg (169 lb 9.6 oz)   06/16/25 76.7 kg (169 lb)   06/09/25 73.6 kg (162 lb 4.8 oz)   06/04/25 75.8 kg (167 lb)   05/27/25 81.8 kg (180 lb 4.8 oz)      GENERAL APPEARANCE: alert and no distress  /65   Pulse 66   Temp 98.1  F (36.7  C)   Resp 18   Ht 1.676 m (5' 6\")   Wt 76.9 kg (169 lb 9.6 oz)   SpO2 94%   BMI 27.37 kg/m     HEENT: normocephalic, no lesion or abnormalities  NECK: no adenopathy, no asymmetry, masses, or scars and thyroid normal to palpation; incision well healed, no open areas or erythema     RESP: decreased BS diffusely   CV: regular rate and rhythm, normal S1 S2  ABDOMEN:  soft, nontender, no HSM or masses and bowel sounds normal  MS: extremities with 1+ edema    SKIN: no suspicious lesions or rashes  NEURO: generalized weakness, WC bound, +STML     PSYCH: affect okay, pleasant and chatty     Lab Results   Component Value Date     06/23/2025    POTASSIUM 4.6 06/23/2025    CHLORIDE 104 06/23/2025    CO2 21 (L) 06/23/2025    ANIONGAP 12 06/23/2025 "     (H) 06/23/2025    BUN 14.8 06/23/2025    CR 1.04 06/23/2025    GFRESTIMATED 73 06/23/2025    JESSE 9.0 06/23/2025     Component Value Date    WBC 8.3 06/23/2025      HGB 10.4 06/23/2025       06/23/2025       06/23/2025      MR THORACIC SPINE W/O and W CONTRAST, MR LUMBAR SPINE W/O and W CONTRAST  2/25/2025   1.  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 As this is an unstable fracture in a rigid spine neurosurgical consultation is recommended.  2.  No traumatic subluxation or high-grade canal stenosis.  3.  Mild lumbar spondylosis, worse at L4-L5 where there is severe left lateral recess stenosis and severe left foraminal stenosis. Correlate for left L4 and L5 radiculopathy.  4.  Advanced multilevel cervical spondylitic changes with multilevel canal flattening and canal stenoses, suboptimally assessed on the current examination. Consider dedicated MRI cervical spine to further evaluate.    04/14/2025  CT ABDOMEN PELVIS WITH IV CONTRAST   FINDINGS:  Many (at least 20) bilobar hepatic cysts consistent with metastases. These have progressed in size and number since 11/20/2024.   Mesenteric lymphadenopathy and metastatic nodes adjacent to the duodenum have not definitely changed. Stable retroperitoneal lymphadenopathy.  Small bowel resection with enteroenterostomy. Colonic diverticulosis.   Heterogeneous enlarged prostate. Diffuse urothelial hyperenhancement in the urinary bladder. Bilateral renal cysts. Cholecystectomy.   IMPRESSION:   1. Hepatic metastases have increased in number.   2. Metastatic mesenteric and retroperitoneal lymphadenopathy is not definitely changed.     4/15/2025  PET CT SKULL TO THIGH DOTATATE   INDICATION:  Metastatic small bowel carcinoid neoplasm. Small bowel resection. History of lanreotide therapy. Subsequent treatment strategy.   FINDINGS:  No evidence of DOTATATE avid small bowel recurrence of carcinoid neoplasm.    Corresponding to yesterday's abdomen/pelvis CT, widespread DOTATATE avid liver metastases have increased in number since 1/24/2024.   Moderately to markedly DOTATATE avid node metastases, predominantly periportal, mesenteric root and retroperitoneal not significantly changed since 1/24/2024. Stable scattered subcentimeter mediastinal and hilar lymph nodes with mild activity most likely reactive.   Since 1/24/2024, there are a few new DOTATATE avid bone metastases including T10, anterior and posterior left iliac wing, right sacral ala and posterior right iliac bone. A few bone metastases are slightly larger or more DOTATATE avid, as examples, anterolateral left sixth rib (SUV max 11.4, previously 5.4) and anterolateral right ninth rib (SUV max 9.7, previously 3.3) while a couple of metastases are slightly less DOTATATE avid, as examples, proximal left femur (SUV max 15.8, previously 22.6) and medial right acetabulum (SUV max 4.0, previously 5.8).   No other suspicious DOTATATE avid lesions.   Significant incidental findings on the low-dose unenhanced CT images: Beyond the findings reported on the recent diagnostic CT abdomen/pelvis from 4/14/2025, stable postoperative changes right lung. Lateral ventricular dilatation unchanged since prior head CTs. Diffuse carotid and coronary artery calcification.   IMPRESSION:   1. Corresponding to CT findings from yesterday, progression of DOTATATE avid widespread liver metastatic disease since outside DOTATATE PET/CT 1/24/2024.   2. Overall progression of DOTATATE avid bone metastases since 1/24/2024.   3. No significant change in DOTATATE avid node metastatic disease.     CT THORACIC SPINE W/O CONTRAST  5/20/2025   HISTORY: T10 fracture; Closed fracture of tenth thoracic vertebra   Intact thoracic vertebral alignment. Stable dextrocurvature of the  thoracic spine. Stable fracture through the anterior bridging  osteophyte at T9-10 with oblique extension through the vertebral  body  and superior endplate of T10. Sclerosis along portions of the the  fracture line extending obliquely through the inferior endplate of  T10. No new fracture. Flowing anterior endplate osteophytes in the mid  and inferior thoracic spine. No prevertebral edema. Patent spinal  canal and neural foramina. Severe right neural foraminal stenosis at  T3-4 and T4-5. Similar multilevel degenerative endplate changes and  facet arthropathy.                                                            IMPRESSION:  1. Healing fracture of the anterior bridging osteophyte at T9-10 with oblique extension through the superior and inferior endplates of T10.  No new osseous abnormality.  2. Similar thoracic spondylosis with severe right neural foraminal stenosis at T3-4 and T4-5. No high-grade spinal canal stenosis.  3. Findings compatible with DISH.      IMP/PLAN:   (M48.02) Cervical stenosis of spinal canal  (primary encounter diagnosis)  (Z98.890) S/P laminectomy  Comment: noted by NS that compressed spinal cord by lamina at C3-6 was widely decompressed after surgery.     Has trouble keeping his neck upright for long, and still some neck pain       Plan: monitor incision, follow up with NS as scheduled   Pain management with acetaminophen 1000 mg bid, methocarbamol 250 mg bid, and oxycodone 5 mg bid and PRN   Naloxone available       (R53.81) Physical deconditioning  Comment: acute on chronic   Plan: PHYSICAL THERAPY /OCCUPATIONAL THERAPY for transfers, balance, gait, ADLs   Discharge goal is return home with PCA support, [facility]       (D64.9) Anemia, unspecified type  Comment: macrocytic   Plan: remains on Fe sulfate daily   Follow CBC     (Z86.711) History of pulmonary embolism  (Z79.01) On anticoagulant therapy  Plan: continue Apixaban 2.5 mg bid     (G62.9) Neuropathy  Comment: by hx  Plan: gabapentin 600 mg bid     (I50.30) Heart failure with preserved ejection fraction, NYHA class II (H)  (I10) Essential hypertension,  benign  Comment:   BP Readings from Last 3 Encounters:   06/23/25 107/65   06/19/25 105/65   06/16/25 (!) 142/75      Plan: amlodipine 5 mg/ day and lisinopril 20 mg/day   Furosemide 40 mg/day   Follow exam, bps and BMP     (E11.69) Type 2 diabetes mellitus with other specified complication, without long-term current use of insulin (H)  Comment:   Lab Results   Component Value Date    A1C 6.4 05/23/2025     Plan: metformin 1000 mg bid   Atorvastatin 20 mg/day; also on an ACEI  BGM prn     (J44.9) Chronic obstructive pulmonary disease, unspecified COPD type (H)  Comment: STACY, cough   Does not use CPAP for CIERA     Plan: Trelegy Ellipta daily   Albuterol MDI prn     (C7A.019) Malignant carcinoid tumor of small intestine, unspecified location (H)  (C34.11) Malignant neoplasm of upper lobe of right lung (H)  (C79.51) Malignant neoplasm metastatic to bone (H)  Comment: and liver mets  Worsened by most recent CT/PET     Plan: lanreotide 120 mg subcutaneous monthly   Zometa injection q2 months   Follow up with Oncology as scheduled     (K52.9) Chronic diarrhea  Comment: may be related to carcinoid, meds   Plan: cholestyramine 4g bid      Lexi Gtz MD

## 2025-06-27 ENCOUNTER — DISCHARGE SUMMARY NURSING HOME (OUTPATIENT)
Dept: GERIATRICS | Facility: CLINIC | Age: 80
End: 2025-06-27
Payer: COMMERCIAL

## 2025-06-27 NOTE — PROGRESS NOTES
[facility] GERIATRICS DISCHARGE SUMMARY  PATIENT'S NAME: Nahun Villalobos  YOB: 1945  MEDICAL RECORD NUMBER:  [id number]  Place of Service where encounter took place:  No question data found.    PRIMARY CARE PROVIDER AND CLINIC RESPONSIBLE AFTER TRANSFER:   Olegario Castillo MD, [address] [place] [phone]    [facility] [place]     Transferring providers: Mikayla Unger, SELMA CNP, ***, MD  Recent Hospitalization/ED:  {LOCATION OF NURSING HOME ADMISSIONS:220979} stay *** to ***.  Date of SNF Admission: {fgsdcdate:281932}  Date of SNF (anticipated) Discharge: {fgsdcdate:838763}  Discharged to: {fgsdischargeto1:924309}  Cognitive Scores: {fgscog1:657625}  Physical Function: {fgsphysicalfunction:177084}  DME: {fgsdmedc:178092}    CODE STATUS/ADVANCE DIRECTIVES DISCUSSION:  Prior {Provider, verify code status is accurate as an order in Epic}  ALLERGIES: Patient has no known allergies.    NURSING FACILITY COURSE   Medication Changes/Rationale:   ***    Summary of nursing facility stay:   {FGS DX2:844792}    Discharge Medications:  MED REC REQUIRED{TIP  Click the link below to document or use med rec list, use list to pull in response :131742}  Post Medication Reconciliation Status: {MED REC LIST:482923}     {Providers Please double check the med list (in the plan section >> meds & orders tab) and Discontinue any of the meds flagged by the TC to be discontinued}  Current Outpatient Medications   Medication Sig Dispense Refill    acetaminophen (TYLENOL) 500 MG tablet Take 1,000 mg by mouth 2 times daily.      albuterol (VENTOLIN HFA) 108 (90 Base) MCG/ACT inhaler INHALE 2 PUFFS INTO THE LUNGS EVERY 6 HOURS AS NEEDED FOR SHORTNESS OF BREATH / DYSPNEA OR WHEEZING 25.5 g 0    amLODIPine (NORVASC) 5 MG tablet Take 1 tablet (5 mg) by mouth daily. 90 tablet 3    apixaban ANTICOAGULANT (ELIQUIS ANTICOAGULANT) 2.5 MG tablet Take 1 tablet (2.5 mg) by mouth 2 times daily. 180 tablet 2     atorvastatin (LIPITOR) 20 MG tablet Take 1 tablet (20 mg) by mouth daily. 90 tablet 2    blood glucose (NO BRAND SPECIFIED) lancets standard Use to test blood sugar 1 time daily, first thing in the morning 50 each 3    calcium carbonate (TUMS) 500 MG chewable tablet Take 1 chew tab by mouth daily as needed for heartburn.      cholestyramine light (QUESTRAN) 4 GM packet Take 4 g by mouth 2 times daily (with meals).      Coenzyme Q10 400 MG CAPS Take 400 mg by mouth daily 30 capsule 0    ferrous sulfate (FE TABS) 325 (65 Fe) MG EC tablet Take 1 tablet (325 mg) by mouth every evening 30 tablet 0    finasteride (PROSCAR) 5 MG tablet Take 1 tablet (5 mg) by mouth daily 90 tablet 3    Fluticasone-Umeclidin-Vilant (TRELEGY ELLIPTA) 100-62.5-25 MCG/ACT oral inhaler USE 1 INHALATION DAILY 60 each 5    furosemide (LASIX) 40 MG tablet Take 1 tablet (40 mg) by mouth daily.      gabapentin (NEURONTIN) 300 MG capsule TAKE 2 CAPSULES TWICE A DAY WITH MEALS 180 capsule 1    lanreotide acetate (SOMATULINE) 120 MG/0.5ML injection Inject 120 mg subcutaneously every 28 days.      lisinopril (ZESTRIL) 20 MG tablet Take 20 mg by mouth daily.      melatonin 3 MG tablet Take 1 tablet (3 mg) by mouth at bedtime.      metFORMIN (GLUCOPHAGE) 1000 MG tablet Take 1 tablet (1,000 mg) by mouth 2 times daily (with meals). 180 tablet 1    methocarbamol (ROBAXIN) 500 MG tablet Take 0.5 tablets (250 mg) by mouth 2 times daily.      miconazole (MICATIN) 2 % external cream Apply topically 2 times daily as needed for other (Rash/ skin irritation)      naloxone (NARCAN) 4 MG/0.1ML nasal spray Spray 1 spray (4 mg) into one nostril alternating nostrils as needed for opioid reversal. every 2-3 minutes until assistance arrives 2 each 0    order for DME Oxygen 2 Li/min  at night via nasal cannula 1 Device 0    oxyCODONE (ROXICODONE) 5 MG tablet Take 1 tablet (5 mg) by mouth 2 times daily. May also take 1 tablet (5 mg) 3 times daily as needed for moderate  pain. 60 tablet 0    senna-docusate (SENOKOT-S/PERICOLACE) 8.6-50 MG tablet Take 1 tablet by mouth 2 times daily. 20 tablet 0    vitamin B-Complex Take 1 tablet by mouth daily      vitamin C (ASCORBIC ACID) 500 MG tablet Take 500 mg by mouth daily.      Vitamin D3 (VITAMIN D-1000 MAX ST) 25 mcg (1000 units) tablet Take 25 mcg by mouth daily.      zoledronic acid (ZOMETA) 4 MG/5ML injection Inject 4 mg into the vein every 2 months.        ***    Controlled medications:   {fgscontrolledmeds1:605332}     Past Medical History:   Past Medical History:   Diagnosis Date    Acute diverticulitis 09/20/2020    Anemia 07/09/2024    Antiplatelet or antithrombotic long-term use     Arthritis     CHF (congestive heart failure) (H) 09/16/2020    COPD (chronic obstructive pulmonary disease) (H)     DM (diabetes mellitus) (H)     Dyspnea on exertion     Essential hypertension, benign     Hemorrhage of rectum and anus     Non-small cell lung cancer (H)     Obesity     Personal history of colonic polyps     Sleep apnea     Thrombosis     Tobacco use disorder     Urinary retention     Walking troubles      Physical Exam:   Vitals: There were no vitals taken for this visit.  BMI: There is no height or weight on file to calculate BMI.  {NURSING HOME PHYSICAL EXAM:693676}     SNF labs: {fgslabdischarge:211346}    DISCHARGE PLAN:  Follow up labs: {fgsfuturelabs1:369709}  Medical Follow Up:      {fgsdischargefollowup:847036}  [facility] scheduled appointments:  Appointments in Next Year      Jun 27, 2025 9:00 AM  Discharge Summary with SELMA Adan CNP  [facility] ([facility] ) [phone]     Jul 10, 2025 11:00 AM  (Arrive by 10:40 AM)  Provider Visit with Olegario Castillo MD  [facility] ([facility]) [phone]     Jul 15, 2025 2:30 PM  (Arrive by 2:15 PM)  POST-OP SPINE with LINDSAY Corona  [facility] ([facility] ) [phone]     Jul 17, 2025 11:30 AM  (Arrive by 11:10 AM)  Provider Visit with Melba Green MD  [facility] ([facility]) [phone]     Aug 27, 2025 9:00 AM  (Arrive by 8:45 AM)  New Neurology with Jaswinder Suárez DO  [facility] ([facility] ) [phone]         ***  Discharge Services: {fgsdcservices1:229570}  Discharge Instructions Verbalized to Patient at Discharge:   {fgsdischargeinstructions1:878496}    TOTAL DISCHARGE TIME:   {TIME:559301}  Electronically signed by:  SELMA Mccall CNP ***    {fgshomecare F2F:451806}

## 2025-07-01 ENCOUNTER — TRANSITIONAL CARE UNIT VISIT (OUTPATIENT)
Dept: GERIATRICS | Facility: CLINIC | Age: 80
End: 2025-07-01
Payer: COMMERCIAL

## 2025-07-01 VITALS
WEIGHT: 170.1 LBS | RESPIRATION RATE: 16 BRPM | DIASTOLIC BLOOD PRESSURE: 59 MMHG | SYSTOLIC BLOOD PRESSURE: 115 MMHG | OXYGEN SATURATION: 96 % | HEART RATE: 65 BPM | HEIGHT: 66 IN | TEMPERATURE: 97.8 F | BODY MASS INDEX: 27.34 KG/M2

## 2025-07-01 DIAGNOSIS — R53.81 PHYSICAL DECONDITIONING: ICD-10-CM

## 2025-07-01 DIAGNOSIS — E11.69 TYPE 2 DIABETES MELLITUS WITH OTHER SPECIFIED COMPLICATION, WITHOUT LONG-TERM CURRENT USE OF INSULIN (H): ICD-10-CM

## 2025-07-01 DIAGNOSIS — Z98.890 H/O LAMINECTOMY: ICD-10-CM

## 2025-07-01 DIAGNOSIS — Z79.01 ON APIXABAN THERAPY: ICD-10-CM

## 2025-07-01 DIAGNOSIS — Z79.01 ON ANTICOAGULANT THERAPY: ICD-10-CM

## 2025-07-01 DIAGNOSIS — I50.30 HEART FAILURE WITH PRESERVED EJECTION FRACTION, NYHA CLASS II (H): ICD-10-CM

## 2025-07-01 DIAGNOSIS — Z98.890 HISTORY OF LUNG SURGERY: ICD-10-CM

## 2025-07-01 DIAGNOSIS — I10 ESSENTIAL HYPERTENSION, BENIGN: ICD-10-CM

## 2025-07-01 DIAGNOSIS — C34.11 MALIGNANT NEOPLASM OF UPPER LOBE OF RIGHT LUNG (H): ICD-10-CM

## 2025-07-01 DIAGNOSIS — R53.1 GENERALIZED WEAKNESS: ICD-10-CM

## 2025-07-01 DIAGNOSIS — M48.02 CERVICAL STENOSIS OF SPINAL CANAL: Primary | ICD-10-CM

## 2025-07-01 DIAGNOSIS — Z86.711 HISTORY OF PULMONARY EMBOLISM: ICD-10-CM

## 2025-07-01 DIAGNOSIS — N40.0 BENIGN PROSTATIC HYPERPLASIA WITHOUT LOWER URINARY TRACT SYMPTOMS: ICD-10-CM

## 2025-07-01 DIAGNOSIS — C79.51 MALIGNANT NEOPLASM METASTATIC TO BONE (H): ICD-10-CM

## 2025-07-01 DIAGNOSIS — J44.9 CHRONIC OBSTRUCTIVE PULMONARY DISEASE, UNSPECIFIED COPD TYPE (H): ICD-10-CM

## 2025-07-01 DIAGNOSIS — Z98.890 S/P LAMINECTOMY: ICD-10-CM

## 2025-07-01 DIAGNOSIS — M62.81 GENERALIZED MUSCLE WEAKNESS: ICD-10-CM

## 2025-07-01 PROCEDURE — 99309 SBSQ NF CARE MODERATE MDM 30: CPT | Performed by: NURSE PRACTITIONER

## 2025-07-01 NOTE — PROGRESS NOTES
"[facility] GERIATRICS    Chief Complaint   Patient presents with    RECHECK     HPI:  Nahun Villalobos is a 79 year old  (1945), who is being seen today for an episodic care visit at: [facility] ([facility]) [[id number]].     VS stable.   Movign to [facility]       Allergies, and PMH/PSH reviewed in EPIC today.  REVIEW OF SYSTEMS:  4 point ROS including Respiratory, CV, GI and , other than that noted in the HPI,  is negative    Objective:   /59   Pulse 65   Temp 97.8  F (36.6  C)   Resp 16   Ht 1.676 m (5' 6\")   Wt 77.2 kg (170 lb 1.6 oz)   SpO2 96%   BMI 27.45 kg/m    GENERAL APPEARANCE:  Alert, in no distress  RESP:  no respiratory distress  CV:  rate-normal  PSYCH:  affect abnormal sarcastic,     Most Recent 3 CBC's:  Recent Labs   Lab Test 06/23/25  0655 06/11/25  0640 06/03/25  0620   WBC 8.3 11.1* 13.4*   HGB 10.4* 9.8* 9.4*   * 107* 100    405 310     Most Recent 3 BMP's:  Recent Labs   Lab Test 06/23/25  0655 06/11/25  0640 06/03/25  1140 06/03/25  0824 06/03/25  0620    140  --   --  134*   POTASSIUM 4.6 4.5  --   --  5.0   CHLORIDE 104 104  --   --  99   CO2 21* 25  --   --  26   BUN 14.8 15.1  --   --  13.9   CR 1.04 1.03  --   --  0.92   ANIONGAP 12 11  --   --  9   JESSE 9.0 9.1  --   --  9.0   * 137* 193*   < > 185*    < > = values in this interval not displayed.       Assessment/Plan:    (M48.02) Cervical stenosis of spinal canal  (Z98.890) H/O laminectomy  (R53.81) Physical deconditioning  (R53.1) Generalized weakness  Comment: Posterior Open Cervical 3-6 laminectomy with Dr. Pinon on 5/27  - eliquis restarted   - No strenuous exercise or heay lifting greater than 10 lbs for 4 weeks or until seen and cleared in clinic.   - incision slightly red around but stable - cefdinir finished on 6/7, keflex from 6/10-6/17  - Pain management with acetaminophen 1000 mg bid, methocarbamol 250 mg bid, and oxycodone 5 mg  PRN   Plan:   Continue with therapy   Have pt " sit up for meals 3 times a day. Lay down in between.   [facility] Date & Time of Appointment: 7/15/25 @ 2:15 Phone Number: [phone] Address: [address] [place] [address] 3rd  Stop schedule oxycodone      (J44.9) Chronic obstructive pulmonary disease, unspecified COPD type (H)  (G47.33) CIERA (obstructive sleep apnea)  Comment: Chronic  Patient does not use CPAP  Continue with Breo Ellipta and Incruse Ellipta   Plan: Continue with plan of care no changes at this time, adjustment as needed        (Z86.711) History of pulmonary embolism  (Z79.01) On anticoagulant therapy  (Z79.01) On apixaban therapy  (D64) Anemia   Comment: Chronic  Taking apixaban   Hemoglobin   Date Value Ref Range Status   06/23/2025 10.4 (L) 13.3 - 17.7 g/dL Final   05/27/2021 13.2 (L) 13.3 - 17.7 g/dL Final   Plan: Continue with plan of care no changes at this time, adjustment as needed        (E11.69) Type 2 diabetes mellitus with other specified complication, without long-term current use of insulin (H)  Comment: Chronic  Currently taking metformin  BS controlled.   Plan: Continue with plan of care no changes at this time, adjustment as needed        (I10) Hypertension, unspecified type  (I50.30) Heart failure with preserved ejection fraction, NYHA class II (H)  Comment:   Currently taking amlodipine and lisinopril   Plan: Continue with plan of care no changes at this time, adjustment as needed        (N40.0) Benign prostatic hyperplasia without lower urinary tract symptoms  Comment: Chronic  Taking finasteride   Plan: Continue with plan of care no changes at this time, adjustment as needed           (C79.51) Malignant neoplasm metastatic to bone (H)  (C34.11) Malignant neoplasm of upper lobe of right lung (H)  (Z98.890) History of lung surgery  Comment:   -Hx RUL adenocarcinoma s/p wedge resection   -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

## 2025-07-05 ENCOUNTER — DOCUMENTATION ONLY (OUTPATIENT)
Dept: GERIATRICS | Facility: CLINIC | Age: 80
End: 2025-07-05
Payer: COMMERCIAL

## 2025-07-05 DIAGNOSIS — I26.99 OTHER PULMONARY EMBOLISM WITHOUT ACUTE COR PULMONALE, UNSPECIFIED CHRONICITY (H): ICD-10-CM

## 2025-07-05 DIAGNOSIS — E11.9 TYPE 2 DIABETES MELLITUS WITHOUT COMPLICATION, WITHOUT LONG-TERM CURRENT USE OF INSULIN (H): ICD-10-CM

## 2025-07-05 DIAGNOSIS — E63.9 NUTRITIONAL DEFICIENCY: ICD-10-CM

## 2025-07-05 DIAGNOSIS — I10 ESSENTIAL HYPERTENSION, BENIGN: ICD-10-CM

## 2025-07-05 DIAGNOSIS — Z78.9 TAKES DIETARY SUPPLEMENTS: Primary | ICD-10-CM

## 2025-07-05 DIAGNOSIS — I10 BENIGN ESSENTIAL HYPERTENSION: ICD-10-CM

## 2025-07-05 DIAGNOSIS — R31.0 GROSS HEMATURIA: ICD-10-CM

## 2025-07-05 DIAGNOSIS — E78.5 HYPERLIPIDEMIA LDL GOAL <100: ICD-10-CM

## 2025-07-05 DIAGNOSIS — M79.2 NEUROPATHIC PAIN: ICD-10-CM

## 2025-07-05 DIAGNOSIS — Z98.890 S/P LAMINECTOMY: ICD-10-CM

## 2025-07-06 DIAGNOSIS — R19.7 DIARRHEA: Primary | ICD-10-CM

## 2025-07-07 DIAGNOSIS — R19.7 DIARRHEA: ICD-10-CM

## 2025-07-07 RX ORDER — APIXABAN 2.5 MG/1
TABLET, FILM COATED ORAL
Qty: 60 TABLET | Refills: 2 | Status: SHIPPED | OUTPATIENT
Start: 2025-07-07

## 2025-07-07 RX ORDER — FINASTERIDE 5 MG/1
TABLET, FILM COATED ORAL
Qty: 28 TABLET | Refills: 12 | Status: SHIPPED | OUTPATIENT
Start: 2025-07-07

## 2025-07-07 RX ORDER — FUROSEMIDE 40 MG/1
TABLET ORAL
Qty: 28 TABLET | Refills: 2 | Status: SHIPPED | OUTPATIENT
Start: 2025-07-07

## 2025-07-07 RX ORDER — MULTIVIT,CALC,MINS/IRON/FOLIC 9MG-400MCG
TABLET ORAL
Qty: 60 CAPSULE | Refills: 2 | Status: SHIPPED | OUTPATIENT
Start: 2025-07-07

## 2025-07-07 RX ORDER — FERROUS SULFATE 325(65) MG
TABLET, DELAYED RELEASE (ENTERIC COATED) ORAL
Qty: 28 TABLET | Refills: 12 | Status: SHIPPED | OUTPATIENT
Start: 2025-07-07

## 2025-07-07 RX ORDER — METHOCARBAMOL 500 MG/1
TABLET, FILM COATED ORAL
Qty: 28 TABLET | Refills: 12 | Status: SHIPPED | OUTPATIENT
Start: 2025-07-07

## 2025-07-07 RX ORDER — GABAPENTIN 300 MG/1
CAPSULE ORAL
Qty: 120 CAPSULE | Refills: 2 | Status: SHIPPED | OUTPATIENT
Start: 2025-07-07

## 2025-07-07 RX ORDER — ASCORBIC ACID 500 MG
500 TABLET ORAL DAILY
Qty: 30 TABLET | Refills: 2 | Status: SHIPPED | OUTPATIENT
Start: 2025-07-07

## 2025-07-07 RX ORDER — CHOLESTYRAMINE LIGHT 4 G/5.7G
POWDER, FOR SUSPENSION ORAL
Qty: 239.4 G | Refills: 12 | Status: SHIPPED | OUTPATIENT
Start: 2025-07-07

## 2025-07-07 RX ORDER — ATORVASTATIN CALCIUM 20 MG/1
TABLET, FILM COATED ORAL
Qty: 30 TABLET | Refills: 2 | Status: SHIPPED | OUTPATIENT
Start: 2025-07-07

## 2025-07-07 RX ORDER — CHOLESTYRAMINE LIGHT 4 G/5.7G
POWDER, FOR SUSPENSION ORAL
Qty: 239.4 G | OUTPATIENT
Start: 2025-07-07

## 2025-07-07 RX ORDER — AMLODIPINE BESYLATE 5 MG/1
TABLET ORAL
Qty: 30 TABLET | Refills: 2 | Status: SHIPPED | OUTPATIENT
Start: 2025-07-07

## 2025-07-07 RX ORDER — VITAMIN B COMPLEX
1 CAPSULE ORAL DAILY
Qty: 30 CAPSULE | Refills: 2 | Status: SHIPPED | OUTPATIENT
Start: 2025-07-07

## 2025-07-09 ENCOUNTER — LAB REQUISITION (OUTPATIENT)
Dept: LAB | Facility: CLINIC | Age: 80
End: 2025-07-09
Payer: COMMERCIAL

## 2025-07-09 ENCOUNTER — NURSING HOME VISIT (OUTPATIENT)
Dept: GERIATRICS | Facility: CLINIC | Age: 80
End: 2025-07-09
Payer: COMMERCIAL

## 2025-07-09 ENCOUNTER — PATIENT OUTREACH (OUTPATIENT)
Dept: CARE COORDINATION | Facility: CLINIC | Age: 80
End: 2025-07-09

## 2025-07-09 VITALS
RESPIRATION RATE: 18 BRPM | OXYGEN SATURATION: 94 % | HEART RATE: 72 BPM | BODY MASS INDEX: 25.89 KG/M2 | SYSTOLIC BLOOD PRESSURE: 126 MMHG | HEIGHT: 66 IN | TEMPERATURE: 97.4 F | DIASTOLIC BLOOD PRESSURE: 72 MMHG | WEIGHT: 161.1 LBS

## 2025-07-09 DIAGNOSIS — Z98.890 S/P LAMINECTOMY: ICD-10-CM

## 2025-07-09 DIAGNOSIS — R53.81 PHYSICAL DECONDITIONING: ICD-10-CM

## 2025-07-09 DIAGNOSIS — M48.02 CERVICAL STENOSIS OF SPINAL CANAL: ICD-10-CM

## 2025-07-09 DIAGNOSIS — I10 ESSENTIAL HYPERTENSION, BENIGN: ICD-10-CM

## 2025-07-09 DIAGNOSIS — E08.65 DIABETES MELLITUS DUE TO UNDERLYING CONDITION WITH HYPERGLYCEMIA (H): ICD-10-CM

## 2025-07-09 DIAGNOSIS — Z79.01 ON ANTICOAGULANT THERAPY: ICD-10-CM

## 2025-07-09 DIAGNOSIS — Z79.01 ON APIXABAN THERAPY: ICD-10-CM

## 2025-07-09 DIAGNOSIS — I50.30 HEART FAILURE WITH PRESERVED EJECTION FRACTION, NYHA CLASS II (H): Primary | ICD-10-CM

## 2025-07-09 DIAGNOSIS — E11.69 TYPE 2 DIABETES MELLITUS WITH OTHER SPECIFIED COMPLICATION, WITHOUT LONG-TERM CURRENT USE OF INSULIN (H): ICD-10-CM

## 2025-07-09 DIAGNOSIS — Z86.711 HISTORY OF PULMONARY EMBOLISM: ICD-10-CM

## 2025-07-09 DIAGNOSIS — R53.1 GENERALIZED WEAKNESS: ICD-10-CM

## 2025-07-09 PROCEDURE — 99309 SBSQ NF CARE MODERATE MDM 30: CPT | Performed by: NURSE PRACTITIONER

## 2025-07-09 NOTE — PROGRESS NOTES
Clinic Care Coordination Contact  Care Coordination Clinician Chart Review    Situation: Patient chart reviewed by care coordinator.    Background: Patient was an enrolled patient with the Primary Care Coordination program.     Assessment: Upon chart review, patient is not a candidate for Primary Care Clinic Care Coordination enrollment due to reason stated below:  Patient transitioned to Long Term Care.    Plan/Recommendations: Clinic Care Coordination Referral/order cancelled. RN/SILVA CC will perform no further monitoring/outreaches at this time and will remain available as needed. If new needs arise, a new Care Coordination Referral may be placed.    Kelli Davidson RN Clinic Care Coordinator  [facility] Clinics: [place], [place] (on-site Wednesdays), [facility] (on-site Thursdays) & [facility].  [email or web address]  Phone: [phone]

## 2025-07-09 NOTE — PROGRESS NOTES
[facility] GERIATRICS    PRIMARY CARE PROVIDER AND CLINIC:  Olegario Castillo MD, [address] [place] [id number]  Chief Complaint   Patient presents with    [facility] Medical Record Number:  [id number]  Place of Service where encounter took place:  [facility] () [[id number]]    Nahun Villalobos  is a 79 year old  (1945), admitted to the above facility from [facility] (TCU)..       Spoke with patient's significant other and patient today.  Agreed to cancel meeting appointment with Dr. Castillo his primary care.  Discussed geriatric team is for his current primary care  Patient had an abnormal mood yesterday.  Appears medications were given late  Patient appeared to be at his baseline today  Weight decreasing.  Current weight 161 pounds using a wheelchair scale  Today was observed at lunch consuming less than 25% of the meal        CODE STATUS/ADVANCE DIRECTIVES DISCUSSION:   CPR/Full code   ALLERGIES: No Known Allergies   PAST MEDICAL HISTORY:   Past Medical History:   Diagnosis Date    Acute diverticulitis 09/20/2020    Anemia 07/09/2024    Antiplatelet or antithrombotic long-term use     Arthritis     CHF (congestive heart failure) (H) 09/16/2020    COPD (chronic obstructive pulmonary disease) (H)     DM (diabetes mellitus) (H)     Dyspnea on exertion     Essential hypertension, benign     Hemorrhage of rectum and anus     Non-small cell lung cancer (H)     Obesity     Personal history of colonic polyps     Sleep apnea     Thrombosis     Tobacco use disorder     Urinary retention     Walking troubles       PAST SURGICAL HISTORY:   has a past surgical history that includes NONSPECIFIC PROCEDURE; Cholecystectomy; appendectomy; wedge resection; Cystoscopy, transurethral resection (TUR) prostate, combined (N/A, 04/30/2018); Abdomen surgery (1995); colonoscopy (2014); Bone Exostosis Excision (Bilateral, 09/20/2022); IR Lung Biopsy Mediastinum Right  (04/06/2016); and Decompression Cervical Posterior Three+ Levels (N/A, 5/27/2025).  FAMILY HISTORY: family history includes Breast Cancer in his sister and sister; C.A.D. in his mother; Cancer in his mother; Cancer - colorectal in his mother; Cerebrovascular Disease in his father; Hypertension in his mother, sister, and sister; Other Cancer in his mother.  SOCIAL HISTORY:   reports that he quit smoking about 12 years ago. His smoking use included cigarettes and cigars. He started smoking about 65 years ago. He has a 106 pack-year smoking history. He has been exposed to tobacco smoke. He has never used smokeless tobacco. He reports that he does not drink alcohol and does not use drugs.  Patient's living condition: lives in a nursing home    Post Discharge Medication Reconciliation Status:   MED REC REQUIRED  Post Medication Reconciliation Status:  Medication reconciliation previously completed during another office visit       Current Outpatient Medications   Medication Sig Dispense Refill    acetaminophen (TYLENOL) 500 MG tablet Take 1,000 mg by mouth 2 times daily.      albuterol (VENTOLIN HFA) 108 (90 Base) MCG/ACT inhaler INHALE 2 PUFFS INTO THE LUNGS EVERY 6 HOURS AS NEEDED FOR SHORTNESS OF BREATH / DYSPNEA OR WHEEZING 25.5 g 0    amLODIPine (NORVASC) 5 MG tablet TAKE 1 TABLET BY MOUTH DAILY FOR HYPERTENSION 30 tablet 2    atorvastatin (LIPITOR) 20 MG tablet TAKE 1 TABLET BY MOUTH DAILY FOR HYPERLIPIDEMIA 30 tablet 2    B Complex Vitamins (VITAMIN  B COMPLEX) CAPS TAKE 1 CAPSULE BY MOUTH DAILY 30 capsule 2    blood glucose (NO BRAND SPECIFIED) lancets standard Use to test blood sugar 1 time daily, first thing in the morning 50 each 3    calcium carbonate (TUMS) 500 MG chewable tablet Take 1 chew tab by mouth daily as needed for heartburn.      cholestyramine light (PREVALITE) 4 GM powder MIX 1 SCOOP (4 G) IN LIQUID AND DRINK TWICE DAILY WITH MEAL(S) FOR DIARRHEA 239.4 g 12    cholestyramine light (QUESTRAN) 4 GM  packet Take 4 g by mouth 2 times daily (with meals).      Coenzyme Q10 400 MG CAPS Take 400 mg by mouth daily 30 capsule 0    ELIQUIS ANTICOAGULANT 2.5 MG tablet TAKE 1 TABLET BY MOUTH TWICE DAILY (BEGIN 6/10) *SHORT CYCLE* 60 tablet 2    ferrous sulfate (FE TABS) 325 (65 Fe) MG EC tablet TAKE 1 TABLET BY MOUTH EVERY EVENING FOR NUTRITIONAL DEFICIENCY 28 tablet 12    finasteride (PROSCAR) 5 MG tablet TAKE 1 TABLET BY MOUTH DAILY FOR BPH *HAZARDOUS DRUG* 28 tablet 12    Fluticasone-Umeclidin-Vilant (TRELEGY ELLIPTA) 100-62.5-25 MCG/ACT oral inhaler USE 1 INHALATION DAILY 60 each 5    furosemide (LASIX) 40 MG tablet TAKE 1 TABLET BY MOUTH DAILY FOR HYPERTENSION 28 tablet 2    gabapentin (NEURONTIN) 300 MG capsule TAKE 2 CAPSULES BY MOUTH TWICE DAILY WITH MEAL(S) (600MG) FOR PAIN 120 capsule 2    GNP CO Q10 200 MG CAPS capsule TAKE 2 TABLETS BY MOUTH DAILY (400MG) FOR NUTRITIONAL DEFICIENCY 60 capsule 2    lanreotide acetate (SOMATULINE) 120 MG/0.5ML injection Inject 120 mg subcutaneously every 28 days.      lisinopril (ZESTRIL) 10 MG tablet Take 1 tablet (10 mg) by mouth daily.      melatonin 3 MG tablet TAKE 1 TABLET BY MOUTH EVERY NIGHT AT BEDTIME 28 tablet 12    metFORMIN (GLUCOPHAGE) 1000 MG tablet TAKE 1 TABLET BY MOUTH TWICE DAILY WITH MEAL(S) FOR DM2 60 tablet 2    methocarbamol (ROBAXIN) 500 MG tablet TAKE 1/2 TABLET BY MOUTH TWICE DAILY (250MG) AT 0700 AND 1600 FOR PAIN 28 tablet 12    miconazole (MICATIN) 2 % external cream Apply topically 2 times daily as needed for other (Rash/ skin irritation)      naloxone (NARCAN) 4 MG/0.1ML nasal spray Spray 1 spray (4 mg) into one nostril alternating nostrils as needed for opioid reversal. every 2-3 minutes until assistance arrives 2 each 0    order for DME Oxygen 2 Li/min  at night via nasal cannula 1 Device 0    oxyCODONE (ROXICODONE) 5 MG tablet Take 1 tablet (5 mg) by mouth 3 times daily. May also take 1 tablet (5 mg) every 8 hours as needed for moderate pain.    "   senna-docusate (SENOKOT-S/PERICOLACE) 8.6-50 MG tablet Take 1 tablet by mouth 2 times daily. 20 tablet 0    vitamin B-Complex Take 1 tablet by mouth daily      vitamin C (ASCORBIC ACID) 500 MG tablet TAKE 1 TABLET BY MOUTH DAILY 30 tablet 2    Vitamin D3 (VITAMIN D-1000 MAX ST) 25 mcg (1000 units) tablet Take 25 mcg by mouth daily.      zoledronic acid (ZOMETA) 4 MG/5ML injection Inject 4 mg into the vein every 2 months. (Patient not taking: Reported on 6/27/2025)       No current facility-administered medications for this visit.       ROS:  4 point ROS including Respiratory, CV, GI and , other than that noted in the HPI,  is negative    Vitals:  /72   Pulse 72   Temp 97.4  F (36.3  C)   Resp 18   Ht 1.676 m (5' 6\")   Wt 73.1 kg (161 lb 1.6 oz)   SpO2 94%   BMI 26.00 kg/m    Exam:  GENERAL APPEARANCE:  Alert, in no distress  RESP:  no respiratory distress  PSYCH:  affect and mood normal    Lab/Diagnostic data:  Most Recent 3 CBC's:  Recent Labs   Lab Test 06/23/25  0655 06/11/25  0640 06/03/25  0620   WBC 8.3 11.1* 13.4*   HGB 10.4* 9.8* 9.4*   * 107* 100    405 310     Most Recent 3 BMP's:  Recent Labs   Lab Test 06/23/25  0655 06/11/25  0640 06/03/25  1140 06/03/25  0824 06/03/25  0620    140  --   --  134*   POTASSIUM 4.6 4.5  --   --  5.0   CHLORIDE 104 104  --   --  99   CO2 21* 25  --   --  26   BUN 14.8 15.1  --   --  13.9   CR 1.04 1.03  --   --  0.92   ANIONGAP 12 11  --   --  9   JESSE 9.0 9.1  --   --  9.0   * 137* 193*   < > 185*    < > = values in this interval not displayed.       ASSESSMENT/PLAN:      (M48.02) Cervical stenosis of spinal canal  (Z98.890) H/O laminectomy  (R53.81) Physical deconditioning  (R53.1) Generalized weakness  Comment: Posterior Open Cervical 3-6 laminectomy with Dr. Pinon on 5/27  improving  Plan:   Vitamin D level  Pain tolerable Continue with prn oxycodone    [facility] Date & Time of Appointment: 7/15/25 @ 2:15 Phone " Number: [phone] Address: [address] [place] [address] [address]rd       (Z71.697) History of pulmonary embolism  (Z79.01) On anticoagulant therapy  (Z79.01) On apixaban therapy  Comment: Chronic  Taking apixaban         Hemoglobin   Date Value Ref Range Status   06/23/2025 10.4 (L) 13.3 - 17.7 g/dL Final   05/27/2021 13.2 (L) 13.3 - 17.7 g/dL Final   Plan:BMP and CBC         (E11.69) Type 2 diabetes mellitus with other specified complication, without long-term current use of insulin (H)  Comment: Chronic  Currently taking metformin  BS controlled.   Plan: hgb a1c      (I10) Hypertension, unspecified type  (I50.30) Heart failure with preserved ejection fraction, NYHA class II (H)  Comment:   Chronic   compensated  Currently taking amlodipine and lisinopril   Plan: BMP and CBC                Electronically signed by:        SELMA Mccall CNP on 7/9/2025 at 9:31 PM

## 2025-07-10 LAB
ALBUMIN SERPL BCG-MCNC: 3.4 G/DL (ref 3.5–5.2)
ALP SERPL-CCNC: 71 U/L (ref 40–150)
ALT SERPL W P-5'-P-CCNC: <5 U/L (ref 0–70)
ANION GAP SERPL CALCULATED.3IONS-SCNC: 14 MMOL/L (ref 7–15)
AST SERPL W P-5'-P-CCNC: 23 U/L (ref 0–45)
BILIRUB SERPL-MCNC: 0.4 MG/DL
BUN SERPL-MCNC: 31.8 MG/DL (ref 8–23)
CALCIUM SERPL-MCNC: 8.9 MG/DL (ref 8.8–10.4)
CHLORIDE SERPL-SCNC: 101 MMOL/L (ref 98–107)
CREAT SERPL-MCNC: 1.45 MG/DL (ref 0.67–1.17)
EGFRCR SERPLBLD CKD-EPI 2021: 49 ML/MIN/1.73M2
ERYTHROCYTE [DISTWIDTH] IN BLOOD BY AUTOMATED COUNT: 12.3 % (ref 10–15)
EST. AVERAGE GLUCOSE BLD GHB EST-MCNC: 131 MG/DL
GLUCOSE SERPL-MCNC: 155 MG/DL (ref 70–99)
HBA1C MFR BLD: 6.2 %
HCO3 SERPL-SCNC: 18 MMOL/L (ref 22–29)
HCT VFR BLD AUTO: 34.4 % (ref 40–53)
HGB BLD-MCNC: 10.6 G/DL (ref 13.3–17.7)
MCH RBC QN AUTO: 32.6 PG (ref 26.5–33)
MCHC RBC AUTO-ENTMCNC: 30.8 G/DL (ref 31.5–36.5)
MCV RBC AUTO: 106 FL (ref 78–100)
PLATELET # BLD AUTO: 319 10E3/UL (ref 150–450)
POTASSIUM SERPL-SCNC: 5.5 MMOL/L (ref 3.4–5.3)
PROT SERPL-MCNC: 7 G/DL (ref 6.4–8.3)
RBC # BLD AUTO: 3.25 10E6/UL (ref 4.4–5.9)
SODIUM SERPL-SCNC: 133 MMOL/L (ref 135–145)
VIT D+METAB SERPL-MCNC: 28 NG/ML (ref 20–50)
WBC # BLD AUTO: 14.6 10E3/UL (ref 4–11)

## 2025-07-10 PROCEDURE — 83036 HEMOGLOBIN GLYCOSYLATED A1C: CPT | Performed by: NURSE PRACTITIONER

## 2025-07-10 PROCEDURE — 85041 AUTOMATED RBC COUNT: CPT | Performed by: NURSE PRACTITIONER

## 2025-07-10 PROCEDURE — 82306 VITAMIN D 25 HYDROXY: CPT | Performed by: NURSE PRACTITIONER

## 2025-07-10 PROCEDURE — 82947 ASSAY GLUCOSE BLOOD QUANT: CPT | Performed by: NURSE PRACTITIONER

## 2025-07-10 PROCEDURE — 36415 COLL VENOUS BLD VENIPUNCTURE: CPT | Performed by: NURSE PRACTITIONER

## 2025-07-10 PROCEDURE — P9604 ONE-WAY ALLOW PRORATED TRIP: HCPCS | Performed by: NURSE PRACTITIONER

## 2025-07-15 ENCOUNTER — LAB REQUISITION (OUTPATIENT)
Dept: LAB | Facility: CLINIC | Age: 80
End: 2025-07-15
Payer: COMMERCIAL

## 2025-07-15 ENCOUNTER — OFFICE VISIT (OUTPATIENT)
Dept: NEUROSURGERY | Facility: CLINIC | Age: 80
End: 2025-07-15
Payer: COMMERCIAL

## 2025-07-15 ENCOUNTER — MEDICAL CORRESPONDENCE (OUTPATIENT)
Dept: HEALTH INFORMATION MANAGEMENT | Facility: CLINIC | Age: 80
End: 2025-07-15

## 2025-07-15 VITALS
DIASTOLIC BLOOD PRESSURE: 73 MMHG | OXYGEN SATURATION: 94 % | SYSTOLIC BLOOD PRESSURE: 112 MMHG | HEART RATE: 77 BPM | RESPIRATION RATE: 16 BRPM

## 2025-07-15 DIAGNOSIS — R29.898 LEFT LEG WEAKNESS: ICD-10-CM

## 2025-07-15 DIAGNOSIS — E87.6 HYPOKALEMIA: ICD-10-CM

## 2025-07-15 DIAGNOSIS — Z98.890 S/P SPINAL SURGERY: Primary | ICD-10-CM

## 2025-07-15 PROCEDURE — 3078F DIAST BP <80 MM HG: CPT | Performed by: PHYSICIAN ASSISTANT

## 2025-07-15 PROCEDURE — 1126F AMNT PAIN NOTED NONE PRSNT: CPT | Performed by: PHYSICIAN ASSISTANT

## 2025-07-15 PROCEDURE — 3074F SYST BP LT 130 MM HG: CPT | Performed by: PHYSICIAN ASSISTANT

## 2025-07-15 PROCEDURE — 99024 POSTOP FOLLOW-UP VISIT: CPT | Performed by: PHYSICIAN ASSISTANT

## 2025-07-15 ASSESSMENT — PAIN SCALES - GENERAL: PAINLEVEL_OUTOF10: NO PAIN (0)

## 2025-07-15 NOTE — LETTER
7/15/2025       RE: Nahun Villalobos  [address] [address]     Dear Colleague,    Thank you for referring your patient, Nahun Villalobos, to the [facility] NEUROSURGERY CLINIC [place] at [facility]. Please see a copy of my visit note below.        Neurosurgery Clinic Note    Chief Complaint: 6 week p/o visit    DATE OF SURGERY: 05/27/25     PREOPERATIVE DIAGNOSIS:  1. Cervical spondylosis   2. Cervical myelopathy     POSTOPERATIVE DIAGNOSIS:  1. Same as above  2. Same as above     PROCEDURES PERFORMED:  1. Posterior complete cervical 3-5 laminectomy  2. Posterior Partial cervical 6 laminectomy  3. Neuromonitoring (SSEP and motors)  4. C-arm fluoroscopy     ATTENDING SURGEON: Seda Pinon MD     RESIDENT SURGEONS: Sae Johnson MD     ANESTHESIA: General endotracheal anesthesia     ESTIMATED BLOOD LOSS: 50 mL     IMPLANTS: None     EXPLANTS: None     DRAINS: Hemovac drain to full suction     FINDINGS:  Compressed spinal cord by lamina at C3-6 widely decompressed by end of the case. No CSF leak observed. No significant neuromonitoring changes compared to pre-operative Motors and SSEPs.      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 He was originally planned for a C3-6 laminoplasty in 4/2025, but given his overall comorbidity and age and at the time a new thoracic fracture, a C3-6 laminectomy was offered at the affected levels thought to be faster and safer. After a thorough discussion of risks/benefits of the surgical  approach, the patient and his family elected to proceed with surgery.      Hospital Course:  Patient underwent above-mentioned procedure on 5/27/2025.  Following the procedure the patient was transferred to surgical floor.  The patient's course was complicated by generalized weakness and UTI. PT/OT assessed patient and recommended TCU. UA/UC was positive on 5/31/2025 and was treated with IV rocephin and discharged on PO cefdinir.  On post operative day 7, he was ambulating, voiding without a philip, eating a regular diet, pain was well controlled and therefore he was discharged to TCU ([place]).  Patient will follow-up with Neurosurgery on 6/10/2025.      HPI: Nahun Villalobos is a pleasant 79 year old male who is s/p C3-5 laminectomy by Dr. Pinon on 5/27/25 for cervical spondylotic myelopathy.    6/10/25 Visit - ~2 weeks out from surgery.  Patient notes neck pain is 10/10 intensity.  He has resolution of arm pain, but notes his fingers feel like he is wearing cut-off gloves (numbness).  He continues to endorse left>right hand weakness and left leg weakness.  He is currently at TCU and has therapy working with him.      6/19/25 visit - wound check 3 weeks p/o.  [facility]  He is working on walking.  He notes some soreness in his head and having trouble with keeping his head upright for long.  He has not been working on any shoulder neck strengthening yet.  He is wondering about using a soft collar or something to help keep his head up.  He has been experimenting with different neck devices which have not helped much.  He denies any new issues/concerns.      7/15/25 visit - 6 week p/o visit.  Patient's neck pain is improving.  He has transitioned to the Medicare side of [place] and has noted his therapies have been disrupted.  Last week the was walking well on Thursday and Friday and then was having increased issues with moving his left leg.  He is not having any pain in  his leg.      Physical Exam   /73 (BP Location: Right arm, Patient Position: Sitting)   Pulse 77   Resp 16   SpO2 94%     Constitutional: Alert, no acute distress.  Welsh:  in w/c, unable to stand for me  Cervical ROM - good  Diffuse cervical muscle tension - rhomboids and trapezius    Neurological:    Strength (L/R)  5/5 Deltoid  5/5 Bicep  5/5 Tricep  5/5 Finger Abduction  4/4+ Handgrip    5/2 Hip Flexion  5/5 Knee Extension  5/5 Ankle Dorsiflexion  5/5 Extensor Hallucis Longus  5/5 Plantar Flexion    Reflexes (L/R)  2+/2+ Bicep  2+/2+ Brachioradialis  3+/3+ Patellar  2+/2+ Ankle  No clonus  No Nino's    Sensation: SILT    Incision:  healing well    IMAGING:  No new imaging.    ASSESSMENT & PLAN:  Nahun Villalobos is a 79 year old male who is s/p C3-5 laminectomy by Dr. Pinon on 5/27/25 for cervical spondylotic myelopathy.    Patient is about 6 weeks out from surgery.      No lifting more than 25 pounds and do light stretches.    Okay to d/c Robaxin scheduled and use PRN  F/u with Dr. Pinon in 6 weeks with cervical dynamic XR.    Will consider workup for low back issues at this time if still having issues.  He should continue to work with PT and OT in the interim.      Patient is in agreement with this plan and states no further questions.      Rebeca Conte PA-C  Department of Neurosurgery  Office: [phone]            Again, thank you for allowing me to participate in the care of your patient.      Sincerely,    Rebeca Conte PA-C

## 2025-07-15 NOTE — PATIENT INSTRUCTIONS
Follow up with Dr. Pinon in 6 weeks with dynamic cervical XR.      Continue to work with PT/OT.    Okay to lift up to 25 pounds and do light stretching activities.

## 2025-07-15 NOTE — PROGRESS NOTES
Neurosurgery Clinic Note    Chief Complaint: 6 week p/o visit    DATE OF SURGERY: 05/27/25     PREOPERATIVE DIAGNOSIS:  1. Cervical spondylosis   2. Cervical myelopathy     POSTOPERATIVE DIAGNOSIS:  1. Same as above  2. Same as above     PROCEDURES PERFORMED:  1. Posterior complete cervical 3-5 laminectomy  2. Posterior Partial cervical 6 laminectomy  3. Neuromonitoring (SSEP and motors)  4. C-arm fluoroscopy     ATTENDING SURGEON: Seda Pinon MD     RESIDENT SURGEONS: Sae Johnson MD     ANESTHESIA: General endotracheal anesthesia     ESTIMATED BLOOD LOSS: 50 mL     IMPLANTS: None     EXPLANTS: None     DRAINS: Hemovac drain to full suction     FINDINGS:  Compressed spinal cord by lamina at C3-6 widely decompressed by end of the case. No CSF leak observed. No significant neuromonitoring changes compared to pre-operative Motors and SSEPs.      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 He was originally planned for a C3-6 laminoplasty in 4/2025, but given his overall comorbidity and age and at the time a new thoracic fracture, a C3-6 laminectomy was offered at the affected levels thought to be faster and safer. After a thorough discussion of risks/benefits of the surgical approach, the patient and his family elected to proceed with surgery.      Hospital Course:  Patient underwent above-mentioned procedure on 5/27/2025.  Following the procedure the patient was transferred to surgical floor.  The patient's course was complicated by generalized weakness and UTI. PT/OT assessed patient and  recommended TCU. UA/UC was positive on 5/31/2025 and was treated with IV rocephin and discharged on PO cefdinir.  On post operative day 7, he was ambulating, voiding without a philip, eating a regular diet, pain was well controlled and therefore he was discharged to TCU ([place]).  Patient will follow-up with Neurosurgery on 6/10/2025.      HPI: Nahun Villalobos is a pleasant 79 year old male who is s/p C3-5 laminectomy by Dr. Pinon on 5/27/25 for cervical spondylotic myelopathy.    6/10/25 Visit - ~2 weeks out from surgery.  Patient notes neck pain is 10/10 intensity.  He has resolution of arm pain, but notes his fingers feel like he is wearing cut-off gloves (numbness).  He continues to endorse left>right hand weakness and left leg weakness.  He is currently at TCU and has therapy working with him.      6/19/25 visit - wound check 3 weeks p/o.  Patient is continuing to [facility] with [facility].  He is working on walking.  He notes some soreness in his head and having trouble with keeping his head upright for long.  He has not been working on any shoulder neck strengthening yet.  He is wondering about using a soft collar or something to help keep his head up.  He has been experimenting with different neck devices which have not helped much.  He denies any new issues/concerns.      7/15/25 visit - 6 week p/o visit.  Patient's neck pain is improving.  He has transitioned to the Medicare side of [place] and has noted his therapies have been disrupted.  Last week the was walking well on Thursday and Friday and then was having increased issues with moving his left leg.  He is not having any pain in his leg.      Physical Exam   /73 (BP Location: Right arm, Patient Position: Sitting)   Pulse 77   Resp 16   SpO2 94%     Constitutional: Alert, no acute distress.  Hardy:  in w/c, unable to stand for me  Cervical ROM - good  Diffuse cervical muscle tension - rhomboids and trapezius    Neurological:    Strength  (L/R)  5/5 Deltoid  5/5 Bicep  5/5 Tricep  5/5 Finger Abduction  4/4+ Handgrip    5/2 Hip Flexion  5/5 Knee Extension  5/5 Ankle Dorsiflexion  5/5 Extensor Hallucis Longus  5/5 Plantar Flexion    Reflexes (L/R)  2+/2+ Bicep  2+/2+ Brachioradialis  3+/3+ Patellar  2+/2+ Ankle  No clonus  No Nino's    Sensation: SILT    Incision:  healing well    IMAGING:  No new imaging.    ASSESSMENT & PLAN:  Nahun Villalobos is a 79 year old male who is s/p C3-5 laminectomy by Dr. Pinon on 5/27/25 for cervical spondylotic myelopathy.    Patient is about 6 weeks out from surgery.      No lifting more than 25 pounds and do light stretches.    Okay to d/c Robaxin scheduled and use PRN  F/u with Dr. Pinon in 6 weeks with cervical dynamic XR.    Will consider workup for low back issues at this time if still having issues.  He should continue to work with PT and OT in the interim.      Patient is in agreement with this plan and states no further questions.      Rebeca Conte PA-C  Department of Neurosurgery  Office: [phone]

## 2025-07-16 ENCOUNTER — TELEPHONE (OUTPATIENT)
Dept: NEUROSURGERY | Facility: CLINIC | Age: 80
End: 2025-07-16
Payer: COMMERCIAL

## 2025-07-16 ENCOUNTER — RESULTS FOLLOW-UP (OUTPATIENT)
Dept: GERIATRICS | Facility: CLINIC | Age: 80
End: 2025-07-16
Payer: COMMERCIAL

## 2025-07-16 LAB
ANION GAP SERPL CALCULATED.3IONS-SCNC: 10 MMOL/L (ref 7–15)
BUN SERPL-MCNC: 21.1 MG/DL (ref 8–23)
CALCIUM SERPL-MCNC: 9.7 MG/DL (ref 8.8–10.4)
CHLORIDE SERPL-SCNC: 106 MMOL/L (ref 98–107)
CREAT SERPL-MCNC: 1.27 MG/DL (ref 0.67–1.17)
EGFRCR SERPLBLD CKD-EPI 2021: 57 ML/MIN/1.73M2
ERYTHROCYTE [DISTWIDTH] IN BLOOD BY AUTOMATED COUNT: 12 % (ref 10–15)
GLUCOSE SERPL-MCNC: 128 MG/DL (ref 70–99)
HCO3 SERPL-SCNC: 21 MMOL/L (ref 22–29)
HCT VFR BLD AUTO: 31.9 % (ref 40–53)
HGB BLD-MCNC: 10.1 G/DL (ref 13.3–17.7)
MCH RBC QN AUTO: 32.2 PG (ref 26.5–33)
MCHC RBC AUTO-ENTMCNC: 31.7 G/DL (ref 31.5–36.5)
MCV RBC AUTO: 102 FL (ref 78–100)
PLATELET # BLD AUTO: 386 10E3/UL (ref 150–450)
POTASSIUM SERPL-SCNC: 5.4 MMOL/L (ref 3.4–5.3)
RBC # BLD AUTO: 3.14 10E6/UL (ref 4.4–5.9)
SODIUM SERPL-SCNC: 137 MMOL/L (ref 135–145)
WBC # BLD AUTO: 10.7 10E3/UL (ref 4–11)

## 2025-07-16 PROCEDURE — 36415 COLL VENOUS BLD VENIPUNCTURE: CPT | Performed by: INTERNAL MEDICINE

## 2025-07-16 PROCEDURE — 80048 BASIC METABOLIC PNL TOTAL CA: CPT | Performed by: INTERNAL MEDICINE

## 2025-07-16 PROCEDURE — P9603 ONE-WAY ALLOW PRORATED MILES: HCPCS | Performed by: INTERNAL MEDICINE

## 2025-07-16 PROCEDURE — 85027 COMPLETE CBC AUTOMATED: CPT | Performed by: INTERNAL MEDICINE

## 2025-07-16 NOTE — TELEPHONE ENCOUNTER
----- Message from Radha HARDY sent at 7/15/2025  3:54 PM CDT -----  So sorry, I forgot to schedule the XR when I scheduled the 6 week appt with Dr. Pinon. Looks like he needs a cervical F/E XR.      Thank you,  Radha

## 2025-07-16 NOTE — TELEPHONE ENCOUNTER
Left pt a VM to call imaging directly to sched X-ray prior to visit with Dr Pinon on August 26th. Imaging direct telephone number was provided.

## 2025-07-16 NOTE — TELEPHONE ENCOUNTER
ORDERS GIVEN:   - discontinue Lisinopril  - Recheck BMP on 7/21/2025 dx: Hyperkalemia    Provider giving order: Lexi Gtz MD    Order given to: Faxed orders to [facility] 4th floor at [phone]    Alisha Cervantes RN

## 2025-07-17 ENCOUNTER — TELEPHONE (OUTPATIENT)
Dept: NEUROSURGERY | Facility: CLINIC | Age: 80
End: 2025-07-17

## 2025-07-18 ENCOUNTER — TRANSFERRED RECORDS (OUTPATIENT)
Dept: HEALTH INFORMATION MANAGEMENT | Facility: CLINIC | Age: 80
End: 2025-07-18
Payer: COMMERCIAL

## 2025-07-18 LAB
ALT SERPL-CCNC: 16 U/L (ref 0–49)
AST SERPL-CCNC: 27 U/L (ref 17–59)
CREATININE (EXTERNAL): 1.3 MG/DL (ref 0.66–1.25)
GFR ESTIMATED (EXTERNAL): 55.3 ML/MIN/1.73M2
GLUCOSE (EXTERNAL): 147 MG/DL (ref 74–100)
POTASSIUM (EXTERNAL): 5.9 MMOL/L (ref 3.5–5.1)

## 2025-07-23 ENCOUNTER — NURSING HOME VISIT (OUTPATIENT)
Dept: GERIATRICS | Facility: CLINIC | Age: 80
End: 2025-07-23
Payer: COMMERCIAL

## 2025-07-23 ENCOUNTER — LAB REQUISITION (OUTPATIENT)
Dept: LAB | Facility: CLINIC | Age: 80
End: 2025-07-23
Payer: COMMERCIAL

## 2025-07-23 VITALS
TEMPERATURE: 98.8 F | RESPIRATION RATE: 16 BRPM | OXYGEN SATURATION: 96 % | WEIGHT: 163.4 LBS | HEIGHT: 66 IN | SYSTOLIC BLOOD PRESSURE: 126 MMHG | HEART RATE: 64 BPM | BODY MASS INDEX: 26.26 KG/M2 | DIASTOLIC BLOOD PRESSURE: 72 MMHG

## 2025-07-23 DIAGNOSIS — E87.6 HYPOKALEMIA: ICD-10-CM

## 2025-07-23 DIAGNOSIS — U07.1 INFECTION DUE TO 2019 NOVEL CORONAVIRUS: Primary | ICD-10-CM

## 2025-07-23 DIAGNOSIS — R52 PAIN: ICD-10-CM

## 2025-07-23 DIAGNOSIS — E87.5 HYPERKALEMIA: ICD-10-CM

## 2025-07-23 PROCEDURE — 99309 SBSQ NF CARE MODERATE MDM 30: CPT | Performed by: NURSE PRACTITIONER

## 2025-07-23 NOTE — PROGRESS NOTES
[facility]   2025     Name: Nahun Villalobos   : 1945     Background:  Patient has hyperkalemia.    Orders:  Discontinue oxycodone discontinue  Narcan  BMP on 2025.  Diagnosis hyperkalemia    Electronically signed by     SELMA Mccall CNP on 2025 at 3:30 PM

## 2025-07-23 NOTE — PROGRESS NOTES
"[facility] GERIATRICS    Chief Complaint   Patient presents with    RECHECK     HPI:  Nahun Villalobos is a 79 year old  (1945), who is being seen today for an episodic care visit at: [facility] () [[id number]].      Patient in isolation due to COVID-19   working with therapy and is getting stronger.  States he walked 200 feet prior to being COVID-positive  Pain is minimal and controlled  Weight is down 7 pounds in the past month  Vital signs stable      Allergies, and PMH/PSH reviewed in [facility] today.  REVIEW OF SYSTEMS:  10 point ROS of systems including Constitutional, Eyes, Respiratory, Cardiovascular, Gastroenterology, Genitourinary, Integumentary, Musculoskeletal, Psychiatric were all negative except for pertinent positives noted in my HPI.    Objective:   /72   Pulse 64   Temp 98.8  F (37.1  C)   Resp 16   Ht 1.676 m (5' 6\")   Wt 74.1 kg (163 lb 6.4 oz)   SpO2 96%   BMI 26.37 kg/m    GENERAL APPEARANCE:  Alert, in no distress  RESP:  lungs clear to auscultation , no respiratory distress  SKIN:  Surgical incision on neck healing  PSYCH:  affect and mood normal    Most Recent 3 CBC's:  Recent Labs   Lab Test 07/16/25  0627 07/10/25  0611 06/23/25  0655   WBC 10.7 14.6* 8.3   HGB 10.1* 10.6* 10.4*   * 106* 106*    319 303     Most Recent 3 BMP's:  Recent Labs   Lab Test 07/16/25  0627 07/10/25  0611 06/23/25  0655    133* 137   POTASSIUM 5.4* 5.5* 4.6   CHLORIDE 106 101 104   CO2 21* 18* 21*   BUN 21.1 31.8* 14.8   CR 1.27* 1.45* 1.04   ANIONGAP 10 14 12   JESSE 9.7 8.9 9.0   * 155* 138*       Assessment/Plan:  (U07.1) Infection due to 2019 novel coronavirus  (primary encounter diagnosis)  Comment: acute  Asymptomatic  Plan: Continue isolation for 10 days since infection    (E87.5) Hyperkalemia  Comment: Acute  On 7/11/2025, Lasix 40 mg was given and lisinopril was decreased from 10 mg to 5 mg.  On 7/17/2025, lisinopril discontinued  Noted to have a " critical potassium 4 days ago at outside clinic and was notified 07/23/25  Plan: BMP on 07/24/25    (R52) Pain  Comment: Improved  Taking acetaminophen  Plan: Discontinue oxycodone      MED REC REQUIRED  Post Medication Reconciliation Status:  Medication reconciliation previously completed during another office visit        Electronically signed by:       SELMA Mccall CNP on 7/23/2025 at 3:27 PM

## 2025-07-24 LAB
ANION GAP SERPL CALCULATED.3IONS-SCNC: 12 MMOL/L (ref 7–15)
BUN SERPL-MCNC: 21.5 MG/DL (ref 8–23)
CALCIUM SERPL-MCNC: 9.3 MG/DL (ref 8.8–10.4)
CHLORIDE SERPL-SCNC: 104 MMOL/L (ref 98–107)
CREAT SERPL-MCNC: 1.29 MG/DL (ref 0.67–1.17)
EGFRCR SERPLBLD CKD-EPI 2021: 56 ML/MIN/1.73M2
GLUCOSE SERPL-MCNC: 144 MG/DL (ref 70–99)
HCO3 SERPL-SCNC: 21 MMOL/L (ref 22–29)
POTASSIUM SERPL-SCNC: 4.9 MMOL/L (ref 3.4–5.3)
SODIUM SERPL-SCNC: 137 MMOL/L (ref 135–145)

## 2025-07-24 PROCEDURE — 36415 COLL VENOUS BLD VENIPUNCTURE: CPT | Performed by: NURSE PRACTITIONER

## 2025-07-24 PROCEDURE — P9604 ONE-WAY ALLOW PRORATED TRIP: HCPCS | Performed by: NURSE PRACTITIONER

## 2025-07-24 PROCEDURE — 80048 BASIC METABOLIC PNL TOTAL CA: CPT | Performed by: NURSE PRACTITIONER

## 2025-07-24 PROCEDURE — 84132 ASSAY OF SERUM POTASSIUM: CPT | Performed by: NURSE PRACTITIONER

## 2025-07-29 ENCOUNTER — TELEPHONE (OUTPATIENT)
Dept: GERIATRICS | Facility: CLINIC | Age: 80
End: 2025-07-29
Payer: COMMERCIAL

## 2025-07-29 DIAGNOSIS — R19.5 LOOSE STOOLS: Primary | ICD-10-CM

## 2025-07-29 RX ORDER — LOPERAMIDE HYDROCHLORIDE 2 MG/1
2 TABLET ORAL ONCE
Status: SHIPPED
Start: 2025-07-29 | End: 2025-07-29

## 2025-07-29 NOTE — TELEPHONE ENCOUNTER
"[facility] Triage Call    Provider: SELMA Clark CNP  Facility: [facility] Facility Type:  LTC    Caller: Manuel  Call Back Number: [phone]    Allergies:  No Known Allergies       SBAR:     S-(situation): Nurse called to report that patient is requesting something for his diarrhea.      B-(background):     A-(assessment): Patient has had 2 loose stools today.  Patient was incontinent of these stools.  Patient complaining that his \"asshole is burning\".  Denies cramping.  Takes cholestyramine BID with meals for diarrhea.    There were no vitals taken for this visit.     R-(recommendations): Please advise       Telephone encounter sent to:  SELMA Clark CNP    Please send response/orders to \"Geriatrics Nurse Pool\"    Jamilah Lockhart RN      "

## 2025-07-29 NOTE — TELEPHONE ENCOUNTER
[facility]   2025     Name: Nahun Villalobos   : 1945     Background:  Pt having loose stools     Orders:  psyllium (METAMUCIL/KONSYL) capsule.  Give 1 capsule daily by mouth for loose stools  Loperamide (IMODIUM A-D) 2 MG tablet.  Give 1 tablet by mouth now.  For loose stools   Barrier cream to rectal area  daily and as needed after bowel movements.     Dx Loose stools.        Electronically signed by     SELMA Mccall CNP on 2025 at 10:02 AM

## 2025-08-04 ENCOUNTER — PATIENT OUTREACH (OUTPATIENT)
Dept: CARE COORDINATION | Facility: CLINIC | Age: 80
End: 2025-08-04
Payer: COMMERCIAL

## 2025-08-14 ENCOUNTER — NURSING HOME VISIT (OUTPATIENT)
Dept: GERIATRICS | Facility: CLINIC | Age: 80
End: 2025-08-14
Payer: COMMERCIAL

## 2025-08-14 VITALS
SYSTOLIC BLOOD PRESSURE: 127 MMHG | HEART RATE: 76 BPM | DIASTOLIC BLOOD PRESSURE: 72 MMHG | TEMPERATURE: 97.8 F | WEIGHT: 159.8 LBS | HEIGHT: 66 IN | BODY MASS INDEX: 25.68 KG/M2 | RESPIRATION RATE: 18 BRPM | OXYGEN SATURATION: 95 %

## 2025-08-14 DIAGNOSIS — E87.1 HYPONATREMIA: ICD-10-CM

## 2025-08-14 DIAGNOSIS — Z79.01 ON ANTICOAGULANT THERAPY: ICD-10-CM

## 2025-08-14 DIAGNOSIS — R41.89 COGNITIVE IMPAIRMENT: ICD-10-CM

## 2025-08-14 DIAGNOSIS — I10 ESSENTIAL HYPERTENSION, BENIGN: ICD-10-CM

## 2025-08-14 DIAGNOSIS — Z79.01 ON APIXABAN THERAPY: ICD-10-CM

## 2025-08-14 DIAGNOSIS — R53.1 GENERALIZED WEAKNESS: ICD-10-CM

## 2025-08-14 DIAGNOSIS — R53.81 PHYSICAL DECONDITIONING: ICD-10-CM

## 2025-08-14 DIAGNOSIS — Z86.711 HISTORY OF PULMONARY EMBOLISM: ICD-10-CM

## 2025-08-14 DIAGNOSIS — C79.51 MALIGNANT NEOPLASM METASTATIC TO BONE (H): ICD-10-CM

## 2025-08-14 DIAGNOSIS — J44.9 CHRONIC OBSTRUCTIVE PULMONARY DISEASE, UNSPECIFIED COPD TYPE (H): ICD-10-CM

## 2025-08-14 DIAGNOSIS — Z98.890 H/O LAMINECTOMY: ICD-10-CM

## 2025-08-14 DIAGNOSIS — G47.33 OBSTRUCTIVE SLEEP APNEA SYNDROME: ICD-10-CM

## 2025-08-14 DIAGNOSIS — I50.30 HEART FAILURE WITH PRESERVED EJECTION FRACTION, NYHA CLASS II (H): ICD-10-CM

## 2025-08-14 DIAGNOSIS — N30.00 ACUTE CYSTITIS WITHOUT HEMATURIA: ICD-10-CM

## 2025-08-14 DIAGNOSIS — N40.0 BENIGN PROSTATIC HYPERPLASIA WITHOUT LOWER URINARY TRACT SYMPTOMS: ICD-10-CM

## 2025-08-14 DIAGNOSIS — C34.11 MALIGNANT NEOPLASM OF UPPER LOBE OF RIGHT LUNG (H): ICD-10-CM

## 2025-08-14 DIAGNOSIS — Z98.890 S/P LAMINECTOMY: ICD-10-CM

## 2025-08-14 DIAGNOSIS — K52.9 CHRONIC DIARRHEA: ICD-10-CM

## 2025-08-14 DIAGNOSIS — M48.02 CERVICAL STENOSIS OF SPINAL CANAL: Primary | ICD-10-CM

## 2025-08-14 DIAGNOSIS — E11.69 TYPE 2 DIABETES MELLITUS WITH OTHER SPECIFIED COMPLICATION, WITHOUT LONG-TERM CURRENT USE OF INSULIN (H): ICD-10-CM

## 2025-08-14 PROCEDURE — 99316 NF DSCHRG MGMT 30 MIN+: CPT | Performed by: NURSE PRACTITIONER

## 2025-08-18 ENCOUNTER — PATIENT OUTREACH (OUTPATIENT)
Dept: CARE COORDINATION | Facility: CLINIC | Age: 80
End: 2025-08-18
Payer: COMMERCIAL

## 2025-08-18 RX ORDER — OXYCODONE HYDROCHLORIDE 5 MG/1
5 CAPSULE ORAL EVERY 8 HOURS PRN
COMMUNITY

## 2025-08-19 ENCOUNTER — DOCUMENTATION ONLY (OUTPATIENT)
Dept: GERIATRICS | Facility: CLINIC | Age: 80
End: 2025-08-19
Payer: COMMERCIAL

## 2025-08-19 DIAGNOSIS — J44.9 CHRONIC OBSTRUCTIVE PULMONARY DISEASE, UNSPECIFIED COPD TYPE (H): Primary | ICD-10-CM

## 2025-08-20 ENCOUNTER — TELEPHONE (OUTPATIENT)
Dept: NEUROSURGERY | Facility: CLINIC | Age: 80
End: 2025-08-20
Payer: COMMERCIAL

## 2025-08-20 DIAGNOSIS — Z98.890 S/P SPINAL SURGERY: ICD-10-CM

## 2025-08-20 DIAGNOSIS — M48.02 SPINAL STENOSIS IN CERVICAL REGION: Primary | ICD-10-CM

## 2025-08-21 ENCOUNTER — MEDICAL CORRESPONDENCE (OUTPATIENT)
Dept: HEALTH INFORMATION MANAGEMENT | Facility: CLINIC | Age: 80
End: 2025-08-21
Payer: COMMERCIAL

## 2025-08-21 ENCOUNTER — TRANSFERRED RECORDS (OUTPATIENT)
Dept: HEALTH INFORMATION MANAGEMENT | Facility: CLINIC | Age: 80
End: 2025-08-21
Payer: COMMERCIAL

## 2025-08-21 ENCOUNTER — TELEPHONE (OUTPATIENT)
Dept: NEUROSURGERY | Facility: CLINIC | Age: 80
End: 2025-08-21
Payer: COMMERCIAL

## 2025-08-26 ENCOUNTER — TELEPHONE (OUTPATIENT)
Dept: WOUND CARE | Facility: CLINIC | Age: 80
End: 2025-08-26

## (undated) DEVICE — PACK CYSTO UMMC CUSTOM

## (undated) DEVICE — PAD CHUX UNDERPAD 23X24" 7136

## (undated) DEVICE — DRSG MEPILEX BORDER 6"X6" 595600

## (undated) DEVICE — DRAPE MAYO STAND 23X54 8337

## (undated) DEVICE — GLOVE BIOGEL PI MICRO SZ 7.0 48570

## (undated) DEVICE — DRAPE SHEET REV FOLD 3/4 9349

## (undated) DEVICE — SOL NACL 0.9% IRRIG 3000ML BAG 2B7477

## (undated) DEVICE — SYR BULB IRRIG DOVER 60 ML LATEX FREE 67000

## (undated) DEVICE — CATH TRAY FOLEY SURESTEP 16FR W/TMP PRB STLK LATEX A319416AM

## (undated) DEVICE — LINEN TOWEL PACK X5 5464

## (undated) DEVICE — LINEN TOWEL PACK X6 WHITE 5487

## (undated) DEVICE — SYR 30ML LL W/O NDL 302832

## (undated) DEVICE — SOL WATER IRRIG 1000ML BOTTLE 2F7114

## (undated) DEVICE — PACK NEURO MINOR UMMC SNE32MNMU4

## (undated) DEVICE — CATH FOLEY 3WAY 24FR 30ML LATEX 0167SI24

## (undated) DEVICE — DRAPE POUCH INSTRUMENT 1018

## (undated) DEVICE — ESU GROUND PAD ADULT W/CORD E7507

## (undated) DEVICE — WIPES FOLEY CARE SURESTEP PROVON DFC100

## (undated) DEVICE — GUIDEWIRE SENSOR DUAL FLEX STR 0.035"X150CM M0066703080

## (undated) DEVICE — OINTMENT ANTIBIOTIC BACITRACIN ZINC .9 G 1171

## (undated) DEVICE — NDL BLUNT 17GA 1.5" 8881202330

## (undated) DEVICE — SYR 70ML TOOMEY 041170

## (undated) DEVICE — SOL NACL 0.9% IRRIG 1000ML BOTTLE 2F7124

## (undated) DEVICE — EVACUATOR BLADDER UROVAC LATEX M0067301250

## (undated) DEVICE — DRAIN ROUND W/RESERV KIT JACKSON PRATT 10FR 400ML SU130-402D

## (undated) DEVICE — BAG DRAINAGE URO CATCHER II 4-040-14-10

## (undated) DEVICE — PACK GOWN 3/PK DISP XL SBA32GPFCB

## (undated) DEVICE — ESU ELEC CUTTING LOOP 24/26FR .35MM BIPOLAR 27040GP1-S

## (undated) DEVICE — SPECIMEN CONTAINER 5OZ STERILE 2600SA

## (undated) DEVICE — DRAPE STERI TOWEL LG 1010

## (undated) DEVICE — PREP CHLORAPREP CLEAR 3ML 930400

## (undated) DEVICE — SU VICRYL 0 CT-1 CR 8X18" J740D

## (undated) DEVICE — GLOVE PROTEXIS POWDER FREE SMT 6.5  2D72PT65X

## (undated) DEVICE — STRAP UNIVERSAL POSITIONING 2-PIECE 4X47X76" US2P3C4W01

## (undated) DEVICE — SU VICRYL 2-0 CT-2 CR 8X18" J726D

## (undated) DEVICE — PREP POVIDONE-IODINE 10% SOLUTION 4OZ BOTTLE MDS093944

## (undated) DEVICE — SPONGE SURGIFOAM 100 1974

## (undated) DEVICE — SUCTION MANIFOLD NEPTUNE 2 SYS 4 PORT 0702-020-000

## (undated) DEVICE — ESU HOLSTER PLASTIC DISP E2400

## (undated) DEVICE — SUCTION MANIFOLD DORNOCH ULTRA CART UL-CL500

## (undated) DEVICE — SYR 10ML FINGER CONTROL W/O NDL 309695

## (undated) DEVICE — ESU PENCIL SMOKE EVAC W/ROCKER SWITCH 0703-047-000

## (undated) DEVICE — DRAPE C-ARM W/STRAPS 42X72" 07-CA104

## (undated) DEVICE — BAG URINARY DRAIN LUBRISIL IC 4000ML LF 253509A

## (undated) DEVICE — PREP POVIDONE-IODINE 7.5% SCRUB 4OZ BOTTLE MDS093945

## (undated) DEVICE — NDL ANGIOCATH 14GA 1.25" 4048

## (undated) DEVICE — SPONGE COTTONOID NEURO 1/2"X1/2" 30-054

## (undated) DEVICE — JELLY LUBRICATING SURGILUBE 2OZ TUBE

## (undated) DEVICE — ESU ELEC BLADE 2.75" COATED/INSULATED E1455

## (undated) DEVICE — SPONGE SURGIFOAM 01GM POWDER 1978

## (undated) DEVICE — SU ETHILON 3-0 PS-1 18" 1663H

## (undated) DEVICE — PIN SKULL MAYFIELD ADULT TITANIUM 3/PK A1120

## (undated) DEVICE — BUR STRK CARBIDE MATCH HEAD 3.0MM 5820-107-530C

## (undated) DEVICE — GLOVE PROTEXIS BLUE W/NEU-THERA 7.5  2D73EB75

## (undated) RX ORDER — CEFAZOLIN SODIUM/WATER 2 G/20 ML
SYRINGE (ML) INTRAVENOUS
Status: DISPENSED
Start: 2025-05-27

## (undated) RX ORDER — POLYETHYLENE GLYCOL 3350 17 G/17G
POWDER, FOR SOLUTION ORAL
Status: DISPENSED
Start: 2025-05-28

## (undated) RX ORDER — FENTANYL CITRATE-0.9 % NACL/PF 10 MCG/ML
PLASTIC BAG, INJECTION (ML) INTRAVENOUS
Status: DISPENSED
Start: 2025-05-27

## (undated) RX ORDER — INDOCYANINE GREEN AND WATER 25 MG
KIT INJECTION
Status: DISPENSED
Start: 2018-04-30

## (undated) RX ORDER — FENTANYL CITRATE 50 UG/ML
INJECTION, SOLUTION INTRAMUSCULAR; INTRAVENOUS
Status: DISPENSED
Start: 2025-05-27

## (undated) RX ORDER — ONDANSETRON 2 MG/ML
INJECTION INTRAMUSCULAR; INTRAVENOUS
Status: DISPENSED
Start: 2025-05-27

## (undated) RX ORDER — SODIUM CHLORIDE 9 MG/ML
INJECTION, SOLUTION INTRAVENOUS
Status: DISPENSED
Start: 2025-05-27

## (undated) RX ORDER — ALBUTEROL SULFATE 90 UG/1
AEROSOL, METERED RESPIRATORY (INHALATION)
Status: DISPENSED
Start: 2018-04-30

## (undated) RX ORDER — METOPROLOL TARTRATE 1 MG/ML
INJECTION, SOLUTION INTRAVENOUS
Status: DISPENSED
Start: 2018-04-30

## (undated) RX ORDER — FENTANYL CITRATE 50 UG/ML
INJECTION, SOLUTION INTRAMUSCULAR; INTRAVENOUS
Status: DISPENSED
Start: 2018-04-30

## (undated) RX ORDER — FAMOTIDINE 20 MG/1
TABLET, FILM COATED ORAL
Status: DISPENSED
Start: 2025-05-27

## (undated) RX ORDER — HYDROMORPHONE HYDROCHLORIDE 1 MG/ML
INJECTION, SOLUTION INTRAMUSCULAR; INTRAVENOUS; SUBCUTANEOUS
Status: DISPENSED
Start: 2018-04-30

## (undated) RX ORDER — AMOXICILLIN 250 MG
CAPSULE ORAL
Status: DISPENSED
Start: 2025-05-28

## (undated) RX ORDER — OXYCODONE HYDROCHLORIDE 5 MG/1
TABLET ORAL
Status: DISPENSED
Start: 2025-05-28

## (undated) RX ORDER — CEFAZOLIN SODIUM 2 G/100ML
INJECTION, SOLUTION INTRAVENOUS
Status: DISPENSED
Start: 2018-04-30

## (undated) RX ORDER — LIDOCAINE HYDROCHLORIDE 10 MG/ML
INJECTION, SOLUTION EPIDURAL; INFILTRATION; INTRACAUDAL; PERINEURAL
Status: DISPENSED
Start: 2024-07-01

## (undated) RX ORDER — LIDOCAINE HYDROCHLORIDE 20 MG/ML
INJECTION, SOLUTION EPIDURAL; INFILTRATION; INTRACAUDAL; PERINEURAL
Status: DISPENSED
Start: 2018-04-30

## (undated) RX ORDER — PROPOFOL 10 MG/ML
INJECTION, EMULSION INTRAVENOUS
Status: DISPENSED
Start: 2025-05-27

## (undated) RX ORDER — IPRATROPIUM BROMIDE AND ALBUTEROL SULFATE 2.5; .5 MG/3ML; MG/3ML
SOLUTION RESPIRATORY (INHALATION)
Status: DISPENSED
Start: 2018-04-30

## (undated) RX ORDER — REGADENOSON 0.08 MG/ML
INJECTION, SOLUTION INTRAVENOUS
Status: DISPENSED
Start: 2020-07-13

## (undated) RX ORDER — PROPOFOL 10 MG/ML
INJECTION, EMULSION INTRAVENOUS
Status: DISPENSED
Start: 2018-04-30

## (undated) RX ORDER — AMOXICILLIN 250 MG
CAPSULE ORAL
Status: DISPENSED
Start: 2025-05-27

## (undated) RX ORDER — METHOCARBAMOL 500 MG/1
TABLET, FILM COATED ORAL
Status: DISPENSED
Start: 2025-05-28

## (undated) RX ORDER — OXYCODONE HYDROCHLORIDE 10 MG/1
TABLET ORAL
Status: DISPENSED
Start: 2025-05-27

## (undated) RX ORDER — GABAPENTIN 300 MG/1
CAPSULE ORAL
Status: DISPENSED
Start: 2025-05-27

## (undated) RX ORDER — CEFAZOLIN SODIUM 1 G/3ML
INJECTION, POWDER, FOR SOLUTION INTRAMUSCULAR; INTRAVENOUS
Status: DISPENSED
Start: 2025-05-28

## (undated) RX ORDER — REGADENOSON 0.08 MG/ML
INJECTION, SOLUTION INTRAVENOUS
Status: DISPENSED
Start: 2017-11-07

## (undated) RX ORDER — ONDANSETRON 2 MG/ML
INJECTION INTRAMUSCULAR; INTRAVENOUS
Status: DISPENSED
Start: 2018-04-30

## (undated) RX ORDER — HYDROMORPHONE HCL IN WATER/PF 6 MG/30 ML
PATIENT CONTROLLED ANALGESIA SYRINGE INTRAVENOUS
Status: DISPENSED
Start: 2025-05-28

## (undated) RX ORDER — DEXAMETHASONE SODIUM PHOSPHATE 4 MG/ML
INJECTION, SOLUTION INTRA-ARTICULAR; INTRALESIONAL; INTRAMUSCULAR; INTRAVENOUS; SOFT TISSUE
Status: DISPENSED
Start: 2025-05-27

## (undated) RX ORDER — OXYCODONE HYDROCHLORIDE 5 MG/1
TABLET ORAL
Status: DISPENSED
Start: 2018-04-30

## (undated) RX ORDER — ESMOLOL HYDROCHLORIDE 10 MG/ML
INJECTION INTRAVENOUS
Status: DISPENSED
Start: 2018-04-30

## (undated) RX ORDER — LIDOCAINE HYDROCHLORIDE AND EPINEPHRINE 10; 10 MG/ML; UG/ML
INJECTION, SOLUTION INFILTRATION; PERINEURAL
Status: DISPENSED
Start: 2025-05-27

## (undated) RX ORDER — PHENYLEPHRINE HCL IN 0.9% NACL 1 MG/10 ML
SYRINGE (ML) INTRAVENOUS
Status: DISPENSED
Start: 2018-04-30

## (undated) RX ORDER — SODIUM CHLORIDE 9 MG/ML
INJECTION, SOLUTION INTRAVENOUS
Status: DISPENSED
Start: 2025-05-28

## (undated) RX ORDER — LIDOCAINE HYDROCHLORIDE 20 MG/ML
JELLY TOPICAL
Status: DISPENSED
Start: 2020-02-28

## (undated) RX ORDER — ACETAMINOPHEN 325 MG/1
TABLET ORAL
Status: DISPENSED
Start: 2025-05-27

## (undated) RX ORDER — SODIUM CHLORIDE, SODIUM LACTATE, POTASSIUM CHLORIDE, CALCIUM CHLORIDE 600; 310; 30; 20 MG/100ML; MG/100ML; MG/100ML; MG/100ML
INJECTION, SOLUTION INTRAVENOUS
Status: DISPENSED
Start: 2018-04-30